# Patient Record
Sex: MALE | Race: BLACK OR AFRICAN AMERICAN | NOT HISPANIC OR LATINO | Employment: UNEMPLOYED | URBAN - METROPOLITAN AREA
[De-identification: names, ages, dates, MRNs, and addresses within clinical notes are randomized per-mention and may not be internally consistent; named-entity substitution may affect disease eponyms.]

---

## 2022-08-05 ENCOUNTER — TELEPHONE (OUTPATIENT)
Dept: TRANSPLANT | Facility: CLINIC | Age: 57
End: 2022-08-05
Payer: MEDICAID

## 2022-08-05 DIAGNOSIS — I50.9 CONGESTIVE HEART FAILURE, UNSPECIFIED HF CHRONICITY, UNSPECIFIED HEART FAILURE TYPE: Primary | ICD-10-CM

## 2022-09-13 ENCOUNTER — HOSPITAL ENCOUNTER (OUTPATIENT)
Dept: PULMONOLOGY | Facility: CLINIC | Age: 57
Discharge: HOME | End: 2022-09-13
Attending: INTERNAL MEDICINE
Payer: MEDICAID

## 2022-09-13 VITALS — HEIGHT: 71 IN | WEIGHT: 170 LBS | BODY MASS INDEX: 23.8 KG/M2

## 2022-09-13 DIAGNOSIS — I50.9 CONGESTIVE HEART FAILURE, UNSPECIFIED HF CHRONICITY, UNSPECIFIED HEART FAILURE TYPE: ICD-10-CM

## 2022-09-13 PROCEDURE — 94618 PULMONARY STRESS TESTING: ICD-10-PCS | Mod: 26,S$PBB,NTX, | Performed by: INTERNAL MEDICINE

## 2022-09-13 PROCEDURE — 94618 PULMONARY STRESS TESTING: CPT | Mod: PBBFAC,NTX | Performed by: INTERNAL MEDICINE

## 2022-09-13 PROCEDURE — 94618 PULMONARY STRESS TESTING: CPT | Mod: 26,S$PBB,NTX, | Performed by: INTERNAL MEDICINE

## 2022-09-13 NOTE — PROCEDURES
Tera Griffiths is a 57 y.o.  male patient, who presents for a 6 minute walk test ordered by MD Waldemar.  The diagnosis is Pre Heart Transplant; Cardiomyopathy.  The patient's BMI is 23.7 kg/m2.  Predicted distance (lower limit of normal) is 472.34 meters.      Test Results:    The test was completed without stopping.  The total time walked was 360 seconds.  During walking, the patient reported:  Lightheadedness; Dyspnea; Chest Tightness.  The patient used no assistive devices during testing.     09/13/2022---------Distance: 243.84 meters (800 feet)     O2 Sat % Supplemental Oxygen Heart Rate Blood Pressure Violeta Scale   Pre-exercise  (Resting) 100 % Room Air 83 bpm 151/119 mmHg 5-6   During Exercise 94 % Room Air 87 bpm 142/105 mmHg 5-6   Post-exercise  (Recovery) 96 % Room Air  76 bpm       Recovery Time: 113 seconds    Performing nurse/tech: Vipin LIN      PREVIOUS STUDY:   The patient has not had a previous study.      CLINICAL INTERPRETATION:  Six minute walk distance is 243.84 meters (800 feet) with heavy dyspnea.  During exercise, there was significant desaturation while breathing room air.  Both blood pressure and heart rate remained stable with walking.  Hypertension was present prior to exercise.  The patient reported non-pulmonary symptoms during exercise.  Significant exercise impairment is likely due to cardiovascular causes and subjective symptoms.  The patient did complete the study, walking 360 seconds of the 360 second test.  No previous study performed.  Based upon age and body mass index, exercise capacity is less than predicted.

## 2022-09-14 ENCOUNTER — TELEPHONE (OUTPATIENT)
Dept: TRANSPLANT | Facility: CLINIC | Age: 57
End: 2022-09-14
Payer: MEDICAID

## 2022-09-14 NOTE — TELEPHONE ENCOUNTER
I called Tera Griffiths and queried about his missed appointments yesterday- pt was unaware of his MD appts, but came to his 6 min walk and lab.  Asked to reschedule his consult.

## 2022-09-23 ENCOUNTER — HOSPITAL ENCOUNTER (OUTPATIENT)
Facility: HOSPITAL | Age: 57
LOS: 1 days | Discharge: HOME | End: 2022-09-24
Attending: INTERNAL MEDICINE | Admitting: INTERNAL MEDICINE
Payer: MEDICAID

## 2022-09-23 ENCOUNTER — OFFICE VISIT (OUTPATIENT)
Dept: TRANSPLANT | Facility: CLINIC | Age: 57
End: 2022-09-23
Payer: MEDICAID

## 2022-09-23 VITALS
BODY MASS INDEX: 24.93 KG/M2 | DIASTOLIC BLOOD PRESSURE: 89 MMHG | HEART RATE: 69 BPM | SYSTOLIC BLOOD PRESSURE: 147 MMHG | HEIGHT: 72 IN | WEIGHT: 184.06 LBS

## 2022-09-23 DIAGNOSIS — I50.9 ACUTE DECOMPENSATED HEART FAILURE: ICD-10-CM

## 2022-09-23 DIAGNOSIS — I50.43 ACUTE ON CHRONIC COMBINED SYSTOLIC AND DIASTOLIC HEART FAILURE: ICD-10-CM

## 2022-09-23 DIAGNOSIS — I25.2 HX OF NON-ST ELEVATION MYOCARDIAL INFARCTION (NSTEMI): ICD-10-CM

## 2022-09-23 DIAGNOSIS — Z95.810 ICD (IMPLANTABLE CARDIOVERTER-DEFIBRILLATOR) IN PLACE: ICD-10-CM

## 2022-09-23 DIAGNOSIS — I10 PRIMARY HYPERTENSION: ICD-10-CM

## 2022-09-23 DIAGNOSIS — I50.9 CONGESTIVE HEART FAILURE, UNSPECIFIED HF CHRONICITY, UNSPECIFIED HEART FAILURE TYPE: Primary | ICD-10-CM

## 2022-09-23 DIAGNOSIS — I25.10 CORONARY ARTERY DISEASE INVOLVING NATIVE CORONARY ARTERY OF NATIVE HEART WITHOUT ANGINA PECTORIS: ICD-10-CM

## 2022-09-23 DIAGNOSIS — I50.9 CONGESTIVE HEART FAILURE (CHF): ICD-10-CM

## 2022-09-23 DIAGNOSIS — E78.5 DYSLIPIDEMIA: ICD-10-CM

## 2022-09-23 DIAGNOSIS — I50.9 CONGESTIVE HEART FAILURE: ICD-10-CM

## 2022-09-23 DIAGNOSIS — E08.00 DIABETES MELLITUS DUE TO UNDERLYING CONDITION WITH HYPEROSMOLARITY WITHOUT COMA, WITHOUT LONG-TERM CURRENT USE OF INSULIN: ICD-10-CM

## 2022-09-23 DIAGNOSIS — I49.9 ABNORMAL HEART RHYTHM: ICD-10-CM

## 2022-09-23 DIAGNOSIS — I48.19 PERSISTENT ATRIAL FIBRILLATION: ICD-10-CM

## 2022-09-23 PROBLEM — I46.9 CARDIAC ARREST WITH VENTRICULAR FIBRILLATION: Status: ACTIVE | Noted: 2022-09-23

## 2022-09-23 PROBLEM — I49.01 CARDIAC ARREST WITH VENTRICULAR FIBRILLATION: Status: ACTIVE | Noted: 2022-09-23

## 2022-09-23 PROBLEM — I48.91 ATRIAL FIBRILLATION: Status: ACTIVE | Noted: 2022-09-23

## 2022-09-23 PROBLEM — E11.9 DIABETES MELLITUS: Status: ACTIVE | Noted: 2022-09-23

## 2022-09-23 LAB
ALBUMIN SERPL BCP-MCNC: 4.1 G/DL (ref 3.5–5.2)
ALP SERPL-CCNC: 75 U/L (ref 55–135)
ALT SERPL W/O P-5'-P-CCNC: 17 U/L (ref 10–44)
AMPHET+METHAMPHET UR QL: NEGATIVE
ANION GAP SERPL CALC-SCNC: 10 MMOL/L (ref 8–16)
APTT BLDCRRT: 28.8 SEC (ref 21–32)
APTT BLDCRRT: 44.8 SEC (ref 21–32)
ASCENDING AORTA: 3.12 CM
AST SERPL-CCNC: 20 U/L (ref 10–40)
AV INDEX (PROSTH): 0.57
AV MEAN GRADIENT: 5 MMHG
AV PEAK GRADIENT: 9 MMHG
AV VALVE AREA: 2.71 CM2
AV VELOCITY RATIO: 0.6
BARBITURATES UR QL SCN>200 NG/ML: NEGATIVE
BASOPHILS # BLD AUTO: 0.02 K/UL (ref 0–0.2)
BASOPHILS NFR BLD: 0.5 % (ref 0–1.9)
BENZODIAZ UR QL SCN>200 NG/ML: NEGATIVE
BILIRUB SERPL-MCNC: 1 MG/DL (ref 0.1–1)
BILIRUB UR QL STRIP: NEGATIVE
BNP SERPL-MCNC: 3916 PG/ML (ref 0–99)
BSA FOR ECHO PROCEDURE: 2.05 M2
BUN SERPL-MCNC: 17 MG/DL (ref 6–20)
BZE UR QL SCN: NEGATIVE
CALCIUM SERPL-MCNC: 9.2 MG/DL (ref 8.7–10.5)
CANNABINOIDS UR QL SCN: NEGATIVE
CHLORIDE SERPL-SCNC: 108 MMOL/L (ref 95–110)
CLARITY UR REFRACT.AUTO: CLEAR
CO2 SERPL-SCNC: 21 MMOL/L (ref 23–29)
COLOR UR AUTO: COLORLESS
CREAT SERPL-MCNC: 1.3 MG/DL (ref 0.5–1.4)
CREAT UR-MCNC: 43 MG/DL (ref 23–375)
CV ECHO LV RWT: 0.36 CM
DIFFERENTIAL METHOD: ABNORMAL
DOP CALC AO PEAK VEL: 1.47 M/S
DOP CALC AO VTI: 24.75 CM
DOP CALC LVOT AREA: 4.8 CM2
DOP CALC LVOT DIAMETER: 2.47 CM
DOP CALC LVOT PEAK VEL: 0.88 M/S
DOP CALC LVOT STROKE VOLUME: 67.05 CM3
DOP CALC MV VTI: 9.22 CM
DOP CALCLVOT PEAK VEL VTI: 14 CM
E/E' RATIO: 18.75 M/S
ECHO LV POSTERIOR WALL: 1.25 CM (ref 0.6–1.1)
EJECTION FRACTION: 15 %
EOSINOPHIL # BLD AUTO: 0.1 K/UL (ref 0–0.5)
EOSINOPHIL NFR BLD: 1.9 % (ref 0–8)
ERYTHROCYTE [DISTWIDTH] IN BLOOD BY AUTOMATED COUNT: 15.8 % (ref 11.5–14.5)
EST. GFR  (NO RACE VARIABLE): >60 ML/MIN/1.73 M^2
ESTIMATED AVG GLUCOSE: 117 MG/DL (ref 68–131)
ETHANOL UR-MCNC: <10 MG/DL
FRACTIONAL SHORTENING: 11 % (ref 28–44)
GLUCOSE SERPL-MCNC: 74 MG/DL (ref 70–110)
GLUCOSE UR QL STRIP: NEGATIVE
HBA1C MFR BLD: 5.7 % (ref 4–5.6)
HCT VFR BLD AUTO: 43.5 % (ref 40–54)
HGB BLD-MCNC: 13.5 G/DL (ref 14–18)
HGB UR QL STRIP: NEGATIVE
IMM GRANULOCYTES # BLD AUTO: 0.01 K/UL (ref 0–0.04)
IMM GRANULOCYTES NFR BLD AUTO: 0.3 % (ref 0–0.5)
INR PPP: 1.2 (ref 0.8–1.2)
INTERVENTRICULAR SEPTUM: 0.77 CM (ref 0.6–1.1)
IVRT: 91.34 MSEC
KETONES UR QL STRIP: NEGATIVE
LA MAJOR: 8.57 CM
LA MINOR: 8.29 CM
LA WIDTH: 5.92 CM
LACTATE SERPL-SCNC: 0.7 MMOL/L (ref 0.5–2.2)
LEFT ATRIUM SIZE: 6.45 CM
LEFT ATRIUM VOLUME INDEX MOD: 93.2 ML/M2
LEFT ATRIUM VOLUME INDEX: 133.4 ML/M2
LEFT ATRIUM VOLUME MOD: 191.09 CM3
LEFT ATRIUM VOLUME: 273.53 CM3
LEFT INTERNAL DIMENSION IN SYSTOLE: 6.16 CM (ref 2.1–4)
LEFT VENTRICLE DIASTOLIC VOLUME INDEX: 120.66 ML/M2
LEFT VENTRICLE DIASTOLIC VOLUME: 247.35 ML
LEFT VENTRICLE MASS INDEX: 155 G/M2
LEFT VENTRICLE SYSTOLIC VOLUME INDEX: 93.4 ML/M2
LEFT VENTRICLE SYSTOLIC VOLUME: 191.42 ML
LEFT VENTRICULAR INTERNAL DIMENSION IN DIASTOLE: 6.9 CM (ref 3.5–6)
LEFT VENTRICULAR MASS: 317.78 G
LEUKOCYTE ESTERASE UR QL STRIP: NEGATIVE
LV LATERAL E/E' RATIO: 18.75 M/S
LV SEPTAL E/E' RATIO: 18.75 M/S
LYMPHOCYTES # BLD AUTO: 1 K/UL (ref 1–4.8)
LYMPHOCYTES NFR BLD: 27.6 % (ref 18–48)
MAGNESIUM SERPL-MCNC: 1.9 MG/DL (ref 1.6–2.6)
MCH RBC QN AUTO: 27.6 PG (ref 27–31)
MCHC RBC AUTO-ENTMCNC: 31 G/DL (ref 32–36)
MCV RBC AUTO: 89 FL (ref 82–98)
METHADONE UR QL SCN>300 NG/ML: NEGATIVE
MONOCYTES # BLD AUTO: 0.4 K/UL (ref 0.3–1)
MONOCYTES NFR BLD: 11.9 % (ref 4–15)
MV MEAN GRADIENT: 0 MMHG
MV PEAK E VEL: 0.75 M/S
MV PEAK GRADIENT: 1 MMHG
MV STENOSIS PRESSURE HALF TIME: 82.36 MS
MV VALVE AREA BY CONTINUITY EQUATION: 7.27 CM2
MV VALVE AREA P 1/2 METHOD: 2.67 CM2
NEUTROPHILS # BLD AUTO: 2.1 K/UL (ref 1.8–7.7)
NEUTROPHILS NFR BLD: 57.8 % (ref 38–73)
NITRITE UR QL STRIP: NEGATIVE
NRBC BLD-RTO: 0 /100 WBC
OPIATES UR QL SCN: NEGATIVE
PCP UR QL SCN>25 NG/ML: NEGATIVE
PH UR STRIP: 7 [PH] (ref 5–8)
PHOSPHATE SERPL-MCNC: 2.9 MG/DL (ref 2.7–4.5)
PISA MRMAX VEL: 0.05 M/S
PISA TR MAX VEL: 2.86 M/S
PLATELET # BLD AUTO: 262 K/UL (ref 150–450)
PMV BLD AUTO: 10.7 FL (ref 9.2–12.9)
POCT GLUCOSE: 63 MG/DL (ref 70–110)
POCT GLUCOSE: 82 MG/DL (ref 70–110)
POCT GLUCOSE: 85 MG/DL (ref 70–110)
POTASSIUM SERPL-SCNC: 4.3 MMOL/L (ref 3.5–5.1)
PROT SERPL-MCNC: 7.3 G/DL (ref 6–8.4)
PROT UR QL STRIP: NEGATIVE
PROTHROMBIN TIME: 11.9 SEC (ref 9–12.5)
PULM VEIN S/D RATIO: 0.32
PV PEAK D VEL: 0.63 M/S
PV PEAK S VEL: 0.2 M/S
RA MAJOR: 8.26 CM
RA PRESSURE: 8 MMHG
RA WIDTH: 5.85 CM
RBC # BLD AUTO: 4.89 M/UL (ref 4.6–6.2)
RIGHT VENTRICULAR END-DIASTOLIC DIMENSION: 4.65 CM
RV TISSUE DOPPLER FREE WALL SYSTOLIC VELOCITY 1 (APICAL 4 CHAMBER VIEW): 11.13 CM/S
SINUS: 3.12 CM
SODIUM SERPL-SCNC: 139 MMOL/L (ref 136–145)
SP GR UR STRIP: 1.01 (ref 1–1.03)
STJ: 2.66 CM
TDI LATERAL: 0.04 M/S
TDI SEPTAL: 0.04 M/S
TDI: 0.04 M/S
TOXICOLOGY INFORMATION: NORMAL
TR MAX PG: 33 MMHG
TRICUSPID ANNULAR PLANE SYSTOLIC EXCURSION: 1.19 CM
TROPONIN I SERPL DL<=0.01 NG/ML-MCNC: 0.02 NG/ML (ref 0–0.03)
TV REST PULMONARY ARTERY PRESSURE: 41 MMHG
URN SPEC COLLECT METH UR: ABNORMAL
WBC # BLD AUTO: 3.7 K/UL (ref 3.9–12.7)

## 2022-09-23 PROCEDURE — 85025 COMPLETE CBC W/AUTO DIFF WBC: CPT | Mod: NTX | Performed by: STUDENT IN AN ORGANIZED HEALTH CARE EDUCATION/TRAINING PROGRAM

## 2022-09-23 PROCEDURE — 99218 PR INITIAL OBSERVATION CARE,LEVL I: CPT | Mod: NTX,,, | Performed by: INTERNAL MEDICINE

## 2022-09-23 PROCEDURE — G0378 HOSPITAL OBSERVATION PER HR: HCPCS | Mod: NTX

## 2022-09-23 PROCEDURE — 25000003 PHARM REV CODE 250: Mod: NTX | Performed by: STUDENT IN AN ORGANIZED HEALTH CARE EDUCATION/TRAINING PROGRAM

## 2022-09-23 PROCEDURE — 3077F SYST BP >= 140 MM HG: CPT | Mod: CPTII,TXP,, | Performed by: INTERNAL MEDICINE

## 2022-09-23 PROCEDURE — 1159F MED LIST DOCD IN RCRD: CPT | Mod: CPTII,TXP,, | Performed by: INTERNAL MEDICINE

## 2022-09-23 PROCEDURE — 85730 THROMBOPLASTIN TIME PARTIAL: CPT | Mod: 91,NTX | Performed by: INTERNAL MEDICINE

## 2022-09-23 PROCEDURE — 85610 PROTHROMBIN TIME: CPT | Mod: NTX | Performed by: STUDENT IN AN ORGANIZED HEALTH CARE EDUCATION/TRAINING PROGRAM

## 2022-09-23 PROCEDURE — 3008F BODY MASS INDEX DOCD: CPT | Mod: CPTII,TXP,, | Performed by: INTERNAL MEDICINE

## 2022-09-23 PROCEDURE — 99999 PR PBB SHADOW E&M-EST. PATIENT-LVL III: CPT | Mod: PBBFAC,TXP,, | Performed by: INTERNAL MEDICINE

## 2022-09-23 PROCEDURE — 36415 COLL VENOUS BLD VENIPUNCTURE: CPT | Mod: NTX | Performed by: STUDENT IN AN ORGANIZED HEALTH CARE EDUCATION/TRAINING PROGRAM

## 2022-09-23 PROCEDURE — 4010F PR ACE/ARB THEARPY RXD/TAKEN: ICD-10-PCS | Mod: CPTII,TXP,, | Performed by: INTERNAL MEDICINE

## 2022-09-23 PROCEDURE — 63600175 PHARM REV CODE 636 W HCPCS: Mod: NTX | Performed by: STUDENT IN AN ORGANIZED HEALTH CARE EDUCATION/TRAINING PROGRAM

## 2022-09-23 PROCEDURE — 3079F PR MOST RECENT DIASTOLIC BLOOD PRESSURE 80-89 MM HG: ICD-10-PCS | Mod: CPTII,TXP,, | Performed by: INTERNAL MEDICINE

## 2022-09-23 PROCEDURE — 84100 ASSAY OF PHOSPHORUS: CPT | Mod: NTX | Performed by: STUDENT IN AN ORGANIZED HEALTH CARE EDUCATION/TRAINING PROGRAM

## 2022-09-23 PROCEDURE — 3079F DIAST BP 80-89 MM HG: CPT | Mod: CPTII,TXP,, | Performed by: INTERNAL MEDICINE

## 2022-09-23 PROCEDURE — 1159F PR MEDICATION LIST DOCUMENTED IN MEDICAL RECORD: ICD-10-PCS | Mod: CPTII,TXP,, | Performed by: INTERNAL MEDICINE

## 2022-09-23 PROCEDURE — 4010F ACE/ARB THERAPY RXD/TAKEN: CPT | Mod: CPTII,TXP,, | Performed by: INTERNAL MEDICINE

## 2022-09-23 PROCEDURE — 83735 ASSAY OF MAGNESIUM: CPT | Mod: NTX | Performed by: STUDENT IN AN ORGANIZED HEALTH CARE EDUCATION/TRAINING PROGRAM

## 2022-09-23 PROCEDURE — 83605 ASSAY OF LACTIC ACID: CPT | Mod: NTX | Performed by: STUDENT IN AN ORGANIZED HEALTH CARE EDUCATION/TRAINING PROGRAM

## 2022-09-23 PROCEDURE — 99204 OFFICE O/P NEW MOD 45 MIN: CPT | Mod: S$PBB,TXP,, | Performed by: INTERNAL MEDICINE

## 2022-09-23 PROCEDURE — 93005 ELECTROCARDIOGRAM TRACING: CPT | Mod: NTX

## 2022-09-23 PROCEDURE — 36415 COLL VENOUS BLD VENIPUNCTURE: CPT | Mod: NTX | Performed by: INTERNAL MEDICINE

## 2022-09-23 PROCEDURE — 3008F PR BODY MASS INDEX (BMI) DOCUMENTED: ICD-10-PCS | Mod: CPTII,TXP,, | Performed by: INTERNAL MEDICINE

## 2022-09-23 PROCEDURE — 3077F PR MOST RECENT SYSTOLIC BLOOD PRESSURE >= 140 MM HG: ICD-10-PCS | Mod: CPTII,TXP,, | Performed by: INTERNAL MEDICINE

## 2022-09-23 PROCEDURE — 93010 ELECTROCARDIOGRAM REPORT: CPT | Mod: NTX,,, | Performed by: STUDENT IN AN ORGANIZED HEALTH CARE EDUCATION/TRAINING PROGRAM

## 2022-09-23 PROCEDURE — 93010 EKG 12-LEAD: ICD-10-PCS | Mod: NTX,,, | Performed by: STUDENT IN AN ORGANIZED HEALTH CARE EDUCATION/TRAINING PROGRAM

## 2022-09-23 PROCEDURE — 83036 HEMOGLOBIN GLYCOSYLATED A1C: CPT | Mod: NTX | Performed by: STUDENT IN AN ORGANIZED HEALTH CARE EDUCATION/TRAINING PROGRAM

## 2022-09-23 PROCEDURE — 93010 EKG 12-LEAD: ICD-10-PCS | Mod: NTX,,, | Performed by: INTERNAL MEDICINE

## 2022-09-23 PROCEDURE — 99218 PR INITIAL OBSERVATION CARE,LEVL I: ICD-10-PCS | Mod: NTX,,, | Performed by: INTERNAL MEDICINE

## 2022-09-23 PROCEDURE — 85730 THROMBOPLASTIN TIME PARTIAL: CPT | Mod: NTX | Performed by: STUDENT IN AN ORGANIZED HEALTH CARE EDUCATION/TRAINING PROGRAM

## 2022-09-23 PROCEDURE — 80307 DRUG TEST PRSMV CHEM ANLYZR: CPT | Mod: NTX | Performed by: STUDENT IN AN ORGANIZED HEALTH CARE EDUCATION/TRAINING PROGRAM

## 2022-09-23 PROCEDURE — 99213 OFFICE O/P EST LOW 20 MIN: CPT | Mod: PBBFAC,TXP,25 | Performed by: INTERNAL MEDICINE

## 2022-09-23 PROCEDURE — 99999 PR PBB SHADOW E&M-EST. PATIENT-LVL III: ICD-10-PCS | Mod: PBBFAC,TXP,, | Performed by: INTERNAL MEDICINE

## 2022-09-23 PROCEDURE — 84484 ASSAY OF TROPONIN QUANT: CPT | Mod: NTX | Performed by: STUDENT IN AN ORGANIZED HEALTH CARE EDUCATION/TRAINING PROGRAM

## 2022-09-23 PROCEDURE — 81003 URINALYSIS AUTO W/O SCOPE: CPT | Mod: NTX | Performed by: STUDENT IN AN ORGANIZED HEALTH CARE EDUCATION/TRAINING PROGRAM

## 2022-09-23 PROCEDURE — 93010 ELECTROCARDIOGRAM REPORT: CPT | Mod: NTX,,, | Performed by: INTERNAL MEDICINE

## 2022-09-23 PROCEDURE — 94761 N-INVAS EAR/PLS OXIMETRY MLT: CPT | Mod: NTX

## 2022-09-23 PROCEDURE — 80053 COMPREHEN METABOLIC PANEL: CPT | Mod: NTX | Performed by: STUDENT IN AN ORGANIZED HEALTH CARE EDUCATION/TRAINING PROGRAM

## 2022-09-23 PROCEDURE — 83880 ASSAY OF NATRIURETIC PEPTIDE: CPT | Mod: NTX | Performed by: STUDENT IN AN ORGANIZED HEALTH CARE EDUCATION/TRAINING PROGRAM

## 2022-09-23 PROCEDURE — 99204 PR OFFICE/OUTPT VISIT, NEW, LEVL IV, 45-59 MIN: ICD-10-PCS | Mod: S$PBB,TXP,, | Performed by: INTERNAL MEDICINE

## 2022-09-23 RX ORDER — IBUPROFEN 200 MG
16 TABLET ORAL
Status: DISCONTINUED | OUTPATIENT
Start: 2022-09-23 | End: 2022-09-24 | Stop reason: HOSPADM

## 2022-09-23 RX ORDER — TORSEMIDE 20 MG/1
40 TABLET ORAL
COMMUNITY
Start: 2022-08-05 | End: 2022-11-07 | Stop reason: SDUPTHER

## 2022-09-23 RX ORDER — ASPIRIN 81 MG/1
81 TABLET ORAL DAILY
Status: DISCONTINUED | OUTPATIENT
Start: 2022-09-23 | End: 2022-09-24 | Stop reason: HOSPADM

## 2022-09-23 RX ORDER — HEPARIN SODIUM,PORCINE/D5W 25000/250
0-40 INTRAVENOUS SOLUTION INTRAVENOUS CONTINUOUS
Status: DISCONTINUED | OUTPATIENT
Start: 2022-09-23 | End: 2022-09-24 | Stop reason: HOSPADM

## 2022-09-23 RX ORDER — FUROSEMIDE 10 MG/ML
40 INJECTION INTRAMUSCULAR; INTRAVENOUS ONCE
Status: COMPLETED | OUTPATIENT
Start: 2022-09-23 | End: 2022-09-23

## 2022-09-23 RX ORDER — TAMSULOSIN HYDROCHLORIDE 0.4 MG/1
1 CAPSULE ORAL NIGHTLY
Status: ON HOLD | COMMUNITY
Start: 2022-07-31 | End: 2023-04-19

## 2022-09-23 RX ORDER — GLUCAGON 1 MG
1 KIT INJECTION
Status: DISCONTINUED | OUTPATIENT
Start: 2022-09-23 | End: 2022-09-24 | Stop reason: HOSPADM

## 2022-09-23 RX ORDER — TAMSULOSIN HYDROCHLORIDE 0.4 MG/1
1 CAPSULE ORAL NIGHTLY
Status: DISCONTINUED | OUTPATIENT
Start: 2022-09-23 | End: 2022-09-24 | Stop reason: HOSPADM

## 2022-09-23 RX ORDER — ATORVASTATIN CALCIUM 80 MG/1
80 TABLET, FILM COATED ORAL DAILY
Status: ON HOLD | COMMUNITY
Start: 2022-07-31 | End: 2023-04-22 | Stop reason: SDUPTHER

## 2022-09-23 RX ORDER — SODIUM CHLORIDE 0.9 % (FLUSH) 0.9 %
10 SYRINGE (ML) INJECTION
Status: DISCONTINUED | OUTPATIENT
Start: 2022-09-23 | End: 2022-09-24 | Stop reason: HOSPADM

## 2022-09-23 RX ORDER — MAGNESIUM SULFATE HEPTAHYDRATE 40 MG/ML
2 INJECTION, SOLUTION INTRAVENOUS ONCE
Status: COMPLETED | OUTPATIENT
Start: 2022-09-23 | End: 2022-09-23

## 2022-09-23 RX ORDER — CLOPIDOGREL BISULFATE 75 MG/1
75 TABLET ORAL DAILY
Status: ON HOLD | COMMUNITY
Start: 2022-07-31 | End: 2023-04-22 | Stop reason: SDUPTHER

## 2022-09-23 RX ORDER — CLOPIDOGREL BISULFATE 75 MG/1
75 TABLET ORAL DAILY
Status: DISCONTINUED | OUTPATIENT
Start: 2022-09-23 | End: 2022-09-23

## 2022-09-23 RX ORDER — FUROSEMIDE 10 MG/ML
40 INJECTION INTRAMUSCULAR; INTRAVENOUS ONCE
Status: DISCONTINUED | OUTPATIENT
Start: 2022-09-23 | End: 2022-09-23

## 2022-09-23 RX ORDER — INSULIN ASPART 100 [IU]/ML
0-5 INJECTION, SOLUTION INTRAVENOUS; SUBCUTANEOUS
Status: DISCONTINUED | OUTPATIENT
Start: 2022-09-23 | End: 2022-09-24 | Stop reason: HOSPADM

## 2022-09-23 RX ORDER — ATORVASTATIN CALCIUM 20 MG/1
80 TABLET, FILM COATED ORAL DAILY
Status: DISCONTINUED | OUTPATIENT
Start: 2022-09-23 | End: 2022-09-24 | Stop reason: HOSPADM

## 2022-09-23 RX ORDER — LANOLIN ALCOHOL/MO/W.PET/CERES
1 CREAM (GRAM) TOPICAL DAILY
Status: DISCONTINUED | OUTPATIENT
Start: 2022-09-23 | End: 2022-09-24 | Stop reason: HOSPADM

## 2022-09-23 RX ORDER — LIDOCAINE 50 MG/G
1 PATCH TOPICAL DAILY
Status: ON HOLD | COMMUNITY
Start: 2022-07-31 | End: 2023-09-01 | Stop reason: HOSPADM

## 2022-09-23 RX ORDER — PANTOPRAZOLE SODIUM 40 MG/1
40 TABLET, DELAYED RELEASE ORAL DAILY
Status: ON HOLD | COMMUNITY
Start: 2022-07-31 | End: 2023-04-19

## 2022-09-23 RX ORDER — GLIMEPIRIDE 4 MG/1
4 TABLET ORAL EVERY MORNING
Status: ON HOLD | COMMUNITY
Start: 2022-07-31 | End: 2022-09-24 | Stop reason: HOSPADM

## 2022-09-23 RX ORDER — SACUBITRIL AND VALSARTAN 24; 26 MG/1; MG/1
1 TABLET, FILM COATED ORAL 2 TIMES DAILY
COMMUNITY
Start: 2022-07-31 | End: 2022-11-07

## 2022-09-23 RX ORDER — SPIRONOLACTONE 25 MG/1
25 TABLET ORAL DAILY
Status: ON HOLD | COMMUNITY
Start: 2022-07-31 | End: 2023-04-19

## 2022-09-23 RX ORDER — FERROUS SULFATE 325(65) MG
325 TABLET ORAL EVERY OTHER DAY
Status: ON HOLD | COMMUNITY
Start: 2022-08-05 | End: 2023-09-01 | Stop reason: HOSPADM

## 2022-09-23 RX ORDER — IBUPROFEN 200 MG
24 TABLET ORAL
Status: DISCONTINUED | OUTPATIENT
Start: 2022-09-23 | End: 2022-09-24 | Stop reason: HOSPADM

## 2022-09-23 RX ADMIN — Medication 16 G: at 12:09

## 2022-09-23 RX ADMIN — HEPARIN SODIUM 12 UNITS/KG/HR: 10000 INJECTION, SOLUTION INTRAVENOUS at 04:09

## 2022-09-23 RX ADMIN — MAGNESIUM SULFATE HEPTAHYDRATE 2 G: 40 INJECTION, SOLUTION INTRAVENOUS at 08:09

## 2022-09-23 RX ADMIN — SACUBITRIL AND VALSARTAN 1 TABLET: 24; 26 TABLET, FILM COATED ORAL at 08:09

## 2022-09-23 RX ADMIN — FUROSEMIDE 40 MG: 10 INJECTION, SOLUTION INTRAMUSCULAR; INTRAVENOUS at 12:09

## 2022-09-23 RX ADMIN — TAMSULOSIN HYDROCHLORIDE 0.4 MG: 0.4 CAPSULE ORAL at 08:09

## 2022-09-23 RX ADMIN — ASPIRIN 81 MG: 81 TABLET, COATED ORAL at 03:09

## 2022-09-23 RX ADMIN — FERROUS SULFATE TAB 325 MG (65 MG ELEMENTAL FE) 1 EACH: 325 (65 FE) TAB at 12:09

## 2022-09-23 RX ADMIN — ATORVASTATIN CALCIUM 80 MG: 20 TABLET, FILM COATED ORAL at 12:09

## 2022-09-23 NOTE — H&P (VIEW-ONLY)
[facility] - Cardiology Stepdown  Heart Transplant  H&P    Patient Name: Tera Griffiths  MRN: [id number]  Admission Date: 9/23/2022  Attending Physician: Caden Aden MD  Primary Care Provider: Primary Doctor No  Principal Problem:<principal problem not specified>    Subjective:     History of Present Illness:  Patient is a 58 yo man with PMH/o DM type 2, dyslipidemia, HTN, CAD with NSTEMI and PCI to RCA 8/2021 complicated with VF arrest, chronic HFrEF 10%  was seen today in Advanced Heart failure and Transplant clinic by Dr. Javier Domingo . Pt was apparently referred by [facility] for  persistent severe symptoms on maximal tolerated therapy since several years.     He presents to clinic with worsening SOB and LE pedal edema since 2 days and was sent to [facility] HT team for admission. On further questioning pt states that he has been gaining weight over 12 pounds over the last 2-3 wks with symptoms of worsening sob and LE edema along with orthopnea, pnd, abdominal bloating, low apetite.  Denies any cp , fever, chills, diet/med noncompliance. He has had multiple hospital admissions for CHF exacerbations over the past 6 months and is unable to walk more than half a block without getting short of breath.       Past Medical History:   Diagnosis Date    Atrial fibrillation     CHF (congestive heart failure)     Coronary atherosclerosis of native coronary artery     Diabetes mellitus, type 2     Hypertension        No past surgical history on file.    Review of patient's allergies indicates:  No Known Allergies    Current Facility-Administered Medications   Medication    aspirin EC tablet 81 mg    atorvastatin tablet 80 mg    dextrose 10% bolus 125 mL    ferrous sulfate tablet 1 each    glucagon (human recombinant) injection 1 mg    glucose chewable tablet 16 g    glucose chewable tablet 24 g    heparin 25,000 units in dextrose 5% (100 units/ml) IV bolus from bag - ADDITIONAL PRN BOLUS - 30 units/kg     heparin 25,000 units in dextrose 5% (100 units/ml) IV bolus from bag - ADDITIONAL PRN BOLUS - 60 units/kg    heparin 25,000 units in dextrose 5% (100 units/ml) IV bolus from bag INITIAL BOLUS    heparin 25,000 units in dextrose 5% 250 mL (100 units/mL) infusion LOW INTENSITY nomogram - [facility]    insulin aspart U-100 pen 0-5 Units    sacubitriL-valsartan 24-26 mg per tablet 1 tablet    sodium chloride 0.9% flush 10 mL    tamsulosin 24 hr capsule 0.4 mg     Family History    None       Tobacco Use    Smoking status: Never    Smokeless tobacco: Never   Substance and Sexual Activity    Alcohol use: Never    Drug use: Not on file    Sexual activity: Not on file     Review of Systems   Constitutional:  Positive for appetite change, fatigue and unexpected weight change.   Respiratory:  Positive for shortness of breath.    Cardiovascular:  Positive for leg swelling. Negative for chest pain.   Gastrointestinal:  Positive for abdominal distention. Negative for abdominal pain, anal bleeding, blood in stool, diarrhea and vomiting.   Genitourinary:  Negative for difficulty urinating and dysuria.   Neurological:  Positive for light-headedness. Negative for dizziness.   Objective:     Vital Signs (Most Recent):  Temp: 97.7 °F (36.5 °C) (09/23/22 1248)  Pulse: 80 (09/23/22 1248)  Resp: 20 (09/23/22 1248)  BP: (!) 149/99 (09/23/22 1248)  SpO2: 96 % (09/23/22 1248)   Vital Signs (24h Range):  Temp:  [97.6 °F (36.4 °C)-97.7 °F (36.5 °C)] 97.7 °F (36.5 °C)  Pulse:  [69-80] 80  Resp:  [20] 20  SpO2:  [96 %] 96 %  BP: (143-152)/() 149/99     Patient Vitals for the past 72 hrs (Last 3 readings):   Weight   09/23/22 1024 83.1 kg (183 lb 3.2 oz)     Body mass index is 24.85 kg/m².      Intake/Output Summary (Last 24 hours) at 9/23/2022 1420  Last data filed at 9/23/2022 1301  Gross per 24 hour   Intake --   Output 150 ml   Net -150 ml       Physical Exam  Constitutional:       Appearance: Normal appearance.   HENT:      Head:  Normocephalic and atraumatic.   Eyes:      Extraocular Movements: Extraocular movements intact.      Pupils: Pupils are equal, round, and reactive to light.   Cardiovascular:      Rate and Rhythm: Normal rate. Rhythm irregular.      Heart sounds:     Gallop present.   Pulmonary:      Effort: Respiratory distress present.      Breath sounds: No stridor. No wheezing, rhonchi or rales.   Chest:      Chest wall: No tenderness.   Abdominal:      General: Bowel sounds are normal. There is distension.      Palpations: Abdomen is soft.      Tenderness: There is abdominal tenderness. There is no guarding.   Musculoskeletal:         General: Swelling present.      Right lower leg: Edema present.      Left lower leg: Edema present.   Skin:     General: Skin is warm.   Neurological:      General: No focal deficit present.      Mental Status: He is alert and oriented to person, place, and time.   Psychiatric:         Mood and Affect: Mood normal.       Significant Labs:  CBC:  Recent Labs   Lab 09/23/22  1124   WBC 3.70*   RBC 4.89   HGB 13.5*   HCT 43.5      MCV 89   MCH 27.6   MCHC 31.0*     BNP:  Recent Labs   Lab 09/23/22  1124   BNP 3,916*     CMP:  Recent Labs   Lab 09/23/22  1124   GLU 74   CALCIUM 9.2   ALBUMIN 4.1   PROT 7.3      K 4.3   CO2 21*      BUN 17   CREATININE 1.3   ALKPHOS 75   ALT 17   AST 20   BILITOT 1.0      Coagulation:   No results for input(s): PT, INR, APTT in the last 168 hours.  LDH:  No results for input(s): LDH in the last 72 hours.  Microbiology:  Microbiology Results (last 7 days)       ** No results found for the last 168 hours. **            I have reviewed all pertinent labs within the past 24 hours.    Diagnostic Results:  I have reviewed and interpreted all pertinent imaging results/findings within the past 24 hours.    Assessment/Plan:   58yo with Advanced Chronic Systolic HF EF 10%, PAF on eliquis, HTN, NIDDM2, HLD, NSTEMI with PCI to RCA 8/2021 with V fib arrest, AICD  placement 10/2021 who presents with worsening LE edema and SOB sice 2 days. Pt has had multiple hospitalizations in the past 6 months with persistent symptoms despite maximal medical therapy.     #Acute on Chronic Systolic HF exacerbation  #Paroxysmal Afib   #CAD with PCI to RCA  8/21  #H/o V fib arrest 8/21  #AICD 10/21  #DM  #HTN  #HLD  #DVT px-on heparin drip    -Hypervolemic on exam, will give 80 mg iv lasix. Strict I and os , cardiac diet 1500 cc fluid restrict.  -Will get an ECHO.  - Hold ELIQUIS and start on heparin drip for Afib.   - will continue Aldactone 25,Entresto .  hold home po diuretic torsemide 20 BID. If bp uncontrolled even after diuresis , will consider increasing entresto or add BIDIL.  -last PCI to RCA in 8/2021 , will replace home med plavix with ASA.   -Once Euvolemic, will reassess and initiate Pathway for HT eval either in hospital vs outpatient.   -continue tele monitoring  - SSI               No notes have been filed under this hospital service.  Service: Transplant Heart      Kayode Adkins MD  Heart Transplant  [facility] - Cardiology Stepdown

## 2022-09-23 NOTE — NURSING
STAT EKG completed, Pt in Afib, provider with HTS stated to monitor if pt goes into Afib RVR to notify HTS team. No further orders.

## 2022-09-23 NOTE — NURSING
Patient arrived to the unit. MD Irish notified. Telemetry box applied and vital signs documented in flow sheet. Identification band placed on patient. Oriented to room, call bell with in reach, bed is in low lock position. Admit completed, plan of care initiated. Will continue to monitor.

## 2022-09-23 NOTE — H&P
[facility] - Cardiology Stepdown  Heart Transplant  H&P    Patient Name: Tera Griffiths  MRN: [id number]  Admission Date: 9/23/2022  Attending Physician: Caden Aden MD  Primary Care Provider: Primary Doctor No  Principal Problem:<principal problem not specified>    Subjective:     History of Present Illness:  Patient is a 56 yo man with PMH/o DM type 2, dyslipidemia, HTN, CAD with NSTEMI and PCI to RCA 8/2021 complicated with VF arrest, chronic HFrEF 10%  was seen today in Advanced Heart failure and Transplant clinic by Dr. Javier Domingo . Pt was apparently referred by [facility] for  persistent severe symptoms on maximal tolerated therapy since several years.     He presents to clinic with worsening SOB and LE pedal edema since 2 days and was sent to [facility] HT team for admission. On further questioning pt states that he has been gaining weight over 12 pounds over the last 2-3 wks with symptoms of worsening sob and LE edema along with orthopnea, pnd, abdominal bloating, low apetite.  Denies any cp , fever, chills, diet/med noncompliance. He has had multiple hospital admissions for CHF exacerbations over the past 6 months and is unable to walk more than half a block without getting short of breath.       Past Medical History:   Diagnosis Date    Atrial fibrillation     CHF (congestive heart failure)     Coronary atherosclerosis of native coronary artery     Diabetes mellitus, type 2     Hypertension        No past surgical history on file.    Review of patient's allergies indicates:  No Known Allergies    Current Facility-Administered Medications   Medication    aspirin EC tablet 81 mg    atorvastatin tablet 80 mg    dextrose 10% bolus 125 mL    ferrous sulfate tablet 1 each    glucagon (human recombinant) injection 1 mg    glucose chewable tablet 16 g    glucose chewable tablet 24 g    heparin 25,000 units in dextrose 5% (100 units/ml) IV bolus from bag - ADDITIONAL PRN BOLUS - 30 units/kg     heparin 25,000 units in dextrose 5% (100 units/ml) IV bolus from bag - ADDITIONAL PRN BOLUS - 60 units/kg    heparin 25,000 units in dextrose 5% (100 units/ml) IV bolus from bag INITIAL BOLUS    heparin 25,000 units in dextrose 5% 250 mL (100 units/mL) infusion LOW INTENSITY nomogram - [facility]    insulin aspart U-100 pen 0-5 Units    sacubitriL-valsartan 24-26 mg per tablet 1 tablet    sodium chloride 0.9% flush 10 mL    tamsulosin 24 hr capsule 0.4 mg     Family History    None       Tobacco Use    Smoking status: Never    Smokeless tobacco: Never   Substance and Sexual Activity    Alcohol use: Never    Drug use: Not on file    Sexual activity: Not on file     Review of Systems   Constitutional:  Positive for appetite change, fatigue and unexpected weight change.   Respiratory:  Positive for shortness of breath.    Cardiovascular:  Positive for leg swelling. Negative for chest pain.   Gastrointestinal:  Positive for abdominal distention. Negative for abdominal pain, anal bleeding, blood in stool, diarrhea and vomiting.   Genitourinary:  Negative for difficulty urinating and dysuria.   Neurological:  Positive for light-headedness. Negative for dizziness.   Objective:     Vital Signs (Most Recent):  Temp: 97.7 °F (36.5 °C) (09/23/22 1248)  Pulse: 80 (09/23/22 1248)  Resp: 20 (09/23/22 1248)  BP: (!) 149/99 (09/23/22 1248)  SpO2: 96 % (09/23/22 1248)   Vital Signs (24h Range):  Temp:  [97.6 °F (36.4 °C)-97.7 °F (36.5 °C)] 97.7 °F (36.5 °C)  Pulse:  [69-80] 80  Resp:  [20] 20  SpO2:  [96 %] 96 %  BP: (143-152)/() 149/99     Patient Vitals for the past 72 hrs (Last 3 readings):   Weight   09/23/22 1024 83.1 kg (183 lb 3.2 oz)     Body mass index is 24.85 kg/m².      Intake/Output Summary (Last 24 hours) at 9/23/2022 1420  Last data filed at 9/23/2022 1301  Gross per 24 hour   Intake --   Output 150 ml   Net -150 ml       Physical Exam  Constitutional:       Appearance: Normal appearance.   HENT:      Head:  Normocephalic and atraumatic.   Eyes:      Extraocular Movements: Extraocular movements intact.      Pupils: Pupils are equal, round, and reactive to light.   Cardiovascular:      Rate and Rhythm: Normal rate. Rhythm irregular.      Heart sounds:     Gallop present.   Pulmonary:      Effort: Respiratory distress present.      Breath sounds: No stridor. No wheezing, rhonchi or rales.   Chest:      Chest wall: No tenderness.   Abdominal:      General: Bowel sounds are normal. There is distension.      Palpations: Abdomen is soft.      Tenderness: There is abdominal tenderness. There is no guarding.   Musculoskeletal:         General: Swelling present.      Right lower leg: Edema present.      Left lower leg: Edema present.   Skin:     General: Skin is warm.   Neurological:      General: No focal deficit present.      Mental Status: He is alert and oriented to person, place, and time.   Psychiatric:         Mood and Affect: Mood normal.       Significant Labs:  CBC:  Recent Labs   Lab 09/23/22  1124   WBC 3.70*   RBC 4.89   HGB 13.5*   HCT 43.5      MCV 89   MCH 27.6   MCHC 31.0*     BNP:  Recent Labs   Lab 09/23/22  1124   BNP 3,916*     CMP:  Recent Labs   Lab 09/23/22  1124   GLU 74   CALCIUM 9.2   ALBUMIN 4.1   PROT 7.3      K 4.3   CO2 21*      BUN 17   CREATININE 1.3   ALKPHOS 75   ALT 17   AST 20   BILITOT 1.0      Coagulation:   No results for input(s): PT, INR, APTT in the last 168 hours.  LDH:  No results for input(s): LDH in the last 72 hours.  Microbiology:  Microbiology Results (last 7 days)       ** No results found for the last 168 hours. **            I have reviewed all pertinent labs within the past 24 hours.    Diagnostic Results:  I have reviewed and interpreted all pertinent imaging results/findings within the past 24 hours.    Assessment/Plan:   58yo with Advanced Chronic Systolic HF EF 10%, PAF on eliquis, HTN, NIDDM2, HLD, NSTEMI with PCI to RCA 8/2021 with V fib arrest, AICD  placement 10/2021 who presents with worsening LE edema and SOB sice 2 days. Pt has had multiple hospitalizations in the past 6 months with persistent symptoms despite maximal medical therapy.     #Acute on Chronic Systolic HF exacerbation  #Paroxysmal Afib   #CAD with PCI to RCA  8/21  #H/o V fib arrest 8/21  #AICD 10/21  #DM  #HTN  #HLD  #DVT px-on heparin drip    -Hypervolemic on exam, will give 80 mg iv lasix. Strict I and os , cardiac diet 1500 cc fluid restrict.  -Will get an ECHO.  - Hold ELIQUIS and start on heparin drip for Afib.   - will continue Aldactone 25,Entresto .  hold home po diuretic torsemide 20 BID. If bp uncontrolled even after diuresis , will consider increasing entresto or add BIDIL.  -last PCI to RCA in 8/2021 , will replace home med plavix with ASA.   -Once Euvolemic, will reassess and initiate Pathway for HT eval either in hospital vs outpatient.   -continue tele monitoring  - SSI               No notes have been filed under this hospital service.  Service: Transplant Heart      Kayode Adkins MD  Heart Transplant  [facility] - Cardiology Stepdown

## 2022-09-23 NOTE — PLAN OF CARE
Problem: Adult Inpatient Plan of Care  Goal: Patient-Specific Goal (Individualized)  Outcome: Ongoing, Progressing  Flowsheets (Taken 9/23/2022 7031)  Anxieties, Fears or Concerns: None voiced  Individualized Care Needs: Monitor Heparin gtt infusing @9.6mL/hr. Aptt due @2200, not therapeutic.Monitor for CP. MOnitor HR and rythm.Pt in Afib, rate has remained controlled.  Patient-Specific Goals (Include Timeframe): Pt to be free of falls and injuries throughout my shift     Plan of care discussed with patient. Patient is free of fall/trauma/injury. Denies CP, SOB, or pain/discomfort. All questions addressed. Will continue to monitor throughout my shift.

## 2022-09-23 NOTE — SUBJECTIVE & OBJECTIVE
Past Medical History:   Diagnosis Date    Atrial fibrillation     CHF (congestive heart failure)     Coronary atherosclerosis of native coronary artery     Diabetes mellitus, type 2     Hypertension        No past surgical history on file.    Review of patient's allergies indicates:  No Known Allergies    Current Facility-Administered Medications   Medication    aspirin EC tablet 81 mg    atorvastatin tablet 80 mg    dextrose 10% bolus 125 mL    ferrous sulfate tablet 1 each    glucagon (human recombinant) injection 1 mg    glucose chewable tablet 16 g    glucose chewable tablet 24 g    heparin 25,000 units in dextrose 5% (100 units/ml) IV bolus from bag - ADDITIONAL PRN BOLUS - 30 units/kg    heparin 25,000 units in dextrose 5% (100 units/ml) IV bolus from bag - ADDITIONAL PRN BOLUS - 60 units/kg    heparin 25,000 units in dextrose 5% (100 units/ml) IV bolus from bag INITIAL BOLUS    heparin 25,000 units in dextrose 5% 250 mL (100 units/mL) infusion LOW INTENSITY nomogram - [facility]    insulin aspart U-100 pen 0-5 Units    sacubitriL-valsartan 24-26 mg per tablet 1 tablet    sodium chloride 0.9% flush 10 mL    tamsulosin 24 hr capsule 0.4 mg     Family History    None       Tobacco Use    Smoking status: Never    Smokeless tobacco: Never   Substance and Sexual Activity    Alcohol use: Never    Drug use: Not on file    Sexual activity: Not on file     Review of Systems   Constitutional:  Positive for appetite change, fatigue and unexpected weight change.   Respiratory:  Positive for shortness of breath.    Cardiovascular:  Positive for leg swelling. Negative for chest pain.   Gastrointestinal:  Positive for abdominal distention. Negative for abdominal pain, anal bleeding, blood in stool, diarrhea and vomiting.   Genitourinary:  Negative for difficulty urinating and dysuria.   Neurological:  Positive for light-headedness. Negative for dizziness.   Objective:     Vital Signs (Most Recent):  Temp: 97.7 °F (36.5 °C)  (09/23/22 1248)  Pulse: 80 (09/23/22 1248)  Resp: 20 (09/23/22 1248)  BP: (!) 149/99 (09/23/22 1248)  SpO2: 96 % (09/23/22 1248)   Vital Signs (24h Range):  Temp:  [97.6 °F (36.4 °C)-97.7 °F (36.5 °C)] 97.7 °F (36.5 °C)  Pulse:  [69-80] 80  Resp:  [20] 20  SpO2:  [96 %] 96 %  BP: (143-152)/() 149/99     Patient Vitals for the past 72 hrs (Last 3 readings):   Weight   09/23/22 1024 83.1 kg (183 lb 3.2 oz)     Body mass index is 24.85 kg/m².      Intake/Output Summary (Last 24 hours) at 9/23/2022 1420  Last data filed at 9/23/2022 1301  Gross per 24 hour   Intake --   Output 150 ml   Net -150 ml       Physical Exam  Constitutional:       Appearance: Normal appearance.   HENT:      Head: Normocephalic and atraumatic.   Eyes:      Extraocular Movements: Extraocular movements intact.      Pupils: Pupils are equal, round, and reactive to light.   Cardiovascular:      Rate and Rhythm: Normal rate. Rhythm irregular.      Heart sounds:     Gallop present.   Pulmonary:      Effort: Respiratory distress present.      Breath sounds: No stridor. No wheezing, rhonchi or rales.   Chest:      Chest wall: No tenderness.   Abdominal:      General: Bowel sounds are normal. There is distension.      Palpations: Abdomen is soft.      Tenderness: There is abdominal tenderness. There is no guarding.   Musculoskeletal:         General: Swelling present.      Right lower leg: Edema present.      Left lower leg: Edema present.   Skin:     General: Skin is warm.   Neurological:      General: No focal deficit present.      Mental Status: He is alert and oriented to person, place, and time.   Psychiatric:         Mood and Affect: Mood normal.       Significant Labs:  CBC:  Recent Labs   Lab 09/23/22  1124   WBC 3.70*   RBC 4.89   HGB 13.5*   HCT 43.5      MCV 89   MCH 27.6   MCHC 31.0*     BNP:  Recent Labs   Lab 09/23/22  1124   BNP 3,916*     CMP:  Recent Labs   Lab 09/23/22  1124   GLU 74   CALCIUM 9.2   ALBUMIN 4.1   PROT 7.3       K 4.3   CO2 21*      BUN 17   CREATININE 1.3   ALKPHOS 75   ALT 17   AST 20   BILITOT 1.0      Coagulation:   No results for input(s): PT, INR, APTT in the last 168 hours.  LDH:  No results for input(s): LDH in the last 72 hours.  Microbiology:  Microbiology Results (last 7 days)       ** No results found for the last 168 hours. **            I have reviewed all pertinent labs within the past 24 hours.    Diagnostic Results:  I have reviewed and interpreted all pertinent imaging results/findings within the past 24 hours.

## 2022-09-23 NOTE — LETTER
September 23, 2022        Rosalio Salazar  [address]  [address]  [place] [address]  Phone: [phone]  Fax: [phone]             [facility]  [address]  [facility] [place] [facility] [address]  Phone: [phone]   Patient: Tera Griffiths   MR Number: [id number]   YOB: 1965   Date of Visit: 9/23/2022       Dear Dr. Rosalio Salazar    Thank you for referring Tear Griffiths to me for evaluation. Attached you will find relevant portions of my assessment and plan of care.    If you have questions, please do not hesitate to call me. I look forward to following Tera Griffiths along with you.    Sincerely,    Daquan Domingo MD    Enclosure    If you would like to receive this communication electronically, please contact [email or web address] or [phone] to request [facility] [employer] access.    [facility] is a tool which provides read-only access to select patient information with whom you have a relationship. Its easy to use and provides real time access to review your patients record including encounter summaries, notes, results, and demographic information.    If you feel you have received this communication in error or would no longer like to receive these types of communications, please e-mail [email or web address]

## 2022-09-23 NOTE — PROGRESS NOTES
Advanced Heart Failure and Transplantation Clinic Follow up.        Attending Physician: Daquan Domingo MD.  The patient's last visit with me was on Visit date not found.         HPI.  Patient is a 58 yo man with PMH/o DM type 2, dyslipidemia, HTN, CAD, RCA NSTEMI complicated with VF arrest and HF in 2021. Patient has HFrEF and LVEF 10%. He has been referred by [facility] cardiologist Dr. Salazar for evaluation for advanced therapies as patient has been having persistent severe symptoms on maximal tolerated therapy. He has had multiple admissions to OSH according to patient, in 2022.     Today he comes with florid symptoms. Gasping for air at rest, after transfering from chair to examining table. He has orthopnea, PND, weight gain, low appetite despite of taking all his medications that includes torsemide 40 mg BID and spironolactone.     Review of Systems   Constitutional:  Positive for activity change, appetite change, fatigue and unexpected weight change. Negative for chills, diaphoresis and fever.   HENT:  Negative for nasal congestion, rhinorrhea and sore throat.    Eyes:  Negative for visual disturbance.   Respiratory:  Positive for cough and shortness of breath.    Cardiovascular:  Positive for palpitations and leg swelling. Negative for chest pain.   Gastrointestinal:  Positive for abdominal distention. Negative for abdominal pain, diarrhea, nausea and vomiting.   Genitourinary:  Negative for difficulty urinating, dysuria and hematuria.   Integumentary:  Negative for rash.   Neurological:  Negative for seizures, syncope and light-headedness.   Psychiatric/Behavioral:  Negative for agitation and hallucinations.       Past Medical History:   Diagnosis Date    Atrial fibrillation     CHF (congestive heart failure)     Coronary atherosclerosis of native coronary artery     Diabetes mellitus, type 2     Hypertension         No past  surgical history on file.     No family history on file.     Review of patient's allergies indicates:  No Known Allergies     Current Outpatient Medications   Medication Instructions    atorvastatin (LIPITOR) 80 mg, Oral, Daily    canagliflozin (INVOKANA) 100 mg, Oral, Daily    clopidogreL (PLAVIX) 75 mg, Oral, Daily    ELIQUIS 5 mg, Oral, 2 times daily    ENTRESTO 24-26 mg per tablet 1 tablet, Oral, 2 times daily    ferrous sulfate (FEOSOL) 325 mg, Oral, Daily    glimepiride (AMARYL) 4 mg, Oral, Every morning    LIDOcaine (LIDODERM) 5 % 1 patch, Daily    pantoprazole (PROTONIX) 40 mg, Oral, Daily    spironolactone (ALDACTONE) 25 mg, Oral, Daily    tamsulosin (FLOMAX) 0.4 mg Cap 1 capsule, Oral, Nightly    torsemide (DEMADEX) 40 mg, Oral        Vitals:    09/23/22 0855   BP: (!) 147/89   Pulse: 69        Wt Readings from Last 3 Encounters:   09/23/22 83.5 kg (184 lb 1.4 oz)   09/13/22 77.1 kg (170 lb)     Temp Readings from Last 3 Encounters:   No data found for Temp     BP Readings from Last 3 Encounters:   09/23/22 (!) 147/89     Pulse Readings from Last 3 Encounters:   09/23/22 69        Body mass index is 24.97 kg/m². Estimated body surface area is 2.06 meters squared as calculated from the following:    Height as of this encounter: 6' (1.829 m).    Weight as of this encounter: 83.5 kg (184 lb 1.4 oz).     Physical Exam  Constitutional:       Appearance: He is well-developed.   HENT:      Head: Normocephalic and atraumatic.      Right Ear: External ear normal.      Left Ear: External ear normal.   Eyes:      Conjunctiva/sclera: Conjunctivae normal.      Pupils: Pupils are equal, round, and reactive to light.   Neck:      Vascular: Hepatojugular reflux and JVD present.      Comments: JVP 16 cmH20  Cardiovascular:      Rate and Rhythm: Normal rate. Rhythm irregularly irregular.      Pulses: Intact distal pulses.      Heart sounds: No murmur heard.    No friction rub. No gallop.   Pulmonary:      Effort: Pulmonary  effort is normal.      Breath sounds: Normal breath sounds.   Abdominal:      General: Bowel sounds are normal. There is no distension.      Palpations: Abdomen is soft.      Tenderness: There is no abdominal tenderness. There is no guarding or rebound.   Musculoskeletal:      Cervical back: Normal range of motion and neck supple.      Right lower le+ Edema present.      Left lower le+ Edema present.   Neurological:      Mental Status: He is alert and oriented to person, place, and time.        Lab Results   Component Value Date    BNP 3,834 (H) 2022     2022    K 4.3 2022     2022    CO2 25 2022    BUN 14 2022    CREATININE 1.2 2022     (H) 2022    HGBA1C 6.1 (H) 2022    AST 16 2022    ALT 16 2022    ALBUMIN 4.2 2022    PROT 7.4 2022    BILITOT 0.8 2022    WBC 4.45 2022    HGB 13.2 (L) 2022    HCT 42.7 2022     2022    TSH 1.676 2022    CHOL 112 2022    HDL 49 2022    LDLCALC 48 2022    TRIG 75 2022           Assessment and Plan:  Coronary artery disease involving native coronary artery of native heart without angina pectoris. Stable.    Hx of non-ST elevation myocardial infarction (NSTEMI)    Persistent atrial fibrillation. Present. On eliquis.    Diabetes mellitus due to underlying condition with hyperosmolarity without coma, without long-term current use of insulin. Stable. Last hgb a1c 6.1    Acute on chronic combined systolic and diastolic heart failure    Dyslipidemia. Stable. On lipitor.    ICD (implantable cardioverter-defibrillator) in place    Primary hypertension. Uncontrolled.           Acute on chronic systolic HF, NYHA class IV, stage C-D.  Etiology: ICM + atrial fibrillation  Devices: ICD  Medications: sacubitril-valsartan, spironolactone, carvedilol, canagliflozin. Torsemide 40 mg BID.  Hemodynamic status: warm, hypertensive,  hypervolemic.  Clinical course: refers multiple hospitalizations for HF in OSH in 2022.  Plan:  Admission for treatment of ADHF and w/u for advanced therapies.

## 2022-09-23 NOTE — HPI
Patient is a 56 yo man with PMH/o DM type 2, dyslipidemia, HTN, CAD with NSTEMI and PCI to RCA 8/2021 complicated with VF arrest, chronic HFrEF 10%  was seen today in Advanced Heart failure and Transplant clinic by Dr. Javier Domingo . Pt was apparently referred by [facility] for  persistent severe symptoms on maximal tolerated therapy since several years.     He presents to clinic with worsening SOB and LE pedal edema since 2 days and was sent to [facility] HT team for admission. On further questioning pt states that he has been gaining weight over 12 pounds over the last 2-3 wks with symptoms of worsening sob and LE edema along with orthopnea, pnd, abdominal bloating, low apetite.  Denies any cp , fever, chills, diet/med noncompliance. He has had multiple hospital admissions for CHF exacerbations over the past 6 months and is unable to walk more than half a block without getting short of breath.

## 2022-09-24 VITALS
HEART RATE: 71 BPM | RESPIRATION RATE: 18 BRPM | WEIGHT: 183 LBS | SYSTOLIC BLOOD PRESSURE: 115 MMHG | BODY MASS INDEX: 24.79 KG/M2 | HEIGHT: 72 IN | DIASTOLIC BLOOD PRESSURE: 77 MMHG | OXYGEN SATURATION: 99 % | TEMPERATURE: 97 F

## 2022-09-24 LAB
ANION GAP SERPL CALC-SCNC: 6 MMOL/L (ref 8–16)
APTT BLDCRRT: 40.6 SEC (ref 21–32)
BASOPHILS # BLD AUTO: 0.02 K/UL (ref 0–0.2)
BASOPHILS NFR BLD: 0.5 % (ref 0–1.9)
BUN SERPL-MCNC: 19 MG/DL (ref 6–20)
CALCIUM SERPL-MCNC: 8.7 MG/DL (ref 8.7–10.5)
CHLORIDE SERPL-SCNC: 105 MMOL/L (ref 95–110)
CO2 SERPL-SCNC: 28 MMOL/L (ref 23–29)
CREAT SERPL-MCNC: 1.3 MG/DL (ref 0.5–1.4)
DIFFERENTIAL METHOD: ABNORMAL
EOSINOPHIL # BLD AUTO: 0.2 K/UL (ref 0–0.5)
EOSINOPHIL NFR BLD: 4.2 % (ref 0–8)
ERYTHROCYTE [DISTWIDTH] IN BLOOD BY AUTOMATED COUNT: 15 % (ref 11.5–14.5)
EST. GFR  (NO RACE VARIABLE): >60 ML/MIN/1.73 M^2
GLUCOSE SERPL-MCNC: 82 MG/DL (ref 70–110)
HCT VFR BLD AUTO: 41.4 % (ref 40–54)
HGB BLD-MCNC: 13 G/DL (ref 14–18)
IMM GRANULOCYTES # BLD AUTO: 0.01 K/UL (ref 0–0.04)
IMM GRANULOCYTES NFR BLD AUTO: 0.2 % (ref 0–0.5)
LYMPHOCYTES # BLD AUTO: 0.9 K/UL (ref 1–4.8)
LYMPHOCYTES NFR BLD: 22.7 % (ref 18–48)
MCH RBC QN AUTO: 26.9 PG (ref 27–31)
MCHC RBC AUTO-ENTMCNC: 31.4 G/DL (ref 32–36)
MCV RBC AUTO: 86 FL (ref 82–98)
MONOCYTES # BLD AUTO: 0.5 K/UL (ref 0.3–1)
MONOCYTES NFR BLD: 12.3 % (ref 4–15)
NEUTROPHILS # BLD AUTO: 2.4 K/UL (ref 1.8–7.7)
NEUTROPHILS NFR BLD: 60.1 % (ref 38–73)
NRBC BLD-RTO: 0 /100 WBC
PLATELET # BLD AUTO: 292 K/UL (ref 150–450)
PMV BLD AUTO: 11.1 FL (ref 9.2–12.9)
POCT GLUCOSE: 83 MG/DL (ref 70–110)
POCT GLUCOSE: 90 MG/DL (ref 70–110)
POTASSIUM SERPL-SCNC: 4 MMOL/L (ref 3.5–5.1)
RBC # BLD AUTO: 4.83 M/UL (ref 4.6–6.2)
SODIUM SERPL-SCNC: 139 MMOL/L (ref 136–145)
WBC # BLD AUTO: 4.05 K/UL (ref 3.9–12.7)

## 2022-09-24 PROCEDURE — G0378 HOSPITAL OBSERVATION PER HR: HCPCS | Mod: NTX

## 2022-09-24 PROCEDURE — 85730 THROMBOPLASTIN TIME PARTIAL: CPT | Mod: NTX | Performed by: STUDENT IN AN ORGANIZED HEALTH CARE EDUCATION/TRAINING PROGRAM

## 2022-09-24 PROCEDURE — 25000003 PHARM REV CODE 250: Mod: NTX | Performed by: STUDENT IN AN ORGANIZED HEALTH CARE EDUCATION/TRAINING PROGRAM

## 2022-09-24 PROCEDURE — 36415 COLL VENOUS BLD VENIPUNCTURE: CPT | Mod: NTX | Performed by: STUDENT IN AN ORGANIZED HEALTH CARE EDUCATION/TRAINING PROGRAM

## 2022-09-24 PROCEDURE — 99224 PR SUBSEQUENT OBSERVATION CARE,LEVEL I: CPT | Mod: NTX,,, | Performed by: INTERNAL MEDICINE

## 2022-09-24 PROCEDURE — 85025 COMPLETE CBC W/AUTO DIFF WBC: CPT | Mod: NTX | Performed by: STUDENT IN AN ORGANIZED HEALTH CARE EDUCATION/TRAINING PROGRAM

## 2022-09-24 PROCEDURE — 99224 PR SUBSEQUENT OBSERVATION CARE,LEVEL I: ICD-10-PCS | Mod: NTX,,, | Performed by: INTERNAL MEDICINE

## 2022-09-24 PROCEDURE — 80048 BASIC METABOLIC PNL TOTAL CA: CPT | Mod: NTX | Performed by: STUDENT IN AN ORGANIZED HEALTH CARE EDUCATION/TRAINING PROGRAM

## 2022-09-24 RX ORDER — TORSEMIDE 20 MG/1
20 TABLET ORAL 2 TIMES DAILY
Status: DISCONTINUED | OUTPATIENT
Start: 2022-09-24 | End: 2022-09-24 | Stop reason: HOSPADM

## 2022-09-24 RX ORDER — SPIRONOLACTONE 25 MG/1
25 TABLET ORAL DAILY
Status: DISCONTINUED | OUTPATIENT
Start: 2022-09-24 | End: 2022-09-24 | Stop reason: HOSPADM

## 2022-09-24 RX ORDER — ACETAMINOPHEN 325 MG/1
650 TABLET ORAL EVERY 8 HOURS PRN
Status: DISCONTINUED | OUTPATIENT
Start: 2022-09-24 | End: 2022-09-24 | Stop reason: HOSPADM

## 2022-09-24 RX ORDER — TORSEMIDE 20 MG/1
20 TABLET ORAL 2 TIMES DAILY
Status: DISCONTINUED | OUTPATIENT
Start: 2022-09-24 | End: 2022-09-24

## 2022-09-24 RX ADMIN — TORSEMIDE 20 MG: 20 TABLET ORAL at 09:09

## 2022-09-24 RX ADMIN — SACUBITRIL AND VALSARTAN 1 TABLET: 24; 26 TABLET, FILM COATED ORAL at 09:09

## 2022-09-24 RX ADMIN — ACETAMINOPHEN 650 MG: 325 TABLET ORAL at 09:09

## 2022-09-24 RX ADMIN — SPIRONOLACTONE 25 MG: 25 TABLET, FILM COATED ORAL at 09:09

## 2022-09-24 RX ADMIN — ATORVASTATIN CALCIUM 80 MG: 20 TABLET, FILM COATED ORAL at 09:09

## 2022-09-24 RX ADMIN — ASPIRIN 81 MG: 81 TABLET, COATED ORAL at 09:09

## 2022-09-24 RX ADMIN — FERROUS SULFATE TAB 325 MG (65 MG ELEMENTAL FE) 1 EACH: 325 (65 FE) TAB at 09:09

## 2022-09-24 NOTE — HOSPITAL COURSE
Patient is a 58 yo man with PMH/o DM type 2, dyslipidemia, HTN, CAD with NSTEMI and PCI to RCA 8/2021 complicated with VF arrest, chronic HFrEF 10%  was seen today in Advanced Heart failure and Transplant clinic by Dr. Javier Domingo . Pt was apparently referred by [facility] for  persistent severe symptoms on maximal tolerated therapy since several years.      Came in to the hospital with  worsening SOB and LE pedal edema since 2 days and was sent to [facility] HT team for admission. On further questioning pt states that he has been gaining weight over 12 pounds over the last 2-3 wks with symptoms of worsening sob and LE edema along with orthopnea, pnd, abdominal bloating, low apetite.  Denies any cp , fever, chills, diet/med noncompliance. He has had multiple hospital admissions for CHF exacerbations over the past 6 months and is unable to walk more than half a block without getting short of breath.     Pt was Hypervolemic on initial examination and was given 80 mg iv lasix and clinically improved overnight with improvement in his symptoms. He was net negative of 3.5 l since 24 hrs. ECHO showed EF 15%, PA 41mm hg, CVP 8, LVIDd 7cm, severely reduced RV systolic fn. Pt was hemodynamically stable , satting at 96 % on room air and planned to D/C him home and to f/u in CHF clinic in 1wk and plan for RHC next wk with a plan for HT/advanced HF evaluation.

## 2022-09-24 NOTE — DISCHARGE SUMMARY
[facility] - Cardiology Stepdown  Heart Transplant  Discharge Summary      Patient Name: Tera Griffiths  MRN: [id number]  Admission Date: 9/23/2022  Hospital Length of Stay: 1 days  Discharge Date and Time: 09/24/2022 1:52 PM  Attending Physician: Caden Aden MD   Discharging Provider: Kayode Adkins MD  Primary Care Provider: Primary Doctor No     HPI: Patient is a 58 yo man with PMH/o DM type 2, dyslipidemia, HTN, CAD with NSTEMI and PCI to RCA 8/2021 complicated with VF arrest, chronic HFrEF 10%  was seen today in Advanced Heart failure and Transplant clinic by Dr. Javier Domingo . Pt was apparently referred by [facility] for  persistent severe symptoms on maximal tolerated therapy since several years.     He presents to clinic with worsening SOB and LE pedal edema since 2 days and was sent to [facility] HT team for admission. On further questioning pt states that he has been gaining weight over 12 pounds over the last 2-3 wks with symptoms of worsening sob and LE edema along with orthopnea, pnd, abdominal bloating, low apetite.  Denies any cp , fever, chills, diet/med noncompliance. He has had multiple hospital admissions for CHF exacerbations over the past 6 months and is unable to walk more than half a block without getting short of breath.       * No surgery found *     Hospital Course: Patient is a 58 yo man with PMH/o DM type 2, dyslipidemia, HTN, CAD with NSTEMI and PCI to RCA 8/2021 complicated with VF arrest, chronic HFrEF 10%  was seen  in Advanced Heart failure and Transplant clinic by Dr. Javier Domingo . Pt was apparently referred by [facility] for  persistent severe symptoms on maximal tolerated therapy since several years.      Came in to the hospital with  worsening SOB and LE pedal edema since 2 days and was sent to [facility] HT team for admission. On further questioning pt states that he has been gaining weight over 12 pounds over the last 2-3 wks with symptoms of worsening sob and LE edema along  with orthopnea, pnd, abdominal bloating, low apetite.  Denies any cp , fever, chills, diet/med noncompliance. He has had multiple hospital admissions for CHF exacerbations over the past 6 months and is unable to walk more than half a block without getting short of breath.     Pt was Hypervolemic on initial examination and was given 80 mg iv lasix and clinically improved overnight with improvement in his symptoms. He was net negative of 3.5 l since 24 hrs. ECHO showed EF 15%, PA 41mm hg, CVP 8, LVIDd 7cm, severely reduced RV systolic fn. Pt was hemodynamically stable , satting at 96 % on room air and planned to D/C him home and to f/u in CHF clinic in 1wk and plan for RHC next wk with a plan for HT/advanced HF evaluation.      Goals of Care Treatment Preferences:  Code Status: Full Code          Pending Diagnostic Studies:     Procedure Component Value Units Date/Time    Echo [[id number]]     Order Status: Sent Lab Status: No result         Final Active Diagnoses:    Diagnosis Date Noted POA    PRINCIPAL PROBLEM:  Acute on chronic combined systolic and diastolic heart failure [I50.43] 09/23/2022 Yes    CAD (coronary artery disease) [I25.10] 09/23/2022 Yes    Hx of non-ST elevation myocardial infarction (NSTEMI) [I25.2] 09/23/2022 Not Applicable    Atrial fibrillation [I48.91] 09/23/2022 Yes    Diabetes mellitus [E11.9] 09/23/2022 Yes    Dyslipidemia [E78.5] 09/23/2022 Yes    ICD (implantable cardioverter-defibrillator) in place [Z95.810] 09/23/2022 Yes    HTN (hypertension) [I10] 09/23/2022 Yes    Cardiac arrest with ventricular fibrillation [I46.9, I49.01] 09/23/2022 Unknown      Problems Resolved During this Admission:      Discharged Condition: Hemodynamically stable    Disposition: Home or Self Care    Follow Up:CHF clinic in 1wk   Plan for RHC next wk      Patient Instructions:   Discontinued Glimeperide medication  F/u with CHF clinic in 1wk  Will schedule for RHC procedure next wk.  No discharge  procedures on file.        Kayode Adkins MD  Heart Transplant  [facility] - Cardiology Stepdown

## 2022-09-24 NOTE — PROGRESS NOTES
Patient had an 18 beat run of vtach. Patient asymptomatic. HTS notified. Will continue to monitor pt.

## 2022-09-24 NOTE — DISCHARGE INSTRUCTIONS
Follow up with Heart failure clinic in 1wk  Discontinued medication Glimepride.  Will schedule for Right Heart Catheterization procedure as an outpatient next week.

## 2022-09-24 NOTE — PLAN OF CARE
Patient remains free from falls and injuries through out shift. Patient AAO and VSS. Patient denies chest pain and SOB. Heparin gtt infusing per orders, PTT Q6. Blood glucose orders maintained. Plan of care reviewed with patient. Patient verbalizes understanding of plan.  Will continue to monitor.

## 2022-09-26 ENCOUNTER — TELEPHONE (OUTPATIENT)
Dept: TRANSPLANT | Facility: CLINIC | Age: 57
End: 2022-09-26
Payer: MEDICAID

## 2022-09-26 DIAGNOSIS — I48.19 PERSISTENT ATRIAL FIBRILLATION: ICD-10-CM

## 2022-09-26 DIAGNOSIS — I50.43 ACUTE ON CHRONIC COMBINED SYSTOLIC AND DIASTOLIC HEART FAILURE: Primary | ICD-10-CM

## 2022-09-26 NOTE — TELEPHONE ENCOUNTER
I called Tera Griffiths as he was DC over the weekend.  He states he is doing well.  Advised that the doctor has requested he undergo a right heart cath this week.  Advised that he will need to hold his Eliquis 2 days before the procedure. Verbalized understanding of instructions. Orders entered and sent to schedulers.

## 2022-09-27 ENCOUNTER — TELEPHONE (OUTPATIENT)
Dept: TRANSPLANT | Facility: CLINIC | Age: 57
End: 2022-09-27
Payer: MEDICAID

## 2022-09-27 NOTE — TELEPHONE ENCOUNTER
I spoke with MR Aye and reminded that he will need to hold Eliquis x 2 days before his RHC on Friday. Verbalized understanding of instructions.

## 2022-09-28 ENCOUNTER — TELEPHONE (OUTPATIENT)
Dept: CARDIOLOGY | Facility: HOSPITAL | Age: 57
End: 2022-09-28
Payer: MEDICAID

## 2022-09-30 ENCOUNTER — HOSPITAL ENCOUNTER (OUTPATIENT)
Facility: HOSPITAL | Age: 57
Discharge: HOME | End: 2022-09-30
Attending: INTERNAL MEDICINE | Admitting: INTERNAL MEDICINE
Payer: MEDICAID

## 2022-09-30 VITALS
OXYGEN SATURATION: 98 % | WEIGHT: 179.44 LBS | HEART RATE: 78 BPM | RESPIRATION RATE: 18 BRPM | DIASTOLIC BLOOD PRESSURE: 76 MMHG | HEIGHT: 72 IN | TEMPERATURE: 98 F | BODY MASS INDEX: 24.3 KG/M2 | SYSTOLIC BLOOD PRESSURE: 151 MMHG

## 2022-09-30 DIAGNOSIS — I48.19 PERSISTENT ATRIAL FIBRILLATION: ICD-10-CM

## 2022-09-30 DIAGNOSIS — I50.43 ACUTE ON CHRONIC COMBINED SYSTOLIC AND DIASTOLIC HEART FAILURE: ICD-10-CM

## 2022-09-30 LAB — POCT GLUCOSE: 94 MG/DL (ref 70–110)

## 2022-09-30 PROCEDURE — 93451 RIGHT HEART CATH: CPT | Mod: TXP | Performed by: INTERNAL MEDICINE

## 2022-09-30 PROCEDURE — 93451 PR RIGHT HEART CATH O2 SATURATION & CARDIAC OUTPUT: ICD-10-PCS | Mod: 26,NTX,, | Performed by: INTERNAL MEDICINE

## 2022-09-30 PROCEDURE — C1769 GUIDE WIRE: HCPCS | Mod: NTX | Performed by: INTERNAL MEDICINE

## 2022-09-30 PROCEDURE — 25000003 PHARM REV CODE 250: Mod: NTX | Performed by: INTERNAL MEDICINE

## 2022-09-30 PROCEDURE — 25000003 PHARM REV CODE 250: Mod: TXP | Performed by: INTERNAL MEDICINE

## 2022-09-30 PROCEDURE — 93451 RIGHT HEART CATH: CPT | Mod: 26,NTX,, | Performed by: INTERNAL MEDICINE

## 2022-09-30 PROCEDURE — C1894 INTRO/SHEATH, NON-LASER: HCPCS | Mod: TXP | Performed by: INTERNAL MEDICINE

## 2022-09-30 PROCEDURE — C1751 CATH, INF, PER/CENT/MIDLINE: HCPCS | Mod: TXP | Performed by: INTERNAL MEDICINE

## 2022-09-30 RX ORDER — NITROGLYCERIN 400 UG/1
SPRAY ORAL
Status: DISCONTINUED | OUTPATIENT
Start: 2022-09-30 | End: 2022-09-30 | Stop reason: HOSPADM

## 2022-09-30 RX ORDER — SODIUM CHLORIDE 0.9 G/100ML
IRRIGANT IRRIGATION
Status: DISCONTINUED | OUTPATIENT
Start: 2022-09-30 | End: 2022-09-30 | Stop reason: HOSPADM

## 2022-09-30 RX ORDER — LIDOCAINE HYDROCHLORIDE 20 MG/ML
INJECTION, SOLUTION INFILTRATION; PERINEURAL
Status: DISCONTINUED | OUTPATIENT
Start: 2022-09-30 | End: 2022-09-30 | Stop reason: HOSPADM

## 2022-09-30 NOTE — DISCHARGE SUMMARY
[facility] - Cath Lab  Discharge Note  Short Stay    Procedure(s) (LRB):  INSERTION, CATHETER, RIGHT HEART (Right)    OUTCOME: Patient tolerated treatment/procedure well without complication and is now ready for discharge.    DISPOSITION: Home or Self Care    FINAL DIAGNOSIS:  <principal problem not specified>    FOLLOWUP: In clinic    DISCHARGE INSTRUCTIONS:  No discharge procedures on file.     TIME SPENT ON DISCHARGE: 30 minutes

## 2022-09-30 NOTE — DISCHARGE INSTRUCTIONS
AFTER THE PROCEDURE:   -DO NOT DRINK OR EAT ANYTHING UNTIL NO LONGER HOARSE   -You may remove the bandage in 24 hours and wash with soap and water.   -You may shower, but do not soak in a tub for three days.   PRECAUTIONS FOR THE NEXT 24 HOURS:   -If you need to cough, sneeze, have a bowel movement, or bear down, hold pressure over your bandage.   -Do not  anything heavier than a gallon of milk(about 5 pounds)   -Avoid excessive bending over.   SYMPTOMS TO WATCH FOR AND REPORT TO YOUR DOCTOR:   -BLEEDING: hold pressure over the site until bleeding stops. Proceed to Emergency Room by ambulance (do not drive yourself) if unable to stop bleeding. Notify your doctor.   -HEMATOMA(hard bruise under the skin): [place] around the bruise if one develops. Call your doctor if it increases in size or if you have difficulty talking, swallowing, breathing or anything unusual.   SIGNS OF INFECTION:Fever (temperature over 100.5 F), pus or redness   -RASH   -CHEST PAIN OR SHORTNESS OF BREATH   You may call you coordinator in the Heart Failure/Heart Transplant/Pulmonary Hypertension Clinic at [phone] during normal business hours(Monday through Friday from 8 A.M. to 5 P.M.) After hours, call the Heart Transplant Service doctor on call at [phone].

## 2022-09-30 NOTE — Clinical Note
dry, intact, no bleeding and no hematoma. Manual pressure applied to right IJ followed with Vaseline gauze with tegaderm dressing

## 2022-09-30 NOTE — Clinical Note
The PA catheter was repositioned to the main pulmonary artery. Hemodynamics were performed. Cardiac output was obtained at 4 L/min. O2 saturation was measured at 64%. C.O=4.18  C.I=2.05

## 2022-09-30 NOTE — PROGRESS NOTES
Patient discharged per MD orders. Instructions given on medications, wound care, activity, signs of infection, when to call MD, and follow up appointments. Pt verbalized understanding. Patient refused transport, ambulated off unit.

## 2022-09-30 NOTE — OP NOTE
Patient was brought to cath lab, draped, and prepped in the usual sterile fashion. Right IJ was accessed via modified seldinger technique with ultrasound guidance and guidewire was introduced into the IJ. Sequential dilation with micropuncture kit and sheath was performed. [place] Lidia catheter was introduced via the sheath. Measurements were obtained, and sheath was removed. Patient tolerated the procedure well and discharged in stable condition.

## 2022-09-30 NOTE — PROGRESS NOTES
Report received from CARL Nolasco. Patient s/p RHC. Dressing to R IJ cdi, soft. No complaints from patient. Vss. Call light in reach.

## 2022-09-30 NOTE — Clinical Note
The PA catheter was repositioned to the main pulmonary artery. Hemodynamics were performed. (POST NITRO)

## 2022-11-07 ENCOUNTER — TELEPHONE (OUTPATIENT)
Dept: ELECTROPHYSIOLOGY | Facility: CLINIC | Age: 57
End: 2022-11-07
Payer: MEDICAID

## 2022-11-07 ENCOUNTER — HOSPITAL ENCOUNTER (OUTPATIENT)
Dept: CARDIOLOGY | Facility: CLINIC | Age: 57
Discharge: HOME | End: 2022-11-07
Payer: MEDICAID

## 2022-11-07 ENCOUNTER — OFFICE VISIT (OUTPATIENT)
Dept: TRANSPLANT | Facility: CLINIC | Age: 57
End: 2022-11-07
Payer: MEDICAID

## 2022-11-07 VITALS
DIASTOLIC BLOOD PRESSURE: 84 MMHG | BODY MASS INDEX: 24.73 KG/M2 | HEIGHT: 72 IN | WEIGHT: 182.56 LBS | HEART RATE: 73 BPM | SYSTOLIC BLOOD PRESSURE: 118 MMHG

## 2022-11-07 DIAGNOSIS — E08.00 DIABETES MELLITUS DUE TO UNDERLYING CONDITION WITH HYPEROSMOLARITY WITHOUT COMA, WITHOUT LONG-TERM CURRENT USE OF INSULIN: ICD-10-CM

## 2022-11-07 DIAGNOSIS — I50.42 CHRONIC COMBINED SYSTOLIC AND DIASTOLIC HEART FAILURE: ICD-10-CM

## 2022-11-07 DIAGNOSIS — I10 PRIMARY HYPERTENSION: ICD-10-CM

## 2022-11-07 DIAGNOSIS — I25.10 CORONARY ARTERY DISEASE INVOLVING NATIVE CORONARY ARTERY OF NATIVE HEART WITHOUT ANGINA PECTORIS: ICD-10-CM

## 2022-11-07 DIAGNOSIS — E78.5 DYSLIPIDEMIA: ICD-10-CM

## 2022-11-07 DIAGNOSIS — I50.9 CONGESTIVE HEART FAILURE, UNSPECIFIED HF CHRONICITY, UNSPECIFIED HEART FAILURE TYPE: Primary | ICD-10-CM

## 2022-11-07 DIAGNOSIS — I48.19 PERSISTENT ATRIAL FIBRILLATION: Primary | ICD-10-CM

## 2022-11-07 DIAGNOSIS — I48.19 PERSISTENT ATRIAL FIBRILLATION: ICD-10-CM

## 2022-11-07 DIAGNOSIS — Z95.810 ICD (IMPLANTABLE CARDIOVERTER-DEFIBRILLATOR) IN PLACE: ICD-10-CM

## 2022-11-07 PROBLEM — I50.43 ACUTE ON CHRONIC COMBINED SYSTOLIC AND DIASTOLIC HEART FAILURE: Status: RESOLVED | Noted: 2022-09-23 | Resolved: 2022-11-07

## 2022-11-07 PROCEDURE — 99214 OFFICE O/P EST MOD 30 MIN: CPT | Mod: S$PBB,NTX,, | Performed by: INTERNAL MEDICINE

## 2022-11-07 PROCEDURE — 3079F PR MOST RECENT DIASTOLIC BLOOD PRESSURE 80-89 MM HG: ICD-10-PCS | Mod: CPTII,NTX,, | Performed by: INTERNAL MEDICINE

## 2022-11-07 PROCEDURE — 3044F HG A1C LEVEL LT 7.0%: CPT | Mod: CPTII,NTX,, | Performed by: INTERNAL MEDICINE

## 2022-11-07 PROCEDURE — 4010F PR ACE/ARB THEARPY RXD/TAKEN: ICD-10-PCS | Mod: CPTII,NTX,, | Performed by: INTERNAL MEDICINE

## 2022-11-07 PROCEDURE — 4010F ACE/ARB THERAPY RXD/TAKEN: CPT | Mod: CPTII,NTX,, | Performed by: INTERNAL MEDICINE

## 2022-11-07 PROCEDURE — 3079F DIAST BP 80-89 MM HG: CPT | Mod: CPTII,NTX,, | Performed by: INTERNAL MEDICINE

## 2022-11-07 PROCEDURE — 3008F BODY MASS INDEX DOCD: CPT | Mod: CPTII,NTX,, | Performed by: INTERNAL MEDICINE

## 2022-11-07 PROCEDURE — 93010 ELECTROCARDIOGRAM REPORT: CPT | Mod: S$PBB,NTX,, | Performed by: INTERNAL MEDICINE

## 2022-11-07 PROCEDURE — 99999 PR PBB SHADOW E&M-EST. PATIENT-LVL IV: CPT | Mod: PBBFAC,TXP,, | Performed by: INTERNAL MEDICINE

## 2022-11-07 PROCEDURE — 3008F PR BODY MASS INDEX (BMI) DOCUMENTED: ICD-10-PCS | Mod: CPTII,NTX,, | Performed by: INTERNAL MEDICINE

## 2022-11-07 PROCEDURE — 93005 ELECTROCARDIOGRAM TRACING: CPT | Mod: PBBFAC,NTX | Performed by: INTERNAL MEDICINE

## 2022-11-07 PROCEDURE — 3074F PR MOST RECENT SYSTOLIC BLOOD PRESSURE < 130 MM HG: ICD-10-PCS | Mod: CPTII,NTX,, | Performed by: INTERNAL MEDICINE

## 2022-11-07 PROCEDURE — 93010 EKG 12-LEAD: ICD-10-PCS | Mod: S$PBB,NTX,, | Performed by: INTERNAL MEDICINE

## 2022-11-07 PROCEDURE — 99214 OFFICE O/P EST MOD 30 MIN: CPT | Mod: PBBFAC,TXP | Performed by: INTERNAL MEDICINE

## 2022-11-07 PROCEDURE — 3044F PR MOST RECENT HEMOGLOBIN A1C LEVEL <7.0%: ICD-10-PCS | Mod: CPTII,NTX,, | Performed by: INTERNAL MEDICINE

## 2022-11-07 PROCEDURE — 99999 PR PBB SHADOW E&M-EST. PATIENT-LVL IV: ICD-10-PCS | Mod: PBBFAC,TXP,, | Performed by: INTERNAL MEDICINE

## 2022-11-07 PROCEDURE — 3074F SYST BP LT 130 MM HG: CPT | Mod: CPTII,NTX,, | Performed by: INTERNAL MEDICINE

## 2022-11-07 PROCEDURE — 1159F MED LIST DOCD IN RCRD: CPT | Mod: CPTII,NTX,, | Performed by: INTERNAL MEDICINE

## 2022-11-07 PROCEDURE — 99214 PR OFFICE/OUTPT VISIT, EST, LEVL IV, 30-39 MIN: ICD-10-PCS | Mod: S$PBB,NTX,, | Performed by: INTERNAL MEDICINE

## 2022-11-07 PROCEDURE — 1159F PR MEDICATION LIST DOCUMENTED IN MEDICAL RECORD: ICD-10-PCS | Mod: CPTII,NTX,, | Performed by: INTERNAL MEDICINE

## 2022-11-07 RX ORDER — GLIMEPIRIDE 4 MG/1
4 TABLET ORAL EVERY MORNING
Status: ON HOLD | COMMUNITY
Start: 2022-09-26 | End: 2023-04-19

## 2022-11-07 RX ORDER — SACUBITRIL AND VALSARTAN 49; 51 MG/1; MG/1
1 TABLET, FILM COATED ORAL 2 TIMES DAILY
Qty: 60 TABLET | Refills: 5 | Status: SHIPPED | OUTPATIENT
Start: 2022-11-07 | End: 2023-01-11

## 2022-11-07 RX ORDER — TORSEMIDE 20 MG/1
40 TABLET ORAL 2 TIMES DAILY
Qty: 120 TABLET | Refills: 5 | Status: ON HOLD | OUTPATIENT
Start: 2022-11-07 | End: 2023-01-11

## 2022-11-07 RX ORDER — METOPROLOL SUCCINATE 25 MG/1
25 TABLET, EXTENDED RELEASE ORAL DAILY
Qty: 30 TABLET | Refills: 11 | Status: ON HOLD | OUTPATIENT
Start: 2022-11-07 | End: 2023-01-07 | Stop reason: HOSPADM

## 2022-11-07 NOTE — TELEPHONE ENCOUNTER
Called patient to get him scheduled to see an arrhythmia specialist. Patient agreed to appointment on 11/10/22 at 3:20pm with Dr. Sanon.

## 2022-11-07 NOTE — PATIENT INSTRUCTIONS
You have just the right amount of fluid on you.  Please adhere to a low sodium diet (no more than 1.5 grams of sodium in 24h).  3.   Follow fluid restriction of  1. no more than 2 liters in 24 hours..   4. You are in atrial fibrillation. You are being referred to EP specialist (rhythm doctor).  5. Please have ECG done today.  6. Please increase your entresto to the next dose: take 2 tablets twice a day of your current dose. New prescription was sent to pharmacy. Take it as instructed.  7. Follow up blood test Thursday.  8. Follow up in 2 months with exercise test and blood tests.  9. Start metoprolol xl 25 mg daily.

## 2022-11-07 NOTE — LETTER
November 7, 2022        [facility]  [address]  [address]  [facility] [address]  Phone: [phone]  Fax: [phone]             [id number]  [address]  [facility] [address]  Phone: [phone]   Patient: Tera Griffiths   MR Number: [id number]   YOB: 1965   Date of Visit: 11/7/2022       Dear Dr. Rosalio Salazar    Thank you for referring Tera Griffiths to me for evaluation. Attached you will find relevant portions of my assessment and plan of care.    If you have questions, please do not hesitate to call me. I look forward to following Tera Griffiths along with you.    Sincerely,    Daquan Domingo MD    Enclosure    If you would like to receive this communication electronically, please contact [email or web address] or [phone] to request [employer] access.    [employer] is a tool which provides read-only access to select patient information with whom you have a relationship. Its easy to use and provides real time access to review your patients record including encounter summaries, notes, results, and demographic information.    If you feel you have received this communication in error or would no longer like to receive these types of communications, please e-mail [email or web address]

## 2022-11-07 NOTE — TELEPHONE ENCOUNTER
----- Message from Cathy Maldonado sent at 11/7/2022  2:33 PM CST -----  Regarding: Appointment  Hi     Is it posible can be seen by one of your docs on 11/10.  Dr. Herrera has placed a referral.      Thank You  Cathy

## 2022-11-07 NOTE — PROGRESS NOTES
[facility] Follow up.      Attending Physician: Daquan Domingo MD.  The patient's last visit with me was on 9/23/2022.         HPI.  Patient is a 58 yo man with PMH/o DM type 2, dyslipidemia, HTN, CAD, RCA NSTEMI complicated with VF arrest and HF in 2021. Patient has HFrEF and LVEF 10%. He has been referred by [facility] cardiologist Dr. Salazar for evaluation for advanced therapies as patient has been having persistent severe symptoms on maximal tolerated therapy. He has had multiple admissions to OSH according to patient, in 2022.      Last visit was his first visit with me. He was in ADHF.  He was admitted for treatment and for evaluation for advanced therapies. He was diuresed and discharged to follow up as an outpatient. Had RHC that showed elevated filling pressures.       RA: 10  PA: 60/25, mean 35  PCWP: 18 with V waves to 27     Saturation:  Arterial: 99 %  PA: 64 %     Mark CI/CO: 2.05/4.18    Today he comes feeling well. He denies congestive symptoms.      Review of Systems   Constitutional:  Negative for activity change, appetite change, chills, diaphoresis, fatigue and fever.   HENT:  Negative for nasal congestion, rhinorrhea and sore throat.    Eyes:  Negative for visual disturbance.   Respiratory:  Negative for cough, choking and shortness of breath.    Cardiovascular:  Negative for chest pain, palpitations and leg swelling.   Gastrointestinal:  Negative for abdominal pain, diarrhea, nausea and vomiting.   Genitourinary:  Negative for difficulty urinating, dysuria and hematuria.   Integumentary:  Negative for rash.   Neurological:  Negative for seizures, syncope and light-headedness.   Psychiatric/Behavioral:  Negative for agitation and hallucinations.       Past Medical History:   Diagnosis Date    Atrial fibrillation     CHF (congestive heart failure)     Coronary atherosclerosis  of native coronary artery     Diabetes mellitus, type 2     Hypertension         Past Surgical History:   Procedure Laterality Date    CARDIAC DEFIBRILLATOR PLACEMENT Left     HERNIA REPAIR      RIGHT HEART CATHETERIZATION Right 9/30/2022    Procedure: INSERTION, CATHETER, RIGHT HEART;  Surgeon: Caden Aden MD;  Location: [facility] CATH LAB;  Service: Cardiology;  Laterality: Right;        No family history on file.     Review of patient's allergies indicates:  No Known Allergies     Current Outpatient Medications   Medication Instructions    atorvastatin (LIPITOR) 80 mg, Oral, Daily    canagliflozin (INVOKANA) 100 mg, Oral, Daily    clopidogreL (PLAVIX) 75 mg, Oral, Daily    ELIQUIS 5 mg, Oral, 2 times daily    ENTRESTO 24-26 mg per tablet 1 tablet, Oral, 2 times daily    ferrous sulfate (FEOSOL) 325 mg, Oral, Daily    glimepiride (AMARYL) 4 mg, Oral, Every morning    LIDOcaine (LIDODERM) 5 % 1 patch, Daily    pantoprazole (PROTONIX) 40 mg, Oral, Daily    spironolactone (ALDACTONE) 25 mg, Oral, Daily    tamsulosin (FLOMAX) 0.4 mg Cap 1 capsule, Oral, Nightly    torsemide (DEMADEX) 40 mg, Oral        Vitals:    11/07/22 1115   BP: 118/84   Pulse: 73        Wt Readings from Last 3 Encounters:   11/07/22 82.8 kg (182 lb 8.7 oz)   09/30/22 81.4 kg (179 lb 7.3 oz)   09/23/22 83 kg (183 lb)     Temp Readings from Last 3 Encounters:   09/30/22 97.9 °F (36.6 °C) (Temporal)   09/24/22 97.4 °F (36.3 °C) (Oral)     BP Readings from Last 3 Encounters:   11/07/22 118/84   09/30/22 (!) 151/76   09/24/22 115/77     Pulse Readings from Last 3 Encounters:   11/07/22 73   09/30/22 78   09/24/22 71        Body mass index is 24.76 kg/m². Estimated body surface area is 2.05 meters squared as calculated from the following:    Height as of this encounter: 6' (1.829 m).    Weight as of this encounter: 82.8 kg (182 lb 8.7 oz).     Physical Exam  Constitutional:       Appearance: He is well-developed.   HENT:      Head: Normocephalic and  atraumatic.      Right Ear: External ear normal.      Left Ear: External ear normal.   Eyes:      Conjunctiva/sclera: Conjunctivae normal.      Pupils: Pupils are equal, round, and reactive to light.   Neck:      Vascular: No hepatojugular reflux or JVD.   Cardiovascular:      Rate and Rhythm: Normal rate and regular rhythm.      Pulses: Intact distal pulses.      Heart sounds: S1 normal and S2 normal. No murmur heard.    No friction rub. No gallop.   Pulmonary:      Effort: Pulmonary effort is normal.      Breath sounds: Normal breath sounds.   Abdominal:      General: Bowel sounds are normal. There is no distension.      Palpations: Abdomen is soft.      Tenderness: There is no abdominal tenderness. There is no guarding or rebound.   Musculoskeletal:      Cervical back: Normal range of motion and neck supple.      Right lower leg: No edema.      Left lower leg: No edema.   Neurological:      Mental Status: He is alert and oriented to person, place, and time.        Lab Results   Component Value Date    BNP 3,916 (H) 09/23/2022     11/07/2022    K 3.9 11/07/2022    MG 1.9 09/23/2022     11/07/2022    CO2 29 11/07/2022    BUN 19 11/07/2022    CREATININE 1.5 (H) 11/07/2022    GLU 92 11/07/2022    HGBA1C 5.7 (H) 09/23/2022    AST 22 11/07/2022    ALT 19 11/07/2022    ALBUMIN 4.2 11/07/2022    PROT 7.7 11/07/2022    BILITOT 1.1 (H) 11/07/2022    WBC 4.51 09/30/2022    HGB 13.6 (L) 09/30/2022    HCT 45.6 09/30/2022     09/30/2022    INR 1.1 09/30/2022    TSH 1.676 09/13/2022    CHOL 112 05/04/2022    HDL 49 05/04/2022    LDLCALC 48 05/04/2022    TRIG 75 05/04/2022         Results for orders placed during the hospital encounter of 09/23/22    Echo    Interpretation Summary  · The left ventricle is severely enlarged with eccentric hypertrophy and severely decreased systolic function. The estimated ejection fraction is 15%.  · Moderate right ventricular enlargement with severely reduced right ventricular  systolic function.  · Severe biatrial enlargement.  · Mild mitral regurgitation.  · The estimated PA systolic pressure is 41 mmHg.  · Intermediate central venous pressure (8 mmHg).  · Atrial fibrillation observed.        Results for orders placed during the hospital encounter of 09/30/22    Cardiac catheterization    Conclusion    The estimated blood loss was <50 mL.    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    Date: 9/30/2022  Time: 11:13 AM         Assessment and Plan:  Congestive heart failure, unspecified HF chronicity, unspecified heart failure type  -     CPX Study; Future  -     Basic Metabolic Panel; Future; Expected date: 11/10/2022  -     NT-Pro Natriuretic Peptide; Future; Expected date: 11/10/2022  -     CBC Auto Differential; Future; Expected date: 12/07/2022  -     Comprehensive Metabolic Panel; Future; Expected date: 12/07/2022  -     NT-Pro Natriuretic Peptide; Future; Expected date: 12/07/2022    Persistent atrial fibrillation  -     EKG 12-lead; Future  -     Ambulatory referral/consult to Electrophysiology; Future; Expected date: 11/14/2022    Coronary artery disease involving native coronary artery of native heart without angina  pectoris    Chronic combined systolic and diastolic heart failure    Dyslipidemia    Primary hypertension    ICD (implantable cardioverter-defibrillator) in place    Diabetes mellitus due to underlying condition with hyperosmolarity without coma, without long-term current use of insulin    Other orders  -     torsemide (DEMADEX) 20 MG Tab; Take 2 tablets (40 mg total) by mouth 2 (two) times a day.  Dispense: 120 tablet; Refill: 5  -     sacubitriL-valsartan (ENTRESTO) 49-51 mg per tablet; Take 1 tablet by mouth 2 (two) times daily.  Dispense: 60 tablet; Refill: 5  -     metoprolol succinate (TOPROL-XL) 25 MG 24 hr tablet; Take 1 tablet (25 mg total) by mouth once daily.  Dispense: 30 tablet; Refill: 11         Chronic systolic HF, NYHA class II, stage C-D.  Etiology: ICM + atrial fibrillation  Devices: ICD  Medications: low dose sacubitril-valsartan, spironolactone, canagliflozin. Torsemide 40 mg BID. Not on any B-blocker.  Hemodynamic status: warm, hypertensive, hypervolemic.  Clinical course: refers multiple hospitalizations for HF in OSH in 2022.  Plan:  -CPX for risk stratification (had [facility]).  -start  metoprolol xl 25 mg daily  -increase entresto to medium dose  -patient in persistent a. Fib. He said he has never been cardioverted. Referral to EP for  rhythm control strategies.  -f/u blood test Thursday      F/u in 2 months

## 2022-11-10 ENCOUNTER — TELEPHONE (OUTPATIENT)
Dept: TRANSPLANT | Facility: CLINIC | Age: 57
End: 2022-11-10
Payer: MEDICAID

## 2022-11-10 ENCOUNTER — OFFICE VISIT (OUTPATIENT)
Dept: ELECTROPHYSIOLOGY | Facility: CLINIC | Age: 57
End: 2022-11-10
Payer: MEDICAID

## 2022-11-10 ENCOUNTER — CLINICAL SUPPORT (OUTPATIENT)
Dept: CARDIOLOGY | Facility: HOSPITAL | Age: 57
End: 2022-11-10
Attending: INTERNAL MEDICINE
Payer: MEDICAID

## 2022-11-10 ENCOUNTER — HOSPITAL ENCOUNTER (OUTPATIENT)
Dept: CARDIOLOGY | Facility: CLINIC | Age: 57
Discharge: HOME | End: 2022-11-10
Payer: MEDICAID

## 2022-11-10 VITALS
SYSTOLIC BLOOD PRESSURE: 130 MMHG | WEIGHT: 186.75 LBS | DIASTOLIC BLOOD PRESSURE: 72 MMHG | BODY MASS INDEX: 25.29 KG/M2 | HEIGHT: 72 IN | HEART RATE: 92 BPM

## 2022-11-10 DIAGNOSIS — Z95.810 ICD (IMPLANTABLE CARDIOVERTER-DEFIBRILLATOR) IN PLACE: ICD-10-CM

## 2022-11-10 DIAGNOSIS — I50.42 CHRONIC COMBINED SYSTOLIC AND DIASTOLIC HEART FAILURE: ICD-10-CM

## 2022-11-10 DIAGNOSIS — I49.01 CARDIAC ARREST WITH VENTRICULAR FIBRILLATION: Primary | ICD-10-CM

## 2022-11-10 DIAGNOSIS — Z95.810 ICD (IMPLANTABLE CARDIOVERTER-DEFIBRILLATOR) IN PLACE: Primary | ICD-10-CM

## 2022-11-10 DIAGNOSIS — I48.91 ATRIAL FIBRILLATION, UNSPECIFIED TYPE: ICD-10-CM

## 2022-11-10 DIAGNOSIS — I48.19 PERSISTENT ATRIAL FIBRILLATION: Primary | ICD-10-CM

## 2022-11-10 DIAGNOSIS — I48.19 PERSISTENT ATRIAL FIBRILLATION: ICD-10-CM

## 2022-11-10 DIAGNOSIS — I50.9 CONGESTIVE HEART FAILURE, UNSPECIFIED HF CHRONICITY, UNSPECIFIED HEART FAILURE TYPE: ICD-10-CM

## 2022-11-10 DIAGNOSIS — I46.9 CARDIAC ARREST WITH VENTRICULAR FIBRILLATION: Primary | ICD-10-CM

## 2022-11-10 PROCEDURE — 93005 ELECTROCARDIOGRAM TRACING: CPT | Mod: PBBFAC,NTX | Performed by: INTERNAL MEDICINE

## 2022-11-10 PROCEDURE — 3044F PR MOST RECENT HEMOGLOBIN A1C LEVEL <7.0%: ICD-10-PCS | Mod: CPTII,NTX,, | Performed by: INTERNAL MEDICINE

## 2022-11-10 PROCEDURE — 3008F PR BODY MASS INDEX (BMI) DOCUMENTED: ICD-10-PCS | Mod: CPTII,NTX,, | Performed by: INTERNAL MEDICINE

## 2022-11-10 PROCEDURE — 1159F MED LIST DOCD IN RCRD: CPT | Mod: CPTII,NTX,, | Performed by: INTERNAL MEDICINE

## 2022-11-10 PROCEDURE — 3008F BODY MASS INDEX DOCD: CPT | Mod: CPTII,NTX,, | Performed by: INTERNAL MEDICINE

## 2022-11-10 PROCEDURE — 93283 PRGRMG EVAL IMPLANTABLE DFB: CPT | Mod: 26,NTX,, | Performed by: INTERNAL MEDICINE

## 2022-11-10 PROCEDURE — 93283 CARDIAC DEVICE CHECK - IN CLINIC & HOSPITAL: ICD-10-PCS | Mod: 26,NTX,, | Performed by: INTERNAL MEDICINE

## 2022-11-10 PROCEDURE — 4010F ACE/ARB THERAPY RXD/TAKEN: CPT | Mod: CPTII,NTX,, | Performed by: INTERNAL MEDICINE

## 2022-11-10 PROCEDURE — 99213 OFFICE O/P EST LOW 20 MIN: CPT | Mod: PBBFAC,TXP | Performed by: INTERNAL MEDICINE

## 2022-11-10 PROCEDURE — 3075F SYST BP GE 130 - 139MM HG: CPT | Mod: CPTII,NTX,, | Performed by: INTERNAL MEDICINE

## 2022-11-10 PROCEDURE — 99999 PR PBB SHADOW E&M-EST. PATIENT-LVL III: CPT | Mod: PBBFAC,TXP,, | Performed by: INTERNAL MEDICINE

## 2022-11-10 PROCEDURE — 99205 PR OFFICE/OUTPT VISIT, NEW, LEVL V, 60-74 MIN: ICD-10-PCS | Mod: S$PBB,NTX,, | Performed by: INTERNAL MEDICINE

## 2022-11-10 PROCEDURE — 1159F PR MEDICATION LIST DOCUMENTED IN MEDICAL RECORD: ICD-10-PCS | Mod: CPTII,NTX,, | Performed by: INTERNAL MEDICINE

## 2022-11-10 PROCEDURE — 93283 PRGRMG EVAL IMPLANTABLE DFB: CPT | Mod: NTX

## 2022-11-10 PROCEDURE — 3044F HG A1C LEVEL LT 7.0%: CPT | Mod: CPTII,NTX,, | Performed by: INTERNAL MEDICINE

## 2022-11-10 PROCEDURE — 3075F PR MOST RECENT SYSTOLIC BLOOD PRESS GE 130-139MM HG: ICD-10-PCS | Mod: CPTII,NTX,, | Performed by: INTERNAL MEDICINE

## 2022-11-10 PROCEDURE — 99999 PR PBB SHADOW E&M-EST. PATIENT-LVL III: ICD-10-PCS | Mod: PBBFAC,TXP,, | Performed by: INTERNAL MEDICINE

## 2022-11-10 PROCEDURE — 99205 OFFICE O/P NEW HI 60 MIN: CPT | Mod: S$PBB,NTX,, | Performed by: INTERNAL MEDICINE

## 2022-11-10 PROCEDURE — 3078F PR MOST RECENT DIASTOLIC BLOOD PRESSURE < 80 MM HG: ICD-10-PCS | Mod: CPTII,NTX,, | Performed by: INTERNAL MEDICINE

## 2022-11-10 PROCEDURE — 4010F PR ACE/ARB THEARPY RXD/TAKEN: ICD-10-PCS | Mod: CPTII,NTX,, | Performed by: INTERNAL MEDICINE

## 2022-11-10 PROCEDURE — 3078F DIAST BP <80 MM HG: CPT | Mod: CPTII,NTX,, | Performed by: INTERNAL MEDICINE

## 2022-11-10 PROCEDURE — 93010 RHYTHM STRIP: ICD-10-PCS | Mod: S$PBB,NTX,, | Performed by: INTERNAL MEDICINE

## 2022-11-10 PROCEDURE — 93010 ELECTROCARDIOGRAM REPORT: CPT | Mod: S$PBB,NTX,, | Performed by: INTERNAL MEDICINE

## 2022-11-10 NOTE — PROGRESS NOTES
Subjective:    Patient ID:  Tera Griffiths is a 57 y.o. male who presents for evaluation of Atrial Fibrillation      57 yoM here for AF management. He suffered a cardiac arrest 12/21 leading to EMS ACLS and recovery. He was admitted to [facility] and underwent DC ICD by Dr Bravo 12/23/21. He developed AF 3/22. No attempts at rhythm control were made. He was on eliquis for CVA prophylaxis. He developed worsening HF symptoms leading to evaluation for advanced options at [facility]. He was referred here to arrange CV.     Past Medical History:  No date: Atrial fibrillation  No date: CHF (congestive heart failure)  No date: Coronary atherosclerosis of native coronary artery  No date: Diabetes mellitus, type 2  No date: Hypertension    Past Surgical History:  No date: CARDIAC DEFIBRILLATOR PLACEMENT; Left  No date: HERNIA REPAIR  9/30/2022: RIGHT HEART CATHETERIZATION; Right      Comment:  Procedure: INSERTION, CATHETER, RIGHT HEART;  Surgeon:                Caden Aden MD;  Location: [facility] CATH LAB;  Service:                Cardiology;  Laterality: Right;    Social History    Socioeconomic History      Marital status: Single    Tobacco Use      Smoking status: Never      Smokeless tobacco: Never    Substance and Sexual Activity      Alcohol use: Never    No family history on file.    Current Outpatient Medications:  atorvastatin (LIPITOR) 80 MG tablet, Take 80 mg by mouth once daily., Disp: , Rfl:   canagliflozin (INVOKANA) 100 mg Tab tablet, Take 100 mg by mouth Daily., Disp: , Rfl:   clopidogreL (PLAVIX) 75 mg tablet, Take 75 mg by mouth once daily., Disp: , Rfl:   ELIQUIS 5 mg Tab, Take 5 mg by mouth 2 (two) times daily., Disp: , Rfl:   ferrous sulfate (FEOSOL) 325 mg (65 mg iron) Tab tablet, Take 325 mg by mouth Daily., Disp: , Rfl:   glimepiride (AMARYL) 4 MG tablet, Take 4 mg by mouth every morning., Disp: , Rfl:   LIDOcaine (LIDODERM) 5 %, 1 patch once daily., Disp: , Rfl:   metoprolol succinate (TOPROL-XL) 25 MG 24 hr  tablet, Take 1 tablet (25 mg total) by mouth once daily., Disp: 30 tablet, Rfl: 11  pantoprazole (PROTONIX) 40 MG tablet, Take 40 mg by mouth once daily., Disp: , Rfl:   sacubitriL-valsartan (ENTRESTO) 49-51 mg per tablet, Take 1 tablet by mouth 2 (two) times daily., Disp: 60 tablet, Rfl: 5  spironolactone (ALDACTONE) 25 MG tablet, Take 25 mg by mouth once daily., Disp: , Rfl:   tamsulosin (FLOMAX) 0.4 mg Cap, Take 1 capsule by mouth every evening., Disp: , Rfl:   torsemide (DEMADEX) 20 MG Tab, Take 2 tablets (40 mg total) by mouth 2 (two) times a day., Disp: 120 tablet, Rfl: 5    No current facility-administered medications for this visit.            Review of Systems   Constitutional: Positive for malaise/fatigue.   HENT: Negative.     Eyes: Negative.    Cardiovascular:  Positive for dyspnea on exertion. Negative for chest pain, leg swelling, near-syncope, palpitations and syncope.   Respiratory:  Positive for shortness of breath.    Endocrine: Negative.    Hematologic/Lymphatic: Negative.    Skin: Negative.    Musculoskeletal: Negative.    Gastrointestinal: Negative.    Genitourinary: Negative.    Neurological: Negative.  Negative for dizziness and light-headedness.   Psychiatric/Behavioral: Negative.     Allergic/Immunologic: Negative.       Objective:    Physical Exam  Vitals reviewed.   Constitutional:       General: He is not in acute distress.     Appearance: He is well-developed.   HENT:      Head: Normocephalic and atraumatic.   Eyes:      Pupils: Pupils are equal, round, and reactive to light.   Neck:      Thyroid: No thyromegaly.      Vascular: No JVD.   Cardiovascular:      Rate and Rhythm: Normal rate. Rhythm irregular.      Chest Wall: PMI is not displaced.      Heart sounds: Normal heart sounds, S1 normal and S2 normal. No murmur heard.    No friction rub. No gallop.   Pulmonary:      Effort: Pulmonary effort is normal. No respiratory distress.      Breath sounds: Normal breath sounds. No wheezing or  rales.   Abdominal:      General: Bowel sounds are normal. There is no distension.      Palpations: Abdomen is soft.      Tenderness: There is no abdominal tenderness. There is no guarding or rebound.   Musculoskeletal:         General: No tenderness. Normal range of motion.      Cervical back: Normal range of motion.   Skin:     General: Skin is warm and dry.      Findings: No erythema or rash.   Neurological:      Mental Status: He is alert and oriented to person, place, and time.      Cranial Nerves: No cranial nerve deficit.   Psychiatric:         Behavior: Behavior normal.         Thought Content: Thought content normal.         Judgment: Judgment normal.     ECG: AF with controlled V rate        Assessment:       1. Cardiac arrest with ventricular fibrillation    2. Persistent atrial fibrillation    3. Chronic combined systolic and diastolic heart failure    4. ICD (implantable cardioverter-defibrillator) in place         Plan:       57 yoM here for AF management. Will arrange KIA/CV. Would start amiodarone afterwards. Extensive discussion regarding risks, benefits, and alternative approaches to the procedure was had with the patient.  The patient voices understanding and all questions have been answered.  The patient would like to proceed as planned.    IKA/CV  Start amiodarone afterwards

## 2022-11-10 NOTE — TELEPHONE ENCOUNTER
Reviewed bmp in epic (pro-bnp still pending).  Results consistent with recent trends.     Spoke with pt who reports no adverse side effects of increased entresto dose.

## 2022-11-11 DIAGNOSIS — I48.91 ATRIAL FIBRILLATION, UNSPECIFIED TYPE: Primary | ICD-10-CM

## 2022-11-14 ENCOUNTER — TELEPHONE (OUTPATIENT)
Dept: TRANSPLANT | Facility: CLINIC | Age: 57
End: 2022-11-14
Payer: MEDICAID

## 2022-11-14 NOTE — TELEPHONE ENCOUNTER
----- Message from Cathy Maldonado sent at 2022  2:40 PM CST -----  [facility] @[facility]      I called pt and reviewed last weeks lab results. Nt proBNP remains elevated at 5592.  Pt denies weight gain however admits that he does not weigh himself daily.  He reports is doing well on current medication regimen and endorses taking a fluid pill.  He is scheduled for pre op labs tomorrow and is scheduled for cardioversion next week.  I asked him to weigh himself every day and to call us if he gains 3 lbs overnight or 5 lbs in a week. Verbalized understanding of instructions.

## 2022-11-15 ENCOUNTER — LAB VISIT (OUTPATIENT)
Dept: LAB | Facility: HOSPITAL | Age: 57
End: 2022-11-15
Attending: INTERNAL MEDICINE
Payer: MEDICAID

## 2022-11-15 DIAGNOSIS — I48.91 ATRIAL FIBRILLATION, UNSPECIFIED TYPE: ICD-10-CM

## 2022-11-15 LAB
APTT BLDCRRT: 28.4 SEC (ref 21–32)
BASOPHILS # BLD AUTO: 0.01 K/UL (ref 0–0.2)
BASOPHILS NFR BLD: 0.3 % (ref 0–1.9)
DIFFERENTIAL METHOD: ABNORMAL
EOSINOPHIL # BLD AUTO: 0.1 K/UL (ref 0–0.5)
EOSINOPHIL NFR BLD: 1.6 % (ref 0–8)
ERYTHROCYTE [DISTWIDTH] IN BLOOD BY AUTOMATED COUNT: 15.7 % (ref 11.5–14.5)
HCT VFR BLD AUTO: 44.4 % (ref 40–54)
HGB BLD-MCNC: 13 G/DL (ref 14–18)
IMM GRANULOCYTES # BLD AUTO: 0.01 K/UL (ref 0–0.04)
IMM GRANULOCYTES NFR BLD AUTO: 0.3 % (ref 0–0.5)
INR PPP: 1.1 (ref 0.8–1.2)
LYMPHOCYTES # BLD AUTO: 0.9 K/UL (ref 1–4.8)
LYMPHOCYTES NFR BLD: 24.2 % (ref 18–48)
MCH RBC QN AUTO: 26.7 PG (ref 27–31)
MCHC RBC AUTO-ENTMCNC: 29.3 G/DL (ref 32–36)
MCV RBC AUTO: 91 FL (ref 82–98)
MONOCYTES # BLD AUTO: 0.4 K/UL (ref 0.3–1)
MONOCYTES NFR BLD: 9.4 % (ref 4–15)
NEUTROPHILS # BLD AUTO: 2.5 K/UL (ref 1.8–7.7)
NEUTROPHILS NFR BLD: 64.2 % (ref 38–73)
NRBC BLD-RTO: 0 /100 WBC
PLATELET # BLD AUTO: 287 K/UL (ref 150–450)
PMV BLD AUTO: 11.9 FL (ref 9.2–12.9)
PROTHROMBIN TIME: 11.4 SEC (ref 9–12.5)
RBC # BLD AUTO: 4.87 M/UL (ref 4.6–6.2)
WBC # BLD AUTO: 3.85 K/UL (ref 3.9–12.7)

## 2022-11-15 PROCEDURE — 85025 COMPLETE CBC W/AUTO DIFF WBC: CPT | Mod: TXP | Performed by: INTERNAL MEDICINE

## 2022-11-15 PROCEDURE — 85610 PROTHROMBIN TIME: CPT | Mod: NTX | Performed by: INTERNAL MEDICINE

## 2022-11-15 PROCEDURE — 36415 COLL VENOUS BLD VENIPUNCTURE: CPT | Mod: TXP | Performed by: INTERNAL MEDICINE

## 2022-11-15 PROCEDURE — 85730 THROMBOPLASTIN TIME PARTIAL: CPT | Mod: NTX | Performed by: INTERNAL MEDICINE

## 2022-11-21 ENCOUNTER — TELEPHONE (OUTPATIENT)
Dept: ELECTROPHYSIOLOGY | Facility: CLINIC | Age: 57
End: 2022-11-21
Payer: MEDICAID

## 2022-11-21 NOTE — TELEPHONE ENCOUNTER
Attempted to contact patient to confirm procedure details. No answer. Left detailed voicemail including: arrival time, NPO after midnight and medication instructions, along with callback number.     Labs Reviewed, no alerts noted.

## 2022-11-22 ENCOUNTER — ANESTHESIA EVENT (OUTPATIENT)
Dept: MEDSURG UNIT | Facility: HOSPITAL | Age: 57
End: 2022-11-22
Payer: MEDICAID

## 2022-11-22 ENCOUNTER — HOSPITAL ENCOUNTER (OUTPATIENT)
Dept: CARDIOLOGY | Facility: HOSPITAL | Age: 57
Discharge: HOME | End: 2022-11-22
Attending: INTERNAL MEDICINE | Admitting: INTERNAL MEDICINE
Payer: MEDICAID

## 2022-11-22 ENCOUNTER — HOSPITAL ENCOUNTER (OUTPATIENT)
Facility: HOSPITAL | Age: 57
Discharge: HOME | End: 2022-11-22
Attending: INTERNAL MEDICINE | Admitting: INTERNAL MEDICINE
Payer: MEDICAID

## 2022-11-22 ENCOUNTER — ANESTHESIA (OUTPATIENT)
Dept: MEDSURG UNIT | Facility: HOSPITAL | Age: 57
End: 2022-11-22
Payer: MEDICAID

## 2022-11-22 VITALS
HEIGHT: 72 IN | BODY MASS INDEX: 24.38 KG/M2 | SYSTOLIC BLOOD PRESSURE: 106 MMHG | DIASTOLIC BLOOD PRESSURE: 62 MMHG | RESPIRATION RATE: 24 BRPM | WEIGHT: 180 LBS | TEMPERATURE: 98 F | OXYGEN SATURATION: 99 % | HEART RATE: 68 BPM

## 2022-11-22 VITALS
DIASTOLIC BLOOD PRESSURE: 91 MMHG | HEIGHT: 72 IN | SYSTOLIC BLOOD PRESSURE: 125 MMHG | BODY MASS INDEX: 24.38 KG/M2 | WEIGHT: 180 LBS

## 2022-11-22 DIAGNOSIS — I48.91 ATRIAL FIBRILLATION, UNSPECIFIED TYPE: ICD-10-CM

## 2022-11-22 DIAGNOSIS — R07.9 CHEST PAIN: ICD-10-CM

## 2022-11-22 DIAGNOSIS — I48.19 PERSISTENT ATRIAL FIBRILLATION: ICD-10-CM

## 2022-11-22 DIAGNOSIS — I48.91 ATRIAL FIBRILLATION: Primary | ICD-10-CM

## 2022-11-22 LAB
BSA FOR ECHO PROCEDURE: 2.04 M2
EJECTION FRACTION: 10 %
GLUCOSE SERPL-MCNC: 61 MG/DL (ref 70–110)
POCT GLUCOSE: 111 MG/DL (ref 70–110)
POCT GLUCOSE: 115 MG/DL (ref 70–110)
POCT GLUCOSE: 43 MG/DL (ref 70–110)
POCT GLUCOSE: 45 MG/DL (ref 70–110)
POCT GLUCOSE: 65 MG/DL (ref 70–110)

## 2022-11-22 PROCEDURE — 63600175 PHARM REV CODE 636 W HCPCS: Mod: NTX | Performed by: NURSE PRACTITIONER

## 2022-11-22 PROCEDURE — 92960 CARDIOVERSION ELECTRIC EXT: CPT | Mod: NTX,59 | Performed by: INTERNAL MEDICINE

## 2022-11-22 PROCEDURE — 25000003 PHARM REV CODE 250: Mod: TXP | Performed by: STUDENT IN AN ORGANIZED HEALTH CARE EDUCATION/TRAINING PROGRAM

## 2022-11-22 PROCEDURE — 25000003 PHARM REV CODE 250: Mod: NTX | Performed by: STUDENT IN AN ORGANIZED HEALTH CARE EDUCATION/TRAINING PROGRAM

## 2022-11-22 PROCEDURE — 93010 ELECTROCARDIOGRAM REPORT: CPT | Mod: NTX,,, | Performed by: INTERNAL MEDICINE

## 2022-11-22 PROCEDURE — 93005 ELECTROCARDIOGRAM TRACING: CPT | Mod: NTX,59

## 2022-11-22 PROCEDURE — D9220A PRA ANESTHESIA: ICD-10-PCS | Mod: ANES,NTX,, | Performed by: ANESTHESIOLOGY

## 2022-11-22 PROCEDURE — 00410 ANES INTEG SYS CONV ARRHYT: CPT | Mod: NTX | Performed by: INTERNAL MEDICINE

## 2022-11-22 PROCEDURE — D9220A PRA ANESTHESIA: Mod: CRNA,NTX,, | Performed by: STUDENT IN AN ORGANIZED HEALTH CARE EDUCATION/TRAINING PROGRAM

## 2022-11-22 PROCEDURE — 92960 PR CARDIOVERSION, ELECTIVE;EXTERN: ICD-10-PCS | Mod: NTX,,, | Performed by: INTERNAL MEDICINE

## 2022-11-22 PROCEDURE — 93320 DOPPLER ECHO COMPLETE: CPT | Mod: 26,NTX,, | Performed by: INTERNAL MEDICINE

## 2022-11-22 PROCEDURE — 92960 CARDIOVERSION ELECTRIC EXT: CPT | Mod: NTX,,, | Performed by: INTERNAL MEDICINE

## 2022-11-22 PROCEDURE — 93325 DOPPLER ECHO COLOR FLOW MAPG: CPT | Mod: 26,NTX,, | Performed by: INTERNAL MEDICINE

## 2022-11-22 PROCEDURE — 63600175 PHARM REV CODE 636 W HCPCS: Mod: TXP | Performed by: NURSE PRACTITIONER

## 2022-11-22 PROCEDURE — 93325 DOPPLER ECHO COLOR FLOW MAPG: CPT | Mod: NTX

## 2022-11-22 PROCEDURE — 37000008 HC ANESTHESIA 1ST 15 MINUTES: Mod: TXP | Performed by: INTERNAL MEDICINE

## 2022-11-22 PROCEDURE — D9220A PRA ANESTHESIA: ICD-10-PCS | Mod: CRNA,NTX,, | Performed by: STUDENT IN AN ORGANIZED HEALTH CARE EDUCATION/TRAINING PROGRAM

## 2022-11-22 PROCEDURE — 93010 EKG 12-LEAD: ICD-10-PCS | Mod: NTX,,, | Performed by: INTERNAL MEDICINE

## 2022-11-22 PROCEDURE — 93312 TRANSESOPHAGEAL ECHO (TEE) (CUPID ONLY): ICD-10-PCS | Mod: 26,NTX,, | Performed by: INTERNAL MEDICINE

## 2022-11-22 PROCEDURE — D9220A PRA ANESTHESIA: Mod: ANES,NTX,, | Performed by: ANESTHESIOLOGY

## 2022-11-22 PROCEDURE — 93325 TRANSESOPHAGEAL ECHO (TEE) (CUPID ONLY): ICD-10-PCS | Mod: 26,NTX,, | Performed by: INTERNAL MEDICINE

## 2022-11-22 PROCEDURE — 37000009 HC ANESTHESIA EA ADD 15 MINS: Mod: NTX | Performed by: INTERNAL MEDICINE

## 2022-11-22 PROCEDURE — 93320 TRANSESOPHAGEAL ECHO (TEE) (CUPID ONLY): ICD-10-PCS | Mod: 26,NTX,, | Performed by: INTERNAL MEDICINE

## 2022-11-22 PROCEDURE — 93312 ECHO TRANSESOPHAGEAL: CPT | Mod: 26,NTX,, | Performed by: INTERNAL MEDICINE

## 2022-11-22 PROCEDURE — 93005 ELECTROCARDIOGRAM TRACING: CPT | Mod: NTX

## 2022-11-22 RX ORDER — SODIUM CHLORIDE 0.9 % (FLUSH) 0.9 %
3 SYRINGE (ML) INJECTION
Status: DISCONTINUED | OUTPATIENT
Start: 2022-11-22 | End: 2022-11-22 | Stop reason: HOSPADM

## 2022-11-22 RX ORDER — ONDANSETRON 2 MG/ML
4 INJECTION INTRAMUSCULAR; INTRAVENOUS ONCE
Status: COMPLETED | OUTPATIENT
Start: 2022-11-22 | End: 2022-11-22

## 2022-11-22 RX ORDER — LIDOCAINE HYDROCHLORIDE 20 MG/ML
SOLUTION OROPHARYNGEAL
Status: DISCONTINUED | OUTPATIENT
Start: 2022-11-22 | End: 2022-11-22

## 2022-11-22 RX ORDER — LIDOCAINE HYDROCHLORIDE 20 MG/ML
INJECTION INTRAVENOUS
Status: DISCONTINUED | OUTPATIENT
Start: 2022-11-22 | End: 2022-11-22

## 2022-11-22 RX ORDER — ETOMIDATE 2 MG/ML
INJECTION INTRAVENOUS
Status: DISCONTINUED | OUTPATIENT
Start: 2022-11-22 | End: 2022-11-22

## 2022-11-22 RX ORDER — AMIODARONE HYDROCHLORIDE 200 MG/1
200 TABLET ORAL 2 TIMES DAILY
Qty: 60 TABLET | Refills: 3 | Status: ON HOLD | OUTPATIENT
Start: 2022-11-22 | End: 2023-04-22 | Stop reason: SDUPTHER

## 2022-11-22 RX ADMIN — ETOMIDATE 6 MG: 2 INJECTION INTRAVENOUS at 02:11

## 2022-11-22 RX ADMIN — DEXTROSE MONOHYDRATE 250 ML: 10 INJECTION, SOLUTION INTRAVENOUS at 02:11

## 2022-11-22 RX ADMIN — LIDOCAINE HYDROCHLORIDE 100 MG: 20 INJECTION INTRAVENOUS at 02:11

## 2022-11-22 RX ADMIN — LIDOCAINE HYDROCHLORIDE 15 ML: 20 SOLUTION ORAL; TOPICAL at 02:11

## 2022-11-22 RX ADMIN — ONDANSETRON 4 MG: 2 INJECTION INTRAMUSCULAR; INTRAVENOUS at 05:11

## 2022-11-22 RX ADMIN — SODIUM CHLORIDE: 9 INJECTION, SOLUTION INTRAVENOUS at 02:11

## 2022-11-22 RX ADMIN — DEXTROSE 250 ML: 10 SOLUTION INTRAVENOUS at 02:11

## 2022-11-22 NOTE — PLAN OF CARE
CBG is 65 pt is NPO for procedure. Pt states he took his invokona and glimeperide at 8 am. Dr Tang notified.

## 2022-11-22 NOTE — DISCHARGE INSTRUCTIONS
Medications:  -Continue to take your home medications as listed on your medication list after you are discharged.    New Medications:  Amiodarone 200 mg two times daily    Diet  -You may resume oral intake after you are discharged, as long you have no swallowing difficulties.    Because you have received sedation for this procedure:  -Limit activity for the remainder of the day.  -Do not smoke for at least 6 hours and until you are fully awake and alert.  -Do not drink alcoholic beverage for 24 hours.  -Do not drive for 24 hours.  -Defer important decision making until the following day.     Go to the Emergency Department if you develop:   -Bleeding  -Weakness or numbness  -Visual, gait or speech disturbance  -New chest pain, palpitations, shortness of breath, rapid heart beat, or fainting  -Fever    Follow up:  -EKG in 1 week.  -Dr. Marvin Sanon  in 1 month.     If you have a pacemaker, defibrillator or loop recorder that is monitored remotely by the [facility] clinic, you may be eligible to send in a remote transmission on the day of your EKG appointment in lieu of the EKG. You will be informed at discharge if this is an option. If you are instructed to send in a remote transmission, please send a manual transmission from your home monitor one week after your procedure and then call the clinic at [phone], option 4, to confirm that your remote transmission was received.    Any need to reschedule or cancel procedures, or any questions regarding your procedures should be addressed directly with the Arrhythmia Department Nurses at the following phone number: [phone].

## 2022-11-22 NOTE — ANESTHESIA PREPROCEDURE EVALUATION
11/22/2022  Tera Griffiths is a 57 y.o., male.      Pre-op Assessment    I have reviewed the Patient Summary Reports.       I have reviewed the Medications.     Review of Systems  Anesthesia Hx:  History of prior surgery of interest to airway management or planning:  Denies Personal Hx of Anesthesia complications.   Cardiovascular:   Pacemaker Hypertension CAD  Dysrhythmias atrial fibrillation CHF EF 15% with severely decreased RV Fxn   Endocrine:   Diabetes        Physical Exam  General: Well nourished    Airway:  Mallampati: III / II  Mouth Opening: Normal  TM Distance: Normal  Tongue: Normal  Neck ROM: Normal ROM    Dental:  Periodontal disease    Chest/Lungs:  Normal Respiratory Rate    Heart:  Rate: Normal        Anesthesia Plan  Type of Anesthesia, risks & benefits discussed:    Anesthesia Type: Gen Natural Airway  Intra-op Monitoring Plan: Art Line and Standard ASA Monitors  Post Op Pain Control Plan: multimodal analgesia  Induction:  IV  Informed Consent: Informed consent signed with the Patient and all parties understand the risks and agree with anesthesia plan.  All questions answered.   ASA Score: 4  Day of Surgery Review of History & Physical: H&P Update referred to the surgeon/provider.  Anesthesia Plan Notes: NPO confirmed.  Patient with mild increased work of breathing preop.  Extremely high-risk with EF 15% and severe RV dysfunction.  Plan a-line pre-induction.  Etomidate bolus with low dose epi boluses (10mcg) to support BP.      Ready For Surgery From Anesthesia Perspective.     .

## 2022-11-22 NOTE — ASSESSMENT & PLAN NOTE
1. KIA for evaluation of YURIDIA prior to DCCV   -No absolute contraindications of esophageal stricture, tumor, perforation, laceration,or diverticulum and/or active GI bleed  -The risks, benefits & alternatives of the procedure were explained to the patient.   -The risks of transesophageal echo include but are not limited to:  Dental trauma, esophageal trauma/perforation, bleeding, laryngospasm/brochospasm, aspiration, sore throat/hoarseness, & dislodgement of the endotracheal tube/nasogastric tube (where applicable).    -The risks of moderate sedation include hypotension, respiratory depression, arrhythmias, bronchospasm, & death.    -Informed consent was obtained. The patient is agreeable to proceed with the procedure and all questions and concerns addressed.    Case discussed with an attending in echocardiography lab.     Further recommendations per attending addendum

## 2022-11-22 NOTE — ANESTHESIA POSTPROCEDURE EVALUATION
Anesthesia Post Evaluation    Patient: Tera Griffiths    Procedure(s) Performed: Procedure(s) (LRB):  CARDIOVERSION (N/A)  Transesophageal echo (KIA) intra-procedure log documentation (N/A)    Final Anesthesia Type: general      Patient location during evaluation: PACU  Patient participation: Yes- Able to Participate  Level of consciousness: awake and alert  Post-procedure vital signs: reviewed and stable  Pain management: adequate  Airway patency: patent    PONV status at discharge: No PONV  Anesthetic complications: no      Cardiovascular status: blood pressure returned to baseline  Respiratory status: unassisted  Hydration status: euvolemic  Follow-up not needed.          Vitals Value Taken Time   /74 11/22/22 1631   Temp 36.7 °C (98.1 °F) 11/22/22 1515   Pulse 58 11/22/22 1633   Resp 12 11/22/22 1633   SpO2 98 % 11/22/22 1633   Vitals shown include unvalidated device data.      No case tracking events are documented in the log.      Pain/Joel Score: Joel Score: 9 (11/22/2022  4:15 PM)

## 2022-11-22 NOTE — H&P
[facility] Short Stay Cardiac Unit  Cardiology  History and Physical     Patient Name: Tera Griffiths  MRN: [id number]  Admission Date: 11/22/2022  Code Status: Prior   Attending Provider: Marvin Sanon MD   Primary Care Physician: Primary Doctor No  Principal Problem:<principal problem not specified>    Patient information was obtained from patient and ER records.     Subjective:     Chief Complaint:  AF      HPI:  57 yoM with AF, HF, CAD, DM type 2, HTN here for AF management. He suffered a cardiac arrest 12/21 leading to EMS ACLS and recovery. He was admitted to [facility] and underwent DC ICD by Dr Bravo 12/23/21. He developed AF 3/22. No attempts at rhythm control were made. He was on eliquis for CVA prophylaxis. He developed worsening HF symptoms leading to evaluation for advanced options at [facility]. He was referred here to arrange CV.     Presents today for KIA/DCCV. ECG notable for AF     Dysphagia or odynophagia:  No  Liver Disease, esophageal disease, or known varices:  No  Upper GI Bleeding: No  Snoring:  No  Sleep Apnea:  No  Prior neck surgery or radiation:  No  History of anesthetic difficulties:  No  Family history of anesthetic difficulties:  No  Last oral intake:  12 hours ago  Able to move neck in all directions:  Yes    9/23/22 TTE    The left ventricle is severely enlarged with eccentric hypertrophy and severely decreased systolic function. The estimated ejection fraction is 15%.   Moderate right ventricular enlargement with severely reduced right ventricular systolic function.   Severe biatrial enlargement.   Mild mitral regurgitation.   The estimated PA systolic pressure is 41 mmHg.   Intermediate central venous pressure (8 mmHg).   Atrial fibrillation observed.      Past Medical History:   Diagnosis Date    Atrial fibrillation     CHF (congestive heart failure)     Coronary atherosclerosis of native coronary artery     Diabetes mellitus, type 2     Hypertension        Past Surgical  History:   Procedure Laterality Date    CARDIAC DEFIBRILLATOR PLACEMENT Left     HERNIA REPAIR      RIGHT HEART CATHETERIZATION Right 9/30/2022    Procedure: INSERTION, CATHETER, RIGHT HEART;  Surgeon: Caden Aden MD;  Location: [facility] CATH LAB;  Service: Cardiology;  Laterality: Right;       Review of patient's allergies indicates:  No Known Allergies    No current facility-administered medications on file prior to encounter.     Current Outpatient Medications on File Prior to Encounter   Medication Sig    atorvastatin (LIPITOR) 80 MG tablet Take 80 mg by mouth once daily.    canagliflozin (INVOKANA) 100 mg Tab tablet Take 100 mg by mouth Daily.    clopidogreL (PLAVIX) 75 mg tablet Take 75 mg by mouth once daily.    ELIQUIS 5 mg Tab Take 5 mg by mouth 2 (two) times daily.    ferrous sulfate (FEOSOL) 325 mg (65 mg iron) Tab tablet Take 325 mg by mouth Daily.    glimepiride (AMARYL) 4 MG tablet Take 4 mg by mouth every morning.    LIDOcaine (LIDODERM) 5 % 1 patch once daily.    metoprolol succinate (TOPROL-XL) 25 MG 24 hr tablet Take 1 tablet (25 mg total) by mouth once daily.    pantoprazole (PROTONIX) 40 MG tablet Take 40 mg by mouth once daily.    sacubitriL-valsartan (ENTRESTO) 49-51 mg per tablet Take 1 tablet by mouth 2 (two) times daily.    spironolactone (ALDACTONE) 25 MG tablet Take 25 mg by mouth once daily.    tamsulosin (FLOMAX) 0.4 mg Cap Take 1 capsule by mouth every evening.    torsemide (DEMADEX) 20 MG Tab Take 2 tablets (40 mg total) by mouth 2 (two) times a day.     Family History    None       Tobacco Use    Smoking status: Never    Smokeless tobacco: Never   Substance and Sexual Activity    Alcohol use: Never    Drug use: Not on file    Sexual activity: Not on file     Review of Systems   Constitutional: Negative for fever and malaise/fatigue.   Eyes:  Negative for blurred vision and pain.   Cardiovascular:  Negative for chest pain, dyspnea on exertion, leg swelling,  orthopnea, palpitations and paroxysmal nocturnal dyspnea.   Respiratory:  Negative for cough, shortness of breath, sputum production and wheezing.    Hematologic/Lymphatic: Negative for adenopathy and bleeding problem.   Skin:  Negative for rash.   Musculoskeletal:  Negative for back pain and neck pain.   Gastrointestinal:  Negative for abdominal pain, constipation, diarrhea, nausea and vomiting.   Genitourinary:  Negative for dysuria.   Neurological:  Negative for dizziness, headaches, light-headedness and weakness.   Objective:     Vital Signs (Most Recent):    Vital Signs (24h Range):           There is no height or weight on file to calculate BMI.            No intake or output data in the 24 hours ending 11/22/22 1221    Lines/Drains/Airways       None                   Physical Exam  Constitutional:       General: He is not in acute distress.     Appearance: Normal appearance. He is not ill-appearing, toxic-appearing or diaphoretic.   HENT:      Head: Normocephalic and atraumatic.      Nose: Nose normal.   Eyes:      Extraocular Movements: Extraocular movements intact.      Pupils: Pupils are equal, round, and reactive to light.   Cardiovascular:      Rate and Rhythm: Normal rate. Rhythm irregular.      Heart sounds: No murmur heard.    No friction rub. No gallop.   Pulmonary:      Effort: Pulmonary effort is normal. No respiratory distress.      Breath sounds: Normal breath sounds. No wheezing or rales.   Abdominal:      General: Abdomen is flat. There is no distension.      Palpations: Abdomen is soft. There is no mass.      Tenderness: There is no abdominal tenderness.   Musculoskeletal:         General: No swelling. Normal range of motion.      Cervical back: Normal range of motion. No rigidity.      Right lower leg: No edema.      Left lower leg: No edema.   Skin:     General: Skin is warm and dry.      Coloration: Skin is not jaundiced.      Findings: No bruising.   Neurological:      General: No focal  deficit present.      Mental Status: He is alert and oriented to person, place, and time.      Cranial Nerves: No cranial nerve deficit.      Motor: No weakness.       Significant Labs: BMP: No results for input(s): GLU, NA, K, CL, CO2, BUN, CREATININE, CALCIUM, MG in the last 48 hours. and CBC No results for input(s): WBC, HGB, HCT, PLT in the last 48 hours.    Significant Imaging: Reviewed     Assessment and Plan:     Atrial fibrillation  1. KIA for evaluation of YURIDIA prior to DCCV   -No absolute contraindications of esophageal stricture, tumor, perforation, laceration,or diverticulum and/or active GI bleed  -The risks, benefits & alternatives of the procedure were explained to the patient.   -The risks of transesophageal echo include but are not limited to:  Dental trauma, esophageal trauma/perforation, bleeding, laryngospasm/brochospasm, aspiration, sore throat/hoarseness, & dislodgement of the endotracheal tube/nasogastric tube (where applicable).    -The risks of moderate sedation include hypotension, respiratory depression, arrhythmias, bronchospasm, & death.    -Informed consent was obtained. The patient is agreeable to proceed with the procedure and all questions and concerns addressed.    Case discussed with an attending in echocardiography lab.     Further recommendations per attending addendum          VTE Risk Mitigation (From admission, onward)    None          Jose Tang MD  Cardiology   [facility] - Short Stay Cardiac Unit

## 2022-11-22 NOTE — SUBJECTIVE & OBJECTIVE
Past Medical History:   Diagnosis Date    Atrial fibrillation     CHF (congestive heart failure)     Coronary atherosclerosis of native coronary artery     Diabetes mellitus, type 2     Hypertension        Past Surgical History:   Procedure Laterality Date    CARDIAC DEFIBRILLATOR PLACEMENT Left     HERNIA REPAIR      RIGHT HEART CATHETERIZATION Right 9/30/2022    Procedure: INSERTION, CATHETER, RIGHT HEART;  Surgeon: Caden Aden MD;  Location: [facility] CATH LAB;  Service: Cardiology;  Laterality: Right;       Review of patient's allergies indicates:  No Known Allergies    No current facility-administered medications on file prior to encounter.     Current Outpatient Medications on File Prior to Encounter   Medication Sig    atorvastatin (LIPITOR) 80 MG tablet Take 80 mg by mouth once daily.    canagliflozin (INVOKANA) 100 mg Tab tablet Take 100 mg by mouth Daily.    clopidogreL (PLAVIX) 75 mg tablet Take 75 mg by mouth once daily.    ELIQUIS 5 mg Tab Take 5 mg by mouth 2 (two) times daily.    ferrous sulfate (FEOSOL) 325 mg (65 mg iron) Tab tablet Take 325 mg by mouth Daily.    glimepiride (AMARYL) 4 MG tablet Take 4 mg by mouth every morning.    LIDOcaine (LIDODERM) 5 % 1 patch once daily.    metoprolol succinate (TOPROL-XL) 25 MG 24 hr tablet Take 1 tablet (25 mg total) by mouth once daily.    pantoprazole (PROTONIX) 40 MG tablet Take 40 mg by mouth once daily.    sacubitriL-valsartan (ENTRESTO) 49-51 mg per tablet Take 1 tablet by mouth 2 (two) times daily.    spironolactone (ALDACTONE) 25 MG tablet Take 25 mg by mouth once daily.    tamsulosin (FLOMAX) 0.4 mg Cap Take 1 capsule by mouth every evening.    torsemide (DEMADEX) 20 MG Tab Take 2 tablets (40 mg total) by mouth 2 (two) times a day.     Family History    None       Tobacco Use    Smoking status: Never    Smokeless tobacco: Never   Substance and Sexual Activity    Alcohol use: Never    Drug use: Not on file    Sexual activity: Not on file     Review of  Systems   Constitutional: Negative for fever and malaise/fatigue.   Eyes:  Negative for blurred vision and pain.   Cardiovascular:  Negative for chest pain, dyspnea on exertion, leg swelling, orthopnea, palpitations and paroxysmal nocturnal dyspnea.   Respiratory:  Negative for cough, shortness of breath, sputum production and wheezing.    Hematologic/Lymphatic: Negative for adenopathy and bleeding problem.   Skin:  Negative for rash.   Musculoskeletal:  Negative for back pain and neck pain.   Gastrointestinal:  Negative for abdominal pain, constipation, diarrhea, nausea and vomiting.   Genitourinary:  Negative for dysuria.   Neurological:  Negative for dizziness, headaches, light-headedness and weakness.   Objective:     Vital Signs (Most Recent):    Vital Signs (24h Range):           There is no height or weight on file to calculate BMI.            No intake or output data in the 24 hours ending 11/22/22 1221    Lines/Drains/Airways       None                   Physical Exam  Constitutional:       General: He is not in acute distress.     Appearance: Normal appearance. He is not ill-appearing, toxic-appearing or diaphoretic.   HENT:      Head: Normocephalic and atraumatic.      Nose: Nose normal.   Eyes:      Extraocular Movements: Extraocular movements intact.      Pupils: Pupils are equal, round, and reactive to light.   Cardiovascular:      Rate and Rhythm: Normal rate. Rhythm irregular.      Heart sounds: No murmur heard.    No friction rub. No gallop.   Pulmonary:      Effort: Pulmonary effort is normal. No respiratory distress.      Breath sounds: Normal breath sounds. No wheezing or rales.   Abdominal:      General: Abdomen is flat. There is no distension.      Palpations: Abdomen is soft. There is no mass.      Tenderness: There is no abdominal tenderness.   Musculoskeletal:         General: No swelling. Normal range of motion.      Cervical back: Normal range of motion. No rigidity.      Right lower leg:  No edema.      Left lower leg: No edema.   Skin:     General: Skin is warm and dry.      Coloration: Skin is not jaundiced.      Findings: No bruising.   Neurological:      General: No focal deficit present.      Mental Status: He is alert and oriented to person, place, and time.      Cranial Nerves: No cranial nerve deficit.      Motor: No weakness.       Significant Labs: BMP: No results for input(s): GLU, NA, K, CL, CO2, BUN, CREATININE, CALCIUM, MG in the last 48 hours. and CBC No results for input(s): WBC, HGB, HCT, PLT in the last 48 hours.    Significant Imaging: Reviewed

## 2022-11-22 NOTE — PROGRESS NOTES
Patient received a 250 ml bolus of D10 for blood glucose of 45. Blood glucose rechecked in procedural room and result is 61. Verbal order received to bolus another 250 ml of D10 and recheck blood glucose once procedure is complete.

## 2022-11-22 NOTE — PLAN OF CARE
Patient arrived to room. PIV placed. Admit assessment completed. Plan of care discussed with patient. Nurse call bell within reach.   Will monitor

## 2022-11-22 NOTE — PLAN OF CARE
D10 IV bolus given as ordered and is now infusing. Pt remains asymptomatic and CARL Marlow in echo lab on unit to transfer patient to his procedure. Bedside report given and patient transferred to procedure by CARL Marlow with D10 gtt infusing. Pt is aaox4. Vss and remains asymptomatic.

## 2022-11-22 NOTE — PLAN OF CARE
Recheck CBG 45. Pt is still asymptomatic. New order given and carried out for D10 IV bolus per protocol. Will continue to monitor

## 2022-11-22 NOTE — TRANSFER OF CARE
Anesthesia Transfer of Care Note    Patient: Tera Griffiths    Procedure(s) Performed: Procedure(s) (LRB):  CARDIOVERSION (N/A)  Transesophageal echo (KIA) intra-procedure log documentation (N/A)    Patient location: PACU    Anesthesia Type: general    Transport from OR: Transported from OR on 2-3 L/min O2 by NC with adequate spontaneous ventilation    Post pain: adequate analgesia    Post assessment: no apparent anesthetic complications    Post vital signs: stable    Level of consciousness: awake    Nausea/Vomiting: no nausea/vomiting    Complications: none    Transfer of care protocol was followed      Last vitals:   Visit Vitals  /89 (BP Location: Left arm, Patient Position: Lying)   Pulse 62   Temp 36.6 °C (97.9 °F) (Temporal)   Resp 20   Ht 6' (1.829 m)   Wt 81.6 kg (180 lb)   SpO2 99%   BMI 24.41 kg/m²

## 2022-11-22 NOTE — PROGRESS NOTES
Removed A-line, held pressure for 10 minutes no bleeding from sight noted after the 10 minutes -placed a 4x4 guaze and covered with a tegaderm

## 2022-11-22 NOTE — PROGRESS NOTES
Received patient from EP lab; patient is still slightly sedated; arouses with pain; D10 currently infusing as a bolus from EP lab which only needs 125cc left to go- CBG is 111; patient arrived with A-line in place (used in the procedure to get a current bp since patient has a low EF)-  will remove A-line

## 2022-11-22 NOTE — PROGRESS NOTES
"Patient is feeling clammy; nausea c/o headache and pain in his chest- He states "something doesn't feel right"   Kaia Carmona Np at bedside- Blood Glucose 115- will order zofran for nausea  "

## 2022-11-23 NOTE — DISCHARGE SUMMARY
[facility] - Short Stay Cardiac Unit  Cardiac Electrophysiology  Discharge Summary      Patient Name: Tera Griffiths  MRN: [id number]  Admission Date: 11/22/2022  Hospital Length of Stay: 0 days  Discharge Date and Time: 11/22/2022  7:40 PM  Attending Physician: Marvin Sanon MD  Discharging Provider: Kaia Carmona NP  Primary Care Physician: Primary Doctor No    HPI: 57 yoM with AF, HF, CAD, DM type 2, HTN here for AF management. He suffered a cardiac arrest 12/21 leading to EMS ACLS and recovery. He was admitted to [facility] and underwent DC ICD by Dr Bravo 12/23/21. He developed AF 3/22. No attempts at rhythm control were made. He was on eliquis for CVA prophylaxis. He developed worsening HF symptoms leading to evaluation for advanced options at [facility]. He was referred here to arrange CV.   Plan for    KIA/CV  Start amiodarone afterwards    Procedure(s) (LRB):  CARDIOVERSION (N/A)  Transesophageal echo (KIA) intra-procedure log documentation (N/A)     Indwelling Lines/Drains at time of discharge:  Lines/Drains/Airways     None             Hospital Course: Mr. Griffiths presented in AF and currently on eliquis 5 mg BID and metoprolol 25 mg daily. He underwent KIA without evidence of YURIDIA thrombus. Proceeded with DCCV 200 J x 1 shock, converting from AF to sinus rhythm. He tolerated the procedure without any acute complications. Post-DCCV ECG revealed sinus rhythm with first degree AV block LBBB at 75 bpm  ms  ms QT/QTc 452/504 ms. Discharge plans discussed with Dr. Sanon. Plan to resume home medications including eliquis 5 mg BID and metoprolol 25 mg daily. Start amiodarone 200 mg BID. Return in 1 week for ECG and in 4 weeks for clinic follow up. While in recovery, patient complaining of chest pain in middle of his chest 8/10, feeling clammy; nausea c/o headache and pain in his chest. . Repeat ECG showed sinus rhythm with first degree block at 61 bpm  ms  ms AT/ATc 520/523 ms. When  compared to first post procedure ECG, no significant change found other than T wave inversion in lateral leads, question of lead placement. Post procedure ECGs reviewed with Dr. Rodriguez (as Dr. Sanon was gone for the day). No concern for ST elevation. Will control nausea and reassess. Ok to start amiodarone.   Ms. Griffiths was assessed at bedside prior to discharge. He reported feeling well and denied chest discomfort/pain, nausea, headache, shortness of breath, palpitations, lightheadedness, or any other acute symptoms. Discharge instructions were discussed and all questions were answered. He was discharged home in stable condition.     Goals of Care Treatment Preferences:  Code Status: Full Code    Consults: none    Significant Diagnostic Studies:  ECG revealed sinus rhythm with first degree AV block LBBB at 75 bpm  ms  ms QT/QTc 452/504 ms    Final Active Diagnoses:    Diagnosis Date Noted POA    PRINCIPAL PROBLEM:  Atrial fibrillation [I48.91] 09/23/2022 Yes      Problems Resolved During this Admission:     Condition: stable    Disposition: Home or Self Care    Follow Up:   Follow-up Information     EKG 1 [facility] Follow up in 1 week(s).    Specialty: Cardiology  Why: Post cardioversion           Marvin Sanon MD Follow up in 1 month(s).    Specialties: Electrophysiology, Cardiovascular Disease  Why: Post cardioversion  Contact information:  [address] [place] [address] [id number] [phone]                       Patient Instructions:      No driving until:   Order Comments: No driving or operating heavy machinery for 24-48 hours after your procedure because you received sedation.     Other restrictions (specify):   Order Comments: Medications:  -Continue to take your home medications as listed on your medication list after you are discharged.    New Medications:  Amiodarone 200 mg two times daily    Diet  -You may resume oral intake after you are discharged, as long you have no swallowing  difficulties.    Because you have received sedation for this procedure:  -Limit activity for the remainder of the day.  -Do not smoke for at least 6 hours and until you are fully awake and alert.  -Do not drink alcoholic beverage for 24 hours.  -Do not drive for 24 hours.  -Defer important decision making until the following day.     Go to the Emergency Department if you develop:   -Bleeding  -Weakness or numbness  -Visual, gait or speech disturbance  -New chest pain, palpitations, shortness of breath, rapid heart beat, or fainting  -Fever    Follow up:  -EKG in 1 week.  -Dr. Marvin Sanon  in 1 month.     If you have a pacemaker, defibrillator or loop recorder that is monitored remotely by the [facility] clinic, you may be eligible to send in a remote transmission on the day of your EKG appointment in lieu of the EKG. You will be informed at discharge if this is an option. If you are instructed to send in a remote transmission, please send a manual transmission from your home monitor one week after your procedure and then call the clinic at [phone], option 4, to confirm that your remote transmission was received.    Any need to reschedule or cancel procedures, or any questions regarding your procedures should be addressed directly with the Arrhythmia Department Nurses at the following phone number: [phone].     Notify your health care provider if you experience any of the following:  increased confusion or weakness     Notify your health care provider if you experience any of the following:  persistent dizziness, light-headedness, or visual disturbances     Notify your health care provider if you experience any of the following:  worsening rash     Notify your health care provider if you experience any of the following:  severe persistent headache     Notify your health care provider if you experience any of the following:  difficulty breathing or increased cough     Notify your health care provider if you  experience any of the following:  redness, tenderness, or signs of infection (pain, swelling, redness, odor or green/yellow discharge around incision site)     Notify your health care provider if you experience any of the following:  severe uncontrolled pain     Notify your health care provider if you experience any of the following:  persistent nausea and vomiting or diarrhea     Notify your health care provider if you experience any of the following:  temperature >100.4     Medications:  Reconciled Home Medications:      Medication List      START taking these medications    amiodarone 200 MG Tab  Commonly known as: PACERONE  Take 1 tablet (200 mg total) by mouth 2 (two) times daily.        CONTINUE taking these medications    atorvastatin 80 MG tablet  Commonly known as: LIPITOR  Take 80 mg by mouth once daily.     canagliflozin 100 mg Tab tablet  Commonly known as: INVOKANA  Take 100 mg by mouth Daily.     clopidogreL 75 mg tablet  Commonly known as: PLAVIX  Take 75 mg by mouth once daily.     ELIQUIS 5 mg Tab  Generic drug: apixaban  Take 5 mg by mouth 2 (two) times daily.     ENTRESTO 49-51 mg per tablet  Generic drug: sacubitriL-valsartan  Take 1 tablet by mouth 2 (two) times daily.     ferrous sulfate 325 mg (65 mg iron) Tab tablet  Commonly known as: FEOSOL  Take 325 mg by mouth Daily.     glimepiride 4 MG tablet  Commonly known as: AMARYL  Take 4 mg by mouth every morning.     LIDOcaine 5 %  Commonly known as: LIDODERM  1 patch once daily.     metoprolol succinate 25 MG 24 hr tablet  Commonly known as: TOPROL-XL  Take 1 tablet (25 mg total) by mouth once daily.     pantoprazole 40 MG tablet  Commonly known as: PROTONIX  Take 40 mg by mouth once daily.     spironolactone 25 MG tablet  Commonly known as: ALDACTONE  Take 25 mg by mouth once daily.     tamsulosin 0.4 mg Cap  Commonly known as: FLOMAX  Take 1 capsule by mouth every evening.     torsemide 20 MG Tab  Commonly known as: DEMADEX  Take 2 tablets (40  mg total) by mouth 2 (two) times a day.          Plan:  -Continue all home medications  -Start Amiodarone 200 mg BID  -Follow up ECG in 1 week  -Follow up with Dr. Sanon in 1 month    Time spent on the discharge of patient: 16 minutes    Kaia Carmona NP  Cardiac Electrophysiology  [facility] - Arrhythmia    Attending: Marvin Sanon MD

## 2022-11-23 NOTE — PROGRESS NOTES
Patient has ambulated without any issues; patient has no complaints of chest pain, nausea, nor headache; patient has eaten and drank; discharge instructions explained, questions answered; waiting on  patient's family member to come pick him up

## 2022-11-29 ENCOUNTER — HOSPITAL ENCOUNTER (OUTPATIENT)
Dept: CARDIOLOGY | Facility: CLINIC | Age: 57
Discharge: HOME | End: 2022-11-29
Payer: MEDICAID

## 2022-11-29 DIAGNOSIS — I48.91 ATRIAL FIBRILLATION, UNSPECIFIED TYPE: ICD-10-CM

## 2022-11-29 PROCEDURE — 93010 RHYTHM STRIP: ICD-10-PCS | Mod: S$PBB,NTX,, | Performed by: INTERNAL MEDICINE

## 2022-11-29 PROCEDURE — 93010 ELECTROCARDIOGRAM REPORT: CPT | Mod: S$PBB,NTX,, | Performed by: INTERNAL MEDICINE

## 2022-11-29 PROCEDURE — 93005 ELECTROCARDIOGRAM TRACING: CPT | Mod: PBBFAC,NTX | Performed by: INTERNAL MEDICINE

## 2022-12-07 ENCOUNTER — TELEPHONE (OUTPATIENT)
Dept: TRANSPLANT | Facility: CLINIC | Age: 57
End: 2022-12-07
Payer: MEDICAID

## 2022-12-07 ENCOUNTER — TELEPHONE (OUTPATIENT)
Dept: ELECTROPHYSIOLOGY | Facility: CLINIC | Age: 57
End: 2022-12-07
Payer: MEDICAID

## 2022-12-07 NOTE — TELEPHONE ENCOUNTER
"I called pt to see how he is doing after his cardioversion.  Pt says he is doing well, denies weight gain and says his HF symptoms are "all right".  Reminded him to call us with worsening symptoms and reminded of upcoming appt in January with Dr Murphy.   "

## 2023-01-03 ENCOUNTER — OFFICE VISIT (OUTPATIENT)
Dept: ELECTROPHYSIOLOGY | Facility: CLINIC | Age: 58
DRG: 291 | End: 2023-01-03
Payer: MEDICAID

## 2023-01-03 ENCOUNTER — CLINICAL SUPPORT (OUTPATIENT)
Dept: CARDIOLOGY | Facility: HOSPITAL | Age: 58
DRG: 291 | End: 2023-01-03
Attending: INTERNAL MEDICINE
Payer: MEDICAID

## 2023-01-03 ENCOUNTER — HOSPITAL ENCOUNTER (INPATIENT)
Facility: HOSPITAL | Age: 58
LOS: 4 days | Discharge: HOME | DRG: 291 | End: 2023-01-07
Attending: EMERGENCY MEDICINE | Admitting: INTERNAL MEDICINE
Payer: MEDICAID

## 2023-01-03 VITALS
SYSTOLIC BLOOD PRESSURE: 122 MMHG | HEIGHT: 72 IN | DIASTOLIC BLOOD PRESSURE: 78 MMHG | BODY MASS INDEX: 25.62 KG/M2 | HEART RATE: 67 BPM | WEIGHT: 189.13 LBS

## 2023-01-03 DIAGNOSIS — I50.9 CHF (CONGESTIVE HEART FAILURE): ICD-10-CM

## 2023-01-03 DIAGNOSIS — Z95.810 ICD (IMPLANTABLE CARDIOVERTER-DEFIBRILLATOR) IN PLACE: ICD-10-CM

## 2023-01-03 DIAGNOSIS — I10 PRIMARY HYPERTENSION: ICD-10-CM

## 2023-01-03 DIAGNOSIS — I48.91 ATRIAL FIBRILLATION, UNSPECIFIED TYPE: ICD-10-CM

## 2023-01-03 DIAGNOSIS — I48.91 A-FIB: ICD-10-CM

## 2023-01-03 DIAGNOSIS — R06.02 SHORTNESS OF BREATH: ICD-10-CM

## 2023-01-03 DIAGNOSIS — I50.9 CONGESTIVE HEART FAILURE, UNSPECIFIED HF CHRONICITY, UNSPECIFIED HEART FAILURE TYPE: ICD-10-CM

## 2023-01-03 DIAGNOSIS — I25.2 HX OF NON-ST ELEVATION MYOCARDIAL INFARCTION (NSTEMI): ICD-10-CM

## 2023-01-03 DIAGNOSIS — I50.42 CHRONIC COMBINED SYSTOLIC AND DIASTOLIC HEART FAILURE: ICD-10-CM

## 2023-01-03 DIAGNOSIS — I46.9 CARDIAC ARREST WITH VENTRICULAR FIBRILLATION: Primary | ICD-10-CM

## 2023-01-03 DIAGNOSIS — I49.01 CARDIAC ARREST WITH VENTRICULAR FIBRILLATION: Primary | ICD-10-CM

## 2023-01-03 DIAGNOSIS — I50.41 ACUTE COMBINED SYSTOLIC AND DIASTOLIC CONGESTIVE HEART FAILURE: Primary | ICD-10-CM

## 2023-01-03 DIAGNOSIS — I50.43 ACUTE ON CHRONIC COMBINED SYSTOLIC AND DIASTOLIC HEART FAILURE: ICD-10-CM

## 2023-01-03 DIAGNOSIS — R06.02 SOB (SHORTNESS OF BREATH): ICD-10-CM

## 2023-01-03 DIAGNOSIS — I48.19 PERSISTENT ATRIAL FIBRILLATION: ICD-10-CM

## 2023-01-03 LAB
ALBUMIN SERPL BCP-MCNC: 4 G/DL (ref 3.5–5.2)
ALP SERPL-CCNC: 66 U/L (ref 55–135)
ALT SERPL W/O P-5'-P-CCNC: 12 U/L (ref 10–44)
ANION GAP SERPL CALC-SCNC: 8 MMOL/L (ref 8–16)
AST SERPL-CCNC: 15 U/L (ref 10–40)
BASOPHILS # BLD AUTO: 0.02 K/UL (ref 0–0.2)
BASOPHILS NFR BLD: 0.4 % (ref 0–1.9)
BILIRUB SERPL-MCNC: 1.1 MG/DL (ref 0.1–1)
BNP SERPL-MCNC: 2779 PG/ML (ref 0–99)
BUN SERPL-MCNC: 13 MG/DL (ref 6–20)
CALCIUM SERPL-MCNC: 9.4 MG/DL (ref 8.7–10.5)
CHLORIDE SERPL-SCNC: 104 MMOL/L (ref 95–110)
CO2 SERPL-SCNC: 29 MMOL/L (ref 23–29)
CREAT SERPL-MCNC: 1.2 MG/DL (ref 0.5–1.4)
DIFFERENTIAL METHOD: ABNORMAL
EOSINOPHIL # BLD AUTO: 0.1 K/UL (ref 0–0.5)
EOSINOPHIL NFR BLD: 1.1 % (ref 0–8)
ERYTHROCYTE [DISTWIDTH] IN BLOOD BY AUTOMATED COUNT: 15.2 % (ref 11.5–14.5)
EST. GFR  (NO RACE VARIABLE): >60 ML/MIN/1.73 M^2
GLUCOSE SERPL-MCNC: 78 MG/DL (ref 70–110)
HCT VFR BLD AUTO: 42.7 % (ref 40–54)
HCV AB SERPL QL IA: NORMAL
HGB BLD-MCNC: 12.7 G/DL (ref 14–18)
HIV 1+2 AB+HIV1 P24 AG SERPL QL IA: NORMAL
IMM GRANULOCYTES # BLD AUTO: 0.01 K/UL (ref 0–0.04)
IMM GRANULOCYTES NFR BLD AUTO: 0.2 % (ref 0–0.5)
LACTATE SERPL-SCNC: 0.9 MMOL/L (ref 0.5–2.2)
LYMPHOCYTES # BLD AUTO: 0.9 K/UL (ref 1–4.8)
LYMPHOCYTES NFR BLD: 19 % (ref 18–48)
MCH RBC QN AUTO: 26.5 PG (ref 27–31)
MCHC RBC AUTO-ENTMCNC: 29.7 G/DL (ref 32–36)
MCV RBC AUTO: 89 FL (ref 82–98)
MONOCYTES # BLD AUTO: 0.5 K/UL (ref 0.3–1)
MONOCYTES NFR BLD: 10.9 % (ref 4–15)
NEUTROPHILS # BLD AUTO: 3.2 K/UL (ref 1.8–7.7)
NEUTROPHILS NFR BLD: 68.4 % (ref 38–73)
NRBC BLD-RTO: 0 /100 WBC
PLATELET # BLD AUTO: 284 K/UL (ref 150–450)
PMV BLD AUTO: 11.4 FL (ref 9.2–12.9)
POCT GLUCOSE: 77 MG/DL (ref 70–110)
POCT GLUCOSE: 77 MG/DL (ref 70–110)
POTASSIUM SERPL-SCNC: 3.8 MMOL/L (ref 3.5–5.1)
PROT SERPL-MCNC: 6.9 G/DL (ref 6–8.4)
RBC # BLD AUTO: 4.8 M/UL (ref 4.6–6.2)
SODIUM SERPL-SCNC: 141 MMOL/L (ref 136–145)
TROPONIN I SERPL DL<=0.01 NG/ML-MCNC: 0.06 NG/ML (ref 0–0.03)
TROPONIN I SERPL DL<=0.01 NG/ML-MCNC: 0.07 NG/ML (ref 0–0.03)
WBC # BLD AUTO: 4.68 K/UL (ref 3.9–12.7)

## 2023-01-03 PROCEDURE — 93005 ELECTROCARDIOGRAM TRACING: CPT | Mod: NTX

## 2023-01-03 PROCEDURE — 93010 EKG 12-LEAD: ICD-10-PCS | Mod: NTX,,, | Performed by: INTERNAL MEDICINE

## 2023-01-03 PROCEDURE — 93283 PRGRMG EVAL IMPLANTABLE DFB: CPT | Mod: TXP

## 2023-01-03 PROCEDURE — 1159F MED LIST DOCD IN RCRD: CPT | Mod: CPTII,NTX,, | Performed by: INTERNAL MEDICINE

## 2023-01-03 PROCEDURE — 3008F PR BODY MASS INDEX (BMI) DOCUMENTED: ICD-10-PCS | Mod: CPTII,NTX,, | Performed by: INTERNAL MEDICINE

## 2023-01-03 PROCEDURE — 20600001 HC STEP DOWN PRIVATE ROOM: Mod: NTX

## 2023-01-03 PROCEDURE — 93010 ELECTROCARDIOGRAM REPORT: CPT | Mod: S$PBB,NTX,, | Performed by: INTERNAL MEDICINE

## 2023-01-03 PROCEDURE — 87389 HIV-1 AG W/HIV-1&-2 AB AG IA: CPT | Mod: NTX | Performed by: PHYSICIAN ASSISTANT

## 2023-01-03 PROCEDURE — 99213 OFFICE O/P EST LOW 20 MIN: CPT | Mod: PBBFAC,TXP,25 | Performed by: INTERNAL MEDICINE

## 2023-01-03 PROCEDURE — 99999 PR PBB SHADOW E&M-EST. PATIENT-LVL III: CPT | Mod: PBBFAC,TXP,, | Performed by: INTERNAL MEDICINE

## 2023-01-03 PROCEDURE — 99285 EMERGENCY DEPT VISIT HI MDM: CPT | Mod: 25,27,NTX

## 2023-01-03 PROCEDURE — 25000003 PHARM REV CODE 250: Mod: NTX | Performed by: NURSE PRACTITIONER

## 2023-01-03 PROCEDURE — 3078F PR MOST RECENT DIASTOLIC BLOOD PRESSURE < 80 MM HG: ICD-10-PCS | Mod: CPTII,NTX,, | Performed by: INTERNAL MEDICINE

## 2023-01-03 PROCEDURE — 84484 ASSAY OF TROPONIN QUANT: CPT | Mod: 91,NTX | Performed by: PHYSICIAN ASSISTANT

## 2023-01-03 PROCEDURE — 84484 ASSAY OF TROPONIN QUANT: CPT | Mod: NTX | Performed by: EMERGENCY MEDICINE

## 2023-01-03 PROCEDURE — 63600175 PHARM REV CODE 636 W HCPCS: Mod: NTX | Performed by: PHYSICIAN ASSISTANT

## 2023-01-03 PROCEDURE — 93283 CARDIAC DEVICE CHECK - IN CLINIC & HOSPITAL: ICD-10-PCS | Mod: 26,NTX,, | Performed by: INTERNAL MEDICINE

## 2023-01-03 PROCEDURE — 93005 ELECTROCARDIOGRAM TRACING: CPT | Mod: PBBFAC,NTX | Performed by: INTERNAL MEDICINE

## 2023-01-03 PROCEDURE — 99213 PR OFFICE/OUTPT VISIT, EST, LEVL III, 20-29 MIN: ICD-10-PCS | Mod: S$PBB,NTX,, | Performed by: INTERNAL MEDICINE

## 2023-01-03 PROCEDURE — 3008F BODY MASS INDEX DOCD: CPT | Mod: CPTII,NTX,, | Performed by: INTERNAL MEDICINE

## 2023-01-03 PROCEDURE — 86803 HEPATITIS C AB TEST: CPT | Mod: NTX | Performed by: PHYSICIAN ASSISTANT

## 2023-01-03 PROCEDURE — 99213 OFFICE O/P EST LOW 20 MIN: CPT | Mod: S$PBB,NTX,, | Performed by: INTERNAL MEDICINE

## 2023-01-03 PROCEDURE — 1159F PR MEDICATION LIST DOCUMENTED IN MEDICAL RECORD: ICD-10-PCS | Mod: CPTII,NTX,, | Performed by: INTERNAL MEDICINE

## 2023-01-03 PROCEDURE — 96374 THER/PROPH/DIAG INJ IV PUSH: CPT | Mod: NTX

## 2023-01-03 PROCEDURE — 99999 PR PBB SHADOW E&M-EST. PATIENT-LVL III: ICD-10-PCS | Mod: PBBFAC,TXP,, | Performed by: INTERNAL MEDICINE

## 2023-01-03 PROCEDURE — 93010 RHYTHM STRIP: ICD-10-PCS | Mod: S$PBB,NTX,, | Performed by: INTERNAL MEDICINE

## 2023-01-03 PROCEDURE — 3074F SYST BP LT 130 MM HG: CPT | Mod: CPTII,NTX,, | Performed by: INTERNAL MEDICINE

## 2023-01-03 PROCEDURE — 99285 EMERGENCY DEPT VISIT HI MDM: CPT | Mod: FS,,, | Performed by: EMERGENCY MEDICINE

## 2023-01-03 PROCEDURE — 80053 COMPREHEN METABOLIC PANEL: CPT | Mod: NTX | Performed by: EMERGENCY MEDICINE

## 2023-01-03 PROCEDURE — 63600175 PHARM REV CODE 636 W HCPCS: Mod: NTX | Performed by: NURSE PRACTITIONER

## 2023-01-03 PROCEDURE — 3078F DIAST BP <80 MM HG: CPT | Mod: CPTII,NTX,, | Performed by: INTERNAL MEDICINE

## 2023-01-03 PROCEDURE — 93010 ELECTROCARDIOGRAM REPORT: CPT | Mod: NTX,,, | Performed by: INTERNAL MEDICINE

## 2023-01-03 PROCEDURE — 83605 ASSAY OF LACTIC ACID: CPT | Mod: NTX | Performed by: PHYSICIAN ASSISTANT

## 2023-01-03 PROCEDURE — 99223 PR INITIAL HOSPITAL CARE,LEVL III: ICD-10-PCS | Mod: NTX,,, | Performed by: INTERNAL MEDICINE

## 2023-01-03 PROCEDURE — 93283 PRGRMG EVAL IMPLANTABLE DFB: CPT | Mod: 26,NTX,, | Performed by: INTERNAL MEDICINE

## 2023-01-03 PROCEDURE — 99285 PR EMERGENCY DEPT VISIT,LEVEL V: ICD-10-PCS | Mod: FS,,, | Performed by: EMERGENCY MEDICINE

## 2023-01-03 PROCEDURE — 85025 COMPLETE CBC W/AUTO DIFF WBC: CPT | Mod: NTX | Performed by: EMERGENCY MEDICINE

## 2023-01-03 PROCEDURE — 99223 1ST HOSP IP/OBS HIGH 75: CPT | Mod: NTX,,, | Performed by: INTERNAL MEDICINE

## 2023-01-03 PROCEDURE — 83880 ASSAY OF NATRIURETIC PEPTIDE: CPT | Mod: NTX | Performed by: EMERGENCY MEDICINE

## 2023-01-03 PROCEDURE — 3074F PR MOST RECENT SYSTOLIC BLOOD PRESSURE < 130 MM HG: ICD-10-PCS | Mod: CPTII,NTX,, | Performed by: INTERNAL MEDICINE

## 2023-01-03 RX ORDER — FUROSEMIDE 10 MG/ML
80 INJECTION INTRAMUSCULAR; INTRAVENOUS 2 TIMES DAILY
Status: DISCONTINUED | OUTPATIENT
Start: 2023-01-03 | End: 2023-01-05

## 2023-01-03 RX ORDER — AMIODARONE HYDROCHLORIDE 200 MG/1
200 TABLET ORAL 2 TIMES DAILY
Status: DISCONTINUED | OUTPATIENT
Start: 2023-01-03 | End: 2023-01-07 | Stop reason: HOSPADM

## 2023-01-03 RX ORDER — CLOPIDOGREL BISULFATE 75 MG/1
75 TABLET ORAL DAILY
Status: DISCONTINUED | OUTPATIENT
Start: 2023-01-04 | End: 2023-01-07 | Stop reason: HOSPADM

## 2023-01-03 RX ORDER — LANOLIN ALCOHOL/MO/W.PET/CERES
400 CREAM (GRAM) TOPICAL 2 TIMES DAILY
Status: DISCONTINUED | OUTPATIENT
Start: 2023-01-03 | End: 2023-01-07

## 2023-01-03 RX ORDER — INSULIN ASPART 100 [IU]/ML
0-5 INJECTION, SOLUTION INTRAVENOUS; SUBCUTANEOUS
Status: DISCONTINUED | OUTPATIENT
Start: 2023-01-03 | End: 2023-01-05

## 2023-01-03 RX ORDER — LANOLIN ALCOHOL/MO/W.PET/CERES
1 CREAM (GRAM) TOPICAL DAILY
Status: DISCONTINUED | OUTPATIENT
Start: 2023-01-04 | End: 2023-01-07 | Stop reason: HOSPADM

## 2023-01-03 RX ORDER — SPIRONOLACTONE 25 MG/1
25 TABLET ORAL DAILY
Status: DISCONTINUED | OUTPATIENT
Start: 2023-01-04 | End: 2023-01-07 | Stop reason: HOSPADM

## 2023-01-03 RX ORDER — POTASSIUM CHLORIDE 20 MEQ/1
20 TABLET, EXTENDED RELEASE ORAL 2 TIMES DAILY
Status: DISCONTINUED | OUTPATIENT
Start: 2023-01-03 | End: 2023-01-04

## 2023-01-03 RX ORDER — IBUPROFEN 200 MG
16 TABLET ORAL
Status: DISCONTINUED | OUTPATIENT
Start: 2023-01-03 | End: 2023-01-05

## 2023-01-03 RX ORDER — ATORVASTATIN CALCIUM 20 MG/1
80 TABLET, FILM COATED ORAL DAILY
Status: DISCONTINUED | OUTPATIENT
Start: 2023-01-04 | End: 2023-01-07 | Stop reason: HOSPADM

## 2023-01-03 RX ORDER — METOPROLOL SUCCINATE 25 MG/1
25 TABLET, EXTENDED RELEASE ORAL DAILY
Status: DISCONTINUED | OUTPATIENT
Start: 2023-01-04 | End: 2023-01-04

## 2023-01-03 RX ORDER — SODIUM CHLORIDE 0.9 % (FLUSH) 0.9 %
10 SYRINGE (ML) INJECTION
Status: DISCONTINUED | OUTPATIENT
Start: 2023-01-03 | End: 2023-01-07 | Stop reason: HOSPADM

## 2023-01-03 RX ORDER — IBUPROFEN 200 MG
24 TABLET ORAL
Status: DISCONTINUED | OUTPATIENT
Start: 2023-01-03 | End: 2023-01-05

## 2023-01-03 RX ORDER — GLUCAGON 1 MG
1 KIT INJECTION
Status: DISCONTINUED | OUTPATIENT
Start: 2023-01-03 | End: 2023-01-05

## 2023-01-03 RX ORDER — PANTOPRAZOLE SODIUM 40 MG/1
40 TABLET, DELAYED RELEASE ORAL DAILY
Status: DISCONTINUED | OUTPATIENT
Start: 2023-01-04 | End: 2023-01-07 | Stop reason: HOSPADM

## 2023-01-03 RX ORDER — TAMSULOSIN HYDROCHLORIDE 0.4 MG/1
1 CAPSULE ORAL NIGHTLY
Status: DISCONTINUED | OUTPATIENT
Start: 2023-01-03 | End: 2023-01-07 | Stop reason: HOSPADM

## 2023-01-03 RX ORDER — FUROSEMIDE 10 MG/ML
40 INJECTION INTRAMUSCULAR; INTRAVENOUS
Status: COMPLETED | OUTPATIENT
Start: 2023-01-03 | End: 2023-01-03

## 2023-01-03 RX ADMIN — APIXABAN 5 MG: 5 TABLET, FILM COATED ORAL at 08:01

## 2023-01-03 RX ADMIN — FUROSEMIDE 40 MG: 10 INJECTION, SOLUTION INTRAMUSCULAR; INTRAVENOUS at 02:01

## 2023-01-03 RX ADMIN — SACUBITRIL AND VALSARTAN 1 TABLET: 49; 51 TABLET, FILM COATED ORAL at 08:01

## 2023-01-03 RX ADMIN — FUROSEMIDE 80 MG: 10 INJECTION, SOLUTION INTRAMUSCULAR; INTRAVENOUS at 06:01

## 2023-01-03 RX ADMIN — AMIODARONE HYDROCHLORIDE 200 MG: 200 TABLET ORAL at 08:01

## 2023-01-03 RX ADMIN — POTASSIUM CHLORIDE 20 MEQ: 1500 TABLET, EXTENDED RELEASE ORAL at 06:01

## 2023-01-03 RX ADMIN — TAMSULOSIN HYDROCHLORIDE 0.4 MG: 0.4 CAPSULE ORAL at 08:01

## 2023-01-03 RX ADMIN — Medication 400 MG: at 08:01

## 2023-01-03 NOTE — PHARMACY MED REC
"Admission Medication History     The home medication history was taken by Carmella Murdock.    You may go to "Admission" then "Reconcile Home Medications" tabs to review and/or act upon these items.     The home medication list has been updated by the Pharmacy department.   Please read ALL comments highlighted in yellow.   Please address this information as you see fit.    Feel free to contact us if you have any questions or require assistance.      The medications listed below were removed from the home medication list. Please reorder if appropriate:  Patient reports no longer taking the following medication(s):  CANAGLIFLOZIN 100 MG      Medications listed below were obtained from: Patient/family    Current Outpatient Medications on File Prior to Encounter   Medication Sig    amiodarone (PACERONE) 200 MG Tab   Take 1 tablet (200 mg total) by mouth 2 (two) times daily.    apixaban (ELIQUIS) 5 mg Tab   Take 5 mg by mouth 2 (two) times daily.    atorvastatin (LIPITOR) 80 MG tablet   Take 80 mg by mouth once daily.    clopidogreL (PLAVIX) 75 mg tablet   Take 75 mg by mouth once daily.    ferrous sulfate (FEOSOL) 325 mg (65 mg iron) Tab tablet   Take 325 mg by mouth every other day.    glimepiride (AMARYL) 4 MG tablet   Take 4 mg by mouth every morning.    LIDOcaine (LIDODERM) 5 %   Place 1 patch onto the skin once daily.    metoprolol succinate (TOPROL-XL) 25 MG 24 hr tablet   Take 1 tablet (25 mg total) by mouth once daily.    pantoprazole (PROTONIX) 40 MG tablet   Take 40 mg by mouth once daily.    sacubitriL-valsartan (ENTRESTO) 49-51 mg per tablet   Take 1 tablet by mouth 2 (two) times daily.    spironolactone (ALDACTONE) 25 MG tablet   Take 25 mg by mouth once daily.    tamsulosin (FLOMAX) 0.4 mg Cap   Take 1 capsule by mouth every evening.    torsemide (DEMADEX) 20 MG Tab Take 1-2 tablets by mouth daily as needed for edema       Potential issues to be addressed PRIOR TO DISCHARGE  Please discuss with the patient " barriers to adherence with medication treatment plans  Patient requires education regarding drug therapies     Carmella Murdock  EXT [id number]                  .

## 2023-01-03 NOTE — ED TRIAGE NOTES
Tera Griffiths, an 57 y.o. male presents to the ED complaining of abdominal swelling and SOB x1 day. States is compliant with all of his medications at home. Difficulty breathign worse with exertion. Not on oxygen at home. Hx of CHF and COPD. O2 while in the room 100%      LOC: The patient is awake, alert and aware of environment with an appropriate affect, the patient is oriented x 3 and speaking appropriately.   APPEARANCE: Patient appears comfortable and in no acute distress, patient is clean and well groomed.  SKIN: The skin is warm and dry, color consistent with ethnicity.   MUSCULOSKELETAL: Patient moving all extremities spontaneously, no swelling noted.  RESPIRATORY: Airway is open and patent, respirations are spontaneous, patient has a normal effort, no accessory muscle use noted. +SOB.   CARDIAC: Patient has a normal rate and regular rhythm, no edema noted, capillary refill < 3 seconds.   GASTRO: Soft and non tender to palpation. Abdominal distention.  : Pt denies any pain or frequency with urination.  NEURO: Pt opens eyes spontaneously, behavior appropriate to situation, follows commands.        Chief Complaint   Patient presents with    Shortness of Breath     Pt reporting progressive worsening of SOB x2 days. Endorses orthopnea, cough, and swelling to abdomen. Denies chest pain. Hx of CHF and COPD     Review of patient's allergies indicates:  No Known Allergies  Past Medical History:   Diagnosis Date    Atrial fibrillation     CHF (congestive heart failure)     Coronary atherosclerosis of native coronary artery     Diabetes mellitus, type 2     Encounter for blood transfusion     Hypertension
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
## 2023-01-03 NOTE — HPI
56 yo man DM2, dyslipidemia, HTN, CAD, RCA NSTEMI complicated with VF arrest and HF in 2021, DC ICD by Dr Bravo 12/23/21, HFrEF and LVEF 10%, in ER after being sent from EP clinic with ADHF/afib RVR. KIA/CV 11/29/22 with initiation of amiodarone. He was in SR for a couple of weeks and then had return of AF 12/17/22. He noticed no symptom change in AF. He states that yesterday evening he started to develop worsening SOB and orthopnea. He took an extra diuretic dose with minimal improvements. In afib in ER with rates 60-70s. He has urinal at bedside with ~800cc Clear urine in it. Denies CP, palpitations, syncope, presyncope, fevers, n/v/d. Cannot lie flat and endorses swollen abdomen.
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
## 2023-01-03 NOTE — H&P
[facility] Emergency Dept  Heart Transplant  H&P  Patient Name: Tera Griffiths  MRN: [id number]  Admission Date: 1/3/2023  Attending Physician: Nicholas Aguilar MD  Primary Care Provider: Primary Doctor No  Principal Problem:<principal problem not specified>    Subjective:     History of Present Illness:  58 yo man DM2, dyslipidemia, HTN, CAD, RCA NSTEMI complicated with VF arrest and HF in 2021, DC ICD by Dr Bravo 12/23/21, HFrEF and LVEF 10%, in ER after being sent from EP clinic with ADHF/afib RVR. KIA/CV 11/29/22 with initiation of amiodarone. He was in SR for a couple of weeks and then had return of AF 12/17/22. He noticed no symptom change in AF. He states that yesterday evening he started to develop worsening SOB and orthopnea. He took an extra diuretic dose with minimal improvements. In afib in ER with rates 60-70s. He has urinal at bedside with ~800cc Clear urine in it. Denies CP, palpitations, syncope, presyncope, fevers, n/v/d. Cannot lie flat and endorses swollen abdomen.         Past Medical History:   Diagnosis Date    Atrial fibrillation     CHF (congestive heart failure)     Coronary atherosclerosis of native coronary artery     Diabetes mellitus, type 2     Encounter for blood transfusion     Hypertension        Past Surgical History:   Procedure Laterality Date    CARDIAC DEFIBRILLATOR PLACEMENT Left     HERNIA REPAIR      RIGHT HEART CATHETERIZATION Right 9/30/2022    Procedure: INSERTION, CATHETER, RIGHT HEART;  Surgeon: Caden Aden MD;  Location: [facility] CATH LAB;  Service: Cardiology;  Laterality: Right;    TREATMENT OF CARDIAC ARRHYTHMIA N/A 11/22/2022    Procedure: CARDIOVERSION;  Surgeon: Marvin Sanon MD;  Location: [facility] EP LAB;  Service: Cardiology;  Laterality: N/A;  afib, KIA, DCCV, anes, MB, 3 Prep       Review of patient's allergies indicates:  No Known Allergies    Current Facility-Administered Medications   Medication    amiodarone tablet 200 mg    apixaban tablet 5 mg     [START ON 1/4/2023] atorvastatin tablet 80 mg    [START ON 1/4/2023] clopidogreL tablet 75 mg    dextrose 10% bolus 125 mL 125 mL    dextrose 10% bolus 250 mL 250 mL    [START ON 1/4/2023] ferrous sulfate tablet 1 each    furosemide injection 80 mg    glucagon (human recombinant) injection 1 mg    glucose chewable tablet 16 g    glucose chewable tablet 24 g    insulin aspart U-100 pen 0-5 Units    magnesium oxide tablet 400 mg    [START ON 1/4/2023] metoprolol succinate (TOPROL-XL) 24 hr tablet 25 mg    [START ON 1/4/2023] pantoprazole EC tablet 40 mg    potassium chloride SA CR tablet 20 mEq    sacubitriL-valsartan 49-51 mg per tablet 1 tablet    sodium chloride 0.9% flush 10 mL    [START ON 1/4/2023] spironolactone tablet 25 mg    tamsulosin 24 hr capsule 0.4 mg     Current Outpatient Medications   Medication Sig    amiodarone (PACERONE) 200 MG Tab Take 1 tablet (200 mg total) by mouth 2 (two) times daily.    atorvastatin (LIPITOR) 80 MG tablet Take 80 mg by mouth once daily.    canagliflozin (INVOKANA) 100 mg Tab tablet Take 100 mg by mouth Daily.    clopidogreL (PLAVIX) 75 mg tablet Take 75 mg by mouth once daily.    ELIQUIS 5 mg Tab Take 5 mg by mouth 2 (two) times daily.    ferrous sulfate (FEOSOL) 325 mg (65 mg iron) Tab tablet Take 325 mg by mouth Daily.    glimepiride (AMARYL) 4 MG tablet Take 4 mg by mouth every morning.    LIDOcaine (LIDODERM) 5 % 1 patch once daily.    metoprolol succinate (TOPROL-XL) 25 MG 24 hr tablet Take 1 tablet (25 mg total) by mouth once daily.    pantoprazole (PROTONIX) 40 MG tablet Take 40 mg by mouth once daily.    sacubitriL-valsartan (ENTRESTO) 49-51 mg per tablet Take 1 tablet by mouth 2 (two) times daily.    sacubitriL-valsartan (ENTRESTO) 49-51 mg per tablet Take 1 tablet by mouth 2 (two) times daily.    spironolactone (ALDACTONE) 25 MG tablet Take 25 mg by mouth once daily.    tamsulosin (FLOMAX) 0.4 mg Cap Take 1 capsule by mouth every evening.    torsemide (DEMADEX) 20  MG Tab Take 2 tablets (40 mg total) by mouth 2 (two) times a day.     Family History    None       Tobacco Use    Smoking status: Never    Smokeless tobacco: Never   Substance and Sexual Activity    Alcohol use: Never    Drug use: Never    Sexual activity: Not on file     Review of Systems  Objective:     Vital Signs (Most Recent):  Temp: 98.1 °F (36.7 °C) (01/03/23 1455)  Pulse: 66 (01/03/23 1345)  Resp: 20 (01/03/23 1345)  BP: 131/87 (01/03/23 1331)  SpO2: 99 % (01/03/23 1345) Vital Signs (24h Range):  Temp:  [98.1 °F (36.7 °C)-98.5 °F (36.9 °C)] 98.1 °F (36.7 °C)  Pulse:  [64-67] 66  Resp:  [20-28] 20  SpO2:  [98 %-99 %] 99 %  BP: (122-131)/(78-87) 131/87     Patient Vitals for the past 72 hrs (Last 3 readings):   Weight   01/03/23 1219 85.7 kg (189 lb)     Body mass index is 25.63 kg/m².      Intake/Output Summary (Last 24 hours) at 1/3/2023 1502  Last data filed at 1/3/2023 1453  Gross per 24 hour   Intake --   Output 800 ml   Net -800 ml       Physical Exam  Vitals and nursing note reviewed.   Constitutional:       Appearance: He is well-developed.   HENT:      Head: Normocephalic.   Eyes:      Pupils: Pupils are equal, round, and reactive to light.   Neck:      Comments: +JVD  Cardiovascular:      Rate and Rhythm: Normal rate. Rhythm irregular.   Pulmonary:      Effort: Pulmonary effort is normal.      Breath sounds: Normal breath sounds.   Abdominal:      General: Bowel sounds are normal. There is distension.      Palpations: Abdomen is soft.   Musculoskeletal:         General: Normal range of motion.      Cervical back: Normal range of motion and neck supple.      Right lower leg: Edema present.      Left lower leg: Edema present.   Skin:     General: Skin is warm and dry.   Neurological:      Mental Status: He is alert and oriented to person, place, and time.   Psychiatric:         Behavior: Behavior normal.       Significant Labs:  CBC:  Recent Labs   Lab 01/03/23  1333   WBC 4.68   RBC 4.80   HGB 12.7*    HCT 42.7      MCV 89   MCH 26.5*   MCHC 29.7*     BNP:  Recent Labs   Lab 01/03/23  1333   BNP 2,779*     CMP:  Recent Labs   Lab 01/03/23  1333   GLU 78   CALCIUM 9.4   ALBUMIN 4.0   PROT 6.9      K 3.8   CO2 29      BUN 13   CREATININE 1.2   ALKPHOS 66   ALT 12   AST 15   BILITOT 1.1*      Coagulation:   No results for input(s): PT, INR, APTT in the last 168 hours.  LDH:  No results for input(s): LDH in the last 72 hours.  Microbiology:  Microbiology Results (last 7 days)       ** No results found for the last 168 hours. **          I have reviewed all pertinent labs within the past 24 hours.    Diagnostic Results:  I have reviewed all pertinent imaging results/findings within the past 24 hours.    Assessment/Plan:     Acute on chronic combined systolic and diastolic heart failure  -ICM. S/P PCI to RCA 8/2021 complicated with VF arrest  -Hypervolemic on exam. Seems to be diuresing after 40mg IVP Lasix. Will increase to 80mg TID.   -GDMT: Continue Entresto 49/51 BID, Spironolactone 25mg daily, toprol 25mg daily.  -Last echo 9/22: EF 15%, Mild-Mod MR. LViDD  6.9/ TAPSE 1.19. Will repeat once euvolemic.  -Will start pathway for LVAD/OHTX w/u(Will initiate in am after diuresis as pt cannot lie flat currently).   -Low sodium 2L FR.     Atrial fibrillation  -Rate controlled currently in ER.  -Continue low dose BB for now.  -Continue amio 200mg daily.   -EP consult once euvolemic for DCCV.      Vishnu Spencer, NP [phone]  Heart Transplant  [facility] - Emergency Dept

## 2023-01-03 NOTE — CARE UPDATE
58 yo man with PMH/o DM type 2, dyslipidemia, HTN, CAD, RCA NSTEMI complicated with VF arrest and HF in 2021. Patient has HFrEF and LVEF 10%. He was admitted to [facility] and underwent DC ICD by Dr Bravo 12/23/21. He has been referred by [facility] cardiologist Dr. Salazar for evaluation for advanced therapies as patient has been having persistent severe symptoms on maximal tolerated therapy. He has had multiple admissions for ADHF. He had a recent RHC with elevated filling pressures. Waiting on CPX.  KIA/CV 11/29/22 with initiation of amiodarone. He was in SR for a couple of weeks and then had return of AF 12/17/22. He noticed no symptom change in AF. He states that yesterday evening he started to develop worsening SOB and orthopnea.He took an extra diuretic dose with minimal improvements.     Today he was referred by Dr. Sanon to ED after found to be in AF RVR and ADHF.     Bedside echo with EF 15%, IVC 2.1cm and not collapsible.  Labs pending however on physical exam he has trace pitting edema with cool feet to the ankles.     Plan:  - admit to HTS team, stable for CSU  - Would start with IV diuresis q 80 IV TID (home regimen Torsemide 40mg bid)  - Once euvolemic repeat KIA DCCV   - Continue Eliquis for cardioembolic protection  - continue Amiodarone    Discussed with Dr. Jeff Uriarte MD PGY VI  Cardiology  Pager: [phone]  [facility]    detailed exam

## 2023-01-03 NOTE — PROGRESS NOTES
Subjective:    Patient ID:  Tera Griffiths is a 57 y.o. male who presents for follow-up of Shortness of Breath      57 yoM here for AF management.     11/22: He suffered a cardiac arrest 12/21 leading to EMS ACLS and recovery. He was admitted to [facility] and underwent DC ICD by Dr Bravo 12/23/21. He developed AF 3/22. No attempts at rhythm control were made. He was on eliquis for CVA prophylaxis. He developed worsening HF symptoms leading to evaluation for advanced options at [facility]. He was referred here to arrange CV.     Interval history: KIA/CV 11/29/22 with initiation of amiodarone. He was in SR for a couple of weeks and then had return of AF 12/17/22. He noticed no symptom change in AF. He feels that his SOB and orthopnea are worse now than usual. He has taken extra diuretic with no improvement.     Past Medical History:  No date: Atrial fibrillation  No date: CHF (congestive heart failure)  No date: Coronary atherosclerosis of native coronary artery  No date: Diabetes mellitus, type 2  No date: Encounter for blood transfusion  No date: Hypertension    Past Surgical History:  No date: CARDIAC DEFIBRILLATOR PLACEMENT; Left  No date: HERNIA REPAIR  9/30/2022: RIGHT HEART CATHETERIZATION; Right      Comment:  Procedure: INSERTION, CATHETER, RIGHT HEART;  Surgeon:                Caden Aden MD;  Location: [facility] CATH LAB;  Service:                Cardiology;  Laterality: Right;  11/22/2022: TREATMENT OF CARDIAC ARRHYTHMIA; N/A      Comment:  Procedure: CARDIOVERSION;  Surgeon: Marvin Sanon MD;  Location: [facility] EP LAB;  Service: Cardiology;                 Laterality: N/A;  afib, KIA, DCCV, anes, MB, 3 Prep    Social History    Socioeconomic History      Marital status: Single    Tobacco Use      Smoking status: Never      Smokeless tobacco: Never    Substance and Sexual Activity      Alcohol use: Never      Drug use: Never    No family history on file.    Current Outpatient  Medications:  amiodarone (PACERONE) 200 MG Tab, Take 1 tablet (200 mg total) by mouth 2 (two) times daily., Disp: 60 tablet, Rfl: 3  atorvastatin (LIPITOR) 80 MG tablet, Take 80 mg by mouth once daily., Disp: , Rfl:   canagliflozin (INVOKANA) 100 mg Tab tablet, Take 100 mg by mouth Daily., Disp: , Rfl:   clopidogreL (PLAVIX) 75 mg tablet, Take 75 mg by mouth once daily., Disp: , Rfl:   ELIQUIS 5 mg Tab, Take 5 mg by mouth 2 (two) times daily., Disp: , Rfl:   ferrous sulfate (FEOSOL) 325 mg (65 mg iron) Tab tablet, Take 325 mg by mouth Daily., Disp: , Rfl:   glimepiride (AMARYL) 4 MG tablet, Take 4 mg by mouth every morning., Disp: , Rfl:   LIDOcaine (LIDODERM) 5 %, 1 patch once daily., Disp: , Rfl:   metoprolol succinate (TOPROL-XL) 25 MG 24 hr tablet, Take 1 tablet (25 mg total) by mouth once daily., Disp: 30 tablet, Rfl: 11  pantoprazole (PROTONIX) 40 MG tablet, Take 40 mg by mouth once daily., Disp: , Rfl:   sacubitriL-valsartan (ENTRESTO) 49-51 mg per tablet, Take 1 tablet by mouth 2 (two) times daily., Disp: 60 tablet, Rfl: 5  spironolactone (ALDACTONE) 25 MG tablet, Take 25 mg by mouth once daily., Disp: , Rfl:   tamsulosin (FLOMAX) 0.4 mg Cap, Take 1 capsule by mouth every evening., Disp: , Rfl:   torsemide (DEMADEX) 20 MG Tab, Take 2 tablets (40 mg total) by mouth 2 (two) times a day., Disp: 120 tablet, Rfl: 5    No current facility-administered medications for this visit.              Review of Systems   Constitutional: Positive for malaise/fatigue.   HENT: Negative.     Eyes: Negative.    Cardiovascular:  Positive for dyspnea on exertion. Negative for chest pain, leg swelling, near-syncope, palpitations and syncope.   Respiratory:  Positive for shortness of breath.    Endocrine: Negative.    Hematologic/Lymphatic: Negative.    Skin: Negative.    Musculoskeletal: Negative.    Gastrointestinal: Negative.    Genitourinary: Negative.    Neurological: Negative.  Negative for dizziness and light-headedness.    Psychiatric/Behavioral: Negative.     Allergic/Immunologic: Negative.       Objective:    Physical Exam  Vitals reviewed.   Constitutional:       General: He is not in acute distress.     Appearance: He is well-developed.   HENT:      Head: Normocephalic and atraumatic.   Eyes:      Pupils: Pupils are equal, round, and reactive to light.   Neck:      Thyroid: No thyromegaly.      Vascular: No JVD.   Cardiovascular:      Rate and Rhythm: Normal rate. Rhythm irregular.      Chest Wall: PMI is not displaced.      Heart sounds: Normal heart sounds, S1 normal and S2 normal. No murmur heard.    No friction rub. No gallop.   Pulmonary:      Effort: Pulmonary effort is normal. No respiratory distress.      Breath sounds: Normal breath sounds. No wheezing or rales.   Abdominal:      General: Bowel sounds are normal. There is no distension.      Palpations: Abdomen is soft.      Tenderness: There is no abdominal tenderness. There is no guarding or rebound.   Musculoskeletal:         General: No tenderness. Normal range of motion.      Cervical back: Normal range of motion.   Skin:     General: Skin is warm and dry.      Findings: No erythema or rash.   Neurological:      Mental Status: He is alert and oriented to person, place, and time.      Cranial Nerves: No cranial nerve deficit.   Psychiatric:         Behavior: Behavior normal.         Thought Content: Thought content normal.         Judgment: Judgment normal.       Assessment:       1. Cardiac arrest with ventricular fibrillation    2. Chronic combined systolic and diastolic heart failure    3. ICD (implantable cardioverter-defibrillator) in place    4. Hx of non-ST elevation myocardial infarction (NSTEMI)    5. Persistent atrial fibrillation         Plan:       57 yoM here for follow up after CV. He had no improvement in symptoms after CV and returned to AF mid December. He has worsening HF symptoms now. I advised that he go to the ER for work up. I anticipate  admission for IV diuresis. Could reattempt CV while admitted.

## 2023-01-04 LAB
ALBUMIN SERPL BCP-MCNC: 3.6 G/DL (ref 3.5–5.2)
ALP SERPL-CCNC: 65 U/L (ref 55–135)
ALT SERPL W/O P-5'-P-CCNC: 10 U/L (ref 10–44)
ANION GAP SERPL CALC-SCNC: 12 MMOL/L (ref 8–16)
ANION GAP SERPL CALC-SCNC: 8 MMOL/L (ref 8–16)
AST SERPL-CCNC: 11 U/L (ref 10–40)
BASOPHILS # BLD AUTO: 0.01 K/UL (ref 0–0.2)
BASOPHILS NFR BLD: 0.2 % (ref 0–1.9)
BILIRUB SERPL-MCNC: 1.1 MG/DL (ref 0.1–1)
BUN SERPL-MCNC: 18 MG/DL (ref 6–20)
BUN SERPL-MCNC: 20 MG/DL (ref 6–20)
CALCIUM SERPL-MCNC: 10 MG/DL (ref 8.7–10.5)
CALCIUM SERPL-MCNC: 9.5 MG/DL (ref 8.7–10.5)
CHLORIDE SERPL-SCNC: 101 MMOL/L (ref 95–110)
CHLORIDE SERPL-SCNC: 103 MMOL/L (ref 95–110)
CO2 SERPL-SCNC: 27 MMOL/L (ref 23–29)
CO2 SERPL-SCNC: 30 MMOL/L (ref 23–29)
CREAT SERPL-MCNC: 1.5 MG/DL (ref 0.5–1.4)
CREAT SERPL-MCNC: 1.6 MG/DL (ref 0.5–1.4)
DIFFERENTIAL METHOD: ABNORMAL
EOSINOPHIL # BLD AUTO: 0.2 K/UL (ref 0–0.5)
EOSINOPHIL NFR BLD: 3.9 % (ref 0–8)
ERYTHROCYTE [DISTWIDTH] IN BLOOD BY AUTOMATED COUNT: 15.1 % (ref 11.5–14.5)
EST. GFR  (NO RACE VARIABLE): 50.6 ML/MIN/1.73 M^2
EST. GFR  (NO RACE VARIABLE): 54.6 ML/MIN/1.73 M^2
GLUCOSE SERPL-MCNC: 75 MG/DL (ref 70–110)
GLUCOSE SERPL-MCNC: 86 MG/DL (ref 70–110)
HCT VFR BLD AUTO: 42.6 % (ref 40–54)
HGB BLD-MCNC: 13.2 G/DL (ref 14–18)
IMM GRANULOCYTES # BLD AUTO: 0 K/UL (ref 0–0.04)
IMM GRANULOCYTES NFR BLD AUTO: 0 % (ref 0–0.5)
LYMPHOCYTES # BLD AUTO: 1 K/UL (ref 1–4.8)
LYMPHOCYTES NFR BLD: 21.8 % (ref 18–48)
MAGNESIUM SERPL-MCNC: 1.8 MG/DL (ref 1.6–2.6)
MAGNESIUM SERPL-MCNC: 2.1 MG/DL (ref 1.6–2.6)
MCH RBC QN AUTO: 27.4 PG (ref 27–31)
MCHC RBC AUTO-ENTMCNC: 31 G/DL (ref 32–36)
MCV RBC AUTO: 88 FL (ref 82–98)
MONOCYTES # BLD AUTO: 0.6 K/UL (ref 0.3–1)
MONOCYTES NFR BLD: 12.6 % (ref 4–15)
NEUTROPHILS # BLD AUTO: 2.7 K/UL (ref 1.8–7.7)
NEUTROPHILS NFR BLD: 61.5 % (ref 38–73)
NRBC BLD-RTO: 0 /100 WBC
PLATELET # BLD AUTO: 287 K/UL (ref 150–450)
PMV BLD AUTO: 11.6 FL (ref 9.2–12.9)
POCT GLUCOSE: 116 MG/DL (ref 70–110)
POCT GLUCOSE: 85 MG/DL (ref 70–110)
POTASSIUM SERPL-SCNC: 3.5 MMOL/L (ref 3.5–5.1)
POTASSIUM SERPL-SCNC: 4.3 MMOL/L (ref 3.5–5.1)
PROT SERPL-MCNC: 6.4 G/DL (ref 6–8.4)
RBC # BLD AUTO: 4.82 M/UL (ref 4.6–6.2)
SODIUM SERPL-SCNC: 139 MMOL/L (ref 136–145)
SODIUM SERPL-SCNC: 142 MMOL/L (ref 136–145)
WBC # BLD AUTO: 4.36 K/UL (ref 3.9–12.7)

## 2023-01-04 PROCEDURE — 85025 COMPLETE CBC W/AUTO DIFF WBC: CPT | Mod: NTX | Performed by: NURSE PRACTITIONER

## 2023-01-04 PROCEDURE — 36415 COLL VENOUS BLD VENIPUNCTURE: CPT | Mod: NTX | Performed by: NURSE PRACTITIONER

## 2023-01-04 PROCEDURE — 83735 ASSAY OF MAGNESIUM: CPT | Mod: 91,NTX | Performed by: STUDENT IN AN ORGANIZED HEALTH CARE EDUCATION/TRAINING PROGRAM

## 2023-01-04 PROCEDURE — 99233 SBSQ HOSP IP/OBS HIGH 50: CPT | Mod: NTX,,, | Performed by: NURSE PRACTITIONER

## 2023-01-04 PROCEDURE — 36415 COLL VENOUS BLD VENIPUNCTURE: CPT | Mod: NTX | Performed by: STUDENT IN AN ORGANIZED HEALTH CARE EDUCATION/TRAINING PROGRAM

## 2023-01-04 PROCEDURE — 83735 ASSAY OF MAGNESIUM: CPT | Mod: NTX | Performed by: NURSE PRACTITIONER

## 2023-01-04 PROCEDURE — 20600001 HC STEP DOWN PRIVATE ROOM: Mod: NTX

## 2023-01-04 PROCEDURE — 99233 PR SUBSEQUENT HOSPITAL CARE,LEVL III: ICD-10-PCS | Mod: NTX,,, | Performed by: NURSE PRACTITIONER

## 2023-01-04 PROCEDURE — 63600175 PHARM REV CODE 636 W HCPCS: Mod: NTX | Performed by: NURSE PRACTITIONER

## 2023-01-04 PROCEDURE — 80048 BASIC METABOLIC PNL TOTAL CA: CPT | Mod: NTX,XB | Performed by: STUDENT IN AN ORGANIZED HEALTH CARE EDUCATION/TRAINING PROGRAM

## 2023-01-04 PROCEDURE — 25000003 PHARM REV CODE 250: Mod: NTX | Performed by: NURSE PRACTITIONER

## 2023-01-04 PROCEDURE — 80053 COMPREHEN METABOLIC PANEL: CPT | Mod: NTX | Performed by: NURSE PRACTITIONER

## 2023-01-04 RX ORDER — POTASSIUM CHLORIDE 20 MEQ/1
40 TABLET, EXTENDED RELEASE ORAL 2 TIMES DAILY
Status: DISCONTINUED | OUTPATIENT
Start: 2023-01-04 | End: 2023-01-07

## 2023-01-04 RX ADMIN — SACUBITRIL AND VALSARTAN 1 TABLET: 49; 51 TABLET, FILM COATED ORAL at 09:01

## 2023-01-04 RX ADMIN — SPIRONOLACTONE 25 MG: 25 TABLET, FILM COATED ORAL at 09:01

## 2023-01-04 RX ADMIN — AMIODARONE HYDROCHLORIDE 200 MG: 200 TABLET ORAL at 09:01

## 2023-01-04 RX ADMIN — APIXABAN 5 MG: 5 TABLET, FILM COATED ORAL at 09:01

## 2023-01-04 RX ADMIN — APIXABAN 5 MG: 5 TABLET, FILM COATED ORAL at 10:01

## 2023-01-04 RX ADMIN — POTASSIUM CHLORIDE 40 MEQ: 1500 TABLET, EXTENDED RELEASE ORAL at 09:01

## 2023-01-04 RX ADMIN — AMIODARONE HYDROCHLORIDE 200 MG: 200 TABLET ORAL at 10:01

## 2023-01-04 RX ADMIN — FERROUS SULFATE TAB 325 MG (65 MG ELEMENTAL FE) 1 EACH: 325 (65 FE) TAB at 09:01

## 2023-01-04 RX ADMIN — TAMSULOSIN HYDROCHLORIDE 0.4 MG: 0.4 CAPSULE ORAL at 10:01

## 2023-01-04 RX ADMIN — ATORVASTATIN CALCIUM 80 MG: 20 TABLET, FILM COATED ORAL at 09:01

## 2023-01-04 RX ADMIN — Medication 400 MG: at 10:01

## 2023-01-04 RX ADMIN — POTASSIUM CHLORIDE 40 MEQ: 1500 TABLET, EXTENDED RELEASE ORAL at 10:01

## 2023-01-04 RX ADMIN — FUROSEMIDE 80 MG: 10 INJECTION, SOLUTION INTRAMUSCULAR; INTRAVENOUS at 04:01

## 2023-01-04 RX ADMIN — PANTOPRAZOLE SODIUM 40 MG: 40 TABLET, DELAYED RELEASE ORAL at 09:01

## 2023-01-04 RX ADMIN — SACUBITRIL AND VALSARTAN 1 TABLET: 49; 51 TABLET, FILM COATED ORAL at 10:01

## 2023-01-04 RX ADMIN — FUROSEMIDE 80 MG: 10 INJECTION, SOLUTION INTRAMUSCULAR; INTRAVENOUS at 09:01

## 2023-01-04 RX ADMIN — CLOPIDOGREL BISULFATE 75 MG: 75 TABLET ORAL at 09:01

## 2023-01-04 RX ADMIN — METOPROLOL SUCCINATE 12.5 MG: 25 TABLET, EXTENDED RELEASE ORAL at 10:01

## 2023-01-04 RX ADMIN — Medication 400 MG: at 09:01

## 2023-01-04 NOTE — SUBJECTIVE & OBJECTIVE
Interval History: Feeling better. Diuresed well.     Scheduled Meds:   amiodarone  200 mg Oral BID    apixaban  5 mg Oral BID    atorvastatin  80 mg Oral Daily    clopidogreL  75 mg Oral Daily    ferrous sulfate  1 tablet Oral Daily    furosemide (LASIX) injection  80 mg Intravenous BID    magnesium oxide  400 mg Oral BID    pantoprazole  40 mg Oral Daily    potassium chloride  40 mEq Oral BID    sacubitriL-valsartan  1 tablet Oral BID    spironolactone  25 mg Oral Daily    tamsulosin  1 capsule Oral QHS     PRN Meds:dextrose 10%, dextrose 10%, glucagon (human recombinant), glucose, glucose, insulin aspart U-100, sodium chloride 0.9%    Review of patient's allergies indicates:  No Known Allergies  Objective:     Vital Signs (Most Recent):  Temp: 97.7 °F (36.5 °C) (01/04/23 0739)  Pulse: (!) 53 (01/04/23 0739)  Resp: 20 (01/04/23 0739)  BP: 114/74 (01/04/23 0739)  SpO2: 96 % (01/04/23 0739)   Vital Signs (24h Range):  Temp:  [97.1 °F (36.2 °C)-98.5 °F (36.9 °C)] 97.7 °F (36.5 °C)  Pulse:  [51-71] 53  Resp:  [17-28] 20  SpO2:  [90 %-99 %] 96 %  BP: ()/(54-96) 114/74     Patient Vitals for the past 72 hrs (Last 3 readings):   Weight   01/03/23 1854 84.1 kg (185 lb 6.5 oz)   01/03/23 1219 85.7 kg (189 lb)     Body mass index is 25.15 kg/m².      Intake/Output Summary (Last 24 hours) at 1/4/2023 0804  Last data filed at 1/4/2023 0557  Gross per 24 hour   Intake 360 ml   Output 3700 ml   Net -3340 ml          Telemetry: afib HR 50-60s.    Physical Exam  Vitals and nursing note reviewed.   Constitutional:       Appearance: He is well-developed.   HENT:      Head: Normocephalic.   Eyes:      Pupils: Pupils are equal, round, and reactive to light.   Neck:      Comments: +JVD  Cardiovascular:      Rate and Rhythm: Normal rate. Rhythm irregular.      Heart sounds: Murmur heard.   Pulmonary:      Effort: Pulmonary effort is normal.      Breath sounds: Normal breath sounds.   Abdominal:      General: Bowel sounds are  normal. There is distension.      Palpations: Abdomen is soft.   Musculoskeletal:         General: Normal range of motion.      Cervical back: Normal range of motion and neck supple.      Right lower leg: Edema present.      Left lower leg: Edema present.   Skin:     General: Skin is warm and dry.   Neurological:      Mental Status: He is alert and oriented to person, place, and time.   Psychiatric:         Behavior: Behavior normal.       Significant Labs:  CBC:  Recent Labs   Lab 01/03/23 1333 01/04/23  0452   WBC 4.68 4.36   RBC 4.80 4.82   HGB 12.7* 13.2*   HCT 42.7 42.6    287   MCV 89 88   MCH 26.5* 27.4   MCHC 29.7* 31.0*     BNP:  Recent Labs   Lab 01/03/23  1333   BNP 2,779*     CMP:  Recent Labs   Lab 01/03/23 1333 01/04/23  0452   GLU 78 86   CALCIUM 9.4 9.5   ALBUMIN 4.0 3.6   PROT 6.9 6.4    142   K 3.8 3.5   CO2 29 27    103   BUN 13 18   CREATININE 1.2 1.5*   ALKPHOS 66 65   ALT 12 10   AST 15 11   BILITOT 1.1* 1.1*      Coagulation:   No results for input(s): PT, INR, APTT in the last 168 hours.  LDH:  No results for input(s): LDH in the last 72 hours.  Microbiology:  Microbiology Results (last 7 days)       ** No results found for the last 168 hours. **            I have reviewed all pertinent labs within the past 24 hours.    Estimated Creatinine Clearance: 61.1 mL/min (A) (based on SCr of 1.5 mg/dL (H)).    Diagnostic Results:  I have reviewed all pertinent imaging results/findings within the past 24 hours.

## 2023-01-04 NOTE — PROGRESS NOTES
[facility]iology [facility]  Heart Transplant  Progress Note    Patient Name: Tera Griffiths  MRN: [id number]  Admission Date: 1/3/2023  Hospital Length of Stay: 1 days  Attending Physician: Nicholas Aguilar MD  Primary Care Provider: Primary Doctor No  Principal Problem:<principal problem not specified>    Subjective:     Interval History: Feeling better. Diuresed well.     Scheduled Meds:   amiodarone  200 mg Oral BID    apixaban  5 mg Oral BID    atorvastatin  80 mg Oral Daily    clopidogreL  75 mg Oral Daily    ferrous sulfate  1 tablet Oral Daily    furosemide (LASIX) injection  80 mg Intravenous BID    magnesium oxide  400 mg Oral BID    pantoprazole  40 mg Oral Daily    potassium chloride  40 mEq Oral BID    sacubitriL-valsartan  1 tablet Oral BID    spironolactone  25 mg Oral Daily    tamsulosin  1 capsule Oral QHS     PRN Meds:dextrose 10%, dextrose 10%, glucagon (human recombinant), glucose, glucose, insulin aspart U-100, sodium chloride 0.9%    Review of patient's allergies indicates:  No Known Allergies  Objective:     Vital Signs (Most Recent):  Temp: 97.7 °F (36.5 °C) (01/04/23 0739)  Pulse: (!) 53 (01/04/23 0739)  Resp: 20 (01/04/23 0739)  BP: 114/74 (01/04/23 0739)  SpO2: 96 % (01/04/23 0739)   Vital Signs (24h Range):  Temp:  [97.1 °F (36.2 °C)-98.5 °F (36.9 °C)] 97.7 °F (36.5 °C)  Pulse:  [51-71] 53  Resp:  [17-28] 20  SpO2:  [90 %-99 %] 96 %  BP: ()/(54-96) 114/74     Patient Vitals for the past 72 hrs (Last 3 readings):   Weight   01/03/23 1854 84.1 kg (185 lb 6.5 oz)   01/03/23 1219 85.7 kg (189 lb)     Body mass index is 25.15 kg/m².      Intake/Output Summary (Last 24 hours) at 1/4/2023 0804  Last data filed at 1/4/2023 0557  Gross per 24 hour   Intake 360 ml   Output 3700 ml   Net -3340 ml          Telemetry: afib HR 50-60s.    Physical Exam  Vitals and nursing note reviewed.   Constitutional:       Appearance: He is well-developed.   HENT:      Head: Normocephalic.   Eyes:       Pupils: Pupils are equal, round, and reactive to light.   Neck:      Comments: +JVD  Cardiovascular:      Rate and Rhythm: Normal rate. Rhythm irregular.      Heart sounds: Murmur heard.   Pulmonary:      Effort: Pulmonary effort is normal.      Breath sounds: Normal breath sounds.   Abdominal:      General: Bowel sounds are normal. There is distension.      Palpations: Abdomen is soft.   Musculoskeletal:         General: Normal range of motion.      Cervical back: Normal range of motion and neck supple.      Right lower leg: Edema present.      Left lower leg: Edema present.   Skin:     General: Skin is warm and dry.   Neurological:      Mental Status: He is alert and oriented to person, place, and time.   Psychiatric:         Behavior: Behavior normal.       Significant Labs:  CBC:  Recent Labs   Lab 01/03/23  1333 01/04/23  0452   WBC 4.68 4.36   RBC 4.80 4.82   HGB 12.7* 13.2*   HCT 42.7 42.6    287   MCV 89 88   MCH 26.5* 27.4   MCHC 29.7* 31.0*     BNP:  Recent Labs   Lab 01/03/23  1333   BNP 2,779*     CMP:  Recent Labs   Lab 01/03/23  1333 01/04/23  0452   GLU 78 86   CALCIUM 9.4 9.5   ALBUMIN 4.0 3.6   PROT 6.9 6.4    142   K 3.8 3.5   CO2 29 27    103   BUN 13 18   CREATININE 1.2 1.5*   ALKPHOS 66 65   ALT 12 10   AST 15 11   BILITOT 1.1* 1.1*      Coagulation:   No results for input(s): PT, INR, APTT in the last 168 hours.  LDH:  No results for input(s): LDH in the last 72 hours.  Microbiology:  Microbiology Results (last 7 days)       ** No results found for the last 168 hours. **            I have reviewed all pertinent labs within the past 24 hours.    Estimated Creatinine Clearance: 61.1 mL/min (A) (based on SCr of 1.5 mg/dL (H)).    Diagnostic Results:  I have reviewed all pertinent imaging results/findings within the past 24 hours.    Assessment and Plan:     56 yo man DM2, dyslipidemia, HTN, CAD, RCA NSTEMI complicated with VF arrest and HF in 2021, DC ICD by Dr Bravo  12/23/21, HFrEF and LVEF 10%, in ER after being sent from EP clinic with ADHF/afib RVR. KIA/CV 11/29/22 with initiation of amiodarone. He was in SR for a couple of weeks and then had return of AF 12/17/22. He noticed no symptom change in AF. He states that yesterday evening he started to develop worsening SOB and orthopnea. He took an extra diuretic dose with minimal improvements. In afib in ER with rates 60-70s. He has urinal at bedside with ~800cc Clear urine in it. Denies CP, palpitations, syncope, presyncope, fevers, n/v/d. Cannot lie flat and endorses swollen abdomen.         Acute on chronic combined systolic and diastolic heart failure  -ICM. S/P PCI to RCA 8/2021 complicated with VF arrest  -Hypervolemic on exam. Seems to be diuresing with Lasix 80mg TID.   -GDMT: Continue Entresto 49/51 BID, Spironolactone 25mg daily. Hold toprol 25mg daily.  -Last echo 9/22: EF 15%, Mild-Mod MR. LViDD  6.9/ TAPSE 1.19. Will repeat once euvolemic.  -Will start pathway for LVAD/OHTX w/u.   -Low sodium 2L FR.     Atrial fibrillation  -Rate controlled currently in ER.  -Hold low dose BB for now.  -Continue amio 200mg daily.   -EP consult once euvolemic for DCCV.      Vishnu Spencer, NP  Heart Transplant  [facility] - [facility] Stepdown

## 2023-01-05 LAB
ALBUMIN SERPL BCP-MCNC: 3.5 G/DL (ref 3.5–5.2)
ALP SERPL-CCNC: 70 U/L (ref 55–135)
ALT SERPL W/O P-5'-P-CCNC: 10 U/L (ref 10–44)
ANION GAP SERPL CALC-SCNC: 10 MMOL/L (ref 8–16)
ASCENDING AORTA: 3 CM
AST SERPL-CCNC: 12 U/L (ref 10–40)
AV INDEX (PROSTH): 0.47
AV MEAN GRADIENT: 2 MMHG
AV PEAK GRADIENT: 5 MMHG
AV VALVE AREA: 2.24 CM2
AV VELOCITY RATIO: 0.49
BILIRUB SERPL-MCNC: 0.9 MG/DL (ref 0.1–1)
BNP SERPL-MCNC: 399 PG/ML (ref 0–99)
BSA FOR ECHO PROCEDURE: 2.01 M2
BUN SERPL-MCNC: 25 MG/DL (ref 6–20)
CALCIUM SERPL-MCNC: 9.4 MG/DL (ref 8.7–10.5)
CHLORIDE SERPL-SCNC: 103 MMOL/L (ref 95–110)
CO2 SERPL-SCNC: 27 MMOL/L (ref 23–29)
CREAT SERPL-MCNC: 1.6 MG/DL (ref 0.5–1.4)
CV ECHO LV RWT: 0.23 CM
DOP CALC AO PEAK VEL: 1.1 M/S
DOP CALC AO VTI: 16.17 CM
DOP CALC LVOT AREA: 4.8 CM2
DOP CALC LVOT DIAMETER: 2.46 CM
DOP CALC LVOT PEAK VEL: 0.54 M/S
DOP CALC LVOT STROKE VOLUME: 36.2 CM3
DOP CALCLVOT PEAK VEL VTI: 7.62 CM
E WAVE DECELERATION TIME: 166.47 MSEC
E/A RATIO: 1.62
E/E' RATIO: 11.33 M/S
ECHO LV POSTERIOR WALL: 0.8 CM (ref 0.6–1.1)
EJECTION FRACTION: 15 %
EST. GFR  (NO RACE VARIABLE): 50.6 ML/MIN/1.73 M^2
FRACTIONAL SHORTENING: 3 % (ref 28–44)
GLUCOSE SERPL-MCNC: 101 MG/DL (ref 70–110)
INTERVENTRICULAR SEPTUM: 0.8 CM (ref 0.6–1.1)
IVRT: 167.46 MSEC
LA MAJOR: 8.59 CM
LA MINOR: 8.37 CM
LA WIDTH: 5.64 CM
LEFT ATRIUM SIZE: 5.25 CM
LEFT ATRIUM VOLUME INDEX MOD: 82.7 ML/M2
LEFT ATRIUM VOLUME INDEX: 105.6 ML/M2
LEFT ATRIUM VOLUME MOD: 167 CM3
LEFT ATRIUM VOLUME: 213.39 CM3
LEFT INTERNAL DIMENSION IN SYSTOLE: 6.8 CM (ref 2.1–4)
LEFT VENTRICLE DIASTOLIC VOLUME INDEX: 122.74 ML/M2
LEFT VENTRICLE DIASTOLIC VOLUME: 247.93 ML
LEFT VENTRICLE MASS INDEX: 121 G/M2
LEFT VENTRICLE SYSTOLIC VOLUME INDEX: 103.2 ML/M2
LEFT VENTRICLE SYSTOLIC VOLUME: 208.37 ML
LEFT VENTRICULAR INTERNAL DIMENSION IN DIASTOLE: 7 CM (ref 3.5–6)
LEFT VENTRICULAR MASS: 244.42 G
LV LATERAL E/E' RATIO: 11.33 M/S
LV SEPTAL E/E' RATIO: 11.33 M/S
MAGNESIUM SERPL-MCNC: 2.1 MG/DL (ref 1.6–2.6)
MV A" WAVE DURATION": 9.51 MSEC
MV PEAK A VEL: 0.21 M/S
MV PEAK E VEL: 0.34 M/S
MV STENOSIS PRESSURE HALF TIME: 48.28 MS
MV VALVE AREA P 1/2 METHOD: 4.56 CM2
POCT GLUCOSE: 89 MG/DL (ref 70–110)
POCT GLUCOSE: 92 MG/DL (ref 70–110)
POTASSIUM SERPL-SCNC: 4.3 MMOL/L (ref 3.5–5.1)
PROT SERPL-MCNC: 6.7 G/DL (ref 6–8.4)
PULM VEIN S/D RATIO: 0.84
PV PEAK D VEL: 0.19 M/S
PV PEAK S VEL: 0.16 M/S
RA MAJOR: 7.28 CM
RA PRESSURE: 3 MMHG
RA WIDTH: 5.44 CM
RIGHT VENTRICULAR END-DIASTOLIC DIMENSION: 4.42 CM
RV TISSUE DOPPLER FREE WALL SYSTOLIC VELOCITY 1 (APICAL 4 CHAMBER VIEW): 6.88 CM/S
SINUS: 3.03 CM
SODIUM SERPL-SCNC: 140 MMOL/L (ref 136–145)
STJ: 2.76 CM
TDI LATERAL: 0.03 M/S
TDI SEPTAL: 0.03 M/S
TDI: 0.03 M/S
TRICUSPID ANNULAR PLANE SYSTOLIC EXCURSION: 1.57 CM

## 2023-01-05 PROCEDURE — 83880 ASSAY OF NATRIURETIC PEPTIDE: CPT | Mod: NTX | Performed by: NURSE PRACTITIONER

## 2023-01-05 PROCEDURE — 63600175 PHARM REV CODE 636 W HCPCS: Mod: NTX | Performed by: NURSE PRACTITIONER

## 2023-01-05 PROCEDURE — 80053 COMPREHEN METABOLIC PANEL: CPT | Mod: NTX | Performed by: NURSE PRACTITIONER

## 2023-01-05 PROCEDURE — 25000003 PHARM REV CODE 250: Mod: NTX | Performed by: NURSE PRACTITIONER

## 2023-01-05 PROCEDURE — 36415 COLL VENOUS BLD VENIPUNCTURE: CPT | Mod: NTX | Performed by: NURSE PRACTITIONER

## 2023-01-05 PROCEDURE — 83735 ASSAY OF MAGNESIUM: CPT | Mod: NTX | Performed by: NURSE PRACTITIONER

## 2023-01-05 PROCEDURE — 99233 SBSQ HOSP IP/OBS HIGH 50: CPT | Mod: NTX,,, | Performed by: NURSE PRACTITIONER

## 2023-01-05 PROCEDURE — 20600001 HC STEP DOWN PRIVATE ROOM: Mod: NTX

## 2023-01-05 PROCEDURE — 99233 PR SUBSEQUENT HOSPITAL CARE,LEVL III: ICD-10-PCS | Mod: NTX,,, | Performed by: NURSE PRACTITIONER

## 2023-01-05 RX ORDER — TORSEMIDE 20 MG/1
40 TABLET ORAL 2 TIMES DAILY
Status: DISCONTINUED | OUTPATIENT
Start: 2023-01-05 | End: 2023-01-06

## 2023-01-05 RX ORDER — FUROSEMIDE 10 MG/ML
80 INJECTION INTRAMUSCULAR; INTRAVENOUS ONCE
Status: COMPLETED | OUTPATIENT
Start: 2023-01-05 | End: 2023-01-05

## 2023-01-05 RX ADMIN — Medication 400 MG: at 08:01

## 2023-01-05 RX ADMIN — AMIODARONE HYDROCHLORIDE 200 MG: 200 TABLET ORAL at 08:01

## 2023-01-05 RX ADMIN — ATORVASTATIN CALCIUM 80 MG: 20 TABLET, FILM COATED ORAL at 08:01

## 2023-01-05 RX ADMIN — POTASSIUM CHLORIDE 40 MEQ: 1500 TABLET, EXTENDED RELEASE ORAL at 08:01

## 2023-01-05 RX ADMIN — FERROUS SULFATE TAB 325 MG (65 MG ELEMENTAL FE) 1 EACH: 325 (65 FE) TAB at 08:01

## 2023-01-05 RX ADMIN — APIXABAN 5 MG: 5 TABLET, FILM COATED ORAL at 08:01

## 2023-01-05 RX ADMIN — TORSEMIDE 40 MG: 20 TABLET ORAL at 04:01

## 2023-01-05 RX ADMIN — CLOPIDOGREL BISULFATE 75 MG: 75 TABLET ORAL at 08:01

## 2023-01-05 RX ADMIN — FUROSEMIDE 80 MG: 10 INJECTION, SOLUTION INTRAMUSCULAR; INTRAVENOUS at 03:01

## 2023-01-05 RX ADMIN — SACUBITRIL AND VALSARTAN 1 TABLET: 49; 51 TABLET, FILM COATED ORAL at 08:01

## 2023-01-05 RX ADMIN — FUROSEMIDE 80 MG: 10 INJECTION, SOLUTION INTRAMUSCULAR; INTRAVENOUS at 08:01

## 2023-01-05 RX ADMIN — SPIRONOLACTONE 25 MG: 25 TABLET, FILM COATED ORAL at 08:01

## 2023-01-05 RX ADMIN — PANTOPRAZOLE SODIUM 40 MG: 40 TABLET, DELAYED RELEASE ORAL at 08:01

## 2023-01-05 RX ADMIN — TAMSULOSIN HYDROCHLORIDE 0.4 MG: 0.4 CAPSULE ORAL at 08:01

## 2023-01-05 NOTE — PLAN OF CARE
Problem: Adult Inpatient Plan of Care  Goal: Plan of Care Review  Outcome: Ongoing, Progressing  Goal: Patient-Specific Goal (Individualized)  Outcome: Ongoing, Progressing  Goal: Absence of Hospital-Acquired Illness or Injury  Outcome: Ongoing, Progressing  Goal: Optimal Comfort and Wellbeing  Outcome: Ongoing, Progressing  Goal: Readiness for Transition of Care  Outcome: Ongoing, Progressing     Problem: Diabetes Comorbidity  Goal: Blood Glucose Level Within Targeted Range  Outcome: Ongoing, Progressing     Problem: Adjustment to Illness (Heart Failure)  Goal: Optimal Coping  Outcome: Ongoing, Progressing     Problem: Cardiac Output Decreased (Heart Failure)  Goal: Optimal Cardiac Output  Outcome: Ongoing, Progressing     Problem: Dysrhythmia (Heart Failure)  Goal: Stable Heart Rate and Rhythm  Outcome: Ongoing, Progressing     Problem: Fluid Imbalance (Heart Failure)  Goal: Fluid Balance  Outcome: Ongoing, Progressing     Problem: Functional Ability Impaired (Heart Failure)  Goal: Optimal Functional Ability  Outcome: Ongoing, Progressing     Problem: Oral Intake Inadequate (Heart Failure)  Goal: Optimal Nutrition Intake  Outcome: Ongoing, Progressing     Problem: Respiratory Compromise (Heart Failure)  Goal: Effective Oxygenation and Ventilation  Outcome: Ongoing, Progressing     Problem: Sleep Disordered Breathing (Heart Failure)  Goal: Effective Breathing Pattern During Sleep  Outcome: Ongoing, Progressing

## 2023-01-05 NOTE — HOSPITAL COURSE
56 yo man DM2, dyslipidemia, HTN, CAD, RCA NSTEMI complicated with VF arrest and HF in 2021, DC ICD by Dr Bravo 12/23/21, HFrEF and LVEF 10%, in ER after being sent from EP clinic with ADHF/afib RVR. KIA/CV 11/29/22 with initiation of amiodarone. He was in SR for a couple of weeks and then had return of AF 12/17/22. He noticed no symptom change in AF. He was admitted and he diuresed well with IVP Lasix. He was transitioned back to PO torsemide once euvolemic, which was held with bump in Cr to 1.8. Will plan to resume at lower dose of 20 mg PO torsemide QD. Will recheck labs Monday and have follow up in 2-3 weeks in HTS clinic. EP consulted for Aflutter/fib, but was found to be in NSR, no indicated for DCCV at this time.

## 2023-01-05 NOTE — PLAN OF CARE
Problem: Adult Inpatient Plan of Care  Goal: Patient-Specific Goal (Individualized)  1/4/2023 1917 by Carleen Baum, RN  Outcome: Ongoing, Progressing  Flowsheets (Taken 1/4/2023 1917)  Anxieties, Fears or Concerns: SOB improve  Individualized Care Needs: Lasix  1/4/2023 1917 by Carleen Baum, RN  Outcome: Ongoing, Progressing     Problem: Diabetes Comorbidity  Goal: Blood Glucose Level Within Targeted Range  Outcome: Ongoing, Progressing  Intervention: Monitor and Manage Glycemia  Flowsheets (Taken 1/4/2023 1917)  Glycemic Management: blood glucose monitored     Problem: Fluid Imbalance (Heart Failure)  Goal: Fluid Balance  Outcome: Ongoing, Progressing  Intervention: Monitor and Manage Fluid Balance  Flowsheets (Taken 1/4/2023 1917)  Fluid/Electrolyte Management: fluids restricted       Plan of care discussed with patient. Patient is free of fall/trauma/injury. Lasix IVP, K replaced. Denies CP, SOB, or pain/discomfort. All questions addressed.

## 2023-01-05 NOTE — PROGRESS NOTES
Admit Note/Discharge Note     Met with patient to assess needs. Patient is a 57 y.o. single male, admitted for  SOB, orthopnea, acute on chronic combined systolic and diastolic heart failure and atrial fibrillation  .      Patient admitted to [facility] on 1/3/2023 .  At this time, patient presents as alert and oriented x 4, good eye contact, calm, and communicative.  At this time, patients caregiver is currently not in attendance.     Household/Family Systems     Patient resides with patient's cousin at     [address] [place] [address].        Support system includes cousins and ex significant other.   Pt has a 16 yr old daughter who lives with her mother in [place].      Patients primary caregiver is self.     Pt's cell: [phone]    Emergency contact:   Cristhian Griffiths (cousin, lives in [place], drives and works full time) [phone]  Neris Palomino (mother to pt's daughter, lives in [place] and drives) [phone]    During admission, patient's caregiver plans to stay at home.  Confirmed patient and patients caregivers do have access to reliable transportation.   The pt does not drive, family assists at times or the pt takes the bus.   Pt reports he is aware of Medicaid Transportation, however he has  not used this service.     Cognitive Status/Learning     Patient reports reading ability as 11th grade and states patient does not have difficulty with reading, writing, seeing, hearing, comprehension, learning, and memory.  Patient reports patient learns best by one on one support (visual).     Needed: No.   Highest education level: High School (9-12) or GED    Vocation/Disability   .  Working for Income: No  If no, reason not working: health  Patient used to be a  and stopped working during the pandemic.   Pt reports he's applying for disability and has an appointment on January 31st.   Pt does received food stamps.  Pt reports he does not have any income and  his cousin is providing housing assistance.     Adherence     Patient reports a high level of adherence to patients health care regimen.  Adherence counseling and education provided. Patient verbalizes understanding.    Substance Use    Patient reports the following substance usage.    Tobacco: none, patient denies any use.  Alcohol: none, patient denies any use.  Illicit Drugs/Non-prescribed Medications: none, patient denies any use.  Patient states clear understanding of the potential impact of substance use.  Substance abstinence/cessation counseling, education and resources provided and reviewed.     Services Utilizing/ADLS    Infusion Service: Prior to admission, patient utilizing? no  Home Health: Prior to admission, patient utilizing? no  DME: Prior to admission, no  Pulmonary/Cardiac Rehab: Prior to admission, no  Dialysis:  Prior to admission, no  Transplant Specialty Pharmacy:  Prior to admission, no.    Prior to admission, patient reports patient was independent with ADLS and was not driving.  Patient reports patient is not able to care for self at this time due to compromised medical condition (as documented in medical record) and physical weakness..  Patient indicates a willingness to care for self once medically cleared to do so.    Insurance/Medications    Insured by   Payer/Plan Subscr  Sex Relation Sub. Ins. ID Effective Group Num   1. MEDICAID - * DARION CARTAGENA 1965 Male Self [id number] [facility]                                   [address] [address]      Primary Insurance (for UNOS reporting): Public Insurance - Medicaid  Secondary Insurance (for UNOS reporting): None    Patient reports patient is able to obtain and afford medications at this time and at time of discharge.    Living Will/Healthcare Power of     Patient states patient does not have a LW and/or HCPA.   provided education regarding LW and HCPA and the completion of forms.    Coping/Mental  Health    Patient is coping adequately with the aid of  family members, by phone.   Patient denies mental health difficulties.   General support provided given hospital admission.     Discharge Planning    At time of discharge, patient plans to return to patient's home under the care of self.  Patients  cousin Cristhian  will transport patient.  Per rounds today, expected discharge date is tomorrow . Patient and patients caregiver  verbalize understanding and are involved in treatment planning and discharge process.    Additional Concerns    Patient is being followed for needs, education, resources, information, emotional support, supportive counseling, and for supportive and skilled discharge plan of care.  remains available. Patient denies additional needs and/or concerns at this time. Patient verbalizes understanding and agreement with information reviewed, social work availability, and how to access available resources as needed.

## 2023-01-05 NOTE — SUBJECTIVE & OBJECTIVE
Interval History: Feeling better(back to baseline).     Scheduled Meds:   amiodarone  200 mg Oral BID    apixaban  5 mg Oral BID    atorvastatin  80 mg Oral Daily    clopidogreL  75 mg Oral Daily    ferrous sulfate  1 tablet Oral Daily    furosemide (LASIX) injection  80 mg Intravenous BID    magnesium oxide  400 mg Oral BID    metoprolol succinate  12.5 mg Oral QHS    pantoprazole  40 mg Oral Daily    potassium chloride  40 mEq Oral BID    sacubitriL-valsartan  1 tablet Oral BID    spironolactone  25 mg Oral Daily    tamsulosin  1 capsule Oral QHS     PRN Meds:dextrose 10%, dextrose 10%, glucagon (human recombinant), glucose, glucose, insulin aspart U-100, sodium chloride 0.9%    Review of patient's allergies indicates:  No Known Allergies  Objective:     Vital Signs (Most Recent):  Temp: 98.2 °F (36.8 °C) (01/05/23 0318)  Pulse: 60 (01/05/23 0713)  Resp: 19 (01/05/23 0318)  BP: 102/65 (01/05/23 0318)  SpO2: 96 % (01/05/23 0318)   Vital Signs (24h Range):  Temp:  [98 °F (36.7 °C)-98.3 °F (36.8 °C)] 98.2 °F (36.8 °C)  Pulse:  [55-83] 60  Resp:  [17-20] 19  SpO2:  [96 %-98 %] 96 %  BP: ()/(61-77) 102/65     Patient Vitals for the past 72 hrs (Last 3 readings):   Weight   01/05/23 0450 80.1 kg (176 lb 9.4 oz)   01/03/23 1854 84.1 kg (185 lb 6.5 oz)   01/03/23 1219 85.7 kg (189 lb)       Body mass index is 23.95 kg/m².      Intake/Output Summary (Last 24 hours) at 1/5/2023 0800  Last data filed at 1/5/2023 0449  Gross per 24 hour   Intake 822 ml   Output 2575 ml   Net -1753 ml            Telemetry: afib HR 50-60s.    Physical Exam  Vitals and nursing note reviewed.   Constitutional:       Appearance: He is well-developed.   HENT:      Head: Normocephalic.   Eyes:      Pupils: Pupils are equal, round, and reactive to light.   Cardiovascular:      Rate and Rhythm: Normal rate. Rhythm irregular.      Heart sounds: Murmur heard.   Pulmonary:      Effort: Pulmonary effort is normal.      Breath sounds: Normal breath  sounds.   Abdominal:      General: Bowel sounds are normal.      Palpations: Abdomen is soft.   Musculoskeletal:         General: Normal range of motion.      Cervical back: Normal range of motion and neck supple.   Skin:     General: Skin is warm and dry.   Neurological:      Mental Status: He is alert and oriented to person, place, and time.   Psychiatric:         Behavior: Behavior normal.       Significant Labs:  CBC:  Recent Labs   Lab 01/03/23 1333 01/04/23  0452   WBC 4.68 4.36   RBC 4.80 4.82   HGB 12.7* 13.2*   HCT 42.7 42.6    287   MCV 89 88   MCH 26.5* 27.4   MCHC 29.7* 31.0*       BNP:  Recent Labs   Lab 01/03/23 1333 01/05/23  0329   BNP 2,779* 399*       CMP:  Recent Labs   Lab 01/03/23 1333 01/04/23 0452 01/04/23  1719 01/05/23  0329   GLU 78 86 75 101   CALCIUM 9.4 9.5 10.0 9.4   ALBUMIN 4.0 3.6  --  3.5   PROT 6.9 6.4  --  6.7    142 139 140   K 3.8 3.5 4.3 4.3   CO2 29 27 30* 27    103 101 103   BUN 13 18 20 25*   CREATININE 1.2 1.5* 1.6* 1.6*   ALKPHOS 66 65  --  70   ALT 12 10  --  10   AST 15 11  --  12   BILITOT 1.1* 1.1*  --  0.9        Coagulation:   No results for input(s): PT, INR, APTT in the last 168 hours.  LDH:  No results for input(s): LDH in the last 72 hours.  Microbiology:  Microbiology Results (last 7 days)       ** No results found for the last 168 hours. **            I have reviewed all pertinent labs within the past 24 hours.    Estimated Creatinine Clearance: 57.3 mL/min (A) (based on SCr of 1.6 mg/dL (H)).    Diagnostic Results:  I have reviewed all pertinent imaging results/findings within the past 24 hours.

## 2023-01-05 NOTE — PROGRESS NOTES
[facility] - Cardiology Stepdown  Heart Transplant  Progress Note    Patient Name: Tera Griffiths  MRN: [id number]  Admission Date: 1/3/2023  Hospital Length of Stay: 2 days  Attending Physician: Nicholas Aguilar MD  Primary Care Provider: Primary Doctor No  Principal Problem:<principal problem not specified>    Subjective:     Interval History: Feeling better(back to baseline).     Scheduled Meds:   amiodarone  200 mg Oral BID    apixaban  5 mg Oral BID    atorvastatin  80 mg Oral Daily    clopidogreL  75 mg Oral Daily    ferrous sulfate  1 tablet Oral Daily    furosemide (LASIX) injection  80 mg Intravenous BID    magnesium oxide  400 mg Oral BID    metoprolol succinate  12.5 mg Oral QHS    pantoprazole  40 mg Oral Daily    potassium chloride  40 mEq Oral BID    sacubitriL-valsartan  1 tablet Oral BID    spironolactone  25 mg Oral Daily    tamsulosin  1 capsule Oral QHS     PRN Meds:dextrose 10%, dextrose 10%, glucagon (human recombinant), glucose, glucose, insulin aspart U-100, sodium chloride 0.9%    Review of patient's allergies indicates:  No Known Allergies  Objective:     Vital Signs (Most Recent):  Temp: 98.2 °F (36.8 °C) (01/05/23 0318)  Pulse: 60 (01/05/23 0713)  Resp: 19 (01/05/23 0318)  BP: 102/65 (01/05/23 0318)  SpO2: 96 % (01/05/23 0318)   Vital Signs (24h Range):  Temp:  [98 °F (36.7 °C)-98.3 °F (36.8 °C)] 98.2 °F (36.8 °C)  Pulse:  [55-83] 60  Resp:  [17-20] 19  SpO2:  [96 %-98 %] 96 %  BP: ()/(61-77) 102/65     Patient Vitals for the past 72 hrs (Last 3 readings):   Weight   01/05/23 0450 80.1 kg (176 lb 9.4 oz)   01/03/23 1854 84.1 kg (185 lb 6.5 oz)   01/03/23 1219 85.7 kg (189 lb)       Body mass index is 23.95 kg/m².      Intake/Output Summary (Last 24 hours) at 1/5/2023 0800  Last data filed at 1/5/2023 0449  Gross per 24 hour   Intake 822 ml   Output 2575 ml   Net -1753 ml            Telemetry: afib HR 50-60s.    Physical Exam  Vitals and nursing note reviewed.    Constitutional:       Appearance: He is well-developed.   HENT:      Head: Normocephalic.   Eyes:      Pupils: Pupils are equal, round, and reactive to light.   Cardiovascular:      Rate and Rhythm: Normal rate. Rhythm irregular.      Heart sounds: Murmur heard.   Pulmonary:      Effort: Pulmonary effort is normal.      Breath sounds: Normal breath sounds.   Abdominal:      General: Bowel sounds are normal.      Palpations: Abdomen is soft.   Musculoskeletal:         General: Normal range of motion.      Cervical back: Normal range of motion and neck supple.   Skin:     General: Skin is warm and dry.   Neurological:      Mental Status: He is alert and oriented to person, place, and time.   Psychiatric:         Behavior: Behavior normal.       Significant Labs:  CBC:  Recent Labs   Lab 01/03/23 1333 01/04/23  0452   WBC 4.68 4.36   RBC 4.80 4.82   HGB 12.7* 13.2*   HCT 42.7 42.6    287   MCV 89 88   MCH 26.5* 27.4   MCHC 29.7* 31.0*       BNP:  Recent Labs   Lab 01/03/23  1333 01/05/23  0329   BNP 2,779* 399*       CMP:  Recent Labs   Lab 01/03/23  1333 01/04/23  0452 01/04/23  1719 01/05/23  0329   GLU 78 86 75 101   CALCIUM 9.4 9.5 10.0 9.4   ALBUMIN 4.0 3.6  --  3.5   PROT 6.9 6.4  --  6.7    142 139 140   K 3.8 3.5 4.3 4.3   CO2 29 27 30* 27    103 101 103   BUN 13 18 20 25*   CREATININE 1.2 1.5* 1.6* 1.6*   ALKPHOS 66 65  --  70   ALT 12 10  --  10   AST 15 11  --  12   BILITOT 1.1* 1.1*  --  0.9        Coagulation:   No results for input(s): PT, INR, APTT in the last 168 hours.  LDH:  No results for input(s): LDH in the last 72 hours.  Microbiology:  Microbiology Results (last 7 days)       ** No results found for the last 168 hours. **            I have reviewed all pertinent labs within the past 24 hours.    Estimated Creatinine Clearance: 57.3 mL/min (A) (based on SCr of 1.6 mg/dL (H)).    Diagnostic Results:  I have reviewed all pertinent imaging results/findings within the past 24  hours.    Assessment and Plan:     56 yo man DM2, dyslipidemia, HTN, CAD, RCA NSTEMI complicated with VF arrest and HF in 2021, DC ICD by Dr Bravo 12/23/21, HFrEF and LVEF 10%, in ER after being sent from EP clinic with ADHF/afib RVR. KIA/CV 11/29/22 with initiation of amiodarone. He was in SR for a couple of weeks and then had return of AF 12/17/22. He noticed no symptom change in AF. He states that yesterday evening he started to develop worsening SOB and orthopnea. He took an extra diuretic dose with minimal improvements. In afib in ER with rates 60-70s. He has urinal at bedside with ~800cc Clear urine in it. Denies CP, palpitations, syncope, presyncope, fevers, n/v/d. Cannot lie flat and endorses swollen abdomen.         Acute on chronic combined systolic and diastolic heart failure  -ICM. S/P PCI to RCA 8/2021 complicated with VF arrest  -Diuresing with Lasix 80mg TID. Will likely transition to oral diuretic soon.   -GDMT: Continue Entresto 49/51 BID, Spironolactone 25mg daily. Decreased toprol to 12.5mg daily.  -Last echo 9/22: EF 15%, Mild-Mod MR. LViDD  6.9/ TAPSE 1.19. Will repeat today.   -Low sodium 2L FR.     Atrial fibrillation  -Rate controlled currently in ER.  -Hold low dose BB for now.  -Continue amio 200mg daily.   -EP consult once euvolemic for DCCV.      Vishnu Spencer, NP  Heart Transplant  [facility] - Cardiology Stepdown

## 2023-01-06 LAB
ALBUMIN SERPL BCP-MCNC: 3.9 G/DL (ref 3.5–5.2)
ALP SERPL-CCNC: 76 U/L (ref 55–135)
ALT SERPL W/O P-5'-P-CCNC: 10 U/L (ref 10–44)
ANION GAP SERPL CALC-SCNC: 14 MMOL/L (ref 8–16)
AST SERPL-CCNC: 12 U/L (ref 10–40)
BASOPHILS # BLD AUTO: 0.03 K/UL (ref 0–0.2)
BASOPHILS NFR BLD: 0.7 % (ref 0–1.9)
BILIRUB SERPL-MCNC: 1 MG/DL (ref 0.1–1)
BUN SERPL-MCNC: 32 MG/DL (ref 6–20)
CALCIUM SERPL-MCNC: 10 MG/DL (ref 8.7–10.5)
CHLORIDE SERPL-SCNC: 100 MMOL/L (ref 95–110)
CO2 SERPL-SCNC: 23 MMOL/L (ref 23–29)
CREAT SERPL-MCNC: 1.8 MG/DL (ref 0.5–1.4)
DIFFERENTIAL METHOD: ABNORMAL
EOSINOPHIL # BLD AUTO: 0.1 K/UL (ref 0–0.5)
EOSINOPHIL NFR BLD: 3.2 % (ref 0–8)
ERYTHROCYTE [DISTWIDTH] IN BLOOD BY AUTOMATED COUNT: 15.2 % (ref 11.5–14.5)
EST. GFR  (NO RACE VARIABLE): 43.9 ML/MIN/1.73 M^2
GLUCOSE SERPL-MCNC: 111 MG/DL (ref 70–110)
HCT VFR BLD AUTO: 51.2 % (ref 40–54)
HGB BLD-MCNC: 15.6 G/DL (ref 14–18)
IMM GRANULOCYTES # BLD AUTO: 0 K/UL (ref 0–0.04)
IMM GRANULOCYTES NFR BLD AUTO: 0 % (ref 0–0.5)
LYMPHOCYTES # BLD AUTO: 1.1 K/UL (ref 1–4.8)
LYMPHOCYTES NFR BLD: 24.6 % (ref 18–48)
MAGNESIUM SERPL-MCNC: 2.2 MG/DL (ref 1.6–2.6)
MCH RBC QN AUTO: 26.5 PG (ref 27–31)
MCHC RBC AUTO-ENTMCNC: 30.5 G/DL (ref 32–36)
MCV RBC AUTO: 87 FL (ref 82–98)
MONOCYTES # BLD AUTO: 0.6 K/UL (ref 0.3–1)
MONOCYTES NFR BLD: 14.2 % (ref 4–15)
NEUTROPHILS # BLD AUTO: 2.5 K/UL (ref 1.8–7.7)
NEUTROPHILS NFR BLD: 57.3 % (ref 38–73)
NRBC BLD-RTO: 0 /100 WBC
PLATELET # BLD AUTO: 338 K/UL (ref 150–450)
PMV BLD AUTO: 11.3 FL (ref 9.2–12.9)
POCT GLUCOSE: 118 MG/DL (ref 70–110)
POCT GLUCOSE: 131 MG/DL (ref 70–110)
POCT GLUCOSE: 99 MG/DL (ref 70–110)
POTASSIUM SERPL-SCNC: 4.8 MMOL/L (ref 3.5–5.1)
PROT SERPL-MCNC: 7.4 G/DL (ref 6–8.4)
RBC # BLD AUTO: 5.88 M/UL (ref 4.6–6.2)
SODIUM SERPL-SCNC: 137 MMOL/L (ref 136–145)
WBC # BLD AUTO: 4.31 K/UL (ref 3.9–12.7)

## 2023-01-06 PROCEDURE — 94761 N-INVAS EAR/PLS OXIMETRY MLT: CPT | Mod: NTX

## 2023-01-06 PROCEDURE — 99233 SBSQ HOSP IP/OBS HIGH 50: CPT | Mod: NTX,,, | Performed by: PHYSICIAN ASSISTANT

## 2023-01-06 PROCEDURE — 85025 COMPLETE CBC W/AUTO DIFF WBC: CPT | Mod: NTX | Performed by: INTERNAL MEDICINE

## 2023-01-06 PROCEDURE — 83735 ASSAY OF MAGNESIUM: CPT | Mod: NTX | Performed by: NURSE PRACTITIONER

## 2023-01-06 PROCEDURE — 20600001 HC STEP DOWN PRIVATE ROOM: Mod: NTX

## 2023-01-06 PROCEDURE — 36415 COLL VENOUS BLD VENIPUNCTURE: CPT | Mod: NTX | Performed by: NURSE PRACTITIONER

## 2023-01-06 PROCEDURE — 99233 PR SUBSEQUENT HOSPITAL CARE,LEVL III: ICD-10-PCS | Mod: NTX,,, | Performed by: PHYSICIAN ASSISTANT

## 2023-01-06 PROCEDURE — 25000003 PHARM REV CODE 250: Mod: NTX | Performed by: NURSE PRACTITIONER

## 2023-01-06 PROCEDURE — 93010 EKG 12-LEAD: ICD-10-PCS | Mod: NTX,,, | Performed by: INTERNAL MEDICINE

## 2023-01-06 PROCEDURE — 93010 ELECTROCARDIOGRAM REPORT: CPT | Mod: NTX,,, | Performed by: INTERNAL MEDICINE

## 2023-01-06 PROCEDURE — 93005 ELECTROCARDIOGRAM TRACING: CPT | Mod: NTX

## 2023-01-06 PROCEDURE — 80053 COMPREHEN METABOLIC PANEL: CPT | Mod: NTX | Performed by: NURSE PRACTITIONER

## 2023-01-06 RX ADMIN — POTASSIUM CHLORIDE 40 MEQ: 1500 TABLET, EXTENDED RELEASE ORAL at 08:01

## 2023-01-06 RX ADMIN — METOPROLOL SUCCINATE 12.5 MG: 25 TABLET, EXTENDED RELEASE ORAL at 08:01

## 2023-01-06 RX ADMIN — Medication 400 MG: at 08:01

## 2023-01-06 RX ADMIN — CLOPIDOGREL BISULFATE 75 MG: 75 TABLET ORAL at 09:01

## 2023-01-06 RX ADMIN — Medication 400 MG: at 09:01

## 2023-01-06 RX ADMIN — SPIRONOLACTONE 25 MG: 25 TABLET, FILM COATED ORAL at 09:01

## 2023-01-06 RX ADMIN — APIXABAN 5 MG: 5 TABLET, FILM COATED ORAL at 09:01

## 2023-01-06 RX ADMIN — APIXABAN 5 MG: 5 TABLET, FILM COATED ORAL at 08:01

## 2023-01-06 RX ADMIN — TAMSULOSIN HYDROCHLORIDE 0.4 MG: 0.4 CAPSULE ORAL at 08:01

## 2023-01-06 RX ADMIN — AMIODARONE HYDROCHLORIDE 200 MG: 200 TABLET ORAL at 08:01

## 2023-01-06 RX ADMIN — FERROUS SULFATE TAB 325 MG (65 MG ELEMENTAL FE) 1 EACH: 325 (65 FE) TAB at 09:01

## 2023-01-06 RX ADMIN — SACUBITRIL AND VALSARTAN 1 TABLET: 49; 51 TABLET, FILM COATED ORAL at 08:01

## 2023-01-06 RX ADMIN — ATORVASTATIN CALCIUM 80 MG: 20 TABLET, FILM COATED ORAL at 09:01

## 2023-01-06 RX ADMIN — PANTOPRAZOLE SODIUM 40 MG: 40 TABLET, DELAYED RELEASE ORAL at 09:01

## 2023-01-06 RX ADMIN — AMIODARONE HYDROCHLORIDE 200 MG: 200 TABLET ORAL at 09:01

## 2023-01-06 RX ADMIN — POTASSIUM CHLORIDE 40 MEQ: 1500 TABLET, EXTENDED RELEASE ORAL at 09:01

## 2023-01-06 RX ADMIN — SACUBITRIL AND VALSARTAN 1 TABLET: 49; 51 TABLET, FILM COATED ORAL at 11:01

## 2023-01-06 NOTE — PROGRESS NOTES
Discharge    Pt presents in room with a visiting friend. Pt aaox4 with pleasant affect. Pt voices agreement with plan to discharge this weekend. No home needs voiced by pt or indicated by medical team. Pt's cousin Cristhian will transport pt home. Pt reports coping well and denies further needs, questions, concerns at this time and none indicated. Providing psychosocial and counseling support, education, resources, assistance and discharge planning as indicated. SW remains available.

## 2023-01-06 NOTE — SUBJECTIVE & OBJECTIVE
Interval History: NAEON. No complaints. Cr trending up to 1.8 from 1.6 yesterday (BL 1.1). Transitioned to PO torsemide 40 mg BID yesterday (previous home dose), will discontinue and monitor kidney function.     Scheduled Meds:   amiodarone  200 mg Oral BID    apixaban  5 mg Oral BID    atorvastatin  80 mg Oral Daily    clopidogreL  75 mg Oral Daily    ferrous sulfate  1 tablet Oral Daily    magnesium oxide  400 mg Oral BID    metoprolol succinate  12.5 mg Oral QHS    pantoprazole  40 mg Oral Daily    potassium chloride  40 mEq Oral BID    sacubitriL-valsartan  1 tablet Oral BID    spironolactone  25 mg Oral Daily    tamsulosin  1 capsule Oral QHS     PRN Meds:dextrose 10%, sodium chloride 0.9%    Review of patient's allergies indicates:  No Known Allergies  Objective:     Vital Signs (Most Recent):  Temp: 97.9 °F (36.6 °C) (01/06/23 0747)  Pulse: 68 (01/06/23 0747)  Resp: 18 (01/06/23 0747)  BP: 119/76 (01/06/23 0747)  SpO2: 98 % (01/06/23 0747)   Vital Signs (24h Range):  Temp:  [97.3 °F (36.3 °C)-98.4 °F (36.9 °C)] 97.9 °F (36.6 °C)  Pulse:  [65-76] 68  Resp:  [16-19] 18  SpO2:  [94 %-99 %] 98 %  BP: (105-128)/(68-86) 119/76     Patient Vitals for the past 72 hrs (Last 3 readings):   Weight   01/06/23 0428 78.8 kg (173 lb 11.6 oz)   01/05/23 1421 79.8 kg (176 lb)   01/05/23 0450 80.1 kg (176 lb 9.4 oz)       Body mass index is 23.56 kg/m².      Intake/Output Summary (Last 24 hours) at 1/6/2023 0903  Last data filed at 1/6/2023 0428  Gross per 24 hour   Intake 685 ml   Output 2350 ml   Net -1665 ml            Telemetry: afib HR 50-60s.    Physical Exam  Vitals and nursing note reviewed.   Constitutional:       Appearance: He is well-developed.   HENT:      Head: Normocephalic.   Eyes:      Pupils: Pupils are equal, round, and reactive to light.   Cardiovascular:      Rate and Rhythm: Normal rate. Rhythm irregular.      Heart sounds: Murmur heard.   Pulmonary:      Effort: Pulmonary effort is normal.      Breath  sounds: Normal breath sounds.   Abdominal:      General: Bowel sounds are normal.      Palpations: Abdomen is soft.   Musculoskeletal:         General: Normal range of motion.      Cervical back: Normal range of motion and neck supple.   Skin:     General: Skin is warm and dry.   Neurological:      Mental Status: He is alert and oriented to person, place, and time.   Psychiatric:         Behavior: Behavior normal.       Significant Labs:  CBC:  Recent Labs   Lab 01/03/23  1333 01/04/23  0452 01/06/23  0419   WBC 4.68 4.36 4.31   RBC 4.80 4.82 5.88   HGB 12.7* 13.2* 15.6   HCT 42.7 42.6 51.2    287 338   MCV 89 88 87   MCH 26.5* 27.4 26.5*   MCHC 29.7* 31.0* 30.5*       BNP:  Recent Labs   Lab 01/03/23  1333 01/05/23  0329   BNP 2,779* 399*       CMP:  Recent Labs   Lab 01/04/23  0452 01/04/23  1719 01/05/23  0329 01/06/23  0420   GLU 86 75 101 111*   CALCIUM 9.5 10.0 9.4 10.0   ALBUMIN 3.6  --  3.5 3.9   PROT 6.4  --  6.7 7.4    139 140 137   K 3.5 4.3 4.3 4.8   CO2 27 30* 27 23    101 103 100   BUN 18 20 25* 32*   CREATININE 1.5* 1.6* 1.6* 1.8*   ALKPHOS 65  --  70 76   ALT 10  --  10 10   AST 11  --  12 12   BILITOT 1.1*  --  0.9 1.0        Coagulation:   No results for input(s): PT, INR, APTT in the last 168 hours.  LDH:  No results for input(s): LDH in the last 72 hours.  Microbiology:  Microbiology Results (last 7 days)       ** No results found for the last 168 hours. **            I have reviewed all pertinent labs within the past 24 hours.    Estimated Creatinine Clearance: 50.9 mL/min (A) (based on SCr of 1.8 mg/dL (H)).    Diagnostic Results:  I have reviewed all pertinent imaging results/findings within the past 24 hours.

## 2023-01-06 NOTE — CARE UPDATE
[facility] contacted regarding plans for DCCV  Tele shows in NSR (also, PVCs aren't followed by T waves and one p wave only)  Used [employer] DCPPM, confirmed patient to be in sinus   No indication for CV at this time

## 2023-01-06 NOTE — PROGRESS NOTES
[facility] - [facility]iology Stepdown  Heart Transplant  Progress Note    Patient Name: Tera Griffiths  MRN: [id number]  Admission Date: 1/3/2023  Hospital Length of Stay: 3 days  Attending Physician: Nicholas Aguilar MD  Primary Care Provider: Primary Doctor No  Principal Problem:<principal problem not specified>    Subjective:     Interval History: NAEON. No complaints. Cr trending up to 1.8 from 1.6 yesterday (BL 1.1). Transitioned to PO torsemide 40 mg BID yesterday (previous home dose), will discontinue and monitor kidney function.     Scheduled Meds:   amiodarone  200 mg Oral BID    apixaban  5 mg Oral BID    atorvastatin  80 mg Oral Daily    clopidogreL  75 mg Oral Daily    ferrous sulfate  1 tablet Oral Daily    magnesium oxide  400 mg Oral BID    metoprolol succinate  12.5 mg Oral QHS    pantoprazole  40 mg Oral Daily    potassium chloride  40 mEq Oral BID    sacubitriL-valsartan  1 tablet Oral BID    spironolactone  25 mg Oral Daily    tamsulosin  1 capsule Oral QHS     PRN Meds:dextrose 10%, sodium chloride 0.9%    Review of patient's allergies indicates:  No Known Allergies  Objective:     Vital Signs (Most Recent):  Temp: 97.9 °F (36.6 °C) (01/06/23 0747)  Pulse: 68 (01/06/23 0747)  Resp: 18 (01/06/23 0747)  BP: 119/76 (01/06/23 0747)  SpO2: 98 % (01/06/23 0747)   Vital Signs (24h Range):  Temp:  [97.3 °F (36.3 °C)-98.4 °F (36.9 °C)] 97.9 °F (36.6 °C)  Pulse:  [65-76] 68  Resp:  [16-19] 18  SpO2:  [94 %-99 %] 98 %  BP: (105-128)/(68-86) 119/76     Patient Vitals for the past 72 hrs (Last 3 readings):   Weight   01/06/23 0428 78.8 kg (173 lb 11.6 oz)   01/05/23 1421 79.8 kg (176 lb)   01/05/23 0450 80.1 kg (176 lb 9.4 oz)       Body mass index is 23.56 kg/m².      Intake/Output Summary (Last 24 hours) at 1/6/2023 0903  Last data filed at 1/6/2023 0428  Gross per 24 hour   Intake 685 ml   Output 2350 ml   Net -1665 ml            Telemetry: afib HR 50-60s.    Physical Exam  Vitals and nursing note  reviewed.   Constitutional:       Appearance: He is well-developed.   HENT:      Head: Normocephalic.   Eyes:      Pupils: Pupils are equal, round, and reactive to light.   Cardiovascular:      Rate and Rhythm: Normal rate. Rhythm irregular.      Heart sounds: Murmur heard.   Pulmonary:      Effort: Pulmonary effort is normal.      Breath sounds: Normal breath sounds.   Abdominal:      General: Bowel sounds are normal.      Palpations: Abdomen is soft.   Musculoskeletal:         General: Normal range of motion.      Cervical back: Normal range of motion and neck supple.   Skin:     General: Skin is warm and dry.   Neurological:      Mental Status: He is alert and oriented to person, place, and time.   Psychiatric:         Behavior: Behavior normal.       Significant Labs:  CBC:  Recent Labs   Lab 01/03/23  1333 01/04/23  0452 01/06/23  0419   WBC 4.68 4.36 4.31   RBC 4.80 4.82 5.88   HGB 12.7* 13.2* 15.6   HCT 42.7 42.6 51.2    287 338   MCV 89 88 87   MCH 26.5* 27.4 26.5*   MCHC 29.7* 31.0* 30.5*       BNP:  Recent Labs   Lab 01/03/23  1333 01/05/23  0329   BNP 2,779* 399*       CMP:  Recent Labs   Lab 01/04/23  0452 01/04/23  1719 01/05/23  0329 01/06/23  0420   GLU 86 75 101 111*   CALCIUM 9.5 10.0 9.4 10.0   ALBUMIN 3.6  --  3.5 3.9   PROT 6.4  --  6.7 7.4    139 140 137   K 3.5 4.3 4.3 4.8   CO2 27 30* 27 23    101 103 100   BUN 18 20 25* 32*   CREATININE 1.5* 1.6* 1.6* 1.8*   ALKPHOS 65  --  70 76   ALT 10  --  10 10   AST 11  --  12 12   BILITOT 1.1*  --  0.9 1.0        Coagulation:   No results for input(s): PT, INR, APTT in the last 168 hours.  LDH:  No results for input(s): LDH in the last 72 hours.  Microbiology:  Microbiology Results (last 7 days)       ** No results found for the last 168 hours. **            I have reviewed all pertinent labs within the past 24 hours.    Estimated Creatinine Clearance: 50.9 mL/min (A) (based on SCr of 1.8 mg/dL (H)).    Diagnostic Results:  I have  reviewed all pertinent imaging results/findings within the past 24 hours.    Assessment and Plan:     58 yo man DM2, dyslipidemia, HTN, CAD, RCA NSTEMI complicated with VF arrest and HF in 2021, DC ICD by Dr Braov 12/23/21, HFrEF and LVEF 10%, in ER after being sent from EP clinic with ADHF/afib RVR. KIA/CV 11/29/22 with initiation of amiodarone. He was in SR for a couple of weeks and then had return of AF 12/17/22. He noticed no symptom change in AF. He states that yesterday evening he started to develop worsening SOB and orthopnea. He took an extra diuretic dose with minimal improvements. In afib in ER with rates 60-70s. He has urinal at bedside with ~800cc Clear urine in it. Denies CP, palpitations, syncope, presyncope, fevers, n/v/d. Cannot lie flat and endorses swollen abdomen.         Acute on chronic combined systolic and diastolic heart failure  -ICM. S/P PCI to RCA 8/2021 complicated with VF arrest  -Diuresed with IVP Lasix 80mg TID, transitioned to PO torsemide 40 mg BID (previous home dose), but will discontinue with bump in Cr  -GDMT: Continue Entresto 49/51 BID, Spironolactone 25mg daily. Decreased toprol to 12.5mg daily.  -Last echo 9/22: EF 15%, Mild-Mod MR. LViDD  6.9/ TAPSE 1.19. Will repeat today.   -Low sodium 2L FR.     Atrial fibrillation  -Rate controlled currently in ER.  -Hold low dose BB for now.  -Continue amio 200mg daily.   -EP consulted for possible DCCV      Elyssa Walker PA-C  Heart Transplant  [facility] - [facility]

## 2023-01-07 VITALS
HEART RATE: 62 BPM | TEMPERATURE: 98 F | WEIGHT: 175.5 LBS | RESPIRATION RATE: 17 BRPM | HEIGHT: 72 IN | OXYGEN SATURATION: 94 % | DIASTOLIC BLOOD PRESSURE: 66 MMHG | BODY MASS INDEX: 23.77 KG/M2 | SYSTOLIC BLOOD PRESSURE: 118 MMHG

## 2023-01-07 LAB
ALBUMIN SERPL BCP-MCNC: 4.1 G/DL (ref 3.5–5.2)
ALP SERPL-CCNC: 79 U/L (ref 55–135)
ALT SERPL W/O P-5'-P-CCNC: 12 U/L (ref 10–44)
ANION GAP SERPL CALC-SCNC: 10 MMOL/L (ref 8–16)
AST SERPL-CCNC: 15 U/L (ref 10–40)
BASOPHILS # BLD AUTO: 0.03 K/UL (ref 0–0.2)
BASOPHILS NFR BLD: 0.7 % (ref 0–1.9)
BILIRUB SERPL-MCNC: 1 MG/DL (ref 0.1–1)
BUN SERPL-MCNC: 37 MG/DL (ref 6–20)
CALCIUM SERPL-MCNC: 10.3 MG/DL (ref 8.7–10.5)
CHLORIDE SERPL-SCNC: 99 MMOL/L (ref 95–110)
CO2 SERPL-SCNC: 25 MMOL/L (ref 23–29)
CREAT SERPL-MCNC: 1.9 MG/DL (ref 0.5–1.4)
DIFFERENTIAL METHOD: ABNORMAL
EOSINOPHIL # BLD AUTO: 0.1 K/UL (ref 0–0.5)
EOSINOPHIL NFR BLD: 3.5 % (ref 0–8)
ERYTHROCYTE [DISTWIDTH] IN BLOOD BY AUTOMATED COUNT: 15.9 % (ref 11.5–14.5)
EST. GFR  (NO RACE VARIABLE): 41.1 ML/MIN/1.73 M^2
GLUCOSE SERPL-MCNC: 129 MG/DL (ref 70–110)
HCT VFR BLD AUTO: 53.2 % (ref 40–54)
HGB BLD-MCNC: 16.3 G/DL (ref 14–18)
IMM GRANULOCYTES # BLD AUTO: 0.01 K/UL (ref 0–0.04)
IMM GRANULOCYTES NFR BLD AUTO: 0.2 % (ref 0–0.5)
LYMPHOCYTES # BLD AUTO: 1.1 K/UL (ref 1–4.8)
LYMPHOCYTES NFR BLD: 26.3 % (ref 18–48)
MAGNESIUM SERPL-MCNC: 2.3 MG/DL (ref 1.6–2.6)
MCH RBC QN AUTO: 26.6 PG (ref 27–31)
MCHC RBC AUTO-ENTMCNC: 30.6 G/DL (ref 32–36)
MCV RBC AUTO: 87 FL (ref 82–98)
MONOCYTES # BLD AUTO: 0.4 K/UL (ref 0.3–1)
MONOCYTES NFR BLD: 10.9 % (ref 4–15)
NEUTROPHILS # BLD AUTO: 2.4 K/UL (ref 1.8–7.7)
NEUTROPHILS NFR BLD: 58.4 % (ref 38–73)
NRBC BLD-RTO: 0 /100 WBC
PLATELET # BLD AUTO: 366 K/UL (ref 150–450)
PMV BLD AUTO: 11.5 FL (ref 9.2–12.9)
POTASSIUM SERPL-SCNC: 4.8 MMOL/L (ref 3.5–5.1)
PROT SERPL-MCNC: 8 G/DL (ref 6–8.4)
RBC # BLD AUTO: 6.12 M/UL (ref 4.6–6.2)
SODIUM SERPL-SCNC: 134 MMOL/L (ref 136–145)
WBC # BLD AUTO: 4.03 K/UL (ref 3.9–12.7)

## 2023-01-07 PROCEDURE — 99233 SBSQ HOSP IP/OBS HIGH 50: CPT | Mod: NTX,,, | Performed by: PHYSICIAN ASSISTANT

## 2023-01-07 PROCEDURE — 36415 COLL VENOUS BLD VENIPUNCTURE: CPT | Mod: NTX | Performed by: INTERNAL MEDICINE

## 2023-01-07 PROCEDURE — 25000003 PHARM REV CODE 250: Mod: NTX | Performed by: NURSE PRACTITIONER

## 2023-01-07 PROCEDURE — 80053 COMPREHEN METABOLIC PANEL: CPT | Mod: NTX | Performed by: INTERNAL MEDICINE

## 2023-01-07 PROCEDURE — 99233 PR SUBSEQUENT HOSPITAL CARE,LEVL III: ICD-10-PCS | Mod: NTX,,, | Performed by: PHYSICIAN ASSISTANT

## 2023-01-07 PROCEDURE — 83735 ASSAY OF MAGNESIUM: CPT | Mod: NTX | Performed by: INTERNAL MEDICINE

## 2023-01-07 PROCEDURE — 85025 COMPLETE CBC W/AUTO DIFF WBC: CPT | Mod: NTX | Performed by: INTERNAL MEDICINE

## 2023-01-07 RX ORDER — POTASSIUM CHLORIDE 20 MEQ/1
40 TABLET, EXTENDED RELEASE ORAL 2 TIMES DAILY
Qty: 60 TABLET | Refills: 0 | Status: CANCELLED | OUTPATIENT
Start: 2023-01-07

## 2023-01-07 RX ORDER — LANOLIN ALCOHOL/MO/W.PET/CERES
400 CREAM (GRAM) TOPICAL 2 TIMES DAILY
Qty: 30 TABLET | Refills: 0 | Status: CANCELLED | OUTPATIENT
Start: 2023-01-07

## 2023-01-07 RX ORDER — METOPROLOL SUCCINATE 25 MG/1
12.5 TABLET, EXTENDED RELEASE ORAL NIGHTLY
Qty: 15 TABLET | Refills: 11 | Status: SHIPPED | OUTPATIENT
Start: 2023-01-07 | End: 2023-02-09 | Stop reason: SDUPTHER

## 2023-01-07 RX ADMIN — PANTOPRAZOLE SODIUM 40 MG: 40 TABLET, DELAYED RELEASE ORAL at 09:01

## 2023-01-07 RX ADMIN — ATORVASTATIN CALCIUM 80 MG: 20 TABLET, FILM COATED ORAL at 09:01

## 2023-01-07 RX ADMIN — APIXABAN 5 MG: 5 TABLET, FILM COATED ORAL at 09:01

## 2023-01-07 RX ADMIN — POTASSIUM CHLORIDE 40 MEQ: 1500 TABLET, EXTENDED RELEASE ORAL at 09:01

## 2023-01-07 RX ADMIN — SACUBITRIL AND VALSARTAN 1 TABLET: 49; 51 TABLET, FILM COATED ORAL at 09:01

## 2023-01-07 RX ADMIN — FERROUS SULFATE TAB 325 MG (65 MG ELEMENTAL FE) 1 EACH: 325 (65 FE) TAB at 09:01

## 2023-01-07 RX ADMIN — AMIODARONE HYDROCHLORIDE 200 MG: 200 TABLET ORAL at 09:01

## 2023-01-07 RX ADMIN — CLOPIDOGREL BISULFATE 75 MG: 75 TABLET ORAL at 09:01

## 2023-01-07 RX ADMIN — SPIRONOLACTONE 25 MG: 25 TABLET, FILM COATED ORAL at 09:01

## 2023-01-07 RX ADMIN — Medication 400 MG: at 09:01

## 2023-01-07 NOTE — NURSING
Patient given discharge instructions with verbal understanding. Meds to be picked up at patients own pharmacy. PIV and tele removed. Patient ambulated to front for discharge, declined a w/c transport

## 2023-01-07 NOTE — PLAN OF CARE
Problem: Adult Inpatient Plan of Care  Goal: Plan of Care Review  Outcome: Met  Goal: Patient-Specific Goal (Individualized)  Outcome: Met  Goal: Absence of Hospital-Acquired Illness or Injury  Outcome: Met  Goal: Optimal Comfort and Wellbeing  Outcome: Met  Goal: Readiness for Transition of Care  Outcome: Met     Problem: Diabetes Comorbidity  Goal: Blood Glucose Level Within Targeted Range  Outcome: Met     Problem: Adjustment to Illness (Heart Failure)  Goal: Optimal Coping  Outcome: Met     Problem: Cardiac Output Decreased (Heart Failure)  Goal: Optimal Cardiac Output  Outcome: Met     Problem: Dysrhythmia (Heart Failure)  Goal: Stable Heart Rate and Rhythm  Outcome: Met     Problem: Fluid Imbalance (Heart Failure)  Goal: Fluid Balance  Outcome: Met     Problem: Functional Ability Impaired (Heart Failure)  Goal: Optimal Functional Ability  Outcome: Met     Problem: Oral Intake Inadequate (Heart Failure)  Goal: Optimal Nutrition Intake  Outcome: Met     Problem: Respiratory Compromise (Heart Failure)  Goal: Effective Oxygenation and Ventilation  Outcome: Met     Problem: Sleep Disordered Breathing (Heart Failure)  Goal: Effective Breathing Pattern During Sleep  Outcome: Met     Problem: Fall Injury Risk  Goal: Absence of Fall and Fall-Related Injury  Outcome: Met

## 2023-01-07 NOTE — PROGRESS NOTES
[facility] - Cardiology Stepdown  Heart Transplant  Progress Note    Patient Name: Tera Griffiths  MRN: [id number]  Admission Date: 1/3/2023  Hospital Length of Stay: 4 days  Attending Physician: Nicholas Aguilar MD  Primary Care Provider: Primary Doctor No  Principal Problem:<principal problem not specified>    Subjective:     Interval History: NAEON. No complaints. Continues to appear euvolemic on exam. Labs pending collection this morning. Will plan to discharge if Cr improved.     Scheduled Meds:   amiodarone  200 mg Oral BID    apixaban  5 mg Oral BID    atorvastatin  80 mg Oral Daily    clopidogreL  75 mg Oral Daily    ferrous sulfate  1 tablet Oral Daily    magnesium oxide  400 mg Oral BID    metoprolol succinate  12.5 mg Oral QHS    pantoprazole  40 mg Oral Daily    potassium chloride  40 mEq Oral BID    sacubitriL-valsartan  1 tablet Oral BID    spironolactone  25 mg Oral Daily    tamsulosin  1 capsule Oral QHS     PRN Meds:dextrose 10%, sodium chloride 0.9%    Review of patient's allergies indicates:  No Known Allergies  Objective:     Vital Signs (Most Recent):  Temp: 98.1 °F (36.7 °C) (01/07/23 0755)  Pulse: 65 (01/07/23 0755)  Resp: 18 (01/07/23 0755)  BP: 125/83 (01/07/23 0755)  SpO2: 95 % (01/07/23 0755)   Vital Signs (24h Range):  Temp:  [97.4 °F (36.3 °C)-98.2 °F (36.8 °C)] 98.1 °F (36.7 °C)  Pulse:  [58-73] 65  Resp:  [17-18] 18  SpO2:  [95 %-98 %] 95 %  BP: (111-125)/(68-83) 125/83     Patient Vitals for the past 72 hrs (Last 3 readings):   Weight   01/07/23 0400 79.6 kg (175 lb 7.8 oz)   01/06/23 0428 78.8 kg (173 lb 11.6 oz)   01/05/23 1421 79.8 kg (176 lb)       Body mass index is 23.8 kg/m².      Intake/Output Summary (Last 24 hours) at 1/7/2023 0924  Last data filed at 1/7/2023 0920  Gross per 24 hour   Intake 720 ml   Output 1400 ml   Net -680 ml            Telemetry: afib HR 50-60s.    Physical Exam  Vitals and nursing note reviewed.   Constitutional:       Appearance: He is  well-developed.   HENT:      Head: Normocephalic.   Eyes:      Pupils: Pupils are equal, round, and reactive to light.   Cardiovascular:      Rate and Rhythm: Normal rate. Rhythm irregular.      Heart sounds: Murmur heard.   Pulmonary:      Effort: Pulmonary effort is normal.      Breath sounds: Normal breath sounds.   Abdominal:      General: Bowel sounds are normal.      Palpations: Abdomen is soft.   Musculoskeletal:         General: Normal range of motion.      Cervical back: Normal range of motion and neck supple.   Skin:     General: Skin is warm and dry.   Neurological:      Mental Status: He is alert and oriented to person, place, and time.   Psychiatric:         Behavior: Behavior normal.       Significant Labs:  CBC:  Recent Labs   Lab 01/03/23  1333 01/04/23  0452 01/06/23  0419   WBC 4.68 4.36 4.31   RBC 4.80 4.82 5.88   HGB 12.7* 13.2* 15.6   HCT 42.7 42.6 51.2    287 338   MCV 89 88 87   MCH 26.5* 27.4 26.5*   MCHC 29.7* 31.0* 30.5*       BNP:  Recent Labs   Lab 01/03/23  1333 01/05/23  0329   BNP 2,779* 399*       CMP:  Recent Labs   Lab 01/04/23  0452 01/04/23  1719 01/05/23  0329 01/06/23  0420   GLU 86 75 101 111*   CALCIUM 9.5 10.0 9.4 10.0   ALBUMIN 3.6  --  3.5 3.9   PROT 6.4  --  6.7 7.4    139 140 137   K 3.5 4.3 4.3 4.8   CO2 27 30* 27 23    101 103 100   BUN 18 20 25* 32*   CREATININE 1.5* 1.6* 1.6* 1.8*   ALKPHOS 65  --  70 76   ALT 10  --  10 10   AST 11  --  12 12   BILITOT 1.1*  --  0.9 1.0        Coagulation:   No results for input(s): PT, INR, APTT in the last 168 hours.  LDH:  No results for input(s): LDH in the last 72 hours.  Microbiology:  Microbiology Results (last 7 days)       ** No results found for the last 168 hours. **            I have reviewed all pertinent labs within the past 24 hours.    Estimated Creatinine Clearance: 50.9 mL/min (A) (based on SCr of 1.8 mg/dL (H)).    Diagnostic Results:  I have reviewed all pertinent imaging results/findings within  the past 24 hours.    Assessment and Plan:     58 yo man DM2, dyslipidemia, HTN, CAD, RCA NSTEMI complicated with VF arrest and HF in 2021, DC ICD by Dr Bravo 12/23/21, HFrEF and LVEF 10%, in ER after being sent from EP clinic with ADHF/afib RVR. KIA/CV 11/29/22 with initiation of amiodarone. He was in SR for a couple of weeks and then had return of AF 12/17/22. He noticed no symptom change in AF. He states that yesterday evening he started to develop worsening SOB and orthopnea. He took an extra diuretic dose with minimal improvements. In afib in ER with rates 60-70s. He has urinal at bedside with ~800cc Clear urine in it. Denies CP, palpitations, syncope, presyncope, fevers, n/v/d. Cannot lie flat and endorses swollen abdomen.         Acute on chronic combined systolic and diastolic heart failure  -ICM. S/P PCI to RCA 8/2021 complicated with VF arrest  -Diuresed with IVP Lasix 80mg TID, transitioned to PO torsemide 40 mg BID (previous home dose), but Cr bumped to 1.8 (BL 1.1) so was discontinued yesterday. Will plan on discharging patient if Cr improved this morning with previous home diuretic regimen.   -GDMT: Continue Entresto 49/51 BID, Spironolactone 25mg daily. Decreased toprol to 12.5mg daily.  -Last echo 9/22: EF 15%, Mild-Mod MR. LViDD  6.9/ TAPSE 1.19. Will repeat today.   -Low sodium 2L FR.     Atrial fibrillation  -Rate controlled currently in ER.  -Hold low dose BB for now.  -Continue amio 200mg daily.   -EP consulted for possible DCCV      Elyssa Walker PA-C  Heart Transplant  [facility] - [facility]

## 2023-01-07 NOTE — PLAN OF CARE
Pt maintained free from falls/trauma/injuries and skin breakdown. Pt denied pain or discomfort. Plan of care reviewed. Pt verbalized understanding. All questions and concerns addressed. Will continue to monitor.

## 2023-01-07 NOTE — DISCHARGE SUMMARY
[facility] - Cardiology Stepdown  Heart Transplant  Discharge Summary      Patient Name: Tera Griffiths  MRN: [id number]  Admission Date: 1/3/2023  Hospital Length of Stay: 4 days  Discharge Date and Time: 01/07/2023 9:40 AM  Attending Physician: Nicholas Aguilar MD   Discharging Provider: Elyssa Walker PA-C  Primary Care Provider: Primary Doctor No     HPI: 58 yo man DM2, dyslipidemia, HTN, CAD, RCA NSTEMI complicated with VF arrest and HF in 2021, DC ICD by Dr Bravo 12/23/21, HFrEF and LVEF 10%, in ER after being sent from EP clinic with ADHF/afib RVR. KIA/CV 11/29/22 with initiation of amiodarone. He was in SR for a couple of weeks and then had return of AF 12/17/22. He noticed no symptom change in AF. He states that yesterday evening he started to develop worsening SOB and orthopnea. He took an extra diuretic dose with minimal improvements. In afib in ER with rates 60-70s. He has urinal at bedside with ~800cc Clear urine in it. Denies CP, palpitations, syncope, presyncope, fevers, n/v/d. Cannot lie flat and endorses swollen abdomen.         * No surgery found *     Hospital Course: 58 yo man DM2, dyslipidemia, HTN, CAD, RCA NSTEMI complicated with VF arrest and HF in 2021, DC ICD by Dr Bravo 12/23/21, HFrEF and LVEF 10%, in ER after being sent from EP clinic with ADHF/afib RVR. KIA/CV 11/29/22 with initiation of amiodarone. He was in SR for a couple of weeks and then had return of AF 12/17/22. He noticed no symptom change in AF. He was admitted and he diuresed well with IVP Lasix. He was transitioned back to PO torsemide once euvolemic, which was held with bump in Cr to 1.8. Will plan to resume at lower dose of 20 mg PO torsemide QD. Will recheck labs Monday and have follow up in 2-3 weeks in [facility] clinic. EP consulted for Aflutter/fib, but was found to be in NSR, no indicated for DCCV at this time.       Goals of Care Treatment Preferences:  Code Status: Full Code      Consults (From admission, onward)         Status Ordering Provider     Inpatient consult to Cardiology  Once        Provider:  (Not yet assigned)    Completed [facility]ON[facility] LUND Diagnostic Studies: Labs:   CMP   Recent Labs   Lab 01/06/23  0420      K 4.8      CO2 23   *   BUN 32*   CREATININE 1.8*   CALCIUM 10.0   PROT 7.4   ALBUMIN 3.9   BILITOT 1.0   ALKPHOS 76   AST 12   ALT 10   ANIONGAP 14       Pending Diagnostic Studies:     Procedure Component Value Units Date/Time    CBC auto differential [[id number]] Collected: 01/07/23 0927    Order Status: Sent Lab Status: In process Updated: 01/07/23 0927    Specimen: Blood     CBC auto differential [[id number]]     Order Status: Sent Lab Status: No result     Specimen: Blood     Comprehensive metabolic panel [[id number]] Collected: 01/07/23 0927    Order Status: Sent Lab Status: In process Updated: 01/07/23 0927    Specimen: Blood     Comprehensive metabolic panel - if not done in ED [[id number]]     Order Status: Sent Lab Status: No result     Specimen: Blood     Magnesium [[id number]] Collected: 01/07/23 0927    Order Status: Sent Lab Status: In process Updated: 01/07/23 0927    Specimen: Blood     Magnesium - if not done in ED [[id number]]     Order Status: Sent Lab Status: No result     Specimen: Blood         Final Active Diagnoses:    Diagnosis Date Noted POA    PRINCIPAL PROBLEM:  Acute on chronic combined systolic and diastolic heart failure [I50.43] 09/23/2022 Yes    Atrial fibrillation [I48.91] 09/23/2022 Yes    ICD (implantable cardioverter-defibrillator) in place [Z95.810] 09/23/2022 Yes      Problems Resolved During this Admission:      Discharged Condition: stable    Disposition:     Follow Up:   Follow-up Information     [id number] Follow up in 2 week(s).    Specialty: Transplant  Contact information:  [address]  [facility] [address] [phone]  Additional information:  [facility] - Main  Building, [address] 3rd Floor   Please park in [facility] and use [facility] elevator                     Patient Instructions:   No discharge procedures on file.  Medications:  Reconciled Home Medications:      Medication List      CHANGE how you take these medications    metoprolol succinate 25 MG 24 hr tablet  Commonly known as: TOPROL-XL  Take 0.5 tablets (12.5 mg total) by mouth every evening.  What changed:   · how much to take  · when to take this     torsemide  Take 20 mg by mouth once daily.  What changed:   · medication strength  · how much to take  · when to take this        CONTINUE taking these medications    amiodarone 200 MG Tab  Commonly known as: PACERONE  Take 1 tablet (200 mg total) by mouth 2 (two) times daily.     apixaban 5 mg Tab  Commonly known as: ELIQUIS  Take 5 mg by mouth 2 (two) times daily.     atorvastatin 80 MG tablet  Commonly known as: LIPITOR  Take 80 mg by mouth once daily.     clopidogreL 75 mg tablet  Commonly known as: PLAVIX  Take 75 mg by mouth once daily.     ENTRESTO 49-51 mg per tablet  Generic drug: sacubitriL-valsartan  Take 1 tablet by mouth 2 (two) times daily.     ferrous sulfate 325 mg (65 mg iron) Tab tablet  Commonly known as: FEOSOL  Take 325 mg by mouth every other day.     glimepiride 4 MG tablet  Commonly known as: AMARYL  Take 4 mg by mouth every morning.     LIDOcaine 5 %  Commonly known as: LIDODERM  Place 1 patch onto the skin once daily.     pantoprazole 40 MG tablet  Commonly known as: PROTONIX  Take 40 mg by mouth once daily.     spironolactone 25 MG tablet  Commonly known as: ALDACTONE  Take 25 mg by mouth once daily.     tamsulosin 0.4 mg Cap  Commonly known as: FLOMAX  Take 1 capsule by mouth every evening.            Elyssa Walker PA-C  Heart Transplant  [facility]

## 2023-01-07 NOTE — ASSESSMENT & PLAN NOTE
-ICM. S/P PCI to RCA 8/2021 complicated with VF arrest  -Diuresed with IVP Lasix 80mg TID, transitioned to PO torsemide 40 mg BID (previous home dose), but Cr bumped to 1.8 (BL 1.1) so was discontinued yesterday. Will plan on discharging patient if Cr improved this morning with previous home diuretic regimen.   -GDMT: Continue Entresto 49/51 BID, Spironolactone 25mg daily. Decreased toprol to 12.5mg daily.  -Last echo 9/22: EF 15%, Mild-Mod MR. LViDD  6.9/ TAPSE 1.19. Will repeat today.   -Low sodium 2L FR.   
-ICM. S/P PCI to RCA 8/2021 complicated with VF arrest  -Diuresed with IVP Lasix 80mg TID, transitioned to PO torsemide 40 mg BID (previous home dose), but will discontinue with bump in Cr  -GDMT: Continue Entresto 49/51 BID, Spironolactone 25mg daily. Decreased toprol to 12.5mg daily.  -Last echo 9/22: EF 15%, Mild-Mod MR. LViDD  6.9/ TAPSE 1.19. Will repeat today.   -Low sodium 2L FR.   
-ICM. S/P PCI to RCA 8/2021 complicated with VF arrest  -Diuresing with Lasix 80mg TID. Will likely transition to oral diuretic soon.   -GDMT: Continue Entresto 49/51 BID, Spironolactone 25mg daily. Decreased toprol to 12.5mg daily.  -Last echo 9/22: EF 15%, Mild-Mod MR. LViDD  6.9/ TAPSE 1.19. Will repeat today.   -Low sodium 2L FR.   
-ICM. S/P PCI to RCA 8/2021 complicated with VF arrest  -Hypervolemic on exam. Seems to be diuresing after 40mg IVP Lasix. Will increase to 80mg TID.   -GDMT: Continue Entresto 49/51 BID, Spironolactone 25mg daily, toprol 25mg daily.  -Last echo 9/22: EF 15%, Mild-Mod MR. LViDD  6.9/ TAPSE 1.19. Will repeat once euvolemic.  -Will start pathway for LVAD/OHTX w/u.   -Low sodium 2L FR.   
-ICM. S/P PCI to RCA 8/2021 complicated with VF arrest  -Hypervolemic on exam. Seems to be diuresing with Lasix 80mg TID.   -GDMT: Continue Entresto 49/51 BID, Spironolactone 25mg daily. Hold toprol 25mg daily.  -Last echo 9/22: EF 15%, Mild-Mod MR. LViDD  6.9/ TAPSE 1.19. Will repeat once euvolemic.  -Will start pathway for LVAD/OHTX w/u.   -Low sodium 2L FR.   
-Rate controlled currently in ER.  -Continue low dose BB for now.  -Continue amio 200mg daily.   -EP consult once euvolemic for DCCV.  
-Rate controlled currently in ER.  -Hold low dose BB for now.  -Continue amio 200mg daily.   -EP consult once euvolemic for DCCV.  
-Rate controlled currently in ER.  -Hold low dose BB for now.  -Continue amio 200mg daily.   -EP consult once euvolemic for DCCV.  
-Rate controlled currently in ER.  -Hold low dose BB for now.  -Continue amio 200mg daily.   -EP consulted for possible DCCV  
-Rate controlled currently in ER.  -Hold low dose BB for now.  -Continue amio 200mg daily.   -EP consulted for possible DCCV  
No

## 2023-01-07 NOTE — SUBJECTIVE & OBJECTIVE
Interval History: NAEON. No complaints. Continues to appear euvolemic on exam. Labs pending collection this morning. Will plan to discharge if Cr improved.     Scheduled Meds:   amiodarone  200 mg Oral BID    apixaban  5 mg Oral BID    atorvastatin  80 mg Oral Daily    clopidogreL  75 mg Oral Daily    ferrous sulfate  1 tablet Oral Daily    magnesium oxide  400 mg Oral BID    metoprolol succinate  12.5 mg Oral QHS    pantoprazole  40 mg Oral Daily    potassium chloride  40 mEq Oral BID    sacubitriL-valsartan  1 tablet Oral BID    spironolactone  25 mg Oral Daily    tamsulosin  1 capsule Oral QHS     PRN Meds:dextrose 10%, sodium chloride 0.9%    Review of patient's allergies indicates:  No Known Allergies  Objective:     Vital Signs (Most Recent):  Temp: 98.1 °F (36.7 °C) (01/07/23 0755)  Pulse: 65 (01/07/23 0755)  Resp: 18 (01/07/23 0755)  BP: 125/83 (01/07/23 0755)  SpO2: 95 % (01/07/23 0755)   Vital Signs (24h Range):  Temp:  [97.4 °F (36.3 °C)-98.2 °F (36.8 °C)] 98.1 °F (36.7 °C)  Pulse:  [58-73] 65  Resp:  [17-18] 18  SpO2:  [95 %-98 %] 95 %  BP: (111-125)/(68-83) 125/83     Patient Vitals for the past 72 hrs (Last 3 readings):   Weight   01/07/23 0400 79.6 kg (175 lb 7.8 oz)   01/06/23 0428 78.8 kg (173 lb 11.6 oz)   01/05/23 1421 79.8 kg (176 lb)       Body mass index is 23.8 kg/m².      Intake/Output Summary (Last 24 hours) at 1/7/2023 0924  Last data filed at 1/7/2023 0920  Gross per 24 hour   Intake 720 ml   Output 1400 ml   Net -680 ml            Telemetry: afib HR 50-60s.    Physical Exam  Vitals and nursing note reviewed.   Constitutional:       Appearance: He is well-developed.   HENT:      Head: Normocephalic.   Eyes:      Pupils: Pupils are equal, round, and reactive to light.   Cardiovascular:      Rate and Rhythm: Normal rate. Rhythm irregular.      Heart sounds: Murmur heard.   Pulmonary:      Effort: Pulmonary effort is normal.      Breath sounds: Normal breath sounds.   Abdominal:      General:  Bowel sounds are normal.      Palpations: Abdomen is soft.   Musculoskeletal:         General: Normal range of motion.      Cervical back: Normal range of motion and neck supple.   Skin:     General: Skin is warm and dry.   Neurological:      Mental Status: He is alert and oriented to person, place, and time.   Psychiatric:         Behavior: Behavior normal.       Significant Labs:  CBC:  Recent Labs   Lab 01/03/23  1333 01/04/23  0452 01/06/23  0419   WBC 4.68 4.36 4.31   RBC 4.80 4.82 5.88   HGB 12.7* 13.2* 15.6   HCT 42.7 42.6 51.2    287 338   MCV 89 88 87   MCH 26.5* 27.4 26.5*   MCHC 29.7* 31.0* 30.5*       BNP:  Recent Labs   Lab 01/03/23  1333 01/05/23  0329   BNP 2,779* 399*       CMP:  Recent Labs   Lab 01/04/23  0452 01/04/23  1719 01/05/23  0329 01/06/23  0420   GLU 86 75 101 111*   CALCIUM 9.5 10.0 9.4 10.0   ALBUMIN 3.6  --  3.5 3.9   PROT 6.4  --  6.7 7.4    139 140 137   K 3.5 4.3 4.3 4.8   CO2 27 30* 27 23    101 103 100   BUN 18 20 25* 32*   CREATININE 1.5* 1.6* 1.6* 1.8*   ALKPHOS 65  --  70 76   ALT 10  --  10 10   AST 11  --  12 12   BILITOT 1.1*  --  0.9 1.0        Coagulation:   No results for input(s): PT, INR, APTT in the last 168 hours.  LDH:  No results for input(s): LDH in the last 72 hours.  Microbiology:  Microbiology Results (last 7 days)       ** No results found for the last 168 hours. **            I have reviewed all pertinent labs within the past 24 hours.    Estimated Creatinine Clearance: 50.9 mL/min (A) (based on SCr of 1.8 mg/dL (H)).    Diagnostic Results:  I have reviewed all pertinent imaging results/findings within the past 24 hours.

## 2023-01-09 ENCOUNTER — PATIENT OUTREACH (OUTPATIENT)
Dept: ADMINISTRATIVE | Facility: CLINIC | Age: 58
End: 2023-01-09
Payer: MEDICAID

## 2023-01-09 ENCOUNTER — LAB VISIT (OUTPATIENT)
Dept: LAB | Facility: OTHER | Age: 58
End: 2023-01-09
Payer: MEDICAID

## 2023-01-09 DIAGNOSIS — I50.22 CHRONIC SYSTOLIC HEART FAILURE: Primary | ICD-10-CM

## 2023-01-09 DIAGNOSIS — I50.22 CHRONIC SYSTOLIC HEART FAILURE: ICD-10-CM

## 2023-01-09 LAB
ALBUMIN SERPL BCP-MCNC: 4.3 G/DL (ref 3.5–5.2)
ALP SERPL-CCNC: 75 U/L (ref 55–135)
ALT SERPL W/O P-5'-P-CCNC: 17 U/L (ref 10–44)
ANION GAP SERPL CALC-SCNC: 9 MMOL/L (ref 8–16)
AST SERPL-CCNC: 17 U/L (ref 10–40)
BILIRUB SERPL-MCNC: 0.8 MG/DL (ref 0.1–1)
BNP SERPL-MCNC: 1358 PG/ML (ref 0–99)
BUN SERPL-MCNC: 20 MG/DL (ref 6–20)
CALCIUM SERPL-MCNC: 9.8 MG/DL (ref 8.7–10.5)
CHLORIDE SERPL-SCNC: 106 MMOL/L (ref 95–110)
CO2 SERPL-SCNC: 24 MMOL/L (ref 23–29)
CREAT SERPL-MCNC: 1.6 MG/DL (ref 0.5–1.4)
EST. GFR  (NO RACE VARIABLE): 51 ML/MIN/1.73 M^2
GLUCOSE SERPL-MCNC: 65 MG/DL (ref 70–110)
MAGNESIUM SERPL-MCNC: 2.2 MG/DL (ref 1.6–2.6)
POTASSIUM SERPL-SCNC: 4.7 MMOL/L (ref 3.5–5.1)
PROT SERPL-MCNC: 8 G/DL (ref 6–8.4)
SODIUM SERPL-SCNC: 139 MMOL/L (ref 136–145)

## 2023-01-09 PROCEDURE — 83735 ASSAY OF MAGNESIUM: CPT | Mod: TXP | Performed by: PHYSICIAN ASSISTANT

## 2023-01-09 PROCEDURE — 36415 COLL VENOUS BLD VENIPUNCTURE: CPT | Mod: NTX | Performed by: PHYSICIAN ASSISTANT

## 2023-01-09 PROCEDURE — 83880 ASSAY OF NATRIURETIC PEPTIDE: CPT | Mod: TXP | Performed by: PHYSICIAN ASSISTANT

## 2023-01-09 PROCEDURE — 80053 COMPREHEN METABOLIC PANEL: CPT | Mod: TXP | Performed by: PHYSICIAN ASSISTANT

## 2023-01-09 NOTE — PROGRESS NOTES
C3 nurse attempted to contact Tera Griffiths  for a TCC post hospital discharge follow up call. No answer. Left message. The patient does not have a scheduled HOSFU appointment, and the pt does not have an [facility] PCP.

## 2023-01-10 NOTE — PROGRESS NOTES
C3 nurse spoke with Tera Griffiths  for a TCC post hospital discharge follow up call. The patient reports does not have a scheduled HOSFU appointment. C3 nurse was unable to schedule HOSFU appointment for [facility]. Patient advised to contact their PCP to schedule a HOSPFU within 5-7 days. Declined NP.

## 2023-01-10 NOTE — PROGRESS NOTES
C3 nurse attempted to contact Tera Griffiths  for a TCC post hospital discharge follow up call. No answer, not available. The patient does not have a scheduled HOSFU appointment, and the pt does not have an [facility] PCP.

## 2023-01-11 ENCOUNTER — HOSPITAL ENCOUNTER (OUTPATIENT)
Dept: CARDIOLOGY | Facility: HOSPITAL | Age: 58
Discharge: HOME | End: 2023-01-11
Attending: INTERNAL MEDICINE
Payer: MEDICAID

## 2023-01-11 ENCOUNTER — OFFICE VISIT (OUTPATIENT)
Dept: TRANSPLANT | Facility: CLINIC | Age: 58
End: 2023-01-11
Payer: MEDICAID

## 2023-01-11 VITALS
BODY MASS INDEX: 25.12 KG/M2 | BODY MASS INDEX: 25.19 KG/M2 | DIASTOLIC BLOOD PRESSURE: 89 MMHG | SYSTOLIC BLOOD PRESSURE: 115 MMHG | WEIGHT: 185.44 LBS | HEART RATE: 58 BPM | WEIGHT: 186 LBS | HEIGHT: 72 IN | HEART RATE: 66 BPM | DIASTOLIC BLOOD PRESSURE: 81 MMHG | HEIGHT: 72 IN | SYSTOLIC BLOOD PRESSURE: 130 MMHG

## 2023-01-11 DIAGNOSIS — E08.00 DIABETES MELLITUS DUE TO UNDERLYING CONDITION WITH HYPEROSMOLARITY WITHOUT COMA, WITHOUT LONG-TERM CURRENT USE OF INSULIN: ICD-10-CM

## 2023-01-11 DIAGNOSIS — Z95.810 ICD (IMPLANTABLE CARDIOVERTER-DEFIBRILLATOR) IN PLACE: ICD-10-CM

## 2023-01-11 DIAGNOSIS — I48.0 PAROXYSMAL ATRIAL FIBRILLATION: ICD-10-CM

## 2023-01-11 DIAGNOSIS — E78.5 DYSLIPIDEMIA: ICD-10-CM

## 2023-01-11 DIAGNOSIS — I50.22 CHRONIC SYSTOLIC HEART FAILURE: Primary | ICD-10-CM

## 2023-01-11 DIAGNOSIS — I10 PRIMARY HYPERTENSION: ICD-10-CM

## 2023-01-11 DIAGNOSIS — I25.10 CORONARY ARTERY DISEASE INVOLVING NATIVE CORONARY ARTERY OF NATIVE HEART WITHOUT ANGINA PECTORIS: ICD-10-CM

## 2023-01-11 DIAGNOSIS — I50.22 CHRONIC SYSTOLIC CONGESTIVE HEART FAILURE: ICD-10-CM

## 2023-01-11 DIAGNOSIS — I50.9 CONGESTIVE HEART FAILURE, UNSPECIFIED HF CHRONICITY, UNSPECIFIED HEART FAILURE TYPE: ICD-10-CM

## 2023-01-11 PROBLEM — I50.43 ACUTE ON CHRONIC COMBINED SYSTOLIC AND DIASTOLIC HEART FAILURE: Status: RESOLVED | Noted: 2022-09-23 | Resolved: 2023-01-11

## 2023-01-11 LAB
CV STRESS BASE HR: 66 BPM
DIASTOLIC BLOOD PRESSURE: 89 MMHG
OHS CV CPX 1 MINUTE RECOVERY HEART RATE: 75 BPM
OHS CV CPX 85 PERCENT MAX PREDICTED HEART RATE MALE: 140
OHS CV CPX DATA GRADE - PEAK: 1.5
OHS CV CPX DATA O2 SAT - PEAK: 96
OHS CV CPX DATA O2 SAT - REST: 95
OHS CV CPX DATA SPEED - PEAK: 1.4
OHS CV CPX DATA TIME - PEAK: 2.47
OHS CV CPX DATA VE/VCO2 - PEAK: 75
OHS CV CPX DATA VE/VO2 - PEAK: 55
OHS CV CPX DATA VO2 - PEAK: 12.1
OHS CV CPX DATA VO2 - REST: 4
OHS CV CPX FEV1/FVC: 0.6
OHS CV CPX FORCED EXPIRATORY VOLUME: 0.63
OHS CV CPX FORCED VITAL CAPACITY (FVC): 1.05
OHS CV CPX HIGHEST VO: 36.7
OHS CV CPX MAX PREDICTED HEART RATE: 165
OHS CV CPX MAXIMAL VOLUNTARY VENTILATION (MVV) PREDICTED: 25.2
OHS CV CPX MAXIMAL VOLUNTARY VENTILATION (MVV): 22
OHS CV CPX MAXIUMUM EXERCISE VENTILATION (VE MAX): 27.8
OHS CV CPX PATIENT AGE: 55
OHS CV CPX PATIENT HEIGHT IN: 72
OHS CV CPX PATIENT IS FEMALE AGE 11-19: 0
OHS CV CPX PATIENT IS FEMALE AGE GREATER THAN 19: 0
OHS CV CPX PATIENT IS FEMALE AGE LESS THAN 11: 0
OHS CV CPX PATIENT IS FEMALE: 0
OHS CV CPX PATIENT IS MALE AGE 11-25: 0
OHS CV CPX PATIENT IS MALE AGE GREATER THAN 25: 1
OHS CV CPX PATIENT IS MALE AGE LESS THAN 11: 0
OHS CV CPX PATIENT IS MALE GREATER THAN 18: 1
OHS CV CPX PATIENT IS MALE LESS THAN OR EQUAL TO 18: 0
OHS CV CPX PATIENT IS MALE: 1
OHS CV CPX PATIENT WEIGHT RETURNED IN OZ: 2976
OHS CV CPX PEAK DIASTOLIC BLOOD PRESSURE: 86 MMHG
OHS CV CPX PEAK HEAR RATE: 81 BPM
OHS CV CPX PEAK RATE PRESSURE PRODUCT: NORMAL
OHS CV CPX PEAK SYSTOLIC BLOOD PRESSURE: 139 MMHG
OHS CV CPX PERCENT BODY FAT: 13.7
OHS CV CPX PERCENT MAX PREDICTED HEART RATE ACHIEVED: 49
OHS CV CPX PREDICTED VO2: 36.7 ML/KG/MIN
OHS CV CPX RATE PRESSURE PRODUCT PRESENTING: 7590
OHS CV CPX REST PET CO2: 23
OHS CV CPX VE/VCO2 SLOPE: 37.5
STRESS ECHO POST EXERCISE DUR MIN: 2 MINUTES
STRESS ECHO POST EXERCISE DUR SEC: 28 SECONDS
SYSTOLIC BLOOD PRESSURE: 115 MMHG

## 2023-01-11 PROCEDURE — 99213 OFFICE O/P EST LOW 20 MIN: CPT | Mod: PBBFAC,25,TXP | Performed by: INTERNAL MEDICINE

## 2023-01-11 PROCEDURE — 99999 PR PBB SHADOW E&M-EST. PATIENT-LVL III: ICD-10-PCS | Mod: PBBFAC,TXP,, | Performed by: INTERNAL MEDICINE

## 2023-01-11 PROCEDURE — 1111F DSCHRG MED/CURRENT MED MERGE: CPT | Mod: CPTII,NTX,, | Performed by: INTERNAL MEDICINE

## 2023-01-11 PROCEDURE — 94621 CARDIOPULMONARY EXERCISE TESTING (CUPID ONLY): ICD-10-PCS | Mod: 26,NTX,, | Performed by: INTERNAL MEDICINE

## 2023-01-11 PROCEDURE — 4010F PR ACE/ARB THEARPY RXD/TAKEN: ICD-10-PCS | Mod: CPTII,NTX,, | Performed by: INTERNAL MEDICINE

## 2023-01-11 PROCEDURE — 3008F PR BODY MASS INDEX (BMI) DOCUMENTED: ICD-10-PCS | Mod: CPTII,NTX,, | Performed by: INTERNAL MEDICINE

## 2023-01-11 PROCEDURE — 94621 CARDIOPULM EXERCISE TESTING: CPT | Mod: 26,NTX,, | Performed by: INTERNAL MEDICINE

## 2023-01-11 PROCEDURE — 1159F PR MEDICATION LIST DOCUMENTED IN MEDICAL RECORD: ICD-10-PCS | Mod: CPTII,NTX,, | Performed by: INTERNAL MEDICINE

## 2023-01-11 PROCEDURE — 99999 PR PBB SHADOW E&M-EST. PATIENT-LVL III: CPT | Mod: PBBFAC,TXP,, | Performed by: INTERNAL MEDICINE

## 2023-01-11 PROCEDURE — 99214 PR OFFICE/OUTPT VISIT, EST, LEVL IV, 30-39 MIN: ICD-10-PCS | Mod: S$PBB,NTX,, | Performed by: INTERNAL MEDICINE

## 2023-01-11 PROCEDURE — 3079F PR MOST RECENT DIASTOLIC BLOOD PRESSURE 80-89 MM HG: ICD-10-PCS | Mod: CPTII,NTX,, | Performed by: INTERNAL MEDICINE

## 2023-01-11 PROCEDURE — 4010F ACE/ARB THERAPY RXD/TAKEN: CPT | Mod: CPTII,NTX,, | Performed by: INTERNAL MEDICINE

## 2023-01-11 PROCEDURE — 3008F BODY MASS INDEX DOCD: CPT | Mod: CPTII,NTX,, | Performed by: INTERNAL MEDICINE

## 2023-01-11 PROCEDURE — 1159F MED LIST DOCD IN RCRD: CPT | Mod: CPTII,NTX,, | Performed by: INTERNAL MEDICINE

## 2023-01-11 PROCEDURE — 94621 CARDIOPULM EXERCISE TESTING: CPT | Mod: NTX

## 2023-01-11 PROCEDURE — 3075F SYST BP GE 130 - 139MM HG: CPT | Mod: CPTII,NTX,, | Performed by: INTERNAL MEDICINE

## 2023-01-11 PROCEDURE — 99214 OFFICE O/P EST MOD 30 MIN: CPT | Mod: S$PBB,NTX,, | Performed by: INTERNAL MEDICINE

## 2023-01-11 PROCEDURE — 3079F DIAST BP 80-89 MM HG: CPT | Mod: CPTII,NTX,, | Performed by: INTERNAL MEDICINE

## 2023-01-11 PROCEDURE — 1111F PR DISCHARGE MEDS RECONCILED W/ CURRENT OUTPATIENT MED LIST: ICD-10-PCS | Mod: CPTII,NTX,, | Performed by: INTERNAL MEDICINE

## 2023-01-11 PROCEDURE — 3075F PR MOST RECENT SYSTOLIC BLOOD PRESS GE 130-139MM HG: ICD-10-PCS | Mod: CPTII,NTX,, | Performed by: INTERNAL MEDICINE

## 2023-01-11 RX ORDER — TORSEMIDE 20 MG/1
20 TABLET ORAL 2 TIMES DAILY
Qty: 60 TABLET | Refills: 11 | Status: ON HOLD | OUTPATIENT
Start: 2023-01-11 | End: 2023-03-14 | Stop reason: SDUPTHER

## 2023-01-11 RX ORDER — CANAGLIFLOZIN 100 MG/1
100 TABLET, FILM COATED ORAL DAILY
COMMUNITY
End: 2023-03-21

## 2023-01-11 RX ORDER — TORSEMIDE 20 MG/1
20 TABLET ORAL DAILY
Qty: 30 TABLET | Refills: 11 | Status: SHIPPED | OUTPATIENT
Start: 2023-01-11 | End: 2023-01-11 | Stop reason: SDUPTHER

## 2023-01-11 RX ORDER — SACUBITRIL AND VALSARTAN 97; 103 MG/1; MG/1
1 TABLET, FILM COATED ORAL 2 TIMES DAILY
Qty: 60 TABLET | Refills: 5 | Status: ON HOLD | OUTPATIENT
Start: 2023-01-11 | End: 2023-04-22 | Stop reason: SDUPTHER

## 2023-01-11 NOTE — PATIENT INSTRUCTIONS
You have just the right amount of fluid on you.  Please adhere to a low sodium diet (no more than 1.5 grams of sodium in 24h).  3.   Follow fluid restriction of  1. no more than 2 liters in 24 hours.  4.  Increase entresto to the next dose: take 2 tablets twice a day.   5. Follow up blood test in 1 week.  6. Follow up in 3 months with caregiver and plan to meet  for evaluation.

## 2023-01-11 NOTE — LETTER
January 11, 2023        Rosalio Salazar  [address]  [address]  [facility] [address]  Phone: [phone]  Fax: [phone]             [facility]  [address]  [facility] [address]  Phone: [phone]   Patient: Tera Griffiths   MR Number: [id number]   YOB: 1965   Date of Visit: 1/11/2023       Dear Dr. Rosalio Salazar    Thank you for referring Tera Griffiths to me for evaluation. Attached you will find relevant portions of my assessment and plan of care.    If you have questions, please do not hesitate to call me. I look forward to following Tera Griffiths along with you.    Sincerely,    Daquan Domingo MD    Enclosure    If you would like to receive this communication electronically, please contact [email or web address] or [phone] to request [employer] access.    [employer] is a tool which provides read-only access to select patient information with whom you have a relationship. Its easy to use and provides real time access to review your patients record including encounter summaries, notes, results, and demographic information.    If you feel you have received this communication in error or would no longer like to receive these types of communications, please e-mail [email or web address]

## 2023-01-11 NOTE — PROGRESS NOTES
Advanced Heart Failure and Transplantation Clinic Follow up.        Attending Physician: Daquan Domingo MD.  The patient's last visit with me was on 11/7/2022.         HPI.  Patient is a 58 yo man with PMH/o DM type 2, dyslipidemia, HTN, CAD, RCA NSTEMI complicated with VF arrest and HF in 2021. Patient has HFrEF and LVEF 10%. He was referred by [facility] cardiologist Dr. Salazar for evaluation for advanced therapies as patient had been having persistent severe symptoms on maximal tolerated therapy. He had multiple admissions to OSH according to patient, in 2022.      Last visit was his first visit with me. He was in ADHF.  He was admitted for treatment and for evaluation for advanced therapies. He was diuresed and discharged to follow up as an outpatient. Had RHC that showed elevated filling pressures.        RA: 10  PA: 60/25, mean 35  PCWP: 18 with V waves to 27     Saturation:  Arterial: 99 %  PA: 64 %     Mark CI/CO: 2.05/4.18     Since last appointment November 2022, he had 2 other admissions. He was discharged 4 days ago from [facility] service. He did not have evaluation for advanced therapies at that time. He has gained 10 pounds since being discharged. He denies congestive symptoms.    Review of Systems   Constitutional:  Negative for activity change, appetite change, chills, diaphoresis, fatigue and fever.   HENT:  Negative for nasal congestion, rhinorrhea and sore throat.    Eyes:  Negative for visual disturbance.   Respiratory:  Negative for cough, choking and shortness of breath.    Cardiovascular:  Negative for chest pain, palpitations and leg swelling.   Gastrointestinal:  Negative for abdominal pain, diarrhea, nausea and vomiting.   Genitourinary:  Negative for difficulty urinating, dysuria and hematuria.   Integumentary:  Negative for rash.   Neurological:  Negative for seizures, syncope and light-headedness.    Psychiatric/Behavioral:  Negative for agitation and hallucinations.       Past Medical History:   Diagnosis Date    Atrial fibrillation     CHF (congestive heart failure)     Coronary atherosclerosis of native coronary artery     Diabetes mellitus, type 2     Encounter for blood transfusion     Hypertension         Past Surgical History:   Procedure Laterality Date    CARDIAC DEFIBRILLATOR PLACEMENT Left     HERNIA REPAIR      RIGHT HEART CATHETERIZATION Right 9/30/2022    Procedure: INSERTION, CATHETER, RIGHT HEART;  Surgeon: Caden Aden MD;  Location: [facility] CATH LAB;  Service: Cardiology;  Laterality: Right;    TREATMENT OF CARDIAC ARRHYTHMIA N/A 11/22/2022    Procedure: CARDIOVERSION;  Surgeon: Marvin Sanon MD;  Location: [facility] EP LAB;  Service: Cardiology;  Laterality: N/A;  afib, KIA, DCCV, anes, MB, 3 Prep        No family history on file.     Review of patient's allergies indicates:  No Known Allergies     Current Outpatient Medications   Medication Instructions    amiodarone (PACERONE) 200 mg, Oral, 2 times daily    apixaban (ELIQUIS) 5 mg, Oral, 2 times daily    atorvastatin (LIPITOR) 80 mg, Oral, Daily    clopidogreL (PLAVIX) 75 mg, Oral, Daily    ferrous sulfate (FEOSOL) 325 mg, Oral, Every other day    glimepiride (AMARYL) 4 mg, Oral, Every morning    INVOKANA 100 mg, Oral, Daily    LIDOcaine (LIDODERM) 5 % 1 patch, Transdermal, Daily    metoprolol succinate (TOPROL-XL) 12.5 mg, Oral, Nightly    pantoprazole (PROTONIX) 40 mg, Oral, Daily    sacubitriL-valsartan (ENTRESTO) 49-51 mg per tablet 1 tablet, Oral, 2 times daily    spironolactone (ALDACTONE) 25 mg, Oral, Daily    tamsulosin (FLOMAX) 0.4 mg Cap 1 capsule, Oral, Nightly    torsemide (DEMADEX) 20 mg, Oral, 2 times daily        Vitals:    01/11/23 1415   BP: 130/81   Pulse: (!) 58        Wt Readings from Last 3 Encounters:   01/11/23 84.1 kg (185 lb 6.5 oz)   01/11/23 84.4 kg (186 lb)   01/07/23 79.6 kg (175 lb 7.8 oz)     Temp Readings  from Last 3 Encounters:   01/07/23 97.8 °F (36.6 °C) (Oral)   11/22/22 98.1 °F (36.7 °C) (Temporal)   09/30/22 97.9 °F (36.6 °C) (Temporal)     BP Readings from Last 3 Encounters:   01/11/23 130/81   01/11/23 115/89   01/07/23 118/66     Pulse Readings from Last 3 Encounters:   01/11/23 (!) 58   01/11/23 66   01/07/23 62        Body mass index is 25.15 kg/m². Estimated body surface area is 2.07 meters squared as calculated from the following:    Height as of this encounter: 6' (1.829 m).    Weight as of this encounter: 84.1 kg (185 lb 6.5 oz).     Physical Exam  Constitutional:       Appearance: He is well-developed.   HENT:      Head: Normocephalic and atraumatic.      Right Ear: External ear normal.      Left Ear: External ear normal.   Eyes:      Conjunctiva/sclera: Conjunctivae normal.      Pupils: Pupils are equal, round, and reactive to light.   Neck:      Vascular: No JVD.   Cardiovascular:      Rate and Rhythm: Normal rate and regular rhythm.      Pulses: Intact distal pulses.      Heart sounds: S1 normal and S2 normal. No murmur heard.    No friction rub. No gallop.   Pulmonary:      Effort: Pulmonary effort is normal.      Breath sounds: Normal breath sounds.   Abdominal:      General: Bowel sounds are normal. There is no distension.      Palpations: Abdomen is soft.      Tenderness: There is no abdominal tenderness. There is no guarding or rebound.   Musculoskeletal:      Cervical back: Normal range of motion and neck supple.      Right lower leg: No edema.      Left lower leg: No edema.   Neurological:      Mental Status: He is alert and oriented to person, place, and time.        Lab Results   Component Value Date    BNP 1,358 (H) 01/09/2023     01/11/2023    K 4.1 01/11/2023    MG 2.2 01/09/2023     01/11/2023    CO2 24 01/11/2023    BUN 12 01/11/2023    CREATININE 1.4 01/11/2023    GLU 63 (L) 01/11/2023    HGBA1C 5.7 (H) 09/23/2022    AST 19 01/11/2023    ALT 15 01/11/2023    ALBUMIN 4.2  01/11/2023    PROT 7.8 01/11/2023    BILITOT 0.8 01/11/2023    WBC 3.06 (L) 01/11/2023    HGB 14.5 01/11/2023    HCT 49.8 01/11/2023     01/11/2023    INR 1.1 11/15/2022    TSH 1.676 09/13/2022    CHOL 112 05/04/2022    HDL 49 05/04/2022    LDLCALC 48 05/04/2022    TRIG 75 05/04/2022         Results for orders placed during the hospital encounter of 01/03/23    Echo    Interpretation Summary  · The left ventricle is severely enlarged with eccentric hypertrophy and severely decreased systolic function.  · The estimated ejection fraction is 15%.  · There is left ventricular global hypokinesis.  · Grade I left ventricular diastolic dysfunction.  · Mild right ventricular enlargement with moderately reduced right ventricular systolic function.  · Normal central venous pressure (3 mmHg).  · Severe left atrial enlargement.  · Moderate posterolateral pericardial effusion. Max diameter 1.4cm.        Results for orders placed during the hospital encounter of 11/22/22    Intra-Procedure Documentation    Narrative  KIA performed in the Invasive Lab  - See Procedure Log link below for nursing documentation  - See KIA order on Card Proc Tab for physician findings         Assessment and Plan:  Chronic systolic heart failure  -     Basic Metabolic Panel; Future; Expected date: 01/18/2023  -     NT-Pro Natriuretic Peptide; Future; Expected date: 01/18/2023  -     CBC Auto Differential; Future; Expected date: 04/11/2023  -     Comprehensive Metabolic Panel; Future; Expected date: 04/11/2023  -     NT-Pro Natriuretic Peptide; Future; Expected date: 04/11/2023    Paroxysmal atrial fibrillation    Coronary artery disease involving native coronary artery of native heart without angina pectoris    Dyslipidemia    Primary hypertension    Chronic systolic congestive heart failure    ICD (implantable cardioverter-defibrillator) in place    Diabetes mellitus due to underlying condition with hyperosmolarity without coma, without  long-term current use of insulin    Stable. Well controlled. HGB A1c 5.7.    Other orders  -     sacubitriL-valsartan (ENTRESTO)  mg per tablet; Take 1 tablet by mouth 2 (two) times daily.  Dispense: 60 tablet; Refill: 5  -     Discontinue: torsemide (DEMADEX) 20 MG Tab; Take 1 tablet (20 mg total) by mouth once daily.  Dispense: 30 tablet; Refill: 11  -     torsemide (DEMADEX) 20 MG Tab; Take 1 tablet (20 mg total) by mouth 2 (two) times a day.  Dispense: 60 tablet; Refill: 11                Chronic systolic HF, NYHA class III, stage D.  Etiology: ICM + atrial fibrillation  Devices: ICD  Medications: medium dose sacubitril-valsartan, spironolactone, canagliflozin, metoprolol xl 12.5 mg daily. Torsemide 20 mg BID.  Hemodynamic status: warm, normotensive, euvolemic.  Clinical course: multiple hospitalizations for HF in OSH in 2022.  Low cardiac function and poor functional status per CPX today.  Patient lives with a cousin. Have children but could not identify a caregiver today. I am concerned about his social support (lack of it) and communications problems for advanced therapies.  Plan:  -Increase entresto to the next dose: take 2 tablets twice a day.   -Follow up blood test in 1 week.  -Follow up in 3 months: patient was asked to bring caregiver to meet with  for evaluation.          F/u in 2 months

## 2023-01-13 ENCOUNTER — TELEPHONE (OUTPATIENT)
Dept: TRANSPLANT | Facility: CLINIC | Age: 58
End: 2023-01-13
Payer: MEDICAID

## 2023-01-13 DIAGNOSIS — I50.22 CHRONIC SYSTOLIC HEART FAILURE: ICD-10-CM

## 2023-01-13 DIAGNOSIS — Q28.2 ARTERIOVENOUS MALFORMATION OF CEREBRAL VESSELS: Primary | ICD-10-CM

## 2023-01-13 NOTE — TELEPHONE ENCOUNTER
I reviewed case with Dr Javier Domingo and he asked to start the evaluation for adv heart failure options.  I called pt and discussed this in detail, and sent request for financial auth to .

## 2023-01-13 NOTE — TELEPHONE ENCOUNTER
Advanced Heart Failure Options: Transplant/LVAD  PRE-EDUCATION BOOKLET NOTE:    I spoke with Tera Griffiths and had a brief discussion regarding the advanced heart failure evaluation process including options for heart transplantation and/or left ventricular assist device (LVAD) implantation.     Heart Transplant Educational Booklet reviewed and given to patient, which included the following handouts:  My Advanced Heart Failure Treatment Guide: Module 1  Pre Heart Transplant Coordinator Team Contact Information   Recipient Informed Consent   HIV Testing Information  Wellness Contract  UNOS Multiple Listing and Waiting Time Transfer  UNOS Heart Allocation   UNOS Toll Free Phone numbers    Significant History:  Tobacco history: no  Stroke history:  no  Thromboembolism history:  no   Prior sternotomies: no  ICD (if yes, indicate brand of device): Yes: medtronic  Angiography (if yes, enter date and location): Yes[facility]   Blood transfusions (if yes, enter date and location): Yes:   Cancer screenings -   Colonoscopy (if yes, enter date and location): No   Pap smear (if yes, enter date and location): N/A   Mammogram (if yes, enter date and location): N/A  COVID Vaccination: yes  Dentist visit within the last year: no  Local Cardiologist: Dr Salazar    Question and answer session was conducted.    Pt notified that Testing for communicable diseases will be completed as part of the Advanced Options evaluation.   Patient notified that HIV Testing is required for transplant evaluation and repeated at scheduled intervals before and after transplant.   Explained that education will be provided, results will be discussed, and the appropriate consults will be scheduled if needed.    Patient was advised to bring the educational packet to future appointments and/or admissions.   Patient was encouraged to contact the coordinator team with any questions or concerns.    Understanding verbalized.

## 2023-01-18 ENCOUNTER — LAB VISIT (OUTPATIENT)
Dept: LAB | Facility: OTHER | Age: 58
End: 2023-01-18
Attending: INTERNAL MEDICINE
Payer: MEDICAID

## 2023-01-18 DIAGNOSIS — I50.22 CHRONIC SYSTOLIC HEART FAILURE: ICD-10-CM

## 2023-01-18 LAB
ANION GAP SERPL CALC-SCNC: 6 MMOL/L (ref 8–16)
BUN SERPL-MCNC: 16 MG/DL (ref 6–20)
CALCIUM SERPL-MCNC: 9.8 MG/DL (ref 8.7–10.5)
CHLORIDE SERPL-SCNC: 111 MMOL/L (ref 95–110)
CO2 SERPL-SCNC: 26 MMOL/L (ref 23–29)
CREAT SERPL-MCNC: 1.5 MG/DL (ref 0.5–1.4)
EST. GFR  (NO RACE VARIABLE): 55 ML/MIN/1.73 M^2
GLUCOSE SERPL-MCNC: 116 MG/DL (ref 70–110)
POTASSIUM SERPL-SCNC: 4.7 MMOL/L (ref 3.5–5.1)
SODIUM SERPL-SCNC: 143 MMOL/L (ref 136–145)

## 2023-01-18 PROCEDURE — 36415 COLL VENOUS BLD VENIPUNCTURE: CPT | Mod: NTX | Performed by: INTERNAL MEDICINE

## 2023-01-18 PROCEDURE — 83880 ASSAY OF NATRIURETIC PEPTIDE: CPT | Mod: TXP | Performed by: INTERNAL MEDICINE

## 2023-01-18 PROCEDURE — 80048 BASIC METABOLIC PNL TOTAL CA: CPT | Mod: TXP | Performed by: INTERNAL MEDICINE

## 2023-01-19 LAB
NT-PROBNP SERPL IA-MCNC: 8102 PG/ML
NT-PROBNP SERPL IA-MCNC: 8102 PG/ML

## 2023-01-23 ENCOUNTER — OFFICE VISIT (OUTPATIENT)
Dept: PALLIATIVE MEDICINE | Facility: CLINIC | Age: 58
End: 2023-01-23
Payer: MEDICAID

## 2023-01-23 ENCOUNTER — HOSPITAL ENCOUNTER (OUTPATIENT)
Dept: PULMONOLOGY | Facility: CLINIC | Age: 58
Discharge: HOME | End: 2023-01-23
Payer: MEDICAID

## 2023-01-23 VITALS
OXYGEN SATURATION: 94 % | TEMPERATURE: 98 F | HEIGHT: 72 IN | HEART RATE: 60 BPM | BODY MASS INDEX: 25.38 KG/M2 | WEIGHT: 187.38 LBS | SYSTOLIC BLOOD PRESSURE: 162 MMHG | DIASTOLIC BLOOD PRESSURE: 101 MMHG

## 2023-01-23 DIAGNOSIS — I50.22 CHRONIC SYSTOLIC HEART FAILURE: ICD-10-CM

## 2023-01-23 DIAGNOSIS — R06.00 DYSPNEA, UNSPECIFIED TYPE: ICD-10-CM

## 2023-01-23 DIAGNOSIS — Q28.2 ARTERIOVENOUS MALFORMATION OF CEREBRAL VESSELS: ICD-10-CM

## 2023-01-23 DIAGNOSIS — Z95.810 ICD (IMPLANTABLE CARDIOVERTER-DEFIBRILLATOR) IN PLACE: ICD-10-CM

## 2023-01-23 DIAGNOSIS — I50.22 CHRONIC SYSTOLIC HEART FAILURE: Primary | ICD-10-CM

## 2023-01-23 DIAGNOSIS — Z51.5 PALLIATIVE CARE ENCOUNTER: ICD-10-CM

## 2023-01-23 LAB
DLCO ADJ PRE: 12.79 ML/(MIN*MMHG) (ref 23.21–37.07)
DLCO SINGLE BREATH LLN: 23.21
DLCO SINGLE BREATH PRE REF: 42.3 %
DLCO SINGLE BREATH REF: 30.14
DLCOC SBVA LLN: 3.04
DLCOC SBVA PRE REF: 94 %
DLCOC SBVA REF: 4.23
DLCOC SINGLE BREATH LLN: 23.21
DLCOC SINGLE BREATH PRE REF: 42.4 %
DLCOC SINGLE BREATH REF: 30.14
DLCOCSBVAULN: 5.42
DLCOCSINGLEBREATHULN: 37.07
DLCOSINGLEBREATHULN: 37.07
DLCOVA LLN: 3.04
DLCOVA PRE REF: 93.7 %
DLCOVA PRE: 3.96 ML/(MIN*MMHG*L) (ref 3.04–5.42)
DLCOVA REF: 4.23
DLCOVAULN: 5.42
DLVAADJ PRE: 3.98 ML/(MIN*MMHG*L) (ref 3.04–5.42)
ERV LLN: -16448.74
ERV PRE REF: 68.9 %
ERV REF: 1.26
ERVULN: ABNORMAL
FEF 25 75 LLN: 1.26
FEF 25 75 PRE REF: 28.3 %
FEF 25 75 REF: 2.84
FEV05 LLN: 1.76
FEV05 REF: 2.9
FEV1 FVC LLN: 68
FEV1 FVC PRE REF: 87.7 %
FEV1 FVC REF: 79
FEV1 LLN: 2.36
FEV1 PRE REF: 43.3 %
FEV1 REF: 3.2
FRCPLETH LLN: 2.58
FRCPLETH PREREF: 77.7 %
FRCPLETH REF: 3.57
FRCPLETHULN: 4.55
FVC LLN: 3.06
FVC PRE REF: 49.3 %
FVC REF: 4.06
IVC PRE: 1.53 L (ref 3.06–5.08)
IVC SINGLE BREATH LLN: 3.06
IVC SINGLE BREATH PRE REF: 37.7 %
IVC SINGLE BREATH REF: 4.06
IVCSINGLEBREATHULN: 5.08
LLN IC: -9999996.6
PEF LLN: 5.97
PEF PRE REF: 44.3 %
PEF REF: 8.62
PHYSICIAN COMMENT: ABNORMAL
PRE DLCO: 12.75 ML/(MIN*MMHG) (ref 23.21–37.07)
PRE ERV: 0.86 L (ref -16448.74–16451.26)
PRE FEF 25 75: 0.8 L/S (ref 1.26–5.04)
PRE FET 100: 8.48 SEC
PRE FEV05 REF: 36.8 %
PRE FEV1 FVC: 69.1 % (ref 67.73–88.45)
PRE FEV1: 1.38 L (ref 2.36–3.98)
PRE FEV5: 1.07 L (ref 1.76–4.03)
PRE FRC PL: 2.77 L (ref 2.58–4.55)
PRE FVC: 2 L (ref 3.06–5.08)
PRE IC: 1.14 L (ref -9999996.6–#######.####)
PRE PEF: 3.82 L/S (ref 5.97–11.28)
PRE REF IC: 33.5 %
PRE RV: 1.91 L (ref 1.63–2.98)
PRE TLC: 3.91 L (ref 5.97–8.28)
PRE VTG: 2.9 L
RAW PRE REF: 129.4 %
RAW PRE: 3.96 CMH2O*S/L (ref 3.06–3.06)
RAW REF: 3.06
REF IC: 3.4
RV LLN: 1.63
RV PRE REF: 82.5 %
RV REF: 2.31
RVTLC LLN: 26
RVTLC PRE REF: 137.6 %
RVTLC PRE: 48.73 % (ref 26.43–44.39)
RVTLC REF: 35
RVTLCULN: 44
RVULN: 2.98
SGAW PRE REF: 98.3 %
SGAW PRE: 0.08 1/(CMH2O*S) (ref 0.08–0.08)
SGAW REF: 0.08
TLC LLN: 5.97
TLC PRE REF: 54.9 %
TLC REF: 7.13
TLC ULN: 8.28
ULN IC: ABNORMAL
VA PRE: 3.22 L (ref 6.98–6.98)
VA SINGLE BREATH LLN: 6.98
VA SINGLE BREATH PRE REF: 46.1 %
VA SINGLE BREATH REF: 6.98
VASINGLEBREATHULN: 6.98
VC LLN: 3.06
VC PRE REF: 49.3 %
VC PRE: 2 L (ref 3.06–5.08)
VC REF: 4.06
VC ULN: 5.08

## 2023-01-23 PROCEDURE — 94729 DIFFUSING CAPACITY: CPT | Mod: 26,S$PBB,TXP, | Performed by: INTERNAL MEDICINE

## 2023-01-23 PROCEDURE — 99214 OFFICE O/P EST MOD 30 MIN: CPT | Mod: PBBFAC,TXP | Performed by: STUDENT IN AN ORGANIZED HEALTH CARE EDUCATION/TRAINING PROGRAM

## 2023-01-23 PROCEDURE — 99205 OFFICE O/P NEW HI 60 MIN: CPT | Mod: S$PBB,NTX,, | Performed by: STUDENT IN AN ORGANIZED HEALTH CARE EDUCATION/TRAINING PROGRAM

## 2023-01-23 PROCEDURE — 99497 ADVNCD CARE PLAN 30 MIN: CPT | Mod: S$PBB,NTX,, | Performed by: STUDENT IN AN ORGANIZED HEALTH CARE EDUCATION/TRAINING PROGRAM

## 2023-01-23 PROCEDURE — 94726 PLETHYSMOGRAPHY LUNG VOLUMES: CPT | Mod: 26,S$PBB,TXP, | Performed by: INTERNAL MEDICINE

## 2023-01-23 PROCEDURE — 94729 DIFFUSING CAPACITY: CPT | Mod: PBBFAC,TXP | Performed by: INTERNAL MEDICINE

## 2023-01-23 PROCEDURE — 99497 ADVNCD CARE PLAN 30 MIN: CPT | Mod: PBBFAC,NTX | Performed by: STUDENT IN AN ORGANIZED HEALTH CARE EDUCATION/TRAINING PROGRAM

## 2023-01-23 PROCEDURE — 94729 PR C02/MEMBANE DIFFUSE CAPACITY: ICD-10-PCS | Mod: 26,S$PBB,TXP, | Performed by: INTERNAL MEDICINE

## 2023-01-23 PROCEDURE — 99205 PR OFFICE/OUTPT VISIT, NEW, LEVL V, 60-74 MIN: ICD-10-PCS | Mod: S$PBB,NTX,, | Performed by: STUDENT IN AN ORGANIZED HEALTH CARE EDUCATION/TRAINING PROGRAM

## 2023-01-23 PROCEDURE — 99999 PR PBB SHADOW E&M-EST. PATIENT-LVL IV: ICD-10-PCS | Mod: PBBFAC,TXP,, | Performed by: STUDENT IN AN ORGANIZED HEALTH CARE EDUCATION/TRAINING PROGRAM

## 2023-01-23 PROCEDURE — 94726 PLETHYSMOGRAPHY LUNG VOLUMES: CPT | Mod: PBBFAC,TXP | Performed by: INTERNAL MEDICINE

## 2023-01-23 PROCEDURE — 99999 PR PBB SHADOW E&M-EST. PATIENT-LVL IV: CPT | Mod: PBBFAC,TXP,, | Performed by: STUDENT IN AN ORGANIZED HEALTH CARE EDUCATION/TRAINING PROGRAM

## 2023-01-23 PROCEDURE — 94726 PULM FUNCT TST PLETHYSMOGRAP: ICD-10-PCS | Mod: 26,S$PBB,TXP, | Performed by: INTERNAL MEDICINE

## 2023-01-23 PROCEDURE — 94010 BREATHING CAPACITY TEST: CPT | Mod: 26,S$PBB,TXP, | Performed by: INTERNAL MEDICINE

## 2023-01-23 PROCEDURE — 94010 BREATHING CAPACITY TEST: CPT | Mod: PBBFAC,TXP | Performed by: INTERNAL MEDICINE

## 2023-01-23 PROCEDURE — 94010 BREATHING CAPACITY TEST: ICD-10-PCS | Mod: 26,S$PBB,TXP, | Performed by: INTERNAL MEDICINE

## 2023-01-23 PROCEDURE — 99497 PR ADVNCD CARE PLAN 30 MIN: ICD-10-PCS | Mod: S$PBB,NTX,, | Performed by: STUDENT IN AN ORGANIZED HEALTH CARE EDUCATION/TRAINING PROGRAM

## 2023-01-23 NOTE — Clinical Note
"Good morning,   I know he is here for advanced therapies evaluation, he told me that his "fluid comes right back up after the hospital every time" so I am wondering if he would benefit from something like the transitional care clinic for more intensive follow up?  Just wanted to ask.  Thanks  Kimberly"

## 2023-01-23 NOTE — PROGRESS NOTES
[facility] Pall[facility]   Psychosocial Assessment    Patient Name: Tera Griffiths  MRN: [id number]  Palliative Care Provider:  Kimberly Baker MD   Primary Care Physician: Primary Doctor No  Principal Problem: Congestive Heart Failure    Reason for Referral:  Psychosocial Assistance and Advanced Care Planning      Present during Interview: patient.      Primary Language:English   Needed: no      Past Medical Situation:   PMH:   Past Medical History:   Diagnosis Date    Atrial fibrillation     CHF (congestive heart failure)     Coronary atherosclerosis of native coronary artery     Diabetes mellitus, type 2     Encounter for blood transfusion     Hypertension      Mental Health/Substance Use History: None identified   Risk of Abuse, neglect or exploitation: None identified   Current or Previous Trauma and/or evidence of PTSD: None identified   Non-traditional Health practices: None identified     Understanding of diagnosis and prognosis: Limited  Experience/Comfort level with health care system: Fair     Patients Mental Status: Alert and oriented to person, place, time and situation with stable mood.     Socio-Economic Factors/Resources:  Address: [address] [place] [address]  Phone Number: [phone] (home)     Marital Status: Single  Household composition: Patient and cousin   Children: Adult children     Patient/Family perceptions about Caregiving Needs; availability and capacity: Patient reports he has support from his cousin and children's mother however it is unclear if these parties would be willing or capable of caring for patient as needs progress.     Family Dynamics/Relationships: healthy     Patient/Family Strengths/Resilience: Patient is willing to engage in difficult discussion of EoL goals.   Patient/Family Coping: Appropriately     Activities of Daily Living: Independent   Support Systems-Family & Community (Home Health, HME etc): n/a  "    Transportation:  yes    Work/Education History: unemployed  Self-Care Activities/Hobbies: fishing, spending time with friends and family     History: no    Financial Resources:Medicaid      Advanced Care Planning & Legal Concerns:   Advanced Directives/Living Will: no  [place]ST: no   Planning:  no    Power of : yes  Surrogate Decision Maker:     Emergency Contacts:    Spirituality, Culture & Coping Mechanisms:  F- Missy and Belief: Pentecostal     I - Importance: Moderate     C - Community/Culture Values: Patient practices independently      A - Address in Care: Needs meet at this time       Goals/Hopes/Expectations: Patient hopes to "feel better" and do "normal things".   Fears/Anxiety/Concerns: Patient endorses SOB and weakness         Complicated Bereavement Risk Assessment Tool (CBRAT)  Reference:  [facility] Clinical Practice Group (May 2016). Bereavement Risk Screening and Management Guidelines.  Retrieved from: http://[email or web address] [email or web address]/wp-content/uploads//BBZYZ-Pheehqtwjtp-Jidjotxzo-and-Management-Guideline-2016.pdf      Bereaved Client Characteristics   Under 18      no  Was a Twin   no  Young Spouse   no  Elderly Spouse    no  Isolated    no  Lacks Meaningful Social Support   no  Dissatisfied with help available during illness   no  New to [place] no  New to Decision-Making   no    Illness  Inherited Disorder   no  Stigmatized Disease in the family/community   no  Lengthy/Burdensome   no     Bereaved Client's History of Loss   Cumulative Multiple Losses   no  Previous Mental Health Illnesses   no  Current Mental Health Illness   no  Other Significant Health Issues   no   Migrant/Refugee   no Death  Sudden or Unexpected   no  Traumatic Circumstances Associated with Death   no  Significant Cultural/Social Burdens as a result of Death   yes   Relationship with   Profound Lifelong Partner   no  Highly Dependent " "   no  Antagonistic   no  Ambivalent   no  Deeply Connected   yes  Culturally Defined   yes   Risk Factors Scores  0-2  Low  3-5  Moderate  5+  High  All persons scoring moderate to high presume to be at risk**    (** It is acknowledged that protective factors and resilience may outweigh apparent risk factors.      Total Risk Factors Score:   Low to moderate     SW accompanied MD during initial visit with patient.  Patient presents Oriented x4 with stable mood. Patient appears to have a limited understanding of their illness. Patient states providers are "trying to find him a new heart.". Patient appeared unaware of alternatives to transplant (LVAD).  Patient denies depression or anxiety. Patient enjoys spending time with his friends and fishing.  Patient hopes to "feel better and do normal things". Patient reports having good support from his cousin (who resides with patient) and the mother of patient's children. Patient reports he feels these individuals would be able to provide assistance with caregiving however cousin works and his child's mother lives across town.   GoC discussion initiated by MD.  Patient completed HCPOA assigning his child's mother (Zahida) and cousin (Cristhian) as decision makers.. Directive scanned into Epic. Patient wishes to be full code and would accept living on breathing machine.Patient encouraged to discuss his wishes further with his decision makers. Patient acknowledged understanding and denies further psychosocial concerns. SW remains available to provide assistance as needed. SW will continue to follow.     Marisol Rivera [facility]  Outpatient   Palliative Medicine                            "

## 2023-01-25 ENCOUNTER — LAB VISIT (OUTPATIENT)
Dept: LAB | Facility: HOSPITAL | Age: 58
End: 2023-01-25
Attending: INTERNAL MEDICINE
Payer: MEDICAID

## 2023-01-25 DIAGNOSIS — Q28.2 ARTERIOVENOUS MALFORMATION OF CEREBRAL VESSELS: ICD-10-CM

## 2023-01-25 DIAGNOSIS — I50.22 CHRONIC SYSTOLIC HEART FAILURE: ICD-10-CM

## 2023-01-25 PROCEDURE — 81241 F5 GENE: CPT | Mod: TXP | Performed by: INTERNAL MEDICINE

## 2023-01-25 PROCEDURE — 36415 COLL VENOUS BLD VENIPUNCTURE: CPT | Mod: TXP | Performed by: INTERNAL MEDICINE

## 2023-01-30 LAB — F5 P.R506Q BLD/T QL: NEGATIVE

## 2023-02-08 ENCOUNTER — TELEPHONE (OUTPATIENT)
Dept: TRANSPLANT | Facility: CLINIC | Age: 58
End: 2023-02-08
Payer: MEDICAID

## 2023-02-08 NOTE — PROGRESS NOTES
Subjective:      Patient ID: Tera Griffiths is a 57 y.o. male.    Chief Complaint: No chief complaint on file.      HPI:  Tera Griffiths is a 57 y.o. male who presents for surgical evaluation of advanced heart failure options. Medical conditions include HFrEF, ICMP, CAD s/p (STEMI s/p PCI to Rcx (8/2021), previous GIB, difficulty with medication compliance,  Afib s/p DCCV, uncontrolled HTN, DM2. He was referred by [facility] cardiologist Dr. Salazar for evaluation for advanced therapies as patient had been having persistent severe symptoms on maximal tolerated therapy. He had multiple admissions to OSH according to patient, in 2022. Patient states that he is unable to walk a full block without being short of breath. Denies any lower extremity swelling but states that he holds fluid in his abdomen. Endorses chest pain at night while laying down. Resolves on its own or with a lidocaine patch. No prior strokes, blood clots, or sternotomies. No smoking or drinking history. Says that his blood glucose is usually 'low.'     B+    Family and social history reviewed    Current Outpatient Medications   Medication Instructions    amiodarone (PACERONE) 200 mg, Oral, 2 times daily    apixaban (ELIQUIS) 5 mg, Oral, 2 times daily    atorvastatin (LIPITOR) 80 mg, Oral, Daily    clopidogreL (PLAVIX) 75 mg, Oral, Daily    ferrous sulfate (FEOSOL) 325 mg, Oral, Every other day    glimepiride (AMARYL) 4 mg, Oral, Every morning    hydrALAZINE (APRESOLINE) 25 mg, Oral, 3 times daily    INVOKANA 100 mg, Oral, Daily    isosorbide dinitrate (ISORDIL) 10 mg, Oral, 3 times daily    LIDOcaine (LIDODERM) 5 % 1 patch, Transdermal, Daily    metoprolol succinate (TOPROL-XL) 25 mg, Oral, Nightly    pantoprazole (PROTONIX) 40 mg, Oral, Daily    potassium chloride SA (K-DUR,KLOR-CON) 20 MEQ tablet 20 mEq, Oral, Daily    sacubitriL-valsartan (ENTRESTO)  mg per tablet 1 tablet, Oral, 2 times daily    spironolactone (ALDACTONE) 25 mg, Oral, Daily     tamsulosin (FLOMAX) 0.4 mg Cap 1 capsule, Oral, Nightly    torsemide (DEMADEX) 20 mg, Oral, 2 times daily         Review of patient's allergies indicates:  No Known Allergies  Past Medical History:   Diagnosis Date    Atrial fibrillation     CHF (congestive heart failure)     Coronary atherosclerosis of native coronary artery     Diabetes mellitus, type 2     Encounter for blood transfusion     Hypertension      Past Surgical History:   Procedure Laterality Date    CARDIAC DEFIBRILLATOR PLACEMENT Left     HERNIA REPAIR      RIGHT HEART CATHETERIZATION Right 9/30/2022    Procedure: INSERTION, CATHETER, RIGHT HEART;  Surgeon: Caden Aden MD;  Location: [facility] CATH LAB;  Service: Cardiology;  Laterality: Right;    TREATMENT OF CARDIAC ARRHYTHMIA N/A 11/22/2022    Procedure: CARDIOVERSION;  Surgeon: Marvin Sanon MD;  Location: [facility] EP LAB;  Service: Cardiology;  Laterality: N/A;  afib, KIA, DCCV, anes, MB, 3 Prep     Family History    None       Social History     Socioeconomic History    Marital status: Single   Tobacco Use    Smoking status: Never    Smokeless tobacco: Never   Substance and Sexual Activity    Alcohol use: Never    Drug use: Never       Current medications Reviewed    Review of Systems   Constitutional:  Positive for activity change and fatigue.   HENT:  Negative for nosebleeds.    Eyes:  Negative for visual disturbance.   Respiratory:  Positive for shortness of breath.    Cardiovascular:  Positive for chest pain (not currently). Negative for palpitations and leg swelling.   Gastrointestinal:  Negative for nausea.   Musculoskeletal:  Negative for gait problem.   Skin:  Negative for color change.   Neurological:  Positive for light-headedness.   Hematological:  Does not bruise/bleed easily.   Psychiatric/Behavioral:  Negative for sleep disturbance.    Objective:   Physical Exam  Vitals reviewed.   Constitutional:       General: He is not in acute distress.     Appearance: He is well-developed.  He is not diaphoretic.   HENT:      Head: Normocephalic and atraumatic.   Eyes:      Pupils: Pupils are equal, round, and reactive to light.   Neck:      Vascular: No JVD.   Cardiovascular:      Rate and Rhythm: Normal rate.   Pulmonary:      Effort: Pulmonary effort is normal. No respiratory distress.   Abdominal:      General: There is no distension.   Musculoskeletal:         General: Normal range of motion.      Cervical back: Normal range of motion.   Skin:     Coloration: Skin is not pale.   Neurological:      Mental Status: He is alert and oriented to person, place, and time.   Psychiatric:         Speech: Speech normal.         Behavior: Behavior normal.         Thought Content: Thought content normal.         Judgment: Judgment normal.       Diagnostic Results: reviewed  CT head 2/9/32  Multiple chronic infarcts, the majority of which are new from 2012.  No acute intracranial process is identified.    CT CAP reviewed     CPX 1/11/23  The patient exercised for 2 minutes and 28 seconds on a low ramp protocol, achieving a peak heart rate of 81 bpm, which is 49% of the age predicted maximum heart rate.  The test was stopped because the patient experienced shortness of breath.  During stress, the following significant arrhythmias were observed: rare PVCs.  There was no ST segment deviation noted during stress.  The ECG portion of this study is negative for myocardial ischemia.  The respiratory exchange ratio (RER) was 0.74, suggesting poor effort.  The peak VO2 was 12.1 ml/kg/min which is 32.97% of predicted equating to a functional capacity of 3.46 METS indicating severe functional impairment.  Severe functional impairment associated with a reduced breathing reserve, normal oxygen stauration, poor effort. The anaerobic threshold was not attained.    TTE 1/5/23  The left ventricle is severely enlarged with eccentric hypertrophy and severely decreased systolic function.  The estimated ejection fraction is  15%.  There is left ventricular global hypokinesis.  Grade I left ventricular diastolic dysfunction.  Mild right ventricular enlargement with moderately reduced right ventricular systolic function.  Normal central venous pressure (3 mmHg).  Severe left atrial enlargement.  Moderate posterolateral pericardial effusion. Max diameter 1.4cm.  LV 7  TAPSE 1.57      Assessment:   ICM   Plan:     CTS Attending Note:    I have personally taken the history and examined this patient and agree with the VINCENT's note as stated above.  Pleasant 57-year-old gentleman with ischemic cardiomyopathy.  He is [place] heart Association class 3.  He has had some of his workup completed.  His ejection fraction is 15%, and his left ventricle is markedly dilated.  I reviewed his CT scan.  There are no anatomic contraindications to either MCS or transplantation.  While moving directly to transplant could be an attractive option as he is blood group B, I am concerned about his degree of pulmonary hypertension.  His pulmonary hypertension did respond somewhat to nitroglycerin, but remained fairly elevated.  He is on appropriate medications, but remains systemically hypertensive.  It may be that with better control of his systemic blood pressure that his pulmonary pressures will come down further.  If that is not the case, then given his pulmonary hypertension left ventricular assist device may be the best option as bridge to transplantation.  If his pulmonary pressures respond to better systemic control, then moving directly to transplant could be an option.
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
## 2023-02-09 ENCOUNTER — OFFICE VISIT (OUTPATIENT)
Dept: TRANSPLANT | Facility: CLINIC | Age: 58
End: 2023-02-09
Payer: MEDICAID

## 2023-02-09 ENCOUNTER — CLINICAL SUPPORT (OUTPATIENT)
Dept: TRANSPLANT | Facility: CLINIC | Age: 58
End: 2023-02-09
Payer: MEDICAID

## 2023-02-09 ENCOUNTER — HOSPITAL ENCOUNTER (OUTPATIENT)
Dept: RADIOLOGY | Facility: HOSPITAL | Age: 58
Discharge: HOME | End: 2023-02-09
Attending: INTERNAL MEDICINE
Payer: MEDICAID

## 2023-02-09 ENCOUNTER — TELEPHONE (OUTPATIENT)
Dept: TRANSPLANT | Facility: CLINIC | Age: 58
End: 2023-02-09
Payer: MEDICAID

## 2023-02-09 ENCOUNTER — SOCIAL WORK (OUTPATIENT)
Dept: TRANSPLANT | Facility: CLINIC | Age: 58
End: 2023-02-09
Payer: MEDICAID

## 2023-02-09 ENCOUNTER — OFFICE VISIT (OUTPATIENT)
Dept: CARDIOTHORACIC SURGERY | Facility: CLINIC | Age: 58
End: 2023-02-09
Payer: MEDICAID

## 2023-02-09 VITALS
DIASTOLIC BLOOD PRESSURE: 74 MMHG | WEIGHT: 190.25 LBS | HEIGHT: 72 IN | HEART RATE: 71 BPM | BODY MASS INDEX: 25.77 KG/M2 | SYSTOLIC BLOOD PRESSURE: 151 MMHG

## 2023-02-09 VITALS
DIASTOLIC BLOOD PRESSURE: 79 MMHG | OXYGEN SATURATION: 97 % | SYSTOLIC BLOOD PRESSURE: 133 MMHG | BODY MASS INDEX: 25.68 KG/M2 | HEIGHT: 72 IN | HEART RATE: 66 BPM | RESPIRATION RATE: 18 BRPM | WEIGHT: 189.63 LBS

## 2023-02-09 DIAGNOSIS — Q28.2 ARTERIOVENOUS MALFORMATION OF CEREBRAL VESSELS: ICD-10-CM

## 2023-02-09 DIAGNOSIS — I50.22 CHRONIC SYSTOLIC HEART FAILURE: ICD-10-CM

## 2023-02-09 DIAGNOSIS — I10 PRIMARY HYPERTENSION: ICD-10-CM

## 2023-02-09 DIAGNOSIS — I50.43 ACUTE ON CHRONIC COMBINED SYSTOLIC AND DIASTOLIC HEART FAILURE: Primary | ICD-10-CM

## 2023-02-09 DIAGNOSIS — I49.01 CARDIAC ARREST WITH VENTRICULAR FIBRILLATION: ICD-10-CM

## 2023-02-09 DIAGNOSIS — I48.0 PAROXYSMAL ATRIAL FIBRILLATION: ICD-10-CM

## 2023-02-09 DIAGNOSIS — I25.10 CORONARY ARTERY DISEASE INVOLVING NATIVE CORONARY ARTERY OF NATIVE HEART WITHOUT ANGINA PECTORIS: ICD-10-CM

## 2023-02-09 DIAGNOSIS — I50.42 CHRONIC COMBINED SYSTOLIC AND DIASTOLIC HEART FAILURE: Primary | ICD-10-CM

## 2023-02-09 DIAGNOSIS — E08.00 DIABETES MELLITUS DUE TO UNDERLYING CONDITION WITH HYPEROSMOLARITY WITHOUT COMA, WITHOUT LONG-TERM CURRENT USE OF INSULIN: ICD-10-CM

## 2023-02-09 DIAGNOSIS — I46.9 CARDIAC ARREST WITH VENTRICULAR FIBRILLATION: ICD-10-CM

## 2023-02-09 DIAGNOSIS — Z95.810 ICD (IMPLANTABLE CARDIOVERTER-DEFIBRILLATOR) IN PLACE: ICD-10-CM

## 2023-02-09 PROCEDURE — 74176 CT ABD & PELVIS W/O CONTRAST: CPT | Mod: TC,TXP

## 2023-02-09 PROCEDURE — 71250 CT CHEST ABDOMEN PELVIS WITHOUT CONTRAST(XPD): ICD-10-PCS | Mod: 26,TXP,, | Performed by: RADIOLOGY

## 2023-02-09 PROCEDURE — 99999 PR PBB SHADOW E&M-EST. PATIENT-LVL III: CPT | Mod: PBBFAC,TXP,, | Performed by: THORACIC SURGERY (CARDIOTHORACIC VASCULAR SURGERY)

## 2023-02-09 PROCEDURE — 70450 CT HEAD WITHOUT CONTRAST: ICD-10-PCS | Mod: 26,TXP,, | Performed by: RADIOLOGY

## 2023-02-09 PROCEDURE — 74176 CT ABD & PELVIS W/O CONTRAST: CPT | Mod: 26,TXP,, | Performed by: RADIOLOGY

## 2023-02-09 PROCEDURE — 99215 PR OFFICE/OUTPT VISIT, EST, LEVL V, 40-54 MIN: ICD-10-PCS | Mod: S$PBB,TXP,, | Performed by: INTERNAL MEDICINE

## 2023-02-09 PROCEDURE — 99999 PR PBB SHADOW E&M-EST. PATIENT-LVL II: CPT | Mod: PBBFAC,TXP,, | Performed by: INTERNAL MEDICINE

## 2023-02-09 PROCEDURE — 99999 PR PBB SHADOW E&M-EST. PATIENT-LVL II: ICD-10-PCS | Mod: PBBFAC,TXP,, | Performed by: INTERNAL MEDICINE

## 2023-02-09 PROCEDURE — 70450 CT HEAD/BRAIN W/O DYE: CPT | Mod: TC,TXP

## 2023-02-09 PROCEDURE — 74176 CT CHEST ABDOMEN PELVIS WITHOUT CONTRAST(XPD): ICD-10-PCS | Mod: 26,TXP,, | Performed by: RADIOLOGY

## 2023-02-09 PROCEDURE — 99999 PR PBB SHADOW E&M-EST. PATIENT-LVL III: ICD-10-PCS | Mod: PBBFAC,TXP,, | Performed by: THORACIC SURGERY (CARDIOTHORACIC VASCULAR SURGERY)

## 2023-02-09 PROCEDURE — 4010F ACE/ARB THERAPY RXD/TAKEN: CPT | Mod: TXP,,, | Performed by: THORACIC SURGERY (CARDIOTHORACIC VASCULAR SURGERY)

## 2023-02-09 PROCEDURE — 99215 OFFICE O/P EST HI 40 MIN: CPT | Mod: S$PBB,TXP,, | Performed by: INTERNAL MEDICINE

## 2023-02-09 PROCEDURE — 4010F ACE/ARB THERAPY RXD/TAKEN: CPT | Mod: TXP,,, | Performed by: INTERNAL MEDICINE

## 2023-02-09 PROCEDURE — 70450 CT HEAD/BRAIN W/O DYE: CPT | Mod: 26,TXP,, | Performed by: RADIOLOGY

## 2023-02-09 PROCEDURE — 99213 OFFICE O/P EST LOW 20 MIN: CPT | Mod: PBBFAC,25,27,TXP | Performed by: THORACIC SURGERY (CARDIOTHORACIC VASCULAR SURGERY)

## 2023-02-09 PROCEDURE — 4010F PR ACE/ARB THEARPY RXD/TAKEN: ICD-10-PCS | Mod: TXP,,, | Performed by: THORACIC SURGERY (CARDIOTHORACIC VASCULAR SURGERY)

## 2023-02-09 PROCEDURE — 99205 OFFICE O/P NEW HI 60 MIN: CPT | Mod: S$PBB,TXP,, | Performed by: THORACIC SURGERY (CARDIOTHORACIC VASCULAR SURGERY)

## 2023-02-09 PROCEDURE — 4010F PR ACE/ARB THEARPY RXD/TAKEN: ICD-10-PCS | Mod: TXP,,, | Performed by: INTERNAL MEDICINE

## 2023-02-09 PROCEDURE — 99212 OFFICE O/P EST SF 10 MIN: CPT | Mod: PBBFAC,25,TXP | Performed by: INTERNAL MEDICINE

## 2023-02-09 PROCEDURE — 71250 CT THORAX DX C-: CPT | Mod: 26,TXP,, | Performed by: RADIOLOGY

## 2023-02-09 PROCEDURE — 99205 PR OFFICE/OUTPT VISIT, NEW, LEVL V, 60-74 MIN: ICD-10-PCS | Mod: S$PBB,TXP,, | Performed by: THORACIC SURGERY (CARDIOTHORACIC VASCULAR SURGERY)

## 2023-02-09 RX ORDER — ISOSORBIDE DINITRATE 10 MG/1
10 TABLET ORAL 3 TIMES DAILY
Qty: 60 TABLET | Refills: 11 | Status: ON HOLD | OUTPATIENT
Start: 2023-02-09 | End: 2023-03-14 | Stop reason: HOSPADM

## 2023-02-09 RX ORDER — HYDRALAZINE HYDROCHLORIDE 25 MG/1
25 TABLET, FILM COATED ORAL 3 TIMES DAILY
Qty: 90 TABLET | Refills: 11 | Status: ON HOLD | OUTPATIENT
Start: 2023-02-09 | End: 2023-03-14 | Stop reason: HOSPADM

## 2023-02-09 RX ORDER — METOPROLOL SUCCINATE 25 MG/1
25 TABLET, EXTENDED RELEASE ORAL NIGHTLY
Qty: 30 TABLET | Refills: 11 | Status: ON HOLD | OUTPATIENT
Start: 2023-02-09 | End: 2023-04-22 | Stop reason: SDUPTHER

## 2023-02-09 RX ORDER — POTASSIUM CHLORIDE 20 MEQ/1
20 TABLET, EXTENDED RELEASE ORAL DAILY
Qty: 90 TABLET | Refills: 3 | Status: ON HOLD | OUTPATIENT
Start: 2023-02-09 | End: 2023-04-18 | Stop reason: HOSPADM

## 2023-02-09 NOTE — PROGRESS NOTES
EDUCATION NOTE:    Tera Griffiths was seen today for pre-heart transplant education.  Patient signed wellness contract and informed consent to undergo heart transplant evaluation work-up.  Thorough pre-transplant education conducted.      Information presented included:  Evaluation process  Members of the transplant team  Selection committee members and role of the committee  Listing process for transplant  Different listing designations, including status 7  1-year graft survival statistics  LVAD as bridge to transplant or DT  Need to reach patient within 15 minutes of donor offer  PHS Donors with Risk Factors  Blood transfusions  Process for matching donor with recipient  Need for weight loss and how it relates to the wait time  Post-transplant immunosuppression for life with need to be able to afford post-transplant medications  Need for a caregiver to be with them at all times beginning with discharge from ICU, through at least the first 6 weeks post-transplant  Need to find local housing for the first 6 weeks post-transplant  How to reach team members at any time  OS website with written instructions regarding how to look up information specific to [facility]'s transplant program  Use of Hepatitis C organs    Patient was accompanied by Claribel.  Patient asked pertinent questions, which were answered to their satisfaction.  Patient was also given a copy of the wellness contract and informed consent to undergo evaluation work-up.
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
## 2023-02-09 NOTE — LETTER
February 9, 2023        Rosalio Salazar  [address]  [address]  [place] [facility] [address]  Phone: [phone]  Fax: [phone]             [facility]  [address]  [facility] [address]  Phone: [phone]   Patient: Tera Griffiths   MR Number: [id number]   YOB: 1965   Date of Visit: 2/9/2023       Dear Dr. Rosalio Salazar    Thank you for referring Tera Griffiths to me for evaluation. Attached you will find relevant portions of my assessment and plan of care.    If you have questions, please do not hesitate to call me. I look forward to following Tera Griffiths along with you.    Sincerely,    Nicholas Aguilar MD    Enclosure    If you would like to receive this communication electronically, please contact [email or web address] or [phone] to request [employer] Link access.    [employer] Link is a tool which provides read-only access to select patient information with whom you have a relationship. Its easy to use and provides real time access to review your patients record including encounter summaries, notes, results, and demographic information.    If you feel you have received this communication in error or would no longer like to receive these types of communications, please e-mail [email or web address]

## 2023-02-09 NOTE — PROGRESS NOTES
Subjective:   Transplant status: active    HPI:  Mr. Griffiths is a very pleasant  57 y.o. year old black male with stage C HFrEF, ICMP, CAD s/p (STEMI s/p PCI to Rcx (8/2021), previous GIB, difficulty with medication compliance,  Afib s/p DCCV, uncontrolled HTN who comes for a follow-up visit. He is currently undergoing VAD/Tx work-up. This is his 1st visit with me. He reports NYHA class III symptoms. PND and orthopnea. He has had several HF admissions. He is on a excellent HF regimen that includes; Entresto 97/103 mg BID, metoprolol succinate 12.5 mg at night, aldactone 25 mg daily and torsemide 20 mg twice daily. Of note, his BP remains significantly elevated (SU=585/74 mm of Hg today) despite optimization of his GDMT.  Most recent Echo showed LV EF=20 cm (LVEDD=7 cm and RVD=4.2 cm). CPX was done with poor effort therefore peak VoO is not reliable though his VE/VCO2 slope was high at 37.5. RHC done recently showed mildly decreased cardiac index 2.05 L.min./m2 with moderate to severe pulmonary HTN (mainly driven by his elevated PCWP large V wave 27 mm of Hg). His BP during his RHC was 124/95 mm of Hg. Patient was given NTG and did show some improvement of his PA pressures (PVR=2.6).    2D Echo with CFD done on 1/5/2023  The left ventricle is severely enlarged with eccentric hypertrophy and severely decreased systolic function. (LVEDD= 7 cm)  The estimated ejection fraction is 15%.  There is left ventricular global hypokinesis.  Grade I left ventricular diastolic dysfunction.  Mild right ventricular enlargement with moderately reduced right ventricular systolic function. (RVD=4.4 cm)  Normal central venous pressure (3 mmHg).  Severe left atrial enlargement.  Moderate posterolateral pericardial effusion. Max diameter 1.4cm.    CPX done on 1/11/2023  Resting spirometry reveals an FVC = 1.05L which is 20.99% of predicted, an FEV1 of 0.63L, which is 15.55% of predicted and an FEV1/FVC ratio of 60%. The MVV = 25.2 L/min,  which is 16.33% of predicted.   The respiratory exchange ratio (RER) was 0.74, suggesting poor effort.   The breathing reserve is calculated at -10.32%, which is reduced. This may be due to a falsely reduced MVV. Oxygen saturation with exercise remained normal.   The peak VO2 was 12.1 ml/kg/min which is 32.97% of predicted equating to a functional capacity of 3.46 METS indicating severe functional impairment.   The anaerobic threshold was not attained.   The peak VO2 Lean was 14.02 ml/kg of lean body weight/min indicating a poor prognosis in heart failure.   The VE/VCO2 Titus was 37.5. The Resting PetCO2 was 23.0.   Severe functional impairment associated with a reduced breathing reserve, normal oxygen stauration, poor effort. These findings are indicative of functional impairment.    RHC done on 9/303022  RA: 10  PA: 60/25, mean 35  PCWP: 18 with V waves to 27     Saturation:  Arterial: 99 %  PA: 64 %     Mark CI/CO: 2.05/4.18     SVR: 1837 dynes/sec/cm -5  PVR: 4.1 Wood Units     Post 2 sprays of sublingual Nitroglycerin:  - Minimal change of BP from 124/95 (106) to 123/91 (102)  - PA pressure reduced to 50/20, with mean of 30  - PCWP largely unchanged at 19 with V waves to 27  - PVR 2.6    Past Medical History:   Diagnosis Date    Atrial fibrillation     CHF (congestive heart failure)     Coronary atherosclerosis of native coronary artery     Diabetes mellitus, type 2     Encounter for blood transfusion     Hypertension      Past Surgical History:   Procedure Laterality Date    CARDIAC DEFIBRILLATOR PLACEMENT Left     HERNIA REPAIR      RIGHT HEART CATHETERIZATION Right 9/30/2022    Procedure: INSERTION, CATHETER, RIGHT HEART;  Surgeon: Caden Aden MD;  Location: [facility] CATH LAB;  Service: Cardiology;  Laterality: Right;    TREATMENT OF CARDIAC ARRHYTHMIA N/A 11/22/2022    Procedure: CARDIOVERSION;  Surgeon: Marvin Sanon MD;  Location: [facility] EP LAB;  Service: Cardiology;  Laterality: N/A;  afib, KIA, DCCV,  LAN castillo, 3 Prep       Review of Systems   Constitutional: Negative. Negative for chills, decreased appetite, diaphoresis, fever, malaise/fatigue, night sweats, weight gain and weight loss.   Eyes: Negative.    Cardiovascular:  Positive for dyspnea on exertion, orthopnea and paroxysmal nocturnal dyspnea. Negative for chest pain, claudication, cyanosis, irregular heartbeat, leg swelling, near-syncope, palpitations and syncope.   Respiratory:  Negative for cough, hemoptysis and shortness of breath.    Endocrine: Negative.    Hematologic/Lymphatic: Negative.    Skin:  Negative for color change, dry skin and nail changes.   Musculoskeletal: Negative.    Gastrointestinal: Negative.    Genitourinary: Negative.    Neurological:  Negative for weakness.     Objective:   Blood pressure (!) 151/74, pulse 71, height 6' (1.829 m), weight 86.3 kg (190 lb 4.1 oz).body mass index is 25.8 kg/m².  Physical Exam  Vitals reviewed.   Constitutional:       Appearance: He is well-developed.      Comments: BP (!) 151/74 (BP Location: Left arm, Patient Position: Sitting, BP Method: Medium (Automatic))   Pulse 71   Ht 6' (1.829 m)   Wt 86.3 kg (190 lb 4.1 oz)   BMI 25.80 kg/m²      HENT:      Head: Normocephalic.   Neck:      Vascular: No carotid bruit or JVD.   Cardiovascular:      Rate and Rhythm: Regular rhythm.      Chest Wall: PMI is displaced.      Pulses: Normal pulses.      Heart sounds: Normal heart sounds. No murmur heard.  Pulmonary:      Effort: Pulmonary effort is normal.      Breath sounds: Normal breath sounds.   Abdominal:      General: Bowel sounds are normal.      Palpations: Abdomen is soft.   Skin:     General: Skin is warm.   Neurological:      Mental Status: He is alert.       Labs:    Chemistry        Component Value Date/Time     02/09/2023 1035    K 3.3 (L) 02/09/2023 1035    CL 99 02/09/2023 1035    CO2 29 02/09/2023 1035    BUN 14 02/09/2023 1035    CREATININE 1.4 02/09/2023 1035    GLU 89 02/09/2023 1035         Component Value Date/Time    CALCIUM 10.6 (H) 02/09/2023 1035    ALKPHOS 84 02/09/2023 1035    AST 23 02/09/2023 1035    ALT 25 02/09/2023 1035    BILITOT 0.8 02/09/2023 1035          Magnesium   Date Value Ref Range Status   01/23/2023 2.0 1.6 - 2.6 mg/dL Final     Lab Results   Component Value Date    WBC 3.32 (L) 02/09/2023    HGB 14.2 02/09/2023    HCT 47.2 02/09/2023     02/09/2023     Lab Results   Component Value Date    INR 1.1 01/23/2023    INR 1.1 11/15/2022    INR 1.1 09/30/2022     BNP   Date Value Ref Range Status   01/23/2023 3,635 (H) 0 - 99 pg/mL Final     Comment:     Values of less than 100 pg/ml are consistent with non-CHF populations.   01/09/2023 1,358 (H) 0 - 99 pg/mL Final     Comment:     Values of less than 100 pg/ml are consistent with non-CHF populations.   01/05/2023 399 (H) 0 - 99 pg/mL Final     Comment:     Values of less than 100 pg/ml are consistent with non-CHF populations.     LD   Date Value Ref Range Status   01/23/2023 232 110 - 260 U/L Final     Comment:     Results are increased in hemolyzed samples.         Assessment:      1. Chronic combined systolic and diastolic heart failure    2. Coronary artery disease involving native coronary artery of native heart without angina pectoris    3. Cardiac arrest with ventricular fibrillation    4. Diabetes mellitus due to underlying condition with hyperosmolarity without coma, without long-term current use of insulin    5. Primary hypertension    6. ICD (implantable cardioverter-defibrillator) in place    7. Paroxysmal atrial fibrillation    8. Arteriovenous malformation of cerebral vessels        Plan:   In summary Mr. Griffiths is a very pleasant with stage C HFrEF, ICMP, NYHA class III symptoms with multiple HF admissions  who is currently in work-up for advanced HF therapies. In my opinion, he will likely not need advanced HF therapies at this time we should optimize his GDMT. His BP remains elevated. I recommend the  following;  Increase metoprolol succinate to 25 mg at night   Start hydralazine 25 mg to take every 8 hours  Start isosorbide dinitrate (isordil) 10 mg every 8 hours.   Call your PCP for  asleep study to r/o sleep apnea  His current elevated BP would preclude MCS and his PA pressures would likely preclude OHTx at this time however with hsi elavted PCWP, thsi hemodynamics may improve with optimization of afterload reduction.    Recommend 2 gram sodium restriction and 1500cc fluid restriction.  Encourage physical activity with graded exercise program.  Requested patient to weigh themselves daily, and to notify us if their weight increases by more than 3 lbs in 1 day or 5 lbs in 1 week.       Transplant candidacy:  Patient is a 57 y.o. year old male with heart failure is being seen for possible LVAD and OHT. he is scheduled for  LVAD/OHTx work-up . The patient will follow up with pre-transplant.    Discussed with the patient and family [facility] program outcomes as reported in INTERMACS (Interagency Registry for Mechanically Assisted Circulatory Support):    1 year survival = 92.7%  2 year survival = 86.7%  3 year survival = 86.7%    Patient and family acknowledged receipt of this information and all questions were answered.     Patient met with  pre-transplant coordinator at the end of this visit.    Nicholas Aguilar MD

## 2023-02-09 NOTE — PATIENT INSTRUCTIONS
Increase metoprolol succinate to 25 mg at night   Start hydralazine 25 mg to take every 8 hours  Start isosorbide dinitrate (isordil) 10 mg every 8 hours.   Call your PCP for  asleep study to r/o sleep apnea

## 2023-02-09 NOTE — TELEPHONE ENCOUNTER
I called Mr. Griffiths as he is late for his appts- he answered his phone and said he is on his way now- he thought his appts started at 1000.  I let him know he had lab at 730, Dr Aguilar at 0900, and several evaluation appointments throughout the day.  He is on his way now and will come up to clinic after he goes to the lab.

## 2023-02-17 ENCOUNTER — NUTRITION (OUTPATIENT)
Dept: TRANSPLANT | Facility: CLINIC | Age: 58
End: 2023-02-17
Payer: MEDICAID

## 2023-02-17 ENCOUNTER — CLINICAL SUPPORT (OUTPATIENT)
Dept: INFECTIOUS DISEASES | Facility: CLINIC | Age: 58
End: 2023-02-17
Payer: MEDICAID

## 2023-02-17 ENCOUNTER — HOSPITAL ENCOUNTER (OUTPATIENT)
Dept: CARDIOLOGY | Facility: HOSPITAL | Age: 58
Discharge: HOME | End: 2023-02-17
Attending: INTERNAL MEDICINE
Payer: MEDICAID

## 2023-02-17 ENCOUNTER — OFFICE VISIT (OUTPATIENT)
Dept: INFECTIOUS DISEASES | Facility: CLINIC | Age: 58
End: 2023-02-17
Payer: MEDICAID

## 2023-02-17 VITALS
DIASTOLIC BLOOD PRESSURE: 115 MMHG | WEIGHT: 195.31 LBS | TEMPERATURE: 98 F | HEIGHT: 72 IN | BODY MASS INDEX: 26.45 KG/M2 | HEART RATE: 81 BPM | SYSTOLIC BLOOD PRESSURE: 169 MMHG

## 2023-02-17 DIAGNOSIS — Z71.85 VACCINE COUNSELING: ICD-10-CM

## 2023-02-17 DIAGNOSIS — I50.22 CHRONIC SYSTOLIC CONGESTIVE HEART FAILURE: Primary | ICD-10-CM

## 2023-02-17 DIAGNOSIS — Q28.2 ARTERIOVENOUS MALFORMATION OF CEREBRAL VESSELS: ICD-10-CM

## 2023-02-17 DIAGNOSIS — I25.10 CORONARY ARTERY DISEASE INVOLVING NATIVE CORONARY ARTERY OF NATIVE HEART WITHOUT ANGINA PECTORIS: ICD-10-CM

## 2023-02-17 DIAGNOSIS — I50.22 CHRONIC SYSTOLIC HEART FAILURE: ICD-10-CM

## 2023-02-17 DIAGNOSIS — Z01.818 PRE-TRANSPLANT EVALUATION FOR HEART TRANSPLANT: ICD-10-CM

## 2023-02-17 DIAGNOSIS — Z76.82 PRE-HEART TRANSPLANT, LISTED: Primary | ICD-10-CM

## 2023-02-17 LAB
LEFT ABI: 1.35
LEFT ARM BP: 163 MMHG
LEFT CBA DIAS: 11 CM/S
LEFT CBA SYS: 25 CM/S
LEFT CCA DIST DIAS: 9 CM/S
LEFT CCA DIST SYS: 40 CM/S
LEFT CCA MID DIAS: 17 CM/S
LEFT CCA MID SYS: 48 CM/S
LEFT CCA PROX DIAS: 19 CM/S
LEFT CCA PROX SYS: 59 CM/S
LEFT DORSALIS PEDIS: 166 MMHG
LEFT ECA DIAS: 10 CM/S
LEFT ECA SYS: 46 CM/S
LEFT ICA DIST DIAS: 23 CM/S
LEFT ICA DIST SYS: 36 CM/S
LEFT ICA MID DIAS: 27 CM/S
LEFT ICA MID SYS: 49 CM/S
LEFT ICA PROX DIAS: 13 CM/S
LEFT ICA PROX SYS: 27 CM/S
LEFT POSTERIOR TIBIAL: 222 MMHG
LEFT TBI: 0.73
LEFT TOE PRESSURE: 120 MMHG
LEFT VERTEBRAL DIAS: 13 CM/S
LEFT VERTEBRAL SYS: 29 CM/S
OHS CV CAROTID RIGHT ICA EDV HIGHEST: 19
OHS CV CAROTID ULTRASOUND LEFT ICA/CCA RATIO: 1.23
OHS CV CAROTID ULTRASOUND RIGHT ICA/CCA RATIO: 1
OHS CV PV CAROTID LEFT HIGHEST CCA: 59
OHS CV PV CAROTID LEFT HIGHEST ICA: 49
OHS CV PV CAROTID RIGHT HIGHEST CCA: 40
OHS CV PV CAROTID RIGHT HIGHEST ICA: 40
OHS CV US CAROTID LEFT HIGHEST EDV: 27
RIGHT ABI: 1.18
RIGHT ARM BP: 165 MMHG
RIGHT CBA DIAS: 15 CM/S
RIGHT CBA SYS: 41 CM/S
RIGHT CCA DIST DIAS: 17 CM/S
RIGHT CCA DIST SYS: 40 CM/S
RIGHT CCA MID DIAS: 13 CM/S
RIGHT CCA MID SYS: 37 CM/S
RIGHT CCA PROX DIAS: 9 CM/S
RIGHT CCA PROX SYS: 39 CM/S
RIGHT DORSALIS PEDIS: 160 MMHG
RIGHT ECA DIAS: 8 CM/S
RIGHT ECA SYS: 50 CM/S
RIGHT ICA DIST DIAS: 19 CM/S
RIGHT ICA DIST SYS: 40 CM/S
RIGHT ICA MID DIAS: 16 CM/S
RIGHT ICA MID SYS: 34 CM/S
RIGHT ICA PROX DIAS: 12 CM/S
RIGHT ICA PROX SYS: 24 CM/S
RIGHT POSTERIOR TIBIAL: 195 MMHG
RIGHT TBI: 0.73
RIGHT TOE PRESSURE: 120 MMHG
RIGHT VERTEBRAL DIAS: 10 CM/S
RIGHT VERTEBRAL SYS: 28 CM/S

## 2023-02-17 PROCEDURE — 93922 ANKLE BRACHIAL INDICES (ABI): ICD-10-PCS | Mod: 26,TXP,, | Performed by: INTERNAL MEDICINE

## 2023-02-17 PROCEDURE — 90677 PCV20 VACCINE IM: CPT | Mod: PBBFAC,TXP

## 2023-02-17 PROCEDURE — 93880 CV US DOPPLER CAROTID (CUPID ONLY): ICD-10-PCS | Mod: 26,TXP,, | Performed by: INTERNAL MEDICINE

## 2023-02-17 PROCEDURE — 93922 UPR/L XTREMITY ART 2 LEVELS: CPT | Mod: TXP

## 2023-02-17 PROCEDURE — 99204 PR OFFICE/OUTPT VISIT, NEW, LEVL IV, 45-59 MIN: ICD-10-PCS | Mod: S$PBB,TXP,, | Performed by: INTERNAL MEDICINE

## 2023-02-17 PROCEDURE — 93880 EXTRACRANIAL BILAT STUDY: CPT | Mod: TXP

## 2023-02-17 PROCEDURE — 99999 PR PBB SHADOW E&M-EST. PATIENT-LVL IV: CPT | Mod: PBBFAC,TXP,, | Performed by: INTERNAL MEDICINE

## 2023-02-17 PROCEDURE — 93922 UPR/L XTREMITY ART 2 LEVELS: CPT | Mod: 26,TXP,, | Performed by: INTERNAL MEDICINE

## 2023-02-17 PROCEDURE — 90632 HEPA VACCINE ADULT IM: CPT | Mod: PBBFAC,TXP

## 2023-02-17 PROCEDURE — 99204 OFFICE O/P NEW MOD 45 MIN: CPT | Mod: S$PBB,TXP,, | Performed by: INTERNAL MEDICINE

## 2023-02-17 PROCEDURE — 4010F PR ACE/ARB THEARPY RXD/TAKEN: ICD-10-PCS | Mod: TXP,,, | Performed by: INTERNAL MEDICINE

## 2023-02-17 PROCEDURE — 99999 PR PBB SHADOW E&M-EST. PATIENT-LVL IV: ICD-10-PCS | Mod: PBBFAC,TXP,, | Performed by: INTERNAL MEDICINE

## 2023-02-17 PROCEDURE — 4010F ACE/ARB THERAPY RXD/TAKEN: CPT | Mod: TXP,,, | Performed by: INTERNAL MEDICINE

## 2023-02-17 PROCEDURE — 93880 EXTRACRANIAL BILAT STUDY: CPT | Mod: 26,TXP,, | Performed by: INTERNAL MEDICINE

## 2023-02-17 PROCEDURE — 99214 OFFICE O/P EST MOD 30 MIN: CPT | Mod: PBBFAC,25,TXP | Performed by: INTERNAL MEDICINE

## 2023-02-17 PROCEDURE — 90472 IMMUNIZATION ADMIN EACH ADD: CPT | Mod: PBBFAC,TXP

## 2023-02-17 NOTE — PROGRESS NOTES
PRE-TRANSPLANT INFECTIOUS DISEASE CONSULT    Reason for Visit:  Pre-transplant evaluation  Referring Provider: Aaareferral Self     History of Present Illness:    57 y.o. male with a history of chronic systolic and diastolic heart failure presents for pre-heart transplant evaluation.    Infectious History:  Recent hospital admissions: No  Recent infections: No  Recent or current antibiotic use: No  History of recurrent infections *(sinus / pneumonia / UTI / SBP)*: No  Recent dental infections, issues or procedures: No  History of chicken pox: No  History of shingles: No  History of STI: No  History of COVID infection: Yes - 2020    History of Immunosuppression:  Prior chemotherapy / immunosuppression: No  Prior transplant: No  History of splenectomy: No    Tuberculosis:  Prior screening for latent TB: No  Prior diagnosis of latent TB: No    Geographical exposures:  Currently lives in [place] with cousin  Lived in the following states: [place]  Lived in [facility] [place]: No  International travel: No    Social/Environmental:  Occupation:    Pets: No  Livestock: No  Fishing / hunting: No  Hobbies: No  Water: Bottled  Consumption of raw/undercooked meat or seafood?  No  Tobacco: No  Alcohol: No  Recreational drug use:  No  Sexual partners: No      Past Histories:   Past Medical History:   Diagnosis Date    Atrial fibrillation     CHF (congestive heart failure)     Coronary atherosclerosis of native coronary artery     Diabetes mellitus, type 2     Encounter for blood transfusion     Hypertension      Past Surgical History:   Procedure Laterality Date    CARDIAC DEFIBRILLATOR PLACEMENT Left     HERNIA REPAIR      RIGHT HEART CATHETERIZATION Right 9/30/2022    Procedure: INSERTION, CATHETER, RIGHT HEART;  Surgeon: Caden Aden MD;  Location: [facility] CATH LAB;  Service: Cardiology;  Laterality: Right;    TREATMENT OF CARDIAC ARRHYTHMIA N/A 11/22/2022    Procedure: CARDIOVERSION;  Surgeon: Marvin Sanon MD;   Location: [facility] EP LAB;  Service: Cardiology;  Laterality: N/A;  afib, KIA, DCCV, anes, MB, 3 Prep     History reviewed. No pertinent family history.  Social History     Tobacco Use    Smoking status: Never    Smokeless tobacco: Never   Substance Use Topics    Alcohol use: Never    Drug use: Never     Review of patient's allergies indicates:  No Known Allergies      Immunization History:  Received all childhood vaccines: No  All household members receive annual flu vaccine: Yes  All household members are up to date on COVID vaccine: Yes      There is no immunization history on file for this patient.       Current antibiotics:  Antibiotics (From admission, onward)      None              Review of Systems   Constitutional: Positive for weight gain. Negative for chills, decreased appetite, fever, malaise/fatigue, night sweats and weight loss.   HENT:  Negative for congestion, ear pain, hearing loss, hoarse voice, sore throat and tinnitus.    Eyes:  Positive for blurred vision. Negative for redness and visual disturbance.   Cardiovascular:  Positive for chest pain, leg swelling and palpitations.   Respiratory:  Positive for shortness of breath. Negative for cough, hemoptysis, sputum production and wheezing.    Hematologic/Lymphatic: Positive for adenopathy. Does not bruise/bleed easily.   Skin:  Negative for dry skin, itching, rash and suspicious lesions.   Musculoskeletal:  Negative for back pain, joint pain, myalgias and neck pain.   Gastrointestinal:  Negative for abdominal pain, constipation, diarrhea, heartburn, nausea and vomiting.   Genitourinary:  Negative for dysuria, flank pain, frequency, hematuria, hesitancy and urgency.   Neurological:  Positive for dizziness and headaches. Negative for numbness, paresthesias and weakness.   Psychiatric/Behavioral:  Negative for depression and memory loss. The patient has insomnia. The patient is not nervous/anxious.      Objective  Physical Exam  Constitutional:        General: He is not in acute distress.     Appearance: He is well-developed. He is not diaphoretic.   HENT:      Head: Normocephalic and atraumatic.      Nose: Nose normal.   Eyes:      Conjunctiva/sclera: Conjunctivae normal.   Pulmonary:      Effort: Pulmonary effort is normal. No respiratory distress.   Abdominal:      General: Abdomen is flat. There is no distension.   Musculoskeletal:      Cervical back: Normal range of motion.      Right lower leg: No edema.      Left lower leg: No edema.   Skin:     General: Skin is warm and dry.      Findings: No erythema or rash.   Neurological:      Mental Status: He is alert.   Psychiatric:         Behavior: Behavior normal.         MELD: *liver transplant only*  MELD-Na score: 10 at 1/23/2023 12:29 PM  MELD score: 10 at 1/23/2023 12:29 PM  Calculated from:  Serum Creatinine: 1.3 mg/dL at 1/23/2023 12:29 PM  Serum Sodium: 141 mmol/L (Using max of 137 mmol/L) at 1/23/2023 12:29 PM  Total Bilirubin: 0.8 mg/dL (Using min of 1 mg/dL) at 1/23/2023 12:29 PM  INR(ratio): 1.1 at 1/23/2023 12:29 PM  Age: 57 years      Labs:    CBC:   Lab Results   Component Value Date    WBC 3.32 (L) 02/09/2023    HGB 14.2 02/09/2023    HCT 47.2 02/09/2023    MCV 90 02/09/2023     02/09/2023    GRAN 1.9 02/09/2023    GRAN 57.0 02/09/2023    LYMPH 0.9 (L) 02/09/2023    LYMPH 27.7 02/09/2023    MONO 0.4 02/09/2023    MONO 11.7 02/09/2023    EOSINOPHIL 3.0 02/09/2023       CMP:   Lab Results   Component Value Date     02/09/2023    K 3.3 (L) 02/09/2023    CL 99 02/09/2023    CO2 29 02/09/2023    GLU 89 02/09/2023    BUN 14 02/09/2023    CREATININE 1.4 02/09/2023    CALCIUM 10.6 (H) 02/09/2023    MG 2.0 01/23/2023    ALBUMIN 4.6 02/09/2023    PROT 8.6 (H) 02/09/2023    ALT 25 02/09/2023    AST 23 02/09/2023    ALKPHOS 84 02/09/2023    BILITOT 0.8 02/09/2023       Syphilis screening:   Lab Results   Component Value Date    RPR Non-reactive 01/23/2023        TB screening:   Lab Results    Component Value Date    TBGOLDPLUS Negative 01/23/2023       HIV screening:   Lab Results   Component Value Date    FBE17PRDG Non-reactive 01/03/2023       Strongyloides IgG:   Lab Results   Component Value Date    STRONGANTIGG Negative 01/23/2023       Hepatitis Serologies:   Lab Results   Component Value Date    HEPAIGG Non-reactive 01/23/2023    HEPBCAB Reactive (A) 01/23/2023    HEPBSAB 137.77 01/23/2023    HEPBSAB Reactive 01/23/2023    HEPCAB Non-reactive 01/03/2023        Varicella IgG:   Lab Results   Component Value Date    VARICELLAINT Negative 01/23/2023       Recent Microbiology:   Microbiology Results (last 7 days)       ** No results found for the last 168 hours. **              Radiology:  CT CAP 2/9/2023    Cardiomegaly and small pericardial effusion.  No significant thoracic aortic calcific atherosclerosis.  Mild abdominal aortic calcific atherosclerosis.     Dilatation of pulmonary trunk to 4.0 cm, an indicator of pulmonary artery hypertension.     1.6 cm heterogeneous exophytic lesion in the inferior pole of the left kidney, indeterminate on this examination.  Further evaluation with ultrasound, renal mass protocol MRI or CT recommended.     Several right sided pulmonary micro nodules.  For multiple solid nodules all <6 mm, Fleischner Society 2017 guidelines recommend no routine follow up for a low risk patient, or follow up with non-contrast chest CT at 12 months after discovery in a high risk patient.    Assessment and Plan    1. Risks of Infection: Available serologies were reviewed. No unusual risks of infection or significant barriers to transplantation were identified from the infectious disease standpoint.       2. Immunizations:  Based on the patient's immunization history and serologies, the following immunizations are recommended:  - Hepatitis A    Patient does not have immunity to hepatitis A    Vaccination required: Yes   - Hepatitis B    HepBcAb positive - further evaluation by  hepatology per heart transplant protocol    Patient does have immunity to hepatitis B    Vaccination ordered today: No    If not ordered, reason for not vaccinating: Immunity   - COVID    Current [facility] vaccination recommendations were discussed with the patient   - Influenza     Annual, high dose preferred   - Prevnar 20   - Tdap   - Varivax (VZV IgG negative)    Recommended Pre-Transplant Immunization Schedule  Vaccine  0m 1m 2m 6m   Pneumococcal conjugate vaccine (Prevnar 20) X      Tetanus-diphtheria-pertussis (Tdap)* X      Hepatitis A Vaccine (Havrix)** X   X   Hepatitis B Vaccine (Heplisav)** X X     Influenza X      Varivax X X            *Administer booster every 10 years.       **Administer if no immunity demonstrated on serologies                 3. Counseling:   I discussed with the patient the risk for increased susceptibility to infections following transplantation including increased risk for infection right after transplant and if rejection should occur.  The patient has been counseled on the importance of vaccinations including but not limited to a yearly flu vaccine. Patient was also instructed to encourage that family/caretakers receive their flu vaccine yearly. The patient was encouraged to contact us about any problems that may develop after immunizations and possible side effects were reviewed.     Specific guidance has been provided to the patient regarding the patient's occupation, hobbies and activities to avoid future infectious complications. These include but are not limited to: avoiding raw/undercooked meats and seafood, avoiding unpasteurized milk/cheeses, proper (hand) hygiene, contact with animals and appropriate vaccination of animals, use of mosquito/tick precautions, avoiding walking barefoot, avoiding sick contacts, and seeking medical advice prior to foreign travel (specifically developing countries).     4. Transplant Candidacy: Based on available information, there are no  identified significant barriers to transplantation from an infectious disease standpoint.  Final determination of transplant candidacy will be made once evaluation is complete and reviewed by the Selection Committee.      Follow up with infectious disease as needed. Please call if any pending serologic testing is positive.      The total time for evaluation and management services performed on 2/17/23 was greater than 45 minutes.

## 2023-02-17 NOTE — PROGRESS NOTES
TRANSPLANT NUTRITIONAL ASSESSMENT    Referring Provider: Rosalio Salazar MD    Reason for Visit: Pre-heart transplant work-up    Age: 57 y.o.  Sex: male    Patient Active Problem List   Diagnosis    Congestive heart failure    CAD (coronary artery disease)    Hx of non-ST elevation myocardial infarction (NSTEMI)    Atrial fibrillation    Diabetes mellitus    Acute on chronic combined systolic and diastolic heart failure    Dyslipidemia    ICD (implantable cardioverter-defibrillator) in place    HTN (hypertension)    Cardiac arrest with ventricular fibrillation    Arteriovenous malformation of cerebral vessels     Past Medical History:   Diagnosis Date    Atrial fibrillation     CHF (congestive heart failure)     Coronary atherosclerosis of native coronary artery     Diabetes mellitus, type 2     Encounter for blood transfusion     Hypertension      Lab Results   Component Value Date    GLU 89 02/09/2023    K 3.3 (L) 02/09/2023    PHOS 2.9 09/23/2022    MG 2.0 01/23/2023    CHOL 192 01/23/2023    HDL 79 (H) 01/23/2023    TRIG 64 01/23/2023    ALBUMIN 4.6 02/09/2023    PREALBUMIN 34 01/23/2023    HGBA1C 5.7 (H) 09/23/2022    CALCIUM 10.6 (H) 02/09/2023     Other Pertinent Labs: GFR: 59.4 (L), PROTEIN: 8.6 (H), BNP: 3,233    Current Outpatient Medications   Medication Sig    amiodarone (PACERONE) 200 MG Tab Take 1 tablet (200 mg total) by mouth 2 (two) times daily.    apixaban (ELIQUIS) 5 mg Tab Take 5 mg by mouth 2 (two) times daily.    atorvastatin (LIPITOR) 80 MG tablet Take 80 mg by mouth once daily.    canagliflozin (INVOKANA) 100 mg Tab tablet Take 100 mg by mouth once daily.    clopidogreL (PLAVIX) 75 mg tablet Take 75 mg by mouth once daily.    ferrous sulfate (FEOSOL) 325 mg (65 mg iron) Tab tablet Take 325 mg by mouth every other day.    glimepiride (AMARYL) 4 MG tablet Take 4 mg by mouth every morning.    hydrALAZINE (APRESOLINE) 25 MG tablet Take 1 tablet (25 mg total) by mouth 3 (three) times daily.     isosorbide dinitrate (ISORDIL) 10 MG tablet Take 1 tablet (10 mg total) by mouth 3 (three) times daily.    LIDOcaine (LIDODERM) 5 % Place 1 patch onto the skin once daily.    metoprolol succinate (TOPROL-XL) 25 MG 24 hr tablet Take 1 tablet (25 mg total) by mouth every evening.    pantoprazole (PROTONIX) 40 MG tablet Take 40 mg by mouth once daily.    potassium chloride SA (K-DUR,KLOR-CON) 20 MEQ tablet Take 1 tablet (20 mEq total) by mouth once daily.    sacubitriL-valsartan (ENTRESTO)  mg per tablet Take 1 tablet by mouth 2 (two) times daily.    spironolactone (ALDACTONE) 25 MG tablet Take 25 mg by mouth once daily.    tamsulosin (FLOMAX) 0.4 mg Cap Take 1 capsule by mouth every evening.    torsemide (DEMADEX) 20 MG Tab Take 1 tablet (20 mg total) by mouth 2 (two) times a day.     No current facility-administered medications for this visit.     Allergies: Patient has no known allergies.    Ht Readings from Last 1 Encounters:   02/09/23 6' (1.829 m)     Wt Readings from Last 1 Encounters:   02/09/23 86 kg (189 lb 9.5 oz)      BMI:  25.71 kg/m²    Usual Weight: 85 KG (187 LBS)    Weight Change/Time: 5.7 lbs in one week  Current Diet: ELIUD, 1500 fluid res  Appetite/Current Intake: fair   Exercise/Physical Activity: none  Nutritional/Herbal Supplements: iron, vit E  Potential Food/Medication Interactions: Lipitor: avoid fatty foods, alcohol. Pacerone:avoid grapefruits and grapefruit juice   Chewing/Swallowing Problems: no  Symptoms: none  Assessment of Lab Values: related to cardiac dysfunction  Support System: family    Estimated Kcal Need: 2150 - 2580 kcals (25 - 30 kcals/ kg CBW)  Estimated Protein Need: 69 (0.8 g/kg CBW)    Nutritional History: pt reports breakfast of home cooked grits. Lunch is a turkey sandwich with mendez. Pt states dinner is beans or baked chicken. Pt drinks 2x 16.9 oz water and 12oz fito-aid.     Nutritional Diagnoses  Problem: predicted suboptimal energy intake  Etiology: pt's food  choices not being caloric dense  Symptoms: nutr report    Educational Need? yes  Barriers: none identified  Discussed with: patient  Interventions: Patient taught nutrition information regarding Pre-heart transplant work-up  .  Recc pt get balance meals along witch checking Na for Turkey for sandwiches to monitor Na closer  Goals/Recommendations: diet adherence, small frequent meals and snacks, and increase intake of high fiber foods  Actions Taken: instruct/provide written information  Strategies Used: problem solving, goal setting, motivational interviewing  Patient and/or family comprehend instructions: yes , adherence expected  Outcome: Verbalizes understanding  Monitoring: Contact information provided, will f/u in clinic and communicate with the care team as needed.     Counseling Time: 15 minutes

## 2023-02-17 NOTE — PROGRESS NOTES
Patient received the Hep A, and Prevnar 20 vaccines in the right deltoid, and the Varicella SQ vaccine in the left posterior arm. Pt tolerated well. Pt asked to wait in the clinic 15 minutes after injection in the event of an allergic reaction. Pt verbalized understanding. Pt left in NAD.

## 2023-02-17 NOTE — PROGRESS NOTES
Review of Systems   Constitutional: Positive for weight gain. Negative for chills, decreased appetite, fever, malaise/fatigue, night sweats and weight loss.   HENT:  Negative for congestion, ear pain, hearing loss, hoarse voice, sore throat and tinnitus.    Eyes:  Positive for blurred vision. Negative for redness and visual disturbance.   Cardiovascular:  Positive for chest pain, leg swelling and palpitations.   Respiratory:  Positive for shortness of breath. Negative for cough, hemoptysis, sputum production and wheezing.    Hematologic/Lymphatic: Positive for adenopathy. Does not bruise/bleed easily.   Skin:  Negative for dry skin, itching, rash and suspicious lesions.   Musculoskeletal:  Negative for back pain, joint pain, myalgias and neck pain.   Gastrointestinal:  Negative for abdominal pain, constipation, diarrhea, heartburn, nausea and vomiting.   Genitourinary:  Negative for dysuria, flank pain, frequency, hematuria, hesitancy and urgency.   Neurological:  Positive for dizziness and headaches. Negative for numbness, paresthesias and weakness.   Psychiatric/Behavioral:  Negative for depression and memory loss. The patient has insomnia. The patient is not nervous/anxious.    Objective:   Physical Exam  Assessment:       No diagnosis found.      Plan:       ***

## 2023-03-09 ENCOUNTER — OFFICE VISIT (OUTPATIENT)
Dept: PALLIATIVE MEDICINE | Facility: CLINIC | Age: 58
DRG: 291 | End: 2023-03-09
Payer: MEDICAID

## 2023-03-09 ENCOUNTER — HOSPITAL ENCOUNTER (INPATIENT)
Facility: HOSPITAL | Age: 58
LOS: 4 days | Discharge: HOME | DRG: 291 | End: 2023-03-14
Attending: STUDENT IN AN ORGANIZED HEALTH CARE EDUCATION/TRAINING PROGRAM | Admitting: INTERNAL MEDICINE
Payer: MEDICAID

## 2023-03-09 VITALS
OXYGEN SATURATION: 97 % | SYSTOLIC BLOOD PRESSURE: 151 MMHG | HEIGHT: 72 IN | DIASTOLIC BLOOD PRESSURE: 107 MMHG | BODY MASS INDEX: 26.43 KG/M2 | WEIGHT: 195.13 LBS | HEART RATE: 73 BPM

## 2023-03-09 DIAGNOSIS — R07.9 CHEST PAIN: ICD-10-CM

## 2023-03-09 DIAGNOSIS — I50.9 ACUTE DECOMPENSATED HEART FAILURE: ICD-10-CM

## 2023-03-09 DIAGNOSIS — Z51.5 PALLIATIVE CARE ENCOUNTER: ICD-10-CM

## 2023-03-09 DIAGNOSIS — I50.43 ACUTE ON CHRONIC COMBINED SYSTOLIC AND DIASTOLIC HEART FAILURE: Primary | ICD-10-CM

## 2023-03-09 DIAGNOSIS — I50.22 CHRONIC SYSTOLIC HEART FAILURE: ICD-10-CM

## 2023-03-09 DIAGNOSIS — I50.22 CHRONIC SYSTOLIC CONGESTIVE HEART FAILURE: ICD-10-CM

## 2023-03-09 DIAGNOSIS — I50.22 CHRONIC SYSTOLIC CONGESTIVE HEART FAILURE: Primary | ICD-10-CM

## 2023-03-09 DIAGNOSIS — R06.00 DYSPNEA, UNSPECIFIED TYPE: ICD-10-CM

## 2023-03-09 LAB
ALBUMIN SERPL BCP-MCNC: 4.2 G/DL (ref 3.5–5.2)
ALLENS TEST: ABNORMAL
ALLENS TEST: NORMAL
ALP SERPL-CCNC: 79 U/L (ref 55–135)
ALT SERPL W/O P-5'-P-CCNC: 16 U/L (ref 10–44)
ANION GAP SERPL CALC-SCNC: 10 MMOL/L (ref 8–16)
AST SERPL-CCNC: 18 U/L (ref 10–40)
BASOPHILS # BLD AUTO: 0.01 K/UL (ref 0–0.2)
BASOPHILS # BLD AUTO: 0.02 K/UL (ref 0–0.2)
BASOPHILS NFR BLD: 0.2 % (ref 0–1.9)
BASOPHILS NFR BLD: 0.5 % (ref 0–1.9)
BILIRUB SERPL-MCNC: 0.8 MG/DL (ref 0.1–1)
BNP SERPL-MCNC: 1755 PG/ML (ref 0–99)
BUN SERPL-MCNC: 12 MG/DL (ref 6–20)
CALCIUM SERPL-MCNC: 9.7 MG/DL (ref 8.7–10.5)
CHLORIDE SERPL-SCNC: 107 MMOL/L (ref 95–110)
CO2 SERPL-SCNC: 23 MMOL/L (ref 23–29)
CREAT SERPL-MCNC: 1.2 MG/DL (ref 0.5–1.4)
DELSYS: ABNORMAL
DELSYS: NORMAL
DIFFERENTIAL METHOD: ABNORMAL
DIFFERENTIAL METHOD: ABNORMAL
EOSINOPHIL # BLD AUTO: 0.2 K/UL (ref 0–0.5)
EOSINOPHIL # BLD AUTO: 0.2 K/UL (ref 0–0.5)
EOSINOPHIL NFR BLD: 3.7 % (ref 0–8)
EOSINOPHIL NFR BLD: 4.1 % (ref 0–8)
ERYTHROCYTE [DISTWIDTH] IN BLOOD BY AUTOMATED COUNT: 14.8 % (ref 11.5–14.5)
ERYTHROCYTE [DISTWIDTH] IN BLOOD BY AUTOMATED COUNT: 15 % (ref 11.5–14.5)
EST. GFR  (NO RACE VARIABLE): >60 ML/MIN/1.73 M^2
GLUCOSE SERPL-MCNC: 77 MG/DL (ref 70–110)
HCO3 UR-SCNC: 27.5 MMOL/L (ref 24–28)
HCT VFR BLD AUTO: 41.6 % (ref 40–54)
HCT VFR BLD AUTO: 47.8 % (ref 40–54)
HCT VFR BLD CALC: 47 %PCV (ref 36–54)
HGB BLD-MCNC: 12.8 G/DL (ref 14–18)
HGB BLD-MCNC: 14 G/DL (ref 14–18)
IMM GRANULOCYTES # BLD AUTO: 0.01 K/UL (ref 0–0.04)
IMM GRANULOCYTES # BLD AUTO: 0.01 K/UL (ref 0–0.04)
IMM GRANULOCYTES NFR BLD AUTO: 0.2 % (ref 0–0.5)
IMM GRANULOCYTES NFR BLD AUTO: 0.3 % (ref 0–0.5)
LDH SERPL L TO P-CCNC: 0.87 MMOL/L (ref 0.36–1.25)
LYMPHOCYTES # BLD AUTO: 1 K/UL (ref 1–4.8)
LYMPHOCYTES # BLD AUTO: 1.1 K/UL (ref 1–4.8)
LYMPHOCYTES NFR BLD: 26.2 % (ref 18–48)
LYMPHOCYTES NFR BLD: 27.2 % (ref 18–48)
MCH RBC QN AUTO: 27 PG (ref 27–31)
MCH RBC QN AUTO: 27.2 PG (ref 27–31)
MCHC RBC AUTO-ENTMCNC: 29.3 G/DL (ref 32–36)
MCHC RBC AUTO-ENTMCNC: 30.8 G/DL (ref 32–36)
MCV RBC AUTO: 89 FL (ref 82–98)
MCV RBC AUTO: 92 FL (ref 82–98)
MONOCYTES # BLD AUTO: 0.4 K/UL (ref 0.3–1)
MONOCYTES # BLD AUTO: 0.5 K/UL (ref 0.3–1)
MONOCYTES NFR BLD: 11.4 % (ref 4–15)
MONOCYTES NFR BLD: 11.4 % (ref 4–15)
NEUTROPHILS # BLD AUTO: 2.1 K/UL (ref 1.8–7.7)
NEUTROPHILS # BLD AUTO: 2.4 K/UL (ref 1.8–7.7)
NEUTROPHILS NFR BLD: 56.5 % (ref 38–73)
NEUTROPHILS NFR BLD: 58.3 % (ref 38–73)
NRBC BLD-RTO: 0 /100 WBC
NRBC BLD-RTO: 0 /100 WBC
PCO2 BLDA: 41.6 MMHG (ref 35–45)
PH SMN: 7.43 [PH] (ref 7.35–7.45)
PLATELET # BLD AUTO: 313 K/UL (ref 150–450)
PLATELET # BLD AUTO: 343 K/UL (ref 150–450)
PMV BLD AUTO: 11 FL (ref 9.2–12.9)
PMV BLD AUTO: 11.3 FL (ref 9.2–12.9)
PO2 BLDA: 75 MMHG (ref 80–100)
POC BE: 3 MMOL/L
POC IONIZED CALCIUM: 1.24 MMOL/L (ref 1.06–1.42)
POC SATURATED O2: 95 % (ref 95–100)
POC TCO2: 29 MMOL/L (ref 23–27)
POTASSIUM BLD-SCNC: 3.4 MMOL/L (ref 3.5–5.1)
POTASSIUM SERPL-SCNC: 4.2 MMOL/L (ref 3.5–5.1)
PROT SERPL-MCNC: 7.8 G/DL (ref 6–8.4)
RBC # BLD AUTO: 4.7 M/UL (ref 4.6–6.2)
RBC # BLD AUTO: 5.19 M/UL (ref 4.6–6.2)
SAMPLE: ABNORMAL
SAMPLE: NORMAL
SITE: ABNORMAL
SITE: NORMAL
SODIUM BLD-SCNC: 138 MMOL/L (ref 136–145)
SODIUM SERPL-SCNC: 140 MMOL/L (ref 136–145)
TROPONIN I SERPL DL<=0.01 NG/ML-MCNC: 0.09 NG/ML (ref 0–0.03)
TROPONIN I SERPL DL<=0.01 NG/ML-MCNC: 0.1 NG/ML (ref 0–0.03)
TROPONIN I SERPL DL<=0.01 NG/ML-MCNC: 0.1 NG/ML (ref 0–0.03)
WBC # BLD AUTO: 3.68 K/UL (ref 3.9–12.7)
WBC # BLD AUTO: 4.04 K/UL (ref 3.9–12.7)

## 2023-03-09 PROCEDURE — 99999 PR PBB SHADOW E&M-EST. PATIENT-LVL III: CPT | Mod: PBBFAC,TXP,, | Performed by: STUDENT IN AN ORGANIZED HEALTH CARE EDUCATION/TRAINING PROGRAM

## 2023-03-09 PROCEDURE — 25000003 PHARM REV CODE 250: Mod: NTX | Performed by: STUDENT IN AN ORGANIZED HEALTH CARE EDUCATION/TRAINING PROGRAM

## 2023-03-09 PROCEDURE — G0378 HOSPITAL OBSERVATION PER HR: HCPCS | Mod: NTX

## 2023-03-09 PROCEDURE — 93010 EKG 12-LEAD: ICD-10-PCS | Mod: NTX,,, | Performed by: INTERNAL MEDICINE

## 2023-03-09 PROCEDURE — 93005 ELECTROCARDIOGRAM TRACING: CPT | Mod: NTX

## 2023-03-09 PROCEDURE — 80053 COMPREHEN METABOLIC PANEL: CPT | Mod: 91,NTX | Performed by: INTERNAL MEDICINE

## 2023-03-09 PROCEDURE — 83880 ASSAY OF NATRIURETIC PEPTIDE: CPT | Mod: NTX | Performed by: STUDENT IN AN ORGANIZED HEALTH CARE EDUCATION/TRAINING PROGRAM

## 2023-03-09 PROCEDURE — 99285 EMERGENCY DEPT VISIT HI MDM: CPT | Mod: NTX,,, | Performed by: STUDENT IN AN ORGANIZED HEALTH CARE EDUCATION/TRAINING PROGRAM

## 2023-03-09 PROCEDURE — 99215 OFFICE O/P EST HI 40 MIN: CPT | Mod: S$PBB,NTX,, | Performed by: STUDENT IN AN ORGANIZED HEALTH CARE EDUCATION/TRAINING PROGRAM

## 2023-03-09 PROCEDURE — 99285 EMERGENCY DEPT VISIT HI MDM: CPT | Mod: 25,27,NTX

## 2023-03-09 PROCEDURE — 84484 ASSAY OF TROPONIN QUANT: CPT | Mod: 91,NTX | Performed by: STUDENT IN AN ORGANIZED HEALTH CARE EDUCATION/TRAINING PROGRAM

## 2023-03-09 PROCEDURE — 93010 ELECTROCARDIOGRAM REPORT: CPT | Mod: NTX,,, | Performed by: INTERNAL MEDICINE

## 2023-03-09 PROCEDURE — 25000242 PHARM REV CODE 250 ALT 637 W/ HCPCS: Mod: NTX | Performed by: STUDENT IN AN ORGANIZED HEALTH CARE EDUCATION/TRAINING PROGRAM

## 2023-03-09 PROCEDURE — 96374 THER/PROPH/DIAG INJ IV PUSH: CPT | Mod: NTX

## 2023-03-09 PROCEDURE — 63600175 PHARM REV CODE 636 W HCPCS: Mod: NTX | Performed by: INTERNAL MEDICINE

## 2023-03-09 PROCEDURE — 84484 ASSAY OF TROPONIN QUANT: CPT | Mod: NTX | Performed by: STUDENT IN AN ORGANIZED HEALTH CARE EDUCATION/TRAINING PROGRAM

## 2023-03-09 PROCEDURE — 83605 ASSAY OF LACTIC ACID: CPT | Mod: NTX | Performed by: INTERNAL MEDICINE

## 2023-03-09 PROCEDURE — 99213 OFFICE O/P EST LOW 20 MIN: CPT | Mod: PBBFAC,TXP,25 | Performed by: STUDENT IN AN ORGANIZED HEALTH CARE EDUCATION/TRAINING PROGRAM

## 2023-03-09 PROCEDURE — 63600175 PHARM REV CODE 636 W HCPCS: Mod: NTX | Performed by: STUDENT IN AN ORGANIZED HEALTH CARE EDUCATION/TRAINING PROGRAM

## 2023-03-09 PROCEDURE — 99215 PR OFFICE/OUTPT VISIT, EST, LEVL V, 40-54 MIN: ICD-10-PCS | Mod: S$PBB,NTX,, | Performed by: STUDENT IN AN ORGANIZED HEALTH CARE EDUCATION/TRAINING PROGRAM

## 2023-03-09 PROCEDURE — 85025 COMPLETE CBC W/AUTO DIFF WBC: CPT | Mod: NTX | Performed by: STUDENT IN AN ORGANIZED HEALTH CARE EDUCATION/TRAINING PROGRAM

## 2023-03-09 PROCEDURE — 36415 COLL VENOUS BLD VENIPUNCTURE: CPT | Mod: NTX | Performed by: INTERNAL MEDICINE

## 2023-03-09 PROCEDURE — 99999 PR PBB SHADOW E&M-EST. PATIENT-LVL III: ICD-10-PCS | Mod: PBBFAC,TXP,, | Performed by: STUDENT IN AN ORGANIZED HEALTH CARE EDUCATION/TRAINING PROGRAM

## 2023-03-09 PROCEDURE — 80053 COMPREHEN METABOLIC PANEL: CPT | Mod: 91,NTX | Performed by: STUDENT IN AN ORGANIZED HEALTH CARE EDUCATION/TRAINING PROGRAM

## 2023-03-09 PROCEDURE — 36415 COLL VENOUS BLD VENIPUNCTURE: CPT | Mod: NTX | Performed by: STUDENT IN AN ORGANIZED HEALTH CARE EDUCATION/TRAINING PROGRAM

## 2023-03-09 PROCEDURE — 85025 COMPLETE CBC W/AUTO DIFF WBC: CPT | Mod: 91,NTX | Performed by: STUDENT IN AN ORGANIZED HEALTH CARE EDUCATION/TRAINING PROGRAM

## 2023-03-09 PROCEDURE — 99285 PR EMERGENCY DEPT VISIT,LEVEL V: ICD-10-PCS | Mod: NTX,,, | Performed by: STUDENT IN AN ORGANIZED HEALTH CARE EDUCATION/TRAINING PROGRAM

## 2023-03-09 RX ORDER — AMIODARONE HYDROCHLORIDE 200 MG/1
200 TABLET ORAL 2 TIMES DAILY
Status: DISCONTINUED | OUTPATIENT
Start: 2023-03-09 | End: 2023-03-14 | Stop reason: HOSPADM

## 2023-03-09 RX ORDER — MORPHINE SULFATE 2 MG/ML
1 INJECTION, SOLUTION INTRAMUSCULAR; INTRAVENOUS ONCE
Status: COMPLETED | OUTPATIENT
Start: 2023-03-09 | End: 2023-03-09

## 2023-03-09 RX ORDER — CLOPIDOGREL BISULFATE 75 MG/1
75 TABLET ORAL DAILY
Status: DISCONTINUED | OUTPATIENT
Start: 2023-03-10 | End: 2023-03-14 | Stop reason: HOSPADM

## 2023-03-09 RX ORDER — TAMSULOSIN HYDROCHLORIDE 0.4 MG/1
1 CAPSULE ORAL NIGHTLY
Status: DISCONTINUED | OUTPATIENT
Start: 2023-03-09 | End: 2023-03-14 | Stop reason: HOSPADM

## 2023-03-09 RX ORDER — ISOSORBIDE DINITRATE 10 MG/1
10 TABLET ORAL 3 TIMES DAILY
Status: DISCONTINUED | OUTPATIENT
Start: 2023-03-09 | End: 2023-03-10

## 2023-03-09 RX ORDER — METOPROLOL SUCCINATE 25 MG/1
25 TABLET, EXTENDED RELEASE ORAL NIGHTLY
Status: DISCONTINUED | OUTPATIENT
Start: 2023-03-09 | End: 2023-03-10

## 2023-03-09 RX ORDER — ASPIRIN 325 MG
325 TABLET ORAL
Status: COMPLETED | OUTPATIENT
Start: 2023-03-09 | End: 2023-03-09

## 2023-03-09 RX ORDER — FUROSEMIDE 10 MG/ML
80 INJECTION INTRAMUSCULAR; INTRAVENOUS
Status: DISCONTINUED | OUTPATIENT
Start: 2023-03-09 | End: 2023-03-09

## 2023-03-09 RX ORDER — FUROSEMIDE 10 MG/ML
80 INJECTION INTRAMUSCULAR; INTRAVENOUS
Status: COMPLETED | OUTPATIENT
Start: 2023-03-09 | End: 2023-03-09

## 2023-03-09 RX ORDER — SPIRONOLACTONE 25 MG/1
25 TABLET ORAL DAILY
Status: DISCONTINUED | OUTPATIENT
Start: 2023-03-10 | End: 2023-03-10

## 2023-03-09 RX ORDER — ACETAMINOPHEN 325 MG/1
650 TABLET ORAL EVERY 4 HOURS PRN
Status: DISCONTINUED | OUTPATIENT
Start: 2023-03-09 | End: 2023-03-14 | Stop reason: HOSPADM

## 2023-03-09 RX ORDER — HYDRALAZINE HYDROCHLORIDE 25 MG/1
25 TABLET, FILM COATED ORAL 3 TIMES DAILY
Status: DISCONTINUED | OUTPATIENT
Start: 2023-03-09 | End: 2023-03-10

## 2023-03-09 RX ORDER — NITROGLYCERIN 0.4 MG/1
0.4 TABLET SUBLINGUAL EVERY 5 MIN PRN
Status: DISCONTINUED | OUTPATIENT
Start: 2023-03-09 | End: 2023-03-14 | Stop reason: HOSPADM

## 2023-03-09 RX ORDER — PANTOPRAZOLE SODIUM 40 MG/1
40 TABLET, DELAYED RELEASE ORAL DAILY
Status: DISCONTINUED | OUTPATIENT
Start: 2023-03-10 | End: 2023-03-14 | Stop reason: HOSPADM

## 2023-03-09 RX ORDER — SODIUM CHLORIDE 0.9 % (FLUSH) 0.9 %
10 SYRINGE (ML) INJECTION
Status: DISCONTINUED | OUTPATIENT
Start: 2023-03-09 | End: 2023-03-14 | Stop reason: HOSPADM

## 2023-03-09 RX ORDER — ATORVASTATIN CALCIUM 40 MG/1
80 TABLET, FILM COATED ORAL DAILY
Status: DISCONTINUED | OUTPATIENT
Start: 2023-03-10 | End: 2023-03-14 | Stop reason: HOSPADM

## 2023-03-09 RX ADMIN — FUROSEMIDE 80 MG: 10 INJECTION, SOLUTION INTRAMUSCULAR; INTRAVENOUS at 09:03

## 2023-03-09 RX ADMIN — MORPHINE SULFATE 1 MG: 2 INJECTION, SOLUTION INTRAMUSCULAR; INTRAVENOUS at 10:03

## 2023-03-09 RX ADMIN — HYDRALAZINE HYDROCHLORIDE 25 MG: 25 TABLET, FILM COATED ORAL at 09:03

## 2023-03-09 RX ADMIN — METOPROLOL SUCCINATE 25 MG: 25 TABLET, EXTENDED RELEASE ORAL at 09:03

## 2023-03-09 RX ADMIN — FUROSEMIDE 80 MG: 10 INJECTION, SOLUTION INTRAMUSCULAR; INTRAVENOUS at 04:03

## 2023-03-09 RX ADMIN — NITROGLYCERIN 0.4 MG: 0.4 TABLET, ORALLY DISINTEGRATING SUBLINGUAL at 04:03

## 2023-03-09 RX ADMIN — NITROGLYCERIN 0.4 MG: 0.4 TABLET, ORALLY DISINTEGRATING SUBLINGUAL at 06:03

## 2023-03-09 RX ADMIN — APIXABAN 5 MG: 5 TABLET, FILM COATED ORAL at 09:03

## 2023-03-09 RX ADMIN — AMIODARONE HYDROCHLORIDE 200 MG: 200 TABLET ORAL at 09:03

## 2023-03-09 RX ADMIN — ASPIRIN 325 MG ORAL TABLET 325 MG: 325 PILL ORAL at 04:03

## 2023-03-09 RX ADMIN — SACUBITRIL AND VALSARTAN 1 TABLET: 97; 103 TABLET, FILM COATED ORAL at 09:03

## 2023-03-09 RX ADMIN — NITROGLYCERIN 0.4 MG: 0.4 TABLET, ORALLY DISINTEGRATING SUBLINGUAL at 05:03

## 2023-03-09 RX ADMIN — ISOSORBIDE DINITRATE 10 MG: 10 TABLET ORAL at 09:03

## 2023-03-09 RX ADMIN — TAMSULOSIN HYDROCHLORIDE 0.4 MG: 0.4 CAPSULE ORAL at 09:03

## 2023-03-09 NOTE — ED NOTES
Patient identifiers verified and correct for Tera Griffiths   LOC: The patient is awake, alert and aware of environment with an appropriate affect, the patient is oriented x 3 and speaking appropriately.   APPEARANCE: Patient appears to be in pain, patient is clean and well groomed.  SKIN: The skin is warm and dry, color consistent with ethnicity, patient has normal skin turgor and moist mucus membranes, skin intact, no breakdown or bruising noted.   MUSCULOSKELETAL: Patient moving all extremities spontaneously, no swelling noted.  RESPIRATORY: Airway is open and patent, respirations are spontaneous, patient is tachypneic, no accessory muscle use noted, pt placed on continuous pulse ox with O2 sats noted at 99% on room air.  CARDIAC: Pt placed on portable cardiac monitor. Patient has a normal rate and regular rhythm, no edema noted, capillary refill < 3 seconds.   GASTRO: Soft and non tender to palpation, no distention noted, normoactive bowel sounds present in all four quadrants. Pt states bowel movements have been regular.  : Pt denies any pain or frequency with urination.  NEURO: Pt opens eyes spontaneously, behavior appropriate to situation, follows commands, facial expression symmetrical, bilateral hand grasp equal and even, purposeful motor response noted, normal sensation in all extremities when touched with a finger.

## 2023-03-09 NOTE — PROGRESS NOTES
Consult Note  Palliative Medicine Clinic      Consult Requested By: No ref. provider found    Primary Care Physician: Primary Doctor No    Reason for Consult: Advance care planning and symptom management in the setting of Heart Failure      ASSESSMENT/PLAN:     Plan/Recommendations:  Tera was seen today for establish care.    Diagnoses and all orders for this visit:    Chronic systolic heart failure / ICD (implantable cardioverter-defibrillator) in place / Dyspnea, unspecified type  - EF is 15%  - Followed by Cardiology, undergoing workup for advanced therapies   -His main goal is life prolongation   -He was very dyspneic today, seemed uncomfortable  -endorsed fluid overload, has been taking torsemide 20 TID for the last week and feels like fluid re accumulates very easily  -Advised him to go to the ED - he kept minimizing his sx even though he was visibly uncomfortable  -If he decides not to go to the ED plan was for CMP and to increase torsemide to 40mg BID     Addendum- pt in the ED, communicated with triage RN       Palliative care encounter    Medicolegal: Has decision making capacity.   He signed a HCPOA naming his friend Zahida and his cousin Cristhian as his HCPOA.     Psychosocial:  support system consists of cousin and friend     Spiritual: yes, Anglican     Prognostication: Patient with advanced heart failure - his prognosis is poor     Understanding of disease and Illness Trajectory: Patient  has  moderate understanding of his illness, they can benefit from continued education on what to expect in the future.      Goals of care:      ACP Date: 01/23/2023  I initiated the process of advance care planning today and explained the importance of this process to the patient.  I introduced the concept of advance directives to the patient, as well. Then the patient received detailed information about the importance of designating a Health Care Power of  (HCPOA).  The patient has not previously appointed a  HCPOA. After our discussion, the patient has decided to complete a HCPOA and has appointed his  friend , health care agent:  Zahida Dave  & health care agent number:    . He was also instructed to communicate with this person about their wishes for future healthcare, should he become sick and lose decision-making capacity. He stated that he trusts them to make the final decisions, that he talks to both of them all the time and they are aware of his condition. He states that they will be available to care for him if need be.  We did specifically address the patient's likely prognosis, which is poor.  We explored the patient's values and preferences for future care.  The patient endorses that what is most important right now is to focus on extending life as long as possible, even it it means sacrificing quality and curative/life-prolongation (regardless of treatment burdens). He states that he will take   Accordingly, we have decided that the best plan to meet the patient's goals includes continuing with treatment            Code status: Full Code     Advance directives:HCPOA on file      min time was spent on advance care planning, goals of care discussion, emotional support, formulating and communicating prognosis and goals of care, exploring burden/benefit of various approaches of treatment.        SUBJECTIVE:     History obtained from: Patient     complaint:   No chief complaint on file.      History of Present Illness:  Mr. Tera Griffiths is 57 y.o. year old male presenting with Heart Failure.  Referred to Palliative Care for evaluation and management of physical symptoms, advance care planning,, and additional support.. He attended the appointment alone      Interval History:    Pt endorsed increased dyspnea lately, has increased his torsemide to 20mg TID, feels like fluid re accumulates very easily.  Endorsed abdominal distention  Endorsed anorexia  "      ----------------------------------  He is endorsing dyspnea, limiting his activity, unable to walk as much or to go up a flight of stairs.  He is endorsing orthopnea, he is waking up at night feeling "like its my last breath" and occasional palpitations- this is affecting his sleep.   He states that is difficult to "keep the fluid off, it is coming right back"  He denies any other sx, including anxiety, depression    Disease History:  HFrEF and LVEF 10% undergoing evaluation for advanced therapies  CAD, RCA NSTEMI complicated with VF arrest and HF in 2021  DM type 2, dyslipidemia, HTN      Past Medical History:   Diagnosis Date    Atrial fibrillation     CHF (congestive heart failure)     Coronary atherosclerosis of native coronary artery     Diabetes mellitus, type 2     Encounter for blood transfusion     Hypertension      Past Surgical History:   Procedure Laterality Date    CARDIAC DEFIBRILLATOR PLACEMENT Left     HERNIA REPAIR      RIGHT HEART CATHETERIZATION Right 9/30/2022    Procedure: INSERTION, CATHETER, RIGHT HEART;  Surgeon: Caden Aden MD;  Location: [facility] CATH LAB;  Service: Cardiology;  Laterality: Right;    TREATMENT OF CARDIAC ARRHYTHMIA N/A 11/22/2022    Procedure: CARDIOVERSION;  Surgeon: Marvin Sanon MD;  Location: [facility] EP LAB;  Service: Cardiology;  Laterality: N/A;  afib, KIA, DCCV, LAN castillo, 3 Prep     No family history on file.  Review of patient's allergies indicates:  No Known Allergies    Medications:    Current Outpatient Medications:     amiodarone (PACERONE) 200 MG Tab, Take 1 tablet (200 mg total) by mouth 2 (two) times daily., Disp: 60 tablet, Rfl: 3    apixaban (ELIQUIS) 5 mg Tab, Take 5 mg by mouth 2 (two) times daily., Disp: , Rfl:     atorvastatin (LIPITOR) 80 MG tablet, Take 80 mg by mouth once daily., Disp: , Rfl:     canagliflozin (INVOKANA) 100 mg Tab tablet, Take 100 mg by mouth once daily., Disp: , Rfl:     clopidogreL (PLAVIX) 75 mg tablet, Take 75 mg by " mouth once daily., Disp: , Rfl:     ferrous sulfate (FEOSOL) 325 mg (65 mg iron) Tab tablet, Take 325 mg by mouth every other day., Disp: , Rfl:     glimepiride (AMARYL) 4 MG tablet, Take 4 mg by mouth every morning., Disp: , Rfl:     hydrALAZINE (APRESOLINE) 25 MG tablet, Take 1 tablet (25 mg total) by mouth 3 (three) times daily., Disp: 90 tablet, Rfl: 11    isosorbide dinitrate (ISORDIL) 10 MG tablet, Take 1 tablet (10 mg total) by mouth 3 (three) times daily., Disp: 60 tablet, Rfl: 11    LIDOcaine (LIDODERM) 5 %, Place 1 patch onto the skin once daily., Disp: , Rfl:     metoprolol succinate (TOPROL-XL) 25 MG 24 hr tablet, Take 1 tablet (25 mg total) by mouth every evening., Disp: 30 tablet, Rfl: 11    pantoprazole (PROTONIX) 40 MG tablet, Take 40 mg by mouth once daily., Disp: , Rfl:     potassium chloride SA (K-DUR,KLOR-CON) 20 MEQ tablet, Take 1 tablet (20 mEq total) by mouth once daily., Disp: 90 tablet, Rfl: 3    sacubitriL-valsartan (ENTRESTO)  mg per tablet, Take 1 tablet by mouth 2 (two) times daily., Disp: 60 tablet, Rfl: 5    spironolactone (ALDACTONE) 25 MG tablet, Take 25 mg by mouth once daily., Disp: , Rfl:     tamsulosin (FLOMAX) 0.4 mg Cap, Take 1 capsule by mouth every evening., Disp: , Rfl:     torsemide (DEMADEX) 20 MG Tab, Take 1 tablet (20 mg total) by mouth 2 (two) times a day., Disp: 60 tablet, Rfl: 11     database queried on 03/09/2023  by Kimberly Baker . The results reviewed and considered with the clinical data in the decision whether or not to prescribe a controlled substance.    None    OBJECTIVE:       ROS:  Review of Systems   Constitutional:  Positive for activity change, appetite change and fatigue.   HENT:  Negative for hearing loss and sore throat.    Eyes:  Negative for visual disturbance.   Respiratory:  Positive for shortness of breath.    Cardiovascular:  Positive for chest pain.   Gastrointestinal:  Positive for abdominal distention. Negative for constipation,  diarrhea and nausea.   Genitourinary:  Negative for dysuria.   Musculoskeletal:  Negative for back pain and gait problem.   Neurological:  Negative for headaches and memory loss.   Psychiatric/Behavioral:  Positive for sleep disturbance. Negative for confusion. The patient is not nervous/anxious.        Review of Symptoms      Symptom Assessment (ESAS 0-10 Scale)  Pain:  5  Dyspnea:  6  Anxiety:  0  Nausea:  0  Depression:  0  Anorexia:  6  Fatigue:  0  Insomnia:  5  Restlessness:  0  Agitation:  0     CAM / Delirium:  Negative  Constipation:  Negative  Diarrhea:  Negative    Anxiety:  Is not nervous/anxious  Constipation:  No constipation    Pain Assessment:    Location(s): chest    Chest       Location: anterior        Quality: Aching and dull        Quantity: 5/10 in intensity        Chronicity: Onset 1 week(s) ago, gradually worsening        Aggravating Factors: Recumbency and movement        Alleviating Factors: Sitting up        Associated Symptoms: None    Modified Violeta Scale:  4    Performance Status:  70    Living Arrangements:  Lives with family    Psychosocial/Cultural:   See Palliative Psychosocial Note: Yes  Lives with his cousin. The mother of his children is part of his support system   **Primary  to Follow**  Palliative Care  Consult: Yes    Spiritual:  F - Missy and Belief:  Religious     Advance Care Planning   Advance Directives:   Living Will: No    LaPOST: No    Do Not Resuscitate Status: No    Medical Power of : Yes    Agent's Name:  Zahida Dave   Agent's Contact Number:  [phone]    Decision Making:  Patient answered questions  Goals of Care: What is most important right now is to focus on extending life as long as possible, even it it means sacrificing quality, curative/life-prolongation (regardless of treatment burdens). Accordingly, we have decided that the best plan to meet the patient's goals includes continuing with treatment.            Physical  Exam:  Vitals: Pulse: 73 (03/09/23 1345)  BP: (!) 151/107 (03/09/23 1345)  SpO2: 97 % (03/09/23 1345)  Physical Exam  Constitutional:       General: He is not in acute distress.  HENT:      Head: Normocephalic and atraumatic.   Eyes:      General: No scleral icterus.  Pulmonary:      Effort: Tachypnea present. No respiratory distress.   Abdominal:      General: Abdomen is protuberant. There is distension.      Palpations: Abdomen is soft.      Tenderness: There is no abdominal tenderness. There is no guarding.   Musculoskeletal:      Cervical back: Neck supple.      Right lower leg: Edema present.      Left lower leg: Edema present.   Skin:     Findings: No rash.   Neurological:      Mental Status: He is alert and oriented to person, place, and time.   Psychiatric:         Mood and Affect: Mood and affect normal.         Labs:  CBC:   WBC   Date Value Ref Range Status   02/09/2023 3.32 (L) 3.90 - 12.70 K/uL Final       Hemoglobin   Date Value Ref Range Status   02/09/2023 14.2 14.0 - 18.0 g/dL Final       Hematocrit   Date Value Ref Range Status   02/09/2023 47.2 40.0 - 54.0 % Final       MCV   Date Value Ref Range Status   02/09/2023 90 82 - 98 fL Final       Platelets   Date Value Ref Range Status   02/09/2023 265 150 - 450 K/uL Final           LFT:   Lab Results   Component Value Date    AST 23 02/09/2023    ALKPHOS 84 02/09/2023    BILITOT 0.8 02/09/2023       Albumin:   Albumin   Date Value Ref Range Status   02/09/2023 4.6 3.5 - 5.2 g/dL Final     Protein:   Total Protein   Date Value Ref Range Status   02/09/2023 8.6 (H) 6.0 - 8.4 g/dL Final       Radiology:I have reviewed all pertinent imaging results/findings within the past 24 hours.  Results for orders placed during the hospital encounter of 01/03/23    Echo    Interpretation Summary  · The left ventricle is severely enlarged with eccentric hypertrophy and severely decreased systolic function.  · The estimated ejection fraction is 15%.  · There is left  ventricular global hypokinesis.  · Grade I left ventricular diastolic dysfunction.  · Mild right ventricular enlargement with moderately reduced right ventricular systolic function.  · Normal central venous pressure (3 mmHg).  · Severe left atrial enlargement.  · Moderate posterolateral pericardial effusion. Max diameter 1.4cm.        40 minutes of total time spent on the encounter, which includes face to face time and non-face to face time preparing to see the patient (eg, review of tests), Obtaining and/or reviewing separately obtained history, Documenting clinical information in the electronic or other health record, Independently interpreting results (not separately reported) and communicating results to the patient/family/caregiver, or Care coordination (not separately reported).        This note was partially created using [employer] Voice Recognition software. Typographical and content errors may occur with this process. While efforts are made to detect and correct such errors, in some cases errors will persist. For this reason, wording in this document should be considered in the proper context and not strictly verbatim.      Encounter occurred during period of COVID-19 emergency. Encounter performed under the concurrent guidelines, limitations and protocols.      Signature: Kimberly Baker MD

## 2023-03-09 NOTE — ED PROVIDER NOTES
Encounter Date: 3/9/2023       History     Chief Complaint   Patient presents with    Chest Pain     Coming from the dr office  upstairs for chest pain.      57 y.o. male with CHF (EF 15%), DM, coronary artery disease, HTN, atrial fibrillation on Eliquis presents for chest pain and shortness of breath.  Patient reports shortness of breath since yesterday that is worse when lying down.  And also reports associated bilateral lower extremity edema that is mild.  Patient also reports some intermittent midsternal chest pain that is present currently.  Patient denies any associated cough, fevers, nausea/vomiting.  Patient does report 1 missed dose of his torsemide this morning      The history is provided by the patient and medical records.   Review of patient's allergies indicates:  No Known Allergies  Past Medical History:   Diagnosis Date    Atrial fibrillation     CHF (congestive heart failure)     Coronary atherosclerosis of native coronary artery     Diabetes mellitus, type 2     Encounter for blood transfusion     Hypertension      Past Surgical History:   Procedure Laterality Date    CARDIAC DEFIBRILLATOR PLACEMENT Left     HERNIA REPAIR      RIGHT HEART CATHETERIZATION Right 9/30/2022    Procedure: INSERTION, CATHETER, RIGHT HEART;  Surgeon: Caden Aden MD;  Location: [facility] CATH LAB;  Service: Cardiology;  Laterality: Right;    TREATMENT OF CARDIAC ARRHYTHMIA N/A 11/22/2022    Procedure: CARDIOVERSION;  Surgeon: Marvin Sanon MD;  Location: [facility] EP LAB;  Service: Cardiology;  Laterality: N/A;  afib, KIA, DCCV, anes, MB, 3 Prep     History reviewed. No pertinent family history.  Social History     Tobacco Use    Smoking status: Never    Smokeless tobacco: Never   Substance Use Topics    Alcohol use: Never    Drug use: Never     Review of Systems   Reason unable to perform ROS: See HPI for relevant ROS.     Physical Exam     Initial Vitals [03/09/23 1449]   BP Pulse Resp Temp SpO2   (!) 162/104 77 16 98.5  °F (36.9 °C) 99 %      MAP       --         Physical Exam    Nursing note and vitals reviewed.  Constitutional:   Alert, tachypneic   Eyes: Conjunctivae are normal. No scleral icterus.   Neck: JVD present.   Cardiovascular:  Normal rate, regular rhythm and intact distal pulses.           Pulmonary/Chest:   Tachypneic, diffuse expiratory wheezing, increased work of breathing   Abdominal: Abdomen is soft. He exhibits no distension. There is no abdominal tenderness.   Musculoskeletal:         General: Edema (Bilateral lower extremity) present.     Neurological: He is alert and oriented to person, place, and time.   Skin: Skin is warm and dry.       ED Course   Procedures  Labs Reviewed   CBC W/ AUTO DIFFERENTIAL - Abnormal; Notable for the following components:       Result Value    MCHC 29.3 (*)     RDW 15.0 (*)     All other components within normal limits   TROPONIN I - Abnormal; Notable for the following components:    Troponin I 0.100 (*)     All other components within normal limits   B-TYPE NATRIURETIC PEPTIDE - Abnormal; Notable for the following components:    BNP 1,755 (*)     All other components within normal limits   TROPONIN I - Abnormal; Notable for the following components:    Troponin I 0.089 (*)     All other components within normal limits   CBC W/ AUTO DIFFERENTIAL - Abnormal; Notable for the following components:    WBC 3.68 (*)     Hemoglobin 12.8 (*)     MCHC 30.8 (*)     RDW 14.8 (*)     All other components within normal limits   COMPREHENSIVE METABOLIC PANEL   POCT TROPONIN   POCT TROPONIN          Imaging Results              X-Ray Chest AP Portable (Final result)  Result time 03/09/23 16:35:07      Final result by Jerry Hodgson MD (03/09/23 16:35:07)                   Impression:      Grossly stable chronic findings as above without detrimental change or radiographic acute intrathoracic process seen on this single view.      Electronically signed by: Jerry Hodgson  MD  Date:    03/09/2023  Time:    16:35               Narrative:    EXAMINATION:  XR CHEST AP PORTABLE    CLINICAL HISTORY:  Chest Pain;    TECHNIQUE:  Single frontal view of the chest was performed.    COMPARISON:  Chest radiograph 01/03/2023    FINDINGS:  Monitoring leads overlie the chest.  Patient is slightly rotated.  Large body habitus.    Left chest dual lead ICD unchanged.  Scattered small radiodense foci again project over the left midlung zone.    Cardiomediastinal silhouette remains midline and enlarged similar to prior.  Grossly similar slight prominence of the central pulmonary vasculature without overt pulmonary edema seen.  Bibasilar minimal platelike scarring versus atelectasis.  The lungs are otherwise well expanded without consolidation, pleural effusion or pneumothorax definitively seen.  Hilar contours are within normal limits.  No acute osseous process seen.  PA and lateral views can be obtained..                                       Medications   nitroGLYCERIN SL tablet 0.4 mg (0.4 mg Sublingual Given 3/9/23 1821)   sodium chloride 0.9% flush 10 mL (has no administration in time range)   acetaminophen tablet 650 mg (has no administration in time range)   amiodarone tablet 200 mg (200 mg Oral Given 3/9/23 2121)   apixaban tablet 5 mg (5 mg Oral Given 3/9/23 2122)   atorvastatin tablet 80 mg (has no administration in time range)   clopidogreL tablet 75 mg (has no administration in time range)   hydrALAZINE tablet 25 mg (25 mg Oral Given 3/9/23 2122)   isosorbide dinitrate tablet 10 mg (10 mg Oral Given 3/9/23 2121)   metoprolol succinate (TOPROL-XL) 24 hr tablet 25 mg (25 mg Oral Given 3/9/23 2121)   pantoprazole EC tablet 40 mg (has no administration in time range)   sacubitriL-valsartan  mg per tablet 1 tablet (1 tablet Oral Given 3/9/23 2121)   spironolactone tablet 25 mg (has no administration in time range)   tamsulosin 24 hr capsule 0.4 mg (0.4 mg Oral Given 3/9/23 2121)   aspirin  tablet 325 mg (325 mg Oral Given 3/9/23 1621)   furosemide injection 80 mg (80 mg Intravenous Given 3/9/23 1621)   morphine injection 1 mg (1 mg Intravenous Given 3/9/23 9777)     Medical Decision Making:   History:   Old Medical Records: I decided to obtain old medical records.  Old Records Summarized: records from clinic visits and records from previous admission(s).  Initial Assessment:   57 y.o. male with CHF (EF 15%), DM, coronary artery disease, HTN, atrial fibrillation on Eliquis presents for chest pain and shortness of breath  Differentials include CHF exacerbation, ACS, musculoskeletal pain, GERD, pulmonary edema, less likely cardiogenic shock  Presentation most consistent with heart failure exacerbation  Patient hemodynamically stable  Patient uncomfortable appearing with active chest pain, EKG shows no STEMI, nonspecific ST changes similar/improved from previous, patient given aspirin, nitroglycerin ordered for chest pain p.r.n.  Patient also has mild peripheral edema, shortness of breath, tachypneic, not hypoxic, Lasix was ordered  Independently Interpreted Test(s):   I have ordered and independently interpreted EKG Reading(s) - see summary below  Clinical Tests:   Lab Tests: Ordered and Reviewed  Radiological Study: Ordered and Reviewed  Medical Tests: Ordered and Reviewed  Other:   I have discussed this case with another health care provider.       <> Summary of the Discussion: Discussed with heart transplant who will admit patient           ED Course as of 03/09/23 2353   Thu Mar 09, 2023   1537 EKG independently interpreted by me shows sinus rhythm with first-degree AV block, AK interval 248, morphology similar to prior, previous T-wave inversions in inferior leads now upright, no STEMI. [NN]   1709 BNP(!): 1,755 [NN]   1709 Troponin I(!): 0.100 [NN]   1717 Troponin I(!): 0.100  Elevated compared to previous [OK]      ED Course User Index  [NN] Rosy Vieyra MD  [OK] Leonides Bhagat MD                  Clinical Impression:   Final diagnoses:  [R07.9] Chest pain  [I50.43] Acute on chronic combined systolic and diastolic heart failure (Primary)        ED Disposition Condition    Observation                 Leonides Bhagat MD  Resident  03/09/23 7120

## 2023-03-10 LAB
ALBUMIN SERPL BCP-MCNC: 3.8 G/DL (ref 3.5–5.2)
ALBUMIN SERPL BCP-MCNC: 3.8 G/DL (ref 3.5–5.2)
ALLENS TEST: ABNORMAL
ALLENS TEST: NORMAL
ALP SERPL-CCNC: 67 U/L (ref 55–135)
ALP SERPL-CCNC: 71 U/L (ref 55–135)
ALT SERPL W/O P-5'-P-CCNC: 13 U/L (ref 10–44)
ALT SERPL W/O P-5'-P-CCNC: 15 U/L (ref 10–44)
ANION GAP SERPL CALC-SCNC: 10 MMOL/L (ref 8–16)
ANION GAP SERPL CALC-SCNC: 13 MMOL/L (ref 8–16)
ANION GAP SERPL CALC-SCNC: 8 MMOL/L (ref 8–16)
ASCENDING AORTA: 3.59 CM
AST SERPL-CCNC: 14 U/L (ref 10–40)
AST SERPL-CCNC: 16 U/L (ref 10–40)
AV INDEX (PROSTH): 0.52
AV MEAN GRADIENT: 5 MMHG
AV PEAK GRADIENT: 10 MMHG
AV VALVE AREA: 2.81 CM2
AV VELOCITY RATIO: 0.55
BASOPHILS # BLD AUTO: 0.01 K/UL (ref 0–0.2)
BASOPHILS NFR BLD: 0.1 % (ref 0–1.9)
BILIRUB SERPL-MCNC: 0.7 MG/DL (ref 0.1–1)
BILIRUB SERPL-MCNC: 0.8 MG/DL (ref 0.1–1)
BILIRUB UR QL STRIP: NEGATIVE
BSA FOR ECHO PROCEDURE: 2.11 M2
BUN SERPL-MCNC: 13 MG/DL (ref 6–20)
BUN SERPL-MCNC: 16 MG/DL (ref 6–20)
BUN SERPL-MCNC: 17 MG/DL (ref 6–20)
CALCIUM SERPL-MCNC: 8.4 MG/DL (ref 8.7–10.5)
CALCIUM SERPL-MCNC: 8.9 MG/DL (ref 8.7–10.5)
CALCIUM SERPL-MCNC: 9.5 MG/DL (ref 8.7–10.5)
CHLORIDE SERPL-SCNC: 104 MMOL/L (ref 95–110)
CHLORIDE SERPL-SCNC: 105 MMOL/L (ref 95–110)
CHLORIDE SERPL-SCNC: 106 MMOL/L (ref 95–110)
CLARITY UR REFRACT.AUTO: CLEAR
CO2 SERPL-SCNC: 23 MMOL/L (ref 23–29)
CO2 SERPL-SCNC: 25 MMOL/L (ref 23–29)
CO2 SERPL-SCNC: 27 MMOL/L (ref 23–29)
COLOR UR AUTO: YELLOW
CREAT SERPL-MCNC: 1.5 MG/DL (ref 0.5–1.4)
CREAT SERPL-MCNC: 1.5 MG/DL (ref 0.5–1.4)
CREAT SERPL-MCNC: 1.6 MG/DL (ref 0.5–1.4)
CV ECHO LV RWT: 0.29 CM
DELSYS: ABNORMAL
DIFFERENTIAL METHOD: ABNORMAL
DOP CALC AO PEAK VEL: 1.62 M/S
DOP CALC AO VTI: 27.23 CM
DOP CALC LVOT AREA: 5.4 CM2
DOP CALC LVOT DIAMETER: 2.63 CM
DOP CALC LVOT PEAK VEL: 0.89 M/S
DOP CALC LVOT STROKE VOLUME: 76.4 CM3
DOP CALCLVOT PEAK VEL VTI: 14.07 CM
E WAVE DECELERATION TIME: 221.16 MSEC
E/A RATIO: 1.3
E/E' RATIO: 14.86 M/S
ECHO LV POSTERIOR WALL: 1.07 CM (ref 0.6–1.1)
EJECTION FRACTION: 18 %
EOSINOPHIL # BLD AUTO: 0.1 K/UL (ref 0–0.5)
EOSINOPHIL NFR BLD: 1 % (ref 0–8)
ERYTHROCYTE [DISTWIDTH] IN BLOOD BY AUTOMATED COUNT: 14.7 % (ref 11.5–14.5)
ERYTHROCYTE [SEDIMENTATION RATE] IN BLOOD BY WESTERGREN METHOD: 23 MM/H
EST. GFR  (NO RACE VARIABLE): 50.6 ML/MIN/1.73 M^2
EST. GFR  (NO RACE VARIABLE): 54.6 ML/MIN/1.73 M^2
EST. GFR  (NO RACE VARIABLE): 54.6 ML/MIN/1.73 M^2
FIO2: 40
FLOW: 2
FLOW: 5
FRACTIONAL SHORTENING: 7 % (ref 28–44)
GLUCOSE SERPL-MCNC: 122 MG/DL (ref 70–110)
GLUCOSE SERPL-MCNC: 152 MG/DL (ref 70–110)
GLUCOSE SERPL-MCNC: 157 MG/DL (ref 70–110)
GLUCOSE UR QL STRIP: NEGATIVE
HCO3 UR-SCNC: 28.7 MMOL/L (ref 24–28)
HCO3 UR-SCNC: 28.9 MMOL/L (ref 24–28)
HCT VFR BLD AUTO: 45.6 % (ref 40–54)
HGB BLD-MCNC: 13.7 G/DL (ref 14–18)
HGB UR QL STRIP: NEGATIVE
IMM GRANULOCYTES # BLD AUTO: 0.01 K/UL (ref 0–0.04)
IMM GRANULOCYTES NFR BLD AUTO: 0.1 % (ref 0–0.5)
INTERVENTRICULAR SEPTUM: 1.03 CM (ref 0.6–1.1)
KETONES UR QL STRIP: NEGATIVE
LA MAJOR: 7.92 CM
LA MINOR: 8.39 CM
LA WIDTH: 5.03 CM
LACTATE SERPL-SCNC: 0.7 MMOL/L (ref 0.5–2.2)
LACTATE SERPL-SCNC: 1.6 MMOL/L (ref 0.5–2.2)
LDH SERPL L TO P-CCNC: 1.05 MMOL/L (ref 0.5–2.2)
LEFT ATRIUM SIZE: 5.7 CM
LEFT ATRIUM VOLUME INDEX MOD: 57 ML/M2
LEFT ATRIUM VOLUME INDEX: 94.6 ML/M2
LEFT ATRIUM VOLUME MOD: 119.61 CM3
LEFT ATRIUM VOLUME: 198.58 CM3
LEFT INTERNAL DIMENSION IN SYSTOLE: 6.85 CM (ref 2.1–4)
LEFT VENTRICLE DIASTOLIC VOLUME INDEX: 136.16 ML/M2
LEFT VENTRICLE DIASTOLIC VOLUME: 285.94 ML
LEFT VENTRICLE MASS INDEX: 178 G/M2
LEFT VENTRICLE SYSTOLIC VOLUME INDEX: 115.7 ML/M2
LEFT VENTRICLE SYSTOLIC VOLUME: 242.94 ML
LEFT VENTRICULAR INTERNAL DIMENSION IN DIASTOLE: 7.36 CM (ref 3.5–6)
LEFT VENTRICULAR MASS: 373.25 G
LEUKOCYTE ESTERASE UR QL STRIP: NEGATIVE
LV LATERAL E/E' RATIO: 13 M/S
LV SEPTAL E/E' RATIO: 17.33 M/S
LYMPHOCYTES # BLD AUTO: 1.1 K/UL (ref 1–4.8)
LYMPHOCYTES NFR BLD: 14.7 % (ref 18–48)
MAGNESIUM SERPL-MCNC: 2 MG/DL (ref 1.6–2.6)
MCH RBC QN AUTO: 26.9 PG (ref 27–31)
MCHC RBC AUTO-ENTMCNC: 30 G/DL (ref 32–36)
MCV RBC AUTO: 90 FL (ref 82–98)
MODE: ABNORMAL
MODE: ABNORMAL
MONOCYTES # BLD AUTO: 0.6 K/UL (ref 0.3–1)
MONOCYTES NFR BLD: 8.1 % (ref 4–15)
MV PEAK A VEL: 0.4 M/S
MV PEAK E VEL: 0.52 M/S
MV STENOSIS PRESSURE HALF TIME: 64.13 MS
MV VALVE AREA P 1/2 METHOD: 3.43 CM2
NEUTROPHILS # BLD AUTO: 5.5 K/UL (ref 1.8–7.7)
NEUTROPHILS NFR BLD: 76 % (ref 38–73)
NITRITE UR QL STRIP: NEGATIVE
NRBC BLD-RTO: 0 /100 WBC
PCO2 BLDA: 48.8 MMHG (ref 35–45)
PCO2 BLDA: 51.5 MMHG (ref 35–45)
PCO2 BLDA: 53.2 MMHG (ref 35–45)
PH SMN: 7.34 [PH] (ref 7.35–7.45)
PH SMN: 7.36 [PH] (ref 7.35–7.45)
PH SMN: 7.38 [PH] (ref 7.35–7.45)
PH UR STRIP: 5 [PH] (ref 5–8)
PHOSPHATE SERPL-MCNC: 2.9 MG/DL (ref 2.7–4.5)
PISA TR MAX VEL: 2.19 M/S
PLATELET # BLD AUTO: 357 K/UL (ref 150–450)
PMV BLD AUTO: 11.2 FL (ref 9.2–12.9)
PO2 BLDA: 33 MMHG (ref 40–60)
PO2 BLDA: 36 MMHG (ref 40–60)
PO2 BLDA: 38 MMHG (ref 40–60)
POC BE: 3 MMOL/L
POC BE: 3 MMOL/L
POC BE: 4 MMOL/L
POC SATURATED O2: 62 % (ref 95–100)
POC SATURATED O2: 64 % (ref 95–100)
POC SATURATED O2: 69 % (ref 95–100)
POC TCO2: 30 MMOL/L (ref 24–29)
POC TCO2: 30 MMOL/L (ref 24–29)
POTASSIUM SERPL-SCNC: 3.8 MMOL/L (ref 3.5–5.1)
POTASSIUM SERPL-SCNC: 3.9 MMOL/L (ref 3.5–5.1)
POTASSIUM SERPL-SCNC: 4 MMOL/L (ref 3.5–5.1)
PROT SERPL-MCNC: 7.1 G/DL (ref 6–8.4)
PROT SERPL-MCNC: 7.2 G/DL (ref 6–8.4)
PROT UR QL STRIP: ABNORMAL
RA MAJOR: 7.56 CM
RA PRESSURE: 3 MMHG
RA WIDTH: 5.35 CM
RBC # BLD AUTO: 5.09 M/UL (ref 4.6–6.2)
RIGHT VENTRICULAR END-DIASTOLIC DIMENSION: 5.86 CM
SAMPLE: ABNORMAL
SAMPLE: NORMAL
SINUS: 3.42 CM
SITE: ABNORMAL
SITE: NORMAL
SODIUM SERPL-SCNC: 139 MMOL/L (ref 136–145)
SODIUM SERPL-SCNC: 140 MMOL/L (ref 136–145)
SODIUM SERPL-SCNC: 142 MMOL/L (ref 136–145)
SP GR UR STRIP: 1.02 (ref 1–1.03)
SP02: 98
STJ: 2.73 CM
TDI LATERAL: 0.04 M/S
TDI SEPTAL: 0.03 M/S
TDI: 0.04 M/S
TR MAX PG: 19 MMHG
TRICUSPID ANNULAR PLANE SYSTOLIC EXCURSION: 1.69 CM
TROPONIN I SERPL DL<=0.01 NG/ML-MCNC: 0.09 NG/ML (ref 0–0.03)
TV REST PULMONARY ARTERY PRESSURE: 22 MMHG
URN SPEC COLLECT METH UR: ABNORMAL
WBC # BLD AUTO: 7.2 K/UL (ref 3.9–12.7)

## 2023-03-10 PROCEDURE — 99900035 HC TECH TIME PER 15 MIN (STAT): Mod: NTX

## 2023-03-10 PROCEDURE — 25500020 PHARM REV CODE 255: Mod: NTX | Performed by: INTERNAL MEDICINE

## 2023-03-10 PROCEDURE — 63600175 PHARM REV CODE 636 W HCPCS: Mod: NTX | Performed by: STUDENT IN AN ORGANIZED HEALTH CARE EDUCATION/TRAINING PROGRAM

## 2023-03-10 PROCEDURE — 20000000 HC ICU ROOM: Mod: NTX

## 2023-03-10 PROCEDURE — 87040 BLOOD CULTURE FOR BACTERIA: CPT | Mod: NTX | Performed by: INTERNAL MEDICINE

## 2023-03-10 PROCEDURE — 99291 CRITICAL CARE FIRST HOUR: CPT | Mod: NTX,,, | Performed by: INTERNAL MEDICINE

## 2023-03-10 PROCEDURE — 27000221 HC OXYGEN, UP TO 24 HOURS: Mod: NTX

## 2023-03-10 PROCEDURE — 25000003 PHARM REV CODE 250: Mod: NTX | Performed by: STUDENT IN AN ORGANIZED HEALTH CARE EDUCATION/TRAINING PROGRAM

## 2023-03-10 PROCEDURE — 83735 ASSAY OF MAGNESIUM: CPT | Mod: NTX | Performed by: STUDENT IN AN ORGANIZED HEALTH CARE EDUCATION/TRAINING PROGRAM

## 2023-03-10 PROCEDURE — 83605 ASSAY OF LACTIC ACID: CPT | Mod: NTX | Performed by: INTERNAL MEDICINE

## 2023-03-10 PROCEDURE — 83605 ASSAY OF LACTIC ACID: CPT | Mod: NTX

## 2023-03-10 PROCEDURE — 84484 ASSAY OF TROPONIN QUANT: CPT | Mod: NTX | Performed by: STUDENT IN AN ORGANIZED HEALTH CARE EDUCATION/TRAINING PROGRAM

## 2023-03-10 PROCEDURE — 36556 INSERT NON-TUNNEL CV CATH: CPT | Mod: NTX

## 2023-03-10 PROCEDURE — 82803 BLOOD GASES ANY COMBINATION: CPT | Mod: NTX

## 2023-03-10 PROCEDURE — 25000003 PHARM REV CODE 250: Mod: NTX | Performed by: INTERNAL MEDICINE

## 2023-03-10 PROCEDURE — 80053 COMPREHEN METABOLIC PANEL: CPT | Mod: NTX | Performed by: STUDENT IN AN ORGANIZED HEALTH CARE EDUCATION/TRAINING PROGRAM

## 2023-03-10 PROCEDURE — 94761 N-INVAS EAR/PLS OXIMETRY MLT: CPT | Mod: NTX

## 2023-03-10 PROCEDURE — 81003 URINALYSIS AUTO W/O SCOPE: CPT | Mod: NTX | Performed by: INTERNAL MEDICINE

## 2023-03-10 PROCEDURE — 85025 COMPLETE CBC W/AUTO DIFF WBC: CPT | Mod: NTX | Performed by: STUDENT IN AN ORGANIZED HEALTH CARE EDUCATION/TRAINING PROGRAM

## 2023-03-10 PROCEDURE — 84100 ASSAY OF PHOSPHORUS: CPT | Mod: NTX | Performed by: STUDENT IN AN ORGANIZED HEALTH CARE EDUCATION/TRAINING PROGRAM

## 2023-03-10 PROCEDURE — 80048 BASIC METABOLIC PNL TOTAL CA: CPT | Mod: NTX,XB | Performed by: INTERNAL MEDICINE

## 2023-03-10 PROCEDURE — 63600175 PHARM REV CODE 636 W HCPCS: Mod: NTX

## 2023-03-10 PROCEDURE — 99291 PR CRITICAL CARE, E/M 30-74 MINUTES: ICD-10-PCS | Mod: NTX,,, | Performed by: INTERNAL MEDICINE

## 2023-03-10 RX ORDER — MORPHINE SULFATE 2 MG/ML
2 INJECTION, SOLUTION INTRAMUSCULAR; INTRAVENOUS ONCE
Status: COMPLETED | OUTPATIENT
Start: 2023-03-10 | End: 2023-03-10

## 2023-03-10 RX ORDER — HYDROCODONE BITARTRATE AND ACETAMINOPHEN 5; 325 MG/1; MG/1
1 TABLET ORAL EVERY 6 HOURS PRN
Status: DISCONTINUED | OUTPATIENT
Start: 2023-03-10 | End: 2023-03-14 | Stop reason: HOSPADM

## 2023-03-10 RX ORDER — SODIUM,POTASSIUM PHOSPHATES 280-250MG
1 POWDER IN PACKET (EA) ORAL ONCE
Status: COMPLETED | OUTPATIENT
Start: 2023-03-10 | End: 2023-03-10

## 2023-03-10 RX ORDER — ONDANSETRON 2 MG/ML
4 INJECTION INTRAMUSCULAR; INTRAVENOUS EVERY 6 HOURS PRN
Status: DISCONTINUED | OUTPATIENT
Start: 2023-03-10 | End: 2023-03-14 | Stop reason: HOSPADM

## 2023-03-10 RX ORDER — MORPHINE SULFATE 2 MG/ML
INJECTION, SOLUTION INTRAMUSCULAR; INTRAVENOUS
Status: COMPLETED
Start: 2023-03-10 | End: 2023-03-10

## 2023-03-10 RX ORDER — ONDANSETRON 2 MG/ML
INJECTION INTRAMUSCULAR; INTRAVENOUS
Status: COMPLETED
Start: 2023-03-10 | End: 2023-03-10

## 2023-03-10 RX ADMIN — AMIODARONE HYDROCHLORIDE 200 MG: 200 TABLET ORAL at 08:03

## 2023-03-10 RX ADMIN — POTASSIUM & SODIUM PHOSPHATES POWDER PACK 280-160-250 MG 1 PACKET: 280-160-250 PACK at 04:03

## 2023-03-10 RX ADMIN — HUMAN ALBUMIN MICROSPHERES AND PERFLUTREN 0.11 MG: 10; .22 INJECTION, SOLUTION INTRAVENOUS at 10:03

## 2023-03-10 RX ADMIN — APIXABAN 5 MG: 5 TABLET, FILM COATED ORAL at 08:03

## 2023-03-10 RX ADMIN — MORPHINE SULFATE 2 MG: 2 INJECTION, SOLUTION INTRAMUSCULAR; INTRAVENOUS at 02:03

## 2023-03-10 RX ADMIN — ATORVASTATIN CALCIUM 80 MG: 40 TABLET, FILM COATED ORAL at 08:03

## 2023-03-10 RX ADMIN — TAMSULOSIN HYDROCHLORIDE 0.4 MG: 0.4 CAPSULE ORAL at 08:03

## 2023-03-10 RX ADMIN — ONDANSETRON 4 MG: 2 INJECTION INTRAMUSCULAR; INTRAVENOUS at 02:03

## 2023-03-10 RX ADMIN — HYDROCODONE BITARTRATE AND ACETAMINOPHEN 1 TABLET: 5; 325 TABLET ORAL at 06:03

## 2023-03-10 RX ADMIN — PANTOPRAZOLE SODIUM 40 MG: 40 TABLET, DELAYED RELEASE ORAL at 08:03

## 2023-03-10 RX ADMIN — ONDANSETRON 4 MG: 2 INJECTION INTRAMUSCULAR; INTRAVENOUS at 09:03

## 2023-03-10 RX ADMIN — CLOPIDOGREL BISULFATE 75 MG: 75 TABLET ORAL at 08:03

## 2023-03-10 RX ADMIN — SODIUM CHLORIDE 500 ML: 9 INJECTION, SOLUTION INTRAVENOUS at 09:03

## 2023-03-10 NOTE — PLAN OF CARE
Heart Transplant Plan of Care    Patient stepped up to ICU for central line placement and hemodynamics after hypotension and chest pain on CSU after arriving from the ED. Received all of home GDMT 930pm. Bedside echo EF 15%, CVP 3.     Lasix 80 IV x2 with 3.2L UOP since 6pm.   iSTAT Lactate 0.87 11pm. Plasma lactate 1.6.  Cr 1.2>1.5 since this afternoon (baseline 1.2-1.4).    RIJ placed.    Hemodynamics:   Parameter   at 0240   CVP 2   SvO2 62   CO  4.92   CI 2.38      MAP 62     Lactate 0.87>1.6>1.05    Assessment/Plan:  - Plan was discussed with attending staff   - Will hold GDMT doses in AM and reassess.  - Hold inotropes at this time  - Repeat hemodynamics and lactate at 7am    Ion Euceda MD  Cardiology Fellow PGY IV  Pager: [phone]  [facility]

## 2023-03-10 NOTE — CARE UPDATE
Inpatient consult to Cardiology  Consult performed by: Ana Maria Uriarte MD  Consult ordered by: Leonides Bhagat MD  Reason for consult: ADHF    Admit patient to [facility] as he is undergoing workup for advanced options    Plan:   Admit for observation for diuresis.    H&P to follow    Ana Maria Uriarte MD PGY VI  Cardiology  Pager: [phone]  [facility]

## 2023-03-10 NOTE — PROCEDURES
Tera Griffiths is a 57 y.o. male patient.    Temp: 98.2 °F (36.8 °C) (03/10/23 0145)  Pulse: 62 (03/10/23 0145)  Resp: (!) 26 (03/10/23 0225)  BP: 107/61 (03/10/23 0145)  SpO2: 100 % (03/10/23 0145)  Weight: 84.4 kg (186 lb 1.1 oz) (03/09/23 2101)  Height: 6' (182.9 cm) (03/09/23 1449)    Central Line    Date/Time: 3/10/2023 2:32 AM  Performed by: Ion Euceda MD  Authorized by: Ion Euceda MD     Location procedure was performed:  [facility] SURGICAL ICU (SICU)  Pre-operative diagnosis:  Acute heart failure  Post-operative diagnosis:  Acute heart failure  Consent Done ?:  Yes  Time out complete?: Verified correct patient, procedure, equipment, staff, and site/side    Indications:  Med administration, vascular access and hemodynamic monitoring  Anesthesia:  Local infiltration  Local anesthetic:  Lidocaine 1% without epinephrine  Preparation:  Skin prepped with ChloraPrep  Skin prep agent dried: Skin prep agent completely dried prior to procedure    Sterile barriers: All five maximal sterile barriers used - gloves, gown, cap, mask and large sterile sheet    Hand hygiene: Hand hygiene performed immediately prior to central venous catheter insertion    Location:  Right internal jugular  Catheter type:  Triple lumen  Catheter size:  7.5 Fr  Ultrasound guidance: Yes    Vessel Caliber:  Medium   patent  Comprressibility:  Normal  Needle advanced into vessel with real time ultrasound guidance.    Guidewire confirmed in vessel.    Image recorded and saved.    Steril sheath on probe.    Sterile gel used.  Manometry: Yes    Number of attempts:  1  Securement:  Line sutured, chlorhexidine patch, sterile dressing applied and blood return through all ports  Complications: No    Specimens: No    Implants: No    XRay:  Placement verified by x-ray  Adverse Events:  NoneTermination Site: superior vena cava    No flowsheet data found.      Ion Euceda MD  Cardiology Fellow, PGY-IV

## 2023-03-10 NOTE — PLAN OF CARE
SICU PLAN OF CARE NOTE     Dx: Acute decompensated heart failure     Shift Events:  Labs, hemodynamics, blood cultures, and SvO2 completed. pt c/o intermittent nausea and x1 emesis PRN zofran given. CT abd/pelvis, CVP 5-6, 500cc bolus given today    Goals of Care: SBP >90. SpO2 >92     Neuro: AAO x4, Arouses to Voice, Follows Commands, and Moves All Extremities        Respiratory: Nasal Cannula     Diet: Cardiac Diet, fluid restriction 1500ml/24*     Urine Output: Voids Spontaneously      Labs/Accuchecks: Daily Labs. SvO2.     Skin: No skin breakdown noted. Pt changes position independently.

## 2023-03-10 NOTE — HPI
Mr. Griffiths is a very pleasant  57 y.o. year old black male with stage C HFrEF, ICMP, CAD s/p (STEMI s/p PCI to Rcx (8/2021), previous GIB, difficulty with medication compliance,  Afib s/p DCCV, uncontrolled HTN who comes for a follow-up visit. He is currently undergoing VAD/Tx work-up.  He has had several HF admissions. He is on a excellent HF regimen that includes; Entresto 97/103 mg BID, metoprolol succinate 12.5 mg at night, aldactone 25 mg daily and torsemide 20 mg twice daily.    He presents today for worsening SOB over the last 2 days along with abdominal swelling and lower extremity swelling. He reports NYHA class III symptoms including PND and orthopnea.He also reports some chest pressure last night as well. No palpitations.     TTE 1/5/23  The left ventricle is severely enlarged with eccentric hypertrophy and severely decreased systolic function.  The estimated ejection fraction is 15%.  There is left ventricular global hypokinesis.  Grade I left ventricular diastolic dysfunction.  Mild right ventricular enlargement with moderately reduced right ventricular systolic function.  Normal central venous pressure (3 mmHg).  Severe left atrial enlargement.  Moderate posterolateral pericardial effusion. Max diameter 1.4cm.

## 2023-03-10 NOTE — SUBJECTIVE & OBJECTIVE
Interval History:   Patient hypotensive after GDMT administration, stepped up to ICU level of care, RIJ central access obtained  This morning reports some subjective improvement although he still has abdominal discomfort. One episode of emesis this morning.   BP improved, -110's  Good urine output, net neg 3L     Continuous Infusions:  Scheduled Meds:   amiodarone  200 mg Oral BID    apixaban  5 mg Oral BID    atorvastatin  80 mg Oral Daily    clopidogreL  75 mg Oral Daily    pantoprazole  40 mg Oral Daily    tamsulosin  1 capsule Oral QHS     PRN Meds:acetaminophen, HYDROcodone-acetaminophen, nitroGLYCERIN, ondansetron, sodium chloride 0.9%    Review of patient's allergies indicates:  No Known Allergies  Objective:     Vital Signs (Most Recent):  Temp: 98.3 °F (36.8 °C) (03/10/23 0715)  Pulse: (!) 51 (03/10/23 0945)  Resp: 17 (03/10/23 0945)  BP: 104/69 (03/10/23 0945)  SpO2: 100 % (03/10/23 0945)   Vital Signs (24h Range):  Temp:  [97.9 °F (36.6 °C)-98.5 °F (36.9 °C)] 98.3 °F (36.8 °C)  Pulse:  [50-77] 51  Resp:  [16-39] 17  SpO2:  [93 %-100 %] 100 %  BP: ()/() 104/69     Patient Vitals for the past 72 hrs (Last 3 readings):   Weight   03/10/23 0945 87.5 kg (193 lb)   03/10/23 0307 87.6 kg (193 lb 2 oz)   03/09/23 2101 84.4 kg (186 lb 1.1 oz)     Body mass index is 26.18 kg/m².      Intake/Output Summary (Last 24 hours) at 3/10/2023 1053  Last data filed at 3/10/2023 0500  Gross per 24 hour   Intake 480 ml   Output 3535 ml   Net -3055 ml       Hemodynamic Parameters:  CVP:  [2 mmHg] 2 mmHg    Telemetry: No tele events     Physical Exam  General: No acute distress   HEENT: Unremarkable   CVS: S1S2 normal, 3/6 HSM LLSB, Displaced PMI, no JVD   Resp: CTABL, mild crackles at L>R bases   Abdomen: Mild distension but no tenderness or guarding   Neuro: AOX3   Peripheries: No pedal edema, B/L LE cool to touch, B/L UE warm,  2+ peripheral pulses     Significant Labs:  CBC:  Recent Labs   Lab  03/09/23  1547 03/09/23  1821 03/09/23  2318 03/10/23  0306   WBC 4.04 3.68*  --  7.20   RBC 5.19 4.70  --  5.09   HGB 14.0 12.8*  --  13.7*   HCT 47.8 41.6 47 45.6    313  --  357   MCV 92 89  --  90   MCH 27.0 27.2  --  26.9*   MCHC 29.3* 30.8*  --  30.0*     BNP:  Recent Labs   Lab 03/09/23  1547   BNP 1,755*     CMP:  Recent Labs   Lab 03/09/23  1547 03/09/23  2258 03/10/23  0306   GLU 77 157* 152*   CALCIUM 9.7 9.5 8.9   ALBUMIN 4.2 3.8 3.8   PROT 7.8 7.2 7.1    140 142   K 4.2 3.9 3.8   CO2 23 23 27    104 105   BUN 12 13 16   CREATININE 1.2 1.5* 1.6*   ALKPHOS 79 67 71   ALT 16 15 13   AST 18 16 14   BILITOT 0.8 0.8 0.7      Coagulation:   No results for input(s): PT, INR, APTT in the last 168 hours.  LDH:  No results for input(s): LDH in the last 72 hours.  Microbiology:  Microbiology Results (last 7 days)       Procedure Component Value Units Date/Time    Blood culture [[id number]]     Order Status: No result Specimen: Blood     Stool culture [[id number]]     Order Status: No result Specimen: Stool             I have reviewed all pertinent labs within the past 24 hours.    Estimated Creatinine Clearance: 57.3 mL/min (A) (based on SCr of 1.6 mg/dL (H)).    Diagnostic Results:  I have reviewed and interpreted all pertinent imaging results/findings within the past 24 hours.

## 2023-03-10 NOTE — PROGRESS NOTES
[facility] Surgical Intensive Care  Heart Transplant  Progress Note    Patient Name: Tera Griffiths  MRN: [id number]  Admission Date: 3/9/2023  Hospital Length of Stay: 0 days  Attending Physician: Nicholas Aguilar MD  Primary Care Provider: Primary Doctor No  Principal Problem:Acute decompensated heart failure    Subjective:     Interval History:   Patient hypotensive after GDMT administration, stepped up to ICU level of care, RIJ central access obtained  This morning reports some subjective improvement although he still has abdominal discomfort. One episode of emesis this morning.   BP improved, -110's  Good urine output, net neg 3L     Continuous Infusions:  Scheduled Meds:   amiodarone  200 mg Oral BID    apixaban  5 mg Oral BID    atorvastatin  80 mg Oral Daily    clopidogreL  75 mg Oral Daily    pantoprazole  40 mg Oral Daily    tamsulosin  1 capsule Oral QHS     PRN Meds:acetaminophen, HYDROcodone-acetaminophen, nitroGLYCERIN, ondansetron, sodium chloride 0.9%    Review of patient's allergies indicates:  No Known Allergies  Objective:     Vital Signs (Most Recent):  Temp: 98.3 °F (36.8 °C) (03/10/23 0715)  Pulse: (!) 51 (03/10/23 0945)  Resp: 17 (03/10/23 0945)  BP: 104/69 (03/10/23 0945)  SpO2: 100 % (03/10/23 0945)   Vital Signs (24h Range):  Temp:  [97.9 °F (36.6 °C)-98.5 °F (36.9 °C)] 98.3 °F (36.8 °C)  Pulse:  [50-77] 51  Resp:  [16-39] 17  SpO2:  [93 %-100 %] 100 %  BP: ()/() 104/69     Patient Vitals for the past 72 hrs (Last 3 readings):   Weight   03/10/23 0945 87.5 kg (193 lb)   03/10/23 0307 87.6 kg (193 lb 2 oz)   03/09/23 2101 84.4 kg (186 lb 1.1 oz)     Body mass index is 26.18 kg/m².      Intake/Output Summary (Last 24 hours) at 3/10/2023 1053  Last data filed at 3/10/2023 0500  Gross per 24 hour   Intake 480 ml   Output 3535 ml   Net -3055 ml       Hemodynamic Parameters:  CVP:  [2 mmHg] 2 mmHg    Telemetry: No tele events     Physical Exam  General: No acute distress    HEENT: Unremarkable   CVS: S1S2 normal, 3/6 HSM LLSB, Displaced PMI, no JVD   Resp: CTABL, mild crackles at L>R bases   Abdomen: Mild distension but no tenderness or guarding   Neuro: AOX3   Peripheries: No pedal edema, B/L LE cool to touch, B/L UE warm,  2+ peripheral pulses     Significant Labs:  CBC:  Recent Labs   Lab 03/09/23  1547 03/09/23  1821 03/09/23  2318 03/10/23  0306   WBC 4.04 3.68*  --  7.20   RBC 5.19 4.70  --  5.09   HGB 14.0 12.8*  --  13.7*   HCT 47.8 41.6 47 45.6    313  --  357   MCV 92 89  --  90   MCH 27.0 27.2  --  26.9*   MCHC 29.3* 30.8*  --  30.0*     BNP:  Recent Labs   Lab 03/09/23  1547   BNP 1,755*     CMP:  Recent Labs   Lab 03/09/23  1547 03/09/23  2258 03/10/23  0306   GLU 77 157* 152*   CALCIUM 9.7 9.5 8.9   ALBUMIN 4.2 3.8 3.8   PROT 7.8 7.2 7.1    140 142   K 4.2 3.9 3.8   CO2 23 23 27    104 105   BUN 12 13 16   CREATININE 1.2 1.5* 1.6*   ALKPHOS 79 67 71   ALT 16 15 13   AST 18 16 14   BILITOT 0.8 0.8 0.7      Coagulation:   No results for input(s): PT, INR, APTT in the last 168 hours.  LDH:  No results for input(s): LDH in the last 72 hours.  Microbiology:  Microbiology Results (last 7 days)       Procedure Component Value Units Date/Time    Blood culture [[id number]]     Order Status: No result Specimen: Blood     Stool culture [[id number]]     Order Status: No result Specimen: Stool             I have reviewed all pertinent labs within the past 24 hours.    Estimated Creatinine Clearance: 57.3 mL/min (A) (based on SCr of 1.6 mg/dL (H)).    Diagnostic Results:  I have reviewed and interpreted all pertinent imaging results/findings within the past 24 hours.    Assessment and Plan:      is a 56 YO male with known h/o ICMP, ACC stage C, NYHA III, with severely reduced LVEF <15%, LVIDd 7cm and RVD 4.2, CAD with h/o STEMI s/p RCA PCI 8/2021, mod -severe PH with noted improvement in PVR on NTG administration, s/p ICD placement, Afib s/p DCCV and  uncontrolled HTN, who is admitted for ADHF.    Patient has had multiple admissions for HF exacerbation in the recent past, currently being evaluated for advanced therapies as an OP and was last seen by  on 2/9/23. GDMT optimized on that visit with continuation of Entresto  BID and spironolactone 25 mg PO Qday, increase in Metoprolol to 25mg PO Qday and addition of Hydralazine and Isordil. Although patient claims that he has been taking all his meds per instructions, drop in BP with administration of home regimen is possibly related to some degree of med non compliance. Also, suspect secondary etiology such as sepsis as the patient has no LE edema, CVP ranges have been 4-6 and BNP is lower than known baseline.         # ADHF   #ICMP, ACC stage C, NYHA III, with severely reduced LVEF <15%, LVIDd 7cm and RVD 4.2  #Mod -severe PH with noted improvement in PVR on NTG administration    -Hold GDMT in the s/o hypotension   - 500 cc NS IV bolus   - CT AP without contrast   - Repeat lactic acid (normal on admission)  -Pan cultures ordered, low threshold to start ABx if needed   -TTE today  -MVO2 62 this am, CI 2.1       #[place]   -Good urine output, likely pre renal from hypotension  - Repeat BMP this afternoon   - Renally dose all meds and avoid nephrotoxins   - Gentle IV hydration as above      #CAD with h/o STEMI s/p RCA PCI 8/2021  - No new EKG changes   - Continue Plavix and Eliquis   - Continue high intensity statin       #h/o Afib s/p DCCV   - Continue Eliquis BID  - Amiodarone 200 mg BID   -Tele monitoring     Arianne Caruso MD  Heart Transplant  [facility] - [facility]

## 2023-03-10 NOTE — CARE UPDATE
Received call from nurse as patient became hypotensive with chest pain.     STAT ECG obtained and remains unchanged from prior. Troponins mildly elevated and flat. Unlikely Type 1 NSTEMI.    Initially thought chest pain was from demand in the setting of hypotension after patient had excessive urine output of 2.9L since 6pm;  however extremities now feel cool and raise concerns for cardiogenic shock    Bedside echo obtained with EF approx 15%, CVP 3    Plan:  Transfer to CICU  Central line placement with hemodynamics  STAT CMP, Lactic acid  Will hold lasix for now    Ana Maria Uriarte MD PGY VI  Cardiology  Pager: [phone]  [facility]

## 2023-03-10 NOTE — PLAN OF CARE
SICU PLAN OF CARE NOTE    Dx: Acute decompensated heart failure    Shift Events: Pt admitted to SICU 0145 am. Labs, hemodynamics, and SvO2 completed. Plan to encourage PO fluid intake and reevaluate in the AM. IVP Morphine given for pain during CVC placement, pt c/o intermittent nausea post admin. PRN zofran given.     Goals of Care: SBP >90. SpO2 >92    Neuro: AAO x4, Arouses to Voice, Follows Commands, and Moves All Extremities    Vital Signs: BP (!) 96/57   Pulse (!) 54   Temp 98.2 °F (36.8 °C) (Oral)   Resp (!) 25   Ht 6' (1.829 m)   Wt 87.6 kg (193 lb 2 oz)   SpO2 97%   BMI 26.19 kg/m²     Respiratory: Nasal Cannula    Diet: Cardiac Diet    Urine Output: Voids Spontaneously 125cc/shift     Labs/Accuchecks: Daily Labs. SvO2.    Skin: No skin breakdown noted. Pt changes position independently.

## 2023-03-10 NOTE — H&P
[facility] - Emergency Dept  Heart Transplant  H&P    Patient Name: Tera Griffiths  MRN: [id number]  Admission Date: 3/9/2023  Attending Physician: Nicholas Aguilar MD  Primary Care Provider: Primary Doctor No  Principal Problem:Acute decompensated heart failure    Subjective:     History of Present Illness:  Mr. Griffiths is a very pleasant  57 y.o. year old black male with stage C HFrEF, ICMP, CAD s/p (STEMI s/p PCI to Rcx (8/2021), previous GIB, difficulty with medication compliance,  Afib s/p DCCV, uncontrolled HTN who comes for a follow-up visit. He is currently undergoing VAD/Tx work-up.  He has had several HF admissions. He is on a excellent HF regimen that includes; Entresto 97/103 mg BID, metoprolol succinate 12.5 mg at night, aldactone 25 mg daily and torsemide 20 mg twice daily.    He presents today for worsening SOB over the last 2 days along with abdominal swelling and lower extremity swelling. He reports NYHA class III symptoms including PND and orthopnea.He also reports some chest pressure last night as well. No palpitations.     TTE 1/5/23   The left ventricle is severely enlarged with eccentric hypertrophy and severely decreased systolic function.   The estimated ejection fraction is 15%.   There is left ventricular global hypokinesis.   Grade I left ventricular diastolic dysfunction.   Mild right ventricular enlargement with moderately reduced right ventricular systolic function.   Normal central venous pressure (3 mmHg).   Severe left atrial enlargement.   Moderate posterolateral pericardial effusion. Max diameter 1.4cm.         Past Medical History:   Diagnosis Date    Atrial fibrillation     CHF (congestive heart failure)     Coronary atherosclerosis of native coronary artery     Diabetes mellitus, type 2     Encounter for blood transfusion     Hypertension        Past Surgical History:   Procedure Laterality Date    CARDIAC DEFIBRILLATOR PLACEMENT Left     HERNIA REPAIR      RIGHT  HEART CATHETERIZATION Right 9/30/2022    Procedure: INSERTION, CATHETER, RIGHT HEART;  Surgeon: Caden Aden MD;  Location: [facility] CATH LAB;  Service: Cardiology;  Laterality: Right;    TREATMENT OF CARDIAC ARRHYTHMIA N/A 11/22/2022    Procedure: CARDIOVERSION;  Surgeon: Marvin Sanon MD;  Location: [facility] EP LAB;  Service: Cardiology;  Laterality: N/A;  afib, KIA, DCCV, anes, MB, 3 Prep       Review of patient's allergies indicates:  No Known Allergies    Current Facility-Administered Medications   Medication    acetaminophen tablet 650 mg    amiodarone tablet 200 mg    apixaban tablet 5 mg    [START ON 3/10/2023] atorvastatin tablet 80 mg    [START ON 3/10/2023] clopidogreL tablet 75 mg    furosemide injection 80 mg    hydrALAZINE tablet 25 mg    isosorbide dinitrate tablet 10 mg    metoprolol succinate (TOPROL-XL) 24 hr tablet 25 mg    nitroGLYCERIN SL tablet 0.4 mg    [START ON 3/10/2023] pantoprazole EC tablet 40 mg    sacubitriL-valsartan  mg per tablet 1 tablet    sodium chloride 0.9% flush 10 mL    [START ON 3/10/2023] spironolactone tablet 25 mg    tamsulosin 24 hr capsule 0.4 mg     Current Outpatient Medications   Medication Sig    amiodarone (PACERONE) 200 MG Tab Take 1 tablet (200 mg total) by mouth 2 (two) times daily.    apixaban (ELIQUIS) 5 mg Tab Take 5 mg by mouth 2 (two) times daily.    atorvastatin (LIPITOR) 80 MG tablet Take 80 mg by mouth once daily.    canagliflozin (INVOKANA) 100 mg Tab tablet Take 100 mg by mouth once daily.    clopidogreL (PLAVIX) 75 mg tablet Take 75 mg by mouth once daily.    ferrous sulfate (FEOSOL) 325 mg (65 mg iron) Tab tablet Take 325 mg by mouth every other day.    glimepiride (AMARYL) 4 MG tablet Take 4 mg by mouth every morning.    hydrALAZINE (APRESOLINE) 25 MG tablet Take 1 tablet (25 mg total) by mouth 3 (three) times daily.    isosorbide dinitrate (ISORDIL) 10 MG tablet Take 1 tablet (10 mg total) by mouth 3 (three) times  daily.    LIDOcaine (LIDODERM) 5 % Place 1 patch onto the skin once daily.    metoprolol succinate (TOPROL-XL) 25 MG 24 hr tablet Take 1 tablet (25 mg total) by mouth every evening.    pantoprazole (PROTONIX) 40 MG tablet Take 40 mg by mouth once daily.    potassium chloride SA (K-DUR,KLOR-CON) 20 MEQ tablet Take 1 tablet (20 mEq total) by mouth once daily.    sacubitriL-valsartan (ENTRESTO)  mg per tablet Take 1 tablet by mouth 2 (two) times daily.    spironolactone (ALDACTONE) 25 MG tablet Take 25 mg by mouth once daily.    tamsulosin (FLOMAX) 0.4 mg Cap Take 1 capsule by mouth every evening.    torsemide (DEMADEX) 20 MG Tab Take 1 tablet (20 mg total) by mouth 2 (two) times a day.     Family History    None       Tobacco Use    Smoking status: Never    Smokeless tobacco: Never   Substance and Sexual Activity    Alcohol use: Never    Drug use: Never    Sexual activity: Not on file     Review of Systems   Constitutional:  Negative for fatigue.   Respiratory:  Positive for chest tightness and shortness of breath. Negative for apnea.    Cardiovascular:  Positive for leg swelling. Negative for chest pain and palpitations.   Gastrointestinal:  Positive for abdominal distention.   Objective:     Vital Signs (Most Recent):  Temp: 98.5 °F (36.9 °C) (03/09/23 1449)  Pulse: 63 (03/09/23 1926)  Resp: 20 (03/09/23 1642)  BP: (!) 152/92 (03/09/23 1927)  SpO2: 96 % (03/09/23 1924)   Vital Signs (24h Range):  Temp:  [98.5 °F (36.9 °C)] 98.5 °F (36.9 °C)  Pulse:  [60-77] 63  Resp:  [16-20] 20  SpO2:  [96 %-99 %] 96 %  BP: (146-162)/() 152/92     Patient Vitals for the past 72 hrs (Last 3 readings):   Weight   03/09/23 1449 86.2 kg (190 lb)     Body mass index is 25.77 kg/m².      Intake/Output Summary (Last 24 hours) at 3/9/2023 2031  Last data filed at 3/9/2023 1929  Gross per 24 hour   Intake --   Output 2960 ml   Net -2960 ml       Physical Exam  Vitals and nursing note reviewed.   Constitutional:        Appearance: Normal appearance.   Neck:      Vascular: JVD present.   Cardiovascular:      Rate and Rhythm: Normal rate and regular rhythm.      Pulses: Normal pulses.      Heart sounds: Murmur heard.      Comments: Pmi displaced  Pulmonary:      Effort: Respiratory distress present.      Breath sounds: Rhonchi present.   Abdominal:      General: Abdomen is flat.   Musculoskeletal:      Cervical back: Normal range of motion.      Right lower leg: No edema.      Left lower leg: No edema.   Skin:     General: Skin is warm.   Neurological:      Mental Status: He is alert.       Significant Labs:  CBC:  Recent Labs   Lab 03/09/23  1547 03/09/23  1821   WBC 4.04 3.68*   RBC 5.19 4.70   HGB 14.0 12.8*   HCT 47.8 41.6    313   MCV 92 89   MCH 27.0 27.2   MCHC 29.3* 30.8*     BNP:  Recent Labs   Lab 03/09/23  1547   BNP 1,755*     CMP:  Recent Labs   Lab 03/09/23  1430 03/09/23  1547   GLU 81 77   CALCIUM 9.6 9.7   ALBUMIN 4.1 4.2   PROT 7.4 7.8    140   K 4.8 4.2   CO2 27 23    107   BUN 13 12   CREATININE 1.2 1.2   ALKPHOS 74 79   ALT 15 16   AST 15 18   BILITOT 0.7 0.8      Coagulation:   No results for input(s): PT, INR, APTT in the last 168 hours.  LDH:  No results for input(s): LDH in the last 72 hours.  Microbiology:  Microbiology Results (last 7 days)       ** No results found for the last 168 hours. **            I have reviewed all pertinent labs within the past 24 hours.        Assessment/Plan:     * Acute decompensated heart failure  In summary Mr. Griffiths is a very pleasant with stage C HFrEF, ICMP, NYHA class III symptoms with multiple HF admissions  who is currently in work-up for advanced HF therapies.    Recommendations:  Continue GDMT which includes: metoprolol succinate to 25 mg at night, Entresto  bid, spironolactone 25, isosorbide dinitrate (isordil) 10 mg   Repeat echo for EF, RV and to assist with volume status assessment  Diuresis with Lasix 80mg iv bid- continue diuresis  until patient clinically dry or BUN/Cr begins to increase  Strict I/oS  2 gram sodium restriction and 1500cc fluid restriction.        HTN (hypertension)  Continue Hydralazine 25 q8h              Atrial fibrillation  Continue Eliquis 5mg bid for cardioembolic protection  Continue Toprol XL 25    CAD (coronary artery disease)  - continue Statin, plavix        Ana Maria Uriarte MD  Heart Transplant  [facility] - Emergency Dept

## 2023-03-10 NOTE — SUBJECTIVE & OBJECTIVE
Past Medical History:   Diagnosis Date    Atrial fibrillation     CHF (congestive heart failure)     Coronary atherosclerosis of native coronary artery     Diabetes mellitus, type 2     Encounter for blood transfusion     Hypertension        Past Surgical History:   Procedure Laterality Date    CARDIAC DEFIBRILLATOR PLACEMENT Left     HERNIA REPAIR      RIGHT HEART CATHETERIZATION Right 9/30/2022    Procedure: INSERTION, CATHETER, RIGHT HEART;  Surgeon: Caden Aden MD;  Location: [facility] CATH LAB;  Service: Cardiology;  Laterality: Right;    TREATMENT OF CARDIAC ARRHYTHMIA N/A 11/22/2022    Procedure: CARDIOVERSION;  Surgeon: Marvin Sanon MD;  Location: [facility] EP LAB;  Service: Cardiology;  Laterality: N/A;  afib, KIA, DCCV, anes, MB, 3 Prep       Review of patient's allergies indicates:  No Known Allergies    Current Facility-Administered Medications   Medication    acetaminophen tablet 650 mg    amiodarone tablet 200 mg    apixaban tablet 5 mg    [START ON 3/10/2023] atorvastatin tablet 80 mg    [START ON 3/10/2023] clopidogreL tablet 75 mg    furosemide injection 80 mg    hydrALAZINE tablet 25 mg    isosorbide dinitrate tablet 10 mg    metoprolol succinate (TOPROL-XL) 24 hr tablet 25 mg    nitroGLYCERIN SL tablet 0.4 mg    [START ON 3/10/2023] pantoprazole EC tablet 40 mg    sacubitriL-valsartan  mg per tablet 1 tablet    sodium chloride 0.9% flush 10 mL    [START ON 3/10/2023] spironolactone tablet 25 mg    tamsulosin 24 hr capsule 0.4 mg     Current Outpatient Medications   Medication Sig    amiodarone (PACERONE) 200 MG Tab Take 1 tablet (200 mg total) by mouth 2 (two) times daily.    apixaban (ELIQUIS) 5 mg Tab Take 5 mg by mouth 2 (two) times daily.    atorvastatin (LIPITOR) 80 MG tablet Take 80 mg by mouth once daily.    canagliflozin (INVOKANA) 100 mg Tab tablet Take 100 mg by mouth once daily.    clopidogreL (PLAVIX) 75 mg tablet Take 75 mg by mouth once daily.    ferrous sulfate (FEOSOL) 325 mg  (65 mg iron) Tab tablet Take 325 mg by mouth every other day.    glimepiride (AMARYL) 4 MG tablet Take 4 mg by mouth every morning.    hydrALAZINE (APRESOLINE) 25 MG tablet Take 1 tablet (25 mg total) by mouth 3 (three) times daily.    isosorbide dinitrate (ISORDIL) 10 MG tablet Take 1 tablet (10 mg total) by mouth 3 (three) times daily.    LIDOcaine (LIDODERM) 5 % Place 1 patch onto the skin once daily.    metoprolol succinate (TOPROL-XL) 25 MG 24 hr tablet Take 1 tablet (25 mg total) by mouth every evening.    pantoprazole (PROTONIX) 40 MG tablet Take 40 mg by mouth once daily.    potassium chloride SA (K-DUR,KLOR-CON) 20 MEQ tablet Take 1 tablet (20 mEq total) by mouth once daily.    sacubitriL-valsartan (ENTRESTO)  mg per tablet Take 1 tablet by mouth 2 (two) times daily.    spironolactone (ALDACTONE) 25 MG tablet Take 25 mg by mouth once daily.    tamsulosin (FLOMAX) 0.4 mg Cap Take 1 capsule by mouth every evening.    torsemide (DEMADEX) 20 MG Tab Take 1 tablet (20 mg total) by mouth 2 (two) times a day.     Family History    None       Tobacco Use    Smoking status: Never    Smokeless tobacco: Never   Substance and Sexual Activity    Alcohol use: Never    Drug use: Never    Sexual activity: Not on file     Review of Systems   Constitutional:  Negative for fatigue.   Respiratory:  Positive for chest tightness and shortness of breath. Negative for apnea.    Cardiovascular:  Positive for leg swelling. Negative for chest pain and palpitations.   Gastrointestinal:  Positive for abdominal distention.   Objective:     Vital Signs (Most Recent):  Temp: 98.5 °F (36.9 °C) (03/09/23 1449)  Pulse: 63 (03/09/23 1926)  Resp: 20 (03/09/23 1642)  BP: (!) 152/92 (03/09/23 1927)  SpO2: 96 % (03/09/23 1924)   Vital Signs (24h Range):  Temp:  [98.5 °F (36.9 °C)] 98.5 °F (36.9 °C)  Pulse:  [60-77] 63  Resp:  [16-20] 20  SpO2:  [96 %-99 %] 96 %  BP: (146-162)/() 152/92     Patient Vitals for the past 72 hrs (Last 3  readings):   Weight   03/09/23 1449 86.2 kg (190 lb)     Body mass index is 25.77 kg/m².      Intake/Output Summary (Last 24 hours) at 3/9/2023 2031  Last data filed at 3/9/2023 1929  Gross per 24 hour   Intake --   Output 2960 ml   Net -2960 ml       Physical Exam  Vitals and nursing note reviewed.   Constitutional:       Appearance: Normal appearance.   Neck:      Vascular: JVD present.   Cardiovascular:      Rate and Rhythm: Normal rate and regular rhythm.      Pulses: Normal pulses.      Heart sounds: Murmur heard.      Comments: Pmi displaced  Pulmonary:      Effort: Respiratory distress present.      Breath sounds: Rhonchi present.   Abdominal:      General: Abdomen is flat.   Musculoskeletal:      Cervical back: Normal range of motion.      Right lower leg: No edema.      Left lower leg: No edema.   Skin:     General: Skin is warm.   Neurological:      Mental Status: He is alert.       Significant Labs:  CBC:  Recent Labs   Lab 03/09/23  1547 03/09/23  1821   WBC 4.04 3.68*   RBC 5.19 4.70   HGB 14.0 12.8*   HCT 47.8 41.6    313   MCV 92 89   MCH 27.0 27.2   MCHC 29.3* 30.8*     BNP:  Recent Labs   Lab 03/09/23  1547   BNP 1,755*     CMP:  Recent Labs   Lab 03/09/23  1430 03/09/23  1547   GLU 81 77   CALCIUM 9.6 9.7   ALBUMIN 4.1 4.2   PROT 7.4 7.8    140   K 4.8 4.2   CO2 27 23    107   BUN 13 12   CREATININE 1.2 1.2   ALKPHOS 74 79   ALT 15 16   AST 15 18   BILITOT 0.7 0.8      Coagulation:   No results for input(s): PT, INR, APTT in the last 168 hours.  LDH:  No results for input(s): LDH in the last 72 hours.  Microbiology:  Microbiology Results (last 7 days)       ** No results found for the last 168 hours. **            I have reviewed all pertinent labs within the past 24 hours.

## 2023-03-10 NOTE — PROGRESS NOTES
Admit Note/Discharge Note     Met with patient to assess needs. Patient is a 57 y.o. single male, admitted for for heart failure; volume overload.  Pt has hx of CAD, afib.      Patient admitted to [facility] on 3/9/2023 .  At this time, patient presents as alert and oriented x 4, good eye contact, calm, and communicative.  At this time, patients caregiver is not present    Household/Family Systems     Patient resides with patient's cousin (Roman Palomino).     [address]  [place] [address].        Support system includes cousins and ex significant other.  Pt has a 16 yr old daughter who lives with her mother in [place].  Pt also reported he has 2 adult children.     Patients primary caregiver is self.     Pt's cell: [phone]    Emergency contact:   Cristhian Griffiths (cousin, lives in [place], drives and works full time) [phone]  Neris Palomino (mother to pt's daughter, lives in [place] and drives) [phone]    During admission, patient's caregiver plans to stay at home.  Confirmed patient and patients caregivers do have access to reliable transportation.   The pt does not drive, family assists at times or the pt takes the bus.   Pt reports he is aware of Medicaid Transportation, however he has not used this service.     Pt also reported having 3 sisters and 2 brothers.  Pt stated that they are involved and supportive.  Pt's sister (Gina Griffiths) also assists with transportation at times per pt.     Cognitive Status/Learning     Patient reports reading ability as 11th grade and states patient does not have difficulty with reading, writing, seeing, hearing, comprehension, learning, and memory.  Patient reports patient learns best by one on one/hands on.     Needed: No.   Highest education level: High School (9-12) or GED    Vocation/Disability   .  Working for Income: No  If no, reason not working: health  Patient used to be a  and stopped working during the  pandemic.   Pt reports he has applied for disability.  Pt stated that he has no income at this time, but he does receive food stamps.  Pt stated that his cousin assists pt with housing.     Adherence     Patient reports a high level of adherence to patients health care regimen.  Adherence counseling and education provided. Patient verbalizes understanding.    Substance Use    Patient reports the following substance usage.    Tobacco: none, patient denies any use.  Alcohol: none, patient denies any use.  Illicit Drugs/Non-prescribed Medications: none, patient denies any use.  Patient states clear understanding of the potential impact of substance use.  Substance abstinence/cessation counseling, education and resources provided and reviewed.     Services Utilizing/ADLS    Infusion Service: Prior to admission, patient utilizing? no  Home Health: Prior to admission, patient utilizing? no  DME: Prior to admission, no  Pulmonary/Cardiac Rehab: Prior to admission, no  Dialysis:  Prior to admission, no  Transplant Specialty Pharmacy:  Prior to admission, no.    Prior to admission, patient reports patient was independent with ADLS and was not driving.  Patient reports patient is not able to care for self at this time due to compromised medical condition (as documented in medical record) and physical weakness..  Patient indicates a willingness to care for self once medically cleared to do so.    Insurance/Medications    Payer/Plan Subscr  Sex Relation Sub. Ins. ID Effective Group Num   1. [place]* DARION CARTAGENA 1965 Male Self [id number] [address] [facility]                                   [address] [address]   2. [employer]* DARION CARTAGENA 1965 Male Self [id number]                                    [address] [address]     Primary Insurance (for UNOS reporting): Public Insurance - Medicaid  Secondary Insurance (for UNOS reporting): None    Patient reports patient is able to obtain and afford medications at this  time and at time of discharge.    Living Will/Healthcare Power of     Patient states patient does not have a LW and/or HCPA.   provided education regarding LW and HCPA and the completion of forms.    Coping/Mental Health    Patient is coping adequately with the aid of  family members.    Patient denies mental health difficulties.     Discharge Planning    At time of discharge, patient plans to return to patient's home under the care of self.  Patients  cousin (Cristhian)  or sister (Corey) will transport patient.  Per rounds today, expected discharge date has not been medically determined at this time. Patient verbalized understanding and are involved in treatment planning and discharge process.    Additional Concerns     providing ongoing psychosocial support, education, resources and d/c planning as needed.  SW remains available. Patient denies additional needs and/or concerns at this time. Patient verbalizes understanding and agreement with information reviewed, social work availability, and how to access available resources as needed.

## 2023-03-10 NOTE — NURSING
Pt transported to SICU [address] with portable telemetry and Nasal Cannula to O2 tank. Pt connected to ICU monitor and wall O2. HTS MD and Charge RN called to bedside for patient arrival. Report received from Lizabeth HENRY. New orders received and implemented. Pt assessed, immediate needs met. All questions answered, emotional support provided. CVC placed with pt consent, pt tolerated well.     Admit Skin Note: Nurses Note -- 4 Eyes      3/10/2023   0145 AM      Skin assessed during: Admit      [x] No Pressure Injuries Present    []Prevention Measures Documented      [] Yes- Altered Skin Integrity Present or Discovered   [] LDA Added if Not in Epic (Describe Wound)   [] New Altered Skin Integrity was Present on Admit and Documented in LDA   [] Wound Image Taken    Wound Care Consulted? No    Attending Nurse:  Vaishali France RN     Second RN/Staff Member:  Prashanth Mckeon RN

## 2023-03-10 NOTE — ASSESSMENT & PLAN NOTE
In summary Mr. Griffiths is a very pleasant with stage C HFrEF, ICMP, NYHA class III symptoms with multiple HF admissions  who is currently in work-up for advanced HF therapies.    Recommendations:  Continue GDMT which includes: metoprolol succinate to 25 mg at night, Entresto  bid, spironolactone 25, isosorbide dinitrate (isordil) 10 mg   Repeat echo for EF, RV and to assist with volume status assessment  Diuresis with Lasix 80mg iv bid- continue diuresis until patient clinically dry or BUN/Cr begins to increase  Strict I/oS  2 gram sodium restriction and 1500cc fluid restriction.

## 2023-03-11 LAB
ALBUMIN SERPL BCP-MCNC: 3.3 G/DL (ref 3.5–5.2)
ALLENS TEST: ABNORMAL
ALLENS TEST: ABNORMAL
ALP SERPL-CCNC: 66 U/L (ref 55–135)
ALT SERPL W/O P-5'-P-CCNC: 10 U/L (ref 10–44)
ANION GAP SERPL CALC-SCNC: 8 MMOL/L (ref 8–16)
AST SERPL-CCNC: 10 U/L (ref 10–40)
BASOPHILS # BLD AUTO: 0.01 K/UL (ref 0–0.2)
BASOPHILS NFR BLD: 0.2 % (ref 0–1.9)
BILIRUB SERPL-MCNC: 0.7 MG/DL (ref 0.1–1)
BUN SERPL-MCNC: 18 MG/DL (ref 6–20)
CALCIUM SERPL-MCNC: 8.6 MG/DL (ref 8.7–10.5)
CHLORIDE SERPL-SCNC: 105 MMOL/L (ref 95–110)
CO2 SERPL-SCNC: 25 MMOL/L (ref 23–29)
CREAT SERPL-MCNC: 1.4 MG/DL (ref 0.5–1.4)
DELSYS: ABNORMAL
DELSYS: ABNORMAL
DIFFERENTIAL METHOD: ABNORMAL
EOSINOPHIL # BLD AUTO: 0.2 K/UL (ref 0–0.5)
EOSINOPHIL NFR BLD: 3.8 % (ref 0–8)
ERYTHROCYTE [DISTWIDTH] IN BLOOD BY AUTOMATED COUNT: 14.9 % (ref 11.5–14.5)
EST. GFR  (NO RACE VARIABLE): 59.4 ML/MIN/1.73 M^2
GLUCOSE SERPL-MCNC: 94 MG/DL (ref 70–110)
HCO3 UR-SCNC: 27.5 MMOL/L (ref 24–28)
HCO3 UR-SCNC: 29.6 MMOL/L (ref 24–28)
HCT VFR BLD AUTO: 44.7 % (ref 40–54)
HCT VFR BLD CALC: 45 %PCV (ref 36–54)
HCT VFR BLD CALC: 47 %PCV (ref 36–54)
HGB BLD-MCNC: 13.2 G/DL (ref 14–18)
IMM GRANULOCYTES # BLD AUTO: 0.01 K/UL (ref 0–0.04)
IMM GRANULOCYTES NFR BLD AUTO: 0.2 % (ref 0–0.5)
LYMPHOCYTES # BLD AUTO: 0.9 K/UL (ref 1–4.8)
LYMPHOCYTES NFR BLD: 20.3 % (ref 18–48)
MAGNESIUM SERPL-MCNC: 1.9 MG/DL (ref 1.6–2.6)
MCH RBC QN AUTO: 26.5 PG (ref 27–31)
MCHC RBC AUTO-ENTMCNC: 29.5 G/DL (ref 32–36)
MCV RBC AUTO: 90 FL (ref 82–98)
MONOCYTES # BLD AUTO: 0.6 K/UL (ref 0.3–1)
MONOCYTES NFR BLD: 14 % (ref 4–15)
NEUTROPHILS # BLD AUTO: 2.8 K/UL (ref 1.8–7.7)
NEUTROPHILS NFR BLD: 61.5 % (ref 38–73)
NRBC BLD-RTO: 0 /100 WBC
PCO2 BLDA: 52.8 MMHG (ref 35–45)
PCO2 BLDA: 53.3 MMHG (ref 35–45)
PH SMN: 7.32 [PH] (ref 7.35–7.45)
PH SMN: 7.35 [PH] (ref 7.35–7.45)
PLATELET # BLD AUTO: 314 K/UL (ref 150–450)
PMV BLD AUTO: 11.2 FL (ref 9.2–12.9)
PO2 BLDA: 37 MMHG (ref 40–60)
PO2 BLDA: 40 MMHG (ref 40–60)
POC BE: 1 MMOL/L
POC BE: 4 MMOL/L
POC SATURATED O2: 67 % (ref 95–100)
POC SATURATED O2: 70 % (ref 95–100)
POC TCO2: 29 MMOL/L (ref 24–29)
POC TCO2: 31 MMOL/L (ref 24–29)
POTASSIUM SERPL-SCNC: 3.9 MMOL/L (ref 3.5–5.1)
PROT SERPL-MCNC: 6.3 G/DL (ref 6–8.4)
RBC # BLD AUTO: 4.98 M/UL (ref 4.6–6.2)
SAMPLE: ABNORMAL
SAMPLE: ABNORMAL
SITE: ABNORMAL
SITE: ABNORMAL
SODIUM SERPL-SCNC: 138 MMOL/L (ref 136–145)
WBC # BLD AUTO: 4.49 K/UL (ref 3.9–12.7)

## 2023-03-11 PROCEDURE — 25000003 PHARM REV CODE 250: Mod: NTX | Performed by: INTERNAL MEDICINE

## 2023-03-11 PROCEDURE — 99900035 HC TECH TIME PER 15 MIN (STAT): Mod: NTX

## 2023-03-11 PROCEDURE — 87045 FECES CULTURE AEROBIC BACT: CPT | Mod: NTX | Performed by: INTERNAL MEDICINE

## 2023-03-11 PROCEDURE — 83735 ASSAY OF MAGNESIUM: CPT | Mod: NTX | Performed by: STUDENT IN AN ORGANIZED HEALTH CARE EDUCATION/TRAINING PROGRAM

## 2023-03-11 PROCEDURE — 82803 BLOOD GASES ANY COMBINATION: CPT | Mod: NTX

## 2023-03-11 PROCEDURE — 80053 COMPREHEN METABOLIC PANEL: CPT | Mod: NTX | Performed by: STUDENT IN AN ORGANIZED HEALTH CARE EDUCATION/TRAINING PROGRAM

## 2023-03-11 PROCEDURE — 27000221 HC OXYGEN, UP TO 24 HOURS: Mod: NTX

## 2023-03-11 PROCEDURE — 87427 SHIGA-LIKE TOXIN AG IA: CPT | Mod: 59,NTX | Performed by: INTERNAL MEDICINE

## 2023-03-11 PROCEDURE — 25000003 PHARM REV CODE 250: Mod: NTX | Performed by: STUDENT IN AN ORGANIZED HEALTH CARE EDUCATION/TRAINING PROGRAM

## 2023-03-11 PROCEDURE — 99900031 HC PATIENT EDUCATION (STAT): Mod: NTX

## 2023-03-11 PROCEDURE — 94761 N-INVAS EAR/PLS OXIMETRY MLT: CPT | Mod: NTX

## 2023-03-11 PROCEDURE — 20600001 HC STEP DOWN PRIVATE ROOM: Mod: NTX

## 2023-03-11 PROCEDURE — 87046 STOOL CULTR AEROBIC BACT EA: CPT | Mod: 59,NTX | Performed by: INTERNAL MEDICINE

## 2023-03-11 PROCEDURE — 99233 SBSQ HOSP IP/OBS HIGH 50: CPT | Mod: NTX,,, | Performed by: INTERNAL MEDICINE

## 2023-03-11 PROCEDURE — 99233 PR SUBSEQUENT HOSPITAL CARE,LEVL III: ICD-10-PCS | Mod: NTX,,, | Performed by: INTERNAL MEDICINE

## 2023-03-11 PROCEDURE — 85025 COMPLETE CBC W/AUTO DIFF WBC: CPT | Mod: NTX | Performed by: STUDENT IN AN ORGANIZED HEALTH CARE EDUCATION/TRAINING PROGRAM

## 2023-03-11 RX ORDER — LACTULOSE 10 G/15ML
20 SOLUTION ORAL 2 TIMES DAILY
Status: COMPLETED | OUTPATIENT
Start: 2023-03-11 | End: 2023-03-12

## 2023-03-11 RX ADMIN — CLOPIDOGREL BISULFATE 75 MG: 75 TABLET ORAL at 08:03

## 2023-03-11 RX ADMIN — AMIODARONE HYDROCHLORIDE 200 MG: 200 TABLET ORAL at 08:03

## 2023-03-11 RX ADMIN — LACTULOSE 20 G: 20 SOLUTION ORAL at 08:03

## 2023-03-11 RX ADMIN — HYDROCODONE BITARTRATE AND ACETAMINOPHEN 1 TABLET: 5; 325 TABLET ORAL at 05:03

## 2023-03-11 RX ADMIN — ATORVASTATIN CALCIUM 80 MG: 40 TABLET, FILM COATED ORAL at 08:03

## 2023-03-11 RX ADMIN — APIXABAN 5 MG: 5 TABLET, FILM COATED ORAL at 08:03

## 2023-03-11 RX ADMIN — TAMSULOSIN HYDROCHLORIDE 0.4 MG: 0.4 CAPSULE ORAL at 08:03

## 2023-03-11 RX ADMIN — PANTOPRAZOLE SODIUM 40 MG: 40 TABLET, DELAYED RELEASE ORAL at 08:03

## 2023-03-11 NOTE — SUBJECTIVE & OBJECTIVE
Interval History:   This morning reports improvement in abdominal discomfort. No further emesis. BP improved, -110's    Continuous Infusions:  Scheduled Meds:   amiodarone  200 mg Oral BID    apixaban  5 mg Oral BID    atorvastatin  80 mg Oral Daily    clopidogreL  75 mg Oral Daily    pantoprazole  40 mg Oral Daily    tamsulosin  1 capsule Oral QHS     PRN Meds:acetaminophen, HYDROcodone-acetaminophen, nitroGLYCERIN, ondansetron, sodium chloride 0.9%    Objective:     Vital Signs (Most Recent):  Temp: 98.1 °F (36.7 °C) (03/11/23 0800)  Pulse: 60 (03/11/23 0830)  Resp: (!) 21 (03/11/23 0830)  BP: 126/86 (03/11/23 0815)  SpO2: 98 % (03/11/23 0830)   Vital Signs (24h Range):  Temp:  [97.7 °F (36.5 °C)-98.1 °F (36.7 °C)] 98.1 °F (36.7 °C)  Pulse:  [50-69] 60  Resp:  [17-43] 21  SpO2:  [94 %-100 %] 98 %  BP: ()/(46-87) 126/86     Patient Vitals for the past 72 hrs (Last 3 readings):   Weight   03/10/23 0945 87.5 kg (193 lb)   03/10/23 0307 87.6 kg (193 lb 2 oz)   03/09/23 2101 84.4 kg (186 lb 1.1 oz)     Body mass index is 26.18 kg/m².    Intake/Output Summary (Last 24 hours) at 3/11/2023 0918  Last data filed at 3/11/2023 0701  Gross per 24 hour   Intake 480 ml   Output 825 ml   Net -345 ml     Hemodynamic Parameters:  CVP:  [5 mmHg-6 mmHg] 5 mmHg    Telemetry: No tele events     Physical Exam  General: No acute distress   HEENT: Unremarkable   CVS: S1S2 normal, 3/6 HSM LLSB, Displaced PMI, no JVD   Resp: CTABL, mild crackles at L>R bases   Abdomen: Mild distension but no tenderness or guarding   Neuro: AOX3   Peripheries: No pedal edema, B/L LE warm to touch, B/L UE warm,  2+ peripheral pulses     Significant Labs:  CBC:  Recent Labs   Lab 03/09/23  1821 03/09/23  2318 03/10/23  0306 03/11/23  0347 03/11/23  0757   WBC 3.68*  --  7.20 4.49  --    RBC 4.70  --  5.09 4.98  --    HGB 12.8*  --  13.7* 13.2*  --    HCT 41.6   < > 45.6 44.7 45     --  357 314  --    MCV 89  --  90 90  --    MCH 27.2  --   26.9* 26.5*  --    MCHC 30.8*  --  30.0* 29.5*  --     < > = values in this interval not displayed.     BNP:  Recent Labs   Lab 03/09/23  1547   BNP 1,755*     CMP:  Recent Labs   Lab 03/09/23  2258 03/10/23  0306 03/10/23  1150 03/11/23  0347   * 152* 122* 94   CALCIUM 9.5 8.9 8.4* 8.6*   ALBUMIN 3.8 3.8  --  3.3*   PROT 7.2 7.1  --  6.3    142 139 138   K 3.9 3.8 4.0 3.9   CO2 23 27 25 25    105 106 105   BUN 13 16 17 18   CREATININE 1.5* 1.6* 1.5* 1.4   ALKPHOS 67 71  --  66   ALT 15 13  --  10   AST 16 14  --  10   BILITOT 0.8 0.7  --  0.7     Coagulation:   No results for input(s): PT, INR, APTT in the last 168 hours.  Microbiology:  Microbiology Results (last 7 days)       Procedure Component Value Units Date/Time    Blood culture [[id number]] Collected: 03/10/23 1149    Order Status: Completed Specimen: Blood from Peripheral, Forearm, Left Updated: 03/10/23 1915     Blood Culture, Routine No Growth to date    Narrative:      Please obtain 2 sets    Blood culture [[id number]]     Order Status: No result Specimen: Blood     Stool culture [[id number]]     Order Status: No result Specimen: Stool           I have reviewed all pertinent labs within the past 24 hours.    Estimated Creatinine Clearance: 65.4 mL/min (based on SCr of 1.4 mg/dL).    Diagnostic Results:  I have reviewed and interpreted all pertinent imaging results/findings within the past 24 hours.

## 2023-03-11 NOTE — PROGRESS NOTES
[facility]  Heart Transplant  Progress Note    Patient Name: Tera Griffiths  MRN: [id number]  Admission Date: 3/9/2023  Hospital Length of Stay: 1 days  Attending Physician: Nicholas Aguilar MD  Primary Care Provider: Primary Doctor No  Principal Problem:Acute decompensated heart failure    Subjective:     Interval History:   This morning reports improvement in abdominal discomfort. No further emesis. BP improved, -110's    Continuous Infusions:  Scheduled Meds:   amiodarone  200 mg Oral BID    apixaban  5 mg Oral BID    atorvastatin  80 mg Oral Daily    clopidogreL  75 mg Oral Daily    pantoprazole  40 mg Oral Daily    tamsulosin  1 capsule Oral QHS     PRN Meds:acetaminophen, HYDROcodone-acetaminophen, nitroGLYCERIN, ondansetron, sodium chloride 0.9%    Objective:     Vital Signs (Most Recent):  Temp: 98.1 °F (36.7 °C) (03/11/23 0800)  Pulse: 60 (03/11/23 0830)  Resp: (!) 21 (03/11/23 0830)  BP: 126/86 (03/11/23 0815)  SpO2: 98 % (03/11/23 0830)   Vital Signs (24h Range):  Temp:  [97.7 °F (36.5 °C)-98.1 °F (36.7 °C)] 98.1 °F (36.7 °C)  Pulse:  [50-69] 60  Resp:  [17-43] 21  SpO2:  [94 %-100 %] 98 %  BP: ()/(46-87) 126/86     Patient Vitals for the past 72 hrs (Last 3 readings):   Weight   03/10/23 0945 87.5 kg (193 lb)   03/10/23 0307 87.6 kg (193 lb 2 oz)   03/09/23 2101 84.4 kg (186 lb 1.1 oz)     Body mass index is 26.18 kg/m².    Intake/Output Summary (Last 24 hours) at 3/11/2023 0918  Last data filed at 3/11/2023 0701  Gross per 24 hour   Intake 480 ml   Output 825 ml   Net -345 ml     Hemodynamic Parameters:  CVP:  [5 mmHg-6 mmHg] 5 mmHg    Telemetry: No tele events     Physical Exam  General: No acute distress   HEENT: Unremarkable   CVS: S1S2 normal, 3/6 HSM LLSB, Displaced PMI, no JVD   Resp: CTABL  Abdomen: Mild distension but no tenderness or guarding   Neuro: AOX3   Peripheries: No pedal edema, B/L LE warm to touch, B/L UE warm,  2+ peripheral pulses     Significant  Labs:  CBC:  Recent Labs   Lab 03/09/23  1821 03/09/23  2318 03/10/23  0306 03/11/23  0347 03/11/23  0757   WBC 3.68*  --  7.20 4.49  --    RBC 4.70  --  5.09 4.98  --    HGB 12.8*  --  13.7* 13.2*  --    HCT 41.6   < > 45.6 44.7 45     --  357 314  --    MCV 89  --  90 90  --    MCH 27.2  --  26.9* 26.5*  --    MCHC 30.8*  --  30.0* 29.5*  --     < > = values in this interval not displayed.     BNP:  Recent Labs   Lab 03/09/23  1547   BNP 1,755*     CMP:  Recent Labs   Lab 03/09/23  2258 03/10/23  0306 03/10/23  1150 03/11/23  0347   * 152* 122* 94   CALCIUM 9.5 8.9 8.4* 8.6*   ALBUMIN 3.8 3.8  --  3.3*   PROT 7.2 7.1  --  6.3    142 139 138   K 3.9 3.8 4.0 3.9   CO2 23 27 25 25    105 106 105   BUN 13 16 17 18   CREATININE 1.5* 1.6* 1.5* 1.4   ALKPHOS 67 71  --  66   ALT 15 13  --  10   AST 16 14  --  10   BILITOT 0.8 0.7  --  0.7     Coagulation:   No results for input(s): PT, INR, APTT in the last 168 hours.  Microbiology:  Microbiology Results (last 7 days)       Procedure Component Value Units Date/Time    Blood culture [[id number]] Collected: 03/10/23 1149    Order Status: Completed Specimen: Blood from Peripheral, Forearm, Left Updated: 03/10/23 1915     Blood Culture, Routine No Growth to date    Narrative:      Please obtain 2 sets    Blood culture [[id number]]     Order Status: No result Specimen: Blood     Stool culture [[id number]]     Order Status: No result Specimen: Stool           I have reviewed all pertinent labs within the past 24 hours.    Estimated Creatinine Clearance: 65.4 mL/min (based on SCr of 1.4 mg/dL).    Diagnostic Results:  I have reviewed and interpreted all pertinent imaging results/findings within the past 24 hours.  Assessment and Plan:     * Acute decompensated heart failure  In summary Mr. Griffiths is a very pleasant with stage C HFrEF, ICMP, NYHA class III symptoms with multiple HF admissions  who is currently in work-up for advanced HF  therapies.    Recommendations:  Holding diuretics and GDMT which includes: toprol 25 mg at night, Entresto  bid, spironolactone 25, isosorbide dinitrate (isordil) 10 mg   Strict I/Os. 2g sodium restriction and 1500cc fluid restriction.  3/11 step down to CSU. Keep RIJ in place for hemodynamics with CVP and SvO2s.    Atrial fibrillation  Continue Eliquis 5mg bid for cardioembolic protection  Holding Toprol XL 25    CAD (coronary artery disease)  - continue atorvastatin, plavix      Ion Euceda MD  Heart Transplant  [facility]

## 2023-03-11 NOTE — EICU
Intervention Initiated From:  COR / EICU    Elver intervened regarding: Video rounding  Respiratory    Nurse Notified: Azeb Hanks, RN  Yes    Doctor Notified:  No    Comments: Video rounds on patient. Patient sitting up in chair. Noted patient unable to say a full sentence, states he's SOB. RR 22 , Sa02 100%. WCTM as needed.

## 2023-03-11 NOTE — PLAN OF CARE
Reporting SOB and dizziness  Hold off on stepping down today  CVP 1, SVO2 70 with Hb 13.2  CI/CO/SVR 2.8/5.9/1219  Got kub and cxr  Will order bowel regimen for constipation on kub

## 2023-03-11 NOTE — CARE UPDATE
Reports mild dyspnea and dizziness  Lungs sound clear, SVO2 99% on 1 lpm  Ordered cvp, svo2 and cxr stat and then will decide if to step down or not

## 2023-03-11 NOTE — CARE UPDATE
Hemodynamics:   Parameter   at 2000   CVP 5   SvO2 69   CO  5.38   CI 2.60   SVR 1234   MAP 88     Current Heart Failure Medications:  -no drips      Intake/Output Summary (Last 24 hours) at 3/10/2023 1833  Last data filed at 3/10/2023 1704  Gross per 24 hour   Intake 840 ml   Output 2760 ml   Net -1920 ml     Assessment/Plan:  - Plan was discussed with attending staff     Ion Euceda MD  Cardiology Fellow PGY IV

## 2023-03-11 NOTE — PLAN OF CARE
POC Reviewed with patient. SBP and MAP goal met throughout the shift. 2L NC. No complaints of chest or abdominal pain. All needs and concerns addressed.   CVP: 5/6/6  svO2: 69

## 2023-03-11 NOTE — ASSESSMENT & PLAN NOTE
In summary Mr. Griffiths is a very pleasant with stage C HFrEF, ICMP, NYHA class III symptoms with multiple HF admissions  who is currently in work-up for advanced HF therapies.    Recommendations:  Holding diuretics and GDMT which includes: toprol 25 mg at night, Entresto  bid, spironolactone 25, isosorbide dinitrate (isordil) 10 mg   Strict I/Os. 2g sodium restriction and 1500cc fluid restriction.  3/11 step down to CSU. Keep RIJ in place for hemodynamics with CVP and SvO2s.

## 2023-03-12 LAB
ALBUMIN SERPL BCP-MCNC: 3 G/DL (ref 3.5–5.2)
ALLENS TEST: ABNORMAL
ALLENS TEST: ABNORMAL
ALP SERPL-CCNC: 59 U/L (ref 55–135)
ALT SERPL W/O P-5'-P-CCNC: 8 U/L (ref 10–44)
ANION GAP SERPL CALC-SCNC: 6 MMOL/L (ref 8–16)
AST SERPL-CCNC: 11 U/L (ref 10–40)
BASOPHILS # BLD AUTO: 0.02 K/UL (ref 0–0.2)
BASOPHILS NFR BLD: 0.5 % (ref 0–1.9)
BILIRUB SERPL-MCNC: 0.6 MG/DL (ref 0.1–1)
BUN SERPL-MCNC: 19 MG/DL (ref 6–20)
CALCIUM SERPL-MCNC: 8.4 MG/DL (ref 8.7–10.5)
CHLORIDE SERPL-SCNC: 108 MMOL/L (ref 95–110)
CO2 SERPL-SCNC: 22 MMOL/L (ref 23–29)
CREAT SERPL-MCNC: 1.2 MG/DL (ref 0.5–1.4)
DELSYS: ABNORMAL
DELSYS: ABNORMAL
DIFFERENTIAL METHOD: ABNORMAL
EOSINOPHIL # BLD AUTO: 0.2 K/UL (ref 0–0.5)
EOSINOPHIL NFR BLD: 4.4 % (ref 0–8)
ERYTHROCYTE [DISTWIDTH] IN BLOOD BY AUTOMATED COUNT: 14.8 % (ref 11.5–14.5)
ERYTHROCYTE [SEDIMENTATION RATE] IN BLOOD BY WESTERGREN METHOD: 19 MM/H
EST. GFR  (NO RACE VARIABLE): >60 ML/MIN/1.73 M^2
FIO2: 21
FIO2: 21
GLUCOSE SERPL-MCNC: 85 MG/DL (ref 70–110)
HCO3 UR-SCNC: 25.6 MMOL/L (ref 24–28)
HCO3 UR-SCNC: 28.5 MMOL/L (ref 24–28)
HCT VFR BLD AUTO: 41.2 % (ref 40–54)
HCT VFR BLD CALC: 41 %PCV (ref 36–54)
HGB BLD-MCNC: 12.3 G/DL (ref 14–18)
IMM GRANULOCYTES # BLD AUTO: 0 K/UL (ref 0–0.04)
IMM GRANULOCYTES NFR BLD AUTO: 0 % (ref 0–0.5)
LYMPHOCYTES # BLD AUTO: 1 K/UL (ref 1–4.8)
LYMPHOCYTES NFR BLD: 25.5 % (ref 18–48)
MAGNESIUM SERPL-MCNC: 1.8 MG/DL (ref 1.6–2.6)
MCH RBC QN AUTO: 27.3 PG (ref 27–31)
MCHC RBC AUTO-ENTMCNC: 29.9 G/DL (ref 32–36)
MCV RBC AUTO: 91 FL (ref 82–98)
MODE: ABNORMAL
MODE: ABNORMAL
MONOCYTES # BLD AUTO: 0.5 K/UL (ref 0.3–1)
MONOCYTES NFR BLD: 12.1 % (ref 4–15)
NEUTROPHILS # BLD AUTO: 2.2 K/UL (ref 1.8–7.7)
NEUTROPHILS NFR BLD: 57.5 % (ref 38–73)
NRBC BLD-RTO: 0 /100 WBC
PCO2 BLDA: 46.3 MMHG (ref 35–45)
PCO2 BLDA: 53.9 MMHG (ref 35–45)
PH SMN: 7.33 [PH] (ref 7.35–7.45)
PH SMN: 7.35 [PH] (ref 7.35–7.45)
PHOSPHATE SERPL-MCNC: 3.4 MG/DL (ref 2.7–4.5)
PLATELET # BLD AUTO: 271 K/UL (ref 150–450)
PMV BLD AUTO: 11.2 FL (ref 9.2–12.9)
PO2 BLDA: 34 MMHG (ref 40–60)
PO2 BLDA: 35 MMHG (ref 40–60)
POC BE: 0 MMOL/L
POC BE: 3 MMOL/L
POC IONIZED CALCIUM: 1.2 MMOL/L (ref 1.06–1.42)
POC SATURATED O2: 61 % (ref 95–100)
POC SATURATED O2: 63 % (ref 95–100)
POC TCO2: 27 MMOL/L (ref 24–29)
POC TCO2: 30 MMOL/L (ref 24–29)
POTASSIUM BLD-SCNC: 4 MMOL/L (ref 3.5–5.1)
POTASSIUM SERPL-SCNC: 4.2 MMOL/L (ref 3.5–5.1)
PROT SERPL-MCNC: 5.7 G/DL (ref 6–8.4)
RBC # BLD AUTO: 4.51 M/UL (ref 4.6–6.2)
SAMPLE: ABNORMAL
SAMPLE: ABNORMAL
SITE: ABNORMAL
SITE: ABNORMAL
SODIUM BLD-SCNC: 141 MMOL/L (ref 136–145)
SODIUM SERPL-SCNC: 136 MMOL/L (ref 136–145)
WBC # BLD AUTO: 3.88 K/UL (ref 3.9–12.7)

## 2023-03-12 PROCEDURE — 99233 PR SUBSEQUENT HOSPITAL CARE,LEVL III: ICD-10-PCS | Mod: NTX,,, | Performed by: INTERNAL MEDICINE

## 2023-03-12 PROCEDURE — 99233 SBSQ HOSP IP/OBS HIGH 50: CPT | Mod: NTX,,, | Performed by: INTERNAL MEDICINE

## 2023-03-12 PROCEDURE — 99900031 HC PATIENT EDUCATION (STAT): Mod: NTX

## 2023-03-12 PROCEDURE — 63600175 PHARM REV CODE 636 W HCPCS: Mod: NTX | Performed by: INTERNAL MEDICINE

## 2023-03-12 PROCEDURE — 27000221 HC OXYGEN, UP TO 24 HOURS: Mod: NTX

## 2023-03-12 PROCEDURE — 20600001 HC STEP DOWN PRIVATE ROOM: Mod: NTX

## 2023-03-12 PROCEDURE — 25000003 PHARM REV CODE 250: Mod: NTX | Performed by: STUDENT IN AN ORGANIZED HEALTH CARE EDUCATION/TRAINING PROGRAM

## 2023-03-12 PROCEDURE — 94761 N-INVAS EAR/PLS OXIMETRY MLT: CPT | Mod: NTX

## 2023-03-12 PROCEDURE — 82803 BLOOD GASES ANY COMBINATION: CPT | Mod: NTX

## 2023-03-12 PROCEDURE — 85025 COMPLETE CBC W/AUTO DIFF WBC: CPT | Mod: NTX | Performed by: STUDENT IN AN ORGANIZED HEALTH CARE EDUCATION/TRAINING PROGRAM

## 2023-03-12 PROCEDURE — 25000003 PHARM REV CODE 250: Mod: NTX | Performed by: INTERNAL MEDICINE

## 2023-03-12 PROCEDURE — 83735 ASSAY OF MAGNESIUM: CPT | Mod: NTX | Performed by: STUDENT IN AN ORGANIZED HEALTH CARE EDUCATION/TRAINING PROGRAM

## 2023-03-12 PROCEDURE — 99900035 HC TECH TIME PER 15 MIN (STAT): Mod: NTX

## 2023-03-12 PROCEDURE — 84100 ASSAY OF PHOSPHORUS: CPT | Mod: NTX | Performed by: STUDENT IN AN ORGANIZED HEALTH CARE EDUCATION/TRAINING PROGRAM

## 2023-03-12 PROCEDURE — 80053 COMPREHEN METABOLIC PANEL: CPT | Mod: NTX | Performed by: STUDENT IN AN ORGANIZED HEALTH CARE EDUCATION/TRAINING PROGRAM

## 2023-03-12 RX ORDER — MAGNESIUM SULFATE HEPTAHYDRATE 40 MG/ML
2 INJECTION, SOLUTION INTRAVENOUS ONCE
Status: COMPLETED | OUTPATIENT
Start: 2023-03-12 | End: 2023-03-12

## 2023-03-12 RX ADMIN — MAGNESIUM SULFATE 2 G: 2 INJECTION INTRAVENOUS at 05:03

## 2023-03-12 RX ADMIN — TAMSULOSIN HYDROCHLORIDE 0.4 MG: 0.4 CAPSULE ORAL at 08:03

## 2023-03-12 RX ADMIN — APIXABAN 5 MG: 5 TABLET, FILM COATED ORAL at 08:03

## 2023-03-12 RX ADMIN — SACUBITRIL AND VALSARTAN 1 TABLET: 24; 26 TABLET, FILM COATED ORAL at 10:03

## 2023-03-12 RX ADMIN — ATORVASTATIN CALCIUM 80 MG: 40 TABLET, FILM COATED ORAL at 09:03

## 2023-03-12 RX ADMIN — APIXABAN 5 MG: 5 TABLET, FILM COATED ORAL at 09:03

## 2023-03-12 RX ADMIN — AMIODARONE HYDROCHLORIDE 200 MG: 200 TABLET ORAL at 08:03

## 2023-03-12 RX ADMIN — PANTOPRAZOLE SODIUM 40 MG: 40 TABLET, DELAYED RELEASE ORAL at 09:03

## 2023-03-12 RX ADMIN — SACUBITRIL AND VALSARTAN 1 TABLET: 24; 26 TABLET, FILM COATED ORAL at 09:03

## 2023-03-12 RX ADMIN — SACUBITRIL AND VALSARTAN 1 TABLET: 24; 26 TABLET, FILM COATED ORAL at 08:03

## 2023-03-12 RX ADMIN — CLOPIDOGREL BISULFATE 75 MG: 75 TABLET ORAL at 09:03

## 2023-03-12 RX ADMIN — LACTULOSE 20 G: 20 SOLUTION ORAL at 09:03

## 2023-03-12 RX ADMIN — AMIODARONE HYDROCHLORIDE 200 MG: 200 TABLET ORAL at 09:03

## 2023-03-12 NOTE — CARE UPDATE
SICU PLAN OF CARE NOTE    Dx: Acute decompensated heart failure    Shift Events: Patient with c/o SOB and abdominal pain towards the end of the shift- CXR and KUB ordered. Bowel regimen ordered. PRN Norco given per orders. Will hold off on stepdown per primary team.    Plan of care reviewed with the patient and all questions answered at this time.    Goals of Care: MAP > 65, Bowel regimen, Pain control    Neuro: AAO x4, Follows Commands, and Moves All Extremities    Vital Signs: /76 (BP Location: Right arm, Patient Position: Lying)   Pulse 62   Temp 98.1 °F (36.7 °C) (Oral)   Resp (!) 26   Ht 6' (1.829 m)   Wt 87.5 kg (193 lb)   SpO2 100%   BMI 26.18 kg/m²     Respiratory: 2L Nasal Cannula    Diet: Cardiac Diet with 1.5L FR    Gtts: None    Urine Output: Voids Spontaneously 375 cc/shift    Drains: None     Labs/Accuchecks: Daily Labs / No accuchecks    Skin: No signs of skin breakdown or redness noted to bony prominences. Patient repositions himself independently in the bed and chair. Assistance provided for transferring to the bed or chair. Waffle mattress in place and inflated. [employer] bed plugged in and working.

## 2023-03-12 NOTE — PLAN OF CARE
SICU PLAN OF CARE NOTE     Dx: Acute decompensated heart failure     Shift Events:  Labs, hemodynamics and SvO2 completed. CVP 4-5, Bowel regimen started,   BM x 2     Goals of Care: SBP >90. SpO2 >92     Neuro: AAO x4, Arouses to Voice, Follows Commands, and Moves All Extremities        Respiratory: Nasal Cannula for comfort      Diet: Cardiac Diet, fluid restriction 1500ml/24*     Urine Output: Voids Spontaneously      Labs/Accuchecks: Daily Labs. SvO2.     Skin: No skin breakdown noted. Pt changes position independently.

## 2023-03-12 NOTE — PROGRESS NOTES
[facility]  Heart Transplant  Progress Note    Patient Name: Tera Griffiths  MRN: [id number]  Admission Date: 3/9/2023  Hospital Length of Stay: 2 days  Attending Physician: Nicholas Aguilar MD  Primary Care Provider: Primary Doctor No  Principal Problem:Acute decompensated heart failure    Subjective:     Interval History:   This morning reports improvement in abdominal discomfort. No further emesis. BP improved, -130's. 3/11 afternoon had dizziness and SOB. KUB with stool burden, symptoms improved with BMs.    Continuous Infusions:  Scheduled Meds:   amiodarone  200 mg Oral BID    apixaban  5 mg Oral BID    atorvastatin  80 mg Oral Daily    clopidogreL  75 mg Oral Daily    lactulose  20 g Oral BID    pantoprazole  40 mg Oral Daily    sacubitriL-valsartan  1 tablet Oral BID    tamsulosin  1 capsule Oral QHS     PRN Meds:acetaminophen, HYDROcodone-acetaminophen, nitroGLYCERIN, ondansetron, sodium chloride 0.9%    Objective:     Vital Signs (Most Recent):  Temp: 98.1 °F (36.7 °C) (03/12/23 0300)  Pulse: 73 (03/12/23 0747)  Resp: (!) 22 (03/12/23 0747)  BP: (!) 150/104 (03/12/23 0600)  SpO2: 100 % (03/12/23 0747)   Vital Signs (24h Range):  Temp:  [98 °F (36.7 °C)-98.2 °F (36.8 °C)] 98.1 °F (36.7 °C)  Pulse:  [54-75] 73  Resp:  [16-48] 22  SpO2:  [93 %-100 %] 100 %  BP: (104-151)/() 150/104     Patient Vitals for the past 72 hrs (Last 3 readings):   Weight   03/12/23 0312 89.3 kg (196 lb 13.9 oz)   03/10/23 0945 87.5 kg (193 lb)   03/10/23 0307 87.6 kg (193 lb 2 oz)     Body mass index is 26.7 kg/m².    Intake/Output Summary (Last 24 hours) at 3/12/2023 0832  Last data filed at 3/12/2023 0430  Gross per 24 hour   Intake 730 ml   Output 725 ml   Net 5 ml     Hemodynamic Parameters:       Telemetry: No tele events     Physical Exam  General: No acute distress   HEENT: Unremarkable   CVS: S1S2 normal, 3/6 HSM LLSB, Displaced PMI, no JVD   Resp: CTABL, mild crackles at L>R bases    Abdomen: Mild distension but no tenderness or guarding   Neuro: AOX3   Peripheries: No pedal edema, B/L LE warm to touch, B/L UE warm,  2+ peripheral pulses     Significant Labs:  CBC:  Recent Labs   Lab 03/10/23  0306 03/11/23  0347 03/11/23  0757 03/11/23  1715 03/12/23  0308   WBC 7.20 4.49  --   --  3.88*   RBC 5.09 4.98  --   --  4.51*   HGB 13.7* 13.2*  --   --  12.3*   HCT 45.6 44.7 45 47 41.2    314  --   --  271   MCV 90 90  --   --  91   MCH 26.9* 26.5*  --   --  27.3   MCHC 30.0* 29.5*  --   --  29.9*     CMP:  Recent Labs   Lab 03/10/23  0306 03/10/23  1150 03/11/23  0347 03/12/23  0308   * 122* 94 85   CALCIUM 8.9 8.4* 8.6* 8.4*   ALBUMIN 3.8  --  3.3* 3.0*   PROT 7.1  --  6.3 5.7*    139 138 136   K 3.8 4.0 3.9 4.2   CO2 27 25 25 22*    106 105 108   BUN 16 17 18 19   CREATININE 1.6* 1.5* 1.4 1.2   ALKPHOS 71  --  66 59   ALT 13  --  10 8*   AST 14  --  10 11   BILITOT 0.7  --  0.7 0.6     Microbiology:  Microbiology Results (last 7 days)       Procedure Component Value Units Date/Time    Blood culture [[id number]] Collected: 03/10/23 1149    Order Status: Completed Specimen: Blood from Peripheral, Forearm, Left Updated: 03/11/23 1412     Blood Culture, Routine No Growth to date      No Growth to date    Narrative:      Please obtain 2 sets    Stool culture [[id number]] Collected: 03/11/23 0943    Order Status: Sent Specimen: Stool Updated: 03/11/23 1030    E. coli 0157 antigen [[id number]] Collected: 03/11/23 0943    Order Status: No result Specimen: Stool Updated: 03/11/23 1030    Blood culture [[id number]]     Order Status: No result Specimen: Blood           I have reviewed all pertinent labs within the past 24 hours.    Estimated Creatinine Clearance: 76.3 mL/min (based on SCr of 1.2 mg/dL).    Diagnostic Results:  I have reviewed and interpreted all pertinent imaging results/findings within the past 24 hours.    Assessment and Plan:     * Acute decompensated heart  failure  In summary Mr. Griffiths is a very pleasant with stage C HFrEF, ICMP, NYHA class III symptoms with multiple HF admissions  who is currently in work-up for advanced HF therapies.    Recommendations:  Holding diuretics and GDMT which includes: toprol 25 mg at night, Entresto  bid, spironolactone 25, isosorbide dinitrate (isordil) 10 mg   Strict I/Os. 2g sodium restriction and 1500cc fluid restriction.  3/12 monitored overnight due to dizziness and SOB. CVP 1>4. SBP 130s.   3/12 Restart entresto at 24/26 dose this morning.   Stepdown to CSU. Keep RIJ for in place for hemodynamics with CVP and SvO2s    HTN (hypertension)  SBP 130s, restart low dose entresto    Atrial fibrillation  Continue Eliquis 5mg bid for cardioembolic protection  Hold Toprol XL 25    CAD (coronary artery disease)  - continue atorvastatin, plavix      Ion Euceda MD  Heart Transplant  [facility]

## 2023-03-12 NOTE — NURSING
SICU PLAN OF CARE NOTE     Dx: Acute decompensated heart failure     Shift Events: No acute events, VSS. Transfer order active.     Plan of care reviewed with the patient and all questions answered at this time.     Goals of Care: MAP > 65, Bowel regimen, Pain control     Neuro: AAO x4, Follows Commands, and Moves All Extremities     Respiratory: Room air    CV: CVP 6, 10, 12. Progressive increase in BP throughout the day, MD notified     Diet: Cardiac Diet with 1.5L FR     Gtts: None     Urine Output: Voids Spontaneously 1375 cc/shift     Drains: None     Labs/Accuchecks: Daily Labs / No accuchecks     Skin: No signs of skin breakdown or redness noted to bony prominences. Patient repositions himself independently in the bed and chair. Assistance provided for transferring to the bed or chair. Waffle mattress in place and inflated. [employer] bed plugged in and working      Mary Pan, BSN, CCRN  Surgical Intensive Care  [facility] - [facility]

## 2023-03-12 NOTE — PROGRESS NOTES
"Call received from eICU about patient with c/o SOB. Patient sitting up in the chair finishing breakfast. VSS on RA. Asked patient if he was feeling short of breath and he stated he was "feeling fine". No distress noted.  "

## 2023-03-12 NOTE — PROGRESS NOTES
Notified Dr. Davila of patient complaining of mild shortness of breath and dizziness while sitting up in the chair. VSS but placed on 2L NC for comfort and patient stated it provided some relief. Dr. Davila at bedside. CVP 1, SVO2 70, abdominal distention noted and patient with reports of pain 6 on 0-10 pain scale to abdomen. CXR and KUB ordered. PRN pain medication given.

## 2023-03-12 NOTE — SUBJECTIVE & OBJECTIVE
Interval History:   This morning reports improvement in abdominal discomfort. No further emesis. BP improved, -130's. 3/11 afternoon had dizziness and SOB. KUB with stool burden, symptoms improved with BMs.    Continuous Infusions:  Scheduled Meds:   amiodarone  200 mg Oral BID    apixaban  5 mg Oral BID    atorvastatin  80 mg Oral Daily    clopidogreL  75 mg Oral Daily    lactulose  20 g Oral BID    pantoprazole  40 mg Oral Daily    sacubitriL-valsartan  1 tablet Oral BID    tamsulosin  1 capsule Oral QHS     PRN Meds:acetaminophen, HYDROcodone-acetaminophen, nitroGLYCERIN, ondansetron, sodium chloride 0.9%    Objective:     Vital Signs (Most Recent):  Temp: 98.1 °F (36.7 °C) (03/12/23 0300)  Pulse: 73 (03/12/23 0747)  Resp: (!) 22 (03/12/23 0747)  BP: (!) 150/104 (03/12/23 0600)  SpO2: 100 % (03/12/23 0747)   Vital Signs (24h Range):  Temp:  [98 °F (36.7 °C)-98.2 °F (36.8 °C)] 98.1 °F (36.7 °C)  Pulse:  [54-75] 73  Resp:  [16-48] 22  SpO2:  [93 %-100 %] 100 %  BP: (104-151)/() 150/104     Patient Vitals for the past 72 hrs (Last 3 readings):   Weight   03/12/23 0312 89.3 kg (196 lb 13.9 oz)   03/10/23 0945 87.5 kg (193 lb)   03/10/23 0307 87.6 kg (193 lb 2 oz)     Body mass index is 26.7 kg/m².    Intake/Output Summary (Last 24 hours) at 3/12/2023 0832  Last data filed at 3/12/2023 0430  Gross per 24 hour   Intake 730 ml   Output 725 ml   Net 5 ml     Hemodynamic Parameters:       Telemetry: No tele events     Physical Exam  General: No acute distress   HEENT: Unremarkable   CVS: S1S2 normal, 3/6 HSM LLSB, Displaced PMI, no JVD   Resp: CTABL, mild crackles at L>R bases   Abdomen: Mild distension but no tenderness or guarding   Neuro: AOX3   Peripheries: No pedal edema, B/L LE warm to touch, B/L UE warm,  2+ peripheral pulses     Significant Labs:  CBC:  Recent Labs   Lab 03/10/23  0306 03/11/23  0347 03/11/23  0757 03/11/23  1715 03/12/23  0308   WBC 7.20 4.49  --   --  3.88*   RBC 5.09 4.98  --   --   4.51*   HGB 13.7* 13.2*  --   --  12.3*   HCT 45.6 44.7 45 47 41.2    314  --   --  271   MCV 90 90  --   --  91   MCH 26.9* 26.5*  --   --  27.3   MCHC 30.0* 29.5*  --   --  29.9*     CMP:  Recent Labs   Lab 03/10/23  0306 03/10/23  1150 03/11/23  0347 03/12/23  0308   * 122* 94 85   CALCIUM 8.9 8.4* 8.6* 8.4*   ALBUMIN 3.8  --  3.3* 3.0*   PROT 7.1  --  6.3 5.7*    139 138 136   K 3.8 4.0 3.9 4.2   CO2 27 25 25 22*    106 105 108   BUN 16 17 18 19   CREATININE 1.6* 1.5* 1.4 1.2   ALKPHOS 71  --  66 59   ALT 13  --  10 8*   AST 14  --  10 11   BILITOT 0.7  --  0.7 0.6     Microbiology:  Microbiology Results (last 7 days)       Procedure Component Value Units Date/Time    Blood culture [[id number]] Collected: 03/10/23 1149    Order Status: Completed Specimen: Blood from Peripheral, Forearm, Left Updated: 03/11/23 1412     Blood Culture, Routine No Growth to date      No Growth to date    Narrative:      Please obtain 2 sets    Stool culture [[id number]] Collected: 03/11/23 0943    Order Status: Sent Specimen: Stool Updated: 03/11/23 1030    E. coli 0157 antigen [[id number]] Collected: 03/11/23 0943    Order Status: No result Specimen: Stool Updated: 03/11/23 1030    Blood culture [[id number]]     Order Status: No result Specimen: Blood           I have reviewed all pertinent labs within the past 24 hours.    Estimated Creatinine Clearance: 76.3 mL/min (based on SCr of 1.2 mg/dL).    Diagnostic Results:  I have reviewed and interpreted all pertinent imaging results/findings within the past 24 hours.

## 2023-03-12 NOTE — ASSESSMENT & PLAN NOTE
In summary Mr. Griffiths is a very pleasant with stage C HFrEF, ICMP, NYHA class III symptoms with multiple HF admissions  who is currently in work-up for advanced HF therapies.    Recommendations:  Holding diuretics and GDMT which includes: toprol 25 mg at night, Entresto  bid, spironolactone 25, isosorbide dinitrate (isordil) 10 mg   Strict I/Os. 2g sodium restriction and 1500cc fluid restriction.  3/12 monitored overnight due to dizziness and SOB. CVP 1>4. SBP 130s.   3/12 Restart entresto at 24/26 dose this morning.   Stepdown to CSU. Keep RIJ for in place for hemodynamics with CVP and SvO2s

## 2023-03-13 ENCOUNTER — TELEPHONE (OUTPATIENT)
Dept: TRANSPLANT | Facility: CLINIC | Age: 58
End: 2023-03-13
Payer: MEDICAID

## 2023-03-13 DIAGNOSIS — Z76.82 HEART TRANSPLANT CANDIDATE: ICD-10-CM

## 2023-03-13 DIAGNOSIS — R76.8 HEPATITIS B CORE ANTIBODY POSITIVE: Primary | ICD-10-CM

## 2023-03-13 LAB
ALBUMIN SERPL BCP-MCNC: 3.1 G/DL (ref 3.5–5.2)
ALLENS TEST: ABNORMAL
ALLENS TEST: ABNORMAL
ALP SERPL-CCNC: 58 U/L (ref 55–135)
ALT SERPL W/O P-5'-P-CCNC: 9 U/L (ref 10–44)
ANION GAP SERPL CALC-SCNC: 7 MMOL/L (ref 8–16)
AST SERPL-CCNC: 10 U/L (ref 10–40)
BASOPHILS # BLD AUTO: 0.03 K/UL (ref 0–0.2)
BASOPHILS NFR BLD: 0.8 % (ref 0–1.9)
BILIRUB SERPL-MCNC: 0.6 MG/DL (ref 0.1–1)
BUN SERPL-MCNC: 21 MG/DL (ref 6–20)
CALCIUM SERPL-MCNC: 8.6 MG/DL (ref 8.7–10.5)
CHLORIDE SERPL-SCNC: 108 MMOL/L (ref 95–110)
CO2 SERPL-SCNC: 22 MMOL/L (ref 23–29)
CREAT SERPL-MCNC: 1.2 MG/DL (ref 0.5–1.4)
DELSYS: ABNORMAL
DIFFERENTIAL METHOD: ABNORMAL
E COLI SXT1 STL QL IA: NEGATIVE
E COLI SXT2 STL QL IA: NEGATIVE
EOSINOPHIL # BLD AUTO: 0.2 K/UL (ref 0–0.5)
EOSINOPHIL NFR BLD: 4.6 % (ref 0–8)
ERYTHROCYTE [DISTWIDTH] IN BLOOD BY AUTOMATED COUNT: 14.7 % (ref 11.5–14.5)
EST. GFR  (NO RACE VARIABLE): >60 ML/MIN/1.73 M^2
GLUCOSE SERPL-MCNC: 84 MG/DL (ref 70–110)
HCO3 UR-SCNC: 25.8 MMOL/L (ref 24–28)
HCT VFR BLD AUTO: 42.7 % (ref 40–54)
HCT VFR BLD CALC: 47 %PCV (ref 36–54)
HGB BLD-MCNC: 13 G/DL (ref 14–18)
IMM GRANULOCYTES # BLD AUTO: 0 K/UL (ref 0–0.04)
IMM GRANULOCYTES NFR BLD AUTO: 0 % (ref 0–0.5)
LYMPHOCYTES # BLD AUTO: 1 K/UL (ref 1–4.8)
LYMPHOCYTES NFR BLD: 24.8 % (ref 18–48)
MAGNESIUM SERPL-MCNC: 1.8 MG/DL (ref 1.6–2.6)
MCH RBC QN AUTO: 26.8 PG (ref 27–31)
MCHC RBC AUTO-ENTMCNC: 30.4 G/DL (ref 32–36)
MCV RBC AUTO: 88 FL (ref 82–98)
MONOCYTES # BLD AUTO: 0.4 K/UL (ref 0.3–1)
MONOCYTES NFR BLD: 11.1 % (ref 4–15)
NEUTROPHILS # BLD AUTO: 2.3 K/UL (ref 1.8–7.7)
NEUTROPHILS NFR BLD: 58.7 % (ref 38–73)
NRBC BLD-RTO: 0 /100 WBC
PCO2 BLDA: 47.1 MMHG (ref 35–45)
PH SMN: 7.35 [PH] (ref 7.35–7.45)
PHOSPHATE SERPL-MCNC: 2.9 MG/DL (ref 2.7–4.5)
PLATELET # BLD AUTO: 287 K/UL (ref 150–450)
PMV BLD AUTO: 11.1 FL (ref 9.2–12.9)
PO2 BLDA: 36 MMHG (ref 40–60)
PO2 BLDA: 37 MMHG (ref 40–60)
POC BE: 0 MMOL/L
POC IONIZED CALCIUM: 1.31 MMOL/L (ref 1.06–1.42)
POC SATURATED O2: 67 % (ref 95–100)
POC SATURATED O2: 67 % (ref 95–100)
POC TCO2: 27 MMOL/L (ref 24–29)
POTASSIUM BLD-SCNC: 4.5 MMOL/L (ref 3.5–5.1)
POTASSIUM SERPL-SCNC: 4.3 MMOL/L (ref 3.5–5.1)
PROT SERPL-MCNC: 6 G/DL (ref 6–8.4)
RBC # BLD AUTO: 4.85 M/UL (ref 4.6–6.2)
SAMPLE: ABNORMAL
SAMPLE: ABNORMAL
SITE: ABNORMAL
SITE: ABNORMAL
SODIUM BLD-SCNC: 137 MMOL/L (ref 136–145)
SODIUM SERPL-SCNC: 137 MMOL/L (ref 136–145)
WBC # BLD AUTO: 3.95 K/UL (ref 3.9–12.7)

## 2023-03-13 PROCEDURE — 99900035 HC TECH TIME PER 15 MIN (STAT): Mod: NTX

## 2023-03-13 PROCEDURE — 63600175 PHARM REV CODE 636 W HCPCS: Mod: NTX | Performed by: STUDENT IN AN ORGANIZED HEALTH CARE EDUCATION/TRAINING PROGRAM

## 2023-03-13 PROCEDURE — 85025 COMPLETE CBC W/AUTO DIFF WBC: CPT | Mod: NTX | Performed by: STUDENT IN AN ORGANIZED HEALTH CARE EDUCATION/TRAINING PROGRAM

## 2023-03-13 PROCEDURE — 99291 CRITICAL CARE FIRST HOUR: CPT | Mod: NTX,,, | Performed by: INTERNAL MEDICINE

## 2023-03-13 PROCEDURE — 25000003 PHARM REV CODE 250: Mod: NTX | Performed by: STUDENT IN AN ORGANIZED HEALTH CARE EDUCATION/TRAINING PROGRAM

## 2023-03-13 PROCEDURE — 94761 N-INVAS EAR/PLS OXIMETRY MLT: CPT | Mod: NTX

## 2023-03-13 PROCEDURE — 84100 ASSAY OF PHOSPHORUS: CPT | Mod: NTX | Performed by: STUDENT IN AN ORGANIZED HEALTH CARE EDUCATION/TRAINING PROGRAM

## 2023-03-13 PROCEDURE — 20600001 HC STEP DOWN PRIVATE ROOM: Mod: NTX

## 2023-03-13 PROCEDURE — 83735 ASSAY OF MAGNESIUM: CPT | Mod: NTX | Performed by: STUDENT IN AN ORGANIZED HEALTH CARE EDUCATION/TRAINING PROGRAM

## 2023-03-13 PROCEDURE — 82803 BLOOD GASES ANY COMBINATION: CPT | Mod: NTX

## 2023-03-13 PROCEDURE — 63600175 PHARM REV CODE 636 W HCPCS: Mod: NTX | Performed by: INTERNAL MEDICINE

## 2023-03-13 PROCEDURE — 99291 PR CRITICAL CARE, E/M 30-74 MINUTES: ICD-10-PCS | Mod: NTX,,, | Performed by: INTERNAL MEDICINE

## 2023-03-13 PROCEDURE — 25000003 PHARM REV CODE 250: Mod: NTX | Performed by: INTERNAL MEDICINE

## 2023-03-13 PROCEDURE — 80053 COMPREHEN METABOLIC PANEL: CPT | Mod: NTX | Performed by: STUDENT IN AN ORGANIZED HEALTH CARE EDUCATION/TRAINING PROGRAM

## 2023-03-13 RX ORDER — MAGNESIUM SULFATE HEPTAHYDRATE 40 MG/ML
2 INJECTION, SOLUTION INTRAVENOUS ONCE
Status: COMPLETED | OUTPATIENT
Start: 2023-03-13 | End: 2023-03-13

## 2023-03-13 RX ORDER — SPIRONOLACTONE 25 MG/1
25 TABLET ORAL DAILY
Status: DISCONTINUED | OUTPATIENT
Start: 2023-03-13 | End: 2023-03-14 | Stop reason: HOSPADM

## 2023-03-13 RX ADMIN — APIXABAN 5 MG: 5 TABLET, FILM COATED ORAL at 09:03

## 2023-03-13 RX ADMIN — TAMSULOSIN HYDROCHLORIDE 0.4 MG: 0.4 CAPSULE ORAL at 09:03

## 2023-03-13 RX ADMIN — CLOPIDOGREL BISULFATE 75 MG: 75 TABLET ORAL at 08:03

## 2023-03-13 RX ADMIN — SPIRONOLACTONE 25 MG: 25 TABLET, FILM COATED ORAL at 11:03

## 2023-03-13 RX ADMIN — ATORVASTATIN CALCIUM 80 MG: 40 TABLET, FILM COATED ORAL at 08:03

## 2023-03-13 RX ADMIN — SACUBITRIL AND VALSARTAN 1 TABLET: 49; 51 TABLET, FILM COATED ORAL at 08:03

## 2023-03-13 RX ADMIN — AMIODARONE HYDROCHLORIDE 200 MG: 200 TABLET ORAL at 09:03

## 2023-03-13 RX ADMIN — IRON SUCROSE 100 MG: 20 INJECTION, SOLUTION INTRAVENOUS at 01:03

## 2023-03-13 RX ADMIN — MAGNESIUM SULFATE 2 G: 2 INJECTION INTRAVENOUS at 04:03

## 2023-03-13 RX ADMIN — PANTOPRAZOLE SODIUM 40 MG: 40 TABLET, DELAYED RELEASE ORAL at 08:03

## 2023-03-13 RX ADMIN — AMIODARONE HYDROCHLORIDE 200 MG: 200 TABLET ORAL at 08:03

## 2023-03-13 RX ADMIN — SACUBITRIL AND VALSARTAN 1 TABLET: 49; 51 TABLET, FILM COATED ORAL at 09:03

## 2023-03-13 RX ADMIN — APIXABAN 5 MG: 5 TABLET, FILM COATED ORAL at 08:03

## 2023-03-13 NOTE — TELEPHONE ENCOUNTER
Mr Griffiths is currently admitted, but will complete his evaluation as an outpatient including colonoscopy and Hepatology consult for +Hep B Core ab  
difficulty breathing

## 2023-03-13 NOTE — CARE UPDATE
Hemodynamics Care Update    CVP 6  Svo2 63  CO 5.3  CI 2.5  SVR 1427    MAPs elevated > 100      Plan:   - Extra entresto dose given- dose changed to 49-51mg BID        Discussed with Dr. Aguilar,         Irma Uriarte MD  Cardiology fellow

## 2023-03-13 NOTE — PLAN OF CARE
SICU PLAN OF CARE NOTE    Dx: Acute decompensated heart failure    Shift Events: JOAN. OOBTC. Stepdown orders in, waiting on bed. Plan of care reviewed with pt, questions answered and verbalized understanding.     Goals of Care: MAP>65     Neuro: AAO x4, Follows Commands, and Moves All Extremities    Vital Signs: /87 (BP Location: Left arm, Patient Position: Lying)   Pulse 66   Temp 98 °F (36.7 °C) (Oral)   Resp 20   Ht 6' (1.829 m)   Wt 90 kg (198 lb 6.6 oz)   SpO2 (!) 93%   BMI 26.91 kg/m²     Respiratory: Room Air    Diet: Cardiac Diet    Gtts: No gtts     Urine Output: Voids Spontaneously 875 cc/shift     Labs/Accuchecks: Daily labs. SvO2 Q12     Skin: No skin breakdown or redness noted to sacrum or hima prominences. Heel and sacral foams in place. Pt repositions independently.

## 2023-03-13 NOTE — PROGRESS NOTES
[facility]  Heart Transplant  Progress Note    Patient Name: Tera Griffiths  MRN: [id number]  Admission Date: 3/9/2023  Hospital Length of Stay: 3 days  Attending Physician: Chelsy Morales DO  Primary Care Provider: Primary Doctor No  Principal Problem:Acute decompensated heart failure    Subjective:     Interval History: No new overnight events, patient reports that he feels well, abdominal discomfort is improved   CVP 6 this am, MVO2 67   Good urine output   One loose BM reported yesterday   Has been OOB to chair     Continuous Infusions:  Scheduled Meds:   amiodarone  200 mg Oral BID    apixaban  5 mg Oral BID    atorvastatin  80 mg Oral Daily    clopidogreL  75 mg Oral Daily    pantoprazole  40 mg Oral Daily    sacubitriL-valsartan  1 tablet Oral BID    spironolactone  25 mg Oral Daily    tamsulosin  1 capsule Oral QHS     PRN Meds:acetaminophen, HYDROcodone-acetaminophen, nitroGLYCERIN, ondansetron, sodium chloride 0.9%    Review of patient's allergies indicates:  No Known Allergies  Objective:     Vital Signs (Most Recent):  Temp: 98.6 °F (37 °C) (03/13/23 0700)  Pulse: 65 (03/13/23 1000)  Resp: (!) 27 (03/13/23 1000)  BP: (!) 157/98 (03/13/23 0900)  SpO2: 97 % (03/13/23 1000)   Vital Signs (24h Range):  Temp:  [98.1 °F (36.7 °C)-98.6 °F (37 °C)] 98.6 °F (37 °C)  Pulse:  [54-80] 65  Resp:  [19-41] 27  SpO2:  [93 %-100 %] 97 %  BP: (118-181)/() 157/98     Patient Vitals for the past 72 hrs (Last 3 readings):   Weight   03/13/23 0400 90 kg (198 lb 6.6 oz)   03/12/23 0312 89.3 kg (196 lb 13.9 oz)     Body mass index is 26.91 kg/m².      Intake/Output Summary (Last 24 hours) at 3/13/2023 1038  Last data filed at 3/13/2023 0718  Gross per 24 hour   Intake 630 ml   Output 2975 ml   Net -2345 ml       Hemodynamic Parameters:         Physical Exam  Gen: NAD   HEENT: Right IJ TLC in place, otherwise unremarkable   CVS: S1S2 normal, no mumurs   Resp: CTABL   Abdomen: Soft, non  tender   Neuro: AOX3   Peripheral: No edema     Significant Labs:  CBC:  Recent Labs   Lab 03/11/23  0347 03/11/23  0757 03/12/23 0308 03/12/23 2054 03/13/23 0234   WBC 4.49  --  3.88*  --  3.95   RBC 4.98  --  4.51*  --  4.85   HGB 13.2*  --  12.3*  --  13.0*   HCT 44.7   < > 41.2 41 42.7     --  271  --  287   MCV 90  --  91  --  88   MCH 26.5*  --  27.3  --  26.8*   MCHC 29.5*  --  29.9*  --  30.4*    < > = values in this interval not displayed.     BNP:  Recent Labs   Lab 03/09/23  1547   BNP 1,755*     CMP:  Recent Labs   Lab 03/11/23 0347 03/12/23 0308 03/13/23 0234   GLU 94 85 84   CALCIUM 8.6* 8.4* 8.6*   ALBUMIN 3.3* 3.0* 3.1*   PROT 6.3 5.7* 6.0    136 137   K 3.9 4.2 4.3   CO2 25 22* 22*    108 108   BUN 18 19 21*   CREATININE 1.4 1.2 1.2   ALKPHOS 66 59 58   ALT 10 8* 9*   AST 10 11 10   BILITOT 0.7 0.6 0.6      Coagulation:   No results for input(s): PT, INR, APTT in the last 168 hours.  LDH:  No results for input(s): LDH in the last 72 hours.  Microbiology:  Microbiology Results (last 7 days)       Procedure Component Value Units Date/Time    Stool culture [[id number]] Collected: 03/11/23 0943    Order Status: Completed Specimen: Stool Updated: 03/13/23 1037     Stool Culture Nothing significant to date    E. coli 0157 antigen [[id number]] Collected: 03/11/23 0943    Order Status: Completed Specimen: Stool Updated: 03/13/23 0240     Shiga Toxin 1 E.coli Negative     Shiga Toxin 2 E.coli Negative    Blood culture [[id number]] Collected: 03/10/23 1149    Order Status: Completed Specimen: Blood from Peripheral, Forearm, Left Updated: 03/12/23 1412     Blood Culture, Routine No Growth to date      No Growth to date      No Growth to date    Narrative:      Please obtain 2 sets    Blood culture [[id number]]     Order Status: Canceled Specimen: Blood             I have reviewed all pertinent labs within the past 24 hours.    Estimated Creatinine Clearance: 76.3 mL/min (based on SCr of  1.2 mg/dL).    Diagnostic Results:  I have reviewed and interpreted all pertinent imaging results/findings within the past 24 hours.    Assessment and Plan:      is a 56 YO male with known h/o ICMP, ACC stage C, NYHA III, with severely reduced LVEF <15%, LVIDd 7cm and RVD 4.2, CAD with h/o STEMI s/p RCA PCI 8/2021, mod -severe PH with noted improvement in PVR on NTG administration, s/p ICD placement, Afib s/p DCCV and uncontrolled HTN, who is admitted for ADHF.     Patient has had multiple admissions for HF exacerbation in the recent past, currently being evaluated for advanced therapies as an OP and was last seen by  on 2/9/23. GDMT optimized on that visit with continuation of Entresto  BID and spironolactone 25 mg PO Qday, increase in Metoprolol to 25mg PO Qday and addition of Hydralazine and Isordil. Although patient claims that he has been taking all his meds per instructions, drop in BP with administration of home regimen is possibly related to some degree of med non compliance.           # ADHF   #ICMP, ACC stage C, NYHA III, with severely reduced LVEF <15%, LVIDd 7cm and RVD 4.2  #Mod -severe PH with noted improvement in PVR on NTG administration     -Re introduce GDMT as tolerated, currently on Entresto 49-51mg PO BID   -Re start spironolactone 25mg PO Qday today   -Hold BB, Hydralzine and Isordil   -Other AHF therapy evaluation as OP, likely will need repeat CMT once GDMT is optimized          #CAD with h/o STEMI s/p RCA PCI 8/2021  - No new EKG changes   - Continue Plavix and Eliquis   - Continue high intensity statin      #h/o Afib s/p DCCV   - Continue Eliquis BID  - Amiodarone 200 mg BID   -Tele monitoring     #Spleenic mass noted on CT AP  - OP fu     Fluid and Na  Restricted cardiac diet  Right IJ TLC placed 3/9/23  PT/OT ordered   Stepdown to floor today       Arianne Caruso MD  Heart Transplant  [facility]

## 2023-03-13 NOTE — SUBJECTIVE & OBJECTIVE
Interval History: No new overnight events, patient reports that he feels well, abdominal discomfort is improved   CVP 6 this am, MVO2 67   Good urine output   One loose BM reported yesterday   Has been OOB to chair     Continuous Infusions:  Scheduled Meds:   amiodarone  200 mg Oral BID    apixaban  5 mg Oral BID    atorvastatin  80 mg Oral Daily    clopidogreL  75 mg Oral Daily    pantoprazole  40 mg Oral Daily    sacubitriL-valsartan  1 tablet Oral BID    spironolactone  25 mg Oral Daily    tamsulosin  1 capsule Oral QHS     PRN Meds:acetaminophen, HYDROcodone-acetaminophen, nitroGLYCERIN, ondansetron, sodium chloride 0.9%    Review of patient's allergies indicates:  No Known Allergies  Objective:     Vital Signs (Most Recent):  Temp: 98.6 °F (37 °C) (03/13/23 0700)  Pulse: 65 (03/13/23 1000)  Resp: (!) 27 (03/13/23 1000)  BP: (!) 157/98 (03/13/23 0900)  SpO2: 97 % (03/13/23 1000)   Vital Signs (24h Range):  Temp:  [98.1 °F (36.7 °C)-98.6 °F (37 °C)] 98.6 °F (37 °C)  Pulse:  [54-80] 65  Resp:  [19-41] 27  SpO2:  [93 %-100 %] 97 %  BP: (118-181)/() 157/98     Patient Vitals for the past 72 hrs (Last 3 readings):   Weight   03/13/23 0400 90 kg (198 lb 6.6 oz)   03/12/23 0312 89.3 kg (196 lb 13.9 oz)     Body mass index is 26.91 kg/m².      Intake/Output Summary (Last 24 hours) at 3/13/2023 1038  Last data filed at 3/13/2023 0718  Gross per 24 hour   Intake 630 ml   Output 2975 ml   Net -2345 ml       Hemodynamic Parameters:         Physical Exam  Gen: NAD   HEENT: Right IJ TLC in place, otherwise unremarkable   CVS: S1S2 normal, no mumurs   Resp: CTABL   Abdomen: Soft, non tender   Neuro: AOX3   Peripheral: No edema     Significant Labs:  CBC:  Recent Labs   Lab 03/11/23  0347 03/11/23  0757 03/12/23  0308 03/12/23 2054 03/13/23 0234   WBC 4.49  --  3.88*  --  3.95   RBC 4.98  --  4.51*  --  4.85   HGB 13.2*  --  12.3*  --  13.0*   HCT 44.7   < > 41.2 41 42.7     --  271  --  287   MCV 90  --  91  --   88   MCH 26.5*  --  27.3  --  26.8*   MCHC 29.5*  --  29.9*  --  30.4*    < > = values in this interval not displayed.     BNP:  Recent Labs   Lab 03/09/23  1547   BNP 1,755*     CMP:  Recent Labs   Lab 03/11/23  0347 03/12/23  0308 03/13/23  0234   GLU 94 85 84   CALCIUM 8.6* 8.4* 8.6*   ALBUMIN 3.3* 3.0* 3.1*   PROT 6.3 5.7* 6.0    136 137   K 3.9 4.2 4.3   CO2 25 22* 22*    108 108   BUN 18 19 21*   CREATININE 1.4 1.2 1.2   ALKPHOS 66 59 58   ALT 10 8* 9*   AST 10 11 10   BILITOT 0.7 0.6 0.6      Coagulation:   No results for input(s): PT, INR, APTT in the last 168 hours.  LDH:  No results for input(s): LDH in the last 72 hours.  Microbiology:  Microbiology Results (last 7 days)       Procedure Component Value Units Date/Time    Stool culture [[id number]] Collected: 03/11/23 0943    Order Status: Completed Specimen: Stool Updated: 03/13/23 1037     Stool Culture Nothing significant to date    E. coli 0157 antigen [[id number]] Collected: 03/11/23 0943    Order Status: Completed Specimen: Stool Updated: 03/13/23 0240     Shiga Toxin 1 E.coli Negative     Shiga Toxin 2 E.coli Negative    Blood culture [[id number]] Collected: 03/10/23 1149    Order Status: Completed Specimen: Blood from Peripheral, Forearm, Left Updated: 03/12/23 1412     Blood Culture, Routine No Growth to date      No Growth to date      No Growth to date    Narrative:      Please obtain 2 sets    Blood culture [[id number]]     Order Status: Canceled Specimen: Blood             I have reviewed all pertinent labs within the past 24 hours.    Estimated Creatinine Clearance: 76.3 mL/min (based on SCr of 1.2 mg/dL).    Diagnostic Results:  I have reviewed and interpreted all pertinent imaging results/findings within the past 24 hours.

## 2023-03-14 VITALS
BODY MASS INDEX: 26.88 KG/M2 | TEMPERATURE: 98 F | HEART RATE: 67 BPM | DIASTOLIC BLOOD PRESSURE: 79 MMHG | OXYGEN SATURATION: 98 % | HEIGHT: 72 IN | WEIGHT: 198.44 LBS | RESPIRATION RATE: 18 BRPM | SYSTOLIC BLOOD PRESSURE: 129 MMHG

## 2023-03-14 LAB
ALBUMIN SERPL BCP-MCNC: 3.6 G/DL (ref 3.5–5.2)
ALP SERPL-CCNC: 70 U/L (ref 55–135)
ALT SERPL W/O P-5'-P-CCNC: 9 U/L (ref 10–44)
ANION GAP SERPL CALC-SCNC: 8 MMOL/L (ref 8–16)
AST SERPL-CCNC: 11 U/L (ref 10–40)
BACTERIA STL CULT: NORMAL
BASOPHILS # BLD AUTO: 0.02 K/UL (ref 0–0.2)
BASOPHILS NFR BLD: 0.5 % (ref 0–1.9)
BILIRUB SERPL-MCNC: 0.6 MG/DL (ref 0.1–1)
BUN SERPL-MCNC: 21 MG/DL (ref 6–20)
CALCIUM SERPL-MCNC: 9.5 MG/DL (ref 8.7–10.5)
CHLORIDE SERPL-SCNC: 104 MMOL/L (ref 95–110)
CO2 SERPL-SCNC: 25 MMOL/L (ref 23–29)
CREAT SERPL-MCNC: 1.4 MG/DL (ref 0.5–1.4)
DIFFERENTIAL METHOD: ABNORMAL
EOSINOPHIL # BLD AUTO: 0.2 K/UL (ref 0–0.5)
EOSINOPHIL NFR BLD: 3.9 % (ref 0–8)
ERYTHROCYTE [DISTWIDTH] IN BLOOD BY AUTOMATED COUNT: 14.7 % (ref 11.5–14.5)
EST. GFR  (NO RACE VARIABLE): 59.4 ML/MIN/1.73 M^2
GLUCOSE SERPL-MCNC: 104 MG/DL (ref 70–110)
HCT VFR BLD AUTO: 46.9 % (ref 40–54)
HGB BLD-MCNC: 14.6 G/DL (ref 14–18)
IMM GRANULOCYTES # BLD AUTO: 0.01 K/UL (ref 0–0.04)
IMM GRANULOCYTES NFR BLD AUTO: 0.2 % (ref 0–0.5)
LYMPHOCYTES # BLD AUTO: 1.2 K/UL (ref 1–4.8)
LYMPHOCYTES NFR BLD: 26.4 % (ref 18–48)
MAGNESIUM SERPL-MCNC: 2 MG/DL (ref 1.6–2.6)
MCH RBC QN AUTO: 26.9 PG (ref 27–31)
MCHC RBC AUTO-ENTMCNC: 31.1 G/DL (ref 32–36)
MCV RBC AUTO: 86 FL (ref 82–98)
MONOCYTES # BLD AUTO: 0.5 K/UL (ref 0.3–1)
MONOCYTES NFR BLD: 11.7 % (ref 4–15)
NEUTROPHILS # BLD AUTO: 2.5 K/UL (ref 1.8–7.7)
NEUTROPHILS NFR BLD: 57.3 % (ref 38–73)
NRBC BLD-RTO: 0 /100 WBC
PLATELET # BLD AUTO: 329 K/UL (ref 150–450)
PMV BLD AUTO: 11.2 FL (ref 9.2–12.9)
POTASSIUM SERPL-SCNC: 4.6 MMOL/L (ref 3.5–5.1)
PROT SERPL-MCNC: 7.1 G/DL (ref 6–8.4)
RBC # BLD AUTO: 5.43 M/UL (ref 4.6–6.2)
SODIUM SERPL-SCNC: 137 MMOL/L (ref 136–145)
WBC # BLD AUTO: 4.36 K/UL (ref 3.9–12.7)

## 2023-03-14 PROCEDURE — 80053 COMPREHEN METABOLIC PANEL: CPT | Mod: NTX | Performed by: STUDENT IN AN ORGANIZED HEALTH CARE EDUCATION/TRAINING PROGRAM

## 2023-03-14 PROCEDURE — 25000003 PHARM REV CODE 250: Mod: NTX | Performed by: INTERNAL MEDICINE

## 2023-03-14 PROCEDURE — 97161 PT EVAL LOW COMPLEX 20 MIN: CPT | Mod: NTX

## 2023-03-14 PROCEDURE — 83735 ASSAY OF MAGNESIUM: CPT | Mod: NTX | Performed by: STUDENT IN AN ORGANIZED HEALTH CARE EDUCATION/TRAINING PROGRAM

## 2023-03-14 PROCEDURE — 99233 SBSQ HOSP IP/OBS HIGH 50: CPT | Mod: NTX,,, | Performed by: INTERNAL MEDICINE

## 2023-03-14 PROCEDURE — 97116 GAIT TRAINING THERAPY: CPT | Mod: NTX

## 2023-03-14 PROCEDURE — 99233 PR SUBSEQUENT HOSPITAL CARE,LEVL III: ICD-10-PCS | Mod: NTX,,, | Performed by: INTERNAL MEDICINE

## 2023-03-14 PROCEDURE — 97165 OT EVAL LOW COMPLEX 30 MIN: CPT | Mod: NTX

## 2023-03-14 PROCEDURE — 25000003 PHARM REV CODE 250: Mod: NTX | Performed by: STUDENT IN AN ORGANIZED HEALTH CARE EDUCATION/TRAINING PROGRAM

## 2023-03-14 PROCEDURE — 85025 COMPLETE CBC W/AUTO DIFF WBC: CPT | Mod: NTX | Performed by: STUDENT IN AN ORGANIZED HEALTH CARE EDUCATION/TRAINING PROGRAM

## 2023-03-14 RX ORDER — TORSEMIDE 20 MG/1
20 TABLET ORAL DAILY
Qty: 30 TABLET | Refills: 11 | Status: ON HOLD | OUTPATIENT
Start: 2023-03-14 | End: 2023-04-22 | Stop reason: SDUPTHER

## 2023-03-14 RX ORDER — METOPROLOL SUCCINATE 25 MG/1
25 TABLET, EXTENDED RELEASE ORAL DAILY
Status: DISCONTINUED | OUTPATIENT
Start: 2023-03-14 | End: 2023-03-14 | Stop reason: HOSPADM

## 2023-03-14 RX ADMIN — SACUBITRIL AND VALSARTAN 1 TABLET: 97; 103 TABLET, FILM COATED ORAL at 08:03

## 2023-03-14 RX ADMIN — CLOPIDOGREL BISULFATE 75 MG: 75 TABLET ORAL at 08:03

## 2023-03-14 RX ADMIN — SPIRONOLACTONE 25 MG: 25 TABLET, FILM COATED ORAL at 08:03

## 2023-03-14 RX ADMIN — ATORVASTATIN CALCIUM 80 MG: 40 TABLET, FILM COATED ORAL at 08:03

## 2023-03-14 RX ADMIN — APIXABAN 5 MG: 5 TABLET, FILM COATED ORAL at 08:03

## 2023-03-14 RX ADMIN — METOPROLOL SUCCINATE 25 MG: 25 TABLET, EXTENDED RELEASE ORAL at 11:03

## 2023-03-14 RX ADMIN — AMIODARONE HYDROCHLORIDE 200 MG: 200 TABLET ORAL at 08:03

## 2023-03-14 RX ADMIN — PANTOPRAZOLE SODIUM 40 MG: 40 TABLET, DELAYED RELEASE ORAL at 08:03

## 2023-03-14 NOTE — DISCHARGE SUMMARY
[facility] - Cardiology Stepdown  Heart Transplant  Discharge Summary      Patient Name: Tera Griffiths  MRN: [id number]  Admission Date: 3/9/2023  Hospital Length of Stay: 4 days  Discharge Date and Time: 03/14/2023 1:53 PM  Attending Physician: Chelsy Morales DO   Discharging Provider: Arianne Caruso MD  Primary Care Provider: Primary Doctor No     HPI: Mr. Griffiths is a very pleasant  57 y.o. year old black male with stage C HFrEF, ICMP, CAD s/p (STEMI s/p PCI to Rcx (8/2021), previous GIB, difficulty with medication compliance,  Afib s/p DCCV, uncontrolled HTN who comes for a follow-up visit. He is currently undergoing VAD/Tx work-up.  He has had several HF admissions. He is on a excellent HF regimen that includes; Entresto 97/103 mg BID, metoprolol succinate 12.5 mg at night, aldactone 25 mg daily and torsemide 20 mg twice daily.    He presents today for worsening SOB over the last 2 days along with abdominal swelling and lower extremity swelling. He reports NYHA class III symptoms including PND and orthopnea.He also reports some chest pressure last night as well. No palpitations.     TTE 1/5/23   The left ventricle is severely enlarged with eccentric hypertrophy and severely decreased systolic function.   The estimated ejection fraction is 15%.   There is left ventricular global hypokinesis.   Grade I left ventricular diastolic dysfunction.   Mild right ventricular enlargement with moderately reduced right ventricular systolic function.   Normal central venous pressure (3 mmHg).   Severe left atrial enlargement.   Moderate posterolateral pericardial effusion. Max diameter 1.4cm.         * No surgery found *     Hospital Course:  is a 58 YO male with known h/o ICMP, ACC stage C, NYHA III, with severely reduced LVEF <15%, LVIDd 7cm and RVD 4.2, CAD with h/o STEMI s/p RCA PCI 8/2021, mod -severe PH with noted improvement in PVR on NTG administration, s/p ICD placement, Afib s/p DCCV and  uncontrolled HTN, admitted 3/9/23 for worsening SOB, diuresed on admission (Lasix 80 IV x 2 became) , subsequently became hypotensive requiring transfer to ICU 3/10/23 requiring IVFs ( transduced CVP 2). Hypotension was likely in the setting of volume depletion from diuresis and GDMT.   GDMT was briefly held (3/11/23) and restarted at low dose the next day with gradual up titration.     On the day of discharge, patient is asymptomatic from a HF standpoint. Labs are WNL. Physical exam is unremarkable for any signs of volume overload.   Plan to fu in OP clinic for further AHF therapy work up as needed, however at this point, we believe he will benefit from further afterload reduction and reassessment with possible CMT once GDMT is further optimized.         Goals of Care Treatment Preferences:  Code Status: Full Code    Health care agent: Zahida Dave  [facility] care agent number:           What is most important right now is to focus on extending life as long as possible, even it it means sacrificing quality, curative/life-prolongation (regardless of treatment burdens).  Accordingly, we have decided that the best plan to meet the patient's goals includes continuing with treatment.      Consults (From admission, onward)        Status Ordering Provider     Inpatient consult to Cardiology  Once        Provider:  (Not yet assigned)    Completed BEATRIS CHAVEZ          Significant Diagnostic Studies: Labs: All labs within the past 24 hours have been reviewed    Pending Diagnostic Studies:     Procedure Component Value Units Date/Time    Comprehensive metabolic panel - if not done in ED [[id number]] Collected: 03/10/23 0306    Order Status: Sent Lab Status: In process Updated: 03/10/23 0307    Specimen: Blood     Magnesium - if not done in ED [[id number]] Collected: 03/10/23 0306    Order Status: Sent Lab Status: In process Updated: 03/10/23 0307    Specimen: Blood     Phosphorus - if not done in ED [[id number]]  Collected: 03/10/23 0306    Order Status: Sent Lab Status: In process Updated: 03/10/23 0307    Specimen: Blood         Final Active Diagnoses:    Diagnosis Date Noted POA    PRINCIPAL PROBLEM:  Acute decompensated heart failure [I50.9] 03/09/2023 Yes    CAD (coronary artery disease) [I25.10] 09/23/2022 Yes    Atrial fibrillation [I48.91] 09/23/2022 Yes    HTN (hypertension) [I10] 09/23/2022 Yes      Problems Resolved During this Admission:      Discharged Condition: good    Disposition: Home     Follow Up: 1 week     Patient Instructions:      BASIC METABOLIC PANEL   Standing Status: Future Standing Exp. Date: 05/12/24     Medications:  Reconciled Home Medications:      Medication List      CHANGE how you take these medications    torsemide 20 MG Tab  Commonly known as: DEMADEX  Take 1 tablet (20 mg total) by mouth once daily.  What changed: when to take this        CONTINUE taking these medications    amiodarone 200 MG Tab  Commonly known as: PACERONE  Take 1 tablet (200 mg total) by mouth 2 (two) times daily.     apixaban 5 mg Tab  Commonly known as: ELIQUIS  Take 5 mg by mouth 2 (two) times daily.     atorvastatin 80 MG tablet  Commonly known as: LIPITOR  Take 80 mg by mouth once daily.     clopidogreL 75 mg tablet  Commonly known as: PLAVIX  Take 75 mg by mouth once daily.     ENTRESTO  mg per tablet  Generic drug: sacubitriL-valsartan  Take 1 tablet by mouth 2 (two) times daily.     ferrous sulfate 325 mg (65 mg iron) Tab tablet  Commonly known as: FEOSOL  Take 325 mg by mouth every other day.     glimepiride 4 MG tablet  Commonly known as: AMARYL  Take 4 mg by mouth every morning.     INVOKANA 100 mg Tab tablet  Generic drug: canagliflozin  Take 100 mg by mouth once daily.     LIDOcaine 5 %  Commonly known as: LIDODERM  Place 1 patch onto the skin once daily.     metoprolol succinate 25 MG 24 hr tablet  Commonly known as: TOPROL-XL  Take 1 tablet (25 mg total) by mouth every evening.      pantoprazole 40 MG tablet  Commonly known as: PROTONIX  Take 40 mg by mouth once daily.     potassium chloride SA 20 MEQ tablet  Commonly known as: K-DUR,KLOR-CON  Take 1 tablet (20 mEq total) by mouth once daily.     spironolactone 25 MG tablet  Commonly known as: ALDACTONE  Take 25 mg by mouth once daily.     tamsulosin 0.4 mg Cap  Commonly known as: FLOMAX  Take 1 capsule by mouth every evening.        STOP taking these medications    hydrALAZINE 25 MG tablet  Commonly known as: APRESOLINE     isosorbide dinitrate 10 MG tablet  Commonly known as: ISORDIL          Adhere to salt restricted diet.  Please keep a diary of blood pressures, daily weights and heart rate and bring it with you to your next clinic appointment   Please consider taking an additional dose of Torsemide if you notice swelling in your legs or abdomen, or have difficulty breathing or lying flat or gain more than 3 lbs in 1 day or 5 lbs in 2-3 days.   Please call 911 if your condition worsens     Arianne Caruso MD  Heart Transplant  [facility] - [facility]

## 2023-03-14 NOTE — HOSPITAL COURSE
is a 56 YO male with known h/o ICMP, ACC stage C, NYHA III, with severely reduced LVEF <15%, LVIDd 7cm and RVD 4.2, CAD with h/o STEMI s/p RCA PCI 8/2021, mod -severe PH with noted improvement in PVR on NTG administration, s/p ICD placement, Afib s/p DCCV and uncontrolled HTN, admitted 3/9/23 for worsening SOB, diuresed on admission (Lasix 80 IV x 2 became) , subsequently became hypotensive requiring transfer to ICU 3/10/23 requiring IVFs ( transduced CVP 2). Hypotension was likely in the setting of volume depletion from diuresis and GDMT.   GDMT was briefly held (3/11/23) and restarted at low dose the next day with gradual up titration.     On the day of discharge, patient is asymptomatic from a HF standpoint. Labs are WNL. Physical exam is unremarkable for any signs of volume overload.   Plan to fu in OP clinic for further AHF therapy work up as needed, however at this point, we believe he will benefit from further afterload reduction and reassessment with possible CMT once GDMT is further optimized.

## 2023-03-14 NOTE — PLAN OF CARE
Problem: Occupational Therapy  Goal: Occupational Therapy Goal  Outcome: Met     Eval and D/CARYL 3/14/23.

## 2023-03-14 NOTE — PLAN OF CARE
Problem: Physical Therapy  Goal: Physical Therapy Goal  Description: Goals to be met by: 3/28/23    Patient will increase functional independence with mobility by performin. Gait  x 500 feet with independence   2. Ascend/descend 1 flight of stairs with bilateral handrails modified independence using LRAD as needed    Outcome: Ongoing, Progressing     Pt tolerated PT session well.      All needs met, all questions answered.

## 2023-03-14 NOTE — PROGRESS NOTES
Interdisciplinary Rounds Report:   Attended interdisciplinary rounds with the [facility]/CTS services including the LVAD Coordinators, social workers, cardiologists, surgeons,  PT/OT/Speech, dietician, and unit charge nurses. Discussed patient status, plan of care, goals of care, including DC date, and post discharge needs. Plan of care will be discussed with the patient and/or family per the physician while rounding on the floor. This is a recurring meeting that is medically and socially necessary to collaborate with the interdisciplinary team to assist patient needs and safe discharge.

## 2023-03-14 NOTE — PLAN OF CARE
Problem: Adult Inpatient Plan of Care  Goal: Plan of Care Review  Outcome: Met  Goal: Patient-Specific Goal (Individualized)  Outcome: Met  Goal: Absence of Hospital-Acquired Illness or Injury  Outcome: Met  Goal: Optimal Comfort and Wellbeing  Outcome: Met  Goal: Readiness for Transition of Care  Outcome: Met     Problem: Diabetes Comorbidity  Goal: Blood Glucose Level Within Targeted Range  Outcome: Met     Problem: Infection  Goal: Absence of Infection Signs and Symptoms  Outcome: Met     Problem: Fall Injury Risk  Goal: Absence of Fall and Fall-Related Injury  Outcome: Met   Pt discharged per MD orders.  Tele discontinued and returned to station.  IV discontinued; catheter tip intact x.  Medication list and prescriptions reviewed; prescriptions sent to pt preferred pharmacy and printed prescriptions provided.  Pt verbalizes understanding of all written and verbal discharge instructions.  Pt awaiting family/escort arrival.  Will continue to monitor.

## 2023-03-14 NOTE — CARE UPDATE
Hemodynamics Care Update Note     SVO2: 67  CVP: 3  CO: 5.4  CI: 2.65  SVR: 934  UO: 975cc since 7am       Plan:  No changes made        Marcello Boucher MD  Cardiovascular Disease PGY4  [facility]

## 2023-03-14 NOTE — PT/OT/SLP EVAL
Physical Therapy  Evaluation and Treatment    Patient Name:  Tera Griffiths   MRN:  [id number]    Recommendations:     Discharge Recommendations:  other (see comments) (home)   Discharge Equipment Recommendations: none   Barriers to discharge: decreased functional mobility and fall risk    Assessment:     Tera Griffiths is a 57 y.o. male admitted with a medical diagnosis of Acute decompensated heart failure.  Pt demonstrates the below listed impairments with decreased tolerance to functional mobility and impaired endurance being the most limiting.  Pt demonstrates fair tolerance to out of bed mobility and feels he is close to his baseline.  Pt requires skilled PT due to patient's status as: a fall risk to allow safe d/c home.     Impairments and functional limitations:  impaired endurance, impaired functional mobility, gait instability, impaired balance, impaired cardiopulmonary response to activity.  These deficits affect their roles and responsibilities in which they were able to complete prior to admit.  Rehab Prognosis:   Good ; patient would benefit from acute skilled PT services 3 x/week to address these deficits, improve quality of life, focus on recovery of impairments, provide patient/caregiver education, reduce fall risk, and reach maximum level of function.  Pt is highly  motivated to participated in skilled PT.    Recent Surgery:   * No surgery found *      Plan:     During this hospitalization, patient to be seen 3 x/week to address the identified rehab impairments via gait training, therapeutic activities, therapeutic exercises, neuromuscular re-education and progress toward the following goals:    Plan of Care Expires:  04/13/23    Subjective     Chief Complaint: decreased tolerance to functional mobility  Patient/Family Comments/Goals: Return home  Pain/Comfort:  Pain Rating 1: 0/10    Patients cultural, spiritual, Religion conflicts given the current situation: no    Living Environment:  Patient  lives with their cousin in apartment, 1 flight of steps with Bilateral hand rail, tub/shower.   Pt utilizes No AD for ambulation of all distances.    Prior to admission, patient was independent with ADLs.   DME owned: none.   DME not currently used: n/a.   Upon discharge, patient will have assistance from family with no 24/7 assist. Does not drive    Objective:     Communicated with nursing prior to session.  Patient found HOB elevated with telemetry  upon PT entry to room.    General Precautions: Standard, fall   Orthopedic Precautions:N/A   Braces: N/A   Oxygen Device:      Exams:  Cognitive Exam:  Patient is oriented to Person, Place, Time, and Situation  Command following: Patient follows 100% of commands   RLE ROM: WFL  RLE Strength: WFL  LLE ROM: WFL  LLE Strength: WFL  Postural Exam:  Patient presented with the following abnormalities:    -       Rounded shoulders    Functional Mobility:  Bed Mobility:  Rolling Left: MOD I  Scooting: MOD I  Supine to Sit: MOD I  Sit to Supine: MOD I  Head of bed position: HOB elevated    Transfers:  Sit to Stand: IND with No AD    Gait: Patient ambulated 450' with No AD and SBA. Patient demonstrates decreased step length and decreased arm swing. All lines remained intact throughout ambulation trial, gait belt utilized, mask donned for out of room ambulation.  Some SOB, states he is close to his baseline    Balance:   Position Score Time   Static Sitting GOOD: Takes MODERATE challenges n/a   Dynamic Sitting GOOD-: Maintains balance through MODERATE excursions of active trunk motion, but inconstantly  n/a   Static Standing FAIR+: Takes MINIMAL challenges n/a   Dynamic Standing FAIR: Maintains without assist, but is unable to take any challenges n/a       Therapeutic Activities:  Patient educated on role of acute care PT and PT POC, safety while in hospital including calling nurse for mobility, call light usage, benefits of out of bed mobility, breathing technique, fall risk,  bed mobility , transfers, gait technique, positioning, posture, risks of prolonged bed rest, possible discharge disposition , and benefits of continued PT by explanation and demonstration.    Patient demonstrates good understanding of education provided this day.   Whiteboard updated    Therapeutic Exercises:  n/a    AM-PAC 6 CLICK MOBILITY  Total Score:      Patient left HOB elevated with all lines intact, call button in reach, and RN notified.    GOALS:   Multidisciplinary Problems       Physical Therapy Goals          Problem: Physical Therapy    Goal Priority Disciplines Outcome Goal Variances Interventions   Physical Therapy Goal     PT, PT/OT Ongoing, Progressing     Description: Goals to be met by: 3/28/23    Patient will increase functional independence with mobility by performin. Gait  x 500 feet with independence   2. Ascend/descend 1 flight of stairs with bilateral handrails modified independence using LRAD as needed                         History:     Past Medical History:   Diagnosis Date    Atrial fibrillation     CHF (congestive heart failure)     Coronary atherosclerosis of native coronary artery     Diabetes mellitus, type 2     Encounter for blood transfusion     Hypertension        Past Surgical History:   Procedure Laterality Date    CARDIAC DEFIBRILLATOR PLACEMENT Left     HERNIA REPAIR      RIGHT HEART CATHETERIZATION Right 2022    Procedure: INSERTION, CATHETER, RIGHT HEART;  Surgeon: Caden Aden MD;  Location: [facility] CATH LAB;  Service: Cardiology;  Laterality: Right;    TREATMENT OF CARDIAC ARRHYTHMIA N/A 2022    Procedure: CARDIOVERSION;  Surgeon: Marvin Sanon MD;  Location: [facility] EP LAB;  Service: Cardiology;  Laterality: N/A;  afib, KIA, DCCV, anes, MB, 3 Prep       Time Tracking:     PT Received On: 23  PT Start Time: 1137     PT Stop Time: 1152  PT Total Time (min): 15 min     Billable Minutes: Evaluation 7 and Gait Training 8    2023

## 2023-03-14 NOTE — PT/OT/SLP EVAL
Occupational Therapy   Evaluation/Discharge    Name: Tera Griffiths  MRN: [id number]  Admitting Diagnosis: Acute decompensated heart failure  Recent Surgery: * No surgery found *      Recommendations:     Discharge Recommendations: home    Assessment:     Tera Griffiths is a 57 y.o. male with a medical diagnosis of Acute decompensated heart failure. Performance deficits affecting function: weakness.  Pt tolerated therapy well and able to complete all ADLs with supervision. Pt with no signs of SOB during session. Pt does not need OT services and plan to D/C this date.     Plan:     Plan to D/C pt this date.   Plan of Care Expires: 03/14/23  Plan of Care Reviewed with: patient    Subjective     Pt agreeable to therapy.   Pt agrees with D/C plan.     Occupational Profile:  Living Environment: Pt lives with cousin in a 2nd floor apartment with approximately 15 steps to get to the 2nd floor. Pt uses walk-in shower.    Previous level of function: Pt I in all ADLs and ambulated with no AD PTA.  Equipment Used at Home: none  Assistance upon Discharge: Pt will have assistance from cousin upon D/C.     Pain/Comfort:  Pain Rating 1: 0/10    Patients cultural, spiritual, Druze conflicts given the current situation: no    Objective:     Communicated with: nsg prior to session.  Patient found supine with telemetry upon OT entry to room.    General Precautions: Standard, fall  Respiratory Status: Room air    Occupational Performance:    Bed Mobility:    Patient completed Scooting/Bridging with supervision  Patient completed Supine to Sit with supervision    Functional Mobility/Transfers:  Patient completed Sit <> Stand Transfer with supervision  with  no assistive device   Functional Mobility: Pt ambulated from bed to bathroom with supervision. Pt stood at bathroom sink for 5 mins with supervision. Pt ambulated from bathroom to chair with supervision.     Activities of Daily Living:  Grooming: Pt completed oral care with  supervision at bathroom sink  Lower Body Dressing: Pt donned socks with supervision in bed    Cognitive/Visual Perceptual:  Pt AxO x 4.   Pt vision intact.     Physical Exam:  Pt B UE ROM and strength WFL as seen through functional use.  Pt B  strength WFL as seen through functional use.    AMPAC 6 Click ADL:  AMPAC Total Score: 24    Treatment & Education:  Ed pt with OT POC and safety with functional mobility and ADL skills.   Pt ed on OT services plan to D/C.  Pt ed on potential need for sternal precautions in the future if pt receives heart tx and LVAD placement.     Patient left up in chair with all lines intact and call button in reach    GOALS:   Multidisciplinary Problems       Occupational Therapy Goals       Not on file              Multidisciplinary Problems (Resolved)          Problem: Occupational Therapy    Goal Priority Disciplines Outcome Interventions   Occupational Therapy Goal   (Resolved)     OT, PT/OT Met                        History:     Past Medical History:   Diagnosis Date    Atrial fibrillation     CHF (congestive heart failure)     Coronary atherosclerosis of native coronary artery     Diabetes mellitus, type 2     Encounter for blood transfusion     Hypertension          Past Surgical History:   Procedure Laterality Date    CARDIAC DEFIBRILLATOR PLACEMENT Left     HERNIA REPAIR      RIGHT HEART CATHETERIZATION Right 9/30/2022    Procedure: INSERTION, CATHETER, RIGHT HEART;  Surgeon: Caden Aden MD;  Location: [facility] CATH LAB;  Service: Cardiology;  Laterality: Right;    TREATMENT OF CARDIAC ARRHYTHMIA N/A 11/22/2022    Procedure: CARDIOVERSION;  Surgeon: Marvin Sanon MD;  Location: [facility] EP LAB;  Service: Cardiology;  Laterality: N/A;  afib, KIA, DCCV, anes, MB, 3 Prep       Time Tracking:     OT Date of Treatment: 03/14/23  OT Start Time: 0955  OT Stop Time: 1006  OT Total Time (min): 11 min    Billable Minutes:Evaluation 11    3/14/2023

## 2023-03-14 NOTE — PROGRESS NOTES
[facility] Cardiology Stepdown  Heart Transplant  Progress Note    Patient Name: Tera Griffiths  MRN: [id number]  Admission Date: 3/9/2023  Hospital Length of Stay: 4 days  Attending Physician: Chelsy Morales DO  Primary Care Provider: Primary Doctor No  Principal Problem:Acute decompensated heart failure    Subjective:     Interval History: No acute overnight events, remains hypertensive, no active complaints this morning.       Continuous Infusions:  Scheduled Meds:   amiodarone  200 mg Oral BID    apixaban  5 mg Oral BID    atorvastatin  80 mg Oral Daily    clopidogreL  75 mg Oral Daily    metoprolol succinate  25 mg Oral Daily    pantoprazole  40 mg Oral Daily    sacubitriL-valsartan  1 tablet Oral BID    spironolactone  25 mg Oral Daily    tamsulosin  1 capsule Oral QHS     PRN Meds:acetaminophen, HYDROcodone-acetaminophen, nitroGLYCERIN, ondansetron, sodium chloride 0.9%    Review of patient's allergies indicates:  No Known Allergies  Objective:     Vital Signs (Most Recent):  Temp: 98.3 °F (36.8 °C) (03/14/23 0745)  Pulse: 66 (03/14/23 0745)  Resp: 18 (03/14/23 0745)  BP: (!) 153/73 (03/14/23 0745)  SpO2: 95 % (03/14/23 0745)   Vital Signs (24h Range):  Temp:  [97.5 °F (36.4 °C)-98.3 °F (36.8 °C)] 98.3 °F (36.8 °C)  Pulse:  [57-75] 66  Resp:  [18-47] 18  SpO2:  [89 %-99 %] 95 %  BP: (105-167)/() 153/73     Patient Vitals for the past 72 hrs (Last 3 readings):   Weight   03/13/23 0400 90 kg (198 lb 6.6 oz)   03/12/23 0312 89.3 kg (196 lb 13.9 oz)     Body mass index is 26.91 kg/m².      Intake/Output Summary (Last 24 hours) at 3/14/2023 1036  Last data filed at 3/14/2023 0902  Gross per 24 hour   Intake 742 ml   Output 1825 ml   Net -1083 ml       Hemodynamic Parameters:           Physical Exam  Constitutional:       Appearance: Normal appearance.   HENT:      Head: Normocephalic.   Eyes:      Pupils: Pupils are equal, round, and reactive to light.   Cardiovascular:      Rate and Rhythm:  Normal rate and regular rhythm.      Pulses: Normal pulses.   Pulmonary:      Effort: Pulmonary effort is normal.      Breath sounds: Normal breath sounds.   Abdominal:      General: Abdomen is flat.      Palpations: Abdomen is soft.   Musculoskeletal:         General: Normal range of motion.      Cervical back: Normal range of motion.   Skin:     General: Skin is warm.   Neurological:      General: No focal deficit present.      Mental Status: He is alert.       Significant Labs:  CBC:  Recent Labs   Lab 03/12/23 0308 03/12/23 2054 03/13/23 0234 03/13/23 1957 03/14/23 0335   WBC 3.88*  --  3.95  --  4.36   RBC 4.51*  --  4.85  --  5.43   HGB 12.3*  --  13.0*  --  14.6   HCT 41.2   < > 42.7 47 46.9     --  287  --  329   MCV 91  --  88  --  86   MCH 27.3  --  26.8*  --  26.9*   MCHC 29.9*  --  30.4*  --  31.1*    < > = values in this interval not displayed.     BNP:  Recent Labs   Lab 03/09/23  1547   BNP 1,755*     CMP:  Recent Labs   Lab 03/12/23 0308 03/13/23 0234 03/14/23 0335   GLU 85 84 104   CALCIUM 8.4* 8.6* 9.5   ALBUMIN 3.0* 3.1* 3.6   PROT 5.7* 6.0 7.1    137 137   K 4.2 4.3 4.6   CO2 22* 22* 25    108 104   BUN 19 21* 21*   CREATININE 1.2 1.2 1.4   ALKPHOS 59 58 70   ALT 8* 9* 9*   AST 11 10 11   BILITOT 0.6 0.6 0.6      Coagulation:   No results for input(s): PT, INR, APTT in the last 168 hours.  LDH:  No results for input(s): LDH in the last 72 hours.  Microbiology:  Microbiology Results (last 7 days)       Procedure Component Value Units Date/Time    Blood culture [[id number]] Collected: 03/10/23 1149    Order Status: Completed Specimen: Blood from Peripheral, Forearm, Left Updated: 03/13/23 1412     Blood Culture, Routine No Growth to date      No Growth to date      No Growth to date      No Growth to date    Narrative:      Please obtain 2 sets    Stool culture [[id number]] Collected: 03/11/23 0943    Order Status: Completed Specimen: Stool Updated: 03/13/23 1037     Stool  Culture Nothing significant to date    E. coli 0157 antigen [[id number]] Collected: 03/11/23 0943    Order Status: Completed Specimen: Stool Updated: 03/13/23 0240     Shiga Toxin 1 E.coli Negative     Shiga Toxin 2 E.coli Negative    Blood culture [[id number]]     Order Status: Canceled Specimen: Blood             I have reviewed all pertinent labs within the past 24 hours.    Estimated Creatinine Clearance: 65.4 mL/min (based on SCr of 1.4 mg/dL).    Diagnostic Results:  I have reviewed and interpreted all pertinent imaging results/findings within the past 24 hours.    Assessment and Plan:      is a 58 YO male with known h/o ICMP, ACC stage C, NYHA III, with severely reduced LVEF <15%, LVIDd 7cm and RVD 4.2, CAD with h/o STEMI s/p RCA PCI 8/2021, mod -severe PH with noted improvement in PVR on NTG administration, s/p ICD placement, Afib s/p DCCV and uncontrolled HTN, who is admitted for ADHF.     Patient has had multiple admissions for HF exacerbation in the recent past, currently being evaluated for advanced therapies as an OP and was last seen by  on 2/9/23. GDMT optimized on that visit with continuation of Entresto  BID and spironolactone 25 mg PO Qday, increase in Metoprolol to 25mg PO Qday and addition of Hydralazine and Isordil. Although patient claims that he has been taking all his meds per instructions, drop in BP with administration of home regimen is possibly related to some degree of med non compliance.           # ADHF   #ICMP, ACC stage C, NYHA III, with severely reduced LVEF <15%, LVIDd 7cm and RVD 4.2  #Mod -severe PH with noted improvement in PVR on NTG administration     CVP ranges over the last 48 hrs : 3-7  -3/14/23: Entresto increased to  BID, continue Spironolactone 25 mg PO Qday . Restart Metoprolol succinate 25 mg PO Q day today  -Will benefit from addition of Jardiance as an OP .   - Consider addition of hydralazine and Isordil once on max doses of above GDMT   -  Likely discharge on Torsemide 20 mg PO Qday, plan to reassess in clinic in 1week, with repeat labs.   -Other AHF therapy evaluation as OP         #CAD with h/o STEMI s/p RCA PCI 8/2021  - Continue Plavix and Eliquis   - Continue high intensity statin      #h/o Afib s/p DCCV   - Continue Eliquis BID  - Amiodarone 200 mg BID   -Tele monitoring      #Spleenic mass noted on CT AP  - OP fu      Discharge pending PT evaluation      Arianne Caruso MD  Heart Transplant  [facility] - Cardiology Stepdown

## 2023-03-14 NOTE — SUBJECTIVE & OBJECTIVE
Interval History: No acute overnight events, remains hypertensive, no active complaints this morning.       Continuous Infusions:  Scheduled Meds:   amiodarone  200 mg Oral BID    apixaban  5 mg Oral BID    atorvastatin  80 mg Oral Daily    clopidogreL  75 mg Oral Daily    metoprolol succinate  25 mg Oral Daily    pantoprazole  40 mg Oral Daily    sacubitriL-valsartan  1 tablet Oral BID    spironolactone  25 mg Oral Daily    tamsulosin  1 capsule Oral QHS     PRN Meds:acetaminophen, HYDROcodone-acetaminophen, nitroGLYCERIN, ondansetron, sodium chloride 0.9%    Review of patient's allergies indicates:  No Known Allergies  Objective:     Vital Signs (Most Recent):  Temp: 98.3 °F (36.8 °C) (03/14/23 0745)  Pulse: 66 (03/14/23 0745)  Resp: 18 (03/14/23 0745)  BP: (!) 153/73 (03/14/23 0745)  SpO2: 95 % (03/14/23 0745)   Vital Signs (24h Range):  Temp:  [97.5 °F (36.4 °C)-98.3 °F (36.8 °C)] 98.3 °F (36.8 °C)  Pulse:  [57-75] 66  Resp:  [18-47] 18  SpO2:  [89 %-99 %] 95 %  BP: (105-167)/() 153/73     Patient Vitals for the past 72 hrs (Last 3 readings):   Weight   03/13/23 0400 90 kg (198 lb 6.6 oz)   03/12/23 0312 89.3 kg (196 lb 13.9 oz)     Body mass index is 26.91 kg/m².      Intake/Output Summary (Last 24 hours) at 3/14/2023 1036  Last data filed at 3/14/2023 0902  Gross per 24 hour   Intake 742 ml   Output 1825 ml   Net -1083 ml       Hemodynamic Parameters:           Physical Exam  Constitutional:       Appearance: Normal appearance.   HENT:      Head: Normocephalic.   Eyes:      Pupils: Pupils are equal, round, and reactive to light.   Cardiovascular:      Rate and Rhythm: Normal rate and regular rhythm.      Pulses: Normal pulses.   Pulmonary:      Effort: Pulmonary effort is normal.      Breath sounds: Normal breath sounds.   Abdominal:      General: Abdomen is flat.      Palpations: Abdomen is soft.   Musculoskeletal:         General: Normal range of motion.      Cervical back: Normal range of motion.    Skin:     General: Skin is warm.   Neurological:      General: No focal deficit present.      Mental Status: He is alert.       Significant Labs:  CBC:  Recent Labs   Lab 03/12/23 0308 03/12/23 2054 03/13/23 0234 03/13/23 1957 03/14/23  0335   WBC 3.88*  --  3.95  --  4.36   RBC 4.51*  --  4.85  --  5.43   HGB 12.3*  --  13.0*  --  14.6   HCT 41.2   < > 42.7 47 46.9     --  287  --  329   MCV 91  --  88  --  86   MCH 27.3  --  26.8*  --  26.9*   MCHC 29.9*  --  30.4*  --  31.1*    < > = values in this interval not displayed.     BNP:  Recent Labs   Lab 03/09/23  1547   BNP 1,755*     CMP:  Recent Labs   Lab 03/12/23 0308 03/13/23 0234 03/14/23 0335   GLU 85 84 104   CALCIUM 8.4* 8.6* 9.5   ALBUMIN 3.0* 3.1* 3.6   PROT 5.7* 6.0 7.1    137 137   K 4.2 4.3 4.6   CO2 22* 22* 25    108 104   BUN 19 21* 21*   CREATININE 1.2 1.2 1.4   ALKPHOS 59 58 70   ALT 8* 9* 9*   AST 11 10 11   BILITOT 0.6 0.6 0.6      Coagulation:   No results for input(s): PT, INR, APTT in the last 168 hours.  LDH:  No results for input(s): LDH in the last 72 hours.  Microbiology:  Microbiology Results (last 7 days)       Procedure Component Value Units Date/Time    Blood culture [[id number]] Collected: 03/10/23 1149    Order Status: Completed Specimen: Blood from Peripheral, Forearm, Left Updated: 03/13/23 1412     Blood Culture, Routine No Growth to date      No Growth to date      No Growth to date      No Growth to date    Narrative:      Please obtain 2 sets    Stool culture [[id number]] Collected: 03/11/23 0943    Order Status: Completed Specimen: Stool Updated: 03/13/23 1037     Stool Culture Nothing significant to date    E. coli 0157 antigen [[id number]] Collected: 03/11/23 0943    Order Status: Completed Specimen: Stool Updated: 03/13/23 0240     Shiga Toxin 1 E.coli Negative     Shiga Toxin 2 E.coli Negative    Blood culture [[id number]]     Order Status: Canceled Specimen: Blood             I have reviewed all  pertinent labs within the past 24 hours.    Estimated Creatinine Clearance: 65.4 mL/min (based on SCr of 1.4 mg/dL).    Diagnostic Results:  I have reviewed and interpreted all pertinent imaging results/findings within the past 24 hours.

## 2023-03-14 NOTE — NURSING
Patient admitted to CSU room 354. Report given to CSU RN Paloma. Patient alert and oriented to room. Cardiac monitoring maintained throughout transfer. Patient connected to telemetry, assessed, denies any pain, oriented to room, and instructed to call for assistance or any needs. Call bell in reach. Reviewed goals for today. Will continue to monitor

## 2023-03-15 ENCOUNTER — PATIENT OUTREACH (OUTPATIENT)
Dept: ADMINISTRATIVE | Facility: CLINIC | Age: 58
End: 2023-03-15
Payer: MEDICAID

## 2023-03-15 LAB — BACTERIA BLD CULT: NORMAL

## 2023-03-15 NOTE — PROGRESS NOTES
Discharge Note:  Pt to be dc today. SW met with pt to discuss dc plan.  Pt voiced understanding and agreement and expressed no needs. Pt reported having a ride home.

## 2023-03-15 NOTE — PROGRESS NOTES
C3 nurse attempted to contact Tera Griffiths  for a TCC post hospital discharge follow up call. No answer. The patient does not have a scheduled HOSFU appointment, and the pt does not have an [facility] PCP.    Patient does have scheduled f/u with Chelsy Morales DO on 3/21/23 (Cardiology) @ 9:30 AM.

## 2023-03-16 NOTE — PROGRESS NOTES
Second attempt C3 nurse attempted to contact Tera Griffiths  for a TCC post hospital discharge follow up call. No answer. The patient does not have a scheduled HOSFU appointment, and the pt does not have an [facility] PCP.    Patient does have scheduled f/u with Chelsy Moarles DO on 3/21/23 (Cardiology) @ 9:30 AM.

## 2023-03-16 NOTE — PROGRESS NOTES
Third  attempt C3 nurse attempted to contact Tera Aye  for a TCC post hospital discharge follow up call. No answer. The patient does not have a scheduled HOSFU appointment, and the pt does not have an [facility] PCP.    Patient does have scheduled f/u with Chelsy Morales DO on 3/21/23 (Cardiology) @ 9:30 AM.

## 2023-03-20 NOTE — PROGRESS NOTES
ADVANCED HEART FAILURE AND TRANSPLANTATION CLINIC VISIT    CHIEF COMPLAINT:  Follow-up heart failure    HISTORY OF PRESENT ILLNESS:  Tera Griffiths is a 57 y.o.  male with a past medical history remarkable for ICM/HFrEF, LVEDD 70 mm presenting for post discharge follow-up     Co-morbidities: CAD s/p STEMI PCI RCA 8/2021, AF DCCV, HTN, iron deficiency anemia, DM2 5.7% 9/2022     Admitted 3/19-3/14/2023 for ADHF but with GDMT and Lasix 80 IV x 2 became hypotensive requiring transfer to ICU 3/10 requiring IVFs after transduced CVP 2. Stated adherence to all medications. He takes care of placing all meds in pill box and does not recall and missed doses. We suspected hypotension was in the setting of very brisk diuresis and volume depletion with GDMT regimen. After stabilization, he became hypertensive and GDMT was resumed and up-titrated. Given no signs of hypoperfusion, Toprol 25 mg daily was restarted, which can be transitioned to Coreg if needed outpatient if still remains hypertensive after SGLT2i and hydralazine/isosorbide. We started SGLT2i on discharge and lowered dose of torsemide to 20 mg daily with daily weights. He received IV Fe 3/13 for iron deficiency anemia    Since discharge, he has been feeling more lightheaded. Home BP readings have been around 120/90s. Notices the lightheadedness with standing. Notes lightheadedness while standing even with today's orthostatic vitals, which are not positive and noted still to be hypertensive. Notes SOB with ambulating 1/2 block. Occasional orthopnea, PND, bendopnea, abdominal bloating/early satiety, notes chest tightness every morning that subsides on its own after occurring for 13 minutes per him. Denies nausea, lower extremity edema, chest discomfort, presyncope, palpitations, or syncope. Denies adverse bleeding, no hematochezia/melena/hematuria/hematemesis. Does have scale at home but does not weigh self daily. States follows salt restriction and  fluid restriction.    HF hospitalizations: 3/2023  ICD discharges -    Current diuretic regimen: torsemide 20 mg daily    GDMT: Toprol 25 mg daily, Entresto  mg BID, spironolactone 25 mg daily  Also on amiodarone 200 mg BID, Eliquis 5 mg BID + Plavix 75 mg daily    Cardiac history:  Angina: +  Myocardial infarction: +  Revascularization:+  CVA/TIA: neg  VTE: neg  AF/arrhythmias: +  Obesity: BMI 26  Hyperlipidemia: +  DM: +  PAD: unknown    Cardiac Data:  Transthoracic Echo: Results for orders placed during the hospital encounter of 03/09/23  · The left ventricle is severely enlarged with severe eccentric hypertrophy and severely decreased systolic function.  · Severe left atrial enlargement.  · The estimated ejection fraction is 18%( probably upper teens to at most low 20s).  · There is severe left ventricular global hypokinesis.  · Grade II left ventricular diastolic dysfunction.  · Moderate right ventricular enlargement with mildly reduced right ventricular systolic function.  · Severe right atrial enlargement.  · Mild tricuspid regurgitation.  · Normal central venous pressure (3 mmHg).  · The estimated PA systolic pressure is 22 mmHg.  · Small posterior pericardial effusion.    ECG 3/9/2023: SR 61 bpm, 1st degree AVB, LVH with QRS widening 132 ms and ST T changes of hypertrophy    Left Heart Catheterization: none documented but states about a year ago    Right Heart Catheterization 9/30/2022  RA: 10  PA: 60/25, mean 35  PCWP: 18 with V waves to 27  Saturation:  Arterial: 99 %  PA: 64 %  Mark CI/CO: 2.05/4.18  SVR: 1837 dynes/sec/cm -5  PVR: 4.1 Wood Units     Post 2 sprays of sublingual Nitroglycerin:  - Minimal change of BP from 124/95 (106) to 123/91 (102)  - PA pressure reduced to 50/20, with mean of 30  - PCWP largely unchanged at 19 with V waves to 27  - PVR 2.6    CPX 1/11/2023 exercised 2:28 on low ramp protocol achieving max HR 81 bpm (49% predicted) and 3.5 METS. Test was stopped due to SOB. RER  0.74, peak VO2 12.1 mL/kg/min (33% predicted), AT was not attained, VE/VCO2 37.5, normal BP response and no desaturations    Devices: ICD    PAST MEDICAL HISTORY:  Past Medical History:   Diagnosis Date    Atrial fibrillation     CHF (congestive heart failure)     Coronary atherosclerosis of native coronary artery     Diabetes mellitus, type 2     Encounter for blood transfusion     Hypertension        PAST SURGICAL HISTORY:  Past Surgical History:   Procedure Laterality Date    CARDIAC DEFIBRILLATOR PLACEMENT Left     HERNIA REPAIR      RIGHT HEART CATHETERIZATION Right 9/30/2022    Procedure: INSERTION, CATHETER, RIGHT HEART;  Surgeon: Caden Aden MD;  Location: [facility] CATH LAB;  Service: Cardiology;  Laterality: Right;    TREATMENT OF CARDIAC ARRHYTHMIA N/A 11/22/2022    Procedure: CARDIOVERSION;  Surgeon: Marvin Sanon MD;  Location: [facility] EP LAB;  Service: Cardiology;  Laterality: N/A;  afib, KIA, DCCV, anes, MB, 3 Prep       SOCIAL HISTORY  Social History     Socioeconomic History    Marital status: Single   Tobacco Use    Smoking status: Never    Smokeless tobacco: Never   Substance and Sexual Activity    Alcohol use: Never    Drug use: Never       FAMILY HISTORY  No family history on file.    ALLERGIES:  Review of patient's allergies indicates:  No Known Allergies    MEDICATIONS  Current Outpatient Medications on File Prior to Visit   Medication Sig Dispense Refill    amiodarone (PACERONE) 200 MG Tab Take 1 tablet (200 mg total) by mouth 2 (two) times daily. 60 tablet 3    apixaban (ELIQUIS) 5 mg Tab Take 5 mg by mouth 2 (two) times daily.      atorvastatin (LIPITOR) 80 MG tablet Take 80 mg by mouth once daily.      canagliflozin (INVOKANA) 100 mg Tab tablet Take 100 mg by mouth once daily.      clopidogreL (PLAVIX) 75 mg tablet Take 75 mg by mouth once daily.      ferrous sulfate (FEOSOL) 325 mg (65 mg iron) Tab tablet Take 325 mg by mouth every other day.      glimepiride (AMARYL) 4 MG tablet Take 4  mg by mouth every morning.      LIDOcaine (LIDODERM) 5 % Place 1 patch onto the skin once daily.      metoprolol succinate (TOPROL-XL) 25 MG 24 hr tablet Take 1 tablet (25 mg total) by mouth every evening. 30 tablet 11    pantoprazole (PROTONIX) 40 MG tablet Take 40 mg by mouth once daily.      potassium chloride SA (K-DUR,KLOR-CON) 20 MEQ tablet Take 1 tablet (20 mEq total) by mouth once daily. 90 tablet 3    sacubitriL-valsartan (ENTRESTO)  mg per tablet Take 1 tablet by mouth 2 (two) times daily. 60 tablet 5    spironolactone (ALDACTONE) 25 MG tablet Take 25 mg by mouth once daily.      tamsulosin (FLOMAX) 0.4 mg Cap Take 1 capsule by mouth every evening.      torsemide (DEMADEX) 20 MG Tab Take 1 tablet (20 mg total) by mouth once daily. 30 tablet 11     No current facility-administered medications on file prior to visit.       REVIEW OF SYSTEMS  As per HPI. All other ROS reviewed and negative.    PHYSICAL EXAM:    Vitals:    03/21/23 0909 03/21/23 0910   BP: (!) 149/99 (!) 153/99   BP Location: Left arm Left arm   Patient Position: Sitting Standing   BP Method: Medium (Automatic) Medium (Automatic)   Pulse: 77 79   Weight: 87.9 kg (193 lb 12.6 oz)    Height: 6' (1.829 m)     Body mass index is 26.28 kg/m².    GENERAL: Alert, NAD   HEENT: Anicteric sclerae  NECK: JVP not visible above level of clavicle while sitting upright and noted at clavicle reclining 45 degrees without hepatojugular reflux  CARDIOVASCULAR: Regular rate and rhythm. Normal S1, S2 no murmurs, rubs or gallops.  PULMONARY: Lungs clear to auscultation bilaterally  ABDOMEN: Soft, nontender, nondistended. No guarding, no rebound, no hepatomegaly  EXTREMITIES: Warm. No clubbing, cyanosis or edema. No pre-sacral edema  VASCULAR: 2+ bilateral radial pulses  NEUROLOGIC: No focal deficits    LABS:    BMP pending today  Lab Results   Component Value Date     03/14/2023    K 4.6 03/14/2023     03/14/2023    CO2 25 03/14/2023    BUN 21 (H)  03/14/2023    CREATININE 1.4 03/14/2023    CALCIUM 9.5 03/14/2023    ANIONGAP 8 03/14/2023    EGFRNORACEVR 59.4 (A) 03/14/2023       Magnesium   Date Value Ref Range Status   03/14/2023 2.0 1.6 - 2.6 mg/dL Final       Lab Results   Component Value Date    WBC 4.36 03/14/2023    HGB 14.6 03/14/2023    HCT 46.9 03/14/2023    MCV 86 03/14/2023     03/14/2023       Lab Results   Component Value Date    INR 1.1 01/23/2023    INR 1.1 11/15/2022    INR 1.1 09/30/2022       BNP   Date Value Ref Range Status   03/09/2023 1,755 (H) 0 - 99 pg/mL Final     Comment:     Values of less than 100 pg/ml are consistent with non-CHF populations.   02/09/2023 3,233 (H) 0 - 99 pg/mL Final     Comment:     Values of less than 100 pg/ml are consistent with non-CHF populations.   01/23/2023 3,635 (H) 0 - 99 pg/mL Final     Comment:     Values of less than 100 pg/ml are consistent with non-CHF populations.       LD   Date Value Ref Range Status   01/23/2023 232 110 - 260 U/L Final     Comment:     Results are increased in hemolyzed samples.       IMPRESSION:    NYHA Class III   ACC/AHA Stage C-D  Smith profile A    1. Chronic combined systolic and diastolic heart failure    2. Ischemic cardiomyopathy    3. Coronary artery disease of native artery of native heart with stable angina pectoris    4. History of ST elevation myocardial infarction (STEMI)    5. Cardiac arrest with ventricular fibrillation    6. Primary hypertension    7. ICD (implantable cardioverter-defibrillator) in place    8. Dyslipidemia    9. Type 2 diabetes mellitus with other circulatory complication, without long-term current use of insulin    10. Arteriovenous malformation of cerebral vessels        PLAN:    Would evaluate chest pain further with PET stress evaluating for any ischemia. In interim, will try low dose isosorbide dinitrate at 10 mg TID. Holding hydralazine at this time with new additions otherwise given reported lightheadedness (but negative  orthostatics).    Start Jardiance 10 mg daily and reduce torsemide to PRN for weight gain following daily weights. Will repeat labs next week.    Recommend 2 gram sodium restriction and 1500 mL fluid restriction.  Encourage physical activity with graded exercise program.  Requested patient to weigh themselves daily, and to notify us if their weight increases by more than 3 lbs in 1 day or 5 lbs in 1 week.     Pt to call us with more shortness of breath, swelling or unexpected weight changes, fever, chills, bloody or black bowel movements, or other concerns.    F/U 1 month with clinic visit, labs, PET stress    Would repeat CPX after max tolerated GDMT and RHC    Electronically signed by:   Chelsy Morales   03/20/2023 3:15 PM

## 2023-03-21 ENCOUNTER — TELEPHONE (OUTPATIENT)
Dept: HEPATOLOGY | Facility: CLINIC | Age: 58
End: 2023-03-21
Payer: MEDICAID

## 2023-03-21 ENCOUNTER — LAB VISIT (OUTPATIENT)
Dept: LAB | Facility: HOSPITAL | Age: 58
End: 2023-03-21
Attending: STUDENT IN AN ORGANIZED HEALTH CARE EDUCATION/TRAINING PROGRAM
Payer: MEDICAID

## 2023-03-21 ENCOUNTER — OFFICE VISIT (OUTPATIENT)
Dept: TRANSPLANT | Facility: CLINIC | Age: 58
End: 2023-03-21
Payer: MEDICAID

## 2023-03-21 VITALS
BODY MASS INDEX: 26.25 KG/M2 | WEIGHT: 193.81 LBS | HEIGHT: 72 IN | HEART RATE: 79 BPM | DIASTOLIC BLOOD PRESSURE: 99 MMHG | SYSTOLIC BLOOD PRESSURE: 153 MMHG

## 2023-03-21 DIAGNOSIS — I25.5 ISCHEMIC CARDIOMYOPATHY: ICD-10-CM

## 2023-03-21 DIAGNOSIS — I50.22 CHRONIC SYSTOLIC CONGESTIVE HEART FAILURE: ICD-10-CM

## 2023-03-21 DIAGNOSIS — I25.2 HISTORY OF ST ELEVATION MYOCARDIAL INFARCTION (STEMI): ICD-10-CM

## 2023-03-21 DIAGNOSIS — E11.59 TYPE 2 DIABETES MELLITUS WITH OTHER CIRCULATORY COMPLICATION, WITHOUT LONG-TERM CURRENT USE OF INSULIN: ICD-10-CM

## 2023-03-21 DIAGNOSIS — I10 PRIMARY HYPERTENSION: ICD-10-CM

## 2023-03-21 DIAGNOSIS — E78.5 DYSLIPIDEMIA: ICD-10-CM

## 2023-03-21 DIAGNOSIS — I46.9 CARDIAC ARREST WITH VENTRICULAR FIBRILLATION: ICD-10-CM

## 2023-03-21 DIAGNOSIS — Z95.810 ICD (IMPLANTABLE CARDIOVERTER-DEFIBRILLATOR) IN PLACE: ICD-10-CM

## 2023-03-21 DIAGNOSIS — I49.01 CARDIAC ARREST WITH VENTRICULAR FIBRILLATION: ICD-10-CM

## 2023-03-21 DIAGNOSIS — I50.42 CHRONIC COMBINED SYSTOLIC AND DIASTOLIC HEART FAILURE: Primary | ICD-10-CM

## 2023-03-21 DIAGNOSIS — I25.118 CORONARY ARTERY DISEASE OF NATIVE ARTERY OF NATIVE HEART WITH STABLE ANGINA PECTORIS: ICD-10-CM

## 2023-03-21 DIAGNOSIS — Q28.2 ARTERIOVENOUS MALFORMATION OF CEREBRAL VESSELS: ICD-10-CM

## 2023-03-21 LAB
ANION GAP SERPL CALC-SCNC: 10 MMOL/L (ref 8–16)
BUN SERPL-MCNC: 15 MG/DL (ref 6–20)
CALCIUM SERPL-MCNC: 9.7 MG/DL (ref 8.7–10.5)
CHLORIDE SERPL-SCNC: 106 MMOL/L (ref 95–110)
CO2 SERPL-SCNC: 24 MMOL/L (ref 23–29)
CREAT SERPL-MCNC: 1.4 MG/DL (ref 0.5–1.4)
EST. GFR  (NO RACE VARIABLE): 59.4 ML/MIN/1.73 M^2
GLUCOSE SERPL-MCNC: 94 MG/DL (ref 70–110)
POTASSIUM SERPL-SCNC: 4.1 MMOL/L (ref 3.5–5.1)
SODIUM SERPL-SCNC: 140 MMOL/L (ref 136–145)

## 2023-03-21 PROCEDURE — 80048 BASIC METABOLIC PNL TOTAL CA: CPT | Mod: TXP | Performed by: INTERNAL MEDICINE

## 2023-03-21 PROCEDURE — 99999 PR PBB SHADOW E&M-EST. PATIENT-LVL V: CPT | Mod: PBBFAC,TXP,, | Performed by: INTERNAL MEDICINE

## 2023-03-21 PROCEDURE — 99215 OFFICE O/P EST HI 40 MIN: CPT | Mod: PBBFAC,TXP | Performed by: INTERNAL MEDICINE

## 2023-03-21 PROCEDURE — 99214 OFFICE O/P EST MOD 30 MIN: CPT | Mod: S$PBB,,, | Performed by: INTERNAL MEDICINE

## 2023-03-21 PROCEDURE — 36415 COLL VENOUS BLD VENIPUNCTURE: CPT | Mod: NTX | Performed by: INTERNAL MEDICINE

## 2023-03-21 PROCEDURE — 99214 PR OFFICE/OUTPT VISIT, EST, LEVL IV, 30-39 MIN: ICD-10-PCS | Mod: S$PBB,,, | Performed by: INTERNAL MEDICINE

## 2023-03-21 PROCEDURE — 99999 PR PBB SHADOW E&M-EST. PATIENT-LVL V: ICD-10-PCS | Mod: PBBFAC,TXP,, | Performed by: INTERNAL MEDICINE

## 2023-03-21 RX ORDER — ISOSORBIDE MONONITRATE 10 MG/1
10 TABLET ORAL 2 TIMES DAILY
Qty: 60 TABLET | Refills: 11 | Status: ON HOLD | OUTPATIENT
Start: 2023-03-21 | End: 2023-04-22 | Stop reason: SDUPTHER

## 2023-03-21 NOTE — TELEPHONE ENCOUNTER
Referral to hepatology for Hep B core antibody, pre heart transplant     Scheduling attempt # 1    Please call pt to schedule appt with any [facility]  Encourage video visits for new patient appts with APPs

## 2023-03-21 NOTE — PATIENT INSTRUCTIONS
We will start a new medicine called Jardiance at 10 mg daily with repeat blood work next week. Because we are starting this medicine, only take your torsemide if your weight increases by >3 lbs in one day or >5 lbs in one week.    We will obtain a stress test looking for areas of the heart that may not be getting enough blood flow.     We will also start a medicine to help with your BP and chest discomfort called isosorbide at 10 mg three times a day

## 2023-03-21 NOTE — LETTER
March 21, 2023        Rosalio Salazar  [address]  [address]  [place] [address]  Phone: [phone]  Fax: [phone]             [facility]  [address]  [place] [address]  Phone: [phone]   Patient: Tera Griffiths   MR Number: [id number]   YOB: 1965   Date of Visit: 3/21/2023       Dear Dr. Rosalio Salazar    Thank you for referring Tera Griffiths to me for evaluation. Attached you will find relevant portions of my assessment and plan of care.    If you have questions, please do not hesitate to call me. I look forward to following Tera Griffiths along with you.    Sincerely,    Chelsy Morales, DO    Enclosure    If you would like to receive this communication electronically, please contact [email or web address] or [phone] to request [employer] access.    [employer] is a tool which provides read-only access to select patient information with whom you have a relationship. Its easy to use and provides real time access to review your patients record including encounter summaries, notes, results, and demographic information.    If you feel you have received this communication in error or would no longer like to receive these types of communications, please e-mail [email or web address]

## 2023-03-22 ENCOUNTER — TELEPHONE (OUTPATIENT)
Dept: TRANSPLANT | Facility: CLINIC | Age: 58
End: 2023-03-22
Payer: MEDICAID

## 2023-03-22 ENCOUNTER — COMMITTEE REVIEW (OUTPATIENT)
Dept: TRANSPLANT | Facility: CLINIC | Age: 58
End: 2023-03-22
Payer: MEDICAID

## 2023-03-22 NOTE — COMMITTEE REVIEW
Discussed Mr Griffiths at Selection Committee.  Mr Griffiths is declined for advanced options at this time as he does not currently meet criteria.  He will follow in Heart Failure section at this time.    Note was written by Thelma Ferrara RN    ==========================================================    I agree and attest to the decision of the committee.

## 2023-03-22 NOTE — TELEPHONE ENCOUNTER
Care of patient is being transferred to CHF section from Preht section, per Dr. Morales    Dx: CAD  Reason: does not meet criteria, need to complete testing and uptitrate GDMT- on High risk list  Pt is not on home inotrope therapy.      Outstanding orders scheduled:  3/28 Lab  4/24 RTC Lab  5/5 PET

## 2023-04-16 ENCOUNTER — HOSPITAL ENCOUNTER (INPATIENT)
Facility: HOSPITAL | Age: 58
LOS: 6 days | Discharge: HOME HEALTH | DRG: 280 | End: 2023-04-22
Attending: EMERGENCY MEDICINE | Admitting: INTERNAL MEDICINE
Payer: MEDICAID

## 2023-04-16 DIAGNOSIS — Z01.818 PRE-OP EXAM: ICD-10-CM

## 2023-04-16 DIAGNOSIS — R00.0 SINUS TACHYCARDIA: ICD-10-CM

## 2023-04-16 DIAGNOSIS — I50.42 CHRONIC COMBINED SYSTOLIC AND DIASTOLIC HEART FAILURE: ICD-10-CM

## 2023-04-16 DIAGNOSIS — I48.91 AFIB: ICD-10-CM

## 2023-04-16 DIAGNOSIS — R07.9 CHEST PAIN: Primary | ICD-10-CM

## 2023-04-16 DIAGNOSIS — I21.4 NSTEMI (NON-ST ELEVATED MYOCARDIAL INFARCTION): ICD-10-CM

## 2023-04-16 DIAGNOSIS — I50.43 ACUTE ON CHRONIC COMBINED SYSTOLIC AND DIASTOLIC CONGESTIVE HEART FAILURE: ICD-10-CM

## 2023-04-16 DIAGNOSIS — I25.5 ISCHEMIC CARDIOMYOPATHY: ICD-10-CM

## 2023-04-16 DIAGNOSIS — I25.118 CORONARY ARTERY DISEASE OF NATIVE ARTERY OF NATIVE HEART WITH STABLE ANGINA PECTORIS: ICD-10-CM

## 2023-04-16 DIAGNOSIS — I25.10 CORONARY ARTERY DISEASE INVOLVING NATIVE CORONARY ARTERY OF NATIVE HEART WITHOUT ANGINA PECTORIS: ICD-10-CM

## 2023-04-16 DIAGNOSIS — R07.89 CHEST DISCOMFORT: ICD-10-CM

## 2023-04-16 LAB
ABO + RH BLD: NORMAL
ALBUMIN SERPL BCP-MCNC: 4.1 G/DL (ref 3.5–5.2)
ALBUMIN SERPL BCP-MCNC: 4.4 G/DL (ref 3.5–5.2)
ALP SERPL-CCNC: 74 U/L (ref 55–135)
ALP SERPL-CCNC: 76 U/L (ref 55–135)
ALT SERPL W/O P-5'-P-CCNC: 13 U/L (ref 10–44)
ALT SERPL W/O P-5'-P-CCNC: 15 U/L (ref 10–44)
ANION GAP SERPL CALC-SCNC: 12 MMOL/L (ref 8–16)
ANION GAP SERPL CALC-SCNC: 18 MMOL/L (ref 8–16)
APTT PPP: 27.8 SEC (ref 21–32)
APTT PPP: 28.5 SEC (ref 21–32)
AST SERPL-CCNC: 15 U/L (ref 10–40)
AST SERPL-CCNC: 18 U/L (ref 10–40)
BASOPHILS # BLD AUTO: 0.02 K/UL (ref 0–0.2)
BASOPHILS # BLD AUTO: 0.02 K/UL (ref 0–0.2)
BASOPHILS NFR BLD: 0.3 % (ref 0–1.9)
BASOPHILS NFR BLD: 0.5 % (ref 0–1.9)
BILIRUB SERPL-MCNC: 0.8 MG/DL (ref 0.1–1)
BILIRUB SERPL-MCNC: 0.8 MG/DL (ref 0.1–1)
BLD GP AB SCN CELLS X3 SERPL QL: NORMAL
BNP SERPL-MCNC: 2707 PG/ML (ref 0–99)
BUN SERPL-MCNC: 15 MG/DL (ref 6–20)
BUN SERPL-MCNC: 16 MG/DL (ref 6–20)
CALCIUM SERPL-MCNC: 10.2 MG/DL (ref 8.7–10.5)
CALCIUM SERPL-MCNC: 9.6 MG/DL (ref 8.7–10.5)
CHLORIDE SERPL-SCNC: 100 MMOL/L (ref 95–110)
CHLORIDE SERPL-SCNC: 103 MMOL/L (ref 95–110)
CO2 SERPL-SCNC: 22 MMOL/L (ref 23–29)
CO2 SERPL-SCNC: 23 MMOL/L (ref 23–29)
CREAT SERPL-MCNC: 1.5 MG/DL (ref 0.5–1.4)
CREAT SERPL-MCNC: 1.5 MG/DL (ref 0.5–1.4)
DIFFERENTIAL METHOD: ABNORMAL
DIFFERENTIAL METHOD: ABNORMAL
EOSINOPHIL # BLD AUTO: 0.1 K/UL (ref 0–0.5)
EOSINOPHIL # BLD AUTO: 0.1 K/UL (ref 0–0.5)
EOSINOPHIL NFR BLD: 1 % (ref 0–8)
EOSINOPHIL NFR BLD: 2 % (ref 0–8)
ERYTHROCYTE [DISTWIDTH] IN BLOOD BY AUTOMATED COUNT: 14.5 % (ref 11.5–14.5)
ERYTHROCYTE [DISTWIDTH] IN BLOOD BY AUTOMATED COUNT: 14.8 % (ref 11.5–14.5)
EST. GFR  (NO RACE VARIABLE): 54.6 ML/MIN/1.73 M^2
EST. GFR  (NO RACE VARIABLE): 54.6 ML/MIN/1.73 M^2
GLUCOSE SERPL-MCNC: 112 MG/DL (ref 70–110)
GLUCOSE SERPL-MCNC: 87 MG/DL (ref 70–110)
HCT VFR BLD AUTO: 45.1 % (ref 40–54)
HCT VFR BLD AUTO: 45.2 % (ref 40–54)
HGB BLD-MCNC: 13.8 G/DL (ref 14–18)
HGB BLD-MCNC: 14.3 G/DL (ref 14–18)
IMM GRANULOCYTES # BLD AUTO: 0.01 K/UL (ref 0–0.04)
IMM GRANULOCYTES # BLD AUTO: 0.01 K/UL (ref 0–0.04)
IMM GRANULOCYTES NFR BLD AUTO: 0.2 % (ref 0–0.5)
IMM GRANULOCYTES NFR BLD AUTO: 0.2 % (ref 0–0.5)
LACTATE SERPL-SCNC: 1.2 MMOL/L (ref 0.5–2.2)
LACTATE SERPL-SCNC: 1.6 MMOL/L (ref 0.5–2.2)
LIPASE SERPL-CCNC: 15 U/L (ref 4–60)
LYMPHOCYTES # BLD AUTO: 0.9 K/UL (ref 1–4.8)
LYMPHOCYTES # BLD AUTO: 1.1 K/UL (ref 1–4.8)
LYMPHOCYTES NFR BLD: 18.2 % (ref 18–48)
LYMPHOCYTES NFR BLD: 23.1 % (ref 18–48)
MAGNESIUM SERPL-MCNC: 2 MG/DL (ref 1.6–2.6)
MCH RBC QN AUTO: 27 PG (ref 27–31)
MCH RBC QN AUTO: 27.6 PG (ref 27–31)
MCHC RBC AUTO-ENTMCNC: 30.6 G/DL (ref 32–36)
MCHC RBC AUTO-ENTMCNC: 31.6 G/DL (ref 32–36)
MCV RBC AUTO: 87 FL (ref 82–98)
MCV RBC AUTO: 88 FL (ref 82–98)
MONOCYTES # BLD AUTO: 0.6 K/UL (ref 0.3–1)
MONOCYTES # BLD AUTO: 0.8 K/UL (ref 0.3–1)
MONOCYTES NFR BLD: 13 % (ref 4–15)
MONOCYTES NFR BLD: 14 % (ref 4–15)
NEUTROPHILS # BLD AUTO: 2.5 K/UL (ref 1.8–7.7)
NEUTROPHILS # BLD AUTO: 3.9 K/UL (ref 1.8–7.7)
NEUTROPHILS NFR BLD: 60.2 % (ref 38–73)
NEUTROPHILS NFR BLD: 67.3 % (ref 38–73)
NRBC BLD-RTO: 0 /100 WBC
NRBC BLD-RTO: 0 /100 WBC
PHOSPHATE SERPL-MCNC: 2.9 MG/DL (ref 2.7–4.5)
PLATELET # BLD AUTO: 275 K/UL (ref 150–450)
PLATELET # BLD AUTO: 284 K/UL (ref 150–450)
PMV BLD AUTO: 11 FL (ref 9.2–12.9)
PMV BLD AUTO: 11.1 FL (ref 9.2–12.9)
POC CARDIAC TROPONIN I: 0.09 NG/ML (ref 0–0.08)
POC CARDIAC TROPONIN I: 0.1 NG/ML (ref 0–0.08)
POTASSIUM SERPL-SCNC: 3.2 MMOL/L (ref 3.5–5.1)
POTASSIUM SERPL-SCNC: 4.1 MMOL/L (ref 3.5–5.1)
PROT SERPL-MCNC: 7.8 G/DL (ref 6–8.4)
PROT SERPL-MCNC: 8 G/DL (ref 6–8.4)
RBC # BLD AUTO: 5.11 M/UL (ref 4.6–6.2)
RBC # BLD AUTO: 5.19 M/UL (ref 4.6–6.2)
SAMPLE: ABNORMAL
SAMPLE: ABNORMAL
SODIUM SERPL-SCNC: 138 MMOL/L (ref 136–145)
SODIUM SERPL-SCNC: 140 MMOL/L (ref 136–145)
SPECIMEN OUTDATE: NORMAL
TROPONIN I SERPL DL<=0.01 NG/ML-MCNC: 0.1 NG/ML (ref 0–0.03)
TROPONIN I SERPL DL<=0.01 NG/ML-MCNC: 0.12 NG/ML (ref 0–0.03)
TROPONIN I SERPL DL<=0.01 NG/ML-MCNC: 0.15 NG/ML (ref 0–0.03)
WBC # BLD AUTO: 4.07 K/UL (ref 3.9–12.7)
WBC # BLD AUTO: 5.83 K/UL (ref 3.9–12.7)

## 2023-04-16 PROCEDURE — 84484 ASSAY OF TROPONIN QUANT: CPT | Performed by: EMERGENCY MEDICINE

## 2023-04-16 PROCEDURE — 99291 CRITICAL CARE FIRST HOUR: CPT | Mod: ,,, | Performed by: EMERGENCY MEDICINE

## 2023-04-16 PROCEDURE — 93010 ELECTROCARDIOGRAM REPORT: CPT | Mod: ,,, | Performed by: INTERNAL MEDICINE

## 2023-04-16 PROCEDURE — 99222 PR INITIAL HOSPITAL CARE,LEVL II: ICD-10-PCS | Mod: ,,, | Performed by: INTERNAL MEDICINE

## 2023-04-16 PROCEDURE — 82962 GLUCOSE BLOOD TEST: CPT

## 2023-04-16 PROCEDURE — 83605 ASSAY OF LACTIC ACID: CPT | Mod: 91 | Performed by: STUDENT IN AN ORGANIZED HEALTH CARE EDUCATION/TRAINING PROGRAM

## 2023-04-16 PROCEDURE — 80053 COMPREHEN METABOLIC PANEL: CPT | Mod: 91 | Performed by: INTERNAL MEDICINE

## 2023-04-16 PROCEDURE — 93010 EKG 12-LEAD: ICD-10-PCS | Mod: ,,, | Performed by: INTERNAL MEDICINE

## 2023-04-16 PROCEDURE — 85025 COMPLETE CBC W/AUTO DIFF WBC: CPT | Performed by: EMERGENCY MEDICINE

## 2023-04-16 PROCEDURE — 25000003 PHARM REV CODE 250: Performed by: INTERNAL MEDICINE

## 2023-04-16 PROCEDURE — 85025 COMPLETE CBC W/AUTO DIFF WBC: CPT | Mod: 91 | Performed by: INTERNAL MEDICINE

## 2023-04-16 PROCEDURE — 80053 COMPREHEN METABOLIC PANEL: CPT | Performed by: EMERGENCY MEDICINE

## 2023-04-16 PROCEDURE — 93005 ELECTROCARDIOGRAM TRACING: CPT

## 2023-04-16 PROCEDURE — 63600175 PHARM REV CODE 636 W HCPCS: Performed by: EMERGENCY MEDICINE

## 2023-04-16 PROCEDURE — 93010 EKG 12-LEAD: ICD-10-PCS | Mod: 76,,, | Performed by: INTERNAL MEDICINE

## 2023-04-16 PROCEDURE — 99285 EMERGENCY DEPT VISIT HI MDM: CPT | Mod: 25

## 2023-04-16 PROCEDURE — 99222 1ST HOSP IP/OBS MODERATE 55: CPT | Mod: ,,, | Performed by: INTERNAL MEDICINE

## 2023-04-16 PROCEDURE — 99291 PR CRITICAL CARE, E/M 30-74 MINUTES: ICD-10-PCS | Mod: ,,, | Performed by: EMERGENCY MEDICINE

## 2023-04-16 PROCEDURE — 25000242 PHARM REV CODE 250 ALT 637 W/ HCPCS: Performed by: EMERGENCY MEDICINE

## 2023-04-16 PROCEDURE — 93010 ELECTROCARDIOGRAM REPORT: CPT | Mod: 76,,, | Performed by: INTERNAL MEDICINE

## 2023-04-16 PROCEDURE — 83735 ASSAY OF MAGNESIUM: CPT | Performed by: INTERNAL MEDICINE

## 2023-04-16 PROCEDURE — 83690 ASSAY OF LIPASE: CPT | Performed by: INTERNAL MEDICINE

## 2023-04-16 PROCEDURE — 83880 ASSAY OF NATRIURETIC PEPTIDE: CPT | Performed by: EMERGENCY MEDICINE

## 2023-04-16 PROCEDURE — 84484 ASSAY OF TROPONIN QUANT: CPT | Mod: 91 | Performed by: STUDENT IN AN ORGANIZED HEALTH CARE EDUCATION/TRAINING PROGRAM

## 2023-04-16 PROCEDURE — 85730 THROMBOPLASTIN TIME PARTIAL: CPT | Performed by: EMERGENCY MEDICINE

## 2023-04-16 PROCEDURE — 84100 ASSAY OF PHOSPHORUS: CPT | Performed by: INTERNAL MEDICINE

## 2023-04-16 PROCEDURE — 85730 THROMBOPLASTIN TIME PARTIAL: CPT | Mod: 91 | Performed by: INTERNAL MEDICINE

## 2023-04-16 PROCEDURE — 25000003 PHARM REV CODE 250: Performed by: EMERGENCY MEDICINE

## 2023-04-16 PROCEDURE — 86900 BLOOD TYPING SEROLOGIC ABO: CPT | Performed by: INTERNAL MEDICINE

## 2023-04-16 PROCEDURE — 27000221 HC OXYGEN, UP TO 24 HOURS

## 2023-04-16 PROCEDURE — 83605 ASSAY OF LACTIC ACID: CPT | Performed by: INTERNAL MEDICINE

## 2023-04-16 PROCEDURE — 63600175 PHARM REV CODE 636 W HCPCS: Performed by: INTERNAL MEDICINE

## 2023-04-16 PROCEDURE — 99900035 HC TECH TIME PER 15 MIN (STAT)

## 2023-04-16 PROCEDURE — 96374 THER/PROPH/DIAG INJ IV PUSH: CPT

## 2023-04-16 PROCEDURE — 63600175 PHARM REV CODE 636 W HCPCS: Performed by: STUDENT IN AN ORGANIZED HEALTH CARE EDUCATION/TRAINING PROGRAM

## 2023-04-16 PROCEDURE — 20000000 HC ICU ROOM

## 2023-04-16 PROCEDURE — 25000003 PHARM REV CODE 250: Performed by: STUDENT IN AN ORGANIZED HEALTH CARE EDUCATION/TRAINING PROGRAM

## 2023-04-16 RX ORDER — CLOPIDOGREL BISULFATE 75 MG/1
75 TABLET ORAL DAILY
Status: DISCONTINUED | OUTPATIENT
Start: 2023-04-17 | End: 2023-04-17

## 2023-04-16 RX ORDER — CLOPIDOGREL 300 MG/1
300 TABLET, FILM COATED ORAL ONCE
Status: COMPLETED | OUTPATIENT
Start: 2023-04-16 | End: 2023-04-16

## 2023-04-16 RX ORDER — PANTOPRAZOLE SODIUM 40 MG/1
40 TABLET, DELAYED RELEASE ORAL DAILY
Status: DISCONTINUED | OUTPATIENT
Start: 2023-04-17 | End: 2023-04-22 | Stop reason: HOSPADM

## 2023-04-16 RX ORDER — MORPHINE SULFATE 4 MG/ML
4 INJECTION, SOLUTION INTRAMUSCULAR; INTRAVENOUS ONCE
Status: COMPLETED | OUTPATIENT
Start: 2023-04-16 | End: 2023-04-16

## 2023-04-16 RX ORDER — DEXTROSE 40 %
30 GEL (GRAM) ORAL
Status: DISCONTINUED | OUTPATIENT
Start: 2023-04-16 | End: 2023-04-22 | Stop reason: HOSPADM

## 2023-04-16 RX ORDER — AMIODARONE HYDROCHLORIDE 200 MG/1
200 TABLET ORAL 2 TIMES DAILY
Status: DISCONTINUED | OUTPATIENT
Start: 2023-04-16 | End: 2023-04-17

## 2023-04-16 RX ORDER — ATORVASTATIN CALCIUM 40 MG/1
80 TABLET, FILM COATED ORAL DAILY
Status: DISCONTINUED | OUTPATIENT
Start: 2023-04-17 | End: 2023-04-22 | Stop reason: HOSPADM

## 2023-04-16 RX ORDER — SODIUM CHLORIDE 0.9 % (FLUSH) 0.9 %
10 SYRINGE (ML) INJECTION EVERY 12 HOURS PRN
Status: DISCONTINUED | OUTPATIENT
Start: 2023-04-16 | End: 2023-04-22 | Stop reason: HOSPADM

## 2023-04-16 RX ORDER — ACETAMINOPHEN 500 MG
1000 TABLET ORAL
Status: COMPLETED | OUTPATIENT
Start: 2023-04-16 | End: 2023-04-16

## 2023-04-16 RX ORDER — ONDANSETRON 2 MG/ML
4 INJECTION INTRAMUSCULAR; INTRAVENOUS EVERY 8 HOURS PRN
Status: DISCONTINUED | OUTPATIENT
Start: 2023-04-16 | End: 2023-04-16 | Stop reason: ALTCHOICE

## 2023-04-16 RX ORDER — ASPIRIN 81 MG/1
81 TABLET ORAL DAILY
Status: DISCONTINUED | OUTPATIENT
Start: 2023-04-17 | End: 2023-04-21

## 2023-04-16 RX ORDER — HEPARIN SODIUM,PORCINE/D5W 25000/250
0-40 INTRAVENOUS SOLUTION INTRAVENOUS CONTINUOUS
Status: DISCONTINUED | OUTPATIENT
Start: 2023-04-16 | End: 2023-04-20

## 2023-04-16 RX ORDER — ONDANSETRON 2 MG/ML
INJECTION INTRAMUSCULAR; INTRAVENOUS
Status: DISPENSED
Start: 2023-04-16 | End: 2023-04-17

## 2023-04-16 RX ORDER — NITROGLYCERIN 20 MG/100ML
0-400 INJECTION INTRAVENOUS CONTINUOUS
Status: DISCONTINUED | OUTPATIENT
Start: 2023-04-16 | End: 2023-04-17

## 2023-04-16 RX ORDER — SPIRONOLACTONE 25 MG/1
25 TABLET ORAL DAILY
Status: DISCONTINUED | OUTPATIENT
Start: 2023-04-17 | End: 2023-04-19

## 2023-04-16 RX ORDER — NITROGLYCERIN 20 MG/1
1 PATCH TRANSDERMAL DAILY
Status: DISCONTINUED | OUTPATIENT
Start: 2023-04-16 | End: 2023-04-16 | Stop reason: SDUPTHER

## 2023-04-16 RX ORDER — NITROGLYCERIN 0.4 MG/1
0.4 TABLET SUBLINGUAL EVERY 5 MIN PRN
Status: DISCONTINUED | OUTPATIENT
Start: 2023-04-16 | End: 2023-04-22 | Stop reason: HOSPADM

## 2023-04-16 RX ORDER — NITROGLYCERIN 20 MG/1
1 PATCH TRANSDERMAL DAILY
Status: DISCONTINUED | OUTPATIENT
Start: 2023-04-17 | End: 2023-04-16

## 2023-04-16 RX ORDER — ACETAMINOPHEN 325 MG/1
650 TABLET ORAL EVERY 4 HOURS PRN
Status: DISCONTINUED | OUTPATIENT
Start: 2023-04-16 | End: 2023-04-22 | Stop reason: HOSPADM

## 2023-04-16 RX ORDER — FUROSEMIDE 10 MG/ML
80 INJECTION INTRAMUSCULAR; INTRAVENOUS
Status: COMPLETED | OUTPATIENT
Start: 2023-04-16 | End: 2023-04-16

## 2023-04-16 RX ORDER — MAG HYDROX/ALUMINUM HYD/SIMETH 200-200-20
30 SUSPENSION, ORAL (FINAL DOSE FORM) ORAL
Status: COMPLETED | OUTPATIENT
Start: 2023-04-16 | End: 2023-04-16

## 2023-04-16 RX ORDER — DEXTROSE 40 %
15 GEL (GRAM) ORAL
Status: DISCONTINUED | OUTPATIENT
Start: 2023-04-16 | End: 2023-04-22 | Stop reason: HOSPADM

## 2023-04-16 RX ORDER — NITROGLYCERIN 0.4 MG/1
0.4 TABLET SUBLINGUAL
Status: COMPLETED | OUTPATIENT
Start: 2023-04-16 | End: 2023-04-16

## 2023-04-16 RX ORDER — FUROSEMIDE 10 MG/ML
80 INJECTION INTRAMUSCULAR; INTRAVENOUS 3 TIMES DAILY
Status: DISCONTINUED | OUTPATIENT
Start: 2023-04-16 | End: 2023-04-16

## 2023-04-16 RX ORDER — METOPROLOL SUCCINATE 25 MG/1
25 TABLET, EXTENDED RELEASE ORAL NIGHTLY
Status: DISCONTINUED | OUTPATIENT
Start: 2023-04-16 | End: 2023-04-22 | Stop reason: HOSPADM

## 2023-04-16 RX ORDER — ASPIRIN 325 MG
325 TABLET ORAL
Status: COMPLETED | OUTPATIENT
Start: 2023-04-16 | End: 2023-04-16

## 2023-04-16 RX ORDER — NALOXONE HCL 0.4 MG/ML
0.02 VIAL (ML) INJECTION
Status: DISCONTINUED | OUTPATIENT
Start: 2023-04-16 | End: 2023-04-22 | Stop reason: HOSPADM

## 2023-04-16 RX ORDER — PROCHLORPERAZINE EDISYLATE 5 MG/ML
5 INJECTION INTRAMUSCULAR; INTRAVENOUS EVERY 6 HOURS PRN
Status: DISCONTINUED | OUTPATIENT
Start: 2023-04-16 | End: 2023-04-17

## 2023-04-16 RX ORDER — GLUCAGON 1 MG
1 KIT INJECTION
Status: DISCONTINUED | OUTPATIENT
Start: 2023-04-16 | End: 2023-04-22 | Stop reason: HOSPADM

## 2023-04-16 RX ORDER — TORSEMIDE 20 MG/1
20 TABLET ORAL DAILY
Status: DISCONTINUED | OUTPATIENT
Start: 2023-04-17 | End: 2023-04-16

## 2023-04-16 RX ADMIN — NITROGLYCERIN 0.4 MG: 0.4 TABLET, ORALLY DISINTEGRATING SUBLINGUAL at 01:04

## 2023-04-16 RX ADMIN — AMIODARONE HYDROCHLORIDE 200 MG: 200 TABLET ORAL at 09:04

## 2023-04-16 RX ADMIN — ACETAMINOPHEN 1000 MG: 500 TABLET ORAL at 02:04

## 2023-04-16 RX ADMIN — NITROGLYCERIN 0.4 MG: 0.4 TABLET SUBLINGUAL at 06:04

## 2023-04-16 RX ADMIN — NITROGLYCERIN 30 MCG/MIN: 20 INJECTION INTRAVENOUS at 09:04

## 2023-04-16 RX ADMIN — PROCHLORPERAZINE EDISYLATE 5 MG: 5 INJECTION INTRAMUSCULAR; INTRAVENOUS at 09:04

## 2023-04-16 RX ADMIN — NITROGLYCERIN 0.4 MG: 0.4 TABLET, ORALLY DISINTEGRATING SUBLINGUAL at 12:04

## 2023-04-16 RX ADMIN — FUROSEMIDE 80 MG: 10 INJECTION, SOLUTION INTRAMUSCULAR; INTRAVENOUS at 09:04

## 2023-04-16 RX ADMIN — MORPHINE SULFATE 4 MG: 4 INJECTION INTRAVENOUS at 06:04

## 2023-04-16 RX ADMIN — POTASSIUM BICARBONATE 50 MEQ: 978 TABLET, EFFERVESCENT ORAL at 11:04

## 2023-04-16 RX ADMIN — HEPARIN SODIUM 12 UNITS/KG/HR: 10000 INJECTION, SOLUTION INTRAVENOUS at 04:04

## 2023-04-16 RX ADMIN — ASPIRIN 325 MG ORAL TABLET 325 MG: 325 PILL ORAL at 12:04

## 2023-04-16 RX ADMIN — FUROSEMIDE 80 MG: 10 INJECTION, SOLUTION INTRAMUSCULAR; INTRAVENOUS at 02:04

## 2023-04-16 RX ADMIN — FUROSEMIDE 10 MG/HR: 10 INJECTION, SOLUTION INTRAMUSCULAR; INTRAVENOUS at 10:04

## 2023-04-16 RX ADMIN — SACUBITRIL AND VALSARTAN 1 TABLET: 97; 103 TABLET, FILM COATED ORAL at 02:04

## 2023-04-16 RX ADMIN — NITROGLYCERIN 0.4 MG: 0.4 TABLET, ORALLY DISINTEGRATING SUBLINGUAL at 02:04

## 2023-04-16 RX ADMIN — ALUMINUM HYDROXIDE, MAGNESIUM HYDROXIDE, AND SIMETHICONE 30 ML: 200; 200; 20 SUSPENSION ORAL at 03:04

## 2023-04-16 RX ADMIN — CLOPIDOGREL BISULFATE 300 MG: 300 TABLET, FILM COATED ORAL at 06:04

## 2023-04-16 NOTE — ED NOTES
Confirmed with JOAQUIM Baker MD - patient to receive loading dose plavix while on heparin infusion per [facility] protocol.

## 2023-04-16 NOTE — ED TRIAGE NOTES
Patient to ED with complaints of constant non radiating midsternal chest pain since 9am with associated shortness of breath. Denies NVD. Denies worsening factors. PMH CHF on transplant list, afib, previous STEMI 2021. Patient aaox4. Respirations even and unlabored. Skin warm and dry. ED stretcher in low and locked position. Side rails up x2. Plan of care discussed with patient - verbalized understanding. Pending ED workup at this time.

## 2023-04-16 NOTE — ASSESSMENT & PLAN NOTE
Significant systolic heart failure, no concerns for decompensation/worsen volume overload at this time, continue home meds.

## 2023-04-16 NOTE — PROVIDER PROGRESS NOTES - EMERGENCY DEPT.
Encounter Date: 4/16/2023    ED Physician Progress Notes         EKG - STEMI Decision  Initial Reading: No STEMI present (Somewhat concerning precordial leads possibly some motion artifact given very bloody [id number].  Will repeat EKG in 10 minutes.).

## 2023-04-16 NOTE — HPI
57 year old gentleman with past medical history of ischemic cardiomyopathy EF of 18% with cardiac device placement, coronary artery disease status post STEMI with PCI to RCA in August 2021, atrial fibrillation, hypertension, recent management for decompensated heart failure, post discharge he was declined for advanced options as he did not meet criteria.  Patient was well until this morning around 9:00 a.m. at home he was lying down and experienced sudden onset chest pain and decided to come to hospital.  Chest pain described as pressure-like and squeezing in nature.  He rated it as 8/10 in severity, central chest, no radiation.  No aggravating factors however minimal relief with nitroglycerin.  Patient denies any worsening shortness of breath, pedal edema or abdominal fullness.  He denies any cough or fever was.  Denies any current tobacco use or alcohol use.  In the emergency room patient had elevated blood pressures.  Troponin slightly raised from baseline with first reading 0.121 and second 0.151.  No significant changes on EKG or CXR.  Patient reports that pain improved from 8/10 to 7/10 after receiving 3 nitroglycerin.  He also received aspirin 325 mg once, 1 time dose of Lasix 80 mg IV and started on heparin drip.  Cardiology was consulted and fellow went to see patient

## 2023-04-16 NOTE — SUBJECTIVE & OBJECTIVE
Past Medical History:   Diagnosis Date    Atrial fibrillation     Encounter for blood transfusion        Past Surgical History:   Procedure Laterality Date    CARDIAC DEFIBRILLATOR PLACEMENT Left     HERNIA REPAIR      RIGHT HEART CATHETERIZATION Right 9/30/2022    Procedure: INSERTION, CATHETER, RIGHT HEART;  Surgeon: Caden Aden MD;  Location: [facility] CATH LAB;  Service: Cardiology;  Laterality: Right;    TREATMENT OF CARDIAC ARRHYTHMIA N/A 11/22/2022    Procedure: CARDIOVERSION;  Surgeon: Marvin Sanon MD;  Location: [facility] EP LAB;  Service: Cardiology;  Laterality: N/A;  afib, KIA, DCCV, anes, MB, 3 Prep       Review of patient's allergies indicates:  No Known Allergies    No current facility-administered medications on file prior to encounter.     Current Outpatient Medications on File Prior to Encounter   Medication Sig    amiodarone (PACERONE) 200 MG Tab Take 1 tablet (200 mg total) by mouth 2 (two) times daily.    apixaban (ELIQUIS) 5 mg Tab Take 5 mg by mouth 2 (two) times daily.    atorvastatin (LIPITOR) 80 MG tablet Take 80 mg by mouth once daily.    clopidogreL (PLAVIX) 75 mg tablet Take 75 mg by mouth once daily.    empagliflozin (JARDIANCE) 10 mg tablet Take 1 tablet (10 mg total) by mouth once daily.    ferrous sulfate (FEOSOL) 325 mg (65 mg iron) Tab tablet Take 325 mg by mouth every other day.    glimepiride (AMARYL) 4 MG tablet Take 4 mg by mouth every morning.    isosorbide mononitrate (ISMO,MONOKET) 10 mg tablet Take 1 tablet (10 mg total) by mouth 2 (two) times daily.    LIDOcaine (LIDODERM) 5 % Place 1 patch onto the skin once daily.    metoprolol succinate (TOPROL-XL) 25 MG 24 hr tablet Take 1 tablet (25 mg total) by mouth every evening.    pantoprazole (PROTONIX) 40 MG tablet Take 40 mg by mouth once daily.    potassium chloride SA (K-DUR,KLOR-CON) 20 MEQ tablet Take 1 tablet (20 mEq total) by mouth once daily.    sacubitriL-valsartan (ENTRESTO)  mg per tablet Take 1 tablet by mouth  2 (two) times daily.    spironolactone (ALDACTONE) 25 MG tablet Take 25 mg by mouth once daily.    tamsulosin (FLOMAX) 0.4 mg Cap Take 1 capsule by mouth every evening.    torsemide (DEMADEX) 20 MG Tab Take 1 tablet (20 mg total) by mouth once daily.     Family History    None       Tobacco Use    Smoking status: Never    Smokeless tobacco: Never   Substance and Sexual Activity    Alcohol use: Never    Drug use: Never    Sexual activity: Not on file     Review of Systems   Respiratory:  Positive for shortness of breath.    Cardiovascular:  Positive for chest pain.   Objective:     Vital Signs (Most Recent):  Temp: 97.9 °F (36.6 °C) (04/16/23 1215)  Pulse: 77 (04/16/23 1502)  Resp: 20 (04/16/23 1502)  BP: (!) 167/102 (04/16/23 1502)  SpO2: 97 % (04/16/23 1502)   Vital Signs (24h Range):  Temp:  [97.9 °F (36.6 °C)] 97.9 °F (36.6 °C)  Pulse:  [53-82] 77  Resp:  [12-25] 20  SpO2:  [96 %-100 %] 97 %  BP: (163-181)/(102-135) 167/102     Weight: 86.2 kg (190 lb 0.6 oz)  Body mass index is 25.77 kg/m².    Physical Exam  Constitutional:       General: He is not in acute distress.  HENT:      Head: Normocephalic.      Right Ear: External ear normal.      Left Ear: External ear normal.      Nose: Nose normal.   Eyes:      General: No scleral icterus.  Neck:      Comments: Minimal JVD  Cardiovascular:      Rate and Rhythm: Normal rate.      Heart sounds: Normal heart sounds.   Pulmonary:      Breath sounds: Rales (minimal) present.   Abdominal:      Palpations: Abdomen is soft.      Tenderness: There is no abdominal tenderness.   Musculoskeletal:      Right lower leg: Edema present.      Left lower leg: Edema present.   Skin:     General: Skin is warm.   Neurological:      General: No focal deficit present.      Mental Status: He is alert and oriented to person, place, and time.   Psychiatric:         Mood and Affect: Mood normal.           Significant Labs: All pertinent labs within the past 24 hours have been  reviewed.    Significant Imaging: I have reviewed all pertinent imaging results/findings within the past 24 hours.

## 2023-04-16 NOTE — H&P
Declan Salomon - Emergency Dept  [facility] Medicine  History & Physical    Patient Name: Tera Griffiths  MRN: [id number]  Patient Class: IP- Inpatient  Admission Date: 4/16/2023  Attending Physician: Karthik Baker MD   Primary Care Provider: Primary Doctor No         Patient information was obtained from patient, past medical records and ER records.     Subjective:     Principal Problem:NSTEMI (non-ST elevated myocardial infarction)    Chief Complaint:   Chief Complaint   Patient presents with    Chest Pain     Pt c/o chest pain-onset this am.  Also c/o SOb        HPI: 57 year old gentleman with past medical history of ischemic cardiomyopathy EF of 18% with cardiac device placement, coronary artery disease status post STEMI with PCI to RCA in August 2021, atrial fibrillation, hypertension, recent management for decompensated heart failure, post discharge he was declined for advanced options as he did not meet criteria.  Patient was well until this morning around 9:00 a.m. at home he was lying down and experienced sudden onset chest pain and decided to come to hospital.  Chest pain described as pressure-like and squeezing in nature.  He rated it as 8/10 in severity, central chest, no radiation.  No aggravating factors however minimal relief with nitroglycerin.  Patient denies any worsening shortness of breath, pedal edema or abdominal fullness.  He denies any cough or fever was.  Denies any current tobacco use or alcohol use.  In the emergency room patient had elevated blood pressures.  Troponin slightly raised from baseline with first reading 0.121 and second 0.151.  No significant changes on EKG or CXR.  Patient reports that pain improved from 8/10 to 7/10 after receiving 3 nitroglycerin.  He also received aspirin 325 mg once, 1 time dose of Lasix 80 mg IV and started on heparin drip.  Cardiology was consulted and fellow went to see patient      Past Medical History:   Diagnosis Date    Atrial fibrillation     Encounter  for blood transfusion        Past Surgical History:   Procedure Laterality Date    CARDIAC DEFIBRILLATOR PLACEMENT Left     HERNIA REPAIR      RIGHT HEART CATHETERIZATION Right 9/30/2022    Procedure: INSERTION, CATHETER, RIGHT HEART;  Surgeon: Caden Aden MD;  Location: [facility] CATH LAB;  Service: Cardiology;  Laterality: Right;    TREATMENT OF CARDIAC ARRHYTHMIA N/A 11/22/2022    Procedure: CARDIOVERSION;  Surgeon: Marvin Sanon MD;  Location: [facility] EP LAB;  Service: Cardiology;  Laterality: N/A;  afib, KIA, DCCV, anes, MB, 3 Prep       Review of patient's allergies indicates:  No Known Allergies    No current facility-administered medications on file prior to encounter.     Current Outpatient Medications on File Prior to Encounter   Medication Sig    amiodarone (PACERONE) 200 MG Tab Take 1 tablet (200 mg total) by mouth 2 (two) times daily.    apixaban (ELIQUIS) 5 mg Tab Take 5 mg by mouth 2 (two) times daily.    atorvastatin (LIPITOR) 80 MG tablet Take 80 mg by mouth once daily.    clopidogreL (PLAVIX) 75 mg tablet Take 75 mg by mouth once daily.    empagliflozin (JARDIANCE) 10 mg tablet Take 1 tablet (10 mg total) by mouth once daily.    ferrous sulfate (FEOSOL) 325 mg (65 mg iron) Tab tablet Take 325 mg by mouth every other day.    glimepiride (AMARYL) 4 MG tablet Take 4 mg by mouth every morning.    isosorbide mononitrate (ISMO,MONOKET) 10 mg tablet Take 1 tablet (10 mg total) by mouth 2 (two) times daily.    LIDOcaine (LIDODERM) 5 % Place 1 patch onto the skin once daily.    metoprolol succinate (TOPROL-XL) 25 MG 24 hr tablet Take 1 tablet (25 mg total) by mouth every evening.    pantoprazole (PROTONIX) 40 MG tablet Take 40 mg by mouth once daily.    potassium chloride SA (K-DUR,KLOR-CON) 20 MEQ tablet Take 1 tablet (20 mEq total) by mouth once daily.    sacubitriL-valsartan (ENTRESTO)  mg per tablet Take 1 tablet by mouth 2 (two) times daily.    spironolactone (ALDACTONE) 25 MG  tablet Take 25 mg by mouth once daily.    tamsulosin (FLOMAX) 0.4 mg Cap Take 1 capsule by mouth every evening.    torsemide (DEMADEX) 20 MG Tab Take 1 tablet (20 mg total) by mouth once daily.     Family History    None       Tobacco Use    Smoking status: Never    Smokeless tobacco: Never   Substance and Sexual Activity    Alcohol use: Never    Drug use: Never    Sexual activity: Not on file     Review of Systems   Respiratory:  Positive for shortness of breath.    Cardiovascular:  Positive for chest pain.   Objective:     Vital Signs (Most Recent):  Temp: 97.9 °F (36.6 °C) (04/16/23 1215)  Pulse: 77 (04/16/23 1502)  Resp: 20 (04/16/23 1502)  BP: (!) 167/102 (04/16/23 1502)  SpO2: 97 % (04/16/23 1502)   Vital Signs (24h Range):  Temp:  [97.9 °F (36.6 °C)] 97.9 °F (36.6 °C)  Pulse:  [53-82] 77  Resp:  [12-25] 20  SpO2:  [96 %-100 %] 97 %  BP: (163-181)/(102-135) 167/102     Weight: 86.2 kg (190 lb 0.6 oz)  Body mass index is 25.77 kg/m².    Physical Exam  Constitutional:       General: He is not in acute distress.  HENT:      Head: Normocephalic.      Right Ear: External ear normal.      Left Ear: External ear normal.      Nose: Nose normal.   Eyes:      General: No scleral icterus.  Neck:      Comments: Minimal JVD  Cardiovascular:      Rate and Rhythm: Normal rate.      Heart sounds: Normal heart sounds.   Pulmonary:      Breath sounds: Rales (minimal) present.   Abdominal:      Palpations: Abdomen is soft.      Tenderness: There is no abdominal tenderness.   Musculoskeletal:      Right lower leg: Edema present.      Left lower leg: Edema present.   Skin:     General: Skin is warm.   Neurological:      General: No focal deficit present.      Mental Status: He is alert and oriented to person, place, and time.   Psychiatric:         Mood and Affect: Mood normal.           Significant Labs: All pertinent labs within the past 24 hours have been reviewed.    Significant Imaging: I have reviewed all pertinent  imaging results/findings within the past 24 hours.    Assessment/Plan:     * NSTEMI (non-ST elevated myocardial infarction)  Unstable angina/NSTEMI.  Patient has mild rise in troponin from baseline and has ongoing typical chest pain with minimal relief with 3 nitroglycerin.  Cardiology consulted and they will see patient.  Will start patient on nitro paste.  Patient already received 325 mg of aspirin, will do loading dose of Plavix 300 mg.  Will continue the altered aspirin 81 mg daily and Plavix 75 mg daily.  Continue statin and beta-blocker.  Continue heparin drip.  Cardiac diet.  Follow-up with Cardiology recommendations, per discussion with cardiology fellow, patient may need to go to the unit on nitroglycerin drip.      Chronic combined systolic and diastolic heart failure  Significant systolic heart failure, no concerns for decompensation/worsen volume overload at this time, continue home meds.      Atrial fibrillation  Rate controlled on heparin drip.  Continue amiodarone and beta-blocker        VTE Risk Mitigation (From admission, onward)         Ordered     heparin 25,000 units in dextrose 5% (100 units/ml) IV bolus from bag - ADDITIONAL PRN BOLUS - 60 units/kg (max bolus 4000 units)  As needed (PRN)        Question:  Heparin Infusion Adjustment (DO NOT MODIFY ANSWER)  Answer:  \\[email or web address]\[place]\Images\Pharmacy\HeparinInfusions\heparin LOW INTENSITY nomogram for [facility] [id number]    04/16/23 1520     heparin 25,000 units in dextrose 5% (100 units/ml) IV bolus from bag - ADDITIONAL PRN BOLUS - 30 units/kg (max bolus 4000 units)  As needed (PRN)        Question:  Heparin Infusion Adjustment (DO NOT MODIFY ANSWER)  Answer:  \\[email or web address]\[place]\Images\Pharmacy\HeparinInfusions\heparin LOW INTENSITY nomogram for [facility] [id number].pdf    04/16/23 1520     IP VTE HIGH RISK PATIENT  Once         04/16/23 1713     Place sequential compression device  Until discontinued         04/16/23 1713     heparin 25,000 units in  dextrose 5% 250 mL (100 units/mL) infusion LOW INTENSITY nomogram - [facility]  Continuous        Question Answer Comment   Heparin Infusion Adjustment (DO NOT MODIFY ANSWER) \\[email or web address]\[facility]\Images\Pharmacy\HeparinInfusions\heparin LOW INTENSITY nomogram for [facility] [id number].pdf    Begin at (in units/kg/hr) 12        04/16/23 1520                           Karthik Baker MD  Department of Hospital Medicine  [facility] - Emergency Dept

## 2023-04-16 NOTE — ED PROVIDER NOTES
Chief Complaint   Chest Pain (Pt c/o chest pain-onset this am.  Also c/o SOb)      History Of Present Illness   Tera Griffiths is a 57 y.o. male presenting with chest discomfort that started gradually this morning.  It feels like a pressure.  Associated shortness of breath.  He can not identify any exacerbating or alleviating factors.  He notes an extensive history of heart disease.    History obtained from:  Patient    Review of patient's allergies indicates:  No Known Allergies    No current facility-administered medications on file prior to encounter.     Current Outpatient Medications on File Prior to Encounter   Medication Sig Dispense Refill    amiodarone (PACERONE) 200 MG Tab Take 1 tablet (200 mg total) by mouth 2 (two) times daily. 60 tablet 3    apixaban (ELIQUIS) 5 mg Tab Take 5 mg by mouth 2 (two) times daily.      atorvastatin (LIPITOR) 80 MG tablet Take 80 mg by mouth once daily.      clopidogreL (PLAVIX) 75 mg tablet Take 75 mg by mouth once daily.      empagliflozin (JARDIANCE) 10 mg tablet Take 1 tablet (10 mg total) by mouth once daily. 30 tablet 1    ferrous sulfate (FEOSOL) 325 mg (65 mg iron) Tab tablet Take 325 mg by mouth every other day.      glimepiride (AMARYL) 4 MG tablet Take 4 mg by mouth every morning.      isosorbide mononitrate (ISMO,MONOKET) 10 mg tablet Take 1 tablet (10 mg total) by mouth 2 (two) times daily. 60 tablet 11    LIDOcaine (LIDODERM) 5 % Place 1 patch onto the skin once daily.      metoprolol succinate (TOPROL-XL) 25 MG 24 hr tablet Take 1 tablet (25 mg total) by mouth every evening. 30 tablet 11    pantoprazole (PROTONIX) 40 MG tablet Take 40 mg by mouth once daily.      potassium chloride SA (K-DUR,KLOR-CON) 20 MEQ tablet Take 1 tablet (20 mEq total) by mouth once daily. 90 tablet 3    sacubitriL-valsartan (ENTRESTO)  mg per tablet Take 1 tablet by mouth 2 (two) times daily. 60 tablet 5    spironolactone (ALDACTONE) 25 MG tablet Take 25 mg by mouth once daily.       tamsulosin (FLOMAX) 0.4 mg Cap Take 1 capsule by mouth every evening.      torsemide (DEMADEX) 20 MG Tab Take 1 tablet (20 mg total) by mouth once daily. 30 tablet 11       Past History   As per HPI and below:  Past Medical History:   Diagnosis Date    Atrial fibrillation     Encounter for blood transfusion      Past Surgical History:   Procedure Laterality Date    CARDIAC DEFIBRILLATOR PLACEMENT Left     HERNIA REPAIR      RIGHT HEART CATHETERIZATION Right 9/30/2022    Procedure: INSERTION, CATHETER, RIGHT HEART;  Surgeon: Caden Aden MD;  Location: [facility] CATH LAB;  Service: Cardiology;  Laterality: Right;    TREATMENT OF CARDIAC ARRHYTHMIA N/A 11/22/2022    Procedure: CARDIOVERSION;  Surgeon: Marvin Sanon MD;  Location: [facility] EP LAB;  Service: Cardiology;  Laterality: N/A;  afib, KIA, DCCV, anes, MB, 3 Prep       Social History     Socioeconomic History    Marital status: Single   Tobacco Use    Smoking status: Never    Smokeless tobacco: Never   Substance and Sexual Activity    Alcohol use: Never    Drug use: Never       History reviewed. No pertinent family history.    Physical Exam     Vitals:    04/16/23 1255 04/16/23 1302 04/16/23 1402 04/16/23 1432   BP:  (!) 164/104 (!) 163/103 (!) 171/109   Pulse:  79 78 78   Resp: 18 12 18 (!) 25   Temp:       SpO2: 96% 97% 99% 100%   Weight:       Height:         Appearance:  Uncomfortable, breathing somewhat heavily.  Skin: No rashes seen.  Good turgor.  No abrasions.  No ecchymoses.  Eyes: No conjunctival injection.  ENT: Oropharynx clear.    Chest: Clear to auscultation bilaterally.  Good air movement.  No wheezes.  No rhonchi.  Cardiovascular: Regular rate and rhythm.  No murmurs. No gallops. No rubs.  Abdomen: Soft.  Not distended.  Nontender.  No guarding.  No rebound.  Musculoskeletal: Good range of motion all joints.  No deformities.  Neck supple.  No meningismus.  Neurologic: Motor intact.  Sensation intact.  Cerebellar intact.  Cranial nerves  intact.  Mental Status:  Alert and oriented x 3.  Appropriate, conversant.      Initial MDM   Chest pressure that started this morning.  EKG shows LVH with strain, similar morphology to previous, no obvious STEMI.  Patient has known coronary artery disease and CHF, echo with EF at about 18% done last month.  Anticipate hospitalization.  Will obtain cardiac workup.    Medications Given     Medications   aspirin tablet 325 mg (325 mg Oral Given 4/16/23 1249)   nitroGLYCERIN SL tablet 0.4 mg (0.4 mg Sublingual Given 4/16/23 1249)   nitroGLYCERIN SL tablet 0.4 mg (0.4 mg Sublingual Given 4/16/23 1353)   sacubitriL-valsartan  mg per tablet 1 tablet (1 tablet Oral Given 4/16/23 1426)   furosemide injection 80 mg (80 mg Intravenous Given 4/16/23 1427)   nitroGLYCERIN SL tablet 0.4 mg (0.4 mg Sublingual Given 4/16/23 1426)   acetaminophen tablet 1,000 mg (1,000 mg Oral Given 4/16/23 1426)   aluminum-magnesium hydroxide-simethicone 200-200-20 mg/5 mL suspension 30 mL (30 mLs Oral Given 4/16/23 1513)       Results and Course     Labs Reviewed   CBC W/ AUTO DIFFERENTIAL - Abnormal; Notable for the following components:       Result Value    Hemoglobin 13.8 (*)     MCHC 30.6 (*)     RDW 14.8 (*)     Lymph # 0.9 (*)     All other components within normal limits   COMPREHENSIVE METABOLIC PANEL - Abnormal; Notable for the following components:    Creatinine 1.5 (*)     eGFR 54.6 (*)     All other components within normal limits   TROPONIN I - Abnormal; Notable for the following components:    Troponin I 0.121 (*)     All other components within normal limits   B-TYPE NATRIURETIC PEPTIDE - Abnormal; Notable for the following components:    BNP 2,707 (*)     All other components within normal limits   TROPONIN ISTAT - Abnormal; Notable for the following components:    POC Cardiac Troponin I 0.10 (*)     All other components within normal limits   TROPONIN I   POCT TROPONIN   POCT TROPONIN       Imaging Results               "X-Ray Chest AP Portable (Final result)  Result time 04/16/23 14:36:10      Final result by Reji Amaya MD (04/16/23 14:36:10)                   Impression:      Cardiomegaly with probable mild interstitial edema and small pleural effusions.  Similar findings were present on 03/11/2023.      Electronically signed by: Reji Amaya MD  Date:    04/16/2023  Time:    14:36               Narrative:    EXAMINATION:  XR CHEST AP PORTABLE    CLINICAL HISTORY:  Provided history is "Chest Pain;  ".    TECHNIQUE:  One view of the chest.    COMPARISON:  03/11/2023.    FINDINGS:  Cardiac wires overlie the chest.  Cardiomediastinal silhouette is enlarged and similar to the prior study.  Left chest wall AICD is present with transvenous leads overlying the heart.  There is central vascular congestion with coarsened perihilar and lower lung zone interstitial markings.  No confluent area of consolidation.  Possible trace bilateral pleural effusions.  No distinct pneumothorax.                                      ED Course as of 04/16/23 1518   Sun Apr 16, 2023   1228 EKG 12-lead  Atrial fibrillation, 87 bpm, LVH with strain, similar overall morphology to previous with subtle ST changes per my independent interpretation.  No STEMI.     [DC]   1259 WBC: 4.07 [DC]   1259 Hemoglobin(!): 13.8 [DC]   1259 Platelets: 275 [DC]   1405 Troponin I(!): 0.121 [DC]   1405 BNP(!): 2,707 [DC]   1409 Patient states CP and SOB are improved, but now has headache. [DC]   1501 Chest pressure resolved, shortness of breath much improved.  He notes a burning sensation different than the chest pressure now.  Will recheck EKG, give Maalox.   [DC]   1501 X-Ray Chest AP Portable  Volume overload, similar to previous per my independent interpretation.     [DC]   1510 Discussed with cardiology and ,  to admit. [DC]   1518 Repeat EKG no changes [DC]      ED Course User Index  [DC] Karthik Richards MD       Critical Care   Date: 04/16/2023  Performed " by: Karthik Richards MD   Authorized by: Karthik Richards MD    Total critical care time (exclusive of procedural time) : 45 minutes  Critical care was necessary to treat or prevent imminent or life-threatening deterioration of the following conditions:  acs       MDM, Impression and Plan   57 y.o. male with chest pressure and shortness of breath that started this morning, resolved after 3 nitroglycerin.  EKGs with no acute changes throughout his stay.  His troponin is elevated above baseline.  Will admit to Hospital Medicine for CHF, ACS management.  Will start heparin drip.         Final diagnoses:  [R07.9] Chest pain (Primary)  [I50.43] Acute on chronic combined systolic and diastolic congestive heart failure  [R07.89] Chest discomfort        ED Disposition Condition    Admit Stable                  Karthik Richards MD  04/16/23 2059

## 2023-04-16 NOTE — Clinical Note
The catheter was inserted into the ostium   left main. An angiography was performed of the left coronary arteries. Multiple views were taken. The angiography was performed via hand injection with .

## 2023-04-16 NOTE — Clinical Note
The catheter was inserted into the proximal   right coronary artery. An angiography was performed of the right coronary arteries. Multiple views were taken. The angiography was performed via hand injection with .

## 2023-04-16 NOTE — ASSESSMENT & PLAN NOTE
Unstable angina/NSTEMI.  Patient has mild rise in troponin from baseline and has ongoing typical chest pain with minimal relief with 3 nitroglycerin.  Cardiology consulted and they will see patient.  Will start patient on nitro paste.  Patient already received 325 mg of aspirin, will do loading dose of Plavix 300 mg.  Will continue the altered aspirin 81 mg daily and Plavix 75 mg daily.  Continue statin and beta-blocker.  Continue heparin drip.  Cardiac diet.  Follow-up with Cardiology recommendations, per discussion with cardiology fellow, patient may need to go to the unit on nitroglycerin drip.

## 2023-04-16 NOTE — Clinical Note
45 ml of contrast were injected throughout the case. 105 mL of contrast was the total wasted during the case. 150 mL was the total amount used during the case.

## 2023-04-16 NOTE — Clinical Note
The catheter was inserted into the aorta right coronary artery. The catheter was unable to access the area..

## 2023-04-17 ENCOUNTER — TELEPHONE (OUTPATIENT)
Dept: TRANSPLANT | Facility: CLINIC | Age: 58
End: 2023-04-17
Payer: MEDICAID

## 2023-04-17 PROBLEM — I16.1 HYPERTENSIVE EMERGENCY: Status: ACTIVE | Noted: 2023-04-17

## 2023-04-17 LAB
ANION GAP SERPL CALC-SCNC: 15 MMOL/L (ref 8–16)
APTT PPP: 39.9 SEC (ref 21–32)
APTT PPP: 41.6 SEC (ref 21–32)
BASOPHILS # BLD AUTO: 0.01 K/UL (ref 0–0.2)
BASOPHILS # BLD AUTO: 0.01 K/UL (ref 0–0.2)
BASOPHILS NFR BLD: 0.2 % (ref 0–1.9)
BASOPHILS NFR BLD: 0.2 % (ref 0–1.9)
BUN SERPL-MCNC: 18 MG/DL (ref 6–20)
CALCIUM SERPL-MCNC: 9.7 MG/DL (ref 8.7–10.5)
CHLORIDE SERPL-SCNC: 100 MMOL/L (ref 95–110)
CO2 SERPL-SCNC: 22 MMOL/L (ref 23–29)
CREAT SERPL-MCNC: 1.5 MG/DL (ref 0.5–1.4)
DIFFERENTIAL METHOD: ABNORMAL
DIFFERENTIAL METHOD: ABNORMAL
EOSINOPHIL # BLD AUTO: 0 K/UL (ref 0–0.5)
EOSINOPHIL # BLD AUTO: 0 K/UL (ref 0–0.5)
EOSINOPHIL NFR BLD: 0 % (ref 0–8)
EOSINOPHIL NFR BLD: 0 % (ref 0–8)
ERYTHROCYTE [DISTWIDTH] IN BLOOD BY AUTOMATED COUNT: 14.5 % (ref 11.5–14.5)
ERYTHROCYTE [DISTWIDTH] IN BLOOD BY AUTOMATED COUNT: 14.5 % (ref 11.5–14.5)
EST. GFR  (NO RACE VARIABLE): 54.6 ML/MIN/1.73 M^2
ESTIMATED AVG GLUCOSE: 111 MG/DL (ref 68–131)
GLUCOSE SERPL-MCNC: 114 MG/DL (ref 70–110)
HBA1C MFR BLD: 5.5 % (ref 4–5.6)
HCT VFR BLD AUTO: 45.8 % (ref 40–54)
HCT VFR BLD AUTO: 45.8 % (ref 40–54)
HGB BLD-MCNC: 14.4 G/DL (ref 14–18)
HGB BLD-MCNC: 14.4 G/DL (ref 14–18)
IMM GRANULOCYTES # BLD AUTO: 0.02 K/UL (ref 0–0.04)
IMM GRANULOCYTES # BLD AUTO: 0.02 K/UL (ref 0–0.04)
IMM GRANULOCYTES NFR BLD AUTO: 0.4 % (ref 0–0.5)
IMM GRANULOCYTES NFR BLD AUTO: 0.4 % (ref 0–0.5)
LYMPHOCYTES # BLD AUTO: 0.8 K/UL (ref 1–4.8)
LYMPHOCYTES # BLD AUTO: 0.8 K/UL (ref 1–4.8)
LYMPHOCYTES NFR BLD: 14 % (ref 18–48)
LYMPHOCYTES NFR BLD: 14 % (ref 18–48)
MAGNESIUM SERPL-MCNC: 2 MG/DL (ref 1.6–2.6)
MCH RBC QN AUTO: 27.1 PG (ref 27–31)
MCH RBC QN AUTO: 27.1 PG (ref 27–31)
MCHC RBC AUTO-ENTMCNC: 31.4 G/DL (ref 32–36)
MCHC RBC AUTO-ENTMCNC: 31.4 G/DL (ref 32–36)
MCV RBC AUTO: 86 FL (ref 82–98)
MCV RBC AUTO: 86 FL (ref 82–98)
MONOCYTES # BLD AUTO: 0.6 K/UL (ref 0.3–1)
MONOCYTES # BLD AUTO: 0.6 K/UL (ref 0.3–1)
MONOCYTES NFR BLD: 10.9 % (ref 4–15)
MONOCYTES NFR BLD: 10.9 % (ref 4–15)
NEUTROPHILS # BLD AUTO: 4.2 K/UL (ref 1.8–7.7)
NEUTROPHILS # BLD AUTO: 4.2 K/UL (ref 1.8–7.7)
NEUTROPHILS NFR BLD: 74.5 % (ref 38–73)
NEUTROPHILS NFR BLD: 74.5 % (ref 38–73)
NRBC BLD-RTO: 0 /100 WBC
NRBC BLD-RTO: 0 /100 WBC
PHOSPHATE SERPL-MCNC: 2.9 MG/DL (ref 2.7–4.5)
PLATELET # BLD AUTO: 299 K/UL (ref 150–450)
PLATELET # BLD AUTO: 299 K/UL (ref 150–450)
PMV BLD AUTO: 11.3 FL (ref 9.2–12.9)
PMV BLD AUTO: 11.3 FL (ref 9.2–12.9)
POCT GLUCOSE: 108 MG/DL (ref 70–110)
POCT GLUCOSE: 115 MG/DL (ref 70–110)
POCT GLUCOSE: 130 MG/DL (ref 70–110)
POCT GLUCOSE: 94 MG/DL (ref 70–110)
POTASSIUM SERPL-SCNC: 4.5 MMOL/L (ref 3.5–5.1)
RBC # BLD AUTO: 5.31 M/UL (ref 4.6–6.2)
RBC # BLD AUTO: 5.31 M/UL (ref 4.6–6.2)
SODIUM SERPL-SCNC: 137 MMOL/L (ref 136–145)
WBC # BLD AUTO: 5.58 K/UL (ref 3.9–12.7)
WBC # BLD AUTO: 5.58 K/UL (ref 3.9–12.7)

## 2023-04-17 PROCEDURE — 83036 HEMOGLOBIN GLYCOSYLATED A1C: CPT | Performed by: STUDENT IN AN ORGANIZED HEALTH CARE EDUCATION/TRAINING PROGRAM

## 2023-04-17 PROCEDURE — 93010 EKG 12-LEAD: ICD-10-PCS | Mod: ,,, | Performed by: INTERNAL MEDICINE

## 2023-04-17 PROCEDURE — 85730 THROMBOPLASTIN TIME PARTIAL: CPT | Mod: 91 | Performed by: INTERNAL MEDICINE

## 2023-04-17 PROCEDURE — 84100 ASSAY OF PHOSPHORUS: CPT | Performed by: INTERNAL MEDICINE

## 2023-04-17 PROCEDURE — 99291 CRITICAL CARE FIRST HOUR: CPT | Mod: ,,, | Performed by: INTERNAL MEDICINE

## 2023-04-17 PROCEDURE — 20000000 HC ICU ROOM

## 2023-04-17 PROCEDURE — 63600175 PHARM REV CODE 636 W HCPCS: Performed by: STUDENT IN AN ORGANIZED HEALTH CARE EDUCATION/TRAINING PROGRAM

## 2023-04-17 PROCEDURE — 27000221 HC OXYGEN, UP TO 24 HOURS

## 2023-04-17 PROCEDURE — 83735 ASSAY OF MAGNESIUM: CPT | Performed by: INTERNAL MEDICINE

## 2023-04-17 PROCEDURE — 51702 INSERT TEMP BLADDER CATH: CPT

## 2023-04-17 PROCEDURE — 25000003 PHARM REV CODE 250: Performed by: STUDENT IN AN ORGANIZED HEALTH CARE EDUCATION/TRAINING PROGRAM

## 2023-04-17 PROCEDURE — 93005 ELECTROCARDIOGRAM TRACING: CPT

## 2023-04-17 PROCEDURE — 63600175 PHARM REV CODE 636 W HCPCS: Performed by: EMERGENCY MEDICINE

## 2023-04-17 PROCEDURE — 85730 THROMBOPLASTIN TIME PARTIAL: CPT | Performed by: EMERGENCY MEDICINE

## 2023-04-17 PROCEDURE — 63600175 PHARM REV CODE 636 W HCPCS

## 2023-04-17 PROCEDURE — 93010 ELECTROCARDIOGRAM REPORT: CPT | Mod: ,,, | Performed by: INTERNAL MEDICINE

## 2023-04-17 PROCEDURE — 25000003 PHARM REV CODE 250: Performed by: INTERNAL MEDICINE

## 2023-04-17 PROCEDURE — 99900035 HC TECH TIME PER 15 MIN (STAT)

## 2023-04-17 PROCEDURE — 85025 COMPLETE CBC W/AUTO DIFF WBC: CPT | Performed by: EMERGENCY MEDICINE

## 2023-04-17 PROCEDURE — 94761 N-INVAS EAR/PLS OXIMETRY MLT: CPT

## 2023-04-17 PROCEDURE — 80048 BASIC METABOLIC PNL TOTAL CA: CPT | Performed by: INTERNAL MEDICINE

## 2023-04-17 PROCEDURE — 99291 PR CRITICAL CARE, E/M 30-74 MINUTES: ICD-10-PCS | Mod: ,,, | Performed by: INTERNAL MEDICINE

## 2023-04-17 RX ORDER — AMIODARONE HYDROCHLORIDE 200 MG/1
200 TABLET ORAL 2 TIMES DAILY
Status: DISCONTINUED | OUTPATIENT
Start: 2023-04-17 | End: 2023-04-17

## 2023-04-17 RX ORDER — DEXTROSE 40 %
15 GEL (GRAM) ORAL
Status: DISCONTINUED | OUTPATIENT
Start: 2023-04-17 | End: 2023-04-17

## 2023-04-17 RX ORDER — AMIODARONE HYDROCHLORIDE 200 MG/1
200 TABLET ORAL DAILY
Status: DISCONTINUED | OUTPATIENT
Start: 2023-04-18 | End: 2023-04-17

## 2023-04-17 RX ORDER — FUROSEMIDE 10 MG/ML
80 INJECTION INTRAMUSCULAR; INTRAVENOUS 2 TIMES DAILY
Status: DISCONTINUED | OUTPATIENT
Start: 2023-04-17 | End: 2023-04-18

## 2023-04-17 RX ORDER — MECLIZINE HCL 12.5 MG 12.5 MG/1
25 TABLET ORAL 3 TIMES DAILY PRN
Status: DISCONTINUED | OUTPATIENT
Start: 2023-04-17 | End: 2023-04-22 | Stop reason: HOSPADM

## 2023-04-17 RX ORDER — INSULIN ASPART 100 [IU]/ML
0-5 INJECTION, SOLUTION INTRAVENOUS; SUBCUTANEOUS
Status: DISCONTINUED | OUTPATIENT
Start: 2023-04-17 | End: 2023-04-22 | Stop reason: HOSPADM

## 2023-04-17 RX ORDER — AMIODARONE HYDROCHLORIDE 200 MG/1
200 TABLET ORAL 2 TIMES DAILY
Status: DISCONTINUED | OUTPATIENT
Start: 2023-04-17 | End: 2023-04-22 | Stop reason: HOSPADM

## 2023-04-17 RX ORDER — GLUCAGON 1 MG
1 KIT INJECTION
Status: DISCONTINUED | OUTPATIENT
Start: 2023-04-17 | End: 2023-04-19

## 2023-04-17 RX ORDER — DEXTROSE 40 %
30 GEL (GRAM) ORAL
Status: DISCONTINUED | OUTPATIENT
Start: 2023-04-17 | End: 2023-04-17

## 2023-04-17 RX ADMIN — FUROSEMIDE 80 MG: 10 INJECTION, SOLUTION INTRAMUSCULAR; INTRAVENOUS at 09:04

## 2023-04-17 RX ADMIN — SACUBITRIL AND VALSARTAN 1 TABLET: 97; 103 TABLET, FILM COATED ORAL at 09:04

## 2023-04-17 RX ADMIN — ATORVASTATIN CALCIUM 80 MG: 40 TABLET, FILM COATED ORAL at 09:04

## 2023-04-17 RX ADMIN — AMIODARONE HYDROCHLORIDE 200 MG: 200 TABLET ORAL at 09:04

## 2023-04-17 RX ADMIN — METOPROLOL SUCCINATE 25 MG: 25 TABLET, EXTENDED RELEASE ORAL at 08:04

## 2023-04-17 RX ADMIN — PANTOPRAZOLE SODIUM 40 MG: 40 TABLET, DELAYED RELEASE ORAL at 09:04

## 2023-04-17 RX ADMIN — CLOPIDOGREL BISULFATE 75 MG: 75 TABLET ORAL at 09:04

## 2023-04-17 RX ADMIN — AMIODARONE HYDROCHLORIDE 150 MG: 1.5 INJECTION, SOLUTION INTRAVENOUS at 12:04

## 2023-04-17 RX ADMIN — SACUBITRIL AND VALSARTAN 1 TABLET: 97; 103 TABLET, FILM COATED ORAL at 08:04

## 2023-04-17 RX ADMIN — SPIRONOLACTONE 25 MG: 25 TABLET, FILM COATED ORAL at 09:04

## 2023-04-17 RX ADMIN — POTASSIUM BICARBONATE 40 MEQ: 391 TABLET, EFFERVESCENT ORAL at 01:04

## 2023-04-17 RX ADMIN — HEPARIN SODIUM 15 UNITS/KG/HR: 10000 INJECTION, SOLUTION INTRAVENOUS at 12:04

## 2023-04-17 RX ADMIN — ASPIRIN 81 MG: 81 TABLET, COATED ORAL at 09:04

## 2023-04-17 NOTE — SUBJECTIVE & OBJECTIVE
Past Medical History:   Diagnosis Date    Atrial fibrillation     Encounter for blood transfusion        Past Surgical History:   Procedure Laterality Date    CARDIAC DEFIBRILLATOR PLACEMENT Left     HERNIA REPAIR      RIGHT HEART CATHETERIZATION Right 9/30/2022    Procedure: INSERTION, CATHETER, RIGHT HEART;  Surgeon: Caden Aden MD;  Location: [facility] CATH LAB;  Service: Cardiology;  Laterality: Right;    TREATMENT OF CARDIAC ARRHYTHMIA N/A 11/22/2022    Procedure: CARDIOVERSION;  Surgeon: Marvin Sanon MD;  Location: [facility] EP LAB;  Service: Cardiology;  Laterality: N/A;  afib, KIA, DCCV, anes, MB, 3 Prep       Review of patient's allergies indicates:  No Known Allergies    No current facility-administered medications on file prior to encounter.     Current Outpatient Medications on File Prior to Encounter   Medication Sig    amiodarone (PACERONE) 200 MG Tab Take 1 tablet (200 mg total) by mouth 2 (two) times daily.    apixaban (ELIQUIS) 5 mg Tab Take 5 mg by mouth 2 (two) times daily.    atorvastatin (LIPITOR) 80 MG tablet Take 80 mg by mouth once daily.    clopidogreL (PLAVIX) 75 mg tablet Take 75 mg by mouth once daily.    empagliflozin (JARDIANCE) 10 mg tablet Take 1 tablet (10 mg total) by mouth once daily.    ferrous sulfate (FEOSOL) 325 mg (65 mg iron) Tab tablet Take 325 mg by mouth every other day.    glimepiride (AMARYL) 4 MG tablet Take 4 mg by mouth every morning.    isosorbide mononitrate (ISMO,MONOKET) 10 mg tablet Take 1 tablet (10 mg total) by mouth 2 (two) times daily.    LIDOcaine (LIDODERM) 5 % Place 1 patch onto the skin once daily.    metoprolol succinate (TOPROL-XL) 25 MG 24 hr tablet Take 1 tablet (25 mg total) by mouth every evening.    pantoprazole (PROTONIX) 40 MG tablet Take 40 mg by mouth once daily.    potassium chloride SA (K-DUR,KLOR-CON) 20 MEQ tablet Take 1 tablet (20 mEq total) by mouth once daily.    sacubitriL-valsartan (ENTRESTO)  mg per tablet Take 1 tablet by mouth  2 (two) times daily.    spironolactone (ALDACTONE) 25 MG tablet Take 25 mg by mouth once daily.    tamsulosin (FLOMAX) 0.4 mg Cap Take 1 capsule by mouth every evening.    torsemide (DEMADEX) 20 MG Tab Take 1 tablet (20 mg total) by mouth once daily.     Family History    None       Tobacco Use    Smoking status: Never    Smokeless tobacco: Never   Substance and Sexual Activity    Alcohol use: Never    Drug use: Never    Sexual activity: Not on file     Review of Systems   Constitutional: Negative for chills, decreased appetite and fever.   Cardiovascular:  Positive for chest pain. Negative for dyspnea on exertion, leg swelling, orthopnea and paroxysmal nocturnal dyspnea.   Respiratory:  Negative for shortness of breath.    Endocrine: Negative.    Hematologic/Lymphatic: Negative.    Skin: Negative.    Musculoskeletal: Negative.    Gastrointestinal:  Positive for nausea. Negative for abdominal pain, constipation, diarrhea and vomiting.   Neurological: Negative.    Psychiatric/Behavioral: Negative.     Objective:     Vital Signs (Most Recent):  Temp: 98.1 °F (36.7 °C) (04/16/23 2300)  Pulse: (!) 111 (04/16/23 2330)  Resp: 20 (04/16/23 2330)  BP: 119/70 (04/16/23 2330)  SpO2: 95 % (04/16/23 2330)   Vital Signs (24h Range):  Temp:  [97.9 °F (36.6 °C)-98.5 °F (36.9 °C)] 98.1 °F (36.7 °C)  Pulse:  [] 111  Resp:  [12-30] 20  SpO2:  [88 %-100 %] 95 %  BP: ()/() 119/70       Weight: 86.2 kg (190 lb 0.6 oz)  Body mass index is 25.77 kg/m².    SpO2: 95 %       Physical Exam  Constitutional:       General: He is not in acute distress.     Appearance: He is obese. He is ill-appearing. He is not toxic-appearing or diaphoretic.   HENT:      Head: Normocephalic.      Nose: Nose normal.      Mouth/Throat:      Mouth: Mucous membranes are moist.   Eyes:      Extraocular Movements: Extraocular movements intact.   Cardiovascular:      Rate and Rhythm: Normal rate. Rhythm irregular.      Pulses: Normal pulses.       Heart sounds: No murmur heard.  Pulmonary:      Effort: Pulmonary effort is normal. No respiratory distress.      Breath sounds: Normal breath sounds. No wheezing or rales.   Abdominal:      General: Abdomen is flat. Bowel sounds are normal. There is no distension.      Tenderness: There is no abdominal tenderness.   Musculoskeletal:         General: No swelling or tenderness. Normal range of motion.      Cervical back: Normal range of motion.   Skin:     General: Skin is warm.   Neurological:      Mental Status: He is alert and oriented to person, place, and time. Mental status is at baseline.   Psychiatric:         Mood and Affect: Mood normal.         Behavior: Behavior normal.       Significant Labs: BMP:   Recent Labs   Lab 04/16/23 1246 04/16/23 2124   GLU 87 112*    140   K 4.1 3.2*    100   CO2 23 22*   BUN 15 16   CREATININE 1.5* 1.5*   CALCIUM 10.2 9.6   MG  --  2.0   , CMP:   Recent Labs   Lab 04/16/23 1246 04/16/23 2124    140   K 4.1 3.2*    100   CO2 23 22*   GLU 87 112*   BUN 15 16   CREATININE 1.5* 1.5*   CALCIUM 10.2 9.6   PROT 8.0 7.8   ALBUMIN 4.4 4.1   BILITOT 0.8 0.8   ALKPHOS 76 74   AST 18 15   ALT 15 13   ANIONGAP 12 18*   , CBC:   Recent Labs   Lab 04/16/23 1246 04/16/23 2124   WBC 4.07 5.83   HGB 13.8* 14.3   HCT 45.1 45.2    284   , INR: No results for input(s): INR, PROTIME in the last 48 hours., and Troponin   Recent Labs   Lab 04/16/23  1246 04/16/23  1515 04/16/23 2002   TROPONINI 0.121* 0.151* 0.102*       Significant Imaging:   Imaging Results              X-Ray Abdomen AP 1 View (Final result)  Result time 04/16/23 22:45:30      Final result by Karthik Corrales MD (04/16/23 22:45:30)                   Impression:      Decreased bowel gas with persistent single loop of small bowel distended in the mid abdomen.  This may be related to improving ileus.      Electronically signed by: Karthik Corrales  Date:    04/16/2023  Time:    22:45           "     Narrative:    EXAMINATION:  XR ABDOMEN AP 1 VIEW    CLINICAL HISTORY:  Abdominal pain;    TECHNIQUE:  AP View(s) of the abdomen was performed.    COMPARISON:  03/11/2023    FINDINGS:  Bowel gas pattern appears similar with decreased filling of gas in mid abdomen small bowel loop.  No free air mass organomegaly or new calcification.                                       X-Ray Chest AP Portable (Final result)  Result time 04/16/23 14:36:10      Final result by Reji Amaya MD (04/16/23 14:36:10)                   Impression:      Cardiomegaly with probable mild interstitial edema and small pleural effusions.  Similar findings were present on 03/11/2023.      Electronically signed by: Reji Amaya MD  Date:    04/16/2023  Time:    14:36               Narrative:    EXAMINATION:  XR CHEST AP PORTABLE    CLINICAL HISTORY:  Provided history is "Chest Pain;  ".    TECHNIQUE:  One view of the chest.    COMPARISON:  03/11/2023.    FINDINGS:  Cardiac wires overlie the chest.  Cardiomediastinal silhouette is enlarged and similar to the prior study.  Left chest wall AICD is present with transvenous leads overlying the heart.  There is central vascular congestion with coarsened perihilar and lower lung zone interstitial markings.  No confluent area of consolidation.  Possible trace bilateral pleural effusions.  No distinct pneumothorax.                                      "

## 2023-04-17 NOTE — PLAN OF CARE
SICU PLAN OF CARE NOTE    Dx: NSTEMI (non-ST elevated myocardial infarction)    Shift Events: Nitro gtt started and then stopped, lasix gtt started, amio bolus, potassium replaced, monk placed,see notes for details    Goals of Care: SD    Neuro: AAO x4, Follows Commands, and Moves All Extremities    Vital Signs: /69 (BP Location: Left arm, Patient Position: Lying)   Pulse 68   Temp 98.5 °F (36.9 °C) (Oral)   Resp (!) 21   Ht 6' (1.829 m)   Wt 86.2 kg (190 lb 0.6 oz)   SpO2 (!) 92%   BMI 25.77 kg/m²     Respiratory: Nasal Cannula    Diet: NPO and Sips with Meds    Gtts: Lasix and Heparin    Urine Output: Urinary Catheter 1785 cc/shift    Drains: none     Labs/Accuchecks: daily/achs    Skin: no skin breakdown, foams to sacrum and heels, waffle mattress in place and inflated properly, pt able to shift weight independently

## 2023-04-17 NOTE — ASSESSMENT & PLAN NOTE
Hx of persistent atrial fib s/p KIA/CV 11/29/22 with initiation of amiodarone. On eliquis 5mg BID.   On admission, he was in atrial fib with ectopy, though now appears to have cardioverted to NSR    - Continue with diuresis  - Continue heparin gtt  - maintain K>4, Mg>2

## 2023-04-17 NOTE — ASSESSMENT & PLAN NOTE
Patient with ADHF. Diuresing with IV lasix. Now with improvement in chest pain.   - See 'NSTEMI'  - Resume GDMT as tolerated  - decrease lasix to 80 mg IV qd  - reports respiratory status now at baseline s/p diuresis  - will obtain LHC today  - will consult EP for BiV evaluation given LBBB

## 2023-04-17 NOTE — CONSULTS
Cardiology consulted for concern of NSTEMI in this patient with CAD, atrial fib, HFrEF (Ef18%), deemed not an advanced candidate. ACS protocol initiated by hospital medicine team. Trop 0.12->0.15. On evaluation, the patient reported ongoing chest pain 8/10 similar to that  of his MI, uncomfortable, nauseated, hypertensive, in atrial fib though rate controlled and in ADHF. Patient transferred to CCU for closer monitoring with initiation of NTG drip in the setting of NSTEMI.     Discussed with staff attending.     Marcello Boucher MD  Cardiovascular Disease PGY4  [facility]

## 2023-04-17 NOTE — NURSING
KUB normal, lab results reported to MD Sander, due to decreasing troponin, nitro titrated to off and lasix gtt started and monk placed, following these intervention pt pain level decreased to 5, gave 50 MEq of effer-K and o2 titrated to 1LNC, pt now sleeping.

## 2023-04-17 NOTE — ED NOTES
MD notified on patient BP dropping, tachypneic with spo2 92% on 2L NC, patient endorsing weakness and more lethargic, with NV. Per MD Obi, plan to give 80mg PIV lasix at this time.

## 2023-04-17 NOTE — HOSPITAL COURSE
Admitted to CCU for nitro drip in setting of chest pain.  Trop downtrending and appears to be at baseline, overloaded by exam, started on lasix drip with brisk diuresis and improvement back to baseline respiratory status.  Interventional cardiology consulted for ischemic evaluation, LHC showing one small obtuse marginal branch with complete acute occlusion but otherwise nonobstructive disease in LAD, LCX, RCA.  Developed EMERSON s/p cath presumably 2/2 to contrast, Cr improving with holding diuresis and ARB/MRA.  Discussed placing BiV lead for CRTD with EP, however decision was made to hold off, may be reevaluated outpt for BiV upgrade.  Will discharge with PCP, cardiology, and EP follow up.  Can add GDMT back outpt if Cr stablizes.

## 2023-04-17 NOTE — PLAN OF CARE
[facility] - Surgical Intensive Care  Initial Discharge Assessment       Primary Care Provider: Primary Doctor No    Admission Diagnosis: Chest discomfort [R07.89]  Acute on chronic combined systolic and diastolic congestive heart failure [I50.43]  Chest pain [R07.9]    Admission Date: 4/16/2023  Expected Discharge Date:     Discharge Barriers Identified: None    Payor: MEDICAID / Plan: [facility] COMMUNITY PLAN [facility] [employer] ([place] MEDICAID) / Product Type: Managed Medicaid /     Extended Emergency Contact Information  Primary Emergency Contact: MAHNAZ CARTAGENA  Mobile Phone: [phone]  Relation: Relative  Preferred language: English   needed? No    Discharge Plan A: Home  Discharge Plan B: Home with family      [facility] [id number] at [place], [place] - [address]  [address]  [id number]  [place] [address]  Phone: [phone] Fax: [phone]      Initial Assessment (most recent)       Adult Discharge Assessment - 04/17/23 1030          Discharge Assessment    Assessment Type Discharge Planning Assessment     Confirmed/corrected address, phone number and insurance Yes     Confirmed Demographics Correct on Facesheet     Source of Information patient     Communicated YUSEF with patient/caregiver Date not available/Unable to determine     Reason For Admission NSTEMI (non-ST elevated myocardial infarction)     People in Home other relative(s)     Do you expect to return to your current living situation? Yes     Do you have help at home or someone to help you manage your care at home? Yes     Who are your caregiver(s) and their phone number(s)? Bisi Cartagena     Prior to hospitilization cognitive status: Alert/Oriented     Current cognitive status: Alert/Oriented     Home Layout Able to live on 1st floor     Equipment Currently Used at Home none     Readmission within 30 days? No     Patient currently being followed by outpatient case management? No     Do you currently have  service(s) that help you manage your care at home? No     Do you take prescription medications? Yes     Do you have prescription coverage? Yes     Coverage MEDICAID - [facility] COMMUNITY PLAN [facility] ([place] MEDICAID)     Is the patient taking medications as prescribed? yes     Who is going to help you get home at discharge? Cristhian Griffiths     How do you get to doctors appointments? family or friend will provide     Are you on dialysis? No     Do you take coumadin? No     Discharge Plan A Home     Discharge Plan B Home with family     DME Needed Upon Discharge  other (see comments)   TBD    Discharge Plan discussed with: Patient     Discharge Barriers Identified None        Physical Activity    On average, how many days per week do you engage in moderate to strenuous exercise (like a brisk walk)? Patient refused     On average, how many minutes do you engage in exercise at this level? Patient refused        Financial Resource Strain    How hard is it for you to pay for the very basics like food, housing, medical care, and heating? Patient refused        Housing Stability    In the last 12 months, was there a time when you were not able to pay the mortgage or rent on time? Patient refused     In the last 12 months, was there a time when you did not have a steady place to sleep or slept in a shelter (including now)? Patient refused        Transportation Needs    In the past 12 months, has lack of transportation kept you from medical appointments or from getting medications? Patient refused     In the past 12 months, has lack of transportation kept you from meetings, work, or from getting things needed for daily living? Patient refused        Food Insecurity    Within the past 12 months, you worried that your food would run out before you got the money to buy more. Patient refused     Within the past 12 months, the food you bought just didn't last and you didn't have money to get more. Patient refused        Stress    Do you  feel stress - tense, restless, nervous, or anxious, or unable to sleep at night because your mind is troubled all the time - these days? Patient refused        Social Connections    In a typical week, how many times do you talk on the phone with family, friends, or neighbors? Patient refused     How often do you get together with friends or relatives? Patient refused     How often do you attend Taoism or Congregational services? Patient refused     Do you belong to any clubs or organizations such as Taoism groups, unions, fraternal or athletic groups, or school groups? Patient refused     How often do you attend meetings of the clubs or organizations you belong to? Patient refused     Are you , , , , never , or living with a partner? Patient refused        Alcohol Use    Q1: How often do you have a drink containing alcohol? Patient refused     Q2: How many drinks containing alcohol do you have on a typical day when you are drinking? Patient refused     Q3: How often do you have six or more drinks on one occasion? Patient refused                   This SW met with patient at bedside to complete DPA. Questions answered / contact numbers provided.  Use PREFERRED PHARMACY / BEDSIDE DELIVERY for any necessary medications at time of discharge. The patient is independent with all ADLs - does not use DME, In-home equipment, is not on HD, BTs or home oxygen. The patient's cousin will be assisting with help upon discharge. The patient's cousin  will be providing transportation home. Hospital follow up will be scheduled with PCP. Will continue to follow for course of hospitalization.     Krunal Sawyer LMSW  Case Management [facility]  Ext: [phone]

## 2023-04-17 NOTE — NURSING
Nurses Note -- 4 Eyes      4/16/2023   11:48 PM      Skin assessed during: Admit      [x] No Pressure Injuries Present    [x]Prevention Measures Documented      [] Yes- Altered Skin Integrity Present or Discovered   [] LDA Added if Not in Epic (Describe Wound)   [] New Altered Skin Integrity was Present on Admit and Documented in LDA   [] Wound Image Taken    Wound Care Consulted? No    Attending Nurse:  Jose Og RN     Second RN/Staff Member:  Mylene Joseph RN

## 2023-04-17 NOTE — ED NOTES
Patient endorsing n/v with x1 episode of nonbloody emesis. MD JOAQUIM Baker notified and antiemetic requested at this time. Pending updated orders.

## 2023-04-17 NOTE — NURSING
Pt BSM alarming Vtach, QRS complexes very wide,BP on cuff with MAPs in the 70s and pt is asymptomatic, MD Sander notified and amio bolus and an additional 40 Meq of potassium administered per orders, MD performed bedside echo without abnormal findings given the pts history of HF, pt converted to NSR after completing the amio bolus.no further orders at this time.

## 2023-04-17 NOTE — NURSING
"Pt arrived  to SICU from ED @ 2120 connected to O2 tank at 2L and BSM with heparin gtt infusing at 12 units/kg/hr, connected to BSM and wall O2, pt c/o 7/10 chest pain, per Obi, MD labs drawn and sent, nitro gtt started and 80 IVP lasix administered, nitro titrated up to 45mcg/min for continued CP without relief of symptoms, pt states " my stomach is burning", abd soft but tender to palpation on the R side, lipase added on to labs and KUB ordered per MD, administered 5mg IVP compazine for nausea with full relief of symptoms, pt in afib with multiple PVCs, bigeminy and trigemy,cuff pressures with MAPs in 60s, PO amio given but PO BP meds held per MD, sats 95% on 2L NC, Pt AAOx4, no skin breakdown upon full physical assessment  "

## 2023-04-17 NOTE — ASSESSMENT & PLAN NOTE
/135 on admit. Likely contributed to chest pain. BP now improved. Symptoms have resolved  - Resume home antihypertensives.

## 2023-04-17 NOTE — H&P
[facility] - [facility] Intensive Care  Cardiology  History and Physical     Patient Name: Tera Griffiths  MRN: [id number]  Admission Date: 4/16/2023  Code Status: Full Code   Attending Provider: Rustam Griggs MD   Primary Care Physician: Primary Doctor No  Principal Problem:NSTEMI (non-ST elevated myocardial infarction)    Patient information was obtained from patient, past medical records and ER records.     Subjective:     Chief Complaint:  Chest pain     HPI:  Tera Griffiths is a 57 year old gentleman with past medical history of ischemic cardiomyopathy EF of 18% (s/p ICD), coronary artery disease status post STEMI with PCI to RCA in August 2021, persistent atrial fibrillation, hypertension, recent management for decompensated heart failure, was evaluated by HTS in the clinic for advanced options consideration but  he was declined for advanced options.  Patient was well until this morning around 9:00 a.m. at home he was lying down and experienced sudden onset chest pain and decided to come to hospital.  Chest pain described as pressure-like and squeezing in nature.  He rated it as 8/10 in severity, central chest, no radiation.  Chest pain is similar to his AMI pain back in 2021. It is associated with nausea and diaphoresis. No aggravating factors however minimal relief with nitroglycerin.  Patient denies any worsening shortness of breath, pedal edema, worsening orthopnea (2+ orthopnea at baseline) or abdominal fullness. He is compliant with his medications. He denies any cough or fever was.  Denies any current tobacco use or alcohol use.  In the emergency room patient had elevated blood pressures (/120s).  Troponin slightly raised from baseline with first reading 0.121 and second 0.151.  No ischemic changes on ECG though with atrial fib and PVCs. CXR with pulmonary edema, similar to prior. He was admitted to hospital medicine. Patient continued to have chest pain despite NTG X4 and morphine.  He was  started on ACS protocol by hospital medicine team.  Cardiology was consulted for further evaluation. On evaluation, he appeared slightly diaphoretic, uncomfortable and in persistent chest pain. He was transfeered to CCU for closer monitoring w/ initiation of NTG.         Past Medical History:   Diagnosis Date    Atrial fibrillation     Encounter for blood transfusion        Past Surgical History:   Procedure Laterality Date    CARDIAC DEFIBRILLATOR PLACEMENT Left     HERNIA REPAIR      RIGHT HEART CATHETERIZATION Right 9/30/2022    Procedure: INSERTION, CATHETER, RIGHT HEART;  Surgeon: Caden Aden MD;  Location: [facility] CATH LAB;  Service: Cardiology;  Laterality: Right;    TREATMENT OF CARDIAC ARRHYTHMIA N/A 11/22/2022    Procedure: CARDIOVERSION;  Surgeon: Marvin Sanon MD;  Location: [facility] EP LAB;  Service: Cardiology;  Laterality: N/A;  afib, KIA, DCCV, anes, MB, 3 Prep       Review of patient's allergies indicates:  No Known Allergies    No current facility-administered medications on file prior to encounter.     Current Outpatient Medications on File Prior to Encounter   Medication Sig    amiodarone (PACERONE) 200 MG Tab Take 1 tablet (200 mg total) by mouth 2 (two) times daily.    apixaban (ELIQUIS) 5 mg Tab Take 5 mg by mouth 2 (two) times daily.    atorvastatin (LIPITOR) 80 MG tablet Take 80 mg by mouth once daily.    clopidogreL (PLAVIX) 75 mg tablet Take 75 mg by mouth once daily.    empagliflozin (JARDIANCE) 10 mg tablet Take 1 tablet (10 mg total) by mouth once daily.    ferrous sulfate (FEOSOL) 325 mg (65 mg iron) Tab tablet Take 325 mg by mouth every other day.    glimepiride (AMARYL) 4 MG tablet Take 4 mg by mouth every morning.    isosorbide mononitrate (ISMO,MONOKET) 10 mg tablet Take 1 tablet (10 mg total) by mouth 2 (two) times daily.    LIDOcaine (LIDODERM) 5 % Place 1 patch onto the skin once daily.    metoprolol succinate (TOPROL-XL) 25 MG 24 hr tablet Take 1 tablet (25 mg total) by mouth  every evening.    pantoprazole (PROTONIX) 40 MG tablet Take 40 mg by mouth once daily.    potassium chloride SA (K-DUR,KLOR-CON) 20 MEQ tablet Take 1 tablet (20 mEq total) by mouth once daily.    sacubitriL-valsartan (ENTRESTO)  mg per tablet Take 1 tablet by mouth 2 (two) times daily.    spironolactone (ALDACTONE) 25 MG tablet Take 25 mg by mouth once daily.    tamsulosin (FLOMAX) 0.4 mg Cap Take 1 capsule by mouth every evening.    torsemide (DEMADEX) 20 MG Tab Take 1 tablet (20 mg total) by mouth once daily.     Family History    None       Tobacco Use    Smoking status: Never    Smokeless tobacco: Never   Substance and Sexual Activity    Alcohol use: Never    Drug use: Never    Sexual activity: Not on file     Review of Systems   Constitutional: Negative for chills, decreased appetite and fever.   Cardiovascular:  Positive for chest pain. Negative for dyspnea on exertion, leg swelling, orthopnea and paroxysmal nocturnal dyspnea.   Respiratory:  Negative for shortness of breath.    Endocrine: Negative.    Hematologic/Lymphatic: Negative.    Skin: Negative.    Musculoskeletal: Negative.    Gastrointestinal:  Positive for nausea. Negative for abdominal pain, constipation, diarrhea and vomiting.   Neurological: Negative.    Psychiatric/Behavioral: Negative.     Objective:     Vital Signs (Most Recent):  Temp: 98.1 °F (36.7 °C) (04/16/23 2300)  Pulse: (!) 111 (04/16/23 2330)  Resp: 20 (04/16/23 2330)  BP: 119/70 (04/16/23 2330)  SpO2: 95 % (04/16/23 2330)   Vital Signs (24h Range):  Temp:  [97.9 °F (36.6 °C)-98.5 °F (36.9 °C)] 98.1 °F (36.7 °C)  Pulse:  [] 111  Resp:  [12-30] 20  SpO2:  [88 %-100 %] 95 %  BP: ()/() 119/70     Weight: 86.2 kg (190 lb 0.6 oz)  Body mass index is 25.77 kg/m².    SpO2: 95 %         Intake/Output Summary (Last 24 hours) at 4/17/2023 0034  Last data filed at 4/16/2023 2228  Gross per 24 hour   Intake 98.61 ml   Output 1915 ml   Net -1816.39 ml        Lines/Drains/Airways       Drain  Duration                  Urethral Catheter 04/16/23 2228 Straight-tip 16 Fr. <1 day              Peripheral Intravenous Line  Duration                  Peripheral IV - Single Lumen 04/16/23 1235 20 G Right Antecubital <1 day         Peripheral IV - Single Lumen 04/16/23 2035 20 G Distal;Posterior;Right Forearm <1 day         Peripheral IV - Single Lumen 04/16/23 2120 18 G Anterior;Left Forearm <1 day                    Physical Exam  Constitutional:       General: He is not in acute distress.     Appearance: He is obese. He is ill-appearing. He is not toxic-appearing or diaphoretic.   HENT:      Head: Normocephalic.      Nose: Nose normal.      Mouth/Throat:      Mouth: Mucous membranes are moist.   Eyes:      Extraocular Movements: Extraocular movements intact.   Cardiovascular:      Rate and Rhythm: Normal rate. Rhythm irregular.      Pulses: Normal pulses.      Heart sounds: No murmur heard.  Pulmonary:      Effort: Pulmonary effort is normal. No respiratory distress.      Breath sounds: Normal breath sounds. No wheezing or rales.   Abdominal:      General: Abdomen is flat. Bowel sounds are normal. There is no distension.      Tenderness: There is no abdominal tenderness.   Musculoskeletal:         General: No swelling or tenderness. Normal range of motion.      Cervical back: Normal range of motion.   Skin:     General: Skin is warm.   Neurological:      Mental Status: He is alert and oriented to person, place, and time. Mental status is at baseline.   Psychiatric:         Mood and Affect: Mood normal.         Behavior: Behavior normal.       Significant Labs: BMP:   Recent Labs   Lab 04/16/23  1246 04/16/23 2124   GLU 87 112*    140   K 4.1 3.2*    100   CO2 23 22*   BUN 15 16   CREATININE 1.5* 1.5*   CALCIUM 10.2 9.6   MG  --  2.0   , CMP   Recent Labs   Lab 04/16/23  1246 04/16/23 2124    140   K 4.1 3.2*    100   CO2 23 22*   GLU 87 112*  "  BUN 15 16   CREATININE 1.5* 1.5*   CALCIUM 10.2 9.6   PROT 8.0 7.8   ALBUMIN 4.4 4.1   BILITOT 0.8 0.8   ALKPHOS 76 74   AST 18 15   ALT 15 13   ANIONGAP 12 18*   , and CBC   Recent Labs   Lab 04/16/23  1246 04/16/23  2124   WBC 4.07 5.83   HGB 13.8* 14.3   HCT 45.1 45.2    284       Significant Imaging:   Imaging Results              X-Ray Abdomen AP 1 View (Final result)  Result time 04/16/23 22:45:30      Final result by Karthik Corrales MD (04/16/23 22:45:30)                   Impression:      Decreased bowel gas with persistent single loop of small bowel distended in the mid abdomen.  This may be related to improving ileus.      Electronically signed by: Karthik Corrales  Date:    04/16/2023  Time:    22:45               Narrative:    EXAMINATION:  XR ABDOMEN AP 1 VIEW    CLINICAL HISTORY:  Abdominal pain;    TECHNIQUE:  AP View(s) of the abdomen was performed.    COMPARISON:  03/11/2023    FINDINGS:  Bowel gas pattern appears similar with decreased filling of gas in mid abdomen small bowel loop.  No free air mass organomegaly or new calcification.                                       X-Ray Chest AP Portable (Final result)  Result time 04/16/23 14:36:10      Final result by Reji Amaya MD (04/16/23 14:36:10)                   Impression:      Cardiomegaly with probable mild interstitial edema and small pleural effusions.  Similar findings were present on 03/11/2023.      Electronically signed by: Reji Amaya MD  Date:    04/16/2023  Time:    14:36               Narrative:    EXAMINATION:  XR CHEST AP PORTABLE    CLINICAL HISTORY:  Provided history is "Chest Pain;  ".    TECHNIQUE:  One view of the chest.    COMPARISON:  03/11/2023.    FINDINGS:  Cardiac wires overlie the chest.  Cardiomediastinal silhouette is enlarged and similar to the prior study.  Left chest wall AICD is present with transvenous leads overlying the heart.  There is central vascular congestion with coarsened perihilar " and lower lung zone interstitial markings.  No confluent area of consolidation.  Possible trace bilateral pleural effusions.  No distinct pneumothorax.                                        Assessment and Plan:     * NSTEMI (non-ST elevated myocardial infarction)  Tera Griffiths is a 57 year old gentleman with past medical history of ischemic cardiomyopathy EF of 18% (s/ ICD), coronary artery disease status post STEMI with PCI to RCA in August 2021, persistent atrial fibrillation, hypertension, recent management for decompensated heart failure, was evaluated by HTS in the clinic for advanced options consideration but  he was declined for advanced options. Admitted for NSTEMI and eventually upgraded to CCU for closer monitoring.   NSTEMI likely 2/2 to elevated BP vs decompensated HF. There was an initial concern for Type 1NSTEMI as ACS was initiated, however troponin flat and now downtrending so low suspicion. He was initiated on NTG ands aggressive diuresis. Bedside TTE with EF 10%, CVP 3 (though it appears CVP 3 on priro ADHF admissions). Reports abdominal bloating. Lactic wnl    Plan  Now chest pain free. Off NTG. Nausea/ vomiting resolved.  Continue ACS for now. Will reassess in the AM  Initiated on IV lasix gtt overnight, pt diuresing well. Closely monitor fluid status and de-escalate as appropriate          Hypertensive emergency  /135 on admit. Likely contributed to chest pain. BP now improved. Symptoms have resolved  - Resume home antihypertensives.     Dyslipidemia  Contiune statin    Acute on chronic combined systolic and diastolic congestive heart failure  Patient with ADHF. Diuresing with IV lasix. Now with improvement in chest pain.   - See 'NSTEMI'  - Resume GDMT in the AM.     Type 2 diabetes mellitus with circulatory disorder, without long-term current use of insulin  Controlled. A1c 5.7.  LDSSI     Atrial fibrillation  Hx of persistent atrial fib s/p KIA/CV 11/29/22 with initiation of  amiodarone. On eliquis 5mg BID.   On admission, he was in atrial fib with ectopy, though now appears to have cardioverted to NSR    - Continue with diuresis  - Continue heparin gtt  - maintain K>4, Mg>2    CAD (coronary artery disease)  S/p PCI in 2021 for AMI.   - Continue antiplatelet therapy and statin        VTE Risk Mitigation (From admission, onward)           Ordered     heparin 25,000 units in dextrose 5% (100 units/ml) IV bolus from bag - ADDITIONAL PRN BOLUS - 60 units/kg (max bolus 4000 units)  As needed (PRN)        Question:  Heparin Infusion Adjustment (DO NOT MODIFY ANSWER)  Answer:  [email or web address]\epic\Images\Pharmacy\HeparinInfusions\heparin LOW INTENSITY nomogram for [facility] [id number]    04/16/23 1520     heparin 25,000 units in dextrose 5% (100 units/ml) IV bolus from bag - ADDITIONAL PRN BOLUS - 30 units/kg (max bolus 4000 units)  As needed (PRN)        Question:  Heparin Infusion Adjustment (DO NOT MODIFY ANSWER)  Answer:  [email or web address]\epic\Images\Pharmacy\HeparinInfusions\heparin LOW INTENSITY nomogram for [facility] [id number].pdf    04/16/23 1520     IP VTE HIGH RISK PATIENT  Once         04/16/23 1713     Place sequential compression device  Until discontinued         04/16/23 1713     heparin 25,000 units in dextrose 5% 250 mL (100 units/mL) infusion LOW INTENSITY nomogram - [facility]  Continuous        Question Answer Comment   Heparin Infusion Adjustment (DO NOT MODIFY ANSWER) [email or web address]\epic\Images\Pharmacy\HeparinInfusions\heparin LOW INTENSITY nomogram for [facility] [id number].pdf    Begin at (in units/kg/hr) 12        04/16/23 1520                    Marcello Boucher MD  Cardiology   [facility] - Surgical Intensive Care

## 2023-04-17 NOTE — ED NOTES
Patient report given to receiving SICU RN. Patient to unit with RN and monitor with all personal belongings.

## 2023-04-17 NOTE — TELEPHONE ENCOUNTER
Heart Transplant  attempted to contact pt by phone x2  in order to discuss pt's appointment on April 24th.   Pt's needs to bring to clinic his primary and secondary caregivers.   will attempt to reach pt again.

## 2023-04-17 NOTE — ASSESSMENT & PLAN NOTE
Tera Griffiths is a 57 year old gentleman with past medical history of ischemic cardiomyopathy EF of 18% with cardiac device placement, coronary artery disease status post STEMI with PCI to RCA in August 2021, persistent atrial fibrillation, hypertension, recent management for decompensated heart failure, was evaluated by HTS in the clinic for advanced options consideration but  he was declined for advanced options. Admitted for NSTEMI and eventually upgraded to CCU for closer monitoring.   NSTEMI likely 2/2 to elevated BP vs decompensated HF. There was an initial concern for Type 1NSTEMI as ACS was initiated, however troponin flat and now downtrending so low suspicion. He was initiated on NTG ands aggressive diuresis. Bedside TTE with EF 10%, CVP 3 (though it appears CVP 3 on priro ADHF admissions). Reports abdominal bloating. Lactic wnl    Plan  Now chest pain free. Off NTG. Nausea/ vomiting resolved.  Continue ACS for now. Will reassess in the AM  Initiated on IV lasix gtt overnight, pt diuresing well. Closely monitor fluid status and de-escalate as appropriate

## 2023-04-17 NOTE — HPI
Tera Griffiths is a 57 year old gentleman with past medical history of ischemic cardiomyopathy EF of 18% with cardiac device placement, coronary artery disease status post STEMI with PCI to RCA in August 2021, persistent atrial fibrillation, hypertension, recent management for decompensated heart failure, was evaluated by [facility] in the clinic for advanced options consideration but  he was declined for advanced options.  Patient was well until this morning around 9:00 a.m. at home he was lying down and experienced sudden onset chest pain and decided to come to hospital.  Chest pain described as pressure-like and squeezing in nature.  He rated it as 8/10 in severity, central chest, no radiation.  Chest pain is similar to his AMI pain back in 2021. It is associated with nausea and diaphoresis. No aggravating factors however minimal relief with nitroglycerin.  Patient denies any worsening shortness of breath, pedal edema, worsening orthopnea (2+ orthopnea at baseline) or abdominal fullness. He is compliant with his medications. He denies any cough or fever was.  Denies any current tobacco use or alcohol use.  In the emergency room patient had elevated blood pressures.  Troponin slightly raised from baseline with first reading 0.121 and second 0.151.  No ischemic changes on ECG though with atrial fib and PVCs. CXR with pulmonary edema, similar to prior. He was admitted to hospital medicine. Patient continued to have chest pain despite NTG X4 and morphine.  He was started on ACS protocol by hospital medicine team.  Cardiology was consulted for further evaluation. On evaluation, he appeared slightly diaphoretic, uncomfortable and in persistent chest pain. He was transfeered to CCU for closer monitoring w/ initiation of NTG.

## 2023-04-17 NOTE — HPI
Tera Griffiths is a 57 year old gentleman with past medical history of ischemic cardiomyopathy EF of 18% (s/p ICD), coronary artery disease status post STEMI with PCI to RCA in August 2021, persistent atrial fibrillation, hypertension, recent management for decompensated heart failure, was evaluated by [facility] in the clinic for advanced options consideration but  he was declined for advanced options.  Patient was well until this morning around 9:00 a.m. at home he was lying down and experienced sudden onset chest pain and decided to come to hospital.  Chest pain described as pressure-like and squeezing in nature.  He rated it as 8/10 in severity, central chest, no radiation.  Chest pain is similar to his AMI pain back in 2021. It is associated with nausea and diaphoresis. No aggravating factors however minimal relief with nitroglycerin.  Patient denies any worsening shortness of breath, pedal edema, worsening orthopnea (2+ orthopnea at baseline) or abdominal fullness. He is compliant with his medications. He denies any cough or fever was.  Denies any current tobacco use or alcohol use.  In the emergency room patient had elevated blood pressures (/120s).  Troponin slightly raised from baseline with first reading 0.121 and second 0.151.  No ischemic changes on ECG though with atrial fib and PVCs. CXR with pulmonary edema, similar to prior. He was admitted to hospital medicine. Patient continued to have chest pain despite NTG X4 and morphine.  He was started on ACS protocol by hospital medicine team.  Cardiology was consulted for further evaluation. On evaluation, he appeared slightly diaphoretic, uncomfortable and in persistent chest pain. He was transfeered to CCU for closer monitoring w/ initiation of NTG.

## 2023-04-17 NOTE — SUBJECTIVE & OBJECTIVE
Past Medical History:   Diagnosis Date    Atrial fibrillation     Encounter for blood transfusion        Past Surgical History:   Procedure Laterality Date    CARDIAC DEFIBRILLATOR PLACEMENT Left     HERNIA REPAIR      RIGHT HEART CATHETERIZATION Right 9/30/2022    Procedure: INSERTION, CATHETER, RIGHT HEART;  Surgeon: Caden Aden MD;  Location: [facility] CATH LAB;  Service: Cardiology;  Laterality: Right;    TREATMENT OF CARDIAC ARRHYTHMIA N/A 11/22/2022    Procedure: CARDIOVERSION;  Surgeon: Marvin Sanon MD;  Location: [facility] EP LAB;  Service: Cardiology;  Laterality: N/A;  afib, KIA, DCCV, anes, MB, 3 Prep       Review of patient's allergies indicates:  No Known Allergies    No current facility-administered medications on file prior to encounter.     Current Outpatient Medications on File Prior to Encounter   Medication Sig    amiodarone (PACERONE) 200 MG Tab Take 1 tablet (200 mg total) by mouth 2 (two) times daily.    apixaban (ELIQUIS) 5 mg Tab Take 5 mg by mouth 2 (two) times daily.    atorvastatin (LIPITOR) 80 MG tablet Take 80 mg by mouth once daily.    clopidogreL (PLAVIX) 75 mg tablet Take 75 mg by mouth once daily.    empagliflozin (JARDIANCE) 10 mg tablet Take 1 tablet (10 mg total) by mouth once daily.    ferrous sulfate (FEOSOL) 325 mg (65 mg iron) Tab tablet Take 325 mg by mouth every other day.    glimepiride (AMARYL) 4 MG tablet Take 4 mg by mouth every morning.    isosorbide mononitrate (ISMO,MONOKET) 10 mg tablet Take 1 tablet (10 mg total) by mouth 2 (two) times daily.    LIDOcaine (LIDODERM) 5 % Place 1 patch onto the skin once daily.    metoprolol succinate (TOPROL-XL) 25 MG 24 hr tablet Take 1 tablet (25 mg total) by mouth every evening.    pantoprazole (PROTONIX) 40 MG tablet Take 40 mg by mouth once daily.    potassium chloride SA (K-DUR,KLOR-CON) 20 MEQ tablet Take 1 tablet (20 mEq total) by mouth once daily.    sacubitriL-valsartan (ENTRESTO)  mg per tablet Take 1 tablet by mouth  2 (two) times daily.    spironolactone (ALDACTONE) 25 MG tablet Take 25 mg by mouth once daily.    tamsulosin (FLOMAX) 0.4 mg Cap Take 1 capsule by mouth every evening.    torsemide (DEMADEX) 20 MG Tab Take 1 tablet (20 mg total) by mouth once daily.     Family History    None       Tobacco Use    Smoking status: Never    Smokeless tobacco: Never   Substance and Sexual Activity    Alcohol use: Never    Drug use: Never    Sexual activity: Not on file     Review of Systems   Constitutional: Negative for chills, decreased appetite and fever.   Cardiovascular:  Positive for chest pain. Negative for dyspnea on exertion, leg swelling, orthopnea and paroxysmal nocturnal dyspnea.   Respiratory:  Negative for shortness of breath.    Endocrine: Negative.    Hematologic/Lymphatic: Negative.    Skin: Negative.    Musculoskeletal: Negative.    Gastrointestinal:  Positive for nausea. Negative for abdominal pain, constipation, diarrhea and vomiting.   Neurological: Negative.    Psychiatric/Behavioral: Negative.     Objective:     Vital Signs (Most Recent):  Temp: 98.1 °F (36.7 °C) (04/16/23 2300)  Pulse: (!) 111 (04/16/23 2330)  Resp: 20 (04/16/23 2330)  BP: 119/70 (04/16/23 2330)  SpO2: 95 % (04/16/23 2330)   Vital Signs (24h Range):  Temp:  [97.9 °F (36.6 °C)-98.5 °F (36.9 °C)] 98.1 °F (36.7 °C)  Pulse:  [] 111  Resp:  [12-30] 20  SpO2:  [88 %-100 %] 95 %  BP: ()/() 119/70     Weight: 86.2 kg (190 lb 0.6 oz)  Body mass index is 25.77 kg/m².    SpO2: 95 %         Intake/Output Summary (Last 24 hours) at 4/17/2023 0034  Last data filed at 4/16/2023 2228  Gross per 24 hour   Intake 98.61 ml   Output 1915 ml   Net -1816.39 ml       Lines/Drains/Airways       Drain  Duration                  Urethral Catheter 04/16/23 2228 Straight-tip 16 Fr. <1 day              Peripheral Intravenous Line  Duration                  Peripheral IV - Single Lumen 04/16/23 1235 20 G Right Antecubital <1 day         Peripheral IV -  Single Lumen 04/16/23 2035 20 G Distal;Posterior;Right Forearm <1 day         Peripheral IV - Single Lumen 04/16/23 2120 18 G Anterior;Left Forearm <1 day                    Physical Exam  Constitutional:       General: He is not in acute distress.     Appearance: He is obese. He is ill-appearing. He is not toxic-appearing or diaphoretic.   HENT:      Head: Normocephalic.      Nose: Nose normal.      Mouth/Throat:      Mouth: Mucous membranes are moist.   Eyes:      Extraocular Movements: Extraocular movements intact.   Cardiovascular:      Rate and Rhythm: Normal rate. Rhythm irregular.      Pulses: Normal pulses.      Heart sounds: No murmur heard.  Pulmonary:      Effort: Pulmonary effort is normal. No respiratory distress.      Breath sounds: Normal breath sounds. No wheezing or rales.   Abdominal:      General: Abdomen is flat. Bowel sounds are normal. There is no distension.      Tenderness: There is no abdominal tenderness.   Musculoskeletal:         General: No swelling or tenderness. Normal range of motion.      Cervical back: Normal range of motion.   Skin:     General: Skin is warm.   Neurological:      Mental Status: He is alert and oriented to person, place, and time. Mental status is at baseline.   Psychiatric:         Mood and Affect: Mood normal.         Behavior: Behavior normal.       Significant Labs: BMP:   Recent Labs   Lab 04/16/23  1246 04/16/23 2124   GLU 87 112*    140   K 4.1 3.2*    100   CO2 23 22*   BUN 15 16   CREATININE 1.5* 1.5*   CALCIUM 10.2 9.6   MG  --  2.0   , CMP   Recent Labs   Lab 04/16/23  1246 04/16/23 2124    140   K 4.1 3.2*    100   CO2 23 22*   GLU 87 112*   BUN 15 16   CREATININE 1.5* 1.5*   CALCIUM 10.2 9.6   PROT 8.0 7.8   ALBUMIN 4.4 4.1   BILITOT 0.8 0.8   ALKPHOS 76 74   AST 18 15   ALT 15 13   ANIONGAP 12 18*   , and CBC   Recent Labs   Lab 04/16/23  1246 04/16/23 2124   WBC 4.07 5.83   HGB 13.8* 14.3   HCT 45.1 45.2    284  "      Significant Imaging:   Imaging Results              X-Ray Abdomen AP 1 View (Final result)  Result time 04/16/23 22:45:30      Final result by Karthik Corrales MD (04/16/23 22:45:30)                   Impression:      Decreased bowel gas with persistent single loop of small bowel distended in the mid abdomen.  This may be related to improving ileus.      Electronically signed by: Karthik Corrales  Date:    04/16/2023  Time:    22:45               Narrative:    EXAMINATION:  XR ABDOMEN AP 1 VIEW    CLINICAL HISTORY:  Abdominal pain;    TECHNIQUE:  AP View(s) of the abdomen was performed.    COMPARISON:  03/11/2023    FINDINGS:  Bowel gas pattern appears similar with decreased filling of gas in mid abdomen small bowel loop.  No free air mass organomegaly or new calcification.                                       X-Ray Chest AP Portable (Final result)  Result time 04/16/23 14:36:10      Final result by Reji Amaya MD (04/16/23 14:36:10)                   Impression:      Cardiomegaly with probable mild interstitial edema and small pleural effusions.  Similar findings were present on 03/11/2023.      Electronically signed by: Reji Amaya MD  Date:    04/16/2023  Time:    14:36               Narrative:    EXAMINATION:  XR CHEST AP PORTABLE    CLINICAL HISTORY:  Provided history is "Chest Pain;  ".    TECHNIQUE:  One view of the chest.    COMPARISON:  03/11/2023.    FINDINGS:  Cardiac wires overlie the chest.  Cardiomediastinal silhouette is enlarged and similar to the prior study.  Left chest wall AICD is present with transvenous leads overlying the heart.  There is central vascular congestion with coarsened perihilar and lower lung zone interstitial markings.  No confluent area of consolidation.  Possible trace bilateral pleural effusions.  No distinct pneumothorax.                                      "

## 2023-04-18 LAB
ANION GAP SERPL CALC-SCNC: 14 MMOL/L (ref 8–16)
APTT PPP: 36.2 SEC (ref 21–32)
APTT PPP: 39.3 SEC (ref 21–32)
APTT PPP: 46.9 SEC (ref 21–32)
BASOPHILS # BLD AUTO: 0.02 K/UL (ref 0–0.2)
BASOPHILS NFR BLD: 0.4 % (ref 0–1.9)
BUN SERPL-MCNC: 26 MG/DL (ref 6–20)
CALCIUM SERPL-MCNC: 9.9 MG/DL (ref 8.7–10.5)
CHLORIDE SERPL-SCNC: 101 MMOL/L (ref 95–110)
CO2 SERPL-SCNC: 23 MMOL/L (ref 23–29)
CREAT SERPL-MCNC: 1.6 MG/DL (ref 0.5–1.4)
DIFFERENTIAL METHOD: ABNORMAL
EOSINOPHIL # BLD AUTO: 0.1 K/UL (ref 0–0.5)
EOSINOPHIL NFR BLD: 1.7 % (ref 0–8)
ERYTHROCYTE [DISTWIDTH] IN BLOOD BY AUTOMATED COUNT: 15 % (ref 11.5–14.5)
EST. GFR  (NO RACE VARIABLE): 50.6 ML/MIN/1.73 M^2
GLUCOSE SERPL-MCNC: 103 MG/DL (ref 70–110)
HCT VFR BLD AUTO: 51.3 % (ref 40–54)
HGB BLD-MCNC: 16.5 G/DL (ref 14–18)
IMM GRANULOCYTES # BLD AUTO: 0.01 K/UL (ref 0–0.04)
IMM GRANULOCYTES NFR BLD AUTO: 0.2 % (ref 0–0.5)
LYMPHOCYTES # BLD AUTO: 0.9 K/UL (ref 1–4.8)
LYMPHOCYTES NFR BLD: 17.6 % (ref 18–48)
MCH RBC QN AUTO: 26.9 PG (ref 27–31)
MCHC RBC AUTO-ENTMCNC: 32.2 G/DL (ref 32–36)
MCV RBC AUTO: 84 FL (ref 82–98)
MONOCYTES # BLD AUTO: 0.7 K/UL (ref 0.3–1)
MONOCYTES NFR BLD: 13.9 % (ref 4–15)
NEUTROPHILS # BLD AUTO: 3.5 K/UL (ref 1.8–7.7)
NEUTROPHILS NFR BLD: 66.2 % (ref 38–73)
NRBC BLD-RTO: 0 /100 WBC
PLATELET # BLD AUTO: 299 K/UL (ref 150–450)
PMV BLD AUTO: 10.8 FL (ref 9.2–12.9)
POCT GLUCOSE: 104 MG/DL (ref 70–110)
POCT GLUCOSE: 130 MG/DL (ref 70–110)
POCT GLUCOSE: 82 MG/DL (ref 70–110)
POCT GLUCOSE: 94 MG/DL (ref 70–110)
POTASSIUM SERPL-SCNC: 3.9 MMOL/L (ref 3.5–5.1)
RBC # BLD AUTO: 6.13 M/UL (ref 4.6–6.2)
SODIUM SERPL-SCNC: 138 MMOL/L (ref 136–145)
WBC # BLD AUTO: 5.34 K/UL (ref 3.9–12.7)

## 2023-04-18 PROCEDURE — 99231 PR SUBSEQUENT HOSPITAL CARE,LEVL I: ICD-10-PCS | Mod: ,,, | Performed by: INTERNAL MEDICINE

## 2023-04-18 PROCEDURE — 99900035 HC TECH TIME PER 15 MIN (STAT)

## 2023-04-18 PROCEDURE — 99152 MOD SED SAME PHYS/QHP 5/>YRS: CPT | Mod: ,,, | Performed by: INTERNAL MEDICINE

## 2023-04-18 PROCEDURE — 25000003 PHARM REV CODE 250: Performed by: STUDENT IN AN ORGANIZED HEALTH CARE EDUCATION/TRAINING PROGRAM

## 2023-04-18 PROCEDURE — 93454 PR CATH PLACE/CORONARY ANGIO, IMG SUPER/INTERP: ICD-10-PCS | Mod: 26,,, | Performed by: INTERNAL MEDICINE

## 2023-04-18 PROCEDURE — 20600001 HC STEP DOWN PRIVATE ROOM

## 2023-04-18 PROCEDURE — 36415 COLL VENOUS BLD VENIPUNCTURE: CPT | Performed by: INTERNAL MEDICINE

## 2023-04-18 PROCEDURE — 80048 BASIC METABOLIC PNL TOTAL CA: CPT | Performed by: INTERNAL MEDICINE

## 2023-04-18 PROCEDURE — 85730 THROMBOPLASTIN TIME PARTIAL: CPT | Mod: 91 | Performed by: INTERNAL MEDICINE

## 2023-04-18 PROCEDURE — 93010 ELECTROCARDIOGRAM REPORT: CPT | Mod: ,,, | Performed by: INTERNAL MEDICINE

## 2023-04-18 PROCEDURE — C1760 CLOSURE DEV, VASC: HCPCS | Performed by: INTERNAL MEDICINE

## 2023-04-18 PROCEDURE — C1894 INTRO/SHEATH, NON-LASER: HCPCS | Performed by: INTERNAL MEDICINE

## 2023-04-18 PROCEDURE — 25000003 PHARM REV CODE 250: Performed by: INTERNAL MEDICINE

## 2023-04-18 PROCEDURE — 99153 MOD SED SAME PHYS/QHP EA: CPT | Performed by: INTERNAL MEDICINE

## 2023-04-18 PROCEDURE — 93005 ELECTROCARDIOGRAM TRACING: CPT

## 2023-04-18 PROCEDURE — 93454 CORONARY ARTERY ANGIO S&I: CPT | Performed by: INTERNAL MEDICINE

## 2023-04-18 PROCEDURE — C1769 GUIDE WIRE: HCPCS | Performed by: INTERNAL MEDICINE

## 2023-04-18 PROCEDURE — 85730 THROMBOPLASTIN TIME PARTIAL: CPT | Performed by: EMERGENCY MEDICINE

## 2023-04-18 PROCEDURE — 63600175 PHARM REV CODE 636 W HCPCS: Performed by: STUDENT IN AN ORGANIZED HEALTH CARE EDUCATION/TRAINING PROGRAM

## 2023-04-18 PROCEDURE — 85025 COMPLETE CBC W/AUTO DIFF WBC: CPT | Performed by: EMERGENCY MEDICINE

## 2023-04-18 PROCEDURE — 25500020 PHARM REV CODE 255: Performed by: INTERNAL MEDICINE

## 2023-04-18 PROCEDURE — 93454 CORONARY ARTERY ANGIO S&I: CPT | Mod: 26,,, | Performed by: INTERNAL MEDICINE

## 2023-04-18 PROCEDURE — 99152 PR MOD CONSCIOUS SEDATION, SAME PHYS, 5+ YRS, FIRST 15 MIN: ICD-10-PCS | Mod: ,,, | Performed by: INTERNAL MEDICINE

## 2023-04-18 PROCEDURE — 63600175 PHARM REV CODE 636 W HCPCS: Performed by: INTERNAL MEDICINE

## 2023-04-18 PROCEDURE — 99231 SBSQ HOSP IP/OBS SF/LOW 25: CPT | Mod: ,,, | Performed by: INTERNAL MEDICINE

## 2023-04-18 PROCEDURE — 93010 EKG 12-LEAD: ICD-10-PCS | Mod: ,,, | Performed by: INTERNAL MEDICINE

## 2023-04-18 PROCEDURE — 99152 MOD SED SAME PHYS/QHP 5/>YRS: CPT | Performed by: INTERNAL MEDICINE

## 2023-04-18 PROCEDURE — 63600175 PHARM REV CODE 636 W HCPCS: Performed by: EMERGENCY MEDICINE

## 2023-04-18 PROCEDURE — 94761 N-INVAS EAR/PLS OXIMETRY MLT: CPT

## 2023-04-18 RX ORDER — CLOPIDOGREL BISULFATE 75 MG/1
75 TABLET ORAL ONCE
Status: COMPLETED | OUTPATIENT
Start: 2023-04-18 | End: 2023-04-18

## 2023-04-18 RX ORDER — CLOPIDOGREL BISULFATE 75 MG/1
75 TABLET ORAL DAILY
Status: DISCONTINUED | OUTPATIENT
Start: 2023-04-19 | End: 2023-04-22 | Stop reason: HOSPADM

## 2023-04-18 RX ORDER — SODIUM CHLORIDE 0.9 % (FLUSH) 0.9 %
10 SYRINGE (ML) INJECTION
Status: DISCONTINUED | OUTPATIENT
Start: 2023-04-18 | End: 2023-04-22 | Stop reason: HOSPADM

## 2023-04-18 RX ORDER — MIDAZOLAM HYDROCHLORIDE 1 MG/ML
INJECTION, SOLUTION INTRAMUSCULAR; INTRAVENOUS
Status: DISCONTINUED | OUTPATIENT
Start: 2023-04-18 | End: 2023-04-22 | Stop reason: HOSPADM

## 2023-04-18 RX ORDER — LIDOCAINE HYDROCHLORIDE 20 MG/ML
INJECTION, SOLUTION EPIDURAL; INFILTRATION; INTRACAUDAL; PERINEURAL
Status: DISCONTINUED | OUTPATIENT
Start: 2023-04-18 | End: 2023-04-22 | Stop reason: HOSPADM

## 2023-04-18 RX ORDER — CLOPIDOGREL 300 MG/1
600 TABLET, FILM COATED ORAL ONCE
Status: DISCONTINUED | OUTPATIENT
Start: 2023-04-18 | End: 2023-04-18

## 2023-04-18 RX ORDER — DIPHENHYDRAMINE HCL 50 MG
50 CAPSULE ORAL ONCE
Status: COMPLETED | OUTPATIENT
Start: 2023-04-18 | End: 2023-04-18

## 2023-04-18 RX ORDER — FUROSEMIDE 10 MG/ML
80 INJECTION INTRAMUSCULAR; INTRAVENOUS DAILY
Status: DISCONTINUED | OUTPATIENT
Start: 2023-04-19 | End: 2023-04-19

## 2023-04-18 RX ORDER — SODIUM CHLORIDE 9 MG/ML
INJECTION, SOLUTION INTRAVENOUS CONTINUOUS
Status: ACTIVE | OUTPATIENT
Start: 2023-04-18 | End: 2023-04-18

## 2023-04-18 RX ORDER — SODIUM CHLORIDE 9 MG/ML
INJECTION, SOLUTION INTRAVENOUS CONTINUOUS
Status: DISCONTINUED | OUTPATIENT
Start: 2023-04-18 | End: 2023-04-20

## 2023-04-18 RX ORDER — HEPARIN SOD,PORCINE/0.9 % NACL 1000/500ML
INTRAVENOUS SOLUTION INTRAVENOUS
Status: DISCONTINUED | OUTPATIENT
Start: 2023-04-18 | End: 2023-04-22 | Stop reason: HOSPADM

## 2023-04-18 RX ADMIN — AMIODARONE HYDROCHLORIDE 200 MG: 200 TABLET ORAL at 08:04

## 2023-04-18 RX ADMIN — CLOPIDOGREL BISULFATE 75 MG: 75 TABLET ORAL at 01:04

## 2023-04-18 RX ADMIN — SACUBITRIL AND VALSARTAN 1 TABLET: 97; 103 TABLET, FILM COATED ORAL at 09:04

## 2023-04-18 RX ADMIN — AMIODARONE HYDROCHLORIDE 200 MG: 200 TABLET ORAL at 09:04

## 2023-04-18 RX ADMIN — SPIRONOLACTONE 25 MG: 25 TABLET, FILM COATED ORAL at 09:04

## 2023-04-18 RX ADMIN — HEPARIN SODIUM 17 UNITS/KG/HR: 10000 INJECTION, SOLUTION INTRAVENOUS at 11:04

## 2023-04-18 RX ADMIN — FUROSEMIDE 80 MG: 10 INJECTION, SOLUTION INTRAMUSCULAR; INTRAVENOUS at 09:04

## 2023-04-18 RX ADMIN — ATORVASTATIN CALCIUM 80 MG: 40 TABLET, FILM COATED ORAL at 09:04

## 2023-04-18 RX ADMIN — POTASSIUM BICARBONATE 40 MEQ: 391 TABLET, EFFERVESCENT ORAL at 09:04

## 2023-04-18 RX ADMIN — DIPHENHYDRAMINE HYDROCHLORIDE 50 MG: 50 CAPSULE ORAL at 01:04

## 2023-04-18 RX ADMIN — SODIUM CHLORIDE: 9 INJECTION, SOLUTION INTRAVENOUS at 07:04

## 2023-04-18 RX ADMIN — ASPIRIN 81 MG: 81 TABLET, COATED ORAL at 09:04

## 2023-04-18 RX ADMIN — PANTOPRAZOLE SODIUM 40 MG: 40 TABLET, DELAYED RELEASE ORAL at 09:04

## 2023-04-18 NOTE — PROGRESS NOTES
[facility] Step[facility]  Cardiology  Progress Note    Patient Name: Tera Griffiths  MRN: [id number]  Admission Date: 4/16/2023  Hospital Length of Stay: 2 days  Code Status: Full Code   Attending Physician: Rustam Griggs MD   Primary Care Physician: Primary Doctor No  Expected Discharge Date: 4/20/2023  Principal Problem:NSTEMI (non-ST elevated myocardial infarction)    Subjective:     Hospital Course:   Admitted to CCU for nitro drip in setting of chest pain.  Trop downtrending and appears to be at baseline, overloaded by exam, started on lasix drip with brisk diuresis and improvement back to baseline respiratory status.  Interventional cardiology consulted for ischemic evaluation.        Interval History: naeon.  Denies CP, SOB.  Interventional consulted, will take for [facility] today.      Review of Systems   Constitutional: Negative for chills, decreased appetite and fever.   Cardiovascular:  Positive for chest pain. Negative for dyspnea on exertion, leg swelling, orthopnea and paroxysmal nocturnal dyspnea.   Respiratory:  Negative for shortness of breath.    Endocrine: Negative.    Hematologic/Lymphatic: Negative.    Skin: Negative.    Musculoskeletal: Negative.    Gastrointestinal:  Positive for nausea. Negative for abdominal pain, constipation, diarrhea and vomiting.   Neurological: Negative.    Psychiatric/Behavioral: Negative.     Objective:     Vital Signs (Most Recent):  Temp: 98.1 °F (36.7 °C) (04/18/23 0800)  Pulse: 72 (04/18/23 1000)  Resp: (!) 30 (04/18/23 1000)  BP: 111/84 (04/18/23 1000)  SpO2: 96 % (04/18/23 1000)   Vital Signs (24h Range):  Temp:  [97.8 °F (36.6 °C)-98.4 °F (36.9 °C)] 98.1 °F (36.7 °C)  Pulse:  [59-81] 72  Resp:  [18-38] 30  SpO2:  [87 %-98 %] 96 %  BP: ()/(56-98) 111/84     Weight: 86.2 kg (190 lb 0.6 oz)  Body mass index is 25.77 kg/m².     SpO2: 96 %         Intake/Output Summary (Last 24 hours) at 4/18/2023 1051  Last data filed at 4/18/2023 1000  Gross per 24  hour   Intake 773 ml   Output 2390 ml   Net -1617 ml       Lines/Drains/Airways       Drain  Duration                  Urethral Catheter 04/16/23 2228 Straight-tip 16 Fr. 1 day              Peripheral Intravenous Line  Duration                  Peripheral IV - Single Lumen 04/16/23 1235 20 G Right Antecubital 1 day         Peripheral IV - Single Lumen 04/16/23 2035 20 G Distal;Posterior;Right Forearm 1 day         Peripheral IV - Single Lumen 04/16/23 2120 18 G Anterior;Left Forearm 1 day                    Physical Exam  Constitutional:       General: He is not in acute distress.     Appearance: He is obese. He is not ill-appearing, toxic-appearing or diaphoretic.   HENT:      Head: Normocephalic.      Nose: Nose normal.      Mouth/Throat:      Mouth: Mucous membranes are moist.   Eyes:      Extraocular Movements: Extraocular movements intact.   Cardiovascular:      Rate and Rhythm: Normal rate. Rhythm irregular.      Pulses: Normal pulses.      Heart sounds: No murmur heard.  Pulmonary:      Effort: Pulmonary effort is normal. No respiratory distress.      Breath sounds: Normal breath sounds. No wheezing or rales.   Abdominal:      General: Abdomen is flat. Bowel sounds are normal. There is no distension.      Tenderness: There is no abdominal tenderness.   Musculoskeletal:         General: No swelling or tenderness. Normal range of motion.      Cervical back: Normal range of motion.   Skin:     General: Skin is warm.   Neurological:      Mental Status: He is alert and oriented to person, place, and time. Mental status is at baseline.   Psychiatric:         Mood and Affect: Mood normal.         Behavior: Behavior normal.       Significant Labs: All pertinent lab results from the last 24 hours have been reviewed.    Significant Imaging:  reviewed    Assessment and Plan:     * NSTEMI (non-ST elevated myocardial infarction)  Tera Griffiths is a 57 year old gentleman with past medical history of ischemic  cardiomyopathy EF of 18% with cardiac device placement, coronary artery disease status post STEMI with PCI to RCA in August 2021, persistent atrial fibrillation, hypertension, recent management for decompensated heart failure, was evaluated by HTS in the clinic for advanced options consideration but does not qualify for advanced options. Admitted for NSTEMI and eventually upgraded to CCU for closer monitoring.   NSTEMI likely 2/2 to elevated BP vs decompensated HF. There was an initial concern for Type 1NSTEMI as ACS was initiated, however troponin flat and now downtrending so low suspicion. He was initiated on NTG ands aggressive diuresis. Bedside TTE with EF 10%, CVP 3 (though it appears CVP 3 on priro ADHF admissions). Reports abdominal bloating. Lactic wnl    - Now chest pain free. Off NTG. Nausea/vomiting resolved  - Continue ACS for now  - trops appear at baseline   - interventional cardiology consulted, will take for [facility] today    Hypertensive emergency  /135 on admit. Likely contributed to chest pain. BP now improved. Symptoms have resolved  - Resume home antihypertensives.     Dyslipidemia  Contiune statin    Acute on chronic combined systolic and diastolic congestive heart failure  Patient with ADHF. Diuresing with IV lasix. Now with improvement in chest pain.   - See 'NSTEMI'  - Resume GDMT as tolerated  - decrease lasix to 80 mg IV qd  - reports respiratory status now at baseline s/p diuresis  - will obtain [facility] today  - will consult EP for BiV evaluation given LBBB    Type 2 diabetes mellitus with circulatory disorder, without long-term current use of insulin  Controlled. A1c 5.7.  LDSSI     Atrial fibrillation  Hx of persistent atrial fib s/p KIA/CV 11/29/22 with initiation of amiodarone. On eliquis 5mg BID.   On admission, he was in atrial fib with ectopy, though now appears to have cardioverted to NSR    - Continue with diuresis  - Continue heparin gtt  - maintain K>4, Mg>2    CAD (coronary  artery disease)  S/p PCI in 2021 for AMI.   - Continue antiplatelet therapy and statin      VTE Risk Mitigation (From admission, onward)         Ordered     heparin infusion 1,000 units/500 ml in 0.9% NaCl (on sterile field)  As needed (PRN)         04/18/23 1415     heparin 25,000 units in dextrose 5% (100 units/ml) IV bolus from bag - ADDITIONAL PRN BOLUS - 60 units/kg (max bolus 4000 units)  As needed (PRN)        Question:  Heparin Infusion Adjustment (DO NOT MODIFY ANSWER)  Answer:  [email or web address]\[email or web address]\Images\Pharmacy\HeparinInfusions\heparin LOW INTENSITY nomogram for [facility] [id number]    04/16/23 1520     heparin 25,000 units in dextrose 5% (100 units/ml) IV bolus from bag - ADDITIONAL PRN BOLUS - 30 units/kg (max bolus 4000 units)  As needed (PRN)        Question:  Heparin Infusion Adjustment (DO NOT MODIFY ANSWER)  Answer:  [email or web address]\[email or web address]\Images\Pharmacy\HeparinInfusions\heparin LOW INTENSITY nomogram for [facility] [id number].pdf    04/16/23 1520     IP VTE HIGH RISK PATIENT  Once         04/16/23 1713     Place sequential compression device  Until discontinued         04/16/23 1713     heparin 25,000 units in dextrose 5% 250 mL (100 units/mL) infusion LOW INTENSITY nomogram - [facility]  Continuous        Question Answer Comment   Heparin Infusion Adjustment (DO NOT MODIFY ANSWER) [email or web address]\[email or web address]\Images\Pharmacy\HeparinInfusions\heparin LOW INTENSITY nomogram for [facility] [id number].pdf    Begin at (in units/kg/hr) 12        04/16/23 1520                Barron Finch MD  Cardiology  [facility] - Cardiology Stepdown

## 2023-04-18 NOTE — HPI
57 year old male with past medical history of ischemic cardiomyopathy EF of 18% s/p ICD, coronary artery disease status post STEMI with PCI to RCA in August 2021 done at [facility], persistent atrial fibrillation, hypertension, who presented for chest pain. Patient reports worsening chest pain over the past 2 months. On both exertion and rest. Describes pain as pressure like with no radiation. On day of presentation, he states he developed recurrent chest pain at 900 a.m. while lying down so presented to ED. Work up concerning for acute decompensated heart failure and uncontrolled hypertension. Trop peak 0.15. EKG with LVH so unable to interpret ST changes. He was initially admitted to  for NSTEMI/ADHF. Started on ACS protocol and moved to CCU due to continued chest pain. He was then started on nitro gtt. IC consulted for ischemic evaluation

## 2023-04-18 NOTE — CONSULTS
[facility]  Interventional Cardiology  Consult Note    Patient Name: Tera Griffiths  MRN: [id number]  Admission Date: 4/16/2023  Hospital Length of Stay: 2 days  Code Status: Full Code   Attending Provider: Rustam Griggs MD  Consulting Provider: Favian Champion MD  Primary Care Physician: Primary Doctor No  Principal Problem:NSTEMI (non-ST elevated myocardial infarction)    Patient information was obtained from patient and past medical records.     Inpatient consult to Interventional Cardiology  Consult performed by: Favian Champion MD  Consult ordered by: Jose Tang MD        Subjective:     Chief Complaint:  Chest pain    HPI:  57 year old male with past medical history of ischemic cardiomyopathy EF of 18% s/p ICD, coronary artery disease status post STEMI with PCI to RCA in August 2021 done at OSH, persistent atrial fibrillation, hypertension, who presented for chest pain. Patient reports worsening chest pain over the past 2 months. On both exertion and rest. Describes pain as pressure like with no radiation. On day of presentation, he states he developed recurrent chest pain at 900 a.m. while lying down so presented to ED. Work up concerning for acute decompensated heart failure and uncontrolled hypertension. Trop peak 0.15. EKG with LVH so unable to interpret ST changes. He was initially admitted to  for NSTEMI/ADHF. Started on ACS protocol and moved to CCU due to continued chest pain. He was then started on nitro gtt. IC consulted for ischemic evaluation       Past Medical History:   Diagnosis Date    Atrial fibrillation     Encounter for blood transfusion        Past Surgical History:   Procedure Laterality Date    CARDIAC DEFIBRILLATOR PLACEMENT Left     HERNIA REPAIR      RIGHT HEART CATHETERIZATION Right 9/30/2022    Procedure: INSERTION, CATHETER, RIGHT HEART;  Surgeon: Caden Aden MD;  Location: [facility] CATH LAB;  Service: Cardiology;  Laterality: Right;    TREATMENT OF  CARDIAC ARRHYTHMIA N/A 11/22/2022    Procedure: CARDIOVERSION;  Surgeon: Marvin Sanon MD;  Location: [facility] EP LAB;  Service: Cardiology;  Laterality: N/A;  afib, KIA, DCCV, anes, MB, 3 Prep       Review of patient's allergies indicates:  No Known Allergies    PTA Medications   Medication Sig    amiodarone (PACERONE) 200 MG Tab Take 1 tablet (200 mg total) by mouth 2 (two) times daily.    apixaban (ELIQUIS) 5 mg Tab Take 5 mg by mouth 2 (two) times daily.    atorvastatin (LIPITOR) 80 MG tablet Take 80 mg by mouth once daily.    clopidogreL (PLAVIX) 75 mg tablet Take 75 mg by mouth once daily.    empagliflozin (JARDIANCE) 10 mg tablet Take 1 tablet (10 mg total) by mouth once daily.    ferrous sulfate (FEOSOL) 325 mg (65 mg iron) Tab tablet Take 325 mg by mouth every other day.    glimepiride (AMARYL) 4 MG tablet Take 4 mg by mouth every morning.    isosorbide mononitrate (ISMO,MONOKET) 10 mg tablet Take 1 tablet (10 mg total) by mouth 2 (two) times daily.    LIDOcaine (LIDODERM) 5 % Place 1 patch onto the skin once daily.    metoprolol succinate (TOPROL-XL) 25 MG 24 hr tablet Take 1 tablet (25 mg total) by mouth every evening.    pantoprazole (PROTONIX) 40 MG tablet Take 40 mg by mouth once daily.    potassium chloride SA (K-DUR,KLOR-CON) 20 MEQ tablet Take 1 tablet (20 mEq total) by mouth once daily.    sacubitriL-valsartan (ENTRESTO)  mg per tablet Take 1 tablet by mouth 2 (two) times daily.    spironolactone (ALDACTONE) 25 MG tablet Take 25 mg by mouth once daily.    tamsulosin (FLOMAX) 0.4 mg Cap Take 1 capsule by mouth every evening.    torsemide (DEMADEX) 20 MG Tab Take 1 tablet (20 mg total) by mouth once daily.     Family History    None       Tobacco Use    Smoking status: Never    Smokeless tobacco: Never   Substance and Sexual Activity    Alcohol use: Never    Drug use: Never    Sexual activity: Not on file     Review of Systems   Constitutional: Positive for malaise/fatigue.   Cardiovascular:   Positive for chest pain.   Objective:     Vital Signs (Most Recent):  Temp: 98.4 °F (36.9 °C) (04/18/23 1200)  Pulse: 84 (04/18/23 1200)  Resp: (!) 34 (04/18/23 1200)  BP: 110/66 (04/18/23 1200)  SpO2: (!) 94 % (04/18/23 1200)   Vital Signs (24h Range):  Temp:  [97.9 °F (36.6 °C)-98.4 °F (36.9 °C)] 98.4 °F (36.9 °C)  Pulse:  [63-84] 84  Resp:  [18-38] 34  SpO2:  [88 %-98 %] 94 %  BP: ()/(56-98) 110/66     Weight: 86.2 kg (190 lb 0.6 oz)  Body mass index is 25.77 kg/m².    SpO2: (!) 94 %         Intake/Output Summary (Last 24 hours) at 4/18/2023 1215  Last data filed at 4/18/2023 1201  Gross per 24 hour   Intake 775.36 ml   Output 2200 ml   Net -1424.64 ml       Lines/Drains/Airways       Drain  Duration                  Urethral Catheter 04/16/23 2228 Straight-tip 16 Fr. 1 day              Peripheral Intravenous Line  Duration                  Peripheral IV - Single Lumen 04/16/23 1235 20 G Right Antecubital 1 day         Peripheral IV - Single Lumen 04/16/23 2035 20 G Distal;Posterior;Right Forearm 1 day         Peripheral IV - Single Lumen 04/16/23 2120 18 G Anterior;Left Forearm 1 day                    Physical Exam  Constitutional:       General: He is not in acute distress.     Appearance: Normal appearance. He is not ill-appearing.   HENT:      Head: Normocephalic and atraumatic.      Nose: No congestion.      Mouth/Throat:      Mouth: Mucous membranes are moist.   Eyes:      Conjunctiva/sclera: Conjunctivae normal.   Cardiovascular:      Rate and Rhythm: Normal rate and regular rhythm.      Pulses: Normal pulses.   Pulmonary:      Effort: Pulmonary effort is normal. No respiratory distress.   Abdominal:      General: Abdomen is flat.   Musculoskeletal:      Cervical back: Normal range of motion.      Right lower leg: No edema.      Left lower leg: No edema.   Skin:     Capillary Refill: Capillary refill takes less than 2 seconds.      Findings: No rash.   Neurological:      Mental Status: He is alert  and oriented to person, place, and time.   Psychiatric:         Mood and Affect: Mood normal.       Significant Labs: All pertinent lab results from the last 24 hours have been reviewed.        Assessment and Plan:     Cardiac/Vascular  * NSTEMI (non-ST elevated myocardial infarction)  - Plan is for LHC/angiogram +/- PCI.   - TTE: 18%  - Anti-platelet therapy: ASA and plavix   - Access: R groin  - Creatinine/CrCl: 1.6  - Hemoglobin: 16.5  - Allergies: no contrast allergies.   - Pre-op hydration: 3 cc/kg/hr x 1 hour  - Pre-op med: 50 mg Benadryl    All patient's questions were answered.  The risks, benefits and alternatives of the procedure were explained to the patient.   The risks of coronary angiography include but are not limited to: bleeding, infection, heart rhythm abnormalities, allergic reactions, kidney injury and potential need for dialysis, stroke and death.   Should stenting be indicated, the patient has agreed to dual anti-platelet therapy for 1-consecutive year with a drug-eluting stent and a minimum of 1-month with the use of a bare metal stent  Additionally, pt is aware that non-compliance is likely to result in stent clotting with heart attack, heart failure, and/or death  The risks of moderate sedation include hypotension, respiratory depression, arrhythmias, bronchospasm, and death.   Informed consent was obtained and the  patient is agreeable to proceed with the procedure.                  VTE Risk Mitigation (From admission, onward)           Ordered     heparin 25,000 units in dextrose 5% (100 units/ml) IV bolus from bag - ADDITIONAL PRN BOLUS - 60 units/kg (max bolus 4000 units)  As needed (PRN)        Question:  Heparin Infusion Adjustment (DO NOT MODIFY ANSWER)  Answer:  [email or web address]\epic\Images\Pharmacy\HeparinInfusions\heparin LOW INTENSITY nomogram for [facility] [id number].pdf    04/16/23 1520     heparin 25,000 units in dextrose 5% (100 units/ml) IV bolus from bag - ADDITIONAL PRN BOLUS - 30  units/kg (max bolus 4000 units)  As needed (PRN)        Question:  Heparin Infusion Adjustment (DO NOT MODIFY ANSWER)  Answer:  \\[email or web address]\epic\Images\Pharmacy\HeparinInfusions\heparin LOW INTENSITY nomogram for [facility] [id number].pdf    04/16/23 1520     IP VTE HIGH RISK PATIENT  Once         04/16/23 1713     Place sequential compression device  Until discontinued         04/16/23 1713     heparin 25,000 units in dextrose 5% 250 mL (100 units/mL) infusion LOW INTENSITY nomogram - [facility]  Continuous        Question Answer Comment   Heparin Infusion Adjustment (DO NOT MODIFY ANSWER) \\[email or web address]\epic\Images\Pharmacy\HeparinInfusions\heparin LOW INTENSITY nomogram for [facility] [id number].pdf    Begin at (in units/kg/hr) 12        04/16/23 1520                        Favian Heathor, MD  Interventional Cardiology   [facility] - Neuro Critical Care      Staff:  I have personally taken the history and examined this patient and agree with the fellow's note as stated above and amended it accordingly :-)  Small NSTEMI in patient wtth known CAD by outside cath.  Agree with coronary angiography and possible intervention.

## 2023-04-18 NOTE — SUBJECTIVE & OBJECTIVE
Interval History: naeon.  Denies CP, SOB.  Interventional consulted, will take for [facility] today.      Review of Systems   Constitutional: Negative for chills, decreased appetite and fever.   Cardiovascular:  Positive for chest pain. Negative for dyspnea on exertion, leg swelling, orthopnea and paroxysmal nocturnal dyspnea.   Respiratory:  Negative for shortness of breath.    Endocrine: Negative.    Hematologic/Lymphatic: Negative.    Skin: Negative.    Musculoskeletal: Negative.    Gastrointestinal:  Positive for nausea. Negative for abdominal pain, constipation, diarrhea and vomiting.   Neurological: Negative.    Psychiatric/Behavioral: Negative.     Objective:     Vital Signs (Most Recent):  Temp: 98.1 °F (36.7 °C) (04/18/23 0800)  Pulse: 72 (04/18/23 1000)  Resp: (!) 30 (04/18/23 1000)  BP: 111/84 (04/18/23 1000)  SpO2: 96 % (04/18/23 1000)   Vital Signs (24h Range):  Temp:  [97.8 °F (36.6 °C)-98.4 °F (36.9 °C)] 98.1 °F (36.7 °C)  Pulse:  [59-81] 72  Resp:  [18-38] 30  SpO2:  [87 %-98 %] 96 %  BP: ()/(56-98) 111/84     Weight: 86.2 kg (190 lb 0.6 oz)  Body mass index is 25.77 kg/m².     SpO2: 96 %         Intake/Output Summary (Last 24 hours) at 4/18/2023 1051  Last data filed at 4/18/2023 1000  Gross per 24 hour   Intake 773 ml   Output 2390 ml   Net -1617 ml       Lines/Drains/Airways       Drain  Duration                  Urethral Catheter 04/16/23 2228 Straight-tip 16 Fr. 1 day              Peripheral Intravenous Line  Duration                  Peripheral IV - Single Lumen 04/16/23 1235 20 G Right Antecubital 1 day         Peripheral IV - Single Lumen 04/16/23 2035 20 G Distal;Posterior;Right Forearm 1 day         Peripheral IV - Single Lumen 04/16/23 2120 18 G Anterior;Left Forearm 1 day                    Physical Exam  Constitutional:       General: He is not in acute distress.     Appearance: He is obese. He is not ill-appearing, toxic-appearing or diaphoretic.   HENT:      Head: Normocephalic.       Nose: Nose normal.      Mouth/Throat:      Mouth: Mucous membranes are moist.   Eyes:      Extraocular Movements: Extraocular movements intact.   Cardiovascular:      Rate and Rhythm: Normal rate. Rhythm irregular.      Pulses: Normal pulses.      Heart sounds: No murmur heard.  Pulmonary:      Effort: Pulmonary effort is normal. No respiratory distress.      Breath sounds: Normal breath sounds. No wheezing or rales.   Abdominal:      General: Abdomen is flat. Bowel sounds are normal. There is no distension.      Tenderness: There is no abdominal tenderness.   Musculoskeletal:         General: No swelling or tenderness. Normal range of motion.      Cervical back: Normal range of motion.   Skin:     General: Skin is warm.   Neurological:      Mental Status: He is alert and oriented to person, place, and time. Mental status is at baseline.   Psychiatric:         Mood and Affect: Mood normal.         Behavior: Behavior normal.       Significant Labs: All pertinent lab results from the last 24 hours have been reviewed.    Significant Imaging:  reviewed

## 2023-04-18 NOTE — ASSESSMENT & PLAN NOTE
Tera Griffiths is a 57 year old gentleman with past medical history of ischemic cardiomyopathy EF of 18% with cardiac device placement, coronary artery disease status post STEMI with PCI to RCA in August 2021, persistent atrial fibrillation, hypertension, recent management for decompensated heart failure, was evaluated by HTS in the clinic for advanced options consideration but does not qualify for advanced options. Admitted for NSTEMI and eventually upgraded to CCU for closer monitoring.   NSTEMI likely 2/2 to elevated BP vs decompensated HF. There was an initial concern for Type 1NSTEMI as ACS was initiated, however troponin flat and now downtrending so low suspicion. He was initiated on NTG ands aggressive diuresis. Bedside TTE with EF 10%, CVP 3 (though it appears CVP 3 on priro ADHF admissions). Reports abdominal bloating. Lactic wnl    - Now chest pain free. Off NTG. Nausea/vomiting resolved  - Continue ACS for now  - trops appear at baseline   - interventional cardiology consulted, will take for [facility] today

## 2023-04-18 NOTE — SUBJECTIVE & OBJECTIVE
Past Medical History:   Diagnosis Date    Atrial fibrillation     Encounter for blood transfusion        Past Surgical History:   Procedure Laterality Date    CARDIAC DEFIBRILLATOR PLACEMENT Left     HERNIA REPAIR      RIGHT HEART CATHETERIZATION Right 9/30/2022    Procedure: INSERTION, CATHETER, RIGHT HEART;  Surgeon: Caden Aden MD;  Location: [facility] CATH LAB;  Service: Cardiology;  Laterality: Right;    TREATMENT OF CARDIAC ARRHYTHMIA N/A 11/22/2022    Procedure: CARDIOVERSION;  Surgeon: Marvin Sanon MD;  Location: [facility] EP LAB;  Service: Cardiology;  Laterality: N/A;  afib, KIA, DCCV, anes, MB, 3 Prep       Review of patient's allergies indicates:  No Known Allergies    PTA Medications   Medication Sig    amiodarone (PACERONE) 200 MG Tab Take 1 tablet (200 mg total) by mouth 2 (two) times daily.    apixaban (ELIQUIS) 5 mg Tab Take 5 mg by mouth 2 (two) times daily.    atorvastatin (LIPITOR) 80 MG tablet Take 80 mg by mouth once daily.    clopidogreL (PLAVIX) 75 mg tablet Take 75 mg by mouth once daily.    empagliflozin (JARDIANCE) 10 mg tablet Take 1 tablet (10 mg total) by mouth once daily.    ferrous sulfate (FEOSOL) 325 mg (65 mg iron) Tab tablet Take 325 mg by mouth every other day.    glimepiride (AMARYL) 4 MG tablet Take 4 mg by mouth every morning.    isosorbide mononitrate (ISMO,MONOKET) 10 mg tablet Take 1 tablet (10 mg total) by mouth 2 (two) times daily.    LIDOcaine (LIDODERM) 5 % Place 1 patch onto the skin once daily.    metoprolol succinate (TOPROL-XL) 25 MG 24 hr tablet Take 1 tablet (25 mg total) by mouth every evening.    pantoprazole (PROTONIX) 40 MG tablet Take 40 mg by mouth once daily.    potassium chloride SA (K-DUR,KLOR-CON) 20 MEQ tablet Take 1 tablet (20 mEq total) by mouth once daily.    sacubitriL-valsartan (ENTRESTO)  mg per tablet Take 1 tablet by mouth 2 (two) times daily.    spironolactone (ALDACTONE) 25 MG tablet Take 25 mg by mouth once daily.    tamsulosin (FLOMAX)  0.4 mg Cap Take 1 capsule by mouth every evening.    torsemide (DEMADEX) 20 MG Tab Take 1 tablet (20 mg total) by mouth once daily.     Family History    None       Tobacco Use    Smoking status: Never    Smokeless tobacco: Never   Substance and Sexual Activity    Alcohol use: Never    Drug use: Never    Sexual activity: Not on file     Review of Systems   Constitutional: Positive for malaise/fatigue.   Cardiovascular:  Positive for chest pain.   Objective:     Vital Signs (Most Recent):  Temp: 98.4 °F (36.9 °C) (04/18/23 1200)  Pulse: 84 (04/18/23 1200)  Resp: (!) 34 (04/18/23 1200)  BP: 110/66 (04/18/23 1200)  SpO2: (!) 94 % (04/18/23 1200)   Vital Signs (24h Range):  Temp:  [97.9 °F (36.6 °C)-98.4 °F (36.9 °C)] 98.4 °F (36.9 °C)  Pulse:  [63-84] 84  Resp:  [18-38] 34  SpO2:  [88 %-98 %] 94 %  BP: ()/(56-98) 110/66     Weight: 86.2 kg (190 lb 0.6 oz)  Body mass index is 25.77 kg/m².    SpO2: (!) 94 %         Intake/Output Summary (Last 24 hours) at 4/18/2023 1215  Last data filed at 4/18/2023 1201  Gross per 24 hour   Intake 775.36 ml   Output 2200 ml   Net -1424.64 ml       Lines/Drains/Airways       Drain  Duration                  Urethral Catheter 04/16/23 2228 Straight-tip 16 Fr. 1 day              Peripheral Intravenous Line  Duration                  Peripheral IV - Single Lumen 04/16/23 1235 20 G Right Antecubital 1 day         Peripheral IV - Single Lumen 04/16/23 2035 20 G Distal;Posterior;Right Forearm 1 day         Peripheral IV - Single Lumen 04/16/23 2120 18 G Anterior;Left Forearm 1 day                    Physical Exam  Constitutional:       General: He is not in acute distress.     Appearance: Normal appearance. He is not ill-appearing.   HENT:      Head: Normocephalic and atraumatic.      Nose: No congestion.      Mouth/Throat:      Mouth: Mucous membranes are moist.   Eyes:      Conjunctiva/sclera: Conjunctivae normal.   Cardiovascular:      Rate and Rhythm: Normal rate and regular rhythm.       Pulses: Normal pulses.   Pulmonary:      Effort: Pulmonary effort is normal. No respiratory distress.   Abdominal:      General: Abdomen is flat.   Musculoskeletal:      Cervical back: Normal range of motion.      Right lower leg: No edema.      Left lower leg: No edema.   Skin:     Capillary Refill: Capillary refill takes less than 2 seconds.      Findings: No rash.   Neurological:      Mental Status: He is alert and oriented to person, place, and time.   Psychiatric:         Mood and Affect: Mood normal.       Significant Labs: All pertinent lab results from the last 24 hours have been reviewed.

## 2023-04-18 NOTE — ASSESSMENT & PLAN NOTE
- Plan is for LHC/angiogram +/- PCI today, keep npo  - TTE: 18%  - Anti-platelet therapy: ASA and plavix   - Access: R groin  - Creatinine/CrCl: 1.6  - Hemoglobin: 16.5  - Allergies: no contrast allergies.   - Pre-op hydration: 3 cc/kg/hr x 1 hour  - Pre-op med: 50 mg Benadryl     All patient's questions were answered.   The risks, benefits and alternatives of the procedure were explained to the patient.    The risks of coronary angiography include but are not limited to: bleeding, infection, heart rhythm abnormalities, allergic reactions, kidney injury and potential need for dialysis, stroke and death.    Should stenting be indicated, the patient has agreed to dual anti-platelet therapy for 1-consecutive year with a drug-eluting stent and a minimum of 1-month with the use of a bare metal stent   Additionally, pt is aware that non-compliance is likely to result in stent clotting with heart attack, heart failure, and/or death   The risks of moderate sedation include hypotension, respiratory depression, arrhythmias, bronchospasm, and death.    Informed consent was obtained and the  patient is agreeable to proceed with the procedure.

## 2023-04-18 NOTE — NURSING
Blood in urine noted @ 0615; Cardiac team called and notified. Resident will notify day team to determine plan of care.

## 2023-04-18 NOTE — ASSESSMENT & PLAN NOTE
- Plan is for LHC/angiogram +/- PCI.   - TTE: 18%  - Anti-platelet therapy: ASA and plavix   - Access: R groin  - Creatinine/CrCl: 1.6  - Hemoglobin: 16.5  - Allergies: no contrast allergies.   - Pre-op hydration: 3 cc/kg/hr x 1 hour  - Pre-op med: 50 mg Benadryl     All patient's questions were answered.   The risks, benefits and alternatives of the procedure were explained to the patient.    The risks of coronary angiography include but are not limited to: bleeding, infection, heart rhythm abnormalities, allergic reactions, kidney injury and potential need for dialysis, stroke and death.    Should stenting be indicated, the patient has agreed to dual anti-platelet therapy for 1-consecutive year with a drug-eluting stent and a minimum of 1-month with the use of a bare metal stent   Additionally, pt is aware that non-compliance is likely to result in stent clotting with heart attack, heart failure, and/or death   The risks of moderate sedation include hypotension, respiratory depression, arrhythmias, bronchospasm, and death.    Informed consent was obtained and the  patient is agreeable to proceed with the procedure.

## 2023-04-18 NOTE — BRIEF OP NOTE
[facility] - Cath Lab  Cardiac Catheterization Procedures   Brief Op Note    SUMMARY     Tera Griffiths  [id number]    Date of Procedure: 4/18/2023    Procedures: [facility]    Indications: acute coronary syndrome.      Pre-Procedure Diagnosis: NSTEMI    Post-Procedure Diagnosis: Non obstructive CAD    Providers: RAMBO PARIS    Assisting Providers: MARJ RODRIGUEZ MD    Anesthesia: RN IV Sedation Procedural Sedation    Complications: None; patient tolerated the procedure well.    Estimated Blood Loss (EBL): less than 50 mL           Implants: NONE    Findings: Non obstructive CAD     Recommendations:   Post procedural care with close monitoring per protocol.   Optimization of medical therapy.   Continue DAPT ASA 81 mg daily and Plavix 75 mg daily  Aggressive risk factor and life style modifications.   Medication compliance.   Plan of care explained to the patient who voiced understanding.   Follow up in clinic as scheduled.   Please call with any questions.   Full op note to follow.     Condition: stable    Disposition: PACU-hemodynamicallystable    Attestation: I was present and scrubbed for the entire procedure.    Manuel Shah MD

## 2023-04-18 NOTE — PLAN OF CARE
[facility] Care Plan    POC reviewed with Tera Griffiths and family at 0500. Pt verbalized understanding. Questions and concerns addressed. No acute events overnight. Pt progressing toward goals. Will continue to monitor. See below and flowsheets for full assessment and VS info.     - Heparin gtt titrated per nomogram; gtt increased to 17 units  - Blood noted in urine; cardiac team notified; see note for details          Is this a stroke patient? no    Neuro:  [place] Coma Scale  Best Eye Response: 4-->(E4) spontaneous  Best Motor Response: 6-->(M6) obeys commands  Best Verbal Response: 5-->(V5) oriented  [place] Coma Scale Score: 15  Assessment Qualifiers: patient not sedated/intubated, no eye obstruction present  Pupil PERRLA: yes     24hr Temp:  [97.8 °F (36.6 °C)-98.4 °F (36.9 °C)]     CV:   Rhythm: normal sinus rhythm  BP goals:   SBP < 180  MAP > 65    Resp:      Oxygen Concentration (%): 28    Plan: N/A    GI/:     Diet/Nutrition Received: consistent carb/diabetic diet  Last Bowel Movement: 04/17/23  Voiding Characteristics: urethral catheter (bladder)    Intake/Output Summary (Last 24 hours) at 4/18/2023 0516  Last data filed at 4/18/2023 0505  Gross per 24 hour   Intake 1053.67 ml   Output 2510 ml   Net -1456.33 ml     Unmeasured Output  Stool Occurrence: 0    Labs/Accuchecks:  Recent Labs   Lab 04/18/23 0414   WBC 5.34   RBC 6.13   HGB 16.5   HCT 51.3         Recent Labs   Lab 04/16/23 2124 04/17/23  0332 04/18/23  0414      < > 138   K 3.2*   < > 3.9   CO2 22*   < > 23      < > 101   BUN 16   < > 26*   CREATININE 1.5*   < > 1.6*   ALKPHOS 74  --   --    ALT 13  --   --    AST 15  --   --    BILITOT 0.8  --   --     < > = values in this interval not displayed.      Recent Labs   Lab 04/18/23 0414   APTT 36.2*      Recent Labs   Lab 04/16/23 2002   TROPONINI 0.102*       Electrolytes: No replacement orders  Accuchecks: ACHS    Gtts:   heparin (porcine) in D5W 15 Units/kg/hr (04/17/23  2300)       LDA/Wounds:  Lines/Drains/Airways       Drain  Duration                  Urethral Catheter 04/16/23 2228 Straight-tip 16 Fr. 1 day              Peripheral Intravenous Line  Duration                  Peripheral IV - Single Lumen 04/16/23 1235 20 G Right Antecubital 1 day         Peripheral IV - Single Lumen 04/16/23 2035 20 G Distal;Posterior;Right Forearm 1 day         Peripheral IV - Single Lumen 04/16/23 2120 18 G Anterior;Left Forearm 1 day                  Wounds: No  Wound care consulted: No

## 2023-04-19 PROBLEM — N17.9 ACUTE KIDNEY INJURY SUPERIMPOSED ON CKD: Status: ACTIVE | Noted: 2023-04-19

## 2023-04-19 PROBLEM — N18.9 ACUTE KIDNEY INJURY SUPERIMPOSED ON CKD: Status: ACTIVE | Noted: 2023-04-19

## 2023-04-19 LAB
ANION GAP SERPL CALC-SCNC: 17 MMOL/L (ref 8–16)
APTT PPP: 46 SEC (ref 21–32)
BASOPHILS # BLD AUTO: 0.02 K/UL (ref 0–0.2)
BASOPHILS NFR BLD: 0.3 % (ref 0–1.9)
BUN SERPL-MCNC: 40 MG/DL (ref 6–20)
CALCIUM SERPL-MCNC: 9.8 MG/DL (ref 8.7–10.5)
CHLORIDE SERPL-SCNC: 98 MMOL/L (ref 95–110)
CO2 SERPL-SCNC: 20 MMOL/L (ref 23–29)
CREAT SERPL-MCNC: 2.5 MG/DL (ref 0.5–1.4)
DIFFERENTIAL METHOD: ABNORMAL
EOSINOPHIL # BLD AUTO: 0.1 K/UL (ref 0–0.5)
EOSINOPHIL NFR BLD: 1.4 % (ref 0–8)
ERYTHROCYTE [DISTWIDTH] IN BLOOD BY AUTOMATED COUNT: 14.5 % (ref 11.5–14.5)
EST. GFR  (NO RACE VARIABLE): 29.6 ML/MIN/1.73 M^2
GLUCOSE SERPL-MCNC: 99 MG/DL (ref 70–110)
HCT VFR BLD AUTO: 51.4 % (ref 40–54)
HGB BLD-MCNC: 16.2 G/DL (ref 14–18)
IMM GRANULOCYTES # BLD AUTO: 0.01 K/UL (ref 0–0.04)
IMM GRANULOCYTES NFR BLD AUTO: 0.2 % (ref 0–0.5)
LYMPHOCYTES # BLD AUTO: 1.2 K/UL (ref 1–4.8)
LYMPHOCYTES NFR BLD: 19.5 % (ref 18–48)
MCH RBC QN AUTO: 26.7 PG (ref 27–31)
MCHC RBC AUTO-ENTMCNC: 31.5 G/DL (ref 32–36)
MCV RBC AUTO: 85 FL (ref 82–98)
MONOCYTES # BLD AUTO: 1 K/UL (ref 0.3–1)
MONOCYTES NFR BLD: 15.6 % (ref 4–15)
NEUTROPHILS # BLD AUTO: 3.9 K/UL (ref 1.8–7.7)
NEUTROPHILS NFR BLD: 63 % (ref 38–73)
NRBC BLD-RTO: 0 /100 WBC
PLATELET # BLD AUTO: 322 K/UL (ref 150–450)
PMV BLD AUTO: 11.9 FL (ref 9.2–12.9)
POCT GLUCOSE: 132 MG/DL (ref 70–110)
POTASSIUM SERPL-SCNC: 4 MMOL/L (ref 3.5–5.1)
RBC # BLD AUTO: 6.06 M/UL (ref 4.6–6.2)
SODIUM SERPL-SCNC: 135 MMOL/L (ref 136–145)
WBC # BLD AUTO: 6.21 K/UL (ref 3.9–12.7)

## 2023-04-19 PROCEDURE — 36415 COLL VENOUS BLD VENIPUNCTURE: CPT | Performed by: EMERGENCY MEDICINE

## 2023-04-19 PROCEDURE — 63600175 PHARM REV CODE 636 W HCPCS: Performed by: EMERGENCY MEDICINE

## 2023-04-19 PROCEDURE — 99231 SBSQ HOSP IP/OBS SF/LOW 25: CPT | Mod: ,,, | Performed by: INTERNAL MEDICINE

## 2023-04-19 PROCEDURE — 36415 COLL VENOUS BLD VENIPUNCTURE: CPT | Performed by: INTERNAL MEDICINE

## 2023-04-19 PROCEDURE — 25000003 PHARM REV CODE 250: Performed by: INTERNAL MEDICINE

## 2023-04-19 PROCEDURE — 80048 BASIC METABOLIC PNL TOTAL CA: CPT | Performed by: INTERNAL MEDICINE

## 2023-04-19 PROCEDURE — 99231 PR SUBSEQUENT HOSPITAL CARE,LEVL I: ICD-10-PCS | Mod: ,,, | Performed by: INTERNAL MEDICINE

## 2023-04-19 PROCEDURE — 25000003 PHARM REV CODE 250: Performed by: STUDENT IN AN ORGANIZED HEALTH CARE EDUCATION/TRAINING PROGRAM

## 2023-04-19 PROCEDURE — 20600001 HC STEP DOWN PRIVATE ROOM

## 2023-04-19 PROCEDURE — 85730 THROMBOPLASTIN TIME PARTIAL: CPT | Performed by: INTERNAL MEDICINE

## 2023-04-19 PROCEDURE — 85025 COMPLETE CBC W/AUTO DIFF WBC: CPT | Performed by: EMERGENCY MEDICINE

## 2023-04-19 RX ADMIN — ASPIRIN 81 MG: 81 TABLET, COATED ORAL at 09:04

## 2023-04-19 RX ADMIN — HEPARIN SODIUM 17 UNITS/KG/HR: 10000 INJECTION, SOLUTION INTRAVENOUS at 12:04

## 2023-04-19 RX ADMIN — PANTOPRAZOLE SODIUM 40 MG: 40 TABLET, DELAYED RELEASE ORAL at 09:04

## 2023-04-19 RX ADMIN — CLOPIDOGREL BISULFATE 75 MG: 75 TABLET ORAL at 09:04

## 2023-04-19 RX ADMIN — AMIODARONE HYDROCHLORIDE 200 MG: 200 TABLET ORAL at 09:04

## 2023-04-19 RX ADMIN — ATORVASTATIN CALCIUM 80 MG: 40 TABLET, FILM COATED ORAL at 09:04

## 2023-04-19 NOTE — SUBJECTIVE & OBJECTIVE
Interval History: mildly hypotensive overnight, antihypertensives held.  This AM, BP 90-110s systolic.  Cr 1.6->2.5.      Review of Systems   Constitutional: Negative for chills, decreased appetite and fever.   Cardiovascular:  Positive for chest pain. Negative for dyspnea on exertion, leg swelling, orthopnea and paroxysmal nocturnal dyspnea.   Respiratory:  Negative for shortness of breath.    Endocrine: Negative.    Hematologic/Lymphatic: Negative.    Skin: Negative.    Musculoskeletal: Negative.    Gastrointestinal:  Positive for nausea. Negative for abdominal pain, constipation, diarrhea and vomiting.   Neurological: Negative.    Psychiatric/Behavioral: Negative.     Objective:     Vital Signs (Most Recent):  Temp: 98.3 °F (36.8 °C) (04/19/23 0754)  Pulse: 73 (04/19/23 0754)  Resp: 16 (04/19/23 0807)  BP: 118/80 (04/19/23 0807)  SpO2: (!) 93 % (04/19/23 0754)   Vital Signs (24h Range):  Temp:  [97.9 °F (36.6 °C)-99.8 °F (37.7 °C)] 98.3 °F (36.8 °C)  Pulse:  [65-84] 73  Resp:  [16-34] 16  SpO2:  [93 %-96 %] 93 %  BP: ()/() 118/80     Weight: 86.2 kg (190 lb 0.6 oz)  Body mass index is 25.77 kg/m².     SpO2: (!) 93 %         Intake/Output Summary (Last 24 hours) at 4/19/2023 0914  Last data filed at 4/18/2023 1942  Gross per 24 hour   Intake 474.05 ml   Output 830 ml   Net -355.95 ml       Lines/Drains/Airways       Drain  Duration                  Urethral Catheter 04/16/23 2228 Straight-tip 16 Fr. 2 days              Peripheral Intravenous Line  Duration                  Peripheral IV - Single Lumen 04/16/23 1235 20 G Right Antecubital 2 days         Peripheral IV - Single Lumen 04/16/23 2035 20 G Distal;Posterior;Right Forearm 2 days         Peripheral IV - Single Lumen 04/16/23 2120 18 G Anterior;Left Forearm 2 days                    Physical Exam  Constitutional:       General: He is not in acute distress.     Appearance: He is obese. He is not ill-appearing, toxic-appearing or diaphoretic.    HENT:      Head: Normocephalic.      Nose: Nose normal.      Mouth/Throat:      Mouth: Mucous membranes are moist.   Eyes:      Extraocular Movements: Extraocular movements intact.   Cardiovascular:      Rate and Rhythm: Normal rate. Rhythm irregular.      Pulses: Normal pulses.      Heart sounds: No murmur heard.  Pulmonary:      Effort: Pulmonary effort is normal. No respiratory distress.      Breath sounds: Normal breath sounds. No wheezing or rales.   Abdominal:      General: Abdomen is flat. Bowel sounds are normal. There is no distension.      Tenderness: There is no abdominal tenderness.   Musculoskeletal:         General: No swelling or tenderness. Normal range of motion.      Cervical back: Normal range of motion.   Skin:     General: Skin is warm.   Neurological:      Mental Status: He is alert and oriented to person, place, and time. Mental status is at baseline.   Psychiatric:         Mood and Affect: Mood normal.         Behavior: Behavior normal.       Significant Labs: All pertinent lab results from the last 24 hours have been reviewed.    Significant Imaging:  reviewed

## 2023-04-19 NOTE — NURSING
Resume care from previous nurse, pt voice no complaints, lying in bed w/ bed in lowest position and locked. Vis not connected - will recalibrate once morning nmeds are giving.. Call bell within reach, introduce myself to pt. Will continue to monitor pt status

## 2023-04-19 NOTE — CARE UPDATE
RAPID RESPONSE NURSE CHART REVIEW       Chart Reviewed: 04/19/2023, 6:12 AM    MRN: [id number]  Bed: 322/322 A    Dx: NSTEMI (non-ST elevated myocardial infarction)    Tera Griffiths has a past medical history of Atrial fibrillation and Encounter for blood transfusion.    Last VS: BP (!) 99/56 (BP Location: Right arm, Patient Position: Lying)   Pulse 80   Temp 99.8 °F (37.7 °C) (Oral)   Resp 19   Ht 6' (1.829 m)   Wt 86.2 kg (190 lb 0.6 oz)   SpO2 95%   BMI 25.77 kg/m²     24H Vital Sign Range:  Temp:  [97.9 °F (36.6 °C)-99.8 °F (37.7 °C)]   Pulse:  [65-84]   Resp:  [19-34]   BP: ()/(50-84)   SpO2:  [93 %-96 %]     Level of Consciousness (AVPU): alert    Recent Labs     04/17/23  0332 04/18/23  0414 04/19/23  0350   WBC 5.58  5.58 5.34 6.21   HGB 14.4  14.4 16.5 16.2   HCT 45.8  45.8 51.3 51.4     299 299 322       Recent Labs     04/16/23 2124 04/17/23  0332 04/17/23  0534 04/18/23  0414 04/19/23  0350    137  --  138 135*   K 3.2* 4.5  --  3.9 4.0    100  --  101 98   CO2 22* 22*  --  23 20*   CREATININE 1.5* 1.5*  --  1.6* 2.5*   * 114*  --  103 99   PHOS 2.9  --  2.9  --   --    MG 2.0  --  2.0  --   --         No results for input(s): PH, PCO2, PO2, HCO3, POCSATURATED, BE in the last 72 hours.     OXYGEN:  Flow (L/min): 2  Oxygen Concentration (%): 28       MEWS score: 2    Chart review completed. No concerns at this time. Instructed to call [phone] for further concerns or assistance.    Karthik Saldana RN

## 2023-04-19 NOTE — ASSESSMENT & PLAN NOTE
Baseline Cr 1.2-1.4.  ISI ([facility] on 4/18) vs hypotension  Lab Results   Component Value Date    BUN 40 (H) 04/19/2023    CREATININE 2.5 (H) 04/19/2023   - strict I/O  - CMP qd, trend Cr  - U/a, Umicro ordered  - renally dose all medications  - avoid nephrotoxic agents, NSAIDs, IV contrast, ACE/ARB  - Maintain MAP > 65  - hold entresto, spironolactone for worsening Tena

## 2023-04-19 NOTE — ASSESSMENT & PLAN NOTE
Tera Griffiths is a 57 year old gentleman with past medical history of ischemic cardiomyopathy EF of 18% with cardiac device placement, coronary artery disease status post STEMI with PCI to RCA in August 2021, persistent atrial fibrillation, hypertension, recent management for decompensated heart failure, was evaluated by HTS in the clinic for advanced options consideration but does not qualify for advanced options. Admitted for NSTEMI and eventually upgraded to CCU for closer monitoring.   NSTEMI likely 2/2 to elevated BP vs decompensated HF. There was an initial concern for Type 1NSTEMI as ACS was initiated, however troponin flat and now downtrending so low suspicion. He was initiated on NTG ands aggressive diuresis. Bedside TTE with EF 10%, CVP 3 (though it appears CVP 3 on priro ADHF admissions). Reports abdominal bloating. Lactic wnl    - Now chest pain free. Off NTG. Nausea/vomiting resolved  - Continue ACS for now  - trops appear at baseline   - [facility] without obstructive disease

## 2023-04-19 NOTE — NURSING
1520- patient -aaox4-transfer from cath lab via stretcher - nadn- right groin dressing dry/intact-  right pedal pulse doppler- bp stable -= heart rhythm NS on monitor- nurse will continue to monitor for any change in condition......

## 2023-04-19 NOTE — PROGRESS NOTES
[facility] - [facility] Step[facility]  Cardiology  Progress Note    Patient Name: Tera Grifftihs  MRN: [id number]  Admission Date: 4/16/2023  Hospital Length of Stay: 3 days  Code Status: Full Code   Attending Physician: Rustam Griggs MD   Primary Care Physician: Primary Doctor No  Expected Discharge Date: 4/20/2023  Principal Problem:NSTEMI (non-ST elevated myocardial infarction)    Subjective:     Hospital Course:   Admitted to CCU for nitro drip in setting of chest pain.  Trop downtrending and appears to be at baseline, overloaded by exam, started on lasix drip with brisk diuresis and improvement back to baseline respiratory status.  Interventional cardiology consulted for ischemic evaluation.        Interval History: mildly hypotensive overnight, antihypertensives held.  This AM, BP 90-110s systolic.  Cr 1.6->2.5.      Review of Systems   Constitutional: Negative for chills, decreased appetite and fever.   Cardiovascular:  Positive for chest pain. Negative for dyspnea on exertion, leg swelling, orthopnea and paroxysmal nocturnal dyspnea.   Respiratory:  Negative for shortness of breath.    Endocrine: Negative.    Hematologic/Lymphatic: Negative.    Skin: Negative.    Musculoskeletal: Negative.    Gastrointestinal:  Positive for nausea. Negative for abdominal pain, constipation, diarrhea and vomiting.   Neurological: Negative.    Psychiatric/Behavioral: Negative.     Objective:     Vital Signs (Most Recent):  Temp: 98.3 °F (36.8 °C) (04/19/23 0754)  Pulse: 73 (04/19/23 0754)  Resp: 16 (04/19/23 0807)  BP: 118/80 (04/19/23 0807)  SpO2: (!) 93 % (04/19/23 0754)   Vital Signs (24h Range):  Temp:  [97.9 °F (36.6 °C)-99.8 °F (37.7 °C)] 98.3 °F (36.8 °C)  Pulse:  [65-84] 73  Resp:  [16-34] 16  SpO2:  [93 %-96 %] 93 %  BP: ()/() 118/80     Weight: 86.2 kg (190 lb 0.6 oz)  Body mass index is 25.77 kg/m².     SpO2: (!) 93 %         Intake/Output Summary (Last 24 hours) at 4/19/2023 0956  Last data filed at  4/18/2023 1942  Gross per 24 hour   Intake 474.05 ml   Output 830 ml   Net -355.95 ml       Lines/Drains/Airways       Drain  Duration                  Urethral Catheter 04/16/23 2228 Straight-tip 16 Fr. 2 days              Peripheral Intravenous Line  Duration                  Peripheral IV - Single Lumen 04/16/23 1235 20 G Right Antecubital 2 days         Peripheral IV - Single Lumen 04/16/23 2035 20 G Distal;Posterior;Right Forearm 2 days         Peripheral IV - Single Lumen 04/16/23 2120 18 G Anterior;Left Forearm 2 days                    Physical Exam  Constitutional:       General: He is not in acute distress.     Appearance: He is obese. He is not ill-appearing, toxic-appearing or diaphoretic.   HENT:      Head: Normocephalic.      Nose: Nose normal.      Mouth/Throat:      Mouth: Mucous membranes are moist.   Eyes:      Extraocular Movements: Extraocular movements intact.   Cardiovascular:      Rate and Rhythm: Normal rate. Rhythm irregular.      Pulses: Normal pulses.      Heart sounds: No murmur heard.  Pulmonary:      Effort: Pulmonary effort is normal. No respiratory distress.      Breath sounds: Normal breath sounds. No wheezing or rales.   Abdominal:      General: Abdomen is flat. Bowel sounds are normal. There is no distension.      Tenderness: There is no abdominal tenderness.   Musculoskeletal:         General: No swelling or tenderness. Normal range of motion.      Cervical back: Normal range of motion.   Skin:     General: Skin is warm.   Neurological:      Mental Status: He is alert and oriented to person, place, and time. Mental status is at baseline.   Psychiatric:         Mood and Affect: Mood normal.         Behavior: Behavior normal.       Significant Labs: All pertinent lab results from the last 24 hours have been reviewed.    Significant Imaging:  reviewed    Assessment and Plan:     * NSTEMI (non-ST elevated myocardial infarction)  Tera Griffiths is a 57 year old gentleman with past  medical history of ischemic cardiomyopathy EF of 18% with cardiac device placement, coronary artery disease status post STEMI with PCI to RCA in August 2021, persistent atrial fibrillation, hypertension, recent management for decompensated heart failure, was evaluated by HTS in the clinic for advanced options consideration but does not qualify for advanced options. Admitted for NSTEMI and eventually upgraded to CCU for closer monitoring.   NSTEMI likely 2/2 to elevated BP vs decompensated HF. There was an initial concern for Type 1NSTEMI as ACS was initiated, however troponin flat and now downtrending so low suspicion. He was initiated on NTG ands aggressive diuresis. Bedside TTE with EF 10%, CVP 3 (though it appears CVP 3 on priro ADHF admissions). Reports abdominal bloating. Lactic wnl    - Now chest pain free. Off NTG. Nausea/vomiting resolved  - Continue ACS for now  - trops appear at baseline   - [facility] without obstructive disease    Acute kidney injury superimposed on CKD  Baseline Cr 1.2-1.4.  ISI ([facility] on 4/18) vs hypotension  Lab Results   Component Value Date    BUN 40 (H) 04/19/2023    CREATININE 2.5 (H) 04/19/2023   - strict I/O  - CMP qd, trend Cr  - U/a, Umicro ordered  - renally dose all medications  - avoid nephrotoxic agents, NSAIDs, IV contrast, ACE/ARB  - Maintain MAP > 65  - hold entresto, spironolactone for worsening Tena    Hypertensive emergency  /135 on admit. Likely contributed to chest pain. BP now improved. Symptoms have resolved  - Resume home antihypertensives.     Dyslipidemia  Contiune statin    Acute on chronic combined systolic and diastolic congestive heart failure  Patient with ADHF. Diuresing with IV lasix. Now with improvement in chest pain.   - See 'NSTEMI'  - Resume GDMT as tolerated  - holding lasix today  - reports respiratory status now at baseline s/p diuresis  - will need outpt EP evaluation for BiV evaluation given LBBB    Type 2 diabetes mellitus with circulatory  disorder, without long-term current use of insulin  Controlled. A1c 5.7.  LDSSI     Atrial fibrillation  Hx of persistent atrial fib s/p KIA/CV 11/29/22 with initiation of amiodarone. On eliquis 5mg BID.   On admission, he was in atrial fib with ectopy, though now appears to have cardioverted to NSR    - Continue heparin gtt, switch back to eliquis when renal function stable  - maintain K>4, Mg>2    CAD (coronary artery disease)  S/p PCI in 2021 for AMI.   - Continue antiplatelet therapy and statin        VTE Risk Mitigation (From admission, onward)         Ordered     heparin infusion 1,000 units/500 ml in 0.9% NaCl (on sterile field)  As needed (PRN)         04/18/23 1415     heparin 25,000 units in dextrose 5% (100 units/ml) IV bolus from bag - ADDITIONAL PRN BOLUS - 60 units/kg (max bolus 4000 units)  As needed (PRN)        Question:  Heparin Infusion Adjustment (DO NOT MODIFY ANSWER)  Answer:  [email or web address]\epic\Images\Pharmacy\HeparinInfusions\heparin LOW INTENSITY nomogram for [facility] [id number].pdf    04/16/23 1520     heparin 25,000 units in dextrose 5% (100 units/ml) IV bolus from bag - ADDITIONAL PRN BOLUS - 30 units/kg (max bolus 4000 units)  As needed (PRN)        Question:  Heparin Infusion Adjustment (DO NOT MODIFY ANSWER)  Answer:  [email or web address]\epic\Images\Pharmacy\HeparinInfusions\heparin LOW INTENSITY nomogram for [facility] [id number].pdf    04/16/23 1520     IP VTE HIGH RISK PATIENT  Once         04/16/23 1713     Place sequential compression device  Until discontinued         04/16/23 1713     heparin 25,000 units in dextrose 5% 250 mL (100 units/mL) infusion LOW INTENSITY nomogram - [facility]  Continuous        Question Answer Comment   Heparin Infusion Adjustment (DO NOT MODIFY ANSWER) \\[email or web address]\epic\Images\Pharmacy\HeparinInfusions\heparin LOW INTENSITY nomogram for [facility] [id number].pdf    Begin at (in units/kg/hr) 12        04/16/23 1520                Barron Finch MD  Cardiology  [facility] - Cardiology Stepdown

## 2023-04-19 NOTE — PLAN OF CARE
Updated care plan w/ pt. Pt continue on a heparin infusion at 17units/hr.  Pt remained free from injury and no skin breakdown.  Address all issues throughout shift.  Pt was evaluated by HTS in the clinic for advanced options consideration but does not qualify for advanced options.  Address all issues throughout shift.  Avasys activated. Pt did not try to get out of bed by self. Ugalde intact draining concentrated urine.

## 2023-04-19 NOTE — NURSING
1900- aaox4-sitting up in chair- c/o lightheadedness/dizziness- place back in bed- bp check 80/50- Dr. Jackson notified- waiting on return phone call.... nurse will continue to monitor for any change...

## 2023-04-19 NOTE — ASSESSMENT & PLAN NOTE
Patient with ADHF. Diuresing with IV lasix. Now with improvement in chest pain.   - See 'NSTEMI'  - Resume GDMT as tolerated  - holding lasix today  - reports respiratory status now at baseline s/p diuresis  - will need outpt EP evaluation for BiV evaluation given LBBB

## 2023-04-19 NOTE — NURSING
Diet  Spoke w/ team in reference to pt's diet status. Pt diet was change per md. Call food and nutrition to order tray. Updated pt on tray.  In the meantime. Pt was given cracker and juice utnil tray arrives.

## 2023-04-19 NOTE — ASSESSMENT & PLAN NOTE
Hx of persistent atrial fib s/p KIA/CV 11/29/22 with initiation of amiodarone. On eliquis 5mg BID.   On admission, he was in atrial fib with ectopy, though now appears to have cardioverted to NSR    - Continue heparin gtt, switch back to eliquis when renal function stable  - maintain K>4, Mg>2

## 2023-04-20 ENCOUNTER — TELEPHONE (OUTPATIENT)
Dept: TRANSPLANT | Facility: CLINIC | Age: 58
End: 2023-04-20
Payer: MEDICAID

## 2023-04-20 LAB
ALBUMIN SERPL BCP-MCNC: 3.4 G/DL (ref 3.5–5.2)
ALP SERPL-CCNC: 71 U/L (ref 55–135)
ALT SERPL W/O P-5'-P-CCNC: 8 U/L (ref 10–44)
ANION GAP SERPL CALC-SCNC: 12 MMOL/L (ref 8–16)
APTT PPP: 30.5 SEC (ref 21–32)
APTT PPP: 30.5 SEC (ref 21–32)
APTT PPP: 73.2 SEC (ref 21–32)
AST SERPL-CCNC: 11 U/L (ref 10–40)
BASOPHILS # BLD AUTO: 0.01 K/UL (ref 0–0.2)
BASOPHILS # BLD AUTO: 0.02 K/UL (ref 0–0.2)
BASOPHILS NFR BLD: 0.2 % (ref 0–1.9)
BASOPHILS NFR BLD: 0.4 % (ref 0–1.9)
BILIRUB SERPL-MCNC: 0.8 MG/DL (ref 0.1–1)
BUN SERPL-MCNC: 43 MG/DL (ref 6–20)
CALCIUM SERPL-MCNC: 9.5 MG/DL (ref 8.7–10.5)
CHLORIDE SERPL-SCNC: 100 MMOL/L (ref 95–110)
CO2 SERPL-SCNC: 23 MMOL/L (ref 23–29)
CREAT SERPL-MCNC: 1.9 MG/DL (ref 0.5–1.4)
DIFFERENTIAL METHOD: ABNORMAL
DIFFERENTIAL METHOD: ABNORMAL
EOSINOPHIL # BLD AUTO: 0.1 K/UL (ref 0–0.5)
EOSINOPHIL # BLD AUTO: 0.2 K/UL (ref 0–0.5)
EOSINOPHIL NFR BLD: 2.4 % (ref 0–8)
EOSINOPHIL NFR BLD: 3.6 % (ref 0–8)
ERYTHROCYTE [DISTWIDTH] IN BLOOD BY AUTOMATED COUNT: 14.1 % (ref 11.5–14.5)
ERYTHROCYTE [DISTWIDTH] IN BLOOD BY AUTOMATED COUNT: 14.3 % (ref 11.5–14.5)
EST. GFR  (NO RACE VARIABLE): 41.1 ML/MIN/1.73 M^2
GLUCOSE SERPL-MCNC: 102 MG/DL (ref 70–110)
HCT VFR BLD AUTO: 48.8 % (ref 40–54)
HCT VFR BLD AUTO: 51.2 % (ref 40–54)
HGB BLD-MCNC: 15.8 G/DL (ref 14–18)
HGB BLD-MCNC: 16.1 G/DL (ref 14–18)
IMM GRANULOCYTES # BLD AUTO: 0 K/UL (ref 0–0.04)
IMM GRANULOCYTES # BLD AUTO: 0.01 K/UL (ref 0–0.04)
IMM GRANULOCYTES NFR BLD AUTO: 0 % (ref 0–0.5)
IMM GRANULOCYTES NFR BLD AUTO: 0.2 % (ref 0–0.5)
INR PPP: 1.1 (ref 0.8–1.2)
LYMPHOCYTES # BLD AUTO: 0.8 K/UL (ref 1–4.8)
LYMPHOCYTES # BLD AUTO: 1.1 K/UL (ref 1–4.8)
LYMPHOCYTES NFR BLD: 17 % (ref 18–48)
LYMPHOCYTES NFR BLD: 22.8 % (ref 18–48)
MAGNESIUM SERPL-MCNC: 2.1 MG/DL (ref 1.6–2.6)
MCH RBC QN AUTO: 26.8 PG (ref 27–31)
MCH RBC QN AUTO: 27 PG (ref 27–31)
MCHC RBC AUTO-ENTMCNC: 31.4 G/DL (ref 32–36)
MCHC RBC AUTO-ENTMCNC: 32.4 G/DL (ref 32–36)
MCV RBC AUTO: 83 FL (ref 82–98)
MCV RBC AUTO: 85 FL (ref 82–98)
MONOCYTES # BLD AUTO: 0.8 K/UL (ref 0.3–1)
MONOCYTES # BLD AUTO: 0.8 K/UL (ref 0.3–1)
MONOCYTES NFR BLD: 16.6 % (ref 4–15)
MONOCYTES NFR BLD: 17 % (ref 4–15)
NEUTROPHILS # BLD AUTO: 2.7 K/UL (ref 1.8–7.7)
NEUTROPHILS # BLD AUTO: 3.1 K/UL (ref 1.8–7.7)
NEUTROPHILS NFR BLD: 56.8 % (ref 38–73)
NEUTROPHILS NFR BLD: 63 % (ref 38–73)
NRBC BLD-RTO: 0 /100 WBC
NRBC BLD-RTO: 0 /100 WBC
PHOSPHATE SERPL-MCNC: 3.4 MG/DL (ref 2.7–4.5)
PLATELET # BLD AUTO: 271 K/UL (ref 150–450)
PLATELET # BLD AUTO: 278 K/UL (ref 150–450)
PMV BLD AUTO: 10.9 FL (ref 9.2–12.9)
PMV BLD AUTO: 12 FL (ref 9.2–12.9)
POCT GLUCOSE: 103 MG/DL (ref 70–110)
POCT GLUCOSE: 107 MG/DL (ref 70–110)
POCT GLUCOSE: 120 MG/DL (ref 70–110)
POTASSIUM SERPL-SCNC: 3.7 MMOL/L (ref 3.5–5.1)
PROT SERPL-MCNC: 7.3 G/DL (ref 6–8.4)
PROTHROMBIN TIME: 11.5 SEC (ref 9–12.5)
RBC # BLD AUTO: 5.86 M/UL (ref 4.6–6.2)
RBC # BLD AUTO: 6.01 M/UL (ref 4.6–6.2)
SODIUM SERPL-SCNC: 135 MMOL/L (ref 136–145)
WBC # BLD AUTO: 4.7 K/UL (ref 3.9–12.7)
WBC # BLD AUTO: 4.94 K/UL (ref 3.9–12.7)

## 2023-04-20 PROCEDURE — 25000003 PHARM REV CODE 250: Performed by: STUDENT IN AN ORGANIZED HEALTH CARE EDUCATION/TRAINING PROGRAM

## 2023-04-20 PROCEDURE — 85730 THROMBOPLASTIN TIME PARTIAL: CPT | Performed by: EMERGENCY MEDICINE

## 2023-04-20 PROCEDURE — 83735 ASSAY OF MAGNESIUM: CPT | Performed by: STUDENT IN AN ORGANIZED HEALTH CARE EDUCATION/TRAINING PROGRAM

## 2023-04-20 PROCEDURE — 85025 COMPLETE CBC W/AUTO DIFF WBC: CPT | Performed by: EMERGENCY MEDICINE

## 2023-04-20 PROCEDURE — 36415 COLL VENOUS BLD VENIPUNCTURE: CPT | Performed by: INTERNAL MEDICINE

## 2023-04-20 PROCEDURE — 20600001 HC STEP DOWN PRIVATE ROOM

## 2023-04-20 PROCEDURE — 63600175 PHARM REV CODE 636 W HCPCS: Performed by: INTERNAL MEDICINE

## 2023-04-20 PROCEDURE — 80053 COMPREHEN METABOLIC PANEL: CPT | Performed by: STUDENT IN AN ORGANIZED HEALTH CARE EDUCATION/TRAINING PROGRAM

## 2023-04-20 PROCEDURE — 63600175 PHARM REV CODE 636 W HCPCS: Performed by: EMERGENCY MEDICINE

## 2023-04-20 PROCEDURE — 99231 SBSQ HOSP IP/OBS SF/LOW 25: CPT | Mod: ,,, | Performed by: INTERNAL MEDICINE

## 2023-04-20 PROCEDURE — 25000003 PHARM REV CODE 250: Performed by: INTERNAL MEDICINE

## 2023-04-20 PROCEDURE — 85610 PROTHROMBIN TIME: CPT | Performed by: INTERNAL MEDICINE

## 2023-04-20 PROCEDURE — 85025 COMPLETE CBC W/AUTO DIFF WBC: CPT | Mod: 91 | Performed by: INTERNAL MEDICINE

## 2023-04-20 PROCEDURE — 85730 THROMBOPLASTIN TIME PARTIAL: CPT | Mod: 91 | Performed by: INTERNAL MEDICINE

## 2023-04-20 PROCEDURE — 99231 PR SUBSEQUENT HOSPITAL CARE,LEVL I: ICD-10-PCS | Mod: ,,, | Performed by: INTERNAL MEDICINE

## 2023-04-20 PROCEDURE — 63600175 PHARM REV CODE 636 W HCPCS: Performed by: STUDENT IN AN ORGANIZED HEALTH CARE EDUCATION/TRAINING PROGRAM

## 2023-04-20 PROCEDURE — 84100 ASSAY OF PHOSPHORUS: CPT | Performed by: STUDENT IN AN ORGANIZED HEALTH CARE EDUCATION/TRAINING PROGRAM

## 2023-04-20 RX ORDER — HEPARIN SODIUM,PORCINE/D5W 25000/250
0-40 INTRAVENOUS SOLUTION INTRAVENOUS CONTINUOUS
Status: DISCONTINUED | OUTPATIENT
Start: 2023-04-20 | End: 2023-04-22

## 2023-04-20 RX ORDER — FUROSEMIDE 10 MG/ML
80 INJECTION INTRAMUSCULAR; INTRAVENOUS 2 TIMES DAILY
Status: COMPLETED | OUTPATIENT
Start: 2023-04-20 | End: 2023-04-20

## 2023-04-20 RX ORDER — FUROSEMIDE 10 MG/ML
80 INJECTION INTRAMUSCULAR; INTRAVENOUS 2 TIMES DAILY
Status: DISCONTINUED | OUTPATIENT
Start: 2023-04-20 | End: 2023-04-20

## 2023-04-20 RX ADMIN — AMIODARONE HYDROCHLORIDE 200 MG: 200 TABLET ORAL at 08:04

## 2023-04-20 RX ADMIN — METOPROLOL SUCCINATE 25 MG: 25 TABLET, EXTENDED RELEASE ORAL at 08:04

## 2023-04-20 RX ADMIN — HEPARIN SODIUM AND DEXTROSE 12 UNITS/KG/HR: 10000; 5 INJECTION INTRAVENOUS at 08:04

## 2023-04-20 RX ADMIN — FUROSEMIDE 80 MG: 10 INJECTION, SOLUTION INTRAMUSCULAR; INTRAVENOUS at 05:04

## 2023-04-20 RX ADMIN — CLOPIDOGREL BISULFATE 75 MG: 75 TABLET ORAL at 08:04

## 2023-04-20 RX ADMIN — HEPARIN SODIUM 17 UNITS/KG/HR: 10000 INJECTION, SOLUTION INTRAVENOUS at 04:04

## 2023-04-20 RX ADMIN — APIXABAN 5 MG: 5 TABLET, FILM COATED ORAL at 10:04

## 2023-04-20 RX ADMIN — ATORVASTATIN CALCIUM 80 MG: 40 TABLET, FILM COATED ORAL at 08:04

## 2023-04-20 RX ADMIN — FUROSEMIDE 80 MG: 10 INJECTION, SOLUTION INTRAMUSCULAR; INTRAVENOUS at 10:04

## 2023-04-20 RX ADMIN — POTASSIUM BICARBONATE 40 MEQ: 391 TABLET, EFFERVESCENT ORAL at 08:04

## 2023-04-20 RX ADMIN — ASPIRIN 81 MG: 81 TABLET, COATED ORAL at 08:04

## 2023-04-20 RX ADMIN — PANTOPRAZOLE SODIUM 40 MG: 40 TABLET, DELAYED RELEASE ORAL at 08:04

## 2023-04-20 NOTE — ASSESSMENT & PLAN NOTE
Baseline Cr 1.2-1.4.  ISI ([facility] on 4/18) vs hypotension  Lab Results   Component Value Date    BUN 43 (H) 04/20/2023    CREATININE 1.9 (H) 04/20/2023   - strict I/O  - CMP qd, trend Cr  - U/a, Umicro ordered  - renally dose all medications  - avoid nephrotoxic agents, NSAIDs, IV contrast, ACE/ARB  - Maintain MAP > 65  - hold entresto, spironolactone for Tena

## 2023-04-20 NOTE — ASSESSMENT & PLAN NOTE
Patient with ADHF. Diuresing with IV lasix. Now with improvement in chest pain.   - See 'NSTEMI'  - Resume GDMT as tolerated  - start lasix 80 mg bid  - reports respiratory status now at baseline s/p diuresis  - will need outpt EP evaluation for BiV evaluation given LBBB

## 2023-04-20 NOTE — PROGRESS NOTES
[facility] - Cardiology Stepdown  Cardiology  Progress Note    Patient Name: Tera Griffiths  MRN: [id number]  Admission Date: 4/16/2023  Hospital Length of Stay: 4 days  Code Status: Full Code   Attending Physician: Rustam Griggs MD   Primary Care Physician: Primary Doctor No  Expected Discharge Date: 4/21/2023  Principal Problem:NSTEMI (non-ST elevated myocardial infarction)    Subjective:     Hospital Course:   Admitted to CCU for nitro drip in setting of chest pain.  Trop downtrending and appears to be at baseline, overloaded by exam, started on lasix drip with brisk diuresis and improvement back to baseline respiratory status.  Interventional cardiology consulted for ischemic evaluation, LHC showing one small obtuse marginal branch with complete acute occlusion but otherwise nonobstructive disease in LAD, LCX, RCA.  Developed EMERSON s/p cath presumably 2/2 to contrast.        Interval History: naeon, this AM without complaints.  UOP -650cc, Cr 2.5->1.9    Review of Systems   Constitutional: Negative for chills, decreased appetite and fever.   Cardiovascular:  Positive for chest pain. Negative for dyspnea on exertion, leg swelling, orthopnea and paroxysmal nocturnal dyspnea.   Respiratory:  Negative for shortness of breath.    Endocrine: Negative.    Hematologic/Lymphatic: Negative.    Skin: Negative.    Musculoskeletal: Negative.    Gastrointestinal:  Positive for nausea. Negative for abdominal pain, constipation, diarrhea and vomiting.   Neurological: Negative.    Psychiatric/Behavioral: Negative.     Objective:     Vital Signs (Most Recent):  Temp: 97.5 °F (36.4 °C) (04/20/23 0730)  Pulse: 60 (04/20/23 0730)  Resp: 16 (04/20/23 0730)  BP: (!) 141/87 (04/20/23 0730)  SpO2: 95 % (04/20/23 0730)   Vital Signs (24h Range):  Temp:  [97.5 °F (36.4 °C)-98.4 °F (36.9 °C)] 97.5 °F (36.4 °C)  Pulse:  [59-85] 60  Resp:  [16-20] 16  SpO2:  [92 %-98 %] 95 %  BP: (109-141)/(58-87) 141/87     Weight: 86.2 kg (190 lb 0.6  oz)  Body mass index is 25.77 kg/m².     SpO2: 95 %         Intake/Output Summary (Last 24 hours) at 4/20/2023 0835  Last data filed at 4/19/2023 1857  Gross per 24 hour   Intake 285.6 ml   Output --   Net 285.6 ml       Lines/Drains/Airways       Drain  Duration                  Urethral Catheter 04/16/23 2228 Straight-tip 16 Fr. 3 days              Peripheral Intravenous Line  Duration                  Peripheral IV - Single Lumen 04/16/23 2035 20 G Distal;Posterior;Right Forearm 3 days         Peripheral IV - Single Lumen 04/16/23 2120 18 G Anterior;Left Forearm 3 days                    Physical Exam  Constitutional:       General: He is not in acute distress.     Appearance: He is obese. He is not ill-appearing, toxic-appearing or diaphoretic.   HENT:      Head: Normocephalic.      Nose: Nose normal.      Mouth/Throat:      Mouth: Mucous membranes are moist.   Eyes:      Extraocular Movements: Extraocular movements intact.   Cardiovascular:      Rate and Rhythm: Normal rate. Rhythm irregular.      Pulses: Normal pulses.      Heart sounds: No murmur heard.  Pulmonary:      Effort: Pulmonary effort is normal. No respiratory distress.      Breath sounds: Normal breath sounds. No wheezing or rales.   Abdominal:      General: Abdomen is flat. Bowel sounds are normal. There is no distension.      Tenderness: There is no abdominal tenderness.   Musculoskeletal:         General: No swelling or tenderness. Normal range of motion.      Cervical back: Normal range of motion.   Skin:     General: Skin is warm.   Neurological:      Mental Status: He is alert and oriented to person, place, and time. Mental status is at baseline.   Psychiatric:         Mood and Affect: Mood normal.         Behavior: Behavior normal.       Significant Labs: All pertinent lab results from the last 24 hours have been reviewed.    Significant Imaging:  reviewed    Assessment and Plan:     * NSTEMI (non-ST elevated myocardial infarction)  Tera  Aye is a 57 year old gentleman with past medical history of ischemic cardiomyopathy EF of 18% with cardiac device placement, coronary artery disease status post STEMI with PCI to RCA in August 2021, persistent atrial fibrillation, hypertension, recent management for decompensated heart failure, was evaluated by HTS in the clinic for advanced options consideration but does not qualify for advanced options. Admitted for NSTEMI and eventually upgraded to CCU for closer monitoring.   NSTEMI likely 2/2 to elevated BP vs decompensated HF. There was an initial concern for Type 1NSTEMI as ACS was initiated, however troponin flat and now downtrending so low suspicion. He was initiated on NTG ands aggressive diuresis. Bedside TTE with EF 10%, CVP 3 (though it appears CVP 3 on priro ADHF admissions). Reports abdominal bloating. Lactic wnl    - Now chest pain free. Off NTG. Nausea/vomiting resolved  - Continue ACS for now  - trops appear at baseline   - [facility] without obstructive disease    Acute kidney injury superimposed on CKD  Baseline Cr 1.2-1.4.  ISI ([facility] on 4/18) vs hypotension  Lab Results   Component Value Date    BUN 43 (H) 04/20/2023    CREATININE 1.9 (H) 04/20/2023   - strict I/O  - CMP qd, trend Cr  - U/a, Umicro ordered  - renally dose all medications  - avoid nephrotoxic agents, NSAIDs, IV contrast, ACE/ARB  - Maintain MAP > 65  - hold entresto, spironolactone for Tena    Hypertensive emergency  /135 on admit. Likely contributed to chest pain. BP now improved. Symptoms have resolved  - Resume home antihypertensives.     Dyslipidemia  Contiune statin    Acute on chronic combined systolic and diastolic congestive heart failure  Patient with ADHF. Diuresing with IV lasix. Now with improvement in chest pain.   - See 'NSTEMI'  - Resume GDMT as tolerated  - start lasix 80 mg bid  - reports respiratory status now at baseline s/p diuresis  - will need outpt EP evaluation for BiV evaluation given LBBB    Type 2  diabetes mellitus with circulatory disorder, without long-term current use of insulin  Controlled. A1c 5.7.  LDSSI     Atrial fibrillation  Hx of persistent atrial fib s/p KIA/CV 11/29/22 with initiation of amiodarone. On eliquis 5mg BID.   On admission, he was in atrial fib with ectopy, though now appears to have cardioverted to NSR    - Continue heparin gtt, switch back to eliquis when renal function stable  - maintain K>4, Mg>2    CAD (coronary artery disease)  S/p PCI in 2021 for AMI.   - Continue antiplatelet therapy and statin        VTE Risk Mitigation (From admission, onward)         Ordered     apixaban tablet 5 mg  2 times daily         04/20/23 0934     heparin infusion 1,000 units/500 ml in 0.9% NaCl (on sterile field)  As needed (PRN)         04/18/23 1415     IP VTE HIGH RISK PATIENT  Once         04/16/23 1713     Place sequential compression device  Until discontinued         04/16/23 1713                Barron Finch MD  Cardiology  [facility] - Cardiology Stepdown

## 2023-04-20 NOTE — PLAN OF CARE
[facility] - Cardiology Stepdown  Discharge Reassessment    Primary Care Provider: Primary Doctor No    Expected Discharge Date: 4/21/2023    Reassessment (most recent)       Discharge Reassessment - 04/20/23 1127          Discharge Reassessment    Assessment Type Discharge Planning Reassessment     Did the patient's condition or plan change since previous assessment? No     Discharge Plan A Home     Discharge Plan B Home     DME Needed Upon Discharge  none     Discharge Barriers Identified None     Why the patient remains in the hospital Requires continued medical care        Post-Acute Status    Post-Acute Authorization Other     Other Status No Post-Acute Service Needs     Discharge Delays None known at this time                   Yolanda Paulson LCSW  PRN

## 2023-04-20 NOTE — PLAN OF CARE
Problem: Adult Inpatient Plan of Care  Goal: Plan of Care Review  Outcome: Ongoing, Progressing  Goal: Patient-Specific Goal (Individualized)  Outcome: Ongoing, Progressing  Goal: Absence of Hospital-Acquired Illness or Injury  Outcome: Ongoing, Progressing  Goal: Optimal Comfort and Wellbeing  Outcome: Ongoing, Progressing  Goal: Readiness for Transition of Care  Outcome: Ongoing, Progressing     Problem: Diabetes Comorbidity  Goal: Blood Glucose Level Within Targeted Range  Outcome: Ongoing, Progressing     Problem: Infection  Goal: Absence of Infection Signs and Symptoms  Outcome: Ongoing, Progressing     Problem: Fall Injury Risk  Goal: Absence of Fall and Fall-Related Injury  Outcome: Ongoing, Progressing     Problem: Skin Injury Risk Increased  Goal: Skin Health and Integrity  Outcome: Ongoing, Progressing     Problem: Fluid and Electrolyte Imbalance (Acute Kidney Injury/Impairment)  Goal: Fluid and Electrolyte Balance  Outcome: Ongoing, Progressing     Problem: Oral Intake Inadequate (Acute Kidney Injury/Impairment)  Goal: Optimal Nutrition Intake  Outcome: Ongoing, Progressing     Problem: Renal Function Impairment (Acute Kidney Injury/Impairment)  Goal: Effective Renal Function  Outcome: Ongoing, Progressing

## 2023-04-20 NOTE — TELEPHONE ENCOUNTER
Heart Transplant  spoke with Mr. Griffiths by phone ( [phone]) to remind pt of his appointment with the  on Monday, April 24th at 2:00.  Worker explained to pt the need for his primary and secondary caregivers to be in attendance so the  can review psychosocial and provide education to caregivers.   Pt stated an understanding of same.

## 2023-04-20 NOTE — SUBJECTIVE & OBJECTIVE
Interval History: candace, this AM without complaints.  UOP -650cc, Cr 2.5->1.9    Review of Systems   Constitutional: Negative for chills, decreased appetite and fever.   Cardiovascular:  Positive for chest pain. Negative for dyspnea on exertion, leg swelling, orthopnea and paroxysmal nocturnal dyspnea.   Respiratory:  Negative for shortness of breath.    Endocrine: Negative.    Hematologic/Lymphatic: Negative.    Skin: Negative.    Musculoskeletal: Negative.    Gastrointestinal:  Positive for nausea. Negative for abdominal pain, constipation, diarrhea and vomiting.   Neurological: Negative.    Psychiatric/Behavioral: Negative.     Objective:     Vital Signs (Most Recent):  Temp: 97.5 °F (36.4 °C) (04/20/23 0730)  Pulse: 60 (04/20/23 0730)  Resp: 16 (04/20/23 0730)  BP: (!) 141/87 (04/20/23 0730)  SpO2: 95 % (04/20/23 0730)   Vital Signs (24h Range):  Temp:  [97.5 °F (36.4 °C)-98.4 °F (36.9 °C)] 97.5 °F (36.4 °C)  Pulse:  [59-85] 60  Resp:  [16-20] 16  SpO2:  [92 %-98 %] 95 %  BP: (109-141)/(58-87) 141/87     Weight: 86.2 kg (190 lb 0.6 oz)  Body mass index is 25.77 kg/m².     SpO2: 95 %         Intake/Output Summary (Last 24 hours) at 4/20/2023 0835  Last data filed at 4/19/2023 1857  Gross per 24 hour   Intake 285.6 ml   Output --   Net 285.6 ml       Lines/Drains/Airways       Drain  Duration                  Urethral Catheter 04/16/23 2228 Straight-tip 16 Fr. 3 days              Peripheral Intravenous Line  Duration                  Peripheral IV - Single Lumen 04/16/23 2035 20 G Distal;Posterior;Right Forearm 3 days         Peripheral IV - Single Lumen 04/16/23 2120 18 G Anterior;Left Forearm 3 days                    Physical Exam  Constitutional:       General: He is not in acute distress.     Appearance: He is obese. He is not ill-appearing, toxic-appearing or diaphoretic.   HENT:      Head: Normocephalic.      Nose: Nose normal.      Mouth/Throat:      Mouth: Mucous membranes are moist.   Eyes:       Extraocular Movements: Extraocular movements intact.   Cardiovascular:      Rate and Rhythm: Normal rate. Rhythm irregular.      Pulses: Normal pulses.      Heart sounds: No murmur heard.  Pulmonary:      Effort: Pulmonary effort is normal. No respiratory distress.      Breath sounds: Normal breath sounds. No wheezing or rales.   Abdominal:      General: Abdomen is flat. Bowel sounds are normal. There is no distension.      Tenderness: There is no abdominal tenderness.   Musculoskeletal:         General: No swelling or tenderness. Normal range of motion.      Cervical back: Normal range of motion.   Skin:     General: Skin is warm.   Neurological:      Mental Status: He is alert and oriented to person, place, and time. Mental status is at baseline.   Psychiatric:         Mood and Affect: Mood normal.         Behavior: Behavior normal.       Significant Labs: All pertinent lab results from the last 24 hours have been reviewed.    Significant Imaging:  reviewed

## 2023-04-20 NOTE — ASSESSMENT & PLAN NOTE
Tera Griffiths is a 57 year old gentleman with past medical history of ischemic cardiomyopathy EF of 18% with cardiac device placement, coronary artery disease status post STEMI with PCI to RCA in August 2021, persistent atrial fibrillation, hypertension, recent management for decompensated heart failure, was evaluated by HTS in the clinic for advanced options consideration but does not qualify for advanced options. Admitted for NSTEMI and eventually upgraded to CCU for closer monitoring.   NSTEMI likely 2/2 to elevated BP vs decompensated HF. There was an initial concern for Type 1NSTEMI as ACS was initiated, however troponin flat and now downtrending so low suspicion. He was initiated on NTG ands aggressive diuresis. Bedside TTE with EF 10%, CVP 3 (though it appears CVP 3 on priro ADHF admissions). Reports abdominal bloating. Lactic wnl    - Now chest pain free. Off NTG. Nausea/vomiting resolved  - Continue ACS for now  - trops appear at baseline   - [facility] without obstructive disease

## 2023-04-21 LAB
ALBUMIN SERPL BCP-MCNC: 3.7 G/DL (ref 3.5–5.2)
ALP SERPL-CCNC: 67 U/L (ref 55–135)
ALT SERPL W/O P-5'-P-CCNC: 10 U/L (ref 10–44)
ANION GAP SERPL CALC-SCNC: 15 MMOL/L (ref 8–16)
APTT PPP: 45.7 SEC (ref 21–32)
APTT PPP: 50.9 SEC (ref 21–32)
APTT PPP: 62.8 SEC (ref 21–32)
AST SERPL-CCNC: 12 U/L (ref 10–40)
BASOPHILS # BLD AUTO: 0.02 K/UL (ref 0–0.2)
BASOPHILS NFR BLD: 0.4 % (ref 0–1.9)
BILIRUB SERPL-MCNC: 0.8 MG/DL (ref 0.1–1)
BUN SERPL-MCNC: 38 MG/DL (ref 6–20)
CALCIUM SERPL-MCNC: 9.8 MG/DL (ref 8.7–10.5)
CHLORIDE SERPL-SCNC: 97 MMOL/L (ref 95–110)
CO2 SERPL-SCNC: 24 MMOL/L (ref 23–29)
CREAT SERPL-MCNC: 1.8 MG/DL (ref 0.5–1.4)
DIFFERENTIAL METHOD: ABNORMAL
EOSINOPHIL # BLD AUTO: 0.2 K/UL (ref 0–0.5)
EOSINOPHIL NFR BLD: 3.9 % (ref 0–8)
ERYTHROCYTE [DISTWIDTH] IN BLOOD BY AUTOMATED COUNT: 14.2 % (ref 11.5–14.5)
EST. GFR  (NO RACE VARIABLE): 43.9 ML/MIN/1.73 M^2
GLUCOSE SERPL-MCNC: 104 MG/DL (ref 70–110)
HCT VFR BLD AUTO: 49.8 % (ref 40–54)
HGB BLD-MCNC: 15.8 G/DL (ref 14–18)
IMM GRANULOCYTES # BLD AUTO: 0.01 K/UL (ref 0–0.04)
IMM GRANULOCYTES NFR BLD AUTO: 0.2 % (ref 0–0.5)
LYMPHOCYTES # BLD AUTO: 1 K/UL (ref 1–4.8)
LYMPHOCYTES NFR BLD: 22.7 % (ref 18–48)
MAGNESIUM SERPL-MCNC: 2.2 MG/DL (ref 1.6–2.6)
MCH RBC QN AUTO: 27 PG (ref 27–31)
MCHC RBC AUTO-ENTMCNC: 31.7 G/DL (ref 32–36)
MCV RBC AUTO: 85 FL (ref 82–98)
MONOCYTES # BLD AUTO: 0.6 K/UL (ref 0.3–1)
MONOCYTES NFR BLD: 13.7 % (ref 4–15)
NEUTROPHILS # BLD AUTO: 2.7 K/UL (ref 1.8–7.7)
NEUTROPHILS NFR BLD: 59.1 % (ref 38–73)
NRBC BLD-RTO: 0 /100 WBC
PHOSPHATE SERPL-MCNC: 3.2 MG/DL (ref 2.7–4.5)
PLATELET # BLD AUTO: 294 K/UL (ref 150–450)
PMV BLD AUTO: 12 FL (ref 9.2–12.9)
POCT GLUCOSE: 100 MG/DL (ref 70–110)
POCT GLUCOSE: 108 MG/DL (ref 70–110)
POCT GLUCOSE: 112 MG/DL (ref 70–110)
POCT GLUCOSE: 145 MG/DL (ref 70–110)
POTASSIUM SERPL-SCNC: 3.8 MMOL/L (ref 3.5–5.1)
PROT SERPL-MCNC: 7.8 G/DL (ref 6–8.4)
RBC # BLD AUTO: 5.85 M/UL (ref 4.6–6.2)
SODIUM SERPL-SCNC: 136 MMOL/L (ref 136–145)
WBC # BLD AUTO: 4.59 K/UL (ref 3.9–12.7)

## 2023-04-21 PROCEDURE — 83735 ASSAY OF MAGNESIUM: CPT | Performed by: STUDENT IN AN ORGANIZED HEALTH CARE EDUCATION/TRAINING PROGRAM

## 2023-04-21 PROCEDURE — 97165 OT EVAL LOW COMPLEX 30 MIN: CPT

## 2023-04-21 PROCEDURE — 80053 COMPREHEN METABOLIC PANEL: CPT | Performed by: STUDENT IN AN ORGANIZED HEALTH CARE EDUCATION/TRAINING PROGRAM

## 2023-04-21 PROCEDURE — 25000003 PHARM REV CODE 250: Performed by: STUDENT IN AN ORGANIZED HEALTH CARE EDUCATION/TRAINING PROGRAM

## 2023-04-21 PROCEDURE — 99233 SBSQ HOSP IP/OBS HIGH 50: CPT | Mod: ,,, | Performed by: INTERNAL MEDICINE

## 2023-04-21 PROCEDURE — 85730 THROMBOPLASTIN TIME PARTIAL: CPT | Mod: 91 | Performed by: INTERNAL MEDICINE

## 2023-04-21 PROCEDURE — 36415 COLL VENOUS BLD VENIPUNCTURE: CPT | Performed by: INTERNAL MEDICINE

## 2023-04-21 PROCEDURE — 97161 PT EVAL LOW COMPLEX 20 MIN: CPT

## 2023-04-21 PROCEDURE — 25000003 PHARM REV CODE 250: Performed by: INTERNAL MEDICINE

## 2023-04-21 PROCEDURE — 20600001 HC STEP DOWN PRIVATE ROOM

## 2023-04-21 PROCEDURE — 99233 PR SUBSEQUENT HOSPITAL CARE,LEVL III: ICD-10-PCS | Mod: ,,, | Performed by: INTERNAL MEDICINE

## 2023-04-21 PROCEDURE — 63600175 PHARM REV CODE 636 W HCPCS: Performed by: INTERNAL MEDICINE

## 2023-04-21 PROCEDURE — 84100 ASSAY OF PHOSPHORUS: CPT | Performed by: STUDENT IN AN ORGANIZED HEALTH CARE EDUCATION/TRAINING PROGRAM

## 2023-04-21 PROCEDURE — 85025 COMPLETE CBC W/AUTO DIFF WBC: CPT | Performed by: INTERNAL MEDICINE

## 2023-04-21 RX ORDER — LOSARTAN POTASSIUM 50 MG/1
50 TABLET ORAL DAILY
Status: DISCONTINUED | OUTPATIENT
Start: 2023-04-21 | End: 2023-04-22

## 2023-04-21 RX ADMIN — ASPIRIN 81 MG: 81 TABLET, COATED ORAL at 09:04

## 2023-04-21 RX ADMIN — HEPARIN SODIUM AND DEXTROSE 12 UNITS/KG/HR: 10000; 5 INJECTION INTRAVENOUS at 04:04

## 2023-04-21 RX ADMIN — PANTOPRAZOLE SODIUM 40 MG: 40 TABLET, DELAYED RELEASE ORAL at 08:04

## 2023-04-21 RX ADMIN — AMIODARONE HYDROCHLORIDE 200 MG: 200 TABLET ORAL at 08:04

## 2023-04-21 RX ADMIN — ATORVASTATIN CALCIUM 80 MG: 40 TABLET, FILM COATED ORAL at 08:04

## 2023-04-21 RX ADMIN — METOPROLOL SUCCINATE 25 MG: 25 TABLET, EXTENDED RELEASE ORAL at 08:04

## 2023-04-21 RX ADMIN — LOSARTAN POTASSIUM 50 MG: 50 TABLET, FILM COATED ORAL at 11:04

## 2023-04-21 RX ADMIN — CLOPIDOGREL BISULFATE 75 MG: 75 TABLET ORAL at 09:04

## 2023-04-21 NOTE — ASSESSMENT & PLAN NOTE
Patient with ADHF. Diuresing with IV lasix. Now with improvement in chest pain.   - See 'NSTEMI'  - Resume GDMT as tolerated; Pt on Metop 25 mg daily.   -Will restart pt on Losartan 50 mg daily. Pt will need MRA and SGLT-2 prior to discharge   - reports respiratory status now at baseline s/p diuresis  - will discuss with EP about upgrade to CRT-D inpatient    -Lasix held given pt euvolemic

## 2023-04-21 NOTE — SUBJECTIVE & OBJECTIVE
Interval History: NAEON. Pt remains stable and denies any issues at this time. Cr down to 1.8.     Review of Systems   Constitutional: Negative for fever and malaise/fatigue.   Eyes:  Negative for blurred vision and pain.   Cardiovascular:  Negative for chest pain, dyspnea on exertion, leg swelling, orthopnea, palpitations and paroxysmal nocturnal dyspnea.   Respiratory:  Negative for cough, shortness of breath, sputum production and wheezing.    Hematologic/Lymphatic: Negative for adenopathy and bleeding problem.   Skin:  Negative for rash.   Musculoskeletal:  Negative for back pain and neck pain.   Gastrointestinal:  Negative for abdominal pain, constipation, diarrhea, nausea and vomiting.   Genitourinary:  Negative for dysuria.   Neurological:  Negative for dizziness, headaches, light-headedness and weakness.   Objective:     Vital Signs (Most Recent):  Temp: 98.2 °F (36.8 °C) (04/21/23 0813)  Pulse: (!) 57 (04/21/23 1200)  Resp: 16 (04/21/23 1112)  BP: (!) 129/104 (04/21/23 1112)  SpO2: 98 % (04/21/23 1112)   Vital Signs (24h Range):  Temp:  [98 °F (36.7 °C)-98.2 °F (36.8 °C)] 98.2 °F (36.8 °C)  Pulse:  [57-76] 57  Resp:  [16-18] 16  SpO2:  [93 %-98 %] 98 %  BP: ()/() 129/104     Weight: 81 kg (178 lb 9.2 oz)  Body mass index is 24.22 kg/m².     SpO2: 98 %         Intake/Output Summary (Last 24 hours) at 4/21/2023 1305  Last data filed at 4/21/2023 0902  Gross per 24 hour   Intake 660 ml   Output 1850 ml   Net -1190 ml       Lines/Drains/Airways       Peripheral Intravenous Line  Duration                  Peripheral IV - Single Lumen 04/16/23 2035 20 G Distal;Posterior;Right Forearm 4 days         Peripheral IV - Single Lumen 04/16/23 2120 18 G Anterior;Left Forearm 4 days                    Physical Exam  Constitutional:       General: He is not in acute distress.     Appearance: Normal appearance. He is not ill-appearing, toxic-appearing or diaphoretic.   HENT:      Head: Normocephalic and  atraumatic.      Nose: Nose normal.   Eyes:      Extraocular Movements: Extraocular movements intact.      Pupils: Pupils are equal, round, and reactive to light.   Cardiovascular:      Rate and Rhythm: Normal rate and regular rhythm.      Heart sounds: No murmur heard.    No friction rub. No gallop.   Pulmonary:      Effort: Pulmonary effort is normal. No respiratory distress.      Breath sounds: Normal breath sounds. No wheezing or rales.   Abdominal:      General: Abdomen is flat. There is no distension.      Palpations: Abdomen is soft. There is no mass.      Tenderness: There is no abdominal tenderness.   Musculoskeletal:         General: No swelling. Normal range of motion.      Cervical back: Normal range of motion. No rigidity.      Right lower leg: No edema.      Left lower leg: No edema.   Skin:     General: Skin is warm and dry.      Coloration: Skin is not jaundiced.      Findings: No bruising.   Neurological:      General: No focal deficit present.      Mental Status: He is alert and oriented to person, place, and time.      Cranial Nerves: No cranial nerve deficit.      Motor: No weakness.       Significant Labs: BMP:   Recent Labs   Lab 04/20/23  0429 04/21/23  0510    104   * 136   K 3.7 3.8    97   CO2 23 24   BUN 43* 38*   CREATININE 1.9* 1.8*   CALCIUM 9.5 9.8   MG 2.1 2.2    and CBC   Recent Labs   Lab 04/20/23  0429 04/20/23  1517 04/21/23  0510   WBC 4.70 4.94 4.59   HGB 15.8 16.1 15.8   HCT 48.8 51.2 49.8    271 294       Significant Imaging: Reviewed

## 2023-04-21 NOTE — ASSESSMENT & PLAN NOTE
S/p PCI in 2021 for AMI. [facility] on this hospitalization with nonobstructive CAD   - Continue plavix, heparin gtt and statin  -Stop ASA at this time

## 2023-04-21 NOTE — PT/OT/SLP EVAL
Physical Therapy Co-Evaluation with OT    Patient Name:  Tera Griffiths   MRN:  [id number]    Recommendations:     Discharge Recommendations: other (see comments)   Discharge Equipment Recommendations:  (TBD pending pt progress)   Barriers to discharge: None    Assessment:     Tera Griffiths is a 57 y.o. male admitted with a medical diagnosis of NSTEMI (non-ST elevated myocardial infarction).  He presents with the following impairments/functional limitations: weakness, impaired endurance, impaired functional mobility, gait instability, impaired balance, impaired cardiopulmonary response to activity. Pt with fair activity tolerance and instability noted during gait activities-- would benefit from follow-up gait training and therapy intervention to maximize safety and endurance training.     Rehab Prognosis: Good; patient would benefit from acute skilled PT services to address these deficits and reach maximum level of function.       Recent Surgery: Procedure(s) (LRB):  Left heart cath (Left) 3 Days Post-Op    Plan:     During this hospitalization, patient to be seen 2 x/week to address the identified rehab impairments via gait training, therapeutic activities, therapeutic exercises, neuromuscular re-education and progress toward the following goals:    Plan of Care Expires:  05/21/23    Subjective     Chief Complaint: SOB upon exertion   Patient/Family Comments/goals: to return home   Pain/Comfort:  Pain Rating 1: 0/10  Pain Rating Post-Intervention 1: 0/10    Patients cultural, spiritual, Scientology conflicts given the current situation: no    Living Environment:  Pt reported living with cousin in 2nd floor apartment with ~15 STEFANIE (no elevator access).     Prior to admission, patients level of function was (I) with mobility and ADLs.  Equipment used at home: none.  DME owned (not currently used): none.      Upon discharge, patient will have assistance from cousin.    Objective:     Communicated with RN prior to  session.  Patient found HOB elevated with peripheral IV, pulse ox (continuous), telemetry  upon PT entry to room.    General Precautions: Standard, fall  Orthopedic Precautions:N/A   Braces: N/A  Respiratory Status: Room air    Exams:  Cognitive Exam:  Patient is oriented to Person, Place, Time, and Situation  Postural Exam:  Patient presented with the following abnormalities:    -       Rounded shoulders  Skin Integrity/Edema:      -       Skin integrity: Visible skin intact  -       Edema: None noted BLEs  RLE ROM: WFL  RLE Strength: WFL  LLE ROM: WFL  LLE Strength: WFL    Functional Mobility:    Bed Mobility:   Supine > Sit: supervision  Sit > Supine: supervision    Transfers:   Sit <> Stand Transfer: contact guard assistance from EOB without AD    Balance:    Standing balance:   FAIR: Maintains without assist but unable to take challenges  FAIR: Needs CONTACT GUARD during gait                 Gait:  Distance: ~100 ft.    Assistive Device: no AD (pt frequently reaching out to hallway railing for UE support due to instability)   Assistance Level: contact guard assistance   Gait Assessment: reciprocal gait pattern with  mild instability, decreased step length, decreased gait speed       AM-PAC 6 CLICK MOBILITY  Total Score:19       Treatment & Education:  Pt educated on:   - role of PT and POC/goals for therapy   - Safety with mobility  - Instructed to call nursing staff for assistance with mobility as needed 2/2 fall risk     Pt verbalized understanding and expressed no further concerns/questions.     Patient left HOB elevated with all lines intact, call button in reach, and RN notified.    GOALS:   Multidisciplinary Problems       Physical Therapy Goals          Problem: Physical Therapy    Goal Priority Disciplines Outcome Goal Variances Interventions   Physical Therapy Goal     PT, PT/OT Ongoing, Progressing     Description: Goals to be met by: 5/5/23     Patient will increase functional independence with  mobility by performin. Sit to stand transfer with Modified [place]  2. Gait  x 200 feet with Supervision with or without using LRAD, with no LOB    3. Ascend/descend 10 stairs with right or left Handrail as needed with Supervision   4. Lower extremity exercise program x20 reps per handout, with independence                         History:     Past Medical History:   Diagnosis Date    Atrial fibrillation     Encounter for blood transfusion        Past Surgical History:   Procedure Laterality Date    CARDIAC DEFIBRILLATOR PLACEMENT Left     HERNIA REPAIR      LEFT HEART CATHETERIZATION Left 2023    Procedure: Left heart cath;  Surgeon: Atilio Marks MD;  Location: [facility] CATH LAB;  Service: Cardiology;  Laterality: Left;    RIGHT HEART CATHETERIZATION Right 2022    Procedure: INSERTION, CATHETER, RIGHT HEART;  Surgeon: Caden Aden MD;  Location: [facility] CATH LAB;  Service: Cardiology;  Laterality: Right;    TREATMENT OF CARDIAC ARRHYTHMIA N/A 2022    Procedure: CARDIOVERSION;  Surgeon: Marvin Sanon MD;  Location: [facility] EP LAB;  Service: Cardiology;  Laterality: N/A;  afib, KIA, DCCV, anes, MB, 3 Prep       Time Tracking:     PT Received On: 23  PT Start Time: 842     PT Stop Time: 0853  PT Total Time (min): 11 min     Billable Minutes: Evaluation 11 min      2023

## 2023-04-21 NOTE — ASSESSMENT & PLAN NOTE
Baseline Cr 1.2-1.4.  ISI ([facility] on 4/18) vs hypotension  Lab Results   Component Value Date    BUN 38 (H) 04/21/2023    CREATININE 1.8 (H) 04/21/2023   - strict I/O  - CMP qd, trend Cr  - renally dose all medications  - avoid nephrotoxic agents, NSAIDs, IV contrast, ACE/ARB  - Maintain MAP > 65  - hold entresto, spironolactone for Tena  -Cr improved today, will continue to monitor

## 2023-04-21 NOTE — PT/OT/SLP EVAL
Occupational Therapy   Co-Evaluation    Name: Tera Griffiths  MRN: [id number]  Admitting Diagnosis: NSTEMI (non-ST elevated myocardial infarction)  Recent Surgery: Procedure(s) (LRB):  Left heart cath (Left) 3 Days Post-Op    Recommendations:     Discharge Recommendations: other (see comments)  Discharge Equipment Recommendations:   (TBD)  Barriers to discharge:  None    Assessment:     Tera Griffiths is a 57 y.o. male with a medical diagnosis of NSTEMI (non-ST elevated myocardial infarction). Pt agreeable to PT/OT eval and tolerated session fairly well with good participation. Pt is currently functioning below his baseline in self-care and functional mobility due to deficits listed below. He required CGA-SBA for all self-care tasks and functional mobility/transfers completed this day. Once medically stable, OT recommending further therapy services post hospitalization in order to reduce caregiver burden, increase (I) with functional activities, and facilitate safe discharge.  Performance deficits affecting function: weakness, impaired endurance, impaired self care skills, impaired functional mobility, gait instability, impaired balance, decreased safety awareness, impaired cardiopulmonary response to activity.      Rehab Prognosis: Good; patient would benefit from acute skilled OT services to address these deficits and reach maximum level of function.       Plan:     Patient to be seen 2 x/week to address the above listed problems via self-care/home management, therapeutic activities, therapeutic exercises, neuromuscular re-education  Plan of Care Expires: 05/21/23  Plan of Care Reviewed with: patient    Subjective     Chief Complaint: feeling unsteady  Patient/Family Comments/goals: return to PLOF    Occupational Profile:  Living Environment: Pt lives with his cousin in a 2nd floor apartment with 15 STEFANIE and b/l hand rails. W-I-S  Previous level of function: (I) in ADLs and functional mobility using no AD  Roles  and Routines: pt does not work/drive  Equipment Used at Home: none  Assistance upon Discharge: cousin    Pain/Comfort:  Pain Rating 1: 0/10  Pain Rating Post-Intervention 1: 0/10    Patients cultural, spiritual, Caodaism conflicts given the current situation: no    Objective:   Co-treatment performed due to patient's multiple deficits requiring two skilled therapists to appropriately and safely assess patient's strength and endurance while facilitating functional tasks in addition to accommodating for patient's activity tolerance.      Additional staff present:  BARTOLO Robb    Communicated with: RN prior to session.  Patient found HOB elevated with peripheral IV, pulse ox (continuous), telemetry upon OT entry to room.    General Precautions: Standard, fall  Orthopedic Precautions: N/A  Braces: N/A  Respiratory Status: Room air    Occupational Performance:    Bed Mobility:    Patient completed Scooting/Bridging with supervision  Patient completed Supine to Sit with supervision  Patient completed Sit to Supine with supervision    Functional Mobility/Transfers:  Patient completed Sit <> Stand Transfer with contact guard assistance  with  no assistive device   Functional Mobility: Pt engaged in functional mobility to simulate household/community distances across room/hallway with CGA and no AD in order to maximize functional endurance and standing balance required for engagement in occupations of choice   No overt LOB however pt unsteady with gait   Multiple attempts to reach for external objects (e.g., hallway railing) to steady    Activities of Daily Living:  Lower Body Dressing: stand by assistance to don b/l socks sitting EOB with figure 4 technique    Cognitive/Visual Perceptual:  Cognitive/Psychosocial Skills:     -       Oriented to: Person, Place, Time, and Situation   -       Follows Commands/attention:Follows one-step commands  -       Safety awareness/insight to disability: impaired   -        Mood/Affect/Coping skills/emotional control: Cooperative    Physical Exam:  Balance:    -       fair/fair+ standing balance with no AD; fair+/good sitting balance  Postural examination/scapula alignment:    -       Rounded shoulders  -       Forward head  Upper Extremity Range of Motion:     -       Right Upper Extremity: WFL  -       Left Upper Extremity: WFL  Upper Extremity Strength:    -       Right Upper Extremity: WFL  -       Left Upper Extremity: WFL    AMPAC 6 Click ADL:  AMPAC Total Score: 19    Treatment & Education:  -Education on energy conservation and task modification to maximize safety and (I) during ADLs and mobility  -Education on importance of OOB activity to improve overall activity tolerance and promote recovery  -Pt educated to call for assistance and to transfer with hospital staff only  -Provided education regarding role of OT, POC, & discharge recommendations with pt verbalizing understanding.  Pt had no further questions & when asked whether there were any concerns pt reported none.     Patient left HOB elevated with all lines intact, call button in reach, and RN notified    GOALS:   Multidisciplinary Problems       Occupational Therapy Goals          Problem: Occupational Therapy    Goal Priority Disciplines Outcome Interventions   Occupational Therapy Goal     OT, PT/OT Ongoing, Progressing    Description: Goals to be met by: 5/12/23     Patient will increase functional independence with ADLs by performing:    UE Dressing with Modified [place].  LE Dressing with Modified [place].  Grooming while standing with Modified [place].  Toileting from toilet/bedside commode with Modified [place] for hygiene and clothing management.   Toilet transfer to toilet/bedside commode with Modified [place].                         History:     Past Medical History:   Diagnosis Date    Atrial fibrillation     Encounter for blood transfusion          Past Surgical History:    Procedure Laterality Date    CARDIAC DEFIBRILLATOR PLACEMENT Left     HERNIA REPAIR      LEFT HEART CATHETERIZATION Left 4/18/2023    Procedure: Left heart cath;  Surgeon: Atilio Marks MD;  Location: [facility] CATH LAB;  Service: Cardiology;  Laterality: Left;    RIGHT HEART CATHETERIZATION Right 9/30/2022    Procedure: INSERTION, CATHETER, RIGHT HEART;  Surgeon: Caden Aden MD;  Location: [facility] CATH LAB;  Service: Cardiology;  Laterality: Right;    TREATMENT OF CARDIAC ARRHYTHMIA N/A 11/22/2022    Procedure: CARDIOVERSION;  Surgeon: Marvin Sanon MD;  Location: [facility] EP LAB;  Service: Cardiology;  Laterality: N/A;  afib, KIA, DCCV, anes, MB, 3 Prep       Time Tracking:     OT Date of Treatment: 04/21/23  OT Start Time: 0843  OT Stop Time: 0853  OT Total Time (min): 10 min    Billable Minutes:Evaluation 10    4/21/2023

## 2023-04-21 NOTE — PLAN OF CARE
Plan of Care:  PT evaluation completed- see note for details. Goals and POC established.     Please continue Progressive Mobility Protocol as appropriate.     2023    Physical Therapy Treatment Goals:  Multidisciplinary Problems       Physical Therapy Goals          Problem: Physical Therapy    Goal Priority Disciplines Outcome Goal Variances Interventions   Physical Therapy Goal     PT, PT/OT Ongoing, Progressing     Description: Goals to be met by: 23     Patient will increase functional independence with mobility by performin. Sit to stand transfer with Modified [place]  2. Gait  x 200 feet with Supervision with or without using LRAD, with no LOB    3. Ascend/descend 10 stairs with right or left Handrail as needed with Supervision   4. Lower extremity exercise program x20 reps per handout, with independence

## 2023-04-21 NOTE — PLAN OF CARE
Plan of care reviewed with patient. Patient is AOX4 and VS stable. Patient remained free of falls and trauma, fall precautions are in place. Patient is ambulating independently with a stand by assist. Patient has no complaints of pain. Heparin gtt continuing. Patient has no questions at this time. Wheels are locked and the bed is in lowest position. The call bell is within reach. Telemetry is on. Will continue to monitor.

## 2023-04-21 NOTE — PROGRESS NOTES
[facility] - [facility]iology Stepdown  Cardiology  Progress Note    Patient Name: Tera Griffiths  MRN: [id number]  Admission Date: 4/16/2023  Hospital Length of Stay: 5 days  Code Status: Full Code   Attending Physician: Milad Nassar MD   Primary Care Physician: Primary Doctor No  Expected Discharge Date: 4/21/2023  Principal Problem:NSTEMI (non-ST elevated myocardial infarction)    Subjective:     Hospital Course:   Admitted to CCU for nitro drip in setting of chest pain.  Trop downtrending and appears to be at baseline, overloaded by exam, started on lasix drip with brisk diuresis and improvement back to baseline respiratory status.  Interventional cardiology consulted for ischemic evaluation, LHC showing one small obtuse marginal branch with complete acute occlusion but otherwise nonobstructive disease in LAD, LCX, RCA.  Developed EMERSON s/p cath presumably 2/2 to contrast.      Interval History: NAEON. Pt remains stable and denies any issues at this time. Cr down to 1.8.     Review of Systems   Constitutional: Negative for fever and malaise/fatigue.   Eyes:  Negative for blurred vision and pain.   Cardiovascular:  Negative for chest pain, dyspnea on exertion, leg swelling, orthopnea, palpitations and paroxysmal nocturnal dyspnea.   Respiratory:  Negative for cough, shortness of breath, sputum production and wheezing.    Hematologic/Lymphatic: Negative for adenopathy and bleeding problem.   Skin:  Negative for rash.   Musculoskeletal:  Negative for back pain and neck pain.   Gastrointestinal:  Negative for abdominal pain, constipation, diarrhea, nausea and vomiting.   Genitourinary:  Negative for dysuria.   Neurological:  Negative for dizziness, headaches, light-headedness and weakness.   Objective:     Vital Signs (Most Recent):  Temp: 98.2 °F (36.8 °C) (04/21/23 0813)  Pulse: (!) 57 (04/21/23 1200)  Resp: 16 (04/21/23 1112)  BP: (!) 129/104 (04/21/23 1112)  SpO2: 98 % (04/21/23 1112)   Vital Signs (24h  Range):  Temp:  [98 °F (36.7 °C)-98.2 °F (36.8 °C)] 98.2 °F (36.8 °C)  Pulse:  [57-76] 57  Resp:  [16-18] 16  SpO2:  [93 %-98 %] 98 %  BP: ()/() 129/104     Weight: 81 kg (178 lb 9.2 oz)  Body mass index is 24.22 kg/m².     SpO2: 98 %         Intake/Output Summary (Last 24 hours) at 4/21/2023 1305  Last data filed at 4/21/2023 0902  Gross per 24 hour   Intake 660 ml   Output 1850 ml   Net -1190 ml       Lines/Drains/Airways       Peripheral Intravenous Line  Duration                  Peripheral IV - Single Lumen 04/16/23 2035 20 G Distal;Posterior;Right Forearm 4 days         Peripheral IV - Single Lumen 04/16/23 2120 18 G Anterior;Left Forearm 4 days                    Physical Exam  Constitutional:       General: He is not in acute distress.     Appearance: Normal appearance. He is not ill-appearing, toxic-appearing or diaphoretic.   HENT:      Head: Normocephalic and atraumatic.      Nose: Nose normal.   Eyes:      Extraocular Movements: Extraocular movements intact.      Pupils: Pupils are equal, round, and reactive to light.   Cardiovascular:      Rate and Rhythm: Normal rate and regular rhythm.      Heart sounds: No murmur heard.    No friction rub. No gallop.   Pulmonary:      Effort: Pulmonary effort is normal. No respiratory distress.      Breath sounds: Normal breath sounds. No wheezing or rales.   Abdominal:      General: Abdomen is flat. There is no distension.      Palpations: Abdomen is soft. There is no mass.      Tenderness: There is no abdominal tenderness.   Musculoskeletal:         General: No swelling. Normal range of motion.      Cervical back: Normal range of motion. No rigidity.      Right lower leg: No edema.      Left lower leg: No edema.   Skin:     General: Skin is warm and dry.      Coloration: Skin is not jaundiced.      Findings: No bruising.   Neurological:      General: No focal deficit present.      Mental Status: He is alert and oriented to person, place, and time.       Cranial Nerves: No cranial nerve deficit.      Motor: No weakness.       Significant Labs: BMP:   Recent Labs   Lab 04/20/23  0429 04/21/23  0510    104   * 136   K 3.7 3.8    97   CO2 23 24   BUN 43* 38*   CREATININE 1.9* 1.8*   CALCIUM 9.5 9.8   MG 2.1 2.2    and CBC   Recent Labs   Lab 04/20/23  0429 04/20/23  1517 04/21/23  0510   WBC 4.70 4.94 4.59   HGB 15.8 16.1 15.8   HCT 48.8 51.2 49.8    271 294       Significant Imaging: Reviewed     Assessment and Plan:         * NSTEMI (non-ST elevated myocardial infarction)  Tera Griffiths is a 57 year old gentleman with past medical history of ischemic cardiomyopathy EF of 18% with cardiac device placement, coronary artery disease status post STEMI with PCI to RCA in August 2021, persistent atrial fibrillation, hypertension, recent management for decompensated heart failure, was evaluated by HTS in the clinic for advanced options consideration but does not qualify for advanced options. Admitted for NSTEMI and eventually upgraded to CCU for closer monitoring.   NSTEMI likely 2/2 to elevated BP vs decompensated HF. There was an initial concern for Type 1NSTEMI as ACS was initiated, however troponin flat and now downtrending so low suspicion. He was initiated on NTG ands aggressive diuresis. Bedside TTE with EF 10%, CVP 3 (though it appears CVP 3 on priro ADHF admissions). Reports abdominal bloating. Lactic wnl    - Now chest pain free. Off NTG. Nausea/vomiting resolved  - Continue Plavix given recent NSTEMI. Will also continue heparin and stop ASA at this time    - [facility] without obstructive disease    Acute kidney injury superimposed on CKD  Baseline Cr 1.2-1.4.  ISI ([facility] on 4/18) vs hypotension  Lab Results   Component Value Date    BUN 38 (H) 04/21/2023    CREATININE 1.8 (H) 04/21/2023   - strict I/O  - CMP qd, trend Cr  - renally dose all medications  - avoid nephrotoxic agents, NSAIDs, IV contrast, ACE/ARB  - Maintain MAP > 65  - hold  entresto, spironolactone for Tena  -Cr improved today, will continue to monitor     Hypertensive emergency  /135 on admit. Likely contributed to chest pain. BP now improved. Symptoms have resolved  - Resume home antihypertensives.     Dyslipidemia  Contiune statin    Acute on chronic combined systolic and diastolic congestive heart failure  Patient with ADHF. Diuresing with IV lasix. Now with improvement in chest pain.   - See 'NSTEMI'  - Resume GDMT as tolerated; Pt on Metop 25 mg daily.   -Will restart pt on Losartan 50 mg daily. Pt will need MRA and SGLT-2 prior to discharge   - reports respiratory status now at baseline s/p diuresis  - will discuss with EP about upgrade to CRT-D inpatient    -Lasix held given pt euvolemic     Type 2 diabetes mellitus with circulatory disorder, without long-term current use of insulin  Controlled. A1c 5.7.  LDSSI     Atrial fibrillation  Hx of persistent atrial fib s/p KIA/CV 11/29/22 with initiation of amiodarone. On eliquis 5mg BID.   On admission, he was in atrial fib with ectopy, though now appears to have cardioverted to NSR    - Continue heparin gtt, switch back to eliquis when renal function stable  - maintain K>4, Mg>2    CAD (coronary artery disease)  S/p PCI in 2021 for AMI. [facility] on this hospitalization with nonobstructive CAD   - Continue plavix, heparin gtt and statin  -Stop ASA at this time         VTE Risk Mitigation (From admission, onward)         Ordered     heparin 25,000 units in dextrose 5% 250 mL (100 units/mL) infusion LOW INTENSITY nomogram - [facility]  Continuous        Question Answer Comment   Heparin Infusion Adjustment (DO NOT MODIFY ANSWER) [email or web address]\epic\Images\Pharmacy\HeparinInfusions\heparin LOW INTENSITY nomogram for [facility] [id number]    Begin at (in units/kg/hr) 12 04/20/23 1448     heparin 25,000 units in dextrose 5% (100 units/ml) IV bolus from bag - ADDITIONAL PRN BOLUS - 60 units/kg  As needed (PRN)        Question:  Heparin Infusion  Adjustment (DO NOT MODIFY ANSWER)  Answer:  [email or web address]\epic\Images\Pharmacy\HeparinInfusions\heparin LOW INTENSITY nomogram for [facility] [id number]    04/20/23 1448     heparin 25,000 units in dextrose 5% (100 units/ml) IV bolus from bag - ADDITIONAL PRN BOLUS - 30 units/kg  As needed (PRN)        Question:  Heparin Infusion Adjustment (DO NOT MODIFY ANSWER)  Answer:  [email or web address]\epic\Images\Pharmacy\HeparinInfusions\heparin LOW INTENSITY nomogram for [facility] [id number]    04/20/23 1448     heparin infusion 1,000 units/500 ml in 0.9% NaCl (on sterile field)  As needed (PRN)         04/18/23 1415     IP VTE HIGH RISK PATIENT  Once         04/16/23 1713     Place sequential compression device  Until discontinued         04/16/23 1713                Jose Tang MD  Cardiology  [facility] - Cardiology Stepdown

## 2023-04-21 NOTE — ASSESSMENT & PLAN NOTE
Tera Griffiths is a 57 year old gentleman with past medical history of ischemic cardiomyopathy EF of 18% with cardiac device placement, coronary artery disease status post STEMI with PCI to RCA in August 2021, persistent atrial fibrillation, hypertension, recent management for decompensated heart failure, was evaluated by HTS in the clinic for advanced options consideration but does not qualify for advanced options. Admitted for NSTEMI and eventually upgraded to CCU for closer monitoring.   NSTEMI likely 2/2 to elevated BP vs decompensated HF. There was an initial concern for Type 1NSTEMI as ACS was initiated, however troponin flat and now downtrending so low suspicion. He was initiated on NTG ands aggressive diuresis. Bedside TTE with EF 10%, CVP 3 (though it appears CVP 3 on priro ADHF admissions). Reports abdominal bloating. Lactic wnl    - Now chest pain free. Off NTG. Nausea/vomiting resolved  - Continue Plavix given recent NSTEMI. Will also continue heparin and stop ASA at this time    - [facility] without obstructive disease

## 2023-04-21 NOTE — PLAN OF CARE
Problem: Occupational Therapy  Goal: Occupational Therapy Goal  Description: Goals to be met by: 5/12/23     Patient will increase functional independence with ADLs by performing:    UE Dressing with Modified [place].  LE Dressing with Modified [place].  Grooming while standing with Modified [place].  Toileting from toilet/bedside commode with Modified [place] for hygiene and clothing management.   Toilet transfer to toilet/bedside commode with Modified [place].    Outcome: Ongoing, Progressing   OT eval complete with goals established.

## 2023-04-22 VITALS
HEIGHT: 72 IN | WEIGHT: 181.88 LBS | SYSTOLIC BLOOD PRESSURE: 145 MMHG | TEMPERATURE: 96 F | DIASTOLIC BLOOD PRESSURE: 96 MMHG | OXYGEN SATURATION: 92 % | BODY MASS INDEX: 24.63 KG/M2 | HEART RATE: 64 BPM | RESPIRATION RATE: 20 BRPM

## 2023-04-22 LAB
ALBUMIN SERPL BCP-MCNC: 3.4 G/DL (ref 3.5–5.2)
ALP SERPL-CCNC: 67 U/L (ref 55–135)
ALT SERPL W/O P-5'-P-CCNC: 12 U/L (ref 10–44)
ANION GAP SERPL CALC-SCNC: 12 MMOL/L (ref 8–16)
APTT PPP: 50.5 SEC (ref 21–32)
AST SERPL-CCNC: 16 U/L (ref 10–40)
BASOPHILS # BLD AUTO: 0.03 K/UL (ref 0–0.2)
BASOPHILS NFR BLD: 0.7 % (ref 0–1.9)
BILIRUB SERPL-MCNC: 0.6 MG/DL (ref 0.1–1)
BUN SERPL-MCNC: 38 MG/DL (ref 6–20)
CALCIUM SERPL-MCNC: 10 MG/DL (ref 8.7–10.5)
CHLORIDE SERPL-SCNC: 97 MMOL/L (ref 95–110)
CO2 SERPL-SCNC: 25 MMOL/L (ref 23–29)
CREAT SERPL-MCNC: 1.7 MG/DL (ref 0.5–1.4)
DIFFERENTIAL METHOD: ABNORMAL
EOSINOPHIL # BLD AUTO: 0.2 K/UL (ref 0–0.5)
EOSINOPHIL NFR BLD: 4.4 % (ref 0–8)
ERYTHROCYTE [DISTWIDTH] IN BLOOD BY AUTOMATED COUNT: 14.3 % (ref 11.5–14.5)
EST. GFR  (NO RACE VARIABLE): 47 ML/MIN/1.73 M^2
GLUCOSE SERPL-MCNC: 108 MG/DL (ref 70–110)
HCT VFR BLD AUTO: 46.1 % (ref 40–54)
HGB BLD-MCNC: 14.5 G/DL (ref 14–18)
IMM GRANULOCYTES # BLD AUTO: 0.01 K/UL (ref 0–0.04)
IMM GRANULOCYTES NFR BLD AUTO: 0.2 % (ref 0–0.5)
LYMPHOCYTES # BLD AUTO: 1.3 K/UL (ref 1–4.8)
LYMPHOCYTES NFR BLD: 27.8 % (ref 18–48)
MAGNESIUM SERPL-MCNC: 2.2 MG/DL (ref 1.6–2.6)
MCH RBC QN AUTO: 26.8 PG (ref 27–31)
MCHC RBC AUTO-ENTMCNC: 31.5 G/DL (ref 32–36)
MCV RBC AUTO: 85 FL (ref 82–98)
MONOCYTES # BLD AUTO: 0.8 K/UL (ref 0.3–1)
MONOCYTES NFR BLD: 16.5 % (ref 4–15)
NEUTROPHILS # BLD AUTO: 2.3 K/UL (ref 1.8–7.7)
NEUTROPHILS NFR BLD: 50.4 % (ref 38–73)
NRBC BLD-RTO: 0 /100 WBC
PHOSPHATE SERPL-MCNC: 3.4 MG/DL (ref 2.7–4.5)
PLATELET # BLD AUTO: 264 K/UL (ref 150–450)
PMV BLD AUTO: 11.6 FL (ref 9.2–12.9)
POCT GLUCOSE: 93 MG/DL (ref 70–110)
POCT GLUCOSE: 98 MG/DL (ref 70–110)
POTASSIUM SERPL-SCNC: 3.9 MMOL/L (ref 3.5–5.1)
PROT SERPL-MCNC: 7.2 G/DL (ref 6–8.4)
RBC # BLD AUTO: 5.42 M/UL (ref 4.6–6.2)
SODIUM SERPL-SCNC: 134 MMOL/L (ref 136–145)
WBC # BLD AUTO: 4.54 K/UL (ref 3.9–12.7)

## 2023-04-22 PROCEDURE — 25000003 PHARM REV CODE 250: Performed by: INTERNAL MEDICINE

## 2023-04-22 PROCEDURE — 80053 COMPREHEN METABOLIC PANEL: CPT | Performed by: STUDENT IN AN ORGANIZED HEALTH CARE EDUCATION/TRAINING PROGRAM

## 2023-04-22 PROCEDURE — 84100 ASSAY OF PHOSPHORUS: CPT | Performed by: STUDENT IN AN ORGANIZED HEALTH CARE EDUCATION/TRAINING PROGRAM

## 2023-04-22 PROCEDURE — 36415 COLL VENOUS BLD VENIPUNCTURE: CPT | Performed by: INTERNAL MEDICINE

## 2023-04-22 PROCEDURE — 99239 HOSP IP/OBS DSCHRG MGMT >30: CPT | Mod: ,,, | Performed by: INTERNAL MEDICINE

## 2023-04-22 PROCEDURE — 85730 THROMBOPLASTIN TIME PARTIAL: CPT | Performed by: INTERNAL MEDICINE

## 2023-04-22 PROCEDURE — 25000003 PHARM REV CODE 250: Performed by: STUDENT IN AN ORGANIZED HEALTH CARE EDUCATION/TRAINING PROGRAM

## 2023-04-22 PROCEDURE — 99239 PR HOSPITAL DISCHARGE DAY,>30 MIN: ICD-10-PCS | Mod: ,,, | Performed by: INTERNAL MEDICINE

## 2023-04-22 PROCEDURE — 83735 ASSAY OF MAGNESIUM: CPT | Performed by: STUDENT IN AN ORGANIZED HEALTH CARE EDUCATION/TRAINING PROGRAM

## 2023-04-22 PROCEDURE — 85025 COMPLETE CBC W/AUTO DIFF WBC: CPT | Performed by: INTERNAL MEDICINE

## 2023-04-22 RX ORDER — ISOSORBIDE MONONITRATE 10 MG/1
10 TABLET ORAL 2 TIMES DAILY
Qty: 60 TABLET | Refills: 11 | Status: ON HOLD | OUTPATIENT
Start: 2023-04-22 | End: 2023-06-11 | Stop reason: SDUPTHER

## 2023-04-22 RX ORDER — AMIODARONE HYDROCHLORIDE 200 MG/1
200 TABLET ORAL 2 TIMES DAILY
Qty: 60 TABLET | Refills: 3 | Status: ON HOLD | OUTPATIENT
Start: 2023-04-22 | End: 2023-09-01 | Stop reason: HOSPADM

## 2023-04-22 RX ORDER — TORSEMIDE 20 MG/1
20 TABLET ORAL DAILY
Status: DISCONTINUED | OUTPATIENT
Start: 2023-04-22 | End: 2023-04-22 | Stop reason: HOSPADM

## 2023-04-22 RX ORDER — CLOPIDOGREL BISULFATE 75 MG/1
75 TABLET ORAL DAILY
Qty: 30 TABLET | Refills: 11 | Status: ON HOLD | OUTPATIENT
Start: 2023-04-22 | End: 2023-09-01 | Stop reason: HOSPADM

## 2023-04-22 RX ORDER — TORSEMIDE 20 MG/1
20 TABLET ORAL DAILY
Qty: 30 TABLET | Refills: 11 | Status: ON HOLD | OUTPATIENT
Start: 2023-04-22 | End: 2023-09-01 | Stop reason: HOSPADM

## 2023-04-22 RX ORDER — ATORVASTATIN CALCIUM 80 MG/1
80 TABLET, FILM COATED ORAL DAILY
Qty: 30 TABLET | Refills: 11 | Status: ON HOLD | OUTPATIENT
Start: 2023-04-22 | End: 2023-09-01 | Stop reason: HOSPADM

## 2023-04-22 RX ORDER — LOSARTAN POTASSIUM 50 MG/1
50 TABLET ORAL DAILY
Status: DISCONTINUED | OUTPATIENT
Start: 2023-04-22 | End: 2023-04-22

## 2023-04-22 RX ORDER — METOPROLOL SUCCINATE 25 MG/1
25 TABLET, EXTENDED RELEASE ORAL NIGHTLY
Qty: 30 TABLET | Refills: 11 | Status: ON HOLD | OUTPATIENT
Start: 2023-04-22 | End: 2023-07-16 | Stop reason: SDUPTHER

## 2023-04-22 RX ORDER — SACUBITRIL AND VALSARTAN 97; 103 MG/1; MG/1
1 TABLET, FILM COATED ORAL 2 TIMES DAILY
Qty: 60 TABLET | Refills: 5 | Status: SHIPPED | OUTPATIENT
Start: 2023-04-22 | End: 2023-04-22 | Stop reason: SDUPTHER

## 2023-04-22 RX ORDER — ISOSORBIDE MONONITRATE 10 MG/1
10 TABLET ORAL 2 TIMES DAILY
Qty: 60 TABLET | Refills: 11 | Status: SHIPPED | OUTPATIENT
Start: 2023-04-22 | End: 2023-04-22 | Stop reason: SDUPTHER

## 2023-04-22 RX ORDER — SACUBITRIL AND VALSARTAN 97; 103 MG/1; MG/1
1 TABLET, FILM COATED ORAL 2 TIMES DAILY
Qty: 60 TABLET | Refills: 5 | Status: ON HOLD | OUTPATIENT
Start: 2023-04-22 | End: 2023-06-11 | Stop reason: HOSPADM

## 2023-04-22 RX ORDER — SPIRONOLACTONE 25 MG/1
25 TABLET ORAL DAILY
Status: DISCONTINUED | OUTPATIENT
Start: 2023-04-22 | End: 2023-04-22

## 2023-04-22 RX ORDER — LOSARTAN POTASSIUM 50 MG/1
100 TABLET ORAL DAILY
Status: DISCONTINUED | OUTPATIENT
Start: 2023-04-22 | End: 2023-04-22

## 2023-04-22 RX ADMIN — CLOPIDOGREL BISULFATE 75 MG: 75 TABLET ORAL at 08:04

## 2023-04-22 RX ADMIN — POTASSIUM BICARBONATE 40 MEQ: 391 TABLET, EFFERVESCENT ORAL at 08:04

## 2023-04-22 RX ADMIN — AMIODARONE HYDROCHLORIDE 200 MG: 200 TABLET ORAL at 08:04

## 2023-04-22 RX ADMIN — ATORVASTATIN CALCIUM 80 MG: 40 TABLET, FILM COATED ORAL at 08:04

## 2023-04-22 RX ADMIN — PANTOPRAZOLE SODIUM 40 MG: 40 TABLET, DELAYED RELEASE ORAL at 08:04

## 2023-04-22 RX ADMIN — LOSARTAN POTASSIUM 50 MG: 50 TABLET, FILM COATED ORAL at 08:04

## 2023-04-22 RX ADMIN — TORSEMIDE 20 MG: 20 TABLET ORAL at 08:04

## 2023-04-22 NOTE — ASSESSMENT & PLAN NOTE
S/p PCI in 2021 for AMI. [facility] on this hospitalization with nonobstructive CAD   - Continue plavix, eliquis and statin  -Stop ASA at this time

## 2023-04-22 NOTE — NURSING
Patient is ready for discharge. Patient stable alert and oriented. Telemetry and IVs removed. No complaints of pain. Discussed discharge plan. Reviewed medications and side effects, appointments, and answered questions with patient. Medications to be picked up at [facility] pharmacy downstairs by patient. Patient walked off unit by self. All belongings sent with patient.

## 2023-04-22 NOTE — SUBJECTIVE & OBJECTIVE
Interval History: Cr slowly downtrending.  Denies shortness of breath, chest pain.  Per chart, UOP -200 total over 24h.     Review of Systems   Constitutional: Negative for fever and malaise/fatigue.   Eyes:  Negative for blurred vision and pain.   Cardiovascular:  Negative for chest pain, dyspnea on exertion, leg swelling, orthopnea, palpitations and paroxysmal nocturnal dyspnea.   Respiratory:  Negative for cough, shortness of breath, sputum production and wheezing.    Hematologic/Lymphatic: Negative for adenopathy and bleeding problem.   Skin:  Negative for rash.   Musculoskeletal:  Negative for back pain and neck pain.   Gastrointestinal:  Negative for abdominal pain, constipation, diarrhea, nausea and vomiting.   Genitourinary:  Negative for dysuria.   Neurological:  Negative for dizziness, headaches, light-headedness and weakness.   Objective:     Vital Signs (Most Recent):  Temp: 96.6 °F (35.9 °C) (04/22/23 0809)  Pulse: 61 (04/22/23 0809)  Resp: 20 (04/22/23 0809)  BP: (!) 134/91 (04/22/23 0809)  SpO2: 98 % (04/22/23 0809)   Vital Signs (24h Range):  Temp:  [96.6 °F (35.9 °C)-98.1 °F (36.7 °C)] 96.6 °F (35.9 °C)  Pulse:  [53-64] 61  Resp:  [16-21] 20  SpO2:  [96 %-98 %] 98 %  BP: (125-135)/() 134/91     Weight: 82.5 kg (181 lb 14.1 oz)  Body mass index is 24.67 kg/m².     SpO2: 98 %         Intake/Output Summary (Last 24 hours) at 4/22/2023 0832  Last data filed at 4/22/2023 0321  Gross per 24 hour   Intake 240 ml   Output 200 ml   Net 40 ml       Lines/Drains/Airways       Peripheral Intravenous Line  Duration                  Peripheral IV - Single Lumen 04/16/23 2035 20 G Distal;Posterior;Right Forearm 5 days         Peripheral IV - Single Lumen 04/16/23 2120 18 G Anterior;Left Forearm 5 days                    Physical Exam  Constitutional:       General: He is not in acute distress.     Appearance: Normal appearance. He is not ill-appearing, toxic-appearing or diaphoretic.   HENT:      Head:  Normocephalic and atraumatic.      Nose: Nose normal.   Eyes:      Extraocular Movements: Extraocular movements intact.      Pupils: Pupils are equal, round, and reactive to light.   Cardiovascular:      Rate and Rhythm: Normal rate and regular rhythm.      Heart sounds: No murmur heard.    No friction rub. No gallop.   Pulmonary:      Effort: Pulmonary effort is normal. No respiratory distress.      Breath sounds: Normal breath sounds. No wheezing or rales.   Abdominal:      General: Abdomen is flat. There is no distension.      Palpations: Abdomen is soft. There is no mass.      Tenderness: There is no abdominal tenderness.   Musculoskeletal:         General: No swelling. Normal range of motion.      Cervical back: Normal range of motion. No rigidity.      Right lower leg: No edema.      Left lower leg: No edema.   Skin:     General: Skin is warm and dry.      Coloration: Skin is not jaundiced.      Findings: No bruising.   Neurological:      General: No focal deficit present.      Mental Status: He is alert and oriented to person, place, and time.      Cranial Nerves: No cranial nerve deficit.      Motor: No weakness.       Significant Labs: All pertinent lab results from the last 24 hours have been reviewed.    Significant Imaging:  reviewed

## 2023-04-22 NOTE — ASSESSMENT & PLAN NOTE
Tera Griffiths is a 57 year old gentleman with past medical history of ischemic cardiomyopathy EF of 18% with cardiac device placement, coronary artery disease status post STEMI with PCI to RCA in August 2021, persistent atrial fibrillation, hypertension, recent management for decompensated heart failure, was evaluated by HTS in the clinic for advanced options consideration but does not qualify for advanced options. Admitted for NSTEMI and eventually upgraded to CCU for closer monitoring.   NSTEMI likely 2/2 to elevated BP vs decompensated HF. There was an initial concern for Type 1NSTEMI as ACS was initiated, however troponin flat and now downtrending so low suspicion. He was initiated on NTG ands aggressive diuresis. Bedside TTE with EF 10%, CVP 3 (though it appears CVP 3 on priro ADHF admissions). Reports abdominal bloating. Lactic wnl    - Now chest pain free. Off NTG. Nausea/vomiting resolved  - Continue Plavix given recent NSTEMI. Will also continue heparin and stop ASA at this time    - [facility] without obstructive disease

## 2023-04-22 NOTE — PLAN OF CARE
[facility] - Cardiology Stepdown      HOME HEALTH ORDERS  FACE TO FACE ENCOUNTER    Patient Name: Tera Griffiths  YOB: 1965    PCP: Primary Doctor No   PCP Address: None  PCP Phone Number: None  PCP Fax: None    Encounter Date: 4/16/23    Admit to Home Health    Diagnoses:  Active Hospital Problems    Diagnosis  POA    *NSTEMI (non-ST elevated myocardial infarction) [I21.4]  Unknown    Acute kidney injury superimposed on CKD [N17.9, N18.9]  Unknown    Hypertensive emergency [I16.1]  Yes    Atrial fibrillation [I48.91]  Yes    Acute on chronic combined systolic and diastolic congestive heart failure [I50.43]  Unknown    ICD (implantable cardioverter-defibrillator) in place [Z95.810]  Yes    HTN (hypertension) [I10]  Yes    CAD (coronary artery disease) [I25.10]  Yes    Dyslipidemia [E78.5]  Yes    Type 2 diabetes mellitus with circulatory disorder, without long-term current use of insulin [E11.59]  Yes      Resolved Hospital Problems   No resolved problems to display.       Follow Up Appointments:  Future Appointments   Date Time Provider Department Center   4/24/2023  1:00 PM LAB, APPOINTMENT [facility] LAB [facility]   4/24/2023  2:00 PM GENOVEVA,  [facility]   4/24/2023  3:00 PM [facility]   5/5/2023  1:30 PM LAB, APPOINTMENT [facility] LAB [facility]   5/5/2023  2:30 PM CARDIAC, PET IMAGING [facility]   5/5/2023  3:30 PM Chelsy Morales DO [facility]   5/10/2023  2:00 PM Kayleen Kelsey NP [facility] HEPAT [facility]       Allergies:Review of patient's allergies indicates:  No Known Allergies    Medications: Review discharge medications with patient and family and provide education.    Current Facility-Administered Medications   Medication Dose Route Frequency Provider Last Rate Last Admin    acetaminophen tablet 650 mg  650 mg Oral Q4H PRN Karthik Baker MD        amiodarone tablet 200 mg  200 mg  Oral BID Barron Finch MD   200 mg at 04/22/23 0859    atorvastatin tablet 80 mg  80 mg Oral Daily Karthik Baker MD   80 mg at 04/22/23 0858    clopidogreL tablet 75 mg  75 mg Oral Daily Manuel Shah MD   75 mg at 04/22/23 0858    dextrose 10% bolus 125 mL 125 mL  12.5 g Intravenous PRN Marcello Boucher MD        dextrose 10% bolus 250 mL 250 mL  25 g Intravenous PRN Karthik Baker MD        dextrose 40 % gel 15,000 mg  15 g Oral PRN Karthik Baker MD        dextrose 40 % gel 30,000 mg  30 g Oral PRN Karthik Baker MD        glucagon (human recombinant) injection 1 mg  1 mg Intramuscular PRN Karthik Baker MD        heparin 25,000 units in dextrose 5% (100 units/ml) IV bolus from bag - ADDITIONAL PRN BOLUS - 30 units/kg  30 Units/kg (Adjusted) Intravenous PRN Rustam Griggs MD        heparin 25,000 units in dextrose 5% (100 units/ml) IV bolus from bag - ADDITIONAL PRN BOLUS - 60 units/kg  60 Units/kg (Adjusted) Intravenous PRN Rustam Griggs MD        heparin 25,000 units in dextrose 5% 250 mL (100 units/mL) infusion LOW INTENSITY nomogram - [facility]  0-40 Units/kg/hr (Adjusted) Intravenous Continuous Rustam Griggs MD 9.7 mL/hr at 04/21/23 1611 12 Units/kg/hr at 04/21/23 1611    heparin infusion 1,000 units/500 ml in 0.9% NaCl (on sterile field)    PRN Atilio Marks MD   1,500 mL at 04/18/23 1415    insulin aspart U-100 pen 0-5 Units  0-5 Units Subcutaneous QID (AC + HS) PRN Marcello Boucher MD        iohexoL (OMNIPAQUE 350) injection    PRN Atilio Marks MD   45 mL at 04/18/23 1446    LIDOcaine (PF) 20 mg/ml (2%) injection    PRN Atilio Marks MD   10 mL at 04/18/23 1420    meclizine tablet 25 mg  25 mg Oral TID PRN aBrron Finch MD        metoprolol succinate (TOPROL-XL) 24 hr tablet 25 mg  25 mg Oral QHS Karthik Baker MD   25 mg at 04/21/23 2024    midazolam injection    PRN Atilio Marks MD   1 mg at 04/18/23 1419    naloxone 0.4 mg/mL injection 0.02 mg  0.02 mg Intravenous PRN Karthik Baker MD         nitroGLYCERIN SL tablet 0.4 mg  0.4 mg Sublingual Q5 Min PRN Phil Gambino MD        pantoprazole EC tablet 40 mg  40 mg Oral Daily Karthik Baker MD   40 mg at 04/22/23 0859    sodium chloride 0.9% flush 10 mL  10 mL Intravenous Q12H PRN Karthik Baker MD        sodium chloride 0.9% flush 10 mL  10 mL Intravenous PRN Favian Champion MD        torsemide tablet 20 mg  20 mg Oral Daily Barron Finch MD   20 mg at 04/22/23 0859     Current Discharge Medication List        CONTINUE these medications which have CHANGED    Details   isosorbide mononitrate (ISMO,MONOKET) 10 mg tablet Take 1 tablet (10 mg total) by mouth 2 (two) times daily. Hold until seen by cardiologist  Qty: 60 tablet, Refills: 11    Associated Diagnoses: Chronic combined systolic and diastolic heart failure; Ischemic cardiomyopathy      sacubitriL-valsartan (ENTRESTO)  mg per tablet Take 1 tablet by mouth 2 (two) times daily. HOLD UNTIL SEEN BY CARDIOLOGY  Qty: 60 tablet, Refills: 5           CONTINUE these medications which have NOT CHANGED    Details   amiodarone (PACERONE) 200 MG Tab Take 1 tablet (200 mg total) by mouth 2 (two) times daily.  Qty: 60 tablet, Refills: 3      empagliflozin (JARDIANCE) 10 mg tablet Take 1 tablet (10 mg total) by mouth once daily.  Qty: 30 tablet, Refills: 1    Associated Diagnoses: Chronic combined systolic and diastolic heart failure; Ischemic cardiomyopathy      ferrous sulfate (FEOSOL) 325 mg (65 mg iron) Tab tablet Take 325 mg by mouth every other day.      LIDOcaine (LIDODERM) 5 % Place 1 patch onto the skin once daily.      metoprolol succinate (TOPROL-XL) 25 MG 24 hr tablet Take 1 tablet (25 mg total) by mouth every evening.  Qty: 30 tablet, Refills: 11    Comments: .  Associated Diagnoses: Chronic combined systolic and diastolic heart failure; Coronary artery disease involving native coronary artery of native heart without angina pectoris      torsemide (DEMADEX) 20 MG Tab Take 1 tablet (20 mg total) by mouth once  daily.  Qty: 30 tablet, Refills: 11    Comments: .      apixaban (ELIQUIS) 5 mg Tab Take 5 mg by mouth 2 (two) times daily.      atorvastatin (LIPITOR) 80 MG tablet Take 80 mg by mouth once daily.      clopidogreL (PLAVIX) 75 mg tablet Take 75 mg by mouth once daily.           STOP taking these medications       pantoprazole (PROTONIX) 40 MG tablet Comments:   Reason for Stopping:         potassium chloride SA (K-DUR,KLOR-CON) 20 MEQ tablet Comments:   Reason for Stopping:                 I have seen and examined this patient within the last 30 days. My clinical findings that support the need for the home health skilled services and home bound status are the following:no   Weakness/numbness causing balance and gait disturbance due to Heart Failure making it taxing to leave home.  Requiring assistive device to leave home due to unsteady gait caused by  Heart Failure.     Diet:   cardiac diet, fluid restriction, and 2 gram sodium diet    Labs:  Report Lab results to PCP.    Referrals/ Consults  Physical Therapy to evaluate and treat. Evaluate for home safety and equipment needs; Establish/upgrade home exercise program. Perform / instruct on therapeutic exercises, gait training, transfer training, and Range of Motion.  Occupational Therapy to evaluate and treat. Evaluate home environment for safety and equipment needs. Perform/Instruct on transfers, ADL training, ROM, and therapeutic exercises.   to evaluate for community resources/long-range planning.  Aide to provide assistance with personal care, ADLs, and vital signs.    Activities:   activity as tolerated    Nursing:   Agency to admit patient within 24 hours of hospital discharge unless specified on physician order or at patient request    SN to complete comprehensive assessment including routine vital signs. Instruct on disease process and s/s of complications to report to MD. Review/verify medication list sent home with the patient at time of  discharge  and instruct patient/caregiver as needed. Frequency may be adjusted depending on start of care date.     Skilled nurse to perform up to 3 visits PRN for symptoms related to diagnosis    Notify MD if SBP > 160 or < 90; DBP > 90 or < 50; HR > 120 or < 50; Temp > 101; O2 < 88%    Ok to schedule additional visits based on staff availability and patient request on consecutive days within the home health episode.    When multiple disciplines ordered:    Start of Care occurs on Sunday - Wednesday schedule remaining discipline evaluations as ordered on separate consecutive days following the start of care.    Thursday SOC -schedule subsequent evaluations Friday and Monday the following week.     Friday - Saturday SOC - schedule subsequent discipline evaluations on consecutive days starting Monday of the following week.    For all post-discharge communication and subsequent orders please contact patient's primary care physician.     Miscellaneous   Routine Skin for Bedridden Patients: Instruct patient/caregiver to apply moisture barrier cream to all skin folds and wet areas in perineal area daily and after baths and all bowel movements.    Home Health Aide:  Nursing Three times weekly, Physical Therapy Three times weekly, Occupational Therapy Three times weekly, Medical Social Work Three times weekly, and Home Health Aide Three times weekly    Wound Care Orders  no    I certify that this patient is confined to his home and needs intermittent skilled nursing care, physical therapy, and occupational therapy.

## 2023-04-22 NOTE — PLAN OF CARE
[facility].    UPDATE (4:30 PM):   Patient accepted in [facility] by [facility].    UPDATE (17:45 pm):  CM received return call from liaison with [facility] stating [employer] does not accept his insurance plan.   No accepting home health agencies at this time.

## 2023-04-22 NOTE — ASSESSMENT & PLAN NOTE
Baseline Cr 1.2-1.4.  ISI ([facility] on 4/18) vs hypotension  Lab Results   Component Value Date    BUN 38 (H) 04/22/2023    CREATININE 1.7 (H) 04/22/2023   - strict I/O  - CMP qd, trend Cr  - renally dose all medications  - avoid nephrotoxic agents, NSAIDs, IV contrast, ACE/ARB  - Maintain MAP > 65  - hold entresto, spironolactone for Tena  -Cr improved today, will continue to monitor

## 2023-04-22 NOTE — ASSESSMENT & PLAN NOTE
Patient with ADHF. Diuresing with IV lasix. Now with improvement in chest pain.   - See 'NSTEMI'  - Resume GDMT as tolerated; Pt on Metop 25 mg daily.   - reports respiratory status now at baseline s/p diuresis  - CRTD upgrade discussed with EP, will defer for now and can be reevaluated as outpt  - holding ARB/MRA and SGLT-2 until seen by outpt cardiology, can resume if Cr stabilizes and HTN allows outpt

## 2023-04-22 NOTE — ASSESSMENT & PLAN NOTE
Hx of persistent atrial fib s/p KIA/CV 11/29/22 with initiation of amiodarone. On eliquis 5mg BID.   On admission, he was in atrial fib with ectopy, though now appears to have cardioverted to NSR    - resume eliquis  - maintain K>4, Mg>2

## 2023-04-22 NOTE — DISCHARGE SUMMARY
[facility] - Cardiology Stepdown  Cardiology  Discharge Summary      Patient Name: Tera Griffiths  MRN: [id number]  Admission Date: 4/16/2023  Hospital Length of Stay: 6 days  Discharge Date and Time:  04/22/2023 10:47 AM  Attending Physician: Milad Nassar MD    Discharging Provider: Barron Finch MD  Primary Care Physician: Primary Doctor No    HPI:   Tera Griffiths is a 57 year old gentleman with past medical history of ischemic cardiomyopathy EF of 18% (s/p ICD), coronary artery disease status post STEMI with PCI to RCA in August 2021, persistent atrial fibrillation, hypertension, recent management for decompensated heart failure, was evaluated by [facility] in the clinic for advanced options consideration but  he was declined for advanced options.  Patient was well until this morning around 9:00 a.m. at home he was lying down and experienced sudden onset chest pain and decided to come to hospital.  Chest pain described as pressure-like and squeezing in nature.  He rated it as 8/10 in severity, central chest, no radiation.  Chest pain is similar to his AMI pain back in 2021. It is associated with nausea and diaphoresis. No aggravating factors however minimal relief with nitroglycerin.  Patient denies any worsening shortness of breath, pedal edema, worsening orthopnea (2+ orthopnea at baseline) or abdominal fullness. He is compliant with his medications. He denies any cough or fever was.  Denies any current tobacco use or alcohol use.  In the emergency room patient had elevated blood pressures (/120s).  Troponin slightly raised from baseline with first reading 0.121 and second 0.151.  No ischemic changes on ECG though with atrial fib and PVCs. CXR with pulmonary edema, similar to prior. He was admitted to hospital medicine. Patient continued to have chest pain despite NTG X4 and morphine.  He was started on ACS protocol by hospital medicine team.  Cardiology was consulted for further evaluation. On  evaluation, he appeared slightly diaphoretic, uncomfortable and in persistent chest pain. He was transfeered to CCU for closer monitoring w/ initiation of NTG.         Procedure(s) (LRB):  Left heart cath (Left)     Indwelling Lines/Drains at time of discharge:  Lines/Drains/Airways     None               BP (!) 134/91 (BP Location: Left arm, Patient Position: Lying)   Pulse (!) 55   Temp 96.6 °F (35.9 °C) (Oral)   Resp 20   Ht 6' (1.829 m)   Wt 82.5 kg (181 lb 14.1 oz)   SpO2 98%   BMI 24.67 kg/m²     Physical Exam  Constitutional:       General: He is not in acute distress.     Appearance: Normal appearance. He is not ill-appearing, toxic-appearing or diaphoretic.   HENT:      Head: Normocephalic and atraumatic.      Nose: Nose normal.   Eyes:      Extraocular Movements: Extraocular movements intact.      Pupils: Pupils are equal, round, and reactive to light.   Cardiovascular:      Rate and Rhythm: Normal rate and regular rhythm.      Heart sounds: No murmur heard.    No friction rub. No gallop.   Pulmonary:      Effort: Pulmonary effort is normal. No respiratory distress.      Breath sounds: Normal breath sounds. No wheezing or rales.   Abdominal:      General: Abdomen is flat. There is no distension.      Palpations: Abdomen is soft. There is no mass.      Tenderness: There is no abdominal tenderness.   Musculoskeletal:         General: No swelling. Normal range of motion.      Cervical back: Normal range of motion. No rigidity.      Right lower leg: No edema.      Left lower leg: No edema.   Skin:     General: Skin is warm and dry.      Coloration: Skin is not jaundiced.      Findings: No bruising.   Neurological:      General: No focal deficit present.      Mental Status: He is alert and oriented to person, place, and time.      Cranial Nerves: No cranial nerve deficit.      Motor: No weakness.     Hospital Course:  Admitted to CCU for nitro drip in setting of chest pain.  Trop downtrending and appears  to be at baseline, overloaded by exam, started on lasix drip with brisk diuresis and improvement back to baseline respiratory status.  Interventional cardiology consulted for ischemic evaluation, LHC showing one small obtuse marginal branch with complete acute occlusion but otherwise nonobstructive disease in LAD, LCX, RCA.  Developed EMERSON s/p cath presumably 2/2 to contrast, Cr improving with holding diuresis and ARB/MRA.  Discussed placing BiV lead for CRTD with EP, however decision was made to hold off, may be reevaluated outpt for BiV upgrade.  Will discharge with PCP, cardiology, and EP follow up.  Can add GDMT back outpt if Cr stablizes.       Goals of Care Treatment Preferences:  Code Status: Full Code    Health care agent: Zahida Dave  [facility] agent number:           What is most important right now is to focus on extending life as long as possible, even it it means sacrificing quality, curative/life-prolongation (regardless of treatment burdens).  Accordingly, we have decided that the best plan to meet the patient's goals includes continuing with treatment.      Consults:   Consults (From admission, onward)        Status Ordering Provider     Inpatient consult to Interventional Cardiology  Once        Provider:  (Not yet assigned)    Completed CORNELIUS MCRAE     Inpatient consult to Cardiology  Once        Provider:  (Not yet assigned)    Completed ZOILA OVALLE          Significant Diagnostic Studies: Labs:   CMP   Recent Labs   Lab 04/21/23  0510 04/22/23  0254    134*   K 3.8 3.9   CL 97 97   CO2 24 25    108   BUN 38* 38*   CREATININE 1.8* 1.7*   CALCIUM 9.8 10.0   PROT 7.8 7.2   ALBUMIN 3.7 3.4*   BILITOT 0.8 0.6   ALKPHOS 67 67   AST 12 16   ALT 10 12   ANIONGAP 15 12    and CBC   Recent Labs   Lab 04/20/23  1517 04/21/23  0510 04/22/23  0254   WBC 4.94 4.59 4.54   HGB 16.1 15.8 14.5   HCT 51.2 49.8 46.1    294 264     Cardiac Graphics: Echocardiogram:   2D echo with  color flow doppler: No results found for this or any previous visit. and Transthoracic echo (TTE) complete (Cupid Only):   Results for orders placed or performed during the hospital encounter of 03/09/23   Echo   Result Value Ref Range    Ascending aorta 3.59 cm    STJ 2.73 cm    AV mean gradient 5 mmHg    Ao peak rome 1.62 m/s    Ao VTI 27.23 cm    IVS 1.03 0.6 - 1.1 cm    LA size 5.70 cm    Left Atrium Major Axis 7.92 cm    Left Atrium Minor Axis 8.39 cm    LVIDd 7.36 (A) 3.5 - 6.0 cm    LVIDs 6.85 (A) 2.1 - 4.0 cm    LVOT diameter 2.63 cm    LVOT peak VTI 14.07 cm    Posterior Wall 1.07 0.6 - 1.1 cm    MV Peak A Rome 0.40 m/s    E wave deceleration time 221.16 msec    MV Peak E Rome 0.52 m/s    RA Major Axis 7.56 cm    RA Width 5.35 cm    RVDD 5.86 cm    Sinus 3.42 cm    TAPSE 1.69 cm    TR Max Rome 2.19 m/s    TDI LATERAL 0.04 m/s    TDI SEPTAL 0.03 m/s    LA WIDTH 5.03 cm    MV stenosis pressure 1/2 time 64.13 ms    LV Diastolic Volume 285.94 mL    LV Systolic Volume 242.94 mL    LVOT peak rome 0.89 m/s    LA volume (mod) 119.61 cm3    LV LATERAL E/E' RATIO 13.00 m/s    LV SEPTAL E/E' RATIO 17.33 m/s    FS 7 %    LA volume 198.58 cm3    LV mass 373.25 g    Left Ventricle Relative Wall Thickness 0.29 cm    AV valve area 2.81 cm2    AV Velocity Ratio 0.55     AV index (prosthetic) 0.52     MV valve area p 1/2 method 3.43 cm2    E/A ratio 1.30     Mean e' 0.04 m/s    LVOT area 5.4 cm2    LVOT stroke volume 76.40 cm3    AV peak gradient 10 mmHg    E/E' ratio 14.86 m/s    LV Systolic Volume Index 115.7 mL/m2    LV Diastolic Volume Index 136.16 mL/m2    LA Volume Index 94.6 mL/m2    LV Mass Index 178 g/m2    Triscuspid Valve Regurgitation Peak Gradient 19 mmHg    LA Volume Index (Mod) 57.0 mL/m2    BSA 2.11 m2    Right Atrial Pressure (from IVC) 3 mmHg    EF 18 %    TV rest pulmonary artery pressure 22 mmHg    Narrative    · The left ventricle is severely enlarged with severe eccentric   hypertrophy and severely decreased  systolic function.  · Severe left atrial enlargement.  · The estimated ejection fraction is 18%( probably upper teens to at most   low 20s).  · There is severe left ventricular global hypokinesis.  · Grade II left ventricular diastolic dysfunction.  · Moderate right ventricular enlargement with mildly reduced right   ventricular systolic function.  · Severe right atrial enlargement.  · Mild tricuspid regurgitation.  · Normal central venous pressure (3 mmHg).  · The estimated PA systolic pressure is 22 mmHg.  · Small posterior pericardial effusion.          Pending Diagnostic Studies:     None          Final Active Diagnoses:    Diagnosis Date Noted POA    PRINCIPAL PROBLEM:  NSTEMI (non-ST elevated myocardial infarction) [I21.4] 04/16/2023 Unknown    Acute kidney injury superimposed on CKD [N17.9, N18.9] 04/19/2023 Unknown    Hypertensive emergency [I16.1] 04/17/2023 Yes    Atrial fibrillation [I48.91] 09/23/2022 Yes    Acute on chronic combined systolic and diastolic congestive heart failure [I50.43] 09/23/2022 Unknown    ICD (implantable cardioverter-defibrillator) in place [Z95.810] 09/23/2022 Yes    HTN (hypertension) [I10] 09/23/2022 Yes    CAD (coronary artery disease) [I25.10] 09/23/2022 Yes    Dyslipidemia [E78.5] 09/23/2022 Yes    Type 2 diabetes mellitus with circulatory disorder, without long-term current use of insulin [E11.59] 09/23/2022 Yes      Problems Resolved During this Admission:     Cardiac/Vascular  * NSTEMI (non-ST elevated myocardial infarction)  Tera Griffiths is a 57 year old gentleman with past medical history of ischemic cardiomyopathy EF of 18% with cardiac device placement, coronary artery disease status post STEMI with PCI to RCA in August 2021, persistent atrial fibrillation, hypertension, recent management for decompensated heart failure, was evaluated by [facility] in the clinic for advanced options consideration but does not qualify for advanced options. Admitted for NSTEMI and  eventually upgraded to CCU for closer monitoring.   NSTEMI likely 2/2 to elevated BP vs decompensated HF. There was an initial concern for Type 1NSTEMI as ACS was initiated, however troponin flat and now downtrending so low suspicion. He was initiated on NTG ands aggressive diuresis. Bedside TTE with EF 10%, CVP 3 (though it appears CVP 3 on priro ADHF admissions). Reports abdominal bloating. Lactic wnl    - Now chest pain free. Off NTG. Nausea/vomiting resolved  - Continue Plavix given recent NSTEMI. Will also continue heparin and stop ASA at this time    - [facility] without obstructive disease    Hypertensive emergency  /135 on admit. Likely contributed to chest pain. BP now improved. Symptoms have resolved  - Resume home antihypertensives.     Dyslipidemia  Contiune statin    Acute on chronic combined systolic and diastolic congestive heart failure  Patient with ADHF. Diuresing with IV lasix. Now with improvement in chest pain.   - See 'NSTEMI'  - Resume GDMT as tolerated; Pt on Metop 25 mg daily.   - reports respiratory status now at baseline s/p diuresis  - CRTD upgrade discussed with EP, will defer for now and can be reevaluated as outpt  - holding ARB/MRA and SGLT-2 until seen by outpt cardiology, can resume if Cr stabilizes and HTN allows outpt    Atrial fibrillation  Hx of persistent atrial fib s/p KIA/CV 11/29/22 with initiation of amiodarone. On eliquis 5mg BID.   On admission, he was in atrial fib with ectopy, though now appears to have cardioverted to NSR    - resume eliquis  - maintain K>4, Mg>2    CAD (coronary artery disease)  S/p PCI in 2021 for AMI. [facility] on this hospitalization with nonobstructive CAD   - Continue plavix, eliquis and statin  -Stop ASA at this time     Renal/  Acute kidney injury superimposed on CKD  Baseline Cr 1.2-1.4.  ISI ([facility] on 4/18) vs hypotension  Lab Results   Component Value Date    BUN 38 (H) 04/22/2023    CREATININE 1.7 (H) 04/22/2023   - strict I/O  - CMP qd, trend  Cr  - renally dose all medications  - avoid nephrotoxic agents, NSAIDs, IV contrast, ACE/ARB  - Maintain MAP > 65  - hold entresto, spironolactone for Tena  -Cr improved today, will continue to monitor     Endocrine  Type 2 diabetes mellitus with circulatory disorder, without long-term current use of insulin  Controlled. A1c 5.7.  LDSSI         Discharged Condition: stable    Disposition: Home-Health Care [facility]    Follow Up:    Patient Instructions:      Ambulatory referral/consult to Cardiology   Standing Status: Future   Referral Priority: Routine Referral Type: Consultation   Referral Reason: Specialty Services Required   Requested Specialty: Cardiology   Number of Visits Requested: 1     Medications:  Reconciled Home Medications:      Medication List      CHANGE how you take these medications    ENTRESTO  mg per tablet  Generic drug: sacubitriL-valsartan  Take 1 tablet by mouth 2 (two) times daily. HOLD UNTIL SEEN BY CARDIOLOGY  What changed: additional instructions     isosorbide mononitrate 10 mg tablet  Commonly known as: ISMO,MONOKET  Take 1 tablet (10 mg total) by mouth 2 (two) times daily. Hold until seen by cardiologist  What changed: additional instructions     metoprolol succinate 25 MG 24 hr tablet  Commonly known as: TOPROL-XL  Take 1 tablet (25 mg total) by mouth every evening.  What changed: how much to take        CONTINUE taking these medications    amiodarone 200 MG Tab  Commonly known as: PACERONE  Take 1 tablet (200 mg total) by mouth 2 (two) times daily.     apixaban 5 mg Tab  Commonly known as: ELIQUIS  Take 5 mg by mouth 2 (two) times daily.     atorvastatin 80 MG tablet  Commonly known as: LIPITOR  Take 80 mg by mouth once daily.     clopidogreL 75 mg tablet  Commonly known as: PLAVIX  Take 75 mg by mouth once daily.     ferrous sulfate 325 mg (65 mg iron) Tab tablet  Commonly known as: FEOSOL  Take 325 mg by mouth every other day.     JARDIANCE 10 mg tablet  Generic drug:  empagliflozin  Take 1 tablet (10 mg total) by mouth once daily.     LIDOcaine 5 %  Commonly known as: LIDODERM  Place 1 patch onto the skin once daily.     torsemide 20 MG Tab  Commonly known as: DEMADEX  Take 1 tablet (20 mg total) by mouth once daily.        STOP taking these medications    potassium chloride SA 20 MEQ tablet  Commonly known as: K-DUR,KLOR-ROWENA            Time spent on the discharge of patient: 35 minutes    Barron Finch MD  Cardiology  [facility] - Cardiology Stepdown

## 2023-04-24 ENCOUNTER — PATIENT OUTREACH (OUTPATIENT)
Dept: ADMINISTRATIVE | Facility: CLINIC | Age: 58
End: 2023-04-24
Payer: MEDICAID

## 2023-04-24 ENCOUNTER — SOCIAL WORK (OUTPATIENT)
Dept: TRANSPLANT | Facility: CLINIC | Age: 58
End: 2023-04-24
Payer: MEDICAID

## 2023-04-24 ENCOUNTER — OFFICE VISIT (OUTPATIENT)
Dept: TRANSPLANT | Facility: CLINIC | Age: 58
End: 2023-04-24
Payer: MEDICAID

## 2023-04-24 VITALS
DIASTOLIC BLOOD PRESSURE: 108 MMHG | HEIGHT: 72 IN | BODY MASS INDEX: 25.26 KG/M2 | WEIGHT: 186.5 LBS | HEART RATE: 78 BPM | SYSTOLIC BLOOD PRESSURE: 175 MMHG

## 2023-04-24 DIAGNOSIS — I10 PRIMARY HYPERTENSION: ICD-10-CM

## 2023-04-24 DIAGNOSIS — I48.0 PAROXYSMAL ATRIAL FIBRILLATION: ICD-10-CM

## 2023-04-24 DIAGNOSIS — I25.10 CORONARY ARTERY DISEASE INVOLVING NATIVE CORONARY ARTERY OF NATIVE HEART WITHOUT ANGINA PECTORIS: ICD-10-CM

## 2023-04-24 DIAGNOSIS — Z95.810 ICD (IMPLANTABLE CARDIOVERTER-DEFIBRILLATOR) IN PLACE: ICD-10-CM

## 2023-04-24 DIAGNOSIS — E78.5 DYSLIPIDEMIA: ICD-10-CM

## 2023-04-24 DIAGNOSIS — E11.51 TYPE 2 DIABETES MELLITUS WITH DIABETIC PERIPHERAL ANGIOPATHY WITHOUT GANGRENE, WITHOUT LONG-TERM CURRENT USE OF INSULIN: ICD-10-CM

## 2023-04-24 DIAGNOSIS — I50.42 CHRONIC COMBINED SYSTOLIC AND DIASTOLIC HEART FAILURE: Primary | ICD-10-CM

## 2023-04-24 PROCEDURE — 99999 PR PBB SHADOW E&M-EST. PATIENT-LVL III: CPT | Mod: PBBFAC,,, | Performed by: INTERNAL MEDICINE

## 2023-04-24 PROCEDURE — 99214 OFFICE O/P EST MOD 30 MIN: CPT | Mod: S$PBB,,, | Performed by: INTERNAL MEDICINE

## 2023-04-24 PROCEDURE — 99214 PR OFFICE/OUTPT VISIT, EST, LEVL IV, 30-39 MIN: ICD-10-PCS | Mod: S$PBB,,, | Performed by: INTERNAL MEDICINE

## 2023-04-24 PROCEDURE — 99999 PR PBB SHADOW E&M-EST. PATIENT-LVL III: ICD-10-PCS | Mod: PBBFAC,,, | Performed by: INTERNAL MEDICINE

## 2023-04-24 PROCEDURE — 99213 OFFICE O/P EST LOW 20 MIN: CPT | Mod: PBBFAC | Performed by: INTERNAL MEDICINE

## 2023-04-24 NOTE — PROGRESS NOTES
C3 nurse attempted to contact Tera Griffiths  for a TCC post hospital discharge follow up call. The patient is unable to conduct the call @ this time. The patient requested a callback. The patient does not have a primary PCP and does not have a hosp f/u scheduled.

## 2023-04-24 NOTE — PATIENT INSTRUCTIONS
You have just the right amount of fluid on you.  Please adhere to a low sodium diet (no more than 1.5 grams of sodium in 24h).  3.   Follow fluid restriction of  1. no more than 2 liters in 24 hours..   4.  Please take your medications daily.  5. Follow up 3 months with labs.

## 2023-04-24 NOTE — LETTER
April 25, 2023        Rosalio Salazar  [address]  [address]  [facility] [address]  Phone: [phone]  Fax: [phone]             [id number]  [address]  [facility] [address]  Phone: [phone]   Patient: Tera Griffiths   MR Number: [id number]   YOB: 1965   Date of Visit: 4/24/2023       Dear Dr. Rosalio Salazar    Thank you for referring Tera Griffiths to me for evaluation. Attached you will find relevant portions of my assessment and plan of care.    If you have questions, please do not hesitate to call me. I look forward to following Tera Griffiths along with you.    Sincerely,    Daquan Domingo MD    Enclosure    If you would like to receive this communication electronically, please contact [email or web address] or [phone] to request [employer] access.    [employer] is a tool which provides read-only access to select patient information with whom you have a relationship. Its easy to use and provides real time access to review your patients record including encounter summaries, notes, results, and demographic information.    If you feel you have received this communication in error or would no longer like to receive these types of communications, please e-mail [email or web address]

## 2023-04-24 NOTE — PROGRESS NOTES
"                                                                                       [facility] Transplantation Clinic Follow up.        Attending Physician: Daquan Domingo MD.  The patient's last visit with me was on 1/11/2023.         HPI.  Patient is a 58 yo man with PMH/o DM type 2, dyslipidemia, HTN, CAD, RCA NSTEMI complicated with VF arrest and HF in 2021. Patient has HFrEF and LVEF 15%. LVEDD 7.4 cm. He was referred by [facility] cardiologist Dr. Salazar for evaluation for advanced therapies as patient had been having persistent severe symptoms on maximal tolerated therapy. He had multiple admissions to OSH according to patient, in 2022.     Patient was presented for advanced therapies. Per committee note: "patient does not meet criteria".        RA: 10  PA: 60/25, mean 35  PCWP: 18 with V waves to 27     Saturation:  Arterial: 99 %  PA: 64 %     Mark CI/CO: 2.05/4.18     He has had 6 CV hospitalizations in the last 7 months.    Review of Systems   Constitutional:  Negative for chills, diaphoresis and fever.   HENT:  Negative for nasal congestion, rhinorrhea and sore throat.    Eyes:  Negative for visual disturbance.   Respiratory:  Negative for cough, choking and shortness of breath.    Cardiovascular:  Negative for chest pain.   Gastrointestinal:  Negative for abdominal pain, diarrhea, nausea and vomiting.   Genitourinary:  Negative for difficulty urinating, dysuria and hematuria.   Integumentary:  Negative for rash.   Neurological:  Negative for seizures, syncope and light-headedness.   Psychiatric/Behavioral:  Negative for agitation and hallucinations.       Past Medical History:   Diagnosis Date    Atrial fibrillation     Encounter for blood transfusion         Past Surgical History:   Procedure Laterality Date    CARDIAC DEFIBRILLATOR PLACEMENT Left     HERNIA REPAIR      LEFT HEART CATHETERIZATION Left 4/18/2023    Procedure: Left heart cath;  Surgeon: Atilio Marks MD;  Location: " [facility] CATH LAB;  Service: Cardiology;  Laterality: Left;    RIGHT HEART CATHETERIZATION Right 9/30/2022    Procedure: INSERTION, CATHETER, RIGHT HEART;  Surgeon: Caden Aden MD;  Location: [facility] CATH LAB;  Service: Cardiology;  Laterality: Right;    TREATMENT OF CARDIAC ARRHYTHMIA N/A 11/22/2022    Procedure: CARDIOVERSION;  Surgeon: Marvin Sanon MD;  Location: [facility] EP LAB;  Service: Cardiology;  Laterality: N/A;  afib, KIA, DCCV, anes, MB, 3 Prep        No family history on file.     Review of patient's allergies indicates:  No Known Allergies     Current Outpatient Medications   Medication Instructions    amiodarone (PACERONE) 200 mg, Oral, 2 times daily    apixaban (ELIQUIS) 5 mg, Oral, 2 times daily    atorvastatin (LIPITOR) 80 mg, Oral, Daily    clopidogreL (PLAVIX) 75 mg, Oral, Daily    ferrous sulfate (FEOSOL) 325 mg, Oral, Every other day    isosorbide mononitrate (ISMO,MONOKET) 10 mg, Oral, 2 times daily, Hold until seen by cardiologist    JARDIANCE 10 mg, Oral, Daily    LIDOcaine (LIDODERM) 5 % 1 patch, Transdermal, Daily    metoprolol succinate (TOPROL-XL) 25 mg, Oral, Nightly    sacubitriL-valsartan (ENTRESTO)  mg per tablet 1 tablet, Oral, 2 times daily, HOLD UNTIL SEEN BY CARDIOLOGY    torsemide (DEMADEX) 20 mg, Oral, Daily        There were no vitals filed for this visit.     Wt Readings from Last 3 Encounters:   04/22/23 82.5 kg (181 lb 14.1 oz)   03/21/23 87.9 kg (193 lb 12.6 oz)   03/13/23 90 kg (198 lb 6.6 oz)     Temp Readings from Last 3 Encounters:   04/22/23 96.4 °F (35.8 °C) (Oral)   03/14/23 98.4 °F (36.9 °C) (Oral)   02/17/23 97.5 °F (36.4 °C) (Oral)     BP Readings from Last 3 Encounters:   04/22/23 (!) 145/96   03/21/23 (!) 153/99   03/14/23 129/79     Pulse Readings from Last 3 Encounters:   04/22/23 64   03/21/23 79   03/14/23 67        There is no height or weight on file to calculate BMI. Estimated body surface area is 2.05 meters squared as calculated from the  following:    Height as of 4/16/23: 6' (1.829 m).    Weight as of 4/22/23: 82.5 kg (181 lb 14.1 oz).     Physical Exam  Constitutional:       Appearance: He is well-developed.   HENT:      Head: Normocephalic and atraumatic.      Right Ear: External ear normal.      Left Ear: External ear normal.   Eyes:      Conjunctiva/sclera: Conjunctivae normal.      Pupils: Pupils are equal, round, and reactive to light.   Neck:      Vascular: No hepatojugular reflux or JVD.   Cardiovascular:      Rate and Rhythm: Normal rate and regular rhythm.      Pulses: Intact distal pulses.      Heart sounds: S1 normal and S2 normal. No murmur heard.    No friction rub. No gallop.   Pulmonary:      Effort: Pulmonary effort is normal.      Breath sounds: Normal breath sounds.   Abdominal:      General: Bowel sounds are normal. There is no distension.      Palpations: Abdomen is soft.      Tenderness: There is no abdominal tenderness. There is no guarding or rebound.   Musculoskeletal:      Cervical back: Normal range of motion and neck supple.      Right lower leg: No edema.      Left lower leg: No edema.   Neurological:      Mental Status: He is alert and oriented to person, place, and time.        Lab Results   Component Value Date    BNP 2,707 (H) 04/16/2023     (L) 04/22/2023    K 3.9 04/22/2023    MG 2.2 04/22/2023    CL 97 04/22/2023    CO2 25 04/22/2023    BUN 38 (H) 04/22/2023    CREATININE 1.7 (H) 04/22/2023     04/22/2023    HGBA1C 5.5 04/17/2023    AST 16 04/22/2023    ALT 12 04/22/2023    ALBUMIN 3.4 (L) 04/22/2023    PROT 7.2 04/22/2023    BILITOT 0.6 04/22/2023    WBC 4.54 04/22/2023    HGB 14.5 04/22/2023    HCT 46.1 04/22/2023    HCT 47 03/13/2023     04/22/2023    INR 1.1 04/20/2023     01/23/2023    TSH 2.034 01/23/2023    CHOL 192 01/23/2023    HDL 79 (H) 01/23/2023    LDLCALC 100.2 01/23/2023    TRIG 64 01/23/2023       @LABRCNTIP(cpk,cpkmb,troponini,mb)@     No results found for this visit on  04/24/23.       Results for orders placed during the hospital encounter of 03/09/23    Echo    Interpretation Summary  · The left ventricle is severely enlarged with severe eccentric hypertrophy and severely decreased systolic function.  · Severe left atrial enlargement.  · The estimated ejection fraction is 18%( probably upper teens to at most low 20s).  · There is severe left ventricular global hypokinesis.  · Grade II left ventricular diastolic dysfunction.  · Moderate right ventricular enlargement with mildly reduced right ventricular systolic function.  · Severe right atrial enlargement.  · Mild tricuspid regurgitation.  · Normal central venous pressure (3 mmHg).  · The estimated PA systolic pressure is 22 mmHg.  · Small posterior pericardial effusion.        Results for orders placed during the hospital encounter of 04/16/23    Cardiac catheterization    Conclusion    The estimated blood loss was none.    Indication    57 y.o. male referred by the hospital service for VT and AICD shock for coronary angiography and possible intervention.    The patient understands the risks, benefits, and alternatives of angiography and intervention and gives informed consent.    Description of procedure    The patient was brought to the cath lab in the fasting state.  Right common femoral access was obtained.  Angiography was performed with the left 5 4 Green[place] Chase catheter and a AR1 catheter.  Hemostasis was obtained with a Perclose device.    Complications:  None  Estimated blood loss:  50 cc  Contrast used:  50    Findings:    Left main was normal  The LAD had luminal irregularity with no obstructive disease.  The left circumflex had luminal irregularity with no obstructive disease.  There was 1 obtuse marginal branch that was completely occluded and appeared to be an acute occlusion.  It was very small.  The RCA luminal irregularity without obstructive disease.      Summary:    Non-STEMI from a small OMB occlusion.  No  other significant coronary disease.  Ischemic cardiomyopathy with a history of VT and AICD in Situ.    Recommendations:    Conservative management of his coronary disease.  Aggressive management of his arrhythmia.      MD Lida Sanchez [facility]  [facility] [address] [place],  [phone]    The clinically important portion of this report has been dictated in the section above. Our Epic reporting system does not allow us to provide a clinically meaningful report without this dictation. Please beware that there may be errors and discrepancies in the computer transcription and all of the clicks required to finalize the report.         Assessment and Plan:  Chronic combined systolic and diastolic heart failure  -     CBC Auto Differential; Future; Expected date: 07/24/2023  -     Comprehensive Metabolic Panel; Future; Expected date: 07/24/2023  -     NT-Pro Natriuretic Peptide; Future; Expected date: 07/24/2023    Coronary artery disease involving native coronary artery of native heart without angina pectoris    Dyslipidemia    Primary hypertension    Paroxysmal atrial fibrillation    ICD (implantable cardioverter-defibrillator) in place    Type 2 diabetes mellitus with diabetic peripheral angiopathy without gangrene, without long-term current use of insulin          Chronic systolic HF, NYHA class III, stage D.  Etiology: ICM + atrial fibrillation  Devices: ICD  Medications: high dose sacubitril-valsartan, empagliflozin, metoprolol xl 25 mg daily. Torsemide 20 mg daily.  Hemodynamic status: warm, normotensive, euvolemic.  Clinical course: multiple hospitalizations for HF in OSH in 2022/2023.    Patient did not take his medications today. Hypertensive.  Plan:  -take meds as prescribed.        F/u in 3 months

## 2023-04-25 PROBLEM — I50.43 ACUTE ON CHRONIC COMBINED SYSTOLIC AND DIASTOLIC CONGESTIVE HEART FAILURE: Status: RESOLVED | Noted: 2022-09-23 | Resolved: 2023-04-25

## 2023-04-25 PROBLEM — I16.1 HYPERTENSIVE EMERGENCY: Status: RESOLVED | Noted: 2023-04-17 | Resolved: 2023-04-25

## 2023-04-25 NOTE — PROGRESS NOTES
3rd attempt for TCC Call; Left voicemail. Please call [phone] please leave first and last name and  for Jesse. I will return your call.

## 2023-04-25 NOTE — PROGRESS NOTES
LVAD and Transplant Recipient Adult Psychosocial Assessment    Pt presents to clinic with (Claribel Griffiths and Pj Kee) for psychosocial education and caregiver assessment.    Claribel Griffiths (Sister, drives, does not work) - [phone]. Claribel will be primary caregiver   Pj Kee (Sister, drives, does not work) - [phone]. Pj will be backup caregiver.       SW reviewed psychosocial assessment by Barbara Randolph on 2/8/23 with pt and caregivers. All information confirmed.    Mrs. Sanford states that Pt has moved into [employer] [facility] and receives a utility voucher, they  They are not sure at this time if Pt has 2 prong or 3 prong outlets. Mrs. Sanford states she is trying to get [place] to allow Pt to move closer to her if possible.       Confirmed pt has adequate caregiver and support plan.    Provided educational handout and discussed contents. Answered questions and provided resources as needed. Pt and caregivers voice understanding and deny further questions or concerns at this time.    Recommendations:    Pt and Mrs. Sanford state they will apply for SSI/SSDI this week.     Encouraged Pt & caregiver(s) to consider fundraising for OHT. Education provided on discussing setting up fundraising account with bank to protect gifts/donations.    Psychosocial Suitability: Patient presents as an suitable candidate for LVAD or transplant at this time. Based on psychosocial risk factors, patient presents as high risk, due to no income, electric outlet issues, possible limited resources and limited health literacy. Pt strengths include adequate health insurance and supportive family members.     Transplant committee to determine final decision regarding transplant eligibility.

## 2023-05-05 ENCOUNTER — TELEPHONE (OUTPATIENT)
Dept: TRANSPLANT | Facility: CLINIC | Age: 58
End: 2023-05-05
Payer: MEDICAID

## 2023-05-05 PROBLEM — I25.5 ISCHEMIC CARDIOMYOPATHY: Status: ACTIVE | Noted: 2023-05-05

## 2023-05-05 PROBLEM — N18.30 STAGE 3 CHRONIC KIDNEY DISEASE: Status: ACTIVE | Noted: 2023-04-19

## 2023-05-05 NOTE — TELEPHONE ENCOUNTER
I spoke with pt's sister, who says that Mr Griffiths is not having any HF problems and said that someone called him and cancelled his appointment for today.  I went ahead and officially cancelled it in the system.

## 2023-05-10 ENCOUNTER — OFFICE VISIT (OUTPATIENT)
Dept: HEPATOLOGY | Facility: CLINIC | Age: 58
End: 2023-05-10
Payer: MEDICAID

## 2023-05-10 VITALS — WEIGHT: 190.94 LBS | BODY MASS INDEX: 25.89 KG/M2

## 2023-05-10 DIAGNOSIS — Z76.82 HEART TRANSPLANT CANDIDATE: ICD-10-CM

## 2023-05-10 DIAGNOSIS — R76.8 HEPATITIS B CORE ANTIBODY POSITIVE: Primary | ICD-10-CM

## 2023-05-10 PROCEDURE — 99214 OFFICE O/P EST MOD 30 MIN: CPT | Mod: S$PBB,,, | Performed by: NURSE PRACTITIONER

## 2023-05-10 PROCEDURE — 99213 OFFICE O/P EST LOW 20 MIN: CPT | Mod: PBBFAC | Performed by: NURSE PRACTITIONER

## 2023-05-10 PROCEDURE — 99999 PR PBB SHADOW E&M-EST. PATIENT-LVL III: CPT | Mod: PBBFAC,,, | Performed by: NURSE PRACTITIONER

## 2023-05-10 PROCEDURE — 99999 PR PBB SHADOW E&M-EST. PATIENT-LVL III: ICD-10-PCS | Mod: PBBFAC,,, | Performed by: NURSE PRACTITIONER

## 2023-05-10 PROCEDURE — 99214 PR OFFICE/OUTPT VISIT, EST, LEVL IV, 30-39 MIN: ICD-10-PCS | Mod: S$PBB,,, | Performed by: NURSE PRACTITIONER

## 2023-05-10 NOTE — PROGRESS NOTES
[facility] - New Patient     REFERRING PROVIDER: Dr. Jeane Stuart  PCP: Primary Doctor No    Chief Complaint: Hepatitis B core Ab positive      HISTORY     This is a 57 y.o. male referred for evaluation of positive hepatitis B core Ab.    He has heart failure with EF 15%. Has ICD. He has been followed by our heart failure/transplant team here. Reports he is not currently being evaluated for heart transplant. Committee review 3/22/23- declined for advanced options as he did not meet criteria. Plan to continue f/u with heart failure team.     Labs from 1/23/23 showed a positive hepatitis B core antibody. HBV surface Ag negative.     No known history of hepatitis B or prior exposure.  No family history of hepatitis B.    History of IVIG - no    Hepatitis B serologies:  Lab Results   Component Value Date    HEPBSAG Non-reactive 01/23/2023    HEPBCAB Reactive (A) 01/23/2023    HEPBSAB 137.77 01/23/2023    HEPBSAB Reactive 01/23/2023   HBV surface Ab titer 137    Normal liver enzymes and synthetic liver function.  CT 2/923 and 3/11/23 with normal liver.     Not currently on immunosuppressive medications.     Feels well overall. Denies any SOB or symptoms of volume overload today.    Denies symptoms of hepatic decompensation including jaundice, ascites, cognitive problems to suggest hepatic encephalopathy, or GI bleeding.           Past medical history, surgical history, problem list, family history, social history, allergies: Reviewed and updated in the appropriate section of the electronic medical record.      Current Outpatient Medications   Medication Sig Dispense Refill    amiodarone (PACERONE) 200 MG Tab Take 1 tablet (200 mg total) by mouth 2 (two) times daily. 60 tablet 3    apixaban (ELIQUIS) 5 mg Tab Take 5 mg by mouth 2 (two) times daily.      atorvastatin (LIPITOR) 80 MG tablet Take 1 tablet (80 mg total) by mouth once daily. 30 tablet 11    clopidogreL (PLAVIX) 75 mg tablet Take 1 tablet (75  mg total) by mouth once daily. 30 tablet 11    empagliflozin (JARDIANCE) 10 mg tablet Take 1 tablet (10 mg total) by mouth once daily. 30 tablet 1    ferrous sulfate (FEOSOL) 325 mg (65 mg iron) Tab tablet Take 325 mg by mouth every other day.      isosorbide mononitrate (ISMO,MONOKET) 10 mg tablet Take 1 tablet (10 mg total) by mouth 2 (two) times daily. Hold until seen by cardiologist 60 tablet 11    LIDOcaine (LIDODERM) 5 % Place 1 patch onto the skin once daily.      metoprolol succinate (TOPROL-XL) 25 MG 24 hr tablet Take 1 tablet (25 mg total) by mouth every evening. 30 tablet 11    sacubitriL-valsartan (ENTRESTO)  mg per tablet Take 1 tablet by mouth 2 (two) times daily. HOLD UNTIL SEEN BY CARDIOLOGY 60 tablet 5    torsemide (DEMADEX) 20 MG Tab Take 1 tablet (20 mg total) by mouth once daily. 30 tablet 11     No current facility-administered medications for this visit.     Medication list reviewed and updated.      Review of Systems - as per HPI  Constitutional: Negative for fatigue or unexpected weight change.   Respiratory: Negative for shortness of breath.    Cardiovascular: Negative for leg swelling.  Gastrointestinal: Negative for abdominal distention or abdominal pain. Negative for melena or hematemesis.  Musculoskeletal: Negative for myalgias.    Skin: Negative for itching.  Neurological: Negative for confusion or slowed mentation. Negative for tremors.   Hematological: Does not bruise/bleed easily.   Psychiatric: Negative for sleep disturbance.      Physical Exam   Constitutional: Well-nourished. No distress. Alert and oriented.  Eyes: No scleral icterus.   Pulmonary/Chest: Respiratory effort normal. No respiratory distress.   Abdominal: No distension, no ascites appreciated.   Extremities: No edema.   Neurological: No tremor or asterixis. Gait normal.  Skin: No jaundice. No spider telangiectasias or palmar erythema.  Psychiatric: Normal mood and affect. Speech, behavior, and thought content  normal. No depression or anxiety noted.         LABS & DIAGNOSTIC STUDIES     I have personally reviewed pertinent laboratory findings:    Lab Results   Component Value Date    ALT 12 04/22/2023    AST 16 04/22/2023    ALKPHOS 67 04/22/2023    BILITOT 0.6 04/22/2023    ALBUMIN 3.4 (L) 04/22/2023    INR 1.1 04/20/2023       Lab Results   Component Value Date    WBC 4.54 04/22/2023    HGB 14.5 04/22/2023    HCT 46.1 04/22/2023    MCV 85 04/22/2023     04/22/2023       Lab Results   Component Value Date     (L) 04/22/2023    K 3.9 04/22/2023    BUN 38 (H) 04/22/2023    CREATININE 1.7 (H) 04/22/2023       Lab Results   Component Value Date    FERRITIN 58 01/23/2023    FESATURATED 11 (L) 01/23/2023    HEPBSAG Non-reactive 01/23/2023    HEPBCAB Reactive (A) 01/23/2023    HEPCAB Non-reactive 01/03/2023    [id number] Non-reactive 01/03/2023       No results found for: AFP    I have personally reviewed the following result reports:  CT - 3/11/23, 2/9/23        ASSESSMENT & PLAN     57 y.o. male with:    1. Hepatitis B core antibody positive  -- Hepatitis B serologies reviewed with patient. Reassured that patient does not have current hepatitis B infection. Labs suggest prior exposure and clearance.   -- Will repeat HBV core Ab and check HBV DNA.  -- Discussed risk of HBV reactivation with certain immunosuppressive medications and when antiviral prophylaxis is warranted.   -- Not currently in evaluation for heart transplant. Declined at selection committee 3/22/23.    Per heart transplant protocol, HBV core Ab+ recipients without surface Ab require lifelong prophylaxis. Labs confirm he has +surface Ab though he can always be re-referred if ever listed for heart transplant or requires immunosuppression in the future.       Orders Placed This Encounter   Procedures    Hepatitis B Core Antibody, Total    Hepatitis B Surface Antigen    HEPATITIS B VIRAL DNA, QUANTITATIVE       *See AVS for patient education and  instructions.       Return to clinic pending above.      Thank you for allowing me to participate in the care of Tera Griffiths    WILLARD QuanP-C  Hepatology        Duration of encounter: 30 min  This includes face to face time and non-face to face time preparing to see the patient (eg, review of tests), obtaining and/or reviewing separately obtained history, documenting clinical information in the electronic or other health record, independently interpreting results and communicating results to the patient/family/caregiver, or Care coordination.

## 2023-05-10 NOTE — PATIENT INSTRUCTIONS
Labs in July      Your labs suggest that you were exposed to hepatitis B in the past and your body cleared the virus. This typically does not cause a chronic (long standing) hepatitis B infection; however the virus can remain present in the liver and potentially flare if the immune system is suppressed. You should let your provider know if you are prescribed medications that can affect your immune system. These include but are not limited to- high dose steroids, medications for rheumatologic disease (such as Lupus, RA, psoriasis, etc.), and chemotherapy. You may need special blood monitoring or medication to prevent the hepatitis B virus from returning or reactivating.

## 2023-06-01 NOTE — PROGRESS NOTES
2nd attempt to make TCC Call. Left voicemail. Please call [phone] leave your first and last name and date of birth for Jesse. I will return your call.       PRE-INTERVENTIONAL RADIOLOGY PROCEDURE NOTE  ============================  Amirah Sherman MD  Internal Medicine, PGY-1  [facility] [address] / [phone]  ============================  Patient Name: JAVY BARILLAS    Patient Age: 7y2m    Patient Gender: Female    Procedure: ______________    Diagnosis/Indication:     Interventional Radiology Attending Physician:  _____    Ordering Attending Physician:    Pertinent Medical History:    Pertinent labs:                      11.8   6.29  )-----------( 489      ( 01 Jun 2023 13:04 )             36.9             Patient and Family Aware ? Yes   PRE-INTERVENTIONAL RADIOLOGY PROCEDURE NOTE  ============================  Patient Name: JAVY BARILLAS    Patient Age: 7y2m    Patient Gender: Female    Procedure: IR single lumen PICC placement    Diagnosis/Indication: 10 yo F with hx congenital cholesteatoma p/w left sided facial swelling and ear drainage along with ear infection for 2-3 weeks prior to admission. Imaging with left sided cholesteatoma with bony erosion of left external canal and mastoid air cells. POD #5 (as of 6/1) from I&D of left post-auricular abscess with biopsy of left external auditory canal mass. Requires 4-6 weeks of IV antibiotics for concern of .    Ordering Attending Physician: Dr. Dodge    Pertinent Medical History:      Pertinent labs:                      11.8   6.29  )-----------( 489      ( 01 Jun 2023 13:04 )             36.9     06-01    135  |  99  |  8   ----------------------------<  90  4.6   |  21<L>  |  0.32    Ca    10.2      01 Jun 2023 13:04  Phos  4.5     06-01  Mg     2.00     06-01    TPro  7.4  /  Alb  4.2  /  TBili  0.3  /  DBili  x   /  AST  21  /  ALT  14  /  AlkPhos  186  06-01    PT/INR - ( 01 Jun 2023 13:04 )   PT: 13.6 sec;   INR: 1.17 ratio       PTT - ( 01 Jun 2023 13:04 )  PTT:35.8 sec      Patient and Family Aware ? Yes   PRE-INTERVENTIONAL RADIOLOGY PROCEDURE NOTE  ============================  Patient Name: JAVY BARILLAS    Patient Age: 7y2m    Patient Gender: Female    Procedure: IR single lumen PICC placement    Diagnosis/Indication: Requires long-term IV antibiotics (4-6 weeks)    Ordering Attending Physician: Dr. Dodge    Pertinent Medical History: 10 yo F with hx congenital cholesteatoma p/w left sided facial swelling and ear drainage along with ear infection for 2-3 weeks prior to admission. Imaging with left sided cholesteatoma with bony erosion of left external canal and mastoid air cells. POD #5 (as of 6/1) from I&D of left post-auricular abscess with biopsy of left external auditory canal mass. Requires 4-6 weeks of IV antibiotics per ID.      Pertinent labs:                      11.8   6.29  )-----------( 489      ( 01 Jun 2023 13:04 )             36.9     06-01    135  |  99  |  8   ----------------------------<  90  4.6   |  21<L>  |  0.32    Ca    10.2      01 Jun 2023 13:04  Phos  4.5     06-01  Mg     2.00     06-01    TPro  7.4  /  Alb  4.2  /  TBili  0.3  /  DBili  x   /  AST  21  /  ALT  14  /  AlkPhos  186  06-01    PT/INR - ( 01 Jun 2023 13:04 )   PT: 13.6 sec;   INR: 1.17 ratio       PTT - ( 01 Jun 2023 13:04 )  PTT:35.8 sec      Patient and Family Aware ? Yes

## 2023-06-08 ENCOUNTER — HOSPITAL ENCOUNTER (INPATIENT)
Facility: HOSPITAL | Age: 58
LOS: 3 days | Discharge: HOME | DRG: 291 | End: 2023-06-11
Attending: EMERGENCY MEDICINE | Admitting: INTERNAL MEDICINE
Payer: MEDICAID

## 2023-06-08 DIAGNOSIS — R06.02 SHORTNESS OF BREATH: ICD-10-CM

## 2023-06-08 DIAGNOSIS — I50.42 CHRONIC COMBINED SYSTOLIC AND DIASTOLIC HEART FAILURE: ICD-10-CM

## 2023-06-08 DIAGNOSIS — R07.9 CHEST PAIN: ICD-10-CM

## 2023-06-08 DIAGNOSIS — N17.9 AKI (ACUTE KIDNEY INJURY): Primary | ICD-10-CM

## 2023-06-08 DIAGNOSIS — I25.5 ISCHEMIC CARDIOMYOPATHY: ICD-10-CM

## 2023-06-08 LAB
ALBUMIN SERPL BCP-MCNC: 4.2 G/DL (ref 3.5–5.2)
ALLENS TEST: ABNORMAL
ALP SERPL-CCNC: 76 U/L (ref 55–135)
ALT SERPL W/O P-5'-P-CCNC: 22 U/L (ref 10–44)
AMPHET+METHAMPHET UR QL: NEGATIVE
ANION GAP SERPL CALC-SCNC: 8 MMOL/L (ref 8–16)
AST SERPL-CCNC: 23 U/L (ref 10–40)
BARBITURATES UR QL SCN>200 NG/ML: NEGATIVE
BASOPHILS # BLD AUTO: 0.01 K/UL (ref 0–0.2)
BASOPHILS NFR BLD: 0.2 % (ref 0–1.9)
BENZODIAZ UR QL SCN>200 NG/ML: NEGATIVE
BILIRUB SERPL-MCNC: 0.8 MG/DL (ref 0.1–1)
BNP SERPL-MCNC: 2871 PG/ML (ref 0–99)
BUN SERPL-MCNC: 13 MG/DL (ref 6–20)
BUN SERPL-MCNC: 15 MG/DL (ref 6–30)
BZE UR QL SCN: NEGATIVE
CALCIUM SERPL-MCNC: 9.6 MG/DL (ref 8.7–10.5)
CANNABINOIDS UR QL SCN: NEGATIVE
CHLORIDE SERPL-SCNC: 106 MMOL/L (ref 95–110)
CHLORIDE SERPL-SCNC: 108 MMOL/L (ref 95–110)
CO2 SERPL-SCNC: 20 MMOL/L (ref 23–29)
CREAT SERPL-MCNC: 1.4 MG/DL (ref 0.5–1.4)
CREAT SERPL-MCNC: 1.4 MG/DL (ref 0.5–1.4)
CREAT UR-MCNC: 6 MG/DL (ref 23–375)
DIFFERENTIAL METHOD: ABNORMAL
EOSINOPHIL # BLD AUTO: 0.1 K/UL (ref 0–0.5)
EOSINOPHIL NFR BLD: 1.2 % (ref 0–8)
ERYTHROCYTE [DISTWIDTH] IN BLOOD BY AUTOMATED COUNT: 14.8 % (ref 11.5–14.5)
EST. GFR  (NO RACE VARIABLE): 59.4 ML/MIN/1.73 M^2
ETHANOL UR-MCNC: <10 MG/DL
GLUCOSE SERPL-MCNC: 91 MG/DL (ref 70–110)
GLUCOSE SERPL-MCNC: 97 MG/DL (ref 70–110)
HCO3 UR-SCNC: 22.3 MMOL/L (ref 24–28)
HCT VFR BLD AUTO: 43.7 % (ref 40–54)
HCT VFR BLD CALC: 46 %PCV (ref 36–54)
HGB BLD-MCNC: 13.3 G/DL (ref 14–18)
IMM GRANULOCYTES # BLD AUTO: 0 K/UL (ref 0–0.04)
IMM GRANULOCYTES NFR BLD AUTO: 0 % (ref 0–0.5)
LYMPHOCYTES # BLD AUTO: 1 K/UL (ref 1–4.8)
LYMPHOCYTES NFR BLD: 22.7 % (ref 18–48)
MCH RBC QN AUTO: 26.9 PG (ref 27–31)
MCHC RBC AUTO-ENTMCNC: 30.4 G/DL (ref 32–36)
MCV RBC AUTO: 88 FL (ref 82–98)
METHADONE UR QL SCN>300 NG/ML: NEGATIVE
MONOCYTES # BLD AUTO: 0.4 K/UL (ref 0.3–1)
MONOCYTES NFR BLD: 9.5 % (ref 4–15)
NEUTROPHILS # BLD AUTO: 2.8 K/UL (ref 1.8–7.7)
NEUTROPHILS NFR BLD: 66.4 % (ref 38–73)
NRBC BLD-RTO: 0 /100 WBC
OPIATES UR QL SCN: NEGATIVE
PCO2 BLDA: 40.6 MMHG (ref 35–45)
PCP UR QL SCN>25 NG/ML: NEGATIVE
PH SMN: 7.35 [PH] (ref 7.35–7.45)
PLATELET # BLD AUTO: 331 K/UL (ref 150–450)
PMV BLD AUTO: 11 FL (ref 9.2–12.9)
PO2 BLDA: 53 MMHG (ref 40–60)
POC BE: -3 MMOL/L
POC CARDIAC TROPONIN I: 0.05 NG/ML (ref 0–0.08)
POC IONIZED CALCIUM: 1.15 MMOL/L (ref 1.06–1.42)
POC SATURATED O2: 85 % (ref 95–100)
POC TCO2 (MEASURED): 21 MMOL/L (ref 23–29)
POC TCO2: 23 MMOL/L (ref 24–29)
POTASSIUM BLD-SCNC: 4.4 MMOL/L (ref 3.5–5.1)
POTASSIUM SERPL-SCNC: 4.4 MMOL/L (ref 3.5–5.1)
PROT SERPL-MCNC: 7.8 G/DL (ref 6–8.4)
RBC # BLD AUTO: 4.95 M/UL (ref 4.6–6.2)
SAMPLE: ABNORMAL
SAMPLE: ABNORMAL
SAMPLE: NORMAL
SITE: ABNORMAL
SODIUM BLD-SCNC: 141 MMOL/L (ref 136–145)
SODIUM SERPL-SCNC: 136 MMOL/L (ref 136–145)
TOXICOLOGY INFORMATION: ABNORMAL
TROPONIN I SERPL DL<=0.01 NG/ML-MCNC: 0.07 NG/ML (ref 0–0.03)
TROPONIN I SERPL DL<=0.01 NG/ML-MCNC: 0.08 NG/ML (ref 0–0.03)
WBC # BLD AUTO: 4.19 K/UL (ref 3.9–12.7)

## 2023-06-08 PROCEDURE — 63600175 PHARM REV CODE 636 W HCPCS: Performed by: EMERGENCY MEDICINE

## 2023-06-08 PROCEDURE — 25000003 PHARM REV CODE 250: Performed by: EMERGENCY MEDICINE

## 2023-06-08 PROCEDURE — 99291 CRITICAL CARE FIRST HOUR: CPT | Mod: ,,, | Performed by: INTERNAL MEDICINE

## 2023-06-08 PROCEDURE — 27000190 HC CPAP FULL FACE MASK W/VALVE

## 2023-06-08 PROCEDURE — 99291 PR CRITICAL CARE, E/M 30-74 MINUTES: ICD-10-PCS | Mod: ,,, | Performed by: INTERNAL MEDICINE

## 2023-06-08 PROCEDURE — 85025 COMPLETE CBC W/AUTO DIFF WBC: CPT | Performed by: EMERGENCY MEDICINE

## 2023-06-08 PROCEDURE — 93010 ELECTROCARDIOGRAM REPORT: CPT | Mod: ,,, | Performed by: INTERNAL MEDICINE

## 2023-06-08 PROCEDURE — 93005 ELECTROCARDIOGRAM TRACING: CPT

## 2023-06-08 PROCEDURE — 83880 ASSAY OF NATRIURETIC PEPTIDE: CPT | Mod: 91 | Performed by: INTERNAL MEDICINE

## 2023-06-08 PROCEDURE — 84484 ASSAY OF TROPONIN QUANT: CPT | Mod: 91 | Performed by: EMERGENCY MEDICINE

## 2023-06-08 PROCEDURE — 20000000 HC ICU ROOM

## 2023-06-08 PROCEDURE — 25000003 PHARM REV CODE 250: Performed by: STUDENT IN AN ORGANIZED HEALTH CARE EDUCATION/TRAINING PROGRAM

## 2023-06-08 PROCEDURE — 82803 BLOOD GASES ANY COMBINATION: CPT

## 2023-06-08 PROCEDURE — 94660 CPAP INITIATION&MGMT: CPT

## 2023-06-08 PROCEDURE — 99291 PR CRITICAL CARE, E/M 30-74 MINUTES: ICD-10-PCS | Mod: ,,, | Performed by: EMERGENCY MEDICINE

## 2023-06-08 PROCEDURE — 80307 DRUG TEST PRSMV CHEM ANLYZR: CPT | Performed by: HOSPITALIST

## 2023-06-08 PROCEDURE — 27000221 HC OXYGEN, UP TO 24 HOURS

## 2023-06-08 PROCEDURE — 83880 ASSAY OF NATRIURETIC PEPTIDE: CPT | Performed by: EMERGENCY MEDICINE

## 2023-06-08 PROCEDURE — 80053 COMPREHEN METABOLIC PANEL: CPT | Performed by: EMERGENCY MEDICINE

## 2023-06-08 PROCEDURE — 25000003 PHARM REV CODE 250: Performed by: HOSPITALIST

## 2023-06-08 PROCEDURE — 93010 EKG 12-LEAD: ICD-10-PCS | Mod: ,,, | Performed by: INTERNAL MEDICINE

## 2023-06-08 PROCEDURE — 99291 CRITICAL CARE FIRST HOUR: CPT | Mod: ,,, | Performed by: EMERGENCY MEDICINE

## 2023-06-08 PROCEDURE — 99291 CRITICAL CARE FIRST HOUR: CPT

## 2023-06-08 PROCEDURE — 99900035 HC TECH TIME PER 15 MIN (STAT)

## 2023-06-08 PROCEDURE — 99900031 HC PATIENT EDUCATION (STAT)

## 2023-06-08 PROCEDURE — 80047 BASIC METABLC PNL IONIZED CA: CPT | Mod: 91

## 2023-06-08 PROCEDURE — 93010 ELECTROCARDIOGRAM REPORT: CPT | Mod: 59,,, | Performed by: INTERNAL MEDICINE

## 2023-06-08 PROCEDURE — 96374 THER/PROPH/DIAG INJ IV PUSH: CPT

## 2023-06-08 PROCEDURE — 94761 N-INVAS EAR/PLS OXIMETRY MLT: CPT

## 2023-06-08 RX ORDER — NITROGLYCERIN 20 MG/100ML
5 INJECTION INTRAVENOUS CONTINUOUS
Status: DISCONTINUED | OUTPATIENT
Start: 2023-06-08 | End: 2023-06-09

## 2023-06-08 RX ORDER — METOPROLOL SUCCINATE 25 MG/1
25 TABLET, EXTENDED RELEASE ORAL NIGHTLY
Status: DISCONTINUED | OUTPATIENT
Start: 2023-06-08 | End: 2023-06-11 | Stop reason: HOSPADM

## 2023-06-08 RX ORDER — ONDANSETRON 2 MG/ML
4 INJECTION INTRAMUSCULAR; INTRAVENOUS
Status: COMPLETED | OUTPATIENT
Start: 2023-06-08 | End: 2023-06-08

## 2023-06-08 RX ORDER — ATORVASTATIN CALCIUM 20 MG/1
80 TABLET, FILM COATED ORAL DAILY
Status: DISCONTINUED | OUTPATIENT
Start: 2023-06-09 | End: 2023-06-11 | Stop reason: HOSPADM

## 2023-06-08 RX ORDER — ONDANSETRON 2 MG/ML
4 INJECTION INTRAMUSCULAR; INTRAVENOUS
Status: ACTIVE | OUTPATIENT
Start: 2023-06-08 | End: 2023-06-08

## 2023-06-08 RX ORDER — CLOPIDOGREL BISULFATE 75 MG/1
75 TABLET ORAL DAILY
Status: DISCONTINUED | OUTPATIENT
Start: 2023-06-09 | End: 2023-06-11 | Stop reason: HOSPADM

## 2023-06-08 RX ORDER — AMIODARONE HYDROCHLORIDE 200 MG/1
200 TABLET ORAL 2 TIMES DAILY
Status: DISCONTINUED | OUTPATIENT
Start: 2023-06-08 | End: 2023-06-09

## 2023-06-08 RX ORDER — ISOSORBIDE DINITRATE 5 MG/1
10 TABLET ORAL
Status: COMPLETED | OUTPATIENT
Start: 2023-06-08 | End: 2023-06-08

## 2023-06-08 RX ORDER — LANOLIN ALCOHOL/MO/W.PET/CERES
1 CREAM (GRAM) TOPICAL DAILY
Status: DISCONTINUED | OUTPATIENT
Start: 2023-06-08 | End: 2023-06-11 | Stop reason: HOSPADM

## 2023-06-08 RX ORDER — ACETAMINOPHEN 325 MG/1
650 TABLET ORAL EVERY 6 HOURS PRN
Status: DISCONTINUED | OUTPATIENT
Start: 2023-06-08 | End: 2023-06-11 | Stop reason: HOSPADM

## 2023-06-08 RX ORDER — FUROSEMIDE 10 MG/ML
80 INJECTION INTRAMUSCULAR; INTRAVENOUS
Status: COMPLETED | OUTPATIENT
Start: 2023-06-08 | End: 2023-06-08

## 2023-06-08 RX ADMIN — ISOSORBIDE DINITRATE 10 MG: 5 TABLET ORAL at 03:06

## 2023-06-08 RX ADMIN — SACUBITRIL AND VALSARTAN 1 TABLET: 97; 103 TABLET, FILM COATED ORAL at 09:06

## 2023-06-08 RX ADMIN — AMIODARONE HYDROCHLORIDE 200 MG: 200 TABLET ORAL at 09:06

## 2023-06-08 RX ADMIN — ONDANSETRON 4 MG: 2 INJECTION INTRAMUSCULAR; INTRAVENOUS at 11:06

## 2023-06-08 RX ADMIN — NITROGLYCERIN 10 MCG/MIN: 20 INJECTION INTRAVENOUS at 11:06

## 2023-06-08 RX ADMIN — FUROSEMIDE 80 MG: 10 INJECTION, SOLUTION INTRAMUSCULAR; INTRAVENOUS at 03:06

## 2023-06-08 RX ADMIN — METOPROLOL SUCCINATE 25 MG: 25 TABLET, EXTENDED RELEASE ORAL at 09:06

## 2023-06-08 RX ADMIN — ACETAMINOPHEN 650 MG: 325 TABLET ORAL at 09:06

## 2023-06-08 RX ADMIN — APIXABAN 5 MG: 5 TABLET, FILM COATED ORAL at 09:06

## 2023-06-08 NOTE — RESPIRATORY THERAPY
Patient transported from ED on NC. SOB and increased WOB noted at this time. After move to bed, patient relaxed. BIPAP at bedside, patient requested a break at this time. Instructed on slow, deep breathing, placed on 4L NC, O2 sat 94%. Will continue to monitor

## 2023-06-08 NOTE — ASSESSMENT & PLAN NOTE
Acute on chronic systolic/diastolic heart failure  Presented with elevated blood pressures.  Started on Tridil drip received IV Lasix in the emergency room.  Restart GDMT.  Will start him on IV Lasix 80 mg b.i.d.  Replete K>4 and MG>2

## 2023-06-08 NOTE — PROVIDER PROGRESS NOTES - EMERGENCY DEPT.
Encounter Date: 6/8/2023    ED Physician Progress Notes        Physician Note:   No dynamic changes on repeat EKG, does not currently meet criteria for STEMI.

## 2023-06-08 NOTE — ED TRIAGE NOTES
Patient identifiers for Tera Griffiths 57 y.o. male checked and correct.  Chief Complaint   Patient presents with    Chest Pain     Patient reports chest pain since 0730 this morning when the pain awoke him from his sleep. Endorses associated shortness of breath. Patient is mid sternal and non radiating.      Past Medical History:   Diagnosis Date    Atrial fibrillation     Encounter for blood transfusion      Allergies reported: Review of patient's allergies indicates:  No Known Allergies      LOC: Patient is awake, alert, and aware of environment with an appropriate affect. Patient is oriented x 4 and speaking appropriately.  APPEARANCE: Patient resting comfortably and in no acute distress. Patient is clean and well groomed, patient's clothing is properly fastened.  SKIN: The skin is warm and dry. Patient has normal skin turgor and moist mucus membranes.   MUSKULOSKELETAL: Patient is moving all extremities well, no obvious deformities noted. Pulses intact.   RESPIRATORY: Airway is open and patent. Respirations are spontaneous and labored with normal. SOB noted  CARDIAC: Patient has a normal rate and rhythm on cardiac monitor. peripheral edema noted to legs bilaterally noted. Reports chest pain  ABDOMEN: No distention noted. Soft and non-tender upon palpation.  NEUROLOGICAL: pupils **mm, PERRL. Facial expression is symmetrical. Hand grasps are equal bilaterally. Normal sensation in all extremities when touched with finger.

## 2023-06-08 NOTE — ED PROVIDER NOTES
Encounter Date: 6/8/2023       History     Chief Complaint   Patient presents with    Chest Pain     Patient reports chest pain since 0730 this morning when the pain awoke him from his sleep. Endorses associated shortness of breath. Patient is mid sternal and non radiating.      57-year-old male with history of coronary artery disease, ischemic cardiomyopathy with EF of 18%, AICD, recent admission for NSTEMI presents with chest discomfort.  He had been doing well until yesterday when he started feeling more short of breath.  Last night he had difficulty sleeping be cause of shortness of breath and orthopnea.  This morning around 730 he started with chest discomfort.  It was a squeezing chest pain that radiated to his shoulder.  Currently he still has this chest discomfort and shortness of breath.  This feels very similar to his visit in April of this year.  He had an NSTEMI, congestive heart failure, elevated blood pressure.  He was admitted to the floor but later upgraded to the CCU.    Review of patient's allergies indicates:  No Known Allergies  Past Medical History:   Diagnosis Date    Atrial fibrillation     Encounter for blood transfusion      Past Surgical History:   Procedure Laterality Date    CARDIAC DEFIBRILLATOR PLACEMENT Left     HERNIA REPAIR      LEFT HEART CATHETERIZATION Left 4/18/2023    Procedure: Left heart cath;  Surgeon: Atilio Marks MD;  Location: [facility] CATH LAB;  Service: Cardiology;  Laterality: Left;    RIGHT HEART CATHETERIZATION Right 9/30/2022    Procedure: INSERTION, CATHETER, RIGHT HEART;  Surgeon: Caden Aden MD;  Location: [facility] CATH LAB;  Service: Cardiology;  Laterality: Right;    TREATMENT OF CARDIAC ARRHYTHMIA N/A 11/22/2022    Procedure: CARDIOVERSION;  Surgeon: Marvin Sanon MD;  Location: [facility] EP LAB;  Service: Cardiology;  Laterality: N/A;  afib, KIA, DCCV, anes, MB, 3 Prep     No family history on file.  Social History     Tobacco Use    Smoking status: Never     Smokeless tobacco: Never   Substance Use Topics    Alcohol use: Never    Drug use: Never     Review of Systems    Physical Exam     Initial Vitals [06/08/23 1030]   BP Pulse Resp Temp SpO2   (!) 175/127 102 (!) 28 98.2 °F (36.8 °C) 98 %      MAP       --         Physical Exam    Constitutional:   Patient appears tachypneic.  He is able to speak in full sentences but he is quite short of breath.  He is sitting straight up on the side of the bed.   HENT:   Head: Normocephalic.   Eyes: Pupils are equal, round, and reactive to light.   Neck: JVD present.   Cardiovascular:  Regular rhythm.           Tachycardic   Pulmonary/Chest: He has rales.   Abdominal: Abdomen is soft. Bowel sounds are normal. He exhibits distension. There is no abdominal tenderness. There is no rebound and no guarding.   Musculoskeletal:         General: Edema present.     Neurological: He is alert. He has normal strength.   Psychiatric: He has a normal mood and affect.       ED Course   Procedures  Labs Reviewed   CBC W/ AUTO DIFFERENTIAL - Abnormal; Notable for the following components:       Result Value    Hemoglobin 13.3 (*)     MCH 26.9 (*)     MCHC 30.4 (*)     RDW 14.8 (*)     All other components within normal limits   COMPREHENSIVE METABOLIC PANEL - Abnormal; Notable for the following components:    CO2 20 (*)     eGFR 59.4 (*)     All other components within normal limits   TROPONIN I - Abnormal; Notable for the following components:    Troponin I 0.070 (*)     All other components within normal limits   TROPONIN I - Abnormal; Notable for the following components:    Troponin I 0.077 (*)     All other components within normal limits   B-TYPE NATRIURETIC PEPTIDE - Abnormal; Notable for the following components:    BNP 2,871 (*)     All other components within normal limits   ISTAT PROCEDURE - Abnormal; Notable for the following components:    POC TCO2 (MEASURED) 21 (*)     All other components within normal limits   ISTAT PROCEDURE -  Abnormal; Notable for the following components:    POC PH 7.347 (*)     POC HCO3 22.3 (*)     POC SATURATED O2 85 (*)     POC TCO2 23 (*)     All other components within normal limits   TROPONIN ISTAT   POCT TROPONIN   POCT TROPONIN   ISTAT CHEM8     EKG Readings: (Independently Interpreted)   Normal sinus rhythm at 94.  QRS widened 02/01/2032.  Left ventricular hypertrophy with ST changes in the anterior leads consistent with prior EKGs.   ECG Results              EKG 12-lead (Final result)  Result time 06/08/23 15:21:27      Final result by Interface, Lab In [facility] (06/08/23 15:21:27)                   Narrative:    Test Reason : R07.9,    Vent. Rate : 086 BPM     Atrial Rate : 086 BPM     P-R Int : 256 ms          QRS Dur : 134 ms      QT Int : 434 ms       P-R-T Axes : 076 -19 125 degrees     QTc Int : 519 ms    Sinus rhythm with 1st degree A-V block  Possible Left atrial enlargement  LVH with QRS widening ( Lyon product )  T wave abnormality, consider lateral ischemia  Abnormal ECG  When compared with ECG of 08-JUN-2023 10:32,  Premature ventricular complexes are no longer Present  Confirmed by LAXMI STILES MD ([id number]) on 6/8/2023 3:21:17 PM    Referred By: [email or web address]   SELF           Confirmed By:LAXMI STILES MD                                  Imaging Results              X-Ray Chest 1 View (Final result)  Result time 06/08/23 13:25:02      Final result by Johan Martin MD (06/08/23 13:25:02)                   Impression:      See above      Electronically signed by: Johan Martin MD  Date:    06/08/2023  Time:    13:25               Narrative:    EXAMINATION:  XR CHEST 1 VIEW    CLINICAL HISTORY:  Shortness of breath    TECHNIQUE:  Single frontal view of the chest was performed.    COMPARISON:  No 04/20/2023 ne    FINDINGS:  Pacemaker identified as before.  Cardiomegaly.  No significant airspace consolidation or pleural effusion identified                                       Medications   nitroGLYCERIN in  5 % dextrose 50 mg/250 mL (200 mcg/mL) infusion (80 mcg/min Intravenous Verify Only 6/8/23 1801)   ondansetron injection 4 mg (4 mg Intravenous Not Given 6/8/23 1145)   amiodarone tablet 200 mg (200 mg Oral Given 6/8/23 2112)   apixaban tablet 5 mg (5 mg Oral Given 6/8/23 2112)   atorvastatin tablet 80 mg (has no administration in time range)   clopidogreL tablet 75 mg (has no administration in time range)   ferrous sulfate tablet 1 each (1 each Oral Not Given 6/8/23 1700)   metoprolol succinate (TOPROL-XL) 24 hr tablet 25 mg (25 mg Oral Given 6/8/23 2112)   sacubitriL-valsartan  mg per tablet 1 tablet (1 tablet Oral Given 6/8/23 2112)   acetaminophen tablet 650 mg (650 mg Oral Given 6/8/23 2112)   ondansetron injection 4 mg (4 mg Intravenous Given 6/8/23 1138)   furosemide injection 80 mg (80 mg Intravenous Given 6/8/23 1521)   isosorbide dinitrate tablet 10 mg (10 mg Oral Given 6/8/23 1524)     Medical Decision Making:   Initial Assessment:   57-year-old male with history of ischemic cardiomyopathy presents with chest pain and shortness of breath.  He does have respiratory distress and markedly elevated blood pressure.  Will start on Tridil drip and BiPAP.  EKG reveals no ST-elevation MI. I reviewed his medical record from prior visit.  I suspect this is if re-exacerbate CHF.  Also could be acute coronary syndrome.  Will continue to keep an open mind as we started our evaluation and treatment.  ED Management:  Reassessed patient.  Blood pressure is down to 130 over 90.  He is still on Tridil drip.  Breathing much better.  Able to take off BiPAP.  Satting 95% on room air.  Discussed case with cardiology.  They will evaluate for potential admission to the Cardiology Service.  Had been followed by the heart transplant Service but he is not eligible for advanced options.            Attending Attestation:         Attending Critical Care:   Critical Care Times:   Direct Patient Care (initial evaluation,  reassessments, and time considering the case)................................................................24 minutes.   Additional History from reviewing old medical records or taking additional history from the family, EMS, PCP, etc.......................4 minutes.   Ordering, Reviewing, and Interpreting Diagnostic Studies...............................................................................................................4 minutes.   Documentation..................................................................................................................................................................................4 minutes.   Consultation with other Physicians. .................................................................................................................................................4 minutes.   ==============================================================  Total Critical Care Time - exclusive of procedural time: 40 minutes.  ==============================================================                     Clinical Impression:   Final diagnoses:  [R07.9] Chest pain  [R06.02] Shortness of breath        ED Disposition Condition    Admit                 Hector Abreu MD  06/08/23 0478

## 2023-06-08 NOTE — H&P
[facility] Emergency Dept  Cardiology  History and Physical     Patient Name: Tear Griffiths  MRN: [id number]  Admission Date: 6/8/2023  Code Status: Prior   Attending Provider: Rustam Griggs MD   Primary Care Physician: Primary Doctor No  Principal Problem:Chronic combined systolic and diastolic heart failure    Patient information was obtained from patient and ER records.     Subjective:     Chief Complaint:sob    HPI:  57 y.o. year old black male with stage C HFrEF, ICMP, CAD s/p (STEMI s/p PCI to Rcx (8/2021), previous GIB, difficulty with medication compliance,  Afib s/p DCCV, uncontrolled HTN presented with acute onset of shortness of breath.  Patient was discharged 1 month back with similar problems.  He continued to have shortness of breath on minimal exertion but has progressed to a state where he is short of breath even at rest.  Complains of having orthopnea, PND and chest pain.  On presentation his blood pressures were elevated with systolics in 180s for which he was started on Tridil drip.  He was also placed on BiPAP but patient was not able to tolerate due to nausea.  Received IV Lasix in the emergency room and made some urine.  On previous admission he underwent left heart catheterization that showed nonobstructive coronary artery disease.  He is currently on max tolerated dose of GDM T with Entresto  mg b.i.d., Aldactone, Toprol-XL.  Patient has very poor quality of life due to his symptoms.  He was declined for advance options by heart transplant      Past Medical History:   Diagnosis Date    Atrial fibrillation     Encounter for blood transfusion        Past Surgical History:   Procedure Laterality Date    CARDIAC DEFIBRILLATOR PLACEMENT Left     HERNIA REPAIR      LEFT HEART CATHETERIZATION Left 4/18/2023    Procedure: Left heart cath;  Surgeon: Atilio Marks MD;  Location: [facility] CATH LAB;  Service: Cardiology;  Laterality: Left;    RIGHT HEART CATHETERIZATION Right 9/30/2022     Procedure: INSERTION, CATHETER, RIGHT HEART;  Surgeon: Caden Aden MD;  Location: [facility] CATH LAB;  Service: Cardiology;  Laterality: Right;    TREATMENT OF CARDIAC ARRHYTHMIA N/A 11/22/2022    Procedure: CARDIOVERSION;  Surgeon: Marvin Sanon MD;  Location: [facility] EP LAB;  Service: Cardiology;  Laterality: N/A;  afib, KIA, DCCV, anes, MB, 3 Prep       Review of patient's allergies indicates:  No Known Allergies    No current facility-administered medications on file prior to encounter.     Current Outpatient Medications on File Prior to Encounter   Medication Sig    amiodarone (PACERONE) 200 MG Tab Take 1 tablet (200 mg total) by mouth 2 (two) times daily.    apixaban (ELIQUIS) 5 mg Tab Take 5 mg by mouth 2 (two) times daily.    atorvastatin (LIPITOR) 80 MG tablet Take 1 tablet (80 mg total) by mouth once daily.    clopidogreL (PLAVIX) 75 mg tablet Take 1 tablet (75 mg total) by mouth once daily.    empagliflozin (JARDIANCE) 10 mg tablet Take 1 tablet (10 mg total) by mouth once daily.    ferrous sulfate (FEOSOL) 325 mg (65 mg iron) Tab tablet Take 325 mg by mouth every other day.    isosorbide mononitrate (ISMO,MONOKET) 10 mg tablet Take 1 tablet (10 mg total) by mouth 2 (two) times daily. Hold until seen by cardiologist    LIDOcaine (LIDODERM) 5 % Place 1 patch onto the skin once daily.    metoprolol succinate (TOPROL-XL) 25 MG 24 hr tablet Take 1 tablet (25 mg total) by mouth every evening.    sacubitriL-valsartan (ENTRESTO)  mg per tablet Take 1 tablet by mouth 2 (two) times daily. HOLD UNTIL SEEN BY CARDIOLOGY    torsemide (DEMADEX) 20 MG Tab Take 1 tablet (20 mg total) by mouth once daily.     Family History    None       Tobacco Use    Smoking status: Never    Smokeless tobacco: Never   Substance and Sexual Activity    Alcohol use: Never    Drug use: Never    Sexual activity: Not on file     Review of Systems   Constitutional: Negative.   Eyes: Negative.    Cardiovascular:  Negative.    Respiratory: Negative.     Endocrine: Negative.    Hematologic/Lymphatic: Negative.    Skin: Negative.    Musculoskeletal: Negative.    Objective:     Vital Signs (Most Recent):  Temp: 98.2 °F (36.8 °C) (06/08/23 1030)  Pulse: 63 (06/08/23 1512)  Resp: (!) 26 (06/08/23 1512)  BP: (!) 144/98 (06/08/23 1602)  SpO2: (!) 94 % (06/08/23 1517) Vital Signs (24h Range):  Temp:  [98.2 °F (36.8 °C)] 98.2 °F (36.8 °C)  Pulse:  [] 63  Resp:  [21-39] 26  SpO2:  [94 %-98 %] 94 %  BP: (136-175)/() 144/98     Weight: 86.2 kg (190 lb)  Body mass index is 25.77 kg/m².    SpO2: (!) 94 %       No intake or output data in the 24 hours ending 06/08/23 1653    Lines/Drains/Airways       Peripheral Intravenous Line  Duration                  Peripheral IV - Single Lumen 06/08/23 1157 18 G Left Antecubital <1 day                     Physical Exam  Constitutional:       Appearance: Normal appearance.   HENT:      Head: Normocephalic and atraumatic.   Eyes:      Extraocular Movements: Extraocular movements intact.      Pupils: Pupils are equal, round, and reactive to light.   Cardiovascular:      Rate and Rhythm: Normal rate and regular rhythm.      Pulses: Normal pulses.      Heart sounds: Normal heart sounds.   Pulmonary:      Effort: Pulmonary effort is normal.      Breath sounds: Normal breath sounds.   Abdominal:      General: Abdomen is flat. Bowel sounds are normal.      Palpations: Abdomen is soft.   Musculoskeletal:         General: Normal range of motion.      Cervical back: Normal range of motion.   Skin:     General: Skin is warm.      Capillary Refill: Capillary refill takes less than 2 seconds.   Neurological:      General: No focal deficit present.      Mental Status: He is alert and oriented to person, place, and time.        Significant Labs: All pertinent lab results from the last 24 hours have been reviewed.    Significant Imaging: EKG: NSR with LBBB    Assessment and Plan:     * Acute combined  systolic and diastolic heart failure  Acute on chronic systolic/diastolic heart failure  Presented with elevated blood pressures.  Started on Tridil drip received IV Lasix in the emergency room.  Restart GDMT.  Will start him on IV Lasix 80 mg b.i.d.  Replete K>4 and MG>2     Stage 3 chronic kidney disease  Creatinine at presentation is 1.4.  Improved from prior admission.  Continue to monitor with diuresis.    HTN (hypertension)  Restart home blood pressure medications.  Continue to wean off Tridil drip.  Will admit him to ICU.    Dyslipidemia  On Statins     Type 2 diabetes mellitus with circulatory disorder, without long-term current use of insulin  Sliding scale and a.c. HS.    Atrial fibrillation  History of paroxysmal atrial fibrillation.  In normal sinus rhythm.  On anticoagulation with Eliquis    CAD (coronary artery disease)  Patient has nonobstructive coronary artery disease from angiogram 1 month back.  He has history of STEMI his RCA.  Continue Plavix, Toprol-XL, statins.        VTE Risk Mitigation (From admission, onward)         Ordered     apixaban tablet 5 mg  2 times daily         06/08/23 7236                Doug Mcgarry MD  Cardiology   [facility] - Emergency Dept

## 2023-06-08 NOTE — PROGRESS NOTES
Patient cannot go on Bipap  @ this time due to being nauseous, it's a contraindication. Dr. Abreu and RN  have made aware. Will continue to monitor.

## 2023-06-08 NOTE — HPI
57 y.o. year old black male with stage C HFrEF, ICMP, CAD s/p (STEMI s/p PCI to Rcx (8/2021), previous GIB, difficulty with medication compliance,  Afib s/p DCCV, uncontrolled HTN presented with acute onset of shortness of breath.  Patient was discharged 1 month back with similar problems.  He continued to have shortness of breath on minimal exertion but has progressed to a state where he is short of breath even at rest.  Complains of having orthopnea, PND and chest pain.  On presentation his blood pressures were elevated with systolics in 180s for which he was started on Tridil drip.  He was also placed on BiPAP but patient was not able to tolerate due to nausea.  Received IV Lasix in the emergency room and made some urine.  On previous admission he underwent left heart catheterization that showed nonobstructive coronary artery disease.  He is currently on max tolerated dose of GDM T with Entresto  mg b.i.d., Aldactone, Toprol-XL.  Patient has very poor quality of life due to his symptoms.  He is currently undergoing testing and workup for advance options by heart transplant

## 2023-06-08 NOTE — ED NOTES
I-STAT Chem-8+ Results:   Value Reference Range   Sodium 141 136-145 mmol/L   Potassium  4.4 3.5-5.1 mmol/L   Chloride 106  mmol/L   Ionized Calcium 1.15 1.06-1.42 mmol/L   CO2 (measured) 21 23-29 mmol/L   Glucose 97  mg/dL   BUN 15 6-30 mg/dL   Creatinine 1.4 0.5-1.4 mg/dL   Hematocrit 46 36-54%

## 2023-06-08 NOTE — SUBJECTIVE & OBJECTIVE
Past Medical History:   Diagnosis Date    Atrial fibrillation     Encounter for blood transfusion        Past Surgical History:   Procedure Laterality Date    CARDIAC DEFIBRILLATOR PLACEMENT Left     HERNIA REPAIR      LEFT HEART CATHETERIZATION Left 4/18/2023    Procedure: Left heart cath;  Surgeon: Atilio Marks MD;  Location: [facility] CATH LAB;  Service: Cardiology;  Laterality: Left;    RIGHT HEART CATHETERIZATION Right 9/30/2022    Procedure: INSERTION, CATHETER, RIGHT HEART;  Surgeon: Caden Aden MD;  Location: [facility] CATH LAB;  Service: Cardiology;  Laterality: Right;    TREATMENT OF CARDIAC ARRHYTHMIA N/A 11/22/2022    Procedure: CARDIOVERSION;  Surgeon: Marvin Sanon MD;  Location: [facility] EP LAB;  Service: Cardiology;  Laterality: N/A;  afib, KIA, DCCV, anes, MB, 3 Prep       Review of patient's allergies indicates:  No Known Allergies    No current facility-administered medications on file prior to encounter.     Current Outpatient Medications on File Prior to Encounter   Medication Sig    amiodarone (PACERONE) 200 MG Tab Take 1 tablet (200 mg total) by mouth 2 (two) times daily.    apixaban (ELIQUIS) 5 mg Tab Take 5 mg by mouth 2 (two) times daily.    atorvastatin (LIPITOR) 80 MG tablet Take 1 tablet (80 mg total) by mouth once daily.    clopidogreL (PLAVIX) 75 mg tablet Take 1 tablet (75 mg total) by mouth once daily.    empagliflozin (JARDIANCE) 10 mg tablet Take 1 tablet (10 mg total) by mouth once daily.    ferrous sulfate (FEOSOL) 325 mg (65 mg iron) Tab tablet Take 325 mg by mouth every other day.    isosorbide mononitrate (ISMO,MONOKET) 10 mg tablet Take 1 tablet (10 mg total) by mouth 2 (two) times daily. Hold until seen by cardiologist    LIDOcaine (LIDODERM) 5 % Place 1 patch onto the skin once daily.    metoprolol succinate (TOPROL-XL) 25 MG 24 hr tablet Take 1 tablet (25 mg total) by mouth every evening.    sacubitriL-valsartan (ENTRESTO)  mg per tablet Take 1 tablet by mouth 2  (two) times daily. HOLD UNTIL SEEN BY CARDIOLOGY    torsemide (DEMADEX) 20 MG Tab Take 1 tablet (20 mg total) by mouth once daily.     Family History    None       Tobacco Use    Smoking status: Never    Smokeless tobacco: Never   Substance and Sexual Activity    Alcohol use: Never    Drug use: Never    Sexual activity: Not on file     Review of Systems   Constitutional: Negative.   Eyes: Negative.    Cardiovascular: Negative.    Respiratory: Negative.     Endocrine: Negative.    Hematologic/Lymphatic: Negative.    Skin: Negative.    Musculoskeletal: Negative.    Objective:     Vital Signs (Most Recent):  Temp: 98.2 °F (36.8 °C) (06/08/23 1030)  Pulse: 63 (06/08/23 1512)  Resp: (!) 26 (06/08/23 1512)  BP: (!) 144/98 (06/08/23 1602)  SpO2: (!) 94 % (06/08/23 1517) Vital Signs (24h Range):  Temp:  [98.2 °F (36.8 °C)] 98.2 °F (36.8 °C)  Pulse:  [] 63  Resp:  [21-39] 26  SpO2:  [94 %-98 %] 94 %  BP: (136-175)/() 144/98     Weight: 86.2 kg (190 lb)  Body mass index is 25.77 kg/m².    SpO2: (!) 94 %       No intake or output data in the 24 hours ending 06/08/23 1653    Lines/Drains/Airways       Peripheral Intravenous Line  Duration                  Peripheral IV - Single Lumen 06/08/23 1157 18 G Left Antecubital <1 day                     Physical Exam  Constitutional:       Appearance: Normal appearance.   HENT:      Head: Normocephalic and atraumatic.   Eyes:      Extraocular Movements: Extraocular movements intact.      Pupils: Pupils are equal, round, and reactive to light.   Cardiovascular:      Rate and Rhythm: Normal rate and regular rhythm.      Pulses: Normal pulses.      Heart sounds: Normal heart sounds.   Pulmonary:      Effort: Pulmonary effort is normal.      Breath sounds: Normal breath sounds.   Abdominal:      General: Abdomen is flat. Bowel sounds are normal.      Palpations: Abdomen is soft.   Musculoskeletal:         General: Normal range of motion.      Cervical back: Normal range of  motion.   Skin:     General: Skin is warm.      Capillary Refill: Capillary refill takes less than 2 seconds.   Neurological:      General: No focal deficit present.      Mental Status: He is alert and oriented to person, place, and time.        Significant Labs: All pertinent lab results from the last 24 hours have been reviewed.    Significant Imaging: EKG: NSR with LBBB

## 2023-06-08 NOTE — PROVIDER PROGRESS NOTES - EMERGENCY DEPT.
Encounter Date: 6/8/2023    ED Physician Progress Notes         EKG - STEMI Decision  Initial Reading: No STEMI present (Some concave ST elevation in V1, V3, V4, unable to open [facility] old EKGs to compare.  No reciprocal changes seen, will repeat EKG in 10 minutes to assure there is no dynamic changes or changes consistent with ischemia.).

## 2023-06-08 NOTE — ASSESSMENT & PLAN NOTE
History of paroxysmal atrial fibrillation.  In normal sinus rhythm.  On anticoagulation with Eliquis

## 2023-06-08 NOTE — ASSESSMENT & PLAN NOTE
Patient has nonobstructive coronary artery disease from angiogram 1 month back.  He has history of STEMI his RCA.  Continue Plavix, Toprol-XL, statins.

## 2023-06-08 NOTE — ASSESSMENT & PLAN NOTE
Restart home blood pressure medications.  Continue to wean off Tridil drip.  Will admit him to ICU.

## 2023-06-08 NOTE — ASSESSMENT & PLAN NOTE
Creatinine at presentation is 1.4.  Improved from prior admission.  Continue to monitor with diuresis.

## 2023-06-08 NOTE — ED NOTES
RT at the bedside. Made aware that pt can't have BIPAP on until nausea is controlled. Dr. Abreu made aware

## 2023-06-09 LAB
ALBUMIN SERPL BCP-MCNC: 3.7 G/DL (ref 3.5–5.2)
ALP SERPL-CCNC: 67 U/L (ref 55–135)
ALT SERPL W/O P-5'-P-CCNC: 18 U/L (ref 10–44)
ANION GAP SERPL CALC-SCNC: 8 MMOL/L (ref 8–16)
ANION GAP SERPL CALC-SCNC: 8 MMOL/L (ref 8–16)
AST SERPL-CCNC: 15 U/L (ref 10–40)
BASOPHILS # BLD AUTO: 0.01 K/UL (ref 0–0.2)
BASOPHILS NFR BLD: 0.2 % (ref 0–1.9)
BILIRUB SERPL-MCNC: 0.8 MG/DL (ref 0.1–1)
BILIRUB UR QL STRIP: NEGATIVE
BUN SERPL-MCNC: 16 MG/DL (ref 6–20)
BUN SERPL-MCNC: 18 MG/DL (ref 6–20)
CALCIUM SERPL-MCNC: 9.4 MG/DL (ref 8.7–10.5)
CALCIUM SERPL-MCNC: 9.5 MG/DL (ref 8.7–10.5)
CHLORIDE SERPL-SCNC: 102 MMOL/L (ref 95–110)
CHLORIDE SERPL-SCNC: 103 MMOL/L (ref 95–110)
CLARITY UR REFRACT.AUTO: CLEAR
CO2 SERPL-SCNC: 27 MMOL/L (ref 23–29)
CO2 SERPL-SCNC: 29 MMOL/L (ref 23–29)
COLOR UR AUTO: COLORLESS
CREAT SERPL-MCNC: 1.5 MG/DL (ref 0.5–1.4)
CREAT SERPL-MCNC: 1.7 MG/DL (ref 0.5–1.4)
CREAT UR-MCNC: 56 MG/DL (ref 23–375)
DIFFERENTIAL METHOD: ABNORMAL
EOSINOPHIL # BLD AUTO: 0.1 K/UL (ref 0–0.5)
EOSINOPHIL NFR BLD: 1.7 % (ref 0–8)
EOSINOPHIL URNS QL WRIGHT STN: NORMAL
ERYTHROCYTE [DISTWIDTH] IN BLOOD BY AUTOMATED COUNT: 14.4 % (ref 11.5–14.5)
EST. GFR  (NO RACE VARIABLE): 47 ML/MIN/1.73 M^2
EST. GFR  (NO RACE VARIABLE): 54.6 ML/MIN/1.73 M^2
GLUCOSE SERPL-MCNC: 101 MG/DL (ref 70–110)
GLUCOSE SERPL-MCNC: 99 MG/DL (ref 70–110)
GLUCOSE UR QL STRIP: NEGATIVE
HCT VFR BLD AUTO: 40.2 % (ref 40–54)
HGB BLD-MCNC: 12.6 G/DL (ref 14–18)
HGB UR QL STRIP: NEGATIVE
IMM GRANULOCYTES # BLD AUTO: 0.01 K/UL (ref 0–0.04)
IMM GRANULOCYTES NFR BLD AUTO: 0.2 % (ref 0–0.5)
KETONES UR QL STRIP: NEGATIVE
LEUKOCYTE ESTERASE UR QL STRIP: NEGATIVE
LYMPHOCYTES # BLD AUTO: 1 K/UL (ref 1–4.8)
LYMPHOCYTES NFR BLD: 14.7 % (ref 18–48)
MAGNESIUM SERPL-MCNC: 2 MG/DL (ref 1.6–2.6)
MCH RBC QN AUTO: 26.6 PG (ref 27–31)
MCHC RBC AUTO-ENTMCNC: 31.3 G/DL (ref 32–36)
MCV RBC AUTO: 85 FL (ref 82–98)
MONOCYTES # BLD AUTO: 0.8 K/UL (ref 0.3–1)
MONOCYTES NFR BLD: 11.7 % (ref 4–15)
NEUTROPHILS # BLD AUTO: 4.8 K/UL (ref 1.8–7.7)
NEUTROPHILS NFR BLD: 71.5 % (ref 38–73)
NITRITE UR QL STRIP: NEGATIVE
NRBC BLD-RTO: 0 /100 WBC
PH UR STRIP: 6 [PH] (ref 5–8)
PHOSPHATE SERPL-MCNC: 3.9 MG/DL (ref 2.7–4.5)
PLATELET # BLD AUTO: 297 K/UL (ref 150–450)
PMV BLD AUTO: 11.2 FL (ref 9.2–12.9)
POTASSIUM SERPL-SCNC: 3.6 MMOL/L (ref 3.5–5.1)
POTASSIUM SERPL-SCNC: 4.5 MMOL/L (ref 3.5–5.1)
PROT SERPL-MCNC: 6.8 G/DL (ref 6–8.4)
PROT UR QL STRIP: NEGATIVE
RBC # BLD AUTO: 4.73 M/UL (ref 4.6–6.2)
SODIUM SERPL-SCNC: 137 MMOL/L (ref 136–145)
SODIUM SERPL-SCNC: 140 MMOL/L (ref 136–145)
SODIUM UR-SCNC: 119 MMOL/L (ref 20–250)
SP GR UR STRIP: 1.01 (ref 1–1.03)
URN SPEC COLLECT METH UR: ABNORMAL
UUN UR-MCNC: 279 MG/DL (ref 140–1050)
WBC # BLD AUTO: 6.66 K/UL (ref 3.9–12.7)

## 2023-06-09 PROCEDURE — 94660 CPAP INITIATION&MGMT: CPT

## 2023-06-09 PROCEDURE — 25000003 PHARM REV CODE 250: Performed by: HOSPITALIST

## 2023-06-09 PROCEDURE — 25000003 PHARM REV CODE 250

## 2023-06-09 PROCEDURE — 87205 SMEAR GRAM STAIN: CPT | Performed by: INTERNAL MEDICINE

## 2023-06-09 PROCEDURE — 84300 ASSAY OF URINE SODIUM: CPT | Performed by: INTERNAL MEDICINE

## 2023-06-09 PROCEDURE — 81003 URINALYSIS AUTO W/O SCOPE: CPT | Performed by: INTERNAL MEDICINE

## 2023-06-09 PROCEDURE — 27000221 HC OXYGEN, UP TO 24 HOURS

## 2023-06-09 PROCEDURE — 94761 N-INVAS EAR/PLS OXIMETRY MLT: CPT

## 2023-06-09 PROCEDURE — 80048 BASIC METABOLIC PNL TOTAL CA: CPT | Mod: XB | Performed by: INTERNAL MEDICINE

## 2023-06-09 PROCEDURE — 82570 ASSAY OF URINE CREATININE: CPT | Performed by: INTERNAL MEDICINE

## 2023-06-09 PROCEDURE — 99291 CRITICAL CARE FIRST HOUR: CPT | Mod: ,,, | Performed by: INTERNAL MEDICINE

## 2023-06-09 PROCEDURE — 20600001 HC STEP DOWN PRIVATE ROOM

## 2023-06-09 PROCEDURE — 85025 COMPLETE CBC W/AUTO DIFF WBC: CPT | Performed by: STUDENT IN AN ORGANIZED HEALTH CARE EDUCATION/TRAINING PROGRAM

## 2023-06-09 PROCEDURE — 25000003 PHARM REV CODE 250: Performed by: EMERGENCY MEDICINE

## 2023-06-09 PROCEDURE — 99900035 HC TECH TIME PER 15 MIN (STAT)

## 2023-06-09 PROCEDURE — 99291 PR CRITICAL CARE, E/M 30-74 MINUTES: ICD-10-PCS | Mod: ,,, | Performed by: INTERNAL MEDICINE

## 2023-06-09 PROCEDURE — 83735 ASSAY OF MAGNESIUM: CPT | Performed by: STUDENT IN AN ORGANIZED HEALTH CARE EDUCATION/TRAINING PROGRAM

## 2023-06-09 PROCEDURE — 63600175 PHARM REV CODE 636 W HCPCS: Performed by: HOSPITALIST

## 2023-06-09 PROCEDURE — 25000003 PHARM REV CODE 250: Performed by: STUDENT IN AN ORGANIZED HEALTH CARE EDUCATION/TRAINING PROGRAM

## 2023-06-09 PROCEDURE — 84100 ASSAY OF PHOSPHORUS: CPT | Performed by: STUDENT IN AN ORGANIZED HEALTH CARE EDUCATION/TRAINING PROGRAM

## 2023-06-09 PROCEDURE — 25000003 PHARM REV CODE 250: Performed by: INTERNAL MEDICINE

## 2023-06-09 PROCEDURE — 80053 COMPREHEN METABOLIC PANEL: CPT | Performed by: STUDENT IN AN ORGANIZED HEALTH CARE EDUCATION/TRAINING PROGRAM

## 2023-06-09 PROCEDURE — 84540 ASSAY OF URINE/UREA-N: CPT | Performed by: INTERNAL MEDICINE

## 2023-06-09 RX ORDER — AMIODARONE HYDROCHLORIDE 200 MG/1
200 TABLET ORAL DAILY
Status: DISCONTINUED | OUTPATIENT
Start: 2023-06-10 | End: 2023-06-11 | Stop reason: HOSPADM

## 2023-06-09 RX ORDER — AMIODARONE HYDROCHLORIDE 200 MG/1
200 TABLET ORAL 2 TIMES DAILY
Status: DISCONTINUED | OUTPATIENT
Start: 2023-06-09 | End: 2023-06-09

## 2023-06-09 RX ORDER — AMIODARONE HYDROCHLORIDE 200 MG/1
200 TABLET ORAL DAILY
Status: DISCONTINUED | OUTPATIENT
Start: 2023-06-10 | End: 2023-06-09

## 2023-06-09 RX ORDER — HYDRALAZINE HYDROCHLORIDE 25 MG/1
25 TABLET, FILM COATED ORAL 3 TIMES DAILY
Status: DISCONTINUED | OUTPATIENT
Start: 2023-06-09 | End: 2023-06-09

## 2023-06-09 RX ORDER — POTASSIUM CHLORIDE 20 MEQ/1
60 TABLET, EXTENDED RELEASE ORAL ONCE
Status: COMPLETED | OUTPATIENT
Start: 2023-06-09 | End: 2023-06-09

## 2023-06-09 RX ORDER — POTASSIUM CHLORIDE 20 MEQ/1
40 TABLET, EXTENDED RELEASE ORAL ONCE
Status: COMPLETED | OUTPATIENT
Start: 2023-06-09 | End: 2023-06-09

## 2023-06-09 RX ORDER — ISOSORBIDE DINITRATE 20 MG/1
20 TABLET ORAL 3 TIMES DAILY
Status: DISCONTINUED | OUTPATIENT
Start: 2023-06-09 | End: 2023-06-09

## 2023-06-09 RX ORDER — FUROSEMIDE 10 MG/ML
80 INJECTION INTRAMUSCULAR; INTRAVENOUS
Status: DISCONTINUED | OUTPATIENT
Start: 2023-06-09 | End: 2023-06-09

## 2023-06-09 RX ADMIN — FERROUS SULFATE TAB 325 MG (65 MG ELEMENTAL FE) 1 EACH: 325 (65 FE) TAB at 10:06

## 2023-06-09 RX ADMIN — ATORVASTATIN CALCIUM 80 MG: 40 TABLET, FILM COATED ORAL at 09:06

## 2023-06-09 RX ADMIN — NITROGLYCERIN 80 MCG/MIN: 20 INJECTION INTRAVENOUS at 01:06

## 2023-06-09 RX ADMIN — SACUBITRIL AND VALSARTAN 1 TABLET: 97; 103 TABLET, FILM COATED ORAL at 09:06

## 2023-06-09 RX ADMIN — ISOSORBIDE DINITRATE 20 MG: 20 TABLET ORAL at 10:06

## 2023-06-09 RX ADMIN — APIXABAN 5 MG: 5 TABLET, FILM COATED ORAL at 09:06

## 2023-06-09 RX ADMIN — FUROSEMIDE 80 MG: 10 INJECTION, SOLUTION INTRAMUSCULAR; INTRAVENOUS at 10:06

## 2023-06-09 RX ADMIN — SACUBITRIL AND VALSARTAN 1 TABLET: 24; 26 TABLET, FILM COATED ORAL at 09:06

## 2023-06-09 RX ADMIN — AMIODARONE HYDROCHLORIDE 200 MG: 200 TABLET ORAL at 09:06

## 2023-06-09 RX ADMIN — HYDRALAZINE HYDROCHLORIDE 25 MG: 25 TABLET, FILM COATED ORAL at 10:06

## 2023-06-09 RX ADMIN — POTASSIUM CHLORIDE 40 MEQ: 1500 TABLET, EXTENDED RELEASE ORAL at 02:06

## 2023-06-09 RX ADMIN — CLOPIDOGREL BISULFATE 75 MG: 75 TABLET ORAL at 09:06

## 2023-06-09 RX ADMIN — POTASSIUM CHLORIDE 60 MEQ: 1500 TABLET, EXTENDED RELEASE ORAL at 03:06

## 2023-06-09 RX ADMIN — METOPROLOL SUCCINATE 25 MG: 25 TABLET, EXTENDED RELEASE ORAL at 09:06

## 2023-06-09 NOTE — PROGRESS NOTES
[facility] - Cardiac Intensive [facility]  Cardiology  Progress Note    Patient Name: Tera Griffiths  MRN: [id number]  Admission Date: 6/8/2023  Hospital Length of Stay: 1 days  Code Status: Full Code   Attending Physician: Rustam Griggs MD   Primary Care Physician: Primary Doctor No  Expected Discharge Date:   Principal Problem:Chronic combined systolic and diastolic heart failure    Subjective:     Hospital Course:   Admitted for HF exacerbation with initial elevated BP on nitro gtt.  Weaning off gtt and reintroducing home medications while diuresing with IV medications.      Interval History: No acute events overnight.  Patient reports intermittent headaches and dyspnea.  Diuresing well.  On nitro gtt    Review of Systems   Constitutional: Negative.   Eyes: Negative.    Cardiovascular: Negative.    Respiratory:  Positive for shortness of breath.    Endocrine: Negative.    Hematologic/Lymphatic: Negative.    Skin: Negative.    Musculoskeletal: Negative.    Neurological:  Positive for headaches. Negative for dizziness.   Objective:     Vital Signs (Most Recent):  Temp: 98.1 °F (36.7 °C) (06/09/23 0800)  Pulse: 66 (06/09/23 0900)  Resp: (!) 30 (06/09/23 0900)  BP: 107/70 (06/09/23 0900)  SpO2: 97 % (06/09/23 0900) Vital Signs (24h Range):  Temp:  [97.5 °F (36.4 °C)-98.2 °F (36.8 °C)] 98.1 °F (36.7 °C)  Pulse:  [] 66  Resp:  [19-39] 30  SpO2:  [88 %-98 %] 97 %  BP: ()/() 107/70     Weight: 86.2 kg (190 lb)  Body mass index is 25.77 kg/m².     SpO2: 97 %         Intake/Output Summary (Last 24 hours) at 6/9/2023 0916  Last data filed at 6/9/2023 0900  Gross per 24 hour   Intake 432.95 ml   Output 2770 ml   Net -2337.05 ml       Lines/Drains/Airways       Peripheral Intravenous Line  Duration                  Peripheral IV - Single Lumen 06/08/23 1157 18 G Left Antecubital <1 day         Peripheral IV - Single Lumen 06/08/23 1745 20 G Posterior;Right Forearm <1 day                       Physical  Exam  Constitutional:       Appearance: Normal appearance.   HENT:      Head: Normocephalic and atraumatic.   Eyes:      Extraocular Movements: Extraocular movements intact.      Pupils: Pupils are equal, round, and reactive to light.   Cardiovascular:      Rate and Rhythm: Normal rate and regular rhythm.      Pulses: Normal pulses.      Heart sounds: Normal heart sounds.   Pulmonary:      Effort: Pulmonary effort is normal.      Breath sounds: Normal breath sounds.   Abdominal:      General: Abdomen is flat. Bowel sounds are normal.      Palpations: Abdomen is soft.   Musculoskeletal:         General: Normal range of motion.      Cervical back: Normal range of motion.   Skin:     General: Skin is warm.      Capillary Refill: Capillary refill takes less than 2 seconds.   Neurological:      General: No focal deficit present.      Mental Status: He is alert and oriented to person, place, and time.          Significant Labs: All pertinent lab results from the last 24 hours have been reviewed.    Significant Imaging:  All relevant imaging reviewed    Assessment and Plan:       * Chronic combined systolic and diastolic heart failure  Acute on chronic systolic/diastolic heart failure  Presented with elevated blood pressures.  Started on Tridil drip received IV Lasix in the emergency room.  Restart GDMT.   IV Lasix 80mg BID  Strict I/Os  Wean off nitro gtt while restarting home medications  Replete K>4 and MG>2     Stage 3 chronic kidney disease  Creatinine at presentation is 1.4.  Improved from prior admission.  Continue to monitor with diuresis.    HTN (hypertension)  Restart home blood pressure medications.  Continue to wean off Tridil drip while introducing home medications.  Denies missing doses of medications at home.    Dyslipidemia  On Statins     Type 2 diabetes mellitus with circulatory disorder, without long-term current use of insulin  Sliding scale and a.c. HS.    Atrial fibrillation  History of paroxysmal  atrial fibrillation.  In normal sinus rhythm.  On anticoagulation with Eliquis    CAD (coronary artery disease)  Patient has nonobstructive coronary artery disease from angiogram 1 month back.  He has history of STEMI his RCA.  Continue Plavix, Toprol-XL, statins.        VTE Risk Mitigation (From admission, onward)         Ordered     apixaban tablet 5 mg  2 times daily         06/08/23 8765                Alexis Martinez MD  Cardiology  [facility] - Cardiac Intensive Care

## 2023-06-09 NOTE — HOSPITAL COURSE
Admitted for HF exacerbation with initial elevated BP on nitro gtt.  Weaned off gtt and reintroduced home medications as tolerated while diuresing with IV medications.  Transitioned to home PO Torsemide with good UOP.  Symptoms improved.  Restarted on Entresto 49-51.  Attempted to restart home isosorbide however became hypotensive.  Plan to hold home isosorbide and continue Entresto as well as PO Torsemide with close follow up in cardiology clinic.

## 2023-06-09 NOTE — PLAN OF CARE
CICU Care Plan    POC reviewed with Tera Griffiths at 1900. Pt verbalized understanding. Questions and concerns addressed. UOP ~1100cc. No acute events today. Pt progressing toward goals. Will continue to monitor. See below and flowsheets for full assessment and VS info.             Is this a stroke patient? no    Neuro:  [place] Coma Scale  Best Eye Response: 4-->(E4) spontaneous  Best Motor Response: 6-->(M6) obeys commands  Best Verbal Response: 5-->(V5) oriented  [place] Coma Scale Score: 15  Assessment Qualifiers: patient not sedated/intubated, no eye obstruction present  Pupil PERRLA: yes     24 hr Temp:  [97.5 °F (36.4 °C)-98.2 °F (36.8 °C)]     CV:   Rhythm: normal sinus rhythm  BP goals:   SBP < 160  MAP > 65    Resp:      Oxygen Concentration (%): 30    Plan: N/A    GI/:        Last Bowel Movement: 06/07/23       Intake/Output Summary (Last 24 hours) at 6/9/2023 0408  Last data filed at 6/9/2023 0302  Gross per 24 hour   Intake 300.33 ml   Output 2520 ml   Net -2219.67 ml     Unmeasured Output  Stool Occurrence: 1  Nutritional Supplement Intake: Quantity NPO, Type:  NPO    Labs/Accuchecks:  Recent Labs   Lab 06/09/23  0240   WBC 6.66   RBC 4.73   HGB 12.6*   HCT 40.2         Recent Labs   Lab 06/09/23  0240      K 3.6   CO2 29      BUN 16   CREATININE 1.5*   ALKPHOS 67   ALT 18   AST 15   BILITOT 0.8    No results for input(s): PROTIME, INR, APTT, HEPANTIXA in the last 168 hours.   Recent Labs   Lab 06/08/23  1558   TROPONINI 0.077*       Electrolytes: Electrolytes replaced  Accuchecks: none    Gtts:   nitroGLYCERIN 80 mcg/min (06/09/23 0302)       LDA/Wounds:  Lines/Drains/Airways       Peripheral Intravenous Line  Duration                  Peripheral IV - Single Lumen 06/08/23 1157 18 G Left Antecubital <1 day         Peripheral IV - Single Lumen 06/08/23 1745 20 G Posterior;Right Forearm <1 day                  Wounds: No  Wound care consulted: No      Problem: Adult Inpatient Plan  of Care  Goal: Plan of Care Review  Outcome: Ongoing, Progressing  Goal: Absence of Hospital-Acquired Illness or Injury  Outcome: Ongoing, Progressing  Goal: Optimal Comfort and Wellbeing  Outcome: Ongoing, Progressing

## 2023-06-09 NOTE — SUBJECTIVE & OBJECTIVE
Interval History: No acute events overnight.  Patient reports intermittent headaches and dyspnea.  Diuresing well.  On nitro gtt    Review of Systems   Constitutional: Negative.   Eyes: Negative.    Cardiovascular: Negative.    Respiratory:  Positive for shortness of breath.    Endocrine: Negative.    Hematologic/Lymphatic: Negative.    Skin: Negative.    Musculoskeletal: Negative.    Neurological:  Positive for headaches. Negative for dizziness.   Objective:     Vital Signs (Most Recent):  Temp: 98.1 °F (36.7 °C) (06/09/23 0800)  Pulse: 66 (06/09/23 0900)  Resp: (!) 30 (06/09/23 0900)  BP: 107/70 (06/09/23 0900)  SpO2: 97 % (06/09/23 0900) Vital Signs (24h Range):  Temp:  [97.5 °F (36.4 °C)-98.2 °F (36.8 °C)] 98.1 °F (36.7 °C)  Pulse:  [] 66  Resp:  [19-39] 30  SpO2:  [88 %-98 %] 97 %  BP: ()/() 107/70     Weight: 86.2 kg (190 lb)  Body mass index is 25.77 kg/m².     SpO2: 97 %         Intake/Output Summary (Last 24 hours) at 6/9/2023 0916  Last data filed at 6/9/2023 0900  Gross per 24 hour   Intake 432.95 ml   Output 2770 ml   Net -2337.05 ml       Lines/Drains/Airways       Peripheral Intravenous Line  Duration                  Peripheral IV - Single Lumen 06/08/23 1157 18 G Left Antecubital <1 day         Peripheral IV - Single Lumen 06/08/23 1745 20 G Posterior;Right Forearm <1 day                       Physical Exam  Constitutional:       Appearance: Normal appearance.   HENT:      Head: Normocephalic and atraumatic.   Eyes:      Extraocular Movements: Extraocular movements intact.      Pupils: Pupils are equal, round, and reactive to light.   Cardiovascular:      Rate and Rhythm: Normal rate and regular rhythm.      Pulses: Normal pulses.      Heart sounds: Normal heart sounds.   Pulmonary:      Effort: Pulmonary effort is normal.      Breath sounds: Normal breath sounds.   Abdominal:      General: Abdomen is flat. Bowel sounds are normal.      Palpations: Abdomen is soft.    Musculoskeletal:         General: Normal range of motion.      Cervical back: Normal range of motion.   Skin:     General: Skin is warm.      Capillary Refill: Capillary refill takes less than 2 seconds.   Neurological:      General: No focal deficit present.      Mental Status: He is alert and oriented to person, place, and time.          Significant Labs: All pertinent lab results from the last 24 hours have been reviewed.    Significant Imaging:  All relevant imaging reviewed

## 2023-06-09 NOTE — PLAN OF CARE
[facility] Card[facility]  Initial Discharge Assessment       Primary Care Provider: Primary Doctor No    Admission Diagnosis: Shortness of breath [R06.02]  Chest pain [R07.9]    Admission Date: 6/8/2023  Expected Discharge Date: 6/13/2023    Transition of Care Barriers: Underinsured    Payor: MEDICAID / Plan: [facility] COMMUNITY PLAN [facility] ([place] MEDICAID) / Product Type: Managed Medicaid /     Extended Emergency Contact Information  Primary Emergency Contact: MADELYN CARTAGENA  Mobile Phone: [phone]  Relation: Sister  Preferred language: English   needed? No  Secondary Emergency Contact: MAHNAZ CARTAGENA  Mobile Phone: [phone]  Relation: Relative  Preferred language: English   needed? No    Discharge Plan A: Home with family  Discharge Plan B: Home      [facility] [address] [place], [place] - [address]  [facility] [address]  Phone: [phone] Fax: [phone]      Initial Assessment (most recent)       Adult Discharge Assessment - 06/09/23 1736          Discharge Assessment    Assessment Type Discharge Planning Assessment     Confirmed/corrected address, phone number and insurance Yes     Confirmed Demographics Correct on Facesheet     Source of Information patient     Communicated YUSEF with patient/caregiver Date not available/Unable to determine     Reason For Admission chronic combined systolic and diastolic heart failure     People in Home other relative(s);sibling(s)     Facility Arrived From: home     Do you expect to return to your current living situation? Yes     Do you have help at home or someone to help you manage your care at home? Yes     Who are your caregiver(s) and their phone number(s)? Madelyn Cartagena(sister) [phone] and Mahnaz Cartagena (cousin) [phone]     Prior to hospitilization cognitive status: Alert/Oriented     Current cognitive status: Alert/Oriented     Equipment Currently Used at Home  none     Readmission within 30 days? No     Patient currently being followed by outpatient case management? No     Do you currently have service(s) that help you manage your care at home? No     Do you take prescription medications? Yes     Do you have prescription coverage? Yes     Coverage Medicaid [employer] community plan     Do you have any problems affording any of your prescribed medications? No     Is the patient taking medications as prescribed? yes     Who is going to help you get home at discharge? Cristhian Griffiths (cousin) [phone]     How do you get to doctors appointments? family or friend will provide     Are you on dialysis? No     Do you take coumadin? No     Discharge Plan A Home with family     Discharge Plan B Home     DME Needed Upon Discharge  other (see comments)   TBD    Discharge Plan discussed with: Patient     Transition of Care Barriers Underinsured        OTHER    Name(s) of People in Home Claribel Griffiths(sister) [phone] and Cristhian Griffiths (cousin) [phone]                      CM met with patient at the bedside . Discussed the discharge process and given the discharge booklet and contact numbers placed on the white board. Patient verified his name , , insurance and stated he sees Dr Bueno at [facility] for his PCP. He stated his cousin lives with him and helps him and will bring hime home. He said he was independent with his care at home. How ever he stated his family helps him with care. He is not on coumadin and is not dialysis. Will continue to monitor for discharge needs.     Marichuy Pérez RN    [phone]

## 2023-06-09 NOTE — ASSESSMENT & PLAN NOTE
Restart home blood pressure medications.  Continue to wean off Tridil drip while introducing home medications.  Denies missing doses of medications at home.

## 2023-06-09 NOTE — NURSING
Pt arrived to room 312 via wheelchair.Pt oriented to room,tele applied.VSS refer to flowsheet.Call light within reach,bed at lowest position.

## 2023-06-09 NOTE — ASSESSMENT & PLAN NOTE
Acute on chronic systolic/diastolic heart failure  Presented with elevated blood pressures.  Started on Tridil drip received IV Lasix in the emergency room.  Restart GDMT.   IV Lasix 80mg BID  Strict I/Os  Wean off nitro gtt while restarting home medications  Replete K>4 and MG>2

## 2023-06-09 NOTE — NURSING TRANSFER
Nursing Transfer Note      6/9/2023     Reason patient is being transferred: Step down    Transfer To:      Transfer via wheelchair    Transfer with 2L to O2, cardiac monitoring    Transported by [facility]    Telemetry: Rate 65bpm    Medicines sent: None    Any special needs or follow-up needed: None    Chart send with patient: Yes    1  Upon arrival to floor: cardiac monitor applied, patient oriented to room, call bell in reach, and bed in lowest position

## 2023-06-10 LAB
ALBUMIN SERPL BCP-MCNC: 3.4 G/DL (ref 3.5–5.2)
ALP SERPL-CCNC: 67 U/L (ref 55–135)
ALT SERPL W/O P-5'-P-CCNC: 14 U/L (ref 10–44)
ANION GAP SERPL CALC-SCNC: 10 MMOL/L (ref 8–16)
AST SERPL-CCNC: 13 U/L (ref 10–40)
BASOPHILS # BLD AUTO: 0.01 K/UL (ref 0–0.2)
BASOPHILS NFR BLD: 0.2 % (ref 0–1.9)
BILIRUB SERPL-MCNC: 1 MG/DL (ref 0.1–1)
BUN SERPL-MCNC: 18 MG/DL (ref 6–20)
CALCIUM SERPL-MCNC: 9.6 MG/DL (ref 8.7–10.5)
CHLORIDE SERPL-SCNC: 106 MMOL/L (ref 95–110)
CO2 SERPL-SCNC: 23 MMOL/L (ref 23–29)
CREAT SERPL-MCNC: 1.5 MG/DL (ref 0.5–1.4)
DIFFERENTIAL METHOD: ABNORMAL
EOSINOPHIL # BLD AUTO: 0.1 K/UL (ref 0–0.5)
EOSINOPHIL NFR BLD: 1.1 % (ref 0–8)
ERYTHROCYTE [DISTWIDTH] IN BLOOD BY AUTOMATED COUNT: 14.3 % (ref 11.5–14.5)
EST. GFR  (NO RACE VARIABLE): 54.6 ML/MIN/1.73 M^2
GLUCOSE SERPL-MCNC: 92 MG/DL (ref 70–110)
HCT VFR BLD AUTO: 46.5 % (ref 40–54)
HGB BLD-MCNC: 14.7 G/DL (ref 14–18)
IMM GRANULOCYTES # BLD AUTO: 0 K/UL (ref 0–0.04)
IMM GRANULOCYTES NFR BLD AUTO: 0 % (ref 0–0.5)
LYMPHOCYTES # BLD AUTO: 1.1 K/UL (ref 1–4.8)
LYMPHOCYTES NFR BLD: 18.6 % (ref 18–48)
MAGNESIUM SERPL-MCNC: 2 MG/DL (ref 1.6–2.6)
MCH RBC QN AUTO: 26.7 PG (ref 27–31)
MCHC RBC AUTO-ENTMCNC: 31.6 G/DL (ref 32–36)
MCV RBC AUTO: 84 FL (ref 82–98)
MONOCYTES # BLD AUTO: 1 K/UL (ref 0.3–1)
MONOCYTES NFR BLD: 17 % (ref 4–15)
NEUTROPHILS # BLD AUTO: 3.9 K/UL (ref 1.8–7.7)
NEUTROPHILS NFR BLD: 63.1 % (ref 38–73)
NRBC BLD-RTO: 0 /100 WBC
NT-PROBNP SERPL IA-MCNC: 4123 PG/ML
PHOSPHATE SERPL-MCNC: 2.7 MG/DL (ref 2.7–4.5)
PLATELET # BLD AUTO: 320 K/UL (ref 150–450)
PMV BLD AUTO: 11.3 FL (ref 9.2–12.9)
POCT GLUCOSE: 103 MG/DL (ref 70–110)
POTASSIUM SERPL-SCNC: 4.9 MMOL/L (ref 3.5–5.1)
PROT SERPL-MCNC: 7.1 G/DL (ref 6–8.4)
RBC # BLD AUTO: 5.51 M/UL (ref 4.6–6.2)
SODIUM SERPL-SCNC: 139 MMOL/L (ref 136–145)
WBC # BLD AUTO: 6.12 K/UL (ref 3.9–12.7)

## 2023-06-10 PROCEDURE — 25000003 PHARM REV CODE 250: Performed by: INTERNAL MEDICINE

## 2023-06-10 PROCEDURE — 36415 COLL VENOUS BLD VENIPUNCTURE: CPT | Performed by: STUDENT IN AN ORGANIZED HEALTH CARE EDUCATION/TRAINING PROGRAM

## 2023-06-10 PROCEDURE — 25000003 PHARM REV CODE 250

## 2023-06-10 PROCEDURE — 99233 SBSQ HOSP IP/OBS HIGH 50: CPT | Mod: ,,, | Performed by: INTERNAL MEDICINE

## 2023-06-10 PROCEDURE — 80053 COMPREHEN METABOLIC PANEL: CPT | Performed by: STUDENT IN AN ORGANIZED HEALTH CARE EDUCATION/TRAINING PROGRAM

## 2023-06-10 PROCEDURE — 83735 ASSAY OF MAGNESIUM: CPT | Performed by: STUDENT IN AN ORGANIZED HEALTH CARE EDUCATION/TRAINING PROGRAM

## 2023-06-10 PROCEDURE — 25000003 PHARM REV CODE 250: Performed by: HOSPITALIST

## 2023-06-10 PROCEDURE — 94660 CPAP INITIATION&MGMT: CPT

## 2023-06-10 PROCEDURE — 85025 COMPLETE CBC W/AUTO DIFF WBC: CPT | Performed by: STUDENT IN AN ORGANIZED HEALTH CARE EDUCATION/TRAINING PROGRAM

## 2023-06-10 PROCEDURE — 84100 ASSAY OF PHOSPHORUS: CPT | Performed by: STUDENT IN AN ORGANIZED HEALTH CARE EDUCATION/TRAINING PROGRAM

## 2023-06-10 PROCEDURE — 94761 N-INVAS EAR/PLS OXIMETRY MLT: CPT

## 2023-06-10 PROCEDURE — 99900035 HC TECH TIME PER 15 MIN (STAT)

## 2023-06-10 PROCEDURE — 20600001 HC STEP DOWN PRIVATE ROOM

## 2023-06-10 PROCEDURE — 99233 PR SUBSEQUENT HOSPITAL CARE,LEVL III: ICD-10-PCS | Mod: ,,, | Performed by: INTERNAL MEDICINE

## 2023-06-10 RX ORDER — TORSEMIDE 20 MG/1
20 TABLET ORAL DAILY
Status: DISCONTINUED | OUTPATIENT
Start: 2023-06-10 | End: 2023-06-11 | Stop reason: HOSPADM

## 2023-06-10 RX ADMIN — TORSEMIDE 20 MG: 20 TABLET ORAL at 01:06

## 2023-06-10 RX ADMIN — FERROUS SULFATE TAB 325 MG (65 MG ELEMENTAL FE) 1 EACH: 325 (65 FE) TAB at 09:06

## 2023-06-10 RX ADMIN — SACUBITRIL AND VALSARTAN 1 TABLET: 24; 26 TABLET, FILM COATED ORAL at 09:06

## 2023-06-10 RX ADMIN — ATORVASTATIN CALCIUM 80 MG: 40 TABLET, FILM COATED ORAL at 09:06

## 2023-06-10 RX ADMIN — METOPROLOL SUCCINATE 25 MG: 25 TABLET, EXTENDED RELEASE ORAL at 10:06

## 2023-06-10 RX ADMIN — CLOPIDOGREL BISULFATE 75 MG: 75 TABLET ORAL at 09:06

## 2023-06-10 RX ADMIN — APIXABAN 5 MG: 5 TABLET, FILM COATED ORAL at 09:06

## 2023-06-10 RX ADMIN — APIXABAN 5 MG: 5 TABLET, FILM COATED ORAL at 10:06

## 2023-06-10 RX ADMIN — AMIODARONE HYDROCHLORIDE 200 MG: 200 TABLET ORAL at 09:06

## 2023-06-10 RX ADMIN — SACUBITRIL AND VALSARTAN 1 TABLET: 49; 51 TABLET, FILM COATED ORAL at 10:06

## 2023-06-10 NOTE — ASSESSMENT & PLAN NOTE
Acute on chronic systolic/diastolic heart failure  Presented with elevated blood pressures.  Started on Tridil drip received IV Lasix in the emergency room.  Restart GDMT.   S/p IV diuresis.  Transition to home Torsemide 20mg qd  Strict I/Os  Weaned off nitro gtt while restarting home medications  Replete K>4 and MG>2

## 2023-06-10 NOTE — SUBJECTIVE & OBJECTIVE
Interval History: Reports feeling well.  Denies chest pain, dyspnea, cough.  Diuresing well.  Restarting home Torsemide and titrating BP medications as tolerated.    Review of Systems   Constitutional: Negative.   Eyes: Negative.    Cardiovascular: Negative.  Negative for chest pain and leg swelling.   Respiratory:  Negative for cough and shortness of breath.    Endocrine: Negative.    Hematologic/Lymphatic: Negative.    Skin: Negative.    Musculoskeletal: Negative.    Neurological:  Negative for dizziness.   Objective:     Vital Signs (Most Recent):  Temp: 97.3 °F (36.3 °C) (06/10/23 1126)  Pulse: 62 (06/10/23 1126)  Resp: 18 (06/10/23 1126)  BP: 120/74 (06/10/23 1126)  SpO2: (!) 94 % (06/10/23 1126) Vital Signs (24h Range):  Temp:  [96.5 °F (35.8 °C)-98.7 °F (37.1 °C)] 97.3 °F (36.3 °C)  Pulse:  [62-78] 62  Resp:  [16-62] 18  SpO2:  [91 %-100 %] 94 %  BP: ()/(44-85) 120/74     Weight: 86.2 kg (190 lb)  Body mass index is 25.77 kg/m².     SpO2: (!) 94 %         Intake/Output Summary (Last 24 hours) at 6/10/2023 1208  Last data filed at 6/10/2023 1002  Gross per 24 hour   Intake 120 ml   Output 1150 ml   Net -1030 ml       Lines/Drains/Airways       Peripheral Intravenous Line  Duration                  Peripheral IV - Single Lumen 06/08/23 1157 18 G Left Antecubital 2 days         Peripheral IV - Single Lumen 06/08/23 1745 20 G Posterior;Right Forearm 1 day                       Physical Exam  Constitutional:       Appearance: Normal appearance.   HENT:      Head: Normocephalic and atraumatic.   Eyes:      Extraocular Movements: Extraocular movements intact.      Pupils: Pupils are equal, round, and reactive to light.   Cardiovascular:      Rate and Rhythm: Normal rate and regular rhythm.      Pulses: Normal pulses.      Heart sounds: Normal heart sounds.   Pulmonary:      Effort: Pulmonary effort is normal.      Breath sounds: Normal breath sounds.   Abdominal:      General: Abdomen is flat. Bowel sounds are  normal.      Palpations: Abdomen is soft.   Musculoskeletal:         General: No tenderness. Normal range of motion.      Cervical back: Normal range of motion.      Right lower leg: No edema.      Left lower leg: No edema.   Skin:     General: Skin is warm.      Capillary Refill: Capillary refill takes less than 2 seconds.   Neurological:      General: No focal deficit present.      Mental Status: He is alert and oriented to person, place, and time.          Significant Labs: All pertinent lab results from the last 24 hours have been reviewed.    Significant Imaging:  All relevant imaging reviewed

## 2023-06-10 NOTE — ASSESSMENT & PLAN NOTE
Restart home blood pressure medications. Weaned off Tridil drip while introducing home medications.  Denies missing doses of medications at home.  BP drop significantly when restarted home isosorbide.  Unclear about home medication compliance.      Entresto 49-51 BID  Titrate and addition of antihypertensives as tolerated

## 2023-06-11 VITALS
DIASTOLIC BLOOD PRESSURE: 74 MMHG | BODY MASS INDEX: 25.73 KG/M2 | SYSTOLIC BLOOD PRESSURE: 117 MMHG | WEIGHT: 190 LBS | HEIGHT: 72 IN | HEART RATE: 62 BPM | OXYGEN SATURATION: 94 % | RESPIRATION RATE: 17 BRPM | TEMPERATURE: 98 F

## 2023-06-11 LAB
ALBUMIN SERPL BCP-MCNC: 3.4 G/DL (ref 3.5–5.2)
ALP SERPL-CCNC: 66 U/L (ref 55–135)
ALT SERPL W/O P-5'-P-CCNC: 12 U/L (ref 10–44)
ANION GAP SERPL CALC-SCNC: 13 MMOL/L (ref 8–16)
AST SERPL-CCNC: 11 U/L (ref 10–40)
BASOPHILS # BLD AUTO: 0.02 K/UL (ref 0–0.2)
BASOPHILS NFR BLD: 0.5 % (ref 0–1.9)
BILIRUB SERPL-MCNC: 0.7 MG/DL (ref 0.1–1)
BUN SERPL-MCNC: 24 MG/DL (ref 6–20)
CALCIUM SERPL-MCNC: 9.8 MG/DL (ref 8.7–10.5)
CHLORIDE SERPL-SCNC: 103 MMOL/L (ref 95–110)
CO2 SERPL-SCNC: 23 MMOL/L (ref 23–29)
CREAT SERPL-MCNC: 1.5 MG/DL (ref 0.5–1.4)
DIFFERENTIAL METHOD: ABNORMAL
EOSINOPHIL # BLD AUTO: 0.1 K/UL (ref 0–0.5)
EOSINOPHIL NFR BLD: 2.7 % (ref 0–8)
ERYTHROCYTE [DISTWIDTH] IN BLOOD BY AUTOMATED COUNT: 14.6 % (ref 11.5–14.5)
EST. GFR  (NO RACE VARIABLE): 54.6 ML/MIN/1.73 M^2
GLUCOSE SERPL-MCNC: 106 MG/DL (ref 70–110)
HCT VFR BLD AUTO: 49.4 % (ref 40–54)
HGB BLD-MCNC: 15.2 G/DL (ref 14–18)
IMM GRANULOCYTES # BLD AUTO: 0.01 K/UL (ref 0–0.04)
IMM GRANULOCYTES NFR BLD AUTO: 0.3 % (ref 0–0.5)
LYMPHOCYTES # BLD AUTO: 1.2 K/UL (ref 1–4.8)
LYMPHOCYTES NFR BLD: 31.1 % (ref 18–48)
MAGNESIUM SERPL-MCNC: 2 MG/DL (ref 1.6–2.6)
MCH RBC QN AUTO: 26.9 PG (ref 27–31)
MCHC RBC AUTO-ENTMCNC: 30.8 G/DL (ref 32–36)
MCV RBC AUTO: 87 FL (ref 82–98)
MONOCYTES # BLD AUTO: 0.6 K/UL (ref 0.3–1)
MONOCYTES NFR BLD: 17 % (ref 4–15)
NEUTROPHILS # BLD AUTO: 1.8 K/UL (ref 1.8–7.7)
NEUTROPHILS NFR BLD: 48.4 % (ref 38–73)
NRBC BLD-RTO: 0 /100 WBC
PHOSPHATE SERPL-MCNC: 3.4 MG/DL (ref 2.7–4.5)
PLATELET # BLD AUTO: 339 K/UL (ref 150–450)
PMV BLD AUTO: 11.3 FL (ref 9.2–12.9)
POTASSIUM SERPL-SCNC: 4.2 MMOL/L (ref 3.5–5.1)
PROT SERPL-MCNC: 7.1 G/DL (ref 6–8.4)
RBC # BLD AUTO: 5.66 M/UL (ref 4.6–6.2)
SODIUM SERPL-SCNC: 139 MMOL/L (ref 136–145)
WBC # BLD AUTO: 3.7 K/UL (ref 3.9–12.7)

## 2023-06-11 PROCEDURE — 94660 CPAP INITIATION&MGMT: CPT

## 2023-06-11 PROCEDURE — 83735 ASSAY OF MAGNESIUM: CPT | Performed by: STUDENT IN AN ORGANIZED HEALTH CARE EDUCATION/TRAINING PROGRAM

## 2023-06-11 PROCEDURE — 99900035 HC TECH TIME PER 15 MIN (STAT)

## 2023-06-11 PROCEDURE — 99233 PR SUBSEQUENT HOSPITAL CARE,LEVL III: ICD-10-PCS | Mod: ,,, | Performed by: INTERNAL MEDICINE

## 2023-06-11 PROCEDURE — 36415 COLL VENOUS BLD VENIPUNCTURE: CPT | Performed by: STUDENT IN AN ORGANIZED HEALTH CARE EDUCATION/TRAINING PROGRAM

## 2023-06-11 PROCEDURE — 80053 COMPREHEN METABOLIC PANEL: CPT | Performed by: STUDENT IN AN ORGANIZED HEALTH CARE EDUCATION/TRAINING PROGRAM

## 2023-06-11 PROCEDURE — 99233 SBSQ HOSP IP/OBS HIGH 50: CPT | Mod: ,,, | Performed by: INTERNAL MEDICINE

## 2023-06-11 PROCEDURE — 85025 COMPLETE CBC W/AUTO DIFF WBC: CPT | Performed by: STUDENT IN AN ORGANIZED HEALTH CARE EDUCATION/TRAINING PROGRAM

## 2023-06-11 PROCEDURE — 25000003 PHARM REV CODE 250

## 2023-06-11 PROCEDURE — 94761 N-INVAS EAR/PLS OXIMETRY MLT: CPT

## 2023-06-11 PROCEDURE — 25000003 PHARM REV CODE 250: Performed by: HOSPITALIST

## 2023-06-11 PROCEDURE — 84100 ASSAY OF PHOSPHORUS: CPT | Performed by: STUDENT IN AN ORGANIZED HEALTH CARE EDUCATION/TRAINING PROGRAM

## 2023-06-11 RX ORDER — ISOSORBIDE MONONITRATE 10 MG/1
10 TABLET ORAL 2 TIMES DAILY
Qty: 60 TABLET | Refills: 11 | Status: ON HOLD | OUTPATIENT
Start: 2023-06-11 | End: 2023-07-16 | Stop reason: HOSPADM

## 2023-06-11 RX ADMIN — ATORVASTATIN CALCIUM 80 MG: 40 TABLET, FILM COATED ORAL at 08:06

## 2023-06-11 RX ADMIN — APIXABAN 5 MG: 5 TABLET, FILM COATED ORAL at 08:06

## 2023-06-11 RX ADMIN — AMIODARONE HYDROCHLORIDE 200 MG: 200 TABLET ORAL at 08:06

## 2023-06-11 RX ADMIN — TORSEMIDE 20 MG: 20 TABLET ORAL at 08:06

## 2023-06-11 RX ADMIN — FERROUS SULFATE TAB 325 MG (65 MG ELEMENTAL FE) 1 EACH: 325 (65 FE) TAB at 08:06

## 2023-06-11 RX ADMIN — SACUBITRIL AND VALSARTAN 1 TABLET: 49; 51 TABLET, FILM COATED ORAL at 08:06

## 2023-06-11 RX ADMIN — CLOPIDOGREL BISULFATE 75 MG: 75 TABLET ORAL at 08:06

## 2023-06-11 NOTE — DISCHARGE SUMMARY
[facility] - Cardiology Stepdown  Cardiology  Discharge Summary      Patient Name: Tera Griffiths  MRN: [id number]  Admission Date: 6/8/2023  Hospital Length of Stay: 3 days  Discharge Date and Time:  06/11/2023 11:24 AM  Attending Physician: Joel Kebede MD    Discharging Provider: Alexis Martinez MD  Primary Care Physician: Carleen Bueno MD    HPI:   57 y.o. year old black male with stage C HFrEF, ICMP, CAD s/p (STEMI s/p PCI to Rcx (8/2021), previous GIB, difficulty with medication compliance,  Afib s/p DCCV, uncontrolled HTN presented with acute onset of shortness of breath.  Patient was discharged 1 month back with similar problems.  He continued to have shortness of breath on minimal exertion but has progressed to a state where he is short of breath even at rest.  Complains of having orthopnea, PND and chest pain.  On presentation his blood pressures were elevated with systolics in 180s for which he was started on Tridil drip.  He was also placed on BiPAP but patient was not able to tolerate due to nausea.  Received IV Lasix in the emergency room and made some urine.  On previous admission he underwent left heart catheterization that showed nonobstructive coronary artery disease.  He is currently on max tolerated dose of GDM T with Entresto  mg b.i.d., Aldactone, Toprol-XL.  Patient has very poor quality of life due to his symptoms.  He is currently undergoing testing and workup for advance options by heart transplant      * No surgery found *     Indwelling Lines/Drains at time of discharge:  Lines/Drains/Airways     None                 Hospital Course:  Admitted for HF exacerbation with initial elevated BP on nitro gtt.  Weaned off gtt and reintroduced home medications as tolerated while diuresing with IV medications.  Transitioned to home PO Torsemide with good UOP.  Symptoms improved.  Restarted on Entresto 49-51.  Attempted to restart home isosorbide however became hypotensive.  Plan to hold  home isosorbide and continue Entresto as well as PO Torsemide with close follow up in cardiology clinic.      Goals of Care Treatment Preferences:  Code Status: Full Code    Health care agent: Zahida Dave  Health care agent number:           What is most important right now is to focus on extending life as long as possible, even it it means sacrificing quality, curative/life-prolongation (regardless of treatment burdens).  Accordingly, we have decided that the best plan to meet the patient's goals includes continuing with treatment.      Vitals:    06/11/23 1114   BP: 117/74   Pulse: 62   Resp: 17   Temp: 97.9 °F (36.6 °C)     Physical Exam  Constitutional:       Appearance: Normal appearance.   HENT:      Head: Normocephalic and atraumatic.   Eyes:      Extraocular Movements: Extraocular movements intact.      Pupils: Pupils are equal, round, and reactive to light.   Cardiovascular:      Rate and Rhythm: Normal rate and regular rhythm.      Pulses: Normal pulses.      Heart sounds: Normal heart sounds.   Pulmonary:      Effort: Pulmonary effort is normal.      Breath sounds: Normal breath sounds.   Abdominal:      General: Abdomen is flat. Bowel sounds are normal.      Palpations: Abdomen is soft.   Musculoskeletal:         General: No tenderness. Normal range of motion.      Cervical back: Normal range of motion.      Right lower leg: No edema.      Left lower leg: No edema.   Skin:     General: Skin is warm.      Capillary Refill: Capillary refill takes less than 2 seconds.   Neurological:      General: No focal deficit present.      Mental Status: He is alert and oriented to person, place, and time.        Consults:     Significant Diagnostic Studies: Labs: All labs within the past 24 hours have been reviewed    Pending Diagnostic Studies:     None          Final Active Diagnoses:    Diagnosis Date Noted POA    PRINCIPAL PROBLEM:  Chronic combined systolic and diastolic heart failure [I50.42]  09/23/2022 Yes    Stage 3 chronic kidney disease [N18.30] 04/19/2023 Yes    HTN (hypertension) [I10] 09/23/2022 Yes    CAD (coronary artery disease) [I25.10] 09/23/2022 Yes    Atrial fibrillation [I48.91] 09/23/2022 Yes    Type 2 diabetes mellitus with circulatory disorder, without long-term current use of insulin [E11.59] 09/23/2022 Yes    Dyslipidemia [E78.5] 09/23/2022 Yes      Problems Resolved During this Admission:     No new Assessment & Plan notes have been filed under this hospital service since the last note was generated.  Service: Cardiology      Discharged Condition: stable    Disposition: Home or Self Care    Follow Up:    Patient Instructions:      Ambulatory referral/consult to Cardiology   Standing Status: Future   Referral Priority: Routine Referral Type: Consultation   Referral Reason: Specialty Services Required   Requested Specialty: Cardiology   Number of Visits Requested: 1     Medications:  Reconciled Home Medications:      Medication List      START taking these medications    sacubitriL-valsartan 49-51 mg per tablet  Commonly known as: ENTRESTO  Take 1 tablet by mouth 2 (two) times daily.  Replaces: ENTRESTO  mg per tablet        CONTINUE taking these medications    amiodarone 200 MG Tab  Commonly known as: PACERONE  Take 1 tablet (200 mg total) by mouth 2 (two) times daily.     apixaban 5 mg Tab  Commonly known as: ELIQUIS  Take 5 mg by mouth 2 (two) times daily.     atorvastatin 80 MG tablet  Commonly known as: LIPITOR  Take 1 tablet (80 mg total) by mouth once daily.     clopidogreL 75 mg tablet  Commonly known as: PLAVIX  Take 1 tablet (75 mg total) by mouth once daily.     ferrous sulfate 325 mg (65 mg iron) Tab tablet  Commonly known as: FEOSOL  Take 325 mg by mouth every other day.     isosorbide mononitrate 10 mg tablet  Commonly known as: ISMO,MONOKET  Take 1 tablet (10 mg total) by mouth 2 (two) times daily. Hold until seen by cardiologist     JARDIANCE 10 mg  tablet  Generic drug: empagliflozin  Take 1 tablet (10 mg total) by mouth once daily.     LIDOcaine 5 %  Commonly known as: LIDODERM  Place 1 patch onto the skin once daily.     metoprolol succinate 25 MG 24 hr tablet  Commonly known as: TOPROL-XL  Take 1 tablet (25 mg total) by mouth every evening.     torsemide 20 MG Tab  Commonly known as: DEMADEX  Take 1 tablet (20 mg total) by mouth once daily.        STOP taking these medications    ENTRESTO  mg per tablet  Generic drug: sacubitriL-valsartan  Replaced by: sacubitriL-valsartan 49-51 mg per tablet            Time spent on the discharge of patient: 35 minutes    Alexis Martinez MD  Cardiology  [facility] - Cardiology Stepdown

## 2023-06-11 NOTE — NURSING
Patient is ready for discharge. Patient stable alert and oriented. IVs removed. No complaints of pain. Discussed discharge plan. Reviewed medications and side effects, appointments, and answered questions with patient and family. Patient requested to retrieve medication from retail pharmacy on the way out for discharge.

## 2023-06-12 ENCOUNTER — PATIENT OUTREACH (OUTPATIENT)
Dept: ADMINISTRATIVE | Facility: CLINIC | Age: 58
End: 2023-06-12
Payer: MEDICAID

## 2023-06-12 NOTE — PROGRESS NOTES
C3 nurse attempted to contact Tera Griffiths  for a TCC post hospital discharge follow up call. No answer. The patient does not have a scheduled HOSFU appointment, and the pt does not have an [facility] PCP.

## 2023-06-12 NOTE — PLAN OF CARE
CHW scheduled pcp f/u   Future Appointments   Date Time Provider Department Center   6/20/2023  9:40 AM Jonathan See MD [facility] PRMCARE [facility]   7/25/2023  1:40 PM Mark See MD [facility] CARDIO [facility]   7/31/2023  9:00 AM LAB, APPOINTMENT [facility] LAB VNP [facility]   7/31/2023  9:30 AM CARDIAC, PET IMAGING [facility] BARON [facility]   7/31/2023 10:30 AM Chelsy Morales DO [facility] HEARTTX [facility]    CHW called pt @ [phone] to inform of apt, CHW left a message on vm.

## 2023-06-12 NOTE — PLAN OF CARE
06/12/23 1025   Final Note   Assessment Type Final Discharge Note   Anticipated Discharge Disposition Home   What phone number can be called within the next 1-3 days to see how you are doing after discharge? [phone]   Hospital Resources/Appts/Education Provided Provided patient/caregiver with written discharge plan information;Appointments scheduled and added to AVS;Appointments scheduled by Navigator/Coordinator   Post-Acute Status   Discharge Delays None known at this time     Per physician discharge  summary :Discharged Condition: stable     Disposition: Home or Self Care     Marichuy Pérez RN    [phone]    Future Appointments   Date Time Provider Department Center   7/25/2023  1:40 PM Mark See MD [facility] CARDIO [facility]   7/31/2023  9:00 AM LAB, APPOINTMENT [facility] LAB VNP [facility]   7/31/2023  9:30 AM CARDIAC, PET IMAGING [facility] BARON [facility]   7/31/2023 10:30 AM Chelsy Morales DO [facility] HEARTTX [facility]

## 2023-06-13 NOTE — PROGRESS NOTES
C3 nurse spoke with Tera Griffiths  for a TCC post hospital discharge follow up call. The patient has a scheduled [facility] appointment with Jonathan See on 06/20/2023 @ 0535.

## 2023-06-20 ENCOUNTER — OFFICE VISIT (OUTPATIENT)
Dept: PRIMARY CARE CLINIC | Facility: CLINIC | Age: 58
End: 2023-06-20
Payer: MEDICAID

## 2023-06-20 VITALS
BODY MASS INDEX: 26.53 KG/M2 | DIASTOLIC BLOOD PRESSURE: 117 MMHG | WEIGHT: 195.88 LBS | HEIGHT: 72 IN | SYSTOLIC BLOOD PRESSURE: 157 MMHG | OXYGEN SATURATION: 98 % | HEART RATE: 72 BPM

## 2023-06-20 DIAGNOSIS — N18.30 STAGE 3 CHRONIC KIDNEY DISEASE, UNSPECIFIED WHETHER STAGE 3A OR 3B CKD: ICD-10-CM

## 2023-06-20 DIAGNOSIS — I48.0 PAROXYSMAL ATRIAL FIBRILLATION: ICD-10-CM

## 2023-06-20 DIAGNOSIS — I25.10 CORONARY ARTERY DISEASE INVOLVING NATIVE CORONARY ARTERY OF NATIVE HEART WITHOUT ANGINA PECTORIS: ICD-10-CM

## 2023-06-20 DIAGNOSIS — I50.42 CHRONIC COMBINED SYSTOLIC AND DIASTOLIC HEART FAILURE: ICD-10-CM

## 2023-06-20 DIAGNOSIS — E11.51 TYPE 2 DIABETES MELLITUS WITH DIABETIC PERIPHERAL ANGIOPATHY WITHOUT GANGRENE, WITHOUT LONG-TERM CURRENT USE OF INSULIN: ICD-10-CM

## 2023-06-20 DIAGNOSIS — I10 PRIMARY HYPERTENSION: Primary | ICD-10-CM

## 2023-06-20 PROCEDURE — 1159F MED LIST DOCD IN RCRD: CPT | Mod: CPTII,,, | Performed by: FAMILY MEDICINE

## 2023-06-20 PROCEDURE — 99213 OFFICE O/P EST LOW 20 MIN: CPT | Mod: PBBFAC,PN | Performed by: FAMILY MEDICINE

## 2023-06-20 PROCEDURE — 99495 TRANSJ CARE MGMT MOD F2F 14D: CPT | Mod: S$PBB,,, | Performed by: FAMILY MEDICINE

## 2023-06-20 PROCEDURE — 1160F RVW MEDS BY RX/DR IN RCRD: CPT | Mod: CPTII,,, | Performed by: FAMILY MEDICINE

## 2023-06-20 PROCEDURE — 3077F PR MOST RECENT SYSTOLIC BLOOD PRESSURE >= 140 MM HG: ICD-10-PCS | Mod: CPTII,,, | Performed by: FAMILY MEDICINE

## 2023-06-20 PROCEDURE — 1160F PR REVIEW ALL MEDS BY PRESCRIBER/CLIN PHARMACIST DOCUMENTED: ICD-10-PCS | Mod: CPTII,,, | Performed by: FAMILY MEDICINE

## 2023-06-20 PROCEDURE — 1111F DSCHRG MED/CURRENT MED MERGE: CPT | Mod: CPTII,,, | Performed by: FAMILY MEDICINE

## 2023-06-20 PROCEDURE — 3008F PR BODY MASS INDEX (BMI) DOCUMENTED: ICD-10-PCS | Mod: CPTII,,, | Performed by: FAMILY MEDICINE

## 2023-06-20 PROCEDURE — 99999 PR PBB SHADOW E&M-EST. PATIENT-LVL III: ICD-10-PCS | Mod: PBBFAC,,, | Performed by: FAMILY MEDICINE

## 2023-06-20 PROCEDURE — 99495 TCM SERVICES (MODERATE COMPLEXITY): ICD-10-PCS | Mod: S$PBB,,, | Performed by: FAMILY MEDICINE

## 2023-06-20 PROCEDURE — 1159F PR MEDICATION LIST DOCUMENTED IN MEDICAL RECORD: ICD-10-PCS | Mod: CPTII,,, | Performed by: FAMILY MEDICINE

## 2023-06-20 PROCEDURE — 3080F PR MOST RECENT DIASTOLIC BLOOD PRESSURE >= 90 MM HG: ICD-10-PCS | Mod: CPTII,,, | Performed by: FAMILY MEDICINE

## 2023-06-20 PROCEDURE — 3044F PR MOST RECENT HEMOGLOBIN A1C LEVEL <7.0%: ICD-10-PCS | Mod: CPTII,,, | Performed by: FAMILY MEDICINE

## 2023-06-20 PROCEDURE — 1111F PR DISCHARGE MEDS RECONCILED W/ CURRENT OUTPATIENT MED LIST: ICD-10-PCS | Mod: CPTII,,, | Performed by: FAMILY MEDICINE

## 2023-06-20 PROCEDURE — 4010F PR ACE/ARB THEARPY RXD/TAKEN: ICD-10-PCS | Mod: CPTII,,, | Performed by: FAMILY MEDICINE

## 2023-06-20 PROCEDURE — 3077F SYST BP >= 140 MM HG: CPT | Mod: CPTII,,, | Performed by: FAMILY MEDICINE

## 2023-06-20 PROCEDURE — 3008F BODY MASS INDEX DOCD: CPT | Mod: CPTII,,, | Performed by: FAMILY MEDICINE

## 2023-06-20 PROCEDURE — 3044F HG A1C LEVEL LT 7.0%: CPT | Mod: CPTII,,, | Performed by: FAMILY MEDICINE

## 2023-06-20 PROCEDURE — 3080F DIAST BP >= 90 MM HG: CPT | Mod: CPTII,,, | Performed by: FAMILY MEDICINE

## 2023-06-20 PROCEDURE — 4010F ACE/ARB THERAPY RXD/TAKEN: CPT | Mod: CPTII,,, | Performed by: FAMILY MEDICINE

## 2023-06-20 PROCEDURE — 99999 PR PBB SHADOW E&M-EST. PATIENT-LVL III: CPT | Mod: PBBFAC,,, | Performed by: FAMILY MEDICINE

## 2023-06-20 NOTE — PATIENT INSTRUCTIONS
Normal blood pressure: 120/80    Restart isosorbide mononitrate 10mg in the morning and 10mg at night

## 2023-06-20 NOTE — PROGRESS NOTES
Transitional Care Note    Family and/or Caretaker present at visit?  Yes.  Diagnostic tests reviewed/disposition: I have reviewed all completed as well as pending diagnostic tests at the time of discharge.  Disease/illness education: yes  Home health/community services discussion/referrals: Patient does not have home health established from hospital visit.  They do not need home health.  If needed, we will set up home health for the patient.   Establishment or re-establishment of referral orders for community resources: No other necessary community resources.   Discussion with other health care providers: No discussion with other health care providers necessary.           Clinic Note  6/20/2023      Subjective:       Patient ID:  Tera is a 58 y.o. male being seen for an new visit.      Chief Complaint:  hospital follow-up     Hospital follow-up- patient with a history of heart failure with reduced ejection fraction, left ventricular EF of 18%, STEMI s/p PCI RCA 2021, history of GI bleeding, atrial fibrillation .  Patient was recently hospitalized for heart failure exacerbation.   Patient has had multiple hospitalizations within the last several months for something similar.  During hospitalization, he was started on nitroglycerin drip.  He is currently on his Entresto 49-51,  amiodarone, Eliquis, atorvastatin, Plavix, Jardiance, metoprolol, torsemide 20 mg q.d..  During hospitalization isosorbide mononitrate was restarted, however patient became hypotensive.  Patient is still holding the medication until follow-up with Cardiology, visit with Cardiology not until 1 month from now.  Patient doing well since being discharged, denies any chest pain, dyspnea, orthopnea, PND, lower extremity swelling.      History reviewed. No pertinent family history.  Social History     Socioeconomic History    Marital status: Single   Tobacco Use    Smoking status: Never    Smokeless tobacco: Never   Substance and Sexual Activity     Alcohol use: Never    Drug use: Never     Social Determinants of Health     Financial Resource Strain: Unknown    Difficulty of Paying Living Expenses: Patient refused   Food Insecurity: Unknown    Worried About Running Out of Food in the Last Year: Patient refused    Ran Out of Food in the Last Year: Patient refused   Transportation Needs: Unknown    Lack of Transportation (Medical): Patient refused    Lack of Transportation (Non-Medical): Patient refused   Physical Activity: Unknown    Days of Exercise per Week: Patient refused    Minutes of Exercise per Session: Patient refused   Stress: Unknown    Feeling of Stress : Patient refused   Social Connections: Unknown    Frequency of Communication with Friends and Family: Patient refused    Frequency of Social Gatherings with Friends and Family: Patient refused    Attends Jainism Services: Patient refused    Active Member of Clubs or Organizations: Patient refused    Attends Club or Organization Meetings: Patient refused    Marital Status: Patient refused   Housing Stability: Unknown    Unable to Pay for Housing in the Last Year: Patient refused    Unstable Housing in the Last Year: Patient refused     Past Surgical History:   Procedure Laterality Date    CARDIAC DEFIBRILLATOR PLACEMENT Left     HERNIA REPAIR      LEFT HEART CATHETERIZATION Left 4/18/2023    Procedure: Left heart cath;  Surgeon: Atilio Marks MD;  Location: [facility] CATH LAB;  Service: Cardiology;  Laterality: Left;    RIGHT HEART CATHETERIZATION Right 9/30/2022    Procedure: INSERTION, CATHETER, RIGHT HEART;  Surgeon: Caden Aden MD;  Location: [facility] CATH LAB;  Service: Cardiology;  Laterality: Right;    TREATMENT OF CARDIAC ARRHYTHMIA N/A 11/22/2022    Procedure: CARDIOVERSION;  Surgeon: Marvin Sanon MD;  Location: [facility] EP LAB;  Service: Cardiology;  Laterality: N/A;  afib, KIA, DCCV, anes, MB, 3 Prep     Medication List with Changes/Refills   Current Medications    AMIODARONE (PACERONE)  200 MG TAB    Take 1 tablet (200 mg total) by mouth 2 (two) times daily.    APIXABAN (ELIQUIS) 5 MG TAB    Take 5 mg by mouth 2 (two) times daily.    ATORVASTATIN (LIPITOR) 80 MG TABLET    Take 1 tablet (80 mg total) by mouth once daily.    CLOPIDOGREL (PLAVIX) 75 MG TABLET    Take 1 tablet (75 mg total) by mouth once daily.    EMPAGLIFLOZIN (JARDIANCE) 10 MG TABLET    Take 1 tablet (10 mg total) by mouth once daily.    FERROUS SULFATE (FEOSOL) 325 MG (65 MG IRON) TAB TABLET    Take 325 mg by mouth every other day.    ISOSORBIDE MONONITRATE (ISMO,MONOKET) 10 MG TABLET    Take 1 tablet (10 mg total) by mouth 2 (two) times daily. Hold until seen by cardiologist    LIDOCAINE (LIDODERM) 5 %    Place 1 patch onto the skin once daily.    METOPROLOL SUCCINATE (TOPROL-XL) 25 MG 24 HR TABLET    Take 1 tablet (25 mg total) by mouth every evening.    SACUBITRIL-VALSARTAN (ENTRESTO) 49-51 MG PER TABLET    Take 1 tablet by mouth 2 (two) times daily.    TORSEMIDE (DEMADEX) 20 MG TAB    Take 1 tablet (20 mg total) by mouth once daily.     Patient Active Problem List   Diagnosis    CAD (coronary artery disease)    History of ST elevation myocardial infarction (STEMI)    Atrial fibrillation    Type 2 diabetes mellitus with circulatory disorder, without long-term current use of insulin    Chronic combined systolic and diastolic heart failure    Dyslipidemia    ICD (implantable cardioverter-defibrillator) in place    HTN (hypertension)    Cardiac arrest with ventricular fibrillation    Arteriovenous malformation of cerebral vessels    NSTEMI (non-ST elevated myocardial infarction)    Stage 3 chronic kidney disease    Ischemic cardiomyopathy    Hepatitis B core antibody positive     Review of Systems   Constitutional:  Negative for chills, fever, malaise/fatigue and weight loss.   HENT:  Negative for congestion, sinus pain and sore throat.    Respiratory:  Negative for cough, shortness of breath and wheezing.    Cardiovascular:   Negative for chest pain and palpitations.   Gastrointestinal:  Negative for constipation, diarrhea, nausea and vomiting.   Genitourinary:  Negative for dysuria, frequency and urgency.   Musculoskeletal:  Negative for myalgias.   Skin:  Negative for rash.   Neurological:  Negative for headaches.       Objective:      BP (!) 157/117   Pulse 72   Ht 6' (1.829 m)   Wt 88.9 kg (195 lb 14.1 oz)   SpO2 98%   BMI 26.57 kg/m²   Estimated body mass index is 26.57 kg/m² as calculated from the following:    Height as of this encounter: 6' (1.829 m).    Weight as of this encounter: 88.9 kg (195 lb 14.1 oz).  Physical Exam  Vitals reviewed.   Constitutional:       General: He is not in acute distress.     Appearance: He is not diaphoretic.   HENT:      Head: Normocephalic and atraumatic.   Eyes:      Conjunctiva/sclera: Conjunctivae normal.   Cardiovascular:      Rate and Rhythm: Normal rate and regular rhythm.      Heart sounds: Normal heart sounds.   Pulmonary:      Effort: Pulmonary effort is normal. No respiratory distress.      Breath sounds: Normal breath sounds. No wheezing.   Abdominal:      General: Bowel sounds are normal.      Palpations: Abdomen is soft.   Musculoskeletal:         General: Normal range of motion.      Cervical back: Normal range of motion.   Skin:     General: Skin is warm and dry.      Findings: No erythema or rash.   Neurological:      Mental Status: He is alert and oriented to person, place, and time.   Psychiatric:         Mood and Affect: Mood and affect normal.         Behavior: Behavior normal.         Thought Content: Thought content normal.         Judgment: Judgment normal.         Assessment and Plan:     1. Primary hypertension  -   Blood pressure elevated today 157/117, still elevated on repeat x2.  Restart home isosorbide mononitrate 10 mg  b.I.d..   follow-up in 4 days for nursing blood pressure check to make sure that patient does not become hypotensive.   Medication  reconciliation completed with patient, patient reports compliance with his other medications including Entresto 49-51, torsemide 20 mg q.d., metoprolol 25 mg q.d., Jardiance 10 mg q.d., Plavix 75 mg q.d., atorvastatin 80 mg q.d., Eliquis 5 mg b.I.d., amiodarone 200 mg b.I.d..  Unclear if patient is    2. Chronic combined systolic and diastolic heart failure  -   controlled, has an appointment with cardiology next month    3. Coronary artery disease involving native coronary artery of native heart without angina pectoris  -  stable on Plavix and statin    4. Paroxysmal atrial fibrillation    5. Type 2 diabetes mellitus with diabetic peripheral angiopathy without gangrene, without long-term current use of insulin  -  controlled    6. Stage 3 chronic kidney disease, unspecified whether stage 3a or 3b CKD  -  recent creatinine  improving/at baseline on discharge        Follow up:   Follow up in about 2 months (around 8/20/2023) for please schedule for nurse BP check this Friday, f/u wt Dr. See in 2 months.     Other Orders Placed This Visit:  No orders of the defined types were placed in this encounter.          Jonathan See MD        This note is dictated on M*Modal word recognition program.  There are word recognition mistakes that are occasionally missed on review.

## 2023-07-13 ENCOUNTER — HOSPITAL ENCOUNTER (INPATIENT)
Facility: HOSPITAL | Age: 58
LOS: 3 days | Discharge: HOME | DRG: 304 | End: 2023-07-16
Attending: EMERGENCY MEDICINE | Admitting: INTERNAL MEDICINE
Payer: MEDICAID

## 2023-07-13 DIAGNOSIS — I50.9 ACUTE ON CHRONIC CONGESTIVE HEART FAILURE, UNSPECIFIED HEART FAILURE TYPE: ICD-10-CM

## 2023-07-13 DIAGNOSIS — I21.4 NSTEMI (NON-ST ELEVATION MYOCARDIAL INFARCTION): ICD-10-CM

## 2023-07-13 DIAGNOSIS — I16.1 HYPERTENSIVE EMERGENCY: ICD-10-CM

## 2023-07-13 DIAGNOSIS — I21.4 NON-ST ELEVATION MYOCARDIAL INFARCTION (NSTEMI): ICD-10-CM

## 2023-07-13 DIAGNOSIS — I16.0 HYPERTENSIVE URGENCY: ICD-10-CM

## 2023-07-13 DIAGNOSIS — I21.4 NSTEMI (NON-ST ELEVATED MYOCARDIAL INFARCTION): Primary | ICD-10-CM

## 2023-07-13 DIAGNOSIS — I10 HYPERTENSION, UNSPECIFIED TYPE: ICD-10-CM

## 2023-07-13 DIAGNOSIS — R76.8 HEPATITIS B CORE ANTIBODY POSITIVE: ICD-10-CM

## 2023-07-13 DIAGNOSIS — I50.42 CHRONIC COMBINED SYSTOLIC AND DIASTOLIC HEART FAILURE: ICD-10-CM

## 2023-07-13 DIAGNOSIS — R07.9 CHEST PAIN: ICD-10-CM

## 2023-07-13 DIAGNOSIS — I25.10 CORONARY ARTERY DISEASE INVOLVING NATIVE CORONARY ARTERY OF NATIVE HEART WITHOUT ANGINA PECTORIS: ICD-10-CM

## 2023-07-13 PROBLEM — I48.0 PAROXYSMAL A-FIB: Status: ACTIVE | Noted: 2022-09-23

## 2023-07-13 LAB
ABO + RH BLD: NORMAL
ALBUMIN SERPL BCP-MCNC: 3.8 G/DL (ref 3.5–5.2)
ALBUMIN SERPL BCP-MCNC: 4.1 G/DL (ref 3.5–5.2)
ALP SERPL-CCNC: 65 U/L (ref 55–135)
ALT SERPL W/O P-5'-P-CCNC: 14 U/L (ref 10–44)
ANION GAP SERPL CALC-SCNC: 11 MMOL/L (ref 8–16)
ANION GAP SERPL CALC-SCNC: 11 MMOL/L (ref 8–16)
APTT PPP: 26.2 SEC (ref 21–32)
AST SERPL-CCNC: 16 U/L (ref 10–40)
BASOPHILS # BLD AUTO: 0.02 K/UL (ref 0–0.2)
BASOPHILS # BLD AUTO: 0.02 K/UL (ref 0–0.2)
BASOPHILS NFR BLD: 0.5 % (ref 0–1.9)
BASOPHILS NFR BLD: 0.5 % (ref 0–1.9)
BILIRUB SERPL-MCNC: 0.5 MG/DL (ref 0.1–1)
BILIRUB UR QL STRIP: NEGATIVE
BLD GP AB SCN CELLS X3 SERPL QL: NORMAL
BNP SERPL-MCNC: 1727 PG/ML (ref 0–99)
BUN SERPL-MCNC: 18 MG/DL (ref 6–20)
BUN SERPL-MCNC: 18 MG/DL (ref 6–20)
CALCIUM SERPL-MCNC: 9.2 MG/DL (ref 8.7–10.5)
CALCIUM SERPL-MCNC: 9.5 MG/DL (ref 8.7–10.5)
CHLORIDE SERPL-SCNC: 102 MMOL/L (ref 95–110)
CHLORIDE SERPL-SCNC: 104 MMOL/L (ref 95–110)
CHOLEST SERPL-MCNC: 211 MG/DL (ref 120–199)
CHOLEST/HDLC SERPL: 4.2 {RATIO} (ref 2–5)
CLARITY UR REFRACT.AUTO: CLEAR
CO2 SERPL-SCNC: 24 MMOL/L (ref 23–29)
CO2 SERPL-SCNC: 25 MMOL/L (ref 23–29)
COLOR UR AUTO: COLORLESS
CREAT SERPL-MCNC: 1.3 MG/DL (ref 0.5–1.4)
CREAT SERPL-MCNC: 1.3 MG/DL (ref 0.5–1.4)
D DIMER PPP IA.FEU-MCNC: 0.77 MG/L FEU
DIFFERENTIAL METHOD: ABNORMAL
DIFFERENTIAL METHOD: ABNORMAL
EOSINOPHIL # BLD AUTO: 0.1 K/UL (ref 0–0.5)
EOSINOPHIL # BLD AUTO: 0.1 K/UL (ref 0–0.5)
EOSINOPHIL NFR BLD: 2.9 % (ref 0–8)
EOSINOPHIL NFR BLD: 3 % (ref 0–8)
ERYTHROCYTE [DISTWIDTH] IN BLOOD BY AUTOMATED COUNT: 15.2 % (ref 11.5–14.5)
ERYTHROCYTE [DISTWIDTH] IN BLOOD BY AUTOMATED COUNT: 15.3 % (ref 11.5–14.5)
EST. GFR  (NO RACE VARIABLE): >60 ML/MIN/1.73 M^2
EST. GFR  (NO RACE VARIABLE): >60 ML/MIN/1.73 M^2
ESTIMATED AVG GLUCOSE: 114 MG/DL (ref 68–131)
GLUCOSE SERPL-MCNC: 95 MG/DL (ref 70–110)
GLUCOSE SERPL-MCNC: 96 MG/DL (ref 70–110)
GLUCOSE UR QL STRIP: NEGATIVE
HBA1C MFR BLD: 5.6 % (ref 4–5.6)
HCT VFR BLD AUTO: 40.3 % (ref 40–54)
HCT VFR BLD AUTO: 45.1 % (ref 40–54)
HDLC SERPL-MCNC: 50 MG/DL (ref 40–75)
HDLC SERPL: 23.7 % (ref 20–50)
HGB BLD-MCNC: 12.8 G/DL (ref 14–18)
HGB BLD-MCNC: 13.7 G/DL (ref 14–18)
HGB UR QL STRIP: NEGATIVE
IMM GRANULOCYTES # BLD AUTO: 0.01 K/UL (ref 0–0.04)
IMM GRANULOCYTES # BLD AUTO: 0.01 K/UL (ref 0–0.04)
IMM GRANULOCYTES NFR BLD AUTO: 0.2 % (ref 0–0.5)
IMM GRANULOCYTES NFR BLD AUTO: 0.3 % (ref 0–0.5)
INR PPP: 1 (ref 0.8–1.2)
KETONES UR QL STRIP: NEGATIVE
LACTATE SERPL-SCNC: 1 MMOL/L (ref 0.5–2.2)
LDLC SERPL CALC-MCNC: 147.6 MG/DL (ref 63–159)
LEUKOCYTE ESTERASE UR QL STRIP: NEGATIVE
LYMPHOCYTES # BLD AUTO: 1.1 K/UL (ref 1–4.8)
LYMPHOCYTES # BLD AUTO: 1.5 K/UL (ref 1–4.8)
LYMPHOCYTES NFR BLD: 29.1 % (ref 18–48)
LYMPHOCYTES NFR BLD: 34.2 % (ref 18–48)
MAGNESIUM SERPL-MCNC: 2 MG/DL (ref 1.6–2.6)
MCH RBC QN AUTO: 27.1 PG (ref 27–31)
MCH RBC QN AUTO: 27.5 PG (ref 27–31)
MCHC RBC AUTO-ENTMCNC: 30.4 G/DL (ref 32–36)
MCHC RBC AUTO-ENTMCNC: 31.8 G/DL (ref 32–36)
MCV RBC AUTO: 87 FL (ref 82–98)
MCV RBC AUTO: 89 FL (ref 82–98)
MONOCYTES # BLD AUTO: 0.5 K/UL (ref 0.3–1)
MONOCYTES # BLD AUTO: 0.6 K/UL (ref 0.3–1)
MONOCYTES NFR BLD: 12.6 % (ref 4–15)
MONOCYTES NFR BLD: 12.9 % (ref 4–15)
NEUTROPHILS # BLD AUTO: 2 K/UL (ref 1.8–7.7)
NEUTROPHILS # BLD AUTO: 2.2 K/UL (ref 1.8–7.7)
NEUTROPHILS NFR BLD: 49.3 % (ref 38–73)
NEUTROPHILS NFR BLD: 54.5 % (ref 38–73)
NITRITE UR QL STRIP: NEGATIVE
NONHDLC SERPL-MCNC: 161 MG/DL
NRBC BLD-RTO: 0 /100 WBC
NRBC BLD-RTO: 0 /100 WBC
PH UR STRIP: 7 [PH] (ref 5–8)
PHOSPHATE SERPL-MCNC: 3.3 MG/DL (ref 2.7–4.5)
PLATELET # BLD AUTO: 268 K/UL (ref 150–450)
PLATELET # BLD AUTO: 273 K/UL (ref 150–450)
PMV BLD AUTO: 11.6 FL (ref 9.2–12.9)
PMV BLD AUTO: 12.2 FL (ref 9.2–12.9)
POC CARDIAC TROPONIN I: 0.12 NG/ML (ref 0–0.08)
POCT GLUCOSE: 94 MG/DL (ref 70–110)
POTASSIUM SERPL-SCNC: 3.6 MMOL/L (ref 3.5–5.1)
POTASSIUM SERPL-SCNC: 4 MMOL/L (ref 3.5–5.1)
PROT SERPL-MCNC: 7.6 G/DL (ref 6–8.4)
PROT UR QL STRIP: NEGATIVE
PROTHROMBIN TIME: 10.9 SEC (ref 9–12.5)
RBC # BLD AUTO: 4.66 M/UL (ref 4.6–6.2)
RBC # BLD AUTO: 5.05 M/UL (ref 4.6–6.2)
SAMPLE: ABNORMAL
SARS-COV-2 RDRP RESP QL NAA+PROBE: NEGATIVE
SODIUM SERPL-SCNC: 138 MMOL/L (ref 136–145)
SODIUM SERPL-SCNC: 139 MMOL/L (ref 136–145)
SP GR UR STRIP: 1 (ref 1–1.03)
SPECIMEN OUTDATE: NORMAL
TRIGL SERPL-MCNC: 67 MG/DL (ref 30–150)
TROPONIN I SERPL DL<=0.01 NG/ML-MCNC: 0.14 NG/ML (ref 0–0.03)
TROPONIN I SERPL DL<=0.01 NG/ML-MCNC: 0.15 NG/ML (ref 0–0.03)
URN SPEC COLLECT METH UR: ABNORMAL
WBC # BLD AUTO: 3.64 K/UL (ref 3.9–12.7)
WBC # BLD AUTO: 4.41 K/UL (ref 3.9–12.7)

## 2023-07-13 PROCEDURE — 85730 THROMBOPLASTIN TIME PARTIAL: CPT | Performed by: STUDENT IN AN ORGANIZED HEALTH CARE EDUCATION/TRAINING PROGRAM

## 2023-07-13 PROCEDURE — 93005 ELECTROCARDIOGRAM TRACING: CPT

## 2023-07-13 PROCEDURE — 25000003 PHARM REV CODE 250: Performed by: STUDENT IN AN ORGANIZED HEALTH CARE EDUCATION/TRAINING PROGRAM

## 2023-07-13 PROCEDURE — 85025 COMPLETE CBC W/AUTO DIFF WBC: CPT | Mod: 91 | Performed by: INTERNAL MEDICINE

## 2023-07-13 PROCEDURE — 86900 BLOOD TYPING SEROLOGIC ABO: CPT

## 2023-07-13 PROCEDURE — 25000003 PHARM REV CODE 250

## 2023-07-13 PROCEDURE — 63600175 PHARM REV CODE 636 W HCPCS: Performed by: STUDENT IN AN ORGANIZED HEALTH CARE EDUCATION/TRAINING PROGRAM

## 2023-07-13 PROCEDURE — 99285 EMERGENCY DEPT VISIT HI MDM: CPT | Mod: 25

## 2023-07-13 PROCEDURE — 93010 EKG 12-LEAD: ICD-10-PCS | Mod: ,,, | Performed by: INTERNAL MEDICINE

## 2023-07-13 PROCEDURE — 93010 ELECTROCARDIOGRAM REPORT: CPT | Mod: ,,, | Performed by: INTERNAL MEDICINE

## 2023-07-13 PROCEDURE — 25500020 PHARM REV CODE 255: Performed by: EMERGENCY MEDICINE

## 2023-07-13 PROCEDURE — 85379 FIBRIN DEGRADATION QUANT: CPT | Performed by: STUDENT IN AN ORGANIZED HEALTH CARE EDUCATION/TRAINING PROGRAM

## 2023-07-13 PROCEDURE — 83735 ASSAY OF MAGNESIUM: CPT | Performed by: INTERNAL MEDICINE

## 2023-07-13 PROCEDURE — 83880 ASSAY OF NATRIURETIC PEPTIDE: CPT | Performed by: STUDENT IN AN ORGANIZED HEALTH CARE EDUCATION/TRAINING PROGRAM

## 2023-07-13 PROCEDURE — 80053 COMPREHEN METABOLIC PANEL: CPT | Performed by: EMERGENCY MEDICINE

## 2023-07-13 PROCEDURE — 25000242 PHARM REV CODE 250 ALT 637 W/ HCPCS: Performed by: STUDENT IN AN ORGANIZED HEALTH CARE EDUCATION/TRAINING PROGRAM

## 2023-07-13 PROCEDURE — 96365 THER/PROPH/DIAG IV INF INIT: CPT

## 2023-07-13 PROCEDURE — 96375 TX/PRO/DX INJ NEW DRUG ADDON: CPT

## 2023-07-13 PROCEDURE — 20000000 HC ICU ROOM

## 2023-07-13 PROCEDURE — 80061 LIPID PANEL: CPT

## 2023-07-13 PROCEDURE — 85610 PROTHROMBIN TIME: CPT | Performed by: STUDENT IN AN ORGANIZED HEALTH CARE EDUCATION/TRAINING PROGRAM

## 2023-07-13 PROCEDURE — 81003 URINALYSIS AUTO W/O SCOPE: CPT | Performed by: STUDENT IN AN ORGANIZED HEALTH CARE EDUCATION/TRAINING PROGRAM

## 2023-07-13 PROCEDURE — 94761 N-INVAS EAR/PLS OXIMETRY MLT: CPT

## 2023-07-13 PROCEDURE — 85025 COMPLETE CBC W/AUTO DIFF WBC: CPT | Performed by: STUDENT IN AN ORGANIZED HEALTH CARE EDUCATION/TRAINING PROGRAM

## 2023-07-13 PROCEDURE — 84484 ASSAY OF TROPONIN QUANT: CPT | Performed by: STUDENT IN AN ORGANIZED HEALTH CARE EDUCATION/TRAINING PROGRAM

## 2023-07-13 PROCEDURE — 80069 RENAL FUNCTION PANEL: CPT | Performed by: INTERNAL MEDICINE

## 2023-07-13 PROCEDURE — 83036 HEMOGLOBIN GLYCOSYLATED A1C: CPT

## 2023-07-13 PROCEDURE — U0002 COVID-19 LAB TEST NON-CDC: HCPCS | Performed by: STUDENT IN AN ORGANIZED HEALTH CARE EDUCATION/TRAINING PROGRAM

## 2023-07-13 PROCEDURE — 83605 ASSAY OF LACTIC ACID: CPT | Performed by: STUDENT IN AN ORGANIZED HEALTH CARE EDUCATION/TRAINING PROGRAM

## 2023-07-13 RX ORDER — NITROGLYCERIN 0.4 MG/1
0.4 TABLET SUBLINGUAL
Status: COMPLETED | OUTPATIENT
Start: 2023-07-13 | End: 2023-07-13

## 2023-07-13 RX ORDER — SODIUM,POTASSIUM PHOSPHATES 280-250MG
2 POWDER IN PACKET (EA) ORAL
Status: DISCONTINUED | OUTPATIENT
Start: 2023-07-13 | End: 2023-07-16 | Stop reason: HOSPADM

## 2023-07-13 RX ORDER — METOPROLOL SUCCINATE 25 MG/1
25 TABLET, EXTENDED RELEASE ORAL NIGHTLY
Status: DISCONTINUED | OUTPATIENT
Start: 2023-07-13 | End: 2023-07-14

## 2023-07-13 RX ORDER — LANOLIN ALCOHOL/MO/W.PET/CERES
800 CREAM (GRAM) TOPICAL
Status: DISCONTINUED | OUTPATIENT
Start: 2023-07-13 | End: 2023-07-16 | Stop reason: HOSPADM

## 2023-07-13 RX ORDER — FUROSEMIDE 10 MG/ML
80 INJECTION INTRAMUSCULAR; INTRAVENOUS
Status: COMPLETED | OUTPATIENT
Start: 2023-07-13 | End: 2023-07-13

## 2023-07-13 RX ORDER — ASPIRIN 325 MG
325 TABLET ORAL
Status: COMPLETED | OUTPATIENT
Start: 2023-07-13 | End: 2023-07-13

## 2023-07-13 RX ORDER — IBUPROFEN 200 MG
24 TABLET ORAL
Status: DISCONTINUED | OUTPATIENT
Start: 2023-07-13 | End: 2023-07-16 | Stop reason: HOSPADM

## 2023-07-13 RX ORDER — OXYCODONE AND ACETAMINOPHEN 5; 325 MG/1; MG/1
1 TABLET ORAL EVERY 6 HOURS PRN
Status: DISCONTINUED | OUTPATIENT
Start: 2023-07-13 | End: 2023-07-16 | Stop reason: HOSPADM

## 2023-07-13 RX ORDER — SODIUM CHLORIDE 0.9 % (FLUSH) 0.9 %
10 SYRINGE (ML) INJECTION
Status: DISCONTINUED | OUTPATIENT
Start: 2023-07-13 | End: 2023-07-16 | Stop reason: HOSPADM

## 2023-07-13 RX ORDER — AMIODARONE HYDROCHLORIDE 200 MG/1
200 TABLET ORAL 2 TIMES DAILY
Status: DISCONTINUED | OUTPATIENT
Start: 2023-07-13 | End: 2023-07-16 | Stop reason: HOSPADM

## 2023-07-13 RX ORDER — ATORVASTATIN CALCIUM 40 MG/1
80 TABLET, FILM COATED ORAL DAILY
Status: DISCONTINUED | OUTPATIENT
Start: 2023-07-14 | End: 2023-07-16 | Stop reason: HOSPADM

## 2023-07-13 RX ORDER — NAPROXEN SODIUM 220 MG/1
81 TABLET, FILM COATED ORAL DAILY
Status: DISCONTINUED | OUTPATIENT
Start: 2023-07-14 | End: 2023-07-13

## 2023-07-13 RX ORDER — HEPARIN SODIUM,PORCINE/D5W 25000/250
0-40 INTRAVENOUS SOLUTION INTRAVENOUS CONTINUOUS
Status: DISCONTINUED | OUTPATIENT
Start: 2023-07-13 | End: 2023-07-13

## 2023-07-13 RX ORDER — GLUCAGON 1 MG
1 KIT INJECTION
Status: DISCONTINUED | OUTPATIENT
Start: 2023-07-13 | End: 2023-07-16 | Stop reason: HOSPADM

## 2023-07-13 RX ORDER — IBUPROFEN 200 MG
16 TABLET ORAL
Status: DISCONTINUED | OUTPATIENT
Start: 2023-07-13 | End: 2023-07-16 | Stop reason: HOSPADM

## 2023-07-13 RX ORDER — SODIUM CHLORIDE 0.9 % (FLUSH) 0.9 %
10 SYRINGE (ML) INJECTION EVERY 12 HOURS PRN
Status: DISCONTINUED | OUTPATIENT
Start: 2023-07-13 | End: 2023-07-16 | Stop reason: HOSPADM

## 2023-07-13 RX ORDER — NICARDIPINE HYDROCHLORIDE 0.2 MG/ML
0-15 INJECTION INTRAVENOUS CONTINUOUS
Status: DISCONTINUED | OUTPATIENT
Start: 2023-07-13 | End: 2023-07-13

## 2023-07-13 RX ORDER — ACETAMINOPHEN 325 MG/1
650 TABLET ORAL EVERY 4 HOURS PRN
Status: DISCONTINUED | OUTPATIENT
Start: 2023-07-13 | End: 2023-07-16 | Stop reason: HOSPADM

## 2023-07-13 RX ORDER — MORPHINE SULFATE 2 MG/ML
1 INJECTION, SOLUTION INTRAMUSCULAR; INTRAVENOUS ONCE
Status: COMPLETED | OUTPATIENT
Start: 2023-07-13 | End: 2023-07-13

## 2023-07-13 RX ORDER — NITROGLYCERIN 20 MG/100ML
0-400 INJECTION INTRAVENOUS CONTINUOUS
Status: DISCONTINUED | OUTPATIENT
Start: 2023-07-13 | End: 2023-07-14

## 2023-07-13 RX ORDER — CLOPIDOGREL BISULFATE 75 MG/1
75 TABLET ORAL DAILY
Status: DISCONTINUED | OUTPATIENT
Start: 2023-07-14 | End: 2023-07-16 | Stop reason: HOSPADM

## 2023-07-13 RX ORDER — LANOLIN ALCOHOL/MO/W.PET/CERES
1 CREAM (GRAM) TOPICAL DAILY
Status: DISCONTINUED | OUTPATIENT
Start: 2023-07-14 | End: 2023-07-16 | Stop reason: HOSPADM

## 2023-07-13 RX ORDER — NALOXONE HCL 0.4 MG/ML
0.02 VIAL (ML) INJECTION
Status: DISCONTINUED | OUTPATIENT
Start: 2023-07-13 | End: 2023-07-16 | Stop reason: HOSPADM

## 2023-07-13 RX ORDER — ONDANSETRON 4 MG/1
8 TABLET, ORALLY DISINTEGRATING ORAL EVERY 8 HOURS PRN
Status: DISCONTINUED | OUTPATIENT
Start: 2023-07-13 | End: 2023-07-16 | Stop reason: HOSPADM

## 2023-07-13 RX ADMIN — OXYCODONE HYDROCHLORIDE AND ACETAMINOPHEN 1 TABLET: 5; 325 TABLET ORAL at 10:07

## 2023-07-13 RX ADMIN — APIXABAN 5 MG: 5 TABLET, FILM COATED ORAL at 10:07

## 2023-07-13 RX ADMIN — NITROGLYCERIN 5 MCG/MIN: 20 INJECTION INTRAVENOUS at 04:07

## 2023-07-13 RX ADMIN — IOHEXOL 100 ML: 350 INJECTION, SOLUTION INTRAVENOUS at 04:07

## 2023-07-13 RX ADMIN — MORPHINE SULFATE 1 MG: 2 INJECTION, SOLUTION INTRAMUSCULAR; INTRAVENOUS at 08:07

## 2023-07-13 RX ADMIN — ASPIRIN 325 MG ORAL TABLET 325 MG: 325 PILL ORAL at 02:07

## 2023-07-13 RX ADMIN — METOPROLOL SUCCINATE 25 MG: 25 TABLET, EXTENDED RELEASE ORAL at 08:07

## 2023-07-13 RX ADMIN — NITROGLYCERIN 0.4 MG: 0.4 TABLET, ORALLY DISINTEGRATING SUBLINGUAL at 03:07

## 2023-07-13 RX ADMIN — HEPARIN SODIUM 12 UNITS/KG/HR: 10000 INJECTION, SOLUTION INTRAVENOUS at 05:07

## 2023-07-13 RX ADMIN — AMIODARONE HYDROCHLORIDE 200 MG: 200 TABLET ORAL at 08:07

## 2023-07-13 RX ADMIN — FUROSEMIDE 80 MG: 10 INJECTION, SOLUTION INTRAMUSCULAR; INTRAVENOUS at 02:07

## 2023-07-13 NOTE — Clinical Note
Diagnosis: Chest pain [042677]   Admitting Provider:: MINISTERIO DC [[id number]]   Future Attending Provider: MINISTERIO DC [[id number]]   Reason for IP Medical Treatment  (Clinical interventions that can only be accomplished in the IP setting? ) :: hep gtt, cardene gtt, nitro gtt   I certify that Inpatient services for greater than or equal to 2 midnights are medically necessary:: Yes   Plans for Post-Acute care--if anticipated (pick the single best option):: A. No post acute care anticipated at this time

## 2023-07-13 NOTE — ED PROVIDER NOTES
Encounter Date: 7/13/2023       History     Chief Complaint   Patient presents with    Multiple Complaints     Patient presents to ER with complaints of HA, chest pain, and shortness of breath since this morning.      58-year-old male with past medical history of HTN, HLD, a-fib on eliquis, CHF (EF 18% on 03/2023), NSTEMI (06/2023) presenting to ED with complaint of chest pain, headache and shortness of breath.  Patient reports that his shortness of breath first started last night waking him up from sleep.  He also had an associated headache with pressure behind his right eye that has been persistent since last night.  He reports that he began to have left-sided chest pain which first started at 10:00 a.m. this morning and has been waxing and waning since that time.  He also reports a subjective fever earlier this morning.  Patient also reports tingling around his mouth since last night.  He states that he has been compliant with his medications.  He takes Plavix and Eliquis. Reports history of feeling fluid in his stomach for which he takes Lasix. No long car rides or flights. Denies leg swelling or calf tenderness.  No unilateral weakness or numbness, vision changes, nausea, vomiting, diarrhea.     Review of patient's allergies indicates:  No Known Allergies  Past Medical History:   Diagnosis Date    Atrial fibrillation     Encounter for blood transfusion      Past Surgical History:   Procedure Laterality Date    CARDIAC DEFIBRILLATOR PLACEMENT Left     HERNIA REPAIR      LEFT HEART CATHETERIZATION Left 4/18/2023    Procedure: Left heart cath;  Surgeon: Atilio Marks MD;  Location: [facility] CATH LAB;  Service: Cardiology;  Laterality: Left;    RIGHT HEART CATHETERIZATION Right 9/30/2022    Procedure: INSERTION, CATHETER, RIGHT HEART;  Surgeon: Caden Aden MD;  Location: [facility] CATH LAB;  Service: Cardiology;  Laterality: Right;    TREATMENT OF CARDIAC ARRHYTHMIA N/A 11/22/2022    Procedure: CARDIOVERSION;   Surgeon: Marvin Sanon MD;  Location: [facility] EP LAB;  Service: Cardiology;  Laterality: N/A;  afib, KIA, DCCV, anes, MB, 3 Prep     History reviewed. No pertinent family history.  Social History     Tobacco Use    Smoking status: Never    Smokeless tobacco: Never   Substance Use Topics    Alcohol use: Never    Drug use: Never     Review of Systems   Constitutional:  Negative for chills and fever.   HENT:  Negative for congestion and sore throat.    Eyes:  Positive for pain (2/2 headache). Negative for discharge.   Respiratory:  Positive for shortness of breath. Negative for wheezing.    Cardiovascular:  Positive for chest pain. Negative for leg swelling.   Gastrointestinal:  Negative for abdominal pain, nausea and vomiting.   Genitourinary:  Negative for difficulty urinating and dysuria.   Musculoskeletal:  Negative for back pain and joint swelling.   Skin:  Negative for color change and rash.   Neurological:  Positive for headaches. Negative for weakness and numbness.   Hematological:  Does not bruise/bleed easily.     Physical Exam     Initial Vitals [07/13/23 1330]   BP Pulse Resp Temp SpO2   (!) 204/102 88 (!) 27 97.4 °F (36.3 °C) (!) 92 %      MAP       --         Physical Exam    Nursing note and vitals reviewed.  Constitutional: He is not diaphoretic. No distress.   HENT:   Head: Normocephalic and atraumatic.   Mouth/Throat: Oropharynx is clear and moist.   Eyes: Conjunctivae and EOM are normal. Pupils are equal, round, and reactive to light.   Neck: Neck supple.   Normal range of motion.  Cardiovascular:  Normal rate, regular rhythm, normal heart sounds and intact distal pulses.           Pulmonary/Chest: Breath sounds normal. He has no wheezes. He has no rhonchi. He has no rales.   Abdominal: Abdomen is soft. He exhibits distension. There is no abdominal tenderness.   Musculoskeletal:         General: No tenderness or edema. Normal range of motion.      Cervical back: Normal range of motion and neck  supple.     Neurological: He is alert and oriented to person, place, and time. He has normal strength. No cranial nerve deficit or sensory deficit.   Skin: Skin is warm and dry. Capillary refill takes less than 2 seconds.       ED Course   Procedures  Labs Reviewed   CBC W/ AUTO DIFFERENTIAL - Abnormal; Notable for the following components:       Result Value    WBC 3.64 (*)     Hemoglobin 13.7 (*)     MCHC 30.4 (*)     RDW 15.2 (*)     All other components within normal limits   TROPONIN I - Abnormal; Notable for the following components:    Troponin I 0.142 (*)     All other components within normal limits   B-TYPE NATRIURETIC PEPTIDE - Abnormal; Notable for the following components:    BNP 1,727 (*)     All other components within normal limits   D DIMER, QUANTITATIVE - Abnormal; Notable for the following components:    D-Dimer 0.77 (*)     All other components within normal limits   URINALYSIS, REFLEX TO URINE CULTURE - Abnormal; Notable for the following components:    Color, UA Colorless (*)     All other components within normal limits    Narrative:     Specimen Source->Urine   TROPONIN ISTAT - Abnormal; Notable for the following components:    POC Cardiac Troponin I 0.12 (*)     All other components within normal limits   TROPONIN I - Abnormal; Notable for the following components:    Troponin I 0.148 (*)     All other components within normal limits   LIPID PANEL - Abnormal; Notable for the following components:    Cholesterol 211 (*)     All other components within normal limits   SARS-COV-2 RNA AMPLIFICATION, QUAL   COMPREHENSIVE METABOLIC PANEL   APTT    Narrative:     Draw baseline aPTT prior to starting the heparin bolus or  infusion  (if patient is on warfarin prior to heparin therapy)   PROTIME-INR    Narrative:     Draw baseline aPTT prior to starting the heparin bolus or  infusion  (if patient is on warfarin prior to heparin therapy)   HEMOGLOBIN A1C   LACTIC ACID, PLASMA   TYPE & SCREEN   POCT  TROPONIN   POCT TROPONIN        ECG Results              EKG 12-lead (Final result)  Result time 07/13/23 15:07:01      Final result by Interface, Lab In [facility] (07/13/23 15:07:01)                   Narrative:    Test Reason : R07.9,    Vent. Rate : 077 BPM     Atrial Rate : 069 BPM     P-R Int : 000 ms          QRS Dur : 144 ms      QT Int : 472 ms       P-R-T Axes : 000 008 195 degrees     QTc Int : 534 ms    Atrial fibrillation with premature ventricular or aberrantly conducted  complexes  Left bundle branch block -like IVCD  Abnormal ECG  When compared with ECG of 13-JUL-2023 13:32,  Mild changes  Confirmed by Panda ESTRADA MD ([id number]) on 7/13/2023 3:06:56 PM    Referred By: [email or web address]   SELF           Confirmed By:Panda ESTRADA MD                                     EKG 12-lead (Final result)  Result time 07/13/23 15:46:06      Final result by Interface, Lab In [facility] (07/13/23 15:46:06)                   Narrative:    Test Reason : R07.9,    Vent. Rate : 087 BPM     Atrial Rate : 041 BPM     P-R Int : 000 ms          QRS Dur : 140 ms      QT Int : 468 ms       P-R-T Axes : 000 -05 164 degrees     QTc Int : 563 ms    Atrial fibrillation with intermittent ventricular paced complexes  Abnormal ECG  When compared with ECG of 08-JUN-2023 10:48,  T wave inversion more evident in Lateral leads  Confirmed by Janene Arroyo MD ([id number]) on 7/13/2023 3:45:58 PM    Referred By: [email or web address]   SELF           Confirmed By:Janene Arroyo MD                                  Imaging Results              CTA Chest Non-Coronary (PE Studies) (In process)  Result time 07/13/23 18:34:40                     X-Ray Chest AP Portable (Final result)  Result time 07/13/23 15:49:24      Final result by Leandro Ruffin MD (07/13/23 15:49:24)                   Impression:      1. Interstitial findings suggest congestive change/edema.  No convincing large focal consolidation.      Electronically signed by: Leandro Ruffin  MD  Date:    07/13/2023  Time:    15:49               Narrative:    EXAMINATION:  XR CHEST AP PORTABLE    CLINICAL HISTORY:  Chest Pain;    TECHNIQUE:  Single frontal view of the chest was performed.    COMPARISON:  06/08/2023    FINDINGS:  The cardiomediastinal silhouette is prominent, similar to the previous exam.  There is left chest wall pacer.  There is no pleural effusion.  The trachea is midline.  The lungs are symmetrically expanded bilaterally with coarse central hilar interstitial attenuation.  No large focal consolidation seen.  There is no pneumothorax.  The osseous structures are remarkable for degenerative change..                                       Medications   heparin 25,000 units in dextrose 5% 250 mL (100 units/mL) infusion LOW INTENSITY nomogram - [facility] (12 Units/kg/hr × 82 kg (Adjusted) Intravenous New Bag 7/13/23 1740)   heparin 25,000 units in dextrose 5% (100 units/ml) IV bolus from bag - ADDITIONAL PRN BOLUS - 60 units/kg (max bolus 4000 units) (has no administration in time range)   heparin 25,000 units in dextrose 5% (100 units/ml) IV bolus from bag - ADDITIONAL PRN BOLUS - 30 units/kg (max bolus 4000 units) (has no administration in time range)   nitroGLYCERIN in 5 % dextrose 50 mg/250 mL (200 mcg/mL) infusion (15 mcg/min Intravenous Rate/Dose Change 7/13/23 1717)   sodium chloride 0.9% flush 10 mL (has no administration in time range)   naloxone 0.4 mg/mL injection 0.02 mg (has no administration in time range)   glucose chewable tablet 16 g (has no administration in time range)   glucose chewable tablet 24 g (has no administration in time range)   glucagon (human recombinant) injection 1 mg (has no administration in time range)   dextrose 10% bolus 125 mL 125 mL (has no administration in time range)   dextrose 10% bolus 250 mL 250 mL (has no administration in time range)   clopidogreL tablet 75 mg (has no administration in time range)   aspirin chewable tablet 81 mg (has no  administration in time range)   atorvastatin tablet 80 mg (has no administration in time range)   amiodarone tablet 200 mg (has no administration in time range)   ferrous sulfate tablet 1 each (has no administration in time range)   metoprolol succinate (TOPROL-XL) 24 hr tablet 25 mg (has no administration in time range)   sodium chloride 0.9% flush 10 mL (has no administration in time range)   acetaminophen tablet 650 mg (has no administration in time range)   ondansetron disintegrating tablet 8 mg (has no administration in time range)   niCARdipine 40 mg/200 mL (0.2 mg/mL) infusion (has no administration in time range)   furosemide injection 80 mg (80 mg Intravenous Given 7/13/23 1451)   aspirin tablet 325 mg (325 mg Oral Given 7/13/23 1449)   nitroGLYCERIN SL tablet 0.4 mg (0.4 mg Sublingual Given 7/13/23 1509)   nitroGLYCERIN SL tablet 0.4 mg (0.4 mg Sublingual Given 7/13/23 1551)   heparin 25,000 units in dextrose 5% (100 units/ml) IV bolus from bag INITIAL BOLUS (max bolus 4000 units) (4,000 Units Intravenous Bolus from Bag 7/13/23 1741)   iohexoL (OMNIPAQUE 350) injection 100 mL (100 mLs Intravenous Given 7/13/23 1634)     Medical Decision Making:   History:   Old Medical Records: I decided to obtain old medical records.  Differential Diagnosis:   ACS  Hypertensive emergency  Pulmonary embolism  CHF exacerbation  Ascites  Independently Interpreted Test(s):   I have ordered and independently interpreted EKG Reading(s) - see summary below       <> Summary of EKG Reading(s): Normal sinus rhythm with frequent PVCs.  No STEMI.  Clinical Tests:   Lab Tests: Ordered and Reviewed  Radiological Study: Ordered and Reviewed  Medical Tests: Ordered and Reviewed  ED Management:  Patient is hypertensive and mildly tachypneic.  He is satting well on room air and his lungs are clear to auscultation.  EKG shows no STEMI the with frequent PVCs.  ASA-324 given. Sublingual nitro x2 given with no relief.  Patient remains  significantly hypertensive.  Nitroglycerin gtt started. CXR shows interstitial edema. BNP 1727. 80mg IV Lasix given. Trop elevated to 0.142. Heparin gtt started per ACS protocol. D-dimer also mildly elevated to 0.77. CTA ordered to r/o PE.  Initially planned for patient to be admitted to Hospital Medicine.  However, patient will need ICU services due to nitroglycerin drip.  Cardiology consulted and plan to admit patient to CCU.  Cardene gtt started per cardiology recommendations.             ED Course as of 07/13/23 1837   Thu Jul 13, 2023   1520 POC Cardiac Troponin I(!): 0.12 [PW]   1520 Troponin ISTAT(!) [PW]   1533 D-Dimer(!): 0.77 [DS]      ED Course User Index  [DS] Liana Devi MD  [PW] Florence Velazco MD                 Clinical Impression:   Final diagnoses:  [R07.9] Chest pain  [I21.4] NSTEMI (non-ST elevated myocardial infarction) (Primary)  [I50.9] Acute on chronic congestive heart failure, unspecified heart failure type  [I16.1] Hypertensive emergency        ED Disposition Condition    Admit Stable                Liana Devi MD  Resident  07/13/23 1837

## 2023-07-13 NOTE — HPI
" 58 y.o. male arrived to the ED via EMS for c/o chest pain. Pt w/ cardiac hx and AICD placement reports sudden onset of "sharp" 8/10 mid-sternal chest pain w/ associated SOB. Pt states pain has been continuous, non-radiating, and non-reproducible. Denies any triggering or alleviating factors. Pt endorses nausea w/o vomiting and recent swelling to abdomen. Denies fevers, chills, V/D, dizziness, or recent swelling to BLE.     "

## 2023-07-13 NOTE — ED NOTES
"C/C: 58 y.o. male arrived to the ED via EMS for c/o chest pain. Pt w/ cardiac hx and AICD placement reports sudden onset of "sharp" 8/10 mid-sternal chest pain w/ associated SOB. Pt states pain has been continuous, non-radiating, and non-reproducible. Denies any triggering or alleviating factors. Pt endorses nausea w/o vomiting and recent swelling to abdomen. Denies fevers, chills, V/D, dizziness, or recent swelling to BLE.    APPEARANCE: Pt well-kempt. Awake, alert, and appears to be in moderate discomfort. Pain score 8/10. Placed on cont cardiac monitoring, auto BP cuff, and cont pulse ox. Bed in lowest and locked position w/ side rails up x2.   SKIN: Warm, dry and intact. No visible breakdown or bruising.  MUSCULOSKELETAL: Pt moving all extremities spontaneously, no obvious swelling or deformities noted. Ambulates independently.  RESPIRATORY: Airway open and patent. +SOB and pursed lip breathing. Respirations even, equal bilaterally on inspiration and expiration.   CARDIAC: +tachypnea. Denies CP, 2+ distal pulses; no peripheral edema. +pacemaker placement   ABDOMEN: S/ND. Denies TTP or vomiting. Denies vomiting  or abnormal BM. Last BM this morning, 7/13/23. +nausea   : Pt voids spontaneously, denies dysuria, hematuria, or urinary frequency.   NEUROLOGIC: AAO x 4;  speaking and following commands appropriately. Equal strength in all extremities; denies numbness/tingling. Denies dizziness, lightheadedness, visual changes, or HA.   "

## 2023-07-14 LAB
ANION GAP SERPL CALC-SCNC: 11 MMOL/L (ref 8–16)
ANION GAP SERPL CALC-SCNC: 7 MMOL/L (ref 8–16)
ASCENDING AORTA: 3.16 CM
AV INDEX (PROSTH): 0.34
AV MEAN GRADIENT: 4 MMHG
AV PEAK GRADIENT: 6 MMHG
AV VALVE AREA: 1.55 CM2
AV VELOCITY RATIO: 0.34
BASOPHILS # BLD AUTO: 0.01 K/UL (ref 0–0.2)
BASOPHILS NFR BLD: 0.2 % (ref 0–1.9)
BSA FOR ECHO PROCEDURE: 2.12 M2
BUN SERPL-MCNC: 20 MG/DL (ref 6–20)
BUN SERPL-MCNC: 21 MG/DL (ref 6–20)
CALCIUM SERPL-MCNC: 9.2 MG/DL (ref 8.7–10.5)
CALCIUM SERPL-MCNC: 9.5 MG/DL (ref 8.7–10.5)
CHLORIDE SERPL-SCNC: 102 MMOL/L (ref 95–110)
CHLORIDE SERPL-SCNC: 102 MMOL/L (ref 95–110)
CO2 SERPL-SCNC: 27 MMOL/L (ref 23–29)
CO2 SERPL-SCNC: 30 MMOL/L (ref 23–29)
CREAT SERPL-MCNC: 1.4 MG/DL (ref 0.5–1.4)
CREAT SERPL-MCNC: 1.6 MG/DL (ref 0.5–1.4)
CV ECHO LV RWT: 0.38 CM
DIFFERENTIAL METHOD: ABNORMAL
DOP CALC AO PEAK VEL: 1.2 M/S
DOP CALC AO VTI: 18.78 CM
DOP CALC LVOT AREA: 4.5 CM2
DOP CALC LVOT DIAMETER: 2.4 CM
DOP CALC LVOT PEAK VEL: 0.41 M/S
DOP CALC LVOT STROKE VOLUME: 29.07 CM3
DOP CALCLVOT PEAK VEL VTI: 6.43 CM
ECHO LV POSTERIOR WALL: 1.33 CM (ref 0.6–1.1)
EJECTION FRACTION: 10 %
EOSINOPHIL # BLD AUTO: 0.2 K/UL (ref 0–0.5)
EOSINOPHIL NFR BLD: 3.2 % (ref 0–8)
ERYTHROCYTE [DISTWIDTH] IN BLOOD BY AUTOMATED COUNT: 15.3 % (ref 11.5–14.5)
EST. GFR  (NO RACE VARIABLE): 50.3 ML/MIN/1.73 M^2
EST. GFR  (NO RACE VARIABLE): 59 ML/MIN/1.73 M^2
FRACTIONAL SHORTENING: 10 % (ref 28–44)
GLUCOSE SERPL-MCNC: 105 MG/DL (ref 70–110)
GLUCOSE SERPL-MCNC: 99 MG/DL (ref 70–110)
HCT VFR BLD AUTO: 43.2 % (ref 40–54)
HGB BLD-MCNC: 13.5 G/DL (ref 14–18)
IMM GRANULOCYTES # BLD AUTO: 0.01 K/UL (ref 0–0.04)
IMM GRANULOCYTES NFR BLD AUTO: 0.2 % (ref 0–0.5)
INTERVENTRICULAR SEPTUM: 0.93 CM (ref 0.6–1.1)
LA MAJOR: 9.01 CM
LA MINOR: 8.63 CM
LA WIDTH: 5.54 CM
LEFT ATRIUM SIZE: 5.83 CM
LEFT ATRIUM VOLUME INDEX: 114.7 ML/M2
LEFT ATRIUM VOLUME: 242.03 CM3
LEFT INTERNAL DIMENSION IN SYSTOLE: 6.24 CM (ref 2.1–4)
LEFT VENTRICLE DIASTOLIC VOLUME INDEX: 118.44 ML/M2
LEFT VENTRICLE DIASTOLIC VOLUME: 249.9 ML
LEFT VENTRICLE MASS INDEX: 175 G/M2
LEFT VENTRICLE SYSTOLIC VOLUME INDEX: 93.3 ML/M2
LEFT VENTRICLE SYSTOLIC VOLUME: 196.96 ML
LEFT VENTRICULAR INTERNAL DIMENSION IN DIASTOLE: 6.93 CM (ref 3.5–6)
LEFT VENTRICULAR MASS: 369.46 G
LYMPHOCYTES # BLD AUTO: 2.1 K/UL (ref 1–4.8)
LYMPHOCYTES NFR BLD: 41.3 % (ref 18–48)
MAGNESIUM SERPL-MCNC: 2 MG/DL (ref 1.6–2.6)
MCH RBC QN AUTO: 27.3 PG (ref 27–31)
MCHC RBC AUTO-ENTMCNC: 31.3 G/DL (ref 32–36)
MCV RBC AUTO: 87 FL (ref 82–98)
MONOCYTES # BLD AUTO: 0.6 K/UL (ref 0.3–1)
MONOCYTES NFR BLD: 12 % (ref 4–15)
NEUTROPHILS # BLD AUTO: 2.2 K/UL (ref 1.8–7.7)
NEUTROPHILS NFR BLD: 43.1 % (ref 38–73)
NRBC BLD-RTO: 0 /100 WBC
PHOSPHATE SERPL-MCNC: 3.7 MG/DL (ref 2.7–4.5)
PISA TR MAX VEL: 3.07 M/S
PLATELET # BLD AUTO: 293 K/UL (ref 150–450)
PMV BLD AUTO: 11.6 FL (ref 9.2–12.9)
POCT GLUCOSE: 110 MG/DL (ref 70–110)
POCT GLUCOSE: 111 MG/DL (ref 70–110)
POCT GLUCOSE: 123 MG/DL (ref 70–110)
POCT GLUCOSE: 132 MG/DL (ref 70–110)
POTASSIUM SERPL-SCNC: 3.5 MMOL/L (ref 3.5–5.1)
POTASSIUM SERPL-SCNC: 4 MMOL/L (ref 3.5–5.1)
RA MAJOR: 7.89 CM
RA PRESSURE: 15 MMHG
RA WIDTH: 5.45 CM
RBC # BLD AUTO: 4.95 M/UL (ref 4.6–6.2)
RIGHT VENTRICULAR END-DIASTOLIC DIMENSION: 5 CM
SINUS: 2.88 CM
SODIUM SERPL-SCNC: 139 MMOL/L (ref 136–145)
SODIUM SERPL-SCNC: 140 MMOL/L (ref 136–145)
STJ: 2.97 CM
TDI LATERAL: 0.1 M/S
TDI SEPTAL: 0.05 M/S
TDI: 0.08 M/S
TR MAX PG: 38 MMHG
TV REST PULMONARY ARTERY PRESSURE: 53 MMHG
WBC # BLD AUTO: 4.99 K/UL (ref 3.9–12.7)

## 2023-07-14 PROCEDURE — 25000003 PHARM REV CODE 250

## 2023-07-14 PROCEDURE — 25000003 PHARM REV CODE 250: Performed by: STUDENT IN AN ORGANIZED HEALTH CARE EDUCATION/TRAINING PROGRAM

## 2023-07-14 PROCEDURE — 94761 N-INVAS EAR/PLS OXIMETRY MLT: CPT

## 2023-07-14 PROCEDURE — 25500020 PHARM REV CODE 255: Performed by: INTERNAL MEDICINE

## 2023-07-14 PROCEDURE — 20600001 HC STEP DOWN PRIVATE ROOM

## 2023-07-14 PROCEDURE — 84100 ASSAY OF PHOSPHORUS: CPT | Performed by: INTERNAL MEDICINE

## 2023-07-14 PROCEDURE — 85025 COMPLETE CBC W/AUTO DIFF WBC: CPT | Performed by: STUDENT IN AN ORGANIZED HEALTH CARE EDUCATION/TRAINING PROGRAM

## 2023-07-14 PROCEDURE — 80048 BASIC METABOLIC PNL TOTAL CA: CPT | Mod: 91 | Performed by: INTERNAL MEDICINE

## 2023-07-14 PROCEDURE — 99233 SBSQ HOSP IP/OBS HIGH 50: CPT | Mod: ,,, | Performed by: INTERNAL MEDICINE

## 2023-07-14 PROCEDURE — 99900035 HC TECH TIME PER 15 MIN (STAT)

## 2023-07-14 PROCEDURE — 27000221 HC OXYGEN, UP TO 24 HOURS

## 2023-07-14 PROCEDURE — 83735 ASSAY OF MAGNESIUM: CPT | Performed by: INTERNAL MEDICINE

## 2023-07-14 PROCEDURE — 80048 BASIC METABOLIC PNL TOTAL CA: CPT | Performed by: INTERNAL MEDICINE

## 2023-07-14 PROCEDURE — 99233 PR SUBSEQUENT HOSPITAL CARE,LEVL III: ICD-10-PCS | Mod: ,,, | Performed by: INTERNAL MEDICINE

## 2023-07-14 RX ORDER — HYDROMORPHONE HYDROCHLORIDE 1 MG/ML
0.5 INJECTION, SOLUTION INTRAMUSCULAR; INTRAVENOUS; SUBCUTANEOUS EVERY 12 HOURS PRN
Status: DISCONTINUED | OUTPATIENT
Start: 2023-07-14 | End: 2023-07-16 | Stop reason: HOSPADM

## 2023-07-14 RX ORDER — CARVEDILOL 6.25 MG/1
6.25 TABLET ORAL 2 TIMES DAILY
Status: DISCONTINUED | OUTPATIENT
Start: 2023-07-14 | End: 2023-07-15

## 2023-07-14 RX ORDER — POTASSIUM CHLORIDE 20 MEQ/1
40 TABLET, EXTENDED RELEASE ORAL ONCE
Status: DISCONTINUED | OUTPATIENT
Start: 2023-07-14 | End: 2023-07-14

## 2023-07-14 RX ORDER — AMOXICILLIN 250 MG
1 CAPSULE ORAL 2 TIMES DAILY
Status: DISCONTINUED | OUTPATIENT
Start: 2023-07-14 | End: 2023-07-16 | Stop reason: HOSPADM

## 2023-07-14 RX ORDER — DICLOFENAC SODIUM 10 MG/G
4 GEL TOPICAL 3 TIMES DAILY
Status: DISCONTINUED | OUTPATIENT
Start: 2023-07-14 | End: 2023-07-16 | Stop reason: HOSPADM

## 2023-07-14 RX ADMIN — APIXABAN 5 MG: 5 TABLET, FILM COATED ORAL at 08:07

## 2023-07-14 RX ADMIN — SACUBITRIL AND VALSARTAN 1 TABLET: 49; 51 TABLET, FILM COATED ORAL at 08:07

## 2023-07-14 RX ADMIN — CARVEDILOL 6.25 MG: 6.25 TABLET, FILM COATED ORAL at 09:07

## 2023-07-14 RX ADMIN — SENNOSIDES AND DOCUSATE SODIUM 1 TABLET: 50; 8.6 TABLET ORAL at 09:07

## 2023-07-14 RX ADMIN — ONDANSETRON 8 MG: 8 TABLET, ORALLY DISINTEGRATING ORAL at 01:07

## 2023-07-14 RX ADMIN — AMIODARONE HYDROCHLORIDE 200 MG: 200 TABLET ORAL at 08:07

## 2023-07-14 RX ADMIN — FERROUS SULFATE TAB 325 MG (65 MG ELEMENTAL FE) 1 EACH: 325 (65 FE) TAB at 08:07

## 2023-07-14 RX ADMIN — OXYCODONE HYDROCHLORIDE AND ACETAMINOPHEN 1 TABLET: 5; 325 TABLET ORAL at 08:07

## 2023-07-14 RX ADMIN — HUMAN ALBUMIN MICROSPHERES AND PERFLUTREN 0.66 MG: 10; .22 INJECTION, SOLUTION INTRAVENOUS at 02:07

## 2023-07-14 RX ADMIN — ATORVASTATIN CALCIUM 80 MG: 40 TABLET, FILM COATED ORAL at 08:07

## 2023-07-14 RX ADMIN — DICLOFENAC SODIUM 4 G: 10 GEL TOPICAL at 03:07

## 2023-07-14 RX ADMIN — DICLOFENAC SODIUM 4 G: 10 GEL TOPICAL at 08:07

## 2023-07-14 RX ADMIN — DICLOFENAC SODIUM 4 G: 10 GEL TOPICAL at 10:07

## 2023-07-14 RX ADMIN — OXYCODONE HYDROCHLORIDE AND ACETAMINOPHEN 1 TABLET: 5; 325 TABLET ORAL at 07:07

## 2023-07-14 RX ADMIN — CLOPIDOGREL BISULFATE 75 MG: 75 TABLET ORAL at 08:07

## 2023-07-14 RX ADMIN — POTASSIUM BICARBONATE 50 MEQ: 978 TABLET, EFFERVESCENT ORAL at 05:07

## 2023-07-14 NOTE — HOSPITAL COURSE
Admitted for hypertensive urgency and demand ischemia. Pt was restarted on his home GDMT and diuresed, and weaned off NTG gtt on 7/14. Patient reports ongoing, constant 2/10 chest pain however his pain sounds more pleuritic and musculoskeletal rather than anginal. Chest pain relieved with voltaren gel and percocet. Patient was stepped down to the floor on 7/14. Overnight 7/14 patient's blood pressure was low while lying in bed, and his home GDMT was held. Entresto was decreased from 49/51 to 24/26 and Toprol at 25 - patient tolerated these doses well. During admission patient also had fluctuant creatinine though on discharge this was stable. Patient was discharged on stable condition on 7/16 with reduced doses of GDMT, and HTS follow up.

## 2023-07-14 NOTE — PLAN OF CARE
Cardiac ICU Care Plan    POC reviewed with Tera Griffiths and family. Questions and concerns addressed. Pt progressing toward goals. See below and flowsheets for full assessment and VS info.    -pt only had 350 output. MD notified, ordered to continue monitoring intake and output.       Neuro:  [place] Coma Scale  Best Eye Response: 4-->(E4) spontaneous  Best Motor Response: 6-->(M6) obeys commands  Best Verbal Response: 5-->(V5) oriented  [place] Coma Scale Score: 15  Assessment Qualifiers: patient not sedated/intubated  Pupil PERRLA: yes    24 hr Temp:  [97.5 °F (36.4 °C)-97.8 °F (36.6 °C)]      CV:  Rhythm: atrial rhythm   DVT prophylaxis: VTE Required Core Measure: Pharmacological prophylaxis initiated/maintained                            Pulses  Right Radial Pulse: 2+ (normal)  Left Radial Pulse: 2+ (normal)  Right Dorsalis Pedis Pulse: 2+ (normal)  Left Dorsalis Pedis Pulse: 2+ (normal)  Right Posterior Tibial Pulse: 1+ (weak)  Left Posterior Tibial Pulse: 1+ (weak)    Resp:  Flow (L/min): 2       GI/:  GI prophylaxis: yes  Diet/Nutrition Received: 2 gram sodium, low saturated fat/low cholesterol  Last Bowel Movement: 07/13/23      Intake/Output Summary (Last 24 hours) at 7/14/2023 1746  Last data filed at 7/14/2023 1701  Gross per 24 hour   Intake 1047.8 ml   Output 1300 ml   Net -252.2 ml            Labs/Accuchecks:  Recent Labs   Lab 07/13/23  1444 07/13/23 2206 07/14/23  0204   WBC 3.64* 4.41 4.99   RBC 5.05 4.66 4.95   HGB 13.7* 12.8* 13.5*   HCT 45.1 40.3 43.2    273 293      Recent Labs   Lab 07/13/23  1602   INR 1.0   APTT 26.2      Recent Labs     07/13/23  1556 07/13/23 2206 07/14/23  1603      < > 139   K 3.6   < > 4.0   CO2 24   < > 30*      < > 102   BUN 18   < > 21*   CREATININE 1.3   < > 1.4   ALKPHOS 65  --   --    ALT 14  --   --    AST 16  --   --    BILITOT 0.5  --   --     < > = values in this interval not displayed.       Recent Labs   Lab 07/13/23  5748  07/13/23  1718   TROPONINI 0.142* 0.148*    No results for input(s): PH, PCO2, PO2, HCO3, POCSATURATED, BE in the last 72 hours.    Electrolytes: Electrolytes replaced  Accuchecks: ACHS    Gtts/LDAs:      Lines/Drains/Airways       Peripheral Intravenous Line  Duration                  Peripheral IV - Single Lumen 07/13/23 1411 18 G Right Antecubital 1 day         Peripheral IV - Single Lumen 07/13/23 1603 18 G Left Antecubital 1 day         Peripheral IV - Single Lumen 07/13/23 1714 20 G Posterior;Right Hand 1 day                    Skin/Wounds  Bathing/Skin Care: bath, complete;dressed/undressed (07/14/23 1701)  Wounds: No  Wound care consulted: No

## 2023-07-14 NOTE — ASSESSMENT & PLAN NOTE
Mr. Griffiths is a 57 y.o. M with PMHx stage C HFrEF, ICM, CAD s/p (STEMI s/p PCI to Rcx (8/2021), previous GIB,  PAF s/p DCCV, uncontrolled HTN who presented to the ED w/ c/o of sudden SOB and chest pressure. Patient was discharged 1 month ago with the same clinical presentation and required nitro gtt for HTN urgency. He presented in hypertensive urgency and rate controlled AF. He was started on a nitro gtt in the ED and was given 80mg of IV lasix with good urine output of 2L. They were unable to wean his drip as BP >200/115. Patient was then admitted to CCU.     Plan:  - Now off NTG gtt  - Will uptitrate PO medications as able to - on entresto and carvedilol  - Chest pressure with tenderness on exam not suggestive for ischemia   - Voltaren gel pain therapy for chest pain  - Step down to the floor today

## 2023-07-14 NOTE — HPI
Mr. Griffiths is a 57 y.o. M with PMHx stage C HFrEF, ICM, CAD s/p (STEMI s/p PCI to Rcx (8/2021), previous GIB,  PAF s/p DCCV, uncontrolled HTN who presented to the ED w/ c/o of sudden SOB and chest pressure. Patient was discharged 1 month ago with the same clinical presentation and required nitro gtt for HTN urgency. He presented today in hypertensive urgency and rate controlled AF. He was started on a nitro gtt in the ED and was given 80mg of IV lasix with good urine output of 2L. They were unable to wean his drip as diastolic BP >115.     ECG: LVH, LBBB, AF rate controlled. Bedside echo showed global hypokinesis w/ an LVEF 10-15%. No pericardial effusion and a pleural effusion present. He admits orthopnea, SHELBY, and chest pressure. Denies palpitations, syncope, dizziness or any other complaints. Says he has been complaint with all his medications and was doing well until this morning.         CTA:   No evidence of central PE.  The distal pulmonary tree are poorly evaluated given motion limitations.  Suggest correlation for possible DVT study.     Stable cardiomegaly without evidence of decompensated CHF.     Stable pulmonary trunk ectasia as well as intrapulmonary arterial dilatation, which is often seen in pulmonary arterial hypertension.

## 2023-07-14 NOTE — ASSESSMENT & PLAN NOTE
-Continue diuresis as needed -goal net negative 1-2L  - Continue GDMT: metoprolol suc 25mg and entresto 49-51  - Will obtain a formal echo in the am

## 2023-07-14 NOTE — PLAN OF CARE
[facility] - Cardiac Intensive Care  Initial Discharge Assessment       Primary Care Provider: Carleen Bueno MD    Admission Diagnosis: Non-ST elevation myocardial infarction (NSTEMI) [I21.4]  NSTEMI (non-ST elevation myocardial infarction) [I21.4]  NSTEMI (non-ST elevated myocardial infarction) [I21.4]  Chest pain [R07.9]  Hypertensive emergency [I16.1]  Acute on chronic congestive heart failure, unspecified heart failure type [I50.9]    Admission Date: 7/13/2023  Expected Discharge Date: 7/15/2023    Transition of Care Barriers: Underinsured    Payor: MEDICAID / Plan: [facility] [employer] PLAN Clerk [email or web address] ([place] MEDICAID) / Product Type: Managed Medicaid /     Extended Emergency Contact Information  Primary Emergency Contact: MADELYN CARTAGENA  Mobile Phone: [phone]  Relation: Sister  Preferred language: English   needed? No  Secondary Emergency Contact: MAHNAZ CARTAGENA  Mobile Phone: [phone]  Relation: Relative  Preferred language: English   needed? No    Discharge Plan A: Home with family  Discharge Plan B: Home with family, Home Health      [facility] [address] [place], [place] - [address]  [address]  [id number]  [facility] [address]  Phone: [phone] Fax: [phone]      Initial Assessment (most recent)       Adult Discharge Assessment - 07/14/23 1321          Discharge Assessment    Assessment Type Discharge Planning Assessment     Confirmed/corrected address, phone number and insurance Yes     Confirmed Demographics Correct on Facesheet     Source of Information patient     Communicated YUSEF with patient/caregiver Date not available/Unable to determine     Reason For Admission non ST elevation     People in Home other relative(s);sibling(s)     Facility Arrived From: home     Do you expect to return to your current living situation? Yes     Do you have help at home or someone to help you manage your care at home? Yes     Who are your caregiver(s) and  their phone number(s)? Bisi Griffiths (cousin)     Prior to hospitilization cognitive status: Alert/Oriented     Current cognitive status: Alert/Oriented     Equipment Currently Used at Home none     Readmission within 30 days? No     Patient currently being followed by outpatient case management? No     Do you currently have service(s) that help you manage your care at home? No     Do you take prescription medications? Yes     Do you have prescription coverage? Yes     Coverage medicaid [employer] Community Plan     Do you have any problems affording any of your prescribed medications? No     Is the patient taking medications as prescribed? yes     Who is going to help you get home at discharge? cousin Cristhian or cousin Bisi Griffiths     How do you get to doctors appointments? car, drives self;family or friend will provide     Are you on dialysis? No     Do you take coumadin? No     Discharge Plan A Home with family     Discharge Plan B Home with family;Home Health     DME Needed Upon Discharge  none     Discharge Plan discussed with: Patient     Transition of Care Barriers Underinsured        Physical Activity    On average, how many days per week do you engage in moderate to strenuous exercise (like a brisk walk)? 0 days     On average, how many minutes do you engage in exercise at this level? 0 min        Financial Resource Strain    How hard is it for you to pay for the very basics like food, housing, medical care, and heating? Not hard at all        Housing Stability    In the last 12 months, was there a time when you were not able to pay the mortgage or rent on time? No     In the last 12 months, how many places have you lived? 1     In the last 12 months, was there a time when you did not have a steady place to sleep or slept in a shelter (including now)? No        Transportation Needs    In the past 12 months, has lack of transportation kept you from medical appointments or from getting medications? No     In  the past 12 months, has lack of transportation kept you from meetings, work, or from getting things needed for daily living? No        Food Insecurity    Within the past 12 months, you worried that your food would run out before you got the money to buy more. Never true     Within the past 12 months, the food you bought just didn't last and you didn't have money to get more. Never true        Stress    Do you feel stress - tense, restless, nervous, or anxious, or unable to sleep at night because your mind is troubled all the time - these days? To some extent        Social Connections    In a typical week, how many times do you talk on the phone with family, friends, or neighbors? More than three times a week     How often do you get together with friends or relatives? More than three times a week     How often do you attend Islam or Taoist services? Never     Do you belong to any clubs or organizations such as Islam groups, unions, fraternal or athletic groups, or school groups? No     How often do you attend meetings of the clubs or organizations you belong to? Never     Are you , , , , never , or living with a partner? Never         Alcohol Use    Q1: How often do you have a drink containing alcohol? Never     Q2: How many drinks containing alcohol do you have on a typical day when you are drinking? Patient does not drink     Q3: How often do you have six or more drinks on one occasion? Never        OTHER    Name(s) of People in Home Claribel Griffiths (sister) [phone] or Cristhian Kingdhiraj (cousin) [phone] or Bisi Christinedhiraj (cousin)[phone]                      CM met with patient at the bedside. Discussed the discharge plan and attempted to give him the discharge booklet and he refused stated he just got one recently . Wrote contact numbers on the white board in the room. He verified his name , , insurance and PCP. Patient lives in an apartment upstairs  with 15 steps to port of entry. He rolly with several cousins in the same house . Cristhian or Michaeltte will most likely bring him home at time of discharge. He owns no DME , and is not on coumadin and not dialysis. Will continue to monitor for discharge needs.     Marichuy Pérez RN CM   [phone]

## 2023-07-14 NOTE — NURSING TRANSFER
Nursing Transfer Note      7/14/2023   6:31 PM    Nurse giving handoff:Evelyn Ochoa receiving handoff:Leandro    Reason patient is being transferred: Stepped down    Transfer To: 349    Transfer via wheelchair    Transfer with cardiac monitoring    Transported by Evelyn RN    Transfer Vital Signs:  Blood Pressure:113/62  Heart Rate: 56  Temperature: 96.8  Respirations:20    Telemetry: #[id number]  Order for Tele Monitor? Yes      Medicines sent: Voltaren     Any special needs or follow-up needed: Monitor intake and output    Patient belongings transferred with patient: Yes    Chart send with patient: Yes    Upon arrival to floor: cardiac monitor applied, patient oriented to room, call bell in reach, and bed in lowest position

## 2023-07-14 NOTE — ED NOTES
Pt care assumed. Report received by Gina RN. Pt lying in stretcher in low and locked position and side rails raised x2. Call light, pt's belongings, and bedside table within pt's reach. Pt on continuous cardiac monitoring, pulse oximetry, and BP cycling every 30 minutes. Pt in NAD and verbalized no needs at this time.

## 2023-07-14 NOTE — ASSESSMENT & PLAN NOTE
-rate controlled AF. Continue amiodarone 200mg BID  - continue eliquis 5mg BID  - can consider cardioversion once more euvolemic

## 2023-07-14 NOTE — ASSESSMENT & PLAN NOTE
Mr. Griffiths is a 57 y.o. M with PMHx stage C HFrEF, ICM, CAD s/p (STEMI s/p PCI to Rcx (8/2021), previous GIB,  PAF s/p DCCV, uncontrolled HTN who presented to the ED w/ c/o of sudden SOB and chest pressure. Patient was discharged 1 month ago with the same clinical presentation and required nitro gtt for HTN urgency. He presented today in hypertensive urgency and rate controlled AF. He was started on a nitro gtt in the ED and was given 80mg of IV lasix with good urine output of 2L. They were unable to wean his drip as diastolic BP >115. Patient was then admitted to CCU.     Plan:  - Continue nitro gtt to keep BP <200/100 and will add nicardipine if needed.  - Will uptitrate PO medications as able too.  - Will obtain a renal artery US  - Continue diuresing as needed- goal net negative 1-2L  - Chest pressure with tenderness on exam not suggestive for ischemia.

## 2023-07-14 NOTE — SUBJECTIVE & OBJECTIVE
Past Medical History:   Diagnosis Date    Atrial fibrillation     Encounter for blood transfusion        Past Surgical History:   Procedure Laterality Date    CARDIAC DEFIBRILLATOR PLACEMENT Left     HERNIA REPAIR      LEFT HEART CATHETERIZATION Left 4/18/2023    Procedure: Left heart cath;  Surgeon: Atilio Marks MD;  Location: [facility] CATH LAB;  Service: Cardiology;  Laterality: Left;    RIGHT HEART CATHETERIZATION Right 9/30/2022    Procedure: INSERTION, CATHETER, RIGHT HEART;  Surgeon: Caden Aden MD;  Location: [facility] CATH LAB;  Service: Cardiology;  Laterality: Right;    TREATMENT OF CARDIAC ARRHYTHMIA N/A 11/22/2022    Procedure: CARDIOVERSION;  Surgeon: Marvin Sanon MD;  Location: [facility] EP LAB;  Service: Cardiology;  Laterality: N/A;  afib, KIA, DCCV, anes, MB, 3 Prep       Review of patient's allergies indicates:  No Known Allergies    No current facility-administered medications on file prior to encounter.     Current Outpatient Medications on File Prior to Encounter   Medication Sig    amiodarone (PACERONE) 200 MG Tab Take 1 tablet (200 mg total) by mouth 2 (two) times daily.    apixaban (ELIQUIS) 5 mg Tab Take 5 mg by mouth 2 (two) times daily.    atorvastatin (LIPITOR) 80 MG tablet Take 1 tablet (80 mg total) by mouth once daily.    clopidogreL (PLAVIX) 75 mg tablet Take 1 tablet (75 mg total) by mouth once daily.    empagliflozin (JARDIANCE) 10 mg tablet Take 1 tablet (10 mg total) by mouth once daily.    ferrous sulfate (FEOSOL) 325 mg (65 mg iron) Tab tablet Take 325 mg by mouth every other day.    isosorbide mononitrate (ISMO,MONOKET) 10 mg tablet Take 1 tablet (10 mg total) by mouth 2 (two) times daily. Hold until seen by cardiologist (Patient not taking: Reported on 6/13/2023)    LIDOcaine (LIDODERM) 5 % Place 1 patch onto the skin once daily.    metoprolol succinate (TOPROL-XL) 25 MG 24 hr tablet Take 1 tablet (25 mg total) by mouth every evening.    sacubitriL-valsartan (ENTRESTO) 49-51  mg per tablet Take 1 tablet by mouth 2 (two) times daily.    torsemide (DEMADEX) 20 MG Tab Take 1 tablet (20 mg total) by mouth once daily.     Family History    None       Tobacco Use    Smoking status: Never    Smokeless tobacco: Never   Substance and Sexual Activity    Alcohol use: Never    Drug use: Never    Sexual activity: Not on file     Review of Systems   Constitutional: Negative for diaphoresis and malaise/fatigue.   HENT:  Negative for sore throat.    Eyes:  Negative for double vision.   Cardiovascular:  Positive for dyspnea on exertion and orthopnea. Negative for chest pain, leg swelling, palpitations, paroxysmal nocturnal dyspnea and syncope.   Respiratory:  Positive for shortness of breath. Negative for cough.    Musculoskeletal:  Negative for muscle cramps and muscle weakness.   Gastrointestinal:  Negative for bloating, nausea and vomiting.   Genitourinary:  Negative for dysuria and flank pain.   Neurological:  Negative for focal weakness and weakness.   Psychiatric/Behavioral:  Negative for substance abuse. The patient is nervous/anxious.    Objective:     Vital Signs (Most Recent):  Temp: 97.4 °F (36.3 °C) (07/13/23 1330)  Pulse: 62 (07/13/23 1852)  Resp: (!) 25 (07/13/23 1852)  BP: (!) 141/86 (07/13/23 1930)  SpO2: 98 % (07/13/23 1852) Vital Signs (24h Range):  Temp:  [97.4 °F (36.3 °C)] 97.4 °F (36.3 °C)  Pulse:  [62-88] 62  Resp:  [19-27] 25  SpO2:  [92 %-100 %] 98 %  BP: (130-204)/() 141/86     Weight: 88.5 kg (195 lb)  Body mass index is 26.45 kg/m².    SpO2: 98 %         Intake/Output Summary (Last 24 hours) at 7/13/2023 1943  Last data filed at 7/13/2023 1724  Gross per 24 hour   Intake --   Output 2075 ml   Net -2075 ml       Lines/Drains/Airways       Peripheral Intravenous Line  Duration                  Peripheral IV - Single Lumen 07/13/23 1411 18 G Right Antecubital <1 day         Peripheral IV - Single Lumen 07/13/23 1603 18 G Left Antecubital <1 day         Peripheral IV -  Single Lumen 07/13/23 1714 20 G Posterior;Right Hand <1 day                     Physical Exam  Vitals reviewed.   Constitutional:       General: He is not in acute distress.     Appearance: He is ill-appearing. He is not toxic-appearing or diaphoretic.   HENT:      Head: Normocephalic and atraumatic.      Nose: Nose normal.      Mouth/Throat:      Mouth: Mucous membranes are dry.      Pharynx: Oropharynx is clear.   Eyes:      Extraocular Movements: Extraocular movements intact.      Conjunctiva/sclera: Conjunctivae normal.   Cardiovascular:      Rate and Rhythm: Normal rate. Rhythm irregular.      Pulses: Normal pulses.      Comments: Left sided chest tenderness on palpation  Pulmonary:      Effort: No respiratory distress.      Breath sounds: Rales present.      Comments: Decreased breath sounds BL lower lobes  Abdominal:      General: Abdomen is flat. Bowel sounds are normal.      Palpations: Abdomen is soft.   Musculoskeletal:         General: No swelling or tenderness. Normal range of motion.      Cervical back: Normal range of motion and neck supple.   Skin:     General: Skin is warm.      Capillary Refill: Capillary refill takes less than 2 seconds.   Neurological:      General: No focal deficit present.      Mental Status: He is alert and oriented to person, place, and time.   Psychiatric:         Mood and Affect: Mood normal.         Thought Content: Thought content normal.        Significant Labs:   Recent Lab Results  (Last 5 results in the past 24 hours)        07/13/23  1823   07/13/23  1718   07/13/23  1603   07/13/23  1602   07/13/23  1556        Albumin         4.1       Alkaline Phosphatase         65       ALT         14       Anion Gap         11       Appearance, UA     Clear           aPTT       26.2  Comment: Refer to local heparin nomogram for intensity/dose specific   therapeutic   range.           AST         16       Bilirubin (UA)     Negative           BILIRUBIN TOTAL          0.5  Comment: For infants and newborns, interpretation of results should be based  on gestational age, weight and in agreement with clinical  observations.    Premature Infant recommended reference ranges:  Up to 24 hours.............<8.0 mg/dL  Up to 48 hours............<12.0 mg/dL  3-5 days..................<15.0 mg/dL  6-29 days.................<15.0 mg/dL         BUN         18       Calcium         9.5       Chloride         104       Cholesterol   211  Comment: The National Cholesterol Education Program (NCEP) has set the  following guidelines (reference ranges) for Cholesterol:  Optimal.....................<200 mg/dL  Borderline High.............200-239 mg/dL  High........................> or = 240 mg/dL               CO2         24       Color, UA     Colorless           Creatinine         1.3       eGFR         >60.0       Estimated Avg Glucose   114             Glucose         96       Glucose, UA     Negative           Group & Rh   B POS             HDL   50  Comment: The National Cholesterol Education Program (NCEP) has set the  following guidelines (reference values) for HDL Cholesterol:  Low...............<40 mg/dL  Optimal...........>60 mg/dL               HDL/Cholesterol Ratio   23.7             Hemoglobin A1C External   5.6  Comment: ADA Screening Guidelines:  5.7-6.4%  Consistent with prediabetes  >or=6.5%  Consistent with diabetes    High levels of fetal hemoglobin interfere with the HbA1C  assay. Heterozygous hemoglobin variants (HbS, HgC, etc)do  not significantly interfere with this assay.   However, presence of multiple variants may affect accuracy.               INDIRECT SOPHIA   NEG             INR       1.0  Comment: Coumadin Therapy:  2.0 - 3.0 for INR for all indicators except mechanical heart valves  and antiphospholipid syndromes which should use 2.5 - 3.5.           Ketones, UA     Negative           Lactate, Rogelio 1.0  Comment: Falsely low lactic acid results can be found in samples    containing >=13.0 mg/dL total bilirubin and/or >=3.5 mg/dL   direct bilirubin.                 LDL Cholesterol External   147.6  Comment: The National Cholesterol Education Program (NCEP) has set the  following guidelines (reference values) for LDL Cholesterol:  Optimal.......................<130 mg/dL  Borderline High...............130-159 mg/dL  High..........................160-189 mg/dL  Very High.....................>190 mg/dL               Leukocytes, UA     Negative           NITRITE UA     Negative           Non-HDL Cholesterol   161  Comment: Risk category and Non-HDL cholesterol goals:  Coronary heart disease (CHD)or equivalent (10-year risk of CHD >20%):  Non-HDL cholesterol goal     <130 mg/dL  Two or more CHD risk factors and 10-year risk of CHD <= 20%:  Non-HDL cholesterol goal     <160 mg/dL  0 to 1 CHD risk factor:  Non-HDL cholesterol goal     <190 mg/dL               Occult Blood UA     Negative           pH, UA     7.0           Potassium         3.6       PROTEIN TOTAL         7.6       Protein, UA     Negative  Comment: Recommend a 24 hour urine protein or a urine   protein/creatinine ratio if globulin induced proteinuria is  clinically suspected.             Protime       10.9         Sodium         139       Specific [place], UA     1.005           Specimen Outdate   07/16/2023 23:59             Specimen UA     Urine, Clean Catch           Total Cholesterol/HDL Ratio   4.2             Triglycerides   67  Comment: The National Cholesterol Education Program (NCEP) has set the  following guidelines (reference values) for triglycerides:  Normal......................<150 mg/dL  Borderline High.............150-199 mg/dL  High........................200-499 mg/dL               Troponin I   0.148  Comment: The reference interval for Troponin I represents the 99th percentile   cutoff   for our facility and is consistent with 3rd generation assay   performance.                                       Significant Imaging: Echocardiogram: Transthoracic echo (TTE) complete (Cupid Only):   Results for orders placed or performed during the hospital encounter of 03/09/23   Echo   Result Value Ref Range    Ascending aorta 3.59 cm    STJ 2.73 cm    AV mean gradient 5 mmHg    Ao peak rome 1.62 m/s    Ao VTI 27.23 cm    IVS 1.03 0.6 - 1.1 cm    LA size 5.70 cm    Left Atrium Major Axis 7.92 cm    Left Atrium Minor Axis 8.39 cm    LVIDd 7.36 (A) 3.5 - 6.0 cm    LVIDs 6.85 (A) 2.1 - 4.0 cm    LVOT diameter 2.63 cm    LVOT peak VTI 14.07 cm    Posterior Wall 1.07 0.6 - 1.1 cm    MV Peak A [place] 0.40 m/s    E wave deceleration time 221.16 msec    MV Peak E [place] 0.52 m/s    RA Major Axis 7.56 cm    RA Width 5.35 cm    RVDD 5.86 cm    Sinus 3.42 cm    TAPSE 1.69 cm    TR Max [place] 2.19 m/s    TDI LATERAL 0.04 m/s    TDI SEPTAL 0.03 m/s    LA WIDTH 5.03 cm    MV stenosis pressure 1/2 time 64.13 ms    LV Diastolic Volume 285.94 mL    LV Systolic Volume 242.94 mL    LVOT peak rome 0.89 m/s    LA volume (mod) 119.61 cm3    LV LATERAL E/E' RATIO 13.00 m/s    LV SEPTAL E/E' RATIO 17.33 m/s    FS 7 %    LA volume 198.58 cm3    LV mass 373.25 g    Left Ventricle Relative Wall Thickness 0.29 cm    AV valve area 2.81 cm2    AV Velocity Ratio 0.55     AV index (prosthetic) 0.52     MV valve area p 1/2 method 3.43 cm2    E/A ratio 1.30     Mean e' 0.04 m/s    LVOT area 5.4 cm2    LVOT stroke volume 76.40 cm3    AV peak gradient 10 mmHg    E/E' ratio 14.86 m/s    LV Systolic Volume Index 115.7 mL/m2    LV Diastolic Volume Index 136.16 mL/m2    LA Volume Index 94.6 mL/m2    LV Mass Index 178 g/m2    Triscuspid Valve Regurgitation Peak Gradient 19 mmHg    LA Volume Index (Mod) 57.0 mL/m2    BSA 2.11 m2    Right Atrial Pressure (from IVC) 3 mmHg    EF 18 %    TV rest pulmonary artery pressure 22 mmHg    Narrative    · The left ventricle is severely enlarged with severe eccentric   hypertrophy and severely decreased systolic function.  · Severe  left atrial enlargement.  · The estimated ejection fraction is 18%( probably upper teens to at most   low 20s).  · There is severe left ventricular global hypokinesis.  · Grade II left ventricular diastolic dysfunction.  · Moderate right ventricular enlargement with mildly reduced right   ventricular systolic function.  · Severe right atrial enlargement.  · Mild tricuspid regurgitation.  · Normal central venous pressure (3 mmHg).  · The estimated PA systolic pressure is 22 mmHg.  · Small posterior pericardial effusion.

## 2023-07-14 NOTE — PROGRESS NOTES
[facility]  Cardiology  Progress Note    Patient Name: Tera Griffiths  MRN: [id number]  Admission Date: 7/13/2023  Hospital Length of Stay: 1 days  Code Status: Full Code   Attending Physician: Milad Nassar MD   Primary Care Physician: Carleen Bueno MD  Expected Discharge Date:   Principal Problem:Hypertensive urgency    Subjective:     Hospital Course:   Admitted for hypertensive urgency and demand ischemia. Pt weaned off NTG gtt on 7/14, patient reports chest pain however sounds more pleuritic and MSK than anginal. BP meds restarted and stepped down to the floor on 7/14.      Interval History: Ongoing chest pain however sounds MSK and pleuritic rather than anginal. Off NTG gtt.  BP better controlled now.   Will stepdown to floor.  Paused diuresis due to fluctuating renal function.    Review of Systems   Constitutional: Negative for diaphoresis and malaise/fatigue.   HENT:  Negative for sore throat.    Eyes:  Negative for double vision.   Cardiovascular:  Positive for chest pain (non-cardiac). Negative for dyspnea on exertion, leg swelling, orthopnea, palpitations, paroxysmal nocturnal dyspnea and syncope.   Respiratory:  Negative for cough and shortness of breath.    Musculoskeletal:  Negative for muscle cramps and muscle weakness.   Gastrointestinal:  Negative for bloating, nausea and vomiting.   Genitourinary:  Negative for dysuria and flank pain.   Neurological:  Negative for focal weakness and weakness.   Psychiatric/Behavioral:  Negative for substance abuse. The patient is nervous/anxious.    Objective:     Vital Signs (Most Recent):  Temp: 97.7 °F (36.5 °C) (07/14/23 0701)  Pulse: 63 (07/14/23 0959)  Resp: 18 (07/14/23 0920)  BP: 121/79 (07/14/23 0959)  SpO2: 100 % (07/14/23 0920) Vital Signs (24h Range):  Temp:  [97.4 °F (36.3 °C)-97.8 °F (36.6 °C)] 97.7 °F (36.5 °C)  Pulse:  [53-88] 63  Resp:  [17-27] 18  SpO2:  [92 %-100 %] 100 %  BP: (121-204)/() 121/79     Weight: 87.3  kg (192 lb 7.4 oz)  Body mass index is 26.1 kg/m².     SpO2: 100 %         Intake/Output Summary (Last 24 hours) at 7/14/2023 1024  Last data filed at 7/14/2023 0717  Gross per 24 hour   Intake 587.8 ml   Output 3125 ml   Net -2537.2 ml       Lines/Drains/Airways       Peripheral Intravenous Line  Duration                  Peripheral IV - Single Lumen 07/13/23 1411 18 G Right Antecubital <1 day         Peripheral IV - Single Lumen 07/13/23 1603 18 G Left Antecubital <1 day         Peripheral IV - Single Lumen 07/13/23 1714 20 G Posterior;Right Hand <1 day                       Physical Exam  Vitals reviewed.   Constitutional:       Appearance: He is not ill-appearing.   HENT:      Mouth/Throat:      Mouth: Mucous membranes are moist.   Cardiovascular:      Rate and Rhythm: Normal rate. Rhythm irregular.      Pulses: Normal pulses.      Comments: Left sided chest tenderness on palpation  Pulmonary:      Effort: No respiratory distress.      Breath sounds: No rales.   Abdominal:      Palpations: Abdomen is soft.      Tenderness: There is no abdominal tenderness.   Musculoskeletal:         General: No swelling or tenderness. Normal range of motion.   Skin:     General: Skin is warm.      Capillary Refill: Capillary refill takes less than 2 seconds.   Neurological:      General: No focal deficit present.      Mental Status: He is alert and oriented to person, place, and time.   Psychiatric:         Mood and Affect: Mood normal.          Significant Labs: All pertinent lab results from the last 24 hours have been reviewed.    Significant Imaging:  Reviewed    Assessment and Plan:     * Hypertensive urgency  Mr. Griffiths is a 57 y.o. M with PMHx stage C HFrEF, ICM, CAD s/p (STEMI s/p PCI to Rcx (8/2021), previous GIB,  PAF s/p DCCV, uncontrolled HTN who presented to the ED w/ c/o of sudden SOB and chest pressure. Patient was discharged 1 month ago with the same clinical presentation and required nitro gtt for HTN urgency.  He presented in hypertensive urgency and rate controlled AF. He was started on a nitro gtt in the ED and was given 80mg of IV lasix with good urine output of 2L. They were unable to wean his drip as BP >200/115. Patient was then admitted to CCU.     Plan:  - Now off NTG gtt  - Will uptitrate PO medications as able to - on entresto and carvedilol  - Chest pressure with tenderness on exam not suggestive for ischemia   - Voltaren gel pain therapy for chest pain  - Step down to the floor today    Dyslipidemia  -Continue statin    Acute on chronic combined systolic and diastolic heart failure  - Holding diuresis today, will resume PO torsemide tomorrow  - Continue GDMT: entresto 49-5, start carvedilol 6.25mg BID  - Pending repeat echo    Paroxysmal A-fib  - rate controlled AF. Continue amiodarone 200mg BID and carvedilol   - continue eliquis 5mg BID      VTE Risk Mitigation (From admission, onward)         Ordered     apixaban tablet 5 mg  2 times daily         07/13/23 2140     Reason for No Pharmacological VTE Prophylaxis  Once        Question:  Reasons:  Answer:  Already adequately anticoagulated on oral Anticoagulants    07/13/23 1701     IP VTE HIGH RISK PATIENT  Once         07/13/23 1701     Place sequential compression device  Until discontinued         07/13/23 1701                Radha Mason MD  Cardiology  [facility]

## 2023-07-14 NOTE — SUBJECTIVE & OBJECTIVE
Interval History: Ongoing chest pain however sounds MSK and pleuritic rather than anginal. Off NTG gtt.  BP better controlled now.   Will stepdown to floor.  Paused diuresis due to fluctuating renal function.    Review of Systems   Constitutional: Negative for diaphoresis and malaise/fatigue.   HENT:  Negative for sore throat.    Eyes:  Negative for double vision.   Cardiovascular:  Positive for chest pain (non-cardiac). Negative for dyspnea on exertion, leg swelling, orthopnea, palpitations, paroxysmal nocturnal dyspnea and syncope.   Respiratory:  Negative for cough and shortness of breath.    Musculoskeletal:  Negative for muscle cramps and muscle weakness.   Gastrointestinal:  Negative for bloating, nausea and vomiting.   Genitourinary:  Negative for dysuria and flank pain.   Neurological:  Negative for focal weakness and weakness.   Psychiatric/Behavioral:  Negative for substance abuse. The patient is nervous/anxious.    Objective:     Vital Signs (Most Recent):  Temp: 97.7 °F (36.5 °C) (07/14/23 0701)  Pulse: 63 (07/14/23 0959)  Resp: 18 (07/14/23 0920)  BP: 121/79 (07/14/23 0959)  SpO2: 100 % (07/14/23 0920) Vital Signs (24h Range):  Temp:  [97.4 °F (36.3 °C)-97.8 °F (36.6 °C)] 97.7 °F (36.5 °C)  Pulse:  [53-88] 63  Resp:  [17-27] 18  SpO2:  [92 %-100 %] 100 %  BP: (121-204)/() 121/79     Weight: 87.3 kg (192 lb 7.4 oz)  Body mass index is 26.1 kg/m².     SpO2: 100 %         Intake/Output Summary (Last 24 hours) at 7/14/2023 1024  Last data filed at 7/14/2023 0717  Gross per 24 hour   Intake 587.8 ml   Output 3125 ml   Net -2537.2 ml       Lines/Drains/Airways       Peripheral Intravenous Line  Duration                  Peripheral IV - Single Lumen 07/13/23 1411 18 G Right Antecubital <1 day         Peripheral IV - Single Lumen 07/13/23 1603 18 G Left Antecubital <1 day         Peripheral IV - Single Lumen 07/13/23 1714 20 G Posterior;Right Hand <1 day                       Physical Exam  Vitals  reviewed.   Constitutional:       Appearance: He is not ill-appearing.   HENT:      Mouth/Throat:      Mouth: Mucous membranes are moist.   Cardiovascular:      Rate and Rhythm: Normal rate. Rhythm irregular.      Pulses: Normal pulses.      Comments: Left sided chest tenderness on palpation  Pulmonary:      Effort: No respiratory distress.      Breath sounds: No rales.   Abdominal:      Palpations: Abdomen is soft.      Tenderness: There is no abdominal tenderness.   Musculoskeletal:         General: No swelling or tenderness. Normal range of motion.   Skin:     General: Skin is warm.      Capillary Refill: Capillary refill takes less than 2 seconds.   Neurological:      General: No focal deficit present.      Mental Status: He is alert and oriented to person, place, and time.   Psychiatric:         Mood and Affect: Mood normal.          Significant Labs: All pertinent lab results from the last 24 hours have been reviewed.    Significant Imaging:  Reviewed

## 2023-07-14 NOTE — NURSING
Nurses Note -- 4 Eyes      7/13/2023   10:17 PM      Skin assessed during: Admit      [x] No Altered Skin Integrity Present    [x]Prevention Measures Documented      [] Yes- Altered Skin Integrity Present or Discovered   [] LDA Added if Not in Epic (Describe Wound)   [] New Altered Skin Integrity was Present on Admit and Documented in LDA   [] Wound Image Taken    Wound Care Consulted? No    Attending Nurse:  Louise Dobbs RN     Second RN/Staff Member:  Melyssa Goel RN

## 2023-07-14 NOTE — H&P
[facility] - Cardiac Intensive Care  Cardiology  History and Physical     Patient Name: Tera Griffiths  MRN: [id number]  Admission Date: 7/13/2023  Code Status: Full Code   Attending Provider: Joel Kebede MD   Primary Care Physician: Carleen Bueno MD  Principal Problem:Hypertensive urgency    Patient information was obtained from patient and ER records.     Subjective:     Chief Complaint:  SOB     HPI:  Mr. Griffiths is a 57 y.o. M with PMHx stage C HFrEF, ICM, CAD s/p (STEMI s/p PCI to Rcx (8/2021), previous GIB,  PAF s/p DCCV, uncontrolled HTN who presented to the ED w/ c/o of sudden SOB and chest pressure. Patient was discharged 1 month ago with the same clinical presentation and required nitro gtt for HTN urgency. He presented today in hypertensive urgency and rate controlled AF. He was started on a nitro gtt in the ED and was given 80mg of IV lasix with good urine output of 2L. They were unable to wean his drip as diastolic BP >115.     ECG: LVH, LBBB, AF rate controlled. Bedside echo showed global hypokinesis w/ an LVEF 10-15%. No pericardial effusion and a pleural effusion present. He admits orthopnea, HSELBY, and chest pressure. Denies palpitations, syncope, dizziness or any other complaints. Says he has been complaint with all his medications and was doing well until this morning.     Lactic acid WNL.    CTA:   No evidence of central PE.  The distal pulmonary tree are poorly evaluated given motion limitations.  Suggest correlation for possible DVT study.     Stable cardiomegaly without evidence of decompensated CHF.     Stable pulmonary trunk ectasia as well as intrapulmonary arterial dilatation, which is often seen in pulmonary arterial hypertension.       [facility] 4/18/13:  Findings:     Left main was normal  The LAD had luminal irregularity with no obstructive disease.  The left circumflex had luminal irregularity with no obstructive disease.  There was 1 obtuse marginal branch that was completely occluded  and appeared to be an acute occlusion.  It was very small.  The RCA luminal irregularity without obstructive disease.     No other significant coronary disease.        Past Medical History:   Diagnosis Date    Atrial fibrillation     Encounter for blood transfusion        Past Surgical History:   Procedure Laterality Date    CARDIAC DEFIBRILLATOR PLACEMENT Left     HERNIA REPAIR      LEFT HEART CATHETERIZATION Left 4/18/2023    Procedure: Left heart cath;  Surgeon: Atilio Marks MD;  Location: [facility] CATH LAB;  Service: Cardiology;  Laterality: Left;    RIGHT HEART CATHETERIZATION Right 9/30/2022    Procedure: INSERTION, CATHETER, RIGHT HEART;  Surgeon: Caden Aden MD;  Location: [facility] CATH LAB;  Service: Cardiology;  Laterality: Right;    TREATMENT OF CARDIAC ARRHYTHMIA N/A 11/22/2022    Procedure: CARDIOVERSION;  Surgeon: Marvin Sanon MD;  Location: [facility] EP LAB;  Service: Cardiology;  Laterality: N/A;  afib, KIA, DCCV, anes, MB, 3 Prep       Review of patient's allergies indicates:  No Known Allergies    No current facility-administered medications on file prior to encounter.     Current Outpatient Medications on File Prior to Encounter   Medication Sig    amiodarone (PACERONE) 200 MG Tab Take 1 tablet (200 mg total) by mouth 2 (two) times daily.    apixaban (ELIQUIS) 5 mg Tab Take 5 mg by mouth 2 (two) times daily.    atorvastatin (LIPITOR) 80 MG tablet Take 1 tablet (80 mg total) by mouth once daily.    clopidogreL (PLAVIX) 75 mg tablet Take 1 tablet (75 mg total) by mouth once daily.    empagliflozin (JARDIANCE) 10 mg tablet Take 1 tablet (10 mg total) by mouth once daily.    ferrous sulfate (FEOSOL) 325 mg (65 mg iron) Tab tablet Take 325 mg by mouth every other day.    isosorbide mononitrate (ISMO,MONOKET) 10 mg tablet Take 1 tablet (10 mg total) by mouth 2 (two) times daily. Hold until seen by cardiologist (Patient not taking: Reported on 6/13/2023)    LIDOcaine (LIDODERM) 5 % Place 1 patch onto  the skin once daily.    metoprolol succinate (TOPROL-XL) 25 MG 24 hr tablet Take 1 tablet (25 mg total) by mouth every evening.    sacubitriL-valsartan (ENTRESTO) 49-51 mg per tablet Take 1 tablet by mouth 2 (two) times daily.    torsemide (DEMADEX) 20 MG Tab Take 1 tablet (20 mg total) by mouth once daily.     Family History    None       Tobacco Use    Smoking status: Never    Smokeless tobacco: Never   Substance and Sexual Activity    Alcohol use: Never    Drug use: Never    Sexual activity: Not on file     Review of Systems   Constitutional: Negative for diaphoresis and malaise/fatigue.   HENT:  Negative for sore throat.    Eyes:  Negative for double vision.   Cardiovascular:  Positive for dyspnea on exertion and orthopnea. Negative for chest pain, leg swelling, palpitations, paroxysmal nocturnal dyspnea and syncope.   Respiratory:  Positive for shortness of breath. Negative for cough.    Musculoskeletal:  Negative for muscle cramps and muscle weakness.   Gastrointestinal:  Negative for bloating, nausea and vomiting.   Genitourinary:  Negative for dysuria and flank pain.   Neurological:  Negative for focal weakness and weakness.   Psychiatric/Behavioral:  Negative for substance abuse. The patient is nervous/anxious.    Objective:     Vital Signs (Most Recent):  Temp: 97.4 °F (36.3 °C) (07/13/23 1330)  Pulse: 62 (07/13/23 1852)  Resp: (!) 25 (07/13/23 1852)  BP: (!) 141/86 (07/13/23 1930)  SpO2: 98 % (07/13/23 1852) Vital Signs (24h Range):  Temp:  [97.4 °F (36.3 °C)] 97.4 °F (36.3 °C)  Pulse:  [62-88] 62  Resp:  [19-27] 25  SpO2:  [92 %-100 %] 98 %  BP: (130-204)/() 141/86     Weight: 88.5 kg (195 lb)  Body mass index is 26.45 kg/m².    SpO2: 98 %         Intake/Output Summary (Last 24 hours) at 7/13/2023 1943  Last data filed at 7/13/2023 1724  Gross per 24 hour   Intake --   Output 2075 ml   Net -2075 ml       Lines/Drains/Airways       Peripheral Intravenous Line  Duration                  Peripheral IV  - Single Lumen 07/13/23 1411 18 G Right Antecubital <1 day         Peripheral IV - Single Lumen 07/13/23 1603 18 G Left Antecubital <1 day         Peripheral IV - Single Lumen 07/13/23 1714 20 G Posterior;Right Hand <1 day                     Physical Exam  Vitals reviewed.   Constitutional:       General: He is not in acute distress.     Appearance: He is ill-appearing. He is not toxic-appearing or diaphoretic.   HENT:      Head: Normocephalic and atraumatic.      Nose: Nose normal.      Mouth/Throat:      Mouth: Mucous membranes are dry.      Pharynx: Oropharynx is clear.   Eyes:      Extraocular Movements: Extraocular movements intact.      Conjunctiva/sclera: Conjunctivae normal.   Cardiovascular:      Rate and Rhythm: Normal rate. Rhythm irregular.      Pulses: Normal pulses.      Comments: Left sided chest tenderness on palpation  Pulmonary:      Effort: No respiratory distress.      Breath sounds: Rales present.      Comments: Decreased breath sounds BL lower lobes  Abdominal:      General: Abdomen is flat. Bowel sounds are normal.      Palpations: Abdomen is soft.   Musculoskeletal:         General: No swelling or tenderness. Normal range of motion.      Cervical back: Normal range of motion and neck supple.   Skin:     General: Skin is warm.      Capillary Refill: Capillary refill takes less than 2 seconds.   Neurological:      General: No focal deficit present.      Mental Status: He is alert and oriented to person, place, and time.   Psychiatric:         Mood and Affect: Mood normal.         Thought Content: Thought content normal.        Significant Labs:   Recent Lab Results  (Last 5 results in the past 24 hours)        07/13/23  1823   07/13/23  1718   07/13/23  1603   07/13/23  1602   07/13/23  1556        Albumin         4.1       Alkaline Phosphatase         65       ALT         14       Anion Gap         11       Appearance, UA     Clear           aPTT       26.2  Comment: Refer to local heparin  nomogram for intensity/dose specific   therapeutic   range.           AST         16       Bilirubin (UA)     Negative           BILIRUBIN TOTAL         0.5  Comment: For infants and newborns, interpretation of results should be based  on gestational age, weight and in agreement with clinical  observations.    Premature Infant recommended reference ranges:  Up to 24 hours.............<8.0 mg/dL  Up to 48 hours............<12.0 mg/dL  3-5 days..................<15.0 mg/dL  6-29 days.................<15.0 mg/dL         BUN         18       Calcium         9.5       Chloride         104       Cholesterol   211  Comment: The National Cholesterol Education Program (NCEP) has set the  following guidelines (reference ranges) for Cholesterol:  Optimal.....................<200 mg/dL  Borderline High.............200-239 mg/dL  High........................> or = 240 mg/dL               CO2         24       Color, UA     Colorless           Creatinine         1.3       eGFR         >60.0       Estimated Avg Glucose   114             Glucose         96       Glucose, UA     Negative           Group & Rh   B POS             HDL   50  Comment: The National Cholesterol Education Program (NCEP) has set the  following guidelines (reference values) for HDL Cholesterol:  Low...............<40 mg/dL  Optimal...........>60 mg/dL               HDL/Cholesterol Ratio   23.7             Hemoglobin A1C External   5.6  Comment: ADA Screening Guidelines:  5.7-6.4%  Consistent with prediabetes  >or=6.5%  Consistent with diabetes    High levels of fetal hemoglobin interfere with the HbA1C  assay. Heterozygous hemoglobin variants (HbS, HgC, etc)do  not significantly interfere with this assay.   However, presence of multiple variants may affect accuracy.               INDIRECT SOPHIA   NEG             INR       1.0  Comment: Coumadin Therapy:  2.0 - 3.0 for INR for all indicators except mechanical heart valves  and antiphospholipid syndromes which  should use 2.5 - 3.5.           Ketones, UA     Negative           Lactate, Rogelio 1.0  Comment: Falsely low lactic acid results can be found in samples   containing >=13.0 mg/dL total bilirubin and/or >=3.5 mg/dL   direct bilirubin.                 LDL Cholesterol External   147.6  Comment: The National Cholesterol Education Program (NCEP) has set the  following guidelines (reference values) for LDL Cholesterol:  Optimal.......................<130 mg/dL  Borderline High...............130-159 mg/dL  High..........................160-189 mg/dL  Very High.....................>190 mg/dL               Leukocytes, UA     Negative           NITRITE UA     Negative           Non-HDL Cholesterol   161  Comment: Risk category and Non-HDL cholesterol goals:  Coronary heart disease (CHD)or equivalent (10-year risk of CHD >20%):  Non-HDL cholesterol goal     <130 mg/dL  Two or more CHD risk factors and 10-year risk of CHD <= 20%:  Non-HDL cholesterol goal     <160 mg/dL  0 to 1 CHD risk factor:  Non-HDL cholesterol goal     <190 mg/dL               Occult Blood UA     Negative           pH, UA     7.0           Potassium         3.6       PROTEIN TOTAL         7.6       Protein, UA     Negative  Comment: Recommend a 24 hour urine protein or a urine   protein/creatinine ratio if globulin induced proteinuria is  clinically suspected.             Protime       10.9         Sodium         139       Specific [place], UA     1.005           Specimen Outdate   07/16/2023 23:59             Specimen UA     Urine, Clean Catch           Total Cholesterol/HDL Ratio   4.2             Triglycerides   67  Comment: The National Cholesterol Education Program (NCEP) has set the  following guidelines (reference values) for triglycerides:  Normal......................<150 mg/dL  Borderline High.............150-199 mg/dL  High........................200-499 mg/dL               Troponin I   0.148  Comment: The reference interval for Troponin I  represents the 99th percentile   cutoff   for our facility and is consistent with 3rd generation assay   performance.                                      Significant Imaging: Echocardiogram: Transthoracic echo (TTE) complete (Cupid Only):   Results for orders placed or performed during the hospital encounter of 03/09/23   Echo   Result Value Ref Range    Ascending aorta 3.59 cm    STJ 2.73 cm    AV mean gradient 5 mmHg    Ao peak rome 1.62 m/s    Ao VTI 27.23 cm    IVS 1.03 0.6 - 1.1 cm    LA size 5.70 cm    Left Atrium Major Axis 7.92 cm    Left Atrium Minor Axis 8.39 cm    LVIDd 7.36 (A) 3.5 - 6.0 cm    LVIDs 6.85 (A) 2.1 - 4.0 cm    LVOT diameter 2.63 cm    LVOT peak VTI 14.07 cm    Posterior Wall 1.07 0.6 - 1.1 cm    MV Peak A [place] 0.40 m/s    E wave deceleration time 221.16 msec    MV Peak E [place] 0.52 m/s    RA Major Axis 7.56 cm    RA Width 5.35 cm    RVDD 5.86 cm    Sinus 3.42 cm    TAPSE 1.69 cm    TR Max [place] 2.19 m/s    TDI LATERAL 0.04 m/s    TDI SEPTAL 0.03 m/s    LA WIDTH 5.03 cm    MV stenosis pressure 1/2 time 64.13 ms    LV Diastolic Volume 285.94 mL    LV Systolic Volume 242.94 mL    LVOT peak rome 0.89 m/s    LA volume (mod) 119.61 cm3    LV LATERAL E/E' RATIO 13.00 m/s    LV SEPTAL E/E' RATIO 17.33 m/s    FS 7 %    LA volume 198.58 cm3    LV mass 373.25 g    Left Ventricle Relative Wall Thickness 0.29 cm    AV valve area 2.81 cm2    AV Velocity Ratio 0.55     AV index (prosthetic) 0.52     MV valve area p 1/2 method 3.43 cm2    E/A ratio 1.30     Mean e' 0.04 m/s    LVOT area 5.4 cm2    LVOT stroke volume 76.40 cm3    AV peak gradient 10 mmHg    E/E' ratio 14.86 m/s    LV Systolic Volume Index 115.7 mL/m2    LV Diastolic Volume Index 136.16 mL/m2    LA Volume Index 94.6 mL/m2    LV Mass Index 178 g/m2    Triscuspid Valve Regurgitation Peak Gradient 19 mmHg    LA Volume Index (Mod) 57.0 mL/m2    BSA 2.11 m2    Right Atrial Pressure (from IVC) 3 mmHg    EF 18 %    TV rest pulmonary artery pressure 22  mmHg    Narrative    · The left ventricle is severely enlarged with severe eccentric   hypertrophy and severely decreased systolic function.  · Severe left atrial enlargement.  · The estimated ejection fraction is 18%( probably upper teens to at most   low 20s).  · There is severe left ventricular global hypokinesis.  · Grade II left ventricular diastolic dysfunction.  · Moderate right ventricular enlargement with mildly reduced right   ventricular systolic function.  · Severe right atrial enlargement.  · Mild tricuspid regurgitation.  · Normal central venous pressure (3 mmHg).  · The estimated PA systolic pressure is 22 mmHg.  · Small posterior pericardial effusion.        Assessment and Plan:     * Hypertensive urgency  Mr. Griffiths is a 57 y.o. M with PMHx stage C HFrEF, ICM, CAD s/p (STEMI s/p PCI to Rcx (8/2021), previous GIB,  PAF s/p DCCV, uncontrolled HTN who presented to the ED w/ c/o of sudden SOB and chest pressure. Patient was discharged 1 month ago with the same clinical presentation and required nitro gtt for HTN urgency. He presented today in hypertensive urgency and rate controlled AF. He was started on a nitro gtt in the ED and was given 80mg of IV lasix with good urine output of 2L. They were unable to wean his drip as diastolic BP >115. Patient was then admitted to CCU.     Plan:  - Continue nitro gtt to keep BP <200/100 and will add nicardipine if needed.  - Will uptitrate PO medications as able too.  - Will obtain a renal artery US  - Continue diuresing as needed- goal net negative 1-2L  - Chest pressure with tenderness on exam not suggestive for ischemia likely secondary to uncontrolled HTN and hypervolemia.       Dyslipidemia  -Continue statin    Acute on chronic combined systolic and diastolic heart failure  -Continue diuresis as needed -goal net negative 1-2L  - Continue GDMT: metoprolol suc 25mg and entresto 49-51  - Will obtain a formal echo in the am    Paroxysmal A-fib  -rate controlled  AF. Continue amiodarone 200mg BID  - continue eliquis 5mg BID  - can consider cardioversion once more euvolemic        VTE Risk Mitigation (From admission, onward)           Ordered     apixaban tablet 5 mg  2 times daily         07/13/23 2140     Reason for No Pharmacological VTE Prophylaxis  Once        Question:  Reasons:  Answer:  Already adequately anticoagulated on oral Anticoagulants    07/13/23 1701     IP VTE HIGH RISK PATIENT  Once         07/13/23 1701     Place sequential compression device  Until discontinued         07/13/23 1701                    Irma Uriarte MD  Cardiology   [facility] - Cardiac Intensive Care

## 2023-07-14 NOTE — H&P
1815- Patient admitted from CICU. Patient is stable with no complaints of chest pain. Dyspnea on exertion. Vitals stable.

## 2023-07-14 NOTE — ASSESSMENT & PLAN NOTE
- Holding diuresis today, will resume PO torsemide tomorrow  - Continue GDMT: entresto 49-5, start carvedilol 6.25mg BID  - Pending repeat echo

## 2023-07-14 NOTE — CONSULTS
Food & Nutrition  Education    Diet Education: Cardiac education   Time Spent: 6 minutes   Learners: Pt       Nutrition Education provided with handouts:   RD discussed limiting foods high in saturated fats such as fatty meat, poultry, skin, desir, sausage, whole milk, cream and butter. RD additionally discussed incorporating more fruits, vegetables, whole grains, and dried beans in pt's diet. Foods recommended/not recommended discussed, sample menus provided.     Comments: Spoke w/ pt at bedside, Boost added for optimization of protein and calorie intake. Discussed cardiac diet education w/ pt, pt had no additional questions for me today.       All questions and concerns answered. Dietitian's contact information provided.       Follow-Up: Yes    Please Re-consult as needed        Thanks!

## 2023-07-15 LAB
ANION GAP SERPL CALC-SCNC: 10 MMOL/L (ref 8–16)
ANION GAP SERPL CALC-SCNC: 12 MMOL/L (ref 8–16)
BASOPHILS # BLD AUTO: 0.01 K/UL (ref 0–0.2)
BASOPHILS NFR BLD: 0.3 % (ref 0–1.9)
BUN SERPL-MCNC: 27 MG/DL (ref 6–20)
BUN SERPL-MCNC: 28 MG/DL (ref 6–20)
CALCIUM SERPL-MCNC: 8.8 MG/DL (ref 8.7–10.5)
CALCIUM SERPL-MCNC: 9.4 MG/DL (ref 8.7–10.5)
CHLORIDE SERPL-SCNC: 105 MMOL/L (ref 95–110)
CHLORIDE SERPL-SCNC: 99 MMOL/L (ref 95–110)
CO2 SERPL-SCNC: 22 MMOL/L (ref 23–29)
CO2 SERPL-SCNC: 24 MMOL/L (ref 23–29)
CREAT SERPL-MCNC: 1.7 MG/DL (ref 0.5–1.4)
CREAT SERPL-MCNC: 1.8 MG/DL (ref 0.5–1.4)
DIFFERENTIAL METHOD: ABNORMAL
EOSINOPHIL # BLD AUTO: 0.2 K/UL (ref 0–0.5)
EOSINOPHIL NFR BLD: 4.7 % (ref 0–8)
ERYTHROCYTE [DISTWIDTH] IN BLOOD BY AUTOMATED COUNT: 15.1 % (ref 11.5–14.5)
EST. GFR  (NO RACE VARIABLE): 43.6 ML/MIN/1.73 M^2
EST. GFR  (NO RACE VARIABLE): 46.7 ML/MIN/1.73 M^2
GLUCOSE SERPL-MCNC: 109 MG/DL (ref 70–110)
GLUCOSE SERPL-MCNC: 99 MG/DL (ref 70–110)
HCT VFR BLD AUTO: 41.9 % (ref 40–54)
HGB BLD-MCNC: 12.8 G/DL (ref 14–18)
IMM GRANULOCYTES # BLD AUTO: 0.01 K/UL (ref 0–0.04)
IMM GRANULOCYTES NFR BLD AUTO: 0.3 % (ref 0–0.5)
LYMPHOCYTES # BLD AUTO: 1.2 K/UL (ref 1–4.8)
LYMPHOCYTES NFR BLD: 30.5 % (ref 18–48)
MAGNESIUM SERPL-MCNC: 2.1 MG/DL (ref 1.6–2.6)
MAGNESIUM SERPL-MCNC: 2.1 MG/DL (ref 1.6–2.6)
MCH RBC QN AUTO: 26.7 PG (ref 27–31)
MCHC RBC AUTO-ENTMCNC: 30.5 G/DL (ref 32–36)
MCV RBC AUTO: 88 FL (ref 82–98)
MONOCYTES # BLD AUTO: 0.4 K/UL (ref 0.3–1)
MONOCYTES NFR BLD: 11.1 % (ref 4–15)
NEUTROPHILS # BLD AUTO: 2.1 K/UL (ref 1.8–7.7)
NEUTROPHILS NFR BLD: 53.1 % (ref 38–73)
NRBC BLD-RTO: 0 /100 WBC
PHOSPHATE SERPL-MCNC: 3.9 MG/DL (ref 2.7–4.5)
PLATELET # BLD AUTO: 261 K/UL (ref 150–450)
PMV BLD AUTO: 11.7 FL (ref 9.2–12.9)
POCT GLUCOSE: 118 MG/DL (ref 70–110)
POTASSIUM SERPL-SCNC: 3.5 MMOL/L (ref 3.5–5.1)
POTASSIUM SERPL-SCNC: 4.3 MMOL/L (ref 3.5–5.1)
RBC # BLD AUTO: 4.79 M/UL (ref 4.6–6.2)
SODIUM SERPL-SCNC: 133 MMOL/L (ref 136–145)
SODIUM SERPL-SCNC: 139 MMOL/L (ref 136–145)
WBC # BLD AUTO: 3.87 K/UL (ref 3.9–12.7)

## 2023-07-15 PROCEDURE — 25000003 PHARM REV CODE 250

## 2023-07-15 PROCEDURE — 80048 BASIC METABOLIC PNL TOTAL CA: CPT | Mod: 91

## 2023-07-15 PROCEDURE — 85025 COMPLETE CBC W/AUTO DIFF WBC: CPT | Performed by: STUDENT IN AN ORGANIZED HEALTH CARE EDUCATION/TRAINING PROGRAM

## 2023-07-15 PROCEDURE — 80048 BASIC METABOLIC PNL TOTAL CA: CPT | Performed by: STUDENT IN AN ORGANIZED HEALTH CARE EDUCATION/TRAINING PROGRAM

## 2023-07-15 PROCEDURE — 36415 COLL VENOUS BLD VENIPUNCTURE: CPT

## 2023-07-15 PROCEDURE — 99233 SBSQ HOSP IP/OBS HIGH 50: CPT | Mod: ,,, | Performed by: INTERNAL MEDICINE

## 2023-07-15 PROCEDURE — 20600001 HC STEP DOWN PRIVATE ROOM

## 2023-07-15 PROCEDURE — 99233 PR SUBSEQUENT HOSPITAL CARE,LEVL III: ICD-10-PCS | Mod: ,,, | Performed by: INTERNAL MEDICINE

## 2023-07-15 PROCEDURE — 84100 ASSAY OF PHOSPHORUS: CPT | Performed by: STUDENT IN AN ORGANIZED HEALTH CARE EDUCATION/TRAINING PROGRAM

## 2023-07-15 PROCEDURE — 25000003 PHARM REV CODE 250: Performed by: STUDENT IN AN ORGANIZED HEALTH CARE EDUCATION/TRAINING PROGRAM

## 2023-07-15 PROCEDURE — 83735 ASSAY OF MAGNESIUM: CPT

## 2023-07-15 RX ORDER — TORSEMIDE 20 MG/1
20 TABLET ORAL DAILY
Status: DISCONTINUED | OUTPATIENT
Start: 2023-07-15 | End: 2023-07-16 | Stop reason: HOSPADM

## 2023-07-15 RX ORDER — POTASSIUM CHLORIDE 20 MEQ/1
40 TABLET, EXTENDED RELEASE ORAL ONCE
Status: COMPLETED | OUTPATIENT
Start: 2023-07-15 | End: 2023-07-15

## 2023-07-15 RX ORDER — METOPROLOL SUCCINATE 25 MG/1
25 TABLET, EXTENDED RELEASE ORAL NIGHTLY
Status: DISCONTINUED | OUTPATIENT
Start: 2023-07-15 | End: 2023-07-16 | Stop reason: HOSPADM

## 2023-07-15 RX ADMIN — DICLOFENAC SODIUM 4 G: 10 GEL TOPICAL at 03:07

## 2023-07-15 RX ADMIN — DICLOFENAC SODIUM 4 G: 10 GEL TOPICAL at 08:07

## 2023-07-15 RX ADMIN — OXYCODONE HYDROCHLORIDE AND ACETAMINOPHEN 1 TABLET: 5; 325 TABLET ORAL at 08:07

## 2023-07-15 RX ADMIN — AMIODARONE HYDROCHLORIDE 200 MG: 200 TABLET ORAL at 09:07

## 2023-07-15 RX ADMIN — APIXABAN 5 MG: 5 TABLET, FILM COATED ORAL at 08:07

## 2023-07-15 RX ADMIN — POTASSIUM CHLORIDE 40 MEQ: 1500 TABLET, EXTENDED RELEASE ORAL at 09:07

## 2023-07-15 RX ADMIN — ATORVASTATIN CALCIUM 80 MG: 40 TABLET, FILM COATED ORAL at 09:07

## 2023-07-15 RX ADMIN — SENNOSIDES AND DOCUSATE SODIUM 1 TABLET: 50; 8.6 TABLET ORAL at 09:07

## 2023-07-15 RX ADMIN — CLOPIDOGREL BISULFATE 75 MG: 75 TABLET ORAL at 09:07

## 2023-07-15 RX ADMIN — SACUBITRIL AND VALSARTAN 1 TABLET: 24; 26 TABLET, FILM COATED ORAL at 09:07

## 2023-07-15 RX ADMIN — TORSEMIDE 20 MG: 20 TABLET ORAL at 09:07

## 2023-07-15 RX ADMIN — SACUBITRIL AND VALSARTAN 1 TABLET: 24; 26 TABLET, FILM COATED ORAL at 08:07

## 2023-07-15 RX ADMIN — DICLOFENAC SODIUM 4 G: 10 GEL TOPICAL at 09:07

## 2023-07-15 RX ADMIN — AMIODARONE HYDROCHLORIDE 200 MG: 200 TABLET ORAL at 08:07

## 2023-07-15 RX ADMIN — METOPROLOL SUCCINATE 25 MG: 25 TABLET, EXTENDED RELEASE ORAL at 08:07

## 2023-07-15 RX ADMIN — FERROUS SULFATE TAB 325 MG (65 MG ELEMENTAL FE) 1 EACH: 325 (65 FE) TAB at 09:07

## 2023-07-15 RX ADMIN — APIXABAN 5 MG: 5 TABLET, FILM COATED ORAL at 09:07

## 2023-07-15 NOTE — NURSING
Patient alert and responsive to nursing care.  Vital signs WNL.  C/o of pain and discomfort.  Pecocet give for pain managment  No cardiopulmomary distress.    PIV patent and saline locked.  Medication given per MD order. Assistance with ADL care as needed.  Voids via bedside commde.  Fall precaution maintained.  Call light and bedside table within reach. Plan of care reviewed and patient education provided. Will continue to monitor for changes of condition

## 2023-07-15 NOTE — PLAN OF CARE
Problem: Adjustment to Illness (Acute Coronary Syndrome)  Goal: Optimal Adaptation to Illness  Outcome: Ongoing, Progressing     Problem: Dysrhythmia (Acute Coronary Syndrome)  Goal: Normalized Cardiac Rhythm  Outcome: Ongoing, Progressing     Problem: Cardiac-Related Pain (Acute Coronary Syndrome)  Goal: Absence of Cardiac-Related Pain  Outcome: Ongoing, Progressing     Problem: Hemodynamic Instability (Acute Coronary Syndrome)  Goal: Effective Cardiac Pump Function  Outcome: Ongoing, Progressing     Problem: Tissue Perfusion (Acute Coronary Syndrome)  Goal: Adequate Tissue Perfusion  Outcome: Ongoing, Progressing     Problem: Arrhythmia/Dysrhythmia (Cardiac Catheterization)  Goal: Stable Heart Rate and Rhythm  Outcome: Ongoing, Progressing     Problem: Bleeding (Cardiac Catheterization)  Goal: Absence of Bleeding  Outcome: Ongoing, Progressing     Problem: Contrast-Induced Injury Risk (Cardiac Catheterization)  Goal: Absence of Contrast-Induced Injury  Outcome: Ongoing, Progressing     Problem: Embolism (Cardiac Catheterization)  Goal: Absence of Embolism Signs and Symptoms  Outcome: Ongoing, Progressing     Problem: Ongoing Anesthesia/Sedation Effects (Cardiac Catheterization)  Goal: Anesthesia/Sedation Recovery  Outcome: Ongoing, Progressing     Problem: Pain (Cardiac Catheterization)  Goal: Acceptable Pain Control  Outcome: Ongoing, Progressing     Problem: Vascular Access Protection (Cardiac Catheterization)  Goal: Absence of Vascular Access Complication  Outcome: Ongoing, Progressing     Problem: Adult Inpatient Plan of Care  Goal: Plan of Care Review  Outcome: Ongoing, Progressing  Goal: Patient-Specific Goal (Individualized)  Outcome: Ongoing, Progressing  Goal: Absence of Hospital-Acquired Illness or Injury  Outcome: Ongoing, Progressing  Goal: Optimal Comfort and Wellbeing  Outcome: Ongoing, Progressing  Goal: Readiness for Transition of Care  Outcome: Ongoing, Progressing     Problem: Fall Injury  Risk  Goal: Absence of Fall and Fall-Related Injury  Outcome: Ongoing, Progressing

## 2023-07-15 NOTE — ASSESSMENT & PLAN NOTE
Mr. Griffiths is a 57 y.o. M with PMHx stage C HFrEF, ICM, CAD s/p (STEMI s/p PCI to Rcx (8/2021), previous GIB,  PAF s/p DCCV, uncontrolled HTN who presented to the ED w/ c/o of sudden SOB and chest pressure. Patient was discharged 1 month ago with the same clinical presentation and required nitro gtt for HTN urgency. He presented in hypertensive urgency and rate controlled AF. He was started on a nitro gtt in the ED and was given 80mg of IV lasix with good urine output of 2L. They were unable to wean his drip as BP >200/115. Patient was then admitted to CCU.     Since admission patient's home medications have been restarted but he cannot tolerate these doses.    Plan:  - Off NTG gtt 7/14 morning  - Downtitrating GDMT today  - Entresto 24/26 BID and Toprol 25 QHS  - Chest pressure with tenderness on exam not suggestive for ischemia   - Multimodal pain therapy for chest pain

## 2023-07-15 NOTE — PROGRESS NOTES
[facility] Stepdown  Cardiology  Progress Note    Patient Name: Tera Griffiths  MRN: [id number]  Admission Date: 7/13/2023  Hospital Length of Stay: 2 days  Code Status: Full Code   Attending Physician: Milad Nassar MD   Primary Care Physician: Carleen Bueno MD  Expected Discharge Date: 7/15/2023  Principal Problem:Hypertensive urgency    Subjective:     Hospital Course:   Admitted for hypertensive urgency and demand ischemia. Pt weaned off NTG gtt on 7/14, patient reports chest pain however sounds more pleuritic and MSK than anginal. BP meds restarted and stepped down to the floor on 7/14. Overnight 7/14 patient's blood pressure was low while lying in bed, and entresto and carvedilol were held. He also had chest pain again and received Percocet. Entresto was decreased from 49/51 to 24/26 and carvedilol changed back to metoprolol. If Cr stable again tomorrow will dc patient.       Interval History: Overnight BP low, entresto and carvedilol were held. Patient also had his pain recur and he received percocet x2. This morning did not have any more pain. BP also more stable.  Decreased GDMT doses.    Review of Systems   Constitutional: Negative for diaphoresis.   Cardiovascular:  Negative for chest pain (resolved this morning), dyspnea on exertion, orthopnea, palpitations and paroxysmal nocturnal dyspnea.   Respiratory:  Negative for cough and shortness of breath.    Musculoskeletal:  Negative for muscle weakness.   Gastrointestinal:  Negative for nausea and vomiting.   Genitourinary:  Negative for dysuria.   Neurological:  Negative for weakness.   Psychiatric/Behavioral:  The patient is not nervous/anxious.    Objective:     Vital Signs (Most Recent):  Temp: 97.3 °F (36.3 °C) (07/15/23 1142)  Pulse: (!) 53 (07/15/23 1142)  Resp: 18 (07/15/23 1142)  BP: 101/62 (07/15/23 1142)  SpO2: 97 % (07/15/23 1142) Vital Signs (24h Range):  Temp:  [96.8 °F (36 °C)-98.4 °F (36.9 °C)] 97.3 °F (36.3 °C)  Pulse:   [50-62] 53  Resp:  [17-27] 18  SpO2:  [89 %-97 %] 97 %  BP: ()/(46-83) 101/62     Weight: 86.5 kg (190 lb 12.9 oz)  Body mass index is 25.17 kg/m².     SpO2: 97 %         Intake/Output Summary (Last 24 hours) at 7/15/2023 1205  Last data filed at 7/15/2023 0416  Gross per 24 hour   Intake 220 ml   Output 300 ml   Net -80 ml       Lines/Drains/Airways       Peripheral Intravenous Line  Duration                  Peripheral IV - Single Lumen 07/13/23 1411 18 G Right Antecubital 1 day         Peripheral IV - Single Lumen 07/13/23 1603 18 G Left Antecubital 1 day         Peripheral IV - Single Lumen 07/13/23 1714 20 G Posterior;Right Hand 1 day                       Physical Exam  Vitals reviewed.   Constitutional:       Appearance: He is not ill-appearing.   HENT:      Mouth/Throat:      Mouth: Mucous membranes are moist.   Neck:      Comments: Cannot visualize JVD  Cardiovascular:      Rate and Rhythm: Normal rate. Rhythm irregular.      Pulses: Normal pulses.      Comments: Left sided chest tenderness on palpation  Pulmonary:      Effort: Pulmonary effort is normal. No respiratory distress.      Breath sounds: Normal breath sounds. No rales.   Abdominal:      Palpations: Abdomen is soft.      Tenderness: There is no abdominal tenderness.   Musculoskeletal:         General: No swelling or tenderness. Normal range of motion.   Skin:     General: Skin is warm.      Capillary Refill: Capillary refill takes less than 2 seconds.   Neurological:      General: No focal deficit present.      Mental Status: He is alert and oriented to person, place, and time.   Psychiatric:         Mood and Affect: Mood normal.          Significant Labs: All pertinent lab results from the last 24 hours have been reviewed.    Significant Imaging:  Reviewed    Assessment and Plan:     * Hypertensive urgency  Mr. Griffiths is a 57 y.o. M with PMHx stage C HFrEF, ICM, CAD s/p (STEMI s/p PCI to Rcx (8/2021), previous GIB,  PAF s/p DCCV,  uncontrolled HTN who presented to the ED w/ c/o of sudden SOB and chest pressure. Patient was discharged 1 month ago with the same clinical presentation and required nitro gtt for HTN urgency. He presented in hypertensive urgency and rate controlled AF. He was started on a nitro gtt in the ED and was given 80mg of IV lasix with good urine output of 2L. They were unable to wean his drip as BP >200/115. Patient was then admitted to CCU.     Since admission patient's home medications have been restarted but he cannot tolerate these doses.    Plan:  - Off NTG gtt 7/14 morning  - Downtitrating GDMT today  - Entresto 24/26 BID and Toprol 25 QHS  - Chest pressure with tenderness on exam not suggestive for ischemia   - Multimodal pain therapy for chest pain    Stage 3 chronic kidney disease  - Cr fluctuating during admission  - Will resume torsemide today, reduce entresto, and continue to monitor renal function tomorrow    Dyslipidemia  - Continue statin    Acute on chronic combined systolic and diastolic heart failure  - Patient unable to tolerate his home doses of GDMT and while inpatient had low pressures  - Reduce entresto to 24/26mg BID  - Switch carvedilol back to metoprolol  - Repeat echo reported EF as 10% and CVP as 15, restarting home torsemide    Paroxysmal A-fib  - Rate controlled AF. Continue amiodarone 200mg BID and Toprol  - Continue eliquis 5mg BID        VTE Risk Mitigation (From admission, onward)         Ordered     apixaban tablet 5 mg  2 times daily         07/13/23 2140     Reason for No Pharmacological VTE Prophylaxis  Once        Question:  Reasons:  Answer:  Already adequately anticoagulated on oral Anticoagulants    07/13/23 1701     IP VTE HIGH RISK PATIENT  Once         07/13/23 1701     Place sequential compression device  Until discontinued         07/13/23 1701                Radha Mason MD  Cardiology  [facility]

## 2023-07-15 NOTE — ASSESSMENT & PLAN NOTE
- Patient unable to tolerate his home doses of GDMT and while inpatient had low pressures  - Reduce entresto to 24/26mg BID  - Switch carvedilol back to metoprolol  - Repeat echo reported EF as 10% and CVP as 15, restarting home torsemide

## 2023-07-15 NOTE — NURSING
At 1954 notified that the patient blood pressure 87/53 with MAP 66 HR 56.  Check on patient and recheck BP.  Patient BP 74/46 MAP 54 on right arm and 89/63 MAP 71 on left arm.  Patient c/o of dizziness. Called CCU, spoke with the on call Dr Uriarte.  MD orders to hold Coreg and Entresto,  encourage oral fluid intake, elevate the legs and call back if patient continues to hypotensive.  At 2035 patient c/o chest pain 7/10.  Notify CCU, spoke with Dr Uriarte. Patient requested Percocet. MD order Dilaudid 0.5 mg PRN every 12 hours percocet relieved the chest pain.

## 2023-07-15 NOTE — ASSESSMENT & PLAN NOTE
- Cr fluctuating during admission  - Will resume torsemide today, reduce entresto, and continue to monitor renal function tomorrow

## 2023-07-15 NOTE — SUBJECTIVE & OBJECTIVE
Interval History: Overnight BP low, entresto and carvedilol were held. Patient also had his pain recur and he received percocet x2. This morning did not have any more pain. BP also more stable.  Decreased GDMT doses.    Review of Systems   Constitutional: Negative for diaphoresis.   Cardiovascular:  Negative for chest pain (resolved this morning), dyspnea on exertion, orthopnea, palpitations and paroxysmal nocturnal dyspnea.   Respiratory:  Negative for cough and shortness of breath.    Musculoskeletal:  Negative for muscle weakness.   Gastrointestinal:  Negative for nausea and vomiting.   Genitourinary:  Negative for dysuria.   Neurological:  Negative for weakness.   Psychiatric/Behavioral:  The patient is not nervous/anxious.    Objective:     Vital Signs (Most Recent):  Temp: 97.3 °F (36.3 °C) (07/15/23 1142)  Pulse: (!) 53 (07/15/23 1142)  Resp: 18 (07/15/23 1142)  BP: 101/62 (07/15/23 1142)  SpO2: 97 % (07/15/23 1142) Vital Signs (24h Range):  Temp:  [96.8 °F (36 °C)-98.4 °F (36.9 °C)] 97.3 °F (36.3 °C)  Pulse:  [50-62] 53  Resp:  [17-27] 18  SpO2:  [89 %-97 %] 97 %  BP: ()/(46-83) 101/62     Weight: 86.5 kg (190 lb 12.9 oz)  Body mass index is 25.17 kg/m².     SpO2: 97 %         Intake/Output Summary (Last 24 hours) at 7/15/2023 1205  Last data filed at 7/15/2023 0416  Gross per 24 hour   Intake 220 ml   Output 300 ml   Net -80 ml       Lines/Drains/Airways       Peripheral Intravenous Line  Duration                  Peripheral IV - Single Lumen 07/13/23 1411 18 G Right Antecubital 1 day         Peripheral IV - Single Lumen 07/13/23 1603 18 G Left Antecubital 1 day         Peripheral IV - Single Lumen 07/13/23 1714 20 G Posterior;Right Hand 1 day                       Physical Exam  Vitals reviewed.   Constitutional:       Appearance: He is not ill-appearing.   HENT:      Mouth/Throat:      Mouth: Mucous membranes are moist.   Neck:      Comments: Cannot visualize JVD  Cardiovascular:      Rate and  Rhythm: Normal rate. Rhythm irregular.      Pulses: Normal pulses.      Comments: Left sided chest tenderness on palpation  Pulmonary:      Effort: Pulmonary effort is normal. No respiratory distress.      Breath sounds: Normal breath sounds. No rales.   Abdominal:      Palpations: Abdomen is soft.      Tenderness: There is no abdominal tenderness.   Musculoskeletal:         General: No swelling or tenderness. Normal range of motion.   Skin:     General: Skin is warm.      Capillary Refill: Capillary refill takes less than 2 seconds.   Neurological:      General: No focal deficit present.      Mental Status: He is alert and oriented to person, place, and time.   Psychiatric:         Mood and Affect: Mood normal.          Significant Labs: All pertinent lab results from the last 24 hours have been reviewed.    Significant Imaging:  Reviewed

## 2023-07-16 VITALS
BODY MASS INDEX: 25.46 KG/M2 | DIASTOLIC BLOOD PRESSURE: 71 MMHG | OXYGEN SATURATION: 97 % | HEART RATE: 53 BPM | RESPIRATION RATE: 18 BRPM | WEIGHT: 192.13 LBS | SYSTOLIC BLOOD PRESSURE: 114 MMHG | TEMPERATURE: 98 F | HEIGHT: 73 IN

## 2023-07-16 LAB
ANION GAP SERPL CALC-SCNC: 13 MMOL/L (ref 8–16)
BUN SERPL-MCNC: 30 MG/DL (ref 6–20)
CALCIUM SERPL-MCNC: 9 MG/DL (ref 8.7–10.5)
CHLORIDE SERPL-SCNC: 102 MMOL/L (ref 95–110)
CO2 SERPL-SCNC: 25 MMOL/L (ref 23–29)
CREAT SERPL-MCNC: 1.7 MG/DL (ref 0.5–1.4)
EST. GFR  (NO RACE VARIABLE): 46.7 ML/MIN/1.73 M^2
GLUCOSE SERPL-MCNC: 89 MG/DL (ref 70–110)
POTASSIUM SERPL-SCNC: 3.8 MMOL/L (ref 3.5–5.1)
SODIUM SERPL-SCNC: 140 MMOL/L (ref 136–145)

## 2023-07-16 PROCEDURE — 36415 COLL VENOUS BLD VENIPUNCTURE: CPT | Performed by: STUDENT IN AN ORGANIZED HEALTH CARE EDUCATION/TRAINING PROGRAM

## 2023-07-16 PROCEDURE — 99239 HOSP IP/OBS DSCHRG MGMT >30: CPT | Mod: ,,, | Performed by: INTERNAL MEDICINE

## 2023-07-16 PROCEDURE — 99239 PR HOSPITAL DISCHARGE DAY,>30 MIN: ICD-10-PCS | Mod: ,,, | Performed by: INTERNAL MEDICINE

## 2023-07-16 PROCEDURE — 25000003 PHARM REV CODE 250

## 2023-07-16 PROCEDURE — 80048 BASIC METABOLIC PNL TOTAL CA: CPT | Performed by: STUDENT IN AN ORGANIZED HEALTH CARE EDUCATION/TRAINING PROGRAM

## 2023-07-16 PROCEDURE — 25000003 PHARM REV CODE 250: Performed by: STUDENT IN AN ORGANIZED HEALTH CARE EDUCATION/TRAINING PROGRAM

## 2023-07-16 RX ORDER — OXYCODONE AND ACETAMINOPHEN 5; 325 MG/1; MG/1
1 TABLET ORAL EVERY 6 HOURS PRN
Qty: 10 EACH | Refills: 0 | Status: ON HOLD | OUTPATIENT
Start: 2023-07-16 | End: 2023-09-01 | Stop reason: HOSPADM

## 2023-07-16 RX ORDER — METOPROLOL SUCCINATE 25 MG/1
25 TABLET, EXTENDED RELEASE ORAL NIGHTLY
Qty: 30 TABLET | Refills: 11 | Status: ON HOLD | OUTPATIENT
Start: 2023-07-16 | End: 2023-09-01 | Stop reason: HOSPADM

## 2023-07-16 RX ADMIN — TORSEMIDE 20 MG: 20 TABLET ORAL at 08:07

## 2023-07-16 RX ADMIN — CLOPIDOGREL BISULFATE 75 MG: 75 TABLET ORAL at 08:07

## 2023-07-16 RX ADMIN — APIXABAN 5 MG: 5 TABLET, FILM COATED ORAL at 08:07

## 2023-07-16 RX ADMIN — FERROUS SULFATE TAB 325 MG (65 MG ELEMENTAL FE) 1 EACH: 325 (65 FE) TAB at 08:07

## 2023-07-16 RX ADMIN — DICLOFENAC SODIUM 4 G: 10 GEL TOPICAL at 08:07

## 2023-07-16 RX ADMIN — AMIODARONE HYDROCHLORIDE 200 MG: 200 TABLET ORAL at 08:07

## 2023-07-16 RX ADMIN — ATORVASTATIN CALCIUM 80 MG: 40 TABLET, FILM COATED ORAL at 08:07

## 2023-07-16 RX ADMIN — SACUBITRIL AND VALSARTAN 1 TABLET: 24; 26 TABLET, FILM COATED ORAL at 08:07

## 2023-07-16 NOTE — NURSING
Discharge instructions provided to pt at this time.  Understanding verbalized and questions denied.  IV and Cardiac monitor removed at this time, pt tolerated well.  Pt requesting to ambulate off floor to pharmacy at this time, refusing transport.  Pt escorted to elevator with all belongings at this time.

## 2023-07-16 NOTE — PLAN OF CARE
Problem: Adjustment to Illness (Acute Coronary Syndrome)  Goal: Optimal Adaptation to Illness  Outcome: Met     Problem: Dysrhythmia (Acute Coronary Syndrome)  Goal: Normalized Cardiac Rhythm  Outcome: Met     Problem: Cardiac-Related Pain (Acute Coronary Syndrome)  Goal: Absence of Cardiac-Related Pain  Outcome: Met     Problem: Hemodynamic Instability (Acute Coronary Syndrome)  Goal: Effective Cardiac Pump Function  Outcome: Met     Problem: Tissue Perfusion (Acute Coronary Syndrome)  Goal: Adequate Tissue Perfusion  Outcome: Met     Problem: Arrhythmia/Dysrhythmia (Cardiac Catheterization)  Goal: Stable Heart Rate and Rhythm  Outcome: Met     Problem: Bleeding (Cardiac Catheterization)  Goal: Absence of Bleeding  Outcome: Met     Problem: Contrast-Induced Injury Risk (Cardiac Catheterization)  Goal: Absence of Contrast-Induced Injury  Outcome: Met     Problem: Embolism (Cardiac Catheterization)  Goal: Absence of Embolism Signs and Symptoms  Outcome: Met     Problem: Ongoing Anesthesia/Sedation Effects (Cardiac Catheterization)  Goal: Anesthesia/Sedation Recovery  Outcome: Met     Problem: Pain (Cardiac Catheterization)  Goal: Acceptable Pain Control  Outcome: Met     Problem: Vascular Access Protection (Cardiac Catheterization)  Goal: Absence of Vascular Access Complication  Outcome: Met     Problem: Adult Inpatient Plan of Care  Goal: Plan of Care Review  Outcome: Met  Goal: Patient-Specific Goal (Individualized)  Outcome: Met  Goal: Absence of Hospital-Acquired Illness or Injury  Outcome: Met  Goal: Optimal Comfort and Wellbeing  Outcome: Met  Goal: Readiness for Transition of Care  Outcome: Met     Problem: Fall Injury Risk  Goal: Absence of Fall and Fall-Related Injury  Outcome: Met

## 2023-07-16 NOTE — DISCHARGE SUMMARY
[facility]  Cardiology  Discharge Summary      Patient Name: Tera Griffiths  MRN: [id number]  Admission Date: 7/13/2023  Hospital Length of Stay: 3 days  Discharge Date and Time:  07/16/2023 8:59 AM  Attending Physician: Milad Nassar MD    Discharging Provider: Radha Mason MD  Primary Care Physician: Carleen Bueno MD    HPI:   Mr. Griffiths is a 57 y.o. M with PMHx stage C HFrEF, ICM, CAD s/p (STEMI s/p PCI to Rcx (8/2021), previous GIB,  PAF s/p DCCV, uncontrolled HTN who presented to the ED w/ c/o of sudden SOB and chest pressure. Patient was discharged 1 month ago with the same clinical presentation and required nitro gtt for HTN urgency. He presented today in hypertensive urgency and rate controlled AF. He was started on a nitro gtt in the ED and was given 80mg of IV lasix with good urine output of 2L. They were unable to wean his drip as diastolic BP >115.     ECG: LVH, LBBB, AF rate controlled. Bedside echo showed global hypokinesis w/ an LVEF 10-15%. No pericardial effusion and a pleural effusion present. He admits orthopnea, SHELBY, and chest pressure. Denies palpitations, syncope, dizziness or any other complaints. Says he has been complaint with all his medications and was doing well until this morning.         CTA:   No evidence of central PE.  The distal pulmonary tree are poorly evaluated given motion limitations.  Suggest correlation for possible DVT study.     Stable cardiomegaly without evidence of decompensated CHF.     Stable pulmonary trunk ectasia as well as intrapulmonary arterial dilatation, which is often seen in pulmonary arterial hypertension.         * No surgery found *     Indwelling Lines/Drains at time of discharge:  Lines/Drains/Airways     None                 Hospital Course:  Admitted for hypertensive urgency and demand ischemia. Pt was restarted on his home GDMT and diuresed, and weaned off NTG gtt on 7/14. Patient reports ongoing, constant 2/10 chest pain  however his pain sounds more pleuritic and musculoskeletal rather than anginal. Chest pain relieved with voltaren gel and percocet. Patient was stepped down to the floor on 7/14. Overnight 7/14 patient's blood pressure was low while lying in bed, and his home GDMT was held. Entresto was decreased from 49/51 to 24/26 and Toprol at 25 - patient tolerated these doses well. During admission patient also had fluctuant creatinine though on discharge this was stable; repeat BMP ordered in 1 week. Patient was discharged on stable condition on 7/16 with reduced doses of GDMT, and HTS follow up.       Goals of Care Treatment Preferences:  Code Status: Full Code    Health care agent: [facility] agent number:           What is most important right now is to focus on extending life as long as possible, even it it means sacrificing quality, curative/life-prolongation (regardless of treatment burdens).  Accordingly, we have decided that the best plan to meet the patient's goals includes continuing with treatment.      Consults:   Consults (From admission, onward)        Status Ordering Provider     Inpatient consult to Registered Dietitian/Nutritionist  Once        Provider:  (Not yet assigned)    Completed CORINA MACDONALD     Inpatient consult to Social Work/Case Management  Once        Provider:  (Not yet assigned)    Acknowledged CORINA MACDONALD          Physical Exam  Vitals reviewed.   Constitutional:       Appearance: He is not ill-appearing.   HENT:      Mouth/Throat:      Mouth: Mucous membranes are moist.   Cardiovascular:      Rate and Rhythm: Normal rate.     Pulses: Normal pulses.      Comments: Left sided chest tenderness on palpation  Pulmonary:      Effort: Pulmonary effort is normal. No respiratory distress.      Breath sounds: Normal breath sounds. No rales.   Abdominal:      Palpations: Abdomen is soft.      Tenderness: There is no abdominal tenderness.   Musculoskeletal:         General:  No swelling or tenderness. Normal range of motion.   Skin:     General: Skin is warm.      Capillary Refill: Capillary refill takes less than 2 seconds.   Neurological:      General: No focal deficit present.      Mental Status: He is alert and oriented to person, place, and time.   Psychiatric:         Mood and Affect: Mood normal.       Significant Diagnostic Studies: N/A    Pending Diagnostic Studies:     None          Final Active Diagnoses:    Diagnosis Date Noted POA    PRINCIPAL PROBLEM:  Hypertensive urgency [I16.0] 07/13/2023 Unknown    Stage 3 chronic kidney disease [N18.30] 04/19/2023 Yes    Acute on chronic combined systolic and diastolic heart failure [I50.43] 09/23/2022 Unknown    Paroxysmal A-fib [I48.0] 09/23/2022 Unknown    Dyslipidemia [E78.5] 09/23/2022 Yes      Problems Resolved During this Admission:     No new Assessment & Plan notes have been filed under this hospital service since the last note was generated.  Service: Cardiology      Discharged Condition: stable    Disposition: Home or Self Care    Follow Up:    Patient Instructions:      BASIC METABOLIC PANEL   Standing Status: Future Standing Exp. Date: 09/13/24     Ambulatory referral/consult to Transplant, Heart   Standing Status: Future   Referral Priority: Routine Referral Type: Transplants   Number of Visits Requested: 1     Medications:  Reconciled Home Medications:      Medication List      START taking these medications    oxyCODONE-acetaminophen 5-325 mg per tablet  Commonly known as: PERCOCET  Take 1 tablet by mouth every 6 (six) hours as needed for Pain.     sacubitriL-valsartan 24-26 mg per tablet  Commonly known as: ENTRESTO  Take 1 tablet by mouth 2 (two) times daily.  Replaces: sacubitriL-valsartan 49-51 mg per tablet        CONTINUE taking these medications    amiodarone 200 MG Tab  Commonly known as: PACERONE  Take 1 tablet (200 mg total) by mouth 2 (two) times daily.     apixaban 5 mg Tab  Commonly known as:  ELIQUIS  Take 5 mg by mouth 2 (two) times daily.     atorvastatin 80 MG tablet  Commonly known as: LIPITOR  Take 1 tablet (80 mg total) by mouth once daily.     clopidogreL 75 mg tablet  Commonly known as: PLAVIX  Take 1 tablet (75 mg total) by mouth once daily.     ferrous sulfate 325 mg (65 mg iron) Tab tablet  Commonly known as: FEOSOL  Take 325 mg by mouth every other day.     JARDIANCE 10 mg tablet  Generic drug: empagliflozin  Take 1 tablet (10 mg total) by mouth once daily.     LIDOcaine 5 %  Commonly known as: LIDODERM  Place 1 patch onto the skin once daily.     metoprolol succinate 25 MG 24 hr tablet  Commonly known as: TOPROL-XL  Take 1 tablet (25 mg total) by mouth every evening.     torsemide 20 MG Tab  Commonly known as: DEMADEX  Take 1 tablet (20 mg total) by mouth once daily.        STOP taking these medications    isosorbide mononitrate 10 mg tablet  Commonly known as: ISMO,MONOKET     sacubitriL-valsartan 49-51 mg per tablet  Commonly known as: ENTRESTO  Replaced by: sacubitriL-valsartan 24-26 mg per tablet            Time spent on the discharge of patient: 35 minutes    Radha Mason MD  Cardiology  [facility]

## 2023-07-16 NOTE — PLAN OF CARE
[facility]  Discharge Final Note    Primary Care Provider: Carleen Bueno MD    Expected Discharge Date: 7/16/2023    Final Discharge Note (most recent)       Final Note - 07/16/23 1116          Final Note    Assessment Type Final Discharge Note (P)      Anticipated Discharge Disposition Home or Self Care (P)      Hospital Resources/Appts/Education Provided Provided patient/caregiver with written discharge plan information;Appointments scheduled and added to AVS;Provided education on problems/symptoms using teachback (P)         Post-Acute Status    Coverage Medicaid (P)                      Important Message from Medicare             Pt. discharging to home with Self-Care. Pt. states his cousin will transport him home. After review of pt's medical record, no discharge needs identified.     French Carrasquillo LMSW

## 2023-07-17 PROBLEM — I21.4 NSTEMI (NON-ST ELEVATED MYOCARDIAL INFARCTION): Status: RESOLVED | Noted: 2023-04-16 | Resolved: 2023-07-17

## 2023-07-18 ENCOUNTER — PATIENT OUTREACH (OUTPATIENT)
Dept: ADMINISTRATIVE | Facility: CLINIC | Age: 58
End: 2023-07-18
Payer: MEDICAID

## 2023-07-25 ENCOUNTER — OFFICE VISIT (OUTPATIENT)
Dept: CARDIOLOGY | Facility: CLINIC | Age: 58
End: 2023-07-25
Payer: MEDICAID

## 2023-07-25 VITALS
BODY MASS INDEX: 25.99 KG/M2 | WEIGHT: 196.13 LBS | HEART RATE: 78 BPM | OXYGEN SATURATION: 98 % | DIASTOLIC BLOOD PRESSURE: 104 MMHG | SYSTOLIC BLOOD PRESSURE: 144 MMHG | HEIGHT: 73 IN

## 2023-07-25 DIAGNOSIS — R07.9 CHEST PAIN: ICD-10-CM

## 2023-07-25 DIAGNOSIS — I50.42 CHRONIC COMBINED SYSTOLIC AND DIASTOLIC HEART FAILURE: ICD-10-CM

## 2023-07-25 DIAGNOSIS — I46.9 CARDIAC ARREST WITH VENTRICULAR FIBRILLATION: ICD-10-CM

## 2023-07-25 DIAGNOSIS — I25.5 ISCHEMIC CARDIOMYOPATHY: ICD-10-CM

## 2023-07-25 DIAGNOSIS — I16.0 HYPERTENSIVE URGENCY: Primary | ICD-10-CM

## 2023-07-25 DIAGNOSIS — I49.01 CARDIAC ARREST WITH VENTRICULAR FIBRILLATION: ICD-10-CM

## 2023-07-25 DIAGNOSIS — I25.118 CORONARY ARTERY DISEASE OF NATIVE ARTERY OF NATIVE HEART WITH STABLE ANGINA PECTORIS: ICD-10-CM

## 2023-07-25 PROCEDURE — 3008F BODY MASS INDEX DOCD: CPT | Mod: CPTII,,, | Performed by: INTERNAL MEDICINE

## 2023-07-25 PROCEDURE — 3008F PR BODY MASS INDEX (BMI) DOCUMENTED: ICD-10-PCS | Mod: CPTII,,, | Performed by: INTERNAL MEDICINE

## 2023-07-25 PROCEDURE — 99215 PR OFFICE/OUTPT VISIT, EST, LEVL V, 40-54 MIN: ICD-10-PCS | Mod: S$PBB,,, | Performed by: INTERNAL MEDICINE

## 2023-07-25 PROCEDURE — 1159F PR MEDICATION LIST DOCUMENTED IN MEDICAL RECORD: ICD-10-PCS | Mod: CPTII,,, | Performed by: INTERNAL MEDICINE

## 2023-07-25 PROCEDURE — 1159F MED LIST DOCD IN RCRD: CPT | Mod: CPTII,,, | Performed by: INTERNAL MEDICINE

## 2023-07-25 PROCEDURE — 4010F ACE/ARB THERAPY RXD/TAKEN: CPT | Mod: CPTII,,, | Performed by: INTERNAL MEDICINE

## 2023-07-25 PROCEDURE — 1111F DSCHRG MED/CURRENT MED MERGE: CPT | Mod: CPTII,,, | Performed by: INTERNAL MEDICINE

## 2023-07-25 PROCEDURE — 99999 PR PBB SHADOW E&M-EST. PATIENT-LVL IV: ICD-10-PCS | Mod: PBBFAC,,, | Performed by: INTERNAL MEDICINE

## 2023-07-25 PROCEDURE — 1111F PR DISCHARGE MEDS RECONCILED W/ CURRENT OUTPATIENT MED LIST: ICD-10-PCS | Mod: CPTII,,, | Performed by: INTERNAL MEDICINE

## 2023-07-25 PROCEDURE — 3077F SYST BP >= 140 MM HG: CPT | Mod: CPTII,,, | Performed by: INTERNAL MEDICINE

## 2023-07-25 PROCEDURE — 99215 OFFICE O/P EST HI 40 MIN: CPT | Mod: S$PBB,,, | Performed by: INTERNAL MEDICINE

## 2023-07-25 PROCEDURE — 3044F HG A1C LEVEL LT 7.0%: CPT | Mod: CPTII,,, | Performed by: INTERNAL MEDICINE

## 2023-07-25 PROCEDURE — 99999 PR PBB SHADOW E&M-EST. PATIENT-LVL IV: CPT | Mod: PBBFAC,,, | Performed by: INTERNAL MEDICINE

## 2023-07-25 PROCEDURE — 4010F PR ACE/ARB THEARPY RXD/TAKEN: ICD-10-PCS | Mod: CPTII,,, | Performed by: INTERNAL MEDICINE

## 2023-07-25 PROCEDURE — 3077F PR MOST RECENT SYSTOLIC BLOOD PRESSURE >= 140 MM HG: ICD-10-PCS | Mod: CPTII,,, | Performed by: INTERNAL MEDICINE

## 2023-07-25 PROCEDURE — 3080F PR MOST RECENT DIASTOLIC BLOOD PRESSURE >= 90 MM HG: ICD-10-PCS | Mod: CPTII,,, | Performed by: INTERNAL MEDICINE

## 2023-07-25 PROCEDURE — 3080F DIAST BP >= 90 MM HG: CPT | Mod: CPTII,,, | Performed by: INTERNAL MEDICINE

## 2023-07-25 PROCEDURE — 3044F PR MOST RECENT HEMOGLOBIN A1C LEVEL <7.0%: ICD-10-PCS | Mod: CPTII,,, | Performed by: INTERNAL MEDICINE

## 2023-07-25 PROCEDURE — 99214 OFFICE O/P EST MOD 30 MIN: CPT | Mod: PBBFAC,PN | Performed by: INTERNAL MEDICINE

## 2023-07-25 NOTE — PROGRESS NOTES
Cardiology    7/25/2023  2:02 PM    Problem list  Patient Active Problem List   Diagnosis    CAD (coronary artery disease)    History of ST elevation myocardial infarction (STEMI)    Paroxysmal A-fib    Type 2 diabetes mellitus with circulatory disorder, without long-term current use of insulin    Acute on chronic combined systolic and diastolic heart failure    Dyslipidemia    ICD (implantable cardioverter-defibrillator) in place    HTN (hypertension)    Cardiac arrest with ventricular fibrillation    Arteriovenous malformation of cerebral vessels    Stage 3 chronic kidney disease    Ischemic cardiomyopathy    Hepatitis B core antibody positive    Hypertensive urgency       CC:  Hospital discharge f/u    HPI:  He was admitted to [facility] [place] from 7/13 to 7/16 for SOB, HTN urgency and atrial fibrillation with controlled HR.  He has PMH of stage C HFrEF, ICM , CAD s/p (STEMI s/p PCI to RCA (8/2021), and angiogram April showed acute occlusion of small OM that was medically managed), previous GIB,  PAF s/p DCCV who was sent for hospital discharge f/u.  He has appointment for cardiac stress PET and heart failure clinic in 6 days.  He states that he has been compliant with his medications, low salt diet and fluid restrictions but has not been checking his weight.  Has SHELBY when light activity such as bathing.  Has orthopnea but no PND.  No ICD discharge.  No bleeding.      Medications  Current Outpatient Medications   Medication Sig Dispense Refill    amiodarone (PACERONE) 200 MG Tab Take 1 tablet (200 mg total) by mouth 2 (two) times daily. 60 tablet 3    apixaban (ELIQUIS) 5 mg Tab Take 5 mg by mouth 2 (two) times daily.      atorvastatin (LIPITOR) 80 MG tablet Take 1 tablet (80 mg total) by mouth once daily. 30 tablet 11    clopidogreL (PLAVIX) 75 mg tablet Take 1 tablet (75 mg total) by mouth once daily. 30 tablet 11    empagliflozin (JARDIANCE) 10 mg tablet Take 1 tablet (10 mg total) by mouth once daily. 30 tablet 1     ferrous sulfate (FEOSOL) 325 mg (65 mg iron) Tab tablet Take 325 mg by mouth every other day.      metoprolol succinate (TOPROL-XL) 25 MG 24 hr tablet Take 1 tablet (25 mg total) by mouth every evening. 30 tablet 11    oxyCODONE-acetaminophen (PERCOCET) 5-325 mg per tablet Take 1 tablet by mouth every 6 (six) hours as needed for Pain. 10 each 0    sacubitriL-valsartan (ENTRESTO) 24-26 mg per tablet Take 1 tablet by mouth 2 (two) times daily. 60 tablet 11    torsemide (DEMADEX) 20 MG Tab Take 1 tablet (20 mg total) by mouth once daily. 30 tablet 11    LIDOcaine (LIDODERM) 5 % Place 1 patch onto the skin once daily.       No current facility-administered medications for this visit.      Prior to Admission medications    Medication Sig Start Date End Date Taking? Authorizing Provider   amiodarone (PACERONE) 200 MG Tab Take 1 tablet (200 mg total) by mouth 2 (two) times daily. 4/22/23  Yes Barron Finch MD   apixaban (ELIQUIS) 5 mg Tab Take 5 mg by mouth 2 (two) times daily. 9/8/22  Yes Historical Provider   atorvastatin (LIPITOR) 80 MG tablet Take 1 tablet (80 mg total) by mouth once daily. 4/22/23  Yes Barron Finch MD   clopidogreL (PLAVIX) 75 mg tablet Take 1 tablet (75 mg total) by mouth once daily. 4/22/23  Yes Barron Finch MD   empagliflozin (JARDIANCE) 10 mg tablet Take 1 tablet (10 mg total) by mouth once daily. 3/21/23  Yes Chelsy Morales DO   ferrous sulfate (FEOSOL) 325 mg (65 mg iron) Tab tablet Take 325 mg by mouth every other day. 8/5/22  Yes Historical Provider   metoprolol succinate (TOPROL-XL) 25 MG 24 hr tablet Take 1 tablet (25 mg total) by mouth every evening. 7/16/23 7/15/24 Yes Radha Mason MD   oxyCODONE-acetaminophen (PERCOCET) 5-325 mg per tablet Take 1 tablet by mouth every 6 (six) hours as needed for Pain. 7/16/23  Yes Radha Mason MD   sacubitriL-valsartan (ENTRESTO) 24-26 mg per tablet Take 1 tablet by mouth 2 (two) times daily. 7/16/23  Yes Radha Mason MD   torsemide (DEMADEX) 20  MG Tab Take 1 tablet (20 mg total) by mouth once daily. 4/22/23 4/21/24 Yes Barron Finch MD   LIDOcaine (LIDODERM) 5 % Place 1 patch onto the skin once daily. 7/31/22   Historical Provider         History  Past Medical History:   Diagnosis Date    Atrial fibrillation     Encounter for blood transfusion      Past Surgical History:   Procedure Laterality Date    CARDIAC DEFIBRILLATOR PLACEMENT Left     HERNIA REPAIR      LEFT HEART CATHETERIZATION Left 4/18/2023    Procedure: Left heart cath;  Surgeon: Atilio Marks MD;  Location: [facility] CATH LAB;  Service: Cardiology;  Laterality: Left;    RIGHT HEART CATHETERIZATION Right 9/30/2022    Procedure: INSERTION, CATHETER, RIGHT HEART;  Surgeon: Caden Aden MD;  Location: [facility] CATH LAB;  Service: Cardiology;  Laterality: Right;    TREATMENT OF CARDIAC ARRHYTHMIA N/A 11/22/2022    Procedure: CARDIOVERSION;  Surgeon: Marvin Sanon MD;  Location: [facility] EP LAB;  Service: Cardiology;  Laterality: N/A;  afib, KIA, DCCV, anes, MB, 3 Prep     Social History     Socioeconomic History    Marital status: Single   Tobacco Use    Smoking status: Never    Smokeless tobacco: Never   Substance and Sexual Activity    Alcohol use: Never    Drug use: Never     Social Determinants of Health     Financial Resource Strain: Low Risk     Difficulty of Paying Living Expenses: Not hard at all   Food Insecurity: No Food Insecurity    Worried About Running Out of Food in the Last Year: Never true    Ran Out of Food in the Last Year: Never true   Transportation Needs: No Transportation Needs    Lack of Transportation (Medical): No    Lack of Transportation (Non-Medical): No   Physical Activity: Inactive    Days of Exercise per Week: 0 days    Minutes of Exercise per Session: 0 min   Stress: Stress Concern Present    Feeling of Stress : To some extent   Social Connections: Socially Isolated    Frequency of Communication with Friends and Family: More than three times a week    Frequency of  Social Gatherings with Friends and Family: More than three times a week    Attends [employer] Services: Never    Active Member of Clubs or Organizations: No    Attends Club or Organization Meetings: Never    Marital Status: Never    Housing Stability: Low Risk     Unable to Pay for Housing in the Last Year: No    Number of Places Lived in the Last Year: 1    Unstable Housing in the Last Year: No         Allergies  Review of patient's allergies indicates:  No Known Allergies      Review of Systems   Review of Systems   Cardiovascular:  Positive for dyspnea on exertion and orthopnea. Negative for chest pain, claudication, cyanosis, irregular heartbeat, leg swelling, near-syncope, palpitations, paroxysmal nocturnal dyspnea and syncope.   Respiratory:  Positive for shortness of breath. Negative for cough, hemoptysis, sleep disturbances due to breathing, snoring, sputum production and wheezing.    Gastrointestinal:  Negative for hematemesis, hematochezia and melena.       Physical Exam  Wt Readings from Last 1 Encounters:   07/25/23 89 kg (196 lb 1.6 oz)     BP Readings from Last 3 Encounters:   07/25/23 (!) 144/104   07/16/23 114/71   06/20/23 (!) 157/117     Pulse Readings from Last 1 Encounters:   07/25/23 78     Body mass index is 25.87 kg/m².    Physical Exam  Neck:      Vascular: JVD present.   Cardiovascular:      Heart sounds: S1 normal and S2 normal. Murmur heard.     Gallop present. S3 sounds present.      Comments: L side DAYANA.  Enlarged and displaced PMI  Pulmonary:      Breath sounds: Normal breath sounds and air entry.   Musculoskeletal:      Right lower leg: No edema.      Left lower leg: No edema.   Neurological:      Mental Status: He is alert.           Assessment  1. Chest pain  resolved  - Ambulatory referral/consult to Cardiology    2. Coronary artery disease of native artery of native heart with stable angina pectoris  stable  - Ambulatory referral/consult to Cardiology    3. Chronic combined  systolic and diastolic heart failure  Class III   - Ambulatory referral/consult to Cardiology    4. Hypertensive urgency  resolved    5. Ischemic cardiomyopathy  unchanged    6. Cardiac arrest with ventricular fibrillation  unchanged        Plan and Discussion  Discussed that his BP is stable and he is on good GDMT.  Recommend to continue with current medications, keep scheduled appointment on Monday 7/31 with cardiac stress PET and heart failure clinic.  Will defer management to HF clinic.    Follow Up  AMARILIS See MD, F.A.C.C, F.S.C.A.I.        45 minutes were spent in chart review, documentation and review of results, and evaluation, treatment, and counseling of patient on the same day of service.    Disclaimer: This document was created using voice recognition software ([employer]). Although it may be edited, this document may contain errors related to incorrect recognition of the spoken word. Please call the physician if clarification is needed.

## 2023-07-27 ENCOUNTER — TELEPHONE (OUTPATIENT)
Dept: CARDIOLOGY | Facility: HOSPITAL | Age: 58
End: 2023-07-27
Payer: MEDICAID

## 2023-08-05 ENCOUNTER — HOSPITAL ENCOUNTER (INPATIENT)
Facility: HOSPITAL | Age: 58
LOS: 33 days | Discharge: NURSING, REHAB OR LONG-TERM CARE | DRG: 280 | End: 2023-09-07
Attending: STUDENT IN AN ORGANIZED HEALTH CARE EDUCATION/TRAINING PROGRAM | Admitting: STUDENT IN AN ORGANIZED HEALTH CARE EDUCATION/TRAINING PROGRAM
Payer: MEDICAID

## 2023-08-05 DIAGNOSIS — I48.0 PAROXYSMAL A-FIB: ICD-10-CM

## 2023-08-05 DIAGNOSIS — R94.31 QT PROLONGATION: ICD-10-CM

## 2023-08-05 DIAGNOSIS — I48.91 A-FIB: ICD-10-CM

## 2023-08-05 DIAGNOSIS — I26.94 MULTIPLE SUBSEGMENTAL PULMONARY EMBOLI WITHOUT ACUTE COR PULMONALE: ICD-10-CM

## 2023-08-05 DIAGNOSIS — I63.9 STROKE: ICD-10-CM

## 2023-08-05 DIAGNOSIS — A41.9 SEPSIS WITH ACUTE ORGAN DYSFUNCTION WITHOUT SEPTIC SHOCK, DUE TO UNSPECIFIED ORGANISM, UNSPECIFIED TYPE: ICD-10-CM

## 2023-08-05 DIAGNOSIS — I50.9 ACUTE ON CHRONIC CONGESTIVE HEART FAILURE, UNSPECIFIED HEART FAILURE TYPE: Primary | ICD-10-CM

## 2023-08-05 DIAGNOSIS — R65.20 SEPSIS WITH ACUTE ORGAN DYSFUNCTION WITHOUT SEPTIC SHOCK, DUE TO UNSPECIFIED ORGANISM, UNSPECIFIED TYPE: ICD-10-CM

## 2023-08-05 DIAGNOSIS — R94.31 PROLONGED Q-T INTERVAL ON ECG: ICD-10-CM

## 2023-08-05 DIAGNOSIS — R00.0 TACHYCARDIA: ICD-10-CM

## 2023-08-05 DIAGNOSIS — I47.20 V TACH: ICD-10-CM

## 2023-08-05 DIAGNOSIS — I63.512 ACUTE ISCHEMIC LEFT MCA STROKE: ICD-10-CM

## 2023-08-05 DIAGNOSIS — R07.9 CHEST PAIN: ICD-10-CM

## 2023-08-05 DIAGNOSIS — I61.9 ICH (INTRACEREBRAL HEMORRHAGE): ICD-10-CM

## 2023-08-05 DIAGNOSIS — I63.9 CEREBROVASCULAR ACCIDENT (CVA), UNSPECIFIED MECHANISM: ICD-10-CM

## 2023-08-05 DIAGNOSIS — R13.12 OROPHARYNGEAL DYSPHAGIA: ICD-10-CM

## 2023-08-05 DIAGNOSIS — Z43.1 ENCOUNTER FOR PEG (PERCUTANEOUS ENDOSCOPIC GASTROSTOMY): ICD-10-CM

## 2023-08-05 DIAGNOSIS — Z79.899 HIGH RISK MEDICATION USE: ICD-10-CM

## 2023-08-05 DIAGNOSIS — R07.89 CHEST DISCOMFORT: ICD-10-CM

## 2023-08-05 DIAGNOSIS — G81.91 HEMIPARESIS, RIGHT: ICD-10-CM

## 2023-08-05 DIAGNOSIS — I50.42 CHRONIC COMBINED SYSTOLIC AND DIASTOLIC HEART FAILURE: ICD-10-CM

## 2023-08-05 DIAGNOSIS — I63.512 ARTERIAL ISCHEMIC STROKE, MCA (MIDDLE CEREBRAL ARTERY), LEFT, ACUTE: ICD-10-CM

## 2023-08-05 DIAGNOSIS — I25.5 ISCHEMIC CARDIOMYOPATHY: ICD-10-CM

## 2023-08-05 DIAGNOSIS — D72.829 LEUKOCYTOSIS, UNSPECIFIED TYPE: ICD-10-CM

## 2023-08-05 DIAGNOSIS — R50.9 FEVER, UNSPECIFIED FEVER CAUSE: ICD-10-CM

## 2023-08-05 DIAGNOSIS — I63.412 EMBOLIC STROKE INVOLVING LEFT MIDDLE CEREBRAL ARTERY: ICD-10-CM

## 2023-08-05 DIAGNOSIS — I25.10 CORONARY ARTERY DISEASE INVOLVING NATIVE CORONARY ARTERY OF NATIVE HEART WITHOUT ANGINA PECTORIS: ICD-10-CM

## 2023-08-05 DIAGNOSIS — Z91.89 AT RISK FOR LONG QT SYNDROME: ICD-10-CM

## 2023-08-05 PROBLEM — E11.59 TYPE 2 DIABETES MELLITUS WITH CIRCULATORY DISORDER, WITHOUT LONG-TERM CURRENT USE OF INSULIN: Status: RESOLVED | Noted: 2022-09-23 | Resolved: 2023-08-05

## 2023-08-05 LAB
ALBUMIN SERPL BCP-MCNC: 4.3 G/DL (ref 3.5–5.2)
ALP SERPL-CCNC: 71 U/L (ref 55–135)
ALT SERPL W/O P-5'-P-CCNC: 16 U/L (ref 10–44)
AMPHET+METHAMPHET UR QL: NEGATIVE
ANION GAP SERPL CALC-SCNC: 12 MMOL/L (ref 8–16)
AST SERPL-CCNC: 15 U/L (ref 10–40)
BARBITURATES UR QL SCN>200 NG/ML: NEGATIVE
BASOPHILS # BLD AUTO: 0.02 K/UL (ref 0–0.2)
BASOPHILS NFR BLD: 0.3 % (ref 0–1.9)
BENZODIAZ UR QL SCN>200 NG/ML: NEGATIVE
BILIRUB SERPL-MCNC: 0.8 MG/DL (ref 0.1–1)
BNP SERPL-MCNC: 1958 PG/ML (ref 0–99)
BUN SERPL-MCNC: 13 MG/DL (ref 6–20)
BZE UR QL SCN: NEGATIVE
CALCIUM SERPL-MCNC: 10 MG/DL (ref 8.7–10.5)
CANNABINOIDS UR QL SCN: NEGATIVE
CHLORIDE SERPL-SCNC: 106 MMOL/L (ref 95–110)
CO2 SERPL-SCNC: 24 MMOL/L (ref 23–29)
CREAT SERPL-MCNC: 1.7 MG/DL (ref 0.5–1.4)
CREAT UR-MCNC: 9 MG/DL (ref 23–375)
DIFFERENTIAL METHOD: ABNORMAL
EOSINOPHIL # BLD AUTO: 0.1 K/UL (ref 0–0.5)
EOSINOPHIL NFR BLD: 1.3 % (ref 0–8)
ERYTHROCYTE [DISTWIDTH] IN BLOOD BY AUTOMATED COUNT: 15.7 % (ref 11.5–14.5)
EST. GFR  (NO RACE VARIABLE): 46.7 ML/MIN/1.73 M^2
ESTIMATED AVG GLUCOSE: 114 MG/DL (ref 68–131)
ETHANOL UR-MCNC: <10 MG/DL
GLUCOSE SERPL-MCNC: 73 MG/DL (ref 70–110)
HBA1C MFR BLD: 5.6 % (ref 4–5.6)
HCT VFR BLD AUTO: 43.7 % (ref 40–54)
HGB BLD-MCNC: 13.5 G/DL (ref 14–18)
IMM GRANULOCYTES # BLD AUTO: 0.01 K/UL (ref 0–0.04)
IMM GRANULOCYTES NFR BLD AUTO: 0.2 % (ref 0–0.5)
LYMPHOCYTES # BLD AUTO: 1 K/UL (ref 1–4.8)
LYMPHOCYTES NFR BLD: 15.9 % (ref 18–48)
MCH RBC QN AUTO: 27.4 PG (ref 27–31)
MCHC RBC AUTO-ENTMCNC: 30.9 G/DL (ref 32–36)
MCV RBC AUTO: 89 FL (ref 82–98)
METHADONE UR QL SCN>300 NG/ML: NEGATIVE
MONOCYTES # BLD AUTO: 0.6 K/UL (ref 0.3–1)
MONOCYTES NFR BLD: 9.5 % (ref 4–15)
NEUTROPHILS # BLD AUTO: 4.4 K/UL (ref 1.8–7.7)
NEUTROPHILS NFR BLD: 72.8 % (ref 38–73)
NRBC BLD-RTO: 0 /100 WBC
OPIATES UR QL SCN: NEGATIVE
PCP UR QL SCN>25 NG/ML: NEGATIVE
PLATELET # BLD AUTO: 304 K/UL (ref 150–450)
PMV BLD AUTO: 11.3 FL (ref 9.2–12.9)
POC CARDIAC TROPONIN I: 0.04 NG/ML (ref 0–0.08)
POTASSIUM SERPL-SCNC: 4.1 MMOL/L (ref 3.5–5.1)
PROT SERPL-MCNC: 7.8 G/DL (ref 6–8.4)
RBC # BLD AUTO: 4.93 M/UL (ref 4.6–6.2)
SAMPLE: NORMAL
SARS-COV-2 RDRP RESP QL NAA+PROBE: NEGATIVE
SODIUM SERPL-SCNC: 142 MMOL/L (ref 136–145)
TOXICOLOGY INFORMATION: ABNORMAL
TROPONIN I SERPL DL<=0.01 NG/ML-MCNC: 0.06 NG/ML (ref 0–0.03)
TROPONIN I SERPL DL<=0.01 NG/ML-MCNC: 0.07 NG/ML (ref 0–0.03)
WBC # BLD AUTO: 6.03 K/UL (ref 3.9–12.7)

## 2023-08-05 PROCEDURE — 25000242 PHARM REV CODE 250 ALT 637 W/ HCPCS

## 2023-08-05 PROCEDURE — 93005 ELECTROCARDIOGRAM TRACING: CPT

## 2023-08-05 PROCEDURE — 63600175 PHARM REV CODE 636 W HCPCS

## 2023-08-05 PROCEDURE — 25000003 PHARM REV CODE 250

## 2023-08-05 PROCEDURE — 99285 EMERGENCY DEPT VISIT HI MDM: CPT | Mod: 25

## 2023-08-05 PROCEDURE — 83036 HEMOGLOBIN GLYCOSYLATED A1C: CPT | Performed by: STUDENT IN AN ORGANIZED HEALTH CARE EDUCATION/TRAINING PROGRAM

## 2023-08-05 PROCEDURE — G0378 HOSPITAL OBSERVATION PER HR: HCPCS

## 2023-08-05 PROCEDURE — 93010 ELECTROCARDIOGRAM REPORT: CPT | Mod: ,,, | Performed by: INTERNAL MEDICINE

## 2023-08-05 PROCEDURE — 96374 THER/PROPH/DIAG INJ IV PUSH: CPT

## 2023-08-05 PROCEDURE — 99223 1ST HOSP IP/OBS HIGH 75: CPT | Mod: 25,,, | Performed by: INTERNAL MEDICINE

## 2023-08-05 PROCEDURE — 20600001 HC STEP DOWN PRIVATE ROOM

## 2023-08-05 PROCEDURE — U0002 COVID-19 LAB TEST NON-CDC: HCPCS

## 2023-08-05 PROCEDURE — 93010 EKG 12-LEAD: ICD-10-PCS | Mod: ,,, | Performed by: INTERNAL MEDICINE

## 2023-08-05 PROCEDURE — 99223 1ST HOSP IP/OBS HIGH 75: CPT | Mod: ,,, | Performed by: STUDENT IN AN ORGANIZED HEALTH CARE EDUCATION/TRAINING PROGRAM

## 2023-08-05 PROCEDURE — 85025 COMPLETE CBC W/AUTO DIFF WBC: CPT

## 2023-08-05 PROCEDURE — 99223 PR INITIAL HOSPITAL CARE,LEVL III: ICD-10-PCS | Mod: 25,,, | Performed by: INTERNAL MEDICINE

## 2023-08-05 PROCEDURE — 25000003 PHARM REV CODE 250: Performed by: STUDENT IN AN ORGANIZED HEALTH CARE EDUCATION/TRAINING PROGRAM

## 2023-08-05 PROCEDURE — 80307 DRUG TEST PRSMV CHEM ANLYZR: CPT | Performed by: STUDENT IN AN ORGANIZED HEALTH CARE EDUCATION/TRAINING PROGRAM

## 2023-08-05 PROCEDURE — 99223 PR INITIAL HOSPITAL CARE,LEVL III: ICD-10-PCS | Mod: ,,, | Performed by: STUDENT IN AN ORGANIZED HEALTH CARE EDUCATION/TRAINING PROGRAM

## 2023-08-05 PROCEDURE — 80053 COMPREHEN METABOLIC PANEL: CPT

## 2023-08-05 PROCEDURE — 83880 ASSAY OF NATRIURETIC PEPTIDE: CPT

## 2023-08-05 PROCEDURE — 84484 ASSAY OF TROPONIN QUANT: CPT

## 2023-08-05 PROCEDURE — 84484 ASSAY OF TROPONIN QUANT: CPT | Mod: 91 | Performed by: STUDENT IN AN ORGANIZED HEALTH CARE EDUCATION/TRAINING PROGRAM

## 2023-08-05 RX ORDER — FUROSEMIDE 10 MG/ML
80 INJECTION INTRAMUSCULAR; INTRAVENOUS
Status: DISCONTINUED | OUTPATIENT
Start: 2023-08-05 | End: 2023-08-05

## 2023-08-05 RX ORDER — AMIODARONE HYDROCHLORIDE 200 MG/1
200 TABLET ORAL 2 TIMES DAILY
Status: DISCONTINUED | OUTPATIENT
Start: 2023-08-05 | End: 2023-08-11

## 2023-08-05 RX ORDER — FUROSEMIDE 10 MG/ML
80 INJECTION INTRAMUSCULAR; INTRAVENOUS
Status: COMPLETED | OUTPATIENT
Start: 2023-08-05 | End: 2023-08-05

## 2023-08-05 RX ORDER — ISOSORBIDE MONONITRATE 30 MG/1
30 TABLET, EXTENDED RELEASE ORAL DAILY
Status: DISCONTINUED | OUTPATIENT
Start: 2023-08-06 | End: 2023-08-05

## 2023-08-05 RX ORDER — ISOSORBIDE MONONITRATE 60 MG/1
60 TABLET, EXTENDED RELEASE ORAL DAILY
Status: DISCONTINUED | OUTPATIENT
Start: 2023-08-06 | End: 2023-08-05

## 2023-08-05 RX ORDER — ASPIRIN 325 MG
325 TABLET ORAL
Status: COMPLETED | OUTPATIENT
Start: 2023-08-05 | End: 2023-08-05

## 2023-08-05 RX ORDER — LANOLIN ALCOHOL/MO/W.PET/CERES
1 CREAM (GRAM) TOPICAL DAILY
Status: DISCONTINUED | OUTPATIENT
Start: 2023-08-06 | End: 2023-08-11

## 2023-08-05 RX ORDER — ATORVASTATIN CALCIUM 40 MG/1
80 TABLET, FILM COATED ORAL DAILY
Status: DISCONTINUED | OUTPATIENT
Start: 2023-08-06 | End: 2023-08-11

## 2023-08-05 RX ORDER — NITROGLYCERIN 0.4 MG/1
0.4 TABLET SUBLINGUAL EVERY 5 MIN PRN
Status: DISCONTINUED | OUTPATIENT
Start: 2023-08-05 | End: 2023-09-07 | Stop reason: HOSPADM

## 2023-08-05 RX ORDER — METOPROLOL SUCCINATE 25 MG/1
25 TABLET, EXTENDED RELEASE ORAL NIGHTLY
Status: DISCONTINUED | OUTPATIENT
Start: 2023-08-05 | End: 2023-08-05

## 2023-08-05 RX ORDER — SODIUM CHLORIDE 0.9 % (FLUSH) 0.9 %
10 SYRINGE (ML) INJECTION
Status: DISCONTINUED | OUTPATIENT
Start: 2023-08-05 | End: 2023-09-07 | Stop reason: HOSPADM

## 2023-08-05 RX ORDER — ISOSORBIDE MONONITRATE 60 MG/1
60 TABLET, EXTENDED RELEASE ORAL DAILY
Status: DISCONTINUED | OUTPATIENT
Start: 2023-08-05 | End: 2023-08-05

## 2023-08-05 RX ORDER — CLOPIDOGREL BISULFATE 75 MG/1
75 TABLET ORAL DAILY
Status: DISCONTINUED | OUTPATIENT
Start: 2023-08-06 | End: 2023-08-11

## 2023-08-05 RX ORDER — FUROSEMIDE 10 MG/ML
80 INJECTION INTRAMUSCULAR; INTRAVENOUS DAILY
Status: DISCONTINUED | OUTPATIENT
Start: 2023-08-06 | End: 2023-08-07

## 2023-08-05 RX ORDER — ISOSORBIDE MONONITRATE 60 MG/1
60 TABLET, EXTENDED RELEASE ORAL DAILY
Status: DISCONTINUED | OUTPATIENT
Start: 2023-08-06 | End: 2023-08-06

## 2023-08-05 RX ORDER — METOPROLOL SUCCINATE 50 MG/1
50 TABLET, EXTENDED RELEASE ORAL NIGHTLY
Status: DISCONTINUED | OUTPATIENT
Start: 2023-08-05 | End: 2023-08-05

## 2023-08-05 RX ADMIN — METOPROLOL SUCCINATE 50 MG: 50 TABLET, EXTENDED RELEASE ORAL at 05:08

## 2023-08-05 RX ADMIN — ISOSORBIDE MONONITRATE 60 MG: 60 TABLET, EXTENDED RELEASE ORAL at 05:08

## 2023-08-05 RX ADMIN — SACUBITRIL AND VALSARTAN 1 TABLET: 24; 26 TABLET, FILM COATED ORAL at 08:08

## 2023-08-05 RX ADMIN — NITROGLYCERIN 0.4 MG: 0.4 TABLET, ORALLY DISINTEGRATING SUBLINGUAL at 03:08

## 2023-08-05 RX ADMIN — AMIODARONE HYDROCHLORIDE 200 MG: 200 TABLET ORAL at 08:08

## 2023-08-05 RX ADMIN — APIXABAN 5 MG: 5 TABLET, FILM COATED ORAL at 08:08

## 2023-08-05 RX ADMIN — ASPIRIN 325 MG ORAL TABLET 325 MG: 325 PILL ORAL at 02:08

## 2023-08-05 RX ADMIN — FUROSEMIDE 80 MG: 10 INJECTION, SOLUTION INTRAMUSCULAR; INTRAVENOUS at 03:08

## 2023-08-05 NOTE — ASSESSMENT & PLAN NOTE
Patient with Paroxysmal (<7 days) atrial fibrillation which is controlled currently with Beta Blocker and Amiodarone. Patient is currently in atrial fibrillation. Anticoagulation indicated. Anticoagulation done with eliquis.

## 2023-08-05 NOTE — ED PROVIDER NOTES
Encounter Date: 8/5/2023       History     Chief Complaint   Patient presents with    Chest Pain     Has aicd, hx mi      57 y.o. M with PMHx stage C HFrEF, ICM, CAD s/p (STEMI s/p PCI to Rcx (8/2021), previous GIB,  PAF s/p DCCV, uncontrolled HTN who presented to the ED w/ c/o of SOB and chest pressure.  Recent admission to hospital July 17th 2023 discharged after diuresis.  Patient was admitted for chest pain and shortness of breath at that time.  Was diuresed drip at that time.  Presenting today for several days of dry cough, shortness of breath that started yesterday evening and chest pain that started today.  Chest pain is midsternal.  States he has had similar chest pain in the past.  He denies any lower extremity edema, calf pain, nausea, vomiting, diaphoresis.  States he takes a diuretic as needed.  He last took 1 yesterday.  He denies any fevers, rhinorrhea, congestion.    The history is provided by the patient. No  was used.     Review of patient's allergies indicates:  No Known Allergies  Past Medical History:   Diagnosis Date    Atrial fibrillation     Encounter for blood transfusion      Past Surgical History:   Procedure Laterality Date    CARDIAC DEFIBRILLATOR PLACEMENT Left     HERNIA REPAIR      LEFT HEART CATHETERIZATION Left 4/18/2023    Procedure: Left heart cath;  Surgeon: Atilio Marks MD;  Location: [facility] CATH LAB;  Service: Cardiology;  Laterality: Left;    RIGHT HEART CATHETERIZATION Right 9/30/2022    Procedure: INSERTION, CATHETER, RIGHT HEART;  Surgeon: Caden Aden MD;  Location: [facility] CATH LAB;  Service: Cardiology;  Laterality: Right;    TREATMENT OF CARDIAC ARRHYTHMIA N/A 11/22/2022    Procedure: CARDIOVERSION;  Surgeon: Marvin Sanon MD;  Location: [facility] EP LAB;  Service: Cardiology;  Laterality: N/A;  afib, KIA, DCCV, anes, MB, 3 Prep     No family history on file.  Social History     Tobacco Use    Smoking status: Never    Smokeless tobacco: Never    Substance Use Topics    Alcohol use: Never    Drug use: Never     Review of Systems   Constitutional:         See HPI       Physical Exam     Initial Vitals [08/05/23 1258]   BP Pulse Resp Temp SpO2   (!) 140/105 89 20 97.6 °F (36.4 °C) 97 %      MAP       --         Physical Exam    Nursing note and vitals reviewed.  Constitutional: He appears well-developed and well-nourished. He is not diaphoretic. No distress.   HENT:   Head: Normocephalic and atraumatic.   Eyes: Conjunctivae and EOM are normal.   Neck: Neck supple.   Normal range of motion.  Cardiovascular:  Normal heart sounds and intact distal pulses.           Irregularly irregular   Pulmonary/Chest: No respiratory distress. He has no wheezes. He has rhonchi. He has rales.   Satting normally on room air.  Crackles bilateral bases.  Mild tachypnea   Abdominal: Abdomen is soft. Bowel sounds are normal. He exhibits no distension. There is no abdominal tenderness. There is no rebound and no guarding.   Musculoskeletal:         General: No tenderness or edema. Normal range of motion.      Cervical back: Normal range of motion and neck supple.      Comments: Calves nontender     Neurological: He is alert. He has normal strength.   Skin: Skin is warm and dry.   Psychiatric: He has a normal mood and affect. Thought content normal.         ED Course   Procedures  Labs Reviewed   CBC W/ AUTO DIFFERENTIAL - Abnormal; Notable for the following components:       Result Value    Hemoglobin 13.5 (*)     MCHC 30.9 (*)     RDW 15.7 (*)     Lymph % 15.9 (*)     All other components within normal limits   COMPREHENSIVE METABOLIC PANEL - Abnormal; Notable for the following components:    Creatinine 1.7 (*)     eGFR 46.7 (*)     All other components within normal limits   TROPONIN I - Abnormal; Notable for the following components:    Troponin I 0.065 (*)     All other components within normal limits   B-TYPE NATRIURETIC PEPTIDE - Abnormal; Notable for the following  components:    BNP 1,958 (*)     All other components within normal limits   SARS-COV-2 RNA AMPLIFICATION, QUAL   TROPONIN ISTAT   POCT TROPONIN   POCT TROPONIN     EKG Readings: (Independently Interpreted)   Initial Reading: No STEMI. Previous EKG: Compared with most recent EKG Rhythm: Atrial Fibrillation. Heart Rate: 73. ST Segments: Normal ST Segments. T Waves: Normal.       Imaging Results              X-Ray Chest AP Portable (Final result)  Result time 08/05/23 14:46:03      Final result by Leandro Ruffin MD (08/05/23 14:46:03)                   Impression:      1. Central hilar findings suggest congestive change/edema.  No large focal consolidation.      Electronically signed by: Leandro Ruffin MD  Date:    08/05/2023  Time:    14:46               Narrative:    EXAMINATION:  XR CHEST AP PORTABLE    CLINICAL HISTORY:  Chest Pain;    TECHNIQUE:  Single frontal view of the chest was performed.    COMPARISON:  07/13/2023    FINDINGS:  The cardiomediastinal silhouette is prominent, similar to the previous exam noting left chest wall pacer..  There is no pleural effusion.  The trachea is midline.  The lungs are symmetrically expanded bilaterally with coarse central hilar interstitial attenuation.  No large focal consolidation seen.  There is no pneumothorax.  The osseous structures are remarkable for degenerative change..                                       Medications   nitroGLYCERIN SL tablet 0.4 mg (0.4 mg Sublingual Given 8/5/23 1533)   amiodarone tablet 200 mg (200 mg Oral Given 8/5/23 2025)   apixaban tablet 5 mg (5 mg Oral Given 8/5/23 2025)   atorvastatin tablet 80 mg (has no administration in time range)   clopidogreL tablet 75 mg (has no administration in time range)   ferrous sulfate tablet 1 each (has no administration in time range)   sacubitriL-valsartan 24-26 mg per tablet 1 tablet (1 tablet Oral Given 8/5/23 2025)   sodium chloride 0.9% flush 10 mL (has no administration in time range)    furosemide injection 80 mg (has no administration in time range)   isosorbide mononitrate 24 hr tablet 60 mg (has no administration in time range)   metoprolol succinate 24 hr tablet 75 mg (has no administration in time range)   aspirin tablet 325 mg (325 mg Oral Given 8/5/23 1435)   furosemide injection 80 mg (80 mg Intravenous Given 8/5/23 1527)     Medical Decision Making:   History:   Old Medical Records: I decided to obtain old medical records.  Old Records Summarized: records from previous admission(s).       <> Summary of Records: Echo from July 13, 2023  · The left ventricle is severely enlarged with severe eccentric hypertrophy and severely decreased systolic function.  · The estimated ejection fraction is approximately 10% ( or less to at most low teens).  · There is severe left ventricular global hypokinesis.  · There is abnormal septal wall motion.  · Left ventricular diastolic dysfunction.  · Severe left atrial enlargement.  · Moderate right ventricular enlargement with severely reduced right ventricular systolic function.  · Severe right atrial enlargement.  · Moderate mitral regurgitation.  · Mild to moderate pulmonic regurgitation.  · Elevated central venous pressure (15 mmHg).  · The estimated PA systolic pressure is 53 mmHg.  · There is moderate pulmonary hypertension.    Initial Assessment:   57 y.o. M with PMHx stage C HFrEF, ICM, CAD s/p (STEMI s/p PCI to Rcx (8/2021), previous GIB,  PAF s/p DCCV, uncontrolled HTN who presented to the ED w/ c/o of SOB and chest pressure.  He presents slightly hypertensive, otherwise with normal vital signs.  Differential Diagnosis:   MI/ACS, CHF exacerbation, PTX, PNA, GERD, esophagitis, malignant cardiac dysrhythmia, pleurisy, costocondritis  Clinical Tests:   Lab Tests: Ordered and Reviewed  Radiological Study: Ordered and Reviewed  Medical Tests: Ordered and Reviewed  ED Management:  See ED course.  Initial troponin within normal limits.  BNP elevated.   Chest x-ray concerning for pulmonary edema ordered IV Lasix in the emergency department.  Plan to bring into the hospital for continued monitoring and treatment.             ED Course as of 08/05/23 2131   Sat Aug 05, 2023   1450 POC Cardiac Troponin I: 0.04 [KL]   1450 Hemoglobin(!): 13.5  No significant anemia [KL]   1450 X-Ray Chest AP Portable  Chest x-ray with cardiomegaly and pulmonary edema.  Lasix ordered. [KL]      ED Course User Index  [KL] Coutrney Valera MD                 Clinical Impression:   Final diagnoses:  [R07.89] Chest discomfort  [R07.9] Chest pain  [I50.9] Acute on chronic congestive heart failure, unspecified heart failure type (Primary)        ED Disposition Condition    Observation Stable                Courtney Valera MD  Resident  08/05/23 2131

## 2023-08-05 NOTE — HPI
58 year old with HFrEF (10%, DD, pHTN), CAD (h/o STEMI, s/p PCI to Rcx-08/2021), pAF s/p DCCV, HTN, CKD3b who presented to the ED with persistent chest pain since 10 AM this morning. Patient states that he has had similar pain since his discharge and has been occurring even after being discharged with no relief. Patient states chest pain is a 8/10 in severity, non-radiating with no associated aggravating or alleviating factors. Patient was recently discharged on 07/16- required nitro gtt for persistent chest pain in the setting of hypertensive emergency. Patient this afternoon still complains of chest pain, unchanged. ECG unchanged from prior. He denies any nausea, vomiting, hematemesis, cough, palpitations or shortness of breath. Reports having worsening abdominal distension and feels he is still retaining fluid. Reports compliance with his medication.

## 2023-08-05 NOTE — ASSESSMENT & PLAN NOTE
- patient w/h/o TTE w/EF of 10-15%, last TTE 07/13/2023  - elevated BNP- 1958, CXR w/pulm edema, rales on physical exam- increase in weight from discharge  - concern for heart failure exacerbation  - started on CHF pathway  - has responded very well to lasix 80mg IV qday in the past- 2L out so far  - continue with lasix 80mg IV qday  - strict I's and O's  - daily weights  - 2g Na restriction, 1.5L fluid restriction  - tele, K>4, Mg>2

## 2023-08-05 NOTE — PROGRESS NOTES
Patient arrived to the unit from Ed. Complains of chest tightness and pressure 8.5/10. MD Eileen notified. Patient was previously administered 2 doses of SL in ED with no relief. Cardiology consulted.       08/05/23 1720   Vital Signs   Temp 97.7 °F (36.5 °C)   Temp Source Oral   Pulse 75   Heart Rate Source Monitor   SpO2 99 %   Device (Oxygen Therapy) room air   BP (!) 157/105   MAP (mmHg) 128   BP Location Right arm   BP Method Automatic   Patient Position Sitting

## 2023-08-05 NOTE — Clinical Note
Diagnosis: Chest discomfort [514210]   Future Attending Provider: HUMERA PIERRE [[id number]]   Admitting Provider:: HUMERA PIERRE [[id number]]

## 2023-08-05 NOTE — SUBJECTIVE & OBJECTIVE
Past Medical History:   Diagnosis Date    Atrial fibrillation     Encounter for blood transfusion        Past Surgical History:   Procedure Laterality Date    CARDIAC DEFIBRILLATOR PLACEMENT Left     HERNIA REPAIR      LEFT HEART CATHETERIZATION Left 4/18/2023    Procedure: Left heart cath;  Surgeon: Atilio Marks MD;  Location: [facility] CATH LAB;  Service: Cardiology;  Laterality: Left;    RIGHT HEART CATHETERIZATION Right 9/30/2022    Procedure: INSERTION, CATHETER, RIGHT HEART;  Surgeon: Caden Aden MD;  Location: [facility] CATH LAB;  Service: Cardiology;  Laterality: Right;    TREATMENT OF CARDIAC ARRHYTHMIA N/A 11/22/2022    Procedure: CARDIOVERSION;  Surgeon: Marvin Sanon MD;  Location: [facility] EP LAB;  Service: Cardiology;  Laterality: N/A;  afib, KIA, DCCV, anes, MB, 3 Prep       Review of patient's allergies indicates:  No Known Allergies    No current facility-administered medications on file prior to encounter.     Current Outpatient Medications on File Prior to Encounter   Medication Sig    amiodarone (PACERONE) 200 MG Tab Take 1 tablet (200 mg total) by mouth 2 (two) times daily.    apixaban (ELIQUIS) 5 mg Tab Take 5 mg by mouth 2 (two) times daily.    atorvastatin (LIPITOR) 80 MG tablet Take 1 tablet (80 mg total) by mouth once daily.    clopidogreL (PLAVIX) 75 mg tablet Take 1 tablet (75 mg total) by mouth once daily.    empagliflozin (JARDIANCE) 10 mg tablet Take 1 tablet (10 mg total) by mouth once daily.    ferrous sulfate (FEOSOL) 325 mg (65 mg iron) Tab tablet Take 325 mg by mouth every other day.    LIDOcaine (LIDODERM) 5 % Place 1 patch onto the skin once daily.    metoprolol succinate (TOPROL-XL) 25 MG 24 hr tablet Take 1 tablet (25 mg total) by mouth every evening.    oxyCODONE-acetaminophen (PERCOCET) 5-325 mg per tablet Take 1 tablet by mouth every 6 (six) hours as needed for Pain.    sacubitriL-valsartan (ENTRESTO) 24-26 mg per tablet Take 1 tablet by mouth 2 (two) times daily.     torsemide (DEMADEX) 20 MG Tab Take 1 tablet (20 mg total) by mouth once daily.     Family History    None       Tobacco Use    Smoking status: Never    Smokeless tobacco: Never   Substance and Sexual Activity    Alcohol use: Never    Drug use: Never    Sexual activity: Not on file       Objective:     Vital Signs (Most Recent):  Temp: 97.7 °F (36.5 °C) (08/05/23 1720)  Pulse: 75 (08/05/23 1720)  Resp: 20 (08/05/23 1630)  BP: (!) 157/105 (08/05/23 1720)  SpO2: 99 % (08/05/23 1720) Vital Signs (24h Range):  Temp:  [97.6 °F (36.4 °C)-97.7 °F (36.5 °C)] 97.7 °F (36.5 °C)  Pulse:  [69-89] 75  Resp:  [20-28] 20  SpO2:  [95 %-99 %] 99 %  BP: (140-162)/() 157/105     Weight: 87.5 kg (192 lb 14.4 oz)  Body mass index is 26.16 kg/m².    SpO2: 99 %         Intake/Output Summary (Last 24 hours) at 8/5/2023 1834  Last data filed at 8/5/2023 1754  Gross per 24 hour   Intake 222 ml   Output 2400 ml   Net -2178 ml       Lines/Drains/Airways       Peripheral Intravenous Line  Duration                  Peripheral IV - Single Lumen 08/05/23 1408 18 G Left Antecubital <1 day                     Physical Exam  Vitals and nursing note reviewed.   Constitutional:       Appearance: Normal appearance.   HENT:      Head: Atraumatic.   Eyes:      Conjunctiva/sclera: Conjunctivae normal.   Cardiovascular:      Rate and Rhythm: Normal rate and regular rhythm.      Comments: - JVP appears elevated on exam  Pulmonary:      Effort: Pulmonary effort is normal.   Abdominal:      General: There is distension.      Palpations: Abdomen is soft.      Tenderness: There is no abdominal tenderness. There is guarding.   Musculoskeletal:      Right lower leg: No edema.      Left lower leg: No edema.   Skin:     General: Skin is warm.   Neurological:      Mental Status: He is alert.          Significant Labs: All pertinent lab results from the last 24 hours have been reviewed.    Significant Imaging:

## 2023-08-05 NOTE — ASSESSMENT & PLAN NOTE
"Patient's FSGs are controlled on current medication regimen.  Last A1c reviewed-   Lab Results   Component Value Date    HGBA1C 5.6 07/13/2023     Most recent fingerstick glucose reviewed- No results for input(s): "POCTGLUCOSE" in the last 24 hours.  Current correctional scale  Low  Maintain anti-hyperglycemic dose as follows-   Antihyperglycemics (From admission, onward)    None        Hold Oral hypoglycemics while patient is in the hospital.  "
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
## 2023-08-05 NOTE — ASSESSMENT & PLAN NOTE
- persistent, substernal chest pain- does not appear to be reproducible on physical exam  - trop 0.065- has been higher in the past, last angiogram 04/2023  Findings:  1. Left main was normal  2. The LAD had luminal irregularity with no obstructive disease.  3. The left circumflex had luminal irregularity with no obstructive disease.  There was 1 obtuse marginal branch that was completely occluded and appeared to be an acute occlusion.  It was very small.  4. The RCA luminal irregularity without obstructive disease.  - he received nitroglycerin x2 w/no relief  - can give imdur 30mg PO x1 now  - up-titrate metoprolol to 50mg qday  - s/p asa 325mg, continue plavix 75mg (no load at this time), ecg w/no acute changes- holding on heparin gtt at this time  - repeat ECG and trop now with persistent chest pain  - I think pain is moreso 2/2 fluid-overloaded state, but with cardiac history and description + location of pain, cardiology consulted for further input

## 2023-08-05 NOTE — SUBJECTIVE & OBJECTIVE
Past Medical History:   Diagnosis Date    Atrial fibrillation     Encounter for blood transfusion        Past Surgical History:   Procedure Laterality Date    CARDIAC DEFIBRILLATOR PLACEMENT Left     HERNIA REPAIR      LEFT HEART CATHETERIZATION Left 4/18/2023    Procedure: Left heart cath;  Surgeon: Atilio Marks MD;  Location: [facility] CATH LAB;  Service: Cardiology;  Laterality: Left;    RIGHT HEART CATHETERIZATION Right 9/30/2022    Procedure: INSERTION, CATHETER, RIGHT HEART;  Surgeon: Caden Aden MD;  Location: [facility] CATH LAB;  Service: Cardiology;  Laterality: Right;    TREATMENT OF CARDIAC ARRHYTHMIA N/A 11/22/2022    Procedure: CARDIOVERSION;  Surgeon: Marvin Sanon MD;  Location: [facility] EP LAB;  Service: Cardiology;  Laterality: N/A;  afib, KIA, DCCV, anes, MB, 3 Prep       Review of patient's allergies indicates:  No Known Allergies    No current facility-administered medications on file prior to encounter.     Current Outpatient Medications on File Prior to Encounter   Medication Sig    amiodarone (PACERONE) 200 MG Tab Take 1 tablet (200 mg total) by mouth 2 (two) times daily.    apixaban (ELIQUIS) 5 mg Tab Take 5 mg by mouth 2 (two) times daily.    atorvastatin (LIPITOR) 80 MG tablet Take 1 tablet (80 mg total) by mouth once daily.    clopidogreL (PLAVIX) 75 mg tablet Take 1 tablet (75 mg total) by mouth once daily.    empagliflozin (JARDIANCE) 10 mg tablet Take 1 tablet (10 mg total) by mouth once daily.    ferrous sulfate (FEOSOL) 325 mg (65 mg iron) Tab tablet Take 325 mg by mouth every other day.    LIDOcaine (LIDODERM) 5 % Place 1 patch onto the skin once daily.    metoprolol succinate (TOPROL-XL) 25 MG 24 hr tablet Take 1 tablet (25 mg total) by mouth every evening.    oxyCODONE-acetaminophen (PERCOCET) 5-325 mg per tablet Take 1 tablet by mouth every 6 (six) hours as needed for Pain.    sacubitriL-valsartan (ENTRESTO) 24-26 mg per tablet Take 1 tablet by mouth 2 (two) times daily.     torsemide (DEMADEX) 20 MG Tab Take 1 tablet (20 mg total) by mouth once daily.     Family History    None       Tobacco Use    Smoking status: Never    Smokeless tobacco: Never   Substance and Sexual Activity    Alcohol use: Never    Drug use: Never    Sexual activity: Not on file     Review of Systems   Constitutional:  Negative for activity change, appetite change, chills, fatigue and fever.   Respiratory:  Positive for chest tightness and shortness of breath. Negative for cough and wheezing.    Cardiovascular:  Positive for chest pain. Negative for palpitations and leg swelling.   Gastrointestinal:  Positive for abdominal distention and nausea. Negative for abdominal pain, constipation, diarrhea and vomiting.   Genitourinary:  Negative for difficulty urinating and dysuria.   Skin:  Negative for rash.   Neurological:  Negative for dizziness and weakness.   All other systems reviewed and are negative.    Objective:     Vital Signs (Most Recent):  Temp: 97.7 °F (36.5 °C) (08/05/23 1720)  Pulse: 75 (08/05/23 1720)  Resp: 20 (08/05/23 1630)  BP: (!) 157/105 (08/05/23 1720)  SpO2: 99 % (08/05/23 1720) Vital Signs (24h Range):  Temp:  [97.6 °F (36.4 °C)-97.7 °F (36.5 °C)] 97.7 °F (36.5 °C)  Pulse:  [69-89] 75  Resp:  [20-28] 20  SpO2:  [95 %-99 %] 99 %  BP: (140-162)/() 157/105     Weight: 87.5 kg (192 lb 14.4 oz)  Body mass index is 26.16 kg/m².     Physical Exam  Gen:  appears stated age, appears comfortable  Neuro: AAOx3, motor, sensory, and strength grossly intact BL  HEENT: NTNC, EOMI, PERRL, MMM  CVS: RRR, no m/r/g; S1/S2 auscultated with no S3 or S4; capillary refill < 2 sec  Chest: some TTP of chest wall, but patient reports that this is different than his current chest discomfort  Resp: BL rales, no belabored breathing or accessory muscle use appreciated   Abd: BS+ in all 4 quadrants; NT, abdominal distension, soft to palpation; no organomegaly appreciated   Extrem: pulses full, equal, and regular over  all 4 extremities; no UE or LE edema BL     Significant Labs: All pertinent labs within the past 24 hours have been reviewed.  CBC:   Recent Labs   Lab 08/05/23  1410   WBC 6.03   HGB 13.5*   HCT 43.7        CMP:   Recent Labs   Lab 08/05/23  1410      K 4.1      CO2 24   GLU 73   BUN 13   CREATININE 1.7*   CALCIUM 10.0   PROT 7.8   ALBUMIN 4.3   BILITOT 0.8   ALKPHOS 71   AST 15   ALT 16   ANIONGAP 12     Cardiac Markers:   Recent Labs   Lab 08/05/23  1410   BNP 1,958*     Troponin:   Recent Labs   Lab 08/05/23  1410   TROPONINI 0.065*       Significant Imaging: I have reviewed all pertinent imaging results/findings within the past 24 hours.    CXR:  FINDINGS:  The cardiomediastinal silhouette is prominent, similar to the previous exam noting left chest wall pacer..  There is no pleural effusion.  The trachea is midline.  The lungs are symmetrically expanded bilaterally with coarse central hilar interstitial attenuation.  No large focal consolidation seen.  There is no pneumothorax.  The osseous structures are remarkable for degenerative change..     Impression:     1. Central hilar findings suggest congestive change/edema.  No large focal consolidation.    TTE 07/13/23:  Summary    The left ventricle is severely enlarged with severe eccentric hypertrophy and severely decreased systolic function.  The estimated ejection fraction is approximately 10% ( or less to at most low teens).  There is severe left ventricular global hypokinesis.  There is abnormal septal wall motion.  Left ventricular diastolic dysfunction.  Severe left atrial enlargement.  Moderate right ventricular enlargement with severely reduced right ventricular systolic function.  Severe right atrial enlargement.  Moderate mitral regurgitation.  Mild to moderate pulmonic regurgitation.  Elevated central venous pressure (15 mmHg).  The estimated PA systolic pressure is 53 mmHg.  There is moderate pulmonary hypertension.

## 2023-08-05 NOTE — CONSULTS
[facility] - Cardiology Stepdown  Cardiology  Consult Note    Patient Name: Tera Griffiths  MRN: [id number]  Admission Date: 8/5/2023  Hospital Length of Stay: 0 days  Code Status: Full Code   Attending Provider: Wilma Arellano MD   Consulting Provider: Arlene Sauceda MD  Primary Care Physician: Carleen Bueno MD  Principal Problem:Acute on chronic combined systolic and diastolic heart failure    Patient information was obtained from patient and ER records.     Consults  Subjective:     Chief Complaint:  Chest Pain    HPI:   58 year old with HFrEF (10%, DD, pHTN), CAD (h/o STEMI, s/p PCI to Rcx-08/2021), pAF s/p DCCV, HTN, CKD3b who presented to the ED with persistent chest pain since 10 AM this morning. Patient states that he has had similar pain since his discharge and has been occurring even after being discharged with no relief. Patient states chest pain is a 8/10 in severity, non-radiating with no associated aggravating or alleviating factors. Patient was recently discharged on 07/16- required nitro gtt for persistent chest pain in the setting of hypertensive emergency. Patient this afternoon still complains of chest pain, unchanged. ECG unchanged from prior. He denies any nausea, vomiting, hematemesis, cough, palpitations or shortness of breath. Reports having worsening abdominal distension and feels he is still retaining fluid. Reports compliance with his medication.     Labs:  CBC: WBC/HGB/HCT/PLT: 6.03/13.5/43.7/304  BMP: Na/K/Cl/CO2/BUN/Cr: 142/4.1/105/24/13/1.7  Troponin: 0.05    EKG: Atrial Fibrillation with LBBB unchanged    Past Medical History:   Diagnosis Date    Atrial fibrillation     Encounter for blood transfusion        Past Surgical History:   Procedure Laterality Date    CARDIAC DEFIBRILLATOR PLACEMENT Left     HERNIA REPAIR      LEFT HEART CATHETERIZATION Left 4/18/2023    Procedure: Left heart cath;  Surgeon: Atilio Marks MD;  Location: [facility] CATH LAB;  Service: Cardiology;   Laterality: Left;    RIGHT HEART CATHETERIZATION Right 9/30/2022    Procedure: INSERTION, CATHETER, RIGHT HEART;  Surgeon: Caden Aden MD;  Location: [facility] CATH LAB;  Service: Cardiology;  Laterality: Right;    TREATMENT OF CARDIAC ARRHYTHMIA N/A 11/22/2022    Procedure: CARDIOVERSION;  Surgeon: Marvin Sanon MD;  Location: [facility] EP LAB;  Service: Cardiology;  Laterality: N/A;  afib, KIA, DCCV, anes, MB, 3 Prep       Review of patient's allergies indicates:  No Known Allergies    No current facility-administered medications on file prior to encounter.     Current Outpatient Medications on File Prior to Encounter   Medication Sig    amiodarone (PACERONE) 200 MG Tab Take 1 tablet (200 mg total) by mouth 2 (two) times daily.    apixaban (ELIQUIS) 5 mg Tab Take 5 mg by mouth 2 (two) times daily.    atorvastatin (LIPITOR) 80 MG tablet Take 1 tablet (80 mg total) by mouth once daily.    clopidogreL (PLAVIX) 75 mg tablet Take 1 tablet (75 mg total) by mouth once daily.    empagliflozin (JARDIANCE) 10 mg tablet Take 1 tablet (10 mg total) by mouth once daily.    ferrous sulfate (FEOSOL) 325 mg (65 mg iron) Tab tablet Take 325 mg by mouth every other day.    LIDOcaine (LIDODERM) 5 % Place 1 patch onto the skin once daily.    metoprolol succinate (TOPROL-XL) 25 MG 24 hr tablet Take 1 tablet (25 mg total) by mouth every evening.    oxyCODONE-acetaminophen (PERCOCET) 5-325 mg per tablet Take 1 tablet by mouth every 6 (six) hours as needed for Pain.    sacubitriL-valsartan (ENTRESTO) 24-26 mg per tablet Take 1 tablet by mouth 2 (two) times daily.    torsemide (DEMADEX) 20 MG Tab Take 1 tablet (20 mg total) by mouth once daily.     Family History    None       Tobacco Use    Smoking status: Never    Smokeless tobacco: Never   Substance and Sexual Activity    Alcohol use: Never    Drug use: Never    Sexual activity: Not on file       Objective:     Vital Signs (Most Recent):  Temp: 97.7 °F (36.5 °C) (08/05/23 1720)  Pulse:  75 (08/05/23 1720)  Resp: 20 (08/05/23 1630)  BP: (!) 157/105 (08/05/23 1720)  SpO2: 99 % (08/05/23 1720) Vital Signs (24h Range):  Temp:  [97.6 °F (36.4 °C)-97.7 °F (36.5 °C)] 97.7 °F (36.5 °C)  Pulse:  [69-89] 75  Resp:  [20-28] 20  SpO2:  [95 %-99 %] 99 %  BP: (140-162)/() 157/105     Weight: 87.5 kg (192 lb 14.4 oz)  Body mass index is 26.16 kg/m².    SpO2: 99 %         Intake/Output Summary (Last 24 hours) at 8/5/2023 1834  Last data filed at 8/5/2023 1754  Gross per 24 hour   Intake 222 ml   Output 2400 ml   Net -2178 ml       Lines/Drains/Airways       Peripheral Intravenous Line  Duration                  Peripheral IV - Single Lumen 08/05/23 1408 18 G Left Antecubital <1 day                     Physical Exam  Vitals and nursing note reviewed.   Constitutional:       Appearance: Normal appearance.   HENT:      Head: Atraumatic.   Eyes:      Conjunctiva/sclera: Conjunctivae normal.   Cardiovascular:      Rate and Rhythm: Normal rate and regular rhythm.      Comments: - JVP appears elevated on exam  Pulmonary:      Effort: Pulmonary effort is normal.   Abdominal:      General: There is distension.      Palpations: Abdomen is soft.      Tenderness: There is no abdominal tenderness. There is guarding.   Musculoskeletal:      Right lower leg: No edema.      Left lower leg: No edema.   Skin:     General: Skin is warm.   Neurological:      Mental Status: He is alert.          Significant Labs: All pertinent lab results from the last 24 hours have been reviewed.    Significant Imaging:    Echocardiogram  (7/14/2023):  The left ventricle is severely enlarged with severe eccentric hypertrophy and severely decreased systolic function.  The estimated ejection fraction is approximately 10% ( or less to at most low teens).  There is severe left ventricular global hypokinesis.  There is abnormal septal wall motion.  Left ventricular diastolic dysfunction.  Severe left atrial enlargement.  Moderate right ventricular  enlargement with severely reduced right ventricular systolic function.  Severe right atrial enlargement.  Moderate mitral regurgitation.  Mild to moderate pulmonic regurgitation.  Elevated central venous pressure (15 mmHg).  The estimated PA systolic pressure is 53 mmHg.  There is moderate pulmonary hypertension.    Left Heart Catheterization (4/18/2023):  Left main was normal  The LAD had luminal irregularity with no obstructive disease.  The left circumflex had luminal irregularity with no obstructive disease.  There was 1 obtuse marginal branch that was completely occluded and appeared to be an acute occlusion.  It was very small.  The RCA luminal irregularity without obstructive disease.    Assessment and Plan:     NSTEMI (non-ST elevated myocardial infarction)  - Patient with persistent chest pain, unchanged; would recommend up-titrating medical therapy  - Recommend increasing Imdur 30 mg, daily to 60 mg, daily  - Increase Toprol 50 mg, daily to 75 mg, daily  - If BP remains elevated; can consider adding Amlodipine  - If patient has worsening chest pain repeat ECG and Troponin    Thank you for your consult. I will follow-up with patient. Please contact us if you have any additional questions.    VTE Risk Mitigation (From admission, onward)           Ordered     apixaban tablet 5 mg  2 times daily         08/05/23 6989                  Arlene Sauceda MD  Cardiology   [facility] - Card[facility] Stepdown  I have personally taken the history and examined the patient and agree with the resident's note as stated above.  Increase BP meds like more BB, Isosorbide to  mg , could add amlodipine 5-10 mg if needed. Does not need IV NTG now . Prognosis dismal with 10% LVEF.

## 2023-08-06 LAB
ANION GAP SERPL CALC-SCNC: 9 MMOL/L (ref 8–16)
BUN SERPL-MCNC: 13 MG/DL (ref 6–20)
CALCIUM SERPL-MCNC: 9 MG/DL (ref 8.7–10.5)
CHLORIDE SERPL-SCNC: 106 MMOL/L (ref 95–110)
CO2 SERPL-SCNC: 24 MMOL/L (ref 23–29)
CREAT SERPL-MCNC: 1.4 MG/DL (ref 0.5–1.4)
ERYTHROCYTE [DISTWIDTH] IN BLOOD BY AUTOMATED COUNT: 15.1 % (ref 11.5–14.5)
EST. GFR  (NO RACE VARIABLE): 59 ML/MIN/1.73 M^2
GLUCOSE SERPL-MCNC: 97 MG/DL (ref 70–110)
HCT VFR BLD AUTO: 41 % (ref 40–54)
HGB BLD-MCNC: 12.6 G/DL (ref 14–18)
MAGNESIUM SERPL-MCNC: 2 MG/DL (ref 1.6–2.6)
MCH RBC QN AUTO: 26.8 PG (ref 27–31)
MCHC RBC AUTO-ENTMCNC: 30.7 G/DL (ref 32–36)
MCV RBC AUTO: 87 FL (ref 82–98)
PLATELET # BLD AUTO: 294 K/UL (ref 150–450)
PMV BLD AUTO: 11.6 FL (ref 9.2–12.9)
POTASSIUM SERPL-SCNC: 3.7 MMOL/L (ref 3.5–5.1)
RBC # BLD AUTO: 4.71 M/UL (ref 4.6–6.2)
SODIUM SERPL-SCNC: 139 MMOL/L (ref 136–145)
WBC # BLD AUTO: 5.37 K/UL (ref 3.9–12.7)

## 2023-08-06 PROCEDURE — 99233 SBSQ HOSP IP/OBS HIGH 50: CPT | Mod: ,,, | Performed by: STUDENT IN AN ORGANIZED HEALTH CARE EDUCATION/TRAINING PROGRAM

## 2023-08-06 PROCEDURE — 99233 SBSQ HOSP IP/OBS HIGH 50: CPT | Mod: ,,, | Performed by: INTERNAL MEDICINE

## 2023-08-06 PROCEDURE — 99233 PR SUBSEQUENT HOSPITAL CARE,LEVL III: ICD-10-PCS | Mod: ,,, | Performed by: STUDENT IN AN ORGANIZED HEALTH CARE EDUCATION/TRAINING PROGRAM

## 2023-08-06 PROCEDURE — G0378 HOSPITAL OBSERVATION PER HR: HCPCS

## 2023-08-06 PROCEDURE — 80048 BASIC METABOLIC PNL TOTAL CA: CPT | Performed by: STUDENT IN AN ORGANIZED HEALTH CARE EDUCATION/TRAINING PROGRAM

## 2023-08-06 PROCEDURE — 25000003 PHARM REV CODE 250: Performed by: INTERNAL MEDICINE

## 2023-08-06 PROCEDURE — 83735 ASSAY OF MAGNESIUM: CPT | Performed by: STUDENT IN AN ORGANIZED HEALTH CARE EDUCATION/TRAINING PROGRAM

## 2023-08-06 PROCEDURE — 63600175 PHARM REV CODE 636 W HCPCS: Performed by: STUDENT IN AN ORGANIZED HEALTH CARE EDUCATION/TRAINING PROGRAM

## 2023-08-06 PROCEDURE — 99233 PR SUBSEQUENT HOSPITAL CARE,LEVL III: ICD-10-PCS | Mod: ,,, | Performed by: INTERNAL MEDICINE

## 2023-08-06 PROCEDURE — 36415 COLL VENOUS BLD VENIPUNCTURE: CPT | Performed by: STUDENT IN AN ORGANIZED HEALTH CARE EDUCATION/TRAINING PROGRAM

## 2023-08-06 PROCEDURE — 20600001 HC STEP DOWN PRIVATE ROOM

## 2023-08-06 PROCEDURE — 25000003 PHARM REV CODE 250: Performed by: STUDENT IN AN ORGANIZED HEALTH CARE EDUCATION/TRAINING PROGRAM

## 2023-08-06 PROCEDURE — 85027 COMPLETE CBC AUTOMATED: CPT | Performed by: STUDENT IN AN ORGANIZED HEALTH CARE EDUCATION/TRAINING PROGRAM

## 2023-08-06 RX ORDER — TORSEMIDE 20 MG/1
40 TABLET ORAL DAILY
Qty: 180 TABLET | Refills: 0 | OUTPATIENT
Start: 2023-08-06 | End: 2023-11-04

## 2023-08-06 RX ORDER — ISOSORBIDE MONONITRATE 30 MG/1
90 TABLET, EXTENDED RELEASE ORAL DAILY
Status: DISCONTINUED | OUTPATIENT
Start: 2023-08-07 | End: 2023-08-10

## 2023-08-06 RX ORDER — METOPROLOL SUCCINATE 25 MG/1
75 TABLET, EXTENDED RELEASE ORAL NIGHTLY
Qty: 90 TABLET | Refills: 11 | OUTPATIENT
Start: 2023-08-06 | End: 2024-08-05

## 2023-08-06 RX ORDER — POTASSIUM CHLORIDE 750 MG/1
30 CAPSULE, EXTENDED RELEASE ORAL ONCE
Status: COMPLETED | OUTPATIENT
Start: 2023-08-06 | End: 2023-08-06

## 2023-08-06 RX ORDER — ISOSORBIDE MONONITRATE 30 MG/1
90 TABLET, EXTENDED RELEASE ORAL DAILY
Qty: 90 TABLET | Refills: 2 | OUTPATIENT
Start: 2023-08-07 | End: 2023-11-05

## 2023-08-06 RX ORDER — EZETIMIBE 10 MG/1
10 TABLET ORAL NIGHTLY
Status: DISCONTINUED | OUTPATIENT
Start: 2023-08-06 | End: 2023-08-11

## 2023-08-06 RX ORDER — METOPROLOL SUCCINATE 25 MG/1
75 TABLET, EXTENDED RELEASE ORAL DAILY
Status: DISCONTINUED | OUTPATIENT
Start: 2023-08-06 | End: 2023-08-10

## 2023-08-06 RX ADMIN — SACUBITRIL AND VALSARTAN 1 TABLET: 49; 51 TABLET, FILM COATED ORAL at 08:08

## 2023-08-06 RX ADMIN — METOPROLOL SUCCINATE 75 MG: 50 TABLET, EXTENDED RELEASE ORAL at 09:08

## 2023-08-06 RX ADMIN — EZETIMIBE 10 MG: 10 TABLET ORAL at 08:08

## 2023-08-06 RX ADMIN — SACUBITRIL AND VALSARTAN 1 TABLET: 24; 26 TABLET, FILM COATED ORAL at 08:08

## 2023-08-06 RX ADMIN — APIXABAN 5 MG: 5 TABLET, FILM COATED ORAL at 08:08

## 2023-08-06 RX ADMIN — FUROSEMIDE 80 MG: 10 INJECTION, SOLUTION INTRAVENOUS at 08:08

## 2023-08-06 RX ADMIN — CLOPIDOGREL BISULFATE 75 MG: 75 TABLET ORAL at 08:08

## 2023-08-06 RX ADMIN — POTASSIUM CHLORIDE 30 MEQ: 10 CAPSULE, COATED, EXTENDED RELEASE ORAL at 06:08

## 2023-08-06 RX ADMIN — AMIODARONE HYDROCHLORIDE 200 MG: 200 TABLET ORAL at 08:08

## 2023-08-06 RX ADMIN — ATORVASTATIN CALCIUM 80 MG: 40 TABLET, FILM COATED ORAL at 08:08

## 2023-08-06 RX ADMIN — ISOSORBIDE MONONITRATE 60 MG: 60 TABLET, EXTENDED RELEASE ORAL at 08:08

## 2023-08-06 RX ADMIN — FERROUS SULFATE TAB 325 MG (65 MG ELEMENTAL FE) 1 EACH: 325 (65 FE) TAB at 08:08

## 2023-08-06 NOTE — PLAN OF CARE
Pt AO4. Pt AD NIDA. Pt updated on plan of care. Pt denies any pain distress at this time. Call light and belongings within pt reach. Pt encouraged to contact staff of any needs and concerns. Pt able to make needs known. Will continue plan of care.   Problem: Adult Inpatient Plan of Care  Goal: Plan of Care Review  Outcome: Ongoing, Progressing  Goal: Patient-Specific Goal (Individualized)  Outcome: Ongoing, Progressing  Goal: Absence of Hospital-Acquired Illness or Injury  Outcome: Ongoing, Progressing  Goal: Optimal Comfort and Wellbeing  Outcome: Ongoing, Progressing  Goal: Readiness for Transition of Care  Outcome: Ongoing, Progressing     Problem: Diabetes Comorbidity  Goal: Blood Glucose Level Within Targeted Range  Outcome: Ongoing, Progressing

## 2023-08-06 NOTE — HOSPITAL COURSE
Initially admitted 08/05 for ADHF and NSTEMI, course complicated by new L MCA stroke. HM consulted for UTI. WBC increasing to 21.65. CT C/A/P pending. KUB negative for any ileus or SBO. Ucx positive for largely pan-sensitive Klebsiella. CTAP wc with non-obstructing calculus in L kidney, perinephric stranding c/f pyelonephritis.

## 2023-08-06 NOTE — PROGRESS NOTES
[facility] - Cardiology Stepdown  Cardiology  Progress Note    Patient Name: Tera Griffiths  MRN: [id number]  Admission Date: 8/5/2023  Hospital Length of Stay: 0 days  Code Status: Full Code   Attending Physician: Wilma Arellano MD   Primary Care Physician: Carleen Bueno MD  Expected Discharge Date:   Principal Problem:Acute on chronic combined systolic and diastolic heart failure    Subjective:       Interval History: No acute events overnight, afebrile, hemodynamically stable. He reports constant episodes of chest discomfort throughout the night, worsened by deep breaths. However, reports symptoms appear to be improving overall.       Review of Systems   Constitutional: Negative for chills and fever.   Cardiovascular:  Positive for chest pain (chest discomfort, worsened by inspiration) and dyspnea on exertion. Negative for palpitations.   Respiratory:  Positive for cough and shortness of breath.    Gastrointestinal:  Negative for constipation, diarrhea, nausea and vomiting.   Neurological:  Negative for light-headedness.     Objective:     Vital Signs (Most Recent):  Temp: 97.1 °F (36.2 °C) (08/06/23 1152)  Pulse: 68 (08/06/23 1201)  Resp: 18 (08/06/23 1152)  BP: 100/61 (08/06/23 1152)  SpO2: 95 % (08/06/23 1152) Vital Signs (24h Range):  Temp:  [97.1 °F (36.2 °C)-98.5 °F (36.9 °C)] 97.1 °F (36.2 °C)  Pulse:  [55-89] 68  Resp:  [18-28] 18  SpO2:  [92 %-99 %] 95 %  BP: (100-162)/() 100/61     Weight: 87.5 kg (192 lb 14.4 oz)  Body mass index is 26.16 kg/m².     SpO2: 95 %         Intake/Output Summary (Last 24 hours) at 8/6/2023 1208  Last data filed at 8/6/2023 1000  Gross per 24 hour   Intake 852 ml   Output 4600 ml   Net -3748 ml       Lines/Drains/Airways       Peripheral Intravenous Line  Duration                  Peripheral IV - Single Lumen 08/05/23 1408 18 G Left Antecubital <1 day                       Physical Exam  Vitals and nursing note reviewed.   Constitutional:       General: He is not  in acute distress.     Appearance: Normal appearance. He is not ill-appearing, toxic-appearing or diaphoretic.   HENT:      Head: Normocephalic and atraumatic.   Eyes:      General: No scleral icterus.        Right eye: No discharge.         Left eye: No discharge.   Cardiovascular:      Rate and Rhythm: Normal rate and regular rhythm.   Pulmonary:      Effort: Pulmonary effort is normal. No respiratory distress.      Breath sounds: No wheezing.   Abdominal:      General: There is distension.      Palpations: Abdomen is soft.      Tenderness: There is no abdominal tenderness.   Musculoskeletal:      Right lower leg: No edema.      Left lower leg: No edema.   Skin:     General: Skin is warm and dry.      Coloration: Skin is not jaundiced.   Neurological:      Mental Status: He is alert and oriented to person, place, and time. Mental status is at baseline.                LABS:  Recent Labs   Lab 08/05/23  1410 08/06/23  0637    139   K 4.1 3.7    106   CO2 24 24   BUN 13 13   CREATININE 1.7* 1.4   GLU 73 97   ANIONGAP 12 9     Recent Labs   Lab 08/06/23  0637   MG 2.0     Recent Labs   Lab 08/05/23  1410   AST 15   ALT 16   ALKPHOS 71   BILITOT 0.8   ALBUMIN 4.3      Recent Labs   Lab 08/05/23  1410 08/06/23  0637   WBC 6.03 5.37   HGB 13.5* 12.6*   HCT 43.7 41.0    294   GRAN 72.8  4.4  --        Recent Labs   Lab 08/05/23  1410 08/05/23  1738   TROPONINI 0.065* 0.059*     Lab Results   Component Value Date    BNP 1,958 (H) 08/05/2023    BNP 1,727 (H) 07/13/2023    BNP 2,871 (H) 06/08/2023              IMAGING:    TTE:  EF   Date Value Ref Range Status   07/14/2023 10 % Final   03/10/2023 18 % Final   01/05/2023 15 % Final       Significant Imaging: I have reviewed all pertinent imaging results/findings within the past 24 hours.      INPATIENT MEDICATIONS:      Scheduled Meds:   amiodarone  200 mg Oral BID    apixaban  5 mg Oral BID    atorvastatin  80 mg Oral Daily    clopidogreL  75 mg Oral  "Daily    ezetimibe  10 mg Oral QHS    ferrous sulfate  1 tablet Oral Daily    furosemide (LASIX) injection  80 mg Intravenous Daily    [START ON 8/7/2023] isosorbide mononitrate  90 mg Oral Daily    metoprolol succinate  75 mg Oral Daily    sacubitriL-valsartan  1 tablet Oral BID     PRN Meds:nitroGLYCERIN, sodium chloride 0.9%        Assessment and Plan:     NSTEMI (non-ST elevated myocardial infarction)  Acute on chronic combined systolic and diastolic heart failure  CAD (coronary artery disease)  Dyslipidemia  58 year old with HFrEF (10%, DD, pHTN), CAD (h/o STEMI, s/p PCI to Rcx-08/2021), pAF s/p DCCV, HTN, CKD3b who presented to the ED with persistent chest pain since morning prior to admission    Per initial Cardiology fellow evaluation 08/05/23: "Patient states that he has had similar pain since his discharge and has been occurring even after being discharged with no relief. Patient states chest pain is a 8/10 in severity, non-radiating with no associated aggravating or alleviating factors. Patient was recently discharged on 07/16- required nitro gtt for persistent chest pain in the setting of hypertensive emergency.  ECG unchanged from prior. He denies any nausea, vomiting, hematemesis, cough, palpitations or shortness of breath. Reports having worsening abdominal distension and feels he is still retaining fluid. Reports compliance with his medication"      Intake/Output Summary (Last 24 hours) at 8/6/2023 1214  Last data filed at 8/6/2023 1000  Gross per 24 hour   Intake 852 ml   Output 4600 ml   Net -3748 ml     Net IO Since Admission: -3,748 mL [08/06/23 1214]    -Suspect patient symptoms are primarily being driven by congestion as reports some alleviation in symptoms with volume removal via diuresis    RECOMMENDATIONS  -Continue diuresis with Furosemide IV to target euvolemia  - Recommend increasing Imdur 60 mg daily to 90 mg daily  - Increase Entresto dose to 49-51mg BID  - Up-titrate BB as " tolerated  -Add amlodipine 5-10 mg if needed for further BP control  -Dyslipidemia not at goal. Switch Atorvastatin to Rosuvastatin 40mg, Continue Ezetemibe. Consider Repatha initiation on discharge for further control          Ugo Lui,   Cardiology  [facility] - Cardiology Stepdown  I have personally taken the history and examined the patient and agree with the resident's note as stated above.Can increase meds . Later could use PCSK9I but  not certain of compliance. Would DC home on Rosuva 40 instead of the Atorva as it is a bit more  potent plus the Ezetimide.

## 2023-08-06 NOTE — NURSING
Tele tech informed RN of nonsustained 10 beats of vtach and MD Gates made aware and at bedside. Pt asymptomatic and sitting in chair eating breakfast,. Pt denies any pain distress at this time. No new orders at this time.    RN introduced self to pt. Pt AO4. Pt denies any pain/distress at this time. Pt encouraged to contact staff of any needs and concerns. Call light and belongings within pt reach. Pt is safe to ambulate independently. Pt on 1.5 FR. Pt updated on plan of care. Orders meds given. Will continue plan of care.

## 2023-08-06 NOTE — PLAN OF CARE
[facility] - Cardiology Stepdown  Initial Discharge Assessment       Primary Care Provider: Carleen Bueno MD    Admission Diagnosis: Chest discomfort [R07.89]  Chest pain [R07.9]    Admission Date: 8/5/2023  Expected Discharge Date:     Transition of Care Barriers: (P) None    Payor: MEDICAID / Plan: [facility] COMMUNITY PLAN [facility] ([place] MEDICAID) / Product Type: Managed Medicaid /     Extended Emergency Contact Information  Primary Emergency Contact: MADELYN CARTAGENA  Mobile Phone: [phone]  Relation: Sister  Preferred language: English   needed? No  Secondary Emergency Contact: MAHNAZ CARTAGENA  Mobile Phone: [phone]  Relation: Relative  Preferred language: English   needed? No    Discharge Plan A: (P) Home with family  Discharge Plan B: (P) Home      [facility] [address] [place] [address] [id number]  [facility] [address]  Phone: [phone] Fax: [phone]      Initial Assessment (most recent)       Adult Discharge Assessment - 08/06/23 1111          Discharge Assessment    Assessment Type Discharge Planning Assessment (P)      Confirmed/corrected address, phone number and insurance Yes (P)      Confirmed Demographics Correct on Facesheet (P)      Source of Information patient (P)      Does patient/caregiver understand observation status No (P)      Was observation education provided? Yes (P)      Communicated YUSEF with patient/caregiver Date not available/Unable to determine (P)      Reason For Admission Chest Discomfort, Heart Failure (P)      People in Home other relative(s) (P)      Do you expect to return to your current living situation? Yes (P)      Do you have help at home or someone to help you manage your care at home? Yes (P)      Who are your caregiver(s) and their phone number(s)? Cousin (P)      Prior to hospitilization cognitive status: Alert/Oriented (P)      Current cognitive status: Alert/Oriented (P)       Equipment Currently Used at Home none (P)      Readmission within 30 days? Yes (P)      Patient currently being followed by outpatient case management? No (P)      Do you currently have service(s) that help you manage your care at home? No (P)      Do you take prescription medications? Yes (P)      Do you have prescription coverage? Yes (P)      Coverage Medicaid (P)      Do you have any problems affording any of your prescribed medications? No (P)      Is the patient taking medications as prescribed? yes (P)      Who is going to help you get home at discharge? Family-Cousin or Sister (P)      How do you get to doctors appointments? family or friend will provide (P)      Are you on dialysis? No (P)      Do you take coumadin? No (P)      Discharge Plan A Home with family (P)      Discharge Plan B Home (P)      DME Needed Upon Discharge  none (P)      Discharge Plan discussed with: Patient (P)      Transition of Care Barriers None (P)         Physical Activity    On average, how many days per week do you engage in moderate to strenuous exercise (like a brisk walk)? 0 days (P)      On average, how many minutes do you engage in exercise at this level? 0 min (P)         Financial Resource Strain    How hard is it for you to pay for the very basics like food, housing, medical care, and heating? Not hard at all (P)         Housing Stability    In the last 12 months, was there a time when you were not able to pay the mortgage or rent on time? No (P)      In the last 12 months, how many places have you lived? 1 (P)      In the last 12 months, was there a time when you did not have a steady place to sleep or slept in a shelter (including now)? No (P)         Transportation Needs    In the past 12 months, has lack of transportation kept you from medical appointments or from getting medications? No (P)      In the past 12 months, has lack of transportation kept you from meetings, work, or from getting things needed for daily  living? No (P)         Food Insecurity    Within the past 12 months, you worried that your food would run out before you got the money to buy more. Never true (P)      Within the past 12 months, the food you bought just didn't last and you didn't have money to get more. Never true (P)         Stress    Do you feel stress - tense, restless, nervous, or anxious, or unable to sleep at night because your mind is troubled all the time - these days? Not at all (P)         Social Connections    In a typical week, how many times do you talk on the phone with family, friends, or neighbors? More than three times a week (P)      How often do you get together with friends or relatives? Twice a week (P)      How often do you attend Alevism or Amish services? Never (P)      Do you belong to any clubs or organizations such as Alevism groups, unions, fraternal or athletic groups, or school groups? No (P)      How often do you attend meetings of the clubs or organizations you belong to? Never (P)      Are you , , , , never , or living with a partner? Never  (P)         Alcohol Use    Q1: How often do you have a drink containing alcohol? Never (P)      Q2: How many drinks containing alcohol do you have on a typical day when you are drinking? Patient does not drink (P)      Q3: How often do you have six or more drinks on one occasion? Never (P)         OTHER    Name(s) of People in Home Cousin (P)                    SW met with pt at bedside to complete Initial Discharge Planning Assessment. Pt. Is somewhat short of breath this morning, sitting up in chair. Pt. Is single and lives with his cousin in their apartment in [place], [place]. ZENOBIA to correct pt's address to reflect: [address], [address], [place] [address]. There are 15 steps to access the entry of the home. Pt. States he has no problems maneuvering the steps. No DME, Home Health, Dialysis or Coumadin. Pt. does not drive,  family provides transportation and will transport pt home upon discharge. SW educating pt on Observation status.     French Carrasquillo LMSW

## 2023-08-06 NOTE — ASSESSMENT & PLAN NOTE
- persistent, substernal chest pain- does not appear to be reproducible on physical exam  - trop 0.065- has been higher in the past, last angiogram 04/2023  Findings:  1. Left main was normal  2. The LAD had luminal irregularity with no obstructive disease.  3. The left circumflex had luminal irregularity with no obstructive disease.  There was 1 obtuse marginal branch that was completely occluded and appeared to be an acute occlusion.  It was very small.  4. The RCA luminal irregularity without obstructive disease.  - he received nitroglycerin x2 w/no relief  - up-titrate metoprolol to 50mg qday  - s/p asa 325mg, continue plavix 75mg (no load at this time), ecg w/no acute changes- holding on heparin gtt at this time  - repeat ECG and trop now with persistent chest pain  - cards consulted- increased imdur to 90mg, continue met 75mg qday, increase entresto to 49-51

## 2023-08-06 NOTE — PROGRESS NOTES
"[facility] - Cardiology [facility] Medicine  Progress Note    Patient Name: Tera Griffiths  MRN: [id number]  Patient Class: OP- Observation   Admission Date: 8/5/2023  Length of Stay: 0 days  Attending Physician: Wilma Arellano MD  Primary Care Provider: Carleen Bueno MD        Subjective:     Principal Problem:Acute on chronic combined systolic and diastolic heart failure        HPI:  Mr. Haley is a 58yoM w/Pmhx of of CHFrEF (10%, DD, pHTN), CAD (h/o STEMI, s/p PCI to Rcx-08/2021), pAF s/p DCCV, HTN, CKD3b who presented to [facility] with sudden onset chest pain. Pain started at 8am, located substernal, 8/10, described as a "tight, twisting pain." Denies radiation. No aggravating factors (I.e, pressure, movement, deep breaths). No alleviating factors (I.e., nitroglycerin)- he received nitroglycerin x2 with no improvement. Associated signs and symptoms:  nausea, worsening abdominal distension, shortness of breath. No diaphoresis (that occurred with his previous STEMI). Denies recreational drug use. When asked etiology of heart failure- he reports "I must have been born with something wrong with my heart."     Upon presentation to ED, elevated BP- 140s-160s/100s, RR 23, 97% on RA. Labs notable for sCr 1.7 (looks like new baseline since 04/2023), BNP 1958 (on entresto too), trop I 0.065 (persistently elevated in the past). ECG w/no acute ST or T wave changes- frequent PVCs appreciated. CXR w/cardiomegaly and pulmonary edema. He was given 2x nitroglycerin with no improvement of pain. Received lasix 80mg IV x1 in ED- 2L output so far. Given asa 325mg x1 too. Admitted to hospital medicine for further workup and evaluation.     Of note, he was just discharged from [facility] 07/16- required nitro gtt for persistent chest pain, underwent aggressive diuresis. During this time, his chest pain remained constant despite nitroglycerin gtt- 2/10- pain was relieved with voltaran gel and percocet. Concern that chest pain 2/2 " pleurisy and musculoskeletal pain.       Overview/Hospital Course:  Cardiology consulted due to persistent chest pain. Started imdur-further up-titrated, increased metoprolol. Entresto further up-titrated as tolerated.       Interval History: Still with chest discomfort- 7/10, but appears very comfortable at bedside. Still with some shortness of breath- good UOP w/lasix 80mg IV x1.     Objective:     Vital Signs (Most Recent):  Temp: 97.1 °F (36.2 °C) (08/06/23 1152)  Pulse: 60 (08/06/23 1542)  Resp: 18 (08/06/23 1152)  BP: 100/61 (08/06/23 1152)  SpO2: 95 % (08/06/23 1152) Vital Signs (24h Range):  Temp:  [97.1 °F (36.2 °C)-98.5 °F (36.9 °C)] 97.1 °F (36.2 °C)  Pulse:  [55-77] 60  Resp:  [18-20] 18  SpO2:  [92 %-99 %] 95 %  BP: (100-160)/() 100/61     Weight: 87.5 kg (192 lb 14.4 oz)  Body mass index is 26.16 kg/m².    Intake/Output Summary (Last 24 hours) at 8/6/2023 1607  Last data filed at 8/6/2023 1000  Gross per 24 hour   Intake 852 ml   Output 4600 ml   Net -3748 ml      Physical Exam  Gen:  appears stated age, appears comfortable  Neuro: AAOx3, motor, sensory, and strength grossly intact BL  HEENT: NTNC, EOMI, PERRL, MMM  CVS: RRR, no m/r/g; S1/S2 auscultated with no S3 or S4; capillary refill < 2 sec  Chest: some TTP of chest wall, but patient reports that this is different than his current chest discomfort, ICD in the left chest wall   Resp: BL rales, no belabored breathing or accessory muscle use appreciated   Abd: BS+ in all 4 quadrants; NT, abdominal distension, soft to palpation; no organomegaly appreciated   Extrem: pulses full, equal, and regular over all 4 extremities; no UE or LE edema BL    Significant Labs: All pertinent labs within the past 24 hours have been reviewed.  CBC:   Recent Labs   Lab 08/05/23  1410 08/06/23  0637   WBC 6.03 5.37   HGB 13.5* 12.6*   HCT 43.7 41.0    294     CMP:   Recent Labs   Lab 08/05/23  1410 08/06/23  0637    139   K 4.1 3.7    106   CO2  24 24   GLU 73 97   BUN 13 13   CREATININE 1.7* 1.4   CALCIUM 10.0 9.0   PROT 7.8  --    ALBUMIN 4.3  --    BILITOT 0.8  --    ALKPHOS 71  --    AST 15  --    ALT 16  --    ANIONGAP 12 9     Cardiac Markers:   Recent Labs   Lab 08/05/23  1410   BNP 1,958*     Troponin:   Recent Labs   Lab 08/05/23  1410 08/05/23  1738   TROPONINI 0.065* 0.059*       Significant Imaging: I have reviewed all pertinent imaging results/findings within the past 24 hours.      Assessment/Plan:      * Acute on chronic combined systolic and diastolic heart failure  - patient w/h/o TTE w/EF of 10-15%, last TTE 07/13/2023  - elevated BNP- 1958, CXR w/pulm edema, rales on physical exam- increase in weight from discharge  - concern for heart failure exacerbation  - started on CHF pathway  - has responded very well to lasix 80mg IV qday in the past- 2L out so far  - continue with lasix 80mg IV qday  - strict I's and O's  - daily weights  - 2g Na restriction, 1.5L fluid restriction  - tele, K>4, Mg>2      NSTEMI (non-ST elevated myocardial infarction)  - persistent, substernal chest pain- does not appear to be reproducible on physical exam  - trop 0.065- has been higher in the past, last angiogram 04/2023  Findings:  1. Left main was normal  2. The LAD had luminal irregularity with no obstructive disease.  3. The left circumflex had luminal irregularity with no obstructive disease.  There was 1 obtuse marginal branch that was completely occluded and appeared to be an acute occlusion.  It was very small.  4. The RCA luminal irregularity without obstructive disease.  - he received nitroglycerin x2 w/no relief  - up-titrate metoprolol to 50mg qday  - s/p asa 325mg, continue plavix 75mg (no load at this time), ecg w/no acute changes- holding on heparin gtt at this time  - repeat ECG and trop now with persistent chest pain  - cards consulted- increased imdur to 90mg, continue met 75mg qday, increase entresto to 49-51        Ischemic cardiomyopathy  -  h/o      Stage 3 chronic kidney disease  - Cr baseline at admission  - Renally dosing all medications  - Avoid nephrotoxins  - Will continue to monitor on daily labs      ICD (implantable cardioverter-defibrillator) in place  - history of cardiac arrest 2/2 v-tach      Dyslipidemia  - continue HI statin      Paroxysmal A-fib  Patient with Paroxysmal (<7 days) atrial fibrillation which is controlled currently with Beta Blocker and Amiodarone. Patient is currently in atrial fibrillation. Anticoagulation indicated. Anticoagulation done with eliquis.    CAD (coronary artery disease)  - continue met 75mg qday  - increase imdur to 90mg qday, increase entresto to 49-51          VTE Risk Mitigation (From admission, onward)         Ordered     apixaban tablet 5 mg  2 times daily         08/05/23 1657                Discharge Planning   YUSEF: 8/7/2023     Code Status: Full Code   Is the patient medically ready for discharge?: No    Reason for patient still in hospital (select all that apply): Patient trending condition  Discharge Plan A: Home with family                    Wilma Arellano MD  Department of Hospital Medicine   [facility] - Cardiology Stepdown

## 2023-08-06 NOTE — SUBJECTIVE & OBJECTIVE
Interval History: Still with chest discomfort- 7/10, but appears very comfortable at bedside. Still with some shortness of breath- good UOP w/lasix 80mg IV x1.     Objective:     Vital Signs (Most Recent):  Temp: 97.1 °F (36.2 °C) (08/06/23 1152)  Pulse: 60 (08/06/23 1542)  Resp: 18 (08/06/23 1152)  BP: 100/61 (08/06/23 1152)  SpO2: 95 % (08/06/23 1152) Vital Signs (24h Range):  Temp:  [97.1 °F (36.2 °C)-98.5 °F (36.9 °C)] 97.1 °F (36.2 °C)  Pulse:  [55-77] 60  Resp:  [18-20] 18  SpO2:  [92 %-99 %] 95 %  BP: (100-160)/() 100/61     Weight: 87.5 kg (192 lb 14.4 oz)  Body mass index is 26.16 kg/m².    Intake/Output Summary (Last 24 hours) at 8/6/2023 1607  Last data filed at 8/6/2023 1000  Gross per 24 hour   Intake 852 ml   Output 4600 ml   Net -3748 ml      Physical Exam  Gen:  appears stated age, appears comfortable  Neuro: AAOx3, motor, sensory, and strength grossly intact BL  HEENT: NTNC, EOMI, PERRL, MMM  CVS: RRR, no m/r/g; S1/S2 auscultated with no S3 or S4; capillary refill < 2 sec  Chest: some TTP of chest wall, but patient reports that this is different than his current chest discomfort, ICD in the left chest wall   Resp: BL rales, no belabored breathing or accessory muscle use appreciated   Abd: BS+ in all 4 quadrants; NT, abdominal distension, soft to palpation; no organomegaly appreciated   Extrem: pulses full, equal, and regular over all 4 extremities; no UE or LE edema BL    Significant Labs: All pertinent labs within the past 24 hours have been reviewed.  CBC:   Recent Labs   Lab 08/05/23  1410 08/06/23  0637   WBC 6.03 5.37   HGB 13.5* 12.6*   HCT 43.7 41.0    294     CMP:   Recent Labs   Lab 08/05/23  1410 08/06/23  0637    139   K 4.1 3.7    106   CO2 24 24   GLU 73 97   BUN 13 13   CREATININE 1.7* 1.4   CALCIUM 10.0 9.0   PROT 7.8  --    ALBUMIN 4.3  --    BILITOT 0.8  --    ALKPHOS 71  --    AST 15  --    ALT 16  --    ANIONGAP 12 9     Cardiac Markers:   Recent Labs    Lab 08/05/23  1410   BNP 1,958*     Troponin:   Recent Labs   Lab 08/05/23  1410 08/05/23  1738   TROPONINI 0.065* 0.059*       Significant Imaging: I have reviewed all pertinent imaging results/findings within the past 24 hours.

## 2023-08-06 NOTE — CONSULTS
"Food & Nutrition  Education     Diet Education: Salt and fluid restriction  Time Spent: -  Learners: Pt     Nutrition Education provided with handouts: "Heart Failure Nutrition Therapy"     Comments: RD consulted for education on salt and fluid restriction. Unable to speak with pt 2/2 soundly sleeping during time of RD visit attempt. Unsure intake PTA. RD private messaged RN who states that pt consumed 90% of both breakfast and lunch. No issues with n/v/d/c/chewing/swallowing today. UBW fluctuates between 190-195# per chart review, #. No s/s of malnutrition, appears well-nourished. Educational handout left on bedside table for pt to review. LBM 8/5.     All questions and concerns answered. Dietitian's contact information provided.         Follow-Up: Yes     Please Re-consult as needed           Thanks!   Melodie Glez RDN,LDN    "

## 2023-08-06 NOTE — SUBJECTIVE & OBJECTIVE
Interval History: No acute events overnight, afebrile, hemodynamically stable. He reports constant episodes of chest discomfort throughout the night, worsened by deep breaths. However, reports symptoms appear to be improving overall.       Review of Systems   Constitutional: Negative for chills and fever.   Cardiovascular:  Positive for chest pain (chest discomfort, worsened by inspiration) and dyspnea on exertion. Negative for palpitations.   Respiratory:  Positive for cough and shortness of breath.    Gastrointestinal:  Negative for constipation, diarrhea, nausea and vomiting.   Neurological:  Negative for light-headedness.     Objective:     Vital Signs (Most Recent):  Temp: 97.1 °F (36.2 °C) (08/06/23 1152)  Pulse: 68 (08/06/23 1201)  Resp: 18 (08/06/23 1152)  BP: 100/61 (08/06/23 1152)  SpO2: 95 % (08/06/23 1152) Vital Signs (24h Range):  Temp:  [97.1 °F (36.2 °C)-98.5 °F (36.9 °C)] 97.1 °F (36.2 °C)  Pulse:  [55-89] 68  Resp:  [18-28] 18  SpO2:  [92 %-99 %] 95 %  BP: (100-162)/() 100/61     Weight: 87.5 kg (192 lb 14.4 oz)  Body mass index is 26.16 kg/m².     SpO2: 95 %         Intake/Output Summary (Last 24 hours) at 8/6/2023 1208  Last data filed at 8/6/2023 1000  Gross per 24 hour   Intake 852 ml   Output 4600 ml   Net -3748 ml       Lines/Drains/Airways       Peripheral Intravenous Line  Duration                  Peripheral IV - Single Lumen 08/05/23 1408 18 G Left Antecubital <1 day                       Physical Exam  Vitals and nursing note reviewed.   Constitutional:       General: He is not in acute distress.     Appearance: Normal appearance. He is not ill-appearing, toxic-appearing or diaphoretic.   HENT:      Head: Normocephalic and atraumatic.   Eyes:      General: No scleral icterus.        Right eye: No discharge.         Left eye: No discharge.   Cardiovascular:      Rate and Rhythm: Normal rate and regular rhythm.   Pulmonary:      Effort: Pulmonary effort is normal. No respiratory  distress.      Breath sounds: No wheezing.   Abdominal:      General: There is distension.      Palpations: Abdomen is soft.      Tenderness: There is no abdominal tenderness.   Musculoskeletal:      Right lower leg: No edema.      Left lower leg: No edema.   Skin:     General: Skin is warm and dry.      Coloration: Skin is not jaundiced.   Neurological:      Mental Status: He is alert and oriented to person, place, and time. Mental status is at baseline.                LABS:  Recent Labs   Lab 08/05/23  1410 08/06/23  0637    139   K 4.1 3.7    106   CO2 24 24   BUN 13 13   CREATININE 1.7* 1.4   GLU 73 97   ANIONGAP 12 9     Recent Labs   Lab 08/06/23  0637   MG 2.0     Recent Labs   Lab 08/05/23  1410   AST 15   ALT 16   ALKPHOS 71   BILITOT 0.8   ALBUMIN 4.3      Recent Labs   Lab 08/05/23  1410 08/06/23  0637   WBC 6.03 5.37   HGB 13.5* 12.6*   HCT 43.7 41.0    294   GRAN 72.8  4.4  --        Recent Labs   Lab 08/05/23  1410 08/05/23  1738   TROPONINI 0.065* 0.059*     Lab Results   Component Value Date    BNP 1,958 (H) 08/05/2023    BNP 1,727 (H) 07/13/2023    BNP 2,871 (H) 06/08/2023              IMAGING:    TTE:  EF   Date Value Ref Range Status   07/14/2023 10 % Final   03/10/2023 18 % Final   01/05/2023 15 % Final       Significant Imaging: I have reviewed all pertinent imaging results/findings within the past 24 hours.      INPATIENT MEDICATIONS:      Scheduled Meds:   amiodarone  200 mg Oral BID    apixaban  5 mg Oral BID    atorvastatin  80 mg Oral Daily    clopidogreL  75 mg Oral Daily    ezetimibe  10 mg Oral QHS    ferrous sulfate  1 tablet Oral Daily    furosemide (LASIX) injection  80 mg Intravenous Daily    [START ON 8/7/2023] isosorbide mononitrate  90 mg Oral Daily    metoprolol succinate  75 mg Oral Daily    sacubitriL-valsartan  1 tablet Oral BID     PRN Meds:nitroGLYCERIN, sodium chloride 0.9%

## 2023-08-06 NOTE — CONSULTS
Please see Cardiology Consult Note      Arlene Sauceda MD  Cardiovascular Disease PGY IV  [facility]

## 2023-08-07 PROBLEM — I16.0 HYPERTENSIVE URGENCY: Status: RESOLVED | Noted: 2023-07-13 | Resolved: 2023-08-07

## 2023-08-07 PROBLEM — I49.01 CARDIAC ARREST WITH VENTRICULAR FIBRILLATION: Status: RESOLVED | Noted: 2022-09-23 | Resolved: 2023-08-07

## 2023-08-07 PROBLEM — I46.9 CARDIAC ARREST WITH VENTRICULAR FIBRILLATION: Status: RESOLVED | Noted: 2022-09-23 | Resolved: 2023-08-07

## 2023-08-07 LAB
ANION GAP SERPL CALC-SCNC: 12 MMOL/L (ref 8–16)
BUN SERPL-MCNC: 17 MG/DL (ref 6–20)
CALCIUM SERPL-MCNC: 9 MG/DL (ref 8.7–10.5)
CHLORIDE SERPL-SCNC: 104 MMOL/L (ref 95–110)
CO2 SERPL-SCNC: 21 MMOL/L (ref 23–29)
CREAT SERPL-MCNC: 1.5 MG/DL (ref 0.5–1.4)
ERYTHROCYTE [DISTWIDTH] IN BLOOD BY AUTOMATED COUNT: 15.4 % (ref 11.5–14.5)
EST. GFR  (NO RACE VARIABLE): 54.3 ML/MIN/1.73 M^2
GLUCOSE SERPL-MCNC: 93 MG/DL (ref 70–110)
HCT VFR BLD AUTO: 43.1 % (ref 40–54)
HGB BLD-MCNC: 13.5 G/DL (ref 14–18)
MAGNESIUM SERPL-MCNC: 2.1 MG/DL (ref 1.6–2.6)
MCH RBC QN AUTO: 27.2 PG (ref 27–31)
MCHC RBC AUTO-ENTMCNC: 31.3 G/DL (ref 32–36)
MCV RBC AUTO: 87 FL (ref 82–98)
PLATELET # BLD AUTO: 287 K/UL (ref 150–450)
PMV BLD AUTO: 11.8 FL (ref 9.2–12.9)
POCT GLUCOSE: 130 MG/DL (ref 70–110)
POTASSIUM SERPL-SCNC: 4.8 MMOL/L (ref 3.5–5.1)
RBC # BLD AUTO: 4.96 M/UL (ref 4.6–6.2)
SODIUM SERPL-SCNC: 137 MMOL/L (ref 136–145)
WBC # BLD AUTO: 6.49 K/UL (ref 3.9–12.7)

## 2023-08-07 PROCEDURE — 93010 EKG 12-LEAD: ICD-10-PCS | Mod: ,,, | Performed by: INTERNAL MEDICINE

## 2023-08-07 PROCEDURE — 99233 SBSQ HOSP IP/OBS HIGH 50: CPT | Mod: ,,, | Performed by: STUDENT IN AN ORGANIZED HEALTH CARE EDUCATION/TRAINING PROGRAM

## 2023-08-07 PROCEDURE — 93010 ELECTROCARDIOGRAM REPORT: CPT | Mod: ,,, | Performed by: INTERNAL MEDICINE

## 2023-08-07 PROCEDURE — 99232 PR SUBSEQUENT HOSPITAL CARE,LEVL II: ICD-10-PCS | Mod: 25,,, | Performed by: INTERNAL MEDICINE

## 2023-08-07 PROCEDURE — 99233 PR SUBSEQUENT HOSPITAL CARE,LEVL III: ICD-10-PCS | Mod: ,,, | Performed by: STUDENT IN AN ORGANIZED HEALTH CARE EDUCATION/TRAINING PROGRAM

## 2023-08-07 PROCEDURE — 25000003 PHARM REV CODE 250: Performed by: INTERNAL MEDICINE

## 2023-08-07 PROCEDURE — 83735 ASSAY OF MAGNESIUM: CPT | Performed by: STUDENT IN AN ORGANIZED HEALTH CARE EDUCATION/TRAINING PROGRAM

## 2023-08-07 PROCEDURE — 63600175 PHARM REV CODE 636 W HCPCS: Performed by: STUDENT IN AN ORGANIZED HEALTH CARE EDUCATION/TRAINING PROGRAM

## 2023-08-07 PROCEDURE — 25000003 PHARM REV CODE 250: Performed by: STUDENT IN AN ORGANIZED HEALTH CARE EDUCATION/TRAINING PROGRAM

## 2023-08-07 PROCEDURE — 20600001 HC STEP DOWN PRIVATE ROOM

## 2023-08-07 PROCEDURE — 94761 N-INVAS EAR/PLS OXIMETRY MLT: CPT

## 2023-08-07 PROCEDURE — 93005 ELECTROCARDIOGRAM TRACING: CPT

## 2023-08-07 PROCEDURE — 85027 COMPLETE CBC AUTOMATED: CPT | Performed by: STUDENT IN AN ORGANIZED HEALTH CARE EDUCATION/TRAINING PROGRAM

## 2023-08-07 PROCEDURE — 80048 BASIC METABOLIC PNL TOTAL CA: CPT | Performed by: STUDENT IN AN ORGANIZED HEALTH CARE EDUCATION/TRAINING PROGRAM

## 2023-08-07 PROCEDURE — 99232 SBSQ HOSP IP/OBS MODERATE 35: CPT | Mod: 25,,, | Performed by: INTERNAL MEDICINE

## 2023-08-07 PROCEDURE — 36415 COLL VENOUS BLD VENIPUNCTURE: CPT | Performed by: STUDENT IN AN ORGANIZED HEALTH CARE EDUCATION/TRAINING PROGRAM

## 2023-08-07 PROCEDURE — 27000221 HC OXYGEN, UP TO 24 HOURS

## 2023-08-07 RX ORDER — EVOLOCUMAB 140 MG/ML
140 INJECTION, SOLUTION SUBCUTANEOUS
Qty: 2 ML | Refills: 0 | Status: SHIPPED | OUTPATIENT
Start: 2023-08-07 | End: 2023-09-04

## 2023-08-07 RX ORDER — ROSUVASTATIN CALCIUM 40 MG/1
40 TABLET, COATED ORAL NIGHTLY
Qty: 90 TABLET | Refills: 3 | Status: SHIPPED | OUTPATIENT
Start: 2023-08-07 | End: 2023-09-07 | Stop reason: HOSPADM

## 2023-08-07 RX ORDER — FUROSEMIDE 10 MG/ML
80 INJECTION INTRAMUSCULAR; INTRAVENOUS
Status: DISCONTINUED | OUTPATIENT
Start: 2023-08-07 | End: 2023-08-10

## 2023-08-07 RX ADMIN — APIXABAN 5 MG: 5 TABLET, FILM COATED ORAL at 08:08

## 2023-08-07 RX ADMIN — IOHEXOL 100 ML: 350 INJECTION, SOLUTION INTRAVENOUS at 11:08

## 2023-08-07 RX ADMIN — AMIODARONE HYDROCHLORIDE 200 MG: 200 TABLET ORAL at 08:08

## 2023-08-07 RX ADMIN — ISOSORBIDE MONONITRATE 90 MG: 60 TABLET, EXTENDED RELEASE ORAL at 08:08

## 2023-08-07 RX ADMIN — CLOPIDOGREL BISULFATE 75 MG: 75 TABLET ORAL at 08:08

## 2023-08-07 RX ADMIN — FERROUS SULFATE TAB 325 MG (65 MG ELEMENTAL FE) 1 EACH: 325 (65 FE) TAB at 08:08

## 2023-08-07 RX ADMIN — SACUBITRIL AND VALSARTAN 1 TABLET: 49; 51 TABLET, FILM COATED ORAL at 08:08

## 2023-08-07 RX ADMIN — ATORVASTATIN CALCIUM 80 MG: 40 TABLET, FILM COATED ORAL at 08:08

## 2023-08-07 RX ADMIN — FUROSEMIDE 80 MG: 10 INJECTION, SOLUTION INTRAVENOUS at 09:08

## 2023-08-07 RX ADMIN — METOPROLOL SUCCINATE 75 MG: 50 TABLET, EXTENDED RELEASE ORAL at 08:08

## 2023-08-07 RX ADMIN — FUROSEMIDE 80 MG: 10 INJECTION, SOLUTION INTRAVENOUS at 08:08

## 2023-08-07 RX ADMIN — EZETIMIBE 10 MG: 10 TABLET ORAL at 08:08

## 2023-08-07 NOTE — PROGRESS NOTES
[facility] ([facility]) Team notified of pt referral via [facility] (automated inbasket notification) .    Patient screened today August 7, 2023 by provider and RN for enrollment to program.      Pt was deemed not a candidate for enrollment at this time related to patient is [place] [facility] [place].Pt followed by [facility], message sent to schedulers for hospital f/u appt.     Pt will require additional follow up planning per primary team.     If pt status, diagnosis, or treatment plan changes , please place AMB referral to [facility] ([id number]) for HFTCC enrollment re-evalution.

## 2023-08-07 NOTE — PLAN OF CARE
Problem: Adult Inpatient Plan of Care  Goal: Absence of Hospital-Acquired Illness or Injury  Intervention: Identify and Manage Fall Risk  Flowsheets (Taken 8/7/2023 7105)  Safety Promotion/Fall Prevention:   assistive device/personal item within reach   Fall Risk reviewed with patient/family   nonskid shoes/socks when out of bed   room near unit station   side rails raised x 2

## 2023-08-07 NOTE — ASSESSMENT & PLAN NOTE
"Acute on chronic combined systolic and diastolic heart failure  CAD (coronary artery disease)  Dyslipidemia  58 year old with HFrEF (10%, DD, pHTN), CAD (h/o STEMI, s/p PCI to Rcx-08/2021), pAF s/p DCCV, HTN, CKD3b who presented to the ED with persistent chest pain since 10 AM on the morning prior to admission    Per initial Cardiology fellow evaluation: "Patient states that he has had similar pain since his discharge and has been occurring even after being discharged with no relief. Patient states chest pain is a 8/10 in severity, non-radiating with no associated aggravating or alleviating factors. Patient was recently discharged on 07/16- required nitro gtt for persistent chest pain in the setting of hypertensive emergency.  ECG unchanged from prior. He denies any nausea, vomiting, hematemesis, cough, palpitations or shortness of breath. Reports having worsening abdominal distension and feels he is still retaining fluid. Reports compliance with his medication"    Intake/Output Summary (Last 24 hours) at 8/7/2023 0819  Last data filed at 8/6/2023 1806  Gross per 24 hour   Intake 539 ml   Output 1401 ml   Net -862 ml     Net IO Since Admission: -4,250 mL [08/07/23 0819]    -Suspect patient symptoms are primarily being driven by congestion as reports some alleviation in symptoms with volume removal via diuresis    RECOMMENDATIONS  -Continue diuresis with Furosemide IV to target euvolemia  - Continue Imdur  90 mg daily  - Continue Entresto 49-51mg BID  - Up-titrate BB as tolerated  -Add amlodipine 5-10 mg if needed for further BP control  -Dyslipidemia not at goal. Switch Atorvastatin to Rosuvastatin 40mg on discharge as not on inpatient formulary. Continue Ezetemibe. Consider Repatha initiation on discharge or outpatient followup for further control.     "

## 2023-08-07 NOTE — PLAN OF CARE
08/07/23 0942   Post-Acute Status   Post-Acute Authorization Other     Referrals for care at home and ready responders placed. Explained purpose for the consults and patient stated he was grateful and accepting . Offered to answer any questions and he stated he did not have any at this time. Contact numbers placed on the white board. Will continue to monitor for discharge needs.     Marichuy Pérez RN    [phone]

## 2023-08-07 NOTE — SUBJECTIVE & OBJECTIVE
Interval History: No acute events overnight, afebrile, hemodynamically stable. He reports decreasing intensity and frequency of chest discomfort. Reports improvement in abdominal swelling.      Review of Systems   Constitutional: Negative for chills and fever.   Cardiovascular:  Positive for chest pain (chest discomfort, worsened by respiration (improving)) and dyspnea on exertion. Negative for palpitations.   Respiratory:  Positive for cough and shortness of breath.    Gastrointestinal:  Negative for constipation, diarrhea, nausea and vomiting.   Neurological:  Negative for light-headedness.     Objective:     Vital Signs (Most Recent):  Temp: 98.1 °F (36.7 °C) (08/07/23 0730)  Pulse: 64 (08/07/23 0730)  Resp: 18 (08/07/23 0730)  BP: 123/81 (08/07/23 0730)  SpO2: 98 % (08/07/23 0730) Vital Signs (24h Range):  Temp:  [97.1 °F (36.2 °C)-99.5 °F (37.5 °C)] 98.1 °F (36.7 °C)  Pulse:  [54-70] 64  Resp:  [18-20] 18  SpO2:  [89 %-98 %] 98 %  BP: ()/(52-81) 123/81     Weight: 85.5 kg (188 lb 7.9 oz)  Body mass index is 25.56 kg/m².     SpO2: 98 %         Intake/Output Summary (Last 24 hours) at 8/7/2023 0955  Last data filed at 8/6/2023 1806  Gross per 24 hour   Intake 299 ml   Output 1401 ml   Net -1102 ml       Lines/Drains/Airways       Peripheral Intravenous Line  Duration                  Peripheral IV - Single Lumen 08/05/23 1408 18 G Left Antecubital 1 day                       Physical Exam  Vitals and nursing note reviewed.   Constitutional:       General: He is not in acute distress.     Appearance: Normal appearance. He is not ill-appearing, toxic-appearing or diaphoretic.   HENT:      Head: Normocephalic and atraumatic.   Eyes:      General: No scleral icterus.        Right eye: No discharge.         Left eye: No discharge.   Cardiovascular:      Rate and Rhythm: Normal rate and regular rhythm.   Pulmonary:      Effort: Pulmonary effort is normal. No respiratory distress.      Breath sounds: No wheezing.    Abdominal:      General: There is distension.      Palpations: Abdomen is soft.      Tenderness: There is no abdominal tenderness.   Musculoskeletal:      Right lower leg: No edema.      Left lower leg: No edema.   Skin:     General: Skin is warm and dry.      Coloration: Skin is not jaundiced.   Neurological:      Mental Status: He is alert and oriented to person, place, and time. Mental status is at baseline.                  LABS:  Recent Labs   Lab 08/05/23  1410 08/06/23  0637 08/07/23  0408    139 137   K 4.1 3.7 4.8    106 104   CO2 24 24 21*   BUN 13 13 17   CREATININE 1.7* 1.4 1.5*   GLU 73 97 93   ANIONGAP 12 9 12     Recent Labs   Lab 08/06/23  0637 08/07/23  0408   MG 2.0 2.1     Recent Labs   Lab 08/05/23  1410   AST 15   ALT 16   ALKPHOS 71   BILITOT 0.8   ALBUMIN 4.3      Recent Labs   Lab 08/05/23  1410 08/06/23  0637 08/07/23  0408   WBC 6.03 5.37 6.49   HGB 13.5* 12.6* 13.5*   HCT 43.7 41.0 43.1    294 287   GRAN 72.8  4.4  --   --        Recent Labs   Lab 08/05/23  1410 08/05/23  1738   TROPONINI 0.065* 0.059*     Lab Results   Component Value Date    BNP 1,958 (H) 08/05/2023    BNP 1,727 (H) 07/13/2023    BNP 2,871 (H) 06/08/2023            IMAGING:      TTE:  EF   Date Value Ref Range Status   07/14/2023 10 % Final   03/10/2023 18 % Final   01/05/2023 15 % Final         Significant Imaging: I have reviewed all pertinent imaging results/findings within the past 24 hours.      INPATIENT MEDICATIONS:    Scheduled Meds:   amiodarone  200 mg Oral BID    apixaban  5 mg Oral BID    atorvastatin  80 mg Oral Daily    clopidogreL  75 mg Oral Daily    ezetimibe  10 mg Oral QHS    ferrous sulfate  1 tablet Oral Daily    furosemide (LASIX) injection  80 mg Intravenous Daily    isosorbide mononitrate  90 mg Oral Daily    metoprolol succinate  75 mg Oral Daily    sacubitriL-valsartan  1 tablet Oral BID     PRN Meds:nitroGLYCERIN, sodium chloride 0.9%

## 2023-08-07 NOTE — PROGRESS NOTES
[facility]  Cardiology  Progress Note    Patient Name: Tera Griffiths  MRN: [id number]  Admission Date: 8/5/2023  Hospital Length of Stay: 0 days  Code Status: Full Code   Attending Physician: Jah Saul MD   Primary Care Physician: Carleen Bueno MD  Expected Discharge Date: 8/7/2023  Principal Problem:Acute on chronic combined systolic and diastolic heart failure    Subjective:       Interval History: No acute events overnight, afebrile, hemodynamically stable. He reports decreasing intensity and frequency of chest discomfort. Reports improvement in abdominal swelling.      Review of Systems   Constitutional: Negative for chills and fever.   Cardiovascular:  Positive for chest pain (chest discomfort, worsened by respiration (improving)) and dyspnea on exertion. Negative for palpitations.   Respiratory:  Positive for cough and shortness of breath.    Gastrointestinal:  Negative for constipation, diarrhea, nausea and vomiting.   Neurological:  Negative for light-headedness.     Objective:     Vital Signs (Most Recent):  Temp: 98.1 °F (36.7 °C) (08/07/23 0730)  Pulse: 64 (08/07/23 0730)  Resp: 18 (08/07/23 0730)  BP: 123/81 (08/07/23 0730)  SpO2: 98 % (08/07/23 0730) Vital Signs (24h Range):  Temp:  [97.1 °F (36.2 °C)-99.5 °F (37.5 °C)] 98.1 °F (36.7 °C)  Pulse:  [54-70] 64  Resp:  [18-20] 18  SpO2:  [89 %-98 %] 98 %  BP: ()/(52-81) 123/81     Weight: 85.5 kg (188 lb 7.9 oz)  Body mass index is 25.56 kg/m².     SpO2: 98 %         Intake/Output Summary (Last 24 hours) at 8/7/2023 0955  Last data filed at 8/6/2023 1806  Gross per 24 hour   Intake 299 ml   Output 1401 ml   Net -1102 ml       Lines/Drains/Airways       Peripheral Intravenous Line  Duration                  Peripheral IV - Single Lumen 08/05/23 1408 18 G Left Antecubital 1 day                       Physical Exam  Vitals and nursing note reviewed.   Constitutional:       General: He is not in acute distress.     Appearance:  Normal appearance. He is not ill-appearing, toxic-appearing or diaphoretic.   HENT:      Head: Normocephalic and atraumatic.   Eyes:      General: No scleral icterus.        Right eye: No discharge.         Left eye: No discharge.   Cardiovascular:      Rate and Rhythm: Normal rate and regular rhythm.   Pulmonary:      Effort: Pulmonary effort is normal. No respiratory distress.      Breath sounds: No wheezing.   Abdominal:      General: There is distension.      Palpations: Abdomen is soft.      Tenderness: There is no abdominal tenderness.   Musculoskeletal:      Right lower leg: No edema.      Left lower leg: No edema.   Skin:     General: Skin is warm and dry.      Coloration: Skin is not jaundiced.   Neurological:      Mental Status: He is alert and oriented to person, place, and time. Mental status is at baseline.                  LABS:  Recent Labs   Lab 08/05/23  1410 08/06/23  0637 08/07/23  0408    139 137   K 4.1 3.7 4.8    106 104   CO2 24 24 21*   BUN 13 13 17   CREATININE 1.7* 1.4 1.5*   GLU 73 97 93   ANIONGAP 12 9 12     Recent Labs   Lab 08/06/23  0637 08/07/23  0408   MG 2.0 2.1     Recent Labs   Lab 08/05/23  1410   AST 15   ALT 16   ALKPHOS 71   BILITOT 0.8   ALBUMIN 4.3      Recent Labs   Lab 08/05/23  1410 08/06/23  0637 08/07/23  0408   WBC 6.03 5.37 6.49   HGB 13.5* 12.6* 13.5*   HCT 43.7 41.0 43.1    294 287   GRAN 72.8  4.4  --   --        Recent Labs   Lab 08/05/23  1410 08/05/23  1738   TROPONINI 0.065* 0.059*     Lab Results   Component Value Date    BNP 1,958 (H) 08/05/2023    BNP 1,727 (H) 07/13/2023    BNP 2,871 (H) 06/08/2023            IMAGING:      TTE:  EF   Date Value Ref Range Status   07/14/2023 10 % Final   03/10/2023 18 % Final   01/05/2023 15 % Final         Significant Imaging: I have reviewed all pertinent imaging results/findings within the past 24 hours.      INPATIENT MEDICATIONS:    Scheduled Meds:   amiodarone  200 mg Oral BID    apixaban  5 mg  "Oral BID    atorvastatin  80 mg Oral Daily    clopidogreL  75 mg Oral Daily    ezetimibe  10 mg Oral QHS    ferrous sulfate  1 tablet Oral Daily    furosemide (LASIX) injection  80 mg Intravenous Daily    isosorbide mononitrate  90 mg Oral Daily    metoprolol succinate  75 mg Oral Daily    sacubitriL-valsartan  1 tablet Oral BID     PRN Meds:nitroGLYCERIN, sodium chloride 0.9%        Assessment and Plan:         NSTEMI (non-ST elevated myocardial infarction)  Acute on chronic combined systolic and diastolic heart failure  CAD (coronary artery disease)  Dyslipidemia  58 year old with HFrEF (10%, DD, pHTN), CAD (h/o STEMI, s/p PCI to Rcx-08/2021), pAF s/p DCCV, HTN, CKD3b who presented to the ED with persistent chest pain since 10 AM on the morning prior to admission    Per initial Cardiology fellow evaluation: "Patient states that he has had similar pain since his discharge and has been occurring even after being discharged with no relief. Patient states chest pain is a 8/10 in severity, non-radiating with no associated aggravating or alleviating factors. Patient was recently discharged on 07/16- required nitro gtt for persistent chest pain in the setting of hypertensive emergency.  ECG unchanged from prior. He denies any nausea, vomiting, hematemesis, cough, palpitations or shortness of breath. Reports having worsening abdominal distension and feels he is still retaining fluid. Reports compliance with his medication"    Intake/Output Summary (Last 24 hours) at 8/7/2023 0819  Last data filed at 8/6/2023 1806  Gross per 24 hour   Intake 539 ml   Output 1401 ml   Net -862 ml     Net IO Since Admission: -4,250 mL [08/07/23 0819]    -Suspect patient symptoms are primarily being driven by congestion as reports some alleviation in symptoms with volume removal via diuresis    RECOMMENDATIONS  -Continue diuresis with Furosemide IV to target euvolemia  - Continue Imdur  90 mg daily  - Continue Entresto 49-51mg BID  - " Up-titrate BB as tolerated  -Add amlodipine 5-10 mg if needed for further BP control  -Dyslipidemia not at goal. Switch Atorvastatin to Rosuvastatin 40mg on discharge as not on inpatient formulary. Continue Ezetemibe. Consider Repatha initiation on discharge or outpatient followup for further dyslipidemia control.       Ugo Lui, DO  Cardiology  [facility] - [facility]  I have personally taken the history and examined the patient and agree with the resident's note as stated above.Diuresing still . Long term prognosis very poor

## 2023-08-08 ENCOUNTER — ANESTHESIA EVENT (OUTPATIENT)
Dept: ENDOSCOPY | Facility: HOSPITAL | Age: 58
DRG: 280 | End: 2023-08-08
Payer: MEDICAID

## 2023-08-08 ENCOUNTER — ANESTHESIA (OUTPATIENT)
Dept: ENDOSCOPY | Facility: HOSPITAL | Age: 58
DRG: 280 | End: 2023-08-08
Payer: MEDICAID

## 2023-08-08 PROBLEM — I63.512 ARTERIAL ISCHEMIC STROKE, MCA (MIDDLE CEREBRAL ARTERY), LEFT, ACUTE: Status: ACTIVE | Noted: 2023-08-08

## 2023-08-08 PROBLEM — G81.91 HEMIPARESIS, RIGHT: Status: ACTIVE | Noted: 2023-08-08

## 2023-08-08 PROBLEM — R47.01 GLOBAL APHASIA: Status: ACTIVE | Noted: 2023-08-08

## 2023-08-08 PROBLEM — I26.94 MULTIPLE SUBSEGMENTAL PULMONARY EMBOLI WITHOUT ACUTE COR PULMONALE: Status: ACTIVE | Noted: 2021-12-10

## 2023-08-08 LAB
ABO + RH BLD: NORMAL
ANION GAP SERPL CALC-SCNC: 13 MMOL/L (ref 8–16)
APTT PPP: 29.6 SEC (ref 21–32)
ASCENDING AORTA: 3.24 CM
AV INDEX (PROSTH): 0.23
AV MEAN GRADIENT: 6 MMHG
AV PEAK GRADIENT: 8 MMHG
AV VALVE AREA BY VELOCITY RATIO: 1.02 CM²
AV VALVE AREA: 0.79 CM²
AV VELOCITY RATIO: 0.29
BLD GP AB SCN CELLS X3 SERPL QL: NORMAL
BSA FOR ECHO PROCEDURE: 2.07 M2
BUN SERPL-MCNC: 17 MG/DL (ref 6–20)
CALCIUM SERPL-MCNC: 9.2 MG/DL (ref 8.7–10.5)
CHLORIDE SERPL-SCNC: 102 MMOL/L (ref 95–110)
CHOLEST SERPL-MCNC: 166 MG/DL (ref 120–199)
CHOLEST/HDLC SERPL: 3.6 {RATIO} (ref 2–5)
CO2 SERPL-SCNC: 20 MMOL/L (ref 23–29)
CREAT SERPL-MCNC: 1.5 MG/DL (ref 0.5–1.4)
CV ECHO LV RWT: 0.32 CM
DOP CALC AO PEAK VEL: 1.4 M/S
DOP CALC AO VTI: 24.63 CM
DOP CALC LVOT AREA: 3.5 CM2
DOP CALC LVOT DIAMETER: 2.11 CM
DOP CALC LVOT PEAK VEL: 0.41 M/S
DOP CALC LVOT STROKE VOLUME: 19.4 CM3
DOP CALCLVOT PEAK VEL VTI: 5.55 CM
ECHO LV POSTERIOR WALL: 1.05 CM (ref 0.6–1.1)
EJECTION FRACTION: 20 %
ERYTHROCYTE [DISTWIDTH] IN BLOOD BY AUTOMATED COUNT: 15.3 % (ref 11.5–14.5)
EST. GFR  (NO RACE VARIABLE): 54.3 ML/MIN/1.73 M^2
FRACTIONAL SHORTENING: 11 % (ref 28–44)
GLUCOSE SERPL-MCNC: 121 MG/DL (ref 70–110)
HCT VFR BLD AUTO: 46.8 % (ref 40–54)
HDLC SERPL-MCNC: 46 MG/DL (ref 40–75)
HDLC SERPL: 27.7 % (ref 20–50)
HGB BLD-MCNC: 14.7 G/DL (ref 14–18)
INR PPP: 1.1 (ref 0.8–1.2)
INTERVENTRICULAR SEPTUM: 0.89 CM (ref 0.6–1.1)
LA MAJOR: 7 CM
LA MINOR: 7.04 CM
LA WIDTH: 5.8 CM
LDLC SERPL CALC-MCNC: 101.4 MG/DL (ref 63–159)
LEFT ATRIUM SIZE: 5.71 CM
LEFT ATRIUM VOLUME INDEX: 95 ML/M2
LEFT ATRIUM VOLUME: 197.61 CM3
LEFT INTERNAL DIMENSION IN SYSTOLE: 5.87 CM (ref 2.1–4)
LEFT VENTRICLE DIASTOLIC VOLUME INDEX: 106.39 ML/M2
LEFT VENTRICLE DIASTOLIC VOLUME: 221.3 ML
LEFT VENTRICLE MASS INDEX: 133 G/M2
LEFT VENTRICLE SYSTOLIC VOLUME INDEX: 82.2 ML/M2
LEFT VENTRICLE SYSTOLIC VOLUME: 171.01 ML
LEFT VENTRICULAR INTERNAL DIMENSION IN DIASTOLE: 6.57 CM (ref 3.5–6)
LEFT VENTRICULAR MASS: 277.41 G
MAGNESIUM SERPL-MCNC: 2 MG/DL (ref 1.6–2.6)
MCH RBC QN AUTO: 27 PG (ref 27–31)
MCHC RBC AUTO-ENTMCNC: 31.4 G/DL (ref 32–36)
MCV RBC AUTO: 86 FL (ref 82–98)
NONHDLC SERPL-MCNC: 120 MG/DL
PHOSPHATE SERPL-MCNC: 2.4 MG/DL (ref 2.7–4.5)
PLATELET # BLD AUTO: 309 K/UL (ref 150–450)
PMV BLD AUTO: 11.2 FL (ref 9.2–12.9)
POCT GLUCOSE: 105 MG/DL (ref 70–110)
POCT GLUCOSE: 142 MG/DL (ref 70–110)
POTASSIUM SERPL-SCNC: 3.8 MMOL/L (ref 3.5–5.1)
POTASSIUM SERPL-SCNC: 4.6 MMOL/L (ref 3.5–5.1)
PROTHROMBIN TIME: 12.2 SEC (ref 9–12.5)
RA MAJOR: 8.03 CM
RA PRESSURE ESTIMATED: 3 MMHG
RA WIDTH: 5.27 CM
RBC # BLD AUTO: 5.45 M/UL (ref 4.6–6.2)
RIGHT VENTRICLE DIASTOLIC BASEL DIMENSION: 4.2 CM
RIGHT VENTRICULAR END-DIASTOLIC DIMENSION: 4.2 CM
SINUS: 3.13 CM
SODIUM SERPL-SCNC: 135 MMOL/L (ref 136–145)
SPECIMEN OUTDATE: NORMAL
STJ: 2.83 CM
TDI LATERAL: 0.06 M/S
TDI SEPTAL: 0.05 M/S
TDI: 0.06 M/S
TRIGL SERPL-MCNC: 93 MG/DL (ref 30–150)
TSH SERPL DL<=0.005 MIU/L-ACNC: 1.13 UIU/ML (ref 0.4–4)
WBC # BLD AUTO: 6.33 K/UL (ref 3.9–12.7)
Z-SCORE OF LEFT VENTRICULAR DIMENSION IN END DIASTOLE: 0.31
Z-SCORE OF LEFT VENTRICULAR DIMENSION IN END SYSTOLE: 3.24

## 2023-08-08 PROCEDURE — 25500020 PHARM REV CODE 255: Performed by: PSYCHIATRY & NEUROLOGY

## 2023-08-08 PROCEDURE — D9220A PRA ANESTHESIA: ICD-10-PCS | Mod: CRNA,,, | Performed by: NURSE ANESTHETIST, CERTIFIED REGISTERED

## 2023-08-08 PROCEDURE — 63600175 PHARM REV CODE 636 W HCPCS

## 2023-08-08 PROCEDURE — 85610 PROTHROMBIN TIME: CPT

## 2023-08-08 PROCEDURE — 84132 ASSAY OF SERUM POTASSIUM: CPT | Performed by: PSYCHIATRY & NEUROLOGY

## 2023-08-08 PROCEDURE — 97112 NEUROMUSCULAR REEDUCATION: CPT

## 2023-08-08 PROCEDURE — 92610 EVALUATE SWALLOWING FUNCTION: CPT

## 2023-08-08 PROCEDURE — 80061 LIPID PANEL: CPT | Performed by: STUDENT IN AN ORGANIZED HEALTH CARE EDUCATION/TRAINING PROGRAM

## 2023-08-08 PROCEDURE — 63600175 PHARM REV CODE 636 W HCPCS: Performed by: NURSE ANESTHETIST, CERTIFIED REGISTERED

## 2023-08-08 PROCEDURE — 97530 THERAPEUTIC ACTIVITIES: CPT

## 2023-08-08 PROCEDURE — 99223 1ST HOSP IP/OBS HIGH 75: CPT | Mod: FS,,, | Performed by: NURSE PRACTITIONER

## 2023-08-08 PROCEDURE — 83735 ASSAY OF MAGNESIUM: CPT | Performed by: STUDENT IN AN ORGANIZED HEALTH CARE EDUCATION/TRAINING PROGRAM

## 2023-08-08 PROCEDURE — 99291 CRITICAL CARE FIRST HOUR: CPT | Mod: ,,, | Performed by: PSYCHIATRY & NEUROLOGY

## 2023-08-08 PROCEDURE — 27000221 HC OXYGEN, UP TO 24 HOURS

## 2023-08-08 PROCEDURE — 63600175 PHARM REV CODE 636 W HCPCS: Performed by: STUDENT IN AN ORGANIZED HEALTH CARE EDUCATION/TRAINING PROGRAM

## 2023-08-08 PROCEDURE — 99223 PR INITIAL HOSPITAL CARE,LEVL III: ICD-10-PCS | Mod: FS,,, | Performed by: NURSE PRACTITIONER

## 2023-08-08 PROCEDURE — D9220A PRA ANESTHESIA: Mod: ANES,,, | Performed by: ANESTHESIOLOGY

## 2023-08-08 PROCEDURE — 63600175 PHARM REV CODE 636 W HCPCS: Performed by: PSYCHIATRY & NEUROLOGY

## 2023-08-08 PROCEDURE — 25000003 PHARM REV CODE 250

## 2023-08-08 PROCEDURE — 80048 BASIC METABOLIC PNL TOTAL CA: CPT | Performed by: STUDENT IN AN ORGANIZED HEALTH CARE EDUCATION/TRAINING PROGRAM

## 2023-08-08 PROCEDURE — 84100 ASSAY OF PHOSPHORUS: CPT | Performed by: STUDENT IN AN ORGANIZED HEALTH CARE EDUCATION/TRAINING PROGRAM

## 2023-08-08 PROCEDURE — 85027 COMPLETE CBC AUTOMATED: CPT | Performed by: STUDENT IN AN ORGANIZED HEALTH CARE EDUCATION/TRAINING PROGRAM

## 2023-08-08 PROCEDURE — 36415 COLL VENOUS BLD VENIPUNCTURE: CPT | Performed by: NURSE PRACTITIONER

## 2023-08-08 PROCEDURE — 93010 EKG 12-LEAD: ICD-10-PCS | Mod: ,,, | Performed by: INTERNAL MEDICINE

## 2023-08-08 PROCEDURE — 37000009 HC ANESTHESIA EA ADD 15 MINS

## 2023-08-08 PROCEDURE — 94761 N-INVAS EAR/PLS OXIMETRY MLT: CPT

## 2023-08-08 PROCEDURE — 25000003 PHARM REV CODE 250: Performed by: NURSE ANESTHETIST, CERTIFIED REGISTERED

## 2023-08-08 PROCEDURE — 93010 ELECTROCARDIOGRAM REPORT: CPT | Mod: ,,, | Performed by: INTERNAL MEDICINE

## 2023-08-08 PROCEDURE — 99291 PR CRITICAL CARE, E/M 30-74 MINUTES: ICD-10-PCS | Mod: ,,, | Performed by: PSYCHIATRY & NEUROLOGY

## 2023-08-08 PROCEDURE — D9220A PRA ANESTHESIA: Mod: CRNA,,, | Performed by: NURSE ANESTHETIST, CERTIFIED REGISTERED

## 2023-08-08 PROCEDURE — 97166 OT EVAL MOD COMPLEX 45 MIN: CPT

## 2023-08-08 PROCEDURE — 93005 ELECTROCARDIOGRAM TRACING: CPT

## 2023-08-08 PROCEDURE — 92523 SPEECH SOUND LANG COMPREHEN: CPT

## 2023-08-08 PROCEDURE — 86900 BLOOD TYPING SEROLOGIC ABO: CPT | Performed by: STUDENT IN AN ORGANIZED HEALTH CARE EDUCATION/TRAINING PROGRAM

## 2023-08-08 PROCEDURE — 84443 ASSAY THYROID STIM HORMONE: CPT | Performed by: STUDENT IN AN ORGANIZED HEALTH CARE EDUCATION/TRAINING PROGRAM

## 2023-08-08 PROCEDURE — D9220A PRA ANESTHESIA: ICD-10-PCS | Mod: ANES,,, | Performed by: ANESTHESIOLOGY

## 2023-08-08 PROCEDURE — 20000000 HC ICU ROOM

## 2023-08-08 PROCEDURE — 85730 THROMBOPLASTIN TIME PARTIAL: CPT

## 2023-08-08 PROCEDURE — 99233 PR SUBSEQUENT HOSPITAL CARE,LEVL III: ICD-10-PCS | Mod: ,,, | Performed by: INTERNAL MEDICINE

## 2023-08-08 PROCEDURE — 99233 SBSQ HOSP IP/OBS HIGH 50: CPT | Mod: ,,, | Performed by: INTERNAL MEDICINE

## 2023-08-08 PROCEDURE — 25500020 PHARM REV CODE 255: Performed by: STUDENT IN AN ORGANIZED HEALTH CARE EDUCATION/TRAINING PROGRAM

## 2023-08-08 PROCEDURE — 97162 PT EVAL MOD COMPLEX 30 MIN: CPT

## 2023-08-08 PROCEDURE — 37000008 HC ANESTHESIA 1ST 15 MINUTES

## 2023-08-08 RX ORDER — ACETAMINOPHEN 325 MG/1
650 TABLET ORAL EVERY 6 HOURS PRN
Status: DISCONTINUED | OUTPATIENT
Start: 2023-08-08 | End: 2023-08-12

## 2023-08-08 RX ORDER — EPINEPHRINE 1 MG/ML
INJECTION, SOLUTION, CONCENTRATE INTRAVENOUS
Status: DISCONTINUED | OUTPATIENT
Start: 2023-08-08 | End: 2023-08-08

## 2023-08-08 RX ORDER — ASPIRIN 300 MG/1
300 SUPPOSITORY RECTAL DAILY
Status: DISCONTINUED | OUTPATIENT
Start: 2023-08-08 | End: 2023-08-11

## 2023-08-08 RX ORDER — POTASSIUM CHLORIDE 7.45 MG/ML
60 INJECTION INTRAVENOUS
Status: DISCONTINUED | OUTPATIENT
Start: 2023-08-08 | End: 2023-08-24

## 2023-08-08 RX ORDER — SODIUM CHLORIDE 0.9 % (FLUSH) 0.9 %
10 SYRINGE (ML) INJECTION
Status: DISCONTINUED | OUTPATIENT
Start: 2023-08-08 | End: 2023-09-07 | Stop reason: HOSPADM

## 2023-08-08 RX ORDER — POTASSIUM CHLORIDE 7.45 MG/ML
80 INJECTION INTRAVENOUS
Status: DISCONTINUED | OUTPATIENT
Start: 2023-08-08 | End: 2023-08-24

## 2023-08-08 RX ORDER — ASPIRIN 81 MG/1
81 TABLET ORAL DAILY
Status: DISCONTINUED | OUTPATIENT
Start: 2023-08-08 | End: 2023-08-08

## 2023-08-08 RX ORDER — ROCURONIUM BROMIDE 10 MG/ML
INJECTION, SOLUTION INTRAVENOUS
Status: DISCONTINUED | OUTPATIENT
Start: 2023-08-08 | End: 2023-08-08

## 2023-08-08 RX ORDER — MAGNESIUM SULFATE HEPTAHYDRATE 40 MG/ML
2 INJECTION, SOLUTION INTRAVENOUS
Status: DISCONTINUED | OUTPATIENT
Start: 2023-08-08 | End: 2023-08-24

## 2023-08-08 RX ORDER — MIDAZOLAM HYDROCHLORIDE 1 MG/ML
INJECTION INTRAMUSCULAR; INTRAVENOUS
Status: DISPENSED
Start: 2023-08-08 | End: 2023-08-08

## 2023-08-08 RX ORDER — DEXAMETHASONE SODIUM PHOSPHATE 4 MG/ML
INJECTION, SOLUTION INTRA-ARTICULAR; INTRALESIONAL; INTRAMUSCULAR; INTRAVENOUS; SOFT TISSUE
Status: DISCONTINUED | OUTPATIENT
Start: 2023-08-08 | End: 2023-08-08

## 2023-08-08 RX ORDER — ONDANSETRON 2 MG/ML
4 INJECTION INTRAMUSCULAR; INTRAVENOUS EVERY 8 HOURS PRN
Status: DISCONTINUED | OUTPATIENT
Start: 2023-08-08 | End: 2023-09-07 | Stop reason: HOSPADM

## 2023-08-08 RX ORDER — PROPOFOL 10 MG/ML
VIAL (ML) INTRAVENOUS
Status: DISCONTINUED | OUTPATIENT
Start: 2023-08-08 | End: 2023-08-08

## 2023-08-08 RX ORDER — AMOXICILLIN 250 MG
1 CAPSULE ORAL 2 TIMES DAILY
Status: DISCONTINUED | OUTPATIENT
Start: 2023-08-08 | End: 2023-08-11

## 2023-08-08 RX ORDER — METOPROLOL TARTRATE 1 MG/ML
5 INJECTION, SOLUTION INTRAVENOUS EVERY 5 MIN PRN
Status: COMPLETED | OUTPATIENT
Start: 2023-08-08 | End: 2023-08-18

## 2023-08-08 RX ORDER — VASOPRESSIN 20 [USP'U]/ML
INJECTION, SOLUTION INTRAMUSCULAR; SUBCUTANEOUS
Status: DISCONTINUED | OUTPATIENT
Start: 2023-08-08 | End: 2023-08-08

## 2023-08-08 RX ORDER — ONDANSETRON 2 MG/ML
INJECTION INTRAMUSCULAR; INTRAVENOUS
Status: DISCONTINUED | OUTPATIENT
Start: 2023-08-08 | End: 2023-08-08

## 2023-08-08 RX ORDER — HEPARIN SODIUM,PORCINE/D5W 25000/250
0-40 INTRAVENOUS SOLUTION INTRAVENOUS CONTINUOUS
Status: DISPENSED | OUTPATIENT
Start: 2023-08-08 | End: 2023-08-11

## 2023-08-08 RX ORDER — EZETIMIBE 10 MG/1
10 TABLET ORAL DAILY
Status: ON HOLD | COMMUNITY
Start: 2023-07-21 | End: 2023-09-01 | Stop reason: HOSPADM

## 2023-08-08 RX ORDER — MAGNESIUM SULFATE HEPTAHYDRATE 40 MG/ML
4 INJECTION, SOLUTION INTRAVENOUS
Status: DISCONTINUED | OUTPATIENT
Start: 2023-08-08 | End: 2023-08-24

## 2023-08-08 RX ORDER — SUCCINYLCHOLINE CHLORIDE 20 MG/ML
INJECTION INTRAMUSCULAR; INTRAVENOUS
Status: DISCONTINUED | OUTPATIENT
Start: 2023-08-08 | End: 2023-08-08

## 2023-08-08 RX ORDER — LABETALOL HYDROCHLORIDE 5 MG/ML
INJECTION, SOLUTION INTRAVENOUS
Status: DISCONTINUED | OUTPATIENT
Start: 2023-08-08 | End: 2023-08-08

## 2023-08-08 RX ORDER — FENTANYL CITRATE 50 UG/ML
INJECTION, SOLUTION INTRAMUSCULAR; INTRAVENOUS
Status: DISCONTINUED | OUTPATIENT
Start: 2023-08-08 | End: 2023-08-08

## 2023-08-08 RX ORDER — MIDAZOLAM HYDROCHLORIDE 1 MG/ML
1 INJECTION INTRAMUSCULAR; INTRAVENOUS ONCE
Status: COMPLETED | OUTPATIENT
Start: 2023-08-08 | End: 2023-08-08

## 2023-08-08 RX ORDER — IODIXANOL 320 MG/ML
100 INJECTION, SOLUTION INTRAVASCULAR
Status: COMPLETED | OUTPATIENT
Start: 2023-08-08 | End: 2023-08-08

## 2023-08-08 RX ORDER — POTASSIUM CHLORIDE 7.45 MG/ML
40 INJECTION INTRAVENOUS
Status: DISCONTINUED | OUTPATIENT
Start: 2023-08-08 | End: 2023-08-24

## 2023-08-08 RX ADMIN — SODIUM CHLORIDE, SODIUM GLUCONATE, SODIUM ACETATE, POTASSIUM CHLORIDE, MAGNESIUM CHLORIDE, SODIUM PHOSPHATE, DIBASIC, AND POTASSIUM PHOSPHATE: .53; .5; .37; .037; .03; .012; .00082 INJECTION, SOLUTION INTRAVENOUS at 01:08

## 2023-08-08 RX ADMIN — IOHEXOL 100 ML: 350 INJECTION, SOLUTION INTRAVENOUS at 05:08

## 2023-08-08 RX ADMIN — EPINEPHRINE 10 MCG: 1 INJECTION, SOLUTION, CONCENTRATE INTRAVENOUS at 01:08

## 2023-08-08 RX ADMIN — ONDANSETRON 4 MG: 2 INJECTION INTRAMUSCULAR; INTRAVENOUS at 01:08

## 2023-08-08 RX ADMIN — EPINEPHRINE 15 MCG: 1 INJECTION, SOLUTION, CONCENTRATE INTRAVENOUS at 01:08

## 2023-08-08 RX ADMIN — EPINEPHRINE 0.02 MCG/KG/MIN: 1 INJECTION INTRAMUSCULAR; INTRAVENOUS; SUBCUTANEOUS at 01:08

## 2023-08-08 RX ADMIN — EPINEPHRINE 5 MCG: 1 INJECTION, SOLUTION, CONCENTRATE INTRAVENOUS at 01:08

## 2023-08-08 RX ADMIN — FUROSEMIDE 80 MG: 10 INJECTION, SOLUTION INTRAVENOUS at 09:08

## 2023-08-08 RX ADMIN — HUMAN ALBUMIN MICROSPHERES AND PERFLUTREN 0.66 MG: 10; .22 INJECTION, SOLUTION INTRAVENOUS at 12:08

## 2023-08-08 RX ADMIN — FENTANYL CITRATE 50 MCG: 50 INJECTION, SOLUTION INTRAMUSCULAR; INTRAVENOUS at 01:08

## 2023-08-08 RX ADMIN — VASOPRESSIN 3 UNITS: 20 INJECTION INTRAVENOUS at 01:08

## 2023-08-08 RX ADMIN — HEPARIN SODIUM AND DEXTROSE 12 UNITS/KG/HR: 10000; 5 INJECTION INTRAVENOUS at 06:08

## 2023-08-08 RX ADMIN — SUGAMMADEX 400 MG: 100 INJECTION, SOLUTION INTRAVENOUS at 02:08

## 2023-08-08 RX ADMIN — ROCURONIUM BROMIDE 50 MG: 10 INJECTION, SOLUTION INTRAVENOUS at 01:08

## 2023-08-08 RX ADMIN — PROPOFOL 50 MG: 10 INJECTION, EMULSION INTRAVENOUS at 01:08

## 2023-08-08 RX ADMIN — MIDAZOLAM 1 MG: 1 INJECTION INTRAMUSCULAR; INTRAVENOUS at 09:08

## 2023-08-08 RX ADMIN — LABETALOL HYDROCHLORIDE 5 MG: 5 INJECTION, SOLUTION INTRAVENOUS at 02:08

## 2023-08-08 RX ADMIN — POTASSIUM CHLORIDE 40 MEQ: 7.46 INJECTION, SOLUTION INTRAVENOUS at 10:08

## 2023-08-08 RX ADMIN — IODIXANOL 55 ML: 320 INJECTION, SOLUTION INTRAVASCULAR at 02:08

## 2023-08-08 RX ADMIN — DEXAMETHASONE SODIUM PHOSPHATE 4 MG: 4 INJECTION, SOLUTION INTRAMUSCULAR; INTRAVENOUS at 01:08

## 2023-08-08 RX ADMIN — SUCCINYLCHOLINE CHLORIDE 160 MG: 20 INJECTION, SOLUTION INTRAMUSCULAR; INTRAVENOUS at 01:08

## 2023-08-08 RX ADMIN — ASPIRIN 300 MG: 300 SUPPOSITORY RECTAL at 10:08

## 2023-08-08 RX ADMIN — VASOPRESSIN 1 UNITS: 20 INJECTION INTRAVENOUS at 01:08

## 2023-08-08 NOTE — PLAN OF CARE
[facility] Neuro Critical Care  Discharge Reassessment    Primary Care Provider: Carleen Bueno MD    Expected Discharge Date: 8/10/2023    Pt had angio today and was transferred post op to NCC unit. Pending new therapy eval for post acute needs.     Reassessment (most recent)       Discharge Reassessment - 08/08/23 1128          Discharge Reassessment    Assessment Type Discharge Planning Reassessment (P)      Did the patient's condition or plan change since previous assessment? Yes (P)      Discharge Plan discussed with: Patient (P)      Communicated YUSEF with patient/caregiver Date not available/Unable to determine (P)      Discharge Plan A Home with family (P)      Discharge Plan B Rehab (P)      DME Needed Upon Discharge  other (see comments) (P)    TBD    Transition of Care Barriers None (P)      Why the patient remains in the hospital Requires continued medical care (P)         Post-Acute Status    Discharge Delays None known at this time (P)                    NAVNEET PintoW  Neurocritical Care   [facility]  [id number]

## 2023-08-08 NOTE — ANESTHESIA PREPROCEDURE EVALUATION
[facility]  Anesthesia Pre-Operative Evaluation   08/08/2023        Tera Griffiths, 1965  [id number]  Procedure(s) (LRB):  ANGIOGRAM-CEREBRAL (N/A)    Subjective    Tera Griffiths is a 58 y.o. male w/ a significant PMHx of systolic and diastolic heart failure (10%, DD, pHTN 53mmHg), CAD (h/o STEMI, s/p PCI to Rcx-08/2021), pAF s/p DCCV, HTN, CKD3b initially admitted for acute on chronic HF exacerbation. Now presenting with MCA stroke.     Patient now presents for above procedure(s) - Class A thrombectomy.     Paper consent completed over the phone with his sister Claribel Griffiths at [phone].    Level of Care: ICU  Hemodynamics: No vasopressor or inotropic support.  Respiratory Status: NC at 5LPM  IV Access: PIV 18G Left AC, PIV 20G R forearm  NPO: Last ate sandwich at 2000 on 8/7/23    Prev Airway: None documented.    LDA:        Peripheral IV - Single Lumen 08/07/23 2336 18 G Right Antecubital (Active)   Number of days: 0       Drips: None documented.      Patient Active Problem List   Diagnosis    CAD (coronary artery disease)    History of ST elevation myocardial infarction (STEMI)    Paroxysmal A-fib    Acute on chronic combined systolic and diastolic heart failure    Dyslipidemia    ICD (implantable cardioverter-defibrillator) in place    HTN (hypertension)    Arteriovenous malformation of cerebral vessels    NSTEMI (non-ST elevated myocardial infarction)    Stage 3 chronic kidney disease    Ischemic cardiomyopathy    Hepatitis B core antibody positive    Arterial ischemic stroke, MCA (middle cerebral artery), left, acute    BPH (benign prostatic hyperplasia)    Multiple subsegmental pulmonary emboli without acute cor pulmonale       Review of patient's allergies indicates:  No Known Allergies    Current Inpatient Medications:    amiodarone  200 mg Oral BID    apixaban  5 mg Oral BID    atorvastatin  80 mg Oral Daily    clopidogreL  75 mg Oral Daily     ezetimibe  10 mg Oral QHS    ferrous sulfate  1 tablet Oral Daily    furosemide (LASIX) injection  80 mg Intravenous Q12H    isosorbide mononitrate  90 mg Oral Daily    metoprolol succinate  75 mg Oral Daily    sacubitriL-valsartan  1 tablet Oral BID       No current facility-administered medications on file prior to encounter.     Current Outpatient Medications on File Prior to Encounter   Medication Sig Dispense Refill    amiodarone (PACERONE) 200 MG Tab Take 1 tablet (200 mg total) by mouth 2 (two) times daily. 60 tablet 3    apixaban (ELIQUIS) 5 mg Tab Take 5 mg by mouth 2 (two) times daily.      atorvastatin (LIPITOR) 80 MG tablet Take 1 tablet (80 mg total) by mouth once daily. 30 tablet 11    clopidogreL (PLAVIX) 75 mg tablet Take 1 tablet (75 mg total) by mouth once daily. 30 tablet 11    empagliflozin (JARDIANCE) 10 mg tablet Take 1 tablet (10 mg total) by mouth once daily. 30 tablet 1    ferrous sulfate (FEOSOL) 325 mg (65 mg iron) Tab tablet Take 325 mg by mouth every other day.      LIDOcaine (LIDODERM) 5 % Place 1 patch onto the skin once daily.      metoprolol succinate (TOPROL-XL) 25 MG 24 hr tablet Take 1 tablet (25 mg total) by mouth every evening. 30 tablet 11    oxyCODONE-acetaminophen (PERCOCET) 5-325 mg per tablet Take 1 tablet by mouth every 6 (six) hours as needed for Pain. 10 each 0    sacubitriL-valsartan (ENTRESTO) 24-26 mg per tablet Take 1 tablet by mouth 2 (two) times daily. 60 tablet 11    torsemide (DEMADEX) 20 MG Tab Take 1 tablet (20 mg total) by mouth once daily. 30 tablet 11       Past Surgical History:   Procedure Laterality Date    CARDIAC DEFIBRILLATOR PLACEMENT Left     HERNIA REPAIR      LEFT HEART CATHETERIZATION Left 4/18/2023    Procedure: Left heart cath;  Surgeon: Atilio Marks MD;  Location: [facility] CATH LAB;  Service: Cardiology;  Laterality: Left;    RIGHT HEART CATHETERIZATION Right 9/30/2022    Procedure: INSERTION, CATHETER, RIGHT HEART;   Surgeon: Caden Aden MD;  Location: [facility] CATH LAB;  Service: Cardiology;  Laterality: Right;    TREATMENT OF CARDIAC ARRHYTHMIA N/A 11/22/2022    Procedure: CARDIOVERSION;  Surgeon: Marvin Sanon MD;  Location: [facility] EP LAB;  Service: Cardiology;  Laterality: N/A;  afib, KIA, DCCV, anes, MB, 3 Prep       Social History:  Tobacco Use: Low Risk  (8/8/2023)    Patient History     Smoking Tobacco Use: Never     Smokeless Tobacco Use: Never     Passive Exposure: Not on file       Alcohol Use: Not At Risk (8/6/2023)    AUDIT-C     Frequency of Alcohol Consumption: Never     Average Number of Drinks: Patient does not drink     Frequency of Binge Drinking: Never       Objective    Vital Signs Range:  BMI Readings from Last 1 Encounters:   08/07/23 25.56 kg/m²       Temp:  [36.9 °C (98.4 °F)]   Pulse:  [54-83]   Resp:  [20]   BP: (116-201)/()   SpO2:  [96 %-98 %]        Significant Labs:        Component Value Date/Time    WBC 6.49 08/07/2023 0408    HGB 13.5 (L) 08/07/2023 0408    HCT 43.1 08/07/2023 0408    HCT 46 06/08/2023 1136     08/07/2023 0408     08/07/2023 0408    K 4.8 08/07/2023 0408     08/07/2023 0408    CO2 21 (L) 08/07/2023 0408    GLU 93 08/07/2023 0408    BUN 17 08/07/2023 0408    CREATININE 1.5 (H) 08/07/2023 0408    MG 2.1 08/07/2023 0408    PHOS 3.9 07/15/2023 0440    CALCIUM 9.0 08/07/2023 0408    ALBUMIN 4.3 08/05/2023 1410    PROT 7.8 08/05/2023 1410    ALKPHOS 71 08/05/2023 1410    BILITOT 0.8 08/05/2023 1410    AST 15 08/05/2023 1410    ALT 16 08/05/2023 1410    INR 1.0 07/13/2023 1602    HGBA1C 5.6 08/05/2023 1738        Please see Results Review for additional labs.     Diagnostic Studies: All relevant studies, reviewed.      EKG:   Results for orders placed or performed during the hospital encounter of 08/05/23   EKG 12-lead    Collection Time: 08/05/23  5:17 PM    Narrative    Test Reason : R07.9,    Vent. Rate : 073 BPM     Atrial Rate : 000 BPM     P-R  Int : 000 ms          QRS Dur : 142 ms      QT Int : 474 ms       P-R-T Axes : 000 004 183 degrees     QTc Int : 522 ms    Atrial fibrillation  Left bundle branch block  Abnormal ECG  When compared with ECG of 05-AUG-2023 14:39,  No significant change was found  Confirmed by PETRA MANUEL MD ([id number]) on 8/6/2023 8:58:41 AM    Referred By: [email or web address]   SELF           Confirmed By:PETRA MANUEL MD       ECHO:  Results for orders placed during the hospital encounter of 07/13/23    Echo    Interpretation Summary  · The left ventricle is severely enlarged with severe eccentric hypertrophy and severely decreased systolic function.  · The estimated ejection fraction is approximately 10% ( or less to at most low teens).  · There is severe left ventricular global hypokinesis.  · There is abnormal septal wall motion.  · Left ventricular diastolic dysfunction.  · Severe left atrial enlargement.  · Moderate right ventricular enlargement with severely reduced right ventricular systolic function.  · Severe right atrial enlargement.  · Moderate mitral regurgitation.  · Mild to moderate pulmonic regurgitation.  · Elevated central venous pressure (15 mmHg).  · The estimated PA systolic pressure is 53 mmHg.  · There is moderate pulmonary hypertension.        Pre-op Assessment    I have reviewed the Patient Summary Reports.     I have reviewed the Nursing Notes. I have reviewed the NPO Status.   I have reviewed the Medications.     Review of Systems  Anesthesia Hx:  History of prior surgery of interest to airway management or planning:  Denies Personal Hx of Anesthesia complications.   Cardiovascular:   Pacemaker Hypertension CAD  Dysrhythmias atrial fibrillation CHF    Endocrine:   Diabetes        Physical Exam  General: Confusion and Somnolent    Airway:  Mallampati: III / II  Mouth Opening: Normal  TM Distance: Normal  Tongue: Normal  Neck ROM: Normal ROM    Dental:  Periodontal disease        Anesthesia Plan  Type of Anesthesia,  risks & benefits discussed:    Anesthesia Type: Gen ETT, MAC  Intra-op Monitoring Plan: Standard ASA Monitors and Art Line  Post Op Pain Control Plan: multimodal analgesia and IV/PO Opioids PRN  Induction:  IV and rapid sequence  Airway Plan: Video, Post-Induction  Informed Consent: Informed consent signed with the Patient representative and all parties understand the risks and agree with anesthesia plan.  All questions answered.   ASA Score: 4 Emergent  Day of Surgery Review of History & Physical: H&P Update referred to the surgeon/provider.    Ready For Surgery From Anesthesia Perspective.     .

## 2023-08-08 NOTE — ASSESSMENT & PLAN NOTE
- s/p [facility] (11/2022)  - A/C with Eliquis  - Rate controlled on admit  - Metoprolol 75mg QD  - Amiodarone 200mg BID

## 2023-08-08 NOTE — ASSESSMENT & PLAN NOTE
- Baseline Cr 1.7  - At/below baseline  - Strict I/O's  - Avoid nephrotoxic agents where appropriate  - Renally-dose meds  - Daily CMP, phos, Mg  - Monitor lytes, replete Mg/phos/K to > 2/3/4   (2) probably inadequate

## 2023-08-08 NOTE — PT/OT/SLP EVAL
Physical Therapy Evaluation and Treatment    Patient Name: Tera Griffiths   MRN: [id number]  Recent Surgery: Procedure(s) (LRB):  ANGIOGRAM-CEREBRAL (N/A) Day of Surgery  Co-evaluation w/ OT 2/2 suspected pt complexity and requirement of assistance from 2 skilled therapists to maximize treatment potential and maintain pt safety   Recommendations:     Discharge Recommendations: rehabilitation facility   Discharge Equipment Recommendations: to be determined by next level of care   Barriers to discharge: Increased level of assist, Inaccessible home, and Decreased caregiver support    Assessment:     Tera Griffiths is a 58 y.o. male admitted with a medical diagnosis of Arterial ischemic stroke, MCA (middle cerebral artery), left, acute. He presents with the following impairments/functional limitations: weakness, impaired endurance, impaired self care skills, impaired functional mobility, gait instability, impaired balance, impaired cognition, decreased coordination, decreased upper extremity function, decreased lower extremity function, decreased safety awareness. Pt w/ AMS, no command following or verbalization, and demonstrating R neglect w/ spontaneous movement noted in LUE and BLE.    Rehab Prognosis: Good; patient would benefit from acute skilled PT services to address these deficits and reach maximum level of function.  Recent Surgery: Procedure(s) (LRB):  ANGIOGRAM-CEREBRAL (N/A) Day of Surgery    Plan:     During this hospitalization, patient to be seen 4 x/week to address the identified rehab impairments via gait training, therapeutic activities, therapeutic exercises, neuromuscular re-education and progress toward the following goals:    Plan of Care Expires: 09/08/23    Subjective     Chief Complaint: NA 2/2 AMS  Patient/Family Comments/Goals: pt's sister wants to see him progress w/ PT/OT/SLP  Pain/Comfort:  Location 1:  (NA 2/2 AMS)  Pain Addressed 1: Reposition, Distraction    Patients cultural,  spiritual, Church conflicts given the current situation: no    Social History:  Living Environment: Patient  lives w/ their cousin  in a second floor apartment, number of outside stairs: 15 .  Prior Level of Function: Prior to admission, patient  was independent w/ ADLs. He is unable to work 2/2 SOB due to cardiac issues, and can walk ~20' before needing a break at baseline .  Equipment Used at Home: none    DME owned (not currently used): none  Assistance Upon Discharge: unknown    Objective:     Communicated with RN prior to session. Patient found HOB elevated with telemetry, blood pressure cuff, pulse ox (continuous), restraints, peripheral IV upon PT entry to room.    General Precautions: Standard, aspiration, aphasia, fall, NPO   Orthopedic Precautions:N/A   Braces: N/A  Respiratory Status: Room air    Exams:  Cognitive Exam: Patient is oriented to unable to assess due to aphasia, follows commands 0% of the time  RLE ROM: WFL  RLE Strength:  spontaneously demonstrates 3/5, unable to formally assess  LLE ROM: WFL  LLE Strength:  spontaneously demonstrates 3/5, unable to formally assess  Sensation: ASHLEY 2/2 AMS    Functional Mobility:  Bed Mobility:  Scooting: total assistance of 2 persons  Supine to Sit: total assistance of 2 persons  Sit to Supine: minimum assistance  Transfers:  NT 2/2 poor sitting balance  Balance:   Static Sitting:  CGA-maxA  at EOB  Dynamic Sitting:  min-maxA  at EOB    Therapeutic Activities and Exercises:  Patient educated on role of acute care PT and PT POC, safety while in hospital including calling nurse for mobility, and call light usage  Patient educated about importance of OOB mobility  Sitting balance at EOB w/ cueing and assistance for upright posture, midline orientation, safety awareness, and use of LUE to assist w/ balance    Patient not clear to transfer with RN/PCT, medi-chair transfer via drawsheet only.    AM-PAC 6 CLICK MOBILITY  Turning over in bed (including adjusting  bedclothes, sheets and blankets)?: 2  Sitting down on and standing up from a chair with arms (e.g., wheelchair, bedside commode, etc.): 1  Moving from lying on back to sitting on the side of the bed?: 1  Moving to and from a bed to a chair (including a wheelchair)?: 1  Need to walk in hospital room?: 1  Climbing 3-5 steps with a railing?: 1  Basic Mobility Total Score: 7     Patient left HOB elevated with all lines intact, call button in reach, RN notified, bed alarm on, and restraints reapplied at end of session.    GOALS:   Multidisciplinary Problems       Physical Therapy Goals          Problem: Physical Therapy    Goal Priority Disciplines Outcome Goal Variances Interventions   Physical Therapy Goal     PT, PT/OT Ongoing, Progressing     Description: Goals to be completed by: 9/8/23    Pt will perform sup<>sit transfers w/ minimum assistance  Pt will have sufficient dynamic balance to sit EOB while performing ADLs/therex w/ stand by assistance for 10 min  Pt will be able to stand up from EOB w/ moderate assistance using LRAD  Pt will ambulate 20 feet w/ maximal assistance using LRAD  Pt will be independent w/ HEP therex on BLE w/ good form and ROM   Pt will follow >50 % of single-step commands throughout treatment session                        History:     Past Medical History:   Diagnosis Date    Acute on chronic combined systolic and diastolic heart failure 9/23/2022    Atrial fibrillation     CAD (coronary artery disease) 9/23/2022    Dyslipidemia 9/23/2022    Encounter for blood transfusion     HTN (hypertension) 9/23/2022    ICD (implantable cardioverter-defibrillator) in place 9/23/2022    Paroxysmal A-fib 9/23/2022    Stage 3 chronic kidney disease 4/19/2023       Past Surgical History:   Procedure Laterality Date    CARDIAC DEFIBRILLATOR PLACEMENT Left     HERNIA REPAIR      LEFT HEART CATHETERIZATION Left 4/18/2023    Procedure: Left heart cath;  Surgeon: Atilio Marks MD;  Location: [facility] CATH LAB;   Service: Cardiology;  Laterality: Left;    RIGHT HEART CATHETERIZATION Right 9/30/2022    Procedure: INSERTION, CATHETER, RIGHT HEART;  Surgeon: Caden Aden MD;  Location: [facility] CATH LAB;  Service: Cardiology;  Laterality: Right;    TREATMENT OF CARDIAC ARRHYTHMIA N/A 11/22/2022    Procedure: CARDIOVERSION;  Surgeon: Marvin Sanon MD;  Location: [facility] EP LAB;  Service: Cardiology;  Laterality: N/A;  afib, KIA, DCCV, anes, MB, 3 Prep       Time Tracking:     PT Received On: 08/08/23  PT Start Time: 1145  PT Stop Time: 1207  PT Total Time (min): 22 min     Billable Minutes: Evaluation 10 and Therapeutic Activity 12 08/08/2023

## 2023-08-08 NOTE — ANESTHESIA PROCEDURE NOTES
Intubation    Date/Time: 8/8/2023 1:31 AM    Performed by: Mariya Mayes CRNA  Authorized by: William Varela MD    Intubation:     Induction:  Rapid sequence induction    Intubated:  Postinduction    Mask Ventilation:  N/a    Attempts:  1    Attempted By:  CRNA    Method of Intubation:  Video laryngoscopy    Blade:  Villalba 3    Laryngeal View Grade: Grade I - full view of cords      Difficult Airway Encountered?: No      Complications:  None    Airway Device:  Oral endotracheal tube    Airway Device Size:  7.5    Style/Cuff Inflation:  Cuffed    Inflation Amount (mL):  7    Tube secured:  23    Secured at:  The lips    Placement Verified By:  Capnometry    Complicating Factors:  None    Findings Post-Intubation:  BS equal bilateral and atraumatic/condition of teeth unchanged

## 2023-08-08 NOTE — CARE UPDATE
Patient returned from IR. Diagnostic angio end time 0208, will remain flat until 0408. R femoral dressing clean, dry, and intact. Continuing post-angio monitoring; VS and assessments per flowsheets; will continue current plan of care.

## 2023-08-08 NOTE — PLAN OF CARE
PT Eval complete, appropriate goals created    Problem: Physical Therapy  Goal: Physical Therapy Goal  Description: Goals to be completed by: 9/8/23    Pt will perform sup<>sit transfers w/ minimum assistance  Pt will have sufficient dynamic balance to sit EOB while performing ADLs/therex w/ stand by assistance for 10 min  Pt will be able to stand up from EOB w/ moderate assistance using LRAD  Pt will ambulate 20 feet w/ maximal assistance using LRAD  Pt will be independent w/ HEP therex on BLE w/ good form and ROM   Pt will follow >50 % of single-step commands throughout treatment session   Outcome: Ongoing, Progressing

## 2023-08-08 NOTE — PROGRESS NOTES
"HPI:  58M w/ PMH CHFrEF (10%, DD, pHTN), CAD (h/o STEMI, s/p PCI to Rcx-08/2021), pAF s/p DCCV, HTN, CKD3b who presented to [facility] [place] [place] with CHF exascerbation yesterday now w/ L MCA syndrome and L M2 LVO. Patient is aphasic and unable to provide history so below is the HPI from his admission:     Pain (chest) started at 8am, located substernal, 8/10, described as a "tight, twisting pain." Denies radiation. No aggravating factors (I.e, pressure, movement, deep breaths). No alleviating factors (I.e., nitroglycerin)- he received nitroglycerin x2 with no improvement. Associated signs and symptoms:  nausea, worsening abdominal distension, shortness of breath. No diaphoresis (that occurred with his previous STEMI). Denies recreational drug use. When asked etiology of heart failure- he reports "I must have been born with something wrong with my heart."      Upon presentation to ED, elevated BP- 140s-160s/100s, RR 23, 97% on RA. Labs notable for sCr 1.7 (looks like new baseline since 04/2023), BNP 1958 (on entresto too), trop I 0.065 (persistently elevated in the past). ECG w/no acute ST or T wave changes- frequent PVCs appreciated. CXR w/cardiomegaly and pulmonary edema. He was given 2x nitroglycerin with no improvement of pain. Received lasix 80mg IV x1 in ED- 2L output so far. Given asa 325mg x1 too. Admitted to hospital medicine for further workup and evaluation.      Of note, he was just discharged from [facility] 07/16- required nitro gtt for persistent chest pain, underwent aggressive diuresis. During this time, his chest pain remained constant despite nitroglycerin gtt- 2/10- pain was relieved with voltaran gel and percocet. Concern that chest pain 2/2 pleurisy and musculoskeletal pain.      Interval: Patient LKN @ 10PM w/ RSW/RFD/Aphasia/Lgaze; TNK not given 2/2 systemic anticoagulation. NIHSS 9 on evaluation       Subjective:  8/8: Patient s/p DSA without intervention (no LVO found) overnight, groins/pulses intact, exam " improving, remains expressive>receptive aphasic       Exam:  E4V2M6, AOx0, improving expressive>receptive aphasia;  CNi on exam, No gaze preference, mild R neglect, PERRL;  FC x4 5/5    Groin site C/D/I       A&P:  58M w/ PMH CHFrEF (10%, DD, pHTN), CAD (h/o STEMI, s/p PCI to Rcx-08/2021), pAF s/p DCCV, HTN, CKD3b who presented to [facility] [place] with CHF exascerbation yesterday now w/ L MCA syndrome and L M2 LVO; now s/p DSA w/ spontaneous resolution of his L M2 LVO on 8/8:    --Admitted to Neuro-ICU; Stroke/NCC primary      -q1h neuro-checks  --All labs and diagnostics reviewed  --No further imaging needed per Neuro-IR s/p angiogram; defer all further imaging to NCC/Stroke  --Groin checks complete; Catheter site c/d/i without evidence of hematoma  --SBP <180; goals per primary team  --No further surgical/interventional management at this time  --Continue maximal medical therapy and stroke work-up per NCC/Stroke teams; stable for Anti-plt/coag medications per primary team  --Neuro-IR will sign-off; please call for any additional questions    Dispo: Per stroke/NCC teams

## 2023-08-08 NOTE — ASSESSMENT & PLAN NOTE
- Initially admitted for CHF exacerbation and c/o CP  - Repeat TTE pending; last EF 10%  - BNP elevated on admission - 1956   - Weigh patient QD   - 1.5L fluid restriction   - Strict I/Os Q1H  - C/w GDMT   - Lasix 80mg BID IV to euvolemia   - Imdur 90mg QD   - Metoprolol 75mg QD   - Entresto 49-51 QD  - Hold home Jardiance  - Cardiology following

## 2023-08-08 NOTE — TRANSFER OF CARE
Anesthesia Transfer of Care Note    Patient: Tera Griffiths    Procedure(s) Performed: Procedure(s) (LRB):  ANGIOGRAM-CEREBRAL (N/A)    Patient location: ICU    Anesthesia Type: general    Transport from OR: Transported from OR on 6-10 L/min O2 by face mask with adequate spontaneous ventilation    Post pain: adequate analgesia    Post assessment: no apparent anesthetic complications and tolerated procedure well    Post vital signs: stable    Level of consciousness: awake and responds to stimulation    Nausea/Vomiting: no nausea/vomiting    Complications: none    Transfer of care protocol was followed      Last vitals:   Visit Vitals  BP (!) 147/96 (BP Location: Right arm, Patient Position: Lying)   Pulse 68   Temp 36.9 °C (98.4 °F) (Oral)   Resp (!) 28   Ht 6' (1.829 m)   Wt 85.5 kg (188 lb 7.9 oz)   SpO2 100%   BMI 25.56 kg/m²

## 2023-08-08 NOTE — PROGRESS NOTES
Pt arrived to IR Dept  for Cerebral angio. Pt oriented to unit and staff. Plan of care reviewed with patient, patient verbalizes understanding. Comfort measures utilized. Pt safely transferred from stretcher to procedural table. Fall risk reviewed with patient, fall risk interventions maintained. Safety strap applied, positioner pillows utilized to minimize pressure points. Blankets applied. Pt prepped and draped utilizing standard sterile technique. Patient placed on continuous monitoring, as required by sedation policy. Timeouts completed utilizing standard universal time-out, per department and facility policy. Pt  is under the direct care of the Anesthesia team. RN to remain at bedside, continuous monitoring maintained. Pt resting comfortably. Denies pain/discomfort. Will continue to monitor. See flow sheets for monitoring, medication administration, and updates.

## 2023-08-08 NOTE — PLAN OF CARE
"  POC reviewed with Tera Griffiths and family at 1400. Pt unable to verbalize understanding. Questions and concerns addressed. No acute events today. Pt progressing toward goals. Will continue to monitor. See below and flowsheets for full assessment and VS info.     - CTH completed x2.   - Echocardiogram completed.   - NGT placement attempted per RN x3 with versed on board as well. Team decided not to place anthony tube today.   - Linens and gown changed.   - CT PE protocol completed.   - Worked with PT/OT.   - Potassium replaced.       Neuro:  [place] Coma Scale  Best Eye Response: 4-->(E4) spontaneous  Best Motor Response: 5-->(M5) localizes pain  Best Verbal Response: 1-->(V1) none  [place] Coma Scale Score: 10  Assessment Qualifiers: patient not sedated/intubated, no eye obstruction present  Pupil PERRLA: yes     24 hr Temp:  [97.9 °F (36.6 °C)-98.7 °F (37.1 °C)]     CV:   Rhythm: paced rhythm, atrial rhythm  BP goals:   SBP < 180  MAP > 65    Resp:           Plan: N/A    GI/:     Diet/Nutrition Received: NPO  Last Bowel Movement: 08/06/23  Voiding Characteristics: urethral catheter (bladder)    Intake/Output Summary (Last 24 hours) at 8/8/2023 1721  Last data filed at 8/8/2023 1701  Gross per 24 hour   Intake 1126.88 ml   Output 2020 ml   Net -893.12 ml     Unmeasured Output  Urine Occurrence: 1  Stool Occurrence: 0  Emesis Occurrence: 0  Pad Count: 2    Labs/Accuchecks:  Recent Labs   Lab 08/08/23  0045   WBC 6.33   RBC 5.45   HGB 14.7   HCT 46.8         Recent Labs   Lab 08/05/23  1410 08/06/23  0637 08/08/23  0045      < > 135*   K 4.1   < > 3.8   CO2 24   < > 20*      < > 102   BUN 13   < > 17   CREATININE 1.7*   < > 1.5*   ALKPHOS 71  --   --    ALT 16  --   --    AST 15  --   --    BILITOT 0.8  --   --     < > = values in this interval not displayed.    No results for input(s): "PROTIME", "INR", "APTT", "HEPANTIXA" in the last 168 hours.   Recent Labs   Lab 08/05/23  1738   TROPONINI " 0.059*       Electrolytes: Electrolytes replaced  Accuchecks: Q6H    Gtts:      LDA/Wounds:  Lines/Drains/Airways       Drain  Duration                  Urethral Catheter 08/08/23 0015 Silicone <1 day              Peripheral Intravenous Line  Duration                  Peripheral IV - Single Lumen 08/07/23 2336 18 G Left Antecubital <1 day         Peripheral IV - Single Lumen 08/08/23 0030 20 G Posterior;Right Forearm <1 day                  Wounds: No  Wound care consulted: No    Is this a stroke patient? Yes, stroke booklet remains at the bedside for ongoing education of patient and family.       Problem: Adult Inpatient Plan of Care  Goal: Plan of Care Review  Outcome: Ongoing, Progressing     Problem: Adult Inpatient Plan of Care  Goal: Optimal Comfort and Wellbeing  Outcome: Ongoing, Progressing     Problem: Diabetes Comorbidity  Goal: Blood Glucose Level Within Targeted Range  Outcome: Ongoing, Progressing     Problem: Cognitive Impairment (Stroke, Ischemic/Transient Ischemic Attack)  Goal: Optimal Cognitive Function  Outcome: Ongoing, Progressing     Problem: Communication Impairment (Stroke, Ischemic/Transient Ischemic Attack)  Goal: Improved Communication Skills  Outcome: Ongoing, Progressing     Problem: Urinary Elimination Impaired (Stroke, Ischemic/Transient Ischemic Attack)  Goal: Effective Urinary Elimination  Outcome: Ongoing, Progressing     Problem: Restraint, Nonbehavioral (Nonviolent)  Goal: Absence of Harm or Injury  Outcome: Ongoing, Progressing     Problem: Fall Injury Risk  Goal: Absence of Fall and Fall-Related Injury  Outcome: Ongoing, Progressing

## 2023-08-08 NOTE — HPI
59 y/o male in hospital since 8-5-23 with VHF exacerbation. Last known normal was 2200. Nurse noticed at 2300 that patient was not moving his right side,not talking or following commands.   In house stroke code called at 2304.   Upon examination patient with left gaze, right hemiplegia, no sensation on right, right hemianopsia, aphasia and not following commands.   Patient taken to CT scan and had CTA head and neck multiphase complete and it reveals L M1/M2 occlsuion. Decision made to go to IR at 0031.

## 2023-08-08 NOTE — SUBJECTIVE & OBJECTIVE
"Interval History: Chest discomfort improved, continuing diuresis    Objective:     Vital Signs (Most Recent):  Temp: 98.4 °F (36.9 °C) (08/07/23 1931)  Pulse: 63 (08/07/23 1931)  Resp: 20 (08/07/23 1931)  BP: 116/71 (08/07/23 1931)  SpO2: 98 % (08/07/23 1931) Vital Signs (24h Range):  Temp:  [97.8 °F (36.6 °C)-99.5 °F (37.5 °C)] 98.4 °F (36.9 °C)  Pulse:  [52-64] 63  Resp:  [18-20] 20  SpO2:  [89 %-98 %] 98 %  BP: ()/(52-81) 116/71     Weight: 85.5 kg (188 lb 7.9 oz)  Body mass index is 25.56 kg/m².    Intake/Output Summary (Last 24 hours) at 8/7/2023 1947  Last data filed at 8/7/2023 1756  Gross per 24 hour   Intake 744 ml   Output 1200 ml   Net -456 ml      Physical Exam  Gen:  appears stated age, appears comfortable  Neuro: AAOx3, motor, sensory, and strength grossly intact BL  HEENT: NTNC, EOMI, PERRL, MMM  CVS: RRR, no m/r/g; S1/S2 auscultated with no S3 or S4; capillary refill < 2 sec  Chest: some TTP of chest wall, but patient reports that this is different than his current chest discomfort, ICD in the left chest wall   Resp: BL rales, no belabored breathing or accessory muscle use appreciated   Abd: BS+ in all 4 quadrants; NT, abdominal distension, soft to palpation; no organomegaly appreciated   Extrem: pulses full, equal, and regular over all 4 extremities; no UE or LE edema BL    Significant Labs: All pertinent labs within the past 24 hours have been reviewed.  CBC:   Recent Labs   Lab 08/06/23 0637 08/07/23  0408   WBC 5.37 6.49   HGB 12.6* 13.5*   HCT 41.0 43.1    287     CMP:   Recent Labs   Lab 08/06/23 0637 08/07/23  0408    137   K 3.7 4.8    104   CO2 24 21*   GLU 97 93   BUN 13 17   CREATININE 1.4 1.5*   CALCIUM 9.0 9.0   ANIONGAP 9 12     Cardiac Markers:   No results for input(s): "CKMB", "MYOGLOBIN", "BNP", "TROPISTAT" in the last 48 hours.    Troponin:   No results for input(s): "TROPONINI", "TROPONINIHS" in the last 48 hours.      Significant Imaging: I have " reviewed all pertinent imaging results/findings within the past 24 hours.

## 2023-08-08 NOTE — SUBJECTIVE & OBJECTIVE
Past Medical History:   Diagnosis Date    Acute on chronic combined systolic and diastolic heart failure 9/23/2022    Atrial fibrillation     CAD (coronary artery disease) 9/23/2022    Dyslipidemia 9/23/2022    Encounter for blood transfusion     HTN (hypertension) 9/23/2022    ICD (implantable cardioverter-defibrillator) in place 9/23/2022    Paroxysmal A-fib 9/23/2022    Stage 3 chronic kidney disease 4/19/2023     Past Surgical History:   Procedure Laterality Date    CARDIAC DEFIBRILLATOR PLACEMENT Left     HERNIA REPAIR      LEFT HEART CATHETERIZATION Left 4/18/2023    Procedure: Left heart cath;  Surgeon: Atilio Marks MD;  Location: [facility] CATH LAB;  Service: Cardiology;  Laterality: Left;    RIGHT HEART CATHETERIZATION Right 9/30/2022    Procedure: INSERTION, CATHETER, RIGHT HEART;  Surgeon: Caden Aden MD;  Location: [facility] CATH LAB;  Service: Cardiology;  Laterality: Right;    TREATMENT OF CARDIAC ARRHYTHMIA N/A 11/22/2022    Procedure: CARDIOVERSION;  Surgeon: Marvin Sanon MD;  Location: [facility] EP LAB;  Service: Cardiology;  Laterality: N/A;  afib, KIA, DCCV, anes, MB, 3 Prep     History reviewed. No pertinent family history.  Social History     Tobacco Use    Smoking status: Never    Smokeless tobacco: Never   Substance Use Topics    Alcohol use: Never    Drug use: Never     Review of patient's allergies indicates:  No Known Allergies    Medications: I have reviewed the current medication administration record.    Medications Prior to Admission   Medication Sig Dispense Refill Last Dose    amiodarone (PACERONE) 200 MG Tab Take 1 tablet (200 mg total) by mouth 2 (two) times daily. 60 tablet 3 8/5/2023    apixaban (ELIQUIS) 5 mg Tab Take 5 mg by mouth 2 (two) times daily.       atorvastatin (LIPITOR) 80 MG tablet Take 1 tablet (80 mg total) by mouth once daily. 30 tablet 11     clopidogreL (PLAVIX) 75 mg tablet Take 1 tablet (75 mg total) by mouth once daily. 30 tablet 11     empagliflozin  (JARDIANCE) 10 mg tablet Take 1 tablet (10 mg total) by mouth once daily. 30 tablet 1     ferrous sulfate (FEOSOL) 325 mg (65 mg iron) Tab tablet Take 325 mg by mouth every other day.       LIDOcaine (LIDODERM) 5 % Place 1 patch onto the skin once daily.       metoprolol succinate (TOPROL-XL) 25 MG 24 hr tablet Take 1 tablet (25 mg total) by mouth every evening. 30 tablet 11     oxyCODONE-acetaminophen (PERCOCET) 5-325 mg per tablet Take 1 tablet by mouth every 6 (six) hours as needed for Pain. 10 each 0     sacubitriL-valsartan (ENTRESTO) 24-26 mg per tablet Take 1 tablet by mouth 2 (two) times daily. 60 tablet 11     torsemide (DEMADEX) 20 MG Tab Take 1 tablet (20 mg total) by mouth once daily. 30 tablet 11        Review of Systems   Unable to perform ROS: Patient nonverbal     Objective:     Vital Signs (Most Recent):  Temp: 98.4 °F (36.9 °C) (08/07/23 1931)  Pulse: 83 (08/07/23 2306)  Resp: 20 (08/07/23 2306)  BP: (!) 201/102 (08/07/23 2306)  SpO2: 96 % (08/07/23 2306)    Vital Signs Range (Last 24H):  Temp:  [97.8 °F (36.6 °C)-98.4 °F (36.9 °C)]   Pulse:  [52-83]   Resp:  [18-20]   BP: (105-201)/()   SpO2:  [95 %-98 %]        Physical Exam  Vitals and nursing note reviewed.   Constitutional:       Appearance: He is ill-appearing.   HENT:      Head: Normocephalic and atraumatic.   Eyes:      Extraocular Movements: Extraocular movements intact.      Conjunctiva/sclera: Conjunctivae normal.      Pupils: Pupils are equal, round, and reactive to light.   Cardiovascular:      Rate and Rhythm: Normal rate and regular rhythm.   Pulmonary:      Effort: Pulmonary effort is normal.      Breath sounds: Normal breath sounds.   Abdominal:      General: Abdomen is flat. Bowel sounds are normal.      Palpations: Abdomen is soft.   Musculoskeletal:         General: Normal range of motion.      Cervical back: Normal range of motion.   Skin:     General: Skin is warm and dry.   Neurological:      Mental Status: He is  "disoriented.      Sensory: Sensory deficit present.      Motor: Weakness present.              Neurological Exam:   LOC: non verbal  Attention Span: non verbal  Language: Mute  Articulation: Mute/Anarthric  Orientation: Untestable due to severe aphasia   Visual Fields: Hemianopsia right  EOM (CN III, IV, VI): Gaze preference  left  Pupils (CN II, III): PERRL  Facial Sensation (CN V): Normal  Facial Movement (CN VII): Lower facial weakness on the Right  Gag Reflex: present  Reflexes: flexor plantar responses bilaterally  Motor: Arm left  Normal 5/5  Leg left  Normal 5/5  Arm right  Plegia 0/5  Leg right Plegia 0/5  Cerebellum: No evidence of appendicular or axial ataxia  Sensation: Jossue-anesthesia right  Tone: Flaccid  RUE and RLE       Laboratory:  CMP:   Recent Labs   Lab 08/08/23  0045   CALCIUM 9.2   *   K 3.8   CO2 20*      BUN 17   CREATININE 1.5*     CBC:   Recent Labs   Lab 08/08/23 0045   WBC 6.33   RBC 5.45   HGB 14.7   HCT 46.8      MCV 86   MCH 27.0   MCHC 31.4*     Lipid Panel:   Recent Labs   Lab 08/08/23  0045   CHOL 166   LDLCALC 101.4   HDL 46   TRIG 93     Coagulation: No results for input(s): "PT", "INR", "APTT" in the last 168 hours.  Hgb A1C:   Recent Labs   Lab 08/05/23  1738   HGBA1C 5.6     TSH:   Recent Labs   Lab 08/08/23  0045   TSH 1.134       Diagnostic Results:      Brain imaging:      Vessel Imaging:  CTA head and neck multiphase 8-8-23 results:      Cardiac Evaluation:   2D Echo 7-13-23 results:  The left ventricle is severely enlarged with severe eccentric hypertrophy and severely decreased systolic function.  The estimated ejection fraction is approximately 10% ( or less to at most low teens).  There is severe left ventricular global hypokinesis.  There is abnormal septal wall motion.  Left ventricular diastolic dysfunction.  Severe left atrial enlargement.  Moderate right ventricular enlargement with severely reduced right ventricular systolic function.  Severe " right atrial enlargement.  Moderate mitral regurgitation.  Mild to moderate pulmonic regurgitation.  Elevated central venous pressure (15 mmHg).  The estimated PA systolic pressure is 53 mmHg.  There is moderate pulmonary hypertension.

## 2023-08-08 NOTE — SUBJECTIVE & OBJECTIVE
Past Medical History:   Diagnosis Date    Acute on chronic combined systolic and diastolic heart failure 9/23/2022    Atrial fibrillation     CAD (coronary artery disease) 9/23/2022    Dyslipidemia 9/23/2022    Encounter for blood transfusion     HTN (hypertension) 9/23/2022    ICD (implantable cardioverter-defibrillator) in place 9/23/2022    Paroxysmal A-fib 9/23/2022    Stage 3 chronic kidney disease 4/19/2023     Past Surgical History:   Procedure Laterality Date    CARDIAC DEFIBRILLATOR PLACEMENT Left     HERNIA REPAIR      LEFT HEART CATHETERIZATION Left 4/18/2023    Procedure: Left heart cath;  Surgeon: Atilio Marks MD;  Location: [facility] CATH LAB;  Service: Cardiology;  Laterality: Left;    RIGHT HEART CATHETERIZATION Right 9/30/2022    Procedure: INSERTION, CATHETER, RIGHT HEART;  Surgeon: Caden Aden MD;  Location: [facility] CATH LAB;  Service: Cardiology;  Laterality: Right;    TREATMENT OF CARDIAC ARRHYTHMIA N/A 11/22/2022    Procedure: CARDIOVERSION;  Surgeon: Marvin Sanon MD;  Location: [facility] EP LAB;  Service: Cardiology;  Laterality: N/A;  afib, KIA, DCCV, anes, MB, 3 Prep      No current facility-administered medications on file prior to encounter.     Current Outpatient Medications on File Prior to Encounter   Medication Sig Dispense Refill    amiodarone (PACERONE) 200 MG Tab Take 1 tablet (200 mg total) by mouth 2 (two) times daily. 60 tablet 3    apixaban (ELIQUIS) 5 mg Tab Take 5 mg by mouth 2 (two) times daily.      atorvastatin (LIPITOR) 80 MG tablet Take 1 tablet (80 mg total) by mouth once daily. 30 tablet 11    clopidogreL (PLAVIX) 75 mg tablet Take 1 tablet (75 mg total) by mouth once daily. 30 tablet 11    empagliflozin (JARDIANCE) 10 mg tablet Take 1 tablet (10 mg total) by mouth once daily. 30 tablet 1    ferrous sulfate (FEOSOL) 325 mg (65 mg iron) Tab tablet Take 325 mg by mouth every other day.      LIDOcaine (LIDODERM) 5 % Place 1 patch onto the skin once daily.      metoprolol  succinate (TOPROL-XL) 25 MG 24 hr tablet Take 1 tablet (25 mg total) by mouth every evening. 30 tablet 11    oxyCODONE-acetaminophen (PERCOCET) 5-325 mg per tablet Take 1 tablet by mouth every 6 (six) hours as needed for Pain. 10 each 0    sacubitriL-valsartan (ENTRESTO) 24-26 mg per tablet Take 1 tablet by mouth 2 (two) times daily. 60 tablet 11    torsemide (DEMADEX) 20 MG Tab Take 1 tablet (20 mg total) by mouth once daily. 30 tablet 11      Allergies: Patient has no known allergies.    History reviewed. No pertinent family history.  Social History     Tobacco Use    Smoking status: Never    Smokeless tobacco: Never   Substance Use Topics    Alcohol use: Never    Drug use: Never     Review of Systems   Unable to perform ROS: Patient nonverbal     Objective:     Vitals:    Temp: 98.4 °F (36.9 °C)  Pulse: 83  Rhythm: paced rhythm  BP: (!) 201/102  MAP (mmHg): 143  Resp: 20  SpO2: 96 %    Temp  Min: 97.8 °F (36.6 °C)  Max: 98.4 °F (36.9 °C)  Pulse  Min: 52  Max: 83  BP  Min: 105/55  Max: 201/102  MAP (mmHg)  Min: 82  Max: 143  Resp  Min: 18  Max: 20  SpO2  Min: 95 %  Max: 98 %    08/07 0701 - 08/08 0700  In: 1244 [P.O.:744; I.V.:500]  Out: 1700 [Urine:1700]            Physical Exam  Vitals and nursing note reviewed.   Constitutional:       General: He is in acute distress (confused, fearful facial expression).      Appearance: He is normal weight.      Interventions: He is not intubated.Nasal cannula in place.   HENT:      Head: Normocephalic and atraumatic.      Right Ear: External ear normal.      Left Ear: External ear normal.      Nose: Nose normal.      Right Nostril: No epistaxis.      Left Nostril: No epistaxis.      Mouth/Throat:      Lips: Pink. No lesions.      Mouth: Mucous membranes are moist. No lacerations or angioedema.   Eyes:      General: Lids are normal. No scleral icterus.  Neck:      Vascular: Normal carotid pulses.   Cardiovascular:      Rate and Rhythm: Normal rate. Rhythm irregular.       "Pulses:           Radial pulses are 2+ on the right side and 2+ on the left side.        Dorsalis pedis pulses are 1+ on the right side and 1+ on the left side.   Pulmonary:      Effort: No tachypnea, bradypnea or respiratory distress. He is not intubated.   Abdominal:      General: Abdomen is flat.      Palpations: Abdomen is soft.      Tenderness: There is no abdominal tenderness.   Musculoskeletal:      Cervical back: Neck supple. No erythema.      Right lower leg: No edema.      Left lower leg: No edema.   Skin:     General: Skin is warm and dry.      Coloration: Skin is not ashen.   Neurological:      Comments:   Awake, alert, inattentive, and globally aphasic  Not following commands for "thumbs up" and "two fingers"  GCS: 10 (E4V1M5)  CN: Left gaze preference, PERRL (2mm and brisk)  No response to visual threat on R eye  Lower R facial paralysis  Sensory: unable to follow commands or mimic  Decreased/delayed sensation to noxious stimuli on RLE, w/d after several seconds  LUE and LLE localize to pain  Motor: HICKMAN spontaneously (L>R), no tremor   Psychiatric:         Attention and Perception: He is inattentive.         Speech: He is noncommunicative.         Behavior: Behavior is uncooperative.       Unable to test orientation, language, memory, judgment, insight, fund of knowledge, hearing, shoulder shrug, tongue protrusion, coordination, gait due to level of consciousness.       Today I personally reviewed pertinent medications, lines/drains/airways, imaging, cardiology results, laboratory results, microbiology results, notably: CTH, CTA Head      NIH prior to IR intervention     08/08/23 0120   NIH Stroke Scale   1a. Level of Consciousness 0-->Alert, keenly responsive   1b. LOC Questions 2-->Answers neither question correctly   1c. LOC Commands 2-->Performs neither task correctly   2. Best Gaze 1-->Partial gaze palsy, gaze is abnormal in one or both eyes, but forced deviation or total gaze paresis is not " present   3. Visual 2-->Complete hemianopia   4. Facial Palsy 1-->Minor paralysis (flattened nasolabial fold, asymmetry on smiling)   5a. Motor Arm, Left 0-->No drift, limb holds 90 (or 45) degrees for full 10 secs   5b. Motor Arm, Right 2-->Some effort against gravity, limb cannot get to or maintain (if cued) 90 (or 45) degrees, drifts down to bed, but has some effort against gravity   6a. Motor Leg, Left 0-->No drift, leg holds 30 degree position for full 5 secs   6b. Motor Leg, Right 2-->Some effort against gravity, leg falls to bed by 5 secs, but has some effort against gravity   7. Limb Ataxia 0-->Absent   8. Sensory 1-->Mild-to-moderate sensory loss, patient feels pinprick is less sharp or is dull on the affected side, or there is a loss of superficial pain with pinprick, but patient is aware of being touched   9. Best Language 3-->Mute, global aphasia, no usable speech or auditory comprehension   10. Dysarthria 2-->Severe dysarthria, patients speech is so slurred as to be unintelligible in the absence of or out of proportion to any dysphasia, or is mute/anarthric   11. Extinction and Inattention (formerly Neglect) 0-->No abnormality   Total (NIH Stroke Scale) 18

## 2023-08-08 NOTE — H&P
[facility] - Neuro Critical Care  Neurocritical Care  History & Physical    Admit Date: 8/5/2023  Service Date: 08/08/2023  Length of Stay: 1    Subjective:     Chief Complaint: Arterial ischemic stroke, MCA (middle cerebral artery), left, acute    History of Present Illness: Tera Griffiths is a 58-year-old male with PMHx of CHFrEF (10%, DD, pHTN), CAD (h/o STEMI, s/p PCI to Rcx-08/2021), AICD placement (12/2021), persistently elevated troponin, pAF (s/p DCCV), HTN, CKD3b (baseline Cr 1.7) who was admitted to [facility] [place] 8/05 for CHF exacerbation. At 23:06 on 8/07, a Code Stroke was called as patient was found lying half out of bed with RFD and not following commands. CTH without acute abnormality. CTA showed L MCA occlusion. No TNK was administered as patient was already taking Eliquis for pAF. Transferred to NCC Unit for closer neurological monitoring. Patient was then taken Class A to IR where no R ICA or MCA stenosis or occlusion was found.        Past Medical History:   Diagnosis Date    Acute on chronic combined systolic and diastolic heart failure 9/23/2022    Atrial fibrillation     CAD (coronary artery disease) 9/23/2022    Dyslipidemia 9/23/2022    Encounter for blood transfusion     HTN (hypertension) 9/23/2022    ICD (implantable cardioverter-defibrillator) in place 9/23/2022    Paroxysmal A-fib 9/23/2022    Stage 3 chronic kidney disease 4/19/2023     Past Surgical History:   Procedure Laterality Date    CARDIAC DEFIBRILLATOR PLACEMENT Left     HERNIA REPAIR      LEFT HEART CATHETERIZATION Left 4/18/2023    Procedure: Left heart cath;  Surgeon: Atilio Marks MD;  Location: [facility] CATH LAB;  Service: Cardiology;  Laterality: Left;    RIGHT HEART CATHETERIZATION Right 9/30/2022    Procedure: INSERTION, CATHETER, RIGHT HEART;  Surgeon: Caden Aden MD;  Location: [facility] CATH LAB;  Service: Cardiology;  Laterality: Right;    TREATMENT OF CARDIAC ARRHYTHMIA N/A 11/22/2022    Procedure: CARDIOVERSION;  Surgeon:  Marvin Sanon MD;  Location: [facility] EP LAB;  Service: Cardiology;  Laterality: N/A;  afib, KIA, DCCV, anes, MB, 3 Prep      No current facility-administered medications on file prior to encounter.     Current Outpatient Medications on File Prior to Encounter   Medication Sig Dispense Refill    amiodarone (PACERONE) 200 MG Tab Take 1 tablet (200 mg total) by mouth 2 (two) times daily. 60 tablet 3    apixaban (ELIQUIS) 5 mg Tab Take 5 mg by mouth 2 (two) times daily.      atorvastatin (LIPITOR) 80 MG tablet Take 1 tablet (80 mg total) by mouth once daily. 30 tablet 11    clopidogreL (PLAVIX) 75 mg tablet Take 1 tablet (75 mg total) by mouth once daily. 30 tablet 11    empagliflozin (JARDIANCE) 10 mg tablet Take 1 tablet (10 mg total) by mouth once daily. 30 tablet 1    ferrous sulfate (FEOSOL) 325 mg (65 mg iron) Tab tablet Take 325 mg by mouth every other day.      LIDOcaine (LIDODERM) 5 % Place 1 patch onto the skin once daily.      metoprolol succinate (TOPROL-XL) 25 MG 24 hr tablet Take 1 tablet (25 mg total) by mouth every evening. 30 tablet 11    oxyCODONE-acetaminophen (PERCOCET) 5-325 mg per tablet Take 1 tablet by mouth every 6 (six) hours as needed for Pain. 10 each 0    sacubitriL-valsartan (ENTRESTO) 24-26 mg per tablet Take 1 tablet by mouth 2 (two) times daily. 60 tablet 11    torsemide (DEMADEX) 20 MG Tab Take 1 tablet (20 mg total) by mouth once daily. 30 tablet 11      Allergies: Patient has no known allergies.    History reviewed. No pertinent family history.  Social History     Tobacco Use    Smoking status: Never    Smokeless tobacco: Never   Substance Use Topics    Alcohol use: Never    Drug use: Never     Review of Systems   Unable to perform ROS: Patient nonverbal     Objective:     Vitals:    Temp: 98.4 °F (36.9 °C)  Pulse: 83  Rhythm: paced rhythm  BP: (!) 201/102  MAP (mmHg): 143  Resp: 20  SpO2: 96 %    Temp  Min: 97.8 °F (36.6 °C)  Max: 98.4 °F (36.9 °C)  Pulse  Min: 52  Max: 83  BP   "Min: 105/55  Max: 201/102  MAP (mmHg)  Min: 82  Max: 143  Resp  Min: 18  Max: 20  SpO2  Min: 95 %  Max: 98 %    08/07 0701 - 08/08 0700  In: 1244 [P.O.:744; I.V.:500]  Out: 1700 [Urine:1700]            Physical Exam  Vitals and nursing note reviewed.   Constitutional:       General: He is in acute distress (confused, fearful facial expression).      Appearance: He is normal weight.      Interventions: He is not intubated.Nasal cannula in place.   HENT:      Head: Normocephalic and atraumatic.      Right Ear: External ear normal.      Left Ear: External ear normal.      Nose: Nose normal.      Right Nostril: No epistaxis.      Left Nostril: No epistaxis.      Mouth/Throat:      Lips: Pink. No lesions.      Mouth: Mucous membranes are moist. No lacerations or angioedema.   Eyes:      General: Lids are normal. No scleral icterus.  Neck:      Vascular: Normal carotid pulses.   Cardiovascular:      Rate and Rhythm: Normal rate. Rhythm irregular.      Pulses:           Radial pulses are 2+ on the right side and 2+ on the left side.        Dorsalis pedis pulses are 1+ on the right side and 1+ on the left side.   Pulmonary:      Effort: No tachypnea, bradypnea or respiratory distress. He is not intubated.   Abdominal:      General: Abdomen is flat.      Palpations: Abdomen is soft.      Tenderness: There is no abdominal tenderness.   Musculoskeletal:      Cervical back: Neck supple. No erythema.      Right lower leg: No edema.      Left lower leg: No edema.   Skin:     General: Skin is warm and dry.      Coloration: Skin is not ashen.   Neurological:      Comments:   Awake, alert, inattentive, and globally aphasic  Not following commands for "thumbs up" and "two fingers"  GCS: 10 (E4V1M5)  CN: Left gaze preference, PERRL (2mm and brisk)  No response to visual threat on R eye  Lower R facial paralysis  Sensory: unable to follow commands or mimic  Decreased/delayed sensation to noxious stimuli on RLE, w/d after several " seconds  LUE and LLE localize to pain  Motor: HICKMAN spontaneously (L>R), no tremor   Psychiatric:         Attention and Perception: He is inattentive.         Speech: He is noncommunicative.         Behavior: Behavior is uncooperative.       Unable to test orientation, language, memory, judgment, insight, fund of knowledge, hearing, shoulder shrug, tongue protrusion, coordination, gait due to level of consciousness.       Today I personally reviewed pertinent medications, lines/drains/airways, imaging, cardiology results, laboratory results, microbiology results, notably: CTH, CTA Head      NIH prior to IR intervention     08/08/23 0120   NIH Stroke Scale   1a. Level of Consciousness 0-->Alert, keenly responsive   1b. LOC Questions 2-->Answers neither question correctly   1c. LOC Commands 2-->Performs neither task correctly   2. Best Gaze 1-->Partial gaze palsy, gaze is abnormal in one or both eyes, but forced deviation or total gaze paresis is not present   3. Visual 2-->Complete hemianopia   4. Facial Palsy 1-->Minor paralysis (flattened nasolabial fold, asymmetry on smiling)   5a. Motor Arm, Left 0-->No drift, limb holds 90 (or 45) degrees for full 10 secs   5b. Motor Arm, Right 2-->Some effort against gravity, limb cannot get to or maintain (if cued) 90 (or 45) degrees, drifts down to bed, but has some effort against gravity   6a. Motor Leg, Left 0-->No drift, leg holds 30 degree position for full 5 secs   6b. Motor Leg, Right 2-->Some effort against gravity, leg falls to bed by 5 secs, but has some effort against gravity   7. Limb Ataxia 0-->Absent   8. Sensory 1-->Mild-to-moderate sensory loss, patient feels pinprick is less sharp or is dull on the affected side, or there is a loss of superficial pain with pinprick, but patient is aware of being touched   9. Best Language 3-->Mute, global aphasia, no usable speech or auditory comprehension   10. Dysarthria 2-->Severe dysarthria, patients speech is so slurred as  to be unintelligible in the absence of or out of proportion to any dysphasia, or is mute/anarthric   11. Extinction and Inattention (formerly Neglect) 0-->No abnormality   Total (NIH Stroke Scale) 18     Assessment/Plan:     Neuro  * Arterial ischemic stroke, MCA (middle cerebral artery), left, acute  Tera Griffiths is a 58-year-old male with PMHx of CHFrEF (10%, DD, pHTN), CAD (h/o STEMI, s/p PCI to Rcx-08/2021), AICD placement (12/2021), persistently elevated troponin, pAF (s/p DCCV), HTN, CKD3b (baseline Cr 1.7) who was admitted to [facility] [place] 8/05 for CHF exacerbation. At 23:06 on 8/07, a Code Stroke was called as patient was found lying half out of bed with RFD and not following commands. CTH without acute abnormality. CTA showed L MCA occlusion. No TNK was administered as patient was already taking Eliquis for pAF. Transferred to NCC Unit for closer neurological monitoring. Patient was then taken Class A to IR where no R ICA or MCA stenosis or occlusion was found.      - Anticoagulation status: Eliquis, DAPT  - Admit to NCC unit post-angio  - VN consulted, following  - Will need MRI once AICD confirmed compatible  - Q1H Neuro checks, VS, I/Os    - SBP goal <180   - C/w home BP meds   - PRN labetalol, hydralazine  - Na goal >135  - Maintain euvolemia   - Cautious IVF administration in light of cardiac history   - On fluid restriction 1.5L    - Atorvastatin 80mg QD  - Continue Eliquis and DAPT  - Seizure, fall, and aspiration precautions  - Place SCDs    - TTE: pending  - aPTT, PT/INR  - Hgb A1c, TSH, lipid panel  - Daily CBC, CMP, Mg, Phos   - Replete Mg/Phos/K to 2/3/4, respectively  - PT/OT consulted for evaluation  - Perform Soto assessment once HOB flat restriction lifted  - SLP consulted, f/u recs  - Additional consults: SW/CM, PMR, Nutrition    Global aphasia  See Arterial ischemic stroke, MCA (middle cerebral artery), left, acute    Hemiparesis, right  See Arterial ischemic stroke, MCA (middle cerebral  artery), left, acute    Cardiac/Vascular  Ischemic cardiomyopathy  - See Acute on chronic combined systolic and diastolic heart failure; NSTEMI    NSTEMI (non-ST elevated myocardial infarction)  - On DAPT: ASA, Plavix  - EKG with AF  - Troponin elevated at baseline  - See Acute on chronic combined systolic and diastolic heart failure    HTN (hypertension)  - Continue home medications  - See Acute on chronic combined systolic and diastolic heart failure      ICD (implantable cardioverter-defibrillator) in place  - [employer]  - Confirm MRI compatibility  - PMHx cardiac arrest 2/2 V-Tach    Dyslipidemia  - Atorvastatin 80mg QD    Acute on chronic combined systolic and diastolic heart failure  - Initially admitted for CHF exacerbation and c/o CP  - Repeat TTE pending; last EF 10%  - BNP elevated on admission - 1956   - Weigh patient QD   - 1.5L fluid restriction   - Strict I/Os Q1H  - C/w GDMT   - Lasix 80mg BID IV to euvolemia   - Imdur 90mg QD   - Metoprolol 75mg QD   - Entresto 49-51 QD  - Hold home Jardiance  - Cardiology following    Paroxysmal A-fib  - s/p DCCV (11/2022)  - A/C with Eliquis  - Rate controlled on admit  - Metoprolol 75mg QD  - Amiodarone 200mg BID      CAD (coronary artery disease)  See Acute on chronic combined systolic and diastolic heart failure; NSTEMI    Renal/  Stage 3 chronic kidney disease  - Baseline Cr 1.7  - At/below baseline  - Strict I/O's  - Avoid nephrotoxic agents where appropriate  - Renally-dose meds  - Daily CMP, phos, Mg  - Monitor lytes, replete Mg/phos/K to > 2/3/4    Hematology  Multiple subsegmental pulmonary emboli without acute cor pulmonale  - PMHx of   - Noted on CTA Chest 9/2021, 12/2021  - No clear evidence of central PE on CTA Pulmonary 7/2023    - Called by Radiology regarding CTA Head and Neck results   - Filling defect in pulmonary artery concerning for PE   - Repeat CTA Pulmonary   - Continue anticoagulation    - Consider increasing Eliquis dose from PE PPx to  therapeutic    - CTA also w/ incomplete opacification of LA appendage, suspicious for intraluminal thrombus   - STAT ECHO ordered to evaluate for shunt    The patient is being Prophylaxed for:  Venous Thromboembolism with: Mechanical or Chemical  Stress Ulcer with: None  Ventilator Pneumonia with: not applicable    Activity Orders            Progressive Mobility Protocol (mobilize patient to their highest level of functioning at least twice daily) starting at 08/08 0800    Elevate HOB Collagen closure or PERCLOSE plug/device - Elevate HOB 30 degrees with limb immobilized for 2 hours after procedure. starting at 08/08 0215    Turn patient starting at 08/08 0200    Elevate HOB starting at 08/08 0044    Diet NPO: NPO starting at 08/08 0044    Ambulate With Assistance starting at 08/05 1800          Full Code   Due to a high probability of clinically significant, life-threatening deterioration, the patient required my highest level of preparedness to intervene emergently. I personally provided 50 minutes of critical care time (CCT) in directly managing the patient. CCT included obtaining a thorough history, physically examining the patient, continuous monitoring of vitals and pulse oximetry, ordering and review of lab and imaging studies, coordinating urgent treatment and development of a management plan, evaluation of patient's response to treatment, frequent reassessment for potential decompensation, and consultation with other providers. CCT was performed to assess and manage the high probability of imminent, life-threatening deterioration that could result in multi-organ failure. CTT provided was exclusive of separately billable procedures and treatment of other patients. Please see details in H&P above for further information on my assessment and treatment of the patient.    Niru Mao PA-C  Neurocritical Care  [facility] - Neuro Critical Care

## 2023-08-08 NOTE — PROGRESS NOTES
"[facility] - [facility]iology [facility] Medicine  Progress Note    Patient Name: Tera Griffiths  MRN: [id number]  Patient Class: IP- Inpatient   Admission Date: 8/5/2023  Length of Stay: 0 days  Attending Physician: Jah Saul MD  Primary Care Provider: Carleen Bueno MD        Subjective:     Principal Problem:Acute on chronic combined systolic and diastolic heart failure        HPI:  Mr. Haley is a 58yoM w/Pmhx of of CHFrEF (10%, DD, pHTN), CAD (h/o STEMI, s/p PCI to Rcx-08/2021), pAF s/p DCCV, HTN, CKD3b who presented to [facility] with sudden onset chest pain. Pain started at 8am, located substernal, 8/10, described as a "tight, twisting pain." Denies radiation. No aggravating factors (I.e, pressure, movement, deep breaths). No alleviating factors (I.e., nitroglycerin)- he received nitroglycerin x2 with no improvement. Associated signs and symptoms:  nausea, worsening abdominal distension, shortness of breath. No diaphoresis (that occurred with his previous STEMI). Denies recreational drug use. When asked etiology of heart failure- he reports "I must have been born with something wrong with my heart."     Upon presentation to ED, elevated BP- 140s-160s/100s, RR 23, 97% on RA. Labs notable for sCr 1.7 (looks like new baseline since 04/2023), BNP 1956 (on entresto too), trop I 0.065 (persistently elevated in the past). ECG w/no acute ST or T wave changes- frequent PVCs appreciated. CXR w/cardiomegaly and pulmonary edema. He was given 2x nitroglycerin with no improvement of pain. Received lasix 80mg IV x1 in ED- 2L output so far. Given asa 325mg x1 too. Admitted to hospital medicine for further workup and evaluation.     Of note, he was just discharged from [facility] 07/16- required nitro gtt for persistent chest pain, underwent aggressive diuresis. During this time, his chest pain remained constant despite nitroglycerin gtt- 2/10- pain was relieved with voltaran gel and percocet. Concern that chest pain 2/2 pleurisy " and musculoskeletal pain.       Overview/Hospital Course:  Cardiology consulted due to persistent chest pain. Started imdur-further up-titrated, increased metoprolol. Entresto further up-titrated as tolerated. Continued diuresis      Interval History: Chest discomfort improved, continuing diuresis    Objective:     Vital Signs (Most Recent):  Temp: 98.4 °F (36.9 °C) (08/07/23 1931)  Pulse: 63 (08/07/23 1931)  Resp: 20 (08/07/23 1931)  BP: 116/71 (08/07/23 1931)  SpO2: 98 % (08/07/23 1931) Vital Signs (24h Range):  Temp:  [97.8 °F (36.6 °C)-99.5 °F (37.5 °C)] 98.4 °F (36.9 °C)  Pulse:  [52-64] 63  Resp:  [18-20] 20  SpO2:  [89 %-98 %] 98 %  BP: ()/(52-81) 116/71     Weight: 85.5 kg (188 lb 7.9 oz)  Body mass index is 25.56 kg/m².    Intake/Output Summary (Last 24 hours) at 8/7/2023 1947  Last data filed at 8/7/2023 1756  Gross per 24 hour   Intake 744 ml   Output 1200 ml   Net -456 ml      Physical Exam  Gen:  appears stated age, appears comfortable  Neuro: AAOx3, motor, sensory, and strength grossly intact BL  HEENT: NTNC, EOMI, PERRL, MMM  CVS: RRR, no m/r/g; S1/S2 auscultated with no S3 or S4; capillary refill < 2 sec  Chest: some TTP of chest wall, but patient reports that this is different than his current chest discomfort, ICD in the left chest wall   Resp: BL rales, no belabored breathing or accessory muscle use appreciated   Abd: BS+ in all 4 quadrants; NT, abdominal distension, soft to palpation; no organomegaly appreciated   Extrem: pulses full, equal, and regular over all 4 extremities; no UE or LE edema BL    Significant Labs: All pertinent labs within the past 24 hours have been reviewed.  CBC:   Recent Labs   Lab 08/06/23  0637 08/07/23  0408   WBC 5.37 6.49   HGB 12.6* 13.5*   HCT 41.0 43.1    287     CMP:   Recent Labs   Lab 08/06/23  0637 08/07/23  0408    137   K 3.7 4.8    104   CO2 24 21*   GLU 97 93   BUN 13 17   CREATININE 1.4 1.5*   CALCIUM 9.0 9.0   ANIONGAP 9 12  "    Cardiac Markers:   No results for input(s): "CKMB", "MYOGLOBIN", "BNP", "TROPISTAT" in the last 48 hours.    Troponin:   No results for input(s): "TROPONINI", "TROPONINIHS" in the last 48 hours.      Significant Imaging: I have reviewed all pertinent imaging results/findings within the past 24 hours.      Assessment/Plan:      * Acute on chronic combined systolic and diastolic heart failure  - patient w/h/o TTE w/EF of 10-15%, last TTE 07/13/2023  - elevated BNP- 1956, CXR w/pulm edema, rales on physical exam- increase in weight from discharge  - concern for heart failure exacerbation  - started on CHF pathway  - has responded very well to lasix 80mg IV qday in the past- 2L out so far  - Increase lasix to 80mg BID IV  - strict I's and O's  - daily weights  - 2g Na restriction, 1.5L fluid restriction  - tele, K>4, Mg>2      Ischemic cardiomyopathy  - h/o      Stage 3 chronic kidney disease  - Cr baseline at admission  - Renally dosing all medications  - Avoid nephrotoxins  - Will continue to monitor on daily labs      NSTEMI (non-ST elevated myocardial infarction)  - persistent, substernal chest pain- does not appear to be reproducible on physical exam  - trop 0.065- has been higher in the past, last angiogram 04/2023  Findings:  1. Left main was normal  2. The LAD had luminal irregularity with no obstructive disease.  3. The left circumflex had luminal irregularity with no obstructive disease.  There was 1 obtuse marginal branch that was completely occluded and appeared to be an acute occlusion.  It was very small.  4. The RCA luminal irregularity without obstructive disease.  - he received nitroglycerin x2 w/no relief  - up-titrate metoprolol to 50mg qday  - s/p asa 325mg, continue plavix 75mg (no load at this time), ecg w/no acute changes- holding on heparin gtt at this time  - repeat ECG and trop now with persistent chest pain  - cards consulted- increased imdur to 90mg, continue met 75mg qday, increase " entresto to 49-51        ICD (implantable cardioverter-defibrillator) in place  - history of cardiac arrest 2/2 v-tach      Dyslipidemia  - continue HI statin      Paroxysmal A-fib  Patient with Paroxysmal (<7 days) atrial fibrillation which is controlled currently with Beta Blocker and Amiodarone. Patient is currently in atrial fibrillation. Anticoagulation indicated. Anticoagulation done with eliquis.    CAD (coronary artery disease)  - continue met 75mg qday  - increase imdur to 90mg qday, increase entresto to 49-51          VTE Risk Mitigation (From admission, onward)         Ordered     apixaban tablet 5 mg  2 times daily         08/05/23 8841                Discharge Planning   YUSEF: 8/8/2023     Code Status: Full Code   Is the patient medically ready for discharge?: No    Reason for patient still in hospital (select all that apply): Patient trending condition, Treatment and Consult recommendations  Discharge Plan A: Home with family                  Jah Saul MD  Department of Hospital Medicine   [facility] - Cardiology Stepdown
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
## 2023-08-08 NOTE — PLAN OF CARE
"[facility] Care Plan    POC reviewed with Tera Griffiths  at 0300. Pt needed reinforcement.. Questions and concerns addressed. Brought down to IR for Angiogram.   Pt progressing toward goals. Will continue to monitor. See below and flowsheets for full assessment and VS info.           Is this a stroke patient? yes- Stroke booklet reviewed with patient, risk factors identified for patient and stroke booklet remains at bedside for ongoing education.     Neuro:  [place] Coma Scale  Best Eye Response: 3-->(E3) to speech  Best Motor Response: 5-->(M5) localizes pain  Best Verbal Response: 1-->(V1) none  [place] Coma Scale Score: 9  Assessment Qualifiers: patient not sedated/intubated, no eye obstruction present  Pupil PERRLA: yes     24hr Temp:  [98 °F (36.7 °C)-98.7 °F (37.1 °C)]     CV:   Rhythm: paced rhythm, atrial rhythm  BP goals:   SBP < 180  MAP > 65    Resp:             GI/:     Diet/Nutrition Received: NPO  Last Bowel Movement: 08/06/23  Voiding Characteristics: urethral catheter (bladder)    Intake/Output Summary (Last 24 hours) at 8/8/2023 0807  Last data filed at 8/8/2023 0701  Gross per 24 hour   Intake 944 ml   Output 2175 ml   Net -1231 ml     Unmeasured Output  Urine Occurrence: 1  Stool Occurrence: 0  Emesis Occurrence: 0  Pad Count: 2    Labs/Accuchecks:  Recent Labs   Lab 08/08/23  0045   WBC 6.33   RBC 5.45   HGB 14.7   HCT 46.8         Recent Labs   Lab 08/05/23  1410 08/06/23  0637 08/08/23  0045      < > 135*   K 4.1   < > 3.8   CO2 24   < > 20*      < > 102   BUN 13   < > 17   CREATININE 1.7*   < > 1.5*   ALKPHOS 71  --   --    ALT 16  --   --    AST 15  --   --    BILITOT 0.8  --   --     < > = values in this interval not displayed.    No results for input(s): "PROTIME", "INR", "APTT", "HEPANTIXA" in the last 168 hours.   Recent Labs   Lab 08/05/23  1738   TROPONINI 0.059*       Electrolytes: No replacement orders  Accuchecks: Q6H    Gtts:      LDA/Wounds:  Lines/Drains/Airways "       Drain  Duration                  Urethral Catheter 08/08/23 0015 Silicone <1 day              Peripheral Intravenous Line  Duration                  Peripheral IV - Single Lumen 08/07/23 2336 18 G Left Antecubital <1 day         Peripheral IV - Single Lumen 08/08/23 0030 20 G Posterior;Right Forearm <1 day                  Wounds: No  Wound care consulted: No

## 2023-08-08 NOTE — CONSULTS
Inpatient consult to Physical Medicine Rehab  Consult performed by: Ele Madison NP  Consult ordered by: Niru Mao PA-C  Reason for consult: Assess rehab needs      Reviewed patient history and current admission.  Rehab team following.  Full consult to follow.    SINDI Sewell, FNP-C  Physical Medicine & Rehabilitation   08/08/2023

## 2023-08-08 NOTE — ASSESSMENT & PLAN NOTE
- patient w/h/o TTE w/EF of 10-15%, last TTE 07/13/2023  - elevated BNP- 1958, CXR w/pulm edema, rales on physical exam- increase in weight from discharge  - concern for heart failure exacerbation  - started on CHF pathway  - has responded very well to lasix 80mg IV qday in the past- 2L out so far  - Increase lasix to 80mg BID IV  - strict I's and O's  - daily weights  - 2g Na restriction, 1.5L fluid restriction  - tele, K>4, Mg>2

## 2023-08-08 NOTE — CONSULTS
"HPI:  58M w/ PMH CHFrEF (10%, DD, pHTN), CAD (h/o STEMI, s/p PCI to Rcx-08/2021), pAF s/p DCCV, HTN, CKD3b who [place] [facility] [place]cer[place] yesterday now w/ L MCA syndrome and L M2 LVO. Patient is aphasic and unable to provide history so below is the HPI from his admission:    Pain (chest) started at 8am, located substernal, 8/10, described as a "tight, twisting pain." Denies radiation. No aggravating factors (I.e, pressure, movement, deep breaths). No alleviating factors (I.e., nitroglycerin)- he received nitroglycerin x2 with no improvement. Associated signs and symptoms:  nausea, worsening abdominal distension, shortness of breath. No diaphoresis (that occurred with his previous STEMI). Denies recreational drug use. When asked etiology of heart failure- he reports "I must have been born with something wrong with my heart."      Upon presentation to ED, elevated BP- 140s-160s/100s, RR 23, 97% on RA. Labs notable for sCr 1.7 (looks like new baseline since 04/2023), BNP 1958 (on entresto too), trop I 0.065 (persistently elevated in the past). ECG w/no acute ST or T wave changes- frequent PVCs appreciated. CXR w/cardiomegaly and pulmonary edema. He was given 2x nitroglycerin with no improvement of pain. Received lasix 80mg IV x1 in ED- 2L output so far. Given asa 325mg x1 too. Admitted to hospital medicine for further workup and evaluation.      Of note, he was just discharged from [facility] 07/16- required nitro gtt for persistent chest pain, underwent aggressive diuresis. During this time, his chest pain remained constant despite nitroglycerin gtt- 2/10- pain was relieved with voltaran gel and percocet. Concern that chest pain 2/2 pleurisy and musculoskeletal pain.     Interval: Patient LKN @ 10PM w/ RSW/RFD/Aphasia/Lgaze; TNK not given 2/2 systemic anticoagulation. NIHSS 9 on evaluation    ROS:  Unable to provide due to aphasia    Exam:  E4V1M5, AOx0, dense global aphasia;  R FD, L gaze, R neglect, " PERRL;  Romario, not following commands, RUE/RLE 4-/5, LUE/LLE 5/5    A&P:  58M w/ PMH CHFrEF (10%, DD, pHTN), CAD (h/o STEMI, s/p PCI to Rcx-08/2021), pAF s/p DCCV, HTN, CKD3b who presented to [facility] with CHF exascerbation yesterday now w/ L MCA syndrome and L M2 LVO:    --Patient evaluated at bedside by Neuro-IR  --All labs and diagnostics reviewed  --CTA w/ LVO of the L M2 artery  --Patient to undergo emergent IR angio for possible intervention          -ASA 2/Mallampati 2  --Further reccomendations to follow procedure    Dispo: Per stroke/NCC teams

## 2023-08-08 NOTE — NURSING
PCT alerted RN to pt lying half out of bed. Upon assessment pt unresponsive, not following commands, and had right-sided facial droop. Code stroke called.           08/07/23 2306   Vital Signs   Pulse 83   Heart Rate Source Monitor   Resp 20   SpO2 96 %   BP (!) 201/102   MAP (mmHg) 143   Patient Position Lying

## 2023-08-08 NOTE — CODE/ RAPID DOCUMENTATION
RAPID RESPONSE NURSE STROKE CODE NOTE         Admit Date: 2023  LOS: 1  Code Status: Full Code   Date of Consult: 2023  : 1965  Age: 58 y.o.  Weight:   Wt Readings from Last 1 Encounters:   23 83.9 kg (184 lb 15.5 oz)     Sex: male  Race: Black or    Bed: [address] [place] [address] A:   MRN: [id number]  Time Rapid Response Team page Received: 2304  Time Rapid Response Team at Bedside: 2308  Time Rapid Response Team left Bedside: 0030  Was the patient discharged from an ICU this admission? No   Was the patient discharged from a PACU within last 24 hours? No   Did the patient receive conscious sedation/general anesthesia in last 24 hours? No  Was the patient in the ED within the past 24 hours? No  Was the patient on NIPPV within the past 24 hours? No   Did this progress into an ARC or CPA: No  Attending Physician: Mis Goncalves MD  Primary Service: Neurology       SITUATION    Notified by pager.  Called to evaluate the patient for Neuro    BACKGROUND    Why is the patient in the hospital?: Arterial ischemic stroke, MCA (middle cerebral artery), left, acute  Patient has a past medical history of Acute on chronic combined systolic and diastolic heart failure, Atrial fibrillation, CAD (coronary artery disease), Dyslipidemia, Encounter for blood transfusion, HTN (hypertension), ICD (implantable cardioverter-defibrillator) in place, Paroxysmal A-fib, and Stage 3 chronic kidney disease.    ASSESSMENT    Last VS: /72 (BP Location: Right arm, Patient Position: Lying)   Pulse 69   Temp 98.7 °F (37.1 °C) (Axillary)   Resp (!) 32   Ht 6' (1.829 m)   Wt 83.9 kg (184 lb 15.5 oz)   SpO2 99%   BMI 25.09 kg/m²     24H Vital Sign Range:  Temp:  [97.8 °F (36.6 °C)-98.7 °F (37.1 °C)]   Pulse:  [52-83]   Resp:  [18-32]   BP: (105-201)/()   SpO2:  [95 %-100 %]     Last know well time: 2200    Glucose time: 2310   Glucose result: 130    Physical Exam  Neurological:      Mental Status: He is  disoriented.      GCS: GCS eye subscore is 4. GCS verbal subscore is 1. GCS motor subscore is 4.      Cranial Nerves: Facial asymmetry present.      Comments: Right arm and leg flaccid, no response to painful stimuli. Right facial droop.  Pt unable to speak, only grunting and moaning. Pt cannot turn head to overcome midline. Gross right visual field inattention. Pt does not follow commands. Pt incontinent of urine. Pt has purposeful movement of left hand grabbing clothing and leg but not to command.        Time Stroke Code initiated: 2304  Stroke team Arrival time: 2310  Stroke Code activation triggers: Aphasia, Right unilateral weakness,right facial droop  Vascular Neurology provider you spoke with: Coleen Emerson NP    Time arrived at CT: 2337  Time CT completed: 2345    VAN positive or negative: positive    Thrombolytic decision: No  Thrombolytic bolus (mg and time):   Thrombolytic infusion (mg and time):   Thrombolytic end time:     IR decision: Yes  IR arrival: 0120  IR end time: 0209     RECOMMENDATIONS    We recommend: ICU transfer    FOLLOW-UP/PLAN    Call the Rapid Response Nurse, Andreea Choi RN at [phone] for additional questions or concerns.    PHYSICIAN ESCALATION    Orders received and case discussed with Coleen     Disposition: Tx in ICU bed 9068.        RRN IV START       IV started during emergency event. 18g placed to left AC, per Verona Zhang RN   Please call Rapid Response RNAndreea RN with any questions or concerns at [phone].

## 2023-08-08 NOTE — PLAN OF CARE
Problem: Occupational Therapy  Goal: Occupational Therapy Goal  Description: Goals to be met by: 9/8/23     Patient will increase functional independence with ADLs by performing:    UE Dressing with Moderate Assistance.  LE Dressing with Moderate Assistance.  Grooming while standing with Moderate Assistance.  Toileting from toilet with Moderate Assistance for hygiene and clothing management.     8/8/2023 1427 by Rupa Mckeon OT  Outcome: Ongoing, Progressing

## 2023-08-08 NOTE — ASSESSMENT & PLAN NOTE
"On Evening of 08/07/23,  concerns for abnormal neuro exam so stroke evaluation was done with CTA imaging which also noted: Right main pulmonary artery filling defect concerning for acute pulmonary thromboembolism.       Prior imaging evaluation had noted:    12/2021: "Segmental and subsegmental pulmonary emboli seen within the left and right lower lobes respectively. No evidence of acute right heart strain."    11/2021: " Small filling defects within the distal subsegmental branches of the bilateral lower lobes concerning for distal PE. Complete evaluation is limited given motion. "        RECOMMENDATIONS  -Repeat TTE and CTA PE pending to confirm acute PE.   -Patient reported adherence with home meds on prior evaluation during this hospitalization.  -If acute PE confirmed on imaging, would reevaluate adherence to DOAC when patient is alert and oriented.  - if patient has been non-adherent with DOAC,  and emphasize importance of adherence and continue DOAC  - if patient confirms adherence with DOAC, consider switch to Warfarin instead due to concerns for nonresponsive to DOAC.   "

## 2023-08-08 NOTE — PLAN OF CARE
Problem: SLP  Goal: SLP Goal  Description: Speech Language Pathology Goals  Goals expected to be met by 8/15  1. Pt will participate in ongoing assessment of swallow.   2. Pt will answer simple y/n questions with 60% accy given max cues.   3. Pt will follow simple commands with 50% accy given max cues.   4. Pt will vocalize in response to any stim x5/session given max cues.   5. Pt will localize to stim on R x2/session given max cues.  Outcome: Ongoing, Progressing    Evaluation completed.  Rec cont npo with strict aspiration precautions at this time.

## 2023-08-08 NOTE — CARE UPDATE
Patient arrived to [facility] Room 9068 from [facility] Room 354 by Rapid Response RN x2 and Primary RNMarisa    Report received from:  CARL Cunningham    Type of stroke/diagnosis: L MCA occlusion    Current symptoms: bilateral pupils 4mm and briskly reactive; moves all extremities spontaneously (not to command); globally aphasic; R facial droop    Skin Assessment done: Yes  Wounds noted: none  *If wounds noted, was Wound Care consulted? N/A  *If wounds noted, LDA placed? N/A  Skin Assessment Verified by: Myself, RNs Kimberly Baptiste, and Elizabeth Soto Completed? Pending    Patient Belongings on Admit: none    NCC notified: Marvin Castano MD

## 2023-08-08 NOTE — CONSULTS
[facility] - Neuro Critical Care  Vascular Neurology  Comprehensive Stroke Center  Consult Note    Inpatient consult to Vascular (Stroke) Neurology  Consult performed by: Coleen Emerson NP  Consult ordered by: Niru Mao PA-C      In House stroke Code  Assessment/Plan:     Patient is a 58 y.o. year old male with:    * Arterial ischemic stroke, MCA (middle cerebral artery), left, acute  59 y/o male with severe EF 10% from CHF plus afib now with L MCA stroke due to M1/M2 occlusion. No thrombolytics due to apixaban, went  to IR    Antithrombotics: apixaban    Statins: lipitor 80 mg daily    Aggressive risk factor modification: HTN, HLD, A-Fib, CAD, CHF     Rehab efforts: The patient has been evaluated by a stroke team provider and the therapy needs have been fully considered based off the presenting complaints and exam findings. The following therapy evaluations are needed: PT evaluate and treat, OT evaluate and treat, SLP evaluate and treat, PM&R evaluate for appropriate placement    Diagnostics ordered/pending: None  need to check if AICD compatible with MRI    VTE prophylaxis: Mechanical prophylaxis: Place SCDs  None: Reason for No Pharmacological VTE Prophylaxis: Currently on anticoagulation    BP parameters: Infarct: Post sucessful thrombectomy, SBP <140        Acute on chronic combined systolic and diastolic heart failure  Stroke risk factor  Per cardiology    Paroxysmal A-fib  Stroke risk factor  Rate controlled  On Apixaban    Dyslipidemia  Stroke risk factor  LDL  Lipitor 80 mg daily    HTN (hypertension)  Stroke risk factor  SBP <140    Stage 3 chronic kidney disease  Stroke risk factor  Avoid nephrotoxins  'renal dose meds    CAD (coronary artery disease)  Stroke risk factor      Hemiparesis, right  Due to stroke  Aggressive therapy          STROKE DOCUMENTATION     Acute Stroke Times   Last Known Normal Date: 08/07/23  Last Known Normal Time: 2200  Symptom Onset Date: 08/07/20  Symptom Onset  Time: 2200  Stroke Team Called Date: 08/07/20  Stroke Team Called Time: 2304  Stroke Team Arrival Date: 08/07/20  Stroke Team Arrival Time: 2310  Thrombolytic Therapy Recommended: No  CTA Interpretation Time: 2329 (images viewed by Dr Jacome)  Thrombectomy Recommended: Yes  Decision to Treat Time for IR: 0031    NIH Scale:  1a. Level of Consciousness: 3-->Responds only with reflex motor or autonomic effects or totally unresponsive, flaccid, and areflexic  1b. LOC Questions: 2-->Answers neither question correctly  1c. LOC Commands: 2-->Performs neither task correctly  2. Best Gaze: 2-->Forced deviation, or total gaze paresis not overcome by the oculocephalic maneuver  3. Visual: 2-->Complete hemianopia  4. Facial Palsy: 1-->Minor paralysis (flattened nasolabial fold, asymmetry on smiling)  5a. Motor Arm, Left: 0-->No drift, limb holds 90 (or 45) degrees for full 10 secs  5b. Motor Arm, Right: 4-->No movement  6a. Motor Leg, Left: 0-->No drift, leg holds 30 degree position for full 5 secs  6b. Motor Leg, Right: 4-->No movement  7. Limb Ataxia: 0-->Absent  8. Sensory: 2-->Severe to total sensory loss, patient is not aware of being touched in the face, arm, and leg  9. Best Language: 3-->Mute, global aphasia, no usable speech or auditory comprehension  10. Dysarthria: 0-->Normal  11. Extinction and Inattention (formerly Neglect): 0-->No abnormality  Total (NIH Stroke Scale): 25    Modified Milli Score: 1  [place] Coma Scale:8   ABCD2 Score:    [id number] Score:   HAS -BLED Score:   ICH Score:   Hunt & Valladares Classification:       Thrombolysis Candidate? No, Current use of direct thrombin inhibitors (dabigatran) or direct factor Xa inhibitors within 48 hours    Delays to Thrombolysis?  Not Applicable    Interventional Revascularization Candidate?   Is the patient eligible for mechanical endovascular reperfusion (ASIF)?  Yes    Delays to Thrombectomy? No    Hemorrhagic change of an Ischemic Stroke: Does this patient  have an ischemic stroke with hemorrhagic changes? No     Subjective:     History of Present Illness:  57 y/o male in hospital since 8-5-23 with VHF exacerbation. Last known normal was 2200. Nurse noticed at 2300 that patient was not moving his right side,not talking or following commands.   In house stroke code called at 2304.   Upon examination patient with left gaze, right hemiplegia, no sensation on right, right hemianopsia, aphasia and not following commands.   Patient taken to CT scan and had CTA head and neck multiphase complete and it reveals L M1/M2 occlsuion. Decision made to go to IR at 0031.           Past Medical History:   Diagnosis Date    Acute on chronic combined systolic and diastolic heart failure 9/23/2022    Atrial fibrillation     CAD (coronary artery disease) 9/23/2022    Dyslipidemia 9/23/2022    Encounter for blood transfusion     HTN (hypertension) 9/23/2022    ICD (implantable cardioverter-defibrillator) in place 9/23/2022    Paroxysmal A-fib 9/23/2022    Stage 3 chronic kidney disease 4/19/2023     Past Surgical History:   Procedure Laterality Date    CARDIAC DEFIBRILLATOR PLACEMENT Left     HERNIA REPAIR      LEFT HEART CATHETERIZATION Left 4/18/2023    Procedure: Left heart cath;  Surgeon: Atilio Marks MD;  Location: [facility] CATH LAB;  Service: Cardiology;  Laterality: Left;    RIGHT HEART CATHETERIZATION Right 9/30/2022    Procedure: INSERTION, CATHETER, RIGHT HEART;  Surgeon: Caden Aden MD;  Location: [facility] CATH LAB;  Service: Cardiology;  Laterality: Right;    TREATMENT OF CARDIAC ARRHYTHMIA N/A 11/22/2022    Procedure: CARDIOVERSION;  Surgeon: Marvin Sanon MD;  Location: [facility] EP LAB;  Service: Cardiology;  Laterality: N/A;  afib, KIA, DCCV, anes, MB, 3 Prep     History reviewed. No pertinent family history.  Social History     Tobacco Use    Smoking status: Never    Smokeless tobacco: Never   Substance Use Topics    Alcohol use: Never    Drug use: Never      Review of patient's allergies indicates:  No Known Allergies    Medications: I have reviewed the current medication administration record.    Medications Prior to Admission   Medication Sig Dispense Refill Last Dose    amiodarone (PACERONE) 200 MG Tab Take 1 tablet (200 mg total) by mouth 2 (two) times daily. 60 tablet 3 8/5/2023    apixaban (ELIQUIS) 5 mg Tab Take 5 mg by mouth 2 (two) times daily.       atorvastatin (LIPITOR) 80 MG tablet Take 1 tablet (80 mg total) by mouth once daily. 30 tablet 11     clopidogreL (PLAVIX) 75 mg tablet Take 1 tablet (75 mg total) by mouth once daily. 30 tablet 11     empagliflozin (JARDIANCE) 10 mg tablet Take 1 tablet (10 mg total) by mouth once daily. 30 tablet 1     ferrous sulfate (FEOSOL) 325 mg (65 mg iron) Tab tablet Take 325 mg by mouth every other day.       LIDOcaine (LIDODERM) 5 % Place 1 patch onto the skin once daily.       metoprolol succinate (TOPROL-XL) 25 MG 24 hr tablet Take 1 tablet (25 mg total) by mouth every evening. 30 tablet 11     oxyCODONE-acetaminophen (PERCOCET) 5-325 mg per tablet Take 1 tablet by mouth every 6 (six) hours as needed for Pain. 10 each 0     sacubitriL-valsartan (ENTRESTO) 24-26 mg per tablet Take 1 tablet by mouth 2 (two) times daily. 60 tablet 11     torsemide (DEMADEX) 20 MG Tab Take 1 tablet (20 mg total) by mouth once daily. 30 tablet 11        Review of Systems   Unable to perform ROS: Patient nonverbal     Objective:     Vital Signs (Most Recent):  Temp: 98.4 °F (36.9 °C) (08/07/23 1931)  Pulse: 83 (08/07/23 2306)  Resp: 20 (08/07/23 2306)  BP: (!) 201/102 (08/07/23 2306)  SpO2: 96 % (08/07/23 2306)    Vital Signs Range (Last 24H):  Temp:  [97.8 °F (36.6 °C)-98.4 °F (36.9 °C)]   Pulse:  [52-83]   Resp:  [18-20]   BP: (105-201)/()   SpO2:  [95 %-98 %]        Physical Exam  Vitals and nursing note reviewed.   Constitutional:       Appearance: He is ill-appearing.   HENT:      Head: Normocephalic and atraumatic.  "  Eyes:      Extraocular Movements: Extraocular movements intact.      Conjunctiva/sclera: Conjunctivae normal.      Pupils: Pupils are equal, round, and reactive to light.   Cardiovascular:      Rate and Rhythm: Normal rate and regular rhythm.   Pulmonary:      Effort: Pulmonary effort is normal.      Breath sounds: Normal breath sounds.   Abdominal:      General: Abdomen is flat. Bowel sounds are normal.      Palpations: Abdomen is soft.   Musculoskeletal:         General: Normal range of motion.      Cervical back: Normal range of motion.   Skin:     General: Skin is warm and dry.   Neurological:      Mental Status: He is disoriented.      Sensory: Sensory deficit present.      Motor: Weakness present.              Neurological Exam:   LOC: non verbal  Attention Span: non verbal  Language: Mute  Articulation: Mute/Anarthric  Orientation: Untestable due to severe aphasia   Visual Fields: Hemianopsia right  EOM (CN III, IV, VI): Gaze preference  left  Pupils (CN II, III): PERRL  Facial Sensation (CN V): Normal  Facial Movement (CN VII): Lower facial weakness on the Right  Gag Reflex: present  Reflexes: flexor plantar responses bilaterally  Motor: Arm left  Normal 5/5  Leg left  Normal 5/5  Arm right  Plegia 0/5  Leg right Plegia 0/5  Cerebellum: No evidence of appendicular or axial ataxia  Sensation: Jossue-anesthesia right  Tone: Flaccid  RUE and RLE       Laboratory:  CMP:   Recent Labs   Lab 08/08/23 0045   CALCIUM 9.2   *   K 3.8   CO2 20*      BUN 17   CREATININE 1.5*     CBC:   Recent Labs   Lab 08/08/23 0045   WBC 6.33   RBC 5.45   HGB 14.7   HCT 46.8      MCV 86   MCH 27.0   MCHC 31.4*     Lipid Panel:   Recent Labs   Lab 08/08/23 0045   CHOL 166   LDLCALC 101.4   HDL 46   TRIG 93     Coagulation: No results for input(s): "PT", "INR", "APTT" in the last 168 hours.  Hgb A1C:   Recent Labs   Lab 08/05/23  1738   HGBA1C 5.6     TSH:   Recent Labs   Lab 08/08/23 0045   TSH 1.134 "       Diagnostic Results:      Brain imaging:      Vessel Imaging:  CTA head and neck multiphase 8-8-23 results:      Cardiac Evaluation:   2D Echo 7-13-23 results:   The left ventricle is severely enlarged with severe eccentric hypertrophy and severely decreased systolic function.   The estimated ejection fraction is approximately 10% ( or less to at most low teens).   There is severe left ventricular global hypokinesis.   There is abnormal septal wall motion.   Left ventricular diastolic dysfunction.   Severe left atrial enlargement.   Moderate right ventricular enlargement with severely reduced right ventricular systolic function.   Severe right atrial enlargement.   Moderate mitral regurgitation.   Mild to moderate pulmonic regurgitation.   Elevated central venous pressure (15 mmHg).   The estimated PA systolic pressure is 53 mmHg.   There is moderate pulmonary hypertension.        Coleen Emerson NP  [facility]  Department of Vascular Neurology   [facility] - Neuro Critical Care

## 2023-08-08 NOTE — NURSING
Belongings brought down by prev RN: Black backpack containing: jeans, black socks, , black slides, keys, black shirt, white shirt, phone, . Wallet: with $36 cash

## 2023-08-08 NOTE — HPI
Tera Griffiths is a 58-year-old male with PMHx of CHFrEF (10%, DD, pHTN), CAD (h/o STEMI, s/p PCI to Rcx-08/2021), AICD placement (12/2021), persistently elevated troponin, pAF (s/p DCCV), HTN, CKD3b (baseline Cr 1.7) who was admitted to [facility] 8/05 for CHF exacerbation. At 23:06 on 8/07, a Code Stroke was called as patient was found lying half out of bed with RFD and not following commands. CTH without acute abnormality. CTA showed L MCA occlusion. No TNK was administered as patient was already taking Eliquis for pAF. Transferred to NCC Unit for closer neurological monitoring. Patient was then taken Class A to IR where no R ICA or MCA stenosis or occlusion was found.
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
## 2023-08-08 NOTE — PT/OT/SLP EVAL
Occupational Therapy  Co Evaluation    Name: Tera Griffiths  MRN: [id number]  Admitting Diagnosis: Arterial ischemic stroke, MCA (middle cerebral artery), left, acute  Recent Surgery: Procedure(s) (LRB):  ANGIOGRAM-CEREBRAL (N/A) Day of Surgery    Recommendations:     Discharge Recommendations: rehabilitation facility  Discharge Equipment Recommendations:  to be determined by next level of care  Barriers to discharge:       Assessment:     Tera Griffiths is a 58 y.o. male with a medical diagnosis of Arterial ischemic stroke, MCA (middle cerebral artery), left, acute.  He presents with decreased functional status due to medical condition. Performance deficits affecting function: weakness, impaired endurance, impaired self care skills, impaired functional mobility, gait instability, impaired balance, impaired cognition, decreased coordination, decreased upper extremity function, decreased lower extremity function, visual deficits, impaired fine motor, decreased ROM, impaired coordination.      Rehab Prognosis: Fair; patient would benefit from acute skilled OT services to address these deficits and reach maximum level of function.       Plan:     Patient to be seen 4 x/week to address the above listed problems via self-care/home management, therapeutic activities, therapeutic exercises, neuromuscular re-education  Plan of Care Expires: 09/08/23  Plan of Care Reviewed with: patient, sibling    Subjective     Chief Complaint: Patient unable to verbalize at this time   Patient/Family Comments/goals: Gain functional status     Occupational Profile: Social history completed by pt sister due to patient language deficit.   Living Environment: Patient lives with cousin in a second story apartment with 15 steps and no elevator.   Previous level of function: Patient was independent before medical event   Roles and Routines: Patient does not work due to heart condition   Equipment Used at Home: none  Assistance upon  Discharge: Unknown     Pain/Comfort:  Pain Rating 1: other (see comments) (Patient unable to verbalize at this time)    Patients cultural, spiritual, Jehovah's witness conflicts given the current situation:      Objective:     Communicated with: RN prior to session.  Patient found supine with telemetry, blood pressure cuff, pulse ox (continuous), restraints, peripheral IV upon OT entry to room.    General Precautions: Standard, aspiration, aphasia, NPO, fall  Orthopedic Precautions: N/A  Braces: N/A  Respiratory Status: Room air    Occupational Performance:    Bed Mobility:    Patient completed Rolling/Turning to Left with  total assistance x 2  Patient completed Scooting/Bridging with total assistance x 2   Patient completed Supine to Sit with total assistance and 2 persons  Patient completed Sit to Supine with minimum assistance  Sitting EOB: CGA-Max     Activities of Daily Living:  Grooming: total assistance EOB  Upper Body Dressing: total assistance EOB  Lower Body Dressing: total assistance EOB    Cognitive/Visual Perceptual:  Cognitive/Psychosocial Skills: Patient unable to follow directions at this time. Patient with language deificts leading to confusion and difficulty communicating with therapist.   Visual/Perceptual:  Patient with L sided gaze; Patient unable to track to R side.     Physical Exam:  Upper Extremity Range of Motion:     -       Right Upper Extremity:Patient displaying signs of R sided neglect at this time; patient unable to follow directions for testing but displays movement at elbow and wrist joints. Patient has PROM through full range.   -       Left Upper Extremity: Patient unable to follow directions for testing by displays functional range through all joints   Upper Extremity Strength:   -       Right upper extremity: decreased   -       Left upper extremity: displays functional strength    AMPAC 6 Click ADL:  AMPAC Total Score: 6    Treatment & Education:  Patient displays R sided neglect and  L gaze at this time. Patient is unable to communicate due to language deficits and aphasia. Patient perseverated during activity frequently manipulating bedding on his L side. OT intervention should focus on RUE use and cognitive retraining to allow for patient participation in functional tasks. Co evaluation completed due to patient increased medical complexity.     Patient left supine with all lines intact, call button in reach, and RN notified    GOALS:   Multidisciplinary Problems       Occupational Therapy Goals          Problem: Occupational Therapy    Goal Priority Disciplines Outcome Interventions   Occupational Therapy Goal     OT, PT/OT Ongoing, Progressing    Description: Goals to be met by: 9/8/23     Patient will increase functional independence with ADLs by performing:    UE Dressing with Moderate Assistance.  LE Dressing with Moderate Assistance.  Grooming while standing with Moderate Assistance.  Toileting from toilet with Moderate Assistance for hygiene and clothing management.                          History:     Past Medical History:   Diagnosis Date    Acute on chronic combined systolic and diastolic heart failure 9/23/2022    Atrial fibrillation     CAD (coronary artery disease) 9/23/2022    Dyslipidemia 9/23/2022    Encounter for blood transfusion     HTN (hypertension) 9/23/2022    ICD (implantable cardioverter-defibrillator) in place 9/23/2022    Paroxysmal A-fib 9/23/2022    Stage 3 chronic kidney disease 4/19/2023         Past Surgical History:   Procedure Laterality Date    CARDIAC DEFIBRILLATOR PLACEMENT Left     HERNIA REPAIR      LEFT HEART CATHETERIZATION Left 4/18/2023    Procedure: Left heart cath;  Surgeon: Atilio Marks MD;  Location: [facility] CATH LAB;  Service: Cardiology;  Laterality: Left;    RIGHT HEART CATHETERIZATION Right 9/30/2022    Procedure: INSERTION, CATHETER, RIGHT HEART;  Surgeon: Caden Aden MD;  Location: [facility] CATH LAB;  Service: Cardiology;  Laterality:  Right;    TREATMENT OF CARDIAC ARRHYTHMIA N/A 11/22/2022    Procedure: CARDIOVERSION;  Surgeon: Marvin Sanon MD;  Location: [facility] EP LAB;  Service: Cardiology;  Laterality: N/A;  afib, KIA, DCCV, anes, MB, 3 Prep       Time Tracking:     OT Date of Treatment: 08/08/23  OT Start Time: 1145  OT Stop Time: 1207  OT Total Time (min): 22 min    Billable Minutes:Evaluation 10 minutes  Neuromuscular Re-education 12 minutes     8/8/2023

## 2023-08-08 NOTE — BRIEF OP NOTE
Procedural Date:  08/08/23    Attending:  Simba Noble MD    Fellow(s):  Clovis Wright MD    Preoperative Diagnoses:   L M2 LVO     Postoperative Diagnosis:  No L ICA intracranial flow-limiting stenosis (no LVO seen)     Procedure:   1-vessel cerebral angiogram (L CCA/ICA) without intervention     Written Informed Consent Obtained:   Consent obtained via phone from family     Specimen Removed:   None     Estimated Blood Loss:  Minimal     Brief Procedure report:   A 8 Ecuadorean sheath was placed into the right femoral artery and a 8 Ecuadorean Broota catheter with 5 Ecuadorean Lucio diagnostic catheter was advanced into the aortic arch.  The L common carotid & L internal carotid arteries were subselected and angiography of the brain was performed. There is no evidence of aneurysm, fistula, malformation, or significant stenoocclusive disease seen.  A right femoral artery angiogram was performed, the sheath removed and hemostasis achieved via 8 Ecuadorean angioseal closure device.  No hematoma was present at the time of hemostasis.   The patient tolerated the procedure well.     Preliminary Interpretation:   1.    No evidence of left ICA or MCA stenosis. No large or medium vessel occlusion.     (Please see Imaging report for full details)    Disposition:  Returning to Neuro-ICU
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
## 2023-08-08 NOTE — ASSESSMENT & PLAN NOTE
Tera Griffiths is a 58-year-old male with PMHx of CHFrEF (10%, DD, pHTN), CAD (h/o STEMI, s/p PCI to Rcx-08/2021), AICD placement (12/2021), persistently elevated troponin, pAF (s/p DCCV), HTN, CKD3b (baseline Cr 1.7) who was admitted to [facility] 8/05 for CHF exacerbation. At 23:06 on 8/07, a Code Stroke was called as patient was found lying half out of bed with RFD and not following commands. CTH without acute abnormality. CTA showed L MCA occlusion. No TNK was administered as patient was already taking Eliquis for pAF. Transferred to NCC Unit for closer neurological monitoring. Patient was then taken Class A to IR where no R ICA or MCA stenosis or occlusion was found.      - Anticoagulation status: Eliquis, DAPT  - Admit to NCC unit post-angio  - VN consulted, following  - Will need MRI once AICD confirmed compatible  - Q1H Neuro checks, VS, I/Os    - SBP goal <140   - C/w home BP meds   - PRN labetalol, hydralazine  - Na goal >135  - Maintain euvolemia   - Cautious IVF administration in light of cardiac history   - On fluid restriction 1.5L    - Atorvastatin 80mg QD  - Continue Eliquis and DAPT  - Seizure, fall, and aspiration precautions  - Place SCDs    - TTE: pending  - aPTT, PT/INR  - Hgb A1c, TSH, lipid panel  - Daily CBC, CMP, Mg, Phos   - Replete Mg/Phos/K to 2/3/4, respectively  - PT/OT consulted for evaluation  - Perform Soto assessment once HOB flat restriction lifted  - SLP consulted, f/u recs  - Additional consults: SW/CM, PMR, Nutrition

## 2023-08-08 NOTE — SEDATION DOCUMENTATION
Hemostasis achieved via right groin with use of Angioseal closure device 0208. Pt to lay flat for 2 hours until 0408

## 2023-08-08 NOTE — ASSESSMENT & PLAN NOTE
- Continue home medications  - See Acute on chronic combined systolic and diastolic heart failure

## 2023-08-08 NOTE — SUBJECTIVE & OBJECTIVE
Interval History: Yesterday evening, concerns for abnormal neuro exam so stroke evaluation was done with imaging and exam which was concerning for L MCA syndrome and acute occlusion of the superior left M2 segment. He underwent cerebral angiogram without intervention as no LVO found. CTA overnight also noted pulmonary artery filling defect and left atrial thrombus. TTE and CTA PE pending.     Patient evaluation limited this morning due to lethargy from recent midazolam injection for NGT placement attempt and change in mental status overnight.    Review of Systems   Unable to perform ROS: Patient nonverbal     Objective:     Vital Signs (Most Recent):  Temp: 98.2 °F (36.8 °C) (08/08/23 1101)  Pulse: (!) 59 (08/08/23 1101)  Resp: (!) 24 (08/08/23 1101)  BP: 110/73 (08/08/23 1101)  SpO2: 98 % (08/08/23 1101) Vital Signs (24h Range):  Temp:  [98 °F (36.7 °C)-98.7 °F (37.1 °C)] 98.2 °F (36.8 °C)  Pulse:  [54-83] 59  Resp:  [20-32] 24  SpO2:  [96 %-100 %] 98 %  BP: (104-201)/() 110/73     Weight: 83.5 kg (184 lb)  Body mass index is 24.28 kg/m².     SpO2: 98 %         Intake/Output Summary (Last 24 hours) at 8/8/2023 1144  Last data filed at 8/8/2023 1101  Gross per 24 hour   Intake 944.23 ml   Output 2465 ml   Net -1520.77 ml       Lines/Drains/Airways       Drain  Duration                  Urethral Catheter 08/08/23 0015 Silicone <1 day              Peripheral Intravenous Line  Duration                  Peripheral IV - Single Lumen 08/07/23 2336 18 G Left Antecubital <1 day         Peripheral IV - Single Lumen 08/08/23 0030 20 G Posterior;Right Forearm <1 day                       Physical Exam  Vitals and nursing note reviewed.   Constitutional:       General: He is not in acute distress.     Appearance: He is not ill-appearing, toxic-appearing or diaphoretic.      Interventions: He is sedated.   HENT:      Head: Normocephalic and atraumatic.   Eyes:      General: No scleral icterus.        Right eye: No  discharge.         Left eye: No discharge.   Cardiovascular:      Rate and Rhythm: Normal rate. Rhythm irregular.   Pulmonary:      Effort: Pulmonary effort is normal. No respiratory distress.      Breath sounds: No wheezing.   Abdominal:      General: There is no distension.      Palpations: Abdomen is soft.   Musculoskeletal:      Right lower leg: No edema.      Left lower leg: No edema.   Skin:     General: Skin is warm and dry.      Coloration: Skin is not jaundiced.   Neurological:      Mental Status: He is lethargic.      Comments: Patient somnolent s/p recent midazolam administration            LABS:  Recent Labs   Lab 08/06/23  0637 08/07/23  0408 08/08/23  0045    137 135*   K 3.7 4.8 3.8    104 102   CO2 24 21* 20*   BUN 13 17 17   CREATININE 1.4 1.5* 1.5*   GLU 97 93 121*   ANIONGAP 9 12 13     Recent Labs   Lab 08/06/23  0637 08/07/23  0408 08/08/23  0045   MG 2.0 2.1 2.0   PHOS  --   --  2.4*     Recent Labs   Lab 08/05/23  1410   AST 15   ALT 16   ALKPHOS 71   BILITOT 0.8   ALBUMIN 4.3      Recent Labs   Lab 08/05/23  1410 08/06/23  0637 08/07/23  0408 08/08/23  0045   WBC 6.03 5.37 6.49 6.33   HGB 13.5* 12.6* 13.5* 14.7   HCT 43.7 41.0 43.1 46.8    294 287 309   GRAN 72.8  4.4  --   --   --        Recent Labs   Lab 08/05/23  1410 08/05/23  1738   TROPONINI 0.065* 0.059*     Lab Results   Component Value Date    BNP 1,958 (H) 08/05/2023    BNP 1,727 (H) 07/13/2023    BNP 2,871 (H) 06/08/2023              IMAGING:  EKGs:  Results for orders placed or performed during the hospital encounter of 08/05/23   EKG 12-lead    Collection Time: 08/07/23 11:57 PM    Narrative    Test Reason : I48.91,    Vent. Rate : 076 BPM     Atrial Rate : 000 BPM     P-R Int : 000 ms          QRS Dur : 146 ms      QT Int : 486 ms       P-R-T Axes : 000 003 161 degrees     QTc Int : 546 ms    Atrial fibrillation with frequent ventricular-paced complexes  Left bundle branch block like IVCD  Abnormal ECG  When  compared with ECG of 05-AUG-2023 17:17,  Electronic ventricular pacemaker now noted  Confirmed by Panda ESTRADA MD ([id number]) on 8/8/2023 7:52:44 AM    Referred By: [email or web address]   SELF           Confirmed By:Panda ESTRADA MD       EF   Date Value Ref Range Status   07/14/2023 10 % Final   03/10/2023 18 % Final   01/05/2023 15 % Final     Significant Imaging: I have reviewed all pertinent imaging results/findings within the past 24 hours.      INPATIENT MEDICATIONS:      Scheduled Meds:   amiodarone  200 mg Oral BID    aspirin  300 mg Rectal Daily    atorvastatin  80 mg Oral Daily    clopidogreL  75 mg Oral Daily    ezetimibe  10 mg Oral QHS    ferrous sulfate  1 tablet Oral Daily    furosemide (LASIX) injection  80 mg Intravenous Q12H    isosorbide mononitrate  90 mg Oral Daily    metoprolol succinate  75 mg Oral Daily    midazolam        [COMPLETED] perflutren protein-a microsphr  3 mL Intravenous Once    sacubitriL-valsartan  1 tablet Oral BID    senna-docusate 8.6-50 mg  1 tablet Oral BID     PRN Meds:acetaminophen, magnesium sulfate IVPB, magnesium sulfate IVPB, midazolam, nitroGLYCERIN, ondansetron, potassium chloride **AND** potassium chloride **AND** potassium chloride, sodium chloride 0.9%, sodium chloride 0.9%, sodium chloride 0.9%, sodium phosphate 15 mmol in dextrose 5 % (D5W) 250 mL IVPB, sodium phosphate 20.01 mmol in dextrose 5 % (D5W) 250 mL IVPB, sodium phosphate 30 mmol in dextrose 5 % (D5W) 250 mL IVPB

## 2023-08-08 NOTE — PROGRESS NOTES
[facility]  Cardiology  Progress Note    Patient Name: Tera Griffiths  MRN: [id number]  Admission Date: 8/5/2023  Hospital Length of Stay: 1 days  Code Status: Full Code   Attending Physician: Landen Billingsley MD   Primary Care Physician: Carleen Bueno MD  Expected Discharge Date: 8/10/2023  Principal Problem:Arterial ischemic stroke, MCA (middle cerebral artery), left, acute    Subjective:       Interval History: Yesterday evening, concerns for abnormal neuro exam so stroke evaluation was done with imaging and exam which was concerning for L MCA syndrome and acute occlusion of the superior left M2 segment. He underwent cerebral angiogram without intervention as no LVO found. CTA overnight also noted pulmonary artery filling defect and left atrial thrombus. TTE and CTA PE pending.     Patient evaluation limited this morning due to lethargy from recent midazolam injection for NGT placement attempt and change in mental status overnight.    Review of Systems   Unable to perform ROS: Patient nonverbal     Objective:     Vital Signs (Most Recent):  Temp: 98.2 °F (36.8 °C) (08/08/23 1101)  Pulse: (!) 59 (08/08/23 1101)  Resp: (!) 24 (08/08/23 1101)  BP: 110/73 (08/08/23 1101)  SpO2: 98 % (08/08/23 1101) Vital Signs (24h Range):  Temp:  [98 °F (36.7 °C)-98.7 °F (37.1 °C)] 98.2 °F (36.8 °C)  Pulse:  [54-83] 59  Resp:  [20-32] 24  SpO2:  [96 %-100 %] 98 %  BP: (104-201)/() 110/73     Weight: 83.5 kg (184 lb)  Body mass index is 24.28 kg/m².     SpO2: 98 %         Intake/Output Summary (Last 24 hours) at 8/8/2023 1144  Last data filed at 8/8/2023 1101  Gross per 24 hour   Intake 944.23 ml   Output 2465 ml   Net -1520.77 ml       Lines/Drains/Airways       Drain  Duration                  Urethral Catheter 08/08/23 0015 Silicone <1 day              Peripheral Intravenous Line  Duration                  Peripheral IV - Single Lumen 08/07/23 2336 18 G Left Antecubital <1 day         Peripheral  IV - Single Lumen 08/08/23 0030 20 G Posterior;Right Forearm <1 day                       Physical Exam  Vitals and nursing note reviewed.   Constitutional:       General: He is not in acute distress.     Appearance: He is not ill-appearing, toxic-appearing or diaphoretic.      Interventions: He is sedated.   HENT:      Head: Normocephalic and atraumatic.   Eyes:      General: No scleral icterus.        Right eye: No discharge.         Left eye: No discharge.   Cardiovascular:      Rate and Rhythm: Normal rate. Rhythm irregular.   Pulmonary:      Effort: Pulmonary effort is normal. No respiratory distress.      Breath sounds: No wheezing.   Abdominal:      General: There is no distension.      Palpations: Abdomen is soft.   Musculoskeletal:      Right lower leg: No edema.      Left lower leg: No edema.   Skin:     General: Skin is warm and dry.      Coloration: Skin is not jaundiced.   Neurological:      Mental Status: He is lethargic.      Comments: Patient somnolent s/p recent midazolam administration            LABS:  Recent Labs   Lab 08/06/23  0637 08/07/23  0408 08/08/23  0045    137 135*   K 3.7 4.8 3.8    104 102   CO2 24 21* 20*   BUN 13 17 17   CREATININE 1.4 1.5* 1.5*   GLU 97 93 121*   ANIONGAP 9 12 13     Recent Labs   Lab 08/06/23  0637 08/07/23  0408 08/08/23  0045   MG 2.0 2.1 2.0   PHOS  --   --  2.4*     Recent Labs   Lab 08/05/23  1410   AST 15   ALT 16   ALKPHOS 71   BILITOT 0.8   ALBUMIN 4.3      Recent Labs   Lab 08/05/23  1410 08/06/23  0637 08/07/23  0408 08/08/23  0045   WBC 6.03 5.37 6.49 6.33   HGB 13.5* 12.6* 13.5* 14.7   HCT 43.7 41.0 43.1 46.8    294 287 309   GRAN 72.8  4.4  --   --   --        Recent Labs   Lab 08/05/23  1410 08/05/23  1738   TROPONINI 0.065* 0.059*     Lab Results   Component Value Date    BNP 1,958 (H) 08/05/2023    BNP 1,727 (H) 07/13/2023    BNP 2,871 (H) 06/08/2023              IMAGING:  EKGs:  Results for orders placed or performed during the  hospital encounter of 08/05/23   EKG 12-lead    Collection Time: 08/07/23 11:57 PM    Narrative    Test Reason : I48.91,    Vent. Rate : 076 BPM     Atrial Rate : 000 BPM     P-R Int : 000 ms          QRS Dur : 146 ms      QT Int : 486 ms       P-R-T Axes : 000 003 161 degrees     QTc Int : 546 ms    Atrial fibrillation with frequent ventricular-paced complexes  Left bundle branch block like IVCD  Abnormal ECG  When compared with ECG of 05-AUG-2023 17:17,  Electronic ventricular pacemaker now noted  Confirmed by Panda ESTRADA MD ([id number]) on 8/8/2023 7:52:44 AM    Referred By: [email or web address]   SELF           Confirmed By:Panda ESTRADA MD       EF   Date Value Ref Range Status   07/14/2023 10 % Final   03/10/2023 18 % Final   01/05/2023 15 % Final     Significant Imaging: I have reviewed all pertinent imaging results/findings within the past 24 hours.      INPATIENT MEDICATIONS:      Scheduled Meds:   amiodarone  200 mg Oral BID    aspirin  300 mg Rectal Daily    atorvastatin  80 mg Oral Daily    clopidogreL  75 mg Oral Daily    ezetimibe  10 mg Oral QHS    ferrous sulfate  1 tablet Oral Daily    furosemide (LASIX) injection  80 mg Intravenous Q12H    isosorbide mononitrate  90 mg Oral Daily    metoprolol succinate  75 mg Oral Daily    midazolam        [COMPLETED] perflutren protein-a microsphr  3 mL Intravenous Once    sacubitriL-valsartan  1 tablet Oral BID    senna-docusate 8.6-50 mg  1 tablet Oral BID     PRN Meds:acetaminophen, magnesium sulfate IVPB, magnesium sulfate IVPB, midazolam, nitroGLYCERIN, ondansetron, potassium chloride **AND** potassium chloride **AND** potassium chloride, sodium chloride 0.9%, sodium chloride 0.9%, sodium chloride 0.9%, sodium phosphate 15 mmol in dextrose 5 % (D5W) 250 mL IVPB, sodium phosphate 20.01 mmol in dextrose 5 % (D5W) 250 mL IVPB, sodium phosphate 30 mmol in dextrose 5 % (D5W) 250 mL IVPB        Assessment and Plan:         Multiple subsegmental pulmonary emboli without acute cor  "pulmonale  On Evening of 08/07/23,  concerns for abnormal neuro exam so stroke evaluation was done with CTA imaging which also noted: Right main pulmonary artery filling defect concerning for acute pulmonary thromboembolism.       Prior imaging evaluation had noted:    12/2021: "Segmental and subsegmental pulmonary emboli seen within the left and right lower lobes respectively. No evidence of acute right heart strain."    11/2021: " Small filling defects within the distal subsegmental branches of the bilateral lower lobes concerning for distal PE. Complete evaluation is limited given motion. "        RECOMMENDATIONS  -Repeat TTE and CTA PE pending to confirm acute PE.   -Patient reported adherence with home meds on prior evaluation during this hospitalization.  -If acute PE confirmed on imaging, would reevaluate adherence to DOAC when patient is alert and oriented.  - if patient has been non-adherent with DOAC,  and emphasize importance of adherence and continue DOAC  - if patient confirms adherence with DOAC, consider switch to Warfarin instead due to concerns for nonresponsive to DOAC.     NSTEMI (non-ST elevated myocardial infarction)  Acute on chronic combined systolic and diastolic heart failure  CAD (coronary artery disease)  Dyslipidemia  58 year old with HFrEF (10%, DD, pHTN), CAD (h/o STEMI, s/p PCI to Rcx-08/2021), pAF s/p DCCV, HTN, CKD3b who presented to the ED with persistent chest pain since 10 AM on the morning prior to admission    Per initial Cardiology fellow evaluation: "Patient states that he has had similar pain since his discharge and has been occurring even after being discharged with no relief. Patient states chest pain is a 8/10 in severity, non-radiating with no associated aggravating or alleviating factors. Patient was recently discharged on 07/16- required nitro gtt for persistent chest pain in the setting of hypertensive emergency.  ECG unchanged from prior. He denies any nausea, " "vomiting, hematemesis, cough, palpitations or shortness of breath. Reports having worsening abdominal distension and feels he is still retaining fluid. Reports compliance with his medication"    Intake/Output Summary (Last 24 hours) at 8/8/2023 0843  Last data filed at 8/8/2023 0701  Gross per 24 hour   Intake 944 ml   Output 2175 ml   Net -1231 ml     Net IO Since Admission: -5,481 mL [08/08/23 0843]    -Suspect patient symptoms are primarily being driven by congestion as reports some alleviation in symptoms with volume removal via diuresis    RECOMMENDATIONS  -Continue diuresis with Furosemide IV to target euvolemia  - Continue Imdur  90 mg daily  - Continue Entresto 49-51mg BID  - Up-titrate BB as tolerated  -Add amlodipine 5-10 mg if needed for further BP control  -Dyslipidemia not at goal. Switch Atorvastatin to Rosuvastatin 40mg on discharge as not on inpatient formulary. Continue Ezetemibe. Consider Repatha initiation on discharge or outpatient followup for further control.           Ugo Lui, DO  Cardiology  [facility]  I have personally taken the history and examined the patient and agree with the resident's note as stated above.I would be very concerned about compliance with warfarin so Eliquis better.Hopefully stroke recovery could be rapid.    "

## 2023-08-08 NOTE — ASSESSMENT & PLAN NOTE
"Acute on chronic combined systolic and diastolic heart failure  CAD (coronary artery disease)  Dyslipidemia  58 year old with HFrEF (10%, DD, pHTN), CAD (h/o STEMI, s/p PCI to Rcx-08/2021), pAF s/p DCCV, HTN, CKD3b who presented to the ED with persistent chest pain since 10 AM on the morning prior to admission    Per initial Cardiology fellow evaluation: "Patient states that he has had similar pain since his discharge and has been occurring even after being discharged with no relief. Patient states chest pain is a 8/10 in severity, non-radiating with no associated aggravating or alleviating factors. Patient was recently discharged on 07/16- required nitro gtt for persistent chest pain in the setting of hypertensive emergency.  ECG unchanged from prior. He denies any nausea, vomiting, hematemesis, cough, palpitations or shortness of breath. Reports having worsening abdominal distension and feels he is still retaining fluid. Reports compliance with his medication"    Intake/Output Summary (Last 24 hours) at 8/8/2023 0843  Last data filed at 8/8/2023 0701  Gross per 24 hour   Intake 944 ml   Output 2175 ml   Net -1231 ml     Net IO Since Admission: -5,481 mL [08/08/23 0843]    -Suspect patient symptoms are primarily being driven by congestion as reports some alleviation in symptoms with volume removal via diuresis    RECOMMENDATIONS  -Continue diuresis with Furosemide IV to target euvolemia  - Continue Imdur  90 mg daily  - Continue Entresto 49-51mg BID  - Up-titrate BB as tolerated  -Add amlodipine 5-10 mg if needed for further BP control  -Dyslipidemia not at goal. Switch Atorvastatin to Rosuvastatin 40mg on discharge as not on inpatient formulary. Continue Ezetemibe. Consider Repatha initiation on discharge or outpatient followup for further control.     "

## 2023-08-08 NOTE — PT/OT/SLP EVAL
"Speech Language Pathology Evaluation  Cognitive/Bedside Swallow    Patient Name:  Tera Griffiths   MRN:  [id number]  Admitting Diagnosis: Arterial ischemic stroke, MCA (middle cerebral artery), left, acute    Recommendations:                  General Recommendations:  Dysphagia therapy, Speech/language therapy, and Cognitive-linguistic therapy  Diet recommendations:  NPO, NPO   Aspiration Precautions: Frequent oral care and Strict aspiration precautions   General Precautions: Standard, aspiration, fall, NPO, aphasia  Communication strategies:  go to room if call light pushed    Assessment:     Tera Griffiths is a 58 y.o. male with an SLP diagnosis of Aphasia, Dysphagia, and Visio-Spatial Impairment.      History:     Past Medical History:   Diagnosis Date    Acute on chronic combined systolic and diastolic heart failure 9/23/2022    Atrial fibrillation     CAD (coronary artery disease) 9/23/2022    Dyslipidemia 9/23/2022    Encounter for blood transfusion     HTN (hypertension) 9/23/2022    ICD (implantable cardioverter-defibrillator) in place 9/23/2022    Paroxysmal A-fib 9/23/2022    Stage 3 chronic kidney disease 4/19/2023       Past Surgical History:   Procedure Laterality Date    CARDIAC DEFIBRILLATOR PLACEMENT Left     HERNIA REPAIR      LEFT HEART CATHETERIZATION Left 4/18/2023    Procedure: Left heart cath;  Surgeon: Atilio Marks MD;  Location: [facility] CATH LAB;  Service: Cardiology;  Laterality: Left;    RIGHT HEART CATHETERIZATION Right 9/30/2022    Procedure: INSERTION, CATHETER, RIGHT HEART;  Surgeon: Caden Aden MD;  Location: [facility] CATH LAB;  Service: Cardiology;  Laterality: Right;    TREATMENT OF CARDIAC ARRHYTHMIA N/A 11/22/2022    Procedure: CARDIOVERSION;  Surgeon: Marvin Sanon MD;  Location: [facility] EP LAB;  Service: Cardiology;  Laterality: N/A;  afib, KIA, DCCV, anes, MB, 3 Prep       Social History: Patient was unable to state, cont to assess.   "History of Present Illness: Tera " "Aye is a 58-year-old male with PMHx of CHFrEF (10%, DD, pHTN), CAD (h/o STEMI, s/p PCI to Rcx-08/2021), AICD placement (12/2021), persistently elevated troponin, pAF (s/p DCCV), HTN, CKD3b (baseline Cr 1.7) who was admitted to [facility] 8/05 for CHF exacerbation. At 23:06 on 8/07, a Code Stroke was called as patient was found lying half out of bed with RFD and not following commands. CTH without acute abnormality. CTA showed L MCA occlusion. No TNK was administered as patient was already taking Eliquis for pAF. Transferred to NCC Unit for closer neurological monitoring. Patient was then taken Class A to IR where no R ICA or MCA stenosis or occlusion was found."      Prior Intubation HX:  IR 8/8    Modified Barium Swallow: none reported     Chest X-Rays: "No significant detrimental interval change in the appearance of the chest since 08/05/2023 at 14:32 is appreciated."        Subjective     Nurse cleared for session.  Session ended 2/2 nurse present to take pt to CT.     Pain/Comfort:  Pain Rating 1:  (NAD)  Pain Rating Post-Intervention 1:  (NAD)    Respiratory Status: Nasal cannula    Objective:     Cognitive Status:    Unable to assess      Receptive Language:   Comprehension:   impaired  Questions Simple yes/no no response via any modality  Commands  One step 0% despite max cues    Expressive Language:  Verbal:    Minimal generalized voicing noted spontaneously.   Automatic Speech  Counting word approximation x1, no other word approximations elicited to any stim       Motor Speech:  Cont to assess    Voice:   Cont to assess, appeared functional in limited sample    Visual-Spatial:  Right Neglect      Reading:   tba      Written Expression:   tba    Oral Musculature Evaluation  Oral Musculature: unable to assess due to poor participation/comprehension, facial asymmetry present, right weakness  Dentition: present and adequate  Secretion Management: adequate  Oral Labial Strength and Mobility: impaired " retraction  Volitional Cough: not elicited  Volitional Swallow: not elicited  Voice Prior to PO Intake: minimal vocalizations, clear, low intensity    Bedside Swallow Eval:   Consistencies Assessed:  Thin liquids ice chip x1      Oral Phase:   Poor oral acceptance  Limited active manipulation     Pharyngeal Phase:   No pharyngeal swallow elicited    Compensatory Strategies  None    Treatment: Education provided re: role of SLP, cont npo, s/s aspiration, and POC.  Pt will require reinforcement.       Goals:   Multidisciplinary Problems       SLP Goals          Problem: SLP    Goal Priority Disciplines Outcome   SLP Goal     SLP Ongoing, Progressing   Description: Speech Language Pathology Goals  Goals expected to be met by 8/15  1. Pt will participate in ongoing assessment of swallow.   2. Pt will answer simple y/n questions with 60% accy given max cues.   3. Pt will follow simple commands with 50% accy given max cues.   4. Pt will vocalize in response to any stim x5/session given max cues.   5. Pt will localize to stim on R x2/session given max cues.                              Plan:     Patient to be seen:  4 x/week   Plan of Care expires:  09/07/23  Plan of Care reviewed with:  patient   SLP Follow-Up:  Yes       Discharge recommendations:  Discharge Facility/Level of Care Needs:  (tbd)   Barriers to Discharge:  Level of Skilled Assistance Needed      Time Tracking:     SLP Treatment Date:   08/08/23  Speech Start Time:  0745  Speech Stop Time:  0755     Speech Total Time (min):  10 min    Billable Minutes: Eval 5  and Eval Swallow and Oral Function 5    08/08/2023

## 2023-08-08 NOTE — ASSESSMENT & PLAN NOTE
- On DAPT: ASA, Plavix  - EKG with AF  - Troponin elevated at baseline  - See Acute on chronic combined systolic and diastolic heart failure

## 2023-08-08 NOTE — NURSING
0703: RN called echo and left a message about state echocardiogram for patient.     0718: RN called CT about timing of stat CT's and tech stated they still needed a protocol. Tech stated that she would reach out to radiologist and would get back to me with more info when she heard from them. Will continue to monitor pt.

## 2023-08-09 PROBLEM — I26.99 ACUTE PULMONARY EMBOLISM WITHOUT ACUTE COR PULMONALE: Status: ACTIVE | Noted: 2023-08-09

## 2023-08-09 LAB
ALBUMIN SERPL BCP-MCNC: 4.2 G/DL (ref 3.5–5.2)
ALLENS TEST: NORMAL
ALP SERPL-CCNC: 76 U/L (ref 55–135)
ALT SERPL W/O P-5'-P-CCNC: 11 U/L (ref 10–44)
ANION GAP SERPL CALC-SCNC: 17 MMOL/L (ref 8–16)
APTT PPP: 27.7 SEC (ref 21–32)
APTT PPP: 38.7 SEC (ref 21–32)
APTT PPP: 38.8 SEC (ref 21–32)
APTT PPP: 49.3 SEC (ref 21–32)
AST SERPL-CCNC: 14 U/L (ref 10–40)
BASOPHILS # BLD AUTO: 0 K/UL (ref 0–0.2)
BASOPHILS NFR BLD: 0 % (ref 0–1.9)
BILIRUB SERPL-MCNC: 0.9 MG/DL (ref 0.1–1)
BUN SERPL-MCNC: 22 MG/DL (ref 6–20)
CALCIUM SERPL-MCNC: 10.1 MG/DL (ref 8.7–10.5)
CHLORIDE SERPL-SCNC: 103 MMOL/L (ref 95–110)
CO2 SERPL-SCNC: 20 MMOL/L (ref 23–29)
CREAT SERPL-MCNC: 1.5 MG/DL (ref 0.5–1.4)
DELSYS: NORMAL
DIFFERENTIAL METHOD: ABNORMAL
EOSINOPHIL # BLD AUTO: 0 K/UL (ref 0–0.5)
EOSINOPHIL NFR BLD: 0 % (ref 0–8)
ERYTHROCYTE [DISTWIDTH] IN BLOOD BY AUTOMATED COUNT: 15.3 % (ref 11.5–14.5)
ERYTHROCYTE [SEDIMENTATION RATE] IN BLOOD BY WESTERGREN METHOD: 20 MM/H
EST. GFR  (NO RACE VARIABLE): 54.3 ML/MIN/1.73 M^2
FIO2: 2
FLOW: 2
GLUCOSE SERPL-MCNC: 110 MG/DL (ref 70–110)
HCO3 UR-SCNC: 24.6 MMOL/L (ref 24–28)
HCT VFR BLD AUTO: 50.6 % (ref 40–54)
HGB BLD-MCNC: 16.2 G/DL (ref 14–18)
IMM GRANULOCYTES # BLD AUTO: 0.02 K/UL (ref 0–0.04)
IMM GRANULOCYTES NFR BLD AUTO: 0.3 % (ref 0–0.5)
LYMPHOCYTES # BLD AUTO: 0.9 K/UL (ref 1–4.8)
LYMPHOCYTES NFR BLD: 11.2 % (ref 18–48)
MAGNESIUM SERPL-MCNC: 2.1 MG/DL (ref 1.6–2.6)
MCH RBC QN AUTO: 27.1 PG (ref 27–31)
MCHC RBC AUTO-ENTMCNC: 32 G/DL (ref 32–36)
MCV RBC AUTO: 85 FL (ref 82–98)
MODE: NORMAL
MONOCYTES # BLD AUTO: 0.8 K/UL (ref 0.3–1)
MONOCYTES NFR BLD: 10.8 % (ref 4–15)
NEUTROPHILS # BLD AUTO: 5.9 K/UL (ref 1.8–7.7)
NEUTROPHILS NFR BLD: 77.7 % (ref 38–73)
NRBC BLD-RTO: 0 /100 WBC
PCO2 BLDA: 35.8 MMHG (ref 35–45)
PH SMN: 7.45 [PH] (ref 7.35–7.45)
PHOSPHATE SERPL-MCNC: 3.2 MG/DL (ref 2.7–4.5)
PLATELET # BLD AUTO: 355 K/UL (ref 150–450)
PMV BLD AUTO: 11.1 FL (ref 9.2–12.9)
PO2 BLDA: 89 MMHG (ref 80–100)
POC BE: 1 MMOL/L
POC SATURATED O2: 97 % (ref 95–100)
POC TCO2: 26 MMOL/L (ref 23–27)
POCT GLUCOSE: 208 MG/DL (ref 70–110)
POTASSIUM SERPL-SCNC: 4.3 MMOL/L (ref 3.5–5.1)
PROT SERPL-MCNC: 8.9 G/DL (ref 6–8.4)
RBC # BLD AUTO: 5.97 M/UL (ref 4.6–6.2)
SAMPLE: NORMAL
SITE: NORMAL
SODIUM SERPL-SCNC: 140 MMOL/L (ref 136–145)
SP02: 98
WBC # BLD AUTO: 7.6 K/UL (ref 3.9–12.7)

## 2023-08-09 PROCEDURE — 94761 N-INVAS EAR/PLS OXIMETRY MLT: CPT

## 2023-08-09 PROCEDURE — 25000003 PHARM REV CODE 250

## 2023-08-09 PROCEDURE — 85730 THROMBOPLASTIN TIME PARTIAL: CPT

## 2023-08-09 PROCEDURE — 80053 COMPREHEN METABOLIC PANEL: CPT | Performed by: PHYSICIAN ASSISTANT

## 2023-08-09 PROCEDURE — 51798 US URINE CAPACITY MEASURE: CPT

## 2023-08-09 PROCEDURE — 63600175 PHARM REV CODE 636 W HCPCS: Performed by: STUDENT IN AN ORGANIZED HEALTH CARE EDUCATION/TRAINING PROGRAM

## 2023-08-09 PROCEDURE — 92507 TX SP LANG VOICE COMM INDIV: CPT

## 2023-08-09 PROCEDURE — 99900035 HC TECH TIME PER 15 MIN (STAT)

## 2023-08-09 PROCEDURE — 82803 BLOOD GASES ANY COMBINATION: CPT

## 2023-08-09 PROCEDURE — 99233 SBSQ HOSP IP/OBS HIGH 50: CPT | Mod: ,,, | Performed by: PSYCHIATRY & NEUROLOGY

## 2023-08-09 PROCEDURE — 20000000 HC ICU ROOM

## 2023-08-09 PROCEDURE — 36600 WITHDRAWAL OF ARTERIAL BLOOD: CPT

## 2023-08-09 PROCEDURE — 99291 PR CRITICAL CARE, E/M 30-74 MINUTES: ICD-10-PCS | Mod: ,,, | Performed by: PSYCHIATRY & NEUROLOGY

## 2023-08-09 PROCEDURE — 63600175 PHARM REV CODE 636 W HCPCS

## 2023-08-09 PROCEDURE — 99291 CRITICAL CARE FIRST HOUR: CPT | Mod: ,,, | Performed by: PSYCHIATRY & NEUROLOGY

## 2023-08-09 PROCEDURE — 99223 PR INITIAL HOSPITAL CARE,LEVL III: ICD-10-PCS | Mod: ,,, | Performed by: STUDENT IN AN ORGANIZED HEALTH CARE EDUCATION/TRAINING PROGRAM

## 2023-08-09 PROCEDURE — 85025 COMPLETE CBC W/AUTO DIFF WBC: CPT | Performed by: PHYSICIAN ASSISTANT

## 2023-08-09 PROCEDURE — 84100 ASSAY OF PHOSPHORUS: CPT | Performed by: PHYSICIAN ASSISTANT

## 2023-08-09 PROCEDURE — 99233 PR SUBSEQUENT HOSPITAL CARE,LEVL III: ICD-10-PCS | Mod: ,,, | Performed by: PSYCHIATRY & NEUROLOGY

## 2023-08-09 PROCEDURE — 85730 THROMBOPLASTIN TIME PARTIAL: CPT | Mod: 91 | Performed by: PSYCHIATRY & NEUROLOGY

## 2023-08-09 PROCEDURE — 92526 ORAL FUNCTION THERAPY: CPT

## 2023-08-09 PROCEDURE — 27000221 HC OXYGEN, UP TO 24 HOURS

## 2023-08-09 PROCEDURE — 99223 1ST HOSP IP/OBS HIGH 75: CPT | Mod: ,,, | Performed by: STUDENT IN AN ORGANIZED HEALTH CARE EDUCATION/TRAINING PROGRAM

## 2023-08-09 PROCEDURE — 83735 ASSAY OF MAGNESIUM: CPT | Performed by: PHYSICIAN ASSISTANT

## 2023-08-09 RX ORDER — DEXMEDETOMIDINE HYDROCHLORIDE 4 UG/ML
0-1.4 INJECTION, SOLUTION INTRAVENOUS CONTINUOUS
Status: DISCONTINUED | OUTPATIENT
Start: 2023-08-09 | End: 2023-08-17

## 2023-08-09 RX ORDER — GLUCAGON 1 MG
1 KIT INJECTION
Status: DISCONTINUED | OUTPATIENT
Start: 2023-08-09 | End: 2023-08-19

## 2023-08-09 RX ORDER — INSULIN ASPART 100 [IU]/ML
1-10 INJECTION, SOLUTION INTRAVENOUS; SUBCUTANEOUS EVERY 6 HOURS PRN
Status: DISCONTINUED | OUTPATIENT
Start: 2023-08-09 | End: 2023-08-19

## 2023-08-09 RX ADMIN — FUROSEMIDE 80 MG: 10 INJECTION, SOLUTION INTRAVENOUS at 09:08

## 2023-08-09 RX ADMIN — ASPIRIN 300 MG: 300 SUPPOSITORY RECTAL at 10:08

## 2023-08-09 RX ADMIN — HEPARIN SODIUM AND DEXTROSE 16 UNITS/KG/HR: 10000; 5 INJECTION INTRAVENOUS at 04:08

## 2023-08-09 RX ADMIN — FUROSEMIDE 80 MG: 10 INJECTION, SOLUTION INTRAVENOUS at 08:08

## 2023-08-09 RX ADMIN — DEXMEDETOMIDINE HYDROCHLORIDE 0.2 MCG/KG/HR: 4 INJECTION INTRAVENOUS at 09:08

## 2023-08-09 NOTE — ASSESSMENT & PLAN NOTE
- Was on DAPT: ASA, Plavix; currently only receiving ASA rectally (no access)  - EKG with AF  - Troponin elevated at baseline  - See Acute on chronic combined systolic and diastolic heart failure

## 2023-08-09 NOTE — ASSESSMENT & PLAN NOTE
Stroke risk factor  .4  Continue home lipitor 80 mg daily  On zetia, cardiology recommending to consider repatha

## 2023-08-09 NOTE — PLAN OF CARE
[facility] Care Plan    POC reviewed with Tera Griffiths and family at 1400. Pt unable to verbalize understanding. Questions and concerns addressed. No acute events today. Pt not progressing toward goals. Will continue to monitor. See below and flowsheets for full assessment and VS info.     Failed to place NGT  ABG obtained  Bath completed  aPTT not therapeutic. Redraw @ 2130  Ugalde discontinued. Condom catheter placed. Pt spontaneously voiding  Heparin gtt increased to 16 u/kg/hr  Plan for PEG tomorrow        Is this a stroke patient? yes- Stroke booklet reviewed with family, risk factors identified for patient and stroke booklet remains at bedside for ongoing education.     Neuro:  [place] Coma Scale  Best Eye Response: 4-->(E4) spontaneous  Best Motor Response: 5-->(M5) localizes pain  Best Verbal Response: 1-->(V1) none  [place] Coma Scale Score: 10  Assessment Qualifiers: patient not sedated/intubated  Pupil PERRLA: yes     24 hr Temp:  [97.8 °F (36.6 °C)-98.8 °F (37.1 °C)]     CV:   Rhythm: paced rhythm, atrial rhythm  BP goals:   SBP < 180  MAP > 65    Resp:           Plan: N/A    GI/:     Diet/Nutrition Received: NPO  Last Bowel Movement: 08/06/23  Voiding Characteristics: urethral catheter (bladder)    Intake/Output Summary (Last 24 hours) at 8/9/2023 1716  Last data filed at 8/9/2023 1705  Gross per 24 hour   Intake 262.53 ml   Output 1925 ml   Net -1662.47 ml     Unmeasured Output  Urine Occurrence: 1  Stool Occurrence: 0  Emesis Occurrence: 0  Pad Count: 2    Labs/Accuchecks:  Recent Labs   Lab 08/09/23  0045   WBC 7.60   RBC 5.97   HGB 16.2   HCT 50.6         Recent Labs   Lab 08/09/23  0045      K 4.3   CO2 20*      BUN 22*   CREATININE 1.5*   ALKPHOS 76   ALT 11   AST 14   BILITOT 0.9      Recent Labs   Lab 08/08/23  1838 08/09/23  0045 08/09/23  1453   INR 1.1  --   --    APTT 29.6   < > 38.7*    < > = values in this interval not displayed.      Recent Labs   Lab 08/05/23  1738    TROPONINI 0.059*       Electrolytes: N/A - electrolytes WDL  Accuchecks: Q6H    Gtts:   dexmedeTOMIDine (Precedex) infusion (titrating) Stopped (08/09/23 1143)    heparin (porcine) in D5W 16 Units/kg/hr (08/09/23 1705)       LDA/Wounds:  Lines/Drains/Airways       Drain  Duration             Male External Urinary Catheter 08/09/23 1305 Large <1 day              Peripheral Intravenous Line  Duration                  Peripheral IV - Single Lumen 08/08/23 0030 20 G Posterior;Right Forearm 1 day         Peripheral IV - Single Lumen 08/09/23 1452 20 G Anterior;Right Forearm <1 day                  Wounds: No  Wound care consulted: No
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
## 2023-08-09 NOTE — SUBJECTIVE & OBJECTIVE
Neurologic Chief Complaint: L MCA syndrome    Subjective:     Interval History: Patient is seen for follow-up neurological assessment and treatment recommendations: exam limited by sedation with precedex, currently on heparin gtt. GI consulted by NCC for peg placement due to multiple failed attempts at NGT placement    HPI, Past Medical, Family, and Social History remains the same as documented in the initial encounter.     Review of Systems   Constitutional:  Negative for fever.   HENT:  Positive for trouble swallowing.    Respiratory:  Negative for cough.    Gastrointestinal:  Negative for vomiting.   Neurological:  Positive for speech difficulty, weakness and numbness.     Scheduled Meds:   amiodarone  200 mg Oral BID    aspirin  300 mg Rectal Daily    atorvastatin  80 mg Oral Daily    clopidogreL  75 mg Oral Daily    ezetimibe  10 mg Oral QHS    ferrous sulfate  1 tablet Oral Daily    furosemide (LASIX) injection  80 mg Intravenous Q12H    isosorbide mononitrate  90 mg Oral Daily    metoprolol succinate  75 mg Oral Daily    sacubitriL-valsartan  1 tablet Oral BID    senna-docusate 8.6-50 mg  1 tablet Oral BID     Continuous Infusions:   dexmedeTOMIDine (Precedex) infusion (titrating) Stopped (08/09/23 1143)    heparin (porcine) in D5W 15 Units/kg/hr (08/09/23 1405)     PRN Meds:acetaminophen, dextrose 10%, dextrose 10%, glucagon (human recombinant), insulin aspart U-100, magnesium sulfate IVPB, magnesium sulfate IVPB, metoprolol, nitroGLYCERIN, ondansetron, potassium chloride **AND** potassium chloride **AND** potassium chloride, sodium chloride 0.9%, sodium chloride 0.9%, sodium chloride 0.9%, sodium phosphate 15 mmol in dextrose 5 % (D5W) 250 mL IVPB, sodium phosphate 20.01 mmol in dextrose 5 % (D5W) 250 mL IVPB, sodium phosphate 30 mmol in dextrose 5 % (D5W) 250 mL IVPB    Objective:     Vital Signs (Most Recent):  Temp: 97.8 °F (36.6 °C) (08/09/23 1105)  Pulse: 64 (08/09/23 1405)  Resp: (!) 21 (08/09/23  "1405)  BP: 115/69 (08/09/23 1405)  SpO2: (!) 94 % (08/09/23 1405)  BP Location: Right arm    Vital Signs Range (Last 24H):  Temp:  [97.8 °F (36.6 °C)-98.7 °F (37.1 °C)]   Pulse:  [53-90]   Resp:  [18-67]   BP: ()/()   SpO2:  [80 %-100 %]   BP Location: Right arm       Physical Exam  Vitals reviewed.   Constitutional:       General: He is not in acute distress.     Appearance: He is well-developed.   HENT:      Head: Normocephalic and atraumatic.   Cardiovascular:      Rate and Rhythm: Normal rate.   Pulmonary:      Effort: No respiratory distress.   Skin:     General: Skin is warm and dry.              Neurological Exam:   LOC: obtunded  Attention Span: poor  Language: Global aphasia  Articulation: Untestable due to severe aphasia   Orientation: Untestable due to severe aphasia   Motor: Arm left  Paresis: 3/5  Leg left  Paresis: 3/5  Arm right  Plegia 0/5  Leg right Paresis: 2/5  Tone: Flaccid  RUE    Laboratory:  CMP:   Recent Labs   Lab 08/09/23  0045   CALCIUM 10.1   ALBUMIN 4.2   PROT 8.9*      K 4.3   CO2 20*      BUN 22*   CREATININE 1.5*   ALKPHOS 76   ALT 11   AST 14   BILITOT 0.9     CBC:   Recent Labs   Lab 08/09/23  0045   WBC 7.60   RBC 5.97   HGB 16.2   HCT 50.6      MCV 85   MCH 27.1   MCHC 32.0     Lipid Panel:   Recent Labs   Lab 08/08/23  0045   CHOL 166   LDLCALC 101.4   HDL 46   TRIG 93     Coagulation:   Recent Labs   Lab 08/08/23  1838 08/09/23  0045 08/09/23  0621   INR 1.1  --   --    APTT 29.6   < > 38.8*    < > = values in this interval not displayed.     Platelet Aggregation Study: No results for input(s): "PLTAGG", "PLTAGINTERP", "PLTAGREGLACO", "ADPPLTAGGREG" in the last 168 hours.  Hgb A1C:   Recent Labs   Lab 08/05/23  1738   HGBA1C 5.6     TSH:   Recent Labs   Lab 08/08/23  0045   TSH 1.134       Diagnostic Results     Brain Imaging   CT Head 8/8/23 1704  Impression:     1. Redemonstration of acute infarctions in the left MCA and PCA territories.  No " evidence to suggest hemorrhagic transformation.  2. Suspected small subacute infarction in the right caudate.  3. Numerous remote infarctions as detailed above.    CT Head 8/8/23 0826  Impression:     Moderate developing hypoattenuation left inferior frontal lobe and left occipital lobe concerning for developing recent infarcts post thrombectomy.     Allowing for scattered hyperdensity related to recently contrast administration for thrombectomy there is no definite acute intracranial hemorrhage.     Previous hypodensity right caudate no longer seen which may be related to contrast administration for thrombectomy and possible subacute age infarction.     Superimposed remote infarcts with moderate to large encephalomalacia right MCA territory unchanged    Vessel Imaging   IR Angio 8/8/23  Preliminary Interpretation:   1.    No evidence of left ICA or MCA stenosis. No large or medium vessel occlusion.    CTA Multiphase 8/7/23  Impression:     CT head:     New right caudate nucleus hypodensity, likely representing age-indeterminate infarct.  Could be further evaluated with MRI.     Multifocal encephalomalacia involving the right frontal, temporoparietal, and left occipital lobes.     CTA head and neck:     Acute occlusion of the superior left M2 segment.     Left PCA occlusion of undetermined age.     Filling defect in the right main pulmonary artery, concerning for acute pulmonary thromboembolism.  Consider follow-up pulmonary CTA to more fully assess the extent of thromboemboli, the clot burden, and the presence or absence of right heart strain.     Not fully detailed above:     - Incomplete opacification of the left atrial appendage, suspicious for an intraluminal thrombus.  Follow-up echocardiogram, or cardiac MRI or CTA, recommended.     - The presence of acute thrombi or thromboemboli in both the systemic arterial and pulmonary arterial circulation raises concern for the possibility of underlying intracardiac or  extracardiac right-to-left shunt, and/or a hypercoagulable state.  Correlate clinically.     - Incompletely visualized, but possibly large, pericardial effusion.  Artifacts compromise accurate assessment of its attenuation.    Cardiac Imaging   TTE with bubble 8/8/23    Left Ventricle: The left ventricle is moderately dilated. Increased ventricular mass. Normal wall thickness. There is moderate eccentric hypertrophy. Severe global hypokinesis present. Septal motion is consistent with pacing. There is severely reduced systolic function with a visually estimated ejection fraction of 15 - 20%. Ejection fraction by visual approximation is 20%. Unable to assess diastolic function due to arrhythmia.    Left Atrium: Left atrium is severely dilated. Radha 85mL/m2.    Right Ventricle: Mild right ventricular enlargement. Wall thickness is normal. Right ventricle wall motion  is normal. Systolic function is severely reduced. Pacemaker lead present in the ventricle.    Right Atrium: Right atrium is severely dilated.    Aortic Valve: There is mild aortic valve sclerosis. There is normal leaflet mobility. There is no significant regurgitation.    Mitral Valve: There is bileaflet sclerosis. There is normal leaflet mobility. There is mild regurgitation.    Tricuspid Valve: The tricuspid valve is structurally normal. There is normal leaflet mobility. There is mild regurgitation.    Pulmonic Valve: The pulmonic valve is structurally normal. There is normal leaflet mobility. There is no significant regurgitation.    Aorta: Aortic root is normal in size measuring 3.13 cm. Ascending aorta is normal measuring 3.24 cm.    IVC/SVC: Normal venous pressure at 3 mmHg.    Pericardium: The pericardium is normal. There is no pericardial effusion.

## 2023-08-09 NOTE — ASSESSMENT & PLAN NOTE
- Initially admitted for CHF exacerbation and c/o CP  - Repeat TTE pending; last EF 10%  - BNP elevated on admission - 1956   - Weigh patient QD   - 1.5L fluid restriction   - Strict I/Os Q1H  - C/w GDMT   - Lasix 80mg BID IV to euvolemia   - Imdur 90mg QD; no access   - Metoprolol 75mg QD; no access   - Entresto 49-51 QD; no access  - Hold home Jardiance  - Cardiology following

## 2023-08-09 NOTE — SUBJECTIVE & OBJECTIVE
Interval History: Patient on minimum intensity heparin gtt for PE, most recent PTT near therapeutic level (38.8); will continue titrating to goal. Patient is restless and agitated, will start Precedex gtt. TTE with no LA thrombus.    Review of Systems   Unable to perform ROS: Patient nonverbal     Objective:     Vitals:  Temp: 98.4 °F (36.9 °C)  Pulse: 74  Rhythm: paced rhythm, atrial rhythm  BP: (!) 141/103  MAP (mmHg): 118  Resp: (!) 21  SpO2: 97 %    Temp  Min: 97.9 °F (36.6 °C)  Max: 98.4 °F (36.9 °C)  Pulse  Min: 58  Max: 90  BP  Min: 110/73  Max: 174/109  MAP (mmHg)  Min: 84  Max: 146  Resp  Min: 20  Max: 67  SpO2  Min: 94 %  Max: 100 %    08/08 0701 - 08/09 0700  In: 404.9 [I.V.:5]  Out: 2445 [Urine:2445]   Unmeasured Output  Urine Occurrence: 1  Stool Occurrence: 0  Emesis Occurrence: 0  Pad Count: 2        Physical Exam  Vitals and nursing note reviewed.   Constitutional:       Comments: Appears alert but nonverbal   HENT:      Head: Normocephalic and atraumatic.   Eyes:      Comments: Right gaze preference   Cardiovascular:      Rate and Rhythm: Normal rate and regular rhythm.   Pulmonary:      Effort: Pulmonary effort is normal.   Neurological:      Comments: GCS E4 V1 M6  Withdraws in RLE  Spontaneous movement RUE/LUE/LLE  Right hemineglect  Right facial droop        Medications:  Continuousheparin (porcine) in D5W, Last Rate: 15 Units/kg/hr (08/09/23 0728)    Scheduledamiodarone, 200 mg, BID  aspirin, 300 mg, Daily  atorvastatin, 80 mg, Daily  clopidogreL, 75 mg, Daily  ezetimibe, 10 mg, QHS  ferrous sulfate, 1 tablet, Daily  furosemide (LASIX) injection, 80 mg, Q12H  isosorbide mononitrate, 90 mg, Daily  metoprolol succinate, 75 mg, Daily  sacubitriL-valsartan, 1 tablet, BID  senna-docusate 8.6-50 mg, 1 tablet, BID    PRNacetaminophen, 650 mg, Q6H PRN  magnesium sulfate IVPB, 2 g, PRN  magnesium sulfate IVPB, 4 g, PRN  metoprolol, 5 mg, Q5 Min PRN  nitroGLYCERIN, 0.4 mg, Q5 Min PRN  ondansetron, 4 mg,  Q8H PRN  potassium chloride, 40 mEq, PRN   And  potassium chloride, 60 mEq, PRN   And  potassium chloride, 80 mEq, PRN  sodium chloride 0.9%, 10 mL, PRN  sodium chloride 0.9%, 10 mL, PRN  sodium chloride 0.9%, 10 mL, PRN  sodium phosphate 15 mmol in dextrose 5 % (D5W) 250 mL IVPB, 15 mmol, PRN  sodium phosphate 20.01 mmol in dextrose 5 % (D5W) 250 mL IVPB, 20.01 mmol, PRN  sodium phosphate 30 mmol in dextrose 5 % (D5W) 250 mL IVPB, 30 mmol, PRN      Today I personally reviewed pertinent medications, imaging, cardiology results, laboratory results, notably: CTA PE, TTE    Diet  Diet NPO

## 2023-08-09 NOTE — HOSPITAL COURSE
Initially admitted 08/05 for ADHF and NSTEMI, course complicated by new L MCA stroke. PEG tube placed in Neuro ICU. WBC increasing to 21.65. KUB negative for any ileus or SBO. Ucx positive for largely pan-sensitive Klebsiella. CTAP wc with non-obstructing calculus in L kidney, perinephric stranding c/f pyelonephritis. Completed course of IV CTX. Hospital course continued to be complicated by non-redirectable agitation, briefly requiring restraints.  Pt pulled out PEG tube, replaced by monk catheter for tube feeds via General Surgery. Responded well to scheduled Depakote and Zyprexa. G-tube successfully replaced 8/28 without complication. Patient off of restraints. Depakote increased for persistent agitation. EEG ordered to evaluate for potential seizures in setting of unclear encephalopathy. Repeat CTH evolving left parieto-occipital, left frontal, and right caudate infarcts.  Petechial hemorrhage along the occipital cortical margin. Patient deemed to benefit from rehabilitation.

## 2023-08-09 NOTE — PLAN OF CARE
[facility] Care Plan    POC reviewed with Tera Griffiths and family at 0300. Pt family verbalized understanding. Questions and concerns addressed. No acute events overnight. Pt progressing toward goals. Will continue to monitor. See below and flowsheets for full assessment and VS info.           Is this a stroke patient? yes- Stroke booklet reviewed with patient and family, risk factors identified for patient and stroke booklet remains at bedside for ongoing education.     Neuro:  [place] Coma Scale  Best Eye Response: 4-->(E4) spontaneous  Best Motor Response: 5-->(M5) localizes pain  Best Verbal Response: 1-->(V1) none  [place] Coma Scale Score: 10  Assessment Qualifiers: patient not sedated/intubated  Pupil PERRLA: yes     24hr Temp:  [97.9 °F (36.6 °C)-98.4 °F (36.9 °C)]     CV:   Rhythm: paced rhythm, atrial rhythm  BP goals:   SBP < 180  MAP > 65    Resp:           Plan: N/A    GI/:     Diet/Nutrition Received: NPO  Last Bowel Movement: 08/06/23  Voiding Characteristics: urethral catheter (bladder)    Intake/Output Summary (Last 24 hours) at 8/9/2023 0637  Last data filed at 8/9/2023 0505  Gross per 24 hour   Intake 404.88 ml   Output 2345 ml   Net -1940.12 ml     Unmeasured Output  Urine Occurrence: 1  Stool Occurrence: 0  Emesis Occurrence: 0  Pad Count: 2    Labs/Accuchecks:  Recent Labs   Lab 08/09/23  0045   WBC 7.60   RBC 5.97   HGB 16.2   HCT 50.6         Recent Labs   Lab 08/09/23  0045      K 4.3   CO2 20*      BUN 22*   CREATININE 1.5*   ALKPHOS 76   ALT 11   AST 14   BILITOT 0.9      Recent Labs   Lab 08/08/23  1838 08/09/23  0045   INR 1.1  --    APTT 29.6 27.7      Recent Labs   Lab 08/05/23  1738   TROPONINI 0.059*       Electrolytes: N/A - electrolytes WDL  Accuchecks: Q6H    Gtts:   heparin (porcine) in D5W 14 Units/kg/hr (08/09/23 0131)       LDA/Wounds:  Lines/Drains/Airways       Drain  Duration                  Urethral Catheter 08/08/23 0015 Silicone 1 day               Peripheral Intravenous Line  Duration                  Peripheral IV - Single Lumen 08/07/23 2336 18 G Left Antecubital 1 day         Peripheral IV - Single Lumen 08/08/23 0030 20 G Posterior;Right Forearm 1 day                  Wounds: No  Wound care consulted: No

## 2023-08-09 NOTE — CONSULTS
[facility] Consult Note    Attending: Viet Irwin MD   Admit Date: 8/5/2023  Today's Date: 08/09/2023      SUBJECTIVE:     HPI:  Mr. Tera Griffiths is a 58 year old male for whom GI is consulted for PEG tube placement. He has a PMH significant for atrial fibrillation (on Apixban), CAD (associated with STEMI in 08/2021 and NTSEMI in 04/2023; status post PCI in 08/2021; on PLAVIX), combined CHF (EF 10% in 07/2023; status post ICD placement; associated with pulmonary hypertension), and HTN. History limited to chart review and speaking to family due to patient's mental status.     Hospital Course:   Patient was admitted to [facility] on 08/05 for management of CHF exacerbation. Hospital course associated with development of left MCA stroke on 08/07 without evidence of vessel occlusion or stenosis on angiogram and evidence of bilateral PE on CTA requiring Heparin drip. GI consulted on 08/09 for PEG placement after multiple unsuccessful bedside attempts to place NG and need to continue PO medications.     On initial bedside interview, patient was non-verbal. His family denies patient having a history of prior abdominal surgeries.       Review of patient's allergies indicates:  No Known Allergies    Past Medical History:   Diagnosis Date    Acute on chronic combined systolic and diastolic heart failure 9/23/2022    Atrial fibrillation     CAD (coronary artery disease) 9/23/2022    Dyslipidemia 9/23/2022    Encounter for blood transfusion     HTN (hypertension) 9/23/2022    ICD (implantable cardioverter-defibrillator) in place 9/23/2022    Paroxysmal A-fib 9/23/2022    Stage 3 chronic kidney disease 4/19/2023     Past Surgical History:   Procedure Laterality Date    CARDIAC DEFIBRILLATOR PLACEMENT Left     CEREBRAL ANGIOGRAM N/A 8/8/2023    Procedure: ANGIOGRAM-CEREBRAL;  Surgeon: Kenzie Rodriguez;  Location: [facility];  Service: Anesthesiology;  Laterality: N/A;  LVO (angiogram)    HERNIA REPAIR       LEFT HEART CATHETERIZATION Left 4/18/2023    Procedure: Left heart cath;  Surgeon: Atilio Marks MD;  Location: [facility] CATH LAB;  Service: Cardiology;  Laterality: Left;    RIGHT HEART CATHETERIZATION Right 9/30/2022    Procedure: INSERTION, CATHETER, RIGHT HEART;  Surgeon: Caden Aden MD;  Location: [facility] CATH LAB;  Service: Cardiology;  Laterality: Right;    TREATMENT OF CARDIAC ARRHYTHMIA N/A 11/22/2022    Procedure: CARDIOVERSION;  Surgeon: Marvin Sanon MD;  Location: [facility] EP LAB;  Service: Cardiology;  Laterality: N/A;  afib, KIA, DCCV, anes, MB, 3 Prep     History reviewed. No pertinent family history.  Social History     Tobacco Use    Smoking status: Never    Smokeless tobacco: Never   Substance Use Topics    Alcohol use: Never    Drug use: Never       All home medications reviewed.    Review of Systems   Unable to perform ROS: Patient nonverbal       OBJECTIVE:     Vital Signs Trends/History Reviewed  Vitals:    08/09/23 1305 08/09/23 1405 08/09/23 1505 08/09/23 1605   BP: 136/87 115/69 104/72 114/85   BP Location:   Right arm    Patient Position:   Lying    Pulse: 70 64 63 70   Resp: (!) 22 (!) 21 19 (!) 26   Temp:   98.8 °F (37.1 °C)    TempSrc:   Axillary    SpO2: 100% (!) 94% 97% (!) 94%   Weight:       Height:             Physical Exam  Constitutional:       Appearance: Normal appearance. He is not ill-appearing.   Eyes:      General: No scleral icterus.  Cardiovascular:      Rate and Rhythm: Regular rhythm. Tachycardia present.      Pulses: Normal pulses.      Heart sounds: Normal heart sounds.   Pulmonary:      Effort: Pulmonary effort is normal. No respiratory distress.      Breath sounds: Normal breath sounds.   Abdominal:      General: Bowel sounds are normal. There is no distension.      Palpations: Abdomen is soft.      Tenderness: There is no abdominal tenderness.   Skin:     General: Skin is warm and dry.      Findings: No bruising or rash.   Neurological:      Mental Status: He  is alert.           Laboratory: All labs within last 24 hours reviewed.     Imaging: All imaging within last 24 hours reviewed.       Infusions:     dexmedeTOMIDine (Precedex) infusion (titrating) Stopped (08/09/23 1143)    heparin (porcine) in D5W 16 Units/kg/hr (08/09/23 1536)       Scheduled Medications:    amiodarone  200 mg Oral BID    aspirin  300 mg Rectal Daily    atorvastatin  80 mg Oral Daily    clopidogreL  75 mg Oral Daily    ezetimibe  10 mg Oral QHS    ferrous sulfate  1 tablet Oral Daily    furosemide (LASIX) injection  80 mg Intravenous Q12H    isosorbide mononitrate  90 mg Oral Daily    metoprolol succinate  75 mg Oral Daily    sacubitriL-valsartan  1 tablet Oral BID    senna-docusate 8.6-50 mg  1 tablet Oral BID       PRN Medications:   acetaminophen, dextrose 10%, dextrose 10%, glucagon (human recombinant), insulin aspart U-100, magnesium sulfate IVPB, magnesium sulfate IVPB, metoprolol, nitroGLYCERIN, ondansetron, potassium chloride **AND** potassium chloride **AND** potassium chloride, sodium chloride 0.9%, sodium chloride 0.9%, sodium chloride 0.9%, sodium phosphate 15 mmol in dextrose 5 % (D5W) 250 mL IVPB, sodium phosphate 20.01 mmol in dextrose 5 % (D5W) 250 mL IVPB, sodium phosphate 30 mmol in dextrose 5 % (D5W) 250 mL IVPB    ASSESSMENT & RECOMMENDATIONS     This is a 58 year old of male with a PMH significant for atrial fibrillation (on Apixban), CAD (associated with STEMI in 08/2021 and NTSEMI in 04/2023; status post PCI in 08/2021; on PLAVIX), combined CHF (EF 10% in 07/2023; status post ICD placement; associated with pulmonary hypertension), and HTN who was admitted to [facility] on 08/05 for management of CHF exacerbation. Hospital course associated with development of left MCA stroke on 08/08 without evidence of vessel occlusion or stenosis on angiogram and evidence of bilateral PE on CTA requiring Heparin drip. GI consulted on 08/09 for PEG placement given inability to place NG at  bedside.     Our recommendations are as follows:     Unable to place PEG tube until 08/11 at the earliest due to current endoscopy case load.   Will order pre-procedure antibiotics for infection prophylaxis on day of procedure.   Please hold tube anticoagulation on evening of 08/11.     Thank you for allowing us to participate in the care of this patient. Please call with questions.      Marvel Luu MD, PGY-VI  Gastroenterology Fellow  [facility]

## 2023-08-09 NOTE — ASSESSMENT & PLAN NOTE
- PMHx of   - Noted on CTA Chest 9/2021, 12/2021    See Acute pulmonary embolism without acute cor pulmonale

## 2023-08-09 NOTE — PT/OT/SLP PROGRESS
Speech Language Pathology Treatment    Patient Name:  Tera Griffiths   MRN:  [id number]  Admitting Diagnosis: Arterial ischemic stroke, MCA (middle cerebral artery), left, acute    Recommendations:                 General Recommendations:  Dysphagia therapy and Speech/language therapy  Diet recommendations:  NPO, Liquid Diet Level: NPO   Aspiration Precautions: Standard aspiration precautions   General Precautions: Standard, aspiration, fall, NPO, aphasia  Communication strategies:   Pt     Assessment:     Tera Griffiths is a 58 y.o. male with an SLP diagnosis of Dysphagia.      Subjective     Awake cues to rouse throughout session     Pain/Comfort:  Pain Rating 1: 0/10  Pain Rating Post-Intervention 1: 0/10    Respiratory Status: Room air    Objective:     Has the patient been evaluated by SLP for swallowing?   Yes  Keep patient NPO? Yes     Pt repositioned upright for session and required mod cues to rouse initially. Ice chips placed to lips with head turn and  tight labial seal  across attempts. SLP provided oral care to increase participation. Straw and spoon placed to lips with no attempt to orally accept trials. Pt made no attempts to verbalize, answer yes/no questions or follow single step commands despite max cues. Pt occasionally grunted during session consisting of grunts. Recommend continue NPO diet at this time. SLP will follow up.     Goals:   Multidisciplinary Problems       SLP Goals          Problem: SLP    Goal Priority Disciplines Outcome   SLP Goal     SLP Ongoing, Progressing   Description: Speech Language Pathology Goals  Goals expected to be met by 8/15  1. Pt will participate in ongoing assessment of swallow.   2. Pt will answer simple y/n questions with 60% accy given max cues.   3. Pt will follow simple commands with 50% accy given max cues.   4. Pt will vocalize in response to any stim x5/session given max cues.   5. Pt will localize to stim on R x2/session given max cues.                               Plan:     Patient to be seen:  4 x/week   Plan of Care expires:  09/07/23  Plan of Care reviewed with:  patient   SLP Follow-Up:  Yes       Discharge recommendations:   (tbd)       Time Tracking:     SLP Treatment Date:   08/09/23  Speech Start Time:  1004  Speech Stop Time:  1013     Speech Total Time (min):  9 min    Billable Minutes: Speech Therapy Individual 5 and Treatment Swallowing Dysfunction 9    08/09/2023

## 2023-08-09 NOTE — HOSPITAL COURSE
08/09/2023: NAEON. Patient on minimum intensity heparin gtt for PE, most recent PTT near therapeutic level (38.8); will continue titrating to goal. Patient is restless and agitated, will start Precedex gtt. TTE with no LA thrombus.  08/10/2023: NAEON. Plan for PEG tube tomorrow, will hold heparin starting at midnight. Heparin therapeutic x 2; repeat CTH stable. POA confirmed as Zahida Dave per document signed January 2023.   08/11/2023: NAEON. Heparin held at midnight for PEG tube placement today with GI.  08/12/2023: GI unable to place PEG, NGT placed. IR re-consulted for PEG placement. However, NGT now clogged, patient with no enteral access. General surgery consulted for PEG placement.   08/13/2023: NGT able to be flushed through distal port. IR reconsulted for PEG tube. Hold heparin gtt at midnight. Contrast ordered for midnight to be given through NGT.   8/14/2023: Plan for G-tube on Thursday, Holding heparin on Wednesday at [facility], Ordered for pacer interrogation prior to surgery.   8/15/2023: LR bolus x 1 and repeat prn, pacer interrogation planned for today  08/16/2023: No events.   8/17/23: Peg today  08/18/2023: Restarted on Precedex gtt overnight for agitation. Weaned off this morning, clonidine started. Monitoring QTC, EKG pending. S/p PEG tube placement POD 1. Advancing TF. Transition Heparin gtt to apixaban. Stable to SD to VN.
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
## 2023-08-09 NOTE — ANESTHESIA POSTPROCEDURE EVALUATION
Anesthesia Post Evaluation    Patient: Tera Griffiths    Procedure(s) Performed: Procedure(s) (LRB):  ANGIOGRAM-CEREBRAL (N/A)    Final Anesthesia Type: general      Patient location during evaluation: ICU  Patient participation: No - Unable to Participate, Coma/Other Inability to Communicate  Level of consciousness: confused  Post-procedure vital signs: reviewed and stable  Pain management: adequate  Airway patency: patent  MIGUEL mitigation strategies: Extubation while patient is awake and Verification of full reversal of neuromuscular block  PONV status at discharge: No PONV  Anesthetic complications: no      Cardiovascular status: stable (Afib)  Respiratory status: unassisted and spontaneous ventilation  Hydration status: euvolemic  Follow-up not needed.          Vitals Value Taken Time   /70 08/08/23 1901   Temp 36.6 °C (97.9 °F) 08/08/23 1501   Pulse 86 08/08/23 1925   Resp 29 08/08/23 1925   SpO2 95 % 08/08/23 1925   Vitals shown include unvalidated device data.      No case tracking events are documented in the log.      Pain/Joel Score: Pain Rating Post Med Admin: 0 (8/8/2023 11:24 AM)
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
## 2023-08-09 NOTE — PLAN OF CARE
After multiple unsuccessful attempts at placing an NGT, including after Versed yesterday and on Precedex today, GI was consulted for placement of a PEG tube. At this time, it is unlikely that the patient will pass a swallow test in the near future. That being said, we believe it is imperative to establish access in order to continue his po medication; including GDMT for his heart failure. We spoke to his sister at bedside, who has consented to an early PEG. Will follow with GI. IR unable to place PEG as barium is needed for placement and patient unable to swallow/no NGT.    Additionally, his sister claims that a few months ago, the patient filled out paperwork designating her as his POA. In the most recent document on Epic (01/2023), his ex-wife Zahida Dave is designated as his POA. His sister was advised to bring in her copy of the new POA document.    Lucía Ledezma MD  IM-Neurology, PGY-1

## 2023-08-09 NOTE — ASSESSMENT & PLAN NOTE
- PMHx of multiple subsegmental Pes on CTAs 9/2021 & 12/2021  - 8/8: CTA Evidence of pulmonary embolism involving the right distal interlobar artery and the segmental/subsegmental bilateral posterior pulmonary arteries without heart strain.   - On therapeutic minimum intensity heparin gtt

## 2023-08-09 NOTE — ASSESSMENT & PLAN NOTE
- Baseline Cr 1.7  - At/below baseline  - Strict I/O's  - Avoid nephrotoxic agents where appropriate  - Renally-dose meds  - Daily CMP, phos, Mg  - Monitor lytes, replete Mg/phos/K to > 2/3/4

## 2023-08-09 NOTE — PLAN OF CARE
08/09/23 1626   Post-Acute Status   Post-Acute Authorization Placement   Post-Acute Placement Status Referrals Sent  (rehab)     SW attempted to meet with the Pt family at bedside, but no one was present and Pt is not able to participate in dc conversation.  Spoke with Pt sister to review discharge recommendation of rehab and she is agreeable to possible plan. Discussed rehab criteria as being able to tolerate three hours of therapy and need for a MD to see Pt three times a week. If this changes, plan B would be a SNF facility.     Reported provided list Rehab facilities in-network with patient's payor plan are at bedside. Notified that blast referral sent to below listed facilities from list based on proximity to home/family support:   [facility] (likely preference per sister)  2.   [facility]  3.   [facility]    If an additional preferred facility not listed above is identified, additional referral to be sent. If above facilities unable to accept, will send additional referrals to in-network providers.     Naomi Hong LCSW  Neurocritical Care   [facility]  [id number]

## 2023-08-09 NOTE — ASSESSMENT & PLAN NOTE
- s/p [facility] (11/2022)  -D/C Eliquis, on therapeutic heparin  - Rate controlled on admit  - Holding Metoprolol 75mg QD & Amiodarone 200mg BID; no access

## 2023-08-09 NOTE — ASSESSMENT & PLAN NOTE
- Continue home medications; no access; Cardene gtt if needed  - See Acute on chronic combined systolic and diastolic heart failure

## 2023-08-10 ENCOUNTER — ANESTHESIA EVENT (OUTPATIENT)
Dept: ENDOSCOPY | Facility: HOSPITAL | Age: 58
DRG: 280 | End: 2023-08-10
Payer: MEDICAID

## 2023-08-10 PROBLEM — R13.10 DYSPHAGIA: Status: ACTIVE | Noted: 2023-08-10

## 2023-08-10 PROBLEM — N17.9 AKI (ACUTE KIDNEY INJURY): Status: ACTIVE | Noted: 2023-08-10

## 2023-08-10 LAB
ALBUMIN SERPL BCP-MCNC: 3.8 G/DL (ref 3.5–5.2)
ALP SERPL-CCNC: 72 U/L (ref 55–135)
ALT SERPL W/O P-5'-P-CCNC: 7 U/L (ref 10–44)
ANION GAP SERPL CALC-SCNC: 17 MMOL/L (ref 8–16)
APTT PPP: 44.2 SEC (ref 21–32)
AST SERPL-CCNC: 14 U/L (ref 10–40)
BASOPHILS # BLD AUTO: 0.02 K/UL (ref 0–0.2)
BASOPHILS NFR BLD: 0.3 % (ref 0–1.9)
BILIRUB SERPL-MCNC: 1 MG/DL (ref 0.1–1)
BUN SERPL-MCNC: 39 MG/DL (ref 6–20)
CALCIUM SERPL-MCNC: 10.2 MG/DL (ref 8.7–10.5)
CHLORIDE SERPL-SCNC: 104 MMOL/L (ref 95–110)
CO2 SERPL-SCNC: 20 MMOL/L (ref 23–29)
CREAT SERPL-MCNC: 2.2 MG/DL (ref 0.5–1.4)
DIFFERENTIAL METHOD: ABNORMAL
EOSINOPHIL # BLD AUTO: 0 K/UL (ref 0–0.5)
EOSINOPHIL NFR BLD: 0.3 % (ref 0–8)
ERYTHROCYTE [DISTWIDTH] IN BLOOD BY AUTOMATED COUNT: 16.2 % (ref 11.5–14.5)
EST. GFR  (NO RACE VARIABLE): 34.3 ML/MIN/1.73 M^2
GLUCOSE SERPL-MCNC: 103 MG/DL (ref 70–110)
HCT VFR BLD AUTO: 52.8 % (ref 40–54)
HGB BLD-MCNC: 16 G/DL (ref 14–18)
IMM GRANULOCYTES # BLD AUTO: 0.02 K/UL (ref 0–0.04)
IMM GRANULOCYTES NFR BLD AUTO: 0.3 % (ref 0–0.5)
LYMPHOCYTES # BLD AUTO: 1 K/UL (ref 1–4.8)
LYMPHOCYTES NFR BLD: 16.2 % (ref 18–48)
MAGNESIUM SERPL-MCNC: 2.4 MG/DL (ref 1.6–2.6)
MCH RBC QN AUTO: 26.5 PG (ref 27–31)
MCHC RBC AUTO-ENTMCNC: 30.3 G/DL (ref 32–36)
MCV RBC AUTO: 88 FL (ref 82–98)
MONOCYTES # BLD AUTO: 0.7 K/UL (ref 0.3–1)
MONOCYTES NFR BLD: 11.3 % (ref 4–15)
NEUTROPHILS # BLD AUTO: 4.4 K/UL (ref 1.8–7.7)
NEUTROPHILS NFR BLD: 71.6 % (ref 38–73)
NRBC BLD-RTO: 0 /100 WBC
PHOSPHATE SERPL-MCNC: 4.9 MG/DL (ref 2.7–4.5)
PLATELET # BLD AUTO: 338 K/UL (ref 150–450)
PMV BLD AUTO: 11.9 FL (ref 9.2–12.9)
POCT GLUCOSE: 84 MG/DL (ref 70–110)
POCT GLUCOSE: 99 MG/DL (ref 70–110)
POTASSIUM SERPL-SCNC: 4.5 MMOL/L (ref 3.5–5.1)
PROT SERPL-MCNC: 8.3 G/DL (ref 6–8.4)
RBC # BLD AUTO: 6.03 M/UL (ref 4.6–6.2)
SODIUM SERPL-SCNC: 141 MMOL/L (ref 136–145)
TROPONIN I SERPL DL<=0.01 NG/ML-MCNC: 0.05 NG/ML (ref 0–0.03)
TROPONIN I SERPL DL<=0.01 NG/ML-MCNC: 0.05 NG/ML (ref 0–0.03)
WBC # BLD AUTO: 6.19 K/UL (ref 3.9–12.7)

## 2023-08-10 PROCEDURE — 97535 SELF CARE MNGMENT TRAINING: CPT

## 2023-08-10 PROCEDURE — 92526 ORAL FUNCTION THERAPY: CPT

## 2023-08-10 PROCEDURE — 84100 ASSAY OF PHOSPHORUS: CPT | Performed by: PHYSICIAN ASSISTANT

## 2023-08-10 PROCEDURE — 99233 PR SUBSEQUENT HOSPITAL CARE,LEVL III: ICD-10-PCS | Mod: ,,, | Performed by: PSYCHIATRY & NEUROLOGY

## 2023-08-10 PROCEDURE — 84484 ASSAY OF TROPONIN QUANT: CPT | Mod: 91

## 2023-08-10 PROCEDURE — 85730 THROMBOPLASTIN TIME PARTIAL: CPT

## 2023-08-10 PROCEDURE — 85025 COMPLETE CBC W/AUTO DIFF WBC: CPT | Performed by: PHYSICIAN ASSISTANT

## 2023-08-10 PROCEDURE — 63600175 PHARM REV CODE 636 W HCPCS

## 2023-08-10 PROCEDURE — 99233 SBSQ HOSP IP/OBS HIGH 50: CPT | Mod: ,,, | Performed by: PSYCHIATRY & NEUROLOGY

## 2023-08-10 PROCEDURE — 80053 COMPREHEN METABOLIC PANEL: CPT | Performed by: PHYSICIAN ASSISTANT

## 2023-08-10 PROCEDURE — 99291 CRITICAL CARE FIRST HOUR: CPT | Mod: ,,, | Performed by: PSYCHIATRY & NEUROLOGY

## 2023-08-10 PROCEDURE — 27000221 HC OXYGEN, UP TO 24 HOURS

## 2023-08-10 PROCEDURE — 97116 GAIT TRAINING THERAPY: CPT

## 2023-08-10 PROCEDURE — 92507 TX SP LANG VOICE COMM INDIV: CPT

## 2023-08-10 PROCEDURE — 94761 N-INVAS EAR/PLS OXIMETRY MLT: CPT

## 2023-08-10 PROCEDURE — 25000003 PHARM REV CODE 250

## 2023-08-10 PROCEDURE — 97112 NEUROMUSCULAR REEDUCATION: CPT

## 2023-08-10 PROCEDURE — 97166 OT EVAL MOD COMPLEX 45 MIN: CPT

## 2023-08-10 PROCEDURE — 99900035 HC TECH TIME PER 15 MIN (STAT)

## 2023-08-10 PROCEDURE — 83735 ASSAY OF MAGNESIUM: CPT | Performed by: PHYSICIAN ASSISTANT

## 2023-08-10 PROCEDURE — 63600175 PHARM REV CODE 636 W HCPCS: Performed by: STUDENT IN AN ORGANIZED HEALTH CARE EDUCATION/TRAINING PROGRAM

## 2023-08-10 PROCEDURE — 20000000 HC ICU ROOM

## 2023-08-10 PROCEDURE — 99291 PR CRITICAL CARE, E/M 30-74 MINUTES: ICD-10-PCS | Mod: ,,, | Performed by: PSYCHIATRY & NEUROLOGY

## 2023-08-10 RX ADMIN — HEPARIN SODIUM AND DEXTROSE 16 UNITS/KG/HR: 10000; 5 INJECTION INTRAVENOUS at 11:08

## 2023-08-10 RX ADMIN — SODIUM CHLORIDE 250 ML: 9 INJECTION, SOLUTION INTRAVENOUS at 02:08

## 2023-08-10 RX ADMIN — DEXMEDETOMIDINE HYDROCHLORIDE 0.2 MCG/KG/HR: 4 INJECTION INTRAVENOUS at 11:08

## 2023-08-10 RX ADMIN — SODIUM CHLORIDE 250 ML: 9 INJECTION, SOLUTION INTRAVENOUS at 09:08

## 2023-08-10 RX ADMIN — ASPIRIN 300 MG: 300 SUPPOSITORY RECTAL at 08:08

## 2023-08-10 NOTE — PT/OT/SLP PROGRESS
Physical Therapy Treatment    Patient Name:  Tera Griffiths   MRN:  [id number]  Co-tx w/ OT 2/2 suspected pt complexity and requirement of 2 skilled therapists to assist in order to maximize pt treatment   Recommendations:     Discharge Recommendations: rehabilitation facility  Discharge Equipment Recommendations: to be determined by next level of care  Barriers to discharge: Inaccessible home    Assessment:     Tera Griffiths is a 58 y.o. male admitted with a medical diagnosis of Arterial ischemic stroke, MCA (middle cerebral artery), left, acute.  He presents with the following impairments/functional limitations: weakness, impaired endurance, impaired self care skills, impaired functional mobility, gait instability, impaired balance, impaired cognition, decreased coordination, decreased upper extremity function, decreased lower extremity function, visual deficits, impaired fine motor. Pt w/ good strength and mobility, moving all extremities spontaneously w/ full strength but not following any commands. Pt fixated on trying to pull off lines/tubes needing constant HHA for safety. He requires therapist to physically initiate all functional mobility but then is able to assist for transfers and ambulation. Rec d/c to IPR for continued treatment in order to maximize functional return as pt w/ high motivation, good activity tolerance, and w/ potential for significant improvements in functional mobility.      Rehab Prognosis: Good; patient would benefit from acute skilled PT services to address these deficits and reach maximum level of function.    Recent Surgery: Procedure(s) (LRB):  ANGIOGRAM-CEREBRAL (N/A) 2 Days Post-Op    Plan:     During this hospitalization, patient to be seen 4 x/week to address the identified rehab impairments via gait training, therapeutic activities, therapeutic exercises, neuromuscular re-education and progress toward the following goals:    Plan of Care Expires:  09/08/23    Subjective      Chief Complaint: ASHLEY 2/2 nonverbal AMS  Patient/Family Comments/goals: ASHLEY 2/2 nonverbal AMS  Pain/Comfort:  Location 1:  (ASHLEY 2/2 nonverbal AMS)  Pain Addressed 1: Reposition, Distraction      Objective:     Communicated with Rn prior to session.  Patient found HOB elevated with telemetry, blood pressure cuff, pulse ox (continuous), restraints, peripheral IV, Condom Catheter upon PT entry to room.     General Precautions: Standard, aspiration, aphasia, fall, NPO  Orthopedic Precautions: N/A  Braces: N/A  Respiratory Status: Nasal cannula, flow 4 L/min     Functional Mobility:  Bed Mobility:     Supine to Sit: moderate assistance  Sit to Supine: moderate assistance  Transfers:     Sit to Stand:  minimum assistance and of 2 persons with hand-held assist  Gait: 4' to the L w/ side steps Gopal x2 HHA  Balance: EOB sitting balance Gopal-SBA  for 15min      AM-PAC 6 CLICK MOBILITY  Turning over in bed (including adjusting bedclothes, sheets and blankets)?: 2  Sitting down on and standing up from a chair with arms (e.g., wheelchair, bedside commode, etc.): 2  Moving from lying on back to sitting on the side of the bed?: 2  Moving to and from a bed to a chair (including a wheelchair)?: 2  Need to walk in hospital room?: 2  Climbing 3-5 steps with a railing?: 1  Basic Mobility Total Score: 11       Treatment & Education:  Pt educated on PT POC/goals, d/c recs, and continued treatment. All questions answered and pt in agreement w/ POC.  Sitting balance w/cueing for safety, upright posture, and attention to task  Gait training w/ physical cueing for each step w/ good step width and height    Patient left HOB elevated with all lines intact, call button in reach, bed alarm on, restraints reapplied at end of session, and RN present..    GOALS:   Multidisciplinary Problems       Physical Therapy Goals          Problem: Physical Therapy    Goal Priority Disciplines Outcome Goal Variances Interventions   Physical Therapy Goal      PT, PT/OT Ongoing, Progressing     Description: Goals to be completed by: 9/8/23    Pt will perform sup<>sit transfers w/ minimum assistance  Pt will have sufficient dynamic balance to sit EOB while performing ADLs/therex w/ stand by assistance for 10 min  Pt will be able to stand up from EOB w/ moderate assistance using LRAD  Pt will ambulate 20 feet w/ maximal assistance using LRAD  Pt will be independent w/ HEP therex on BLE w/ good form and ROM   Pt will follow >50 % of single-step commands throughout treatment session                        Time Tracking:     PT Received On: 08/10/23  PT Start Time: 0822     PT Stop Time: 0845  PT Total Time (min): 23 min     Billable Minutes: Gait Training 8 and Neuromuscular Re-education 15    Treatment Type: Treatment  PT/PTA: PT     Number of PTA visits since last PT visit: 0     08/10/2023

## 2023-08-10 NOTE — PT/OT/SLP PROGRESS
Speech Language Pathology Treatment    Patient Name:  Tera Griffiths   MRN:  [id number]  Admitting Diagnosis: Arterial ischemic stroke, MCA (middle cerebral artery), left, acute    Recommendations:                 General Recommendations:  Dysphagia therapy, Speech/language therapy, and Cognitive-linguistic therapy  Diet recommendations:  NPO, Liquid Diet Level: NPO   Aspiration Precautions: Strict aspiration precautions   General Precautions: Standard, aspiration, aphasia, NPO  Communication strategies:  provide increased time to answer    Assessment:     Tera Griffiths is a 58 y.o. male with an SLP diagnosis of Aphasia and Dysphagia.  He presents with limited participation and refusal of all PO trials.     Subjective     Nurse cleared for session.     Pain/Comfort:  Pain Rating 1:  (NAD)  Pain Rating Post-Intervention 1:  (NAD)    Respiratory Status: Nasal cannula    Objective:     Has the patient been evaluated by SLP for swallowing?   Yes  Keep patient NPO? Yes     Pt alert t/o session with limited attention to SLP. No response to simple y/n questions via any modality despite max cues.  Simple command followed x1 given max cues.  Audible wheeze/groaning noted though no purposeful voicing in response to prompts.  No visual attn to SLP on R.  No attempts to identify objects in field of 2.  Oral care attempted though pt turning head away and swatting at SLP with L hand mitt.  Puree placed on pt's lips though no attempt by pt to lick lips, using mitt to wipe pudding from lips.  Ice chip presented though pt turning head away from trials.  Additional trials deferred.  Education provided re: role of SLP, cont npo, and POC.  No response by pt.     Goals:   Multidisciplinary Problems       SLP Goals          Problem: SLP    Goal Priority Disciplines Outcome   SLP Goal     SLP Ongoing, Progressing   Description: Speech Language Pathology Goals  Goals expected to be met by 8/15  1. Pt will participate in ongoing  assessment of swallow.   2. Pt will answer simple y/n questions with 60% accy given max cues.   3. Pt will follow simple commands with 50% accy given max cues.   4. Pt will vocalize in response to any stim x5/session given max cues.   5. Pt will localize to stim on R x2/session given max cues.                              Plan:     Patient to be seen:  4 x/week   Plan of Care expires:  09/07/23  Plan of Care reviewed with:  patient   SLP Follow-Up:  Yes       Discharge recommendations:  rehabilitation facility   Barriers to Discharge:  Level of Skilled Assistance Needed      Time Tracking:     SLP Treatment Date:   08/10/23  Speech Start Time:  0722  Speech Stop Time:  0734     Speech Total Time (min):  12 min    Billable Minutes: Speech Therapy Individual 7 and Treatment Swallowing Dysfunction 5    08/10/2023

## 2023-08-10 NOTE — PLAN OF CARE
Problem: Occupational Therapy  Goal: Occupational Therapy Goal  Description: Goals to be met by: 9/8/23     Patient will increase functional independence with ADLs by performing:    UE Dressing with Moderate Assistance.  LE Dressing with Moderate Assistance.  Grooming while standing with Moderate Assistance.  Toileting from toilet with Moderate Assistance for hygiene and clothing management.     Outcome: Ongoing, Progressing

## 2023-08-10 NOTE — ANESTHESIA PREPROCEDURE EVALUATION
[facility]  Anesthesia Pre-Operative Evaluation   08/10/2023        Tera Griffiths, 1965  [id number]  Procedure(s) (LRB):  EGD (ESOPHAGOGASTRODUODENOSCOPY) (N/A)    Subjective    Tera Griffiths is a 58 y.o. male w/ a significant PMHx of HFrEF 10%, pHTN, CAD s/p  PCI 2021, AICD(Medtronic), AF s/p DCCV, HTN, CKD admitted for ADHF who was then stepped up to [facility] after  L MCA occlusion. Pt now has PE and is on heparin gtt. On entresto.     Patient now presents for above procedure(s).     Prev Airway:  Induction:  Rapid sequence induction    Intubated:  Postinduction    Mask Ventilation:  N/a    Attempts:  1    Attempted By:  CRNA    Method of Intubation:  Video laryngoscopy    Blade:  Villalba 3    Laryngeal View Grade: Grade I - full view of cords      Difficult Airway Encountered?: No      Complications:  None    Airway Device:  Oral endotracheal tube    Airway Device Size:  7.5    Style/Cuff Inflation:  Cuffed    Inflation Amount (mL):  7    Tube secured:  23    Secured at:  The lips    Placement Verified By:  Capnometry    Complicating Factors:  None    Findings Post-Intubation:  BS equal bilateral and atraumatic/condition of teeth unchanged    LDA:       Peripheral IV - Single Lumen 08/08/23 0030 20 G Posterior;Right Forearm (Active)   Site Assessment Clean;Dry;Intact 08/10/23 0800   Extremity Assessment Distal to IV No abnormal discoloration;No redness;No swelling;No warmth 08/09/23 1705   Line Status Infusing 08/09/23 1705   Dressing Status Clean;Dry;Intact 08/10/23 0800   Dressing Intervention Integrity maintained 08/09/23 1705   Dressing Change Due 08/12/23 08/09/23 1705   Site Change Due 08/12/23 08/09/23 0705   Reason Not Rotated Not due 08/09/23 1705   Number of days: 2            Peripheral IV - Single Lumen 08/09/23 1452 20 G Anterior;Right Forearm (Active)   Site Assessment Clean;Dry;Intact 08/10/23 0800   Dressing Status Clean;Dry;Intact 08/10/23 0800    Number of days: 0       Male External Urinary Catheter 08/09/23 1305 Large (Active)   Collection Container Standard drainage bag 08/10/23 0705   Skin no redness;no breakdown 08/10/23 0705   Tolerance no signs/symptoms of discomfort 08/10/23 0705   Output (mL) 200 mL 08/10/23 0616   Number of days: 0       Drips:    dexmedeTOMIDine (Precedex) infusion (titrating) Stopped (08/09/23 1143)    heparin (porcine) in D5W 16 Units/kg/hr (08/09/23 1805)       Patient Active Problem List   Diagnosis    CAD (coronary artery disease)    History of ST elevation myocardial infarction (STEMI)    Paroxysmal A-fib    Acute on chronic combined systolic and diastolic heart failure    Dyslipidemia    ICD (implantable cardioverter-defibrillator) in place    Primary hypertension    Arteriovenous malformation of cerebral vessels    NSTEMI (non-ST elevated myocardial infarction)    Stage 3 chronic kidney disease    Ischemic cardiomyopathy    Hepatitis B core antibody positive    Arterial ischemic stroke, MCA (middle cerebral artery), left, acute    BPH (benign prostatic hyperplasia)    Multiple subsegmental pulmonary emboli without acute cor pulmonale    Hemiparesis, right    Global aphasia    Acute pulmonary embolism without acute cor pulmonale       Review of patient's allergies indicates:  No Known Allergies    Current Inpatient Medications:    amiodarone  200 mg Oral BID    aspirin  300 mg Rectal Daily    atorvastatin  80 mg Oral Daily    clopidogreL  75 mg Oral Daily    ezetimibe  10 mg Oral QHS    ferrous sulfate  1 tablet Oral Daily    furosemide (LASIX) injection  80 mg Intravenous Q12H    isosorbide mononitrate  90 mg Oral Daily    metoprolol succinate  75 mg Oral Daily    sacubitriL-valsartan  1 tablet Oral BID    senna-docusate 8.6-50 mg  1 tablet Oral BID       No current facility-administered medications on file prior to encounter.     Current Outpatient Medications on File Prior to Encounter    Medication Sig Dispense Refill    amiodarone (PACERONE) 200 MG Tab Take 1 tablet (200 mg total) by mouth 2 (two) times daily. 60 tablet 3    apixaban (ELIQUIS) 5 mg Tab Take 5 mg by mouth 2 (two) times daily.      atorvastatin (LIPITOR) 80 MG tablet Take 1 tablet (80 mg total) by mouth once daily. 30 tablet 11    clopidogreL (PLAVIX) 75 mg tablet Take 1 tablet (75 mg total) by mouth once daily. 30 tablet 11    empagliflozin (JARDIANCE) 10 mg tablet Take 1 tablet (10 mg total) by mouth once daily. 30 tablet 1    ezetimibe (ZETIA) 10 mg tablet Take 10 mg by mouth once daily.      ferrous sulfate (FEOSOL) 325 mg (65 mg iron) Tab tablet Take 325 mg by mouth every other day.      LIDOcaine (LIDODERM) 5 % Place 1 patch onto the skin once daily.      metoprolol succinate (TOPROL-XL) 25 MG 24 hr tablet Take 1 tablet (25 mg total) by mouth every evening. 30 tablet 11    oxyCODONE-acetaminophen (PERCOCET) 5-325 mg per tablet Take 1 tablet by mouth every 6 (six) hours as needed for Pain. 10 each 0    sacubitriL-valsartan (ENTRESTO) 24-26 mg per tablet Take 1 tablet by mouth 2 (two) times daily. 60 tablet 11    torsemide (DEMADEX) 20 MG Tab Take 1 tablet (20 mg total) by mouth once daily. 30 tablet 11       Past Surgical History:   Procedure Laterality Date    CARDIAC DEFIBRILLATOR PLACEMENT Left     CEREBRAL ANGIOGRAM N/A 8/8/2023    Procedure: ANGIOGRAM-CEREBRAL;  Surgeon: Kenzie Rodriguez;  Location: [facility];  Service: Anesthesiology;  Laterality: N/A;  LVO (angiogram)    HERNIA REPAIR      LEFT HEART CATHETERIZATION Left 4/18/2023    Procedure: Left heart cath;  Surgeon: Atilio Marks MD;  Location: [facility] CATH LAB;  Service: Cardiology;  Laterality: Left;    RIGHT HEART CATHETERIZATION Right 9/30/2022    Procedure: INSERTION, CATHETER, RIGHT HEART;  Surgeon: Caden Aden MD;  Location: [facility] CATH LAB;  Service: Cardiology;  Laterality: Right;    TREATMENT OF CARDIAC ARRHYTHMIA N/A 11/22/2022     Procedure: CARDIOVERSION;  Surgeon: Marvin Sanon MD;  Location: [facility] EP LAB;  Service: Cardiology;  Laterality: N/A;  afib, KIA, DCCV, anes, MB, 3 Prep       Social History:  Tobacco Use: Low Risk  (8/9/2023)    Patient History     Smoking Tobacco Use: Never     Smokeless Tobacco Use: Never     Passive Exposure: Not on file       Alcohol Use: Not At Risk (8/6/2023)    AUDIT-C     Frequency of Alcohol Consumption: Never     Average Number of Drinks: Patient does not drink     Frequency of Binge Drinking: Never       Objective    Vital Signs Range:  BMI Readings from Last 1 Encounters:   08/08/23 24.28 kg/m²       Temp:  [37.1 °C (98.8 °F)]   Pulse:  [53-67]   Resp:  [17-38]   BP: ()/(57-88)   SpO2:  [89 %-98 %]        Significant Labs:        Component Value Date/Time    WBC 6.19 08/10/2023 0013    HGB 16.0 08/10/2023 0013    HCT 52.8 08/10/2023 0013    HCT 46 06/08/2023 1136     08/10/2023 0013     08/10/2023 0013    K 4.5 08/10/2023 0013     08/10/2023 0013    CO2 20 (L) 08/10/2023 0013     08/10/2023 0013    BUN 39 (H) 08/10/2023 0013    CREATININE 2.2 (H) 08/10/2023 0013    MG 2.4 08/10/2023 0013    PHOS 4.9 (H) 08/10/2023 0013    CALCIUM 10.2 08/10/2023 0013    ALBUMIN 3.8 08/10/2023 0013    PROT 8.3 08/10/2023 0013    ALKPHOS 72 08/10/2023 0013    BILITOT 1.0 08/10/2023 0013    AST 14 08/10/2023 0013    ALT 7 (L) 08/10/2023 0013    INR 1.1 08/08/2023 1838    HGBA1C 5.6 08/05/2023 1738        Please see Results Review for additional labs.     Diagnostic Studies: All relevant studies, reviewed.      EKG:   Results for orders placed or performed during the hospital encounter of 08/05/23   EKG, 12 - Lead    Collection Time: 08/08/23 12:54 AM    Narrative    Test Reason : I63.512,    Vent. Rate : 073 BPM     Atrial Rate : 234 BPM     P-R Int : 000 ms          QRS Dur : 152 ms      QT Int : 504 ms       P-R-T Axes : 000 011 199 degrees     QTc Int : 555 ms    Atrial  fibrillation with a demand pacemaker  Intraventricular conduction delay  Abnormal ECG  When compared with ECG of 07-AUG-2023 23:57,  No significant change was found  Confirmed by Kristen Machuca MD ([id number]) on 8/8/2023 3:46:12 PM    Referred By: GEORGIEERR   SELF           Confirmed By:Kristen Machuca MD       ECHO:  Results for orders placed during the hospital encounter of 08/05/23    Echo Saline Bubble? Yes    Interpretation Summary    Left Ventricle: The left ventricle is moderately dilated. Increased ventricular mass. Normal wall thickness. There is moderate eccentric hypertrophy. Severe global hypokinesis present. Septal motion is consistent with pacing. There is severely reduced systolic function with a visually estimated ejection fraction of 15 - 20%. Ejection fraction by visual approximation is 20%. Unable to assess diastolic function due to arrhythmia.    Left Atrium: Left atrium is severely dilated. Radha 85mL/m2.    Right Ventricle: Mild right ventricular enlargement. Wall thickness is normal. Right ventricle wall motion  is normal. Systolic function is severely reduced. Pacemaker lead present in the ventricle.    Right Atrium: Right atrium is severely dilated.    Aortic Valve: There is mild aortic valve sclerosis. There is normal leaflet mobility. There is no significant regurgitation.    Mitral Valve: There is bileaflet sclerosis. There is normal leaflet mobility. There is mild regurgitation.    Tricuspid Valve: The tricuspid valve is structurally normal. There is normal leaflet mobility. There is mild regurgitation.    Pulmonic Valve: The pulmonic valve is structurally normal. There is normal leaflet mobility. There is no significant regurgitation.    Aorta: Aortic root is normal in size measuring 3.13 cm. Ascending aorta is normal measuring 3.24 cm.    IVC/SVC: Normal venous pressure at 3 mmHg.    Pericardium: The pericardium is normal. There is no pericardial effusion.        Pre-op  Assessment    I have reviewed the Patient Summary Reports.     I have reviewed the Nursing Notes. I have reviewed the NPO Status.   I have reviewed the Medications.     Review of Systems  Anesthesia Hx:  Neg history of prior surgery. Denies Family Hx of Anesthesia complications.   Denies Personal Hx of Anesthesia complications.   Social:  Non-Smoker    Hematology/Oncology:         -- Denies Anemia:   Cardiovascular:   Pacemaker Hypertension Past MI CAD   Denies CABG/stent.   Denies CHF. ECG has been reviewed.    Pulmonary:   Denies COPD.  Denies Asthma.    Renal/:   Chronic Renal Disease    Hepatic/GI:   Denies GERD. Denies Liver Disease.    Musculoskeletal:  Denies Spine Disorders    Neurological:   CVA Denies Seizures.    Endocrine:   Denies Diabetes. Denies Hypothyroidism.        Physical Exam  General: Confusion and Somnolent    Airway:  Mallampati: III / II  Mouth Opening: Normal  TM Distance: Normal  Tongue: Normal  Neck ROM: Normal ROM    Dental:  Periodontal disease        Anesthesia Plan  Type of Anesthesia, risks & benefits discussed:    Anesthesia Type: Gen ETT, Gen Supraglottic Airway, MAC, Gen Natural Airway  Intra-op Monitoring Plan: Standard ASA Monitors  Post Op Pain Control Plan: multimodal analgesia and IV/PO Opioids PRN  Induction:  IV  Airway Plan: Direct and Video, Post-Induction  Informed Consent: Informed consent signed with the Patient and all parties understand the risks and agree with anesthesia plan.  All questions answered.   ASA Score: 4  Day of Surgery Review of History & Physical: H&P Update referred to the surgeon/provider.    Ready For Surgery From Anesthesia Perspective.     .
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
## 2023-08-10 NOTE — ASSESSMENT & PLAN NOTE
- Cr today 2.2 from 1.7 yesterday  - Holding Lasix today, will re-evaluate tomorrow  - 250 cc NS bolus

## 2023-08-10 NOTE — PT/OT/SLP PROGRESS
Occupational Therapy   Treatment    Name: Tera Griffiths  MRN: [id number]  Admitting Diagnosis:  Arterial ischemic stroke, MCA (middle cerebral artery), left, acute  2 Days Post-Op    Recommendations:     Discharge Recommendations: rehabilitation facility  Discharge Equipment Recommendations:   (TBD)  Barriers to discharge:   (Unknown support and home environment.)    Assessment:     Tera Griffiths is a 58 y.o. male with a medical diagnosis of Arterial ischemic stroke, MCA (middle cerebral artery), left, acute.  He presents with performance deficits affecting function including weakness, impaired endurance, impaired self care skills, impaired functional mobility, gait instability, impaired balance, decreased upper extremity function, decreased lower extremity function, decreased coordination, decreased safety awareness, abnormal tone, decreased ROM, impaired fine motor, visual deficits, impaired cognition.   Pt tolerated Tx without incident but he continues to require significant assist perform self care tasks, functional mobility and functional transfers .  Pt demonstrating severe (R) side inattention and neglect and frequently attempts to grab restraints and reach for any line or strap within reach.  He needs consistent hands on to avoid his pulling out of lines.  He would continue to benefit from OT intervention to further his functional (I)ce and safety.     Rehab Prognosis:  Good; patient would benefit from acute skilled OT services to address these deficits and reach maximum level of function.       Plan:     Patient to be seen 4 x/week to address the above listed problems via self-care/home management, therapeutic activities, neuromuscular re-education, cognitive retraining  Plan of Care Expires: 09/11/23  Plan of Care Reviewed with: patient    Subjective     Chief Complaint: Pt non verbal during Tx session   Patient/Family Comments/goals: None stated  Pain/Comfort:  Pain Rating 1:  (Pt without  vocalization but with no apparent pain)  Pain Rating Post-Intervention 1:  (Pt without vocalization but with no apparent pain)    Objective:     Communicated with: nurse prior to session.  Patient found supine with peripheral IV, telemetry, restraints, pulse ox (continuous), blood pressure cuff, Condom Catheter upon OT entry to room.    General Precautions: Standard, aspiration, aphasia, NPO, fall    Orthopedic Precautions:N/A  Braces: N/A  Respiratory Status: Nasal caNULA 4 LIT     Occupational Performance:     Bed Mobility:    Patient completed Rolling/Turning to Left with  moderate assistance  Patient completed Scooting/Bridging with moderate assistance  Patient completed Supine to Sit with moderate assistance  Patient completed Sit to Supine with moderate assistance   Pt was able to sit EOB for 10 minutes with SBA to Min (A) required with Pt making several attempts to grab lines and straps with (A) to keep Pt from pulling at lines.     Functional Mobility/Transfers:  Patient completed Sit <> Stand Transfer with minimum assistance and of 2 persons  with  hand-held assist   Functional Mobility: Pt was able to take 4 side steps toward HOB with Min (A) x 2 helpers.     Activities of Daily Living:  Grooming: Grooming: total assistance with Pt unable to follow command to wash his face even with hand over hand assistance. Pt appeared confused and unclear of what to do with towel even with cueing.    [facility] 6 Click ADL: 6    Treatment & Education:  Pt edu on POC, but with no signs of comprehending instructions.  Pt remained non verbal throughout session with gaze to (L) of midline and never approaching midline.      Patient left supine with all lines intact and restraints reapplied at end of session    GOALS:   Multidisciplinary Problems       Occupational Therapy Goals          Problem: Occupational Therapy    Goal Priority Disciplines Outcome Interventions   Occupational Therapy Goal     OT, PT/OT Ongoing, Progressing     Description: Goals to be met by: 9/8/23     Patient will increase functional independence with ADLs by performing:    UE Dressing with Moderate Assistance.  LE Dressing with Moderate Assistance.  Grooming while standing with Moderate Assistance.  Toileting from toilet with Moderate Assistance for hygiene and clothing management.                          Time Tracking:     OT Date of Treatment: 08/10/23  OT Start Time: 0820  OT Stop Time: 0843  OT Total Time (min): 23 min    Billable Minutes:Self Care/Home Management 10  Therapeutic Activity 13               8/10/2023

## 2023-08-10 NOTE — CARE UPDATE
Cardiology Update:    Asked by primary team to evaluate EKG changes on telemetry and concerns for ischemia. Reviewed telemetry and remains in Afib with RVR with an LBBB and ST changes secondary to LBBB. When HR increases, LBBB widens consistent with aberrancy. No evidence of ischemic ST-T changes on telemetry on EKG. Troponin 0.04 and consistent with prior levels earlier in admission. Electrolytes WNL with EMERSON Cr 2.2. Beside echo with small, collapsible IVC.     Recommend current regimen for Afib as rate-controlled (HR < 110bpm)  Continue anticoagulation  Holding diuretics as euvolemic on exam and EMERSON  Keep K>4, Mag>2; strict I/Os, daily weights  Monitor on telemetry  Plan for PEG tomorrow with GI in order to reliably take PO  No other cardiac testing needed prior to procedure    Relayed to primary team as well. Contact cardiology for any further questions.    Juan Saxena MD PGY6  Cardiovascular Medicine Fellow  [facility]  Pager: [phone]

## 2023-08-10 NOTE — PLAN OF CARE
[facility] - Neuro Critical Care  Discharge Reassessment    Primary Care Provider: Carleen Bueno MD    Expected Discharge Date: 8/14/2023  Per RN:  - Heparin gtt Therapeutic x2  - Precedex used intermittently for agitation   - OR today for PEG placement    Pt may be medically ready for dc soon. Plan: rehab  Preference currently [facility]- spoke with Carlene in admissions ([phone]) and they are following. Pt would need to be bridged and tolerating TF. He would also need to do a bit more with therapy.    Received careport message from Gin with [facility] ([phone]) reporting they are following and will see the Pt today.    Reassessment (most recent)       Discharge Reassessment - 08/10/23 1010          Discharge Reassessment    Assessment Type Discharge Planning Reassessment (P)      Did the patient's condition or plan change since previous assessment? No (P)      Discharge Plan discussed with: Sibling (P)      Name(s) and Number(s) Claribel Grfifiths (sister)[phone] (P)      Communicated YUSEF with patient/caregiver Date not available/Unable to determine (P)      Discharge Plan A Rehab (P)      Discharge Plan B Rehab (P)      DME Needed Upon Discharge  none (P)      Transition of Care Barriers None (P)      Why the patient remains in the hospital Requires continued medical care (P)         Post-Acute Status    Discharge Delays None known at this time (P)                      Naomi Hong LCSW  Neurocritical Care   [facility]  [id number]

## 2023-08-10 NOTE — PLAN OF CARE
[facility] Care Plan    POC reviewed with Tera Griffiths and family at 0300. Pt family verbalized understanding. Questions and concerns addressed. No acute events overnight. Pt progressing toward goals. Will continue to monitor. See below and flowsheets for full assessment and VS info.     - Heparin gtt Therapeutic x2  - CTH completed   - Linens changed bath given   - Precedex used intermittently for agitation   - OR today for PEG placement         Is this a stroke patient? yes- Stroke booklet reviewed with patient and family, risk factors identified for patient and stroke booklet remains at bedside for ongoing education.     Neuro:  [place] Coma Scale  Best Eye Response: 4-->(E4) spontaneous  Best Motor Response: 5-->(M5) localizes pain  Best Verbal Response: 1-->(V1) none  [place] Coma Scale Score: 10  Assessment Qualifiers: patient not sedated/intubated  Pupil PERRLA: yes     24hr Temp:  [97.8 °F (36.6 °C)-98.8 °F (37.1 °C)]     CV:   Rhythm: paced rhythm, atrial rhythm  BP goals:   SBP < 180  MAP > 65    Resp:           Plan: N/A    GI/:     Diet/Nutrition Received: NPO  Last Bowel Movement: 08/06/23  Voiding Characteristics: urethral catheter (bladder)    Intake/Output Summary (Last 24 hours) at 8/10/2023 0619  Last data filed at 8/10/2023 0616  Gross per 24 hour   Intake 275.61 ml   Output 890 ml   Net -614.39 ml     Unmeasured Output  Urine Occurrence: 1  Stool Occurrence: 0  Emesis Occurrence: 0  Pad Count: 2    Labs/Accuchecks:  Recent Labs   Lab 08/10/23  0013   WBC 6.19   RBC 6.03   HGB 16.0   HCT 52.8         Recent Labs   Lab 08/10/23  0013      K 4.5   CO2 20*      BUN 39*   CREATININE 2.2*   ALKPHOS 72   ALT 7*   AST 14   BILITOT 1.0      Recent Labs   Lab 08/08/23  1838 08/09/23  0045 08/10/23  0013   INR 1.1  --   --    APTT 29.6   < > 44.2*    < > = values in this interval not displayed.      Recent Labs   Lab 08/05/23  1738   TROPONINI 0.059*       Electrolytes: N/A -  electrolytes WDL  Accuchecks: Q6H    Gtts:   dexmedeTOMIDine (Precedex) infusion (titrating) Stopped (08/09/23 1143)    heparin (porcine) in D5W 16 Units/kg/hr (08/09/23 1805)       LDA/Wounds:  Lines/Drains/Airways       Drain  Duration             Male External Urinary Catheter 08/09/23 1305 Large <1 day              Peripheral Intravenous Line  Duration                  Peripheral IV - Single Lumen 08/08/23 0030 20 G Posterior;Right Forearm 2 days         Peripheral IV - Single Lumen 08/09/23 1452 20 G Anterior;Right Forearm <1 day                  Wounds: No  Wound care consulted: No
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
## 2023-08-10 NOTE — ASSESSMENT & PLAN NOTE
- s/p [facility] (11/2022)  -D/C Eliquis, on therapeutic heparin  - Rate controlled on admit  - Holding Metoprolol 75mg QD & Amiodarone 200mg BID; no access

## 2023-08-10 NOTE — SUBJECTIVE & OBJECTIVE
Interval History:  Plan for PEG tube tomorrow, will hold heparin starting at midnight. Heparin therapeutic x 2; repeat CTH stable. POA confirmed as Zahida Dave per document signed January 2023.     Review of Systems   Unable to perform ROS: Patient nonverbal     Objective:     Vitals:  Temp: 96.5 °F (35.8 °C)  Pulse: 63  Rhythm: paced rhythm  BP: 114/67  MAP (mmHg): 85  Resp: 17  SpO2: 100 %    Temp  Min: 96.5 °F (35.8 °C)  Max: 98.8 °F (37.1 °C)  Pulse  Min: 50  Max: 70  BP  Min: 75/60  Max: 136/87  MAP (mmHg)  Min: 65  Max: 106  Resp  Min: 17  Max: 43  SpO2  Min: 80 %  Max: 100 %    08/09 0701 - 08/10 0700  In: 275.6 [I.V.:275.6]  Out: 790 [Urine:790]   Unmeasured Output  Urine Occurrence: 1  Stool Occurrence: (P) 0  Emesis Occurrence: 0  Pad Count: 2        Physical Exam  Vitals and nursing note reviewed.   Constitutional:       Comments: Appears alert   HENT:      Head: Normocephalic and atraumatic.   Eyes:      Comments: Refuses pupillary assessment   Cardiovascular:      Rate and Rhythm: Normal rate and regular rhythm.   Pulmonary:      Effort: Pulmonary effort is normal.   Abdominal:      Palpations: Abdomen is soft.   Neurological:      Comments: GCS E4 V2 M4  Withdraws RLE; spontaneous movements in other extremities  Right tyesha-neglect  Right facial droop            Medications:  ContinuousdexmedeTOMIDine (Precedex) infusion (titrating), Last Rate: Stopped (08/09/23 1143)  heparin (porcine) in D5W, Last Rate: 16 Units/kg/hr (08/09/23 1805)    Scheduledamiodarone, 200 mg, BID  aspirin, 300 mg, Daily  atorvastatin, 80 mg, Daily  clopidogreL, 75 mg, Daily  ezetimibe, 10 mg, QHS  ferrous sulfate, 1 tablet, Daily  senna-docusate 8.6-50 mg, 1 tablet, BID    PRNacetaminophen, 650 mg, Q6H PRN  dextrose 10%, 12.5 g, PRN  dextrose 10%, 25 g, PRN  glucagon (human recombinant), 1 mg, PRN  insulin aspart U-100, 1-10 Units, Q6H PRN  magnesium sulfate IVPB, 2 g, PRN  magnesium sulfate IVPB, 4 g, PRN  metoprolol, 5 mg,  Q5 Min PRN  nitroGLYCERIN, 0.4 mg, Q5 Min PRN  ondansetron, 4 mg, Q8H PRN  potassium chloride, 40 mEq, PRN   And  potassium chloride, 60 mEq, PRN   And  potassium chloride, 80 mEq, PRN  sodium chloride 0.9%, 10 mL, PRN  sodium chloride 0.9%, 10 mL, PRN  sodium chloride 0.9%, 10 mL, PRN  sodium phosphate 15 mmol in dextrose 5 % (D5W) 250 mL IVPB, 15 mmol, PRN  sodium phosphate 20.01 mmol in dextrose 5 % (D5W) 250 mL IVPB, 20.01 mmol, PRN  sodium phosphate 30 mmol in dextrose 5 % (D5W) 250 mL IVPB, 30 mmol, PRN      Today I personally reviewed pertinent medications, laboratory results,   Diet  Diet NPO

## 2023-08-10 NOTE — SUBJECTIVE & OBJECTIVE
Neurologic Chief Complaint: L MCA syndrome    Subjective:     Interval History: Patient is seen for follow-up neurological assessment and treatment recommendations: Peg tube pending with GI tomorrow. Repeat CTH with evolving L MCA/PCA infarct. Drowsy today, sontinue ICU care with close neurological monitoring.    HPI, Past Medical, Family, and Social History remains the same as documented in the initial encounter.     Review of Systems   Unable to perform ROS: Patient nonverbal   HENT:  Positive for trouble swallowing.    Gastrointestinal:  Negative for vomiting.   Neurological:  Positive for weakness.     Scheduled Meds:   amiodarone  200 mg Oral BID    aspirin  300 mg Rectal Daily    atorvastatin  80 mg Oral Daily    clopidogreL  75 mg Oral Daily    ezetimibe  10 mg Oral QHS    ferrous sulfate  1 tablet Oral Daily    senna-docusate 8.6-50 mg  1 tablet Oral BID     Continuous Infusions:   dexmedeTOMIDine (Precedex) infusion (titrating) Stopped (08/10/23 0513)    heparin (porcine) in D5W 16 Units/kg/hr (08/10/23 1305)     PRN Meds:acetaminophen, dextrose 10%, dextrose 10%, glucagon (human recombinant), insulin aspart U-100, magnesium sulfate IVPB, magnesium sulfate IVPB, metoprolol, nitroGLYCERIN, ondansetron, potassium chloride **AND** potassium chloride **AND** potassium chloride, sodium chloride 0.9%, sodium chloride 0.9%, sodium chloride 0.9%, sodium phosphate 15 mmol in dextrose 5 % (D5W) 250 mL IVPB, sodium phosphate 20.01 mmol in dextrose 5 % (D5W) 250 mL IVPB, sodium phosphate 30 mmol in dextrose 5 % (D5W) 250 mL IVPB    Objective:     Vital Signs (Most Recent):  Temp: 97.6 °F (36.4 °C) (08/10/23 1105)  Pulse: 63 (08/10/23 1305)  Resp: (!) 39 (08/10/23 1305)  BP: 122/74 (08/10/23 1305)  SpO2: 99 % (08/10/23 1305)  BP Location: Right arm    Vital Signs Range (Last 24H):  Temp:  [96.5 °F (35.8 °C)-98.8 °F (37.1 °C)]   Pulse:  [50-70]   Resp:  [17-43]   BP: ()/(57-88)   SpO2:  [89 %-100 %]   BP Location:  "Right arm       Physical Exam  Vitals reviewed.   Constitutional:       General: He is not in acute distress.     Appearance: He is well-developed.   HENT:      Head: Normocephalic and atraumatic.   Eyes:      Comments: Left gaze preference   Cardiovascular:      Rate and Rhythm: Normal rate.   Pulmonary:      Effort: No respiratory distress.   Abdominal:      General: Abdomen is flat.   Musculoskeletal:      Comments: rsw   Skin:     General: Skin is warm and dry.   Neurological:      Mental Status: He is lethargic.      Sensory: Sensory deficit present.      Motor: Weakness present.              Neurological Exam:   LOC: drowsy  Attention Span: poor  Language: Global aphasia  Articulation: Untestable due to severe aphasia   Orientation: Untestable due to severe aphasia   Motor: Arm left  Paresis: 3/5  Leg left  Paresis: 3/5  Arm right  Plegia 0/5  Leg right Paresis: 1/5  Tone: Flaccid  RUE    Laboratory:  CMP:   Recent Labs   Lab 08/10/23  0013   CALCIUM 10.2   ALBUMIN 3.8   PROT 8.3      K 4.5   CO2 20*      BUN 39*   CREATININE 2.2*   ALKPHOS 72   ALT 7*   AST 14   BILITOT 1.0       CBC:   Recent Labs   Lab 08/10/23  0013   WBC 6.19   RBC 6.03   HGB 16.0   HCT 52.8      MCV 88   MCH 26.5*   MCHC 30.3*       Lipid Panel:   Recent Labs   Lab 08/08/23  0045   CHOL 166   LDLCALC 101.4   HDL 46   TRIG 93       Coagulation:   Recent Labs   Lab 08/08/23  1838 08/09/23  0045 08/10/23  0013   INR 1.1  --   --    APTT 29.6   < > 44.2*    < > = values in this interval not displayed.       Platelet Aggregation Study: No results for input(s): "PLTAGG", "PLTAGINTERP", "PLTAGREGLACO", "ADPPLTAGGREG" in the last 168 hours.  Hgb A1C:   Recent Labs   Lab 08/05/23  1738   HGBA1C 5.6       TSH:   Recent Labs   Lab 08/08/23  0045   TSH 1.134         Diagnostic Results     [facility]     [facility] 8/10/2023  No detrimental change compared to prior.     Evolving acute infarcts within the left MCA and PCA territories.  " No hemorrhagic conversion.  No significant mass effect     Suspected small subacute infarction within the right caudate.     Multiple areas remote infarctions as detailed above.    CT Head 8/8/23 1704  Impression:     1. Redemonstration of acute infarctions in the left MCA and PCA territories.  No evidence to suggest hemorrhagic transformation.  2. Suspected small subacute infarction in the right caudate.  3. Numerous remote infarctions as detailed above.    CT Head 8/8/23 0826  Impression:     Moderate developing hypoattenuation left inferior frontal lobe and left occipital lobe concerning for developing recent infarcts post thrombectomy.     Allowing for scattered hyperdensity related to recently contrast administration for thrombectomy there is no definite acute intracranial hemorrhage.     Previous hypodensity right caudate no longer seen which may be related to contrast administration for thrombectomy and possible subacute age infarction.     Superimposed remote infarcts with moderate to large encephalomalacia right MCA territory unchanged    Vessel Imaging   IR Angio 8/8/23  Preliminary Interpretation:   1.    No evidence of left ICA or MCA stenosis. No large or medium vessel occlusion.    CTA Multiphase 8/7/23  Impression:     CT head:     New right caudate nucleus hypodensity, likely representing age-indeterminate infarct.  Could be further evaluated with MRI.     Multifocal encephalomalacia involving the right frontal, temporoparietal, and left occipital lobes.     CTA head and neck:     Acute occlusion of the superior left M2 segment.     Left PCA occlusion of undetermined age.     Filling defect in the right main pulmonary artery, concerning for acute pulmonary thromboembolism.  Consider follow-up pulmonary CTA to more fully assess the extent of thromboemboli, the clot burden, and the presence or absence of right heart strain.     Not fully detailed above:     - Incomplete opacification of the left atrial  appendage, suspicious for an intraluminal thrombus.  Follow-up echocardiogram, or cardiac MRI or CTA, recommended.     - The presence of acute thrombi or thromboemboli in both the systemic arterial and pulmonary arterial circulation raises concern for the possibility of underlying intracardiac or extracardiac right-to-left shunt, and/or a hypercoagulable state.  Correlate clinically.     - Incompletely visualized, but possibly large, pericardial effusion.  Artifacts compromise accurate assessment of its attenuation.    Cardiac Imaging   TTE with bubble 8/8/23    Left Ventricle: The left ventricle is moderately dilated. Increased ventricular mass. Normal wall thickness. There is moderate eccentric hypertrophy. Severe global hypokinesis present. Septal motion is consistent with pacing. There is severely reduced systolic function with a visually estimated ejection fraction of 15 - 20%. Ejection fraction by visual approximation is 20%. Unable to assess diastolic function due to arrhythmia.    Left Atrium: Left atrium is severely dilated. Radha 85mL/m2.    Right Ventricle: Mild right ventricular enlargement. Wall thickness is normal. Right ventricle wall motion  is normal. Systolic function is severely reduced. Pacemaker lead present in the ventricle.    Right Atrium: Right atrium is severely dilated.    Aortic Valve: There is mild aortic valve sclerosis. There is normal leaflet mobility. There is no significant regurgitation.    Mitral Valve: There is bileaflet sclerosis. There is normal leaflet mobility. There is mild regurgitation.    Tricuspid Valve: The tricuspid valve is structurally normal. There is normal leaflet mobility. There is mild regurgitation.    Pulmonic Valve: The pulmonic valve is structurally normal. There is normal leaflet mobility. There is no significant regurgitation.    Aorta: Aortic root is normal in size measuring 3.13 cm. Ascending aorta is normal measuring 3.24 cm.    IVC/SVC: Normal venous  pressure at 3 mmHg.    Pericardium: The pericardium is normal. There is no pericardial effusion.

## 2023-08-10 NOTE — PLAN OF CARE
[facility] Care Plan    POC reviewed with Tera Kingemanityler and family at 1400. Pt unable to verbalize understanding. Questions and concerns addressed. No acute events today. Pt progressing toward goals. Will continue to monitor. See below and flowsheets for full assessment and VS info.     Heparin drip continued. Plan to stop heparin infusion at midnight for PEG placement.   2*250 ml NS bonus administered.   Lasix discontinued.   PEG tube placement planned for tomorrow.   KUB obtained.   Lower extremity ultrasound ordered.   EKG and troponin obtained.         Is this a stroke patient? Stroke booklet reviewed with patient and family, risk factors identified for patient and family member.     Neuro:  Nazareth Coma Scale  Best Eye Response: 3-->(E3) to speech  Best Motor Response: 4-->(M4) withdraws from pain  Best Verbal Response: 1-->(V1) none  Nazareth Coma Scale Score: 8  Assessment Qualifiers: patient not sedated/intubated  Pupil PERRLA: yes     24 hr Temp:  [96.5 °F (35.8 °C)-98.8 °F (37.1 °C)]     CV:   Rhythm: paced rhythm  BP goals:   SBP < 180  MAP > 65    Resp:           Plan: N/A    GI/:     Diet/Nutrition Received: NPO  Last Bowel Movement: 08/06/23  Voiding Characteristics: other (see comments) (condom cath)    Intake/Output Summary (Last 24 hours) at 8/10/2023 1812  Last data filed at 8/10/2023 1705  Gross per 24 hour   Intake 798.45 ml   Output 900 ml   Net -101.55 ml     Unmeasured Output  Urine Occurrence: 1  Stool Occurrence: 0  Emesis Occurrence: 0  Pad Count: 2    Labs/Accuchecks:  Recent Labs   Lab 08/10/23  0013   WBC 6.19   RBC 6.03   HGB 16.0   HCT 52.8         Recent Labs   Lab 08/10/23  0013      K 4.5   CO2 20*      BUN 39*   CREATININE 2.2*   ALKPHOS 72   ALT 7*   AST 14   BILITOT 1.0      Recent Labs   Lab 08/08/23  1838 08/09/23  0045 08/10/23  0013   INR 1.1  --   --    APTT 29.6   < > 44.2*    < > = values in this interval not displayed.      Recent Labs   Lab  08/10/23  1453   TROPONINI 0.048*       Electrolytes: N/A - electrolytes WDL  Accuchecks: Q6H    Gtts:   dexmedeTOMIDine (Precedex) infusion (titrating) Stopped (08/10/23 0513)    heparin (porcine) in D5W 16 Units/kg/hr (08/10/23 1705)       LDA/Wounds:  Lines/Drains/Airways       Drain  Duration             Male External Urinary Catheter 08/09/23 1305 Large 1 day              Peripheral Intravenous Line  Duration                  Peripheral IV - Single Lumen 08/08/23 0030 20 G Posterior;Right Forearm 2 days         Peripheral IV - Single Lumen 08/09/23 1452 20 G Anterior;Right Forearm 1 day                  Wounds: No  Wound care consulted: No
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
## 2023-08-10 NOTE — ASSESSMENT & PLAN NOTE
- Initially admitted for CHF exacerbation and c/o CP  - Repeat TTE pending; last EF 10%  - BNP elevated on admission - 1956   - Weigh patient QD   - 1.5L fluid restriction   - Strict I/Os Q1H  - GDMT   - Holding Lasix 80mg BID IV today due to EMERSON   - Imdur 90mg QD; no access   - Metoprolol 75mg QD; no access   - Entresto 49-51 QD; no access  - Hold home Jardiance  - Cardiology following

## 2023-08-11 ENCOUNTER — ANESTHESIA (OUTPATIENT)
Dept: ENDOSCOPY | Facility: HOSPITAL | Age: 58
DRG: 280 | End: 2023-08-11
Payer: MEDICAID

## 2023-08-11 LAB
ABO + RH BLD: NORMAL
ALBUMIN SERPL BCP-MCNC: 3.9 G/DL (ref 3.5–5.2)
ALP SERPL-CCNC: 72 U/L (ref 55–135)
ALT SERPL W/O P-5'-P-CCNC: 9 U/L (ref 10–44)
ANION GAP SERPL CALC-SCNC: 16 MMOL/L (ref 8–16)
APTT PPP: 32.5 SEC (ref 21–32)
AST SERPL-CCNC: 20 U/L (ref 10–40)
BASOPHILS # BLD AUTO: 0.03 K/UL (ref 0–0.2)
BASOPHILS NFR BLD: 0.4 % (ref 0–1.9)
BILIRUB SERPL-MCNC: 0.9 MG/DL (ref 0.1–1)
BLD GP AB SCN CELLS X3 SERPL QL: NORMAL
BUN SERPL-MCNC: 44 MG/DL (ref 6–20)
CALCIUM SERPL-MCNC: 10.6 MG/DL (ref 8.7–10.5)
CHLORIDE SERPL-SCNC: 107 MMOL/L (ref 95–110)
CO2 SERPL-SCNC: 20 MMOL/L (ref 23–29)
CREAT SERPL-MCNC: 1.7 MG/DL (ref 0.5–1.4)
DIFFERENTIAL METHOD: ABNORMAL
EOSINOPHIL # BLD AUTO: 0 K/UL (ref 0–0.5)
EOSINOPHIL NFR BLD: 0.4 % (ref 0–8)
ERYTHROCYTE [DISTWIDTH] IN BLOOD BY AUTOMATED COUNT: 15.8 % (ref 11.5–14.5)
EST. GFR  (NO RACE VARIABLE): 46.7 ML/MIN/1.73 M^2
GLUCOSE SERPL-MCNC: 85 MG/DL (ref 70–110)
HCT VFR BLD AUTO: 52.7 % (ref 40–54)
HGB BLD-MCNC: 16.1 G/DL (ref 14–18)
IMM GRANULOCYTES # BLD AUTO: 0.01 K/UL (ref 0–0.04)
IMM GRANULOCYTES NFR BLD AUTO: 0.1 % (ref 0–0.5)
LYMPHOCYTES # BLD AUTO: 1.1 K/UL (ref 1–4.8)
LYMPHOCYTES NFR BLD: 16 % (ref 18–48)
MAGNESIUM SERPL-MCNC: 2.4 MG/DL (ref 1.6–2.6)
MCH RBC QN AUTO: 26.9 PG (ref 27–31)
MCHC RBC AUTO-ENTMCNC: 30.6 G/DL (ref 32–36)
MCV RBC AUTO: 88 FL (ref 82–98)
MONOCYTES # BLD AUTO: 0.8 K/UL (ref 0.3–1)
MONOCYTES NFR BLD: 12.1 % (ref 4–15)
NEUTROPHILS # BLD AUTO: 4.8 K/UL (ref 1.8–7.7)
NEUTROPHILS NFR BLD: 71 % (ref 38–73)
NRBC BLD-RTO: 0 /100 WBC
PHOSPHATE SERPL-MCNC: 3.8 MG/DL (ref 2.7–4.5)
PLATELET # BLD AUTO: 312 K/UL (ref 150–450)
PMV BLD AUTO: 11.7 FL (ref 9.2–12.9)
POCT GLUCOSE: 69 MG/DL (ref 70–110)
POCT GLUCOSE: 87 MG/DL (ref 70–110)
POCT GLUCOSE: 90 MG/DL (ref 70–110)
POCT GLUCOSE: 91 MG/DL (ref 70–110)
POTASSIUM SERPL-SCNC: 4.7 MMOL/L (ref 3.5–5.1)
PROT SERPL-MCNC: 8.7 G/DL (ref 6–8.4)
RBC # BLD AUTO: 5.99 M/UL (ref 4.6–6.2)
SODIUM SERPL-SCNC: 143 MMOL/L (ref 136–145)
SPECIMEN OUTDATE: NORMAL
WBC # BLD AUTO: 6.76 K/UL (ref 3.9–12.7)

## 2023-08-11 PROCEDURE — 85025 COMPLETE CBC W/AUTO DIFF WBC: CPT | Performed by: PHYSICIAN ASSISTANT

## 2023-08-11 PROCEDURE — D9220A PRA ANESTHESIA: ICD-10-PCS | Mod: ANES,,, | Performed by: ANESTHESIOLOGY

## 2023-08-11 PROCEDURE — 84100 ASSAY OF PHOSPHORUS: CPT | Performed by: PHYSICIAN ASSISTANT

## 2023-08-11 PROCEDURE — 37000009 HC ANESTHESIA EA ADD 15 MINS: Performed by: STUDENT IN AN ORGANIZED HEALTH CARE EDUCATION/TRAINING PROGRAM

## 2023-08-11 PROCEDURE — 99233 PR SUBSEQUENT HOSPITAL CARE,LEVL III: ICD-10-PCS | Mod: ,,, | Performed by: PSYCHIATRY & NEUROLOGY

## 2023-08-11 PROCEDURE — 97530 THERAPEUTIC ACTIVITIES: CPT

## 2023-08-11 PROCEDURE — 43241 EGD TUBE/CATH INSERTION: CPT | Mod: ,,, | Performed by: STUDENT IN AN ORGANIZED HEALTH CARE EDUCATION/TRAINING PROGRAM

## 2023-08-11 PROCEDURE — 94761 N-INVAS EAR/PLS OXIMETRY MLT: CPT

## 2023-08-11 PROCEDURE — 99291 CRITICAL CARE FIRST HOUR: CPT | Mod: ,,, | Performed by: PSYCHIATRY & NEUROLOGY

## 2023-08-11 PROCEDURE — 25000003 PHARM REV CODE 250

## 2023-08-11 PROCEDURE — 63600175 PHARM REV CODE 636 W HCPCS

## 2023-08-11 PROCEDURE — 92507 TX SP LANG VOICE COMM INDIV: CPT

## 2023-08-11 PROCEDURE — 99233 SBSQ HOSP IP/OBS HIGH 50: CPT | Mod: ,,, | Performed by: PSYCHIATRY & NEUROLOGY

## 2023-08-11 PROCEDURE — 37000008 HC ANESTHESIA 1ST 15 MINUTES: Performed by: STUDENT IN AN ORGANIZED HEALTH CARE EDUCATION/TRAINING PROGRAM

## 2023-08-11 PROCEDURE — 43241 PR EGD, FLEX, W/INSERT, INTRALUMINAL TUBE/CATH: ICD-10-PCS | Mod: ,,, | Performed by: STUDENT IN AN ORGANIZED HEALTH CARE EDUCATION/TRAINING PROGRAM

## 2023-08-11 PROCEDURE — 85730 THROMBOPLASTIN TIME PARTIAL: CPT

## 2023-08-11 PROCEDURE — 83735 ASSAY OF MAGNESIUM: CPT | Performed by: PHYSICIAN ASSISTANT

## 2023-08-11 PROCEDURE — D9220A PRA ANESTHESIA: ICD-10-PCS | Mod: CRNA,,, | Performed by: NURSE ANESTHETIST, CERTIFIED REGISTERED

## 2023-08-11 PROCEDURE — 86900 BLOOD TYPING SEROLOGIC ABO: CPT | Performed by: PSYCHIATRY & NEUROLOGY

## 2023-08-11 PROCEDURE — 99291 PR CRITICAL CARE, E/M 30-74 MINUTES: ICD-10-PCS | Mod: ,,, | Performed by: PSYCHIATRY & NEUROLOGY

## 2023-08-11 PROCEDURE — 27000221 HC OXYGEN, UP TO 24 HOURS

## 2023-08-11 PROCEDURE — 25000003 PHARM REV CODE 250: Performed by: NURSE ANESTHETIST, CERTIFIED REGISTERED

## 2023-08-11 PROCEDURE — D9220A PRA ANESTHESIA: Mod: ANES,,, | Performed by: ANESTHESIOLOGY

## 2023-08-11 PROCEDURE — 20000000 HC ICU ROOM

## 2023-08-11 PROCEDURE — 99900035 HC TECH TIME PER 15 MIN (STAT)

## 2023-08-11 PROCEDURE — 63600175 PHARM REV CODE 636 W HCPCS: Performed by: NURSE ANESTHETIST, CERTIFIED REGISTERED

## 2023-08-11 PROCEDURE — 80053 COMPREHEN METABOLIC PANEL: CPT | Performed by: PHYSICIAN ASSISTANT

## 2023-08-11 PROCEDURE — D9220A PRA ANESTHESIA: Mod: CRNA,,, | Performed by: NURSE ANESTHETIST, CERTIFIED REGISTERED

## 2023-08-11 PROCEDURE — 43241 EGD TUBE/CATH INSERTION: CPT | Performed by: STUDENT IN AN ORGANIZED HEALTH CARE EDUCATION/TRAINING PROGRAM

## 2023-08-11 RX ORDER — HYDRALAZINE HYDROCHLORIDE 10 MG/1
10 TABLET, FILM COATED ORAL ONCE
Status: DISCONTINUED | OUTPATIENT
Start: 2023-08-11 | End: 2023-08-11

## 2023-08-11 RX ORDER — MIDAZOLAM HYDROCHLORIDE 1 MG/ML
INJECTION, SOLUTION INTRAMUSCULAR; INTRAVENOUS
Status: DISCONTINUED | OUTPATIENT
Start: 2023-08-11 | End: 2023-08-11

## 2023-08-11 RX ORDER — NAPROXEN SODIUM 220 MG/1
81 TABLET, FILM COATED ORAL DAILY
Status: DISCONTINUED | OUTPATIENT
Start: 2023-08-12 | End: 2023-08-14

## 2023-08-11 RX ORDER — LABETALOL HCL 20 MG/4 ML
10 SYRINGE (ML) INTRAVENOUS EVERY 6 HOURS PRN
Status: DISCONTINUED | OUTPATIENT
Start: 2023-08-11 | End: 2023-09-07 | Stop reason: HOSPADM

## 2023-08-11 RX ORDER — ETOMIDATE 2 MG/ML
INJECTION INTRAVENOUS
Status: DISCONTINUED | OUTPATIENT
Start: 2023-08-11 | End: 2023-08-11

## 2023-08-11 RX ORDER — EZETIMIBE 10 MG/1
10 TABLET ORAL NIGHTLY
Status: DISCONTINUED | OUTPATIENT
Start: 2023-08-11 | End: 2023-08-24

## 2023-08-11 RX ORDER — HYDRALAZINE HYDROCHLORIDE 20 MG/ML
10 INJECTION INTRAMUSCULAR; INTRAVENOUS EVERY 6 HOURS PRN
Status: DISCONTINUED | OUTPATIENT
Start: 2023-08-11 | End: 2023-09-07 | Stop reason: HOSPADM

## 2023-08-11 RX ORDER — AMOXICILLIN 250 MG
1 CAPSULE ORAL 2 TIMES DAILY
Status: DISCONTINUED | OUTPATIENT
Start: 2023-08-11 | End: 2023-08-24

## 2023-08-11 RX ORDER — HEPARIN SODIUM,PORCINE/D5W 25000/250
0-40 INTRAVENOUS SOLUTION INTRAVENOUS CONTINUOUS
Status: DISPENSED | OUTPATIENT
Start: 2023-08-11 | End: 2023-08-16

## 2023-08-11 RX ORDER — METOPROLOL TARTRATE 25 MG/1
25 TABLET, FILM COATED ORAL 2 TIMES DAILY
Status: DISCONTINUED | OUTPATIENT
Start: 2023-08-11 | End: 2023-08-24

## 2023-08-11 RX ORDER — LABETALOL HCL 20 MG/4 ML
SYRINGE (ML) INTRAVENOUS
Status: COMPLETED
Start: 2023-08-11 | End: 2023-08-11

## 2023-08-11 RX ORDER — AMIODARONE HYDROCHLORIDE 200 MG/1
200 TABLET ORAL 2 TIMES DAILY
Status: DISCONTINUED | OUTPATIENT
Start: 2023-08-11 | End: 2023-08-24

## 2023-08-11 RX ORDER — ATORVASTATIN CALCIUM 40 MG/1
80 TABLET, FILM COATED ORAL DAILY
Status: DISCONTINUED | OUTPATIENT
Start: 2023-08-12 | End: 2023-08-24

## 2023-08-11 RX ORDER — LABETALOL HCL 20 MG/4 ML
10 SYRINGE (ML) INTRAVENOUS EVERY 4 HOURS PRN
Status: DISCONTINUED | OUTPATIENT
Start: 2023-08-11 | End: 2023-08-11

## 2023-08-11 RX ORDER — DEXMEDETOMIDINE HYDROCHLORIDE 100 UG/ML
INJECTION, SOLUTION INTRAVENOUS
Status: DISCONTINUED | OUTPATIENT
Start: 2023-08-11 | End: 2023-08-11

## 2023-08-11 RX ORDER — PROPOFOL 10 MG/ML
VIAL (ML) INTRAVENOUS CONTINUOUS PRN
Status: DISCONTINUED | OUTPATIENT
Start: 2023-08-11 | End: 2023-08-11

## 2023-08-11 RX ORDER — CLOPIDOGREL BISULFATE 75 MG/1
75 TABLET ORAL DAILY
Status: DISCONTINUED | OUTPATIENT
Start: 2023-08-12 | End: 2023-08-14

## 2023-08-11 RX ORDER — PHENYLEPHRINE HYDROCHLORIDE 10 MG/ML
INJECTION INTRAVENOUS
Status: DISCONTINUED | OUTPATIENT
Start: 2023-08-11 | End: 2023-08-11

## 2023-08-11 RX ORDER — MIDAZOLAM HYDROCHLORIDE 1 MG/ML
INJECTION INTRAMUSCULAR; INTRAVENOUS
Status: COMPLETED
Start: 2023-08-11 | End: 2023-08-11

## 2023-08-11 RX ORDER — LANOLIN ALCOHOL/MO/W.PET/CERES
1 CREAM (GRAM) TOPICAL DAILY
Status: DISCONTINUED | OUTPATIENT
Start: 2023-08-12 | End: 2023-08-24

## 2023-08-11 RX ADMIN — Medication 10 MG: at 05:08

## 2023-08-11 RX ADMIN — PROPOFOL 25 MCG/KG/MIN: 10 INJECTION, EMULSION INTRAVENOUS at 03:08

## 2023-08-11 RX ADMIN — ETOMIDATE 2 MG: 2 INJECTION INTRAVENOUS at 03:08

## 2023-08-11 RX ADMIN — ETOMIDATE 4 MG: 2 INJECTION INTRAVENOUS at 03:08

## 2023-08-11 RX ADMIN — MIDAZOLAM HYDROCHLORIDE 2 MG: 1 INJECTION, SOLUTION INTRAMUSCULAR; INTRAVENOUS at 03:08

## 2023-08-11 RX ADMIN — DEXMEDETOMIDINE 6 MCG: 100 INJECTION, SOLUTION, CONCENTRATE INTRAVENOUS at 02:08

## 2023-08-11 RX ADMIN — DEXMEDETOMIDINE HYDROCHLORIDE 0.2 MCG/KG/HR: 4 INJECTION INTRAVENOUS at 10:08

## 2023-08-11 RX ADMIN — SENNOSIDES AND DOCUSATE SODIUM 1 TABLET: 50; 8.6 TABLET ORAL at 08:08

## 2023-08-11 RX ADMIN — EZETIMIBE 10 MG: 10 TABLET ORAL at 08:08

## 2023-08-11 RX ADMIN — PHENYLEPHRINE HYDROCHLORIDE 50 MCG: 10 INJECTION INTRAVENOUS at 03:08

## 2023-08-11 RX ADMIN — SODIUM CHLORIDE: 0.9 INJECTION, SOLUTION INTRAVENOUS at 02:08

## 2023-08-11 RX ADMIN — SODIUM CHLORIDE 250 ML: 0.9 INJECTION, SOLUTION INTRAVENOUS at 09:08

## 2023-08-11 RX ADMIN — METOPROLOL TARTRATE 25 MG: 25 TABLET, FILM COATED ORAL at 08:08

## 2023-08-11 RX ADMIN — AMIODARONE HYDROCHLORIDE 200 MG: 200 TABLET ORAL at 08:08

## 2023-08-11 NOTE — SUBJECTIVE & OBJECTIVE
Interval History:  Heparin held at midnight for PEG tube placement today with GI.    Review of Systems   Unable to perform ROS: Patient nonverbal     Objective:     Vitals:  Temp: 97.1 °F (36.2 °C)  Pulse: (!) 58  Rhythm: paced rhythm  BP: 121/89  MAP (mmHg): 101  Resp: (!) 26  SpO2: (!) 92 %  Oxygen Concentration (%): 28    Temp  Min: 97.1 °F (36.2 °C)  Max: 97.8 °F (36.6 °C)  Pulse  Min: 58  Max: 85  BP  Min: 95/69  Max: 163/110  MAP (mmHg)  Min: 72  Max: 129  Resp  Min: 11  Max: 39  SpO2  Min: 85 %  Max: 100 %  Oxygen Concentration (%)  Min: 28  Max: 28    08/10 0701 - 08/11 0700  In: 917.1 [I.V.:454.2]  Out: 675 [Urine:675]   Unmeasured Output  Urine Occurrence: 1  Stool Occurrence: 0  Emesis Occurrence: 0  Pad Count: 2        Physical Exam  Vitals and nursing note reviewed.   Constitutional:       Comments: Appears alert   HENT:      Head: Normocephalic and atraumatic.   Eyes:      Comments: Left gaze preference   Cardiovascular:      Rate and Rhythm: Normal rate. Rhythm irregular.   Pulmonary:      Comments: Effort normal on 2L NC  Neurological:      Comments: GCS E4 V2 M6  Moves all extremities spontaneously  RLE gross motor movements; not purposeful  Right facial droop  Right hemineglect        Medications:  ContinuousdexmedeTOMIDine (Precedex) infusion (titrating), Last Rate: Stopped (08/11/23 0530)    Scheduledamiodarone, 200 mg, BID  aspirin, 300 mg, Daily  atorvastatin, 80 mg, Daily  clopidogreL, 75 mg, Daily  ezetimibe, 10 mg, QHS  ferrous sulfate, 1 tablet, Daily  senna-docusate 8.6-50 mg, 1 tablet, BID  sodium chloride 0.9%, 250 mL, Once    PRNacetaminophen, 650 mg, Q6H PRN  dextrose 10%, 12.5 g, PRN  dextrose 10%, 25 g, PRN  glucagon (human recombinant), 1 mg, PRN  insulin aspart U-100, 1-10 Units, Q6H PRN  magnesium sulfate IVPB, 2 g, PRN  magnesium sulfate IVPB, 4 g, PRN  metoprolol, 5 mg, Q5 Min PRN  nitroGLYCERIN, 0.4 mg, Q5 Min PRN  ondansetron, 4 mg, Q8H PRN  potassium chloride, 40 mEq, PRN    And  potassium chloride, 60 mEq, PRN   And  potassium chloride, 80 mEq, PRN  sodium chloride 0.9%, 10 mL, PRN  sodium chloride 0.9%, 10 mL, PRN  sodium chloride 0.9%, 10 mL, PRN  sodium phosphate 15 mmol in dextrose 5 % (D5W) 250 mL IVPB, 15 mmol, PRN  sodium phosphate 20.01 mmol in dextrose 5 % (D5W) 250 mL IVPB, 20.01 mmol, PRN  sodium phosphate 30 mmol in dextrose 5 % (D5W) 250 mL IVPB, 30 mmol, PRN      Today I personally reviewed pertinent medications, imaging, laboratory results,    Diet  Diet NPO

## 2023-08-11 NOTE — ASSESSMENT & PLAN NOTE
- PMHx of multiple subsegmental Pes on CTAs 9/2021 & 12/2021  - 8/8: CTA Evidence of pulmonary embolism involving the right distal interlobar artery and the segmental/subsegmental bilateral posterior pulmonary arteries without heart strain.   - Heparin held for procedure today; will resume per GI

## 2023-08-11 NOTE — PLAN OF CARE
ZENOBIA spoke with Nika with [facility] ([phone]) regarding this Pt. She advised that their MD reviewed it and feels that at this time, he is more SNF appropriate. If he progresses a bit more with therapy once he is medically cleared, they will reconsider and need a new referral or call.     Naomi Hong, BEAR  Neurocritical Care   [facility]  [phone]

## 2023-08-11 NOTE — PROGRESS NOTES
[facility] - [facility]  Adult Nutrition  Progress Note    SUMMARY       Recommendations    1. EN recs:   - Continuous: Isosource 1.5 @ goal rate of 55 mL/hr- provides 1980 kcals, 90 g pro, and 1008 mL fluid.   - Bolus: Isosource 1.5 (5 cartons/day)- provides 1875 kcals, 85 g pro, and 955 mL fluid.     2. If renal formula desired:    - Continuous: Novasource @ goal rate of 40 mL/hr- provides 1920 kcals, 87 g pro, and 688 mL fluid.   - Bolus: Novasource (4 cartons/day) - provides 1900 kcals, 86 g pro, and 680 mL fluid.     3. RD following.     Goals: Will meet % EEN/EPN by next RD f/u.  Nutrition Goal Status: new  Communication of RD Recs:  (POC)    Assessment and Plan    Nutrition Problem  Inadequate oral intake     Related to (etiology):   Inability to consume sufficient needs     Signs and Symptoms (as evidenced by):   NPO status      Interventions/Recommendations (treatment strategy):  Collaboration of nutrition care with other providers   EN     Nutrition Diagnosis Status:   New    Reason for Assessment    Reason For Assessment: RD follow-up  Diagnosis: stroke/CVA  Relevant Medical History: CAD, PAF, CHF, CKD 3, [place]  Interdisciplinary Rounds: did not attend  General Information Comments: RD f/u. Unable to speak with pt 2/2 being aphasic. Pt NPO per SLP recs today. Noted PEG placement schedule with GI today. Noted # and wt during last RD visit was 192# (unsure reason for wt difference). LBM 8/6.    (8/6): Unsure intake PTA. RD private messaged RN who states that pt consumed 90% of both breakfast and lunch. No issues with n/v/d/c/chewing/swallowing today. UBW fluctuates between 190-195# per chart review, #. No s/s of malnutrition, appears well-nourished. Educational handout left on bedside table for pt to review.     Nutrition Discharge Planning: Pending clinical course    Nutrition Risk Screen    Nutrition Risk Screen: difficulty chewing/swallowing    Nutrition/Diet  "History    Spiritual, Cultural Beliefs, Nondenominational Practices, Values that Affect Care: no  Food Allergies: NKFA  Factors Affecting Nutritional Intake: NPO, difficulty/impaired swallowing, impaired cognitive status/motor control    Anthropometrics    Temp: 97.1 °F (36.2 °C)  Height Method: Stated  Height: 6' 1" (185.4 cm)  Height (inches): 73 in  Weight Method: Bed Scale  Weight: 83.5 kg (184 lb 1.4 oz)  Weight (lb): 184.09 lb  Ideal Body Weight (IBW), Male: 184 lb  % Ideal Body Weight, Male (lb): 100.05 %  BMI (Calculated): 24.3  BMI Grade: 18.5-24.9 - normal    Lab/Procedures/Meds    Pertinent Labs Reviewed: reviewed  Pertinent Labs Comments: Creatinine 1.7, BUN 44, GFR 46.7, Calcium 10.6, ALT 9, BNP 1958  Pertinent Medications Reviewed: reviewed  Pertinent Medications Comments: aspirin, atorvastatin, senna-docusate    Estimated/Assessed Needs    Weight Used For Calorie Calculations: 83.5 kg (184 lb 1.4 oz)  Energy Calorie Requirements (kcal): 2088 kcals  Energy Need Method: Kcal/kg (25 kcal/kg)  Protein Requirements: 84 g (1.0 g/kg)  Weight Used For Protein Calculations: 83.5 kg (184 lb 1.4 oz)  Fluid Requirements (mL): 1 mL/kcal or fluid per MD  Estimated Fluid Requirement Method: RDA Method  RDA Method (mL): 2088    Nutrition Prescription Ordered    Current Diet Order: NPO    Evaluation of Received Nutrient/Fluid Intake    I/O: -7.7 L since admit  % Intake of Estimated Energy Needs: 0%  % Meal Intake: NPO    Nutrition Risk    Level of Risk/Frequency of Follow-up:  (1 time/week)     Monitor and Evaluation    Food and Nutrient Intake: enteral nutrition intake  Food and Nutrient Adminstration: enteral and parenteral nutrition administration  Knowledge/Beliefs/Attitudes: food and nutrition knowledge/skill, beliefs and attitudes  Physical Activity and Function: nutrition-related ADLs and IADLs  Anthropometric Measurements: body mass index, weight change, weight, height/length  Biochemical Data, Medical Tests and " Procedures: lipid profile, inflammatory profile, glucose/endocrine profile, gastrointestinal profile, electrolyte and renal panel  Nutrition-Focused Physical Findings: overall appearance     Nutrition Follow-Up    RD Follow-up?: Yes    Melodie Colon RDN,LDN

## 2023-08-11 NOTE — ASSESSMENT & PLAN NOTE
- s/p [facility] (11/2022)  -D/C Eliquis, on therapeutic heparin  - Rate controlled on admit  - Holding Metoprolol 75mg QD & Amiodarone 200mg BID; no access

## 2023-08-11 NOTE — TRANSFER OF CARE
"Anesthesia Transfer of Care Note    Patient: Tera Griffiths    Procedure(s) Performed: Procedure(s) (LRB):  EGD (ESOPHAGOGASTRODUODENOSCOPY) (N/A)    Patient location: ICU    Anesthesia Type: general    Transport from OR: Transported from OR on room air with adequate spontaneous ventilation. Transported from OR on 2-3 L/min O2 by NC with adequate spontaneous ventilation    Post pain: adequate analgesia    Post assessment: no apparent anesthetic complications and tolerated procedure well    Post vital signs: stable    Level of consciousness: awake, alert and oriented    Nausea/Vomiting: no nausea/vomiting    Complications: none    Transfer of care protocol was followed      Last vitals:   Visit Vitals  BP (!) 110/88   Pulse 71   Temp 36 °C (96.8 °F) (Axillary)   Resp 19   Ht 6' 1" (1.854 m)   Wt 83.5 kg (184 lb 1.4 oz)   SpO2 98%   BMI 24.29 kg/m²     "

## 2023-08-11 NOTE — ASSESSMENT & PLAN NOTE
- Cr 8/10 2.2 from 1.7 on 8/9; given 250 NS bolus x 2  - Hold Lasix for now  - another 250 cc bolus  - strict I/O  - daily CMPs

## 2023-08-11 NOTE — PROVATION PATIENT INSTRUCTIONS
Discharge Summary/Instructions after an Endoscopic Procedure  Patient Name: Tera Griffiths  Patient MRN: [id number]  Patient YOB: 1965 Friday, August 11, 2023  Colette Martinez MD  Dear patient,  As a result of recent federal legislation (The Federal Cures Act), you may   receive lab or pathology results from your procedure in your [facility]   account before your physician is able to contact you. Your physician or   their representative will relay the results to you with their   recommendations at their soonest availability.  Thank you,  RESTRICTIONS:  During your procedure today, you received medications for sedation.  These   medications may affect your judgment, balance and coordination.  Therefore,   for 24 hours, you have the following restrictions:   - DO NOT drive a car, operate machinery, make legal/financial decisions,   sign important papers or drink alcohol.    ACTIVITY:  Today: no heavy lifting, straining or running due to procedural   sedation/anesthesia.  The following day: return to full activity including work.  DIET:  Eat and drink normally unless instructed otherwise.     TREATMENT FOR COMMON SIDE EFFECTS:  - Mild abdominal pain, nausea, belching, bloating or excessive gas:  rest,   eat lightly and use a heating pad.  - Sore Throat: treat with throat lozenges and/or gargle with warm salt   water.  - Because air was used during the procedure, expelling large amounts of air   from your rectum or belching is normal.  - If a bowel prep was taken, you may not have a bowel movement for 1-3 days.    This is normal.  SYMPTOMS TO WATCH FOR AND REPORT TO YOUR PHYSICIAN:  1. Abdominal pain or bloating, other than gas cramps.  2. Chest pain.  3. Back pain.  4. Signs of infection such as: chills or fever occurring within 24 hours   after the procedure.  5. Rectal bleeding, which would show as bright red, maroon, or black stools.   (A tablespoon of blood from the rectum is not serious, especially  if   hemorrhoids are present.)  6. Vomiting.  7. Weakness or dizziness.  GO DIRECTLY TO THE NEAREST EMERGENCY ROOM IF YOU HAVE ANY OF THE FOLLOWING:      Difficulty breathing              Chills and/or fever over 101 F   Persistent vomiting and/or vomiting blood   Severe abdominal pain   Severe chest pain   Black, tarry stools   Bleeding- more than one tablespoon   Any other symptom or condition that you feel may need urgent attention  Your doctor recommends these additional instructions:  If any biopsies were taken, your doctors clinic will contact you in 1 to 2   weeks with any results.  - Continue present medications.   - Refer to an interventional radiologist.   - Return patient to ICU for ongoing care.  For questions, problems or results please call your physician - Colette Martinez MD at Work:  [phone].  [facility] [place] [facility], EMERGENCY ROOM PHONE NUMBER: [phone]  IF A COMPLICATION OR EMERGENCY SITUATION ARISES AND YOU ARE UNABLE TO REACH   YOUR PHYSICIAN - GO DIRECTLY TO THE EMERGENCY ROOM.  Colette Martinez MD  8/11/2023 4:10:01 PM  This report has been verified and signed electronically.  Dear patient,  As a result of recent federal legislation (The Federal Cures Act), you may   receive lab or pathology results from your procedure in your [facility]   account before your physician is able to contact you. Your physician or   their representative will relay the results to you with their   recommendations at their soonest availability.  Thank you,  PROVATION
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
## 2023-08-11 NOTE — PLAN OF CARE
Problem: Adult Inpatient Plan of Care  Goal: Optimal Comfort and Wellbeing  Outcome: Ongoing, Progressing   [facility] Care Plan    POC reviewed with Tera Griffiths and family at 0300. Pt unable to verbalize understanding. Questions and concerns addressed with family at bedside. No acute events overnight. Pt progressing toward goals. Will continue to monitor. See below and flowsheets for full assessment and VS info.           Is this a stroke patient? yes- Stroke booklet reviewed with patient and family, risk factors identified for patient and stroke booklet remains at bedside for ongoing education.     Neuro:  [place] Coma Scale  Best Eye Response: 4-->(E4) spontaneous  Best Motor Response: 5-->(M5) localizes pain  Best Verbal Response: 1-->(V1) none  [place] Coma Scale Score: 10  Assessment Qualifiers: patient not sedated/intubated  Pupil PERRLA: other (see comments) (resist)     24hr Temp:  [96.5 °F (35.8 °C)-97.8 °F (36.6 °C)]     CV:   Rhythm: paced rhythm  BP goals:   SBP < 180  MAP > 65    Resp:           Plan:  peg tube placement    GI/:     Diet/Nutrition Received: NPO  Last Bowel Movement: 08/06/23  Voiding Characteristics: external catheter    Intake/Output Summary (Last 24 hours) at 8/11/2023 0506  Last data filed at 8/11/2023 0502  Gross per 24 hour   Intake 915.08 ml   Output 875 ml   Net 40.08 ml     Unmeasured Output  Urine Occurrence: 1  Stool Occurrence: 0  Emesis Occurrence: 0  Pad Count: 2    Labs/Accuchecks:  Recent Labs   Lab 08/11/23  0155   WBC 6.76   RBC 5.99   HGB 16.1   HCT 52.7         Recent Labs   Lab 08/11/23  0155      K 4.7   CO2 20*      BUN 44*   CREATININE 1.7*   ALKPHOS 72   ALT 9*   AST 20   BILITOT 0.9      Recent Labs   Lab 08/08/23  1838 08/09/23  0045 08/11/23  0155   INR 1.1  --   --    APTT 29.6   < > 32.5*    < > = values in this interval not displayed.      Recent Labs   Lab 08/10/23  1830   TROPONINI 0.053*       Electrolytes: N/A - electrolytes  WDL  Accuchecks: Q6H    Gtts:   dexmedeTOMIDine (Precedex) infusion (titrating) 0.2 mcg/kg/hr (08/11/23 0502)       LDA/Wounds:  Lines/Drains/Airways       Drain  Duration             Male External Urinary Catheter 08/09/23 1305 Large 1 day              Peripheral Intravenous Line  Duration                  Peripheral IV - Single Lumen 08/09/23 1452 20 G Anterior;Right Forearm 1 day         Peripheral IV - Single Lumen 08/10/23 1847 20 G Left Antecubital <1 day                  Wounds: No  Wound care consulted: No

## 2023-08-11 NOTE — ANESTHESIA POSTPROCEDURE EVALUATION
Anesthesia Post Evaluation    Patient: Tera Griffiths    Procedure(s) Performed: Procedure(s) (LRB):  EGD (ESOPHAGOGASTRODUODENOSCOPY) (N/A)    Final Anesthesia Type: general      Patient location during evaluation: ICU  Patient participation: Yes- Able to Participate  Level of consciousness: awake and alert  Post-procedure vital signs: reviewed and stable  Pain management: adequate  Airway patency: patent    PONV status at discharge: No PONV  Anesthetic complications: no      Cardiovascular status: blood pressure returned to baseline  Respiratory status: unassisted  Hydration status: euvolemic  Follow-up not needed.          Vitals Value Taken Time   /86 08/11/23 1640   Temp  08/11/23 1642   Pulse 85 08/11/23 1641   Resp 25 08/11/23 1641   SpO2 99 % 08/11/23 1641   Vitals shown include unvalidated device data.      No case tracking events are documented in the log.      Pain/Joel Score: No data recorded

## 2023-08-11 NOTE — PT/OT/SLP PROGRESS
"Physical Therapy Treatment    Patient Name: Tera Griffiths   MRN: [id number]    Recommendations:     Discharge Recommendations: nursing facility, skilled  Discharge Equipment Recommendations:  (TBD at next level of care)  Barriers to discharge: Increased level of assist and Inaccessible home    Assessment:     Tera Griffiths is a 58 y.o. male admitted with a medical diagnosis of Arterial ischemic stroke, MCA (middle cerebral artery), left, acute.He presents with the following impairments/functional limitations: weakness, impaired endurance, impaired self care skills, impaired functional mobility, gait instability, impaired balance, impaired cognition, decreased upper extremity function, decreased lower extremity function, decreased safety awareness, impaired coordination. Pt with fair tolerance to treatment session on this day. Pt demonstrating excellent strength and good sitting balance however following zero motor commands at this time. Pt continuing to demonstrate significant R neglect. Pt fixated on removal of L hand restraint while sitting at EOB and during brief standing trial and becoming agitated requiring HHA for safety throughout. Pt would continue to benefit from skilled acute PT in order to address current deficits and progress functional mobility.     Rehab Prognosis: Good; patient continues to benefit from acute skilled PT services to address these deficits and reach maximum level of function.  Recent Surgery: Procedure(s) (LRB):  EGD (ESOPHAGOGASTRODUODENOSCOPY) (N/A) Day of Surgery    Plan:     During this hospitalization, patient to be seen 4 x/week to address the identified rehab impairments via gait training, therapeutic activities, therapeutic exercises, neuromuscular re-education and progress toward the following goals:    Plan of Care Expires:  09/08/23    Subjective     Chief Complaint: Pt nonverbal with AMS  Patient/Family Comments/Goals: per family: "Y'all need to take this stuff off his " "hands so he can start using his muscles and learn to do stuff for himself."  Pain/Comfort:  Pain Rating 1:  (pt nonverbal; no signs of distress noted)    Objective:     Communicated with RN prior to session. Patient found HOB elevated with restraints, oxygen, blood pressure cuff, Condom Catheter, telemetry, peripheral IV, pulse ox (continuous) upon PT entry to room.     General Precautions: Standard, aspiration, aphasia, fall, NPO  Orthopedic Precautions: N/A  Braces: N/A  Pt on 2L of O2 throughout session.    Functional Mobility:  Bed Mobility:    Supine to Sit: moderate assistance for LE management and trunk management with verbal and tactile cues provided for coordination of skill  Sit to Supine: moderate assistance for LE management and trunk management  Transfers:    Sit to Stand: moderate assistance of 2 persons with hand-held assist with cues for hand placement and upright posture.  Balance:   Static Sitting: Good, able to maintain for 2 minute(s) with minimum assistance  Dynamic Sitting: not assessed this visit  Static Standing: Fair, able to maintain for 15 seconds with moderate assistance of 2 persons    AM-PAC 6 CLICK MOBILITY  Turning over in bed (including adjusting bedclothes, sheets and blankets)?: 2  Sitting down on and standing up from a chair with arms (e.g., wheelchair, bedside commode, etc.): 2  Moving from lying on back to sitting on the side of the bed?: 2  Moving to and from a bed to a chair (including a wheelchair)?: 1  Need to walk in hospital room?: 1  Climbing 3-5 steps with a railing?: 1  Basic Mobility Total Score: 9     Therapeutic Activities and Exercises:  Patient and pt family educated on role of acute care PT and PT POC and call light usage.  Pt and pt's family educated on reasoning for and importance of use of restraints. Pt's family verbalized understanding.    Patient left HOB elevated with all lines intact, call button in reach, RN notified, and restraints reapplied at end of " session.    GOALS:   Multidisciplinary Problems       Physical Therapy Goals          Problem: Physical Therapy    Goal Priority Disciplines Outcome Goal Variances Interventions   Physical Therapy Goal     PT, PT/OT Ongoing, Progressing     Description: Goals to be completed by: 9/8/23    Pt will perform sup<>sit transfers w/ minimum assistance  Pt will have sufficient dynamic balance to sit EOB while performing ADLs/therex w/ stand by assistance for 10 min  Pt will be able to stand up from EOB w/ moderate assistance using LRAD  Pt will ambulate 20 feet w/ maximal assistance using LRAD  Pt will be independent w/ HEP therex on BLE w/ good form and ROM   Pt will follow >50 % of single-step commands throughout treatment session                        Time Tracking:     PT Received On: 08/11/23  PT Start Time: 1416     PT Stop Time: 1434  PT Total Time (min): 18 min     Billable Minutes: Therapeutic Activity 18 min      Treatment Type: Treatment  PT/PTA: PT     Number of PTA visits since last PT visit: 0     08/11/2023
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
## 2023-08-11 NOTE — ASSESSMENT & PLAN NOTE
Echo Saline Bubble? Yes    Result Date: 8/8/2023    Left Ventricle: The left ventricle is moderately dilated. Increased   ventricular mass. Normal wall thickness. There is moderate eccentric   hypertrophy. Severe global hypokinesis present. Septal motion is   consistent with pacing. There is severely reduced systolic function with a   visually estimated ejection fraction of 15 - 20%. Ejection fraction by   visual approximation is 20%. Unable to assess diastolic function due to   arrhythmia.    Left Atrium: Left atrium is severely dilated. Radha 85mL/m2.    Right Ventricle: Mild right ventricular enlargement. Wall thickness is   normal. Right ventricle wall motion  is normal. Systolic function is   severely reduced. Pacemaker lead present in the ventricle.    Right Atrium: Right atrium is severely dilated.    Aortic Valve: There is mild aortic valve sclerosis. There is normal   leaflet mobility. There is no significant regurgitation.    Mitral Valve: There is bileaflet sclerosis. There is normal leaflet   mobility. There is mild regurgitation.    Tricuspid Valve: The tricuspid valve is structurally normal. There is   normal leaflet mobility. There is mild regurgitation.    Pulmonic Valve: The pulmonic valve is structurally normal. There is   normal leaflet mobility. There is no significant regurgitation.    Aorta: Aortic root is normal in size measuring 3.13 cm. Ascending aorta   is normal measuring 3.24 cm.    IVC/SVC: Normal venous pressure at 3 mmHg.    Pericardium: The pericardium is normal. There is no pericardial   effusion.      - Initially admitted for CHF exacerbation and c/o CP  - BNP elevated on admission - 1956   - Weigh patient QD   - 1.5L fluid restriction   - Strict I/Os Q1H  - GDMT   - Holding Lasix 80mg BID IV today due to EMERSON   - Imdur 90mg QD; no access   - Metoprolol 75mg QD; no access   - Entresto 49-51 QD; no access  - Hold home Jardiance  - Cardiology signed-off  - Cardiology noticed  LLE cooler than RLE; US ordered, results pending
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
## 2023-08-11 NOTE — PT/OT/SLP PROGRESS
Speech Language Pathology Treatment    Patient Name:  Tera Griffiths   MRN:  [id number]  Admitting Diagnosis: Arterial ischemic stroke, MCA (middle cerebral artery), left, acute    Recommendations:                 General Recommendations:  Dysphagia therapy, Speech/language therapy, and Cognitive-linguistic therapy  Diet recommendations:  NPO, Liquid Diet Level: NPO   Aspiration Precautions: Frequent oral care and Strict aspiration precautions   General Precautions: Standard, aphasia, aspiration, fall, NPO  Communication strategies:  provide increased time to answer and go to room if call light pushed    Assessment:     Tera Griffiths is a 58 y.o. male with an SLP diagnosis of Aphasia and Dysphagia.      Subjective     PO trials not attempted 2/2 pt npo for peg placement today.     Pain/Comfort:  Pain Rating 1:  (NAD)  Pain Rating Post-Intervention 1:  (NAD)    Respiratory Status: Nasal cannula    Objective:     Has the patient been evaluated by SLP for swallowing?   Yes  Keep patient NPO? Yes     Pt seen bedside, alert though nonverbal  he did not follow simple commands or answer simple y/n questions via any modality.  Increased vocalizations noted during prompts for auto speech task though no true words appreciated.  Pt did not localize to SLP on R.  Education provided re: cont npo, language stim, and POC.  Education to be ongoing.     Goals:   Multidisciplinary Problems       SLP Goals          Problem: SLP    Goal Priority Disciplines Outcome   SLP Goal     SLP Ongoing, Progressing   Description: Speech Language Pathology Goals  Goals expected to be met by 8/15  1. Pt will participate in ongoing assessment of swallow.   2. Pt will answer simple y/n questions with 60% accy given max cues.   3. Pt will follow simple commands with 50% accy given max cues.   4. Pt will vocalize in response to any stim x5/session given max cues.   5. Pt will localize to stim on R x2/session given max cues.                               Plan:     Patient to be seen:  4 x/week   Plan of Care expires:  09/07/23  Plan of Care reviewed with:  patient   SLP Follow-Up:  Yes       Discharge recommendations:  rehabilitation facility   Barriers to Discharge:  Level of Skilled Assistance Needed      Time Tracking:     SLP Treatment Date:   08/11/23  Speech Start Time:  0717  Speech Stop Time:  0727     Speech Total Time (min):  10 min    Billable Minutes: Speech Therapy Individual 10    08/11/2023

## 2023-08-11 NOTE — TREATMENT PLAN
Post-Procedure Gastroenterology Treatment Plan:     Tera Griffiths is status post EGD with the following findings:     - Normal esophagus.   - Gastritis.   - Normal examined duodenum.   - Due to inadequate transillumination PEG was not completed. The needle/trocar was not passed.   - Nasogastric tube was placed and confirmed to be in stomach endoscopically with direct visualization.     Our recommendations are as follows:     -Okay to begin using NG tube.   -Okay to resume anticoagulation.   -If G-tube needed, recommend IR consultation since NG tube can be used to assist with placement.     No further endoscopic interventions planned. We are signing off. Please call with questions.         Marvel Luu MD, PGY-VI  Gastroenterology Fellow  [facility]

## 2023-08-11 NOTE — PLAN OF CARE
Recommendations     1. EN recs:              - Continuous: Isosource 1.5 @ goal rate of 55 mL/hr- provides 1980 kcals, 90 g pro, and 1008 mL fluid.              - Bolus: Isosource 1.5 (5 cartons/day)- provides 1875 kcals, 85 g pro, and 955 mL fluid.      2. If renal formula desired:               - Continuous: Novasource @ goal rate of 40 mL/hr- provides 1920 kcals, 87 g pro, and 688 mL fluid.              - Bolus: Novasource (4 cartons/day) - provides 1900 kcals, 86 g pro, and 680 mL fluid.      3. RD following.      Goals: Will meet % EEN/EPN by next RD f/u.  Nutrition Goal Status: new  Communication of RD Recs:  (POC)    Melodie Glez RDN,LDN

## 2023-08-12 PROBLEM — I63.412 EMBOLIC STROKE INVOLVING LEFT MIDDLE CEREBRAL ARTERY: Status: ACTIVE | Noted: 2023-08-08

## 2023-08-12 LAB
ALBUMIN SERPL BCP-MCNC: 3.5 G/DL (ref 3.5–5.2)
ALP SERPL-CCNC: 63 U/L (ref 55–135)
ALT SERPL W/O P-5'-P-CCNC: 9 U/L (ref 10–44)
ANION GAP SERPL CALC-SCNC: 13 MMOL/L (ref 8–16)
ANION GAP SERPL CALC-SCNC: 16 MMOL/L (ref 8–16)
APTT PPP: 47.6 SEC (ref 21–32)
APTT PPP: 49.4 SEC (ref 21–32)
AST SERPL-CCNC: 19 U/L (ref 10–40)
BASOPHILS # BLD AUTO: 0.02 K/UL (ref 0–0.2)
BASOPHILS NFR BLD: 0.4 % (ref 0–1.9)
BILIRUB SERPL-MCNC: 0.9 MG/DL (ref 0.1–1)
BUN SERPL-MCNC: 33 MG/DL (ref 6–20)
BUN SERPL-MCNC: 36 MG/DL (ref 6–20)
CALCIUM SERPL-MCNC: 10.5 MG/DL (ref 8.7–10.5)
CALCIUM SERPL-MCNC: 9.9 MG/DL (ref 8.7–10.5)
CHLORIDE SERPL-SCNC: 110 MMOL/L (ref 95–110)
CHLORIDE SERPL-SCNC: 110 MMOL/L (ref 95–110)
CO2 SERPL-SCNC: 16 MMOL/L (ref 23–29)
CO2 SERPL-SCNC: 22 MMOL/L (ref 23–29)
CREAT SERPL-MCNC: 1.4 MG/DL (ref 0.5–1.4)
CREAT SERPL-MCNC: 1.7 MG/DL (ref 0.5–1.4)
DIFFERENTIAL METHOD: ABNORMAL
EOSINOPHIL # BLD AUTO: 0 K/UL (ref 0–0.5)
EOSINOPHIL NFR BLD: 0.2 % (ref 0–8)
ERYTHROCYTE [DISTWIDTH] IN BLOOD BY AUTOMATED COUNT: 15.2 % (ref 11.5–14.5)
EST. GFR  (NO RACE VARIABLE): 46.7 ML/MIN/1.73 M^2
EST. GFR  (NO RACE VARIABLE): 59 ML/MIN/1.73 M^2
GLUCOSE SERPL-MCNC: 86 MG/DL (ref 70–110)
GLUCOSE SERPL-MCNC: 94 MG/DL (ref 70–110)
HCT VFR BLD AUTO: 51.9 % (ref 40–54)
HGB BLD-MCNC: 15.2 G/DL (ref 14–18)
IMM GRANULOCYTES # BLD AUTO: 0.01 K/UL (ref 0–0.04)
IMM GRANULOCYTES NFR BLD AUTO: 0.2 % (ref 0–0.5)
LYMPHOCYTES # BLD AUTO: 0.6 K/UL (ref 1–4.8)
LYMPHOCYTES NFR BLD: 11.6 % (ref 18–48)
MAGNESIUM SERPL-MCNC: 2.4 MG/DL (ref 1.6–2.6)
MCH RBC QN AUTO: 26.6 PG (ref 27–31)
MCHC RBC AUTO-ENTMCNC: 29.3 G/DL (ref 32–36)
MCV RBC AUTO: 91 FL (ref 82–98)
MONOCYTES # BLD AUTO: 0.6 K/UL (ref 0.3–1)
MONOCYTES NFR BLD: 11.6 % (ref 4–15)
NEUTROPHILS # BLD AUTO: 4.2 K/UL (ref 1.8–7.7)
NEUTROPHILS NFR BLD: 76 % (ref 38–73)
NRBC BLD-RTO: 0 /100 WBC
PHOSPHATE SERPL-MCNC: 3.2 MG/DL (ref 2.7–4.5)
PLATELET # BLD AUTO: 277 K/UL (ref 150–450)
PMV BLD AUTO: 11.9 FL (ref 9.2–12.9)
POCT GLUCOSE: 76 MG/DL (ref 70–110)
POCT GLUCOSE: 87 MG/DL (ref 70–110)
POCT GLUCOSE: 94 MG/DL (ref 70–110)
POTASSIUM SERPL-SCNC: 4.4 MMOL/L (ref 3.5–5.1)
POTASSIUM SERPL-SCNC: 4.5 MMOL/L (ref 3.5–5.1)
PROT SERPL-MCNC: 7.9 G/DL (ref 6–8.4)
RBC # BLD AUTO: 5.71 M/UL (ref 4.6–6.2)
SODIUM SERPL-SCNC: 142 MMOL/L (ref 136–145)
SODIUM SERPL-SCNC: 145 MMOL/L (ref 136–145)
WBC # BLD AUTO: 5.45 K/UL (ref 3.9–12.7)

## 2023-08-12 PROCEDURE — 85730 THROMBOPLASTIN TIME PARTIAL: CPT | Mod: 91 | Performed by: PSYCHIATRY & NEUROLOGY

## 2023-08-12 PROCEDURE — 85025 COMPLETE CBC W/AUTO DIFF WBC: CPT | Performed by: PHYSICIAN ASSISTANT

## 2023-08-12 PROCEDURE — 84100 ASSAY OF PHOSPHORUS: CPT | Performed by: PHYSICIAN ASSISTANT

## 2023-08-12 PROCEDURE — 99233 PR SUBSEQUENT HOSPITAL CARE,LEVL III: ICD-10-PCS | Mod: ,,, | Performed by: PSYCHIATRY & NEUROLOGY

## 2023-08-12 PROCEDURE — 85730 THROMBOPLASTIN TIME PARTIAL: CPT

## 2023-08-12 PROCEDURE — 80048 BASIC METABOLIC PNL TOTAL CA: CPT | Mod: XB | Performed by: PSYCHIATRY & NEUROLOGY

## 2023-08-12 PROCEDURE — 99223 PR INITIAL HOSPITAL CARE,LEVL III: ICD-10-PCS | Mod: ,,, | Performed by: STUDENT IN AN ORGANIZED HEALTH CARE EDUCATION/TRAINING PROGRAM

## 2023-08-12 PROCEDURE — 25000003 PHARM REV CODE 250: Performed by: NURSE PRACTITIONER

## 2023-08-12 PROCEDURE — 94761 N-INVAS EAR/PLS OXIMETRY MLT: CPT

## 2023-08-12 PROCEDURE — 80053 COMPREHEN METABOLIC PANEL: CPT | Performed by: PHYSICIAN ASSISTANT

## 2023-08-12 PROCEDURE — 83735 ASSAY OF MAGNESIUM: CPT | Performed by: PHYSICIAN ASSISTANT

## 2023-08-12 PROCEDURE — 99900035 HC TECH TIME PER 15 MIN (STAT)

## 2023-08-12 PROCEDURE — 20000000 HC ICU ROOM

## 2023-08-12 PROCEDURE — 27000221 HC OXYGEN, UP TO 24 HOURS

## 2023-08-12 PROCEDURE — 99223 1ST HOSP IP/OBS HIGH 75: CPT | Mod: ,,, | Performed by: STUDENT IN AN ORGANIZED HEALTH CARE EDUCATION/TRAINING PROGRAM

## 2023-08-12 PROCEDURE — 25000003 PHARM REV CODE 250

## 2023-08-12 PROCEDURE — 99233 SBSQ HOSP IP/OBS HIGH 50: CPT | Mod: ,,, | Performed by: PSYCHIATRY & NEUROLOGY

## 2023-08-12 RX ORDER — POLYETHYLENE GLYCOL 3350 17 G/17G
17 POWDER, FOR SOLUTION ORAL DAILY
Status: DISCONTINUED | OUTPATIENT
Start: 2023-08-12 | End: 2023-08-24

## 2023-08-12 RX ORDER — ACETAMINOPHEN 325 MG/1
650 TABLET ORAL EVERY 6 HOURS PRN
Status: DISCONTINUED | OUTPATIENT
Start: 2023-08-12 | End: 2023-08-24

## 2023-08-12 NOTE — NURSING
"      [facility] PLAN OF CARE NOTE    Dx: Embolic stroke involving left middle cerebral artery    Vital Signs: BP (!) 146/98   Pulse 70   Temp 97.5 °F (36.4 °C) (Axillary)   Resp 18   Ht 6' 1" (1.854 m)   Wt 83.5 kg (184 lb 1.4 oz)   SpO2 100%   BMI 24.29 kg/m²     Neuro: Global aphasia    Respiratory: Nasal Cannula    Cardiac: Atrial fibrillation, frequent ectopy. AICD    Diet: NPO    Gtts: Precedex and Heparin     Urine Output: External catheter  835 cc/shift     Labs/Accuchecks: Daily labs, accuchecks q6, PTT per heparin nomogram    Skin precautions maintained including:  Sacrum and heels with foam dressing in place for pressure protection. Frequent weight shift encouraged / assistance provided Q2 hr to prevent further breakdown. Bed plugged in and mattress inflated; continuous rotational turning bed feature / [employer] bed utilized. Adhesive use limited. Heels elevated off bed. Positioned off wounds. Pressure points protected and positioning supports utilized.  Skin-to-device areas padded. Skin-to-skin areas padded   "

## 2023-08-12 NOTE — ASSESSMENT & PLAN NOTE
58 year old M w/ CHFrEF (10%, DD, pHTN), CAD (h/o STEMI, s/p PCI to Rcx-08/2021), AICD placement (12/2021), pAF (s/p DCCV), HTN, CKD3b admitted to  8/52 for CHF exacerbation. Course c/b CVA and resulting dysphasia, patient now requiring enteral access. Failed PEG tube placement by GI, NGT clogged and difficult to obtain preventing IR attempt.     High surgical risk given comorbidities. Will discuss timing of procedure with staff and risks/benefits as well as consent process with power of , patients girlfriend.     - Possible OR Monday 8/14  - Hold heparin midnight 8/14  - Remainder of care per primary    160.02

## 2023-08-12 NOTE — SUBJECTIVE & OBJECTIVE
Review of Systems: Unable to obtain a complete ROS due to level of consciousness.     Vitals:   Temp: 97.5 °F (36.4 °C)  Pulse: 70  Rhythm: paced rhythm  BP: (!) 146/98  MAP (mmHg): 117  Resp: 18  SpO2: 100 %    Temp  Min: 97.5 °F (36.4 °C)  Max: 98.3 °F (36.8 °C)  Pulse  Min: 67  Max: 96  BP  Min: 107/78  Max: 193/105  MAP (mmHg)  Min: 82  Max: 144  Resp  Min: 17  Max: 43  SpO2  Min: 91 %  Max: 100 %  Oxygen Concentration (%)  Min: 28  Max: 28    08/11 0701 - 08/12 0700  In: 953.4 [I.V.:188.8]  Out: 905 [Urine:905]   Unmeasured Output  Urine Occurrence: 1  Stool Occurrence: 0  Emesis Occurrence: 0  Pad Count: 1     Examination:   Constitutional: Well-nourished and -developed. No apparent distress.   Eyes: Conjunctiva clear, anicteric. Lids no lesions.  Head/Ears/Nose/Mouth/Throat/Neck: Moist mucous membranes. External ears, nose atraumatic.   Cardiovascular: Regular rhythm. No leg edema.  Respiratory: Comfortable respirations. Clear to auscultation.  Gastrointestinal: Soft, nondistended, nontender. + bowel sounds.    Neurologic:  -GCS E 4 V 1 M 5  -Drowsy. Opens eyes to voice. Aphasic. Unable to follow commands.  -Cranial nerves: L gaze preference, PERRL, R facial droop, + cough  -Motor: R hemiparesis, L side moves spontaneous/antigravity  Unable to test orientation, language, memory, judgment, insight, fund of knowledge, shoulder shrug, tongue protrusion, coordination, gait due to level of consciousness.    Medications:   ContinuousdexmedeTOMIDine (Precedex) infusion (titrating), Last Rate: 0.2 mcg/kg/hr (08/12/23 0757)  heparin (porcine) in D5W, Last Rate: 16 Units/kg/hr (08/12/23 0800)    Scheduledamiodarone, 200 mg, BID  aspirin, 81 mg, Daily  atorvastatin, 80 mg, Daily  clopidogreL, 75 mg, Daily  ezetimibe, 10 mg, QHS  ferrous sulfate, 1 tablet, Daily  metoprolol tartrate, 25 mg, BID  polyethylene glycol, 17 g, Daily  senna-docusate 8.6-50 mg, 1 tablet, BID    PRNacetaminophen, 650 mg, Q6H PRN  dextrose 10%,  "12.5 g, PRN  dextrose 10%, 25 g, PRN  glucagon (human recombinant), 1 mg, PRN  hydrALAZINE, 10 mg, Q6H PRN  insulin aspart U-100, 1-10 Units, Q6H PRN  labetalol, 10 mg, Q6H PRN  magnesium sulfate IVPB, 2 g, PRN  magnesium sulfate IVPB, 4 g, PRN  metoprolol, 5 mg, Q5 Min PRN  nitroGLYCERIN, 0.4 mg, Q5 Min PRN  ondansetron, 4 mg, Q8H PRN  potassium chloride, 40 mEq, PRN   And  potassium chloride, 60 mEq, PRN   And  potassium chloride, 80 mEq, PRN  sodium chloride 0.9%, 10 mL, PRN  sodium chloride 0.9%, 10 mL, PRN  sodium chloride 0.9%, 10 mL, PRN  sodium phosphate 15 mmol in dextrose 5 % (D5W) 250 mL IVPB, 15 mmol, PRN  sodium phosphate 20.01 mmol in dextrose 5 % (D5W) 250 mL IVPB, 20.01 mmol, PRN  sodium phosphate 30 mmol in dextrose 5 % (D5W) 250 mL IVPB, 30 mmol, PRN       Today I independently reviewed pertinent medications, lines/drains/airways, imaging, cardiology results, laboratory results, microbiology results, notably:     ISTAT: No results for input(s): "PH", "PCO2", "PO2", "POCSATURATED", "HCO3", "BE", "POCNA", "POCK", "POCTCO2", "POCGLU", "POCICA", "POCLAC", "SAMPLE" in the last 24 hours.   Chem:   Recent Labs   Lab 08/12/23 0226 08/12/23  1530    145   K 4.4 4.5    110   CO2 16* 22*   GLU 94 86   BUN 36* 33*   CREATININE 1.7* 1.4   CALCIUM 9.9 10.5   MG 2.4  --    PHOS 3.2  --    ANIONGAP 16 13   PROT 7.9  --    ALBUMIN 3.5  --    BILITOT 0.9  --    ALKPHOS 63  --    AST 19  --    ALT 9*  --      Heme:   Recent Labs   Lab 08/12/23 0226   WBC 5.45   HGB 15.2   HCT 51.9        Endo:   Recent Labs   Lab 08/12/23  0019 08/12/23  0603 08/12/23  1522   POCTGLUCOSE 76 87 94        "
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
## 2023-08-12 NOTE — CONSULTS
[facility]  General Surgery  Consult Note    Patient Name: Tera Griffiths  MRN: [id number]  Code Status: Full Code  Admission Date: 8/5/2023  Hospital Length of Stay: 5 days  Attending Physician: Jose Palomino DO  Primary Care Provider: Carleen Bueno MD    Patient information was obtained from relative(s), past medical records and primary team.     Inpatient consult to General Surgery  Consult performed by: Areli Richter MD  Consult ordered by: Gin Everett NP        Subjective:     Principal Problem: Embolic stroke involving left middle cerebral artery    History of Present Illness: 59 yo M with atrial fibrillation (on Apixban), CAD (associated with STEMI in 08/2021 and NTSEMI in 04/2023; status post PCI in 08/2021; on PLAVIX), combined CHF (EF 10% in 07/2023; status post ICD placement; associated with pulmonary hypertension), and HTN. Patient admitted to  08/05 for CHF exacerbation. Hospital course c/b left MCA stroke on 08/07 without evidence of vessel occlusion or stenosis on angiogram and evidence of bilateral PE on CTA requiring Heparin drip. He has since developed dysphagia hindering safe PO intake. General surgery consulted for placement of PEG tube.     Of note, GI was consulted for PEG placement and were unable to place PEG tube due to lack of transillumination during endoscopy. They were able to place NGT under direct visualization (nursing staff had previously been unable to place NGT) and recommended IR peg tube placement, however the NGT has become clogged following medication administration this morning.       No current facility-administered medications on file prior to encounter.     Current Outpatient Medications on File Prior to Encounter   Medication Sig    amiodarone (PACERONE) 200 MG Tab Take 1 tablet (200 mg total) by mouth 2 (two) times daily.    apixaban (ELIQUIS) 5 mg Tab Take 5 mg by mouth 2 (two) times daily.    atorvastatin (LIPITOR) 80 MG  tablet Take 1 tablet (80 mg total) by mouth once daily.    clopidogreL (PLAVIX) 75 mg tablet Take 1 tablet (75 mg total) by mouth once daily.    empagliflozin (JARDIANCE) 10 mg tablet Take 1 tablet (10 mg total) by mouth once daily.    ezetimibe (ZETIA) 10 mg tablet Take 10 mg by mouth once daily.    ferrous sulfate (FEOSOL) 325 mg (65 mg iron) Tab tablet Take 325 mg by mouth every other day.    LIDOcaine (LIDODERM) 5 % Place 1 patch onto the skin once daily.    metoprolol succinate (TOPROL-XL) 25 MG 24 hr tablet Take 1 tablet (25 mg total) by mouth every evening.    oxyCODONE-acetaminophen (PERCOCET) 5-325 mg per tablet Take 1 tablet by mouth every 6 (six) hours as needed for Pain.    sacubitriL-valsartan (ENTRESTO) 24-26 mg per tablet Take 1 tablet by mouth 2 (two) times daily.    torsemide (DEMADEX) 20 MG Tab Take 1 tablet (20 mg total) by mouth once daily.       Review of patient's allergies indicates:  No Known Allergies    Past Medical History:   Diagnosis Date    Acute on chronic combined systolic and diastolic heart failure 9/23/2022    Atrial fibrillation     CAD (coronary artery disease) 9/23/2022    Dyslipidemia 9/23/2022    Embolic stroke involving left middle cerebral artery 8/8/2023    Encounter for blood transfusion     HTN (hypertension) 9/23/2022    ICD (implantable cardioverter-defibrillator) in place 9/23/2022    Paroxysmal A-fib 9/23/2022    Stage 3 chronic kidney disease 4/19/2023     Past Surgical History:   Procedure Laterality Date    CARDIAC DEFIBRILLATOR PLACEMENT Left     CEREBRAL ANGIOGRAM N/A 8/8/2023    Procedure: ANGIOGRAM-CEREBRAL;  Surgeon: Kenzie Rodriugez;  Location: [facility];  Service: Anesthesiology;  Laterality: N/A;  LVO (angiogram)    ESOPHAGOGASTRODUODENOSCOPY N/A 8/11/2023    Procedure: EGD (ESOPHAGOGASTRODUODENOSCOPY);  Surgeon: Colette Martinez MD;  Location: [address]);  Service: Gastroenterology;  Laterality: N/A;    HERNIA REPAIR       LEFT HEART CATHETERIZATION Left 4/18/2023    Procedure: Left heart cath;  Surgeon: Atilio Marks MD;  Location: [facility] CATH LAB;  Service: Cardiology;  Laterality: Left;    RIGHT HEART CATHETERIZATION Right 9/30/2022    Procedure: INSERTION, CATHETER, RIGHT HEART;  Surgeon: Caden Aden MD;  Location: [facility] CATH LAB;  Service: Cardiology;  Laterality: Right;    TREATMENT OF CARDIAC ARRHYTHMIA N/A 11/22/2022    Procedure: CARDIOVERSION;  Surgeon: Marvin Sanon MD;  Location: [facility] EP LAB;  Service: Cardiology;  Laterality: N/A;  afib, KIA, DCCV, anes, MB, 3 Prep     Family History    None       Tobacco Use    Smoking status: Never    Smokeless tobacco: Never   Substance and Sexual Activity    Alcohol use: Never    Drug use: Never    Sexual activity: Not on file     Review of Systems   Unable to perform ROS: Mental status change     Objective:     Vital Signs (Most Recent):  Temp: 97.5 °F (36.4 °C) (08/12/23 0700)  Pulse: 70 (08/12/23 1000)  Resp: 18 (08/12/23 1000)  BP: (!) 146/98 (08/12/23 1000)  SpO2: 100 % (08/12/23 1000) Vital Signs (24h Range):  Temp:  [97.5 °F (36.4 °C)-98.3 °F (36.8 °C)] 97.5 °F (36.4 °C)  Pulse:  [67-92] 70  Resp:  [17-33] 18  SpO2:  [91 %-100 %] 100 %  BP: (107-177)/() 146/98     Weight: 83.5 kg (184 lb 1.4 oz)  Body mass index is 24.29 kg/m².     Physical Exam  Vitals and nursing note reviewed.   HENT:      Head: Normocephalic and atraumatic.      Nose:      Comments: NGT that does not flush or draw back   Cardiovascular:      Rate and Rhythm: Normal rate and regular rhythm.   Pulmonary:      Effort: Pulmonary effort is normal. No respiratory distress.   Abdominal:      General: Abdomen is flat. There is no distension.      Palpations: Abdomen is soft. There is no mass.      Tenderness: There is no abdominal tenderness.      Hernia: No hernia is present.      Comments: Transverse umbilical scar  Epigastric lap incisions    Musculoskeletal:      Right lower leg: No  edema.      Left lower leg: No edema.      Comments: Hands in mits   Skin:     General: Skin is warm and dry.   Neurological:      Comments: Tracks with eyes, aphasic, doesn't follow commands            I have reviewed all pertinent lab results within the past 24 hours.    Significant Diagnostics:  I have reviewed all pertinent imaging results/findings within the past 24 hours.      Assessment/Plan:     * Embolic stroke involving left middle cerebral artery  58 year old M w/ CHFrEF (10%, DD, pHTN), CAD (h/o STEMI, s/p PCI to Rcx-08/2021), AICD placement (12/2021), pAF (s/p DCCV), HTN, CKD3b admitted to  8/52 for CHF exacerbation. Course c/b CVA and resulting dysphasia, patient now requiring enteral access. Failed PEG tube placement by GI, NGT clogged and difficult to obtain preventing IR attempt.     High surgical risk given comorbidities. Will discuss timing of procedure with staff and risks/benefits as well as consent process with power of , patients girlfriend.     - Possible OR Monday 8/14  - Hold heparin midnight 8/14  - Remainder of care per primary       VTE Risk Mitigation (From admission, onward)         Ordered     heparin 25,000 units in dextrose 5% 250 ml (100 units/mL) infusion MINIMAL INTENSITY nomogram - [facility]  Continuous        Question Answer Comment   Heparin Infusion Adjustment (DO NOT MODIFY ANSWER) \\[email or web address]\epic\Images\Pharmacy\HeparinInfusions\heparin MINIMAL  INTENSITY nomogram for [facility] [id number]    Begin at (in units/kg/hr) 12 08/11/23 1734     heparin 25,000 units in dextrose 5% 250 ml (100 units/mL) infusion MINIMAL INTENSITY nomogram - [facility]  Continuous        Question Answer Comment   Heparin Infusion Adjustment (DO NOT MODIFY ANSWER) \\[email or web address]\epic\Images\Pharmacy\HeparinInfusions\heparin MINIMAL  INTENSITY nomogram for [facility] [id number]    Begin at (in units/kg/hr) 12 08/08/23 1750                Thank you for your consult. I will follow-up with patient. Please  contact us if you have any additional questions.    Areli Richter MD  General Surgery  [facility]

## 2023-08-12 NOTE — SUBJECTIVE & OBJECTIVE
No current facility-administered medications on file prior to encounter.     Current Outpatient Medications on File Prior to Encounter   Medication Sig    amiodarone (PACERONE) 200 MG Tab Take 1 tablet (200 mg total) by mouth 2 (two) times daily.    apixaban (ELIQUIS) 5 mg Tab Take 5 mg by mouth 2 (two) times daily.    atorvastatin (LIPITOR) 80 MG tablet Take 1 tablet (80 mg total) by mouth once daily.    clopidogreL (PLAVIX) 75 mg tablet Take 1 tablet (75 mg total) by mouth once daily.    empagliflozin (JARDIANCE) 10 mg tablet Take 1 tablet (10 mg total) by mouth once daily.    ezetimibe (ZETIA) 10 mg tablet Take 10 mg by mouth once daily.    ferrous sulfate (FEOSOL) 325 mg (65 mg iron) Tab tablet Take 325 mg by mouth every other day.    LIDOcaine (LIDODERM) 5 % Place 1 patch onto the skin once daily.    metoprolol succinate (TOPROL-XL) 25 MG 24 hr tablet Take 1 tablet (25 mg total) by mouth every evening.    oxyCODONE-acetaminophen (PERCOCET) 5-325 mg per tablet Take 1 tablet by mouth every 6 (six) hours as needed for Pain.    sacubitriL-valsartan (ENTRESTO) 24-26 mg per tablet Take 1 tablet by mouth 2 (two) times daily.    torsemide (DEMADEX) 20 MG Tab Take 1 tablet (20 mg total) by mouth once daily.       Review of patient's allergies indicates:  No Known Allergies    Past Medical History:   Diagnosis Date    Acute on chronic combined systolic and diastolic heart failure 9/23/2022    Atrial fibrillation     CAD (coronary artery disease) 9/23/2022    Dyslipidemia 9/23/2022    Embolic stroke involving left middle cerebral artery 8/8/2023    Encounter for blood transfusion     HTN (hypertension) 9/23/2022    ICD (implantable cardioverter-defibrillator) in place 9/23/2022    Paroxysmal A-fib 9/23/2022    Stage 3 chronic kidney disease 4/19/2023     Past Surgical History:   Procedure Laterality Date    CARDIAC DEFIBRILLATOR PLACEMENT Left     CEREBRAL ANGIOGRAM N/A 8/8/2023    Procedure: ANGIOGRAM-CEREBRAL;  Surgeon:  SurgeonKenzie;  Location: [facility];  Service: Anesthesiology;  Laterality: N/A;  LVO (angiogram)    ESOPHAGOGASTRODUODENOSCOPY N/A 8/11/2023    Procedure: EGD (ESOPHAGOGASTRODUODENOSCOPY);  Surgeon: Colette Martinez MD;  Location: [facility] ENDO (2ND FLR);  Service: Gastroenterology;  Laterality: N/A;    HERNIA REPAIR      LEFT HEART CATHETERIZATION Left 4/18/2023    Procedure: Left heart cath;  Surgeon: Atilio Marks MD;  Location: [facility] CATH LAB;  Service: Cardiology;  Laterality: Left;    RIGHT HEART CATHETERIZATION Right 9/30/2022    Procedure: INSERTION, CATHETER, RIGHT HEART;  Surgeon: Caden Aden MD;  Location: [facility] CATH LAB;  Service: Cardiology;  Laterality: Right;    TREATMENT OF CARDIAC ARRHYTHMIA N/A 11/22/2022    Procedure: CARDIOVERSION;  Surgeon: Marvin Sanon MD;  Location: [facility] EP LAB;  Service: Cardiology;  Laterality: N/A;  afib, KIA, DCCV, anes, MB, 3 Prep     Family History    None       Tobacco Use    Smoking status: Never    Smokeless tobacco: Never   Substance and Sexual Activity    Alcohol use: Never    Drug use: Never    Sexual activity: Not on file     Review of Systems   Unable to perform ROS: Mental status change     Objective:     Vital Signs (Most Recent):  Temp: 97.5 °F (36.4 °C) (08/12/23 0700)  Pulse: 70 (08/12/23 1000)  Resp: 18 (08/12/23 1000)  BP: (!) 146/98 (08/12/23 1000)  SpO2: 100 % (08/12/23 1000) Vital Signs (24h Range):  Temp:  [97.5 °F (36.4 °C)-98.3 °F (36.8 °C)] 97.5 °F (36.4 °C)  Pulse:  [67-92] 70  Resp:  [17-33] 18  SpO2:  [91 %-100 %] 100 %  BP: (107-177)/() 146/98     Weight: 83.5 kg (184 lb 1.4 oz)  Body mass index is 24.29 kg/m².     Physical Exam  Vitals and nursing note reviewed.   HENT:      Head: Normocephalic and atraumatic.      Nose:      Comments: NGT that does not flush or draw back   Cardiovascular:      Rate and Rhythm: Normal rate and regular rhythm.   Pulmonary:      Effort: Pulmonary effort is normal. No respiratory distress.    Abdominal:      General: Abdomen is flat. There is no distension.      Palpations: Abdomen is soft. There is no mass.      Tenderness: There is no abdominal tenderness.      Hernia: No hernia is present.      Comments: Transverse umbilical scar  Epigastric lap incisions    Musculoskeletal:      Right lower leg: No edema.      Left lower leg: No edema.      Comments: Hands in mits   Skin:     General: Skin is warm and dry.   Neurological:      Comments: Tracks with eyes, aphasic, doesn't follow commands            I have reviewed all pertinent lab results within the past 24 hours.    Significant Diagnostics:  I have reviewed all pertinent imaging results/findings within the past 24 hours.

## 2023-08-12 NOTE — ASSESSMENT & PLAN NOTE
- PMHx of multiple subsegmental Pes on CTAs 9/2021 & 12/2021  - 8/8: CTA Evidence of pulmonary embolism involving the right distal interlobar artery and the segmental/subsegmental bilateral posterior pulmonary arteries without heart strain.   - Continue heparin gtt

## 2023-08-12 NOTE — ASSESSMENT & PLAN NOTE
SLP following  GI team consulted for PEG tube, unable to place on 8/11, NGT placed instead  Plans for IR to perform PEG, however, NGT became occluded 8/12  General Surgery consulted 8/12 for PEG tube placement, plans for OR possibly Monday  Once obtain enteral access- will restart medications, transition Heparin gtt to oral AC, and start nutrition

## 2023-08-12 NOTE — ASSESSMENT & PLAN NOTE
- s/p DCCV (11/2022)  - Previously on apixaban  - Continue heparin gtt  - Rate controlled on admit  - Holding Metoprolol 75mg QD & Amiodarone 200mg BID; no enteral access

## 2023-08-12 NOTE — HPI
59 yo M with atrial fibrillation (on Apixban), CAD (associated with STEMI in 08/2021 and NTSEMI in 04/2023; status post PCI in 08/2021; on PLAVIX), combined CHF (EF 10% in 07/2023; status post ICD placement; associated with pulmonary hypertension), and HTN. Patient admitted to  08/05 for CHF exacerbation. Hospital course c/b left MCA stroke on 08/07 without evidence of vessel occlusion or stenosis on angiogram and evidence of bilateral PE on CTA requiring Heparin drip. He has since developed dysphagia hindering safe PO intake. General surgery consulted for placement of PEG tube.     Of note, GI was consulted for PEG placement and were unable to place PEG tube due to lack of transillumination during endoscopy. They were able to place NGT under direct visualization (nursing staff had previously been unable to place NGT) and recommended IR peg tube placement, however the NGT has become clogged following medication administration this morning.

## 2023-08-12 NOTE — ASSESSMENT & PLAN NOTE
- Baseline Cr 1.7  - At/below baseline  - Strict I/O's  - Avoid nephrotoxic agents where appropriate  - Renally-dose meds

## 2023-08-12 NOTE — ASSESSMENT & PLAN NOTE
Echo Saline Bubble? Yes    Result Date: 8/8/2023    Left Ventricle: The left ventricle is moderately dilated. Increased   ventricular mass. Normal wall thickness. There is moderate eccentric   hypertrophy. Severe global hypokinesis present. Septal motion is   consistent with pacing. There is severely reduced systolic function with a   visually estimated ejection fraction of 15 - 20%. Ejection fraction by   visual approximation is 20%. Unable to assess diastolic function due to   arrhythmia.    Left Atrium: Left atrium is severely dilated. Radha 85mL/m2.    Right Ventricle: Mild right ventricular enlargement. Wall thickness is   normal. Right ventricle wall motion  is normal. Systolic function is   severely reduced. Pacemaker lead present in the ventricle.    Right Atrium: Right atrium is severely dilated.    Aortic Valve: There is mild aortic valve sclerosis. There is normal   leaflet mobility. There is no significant regurgitation.    Mitral Valve: There is bileaflet sclerosis. There is normal leaflet   mobility. There is mild regurgitation.    Tricuspid Valve: The tricuspid valve is structurally normal. There is   normal leaflet mobility. There is mild regurgitation.    Pulmonic Valve: The pulmonic valve is structurally normal. There is   normal leaflet mobility. There is no significant regurgitation.    Aorta: Aortic root is normal in size measuring 3.13 cm. Ascending aorta   is normal measuring 3.24 cm.    IVC/SVC: Normal venous pressure at 3 mmHg.    Pericardium: The pericardium is normal. There is no pericardial   effusion.      - Initially admitted for CHF exacerbation and c/o CP  - BNP elevated on admission - 1956   - Weigh patient QD   - 1.5L fluid restriction   - Strict I/Os Q1H  - GDMT   - Holding Lasix 80mg BID IV today due to EMERSON   - Imdur 90mg QD; no access   - Metoprolol 75mg QD; no access   - Entresto 49-51 QD; no access  - Hold home Jardiance  - Cardiology signed-off

## 2023-08-12 NOTE — PLAN OF CARE
Problem: Restraint, Nonbehavioral (Nonviolent)  Goal: Absence of Harm or Injury  Outcome: Ongoing, Progressing   [facility] Care Plan    POC reviewed with Tera Griffiths and family at 0300. Pt unable to verbalize understanding. Questions and concerns addressed. No acute events overnight. Pt progressing toward goals. Will continue to monitor. See below and flowsheets for full assessment and VS info.           Is this a stroke patient? yes- Stroke booklet reviewed with patient and family, risk factors identified for patient and stroke booklet remains at bedside for ongoing education.     Neuro:  [place] Coma Scale  Best Eye Response: 4-->(E4) spontaneous  Best Motor Response: 5-->(M5) localizes pain  Best Verbal Response: 1-->(V1) none  [place] Coma Scale Score: 10  Assessment Qualifiers: patient not sedated/intubated  Pupil PERRLA: other (see comments) (resist)     24hr Temp:  [96.8 °F (36 °C)-98.3 °F (36.8 °C)]     CV:   Rhythm: paced rhythm  BP goals:   SBP < 180  MAP > 65    Resp:      Oxygen Concentration (%): 28    Plan: trach/PEG discussions    GI/:     Diet/Nutrition Received: NPO  Last Bowel Movement: 08/06/23  Voiding Characteristics: external catheter    Intake/Output Summary (Last 24 hours) at 8/12/2023 0521  Last data filed at 8/12/2023 0305  Gross per 24 hour   Intake 566.58 ml   Output 830 ml   Net -263.42 ml     Unmeasured Output  Urine Occurrence: 1  Stool Occurrence: 0  Emesis Occurrence: 0  Pad Count: 1    Labs/Accuchecks:  Recent Labs   Lab 08/12/23 0226   WBC 5.45   RBC 5.71   HGB 15.2   HCT 51.9         Recent Labs   Lab 08/12/23 0226      K 4.4   CO2 16*      BUN 36*   CREATININE 1.7*   ALKPHOS 63   ALT 9*   AST 19   BILITOT 0.9      Recent Labs   Lab 08/08/23  1838 08/09/23  0045 08/12/23 0226   INR 1.1  --   --    APTT 29.6   < > 47.6*    < > = values in this interval not displayed.      Recent Labs   Lab 08/10/23  1830   TROPONINI 0.053*       Electrolytes: N/A -  electrolytes WDL  Accuchecks: Q6H    Gtts:   dexmedeTOMIDine (Precedex) infusion (titrating) 0.2 mcg/kg/hr (08/11/23 2234)    heparin (porcine) in D5W 16 Units/kg/hr (08/11/23 1745)       LDA/Wounds:  Lines/Drains/Airways       Drain  Duration             Male External Urinary Catheter 08/09/23 1305 Large 2 days         NG/OG Tube 08/11/23 1530 [place] sump Left nostril <1 day              Peripheral Intravenous Line  Duration                  Peripheral IV - Single Lumen 08/09/23 1452 20 G Anterior;Right Forearm 2 days         Peripheral IV - Single Lumen 08/10/23 1847 20 G Left Antecubital 1 day                  Wounds: No  Wound care consulted: No
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
## 2023-08-12 NOTE — ASSESSMENT & PLAN NOTE
- Was on DAPT: ASA, Plavix  - On hold due to no enteral access  - EKG with AF, negative for STEMI  - Troponin elevated at baseline
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
## 2023-08-13 LAB
ALBUMIN SERPL BCP-MCNC: 3.5 G/DL (ref 3.5–5.2)
ALP SERPL-CCNC: 63 U/L (ref 55–135)
ALT SERPL W/O P-5'-P-CCNC: 7 U/L (ref 10–44)
ANION GAP SERPL CALC-SCNC: 13 MMOL/L (ref 8–16)
APTT PPP: 42.4 SEC (ref 21–32)
APTT PPP: 50.4 SEC (ref 21–32)
APTT PPP: 59.6 SEC (ref 21–32)
AST SERPL-CCNC: 18 U/L (ref 10–40)
BASOPHILS # BLD AUTO: 0.01 K/UL (ref 0–0.2)
BASOPHILS NFR BLD: 0.1 % (ref 0–1.9)
BILIRUB SERPL-MCNC: 0.9 MG/DL (ref 0.1–1)
BUN SERPL-MCNC: 30 MG/DL (ref 6–20)
CALCIUM SERPL-MCNC: 10.1 MG/DL (ref 8.7–10.5)
CHLORIDE SERPL-SCNC: 111 MMOL/L (ref 95–110)
CO2 SERPL-SCNC: 21 MMOL/L (ref 23–29)
CREAT SERPL-MCNC: 1.4 MG/DL (ref 0.5–1.4)
DIFFERENTIAL METHOD: ABNORMAL
EOSINOPHIL # BLD AUTO: 0 K/UL (ref 0–0.5)
EOSINOPHIL NFR BLD: 0 % (ref 0–8)
ERYTHROCYTE [DISTWIDTH] IN BLOOD BY AUTOMATED COUNT: 15.3 % (ref 11.5–14.5)
EST. GFR  (NO RACE VARIABLE): 59 ML/MIN/1.73 M^2
GLUCOSE SERPL-MCNC: 106 MG/DL (ref 70–110)
HCT VFR BLD AUTO: 49 % (ref 40–54)
HGB BLD-MCNC: 15 G/DL (ref 14–18)
IMM GRANULOCYTES # BLD AUTO: 0.02 K/UL (ref 0–0.04)
IMM GRANULOCYTES NFR BLD AUTO: 0.3 % (ref 0–0.5)
LYMPHOCYTES # BLD AUTO: 0.7 K/UL (ref 1–4.8)
LYMPHOCYTES NFR BLD: 9.5 % (ref 18–48)
MAGNESIUM SERPL-MCNC: 2.4 MG/DL (ref 1.6–2.6)
MCH RBC QN AUTO: 26.8 PG (ref 27–31)
MCHC RBC AUTO-ENTMCNC: 30.6 G/DL (ref 32–36)
MCV RBC AUTO: 88 FL (ref 82–98)
MONOCYTES # BLD AUTO: 0.8 K/UL (ref 0.3–1)
MONOCYTES NFR BLD: 11.4 % (ref 4–15)
NEUTROPHILS # BLD AUTO: 5.8 K/UL (ref 1.8–7.7)
NEUTROPHILS NFR BLD: 78.7 % (ref 38–73)
NRBC BLD-RTO: 0 /100 WBC
PHOSPHATE SERPL-MCNC: 3 MG/DL (ref 2.7–4.5)
PLATELET # BLD AUTO: 312 K/UL (ref 150–450)
PMV BLD AUTO: 12.1 FL (ref 9.2–12.9)
POCT GLUCOSE: 111 MG/DL (ref 70–110)
POCT GLUCOSE: 112 MG/DL (ref 70–110)
POCT GLUCOSE: 115 MG/DL (ref 70–110)
POCT GLUCOSE: 85 MG/DL (ref 70–110)
POTASSIUM SERPL-SCNC: 4.5 MMOL/L (ref 3.5–5.1)
PROT SERPL-MCNC: 8 G/DL (ref 6–8.4)
RBC # BLD AUTO: 5.6 M/UL (ref 4.6–6.2)
SODIUM SERPL-SCNC: 145 MMOL/L (ref 136–145)
WBC # BLD AUTO: 7.36 K/UL (ref 3.9–12.7)

## 2023-08-13 PROCEDURE — 99233 SBSQ HOSP IP/OBS HIGH 50: CPT | Mod: ,,, | Performed by: PSYCHIATRY & NEUROLOGY

## 2023-08-13 PROCEDURE — 94761 N-INVAS EAR/PLS OXIMETRY MLT: CPT

## 2023-08-13 PROCEDURE — 80053 COMPREHEN METABOLIC PANEL: CPT | Performed by: PHYSICIAN ASSISTANT

## 2023-08-13 PROCEDURE — 84100 ASSAY OF PHOSPHORUS: CPT | Performed by: PHYSICIAN ASSISTANT

## 2023-08-13 PROCEDURE — 85730 THROMBOPLASTIN TIME PARTIAL: CPT

## 2023-08-13 PROCEDURE — 99900035 HC TECH TIME PER 15 MIN (STAT)

## 2023-08-13 PROCEDURE — 83735 ASSAY OF MAGNESIUM: CPT | Performed by: PHYSICIAN ASSISTANT

## 2023-08-13 PROCEDURE — 99233 PR SUBSEQUENT HOSPITAL CARE,LEVL III: ICD-10-PCS | Mod: ,,, | Performed by: PSYCHIATRY & NEUROLOGY

## 2023-08-13 PROCEDURE — 85730 THROMBOPLASTIN TIME PARTIAL: CPT | Mod: 91 | Performed by: PSYCHIATRY & NEUROLOGY

## 2023-08-13 PROCEDURE — 63600175 PHARM REV CODE 636 W HCPCS

## 2023-08-13 PROCEDURE — 25000003 PHARM REV CODE 250

## 2023-08-13 PROCEDURE — 27000221 HC OXYGEN, UP TO 24 HOURS

## 2023-08-13 PROCEDURE — 20000000 HC ICU ROOM

## 2023-08-13 PROCEDURE — 85025 COMPLETE CBC W/AUTO DIFF WBC: CPT | Performed by: PHYSICIAN ASSISTANT

## 2023-08-13 RX ADMIN — HEPARIN SODIUM 14 UNITS/KG/HR: 10000 INJECTION, SOLUTION INTRAVENOUS at 05:08

## 2023-08-13 RX ADMIN — DEXMEDETOMIDINE HYDROCHLORIDE 0.2 MCG/KG/HR: 4 INJECTION INTRAVENOUS at 02:08

## 2023-08-13 RX ADMIN — DEXMEDETOMIDINE HYDROCHLORIDE 0.6 MCG/KG/HR: 4 INJECTION INTRAVENOUS at 03:08

## 2023-08-13 NOTE — SUBJECTIVE & OBJECTIVE
Neurologic Chief Complaint: L MCA syndrome    Subjective:     Interval History: Patient is seen for follow-up neurological assessment and treatment recommendations:  exam limited by precedex. GI unable to place peg on 8/11 but was able to place NGT. Plans for IR PEG placement, but NG now clogged and at or above GE junction with difficulty advancing tube. Possible peg placement tomorrow with general surgery    HPI, Past Medical, Family, and Social History remains the same as documented in the initial encounter.     Review of Systems   Constitutional:  Negative for fever.   HENT:  Positive for trouble swallowing.    Respiratory:  Negative for cough.    Gastrointestinal:  Negative for vomiting.   Neurological:  Positive for facial asymmetry, speech difficulty, weakness and numbness.   Psychiatric/Behavioral:          Restless     Scheduled Meds:   amiodarone  200 mg Per NG tube BID    aspirin  81 mg Per NG tube Daily    atorvastatin  80 mg Per NG tube Daily    [START ON 8/14/2023] barium  450 mL Per NG tube Once    clopidogreL  75 mg Per NG tube Daily    ezetimibe  10 mg Per NG tube QHS    ferrous sulfate  1 tablet Per NG tube Daily    metoprolol tartrate  25 mg Per NG tube BID    polyethylene glycol  17 g Per NG tube Daily    senna-docusate 8.6-50 mg  1 tablet Per NG tube BID     Continuous Infusions:   dexmedeTOMIDine (Precedex) infusion (titrating) 0.6 mcg/kg/hr (08/13/23 1613)    heparin (porcine) in D5W 14 Units/kg/hr (08/13/23 1613)     PRN Meds:acetaminophen, dextrose 10%, dextrose 10%, glucagon (human recombinant), hydrALAZINE, insulin aspart U-100, labetalol, magnesium sulfate IVPB, magnesium sulfate IVPB, metoprolol, nitroGLYCERIN, ondansetron, potassium chloride **AND** potassium chloride **AND** potassium chloride, sodium chloride 0.9%, sodium chloride 0.9%, sodium chloride 0.9%, sodium phosphate 15 mmol in dextrose 5 % (D5W) 250 mL IVPB, sodium phosphate 20.01 mmol in dextrose 5 % (D5W) 250 mL IVPB, sodium  phosphate 30 mmol in dextrose 5 % (D5W) 250 mL IVPB    Objective:     Vital Signs (Most Recent):  Temp: 97.9 °F (36.6 °C) (08/13/23 1501)  Pulse: 65 (08/13/23 1601)  Resp: (!) 28 (08/13/23 1601)  BP: (!) 138/97 (08/13/23 1601)  SpO2: 100 % (08/13/23 1601)  BP Location: Right arm    Vital Signs Range (Last 24H):  Temp:  [97.7 °F (36.5 °C)-99.5 °F (37.5 °C)]   Pulse:  []   Resp:  [18-28]   BP: ()/()   SpO2:  [93 %-100 %]   BP Location: Right arm       Physical Exam  Vitals reviewed.   Constitutional:       General: He is not in acute distress.     Appearance: He is well-developed.   HENT:      Head: Normocephalic and atraumatic.   Cardiovascular:      Rate and Rhythm: Normal rate.   Pulmonary:      Effort: Pulmonary effort is normal. No respiratory distress.   Skin:     General: Skin is warm and dry.   Neurological:      Mental Status: He is alert.              Neurological Exam:   LOC: alert  Attention Span: poor  Language: Global aphasia, not following commands  Articulation: Untestable due to severe aphasia   Orientation: Untestable due to severe aphasia   Visual Fields: Hemianopsia right  EOM (CN III, IV, VI): Gaze preference  left  Facial Movement (CN VII): Lower facial weakness on the Right  Motor: Arm left  Normal 5/5  Leg left  Normal 5/5  Arm right  Paresis: 2/5  Leg right Paresis: 2/5  Tone: Normal tone throughout    Laboratory:  CMP:   Recent Labs   Lab 08/13/23  0237   CALCIUM 10.1   ALBUMIN 3.5   PROT 8.0      K 4.5   CO2 21*   *   BUN 30*   CREATININE 1.4   ALKPHOS 63   ALT 7*   AST 18   BILITOT 0.9       CBC:   Recent Labs   Lab 08/13/23  0237   WBC 7.36   RBC 5.60   HGB 15.0   HCT 49.0      MCV 88   MCH 26.8*   MCHC 30.6*       Lipid Panel:   Recent Labs   Lab 08/08/23  0045   CHOL 166   LDLCALC 101.4   HDL 46   TRIG 93       Coagulation:   Recent Labs   Lab 08/08/23  1838 08/09/23  0045 08/13/23  1124   INR 1.1  --   --    APTT 29.6   < > 50.4*    < > = values in  "this interval not displayed.       Platelet Aggregation Study: No results for input(s): "PLTAGG", "PLTAGINTERP", "PLTAGREGLACO", "ADPPLTAGGREG" in the last 168 hours.  Hgb A1C:   No results for input(s): "HGBA1C" in the last 168 hours.    TSH:   Recent Labs   Lab 08/08/23  0045   TSH 1.134         Diagnostic Results     Brain Imaging   CT Head 8/10/23  Impression:     No detrimental change compared to prior.     Evolving acute infarcts within the left MCA and PCA territories.  No hemorrhagic conversion.  No significant mass effect     Suspected small subacute infarction within the right caudate.     Multiple areas remote infarctions as detailed above.      CT Head 8/8/23 1704  Impression:     1. Redemonstration of acute infarctions in the left MCA and PCA territories.  No evidence to suggest hemorrhagic transformation.  2. Suspected small subacute infarction in the right caudate.  3. Numerous remote infarctions as detailed above.    CT Head 8/8/23 0826  Impression:     Moderate developing hypoattenuation left inferior frontal lobe and left occipital lobe concerning for developing recent infarcts post thrombectomy.     Allowing for scattered hyperdensity related to recently contrast administration for thrombectomy there is no definite acute intracranial hemorrhage.     Previous hypodensity right caudate no longer seen which may be related to contrast administration for thrombectomy and possible subacute age infarction.     Superimposed remote infarcts with moderate to large encephalomalacia right MCA territory unchanged    Vessel Imaging   IR Angio 8/8/23  Preliminary Interpretation:   1.    No evidence of left ICA or MCA stenosis. No large or medium vessel occlusion.    CTA Multiphase 8/7/23  Impression:     CT head:     New right caudate nucleus hypodensity, likely representing age-indeterminate infarct.  Could be further evaluated with MRI.     Multifocal encephalomalacia involving the right frontal, " temporoparietal, and left occipital lobes.     CTA head and neck:     Acute occlusion of the superior left M2 segment.     Left PCA occlusion of undetermined age.     Filling defect in the right main pulmonary artery, concerning for acute pulmonary thromboembolism.  Consider follow-up pulmonary CTA to more fully assess the extent of thromboemboli, the clot burden, and the presence or absence of right heart strain.     Not fully detailed above:     - Incomplete opacification of the left atrial appendage, suspicious for an intraluminal thrombus.  Follow-up echocardiogram, or cardiac MRI or CTA, recommended.     - The presence of acute thrombi or thromboemboli in both the systemic arterial and pulmonary arterial circulation raises concern for the possibility of underlying intracardiac or extracardiac right-to-left shunt, and/or a hypercoagulable state.  Correlate clinically.     - Incompletely visualized, but possibly large, pericardial effusion.  Artifacts compromise accurate assessment of its attenuation.    Cardiac Imaging   TTE with bubble 8/8/23    Left Ventricle: The left ventricle is moderately dilated. Increased ventricular mass. Normal wall thickness. There is moderate eccentric hypertrophy. Severe global hypokinesis present. Septal motion is consistent with pacing. There is severely reduced systolic function with a visually estimated ejection fraction of 15 - 20%. Ejection fraction by visual approximation is 20%. Unable to assess diastolic function due to arrhythmia.    Left Atrium: Left atrium is severely dilated. Radha 85mL/m2.    Right Ventricle: Mild right ventricular enlargement. Wall thickness is normal. Right ventricle wall motion  is normal. Systolic function is severely reduced. Pacemaker lead present in the ventricle.    Right Atrium: Right atrium is severely dilated.    Aortic Valve: There is mild aortic valve sclerosis. There is normal leaflet mobility. There is no significant regurgitation.     Mitral Valve: There is bileaflet sclerosis. There is normal leaflet mobility. There is mild regurgitation.    Tricuspid Valve: The tricuspid valve is structurally normal. There is normal leaflet mobility. There is mild regurgitation.    Pulmonic Valve: The pulmonic valve is structurally normal. There is normal leaflet mobility. There is no significant regurgitation.    Aorta: Aortic root is normal in size measuring 3.13 cm. Ascending aorta is normal measuring 3.24 cm.    IVC/SVC: Normal venous pressure at 3 mmHg.    Pericardium: The pericardium is normal. There is no pericardial effusion.

## 2023-08-13 NOTE — ASSESSMENT & PLAN NOTE
- [employer]  - PMHx cardiac arrest 2/2 V-Tach   89 year admitted with fever, lethargic  had clogged hsieh  patient was in septic shock  found to be c. dif positive, positive blood culture for enterococcus and   e. coli, ct showed only colit  required high dose pressors.  Patient made  himself dnr/DNI and

## 2023-08-13 NOTE — PLAN OF CARE
POC reviewed with Tera Griffiths and family at 1400. Pt unable to verbalize understanding. Questions and concerns addressed. No acute events today. Pt progressing toward goals. Will continue to monitor. See below and flowsheets for full assessment and VS info.     - NGT troubleshooting, advancements with following KUB x-rays.   - Need working NGT for IR on 8/14 for PEG placement.   - New PIV placed R hand.   - PTT lab draw Q 6.   - Precedex titrated as tolerated.     Neuro:  [place] Coma Scale  Best Eye Response: 3-->(E3) to speech  Best Motor Response: 5-->(M5) localizes pain  Best Verbal Response: 1-->(V1) none  [place] Coma Scale Score: 9  Assessment Qualifiers: patient not sedated/intubated, no eye obstruction present  Pupil PERRLA: yes     24 hr Temp:  [97.7 °F (36.5 °C)-99.5 °F (37.5 °C)]     CV:   Rhythm: paced rhythm  BP goals:   SBP < 180  MAP > 65    Resp:      Oxygen Concentration (%): 28    Plan: N/A    GI/:     Diet/Nutrition Received: NPO  Last Bowel Movement: 08/06/23  Voiding Characteristics: external catheter    Intake/Output Summary (Last 24 hours) at 8/13/2023 1537  Last data filed at 8/13/2023 1501  Gross per 24 hour   Intake 542.17 ml   Output 730 ml   Net -187.83 ml     Unmeasured Output  Urine Occurrence: 2  Stool Occurrence: 0  Emesis Occurrence: 0  Pad Count: 1    Labs/Accuchecks:  Recent Labs   Lab 08/13/23  0237   WBC 7.36   RBC 5.60   HGB 15.0   HCT 49.0         Recent Labs   Lab 08/13/23  0237      K 4.5   CO2 21*   *   BUN 30*   CREATININE 1.4   ALKPHOS 63   ALT 7*   AST 18   BILITOT 0.9      Recent Labs   Lab 08/08/23  1838 08/09/23  0045 08/13/23  1124   INR 1.1  --   --    APTT 29.6   < > 50.4*    < > = values in this interval not displayed.      Recent Labs   Lab 08/10/23  1830   TROPONINI 0.053*       Electrolytes: N/A - electrolytes WDL  Accuchecks: Q6H    Gtts:   dexmedeTOMIDine (Precedex) infusion (titrating) 0.6 mcg/kg/hr (08/13/23 5405)    heparin  (porcine) in D5W 14 Units/kg/hr (08/13/23 1501)       LDA/Wounds:  Lines/Drains/Airways       Drain  Duration             Male External Urinary Catheter 08/09/23 1305 Large 4 days         NG/OG Tube 08/11/23 1530 [place] sump Left nostril 2 days              Peripheral Intravenous Line  Duration                  Peripheral IV - Single Lumen 08/09/23 1452 20 G Anterior;Right Forearm 4 days         Peripheral IV - Single Lumen 08/10/23 1847 20 G Left Antecubital 2 days         Peripheral IV - Single Lumen 08/13/23 1125 20 G Posterior;Right Hand <1 day                  Wounds: No  Wound care consulted: No    Is this a stroke patient? Yes, stroke booklet remains at bedside for ongoing education of patient and family.       Problem: Adult Inpatient Plan of Care  Goal: Plan of Care Review  Outcome: Ongoing, Progressing     Problem: Adult Inpatient Plan of Care  Goal: Optimal Comfort and Wellbeing  Outcome: Ongoing, Progressing     Problem: Adjustment to Illness (Stroke, Ischemic/Transient Ischemic Attack)  Goal: Optimal Coping  Outcome: Ongoing, Progressing     Problem: Communication Impairment (Stroke, Ischemic/Transient Ischemic Attack)  Goal: Improved Communication Skills  Outcome: Ongoing, Progressing     Problem: Functional Ability Impaired (Stroke, Ischemic/Transient Ischemic Attack)  Goal: Optimal Functional Ability  Outcome: Ongoing, Progressing     Problem: Fall Injury Risk  Goal: Absence of Fall and Fall-Related Injury  Outcome: Ongoing, Progressing     Problem: Swallowing Impairment (Stroke, Ischemic/Transient Ischemic Attack)  Goal: Optimal Eating and Swallowing without Aspiration  Outcome: Ongoing, Progressing     Problem: Restraint, Nonbehavioral (Nonviolent)  Goal: Absence of Harm or Injury  Outcome: Ongoing, Progressing
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
## 2023-08-13 NOTE — ASSESSMENT & PLAN NOTE
Due to stroke  SLP to evaluate and treat  GI unable to place peg on 8/11 but was able to place NGT  Plans for IR PEG placement, but NG now clogged and at or above GE junction with difficulty advancing tube. Possible peg placement tomorrow with general surgery

## 2023-08-13 NOTE — ASSESSMENT & PLAN NOTE
SLP following  GI team consulted for PEG tube, unable to place on 8/11, NGT placed instead  NGT now flushing through side port, IR consulted for PEG placement  Once obtain enteral access- will restart medications, transition Heparin gtt to oral AC, and start nutrition  Hold heparin gtt at midnight, contrast ordered via NGT for midnight tonight, PEG planned for tomorrow

## 2023-08-13 NOTE — PLAN OF CARE
Problem: Urinary Elimination Impaired (Stroke, Ischemic/Transient Ischemic Attack)  Goal: Effective Urinary Elimination  Outcome: Ongoing, Progressing     Problem: Restraint, Nonbehavioral (Nonviolent)  Goal: Absence of Harm or Injury  Outcome: Ongoing, Progressing   [facility] Care Plan    POC reviewed with Tera Griffiths and family at 0300. Pt unable to verbalize understanding. Questions and concerns addressed. No acute events overnight. Pt progressing toward goals. Will continue to monitor. See below and flowsheets for full assessment and VS info.           Is this a stroke patient? yes- Stroke booklet reviewed with patient and family, risk factors identified for patient and stroke booklet remains at bedside for ongoing education.     Neuro:  [place] Coma Scale  Best Eye Response: 4-->(E4) spontaneous  Best Motor Response: 5-->(M5) localizes pain  Best Verbal Response: 1-->(V1) none  [place] Coma Scale Score: 10  Assessment Qualifiers: patient not sedated/intubated  Pupil PERRLA: other (see comments) (resist)     24hr Temp:  [97.5 °F (36.4 °C)-97.8 °F (36.6 °C)]     CV:   Rhythm: paced rhythm  BP goals:   SBP < 180  MAP > 65    Resp:      Oxygen Concentration (%): 28    Plan: N/A    GI/:     Diet/Nutrition Received: NPO  Last Bowel Movement: 08/06/23  Voiding Characteristics: external catheter    Intake/Output Summary (Last 24 hours) at 8/13/2023 0544  Last data filed at 8/13/2023 0305  Gross per 24 hour   Intake 366.96 ml   Output 1385 ml   Net -1018.04 ml     Unmeasured Output  Urine Occurrence: 1  Stool Occurrence: 0  Emesis Occurrence: 0  Pad Count: 1    Labs/Accuchecks:  Recent Labs   Lab 08/13/23 0237   WBC 7.36   RBC 5.60   HGB 15.0   HCT 49.0         Recent Labs   Lab 08/13/23 0237      K 4.5   CO2 21*   *   BUN 30*   CREATININE 1.4   ALKPHOS 63   ALT 7*   AST 18   BILITOT 0.9      Recent Labs   Lab 08/08/23  1838 08/09/23  0045 08/13/23 0237   INR 1.1  --   --    APTT 29.6   < >  59.6*    < > = values in this interval not displayed.      Recent Labs   Lab 08/10/23  1830   TROPONINI 0.053*       Electrolytes: N/A - electrolytes WDL  Accuchecks: Q6H    Gtts:   dexmedeTOMIDine (Precedex) infusion (titrating) 0.2 mcg/kg/hr (08/13/23 0215)    heparin (porcine) in D5W 14 Units/kg/hr (08/13/23 0514)       LDA/Wounds:  Lines/Drains/Airways       Drain  Duration             Male External Urinary Catheter 08/09/23 1305 Large 3 days         NG/OG Tube 08/11/23 1530 [place] sump Left nostril 1 day              Peripheral Intravenous Line  Duration                  Peripheral IV - Single Lumen 08/09/23 1452 20 G Anterior;Right Forearm 3 days         Peripheral IV - Single Lumen 08/10/23 1847 20 G Left Antecubital 2 days                  Wounds: No  Wound care consulted: No

## 2023-08-13 NOTE — SUBJECTIVE & OBJECTIVE
Review of Systems: Unable to obtain a complete ROS due to level of consciousness.     Vitals:   Temp: 99.5 °F (37.5 °C)  Pulse: 63  Rhythm: paced rhythm  BP: 119/82  MAP (mmHg): 96  Resp: (!) 25  SpO2: 98 %    Temp  Min: 97.7 °F (36.5 °C)  Max: 99.5 °F (37.5 °C)  Pulse  Min: 61  Max: 101  BP  Min: 98/74  Max: 163/102  MAP (mmHg)  Min: 76  Max: 129  Resp  Min: 18  Max: 28  SpO2  Min: 93 %  Max: 100 %    08/12 0701 - 08/13 0700  In: 394.7 [I.V.:394.7]  Out: 1385 [Urine:1385]   Unmeasured Output  Urine Occurrence: 1  Stool Occurrence: 0  Emesis Occurrence: 0  Pad Count: 1     Examination:   Constitutional: Well-nourished and -developed. No apparent distress.   Eyes: Conjunctiva clear, anicteric. Lids no lesions.  Head/Ears/Nose/Mouth/Throat/Neck: Moist mucous membranes. External ears, nose atraumatic.   Cardiovascular: Regular rhythm. No leg edema.  Respiratory: Comfortable respirations. Clear to auscultation.  Gastrointestinal: Soft, nondistended, nontender. + bowel sounds.    Neurologic:  -GCS E 4 V 1 M 5  -Drowsy. Opens eyes to voice. Aphasic. Unable to follow commands.  -Cranial nerves: L gaze preference, PERRL, R facial droop, + cough  -Motor: R hemiparesis, L side moves spontaneous/antigravity  Unable to test orientation, language, memory, judgment, insight, fund of knowledge, shoulder shrug, tongue protrusion, coordination, gait due to level of consciousness.    Medications:   ContinuousdexmedeTOMIDine (Precedex) infusion (titrating), Last Rate: 0.4 mcg/kg/hr (08/13/23 1301)  heparin (porcine) in D5W, Last Rate: 14 Units/kg/hr (08/13/23 1301)    Scheduledamiodarone, 200 mg, BID  aspirin, 81 mg, Daily  atorvastatin, 80 mg, Daily  [START ON 8/14/2023] barium, 450 mL, Once  clopidogreL, 75 mg, Daily  ezetimibe, 10 mg, QHS  ferrous sulfate, 1 tablet, Daily  metoprolol tartrate, 25 mg, BID  polyethylene glycol, 17 g, Daily  senna-docusate 8.6-50 mg, 1 tablet, BID    PRNacetaminophen, 650 mg, Q6H PRN  dextrose 10%,  "12.5 g, PRN  dextrose 10%, 25 g, PRN  glucagon (human recombinant), 1 mg, PRN  hydrALAZINE, 10 mg, Q6H PRN  insulin aspart U-100, 1-10 Units, Q6H PRN  labetalol, 10 mg, Q6H PRN  magnesium sulfate IVPB, 2 g, PRN  magnesium sulfate IVPB, 4 g, PRN  metoprolol, 5 mg, Q5 Min PRN  nitroGLYCERIN, 0.4 mg, Q5 Min PRN  ondansetron, 4 mg, Q8H PRN  potassium chloride, 40 mEq, PRN   And  potassium chloride, 60 mEq, PRN   And  potassium chloride, 80 mEq, PRN  sodium chloride 0.9%, 10 mL, PRN  sodium chloride 0.9%, 10 mL, PRN  sodium chloride 0.9%, 10 mL, PRN  sodium phosphate 15 mmol in dextrose 5 % (D5W) 250 mL IVPB, 15 mmol, PRN  sodium phosphate 20.01 mmol in dextrose 5 % (D5W) 250 mL IVPB, 20.01 mmol, PRN  sodium phosphate 30 mmol in dextrose 5 % (D5W) 250 mL IVPB, 30 mmol, PRN       Today I independently reviewed pertinent medications, lines/drains/airways, imaging, cardiology results, laboratory results, microbiology results, notably:     ISTAT: No results for input(s): "PH", "PCO2", "PO2", "POCSATURATED", "HCO3", "BE", "POCNA", "POCK", "POCTCO2", "POCGLU", "POCICA", "POCLAC", "SAMPLE" in the last 24 hours.   Chem:   Recent Labs   Lab 08/13/23  0237      K 4.5   *   CO2 21*      BUN 30*   CREATININE 1.4   CALCIUM 10.1   MG 2.4   PHOS 3.0   ANIONGAP 13   PROT 8.0   ALBUMIN 3.5   BILITOT 0.9   ALKPHOS 63   AST 18   ALT 7*     Heme:   Recent Labs   Lab 08/13/23  0237   WBC 7.36   HGB 15.0   HCT 49.0        Endo:   Recent Labs   Lab 08/12/23  1522 08/13/23  0639 08/13/23  1227   POCTGLUCOSE 94 85 115*      "
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
## 2023-08-14 LAB
ABO + RH BLD: NORMAL
ALBUMIN SERPL BCP-MCNC: 3.1 G/DL (ref 3.5–5.2)
ALP SERPL-CCNC: 56 U/L (ref 55–135)
ALT SERPL W/O P-5'-P-CCNC: <5 U/L (ref 10–44)
ANION GAP SERPL CALC-SCNC: 12 MMOL/L (ref 8–16)
APTT PPP: 26.3 SEC (ref 21–32)
APTT PPP: 32.8 SEC (ref 21–32)
APTT PPP: 35 SEC (ref 21–32)
AST SERPL-CCNC: 12 U/L (ref 10–40)
BASOPHILS # BLD AUTO: 0.02 K/UL (ref 0–0.2)
BASOPHILS NFR BLD: 0.2 % (ref 0–1.9)
BILIRUB SERPL-MCNC: 0.8 MG/DL (ref 0.1–1)
BLD GP AB SCN CELLS X3 SERPL QL: NORMAL
BUN SERPL-MCNC: 31 MG/DL (ref 6–20)
CALCIUM SERPL-MCNC: 10.3 MG/DL (ref 8.7–10.5)
CHLORIDE SERPL-SCNC: 112 MMOL/L (ref 95–110)
CO2 SERPL-SCNC: 21 MMOL/L (ref 23–29)
CREAT SERPL-MCNC: 1.5 MG/DL (ref 0.5–1.4)
DIFFERENTIAL METHOD: ABNORMAL
EOSINOPHIL # BLD AUTO: 0 K/UL (ref 0–0.5)
EOSINOPHIL NFR BLD: 0.1 % (ref 0–8)
ERYTHROCYTE [DISTWIDTH] IN BLOOD BY AUTOMATED COUNT: 15.3 % (ref 11.5–14.5)
EST. GFR  (NO RACE VARIABLE): 54.3 ML/MIN/1.73 M^2
GLUCOSE SERPL-MCNC: 110 MG/DL (ref 70–110)
HCT VFR BLD AUTO: 50.4 % (ref 40–54)
HGB BLD-MCNC: 15 G/DL (ref 14–18)
IMM GRANULOCYTES # BLD AUTO: 0.03 K/UL (ref 0–0.04)
IMM GRANULOCYTES NFR BLD AUTO: 0.3 % (ref 0–0.5)
LYMPHOCYTES # BLD AUTO: 0.7 K/UL (ref 1–4.8)
LYMPHOCYTES NFR BLD: 6.9 % (ref 18–48)
MAGNESIUM SERPL-MCNC: 2.4 MG/DL (ref 1.6–2.6)
MCH RBC QN AUTO: 26.6 PG (ref 27–31)
MCHC RBC AUTO-ENTMCNC: 29.8 G/DL (ref 32–36)
MCV RBC AUTO: 90 FL (ref 82–98)
MONOCYTES # BLD AUTO: 1.2 K/UL (ref 0.3–1)
MONOCYTES NFR BLD: 12.3 % (ref 4–15)
NEUTROPHILS # BLD AUTO: 8.1 K/UL (ref 1.8–7.7)
NEUTROPHILS NFR BLD: 80.2 % (ref 38–73)
NRBC BLD-RTO: 0 /100 WBC
PHOSPHATE SERPL-MCNC: 3.5 MG/DL (ref 2.7–4.5)
PLATELET # BLD AUTO: 271 K/UL (ref 150–450)
PMV BLD AUTO: 12 FL (ref 9.2–12.9)
POCT GLUCOSE: 104 MG/DL (ref 70–110)
POCT GLUCOSE: 93 MG/DL (ref 70–110)
POCT GLUCOSE: 98 MG/DL (ref 70–110)
POTASSIUM SERPL-SCNC: 4.3 MMOL/L (ref 3.5–5.1)
PROT SERPL-MCNC: 7.5 G/DL (ref 6–8.4)
RBC # BLD AUTO: 5.63 M/UL (ref 4.6–6.2)
SODIUM SERPL-SCNC: 145 MMOL/L (ref 136–145)
SPECIMEN OUTDATE: NORMAL
WBC # BLD AUTO: 10.04 K/UL (ref 3.9–12.7)

## 2023-08-14 PROCEDURE — 20000000 HC ICU ROOM

## 2023-08-14 PROCEDURE — 25000242 PHARM REV CODE 250 ALT 637 W/ HCPCS

## 2023-08-14 PROCEDURE — 86900 BLOOD TYPING SEROLOGIC ABO: CPT | Performed by: PSYCHIATRY & NEUROLOGY

## 2023-08-14 PROCEDURE — 97530 THERAPEUTIC ACTIVITIES: CPT

## 2023-08-14 PROCEDURE — 27200966 HC CLOSED SUCTION SYSTEM

## 2023-08-14 PROCEDURE — 63600175 PHARM REV CODE 636 W HCPCS

## 2023-08-14 PROCEDURE — 99900035 HC TECH TIME PER 15 MIN (STAT)

## 2023-08-14 PROCEDURE — 85730 THROMBOPLASTIN TIME PARTIAL: CPT

## 2023-08-14 PROCEDURE — 99233 SBSQ HOSP IP/OBS HIGH 50: CPT | Mod: ,,, | Performed by: PSYCHIATRY & NEUROLOGY

## 2023-08-14 PROCEDURE — 85730 THROMBOPLASTIN TIME PARTIAL: CPT | Mod: 91 | Performed by: PSYCHIATRY & NEUROLOGY

## 2023-08-14 PROCEDURE — 99233 SBSQ HOSP IP/OBS HIGH 50: CPT | Mod: ,,, | Performed by: NURSE PRACTITIONER

## 2023-08-14 PROCEDURE — 25000003 PHARM REV CODE 250

## 2023-08-14 PROCEDURE — 25000003 PHARM REV CODE 250: Performed by: PSYCHIATRY & NEUROLOGY

## 2023-08-14 PROCEDURE — 83735 ASSAY OF MAGNESIUM: CPT | Performed by: PHYSICIAN ASSISTANT

## 2023-08-14 PROCEDURE — 85025 COMPLETE CBC W/AUTO DIFF WBC: CPT | Performed by: PHYSICIAN ASSISTANT

## 2023-08-14 PROCEDURE — 27000221 HC OXYGEN, UP TO 24 HOURS

## 2023-08-14 PROCEDURE — 94640 AIRWAY INHALATION TREATMENT: CPT

## 2023-08-14 PROCEDURE — 97112 NEUROMUSCULAR REEDUCATION: CPT

## 2023-08-14 PROCEDURE — 99233 PR SUBSEQUENT HOSPITAL CARE,LEVL III: ICD-10-PCS | Mod: ,,, | Performed by: PSYCHIATRY & NEUROLOGY

## 2023-08-14 PROCEDURE — 94761 N-INVAS EAR/PLS OXIMETRY MLT: CPT

## 2023-08-14 PROCEDURE — 80053 COMPREHEN METABOLIC PANEL: CPT | Performed by: PHYSICIAN ASSISTANT

## 2023-08-14 PROCEDURE — 63600175 PHARM REV CODE 636 W HCPCS: Performed by: NURSE PRACTITIONER

## 2023-08-14 PROCEDURE — 84100 ASSAY OF PHOSPHORUS: CPT | Performed by: PHYSICIAN ASSISTANT

## 2023-08-14 PROCEDURE — 99233 PR SUBSEQUENT HOSPITAL CARE,LEVL III: ICD-10-PCS | Mod: ,,, | Performed by: NURSE PRACTITIONER

## 2023-08-14 RX ORDER — ACETYLCYSTEINE 100 MG/ML
4 SOLUTION ORAL; RESPIRATORY (INHALATION)
Status: DISCONTINUED | OUTPATIENT
Start: 2023-08-14 | End: 2023-08-22

## 2023-08-14 RX ORDER — MUPIROCIN 20 MG/G
OINTMENT TOPICAL 2 TIMES DAILY
Status: DISPENSED | OUTPATIENT
Start: 2023-08-14 | End: 2023-08-19

## 2023-08-14 RX ORDER — ASPIRIN 300 MG/1
300 SUPPOSITORY RECTAL DAILY
Status: DISCONTINUED | OUTPATIENT
Start: 2023-08-14 | End: 2023-08-17

## 2023-08-14 RX ORDER — IPRATROPIUM BROMIDE AND ALBUTEROL SULFATE 2.5; .5 MG/3ML; MG/3ML
3 SOLUTION RESPIRATORY (INHALATION)
Status: DISCONTINUED | OUTPATIENT
Start: 2023-08-14 | End: 2023-08-22

## 2023-08-14 RX ADMIN — IPRATROPIUM BROMIDE AND ALBUTEROL SULFATE 3 ML: .5; 3 SOLUTION RESPIRATORY (INHALATION) at 07:08

## 2023-08-14 RX ADMIN — MUPIROCIN: 20 OINTMENT TOPICAL at 10:08

## 2023-08-14 RX ADMIN — HEPARIN SODIUM 12 UNITS/KG/HR: 10000 INJECTION, SOLUTION INTRAVENOUS at 10:08

## 2023-08-14 RX ADMIN — DEXMEDETOMIDINE HYDROCHLORIDE 0.4 MCG/KG/HR: 4 INJECTION INTRAVENOUS at 01:08

## 2023-08-14 RX ADMIN — ACETYLCYSTEINE 4 ML: 100 INHALANT RESPIRATORY (INHALATION) at 07:08

## 2023-08-14 RX ADMIN — SODIUM CHLORIDE 250 ML: 9 INJECTION, SOLUTION INTRAVENOUS at 05:08

## 2023-08-14 RX ADMIN — IPRATROPIUM BROMIDE AND ALBUTEROL SULFATE 3 ML: .5; 3 SOLUTION RESPIRATORY (INHALATION) at 06:08

## 2023-08-14 RX ADMIN — ASPIRIN 300 MG: 300 SUPPOSITORY RECTAL at 10:08

## 2023-08-14 RX ADMIN — HEPARIN SODIUM 13 UNITS/KG/HR: 10000 INJECTION, SOLUTION INTRAVENOUS at 05:08

## 2023-08-14 RX ADMIN — IPRATROPIUM BROMIDE AND ALBUTEROL SULFATE 3 ML: .5; 3 SOLUTION RESPIRATORY (INHALATION) at 02:08

## 2023-08-14 RX ADMIN — ACETYLCYSTEINE 4 ML: 100 INHALANT RESPIRATORY (INHALATION) at 02:08

## 2023-08-14 RX ADMIN — DEXMEDETOMIDINE HYDROCHLORIDE 0.3 MCG/KG/HR: 4 INJECTION INTRAVENOUS at 07:08

## 2023-08-14 RX ADMIN — ACETYLCYSTEINE 4 ML: 100 INHALANT RESPIRATORY (INHALATION) at 06:08

## 2023-08-14 NOTE — PT/OT/SLP PROGRESS
Physical Therapy Treatment    Patient Name:  Tera Griffiths   MRN:  [id number]  Co-tx w/ OT 2/2 suspected pt complexity and requirement of 2 skilled therapists to assist in order to maximize pt treatment   Recommendations:     Discharge Recommendations: nursing facility, skilled  Discharge Equipment Recommendations: to be determined by next level of care  Barriers to discharge: Inaccessible home and Decreased caregiver support    Assessment:     Tera Griffiths is a 58 y.o. male admitted with a medical diagnosis of Embolic stroke involving left middle cerebral artery.  He presents with the following impairments/functional limitations: weakness, impaired endurance, impaired self care skills, impaired functional mobility, gait instability, impaired balance, impaired cognition, decreased coordination, decreased upper extremity function, decreased lower extremity function, decreased safety awareness. Pt remains strong w/ no command following and fixated on restraints and removing lines/tubes, unable to be redirected. He resisted treatment today, actively fighting attempts to stand and any attempts at ADLs. Rec d/c to SNF for continued treatment as pt w/ significant decline in functional mobility from baseline, but unable to tolerate the 3hrs/day of therapy required for IPR.     Rehab Prognosis: Good; patient would benefit from acute skilled PT services to address these deficits and reach maximum level of function.    Recent Surgery: Procedure(s) (LRB):  EGD (ESOPHAGOGASTRODUODENOSCOPY) (N/A) 3 Days Post-Op    Plan:     During this hospitalization, patient to be seen 3 x/week to address the identified rehab impairments via gait training, therapeutic activities, therapeutic exercises, neuromuscular re-education and progress toward the following goals:    Plan of Care Expires:  09/08/23    Subjective     Chief Complaint: ASHLEY 2/2 AMS nonverbal  Patient/Family Comments/goals: ASHLEY 2/2 AMS nonverbal  Pain/Comfort:  Location  1:  (ASHLEY 2/2 AMS nonverbal)  Pain Addressed 1: Reposition, Distraction      Objective:     Communicated with RN prior to session.  Patient found HOB elevated with restraints, blood pressure cuff, Condom Catheter, telemetry, peripheral IV, pulse ox (continuous) upon PT entry to room.     General Precautions: Standard, aspiration, aphasia, fall, NPO  Orthopedic Precautions: N/A  Braces: N/A  Respiratory Status: Room air     Functional Mobility:  Bed Mobility:     Scooting: total assistance and of 2 persons  Supine to Sit: total assistance and of 2 persons  Sit to Supine: total assistance and of 2 persons  Transfers:  Sit to Stand:  total assistance and of 2 persons with hand-held assist and inability to achieve full stand 2/2 pt resisting  Balance: EOB sitting CGA-total A 2/2 intermittent participation      AM-PAC 6 CLICK MOBILITY  Turning over in bed (including adjusting bedclothes, sheets and blankets)?: 2  Sitting down on and standing up from a chair with arms (e.g., wheelchair, bedside commode, etc.): 2  Moving from lying on back to sitting on the side of the bed?: 2  Moving to and from a bed to a chair (including a wheelchair)?: 1  Need to walk in hospital room?: 1  Climbing 3-5 steps with a railing?: 1  Basic Mobility Total Score: 9       Treatment & Education:  Pt educated on PT POC/goals, d/c recs, and continued treatment.   Sitting balance w/ verbal, visual, and tactile cueing for upright posture, midline orientation, attention to task, and safety awareness, pt following 0% of commands  Rolling L/R in bed to reposition pt w/ wedges and pillows to offload pressure, avoid muscle contractures, and prevent skin breakdown.       Patient left HOB elevated with all lines intact, call button in reach, bed alarm on, restraints reapplied at end of session, and RN notified..    GOALS:   Multidisciplinary Problems       Physical Therapy Goals          Problem: Physical Therapy    Goal Priority Disciplines Outcome Goal  Variances Interventions   Physical Therapy Goal     PT, PT/OT Ongoing, Progressing     Description: Goals to be completed by: 9/8/23    Pt will perform sup<>sit transfers w/ minimum assistance  Pt will have sufficient dynamic balance to sit EOB while performing ADLs/therex w/ stand by assistance for 10 min  Pt will be able to stand up from EOB w/ moderate assistance using LRAD  Pt will ambulate 20 feet w/ maximal assistance using LRAD  Pt will be independent w/ HEP therex on BLE w/ good form and ROM   Pt will follow >50 % of single-step commands throughout treatment session                        Time Tracking:     PT Received On: 08/14/23  PT Start Time: 1329     PT Stop Time: 1352  PT Total Time (min): 23 min     Billable Minutes: Neuromuscular Re-education 23    Treatment Type: Treatment  PT/PTA: PT     Number of PTA visits since last PT visit: 0     08/14/2023

## 2023-08-14 NOTE — ASSESSMENT & PLAN NOTE
- s/p DCCV (11/2022)  - Previously on apixaban  - Continue heparin gtt  - Rate controlled on admit  - Holding Metoprolol 75mg QD & Amiodarone 200mg BID; no enteral access  - 8/14/2023 Plan for G-tube on Thursday, Holding heparin on Wednesday at [facility], Ordered for pacer interrogation prior to surgery.

## 2023-08-14 NOTE — PLAN OF CARE
Problem: Occupational Therapy  Goal: Occupational Therapy Goal  Description: Goals to be met by: 9/8/23     Patient will increase functional independence with ADLs by performing:    UE Dressing with Moderate Assistance.  LE Dressing with Moderate Assistance.  Grooming while standing with Moderate Assistance.  Toileting from toilet with Moderate Assistance for hygiene and clothing management.     Outcome: Ongoing, Progressing   Goals remain appropriate     Patient to be seen 3x a week due to current medical status

## 2023-08-14 NOTE — ASSESSMENT & PLAN NOTE
- [employer]  - PMHx cardiac arrest 2/2 V-Tach  8/14/2023 Ordered for pacer interrogation prior to surgery.

## 2023-08-14 NOTE — PLAN OF CARE
[facility] - Neuro Critical Care  Discharge Reassessment    Primary Care Provider: Carleen Bueno MD    Expected Discharge Date: 8/22/2023    Patient to have peg tube placed.  Therapy is now recommending SNF, however, patient has Medicaid and limited to no SNF benefits.  SW to send multiple SNF referrals in attempt to find acceptance.     Reassessment (most recent)       Discharge Reassessment - 08/14/23 1210          Discharge Reassessment    Assessment Type Discharge Planning Reassessment     Did the patient's condition or plan change since previous assessment? No     Communicated YUSEF with patient/caregiver Date not available/Unable to determine     Discharge Plan A Skilled Nursing Facility     Discharge Plan B Rehab     DME Needed Upon Discharge  none     Transition of Care Barriers Underinsured     Why the patient remains in the hospital Requires continued medical care                   Mica Vaughan RN, CCRN-K, [facility]  Neuro-Critical Care   [phone]
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
## 2023-08-14 NOTE — ASSESSMENT & PLAN NOTE
Stroke risk factor  Currently on 1.5 L fluid restriction and GDMT  Cardiology was consulted by NCC

## 2023-08-14 NOTE — PLAN OF CARE
POC reviewed with Tera Griffiths and family at 1400. Family verbalized understanding. Questions and concerns addressed. No acute events today. Pt progressing toward goals. Will continue to monitor. See below and flowsheets for full assessment and VS info.     - OR for PEG tube planned for Thursday.   - Heparin gtt restarted.   - Pt doing well on room air.   - Worked with PT/OT.   - Precedex titrated as tolerated.       Neuro:  [place] Coma Scale  Best Eye Response: 3-->(E3) to speech  Best Motor Response: 5-->(M5) localizes pain  Best Verbal Response: 1-->(V1) none  [place] Coma Scale Score: 9  Assessment Qualifiers: patient not sedated/intubated, no eye obstruction present  Pupil PERRLA: yes     24 hr Temp:  [97.2 °F (36.2 °C)-98.4 °F (36.9 °C)]     CV:   Rhythm: paced rhythm  BP goals:   SBP < 180  MAP > 65    Resp:      Oxygen Concentration (%): 28    Plan: N/A    GI/:     Diet/Nutrition Received: NPO  Last Bowel Movement: 08/06/23  Voiding Characteristics: external catheter    Intake/Output Summary (Last 24 hours) at 8/14/2023 1442  Last data filed at 8/14/2023 1401  Gross per 24 hour   Intake 571.21 ml   Output 875 ml   Net -303.79 ml     Unmeasured Output  Urine Occurrence: 1  Stool Occurrence: 0  Emesis Occurrence: 0  Pad Count: 1    Labs/Accuchecks:  Recent Labs   Lab 08/14/23  0239   WBC 10.04   RBC 5.63   HGB 15.0   HCT 50.4         Recent Labs   Lab 08/14/23  0239      K 4.3   CO2 21*   *   BUN 31*   CREATININE 1.5*   ALKPHOS 56   ALT <5*   AST 12   BILITOT 0.8      Recent Labs   Lab 08/08/23  1838 08/09/23  0045 08/14/23  1037   INR 1.1  --   --    APTT 29.6   < > 26.3    < > = values in this interval not displayed.      Recent Labs   Lab 08/10/23  1830   TROPONINI 0.053*       Electrolytes: N/A - electrolytes WDL  Accuchecks: Q6H    Gtts:   dexmedeTOMIDine (Precedex) infusion (titrating) 0.3 mcg/kg/hr (08/14/23 1401)    heparin (porcine) in D5W 12 Units/kg/hr (08/14/23 1401)        LDA/Wounds:  Lines/Drains/Airways       Drain  Duration             Male External Urinary Catheter 08/09/23 1305 Large 5 days              Peripheral Intravenous Line  Duration                  Peripheral IV - Single Lumen 08/10/23 1847 20 G Left Antecubital 3 days         Peripheral IV - Single Lumen 08/13/23 1125 20 G Posterior;Right Hand 1 day         Peripheral IV - Single Lumen 08/14/23 22 G Left;Posterior Hand <1 day                  Wounds: No  Wound care consulted: No    Is this a stroke patient? Yes, stroke booklet remains at bedside for ongoing education of patient and family.       Problem: Adult Inpatient Plan of Care  Goal: Plan of Care Review  Outcome: Ongoing, Progressing     Problem: Adult Inpatient Plan of Care  Goal: Optimal Comfort and Wellbeing  Outcome: Ongoing, Progressing     Problem: Cerebral Tissue Perfusion (Stroke, Ischemic/Transient Ischemic Attack)  Goal: Optimal Cerebral Tissue Perfusion  Outcome: Ongoing, Progressing     Problem: Cognitive Impairment (Stroke, Ischemic/Transient Ischemic Attack)  Goal: Optimal Cognitive Function  Outcome: Ongoing, Progressing     Problem: Functional Ability Impaired (Stroke, Ischemic/Transient Ischemic Attack)  Goal: Optimal Functional Ability  Outcome: Ongoing, Progressing     Problem: Communication Impairment (Stroke, Ischemic/Transient Ischemic Attack)  Goal: Improved Communication Skills  Outcome: Ongoing, Progressing     Problem: Swallowing Impairment (Stroke, Ischemic/Transient Ischemic Attack)  Goal: Optimal Eating and Swallowing without Aspiration  Outcome: Ongoing, Progressing     Problem: Restraint, Nonbehavioral (Nonviolent)  Goal: Absence of Harm or Injury  Outcome: Ongoing, Progressing

## 2023-08-14 NOTE — ASSESSMENT & PLAN NOTE
Due to stroke  SLP to evaluate and treat  GI unable to place peg on 8/11 but was able to place NGT  Plans for PEG placement on Thursday
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
## 2023-08-14 NOTE — PT/OT/SLP PROGRESS
Occupational Therapy   CoTreatment    Name: Tera Griffiths  MRN: [id number]  Admitting Diagnosis:  Embolic stroke involving left middle cerebral artery  3 Days Post-Op    Recommendations:     Discharge Recommendations: rehabilitation facility  Discharge Equipment Recommendations:  to be determined by next level of care  Barriers to discharge:  Other (Comment) (Increased A needed)    Assessment:     Tera Griffiths is a 58 y.o. male with a medical diagnosis of Embolic stroke involving left middle cerebral artery.  He presents with decreased functional status due to medical diagnosis. Performance deficits affecting function are weakness, impaired endurance, impaired self care skills, impaired functional mobility, impaired balance, decreased lower extremity function, decreased ROM, decreased upper extremity function, impaired fine motor, decreased coordination, decreased safety awareness, gait instability.     Rehab Prognosis:  Fair; patient would benefit from acute skilled OT services to address these deficits and reach maximum level of function.       Plan:     Patient to be seen 3 x/week to address the above listed problems via self-care/home management, therapeutic activities, therapeutic exercises, neuromuscular re-education, cognitive retraining  Plan of Care Expires: 09/11/23  Plan of Care Reviewed with: patient, family    Subjective     Chief Complaint: Patient unable to verbalize   Patient/Family Comments/goals: Gain functional status   Pain/Comfort:  Pain Rating 1:  (Unable to verbalize at this time)    Objective:     Communicated with: RN prior to session.  Patient found supine with peripheral IV upon OT entry to room.    General Precautions: Standard, aspiration, aphasia, fall, NPO    Orthopedic Precautions:N/A  Braces: N/A  Respiratory Status: Room air     Occupational Performance:     Bed Mobility:    Patient completed Rolling/Turning to Right with total assistance and 2 persons  Patient completed  Scooting/Bridging with total assistance and 2 persons  Patient completed Supine to Sit with total assistance and 2 persons  Patient completed Sit to Supine with total assistance and 2 persons     Functional Mobility/Transfers:  Not attempted at this time    Activities of Daily Living:  Grooming: maximal assistance due to patient cognitive deficits      [facility] 6 Click ADL: 6    Treatment & Education:  OT performed ROM and attempted to perform patient stimulus for following directions via verbal and tactile cueing. Patient unable to follow directions at this time and reacted inappropriately to therapeutic activities. OT attempted facial grooming while pt sat EOB with CGA; patient unable to perform; needing max A and Jena assist for completion. Co treatment performed due to patient complex medical status and need for two therapist for safe treatment.     Patient left supine with all lines intact, call button in reach, and restraints reapplied at end of session    GOALS:   Multidisciplinary Problems       Occupational Therapy Goals          Problem: Occupational Therapy    Goal Priority Disciplines Outcome Interventions   Occupational Therapy Goal     OT, PT/OT Ongoing, Progressing    Description: Goals to be met by: 9/8/23     Patient will increase functional independence with ADLs by performing:    UE Dressing with Moderate Assistance.  LE Dressing with Moderate Assistance.  Grooming while standing with Moderate Assistance.  Toileting from toilet with Moderate Assistance for hygiene and clothing management.                          Time Tracking:     OT Date of Treatment: 08/14/23  OT Start Time: 1329  OT Stop Time: 1352  OT Total Time (min): 23 minutes    Billable Minutes:Therapeutic Activity 23 minutes    OT/ANDREA: OT          8/14/2023

## 2023-08-14 NOTE — SUBJECTIVE & OBJECTIVE
Review of Systems: Unable to obtain a complete ROS due to level of consciousness.     Vitals:   Temp: 97.2 °F (36.2 °C)  Pulse: 78  Rhythm: paced rhythm  BP: (!) 173/103  MAP (mmHg): 117  Resp: 20  SpO2: 96 %    Temp  Min: 97.2 °F (36.2 °C)  Max: 98.4 °F (36.9 °C)  Pulse  Min: 57  Max: 81  BP  Min: 86/65  Max: 173/103  MAP (mmHg)  Min: 72  Max: 117  Resp  Min: 20  Max: 39  SpO2  Min: 94 %  Max: 100 %    08/13 0701 - 08/14 0700  In: 574 [I.V.:397.7]  Out: 705 [Urine:705]   Unmeasured Output  Urine Occurrence: 2  Stool Occurrence: 0  Emesis Occurrence: 0  Pad Count: 1     Examination:   Constitutional: Well-nourished and -developed. No apparent distress.   Eyes: Conjunctiva clear, anicteric. Lids no lesions.  Head/Ears/Nose/Mouth/Throat/Neck: Moist mucous membranes. External ears, nose atraumatic.   Cardiovascular: Regular rhythm. No leg edema.  Respiratory: Comfortable respirations. Clear to auscultation.  Gastrointestinal: Soft, nondistended, nontender. + bowel sounds.    Neurologic:  -GCS E 4 V 1 M 4  -Drowsy. Opens eyes to voice. Aphasic. Unable to follow commands.  -Cranial nerves: L gaze preference, PERRL, R facial droop, + cough  -Motor: R hemiparesis, L side moves spontaneous/antigravity  Unable to test orientation, language, memory, judgment, insight, fund of knowledge, shoulder shrug, tongue protrusion, coordination, gait due to level of consciousness.    Medications:   ContinuousdexmedeTOMIDine (Precedex) infusion (titrating), Last Rate: 0.2 mcg/kg/hr (08/14/23 1115)  heparin (porcine) in D5W, Last Rate: 12 Units/kg/hr (08/14/23 1101)    Scheduledacetylcysteine 100 mg/ml (10%), 4 mL, TID WAKE  albuterol-ipratropium, 3 mL, TID WAKE  amiodarone, 200 mg, BID  aspirin, 300 mg, Daily  atorvastatin, 80 mg, Daily  ezetimibe, 10 mg, QHS  ferrous sulfate, 1 tablet, Daily  metoprolol tartrate, 25 mg, BID  mupirocin, , BID  polyethylene glycol, 17 g, Daily  senna-docusate 8.6-50 mg, 1 tablet, BID  sodium chloride  0.9%, 250 mL, Once    PRNacetaminophen, 650 mg, Q6H PRN  dextrose 10%, 12.5 g, PRN  dextrose 10%, 25 g, PRN  glucagon (human recombinant), 1 mg, PRN  hydrALAZINE, 10 mg, Q6H PRN  insulin aspart U-100, 1-10 Units, Q6H PRN  labetalol, 10 mg, Q6H PRN  magnesium sulfate IVPB, 2 g, PRN  magnesium sulfate IVPB, 4 g, PRN  metoprolol, 5 mg, Q5 Min PRN  nitroGLYCERIN, 0.4 mg, Q5 Min PRN  ondansetron, 4 mg, Q8H PRN  potassium chloride, 40 mEq, PRN   And  potassium chloride, 60 mEq, PRN   And  potassium chloride, 80 mEq, PRN  sodium chloride 0.9%, 10 mL, PRN  sodium chloride 0.9%, 10 mL, PRN  sodium chloride 0.9%, 10 mL, PRN  sodium phosphate 15 mmol in dextrose 5 % (D5W) 250 mL IVPB, 15 mmol, PRN  sodium phosphate 20.01 mmol in dextrose 5 % (D5W) 250 mL IVPB, 20.01 mmol, PRN  sodium phosphate 30 mmol in dextrose 5 % (D5W) 250 mL IVPB, 30 mmol, PRN       Today I independently reviewed pertinent medications, lines/drains/airways, imaging, cardiology results, laboratory results, microbiology results, notably:

## 2023-08-14 NOTE — PLAN OF CARE
Problem: Respiratory Compromise (Stroke, Ischemic/Transient Ischemic Attack)  Goal: Effective Oxygenation and Ventilation  Outcome: Ongoing, Progressing     Problem: Restraint, Nonbehavioral (Nonviolent)  Goal: Absence of Harm or Injury  Outcome: Ongoing, Progressing   [facility] Care Plan    POC reviewed with Tera Griffiths and family at 0300. Pt unable to verbalize understanding. Questions and concerns addressed family at bedside.  Pt progressing toward goals. Will continue to monitor. See below and flowsheets for full assessment and VS info.   - V Fib episode x1, EKG done, provider notified.   - 250 ml bolus over 2 hours   - Heparin gtt off for pending surgery  - Precedex titrated for agitation/restlessness         Is this a stroke patient? yes- Stroke booklet reviewed with patient and family, risk factors identified for patient and stroke booklet remains at bedside for ongoing education.     Neuro:  [place] Coma Scale  Best Eye Response: 4-->(E4) spontaneous  Best Motor Response: 5-->(M5) localizes pain  Best Verbal Response: 1-->(V1) none  Thermal Coma Scale Score: 10  Assessment Qualifiers: patient not sedated/intubated  Pupil PERRLA: yes     24hr Temp:  [97.9 °F (36.6 °C)-99.5 °F (37.5 °C)]     CV:   Rhythm: paced rhythm  BP goals:   SBP < 180  MAP > 65    Resp:      Oxygen Concentration (%): 28    Plan:  peg tube placement     GI/:     Diet/Nutrition Received: NPO  Last Bowel Movement: 08/06/23  Voiding Characteristics: external catheter    Intake/Output Summary (Last 24 hours) at 8/14/2023 0643  Last data filed at 8/14/2023 0639  Gross per 24 hour   Intake 766.79 ml   Output 705 ml   Net 61.79 ml     Unmeasured Output  Urine Occurrence: 2  Stool Occurrence: 0  Emesis Occurrence: 0  Pad Count: 1    Labs/Accuchecks:  Recent Labs   Lab 08/14/23  0239   WBC 10.04   RBC 5.63   HGB 15.0   HCT 50.4         Recent Labs   Lab 08/14/23  0239      K 4.3   CO2 21*   *   BUN 31*   CREATININE 1.5*    ALKPHOS 56   ALT <5*   AST 12   BILITOT 0.8      Recent Labs   Lab 08/08/23  1838 08/09/23  0045 08/14/23  0239   INR 1.1  --   --    APTT 29.6   < > 32.8*    < > = values in this interval not displayed.      Recent Labs   Lab 08/10/23  1830   TROPONINI 0.053*       Electrolytes: N/A - electrolytes WDL  Accuchecks: Q6H    Gtts:   dexmedeTOMIDine (Precedex) infusion (titrating) 0.2 mcg/kg/hr (08/14/23 0639)    heparin (porcine) in D5W Stopped (08/14/23 0104)       LDA/Wounds:  Lines/Drains/Airways       Drain  Duration             Male External Urinary Catheter 08/09/23 1305 Large 4 days              Peripheral Intravenous Line  Duration                  Peripheral IV - Single Lumen 08/10/23 1847 20 G Left Antecubital 3 days         Peripheral IV - Single Lumen 08/13/23 1125 20 G Posterior;Right Hand <1 day         Peripheral IV - Single Lumen 08/14/23 22 G Left;Posterior Hand <1 day                  Wounds: No  Wound care consulted: No

## 2023-08-14 NOTE — ASSESSMENT & PLAN NOTE
SLP following  GI team consulted for PEG tube, unable to place on 8/11, NGT placed instead  NGT now flushing through side port, IR consulted for PEG placement  Once obtain enteral access- will restart medications, transition Heparin gtt to oral AC, and start nutrition  Hold heparin gtt at midnight, contrast ordered via NGT for midnight tonight, PEG planned for tomorrow  8/14/2023 Plan for G-tube on Thursday, Holding heparin on Wednesday at [facility]

## 2023-08-14 NOTE — CONSULTS
Please see consult note from 8/12 for full note. Plan pending clarification regarding if plavix can be held. We are happy to attempt PEG tube placement, however if unsuccessful will likely not be able to offer him open G tube given his co-morbidities and associated increased risk of complication.    Further plan to follow    Ele Flores MD  General Surgery, PGY-V  Pager: [phone]  8/14/2023 11:19 AM

## 2023-08-14 NOTE — SUBJECTIVE & OBJECTIVE
Neurologic Chief Complaint: L MCA syndrome    Subjective:     Interval History: Patient is seen for follow-up neurological assessment and treatment recommendations:   Patient was agitated and required precedex gtt. On heparin gtt which will be held on Wednesday for PEG placement on Thursday per NCC note. Overnight, patient with episode of Vfib.    HPI, Past Medical, Family, and Social History remains the same as documented in the initial encounter.     Review of Systems   Unable to perform ROS: Other (sedated)     Scheduled Meds:   acetylcysteine 100 mg/ml (10%)  4 mL Nebulization TID WAKE    albuterol-ipratropium  3 mL Nebulization TID WAKE    amiodarone  200 mg Per NG tube BID    aspirin  300 mg Rectal Daily    atorvastatin  80 mg Per NG tube Daily    ezetimibe  10 mg Per NG tube QHS    ferrous sulfate  1 tablet Per NG tube Daily    metoprolol tartrate  25 mg Per NG tube BID    mupirocin   Nasal BID    polyethylene glycol  17 g Per NG tube Daily    senna-docusate 8.6-50 mg  1 tablet Per NG tube BID    sodium chloride 0.9%  250 mL Intravenous Once     Continuous Infusions:   dexmedeTOMIDine (Precedex) infusion (titrating) 0.3 mcg/kg/hr (08/14/23 1301)    heparin (porcine) in D5W 12 Units/kg/hr (08/14/23 1301)     PRN Meds:acetaminophen, dextrose 10%, dextrose 10%, glucagon (human recombinant), hydrALAZINE, insulin aspart U-100, labetalol, magnesium sulfate IVPB, magnesium sulfate IVPB, metoprolol, nitroGLYCERIN, ondansetron, potassium chloride **AND** potassium chloride **AND** potassium chloride, sodium chloride 0.9%, sodium chloride 0.9%, sodium chloride 0.9%, sodium phosphate 15 mmol in dextrose 5 % (D5W) 250 mL IVPB, sodium phosphate 20.01 mmol in dextrose 5 % (D5W) 250 mL IVPB, sodium phosphate 30 mmol in dextrose 5 % (D5W) 250 mL IVPB    Objective:     Vital Signs (Most Recent):  Temp: 97.2 °F (36.2 °C) (08/14/23 1101)  Pulse: 66 (08/14/23 1301)  Resp: 19 (08/14/23 1301)  BP: 119/84 (08/14/23 1301)  SpO2: 97  "% (08/14/23 1301)  BP Location: Right arm    Vital Signs Range (Last 24H):  Temp:  [97.2 °F (36.2 °C)-98.4 °F (36.9 °C)]   Pulse:  [57-81]   Resp:  [19-39]   BP: ()/()   SpO2:  [94 %-100 %]   BP Location: Right arm       Physical Exam  Vitals and nursing note reviewed.   Constitutional:       General: He is not in acute distress.     Appearance: He is well-developed.      Comments: Sedated on precedex   HENT:      Head: Normocephalic and atraumatic.   Cardiovascular:      Rate and Rhythm: Normal rate.   Pulmonary:      Effort: Pulmonary effort is normal. No respiratory distress.   Skin:     General: Skin is warm and dry.   Neurological:      Comments: Patient with right hemiparesis, moves L spont  Aphasic, follows no commands              Neurological Exam:   LOC: drowsy  Attention Span: poor  Language: Global aphasia, not following commands  Articulation: Untestable due to severe aphasia   Orientation: Untestable due to severe aphasia   Visual Fields: Hemianopsia right  EOM (CN III, IV, VI): Gaze preference  left  Facial Movement (CN VII): Lower facial weakness on the Right  Motor: moves left spontaneous, right hemiparesis      Laboratory:  CMP:   Recent Labs   Lab 08/14/23  0239   CALCIUM 10.3   ALBUMIN 3.1*   PROT 7.5      K 4.3   CO2 21*   *   BUN 31*   CREATININE 1.5*   ALKPHOS 56   ALT <5*   AST 12   BILITOT 0.8       CBC:   Recent Labs   Lab 08/14/23  0239   WBC 10.04   RBC 5.63   HGB 15.0   HCT 50.4      MCV 90   MCH 26.6*   MCHC 29.8*       Lipid Panel:   Recent Labs   Lab 08/08/23  0045   CHOL 166   LDLCALC 101.4   HDL 46   TRIG 93       Coagulation:   Recent Labs   Lab 08/08/23  1838 08/09/23  0045 08/14/23  1037   INR 1.1  --   --    APTT 29.6   < > 26.3    < > = values in this interval not displayed.       Platelet Aggregation Study: No results for input(s): "PLTAGG", "PLTAGINTERP", "PLTAGREGLACO", "ADPPLTAGGREG" in the last 168 hours.  Hgb A1C:   No results for input(s): " ""HGBA1C" in the last 168 hours.    TSH:   Recent Labs   Lab 08/08/23  0045   TSH 1.134         Diagnostic Results     Brain Imaging   CT Head 8/10/23  Impression:     No detrimental change compared to prior.     Evolving acute infarcts within the left MCA and PCA territories.  No hemorrhagic conversion.  No significant mass effect     Suspected small subacute infarction within the right caudate.     Multiple areas remote infarctions as detailed above.      CT Head 8/8/23 1704  Impression:     1. Redemonstration of acute infarctions in the left MCA and PCA territories.  No evidence to suggest hemorrhagic transformation.  2. Suspected small subacute infarction in the right caudate.  3. Numerous remote infarctions as detailed above.    CT Head 8/8/23 0826  Impression:     Moderate developing hypoattenuation left inferior frontal lobe and left occipital lobe concerning for developing recent infarcts post thrombectomy.     Allowing for scattered hyperdensity related to recently contrast administration for thrombectomy there is no definite acute intracranial hemorrhage.     Previous hypodensity right caudate no longer seen which may be related to contrast administration for thrombectomy and possible subacute age infarction.     Superimposed remote infarcts with moderate to large encephalomalacia right MCA territory unchanged    Vessel Imaging   IR Angio 8/8/23  Preliminary Interpretation:   1.    No evidence of left ICA or MCA stenosis. No large or medium vessel occlusion.    CTA Multiphase 8/7/23  Impression:     CT head:     New right caudate nucleus hypodensity, likely representing age-indeterminate infarct.  Could be further evaluated with MRI.     Multifocal encephalomalacia involving the right frontal, temporoparietal, and left occipital lobes.     CTA head and neck:     Acute occlusion of the superior left M2 segment.     Left PCA occlusion of undetermined age.     Filling defect in the right main pulmonary artery, " concerning for acute pulmonary thromboembolism.  Consider follow-up pulmonary CTA to more fully assess the extent of thromboemboli, the clot burden, and the presence or absence of right heart strain.     Not fully detailed above:     - Incomplete opacification of the left atrial appendage, suspicious for an intraluminal thrombus.  Follow-up echocardiogram, or cardiac MRI or CTA, recommended.     - The presence of acute thrombi or thromboemboli in both the systemic arterial and pulmonary arterial circulation raises concern for the possibility of underlying intracardiac or extracardiac right-to-left shunt, and/or a hypercoagulable state.  Correlate clinically.     - Incompletely visualized, but possibly large, pericardial effusion.  Artifacts compromise accurate assessment of its attenuation.    Cardiac Imaging   TTE with bubble 8/8/23    Left Ventricle: The left ventricle is moderately dilated. Increased ventricular mass. Normal wall thickness. There is moderate eccentric hypertrophy. Severe global hypokinesis present. Septal motion is consistent with pacing. There is severely reduced systolic function with a visually estimated ejection fraction of 15 - 20%. Ejection fraction by visual approximation is 20%. Unable to assess diastolic function due to arrhythmia.    Left Atrium: Left atrium is severely dilated. Radah 85mL/m2.    Right Ventricle: Mild right ventricular enlargement. Wall thickness is normal. Right ventricle wall motion  is normal. Systolic function is severely reduced. Pacemaker lead present in the ventricle.    Right Atrium: Right atrium is severely dilated.    Aortic Valve: There is mild aortic valve sclerosis. There is normal leaflet mobility. There is no significant regurgitation.    Mitral Valve: There is bileaflet sclerosis. There is normal leaflet mobility. There is mild regurgitation.    Tricuspid Valve: The tricuspid valve is structurally normal. There is normal leaflet mobility. There is mild  regurgitation.    Pulmonic Valve: The pulmonic valve is structurally normal. There is normal leaflet mobility. There is no significant regurgitation.    Aorta: Aortic root is normal in size measuring 3.13 cm. Ascending aorta is normal measuring 3.24 cm.    IVC/SVC: Normal venous pressure at 3 mmHg.    Pericardium: The pericardium is normal. There is no pericardial effusion.

## 2023-08-14 NOTE — ASSESSMENT & PLAN NOTE
Stroke risk factor  .4  Continue home atorvastatin 80 mg daily  On zetia, cardiology recommending to consider repatha
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
## 2023-08-15 ENCOUNTER — DOCUMENTATION ONLY (OUTPATIENT)
Dept: ELECTROPHYSIOLOGY | Facility: CLINIC | Age: 58
End: 2023-08-15
Payer: MEDICAID

## 2023-08-15 LAB
ALBUMIN SERPL BCP-MCNC: 3.1 G/DL (ref 3.5–5.2)
ALP SERPL-CCNC: 59 U/L (ref 55–135)
ALT SERPL W/O P-5'-P-CCNC: 6 U/L (ref 10–44)
ANION GAP SERPL CALC-SCNC: 11 MMOL/L (ref 8–16)
ANISOCYTOSIS BLD QL SMEAR: SLIGHT
APTT PPP: 42.1 SEC (ref 21–32)
APTT PPP: 43 SEC (ref 21–32)
AST SERPL-CCNC: 19 U/L (ref 10–40)
BASOPHILS # BLD AUTO: 0.02 K/UL (ref 0–0.2)
BASOPHILS NFR BLD: 0.3 % (ref 0–1.9)
BILIRUB SERPL-MCNC: 0.7 MG/DL (ref 0.1–1)
BUN SERPL-MCNC: 31 MG/DL (ref 6–20)
BURR CELLS BLD QL SMEAR: ABNORMAL
CALCIUM SERPL-MCNC: 9.6 MG/DL (ref 8.7–10.5)
CHLORIDE SERPL-SCNC: 117 MMOL/L (ref 95–110)
CO2 SERPL-SCNC: 19 MMOL/L (ref 23–29)
CREAT SERPL-MCNC: 1.6 MG/DL (ref 0.5–1.4)
DIFFERENTIAL METHOD: ABNORMAL
EOSINOPHIL # BLD AUTO: 0 K/UL (ref 0–0.5)
EOSINOPHIL NFR BLD: 0.1 % (ref 0–8)
ERYTHROCYTE [DISTWIDTH] IN BLOOD BY AUTOMATED COUNT: 15.5 % (ref 11.5–14.5)
EST. GFR  (NO RACE VARIABLE): 50.3 ML/MIN/1.73 M^2
GLUCOSE SERPL-MCNC: 107 MG/DL (ref 70–110)
HCT VFR BLD AUTO: 46.4 % (ref 40–54)
HGB BLD-MCNC: 13.8 G/DL (ref 14–18)
IMM GRANULOCYTES # BLD AUTO: 0.03 K/UL (ref 0–0.04)
IMM GRANULOCYTES NFR BLD AUTO: 0.4 % (ref 0–0.5)
LYMPHOCYTES # BLD AUTO: 0.8 K/UL (ref 1–4.8)
LYMPHOCYTES NFR BLD: 10 % (ref 18–48)
MAGNESIUM SERPL-MCNC: 2.4 MG/DL (ref 1.6–2.6)
MCH RBC QN AUTO: 26.8 PG (ref 27–31)
MCHC RBC AUTO-ENTMCNC: 29.7 G/DL (ref 32–36)
MCV RBC AUTO: 90 FL (ref 82–98)
MONOCYTES # BLD AUTO: 0.9 K/UL (ref 0.3–1)
MONOCYTES NFR BLD: 11.4 % (ref 4–15)
NEUTROPHILS # BLD AUTO: 6.1 K/UL (ref 1.8–7.7)
NEUTROPHILS NFR BLD: 77.8 % (ref 38–73)
NRBC BLD-RTO: 0 /100 WBC
OVALOCYTES BLD QL SMEAR: ABNORMAL
PHOSPHATE SERPL-MCNC: 3.7 MG/DL (ref 2.7–4.5)
PLATELET # BLD AUTO: 249 K/UL (ref 150–450)
PLATELET BLD QL SMEAR: ABNORMAL
PMV BLD AUTO: 12.8 FL (ref 9.2–12.9)
POCT GLUCOSE: 106 MG/DL (ref 70–110)
POCT GLUCOSE: 99 MG/DL (ref 70–110)
POIKILOCYTOSIS BLD QL SMEAR: ABNORMAL
POTASSIUM SERPL-SCNC: 4.8 MMOL/L (ref 3.5–5.1)
PROT SERPL-MCNC: 7.1 G/DL (ref 6–8.4)
RBC # BLD AUTO: 5.14 M/UL (ref 4.6–6.2)
SODIUM SERPL-SCNC: 147 MMOL/L (ref 136–145)
WBC # BLD AUTO: 7.79 K/UL (ref 3.9–12.7)

## 2023-08-15 PROCEDURE — 25000003 PHARM REV CODE 250

## 2023-08-15 PROCEDURE — 25000003 PHARM REV CODE 250: Performed by: PSYCHIATRY & NEUROLOGY

## 2023-08-15 PROCEDURE — 80053 COMPREHEN METABOLIC PANEL: CPT | Performed by: PSYCHIATRY & NEUROLOGY

## 2023-08-15 PROCEDURE — 99233 PR SUBSEQUENT HOSPITAL CARE,LEVL III: ICD-10-PCS | Mod: ,,, | Performed by: NURSE PRACTITIONER

## 2023-08-15 PROCEDURE — 85730 THROMBOPLASTIN TIME PARTIAL: CPT | Mod: 91

## 2023-08-15 PROCEDURE — 97530 THERAPEUTIC ACTIVITIES: CPT

## 2023-08-15 PROCEDURE — 84100 ASSAY OF PHOSPHORUS: CPT | Performed by: PSYCHIATRY & NEUROLOGY

## 2023-08-15 PROCEDURE — 99233 PR SUBSEQUENT HOSPITAL CARE,LEVL III: ICD-10-PCS | Mod: ,,, | Performed by: PSYCHIATRY & NEUROLOGY

## 2023-08-15 PROCEDURE — 92526 ORAL FUNCTION THERAPY: CPT

## 2023-08-15 PROCEDURE — 99233 SBSQ HOSP IP/OBS HIGH 50: CPT | Mod: ,,, | Performed by: PSYCHIATRY & NEUROLOGY

## 2023-08-15 PROCEDURE — 83735 ASSAY OF MAGNESIUM: CPT | Performed by: PSYCHIATRY & NEUROLOGY

## 2023-08-15 PROCEDURE — 85730 THROMBOPLASTIN TIME PARTIAL: CPT | Performed by: PSYCHIATRY & NEUROLOGY

## 2023-08-15 PROCEDURE — 63600175 PHARM REV CODE 636 W HCPCS

## 2023-08-15 PROCEDURE — 94640 AIRWAY INHALATION TREATMENT: CPT

## 2023-08-15 PROCEDURE — 85025 COMPLETE CBC W/AUTO DIFF WBC: CPT | Performed by: PHYSICIAN ASSISTANT

## 2023-08-15 PROCEDURE — 63600175 PHARM REV CODE 636 W HCPCS: Performed by: NURSE PRACTITIONER

## 2023-08-15 PROCEDURE — 92507 TX SP LANG VOICE COMM INDIV: CPT

## 2023-08-15 PROCEDURE — 20000000 HC ICU ROOM

## 2023-08-15 PROCEDURE — 99900035 HC TECH TIME PER 15 MIN (STAT)

## 2023-08-15 PROCEDURE — 97112 NEUROMUSCULAR REEDUCATION: CPT

## 2023-08-15 PROCEDURE — 25000242 PHARM REV CODE 250 ALT 637 W/ HCPCS

## 2023-08-15 PROCEDURE — 99233 SBSQ HOSP IP/OBS HIGH 50: CPT | Mod: ,,, | Performed by: NURSE PRACTITIONER

## 2023-08-15 PROCEDURE — 94761 N-INVAS EAR/PLS OXIMETRY MLT: CPT

## 2023-08-15 RX ADMIN — ACETYLCYSTEINE 4 ML: 100 INHALANT RESPIRATORY (INHALATION) at 08:08

## 2023-08-15 RX ADMIN — SODIUM CHLORIDE, POTASSIUM CHLORIDE, SODIUM LACTATE AND CALCIUM CHLORIDE 250 ML: 600; 310; 30; 20 INJECTION, SOLUTION INTRAVENOUS at 03:08

## 2023-08-15 RX ADMIN — DEXMEDETOMIDINE HYDROCHLORIDE 0.2 MCG/KG/HR: 4 INJECTION INTRAVENOUS at 12:08

## 2023-08-15 RX ADMIN — IPRATROPIUM BROMIDE AND ALBUTEROL SULFATE 3 ML: .5; 3 SOLUTION RESPIRATORY (INHALATION) at 01:08

## 2023-08-15 RX ADMIN — HEPARIN SODIUM 13 UNITS/KG/HR: 10000 INJECTION, SOLUTION INTRAVENOUS at 09:08

## 2023-08-15 RX ADMIN — IPRATROPIUM BROMIDE AND ALBUTEROL SULFATE 3 ML: .5; 3 SOLUTION RESPIRATORY (INHALATION) at 08:08

## 2023-08-15 RX ADMIN — ASPIRIN 300 MG: 300 SUPPOSITORY RECTAL at 09:08

## 2023-08-15 RX ADMIN — ACETYLCYSTEINE 4 ML: 100 INHALANT RESPIRATORY (INHALATION) at 01:08

## 2023-08-15 RX ADMIN — SODIUM CHLORIDE, POTASSIUM CHLORIDE, SODIUM LACTATE AND CALCIUM CHLORIDE 250 ML: 600; 310; 30; 20 INJECTION, SOLUTION INTRAVENOUS at 10:08

## 2023-08-15 RX ADMIN — MUPIROCIN: 20 OINTMENT TOPICAL at 09:08

## 2023-08-15 RX ADMIN — DEXMEDETOMIDINE HYDROCHLORIDE 0.6 MCG/KG/HR: 4 INJECTION INTRAVENOUS at 11:08

## 2023-08-15 NOTE — SUBJECTIVE & OBJECTIVE
Neurologic Chief Complaint: L MCA syndrome    Subjective:     Interval History: Patient is seen for follow-up neurological assessment and treatment recommendations:   Patient on precedex gtt/heparin gtt. On rectal ASA. Pending PEG placement on Thursday. Per [facility] notes, heparin gtt to be held on tomorrow. Given LR x 1 today and pacer interrogated.    HPI, Past Medical, Family, and Social History remains the same as documented in the initial encounter.     Review of Systems   Unable to perform ROS: Other (sedated)     Scheduled Meds:   acetylcysteine 100 mg/ml (10%)  4 mL Nebulization TID WAKE    albuterol-ipratropium  3 mL Nebulization TID WAKE    amiodarone  200 mg Per NG tube BID    aspirin  300 mg Rectal Daily    atorvastatin  80 mg Per NG tube Daily    ezetimibe  10 mg Per NG tube QHS    ferrous sulfate  1 tablet Per NG tube Daily    metoprolol tartrate  25 mg Per NG tube BID    mupirocin   Nasal BID    polyethylene glycol  17 g Per NG tube Daily    senna-docusate 8.6-50 mg  1 tablet Per NG tube BID     Continuous Infusions:   dexmedeTOMIDine (Precedex) infusion (titrating) 0.2 mcg/kg/hr (08/15/23 1242)    heparin (porcine) in D5W 13 Units/kg/hr (08/15/23 1115)     PRN Meds:acetaminophen, dextrose 10%, dextrose 10%, glucagon (human recombinant), hydrALAZINE, insulin aspart U-100, labetalol, magnesium sulfate IVPB, magnesium sulfate IVPB, metoprolol, nitroGLYCERIN, ondansetron, potassium chloride **AND** potassium chloride **AND** potassium chloride, sodium chloride 0.9%, sodium chloride 0.9%, sodium chloride 0.9%, sodium phosphate 15 mmol in dextrose 5 % (D5W) 250 mL IVPB, sodium phosphate 20.01 mmol in dextrose 5 % (D5W) 250 mL IVPB, sodium phosphate 30 mmol in dextrose 5 % (D5W) 250 mL IVPB    Objective:     Vital Signs (Most Recent):  Temp: 98.2 °F (36.8 °C) (08/15/23 0705)  Pulse: 76 (08/15/23 1005)  Resp: 16 (08/15/23 1005)  BP: (!) 133/92 (08/15/23 1005)  SpO2: 99 % (08/15/23 1005)  BP Location: Right  "arm    Vital Signs Range (Last 24H):  Temp:  [98 °F (36.7 °C)-98.4 °F (36.9 °C)]   Pulse:  [60-86]   Resp:  [12-27]   BP: (103-154)/()   SpO2:  [95 %-100 %]   BP Location: Right arm       Physical Exam  Vitals and nursing note reviewed.   Constitutional:       General: He is not in acute distress.     Appearance: He is well-developed.      Comments: Sedated on precedex   HENT:      Head: Normocephalic and atraumatic.   Cardiovascular:      Rate and Rhythm: Normal rate.   Pulmonary:      Effort: Pulmonary effort is normal. No respiratory distress.   Skin:     General: Skin is warm and dry.   Neurological:      Comments: Patient with right hemiparesis, moves L spont  Aphasic, does not follow commands              Neurological Exam:   LOC: drowsy  Attention Span: poor  Language: Global aphasia, not following commands  Articulation: Untestable due to severe aphasia   Orientation: Untestable due to severe aphasia   EOM (CN III, IV, VI): Gaze preference  left  Facial Movement (CN VII): Lower facial weakness on the Right  Motor: moves left spontaneous, right hemiparesis      Laboratory:  CMP:   Recent Labs   Lab 08/15/23  0621   CALCIUM 9.6   ALBUMIN 3.1*   PROT 7.1   *   K 4.8   CO2 19*   *   BUN 31*   CREATININE 1.6*   ALKPHOS 59   ALT 6*   AST 19   BILITOT 0.7       CBC:   Recent Labs   Lab 08/15/23  0122   WBC 7.79   RBC 5.14   HGB 13.8*   HCT 46.4      MCV 90   MCH 26.8*   MCHC 29.7*       Lipid Panel:   No results for input(s): "CHOL", "LDLCALC", "HDL", "TRIG" in the last 168 hours.    Coagulation:   Recent Labs   Lab 08/08/23  1838 08/09/23  0045 08/15/23  0621   INR 1.1  --   --    APTT 29.6   < > 43.0*    < > = values in this interval not displayed.       Platelet Aggregation Study: No results for input(s): "PLTAGG", "PLTAGINTERP", "PLTAGREGLACO", "ADPPLTAGGREG" in the last 168 hours.  Hgb A1C:   No results for input(s): "HGBA1C" in the last 168 hours.    TSH:   No results for input(s): " ""TSH" in the last 168 hours.      Diagnostic Results     Brain Imaging   CT Head 8/10/23  Impression:     No detrimental change compared to prior.     Evolving acute infarcts within the left MCA and PCA territories.  No hemorrhagic conversion.  No significant mass effect     Suspected small subacute infarction within the right caudate.     Multiple areas remote infarctions as detailed above.      CT Head 8/8/23 1704  Impression:     1. Redemonstration of acute infarctions in the left MCA and PCA territories.  No evidence to suggest hemorrhagic transformation.  2. Suspected small subacute infarction in the right caudate.  3. Numerous remote infarctions as detailed above.    CT Head 8/8/23 0826  Impression:     Moderate developing hypoattenuation left inferior frontal lobe and left occipital lobe concerning for developing recent infarcts post thrombectomy.     Allowing for scattered hyperdensity related to recently contrast administration for thrombectomy there is no definite acute intracranial hemorrhage.     Previous hypodensity right caudate no longer seen which may be related to contrast administration for thrombectomy and possible subacute age infarction.     Superimposed remote infarcts with moderate to large encephalomalacia right MCA territory unchanged    Vessel Imaging   IR Angio 8/8/23  Preliminary Interpretation:   1.    No evidence of left ICA or MCA stenosis. No large or medium vessel occlusion.    CTA Multiphase 8/7/23  Impression:     CT head:     New right caudate nucleus hypodensity, likely representing age-indeterminate infarct.  Could be further evaluated with MRI.     Multifocal encephalomalacia involving the right frontal, temporoparietal, and left occipital lobes.     CTA head and neck:     Acute occlusion of the superior left M2 segment.     Left PCA occlusion of undetermined age.     Filling defect in the right main pulmonary artery, concerning for acute pulmonary thromboembolism.  Consider " follow-up pulmonary CTA to more fully assess the extent of thromboemboli, the clot burden, and the presence or absence of right heart strain.     Not fully detailed above:     - Incomplete opacification of the left atrial appendage, suspicious for an intraluminal thrombus.  Follow-up echocardiogram, or cardiac MRI or CTA, recommended.     - The presence of acute thrombi or thromboemboli in both the systemic arterial and pulmonary arterial circulation raises concern for the possibility of underlying intracardiac or extracardiac right-to-left shunt, and/or a hypercoagulable state.  Correlate clinically.     - Incompletely visualized, but possibly large, pericardial effusion.  Artifacts compromise accurate assessment of its attenuation.    Cardiac Imaging   TTE with bubble 8/8/23    Left Ventricle: The left ventricle is moderately dilated. Increased ventricular mass. Normal wall thickness. There is moderate eccentric hypertrophy. Severe global hypokinesis present. Septal motion is consistent with pacing. There is severely reduced systolic function with a visually estimated ejection fraction of 15 - 20%. Ejection fraction by visual approximation is 20%. Unable to assess diastolic function due to arrhythmia.    Left Atrium: Left atrium is severely dilated. Radha 85mL/m2.    Right Ventricle: Mild right ventricular enlargement. Wall thickness is normal. Right ventricle wall motion  is normal. Systolic function is severely reduced. Pacemaker lead present in the ventricle.    Right Atrium: Right atrium is severely dilated.    Aortic Valve: There is mild aortic valve sclerosis. There is normal leaflet mobility. There is no significant regurgitation.    Mitral Valve: There is bileaflet sclerosis. There is normal leaflet mobility. There is mild regurgitation.    Tricuspid Valve: The tricuspid valve is structurally normal. There is normal leaflet mobility. There is mild regurgitation.    Pulmonic Valve: The pulmonic valve is  structurally normal. There is normal leaflet mobility. There is no significant regurgitation.    Aorta: Aortic root is normal in size measuring 3.13 cm. Ascending aorta is normal measuring 3.24 cm.    IVC/SVC: Normal venous pressure at 3 mmHg.    Pericardium: The pericardium is normal. There is no pericardial effusion.

## 2023-08-15 NOTE — PT/OT/SLP PROGRESS
Occupational Therapy  Treatment    Name: Tera Griffiths  MRN: [id number]  Admitting Diagnosis:  Embolic stroke involving left middle cerebral artery  4 Days Post-Op    Recommendations:     Discharge Recommendations: nursing facility, skilled  Discharge Equipment Recommendations:  to be determined by next level of care  Barriers to discharge:   (increased A required)    Assessment:     Tera Griffiths is a 58 y.o. male with a medical diagnosis of Embolic stroke involving left middle cerebral artery.  He presents with performance deficits affecting function are weakness, impaired endurance, decreased upper extremity function, impaired self care skills, impaired functional mobility, gait instability, impaired balance, decreased coordination, impaired cognition, decreased lower extremity function, decreased safety awareness. Pt nonverbal and not following commands. RN approved treatment. Pt required Total A for all mobility, due to impaired coordination, L gaze preference and overall decreased safety. Pt was very fidgety throughout session, but was able to work on midline sitting and attempting midline gaze. Pt participates well with assistance for initiation of tasks.      Rehab Prognosis:  Fair; patient would benefit from acute skilled OT services to address these deficits and reach maximum level of function.       Plan:     Patient to be seen 3 x/week to address the above listed problems via self-care/home management, therapeutic activities, therapeutic exercises, neuromuscular re-education, cognitive retraining  Plan of Care Expires: 09/11/23  Plan of Care Reviewed with: patient    Subjective     Chief Complaint: Restraints/mitts  Patient/Family Comments/goals: Get well  Pain/Comfort:  Pain Rating 1:  (unable to verbalize, but appeared in NAD)    Objective:     Communicated with: RN prior to session.  Patient found HOB elevated with restraints, peripheral IV upon OT entry to room.    General Precautions:  Standard, aphasia, aspiration, fall, NPO    Orthopedic Precautions:N/A  Braces: N/A  Respiratory Status: Room air     Occupational Performance:     Bed Mobility:    Patient completed Scooting/Bridging with SBA to HOB on pt's own volition, but to command was unable and required Total Ax2 to complete full scoot to HOB  Patient completed Supine to Sit with total assistance  Patient completed Sit to Supine with total assistance   EOB sitting with SBA to Min A.    Functional Mobility/Transfers:  Pt very resistive posteriorly and unable to stand safely.    Activities of Daily Living:  Upper Body Dressing: total assistance gown  Lower Body Dressing: total assistance socks      [facility] 6 Click ADL: 6    Treatment & Education:  Pt worked on EOB sitting, midline orientation of trunk and head.  Pt worked on UE WB, but was unable to maintain attention to task.    Patient left HOB elevated with all lines intact, call button in reach, and RN present    GOALS:   Multidisciplinary Problems       Occupational Therapy Goals          Problem: Occupational Therapy    Goal Priority Disciplines Outcome Interventions   Occupational Therapy Goal     OT, PT/OT Ongoing, Progressing    Description: Goals to be met by: 9/8/23     Patient will increase functional independence with ADLs by performing:    UE Dressing with Moderate Assistance.  LE Dressing with Moderate Assistance.  Grooming while standing with Moderate Assistance.  Toileting from toilet with Moderate Assistance for hygiene and clothing management.                          Time Tracking:     OT Date of Treatment: 08/15/23  OT Start Time: 1538  OT Stop Time: 1602  OT Total Time (min): 24 min    Billable Minutes:Therapeutic Activity 12 minutes  Neuromuscular Re-education 12 minutes    OT/ANDREA: OT       TYSHAWN Melton  8/15/2023  Pager: [phone]

## 2023-08-15 NOTE — ASSESSMENT & PLAN NOTE
- [employer]  - PMHx cardiac arrest 2/2 V-Tach  8/15/2023 Ordered for pacer interrogation prior to surgery.

## 2023-08-15 NOTE — PLAN OF CARE
Problem: Occupational Therapy  Goal: Occupational Therapy Goal  Description: Goals to be met by: 9/8/23     Patient will increase functional independence with ADLs by performing:    UE Dressing with Moderate Assistance.  LE Dressing with Moderate Assistance.  Grooming while standing with Moderate Assistance.  Toileting from toilet with Moderate Assistance for hygiene and clothing management.     Outcome: Ongoing, Progressing     Goals remain appropriate. Cont POC.

## 2023-08-15 NOTE — ASSESSMENT & PLAN NOTE
SLP following  GI team consulted for PEG tube, unable to place on 8/11, NGT placed instead  NGT now flushing through side port, IR consulted for PEG placement  Once obtain enteral access- will restart medications, transition Heparin gtt to oral AC, and start nutrition  Hold heparin gtt at midnight, contrast ordered via NGT for midnight tonight, PEG planned for tomorrow  8/15/2023 Plan for G-tube on Thursday, Holding heparin on Wednesday at [facility]
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
## 2023-08-15 NOTE — ASSESSMENT & PLAN NOTE
- Baseline Cr 1.7  - At/below baseline  - Strict I/O's  - Avoid nephrotoxic agents where appropriate  - Renally-dose meds  - LR bolus x 1 and repeat prn
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
## 2023-08-15 NOTE — PROGRESS NOTES
Cardiac device interrogation and/or reprogramming completed by industry representative, Char LONG with [employer] on 8/15/23; please refer to report located in the Media tab.
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
## 2023-08-15 NOTE — SUBJECTIVE & OBJECTIVE
Review of Systems: Unable to obtain a complete ROS due to level of consciousness.     Vitals:   Temp: 98 °F (36.7 °C)  Pulse: 75  Rhythm: paced rhythm  BP: (!) 131/98  MAP (mmHg): 111  Resp: 18  SpO2: 100 %    Temp  Min: 97.2 °F (36.2 °C)  Max: 98.4 °F (36.9 °C)  Pulse  Min: 63  Max: 86  BP  Min: 103/77  Max: 173/103  MAP (mmHg)  Min: 78  Max: 117  Resp  Min: 12  Max: 27  SpO2  Min: 94 %  Max: 100 %    08/14 0701 - 08/15 0700  In: 415.8 [I.V.:343]  Out: 665 [Urine:665]   Unmeasured Output  Urine Occurrence: 1  Stool Occurrence: 0  Emesis Occurrence: 0  Pad Count: 1     Examination:   Constitutional: Well-nourished and -developed. No apparent distress.   Eyes: Conjunctiva clear, anicteric. Lids no lesions.  Head/Ears/Nose/Mouth/Throat/Neck: Moist mucous membranes. External ears, nose atraumatic.   Cardiovascular: Regular rhythm. No leg edema.  Respiratory: Comfortable respirations. Clear to auscultation.  Gastrointestinal: Soft, nondistended, nontender. + bowel sounds.    Neurologic:  -GCS E 4 V 1 M 4  -Drowsy. Aphasic. Unable to follow commands.  -Cranial nerves: L gaze preference, PERRL, R facial droop, + cough  -Motor: R hemiparesis, LUE moves spontaneous/antigravity, Withdrawals to bilat LE  Unable to test orientation, language, memory, judgment, insight, fund of knowledge, shoulder shrug, tongue protrusion, coordination, gait due to level of consciousness.    Medications:   ContinuousdexmedeTOMIDine (Precedex) infusion (titrating), Last Rate: 0.3 mcg/kg/hr (08/15/23 0643)  heparin (porcine) in D5W, Last Rate: 13 Units/kg/hr (08/15/23 0946)    Scheduledacetylcysteine 100 mg/ml (10%), 4 mL, TID WAKE  albuterol-ipratropium, 3 mL, TID WAKE  amiodarone, 200 mg, BID  aspirin, 300 mg, Daily  atorvastatin, 80 mg, Daily  ezetimibe, 10 mg, QHS  ferrous sulfate, 1 tablet, Daily  lactated ringers, 250 mL, Once  metoprolol tartrate, 25 mg, BID  mupirocin, , BID  polyethylene glycol, 17 g, Daily  senna-docusate 8.6-50 mg, 1  tablet, BID  sodium chloride 0.9%, 250 mL, Once    PRNacetaminophen, 650 mg, Q6H PRN  dextrose 10%, 12.5 g, PRN  dextrose 10%, 25 g, PRN  glucagon (human recombinant), 1 mg, PRN  hydrALAZINE, 10 mg, Q6H PRN  insulin aspart U-100, 1-10 Units, Q6H PRN  labetalol, 10 mg, Q6H PRN  magnesium sulfate IVPB, 2 g, PRN  magnesium sulfate IVPB, 4 g, PRN  metoprolol, 5 mg, Q5 Min PRN  nitroGLYCERIN, 0.4 mg, Q5 Min PRN  ondansetron, 4 mg, Q8H PRN  potassium chloride, 40 mEq, PRN   And  potassium chloride, 60 mEq, PRN   And  potassium chloride, 80 mEq, PRN  sodium chloride 0.9%, 10 mL, PRN  sodium chloride 0.9%, 10 mL, PRN  sodium chloride 0.9%, 10 mL, PRN  sodium phosphate 15 mmol in dextrose 5 % (D5W) 250 mL IVPB, 15 mmol, PRN  sodium phosphate 20.01 mmol in dextrose 5 % (D5W) 250 mL IVPB, 20.01 mmol, PRN  sodium phosphate 30 mmol in dextrose 5 % (D5W) 250 mL IVPB, 30 mmol, PRN       Today I independently reviewed pertinent medications, lines/drains/airways, imaging, cardiology results, laboratory results, microbiology results, notably:

## 2023-08-15 NOTE — ASSESSMENT & PLAN NOTE
- s/p DCCV (11/2022)  - Previously on apixaban  - Continue heparin gtt  - Rate controlled on admit  - Holding Metoprolol 75mg QD & Amiodarone 200mg BID; no enteral access  - 8/15/2023 Plan for G-tube on Thursday, Holding heparin on Wednesday at [facility], Ordered for pacer interrogation prior to surgery with plan for today.

## 2023-08-15 NOTE — PLAN OF CARE
08/15/23 1245   Post-Acute Status   Post-Acute Authorization Placement   Post-Acute Placement Status Referrals Sent  (SNF)     SW observed therapy recs have changed to SNF from Rehab. Pt has [employer] and there are not a lot of options for SNF in network. Sent blast referrals via Careport to obtain options to provide to family.    Naomi Hong, BEAR  Neurocritical Care   [facility]  [id number]

## 2023-08-15 NOTE — PLAN OF CARE
Problem: Diabetes Comorbidity  Goal: Blood Glucose Level Within Targeted Range  Outcome: Ongoing, Progressing     Problem: Restraint, Nonbehavioral (Nonviolent)  Goal: Absence of Harm or Injury  Outcome: Ongoing, Progressing   [facility] Care Plan    POC reviewed with Tera Griffiths and family at 0300. Pt unable to verbalize understanding. Questions and concerns addressed with family at bedside. No acute events overnight. Pt progressing toward goals. Will continue to monitor. See below and flowsheets for full assessment and VS info.           Is this a stroke patient? yes- Stroke booklet reviewed with patient and family, risk factors identified for patient and stroke booklet remains at bedside for ongoing education.     Neuro:  [place] Coma Scale  Best Eye Response: 4-->(E4) spontaneous  Best Motor Response: 5-->(M5) localizes pain  Best Verbal Response: 1-->(V1) none  [place] Coma Scale Score: 10  Assessment Qualifiers: patient not sedated/intubated  Pupil PERRLA: yes     24hr Temp:  [97.2 °F (36.2 °C)-98.4 °F (36.9 °C)]     CV:   Rhythm: paced rhythm  BP goals:   SBP < 180  MAP > 65    Resp:      Oxygen Concentration (%): 28    Plan:  peg tube placement on Thursday    GI/:     Diet/Nutrition Received: NPO  Last Bowel Movement: 08/06/23  Voiding Characteristics: external catheter    Intake/Output Summary (Last 24 hours) at 8/15/2023 0514  Last data filed at 8/15/2023 0405  Gross per 24 hour   Intake 529.83 ml   Output 635 ml   Net -105.17 ml     Unmeasured Output  Urine Occurrence: 1  Stool Occurrence: 0  Emesis Occurrence: 0  Pad Count: 1    Labs/Accuchecks:  Recent Labs   Lab 08/15/23  0122   WBC 7.79   RBC 5.14   HGB 13.8*   HCT 46.4         Recent Labs   Lab 08/14/23  0239      K 4.3   CO2 21*   *   BUN 31*   CREATININE 1.5*   ALKPHOS 56   ALT <5*   AST 12   BILITOT 0.8      Recent Labs   Lab 08/08/23  1838 08/09/23  0045 08/15/23  0122   INR 1.1  --   --    APTT 29.6   < > 42.1*    < > =  values in this interval not displayed.      Recent Labs   Lab 08/10/23  1830   TROPONINI 0.053*       Electrolytes: N/A - electrolytes WDL  Accuchecks: Q6H    Gtts:   dexmedeTOMIDine (Precedex) infusion (titrating) 0.3 mcg/kg/hr (08/14/23 1931)    heparin (porcine) in D5W 13 Units/kg/hr (08/14/23 1729)       LDA/Wounds:  Lines/Drains/Airways       Drain  Duration             Male External Urinary Catheter 08/09/23 1305 Large 5 days              Peripheral Intravenous Line  Duration                  Peripheral IV - Single Lumen 08/10/23 1847 20 G Left Antecubital 4 days         Peripheral IV - Single Lumen 08/13/23 1125 20 G Posterior;Right Hand 1 day         Peripheral IV - Single Lumen 08/14/23 22 G Left;Posterior Hand 1 day                  Wounds: No  Wound care consulted: No

## 2023-08-15 NOTE — PT/OT/SLP PROGRESS
Speech Language Pathology Treatment    Patient Name:  Tera Griffiths   MRN:  [id number]  Admitting Diagnosis: Embolic stroke involving left middle cerebral artery    Recommendations:                 General Recommendations:  Dysphagia therapy, Speech/language therapy, and Cognitive-linguistic therapy  Diet recommendations:  NPO, Liquid Diet Level: NPO   Aspiration Precautions: Frequent oral care and Strict aspiration precautions   General Precautions: Standard, aphasia, aspiration, fall, NPO  Communication strategies:  provide increased time to answer and go to room if call light pushed    Assessment:     Tera Griffiths is a 58 y.o. male with an SLP diagnosis of Aphasia and Dysphagia.      Subjective     Pt nonverbal     Pain/Comfort:  Pain Rating 1:  (NAD)  Pain Rating Post-Intervention 1:  (NAD)    Respiratory Status: Room air    Objective:     Has the patient been evaluated by SLP for swallowing?   Yes  Keep patient NPO? Yes     Pt seen bedside with family present.  He roused easily to participate in therapy.  No response to simple y/n questions or simple commands.  No vocalizations elicited despite max cues.  Pt continues to close L eye when attempting to focus on items/people.  HOB raised upright for safety with PO trials.  Ice chip presented to right inside lips.  Pt leaning forward and actively expectorating trial.  Given encouragement, pt did accept 1/4 tsp of puree though pharyngeal swallow not elicited.  Pt again leaning forward and expectorating trial into a napkin.  Education provided to pt and his family re: role of SLP, cont npo, aspiration risk, language stim, and POC.  Family verbalized understanding.          Goals:   Multidisciplinary Problems       SLP Goals          Problem: SLP    Goal Priority Disciplines Outcome   SLP Goal     SLP Ongoing, Progressing   Description: Speech Language Pathology Goals  Goals expected to be met by 8/15  1. Pt will participate in ongoing assessment of swallow.    2. Pt will answer simple y/n questions with 60% accy given max cues.   3. Pt will follow simple commands with 50% accy given max cues.   4. Pt will vocalize in response to any stim x5/session given max cues.   5. Pt will localize to stim on R x2/session given max cues.                              Plan:     Patient to be seen:  4 x/week   Plan of Care expires:  09/07/23  Plan of Care reviewed with:  patient, family   SLP Follow-Up:  Yes       Discharge recommendations:  nursing facility, skilled   Barriers to Discharge:  Level of Skilled Assistance Needed      Time Tracking:     SLP Treatment Date:   08/15/23  Speech Start Time:  0840  Speech Stop Time:  0857     Speech Total Time (min):  17 min    Billable Minutes: Speech Therapy Individual 9 and Treatment Swallowing Dysfunction 8    08/15/2023

## 2023-08-15 NOTE — PLAN OF CARE
[facility] Care Plan    POC reviewed with Tera Griffiths and family at 1400. Pt verbalized understanding. Questions and concerns addressed. No acute events today. Pt progressing toward goals. Will continue to monitor. See below and flowsheets for full assessment and VS info.             Is this a stroke patient? yes- Stroke booklet reviewed with patient and family, risk factors identified for patient and stroke booklet remains at bedside for ongoing education.     Neuro:  [place]ber[place] Coma Scale  Best Eye Response: 4-->(E4) spontaneous  Best Motor Response: 5-->(M5) localizes pain  Best Verbal Response: 1-->(V1) none  [place]n Coma Scale Score: 10  Assessment Qualifiers: patient not sedated/intubated  Pupil PERRLA: yes     24 hr Temp:  [97.7 °F (36.5 °C)-98.4 °F (36.9 °C)]     CV:   Rhythm: paced rhythm  BP goals:   SBP < 180  MAP > 65    Resp:      Oxygen Concentration (%): 28    Plan: N/A    GI/:     Diet/Nutrition Received: NPO  Last Bowel Movement: 08/15/23  Voiding Characteristics: external catheter    Intake/Output Summary (Last 24 hours) at 8/15/2023 1843  Last data filed at 8/15/2023 1805  Gross per 24 hour   Intake 921.33 ml   Output 720 ml   Net 201.33 ml     Unmeasured Output  Urine Occurrence: 1  Stool Occurrence: 0  Emesis Occurrence: 0  Pad Count: 1    Labs/Accuchecks:  Recent Labs   Lab 08/15/23  0122   WBC 7.79   RBC 5.14   HGB 13.8*   HCT 46.4         Recent Labs   Lab 08/15/23  0621   *   K 4.8   CO2 19*   *   BUN 31*   CREATININE 1.6*   ALKPHOS 59   ALT 6*   AST 19   BILITOT 0.7      Recent Labs   Lab 08/15/23  0621   APTT 43.0*      Recent Labs   Lab 08/10/23  1830   TROPONINI 0.053*       Electrolytes: N/A - electrolytes WDL  Accuchecks: none    Gtts:   dexmedeTOMIDine (Precedex) infusion (titrating) 0.3 mcg/kg/hr (08/15/23 1805)    heparin (porcine) in D5W 13 Units/kg/hr (08/15/23 1805)       LDA/Wounds:  Lines/Drains/Airways       Drain  Duration             Male External Urinary  Catheter 08/09/23 1305 Large 6 days              Peripheral Intravenous Line  Duration                  Peripheral IV - Single Lumen 08/10/23 1847 20 G Left Antecubital 4 days         Peripheral IV - Single Lumen 08/13/23 1125 20 G Posterior;Right Hand 2 days         Peripheral IV - Single Lumen 08/14/23 22 G Left;Posterior Hand 1 day                  Wounds: No  Wound care consulted: No

## 2023-08-16 ENCOUNTER — ANESTHESIA EVENT (OUTPATIENT)
Dept: SURGERY | Facility: HOSPITAL | Age: 58
DRG: 280 | End: 2023-08-16
Payer: MEDICAID

## 2023-08-16 LAB
ALBUMIN SERPL BCP-MCNC: 3 G/DL (ref 3.5–5.2)
ALP SERPL-CCNC: 55 U/L (ref 55–135)
ALT SERPL W/O P-5'-P-CCNC: 6 U/L (ref 10–44)
ANION GAP SERPL CALC-SCNC: 10 MMOL/L (ref 8–16)
APTT PPP: 45.3 SEC (ref 21–32)
AST SERPL-CCNC: 12 U/L (ref 10–40)
BASOPHILS # BLD AUTO: 0.02 K/UL (ref 0–0.2)
BASOPHILS NFR BLD: 0.3 % (ref 0–1.9)
BILIRUB SERPL-MCNC: 0.7 MG/DL (ref 0.1–1)
BUN SERPL-MCNC: 26 MG/DL (ref 6–20)
CALCIUM SERPL-MCNC: 10.3 MG/DL (ref 8.7–10.5)
CHLORIDE SERPL-SCNC: 116 MMOL/L (ref 95–110)
CO2 SERPL-SCNC: 22 MMOL/L (ref 23–29)
CREAT SERPL-MCNC: 1.4 MG/DL (ref 0.5–1.4)
DIFFERENTIAL METHOD: ABNORMAL
EOSINOPHIL # BLD AUTO: 0 K/UL (ref 0–0.5)
EOSINOPHIL NFR BLD: 0.7 % (ref 0–8)
ERYTHROCYTE [DISTWIDTH] IN BLOOD BY AUTOMATED COUNT: 15.2 % (ref 11.5–14.5)
EST. GFR  (NO RACE VARIABLE): 59 ML/MIN/1.73 M^2
GLUCOSE SERPL-MCNC: 100 MG/DL (ref 70–110)
HCT VFR BLD AUTO: 49.5 % (ref 40–54)
HGB BLD-MCNC: 15 G/DL (ref 14–18)
IMM GRANULOCYTES # BLD AUTO: 0.01 K/UL (ref 0–0.04)
IMM GRANULOCYTES NFR BLD AUTO: 0.2 % (ref 0–0.5)
LYMPHOCYTES # BLD AUTO: 0.9 K/UL (ref 1–4.8)
LYMPHOCYTES NFR BLD: 15.4 % (ref 18–48)
MAGNESIUM SERPL-MCNC: 2.3 MG/DL (ref 1.6–2.6)
MCH RBC QN AUTO: 27.2 PG (ref 27–31)
MCHC RBC AUTO-ENTMCNC: 30.3 G/DL (ref 32–36)
MCV RBC AUTO: 90 FL (ref 82–98)
MONOCYTES # BLD AUTO: 0.6 K/UL (ref 0.3–1)
MONOCYTES NFR BLD: 10.6 % (ref 4–15)
NEUTROPHILS # BLD AUTO: 4.3 K/UL (ref 1.8–7.7)
NEUTROPHILS NFR BLD: 72.8 % (ref 38–73)
NRBC BLD-RTO: 0 /100 WBC
PHOSPHATE SERPL-MCNC: 3.7 MG/DL (ref 2.7–4.5)
PLATELET # BLD AUTO: 306 K/UL (ref 150–450)
PMV BLD AUTO: 12.5 FL (ref 9.2–12.9)
POTASSIUM SERPL-SCNC: 4.3 MMOL/L (ref 3.5–5.1)
PROT SERPL-MCNC: 7.5 G/DL (ref 6–8.4)
RBC # BLD AUTO: 5.51 M/UL (ref 4.6–6.2)
SODIUM SERPL-SCNC: 148 MMOL/L (ref 136–145)
WBC # BLD AUTO: 5.85 K/UL (ref 3.9–12.7)

## 2023-08-16 PROCEDURE — 83735 ASSAY OF MAGNESIUM: CPT | Performed by: PHYSICIAN ASSISTANT

## 2023-08-16 PROCEDURE — 25000003 PHARM REV CODE 250: Performed by: PSYCHIATRY & NEUROLOGY

## 2023-08-16 PROCEDURE — 94640 AIRWAY INHALATION TREATMENT: CPT

## 2023-08-16 PROCEDURE — 94761 N-INVAS EAR/PLS OXIMETRY MLT: CPT

## 2023-08-16 PROCEDURE — 97112 NEUROMUSCULAR REEDUCATION: CPT

## 2023-08-16 PROCEDURE — 85025 COMPLETE CBC W/AUTO DIFF WBC: CPT | Performed by: PHYSICIAN ASSISTANT

## 2023-08-16 PROCEDURE — 63600175 PHARM REV CODE 636 W HCPCS: Performed by: NURSE PRACTITIONER

## 2023-08-16 PROCEDURE — 25000242 PHARM REV CODE 250 ALT 637 W/ HCPCS

## 2023-08-16 PROCEDURE — 99291 CRITICAL CARE FIRST HOUR: CPT | Mod: ,,, | Performed by: PSYCHIATRY & NEUROLOGY

## 2023-08-16 PROCEDURE — 80053 COMPREHEN METABOLIC PANEL: CPT | Performed by: PHYSICIAN ASSISTANT

## 2023-08-16 PROCEDURE — 84100 ASSAY OF PHOSPHORUS: CPT | Performed by: PHYSICIAN ASSISTANT

## 2023-08-16 PROCEDURE — 99291 PR CRITICAL CARE, E/M 30-74 MINUTES: ICD-10-PCS | Mod: ,,, | Performed by: PSYCHIATRY & NEUROLOGY

## 2023-08-16 PROCEDURE — 85730 THROMBOPLASTIN TIME PARTIAL: CPT

## 2023-08-16 PROCEDURE — 99233 PR SUBSEQUENT HOSPITAL CARE,LEVL III: ICD-10-PCS | Mod: ,,, | Performed by: PSYCHIATRY & NEUROLOGY

## 2023-08-16 PROCEDURE — 92507 TX SP LANG VOICE COMM INDIV: CPT

## 2023-08-16 PROCEDURE — 25000003 PHARM REV CODE 250

## 2023-08-16 PROCEDURE — 20000000 HC ICU ROOM

## 2023-08-16 PROCEDURE — 97110 THERAPEUTIC EXERCISES: CPT | Mod: CQ

## 2023-08-16 PROCEDURE — 99233 SBSQ HOSP IP/OBS HIGH 50: CPT | Mod: ,,, | Performed by: PSYCHIATRY & NEUROLOGY

## 2023-08-16 RX ADMIN — HEPARIN SODIUM 13 UNITS/KG/HR: 10000 INJECTION, SOLUTION INTRAVENOUS at 09:08

## 2023-08-16 RX ADMIN — DEXMEDETOMIDINE HYDROCHLORIDE 0.5 MCG/KG/HR: 4 INJECTION INTRAVENOUS at 02:08

## 2023-08-16 RX ADMIN — ACETYLCYSTEINE 4 ML: 100 INHALANT RESPIRATORY (INHALATION) at 08:08

## 2023-08-16 RX ADMIN — ACETYLCYSTEINE 4 ML: 100 INHALANT RESPIRATORY (INHALATION) at 01:08

## 2023-08-16 RX ADMIN — ASPIRIN 300 MG: 300 SUPPOSITORY RECTAL at 09:08

## 2023-08-16 RX ADMIN — DEXMEDETOMIDINE HYDROCHLORIDE 0.2 MCG/KG/HR: 4 INJECTION INTRAVENOUS at 01:08

## 2023-08-16 RX ADMIN — MUPIROCIN: 20 OINTMENT TOPICAL at 09:08

## 2023-08-16 RX ADMIN — IPRATROPIUM BROMIDE AND ALBUTEROL SULFATE 3 ML: .5; 3 SOLUTION RESPIRATORY (INHALATION) at 08:08

## 2023-08-16 RX ADMIN — IPRATROPIUM BROMIDE AND ALBUTEROL SULFATE 3 ML: .5; 3 SOLUTION RESPIRATORY (INHALATION) at 01:08

## 2023-08-16 NOTE — SUBJECTIVE & OBJECTIVE
Interval History:  No events overnight. Plan for PEG tomorrow to gain enteral access, begin second antiplatelet agent at that point per Vascular Neurology recommendations.     Review of Systems: Unable to obtain a complete ROS due to level of consciousness.     Vitals:   Pulse: 69  Rhythm: paced rhythm  BP: (!) 139/98  MAP (mmHg): 117  Resp: (!) 28  SpO2: 95 %    Temp  Min: 98.2 °F (36.8 °C)  Max: 98.3 °F (36.8 °C)  Pulse  Min: 57  Max: 72  BP  Min: 113/76  Max: 163/95  MAP (mmHg)  Min: 89  Max: 131  Resp  Min: 16  Max: 28  SpO2  Min: 90 %  Max: 99 %    08/15 0701 - 08/16 0700  In: 954.3 [I.V.:457]  Out: 610 [Urine:610]   Unmeasured Output  Urine Occurrence: 1  Stool Occurrence: 0  Emesis Occurrence: 0  Pad Count: 1     Examination:   Constitutional: Well-nourished and -developed. No apparent distress.   Eyes: Conjunctiva clear, anicteric. Lids no lesions.  Head/Ears/Nose/Mouth/Throat/Neck: Moist mucous membranes. External ears, nose atraumatic.   Cardiovascular: Regular rhythm. No leg edema.  Respiratory: Comfortable respirations.   Gastrointestinal: Soft, nondistended, nontender.     Neurologic:  -GCS E3 V1 M5  -Drowsy with intermittent agitation (sitting up, attempt to get out of bed). Aphasic. Unable to follow commands.  -Cranial nerves: L gaze preference, PERRL, R facial droop  -Motor: R hemiparesis, LUE moves spontaneous/antigravity, Withdrawals to bilat LE    Unable to test orientation, language, memory, judgment, insight, fund of knowledge, shoulder shrug, tongue protrusion, coordination, gait due to level of consciousness.    Medications:   ContinuousdexmedeTOMIDine (Precedex) infusion (titrating), Last Rate: 0.2 mcg/kg/hr (08/16/23 1319)  heparin (porcine) in D5W, Last Rate: 13 Units/kg/hr (08/16/23 1005)    Scheduledacetylcysteine 100 mg/ml (10%), 4 mL, TID WAKE  albuterol-ipratropium, 3 mL, TID WAKE  amiodarone, 200 mg, BID  aspirin, 300 mg, Daily  atorvastatin, 80 mg, Daily  ezetimibe, 10 mg,  QHS  ferrous sulfate, 1 tablet, Daily  metoprolol tartrate, 25 mg, BID  mupirocin, , BID  polyethylene glycol, 17 g, Daily  senna-docusate 8.6-50 mg, 1 tablet, BID    PRNacetaminophen, 650 mg, Q6H PRN  dextrose 10%, 12.5 g, PRN  dextrose 10%, 25 g, PRN  glucagon (human recombinant), 1 mg, PRN  hydrALAZINE, 10 mg, Q6H PRN  insulin aspart U-100, 1-10 Units, Q6H PRN  labetalol, 10 mg, Q6H PRN  magnesium sulfate IVPB, 2 g, PRN  magnesium sulfate IVPB, 4 g, PRN  metoprolol, 5 mg, Q5 Min PRN  nitroGLYCERIN, 0.4 mg, Q5 Min PRN  ondansetron, 4 mg, Q8H PRN  potassium chloride, 40 mEq, PRN   And  potassium chloride, 60 mEq, PRN   And  potassium chloride, 80 mEq, PRN  sodium chloride 0.9%, 10 mL, PRN  sodium chloride 0.9%, 10 mL, PRN  sodium chloride 0.9%, 10 mL, PRN  sodium phosphate 15 mmol in dextrose 5 % (D5W) 250 mL IVPB, 15 mmol, PRN  sodium phosphate 20.01 mmol in dextrose 5 % (D5W) 250 mL IVPB, 20.01 mmol, PRN  sodium phosphate 30 mmol in dextrose 5 % (D5W) 250 mL IVPB, 30 mmol, PRN       Today I independently reviewed pertinent medications, lines/drains/airways, imaging, cardiology results, laboratory results, microbiology results, notably:     - CTH with evolving left PCA and MCA territory insults

## 2023-08-16 NOTE — PT/OT/SLP PROGRESS
Physical Therapy Treatment    Patient Name:  Tera Griffiths   MRN:  [id number]    Recommendations:     Discharge Recommendations: nursing facility, skilled  Discharge Equipment Recommendations:  (TBD at next level of care)  Barriers to discharge: Inaccessible home and Decreased caregiver support    Assessment:     Tera Griffiths is a 58 y.o. male admitted with a medical diagnosis of Embolic stroke involving left middle cerebral artery.  He presents with the following impairments/functional limitations: weakness, impaired endurance, impaired self care skills, impaired functional mobility, gait instability, impaired balance, decreased upper extremity function, decreased lower extremity function, decreased safety awareness, abnormal tone, decreased ROM, impaired coordination, impaired fine motor, impaired cardiopulmonary response to activity . Treatment limited as Patient had just finished working with O.T. Patient was resistant at times, but unable to follow any motor commands.    Rehab Prognosis: Fair; patient would benefit from acute skilled PT services to address these deficits and reach maximum level of function.    Recent Surgery: Procedure(s) (LRB):  EGD (ESOPHAGOGASTRODUODENOSCOPY) (N/A) 5 Days Post-Op    Plan:     During this hospitalization, patient to be seen 3 x/week to address the identified rehab impairments via gait training, therapeutic activities, therapeutic exercises, neuromuscular re-education and progress toward the following goals:    Plan of Care Expires:  09/08/23    Subjective     Chief Complaint: N/A, Aphasic  Patient/Family Comments/goals: N/A, Aphasic  Pain/Comfort:  Pain Rating 1: 0/10  Pain Rating Post-Intervention 1: 0/10      Objective:     Communicated with NSG prior to session.  Patient found HOB elevated with telemetry, pulse ox (continuous), peripheral IV, restraints upon PT entry to room.     General Precautions: Standard, aphasia, aspiration, fall, NPO  Orthopedic Precautions:  N/A  Braces: N/A  Respiratory Status: Room air     Functional Mobility:  Bed Mobility:     Scooting: dependence      AM-PAC 6 CLICK MOBILITY  Turning over in bed (including adjusting bedclothes, sheets and blankets)?: 1  Sitting down on and standing up from a chair with arms (e.g., wheelchair, bedside commode, etc.): 1  Moving from lying on back to sitting on the side of the bed?: 1  Moving to and from a bed to a chair (including a wheelchair)?: 1  Need to walk in hospital room?: 1  Climbing 3-5 steps with a railing?: 1  Basic Mobility Total Score: 6       Treatment & Education:  B LE PROM x 30 reps on all available planes of motion.    Patient left HOB elevated with all lines intact and call button in reach..    GOALS:   Multidisciplinary Problems       Physical Therapy Goals          Problem: Physical Therapy    Goal Priority Disciplines Outcome Goal Variances Interventions   Physical Therapy Goal     PT, PT/OT Ongoing, Progressing     Description: Goals to be completed by: 9/8/23    Pt will perform sup<>sit transfers w/ minimum assistance  Pt will have sufficient dynamic balance to sit EOB while performing ADLs/therex w/ stand by assistance for 10 min  Pt will be able to stand up from EOB w/ moderate assistance using LRAD  Pt will ambulate 20 feet w/ maximal assistance using LRAD  Pt will be independent w/ HEP therex on BLE w/ good form and ROM   Pt will follow >50 % of single-step commands throughout treatment session                        Time Tracking:     PT Received On: 08/16/23  PT Start Time: 1442     PT Stop Time: 1452  PT Total Time (min): 10 min     Billable Minutes: Therapeutic Exercise 10    Treatment Type: Treatment  PT/PTA: PTA     Number of PTA visits since last PT visit: 1 08/16/2023
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
## 2023-08-16 NOTE — PLAN OF CARE
Problem: Cognitive Impairment (Stroke, Ischemic/Transient Ischemic Attack)  Goal: Optimal Cognitive Function  Outcome: Ongoing, Progressing     Problem: Respiratory Compromise (Stroke, Ischemic/Transient Ischemic Attack)  Goal: Effective Oxygenation and Ventilation  Outcome: Ongoing, Progressing   [facility] Care Plan    POC reviewed with Tera Griffiths and family at 0300. Pt unable to verbalize understanding. Questions and concerns addressed. No acute events overnight. Pt progressing toward goals. Will continue to monitor. See below and flowsheets for full assessment and VS info.           Is this a stroke patient? yes- Stroke booklet reviewed with patient and family, risk factors identified for patient and stroke booklet remains at bedside for ongoing education.     Neuro:  [place] Coma Scale  Best Eye Response: 4-->(E4) spontaneous  Best Motor Response: 5-->(M5) localizes pain  Best Verbal Response: 1-->(V1) none  [place] Coma Scale Score: 10  Assessment Qualifiers: patient not sedated/intubated  Pupil PERRLA: yes     24hr Temp:  [97.7 °F (36.5 °C)-98.3 °F (36.8 °C)]     CV:   Rhythm: paced rhythm  BP goals:   SBP < 180  MAP > 65    Resp:      Oxygen Concentration (%): 28    Plan: trach/PEG discussions    GI/:     Diet/Nutrition Received: NPO  Last Bowel Movement: 08/06/23  Voiding Characteristics: external catheter    Intake/Output Summary (Last 24 hours) at 8/16/2023 0539  Last data filed at 8/16/2023 0305  Gross per 24 hour   Intake 921.33 ml   Output 595 ml   Net 326.33 ml     Unmeasured Output  Urine Occurrence: 1  Stool Occurrence: 0  Emesis Occurrence: 0  Pad Count: 1    Labs/Accuchecks:  Recent Labs   Lab 08/16/23 0215   WBC 5.85   RBC 5.51   HGB 15.0   HCT 49.5         Recent Labs   Lab 08/16/23 0215   *   K 4.3   CO2 22*   *   BUN 26*   CREATININE 1.4   ALKPHOS 55   ALT 6*   AST 12   BILITOT 0.7      Recent Labs   Lab 08/16/23 0215   APTT 45.3*      Recent Labs   Lab  08/10/23  1830   TROPONINI 0.053*       Electrolytes: N/A - electrolytes WDL  Accuchecks: Q6H    Gtts:   dexmedeTOMIDine (Precedex) infusion (titrating) 0.5 mcg/kg/hr (08/16/23 0213)    heparin (porcine) in D5W 13 Units/kg/hr (08/15/23 1805)       LDA/Wounds:  Lines/Drains/Airways       Drain  Duration             Male External Urinary Catheter 08/09/23 1305 Large 6 days              Peripheral Intravenous Line  Duration                  Peripheral IV - Single Lumen 08/10/23 1847 20 G Left Antecubital 5 days         Peripheral IV - Single Lumen 08/13/23 1125 20 G Posterior;Right Hand 2 days         Peripheral IV - Single Lumen 08/14/23 22 G Left;Posterior Hand 2 days                  Wounds: No  Wound care consulted: No

## 2023-08-16 NOTE — CARE UPDATE
General Surgery Care Update    Patient planned for PEG versus lap assisted PEG on 8/17/23. Consent previously obtained. Patient remains HDS at this time.     - Please pause heparin gtt at midnight prior to procedure.   - Can continue ASA per rectum  - Will need to have EP check his cardiac device prior to anesthesia tomorrow.    Please call with questions or concerns.    Ele Flores MD  General Surgery, PGY-V  Pager: [phone]  8/16/2023 10:46 AM

## 2023-08-16 NOTE — ASSESSMENT & PLAN NOTE
- s/p DCCV (11/2022)  - Previously on apixaban  - Continue heparin gtt  - Remains rate controlled despite no access for metoprolol and amiodarone, restart after PEG placement tomorrow .

## 2023-08-16 NOTE — ASSESSMENT & PLAN NOTE
Echo from 8/8/2023    Left Ventricle: The left ventricle is moderately dilated. Increased   ventricular mass. Normal wall thickness. There is moderate eccentric   hypertrophy. Severe global hypokinesis present. Septal motion is   consistent with pacing. There is severely reduced systolic function with a   visually estimated ejection fraction of 15 - 20%. Ejection fraction by   visual approximation is 20%. Unable to assess diastolic function due to   arrhythmia.    Left Atrium: Left atrium is severely dilated. Radha 85mL/m2.    Right Ventricle: Mild right ventricular enlargement. Wall thickness is   normal. Right ventricle wall motion  is normal. Systolic function is   severely reduced. Pacemaker lead present in the ventricle.    Right Atrium: Right atrium is severely dilated.    Aortic Valve: There is mild aortic valve sclerosis. There is normal   leaflet mobility. There is no significant regurgitation.    Mitral Valve: There is bileaflet sclerosis. There is normal leaflet   mobility. There is mild regurgitation.    Tricuspid Valve: The tricuspid valve is structurally normal. There is   normal leaflet mobility. There is mild regurgitation.    Pulmonic Valve: The pulmonic valve is structurally normal. There is   normal leaflet mobility. There is no significant regurgitation.    Aorta: Aortic root is normal in size measuring 3.13 cm. Ascending aorta   is normal measuring 3.24 cm.    IVC/SVC: Normal venous pressure at 3 mmHg.    Pericardium: The pericardium is normal. There is no pericardial   effusion.  - Initially admitted for CHF exacerbation and c/o CP  - BNP elevated on admission - 1956   - Weigh patient QD   - 1.5L fluid restriction- currently no enteral access, remains net negative throughout admission    - Strict I/Os Q1H  - GDMT   - Holding Lasix 80mg BID IV due to EMERSON, continue to reevaluate daily    - Imdur 90mg QD; no access   - Metoprolol 75mg QD; no access   - Entresto 49-51 QD; no access  -  Hold home Jardiance  - Cardiology signed-off

## 2023-08-16 NOTE — ANESTHESIA PREPROCEDURE EVALUATION
[facility]  Anesthesia Pre-Operative Evaluation         Patient Name: Tera Griffiths  YOB: 1965  MRN: [id number]    SUBJECTIVE:     Pre-operative evaluation for Procedure(s) (LRB):  EGD, WITH PEG TUBE INSERTION (N/A)     08/16/2023    Tera Griffiths is a 58 y.o. male w/ a significant PMHx of CHFrEF (10%, DD, pHTN), CAD (h/o STEMI, s/p PCI to Rcx-08/2021), AICD placement (12/2021), persistently elevated troponin, pAF (s/p DCCV), HTN, CKD3b (baseline Cr 1.7) [place] [facility] [place]F exacerbation. At 23:06 on 8/07, a Code Stroke was called as patient was found lying half out of bed with RFD and not following commands. CTH without acute abnormality. CTA showed L MCA occlusion. No TNK was administered as patient was already taking Eliquis for pAF. Transferred to NCC Unit for closer neurological monitoring. Patient was then taken Class A to IR where no R ICA or MCA stenosis or occlusion was found.     PAPER CONSENT IN CHART    Patient now presents for the above procedure(s).      LDA:        Peripheral IV - Single Lumen 08/10/23 1847 20 G Left Antecubital (Active)   Site Assessment Clean;Dry;Intact 08/16/23 0505   Extremity Assessment Distal to IV No abnormal discoloration;No redness;No swelling;No warmth 08/16/23 0505   Line Status Flushed;Saline locked 08/16/23 0505   Dressing Status Clean;Dry;Intact 08/16/23 0505   Dressing Intervention Integrity maintained 08/16/23 0505   Dressing Change Due 08/14/23 08/16/23 0505   Site Change Due 08/14/23 08/15/23 0705   Reason Not Rotated Not due 08/16/23 0505   Number of days: 5            Peripheral IV - Single Lumen 08/13/23 1125 20 G Posterior;Right Hand (Active)   Site Assessment Clean;Dry;Intact 08/16/23 0505   Extremity Assessment Distal to IV No abnormal discoloration;No redness;No swelling;No warmth 08/16/23 0505   Line Status Flushed;Saline locked 08/16/23 0505   Dressing Status Clean;Dry;Intact 08/16/23 6225   Dressing  Intervention Integrity maintained 08/16/23 0505   Dressing Change Due 08/17/23 08/16/23 0505   Site Change Due 08/17/23 08/15/23 1905   Reason Not Rotated Not due 08/16/23 0505   Number of days: 2            Peripheral IV - Single Lumen 08/14/23 22 G Left;Posterior Hand (Active)   Site Assessment Clean;Dry;Intact 08/16/23 0505   Extremity Assessment Distal to IV No abnormal discoloration;No redness;No swelling;No warmth 08/16/23 0505   Line Status Flushed;Saline locked 08/16/23 0505   Dressing Status Clean;Dry;Intact 08/16/23 0505   Dressing Intervention Integrity maintained 08/16/23 0505   Dressing Change Due 08/18/23 08/16/23 0505   Site Change Due 08/18/23 08/15/23 1905   Reason Not Rotated Not due 08/16/23 0505   Number of days: 2       Male External Urinary Catheter 08/09/23 1305 Large (Active)   Collection Container Urimeter 08/16/23 0505   Securement Method secured to top of thigh w/ adhesive device 08/16/23 0505   Skin no redness;no breakdown 08/16/23 0505   Tolerance no signs/symptoms of discomfort 08/16/23 0505   Output (mL) 75 mL 08/16/23 0605   Catheter Change Date 08/12/23 08/16/23 0505   Catheter Change Time 1455 08/14/23 1701   Number of days: 6       Prev airway: Induction:  Rapid sequence induction    Intubated:  Postinduction    Mask Ventilation:  N/a    Attempts:  1    Attempted By:  CRNA    Method of Intubation:  Video laryngoscopy    Blade:  Villalba 3    Laryngeal View Grade: Grade I - full view of cords      Drips:    dexmedeTOMIDine (Precedex) infusion (titrating) 0.4 mcg/kg/hr (08/16/23 0653)    heparin (porcine) in D5W 13 Units/kg/hr (08/16/23 0653)       Patient Active Problem List   Diagnosis    CAD (coronary artery disease)    History of ST elevation myocardial infarction (STEMI)    Paroxysmal A-fib    Acute on chronic combined systolic and diastolic heart failure    Dyslipidemia    ICD (implantable cardioverter-defibrillator) in place    Primary hypertension    Arteriovenous  malformation of cerebral vessels    NSTEMI (non-ST elevated myocardial infarction)    Stage 3 chronic kidney disease    Ischemic cardiomyopathy    Hepatitis B core antibody positive    Embolic stroke involving left middle cerebral artery    BPH (benign prostatic hyperplasia)    Multiple subsegmental pulmonary emboli without acute cor pulmonale    Hemiparesis, right    Global aphasia    Acute pulmonary embolism without acute cor pulmonale    EMERSON (acute kidney injury)    Dysphagia       Review of patient's allergies indicates:  No Known Allergies    Current Inpatient Medications:   acetylcysteine 100 mg/ml (10%)  4 mL Nebulization TID WAKE    albuterol-ipratropium  3 mL Nebulization TID WAKE    amiodarone  200 mg Per NG tube BID    aspirin  300 mg Rectal Daily    atorvastatin  80 mg Per NG tube Daily    ezetimibe  10 mg Per NG tube QHS    ferrous sulfate  1 tablet Per NG tube Daily    metoprolol tartrate  25 mg Per NG tube BID    mupirocin   Nasal BID    polyethylene glycol  17 g Per NG tube Daily    senna-docusate 8.6-50 mg  1 tablet Per NG tube BID       No current facility-administered medications on file prior to encounter.     Current Outpatient Medications on File Prior to Encounter   Medication Sig Dispense Refill    amiodarone (PACERONE) 200 MG Tab Take 1 tablet (200 mg total) by mouth 2 (two) times daily. 60 tablet 3    apixaban (ELIQUIS) 5 mg Tab Take 5 mg by mouth 2 (two) times daily.      atorvastatin (LIPITOR) 80 MG tablet Take 1 tablet (80 mg total) by mouth once daily. 30 tablet 11    clopidogreL (PLAVIX) 75 mg tablet Take 1 tablet (75 mg total) by mouth once daily. 30 tablet 11    empagliflozin (JARDIANCE) 10 mg tablet Take 1 tablet (10 mg total) by mouth once daily. 30 tablet 1    ezetimibe (ZETIA) 10 mg tablet Take 10 mg by mouth once daily.      ferrous sulfate (FEOSOL) 325 mg (65 mg iron) Tab tablet Take 325 mg by mouth every other day.      LIDOcaine (LIDODERM) 5 %  Place 1 patch onto the skin once daily.      metoprolol succinate (TOPROL-XL) 25 MG 24 hr tablet Take 1 tablet (25 mg total) by mouth every evening. 30 tablet 11    oxyCODONE-acetaminophen (PERCOCET) 5-325 mg per tablet Take 1 tablet by mouth every 6 (six) hours as needed for Pain. 10 each 0    sacubitriL-valsartan (ENTRESTO) 24-26 mg per tablet Take 1 tablet by mouth 2 (two) times daily. 60 tablet 11    torsemide (DEMADEX) 20 MG Tab Take 1 tablet (20 mg total) by mouth once daily. 30 tablet 11       Past Surgical History:   Procedure Laterality Date    CARDIAC DEFIBRILLATOR PLACEMENT Left     CEREBRAL ANGIOGRAM N/A 8/8/2023    Procedure: ANGIOGRAM-CEREBRAL;  Surgeon: Kenzie Rodriguez;  Location: [facility];  Service: Anesthesiology;  Laterality: N/A;  LVO (angiogram)    ESOPHAGOGASTRODUODENOSCOPY N/A 8/11/2023    Procedure: EGD (ESOPHAGOGASTRODUODENOSCOPY);  Surgeon: Colette Martinez MD;  [facility]  Service: Gastroenterology;  Laterality: N/A;    HERNIA REPAIR      LEFT HEART CATHETERIZATION Left 4/18/2023    Procedure: Left heart cath;  Surgeon: Atilio Marks MD;  Location: [facility] CATH LAB;  Service: Cardiology;  Laterality: Left;    RIGHT HEART CATHETERIZATION Right 9/30/2022    Procedure: INSERTION, CATHETER, RIGHT HEART;  Surgeon: aCden Aden MD;  Location: [facility] CATH LAB;  Service: Cardiology;  Laterality: Right;    TREATMENT OF CARDIAC ARRHYTHMIA N/A 11/22/2022    Procedure: CARDIOVERSION;  Surgeon: Marvin Sanon MD;  Location: [facility] EP LAB;  Service: Cardiology;  Laterality: N/A;  afib, KIA, DCCV, anes, MB, 3 Prep           OBJECTIVE:     Vital Signs Range (Last 24H):  Temp:  [36.5 °C (97.7 °F)-36.8 °C (98.3 °F)]   Pulse:  [58-78]   Resp:  [10-26]   BP: (113-171)/()   SpO2:  [90 %-100 %]       Significant Labs:  Lab Results   Component Value Date    WBC 5.85 08/16/2023    HGB 15.0 08/16/2023    HCT 49.5 08/16/2023     08/16/2023    CHOL 166 08/08/2023     TRIG 93 08/08/2023    HDL 46 08/08/2023    ALT 6 (L) 08/16/2023    AST 12 08/16/2023     (H) 08/16/2023    K 4.3 08/16/2023     (H) 08/16/2023    CREATININE 1.4 08/16/2023    BUN 26 (H) 08/16/2023    CO2 22 (L) 08/16/2023    TSH 1.134 08/08/2023    PSA 0.75 01/23/2023    INR 1.1 08/08/2023    HGBA1C 5.6 08/05/2023       Diagnostic Studies: No relevant studies.    EKG:   Results for orders placed or performed during the hospital encounter of 08/05/23   EKG 12-lead    Collection Time: 08/14/23  4:15 AM    Narrative    Test Reason :     Vent. Rate : 069 BPM     Atrial Rate : 357 BPM     P-R Int : 000 ms          QRS Dur : 146 ms      QT Int : 498 ms       P-R-T Axes : 000 038 230 degrees     QTc Int : 533 ms    Atrial fibrillation with premature ventricular or aberrantly conducted  complexes and with ventricular escape complexes  Left bundle branch block  Abnormal ECG  When compared with ECG of 10-AUG-2023 14:52,  Sinus rhythm is now with ventricular escape complexes  T wave inversion more evident in Inferior leads  Confirmed by LILY RAY MD ([id number]) on 8/14/2023 1:03:18 PM    Referred By: [email or web address]   SELF           Confirmed By:LILY RAY MD       2D ECHO:  TTE:  Results for orders placed or performed during the hospital encounter of 08/05/23   Echo Saline Bubble? Yes   Result Value Ref Range    Ascending aorta 3.24 cm    STJ 2.83 cm    AV mean gradient 6 mmHg    Ao peak benton 1.40 m/s    Ao VTI 24.63 cm    IVS 0.89 0.6 - 1.1 cm    LA size 5.71 cm    Left Atrium Major Axis 7.0 cm    Left Atrium Minor Axis 7.04 cm    LVIDd 6.57 (A) 3.5 - 6.0 cm    LVIDs 5.87 (A) 2.1 - 4.0 cm    LVOT diameter 2.11 cm    LVOT peak VTI 5.55 cm    Posterior Wall 1.05 0.6 - 1.1 cm    RA Major Axis 8.03 cm    RA Width 5.27 cm    RVDD 4.20 cm    Sinus 3.13 cm    LA WIDTH 5.80 cm    LV Diastolic Volume 221.30 mL    LV Systolic Volume 171.01 mL    LVOT peak benton 0.41 m/s    TDI LATERAL 0.06 m/s    TDI SEPTAL 0.05 m/s    FS 11 %     LA volume 197.61 cm3    LV mass 277.41 g    ZLVIDD 0.31     ZLVIDS 3.24     Left Ventricle Relative Wall Thickness 0.32 cm    AV valve area 0.79 cm²    AV Velocity Ratio 0.29     AV index (prosthetic) 0.23     Mean e' 0.06 m/s    LVOT area 3.5 cm2    LVOT stroke volume 19.40 cm3    AV peak gradient 8 mmHg    LV Systolic Volume Index 82.2 mL/m2    LV Diastolic Volume Index 106.39 mL/m2    LA Volume Index 95.0 mL/m2    LV Mass Index 133 g/m2    KARIN by Velocity Ratio 1.02 cm²    BSA 2.07 m2    Est. RA pres 3 mmHg    EF 20 %    RV-vieira basal d 4.2 cm    Narrative      Left Ventricle: The left ventricle is moderately dilated. Increased   ventricular mass. Normal wall thickness. There is moderate eccentric   hypertrophy. Severe global hypokinesis present. Septal motion is   consistent with pacing. There is severely reduced systolic function with a   visually estimated ejection fraction of 15 - 20%. Ejection fraction by   visual approximation is 20%. Unable to assess diastolic function due to   arrhythmia.    Left Atrium: Left atrium is severely dilated. Radha 85mL/m2.    Right Ventricle: Mild right ventricular enlargement. Wall thickness is   normal. Right ventricle wall motion  is normal. Systolic function is   severely reduced. Pacemaker lead present in the ventricle.    Right Atrium: Right atrium is severely dilated.    Aortic Valve: There is mild aortic valve sclerosis. There is normal   leaflet mobility. There is no significant regurgitation.    Mitral Valve: There is bileaflet sclerosis. There is normal leaflet   mobility. There is mild regurgitation.    Tricuspid Valve: The tricuspid valve is structurally normal. There is   normal leaflet mobility. There is mild regurgitation.    Pulmonic Valve: The pulmonic valve is structurally normal. There is   normal leaflet mobility. There is no significant regurgitation.    Aorta: Aortic root is normal in size measuring 3.13 cm. Ascending aorta   is normal  measuring 3.24 cm.    IVC/SVC: Normal venous pressure at 3 mmHg.    Pericardium: The pericardium is normal. There is no pericardial   effusion.         KIA:  Results for orders placed or performed during the hospital encounter of 11/22/22   Transesophageal echo (KIA)   Result Value Ref Range    BSA 2.04 m2    EF 10 %    Narrative    · This was a limited study prior to DCCV.  · Normal appearing left atrial appendage. No thrombus is present in the   appendage. Low normal to slightly reduced appendage velocities.  · The left ventricle is mildly enlarged with severely decreased systolic   function.  · The estimated ejection fraction is 10%.  · There is severe left ventricular global hypokinesis.  · Severe left atrial enlargement.  · Moderate right ventricular enlargement with moderately to severely   reduced right ventricular systolic function.  · Severe right atrial enlargement.  · Mild-to-moderate mitral regurgitation.  · Mild tricuspid regurgitation.  · No plaque present.          ASSESSMENT/PLAN:       Pre-op Assessment    I have reviewed the Patient Summary Reports.     I have reviewed the Nursing Notes. I have reviewed the NPO Status.   I have reviewed the Medications.     Review of Systems  Anesthesia Hx:  No problems with previous Anesthesia  History of prior surgery of interest to airway management or planning: Previous anesthesia: General Denies Family Hx of Anesthesia complications.   Denies Personal Hx of Anesthesia complications.   Hematology/Oncology:  Hematology Normal   Oncology Normal     EENT/Dental:EENT/Dental Normal   Cardiovascular:   Hypertension Past MI CAD  Dysrhythmias atrial fibrillation    Pulmonary:  Pulmonary Normal    Renal/:   Chronic Renal Disease, CKD    Hepatic/GI:  Hepatic/GI Normal    Musculoskeletal:  Musculoskeletal Normal    Neurological:   CVA    Endocrine:  Endocrine Normal    Dermatological:  Skin Normal    Psych:  Psychiatric Normal           Physical Exam  General:  Somnolent  Sedated  Airway:  Mallampati: III / II  Mouth Opening: Normal  TM Distance: Normal  Tongue: Normal  Neck ROM: Normal ROM    Dental:  Periodontal disease        Anesthesia Plan  Type of Anesthesia, risks & benefits discussed:    Anesthesia Type: Gen ETT, Gen Natural Airway  Intra-op Monitoring Plan: Standard ASA Monitors  Post Op Pain Control Plan: multimodal analgesia and IV/PO Opioids PRN  Induction:  IV  Airway Plan: Direct, Post-Induction  Informed Consent: Informed consent signed with the Patient and all parties understand the risks and agree with anesthesia plan.  All questions answered.   ASA Score: 4  Day of Surgery Review of History & Physical: H&P Update referred to the surgeon/provider.    Ready For Surgery From Anesthesia Perspective.     .

## 2023-08-16 NOTE — SUBJECTIVE & OBJECTIVE
Neurologic Chief Complaint: L MCA syndrome    Subjective:     Interval History: Patient is seen for follow-up neurological assessment and treatment recommendations:     Patient pending PEG placement tomorrow. Remains on precedex and heparin gtt. Exam stable.     HPI, Past Medical, Family, and Social History remains the same as documented in the initial encounter.     Review of Systems   Unable to perform ROS: Other (sedated)     Scheduled Meds:   acetylcysteine 100 mg/ml (10%)  4 mL Nebulization TID WAKE    albuterol-ipratropium  3 mL Nebulization TID WAKE    amiodarone  200 mg Per NG tube BID    aspirin  300 mg Rectal Daily    atorvastatin  80 mg Per NG tube Daily    ezetimibe  10 mg Per NG tube QHS    ferrous sulfate  1 tablet Per NG tube Daily    metoprolol tartrate  25 mg Per NG tube BID    mupirocin   Nasal BID    polyethylene glycol  17 g Per NG tube Daily    senna-docusate 8.6-50 mg  1 tablet Per NG tube BID     Continuous Infusions:   dexmedeTOMIDine (Precedex) infusion (titrating) 0.4 mcg/kg/hr (08/16/23 0653)    heparin (porcine) in D5W 13 Units/kg/hr (08/16/23 0915)     PRN Meds:acetaminophen, dextrose 10%, dextrose 10%, glucagon (human recombinant), hydrALAZINE, insulin aspart U-100, labetalol, magnesium sulfate IVPB, magnesium sulfate IVPB, metoprolol, nitroGLYCERIN, ondansetron, potassium chloride **AND** potassium chloride **AND** potassium chloride, sodium chloride 0.9%, sodium chloride 0.9%, sodium chloride 0.9%, sodium phosphate 15 mmol in dextrose 5 % (D5W) 250 mL IVPB, sodium phosphate 20.01 mmol in dextrose 5 % (D5W) 250 mL IVPB, sodium phosphate 30 mmol in dextrose 5 % (D5W) 250 mL IVPB    Objective:     Vital Signs (Most Recent):  Temp: 98.3 °F (36.8 °C) (08/16/23 0305)  Pulse: 71 (08/16/23 0807)  Resp: 20 (08/16/23 0807)  BP: (!) 143/98 (08/16/23 0807)  SpO2: (!) 91 % (08/16/23 0807)  BP Location: Right arm    Vital Signs Range (Last 24H):  Temp:  [97.7 °F (36.5 °C)-98.3 °F (36.8 °C)]  "  Pulse:  [58-78]   Resp:  [10-26]   BP: (113-171)/()   SpO2:  [90 %-100 %]   BP Location: Right arm       Physical Exam  Vitals and nursing note reviewed.   Constitutional:       General: He is not in acute distress.     Appearance: He is well-developed.      Comments: Sedated on precedex   HENT:      Head: Normocephalic and atraumatic.   Cardiovascular:      Rate and Rhythm: Normal rate.   Pulmonary:      Effort: Pulmonary effort is normal. No respiratory distress.   Abdominal:      General: There is no distension.   Skin:     General: Skin is warm and dry.   Neurological:      Comments: Aphasic, does not follow commands  L gaze              Neurological Exam:   LOC: drowsy  Attention Span: poor  Language: Global aphasia, not following commands  Articulation: Untestable due to severe aphasia   Orientation: Untestable due to severe aphasia   EOM (CN III, IV, VI): Gaze preference  left  Facial Movement (CN VII): Lower facial weakness on the Right  Motor: moves left spontaneous, right hemiparesis  Unable to assess drift and strength as patient does not follow commands, and gets agitated      Laboratory:  CMP:   Recent Labs   Lab 08/16/23 0215   CALCIUM 10.3   ALBUMIN 3.0*   PROT 7.5   *   K 4.3   CO2 22*   *   BUN 26*   CREATININE 1.4   ALKPHOS 55   ALT 6*   AST 12   BILITOT 0.7       CBC:   Recent Labs   Lab 08/16/23 0215   WBC 5.85   RBC 5.51   HGB 15.0   HCT 49.5      MCV 90   MCH 27.2   MCHC 30.3*       Lipid Panel:   No results for input(s): "CHOL", "LDLCALC", "HDL", "TRIG" in the last 168 hours.    Coagulation:   Recent Labs   Lab 08/16/23 0215   APTT 45.3*       Platelet Aggregation Study: No results for input(s): "PLTAGG", "PLTAGINTERP", "PLTAGREGLACO", "ADPPLTAGGREG" in the last 168 hours.  Hgb A1C:   No results for input(s): "HGBA1C" in the last 168 hours.    TSH:   No results for input(s): "TSH" in the last 168 hours.      Diagnostic Results     Brain Imaging   CT Head " 8/10/23  Impression:     No detrimental change compared to prior.     Evolving acute infarcts within the left MCA and PCA territories.  No hemorrhagic conversion.  No significant mass effect     Suspected small subacute infarction within the right caudate.     Multiple areas remote infarctions as detailed above.      CT Head 8/8/23 1704  Impression:     1. Redemonstration of acute infarctions in the left MCA and PCA territories.  No evidence to suggest hemorrhagic transformation.  2. Suspected small subacute infarction in the right caudate.  3. Numerous remote infarctions as detailed above.    CT Head 8/8/23 0826  Impression:     Moderate developing hypoattenuation left inferior frontal lobe and left occipital lobe concerning for developing recent infarcts post thrombectomy.     Allowing for scattered hyperdensity related to recently contrast administration for thrombectomy there is no definite acute intracranial hemorrhage.     Previous hypodensity right caudate no longer seen which may be related to contrast administration for thrombectomy and possible subacute age infarction.     Superimposed remote infarcts with moderate to large encephalomalacia right MCA territory unchanged    Vessel Imaging   IR Angio 8/8/23  Preliminary Interpretation:   1.    No evidence of left ICA or MCA stenosis. No large or medium vessel occlusion.    CTA Multiphase 8/7/23  Impression:     CT head:     New right caudate nucleus hypodensity, likely representing age-indeterminate infarct.  Could be further evaluated with MRI.     Multifocal encephalomalacia involving the right frontal, temporoparietal, and left occipital lobes.     CTA head and neck:     Acute occlusion of the superior left M2 segment.     Left PCA occlusion of undetermined age.     Filling defect in the right main pulmonary artery, concerning for acute pulmonary thromboembolism.  Consider follow-up pulmonary CTA to more fully assess the extent of thromboemboli, the clot  burden, and the presence or absence of right heart strain.     Not fully detailed above:     - Incomplete opacification of the left atrial appendage, suspicious for an intraluminal thrombus.  Follow-up echocardiogram, or cardiac MRI or CTA, recommended.     - The presence of acute thrombi or thromboemboli in both the systemic arterial and pulmonary arterial circulation raises concern for the possibility of underlying intracardiac or extracardiac right-to-left shunt, and/or a hypercoagulable state.  Correlate clinically.     - Incompletely visualized, but possibly large, pericardial effusion.  Artifacts compromise accurate assessment of its attenuation.    Cardiac Imaging   TTE with bubble 8/8/23    Left Ventricle: The left ventricle is moderately dilated. Increased ventricular mass. Normal wall thickness. There is moderate eccentric hypertrophy. Severe global hypokinesis present. Septal motion is consistent with pacing. There is severely reduced systolic function with a visually estimated ejection fraction of 15 - 20%. Ejection fraction by visual approximation is 20%. Unable to assess diastolic function due to arrhythmia.    Left Atrium: Left atrium is severely dilated. Radha 85mL/m2.    Right Ventricle: Mild right ventricular enlargement. Wall thickness is normal. Right ventricle wall motion  is normal. Systolic function is severely reduced. Pacemaker lead present in the ventricle.    Right Atrium: Right atrium is severely dilated.    Aortic Valve: There is mild aortic valve sclerosis. There is normal leaflet mobility. There is no significant regurgitation.    Mitral Valve: There is bileaflet sclerosis. There is normal leaflet mobility. There is mild regurgitation.    Tricuspid Valve: The tricuspid valve is structurally normal. There is normal leaflet mobility. There is mild regurgitation.    Pulmonic Valve: The pulmonic valve is structurally normal. There is normal leaflet mobility. There is no significant  regurgitation.    Aorta: Aortic root is normal in size measuring 3.13 cm. Ascending aorta is normal measuring 3.24 cm.    IVC/SVC: Normal venous pressure at 3 mmHg.    Pericardium: The pericardium is normal. There is no pericardial effusion.

## 2023-08-16 NOTE — PLAN OF CARE
[facility] Care Plan    POC reviewed with Tera Griffiths and family at 1400. Pt verbalized understanding. Questions and concerns addressed. No acute events today. Pt progressing toward goals. Will continue to monitor. See below and flowsheets for full assessment and VS info.             Is this a stroke patient? yes- Stroke booklet reviewed with patient and family, risk factors identified for patient and stroke booklet remains at bedside for ongoing education.     Neuro:  Stanton Coma Scale  Best Eye Response: 4-->(E4) spontaneous  Best Motor Response: 5-->(M5) localizes pain  Best Verbal Response: 1-->(V1) none  Stanton Coma Scale Score: 10  Assessment Qualifiers: patient not sedated/intubated  Pupil PERRLA: yes     24 hr Temp:  [97.9 °F (36.6 °C)-98.3 °F (36.8 °C)]     CV:   Rhythm: paced rhythm  BP goals:   SBP < 180  MAP > 65    Resp:      Oxygen Concentration (%): 28    Plan: N/A    GI/:     Diet/Nutrition Received: NPO  Last Bowel Movement: 08/15/23  Voiding Characteristics: external catheter    Intake/Output Summary (Last 24 hours) at 8/16/2023 1824  Last data filed at 8/16/2023 1805  Gross per 24 hour   Intake 436.44 ml   Output 625 ml   Net -188.56 ml     Unmeasured Output  Urine Occurrence: 1  Stool Occurrence: 0  Emesis Occurrence: 0  Pad Count: 1    Labs/Accuchecks:  Recent Labs   Lab 08/16/23 0215   WBC 5.85   RBC 5.51   HGB 15.0   HCT 49.5         Recent Labs   Lab 08/16/23 0215   *   K 4.3   CO2 22*   *   BUN 26*   CREATININE 1.4   ALKPHOS 55   ALT 6*   AST 12   BILITOT 0.7      Recent Labs   Lab 08/16/23 0215   APTT 45.3*      Recent Labs   Lab 08/10/23  1830   TROPONINI 0.053*       Electrolytes: N/A - electrolytes WDL  Accuchecks: Q6H    Gtts:   dexmedeTOMIDine (Precedex) infusion (titrating) 0.2 mcg/kg/hr (08/16/23 1805)    heparin (porcine) in D5W 13 Units/kg/hr (08/16/23 1805)       LDA/Wounds:  Lines/Drains/Airways       Drain  Duration             Male External Urinary  Catheter 08/09/23 1305 Large 7 days              Peripheral Intravenous Line  Duration                  Peripheral IV - Single Lumen 08/10/23 1847 20 G Left Antecubital 5 days         Peripheral IV - Single Lumen 08/13/23 1125 20 G Posterior;Right Hand 3 days         Peripheral IV - Single Lumen 08/14/23 22 G Left;Posterior Hand 2 days                  Wounds: No  Wound care consulted: No

## 2023-08-16 NOTE — ASSESSMENT & PLAN NOTE
SLP following  GI team consulted for PEG tube, unable to place on 8/11, NGT placed instead  NGT now flushing through side port, IR consulted for PEG placement  Once obtain enteral access- will restart medications, transition Heparin gtt to oral AC, and start nutrition  8/16/2023 Plan for G-tube on Thursday, Holding heparin on Wednesday at [facility]

## 2023-08-16 NOTE — ASSESSMENT & PLAN NOTE
Baseline Cr 1.7  - Stable  - Strict I/O's  - Avoid nephrotoxic agents where appropriate  - Renally-dose meds

## 2023-08-16 NOTE — ASSESSMENT & PLAN NOTE
- PMHx of multiple subsegmental PEs on CTAs 9/2021 & 12/2021  - 8/8: CTA Evidence of pulmonary embolism involving the right distal interlobar artery and the segmental/subsegmental bilateral posterior pulmonary arteries without heart strain.   - Continue heparin gtt, consider transition to OAC once PEG is in place   - Tolerating room air

## 2023-08-16 NOTE — CONSULTS
Inpatient consult to Physical Medicine Rehab  Consult performed by: Carola Abarca NP  Consult ordered by: Viet Irwin MD      Consult received.  Reviewed patient history and current admission.  PM&R following. Will follow up with pt once medically stable and able to participate with therapies.     Carola Abarca NP  Physical Medicine & Rehabilitation   08/16/2023

## 2023-08-16 NOTE — PLAN OF CARE
08/16/23 0935   Post-Acute Status   Post-Acute Authorization Placement   Coverage Medicaid   Discharge Delays None known at this time   Discharge Plan   Discharge Plan A Rehab     Update for Rehab Placement:  1.[facility] Phone: [phone]- interested, but need more information  Comments:Jerry will following  2. [facility] Phone: [phone]  - Referral Received Comments: Saw the patient at bedside, but patient was unable to discuss future care. Will reach out to family this afternoon. [facility] [place] [phone]   3.[facility] Phone: [phone]- no response     SW will continue to follow.    9:44 AM   SW placed PMR FOR REHAB PLACEMENT        4:42 PM   SW placed referral to [facility] for Pt.        Asmita Urena LMSW  PRN - [facility]  [id number]
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
## 2023-08-16 NOTE — ASSESSMENT & PLAN NOTE
- [employer]  - PMHx cardiac arrest 2/2 V-Tach  8/16/2023 Ordered for pacer interrogation prior to surgery.
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
## 2023-08-16 NOTE — PT/OT/SLP PROGRESS
Speech Language Pathology Treatment    Patient Name:  Tera Griffiths   MRN:  [id number]  Admitting Diagnosis: Embolic stroke involving left middle cerebral artery    Recommendations:                 General Recommendations:  Dysphagia therapy, Speech/language therapy, and Cognitive-linguistic therapy  Diet recommendations:  NPO, Liquid Diet Level: NPO   Aspiration Precautions: Frequent oral care and Strict aspiration precautions   General Precautions: Standard, aspiration, aphasia, fall, NPO  Communication strategies:  provide increased time to answer and go to room if call light pushed    Assessment:     Tera Griffiths is a 58 y.o. male with an SLP diagnosis of Aphasia and Dysphagia.      Subjective     SLP reviewed Pt with RN pre/post session  Pt presents easily distracted   SLP unable to elicit vocalization     Pain/Comfort:  Pain Rating 1: other (see comments) (unable to assess 2/2  underlying Aphasia)  Pain Addressed 1: Nurse notified    Respiratory Status: Room air    Objective:     Has the patient been evaluated by SLP for swallowing?   Yes  Keep patient NPO? Yes   Current Respiratory Status:   Room air     Pt found awake in bed with restraints and soft L mitten in place. Sibling at the bedside at start of session then exited room as session continued. Pt did not follow commands provided max cues.  Pt did not respond to yes/no questions via head nod, verbal or gesture response provided max cues. SLP unable to elicit vocalization via spontaneous, automatic or responsive speech tasks provided max cues. Pt with frequent movement, looked around the room often and required continues cues to redirect to structured tasks.  He was educated on compensatory strategies for communication; however, did not demonstrate or verbalize understanding secondary to underlying Aphasia.  PO trials deferred due to increased restlessness as session continued and aspiration risk. RN notified of findings upon SLP exit.     Goals:    Multidisciplinary Problems       SLP Goals          Problem: SLP    Goal Priority Disciplines Outcome   SLP Goal     SLP Ongoing, Progressing   Description: Speech Language Pathology Goals  Goals expected to be met by 8/15  1. Pt will participate in ongoing assessment of swallow.   2. Pt will answer simple y/n questions with 60% accy given max cues.   3. Pt will follow simple commands with 50% accy given max cues.   4. Pt will vocalize in response to any stim x5/session given max cues.   5. Pt will localize to stim on R x2/session given max cues.                              Plan:     Patient to be seen:  4 x/week   Plan of Care expires:  09/07/23  Plan of Care reviewed with:  patient, sibling   SLP Follow-Up:  Yes       Discharge recommendations:  nursing facility, skilled   Barriers to Discharge:   NPO    Time Tracking:     SLP Treatment Date:   08/16/23  Speech Start Time:  1125  Speech Stop Time:  1135     Speech Total Time (min):  10 min    Billable Minutes: Speech Therapy Individual 10    08/16/2023

## 2023-08-16 NOTE — PT/OT/SLP PROGRESS
Occupational Therapy   Treatment    Name: Tera Griffiths  MRN: [id number]  Admitting Diagnosis:  Embolic stroke involving left middle cerebral artery  5 Days Post-Op    Recommendations:     Discharge Recommendations: nursing facility, skilled  Discharge Equipment Recommendations:  to be determined by next level of care  Barriers to discharge:  Other (Comment) (Increased A needed)    Assessment:     Tera Griffiths is a 58 y.o. male with a medical diagnosis of Embolic stroke involving left middle cerebral artery.  He presents with decreased functional status due to medical diagnosis. Performance deficits affecting function are weakness, impaired endurance, impaired self care skills, decreased lower extremity function, decreased ROM, decreased upper extremity function, decreased coordination, gait instability, impaired coordination, impaired cognition, decreased safety awareness, impaired functional mobility.     Rehab Prognosis:  Fair; patient would benefit from acute skilled OT services to address these deficits and reach maximum level of function.       Plan:     Patient to be seen 3 x/week to address the above listed problems via self-care/home management, therapeutic activities, therapeutic exercises, neuromuscular re-education  Plan of Care Expires: 09/11/23  Plan of Care Reviewed with: patient, sibling    Subjective     Chief Complaint: Patient unable to verbalize   Patient/Family Comments/goals: Gain functional status     Objective:     Communicated with: RN prior to session.  Patient found supine with restraints, peripheral IV upon OT entry to room.    General Precautions: Standard, aphasia, aspiration, fall, NPO    Orthopedic Precautions:N/A  Braces: N/A  Respiratory Status: Room air     Occupational Performance:     Bed Mobility:    Patient completed Rolling/Turning to Right with maximal assistance and 2 persons  Patient completed Scooting/Bridging with maximal assistance and 2 persons  Patient completed  Supine to Sit with maximal assistance and 2 persons  Patient completed Sit to Supine with maximal assistance and 2 persons     Functional Mobility/Transfers:  Not completed at this time     Activities of Daily Living:  Grooming: maximal assistance to complete facial grooming EOB      AMPAC 6 Click ADL: 6    Treatment & Education:  Patient needing significant cueing this session to complete therapeutic activities with OT. Patient with L side gaze; OT attempted to elicit response to pt right by providing tactile/visual/verbal cueing but patient with no significant response at this time. Patient not following directions at this time. OT performed AAROM with BUE in all planes.     Patient left supine with all lines intact, call button in reach, and RN present    GOALS:   Multidisciplinary Problems       Occupational Therapy Goals          Problem: Occupational Therapy    Goal Priority Disciplines Outcome Interventions   Occupational Therapy Goal     OT, PT/OT Ongoing, Progressing    Description: Goals to be met by: 9/8/23     Patient will increase functional independence with ADLs by performing:    UE Dressing with Moderate Assistance.  LE Dressing with Moderate Assistance.  Grooming while standing with Moderate Assistance.  Toileting from toilet with Moderate Assistance for hygiene and clothing management.                          Time Tracking:     OT Date of Treatment: 08/16/23  OT Start Time: 1414  OT Stop Time: 1437  OT Total Time (min): 23 min    Billable Minutes:Neuromuscular Re-education 23 minutes     OT/ANDREA: OT          8/16/2023

## 2023-08-17 ENCOUNTER — ANESTHESIA (OUTPATIENT)
Dept: SURGERY | Facility: HOSPITAL | Age: 58
DRG: 280 | End: 2023-08-17
Payer: MEDICAID

## 2023-08-17 LAB
ABO + RH BLD: NORMAL
ALBUMIN SERPL BCP-MCNC: 3.1 G/DL (ref 3.5–5.2)
ALP SERPL-CCNC: 64 U/L (ref 55–135)
ALT SERPL W/O P-5'-P-CCNC: 9 U/L (ref 10–44)
ANION GAP SERPL CALC-SCNC: 13 MMOL/L (ref 8–16)
ANISOCYTOSIS BLD QL SMEAR: SLIGHT
APTT PPP: 21.3 SEC (ref 21–32)
APTT PPP: 22.1 SEC (ref 21–32)
APTT PPP: 36.8 SEC (ref 21–32)
AST SERPL-CCNC: 17 U/L (ref 10–40)
BASOPHILS # BLD AUTO: 0.01 K/UL (ref 0–0.2)
BASOPHILS # BLD AUTO: 0.03 K/UL (ref 0–0.2)
BASOPHILS NFR BLD: 0.1 % (ref 0–1.9)
BASOPHILS NFR BLD: 0.5 % (ref 0–1.9)
BILIRUB SERPL-MCNC: 0.6 MG/DL (ref 0.1–1)
BLD GP AB SCN CELLS X3 SERPL QL: NORMAL
BUN SERPL-MCNC: 28 MG/DL (ref 6–20)
CALCIUM SERPL-MCNC: 10.6 MG/DL (ref 8.7–10.5)
CHLORIDE SERPL-SCNC: 119 MMOL/L (ref 95–110)
CO2 SERPL-SCNC: 18 MMOL/L (ref 23–29)
CREAT SERPL-MCNC: 1.3 MG/DL (ref 0.5–1.4)
DIFFERENTIAL METHOD: ABNORMAL
DIFFERENTIAL METHOD: ABNORMAL
EOSINOPHIL # BLD AUTO: 0 K/UL (ref 0–0.5)
EOSINOPHIL # BLD AUTO: 0 K/UL (ref 0–0.5)
EOSINOPHIL NFR BLD: 0.2 % (ref 0–8)
EOSINOPHIL NFR BLD: 0.3 % (ref 0–8)
ERYTHROCYTE [DISTWIDTH] IN BLOOD BY AUTOMATED COUNT: 15.3 % (ref 11.5–14.5)
ERYTHROCYTE [DISTWIDTH] IN BLOOD BY AUTOMATED COUNT: 16.9 % (ref 11.5–14.5)
EST. GFR  (NO RACE VARIABLE): >60 ML/MIN/1.73 M^2
GLUCOSE SERPL-MCNC: 100 MG/DL (ref 70–110)
HCT VFR BLD AUTO: 49.5 % (ref 40–54)
HCT VFR BLD AUTO: 56.4 % (ref 40–54)
HGB BLD-MCNC: 15.3 G/DL (ref 14–18)
HGB BLD-MCNC: 17.4 G/DL (ref 14–18)
IMM GRANULOCYTES # BLD AUTO: 0.04 K/UL (ref 0–0.04)
IMM GRANULOCYTES # BLD AUTO: 0.04 K/UL (ref 0–0.04)
IMM GRANULOCYTES NFR BLD AUTO: 0.4 % (ref 0–0.5)
IMM GRANULOCYTES NFR BLD AUTO: 0.6 % (ref 0–0.5)
INR PPP: 1.2 (ref 0.8–1.2)
LYMPHOCYTES # BLD AUTO: 0.7 K/UL (ref 1–4.8)
LYMPHOCYTES # BLD AUTO: 1 K/UL (ref 1–4.8)
LYMPHOCYTES NFR BLD: 10.4 % (ref 18–48)
LYMPHOCYTES NFR BLD: 9.3 % (ref 18–48)
MAGNESIUM SERPL-MCNC: 2.1 MG/DL (ref 1.6–2.6)
MCH RBC QN AUTO: 26.7 PG (ref 27–31)
MCH RBC QN AUTO: 27 PG (ref 27–31)
MCHC RBC AUTO-ENTMCNC: 30.9 G/DL (ref 32–36)
MCHC RBC AUTO-ENTMCNC: 30.9 G/DL (ref 32–36)
MCV RBC AUTO: 86 FL (ref 82–98)
MCV RBC AUTO: 88 FL (ref 82–98)
MONOCYTES # BLD AUTO: 0.6 K/UL (ref 0.3–1)
MONOCYTES # BLD AUTO: 1.6 K/UL (ref 0.3–1)
MONOCYTES NFR BLD: 14.9 % (ref 4–15)
MONOCYTES NFR BLD: 8.6 % (ref 4–15)
NEUTROPHILS # BLD AUTO: 5.2 K/UL (ref 1.8–7.7)
NEUTROPHILS # BLD AUTO: 8.2 K/UL (ref 1.8–7.7)
NEUTROPHILS NFR BLD: 75.1 % (ref 38–73)
NEUTROPHILS NFR BLD: 79.6 % (ref 38–73)
NRBC BLD-RTO: 0 /100 WBC
NRBC BLD-RTO: 0 /100 WBC
PHOSPHATE SERPL-MCNC: 4.1 MG/DL (ref 2.7–4.5)
PLATELET # BLD AUTO: 347 K/UL (ref 150–450)
PLATELET # BLD AUTO: 363 K/UL (ref 150–450)
PLATELET BLD QL SMEAR: ABNORMAL
PMV BLD AUTO: 12.6 FL (ref 9.2–12.9)
PMV BLD AUTO: 12.7 FL (ref 9.2–12.9)
POCT GLUCOSE: 103 MG/DL (ref 70–110)
POCT GLUCOSE: 29 MG/DL (ref 70–110)
POCT GLUCOSE: 37 MG/DL (ref 70–110)
POCT GLUCOSE: >500 MG/DL (ref 70–110)
POTASSIUM SERPL-SCNC: 4.2 MMOL/L (ref 3.5–5.1)
PROT SERPL-MCNC: 7.9 G/DL (ref 6–8.4)
PROTHROMBIN TIME: 13 SEC (ref 9–12.5)
RBC # BLD AUTO: 5.73 M/UL (ref 4.6–6.2)
RBC # BLD AUTO: 6.44 M/UL (ref 4.6–6.2)
SODIUM SERPL-SCNC: 150 MMOL/L (ref 136–145)
SPECIMEN OUTDATE: NORMAL
WBC # BLD AUTO: 10.93 K/UL (ref 3.9–12.7)
WBC # BLD AUTO: 6.52 K/UL (ref 3.9–12.7)

## 2023-08-17 PROCEDURE — 85610 PROTHROMBIN TIME: CPT | Performed by: NURSE PRACTITIONER

## 2023-08-17 PROCEDURE — D9220A PRA ANESTHESIA: Mod: ANES,,, | Performed by: ANESTHESIOLOGY

## 2023-08-17 PROCEDURE — 85730 THROMBOPLASTIN TIME PARTIAL: CPT | Mod: 91 | Performed by: PSYCHIATRY & NEUROLOGY

## 2023-08-17 PROCEDURE — 93010 ELECTROCARDIOGRAM REPORT: CPT | Mod: ,,, | Performed by: INTERNAL MEDICINE

## 2023-08-17 PROCEDURE — 85730 THROMBOPLASTIN TIME PARTIAL: CPT | Mod: 91 | Performed by: NURSE PRACTITIONER

## 2023-08-17 PROCEDURE — D9220A PRA ANESTHESIA: ICD-10-PCS | Mod: ANES,,, | Performed by: ANESTHESIOLOGY

## 2023-08-17 PROCEDURE — 84100 ASSAY OF PHOSPHORUS: CPT | Performed by: PHYSICIAN ASSISTANT

## 2023-08-17 PROCEDURE — 99232 PR SUBSEQUENT HOSPITAL CARE,LEVL II: ICD-10-PCS | Mod: ,,, | Performed by: PSYCHIATRY & NEUROLOGY

## 2023-08-17 PROCEDURE — 99232 SBSQ HOSP IP/OBS MODERATE 35: CPT | Mod: ,,, | Performed by: PSYCHIATRY & NEUROLOGY

## 2023-08-17 PROCEDURE — 25000003 PHARM REV CODE 250: Performed by: NURSE ANESTHETIST, CERTIFIED REGISTERED

## 2023-08-17 PROCEDURE — 93010 EKG 12-LEAD: ICD-10-PCS | Mod: ,,, | Performed by: INTERNAL MEDICINE

## 2023-08-17 PROCEDURE — 99232 SBSQ HOSP IP/OBS MODERATE 35: CPT | Mod: 25,ICN,, | Performed by: STUDENT IN AN ORGANIZED HEALTH CARE EDUCATION/TRAINING PROGRAM

## 2023-08-17 PROCEDURE — 37000009 HC ANESTHESIA EA ADD 15 MINS: Performed by: STUDENT IN AN ORGANIZED HEALTH CARE EDUCATION/TRAINING PROGRAM

## 2023-08-17 PROCEDURE — 85025 COMPLETE CBC W/AUTO DIFF WBC: CPT | Performed by: NURSE PRACTITIONER

## 2023-08-17 PROCEDURE — 99291 PR CRITICAL CARE, E/M 30-74 MINUTES: ICD-10-PCS | Mod: ,,, | Performed by: NURSE PRACTITIONER

## 2023-08-17 PROCEDURE — 80053 COMPREHEN METABOLIC PANEL: CPT | Performed by: PHYSICIAN ASSISTANT

## 2023-08-17 PROCEDURE — 25000242 PHARM REV CODE 250 ALT 637 W/ HCPCS

## 2023-08-17 PROCEDURE — 36000706: Performed by: STUDENT IN AN ORGANIZED HEALTH CARE EDUCATION/TRAINING PROGRAM

## 2023-08-17 PROCEDURE — 94761 N-INVAS EAR/PLS OXIMETRY MLT: CPT

## 2023-08-17 PROCEDURE — 43246 EGD PLACE GASTROSTOMY TUBE: CPT | Mod: ,,, | Performed by: STUDENT IN AN ORGANIZED HEALTH CARE EDUCATION/TRAINING PROGRAM

## 2023-08-17 PROCEDURE — 92507 TX SP LANG VOICE COMM INDIV: CPT

## 2023-08-17 PROCEDURE — 86900 BLOOD TYPING SEROLOGIC ABO: CPT | Performed by: PSYCHIATRY & NEUROLOGY

## 2023-08-17 PROCEDURE — 63600175 PHARM REV CODE 636 W HCPCS: Performed by: NURSE PRACTITIONER

## 2023-08-17 PROCEDURE — 85730 THROMBOPLASTIN TIME PARTIAL: CPT

## 2023-08-17 PROCEDURE — 94640 AIRWAY INHALATION TREATMENT: CPT

## 2023-08-17 PROCEDURE — 20000000 HC ICU ROOM

## 2023-08-17 PROCEDURE — 93005 ELECTROCARDIOGRAM TRACING: CPT

## 2023-08-17 PROCEDURE — 85025 COMPLETE CBC W/AUTO DIFF WBC: CPT | Mod: 91 | Performed by: PHYSICIAN ASSISTANT

## 2023-08-17 PROCEDURE — D9220A PRA ANESTHESIA: ICD-10-PCS | Mod: CRNA,,, | Performed by: NURSE ANESTHETIST, CERTIFIED REGISTERED

## 2023-08-17 PROCEDURE — 25000003 PHARM REV CODE 250: Performed by: PHYSICIAN ASSISTANT

## 2023-08-17 PROCEDURE — 25000003 PHARM REV CODE 250

## 2023-08-17 PROCEDURE — 25000003 PHARM REV CODE 250: Performed by: NURSE PRACTITIONER

## 2023-08-17 PROCEDURE — 25000003 PHARM REV CODE 250: Performed by: PSYCHIATRY & NEUROLOGY

## 2023-08-17 PROCEDURE — 99291 CRITICAL CARE FIRST HOUR: CPT | Mod: ,,, | Performed by: NURSE PRACTITIONER

## 2023-08-17 PROCEDURE — 63600175 PHARM REV CODE 636 W HCPCS: Performed by: NURSE ANESTHETIST, CERTIFIED REGISTERED

## 2023-08-17 PROCEDURE — 37000008 HC ANESTHESIA 1ST 15 MINUTES: Performed by: STUDENT IN AN ORGANIZED HEALTH CARE EDUCATION/TRAINING PROGRAM

## 2023-08-17 PROCEDURE — D9220A PRA ANESTHESIA: Mod: CRNA,,, | Performed by: NURSE ANESTHETIST, CERTIFIED REGISTERED

## 2023-08-17 PROCEDURE — 36000707: Performed by: STUDENT IN AN ORGANIZED HEALTH CARE EDUCATION/TRAINING PROGRAM

## 2023-08-17 PROCEDURE — 27201423 OPTIME MED/SURG SUP & DEVICES STERILE SUPPLY: Performed by: STUDENT IN AN ORGANIZED HEALTH CARE EDUCATION/TRAINING PROGRAM

## 2023-08-17 PROCEDURE — 43246 PR EGD, FLEX, W/PLCMT, GASTROSTOMY TUBE: ICD-10-PCS | Mod: ,,, | Performed by: STUDENT IN AN ORGANIZED HEALTH CARE EDUCATION/TRAINING PROGRAM

## 2023-08-17 PROCEDURE — 83735 ASSAY OF MAGNESIUM: CPT | Performed by: PHYSICIAN ASSISTANT

## 2023-08-17 PROCEDURE — 99232 PR SUBSEQUENT HOSPITAL CARE,LEVL II: ICD-10-PCS | Mod: 25,ICN,, | Performed by: STUDENT IN AN ORGANIZED HEALTH CARE EDUCATION/TRAINING PROGRAM

## 2023-08-17 RX ORDER — DEXMEDETOMIDINE HYDROCHLORIDE 4 UG/ML
0-1.4 INJECTION, SOLUTION INTRAVENOUS CONTINUOUS
Status: DISCONTINUED | OUTPATIENT
Start: 2023-08-17 | End: 2023-08-18

## 2023-08-17 RX ORDER — MIDAZOLAM HYDROCHLORIDE 1 MG/ML
INJECTION, SOLUTION INTRAMUSCULAR; INTRAVENOUS
Status: DISCONTINUED | OUTPATIENT
Start: 2023-08-17 | End: 2023-08-17

## 2023-08-17 RX ORDER — BISACODYL 10 MG
10 SUPPOSITORY, RECTAL RECTAL DAILY PRN
Status: DISCONTINUED | OUTPATIENT
Start: 2023-08-17 | End: 2023-09-07 | Stop reason: HOSPADM

## 2023-08-17 RX ORDER — ETOMIDATE 2 MG/ML
INJECTION INTRAVENOUS
Status: DISCONTINUED | OUTPATIENT
Start: 2023-08-17 | End: 2023-08-17

## 2023-08-17 RX ORDER — NAPROXEN SODIUM 220 MG/1
81 TABLET, FILM COATED ORAL DAILY
Status: DISCONTINUED | OUTPATIENT
Start: 2023-08-18 | End: 2023-09-07 | Stop reason: HOSPADM

## 2023-08-17 RX ORDER — DEXMEDETOMIDINE HYDROCHLORIDE 100 UG/ML
INJECTION, SOLUTION INTRAVENOUS
Status: DISCONTINUED | OUTPATIENT
Start: 2023-08-17 | End: 2023-08-17

## 2023-08-17 RX ORDER — DEXMEDETOMIDINE HYDROCHLORIDE 4 UG/ML
0-1.4 INJECTION, SOLUTION INTRAVENOUS CONTINUOUS
Status: DISCONTINUED | OUTPATIENT
Start: 2023-08-17 | End: 2023-08-17

## 2023-08-17 RX ORDER — HEPARIN SODIUM,PORCINE/D5W 25000/250
0-40 INTRAVENOUS SOLUTION INTRAVENOUS CONTINUOUS
Status: DISCONTINUED | OUTPATIENT
Start: 2023-08-17 | End: 2023-08-18

## 2023-08-17 RX ORDER — PROPOFOL 10 MG/ML
VIAL (ML) INTRAVENOUS
Status: DISCONTINUED | OUTPATIENT
Start: 2023-08-17 | End: 2023-08-17

## 2023-08-17 RX ADMIN — METOPROLOL TARTRATE 5 MG: 5 INJECTION, SOLUTION INTRAVENOUS at 04:08

## 2023-08-17 RX ADMIN — DEXMEDETOMIDINE HYDROCHLORIDE 0.5 MCG/KG/HR: 4 INJECTION INTRAVENOUS at 02:08

## 2023-08-17 RX ADMIN — SENNOSIDES AND DOCUSATE SODIUM 1 TABLET: 50; 8.6 TABLET ORAL at 09:08

## 2023-08-17 RX ADMIN — SODIUM CHLORIDE: 9 INJECTION, SOLUTION INTRAVENOUS at 07:08

## 2023-08-17 RX ADMIN — IPRATROPIUM BROMIDE AND ALBUTEROL SULFATE 3 ML: .5; 3 SOLUTION RESPIRATORY (INHALATION) at 08:08

## 2023-08-17 RX ADMIN — METOPROLOL TARTRATE 25 MG: 25 TABLET, FILM COATED ORAL at 09:08

## 2023-08-17 RX ADMIN — AMIODARONE HYDROCHLORIDE 200 MG: 200 TABLET ORAL at 09:08

## 2023-08-17 RX ADMIN — ACETYLCYSTEINE 4 ML: 100 INHALANT RESPIRATORY (INHALATION) at 01:08

## 2023-08-17 RX ADMIN — DEXTROSE MONOHYDRATE 250 ML: 100 INJECTION, SOLUTION INTRAVENOUS at 01:08

## 2023-08-17 RX ADMIN — HEPARIN SODIUM 12 UNITS/KG/HR: 10000 INJECTION, SOLUTION INTRAVENOUS at 02:08

## 2023-08-17 RX ADMIN — PROPOFOL 30 MG: 10 INJECTION, EMULSION INTRAVENOUS at 07:08

## 2023-08-17 RX ADMIN — EZETIMIBE 10 MG: 10 TABLET ORAL at 09:08

## 2023-08-17 RX ADMIN — DEXMEDETOMIDINE 6 MCG: 100 INJECTION, SOLUTION, CONCENTRATE INTRAVENOUS at 07:08

## 2023-08-17 RX ADMIN — ETOMIDATE 8 MG: 2 INJECTION, SOLUTION INTRAVENOUS at 07:08

## 2023-08-17 RX ADMIN — IPRATROPIUM BROMIDE AND ALBUTEROL SULFATE 3 ML: .5; 3 SOLUTION RESPIRATORY (INHALATION) at 01:08

## 2023-08-17 RX ADMIN — MIDAZOLAM HYDROCHLORIDE 1 MG: 1 INJECTION, SOLUTION INTRAMUSCULAR; INTRAVENOUS at 07:08

## 2023-08-17 RX ADMIN — SODIUM CHLORIDE, POTASSIUM CHLORIDE, SODIUM LACTATE AND CALCIUM CHLORIDE 250 ML: 600; 310; 30; 20 INJECTION, SOLUTION INTRAVENOUS at 10:08

## 2023-08-17 RX ADMIN — DEXMEDETOMIDINE HYDROCHLORIDE 0.2 MCG/KG/HR: 4 INJECTION INTRAVENOUS at 11:08

## 2023-08-17 RX ADMIN — MUPIROCIN: 20 OINTMENT TOPICAL at 09:08

## 2023-08-17 RX ADMIN — MUPIROCIN: 20 OINTMENT TOPICAL at 08:08

## 2023-08-17 RX ADMIN — METOPROLOL TARTRATE 5 MG: 5 INJECTION, SOLUTION INTRAVENOUS at 06:08

## 2023-08-17 RX ADMIN — ACETYLCYSTEINE 4 ML: 100 INHALANT RESPIRATORY (INHALATION) at 08:08

## 2023-08-17 RX ADMIN — ASPIRIN 300 MG: 300 SUPPOSITORY RECTAL at 10:08

## 2023-08-17 NOTE — ANESTHESIA POSTPROCEDURE EVALUATION
Anesthesia Post Evaluation    Patient: Tera Griffiths    Procedure(s) Performed: Procedure(s) (LRB):  EGD, WITH PEG TUBE INSERTION (N/A)    Final Anesthesia Type: general      Patient location during evaluation: ICU  Patient participation: Yes- Able to Participate  Level of consciousness: awake  Post-procedure vital signs: reviewed and stable  Pain management: adequate  Airway patency: patent    PONV status at discharge: No PONV  Anesthetic complications: no      Cardiovascular status: blood pressure returned to baseline  Respiratory status: unassisted  Hydration status: euvolemic  Follow-up not needed.          Vitals Value Taken Time   /97 08/17/23 0924   Temp  08/17/23 1004   Pulse 64 08/17/23 1003   Resp 19 08/17/23 1003   SpO2 93 % 08/17/23 1003   Vitals shown include unvalidated device data.      No case tracking events are documented in the log.      Pain/Joel Score: No data recorded

## 2023-08-17 NOTE — PT/OT/SLP PROGRESS
Speech Language Pathology Treatment    Patient Name:  Tera Griffiths   MRN:  [id number]  Admitting Diagnosis: Embolic stroke involving left middle cerebral artery    Recommendations:                 General Recommendations:  Dysphagia therapy, Speech/language therapy, and Cognitive-linguistic therapy  Diet recommendations:  NPO, Liquid Diet Level: NPO   Aspiration Precautions: Frequent oral care and Strict aspiration precautions   General Precautions: Standard, aphasia, aspiration, fall, NPO  Communication strategies:  provide increased time to answer and go to room if call light pushed    Assessment:     Tera Griffiths is a 58 y.o. male with an SLP diagnosis of Aphasia and Dysphagia.     Subjective     Pt nonverbal, recently back from peg placement.  Nurse cleared for session.     Pain/Comfort:  Pain Rating 1: 0/10  Pain Rating Post-Intervention 1: 0/10    Respiratory Status: Room air    Objective:     Has the patient been evaluated by SLP for swallowing?   Yes  Keep patient NPO? Yes     Pt seen bedside, alert though restless.  He did not follow commands or answer simple y/n questions.  No verbalizations elicited despite max cues.  PO trials not attempted 2/2 recent peg placement.  Pt did not localize to SLP on R despite max cues.  Education provided re: ongoing language stim and POC.  Education to be ongoing.     Goals:   Multidisciplinary Problems       SLP Goals          Problem: SLP    Goal Priority Disciplines Outcome   SLP Goal     SLP Ongoing, Progressing   Description: Speech Language Pathology Goals  Goals expected to be met by 8/15  1. Pt will participate in ongoing assessment of swallow.   2. Pt will answer simple y/n questions with 60% accy given max cues.   3. Pt will follow simple commands with 50% accy given max cues.   4. Pt will vocalize in response to any stim x5/session given max cues.   5. Pt will localize to stim on R x2/session given max cues.                              Plan:     Patient  to be seen:  4 x/week   Plan of Care expires:  09/07/23  Plan of Care reviewed with:  patient   SLP Follow-Up:  Yes       Discharge recommendations:  nursing facility, skilled   Barriers to Discharge:  Level of Skilled Assistance Needed      Time Tracking:     SLP Treatment Date:   08/17/23  Speech Start Time:  0827  Speech Stop Time:  0834     Speech Total Time (min):  7 min    Billable Minutes: Speech Therapy Individual 7    08/17/2023

## 2023-08-17 NOTE — ASSESSMENT & PLAN NOTE
Patient is a 58 year old M w/ CHFrEF (10%, DD, pHTN), CAD (h/o STEMI, s/p PCI to Rcx-08/2021), AICD placement (12/2021), pAF (s/p DCCV), HTN, CKD3b admitted to  8/52 for CHF exacerbation. Course c/b CVA and resulting dysphasia, patient now requiring enteral access. Failed PEG tube placement by GI.     - NPO, hold TF  - To OR today for PEG vs lap G  - Consented obtained. Discussed elevated risk of periop M&M given medical comorbidity  - Remainder of care per primary team  - Please contact general surgery with any questions, concerns, or clinical status changes    Routine Post-Op PEG Care:   · Please keep tube to gravity for the next 24 hours.  · May administer meds after 6 hours and clamp for 30 minutes after medication administration. Prefer elixirs or liquids but well crushed meds are also okay  · We will give further recs about when to restart tube feeds.   · Please notify General Surgery with any significant changes in patient's vital signs within the first 24 hours after PEG placement.  · Please call with questions about PEG tube.

## 2023-08-17 NOTE — SUBJECTIVE & OBJECTIVE
Neurologic Chief Complaint: L MCA syndrome    Subjective:     Interval History: Patient is seen for follow-up neurological assessment and treatment recommendations:   Peg placed today. Neuro exam stable.     HPI, Past Medical, Family, and Social History remains the same as documented in the initial encounter.     Review of Systems   Unable to perform ROS: Other (sedated)   Neurological:  Positive for speech difficulty.     Scheduled Meds:   acetylcysteine 100 mg/ml (10%)  4 mL Nebulization TID WAKE    albuterol-ipratropium  3 mL Nebulization TID WAKE    amiodarone  200 mg Per NG tube BID    aspirin  300 mg Rectal Daily    atorvastatin  80 mg Per NG tube Daily    ezetimibe  10 mg Per NG tube QHS    ferrous sulfate  1 tablet Per NG tube Daily    lactated ringers  250 mL Intravenous Once    metoprolol tartrate  25 mg Per NG tube BID    mupirocin   Nasal BID    polyethylene glycol  17 g Per NG tube Daily    senna-docusate 8.6-50 mg  1 tablet Per NG tube BID     Continuous Infusions:   dexmedeTOMIDine (Precedex) infusion (titrating)       PRN Meds:acetaminophen, bisacodyL, dextrose 10%, dextrose 10%, glucagon (human recombinant), hydrALAZINE, insulin aspart U-100, labetalol, magnesium sulfate IVPB, magnesium sulfate IVPB, metoprolol, nitroGLYCERIN, ondansetron, potassium chloride **AND** potassium chloride **AND** potassium chloride, sodium chloride 0.9%, sodium chloride 0.9%, sodium chloride 0.9%, sodium phosphate 15 mmol in dextrose 5 % (D5W) 250 mL IVPB, sodium phosphate 20.01 mmol in dextrose 5 % (D5W) 250 mL IVPB, sodium phosphate 30 mmol in dextrose 5 % (D5W) 250 mL IVPB    Objective:     Vital Signs (Most Recent):  Temp: 98 °F (36.7 °C) (08/17/23 0305)  Pulse: 74 (08/17/23 0705)  Resp: (!) 25 (08/17/23 0705)  BP: (!) 148/97 (08/17/23 0705)  SpO2: 97 % (08/17/23 0705)  BP Location: Right arm    Vital Signs Range (Last 24H):  Temp:  [97.9 °F (36.6 °C)-98.2 °F (36.8 °C)]   Pulse:  [58-84]   Resp:  [17-28]   BP:  "(114-152)/()   SpO2:  [95 %-100 %]   BP Location: Right arm       Physical Exam  Vitals and nursing note reviewed.   Constitutional:       General: He is not in acute distress.     Appearance: He is well-developed.      Comments: Sedated on precedex   HENT:      Head: Normocephalic and atraumatic.   Cardiovascular:      Rate and Rhythm: Normal rate.   Pulmonary:      Effort: Pulmonary effort is normal. No respiratory distress.   Abdominal:      General: There is no distension.   Skin:     General: Skin is warm and dry.   Neurological:      Comments: Aphasic, does not follow commands  L gaze              Neurological Exam:   LOC: drowsy  Attention Span: poor  Language: Global aphasia, not following commands  Articulation: Untestable due to severe aphasia   Orientation: Untestable due to severe aphasia   EOM (CN III, IV, VI): Gaze preference  left  Facial Movement (CN VII): Lower facial weakness on the Right  Motor: moves left spontaneous, right hemiparesis  Unable to assess drift and strength as patient does not follow commands, and gets agitated      Laboratory:  CMP:   Recent Labs   Lab 08/17/23  0452   CALCIUM 10.6*   ALBUMIN 3.1*   PROT 7.9   *   K 4.2   CO2 18*   *   BUN 28*   CREATININE 1.3   ALKPHOS 64   ALT 9*   AST 17   BILITOT 0.6       CBC:   Recent Labs   Lab 08/17/23  0452   WBC 6.52   RBC 6.44*   HGB 17.4   HCT 56.4*      MCV 88   MCH 27.0   MCHC 30.9*       Lipid Panel:   No results for input(s): "CHOL", "LDLCALC", "HDL", "TRIG" in the last 168 hours.    Coagulation:   Recent Labs   Lab 08/17/23  0452   APTT 22.1       Platelet Aggregation Study: No results for input(s): "PLTAGG", "PLTAGINTERP", "PLTAGREGLACO", "ADPPLTAGGREG" in the last 168 hours.  Hgb A1C:   No results for input(s): "HGBA1C" in the last 168 hours.    TSH:   No results for input(s): "TSH" in the last 168 hours.      Diagnostic Results     Brain Imaging   CT Head 8/10/23  Impression:     No detrimental change " compared to prior.     Evolving acute infarcts within the left MCA and PCA territories.  No hemorrhagic conversion.  No significant mass effect     Suspected small subacute infarction within the right caudate.     Multiple areas remote infarctions as detailed above.      CT Head 8/8/23 1704  Impression:     1. Redemonstration of acute infarctions in the left MCA and PCA territories.  No evidence to suggest hemorrhagic transformation.  2. Suspected small subacute infarction in the right caudate.  3. Numerous remote infarctions as detailed above.    CT Head 8/8/23 0826  Impression:     Moderate developing hypoattenuation left inferior frontal lobe and left occipital lobe concerning for developing recent infarcts post thrombectomy.     Allowing for scattered hyperdensity related to recently contrast administration for thrombectomy there is no definite acute intracranial hemorrhage.     Previous hypodensity right caudate no longer seen which may be related to contrast administration for thrombectomy and possible subacute age infarction.     Superimposed remote infarcts with moderate to large encephalomalacia right MCA territory unchanged    Vessel Imaging   IR Angio 8/8/23  Preliminary Interpretation:   1.    No evidence of left ICA or MCA stenosis. No large or medium vessel occlusion.    CTA Multiphase 8/7/23  Impression:     CT head:     New right caudate nucleus hypodensity, likely representing age-indeterminate infarct.  Could be further evaluated with MRI.     Multifocal encephalomalacia involving the right frontal, temporoparietal, and left occipital lobes.     CTA head and neck:     Acute occlusion of the superior left M2 segment.     Left PCA occlusion of undetermined age.     Filling defect in the right main pulmonary artery, concerning for acute pulmonary thromboembolism.  Consider follow-up pulmonary CTA to more fully assess the extent of thromboemboli, the clot burden, and the presence or absence of right  heart strain.     Not fully detailed above:     - Incomplete opacification of the left atrial appendage, suspicious for an intraluminal thrombus.  Follow-up echocardiogram, or cardiac MRI or CTA, recommended.     - The presence of acute thrombi or thromboemboli in both the systemic arterial and pulmonary arterial circulation raises concern for the possibility of underlying intracardiac or extracardiac right-to-left shunt, and/or a hypercoagulable state.  Correlate clinically.     - Incompletely visualized, but possibly large, pericardial effusion.  Artifacts compromise accurate assessment of its attenuation.    Cardiac Imaging   TTE with bubble 8/8/23    Left Ventricle: The left ventricle is moderately dilated. Increased ventricular mass. Normal wall thickness. There is moderate eccentric hypertrophy. Severe global hypokinesis present. Septal motion is consistent with pacing. There is severely reduced systolic function with a visually estimated ejection fraction of 15 - 20%. Ejection fraction by visual approximation is 20%. Unable to assess diastolic function due to arrhythmia.    Left Atrium: Left atrium is severely dilated. Radha 85mL/m2.    Right Ventricle: Mild right ventricular enlargement. Wall thickness is normal. Right ventricle wall motion  is normal. Systolic function is severely reduced. Pacemaker lead present in the ventricle.    Right Atrium: Right atrium is severely dilated.    Aortic Valve: There is mild aortic valve sclerosis. There is normal leaflet mobility. There is no significant regurgitation.    Mitral Valve: There is bileaflet sclerosis. There is normal leaflet mobility. There is mild regurgitation.    Tricuspid Valve: The tricuspid valve is structurally normal. There is normal leaflet mobility. There is mild regurgitation.    Pulmonic Valve: The pulmonic valve is structurally normal. There is normal leaflet mobility. There is no significant regurgitation.    Aorta: Aortic root is normal in  size measuring 3.13 cm. Ascending aorta is normal measuring 3.24 cm.    IVC/SVC: Normal venous pressure at 3 mmHg.    Pericardium: The pericardium is normal. There is no pericardial effusion.

## 2023-08-17 NOTE — SUBJECTIVE & OBJECTIVE
Interval History: NAEON. Afebrile. HDS. To OR today for PEG     Medications:  Continuous Infusions:   dexmedeTOMIDine (Precedex) infusion (titrating) 0.6 mcg/kg/hr (08/17/23 0637)     Scheduled Meds:   acetylcysteine 100 mg/ml (10%)  4 mL Nebulization TID WAKE    albuterol-ipratropium  3 mL Nebulization TID WAKE    amiodarone  200 mg Per NG tube BID    aspirin  300 mg Rectal Daily    atorvastatin  80 mg Per NG tube Daily    ezetimibe  10 mg Per NG tube QHS    ferrous sulfate  1 tablet Per NG tube Daily    metoprolol tartrate  25 mg Per NG tube BID    mupirocin   Nasal BID    polyethylene glycol  17 g Per NG tube Daily    senna-docusate 8.6-50 mg  1 tablet Per NG tube BID     PRN Meds:acetaminophen, dextrose 10%, dextrose 10%, glucagon (human recombinant), hydrALAZINE, insulin aspart U-100, labetalol, magnesium sulfate IVPB, magnesium sulfate IVPB, metoprolol, nitroGLYCERIN, ondansetron, potassium chloride **AND** potassium chloride **AND** potassium chloride, sodium chloride 0.9%, sodium chloride 0.9%, sodium chloride 0.9%, sodium phosphate 15 mmol in dextrose 5 % (D5W) 250 mL IVPB, sodium phosphate 20.01 mmol in dextrose 5 % (D5W) 250 mL IVPB, sodium phosphate 30 mmol in dextrose 5 % (D5W) 250 mL IVPB     Review of patient's allergies indicates:  No Known Allergies  Objective:     Vital Signs (Most Recent):  Temp: 98 °F (36.7 °C) (08/17/23 0305)  Pulse: 74 (08/17/23 0605)  Resp: (!) 23 (08/17/23 0605)  BP: (!) 142/95 (08/17/23 0605)  SpO2: 99 % (08/17/23 0605) Vital Signs (24h Range):  Temp:  [97.9 °F (36.6 °C)-98.2 °F (36.8 °C)] 98 °F (36.7 °C)  Pulse:  [57-84] 74  Resp:  [17-28] 23  SpO2:  [91 %-100 %] 99 %  BP: (114-152)/() 142/95     Weight: 83.5 kg (184 lb 1.4 oz)  Body mass index is 24.29 kg/m².    Intake/Output - Last 3 Shifts         08/15 0700 08/16 0659 08/16 0700 08/17 0659 08/17 0700 08/18 0659    I.V. (mL/kg) 457 (5.5) 369.3 (4.4)     IV Piggyback 497.4      Total Intake(mL/kg) 954.3 (11.4)  369.3 (4.4)     Urine (mL/kg/hr) 610 (0.3) 750 (0.4)     Other       Total Output 610 750     Net +344.3 -380.7                     Physical Exam  Vitals and nursing note reviewed.   Constitutional:       General: He is not in acute distress.     Appearance: He is not diaphoretic.      Comments: NGT   HENT:      Head: Normocephalic and atraumatic.   Eyes:      Conjunctiva/sclera: Conjunctivae normal.   Cardiovascular:      Rate and Rhythm: Normal rate.   Pulmonary:      Effort: Pulmonary effort is normal. No respiratory distress.   Abdominal:      General: There is no distension.      Palpations: Abdomen is soft.      Tenderness: There is no abdominal tenderness. There is no guarding or rebound.      Comments: Transverse umbilical scar  Epigastric lap incisions     Skin:     General: Skin is warm and dry.      Coloration: Skin is not jaundiced.          Significant Labs:  I have reviewed all pertinent lab results within the past 24 hours.    Significant Diagnostics:  I have reviewed all pertinent imaging results/findings within the past 24 hours.

## 2023-08-17 NOTE — PLAN OF CARE
[facility] Care Plan    POC reviewed with Tera Griffiths and family at 1400. Pt verbalized understanding. Questions and concerns addressed. No acute events today. Pt progressing toward goals. Will continue to monitor. See below and flowsheets for full assessment and VS info.             Is this a stroke patient? yes- Stroke booklet reviewed with patient, risk factors identified for patient and stroke booklet remains at bedside for ongoing education.     Neuro:  [place] Coma Scale  Best Eye Response: 4-->(E4) spontaneous  Best Motor Response: 5-->(M5) localizes pain  Best Verbal Response: 1-->(V1) none  [place] Coma Scale Score: 10  Assessment Qualifiers: patient chemically sedated or paralyzed  Pupil PERRLA: yes     24 hr Temp:  [97.6 °F (36.4 °C)-98.2 °F (36.8 °C)]     CV:   Rhythm: sinus tachycardia  BP goals:   SBP < 180  MAP > 65    Resp:      Oxygen Concentration (%): 28    Plan: N/A    GI/:     Diet/Nutrition Received: NPO  Last Bowel Movement: 08/06/23  Voiding Characteristics: external catheter    Intake/Output Summary (Last 24 hours) at 8/17/2023 1723  Last data filed at 8/17/2023 1705  Gross per 24 hour   Intake 689.36 ml   Output 750 ml   Net -60.64 ml     Unmeasured Output  Urine Occurrence: 1  Stool Occurrence: 0  Emesis Occurrence: 0  Pad Count: 1    Labs/Accuchecks:  Recent Labs   Lab 08/17/23  1351   WBC 10.93   RBC 5.73   HGB 15.3   HCT 49.5         Recent Labs   Lab 08/17/23  0452   *   K 4.2   CO2 18*   *   BUN 28*   CREATININE 1.3   ALKPHOS 64   ALT 9*   AST 17   BILITOT 0.6      Recent Labs   Lab 08/17/23  1351   INR 1.2   APTT 21.3      Recent Labs   Lab 08/10/23  1830   TROPONINI 0.053*       Electrolytes: N/A - electrolytes WDL  Accuchecks: ACHS    Gtts:   heparin (porcine) in D5W 12 Units/kg/hr (08/17/23 1705)       LDA/Wounds:  Lines/Drains/Airways       Drain  Duration             Male External Urinary Catheter 08/09/23 1305 Large 8 days         Gastrostomy/Enterostomy  08/17/23 0800 Percutaneous endoscopic gastrostomy (PEG) midline <1 day              Peripheral Intravenous Line  Duration                  Peripheral IV - Single Lumen 08/13/23 1125 20 G Posterior;Right Hand 4 days         Peripheral IV - Single Lumen 08/14/23 22 G Left;Posterior Hand 3 days         Peripheral IV - Single Lumen 08/17/23 1417 18 G Anterior;Left;Proximal Forearm <1 day         Peripheral IV - Single Lumen 08/17/23 1418 Anterior;Right Foot <1 day                  Wounds: No  Wound care consulted: No

## 2023-08-17 NOTE — TRANSFER OF CARE
"Anesthesia Transfer of Care Note    Patient: Tera Griffiths    Procedure(s) Performed: Procedure(s) (LRB):  EGD, WITH PEG TUBE INSERTION (N/A)    Patient location: ICU    Anesthesia Type: general    Transport from OR: Transported from OR on 6-10 L/min O2 by face mask with adequate spontaneous ventilation    Post pain: adequate analgesia    Post assessment: no apparent anesthetic complications    Post vital signs: stable    Level of consciousness: awake    Nausea/Vomiting: no nausea/vomiting    Complications: none    Transfer of care protocol was followed      Last vitals:   Visit Vitals  BP (!) 142/95 (BP Location: Right arm, Patient Position: Lying)   Pulse 74   Temp 36.7 °C (98 °F) (Axillary)   Resp (!) 23   Ht 6' 1" (1.854 m)   Wt 83.5 kg (184 lb 1.4 oz)   SpO2 99%   BMI 24.29 kg/m²     "

## 2023-08-17 NOTE — ASSESSMENT & PLAN NOTE
- PMHx of   - Noted on CTA Chest 9/2021, 12/2021  See Acute pulmonary embolism without acute cor pulmonale  - hold AC due to PEG placement

## 2023-08-17 NOTE — PROGRESS NOTES
[facility]  General Surgery  Progress Note    Subjective:     History of Present Illness:  57 yo M with atrial fibrillation (on Apixban), CAD (associated with STEMI in 08/2021 and NTSEMI in 04/2023; status post PCI in 08/2021; on PLAVIX), combined CHF (EF 10% in 07/2023; status post ICD placement; associated with pulmonary hypertension), and HTN. Patient admitted to  08/05 for CHF exacerbation. Hospital course c/b left MCA stroke on 08/07 without evidence of vessel occlusion or stenosis on angiogram and evidence of bilateral PE on CTA requiring Heparin drip. He has since developed dysphagia hindering safe PO intake. General surgery consulted for placement of PEG tube.     Of note, GI was consulted for PEG placement and were unable to place PEG tube due to lack of transillumination during endoscopy. They were able to place NGT under direct visualization (nursing staff had previously been unable to place NGT) and recommended IR peg tube placement, however the NGT has become clogged following medication administration this morning.       Post-Op Info:  Procedure(s) (LRB):  EGD, WITH PEG TUBE INSERTION (N/A)   Day of Surgery     Interval History: NAEON. Afebrile. HDS. To OR today for PEG     Medications:  Continuous Infusions:   dexmedeTOMIDine (Precedex) infusion (titrating) 0.6 mcg/kg/hr (08/17/23 0637)     Scheduled Meds:   acetylcysteine 100 mg/ml (10%)  4 mL Nebulization TID WAKE    albuterol-ipratropium  3 mL Nebulization TID WAKE    amiodarone  200 mg Per NG tube BID    aspirin  300 mg Rectal Daily    atorvastatin  80 mg Per NG tube Daily    ezetimibe  10 mg Per NG tube QHS    ferrous sulfate  1 tablet Per NG tube Daily    metoprolol tartrate  25 mg Per NG tube BID    mupirocin   Nasal BID    polyethylene glycol  17 g Per NG tube Daily    senna-docusate 8.6-50 mg  1 tablet Per NG tube BID     PRN Meds:acetaminophen, dextrose 10%, dextrose 10%, glucagon (human recombinant),  hydrALAZINE, insulin aspart U-100, labetalol, magnesium sulfate IVPB, magnesium sulfate IVPB, metoprolol, nitroGLYCERIN, ondansetron, potassium chloride **AND** potassium chloride **AND** potassium chloride, sodium chloride 0.9%, sodium chloride 0.9%, sodium chloride 0.9%, sodium phosphate 15 mmol in dextrose 5 % (D5W) 250 mL IVPB, sodium phosphate 20.01 mmol in dextrose 5 % (D5W) 250 mL IVPB, sodium phosphate 30 mmol in dextrose 5 % (D5W) 250 mL IVPB     Review of patient's allergies indicates:  No Known Allergies  Objective:     Vital Signs (Most Recent):  Temp: 98 °F (36.7 °C) (08/17/23 0305)  Pulse: 74 (08/17/23 0605)  Resp: (!) 23 (08/17/23 0605)  BP: (!) 142/95 (08/17/23 0605)  SpO2: 99 % (08/17/23 0605) Vital Signs (24h Range):  Temp:  [97.9 °F (36.6 °C)-98.2 °F (36.8 °C)] 98 °F (36.7 °C)  Pulse:  [57-84] 74  Resp:  [17-28] 23  SpO2:  [91 %-100 %] 99 %  BP: (114-152)/() 142/95     Weight: 83.5 kg (184 lb 1.4 oz)  Body mass index is 24.29 kg/m².    Intake/Output - Last 3 Shifts         08/15 0700 08/16 0659 08/16 0700 08/17 0659 08/17 0700 08/18 0659    I.V. (mL/kg) 457 (5.5) 369.3 (4.4)     IV Piggyback 497.4      Total Intake(mL/kg) 954.3 (11.4) 369.3 (4.4)     Urine (mL/kg/hr) 610 (0.3) 750 (0.4)     Other       Total Output 610 750     Net +344.3 -380.7                     Physical Exam  Vitals and nursing note reviewed.   Constitutional:       General: He is not in acute distress.     Appearance: He is not diaphoretic.      Comments: NGT   HENT:      Head: Normocephalic and atraumatic.   Eyes:      Conjunctiva/sclera: Conjunctivae normal.   Cardiovascular:      Rate and Rhythm: Normal rate.   Pulmonary:      Effort: Pulmonary effort is normal. No respiratory distress.   Abdominal:      General: There is no distension.      Palpations: Abdomen is soft.      Tenderness: There is no abdominal tenderness. There is no guarding or rebound.      Comments: Transverse umbilical scar  Epigastric lap  incisions     Skin:     General: Skin is warm and dry.      Coloration: Skin is not jaundiced.          Significant Labs:  I have reviewed all pertinent lab results within the past 24 hours.    Significant Diagnostics:  I have reviewed all pertinent imaging results/findings within the past 24 hours.    Assessment/Plan:     * Embolic stroke involving left middle cerebral artery  Patient is a 58 year old M w/ CHFrEF (10%, DD, pHTN), CAD (h/o STEMI, s/p PCI to Rcx-08/2021), AICD placement (12/2021), pAF (s/p DCCV), HTN, CKD3b admitted to  8/52 for CHF exacerbation. Course c/b CVA and resulting dysphasia, patient now requiring enteral access. Failed PEG tube placement by GI.     - NPO, hold TF  - To OR today for PEG vs lap G  - Consented obtained. Discussed elevated risk of periop M&M given medical comorbidity  - Remainder of care per primary team  - Please contact general surgery with any questions, concerns, or clinical status changes    Routine Post-Op PEG Care:   · Please keep tube to gravity for the next 24 hours.  · May administer meds after 6 hours and clamp for 30 minutes after medication administration. Prefer elixirs or liquids but well crushed meds are also okay  · We will give further recs about when to restart tube feeds.   · Please notify General Surgery with any significant changes in patient's vital signs within the first 24 hours after PEG placement.  · Please call with questions about PEG tube.       Case discussed with Dr. Scott.      Rich Hernandez PA-C  General Surgery  [facility]

## 2023-08-17 NOTE — CARE UPDATE
Care update: Post-Gastrostomy Tube Placement Instructions    Leave g-tube to gravity via monk bag after surgery.   Can keep abdominal binder in place around the clock to help prevent accidental dislodgement.   Ok to use g-tube immediately for medications; prefer elixirs and liquids however well-crushed medications are acceptable. Clamp tube for 30 mins after medication administration.  Tube feeds are ok to start 4hrs after gastrostomy tube placement if no apparent issues.  Please call for any bleeding or malfunction.    Yan Scott MD  Acute Care Surgery and Surgical Critical Care  [facility]  8/17/2023

## 2023-08-17 NOTE — SUBJECTIVE & OBJECTIVE
Interval History:  PEG today, awaiting gen surg recs on when to start heparin drip, suppository ordered    Review of Systems   Reason unable to perform ROS: aphasic.     Unable to obtain a complete ROS due to level of consciousness.  Objective:     Vitals:  Temp: 98 °F (36.7 °C)  Pulse: 74  Rhythm: paced rhythm  BP: (!) 142/95  MAP (mmHg): 111  Resp: (!) 23  SpO2: 99 %    Temp  Min: 97.9 °F (36.6 °C)  Max: 98.2 °F (36.8 °C)  Pulse  Min: 58  Max: 84  BP  Min: 114/72  Max: 152/97  MAP (mmHg)  Min: 88  Max: 126  Resp  Min: 17  Max: 28  SpO2  Min: 95 %  Max: 100 %    08/16 0701 - 08/17 0700  In: 369.3 [I.V.:369.3]  Out: 750 [Urine:750]   Unmeasured Output  Urine Occurrence: 1  Stool Occurrence: 0  Emesis Occurrence: 0  Pad Count: 1        Physical Exam  Vitals and nursing note reviewed.   HENT:      Head: Normocephalic.      Nose: Nose normal.      Mouth/Throat:      Mouth: Mucous membranes are moist.      Pharynx: Oropharynx is clear.   Eyes:      Pupils: Pupils are equal, round, and reactive to light.   Cardiovascular:      Rate and Rhythm: Normal rate and regular rhythm.      Pulses: Normal pulses.      Heart sounds: Normal heart sounds.   Pulmonary:      Effort: Pulmonary effort is normal.      Breath sounds: Normal breath sounds.   Abdominal:      General: Bowel sounds are normal.      Palpations: Abdomen is soft.   Musculoskeletal:         General: Normal range of motion.   Skin:     General: Skin is warm and dry.      Capillary Refill: Capillary refill takes 2 to 3 seconds.   Neurological:      Mental Status: He is alert.      Comments: GCS 10  Aphasic  Does not follow commands  RUE no movement  LUE spontaneous  RLE, LLE spontaneous  Withdrawals to bilat LE       Unable to test orientation, language, memory, judgment, insight, fund of knowledge, hearing, shoulder shrug, tongue protrusion, coordination, gait due to level of consciousness.       Medications:  ContinuousdexmedeTOMIDine (Precedex) infusion  (titrating)    Scheduledacetylcysteine 100 mg/ml (10%), 4 mL, TID WAKE  albuterol-ipratropium, 3 mL, TID WAKE  amiodarone, 200 mg, BID  aspirin, 300 mg, Daily  atorvastatin, 80 mg, Daily  ezetimibe, 10 mg, QHS  ferrous sulfate, 1 tablet, Daily  lactated ringers, 250 mL, Once  metoprolol tartrate, 25 mg, BID  mupirocin, , BID  polyethylene glycol, 17 g, Daily  senna-docusate 8.6-50 mg, 1 tablet, BID    PRNacetaminophen, 650 mg, Q6H PRN  bisacodyL, 10 mg, Daily PRN  dextrose 10%, 12.5 g, PRN  dextrose 10%, 25 g, PRN  glucagon (human recombinant), 1 mg, PRN  hydrALAZINE, 10 mg, Q6H PRN  insulin aspart U-100, 1-10 Units, Q6H PRN  labetalol, 10 mg, Q6H PRN  magnesium sulfate IVPB, 2 g, PRN  magnesium sulfate IVPB, 4 g, PRN  metoprolol, 5 mg, Q5 Min PRN  nitroGLYCERIN, 0.4 mg, Q5 Min PRN  ondansetron, 4 mg, Q8H PRN  potassium chloride, 40 mEq, PRN   And  potassium chloride, 60 mEq, PRN   And  potassium chloride, 80 mEq, PRN  sodium chloride 0.9%, 10 mL, PRN  sodium chloride 0.9%, 10 mL, PRN  sodium chloride 0.9%, 10 mL, PRN  sodium phosphate 15 mmol in dextrose 5 % (D5W) 250 mL IVPB, 15 mmol, PRN  sodium phosphate 20.01 mmol in dextrose 5 % (D5W) 250 mL IVPB, 20.01 mmol, PRN  sodium phosphate 30 mmol in dextrose 5 % (D5W) 250 mL IVPB, 30 mmol, PRN      Today I personally reviewed pertinent medications, lines/drains/airways, imaging, cardiology results, laboratory results, microbiology results, notably:    Diet  Diet NPO  Diet NPO

## 2023-08-17 NOTE — CONSULTS
Nutrition Care Consult     RD consulted for TF recs. Noted PEG placed today. Recommendations provided below. See RD progress note from 8/11 for full nutritional assessment. RD to continue monitoring and following.     Thanks!  Melodie Glez RDN,LDN    Recommendations    EN recs:     - Continuous: Isosource 1.5 @ goal rate of 55 mL/hr- provides 1980 kcals, 90 g pro, and 1008 mL fluid.              - Bolus: Isosource 1.5 (5 cartons/day)- provides 1875 kcals, 85 g pro, and 955 mL fluid.   RD following.     Goals: Will meet % EEN/EPN by next RD f/u.  Nutrition Goal Status: progressing towards goal   Communication of RD Recs:  (POC)

## 2023-08-17 NOTE — PLAN OF CARE
Problem: Sensorimotor Impairment (Stroke, Ischemic/Transient Ischemic Attack)  Goal: Improved Sensorimotor Function  Outcome: Ongoing, Progressing     Problem: Restraint, Nonbehavioral (Nonviolent)  Goal: Absence of Harm or Injury  Outcome: Ongoing, Progressing     Problem: Skin Injury Risk Increased  Goal: Skin Health and Integrity  Outcome: Ongoing, Progressing   [facility] Care Plan    POC reviewed with Tera Griffiths and family at 0300. Pt unable to verbalize understanding. Questions and concerns addressed. No acute events overnight. Pt progressing toward goals. Will continue to monitor. See below and flowsheets for full assessment and VS info.   - Heparin gtt off since midnight  - Precedex titrated per order for agitation   throughout shift.         Is this a stroke patient? yes- Stroke booklet reviewed with patient, risk factors identified for patient and stroke booklet remains at bedside for ongoing education.     Neuro:  [place] Coma Scale  Best Eye Response: 4-->(E4) spontaneous  Best Motor Response: 5-->(M5) localizes pain  Best Verbal Response: 1-->(V1) none  Jack Coma Scale Score: 10  Assessment Qualifiers: patient not sedated/intubated  Pupil PERRLA: yes     24hr Temp:  [97.9 °F (36.6 °C)-98.2 °F (36.8 °C)]     CV:   Rhythm: paced rhythm  BP goals:   SBP < 180  MAP > 65    Resp:      Oxygen Concentration (%): 28    Plan:  peg tube placement today    GI/:     Diet/Nutrition Received: NPO  Last Bowel Movement: 08/06/23  Voiding Characteristics: external catheter    Intake/Output Summary (Last 24 hours) at 8/17/2023 0511  Last data filed at 8/17/2023 0405  Gross per 24 hour   Intake 436.44 ml   Output 750 ml   Net -313.56 ml     Unmeasured Output  Urine Occurrence: 1  Stool Occurrence: 0  Emesis Occurrence: 0  Pad Count: 1    Labs/Accuchecks:  Recent Labs   Lab 08/16/23 0215   WBC 5.85   RBC 5.51   HGB 15.0   HCT 49.5         Recent Labs   Lab 08/16/23 0215   *   K 4.3   CO2 22*   CL  116*   BUN 26*   CREATININE 1.4   ALKPHOS 55   ALT 6*   AST 12   BILITOT 0.7      Recent Labs   Lab 08/16/23  0215   APTT 45.3*      Recent Labs   Lab 08/10/23  1830   TROPONINI 0.053*       Electrolytes: N/A - electrolytes WDL  Accuchecks: Q6H    Gtts:   dexmedeTOMIDine (Precedex) infusion (titrating) 0.5 mcg/kg/hr (08/17/23 0256)       LDA/Wounds:  Lines/Drains/Airways       Drain  Duration             Male External Urinary Catheter 08/09/23 1305 Large 7 days              Peripheral Intravenous Line  Duration                  Peripheral IV - Single Lumen 08/10/23 1847 20 G Left Antecubital 6 days         Peripheral IV - Single Lumen 08/13/23 1125 20 G Posterior;Right Hand 3 days         Peripheral IV - Single Lumen 08/14/23 22 G Left;Posterior Hand 3 days                  Wounds: No  Wound care consulted: No

## 2023-08-17 NOTE — PT/OT/SLP PROGRESS
Occupational Therapy      Patient Name:  Tera Griffiths   MRN:  [id number]    Patient not seen today secondary to patient agitation; patient unsafe for therapy at this time . Will follow-up as appropriate.    8/17/2023

## 2023-08-17 NOTE — ASSESSMENT & PLAN NOTE
SLP following  GI team consulted for PEG tube, unable to place on 8/11, NGT placed instead  NGT now flushing through side port, IR consulted for PEG placement  Once obtain enteral access- will restart medications, transition Heparin gtt to oral AC, and start nutrition  8/17/2023 Plan for G-tube on Thursday, Holding heparin on Wednesday at [place]  8/17/23: PEG placed

## 2023-08-17 NOTE — OP NOTE
[facility]  General Surgery  Operative Note    DATE OF PROCEDURE: 08/17/2023    PREOPERATIVE DIAGNOSES:   1. CVA   2. Dysphagia, unspecified.    POSTOPERATIVE DIAGNOSES:   1. CVA  2. Dysphagia, unspecified.     PROCEDURES PERFORMED:   1. Esophagogastroduodenoscopy  2. Percutaneous endoscopic gastrostomy.    ATTENDING SURGEON: Yan Scott M.D.     HOUSESTAFF SURGEON: Gallito Alvarado MD      ANESTHESIA: Local plus monitored anesthesia care    ESTIMATED BLOOD LOSS: 1 mL     FINDINGS: 20-Finnish percutaneous gastrostomy @ 3.5cm placed without apparent complication.     SPECIMEN: None.     DRAINS: None.     COMPLICATIONS: None.     INDICATIONS: Tera Griffiths is a 58 y.o.male referred to [facility] for CVA. The patient is expected to have prolonged dysphagia as a result and General Surgery was consulted for placement of a percutaneous gastrostomy tube. We did obtain informed consent from the patient family who expressed understanding of the risks and benefits and gave consent to proceed.     OPERATIVE PROCEDURE: The patient was identified in preoperative holding and brought back to the operating room. Anesthesia was induced. A timeout procedure was performed and all team members present agreed this was the correct procedure on the correct patient.    We then directed our attention to the left upper quadrant for gastrostomy tube placement. The patient's abdomen was prepped and draped. An upper endoscope was introduced into the oropharynx and guided down into the esophagus and stomach. The stomach was insufflated with air and we identified an appropriate position for gastrostomy tube placement 2 finger-breadths below the left subcostal margin. Palpation of the anterior abdominal wall at this point was visualized endoscopically and transillumination from the endoscope was visualized through the anterior abdominal wall. We made a small transverse skin incision. At this point, the stomach  was cannulated with a catheter loaded on a needle. This was grasped by a snare which had been passed through the endoscope. The needle was removed, and a guidewire was placed, and the snare was used to grasp the guidewire. The endoscope, snare, and guidewire were all withdrawn from the patient's mouth. A 20-Georgian gastrostomy tube was loaded onto the guidewire and pulled through the anterior abdominal wall via Seldinger technique. Repeat endoscopy was performed with the gastrostomy tube at the 3.5 cm ashok at the skin. There was no blanching of the gastric mucosa, and when the tube was twisted, the button did not grab the mucosa.  We withdrew the endoscope  and the insufflation in the stomach was evacuated, and the endoscope was removed. The gastrostomy tube was secured in place using the supplied devices and connected to a bag for gravity drainage.    The patient tolerated the procedure well and  All sponge, instrument, and needle counts were correct at the termination of the PEG procedure. Pt returned to the ICU.       Dr. Scott was present for the entire case.     Gallito Alvarado MD  Surgery, PGY-3
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
## 2023-08-17 NOTE — ASSESSMENT & PLAN NOTE
- Medtronic  - PMHx cardiac arrest 2/2 V-Tach  8/17/2023 Ordered for pacer interrogation prior to surgery.

## 2023-08-17 NOTE — PLAN OF CARE
[facility] - Neuro Critical Care  Discharge Reassessment    Primary Care Provider: Carleen Bueno MD    Expected Discharge Date: 8/22/2023    Patient S/p peg tube placement today.  Not medically ready for discharge.    Therapy is now recommending SNF.  SW sent blast referral .      [facility] Phone: [phone]  --- Response: No, unable to accept patient  [facility] Phone: [phone]  -- -  Response: No, unable to accept patient  [facility] Phone: [phone]   Response: No, unable to accept patient  [facility] Phone: [phone]   Response: No, unable to accept patient  [facility] Phone: [phone]   Response: No, unable to accept patient  [facility] Phone: [phone]   Response: No, unable to accept patient  [facility] Phone: [phone]  Not in network with insurance. Please let me know if you receive three in network denials.    [facility] Phone: [phone]   No response   [facility] Phone: [phone] No response   [facility] Phone: [phone]  No response             Payor: MEDICAID / Plan: [facility] COMMUNITY PLAN [facility] ([place] MEDICAID) / Product Type: Managed Medicaid /       Reassessment (most recent)       Discharge Reassessment - 08/17/23 1015          Discharge Reassessment    Assessment Type Discharge Planning Reassessment     Did the patient's condition or plan change since previous assessment? No     Communicated YUSEF with patient/caregiver Date not available/Unable to determine     Discharge Plan A Rehab     Discharge Plan B Rehab     DME Needed Upon Discharge  none     Transition of Care Barriers Underinsured     Why the patient remains in the hospital Requires continued medical care                    Mica Vaughan RN, CCRN-K, [facility]  Neuro-Critical Care   [phone]

## 2023-08-17 NOTE — ANESTHESIA PROCEDURE NOTES
Intubation    Date/Time: 8/17/2023 7:44 AM    Performed by: Kenny Livingston CRNA  Authorized by: Anibal Godoy MD    Intubation:     Induction:  Intravenous    Intubated:  Postinduction    Mask Ventilation:  Easy with oral airway    Attempts:  1    Attempted By:  CRNA    Method of Intubation:  Video laryngoscopy    Blade:  Villalba 3    Laryngeal View Grade: Grade I - full view of cords      Difficult Airway Encountered?: No      Complications:  None    Airway Device:  Oral endotracheal tube    Airway Device Size:  7.5    Style/Cuff Inflation:  Cuffed    Inflation Amount (mL):  8    Tube secured:  21    Secured at:  The lips    Placement Verified By:  Capnometry    Complicating Factors:  None    Findings Post-Intubation:  BS equal bilateral

## 2023-08-18 LAB
ALBUMIN SERPL BCP-MCNC: 3.1 G/DL (ref 3.5–5.2)
ALP SERPL-CCNC: 69 U/L (ref 55–135)
ALT SERPL W/O P-5'-P-CCNC: 7 U/L (ref 10–44)
ANION GAP SERPL CALC-SCNC: 11 MMOL/L (ref 8–16)
ANISOCYTOSIS BLD QL SMEAR: SLIGHT
ANISOCYTOSIS BLD QL SMEAR: SLIGHT
APTT PPP: 43.1 SEC (ref 21–32)
AST SERPL-CCNC: 20 U/L (ref 10–40)
BASOPHILS # BLD AUTO: 0.02 K/UL (ref 0–0.2)
BASOPHILS # BLD AUTO: 0.02 K/UL (ref 0–0.2)
BASOPHILS NFR BLD: 0.2 % (ref 0–1.9)
BASOPHILS NFR BLD: 0.2 % (ref 0–1.9)
BILIRUB SERPL-MCNC: 0.8 MG/DL (ref 0.1–1)
BUN SERPL-MCNC: 34 MG/DL (ref 6–20)
BURR CELLS BLD QL SMEAR: ABNORMAL
BURR CELLS BLD QL SMEAR: ABNORMAL
CALCIUM SERPL-MCNC: 10.4 MG/DL (ref 8.7–10.5)
CHLORIDE SERPL-SCNC: 119 MMOL/L (ref 95–110)
CO2 SERPL-SCNC: 21 MMOL/L (ref 23–29)
CREAT SERPL-MCNC: 2 MG/DL (ref 0.5–1.4)
DIFFERENTIAL METHOD: ABNORMAL
DIFFERENTIAL METHOD: ABNORMAL
EOSINOPHIL # BLD AUTO: 0 K/UL (ref 0–0.5)
EOSINOPHIL # BLD AUTO: 0 K/UL (ref 0–0.5)
EOSINOPHIL NFR BLD: 0 % (ref 0–8)
EOSINOPHIL NFR BLD: 0 % (ref 0–8)
ERYTHROCYTE [DISTWIDTH] IN BLOOD BY AUTOMATED COUNT: 15.3 % (ref 11.5–14.5)
ERYTHROCYTE [DISTWIDTH] IN BLOOD BY AUTOMATED COUNT: 15.3 % (ref 11.5–14.5)
EST. GFR  (NO RACE VARIABLE): 38.4 ML/MIN/1.73 M^2
GLUCOSE SERPL-MCNC: 117 MG/DL (ref 70–110)
HCT VFR BLD AUTO: 48.8 % (ref 40–54)
HCT VFR BLD AUTO: 48.8 % (ref 40–54)
HGB BLD-MCNC: 15.2 G/DL (ref 14–18)
HGB BLD-MCNC: 15.2 G/DL (ref 14–18)
IMM GRANULOCYTES # BLD AUTO: 0.06 K/UL (ref 0–0.04)
IMM GRANULOCYTES # BLD AUTO: 0.06 K/UL (ref 0–0.04)
IMM GRANULOCYTES NFR BLD AUTO: 0.5 % (ref 0–0.5)
IMM GRANULOCYTES NFR BLD AUTO: 0.5 % (ref 0–0.5)
LYMPHOCYTES # BLD AUTO: 0.7 K/UL (ref 1–4.8)
LYMPHOCYTES # BLD AUTO: 0.7 K/UL (ref 1–4.8)
LYMPHOCYTES NFR BLD: 6.5 % (ref 18–48)
LYMPHOCYTES NFR BLD: 6.5 % (ref 18–48)
MAGNESIUM SERPL-MCNC: 2.2 MG/DL (ref 1.6–2.6)
MCH RBC QN AUTO: 27.3 PG (ref 27–31)
MCH RBC QN AUTO: 27.3 PG (ref 27–31)
MCHC RBC AUTO-ENTMCNC: 31.1 G/DL (ref 32–36)
MCHC RBC AUTO-ENTMCNC: 31.1 G/DL (ref 32–36)
MCV RBC AUTO: 88 FL (ref 82–98)
MCV RBC AUTO: 88 FL (ref 82–98)
MONOCYTES # BLD AUTO: 1.3 K/UL (ref 0.3–1)
MONOCYTES # BLD AUTO: 1.3 K/UL (ref 0.3–1)
MONOCYTES NFR BLD: 11.4 % (ref 4–15)
MONOCYTES NFR BLD: 11.4 % (ref 4–15)
NEUTROPHILS # BLD AUTO: 9.2 K/UL (ref 1.8–7.7)
NEUTROPHILS # BLD AUTO: 9.2 K/UL (ref 1.8–7.7)
NEUTROPHILS NFR BLD: 81.4 % (ref 38–73)
NEUTROPHILS NFR BLD: 81.4 % (ref 38–73)
NRBC BLD-RTO: 0 /100 WBC
NRBC BLD-RTO: 0 /100 WBC
PHOSPHATE SERPL-MCNC: 3.7 MG/DL (ref 2.7–4.5)
PLATELET # BLD AUTO: 322 K/UL (ref 150–450)
PLATELET # BLD AUTO: 322 K/UL (ref 150–450)
PLATELET BLD QL SMEAR: ABNORMAL
PLATELET BLD QL SMEAR: ABNORMAL
PMV BLD AUTO: 12.5 FL (ref 9.2–12.9)
PMV BLD AUTO: 12.5 FL (ref 9.2–12.9)
POCT GLUCOSE: 109 MG/DL (ref 70–110)
POCT GLUCOSE: 118 MG/DL (ref 70–110)
POCT GLUCOSE: 49 MG/DL (ref 70–110)
POCT GLUCOSE: 52 MG/DL (ref 70–110)
POIKILOCYTOSIS BLD QL SMEAR: SLIGHT
POIKILOCYTOSIS BLD QL SMEAR: SLIGHT
POTASSIUM SERPL-SCNC: 4.5 MMOL/L (ref 3.5–5.1)
PROT SERPL-MCNC: 7.9 G/DL (ref 6–8.4)
RBC # BLD AUTO: 5.57 M/UL (ref 4.6–6.2)
RBC # BLD AUTO: 5.57 M/UL (ref 4.6–6.2)
SODIUM SERPL-SCNC: 151 MMOL/L (ref 136–145)
WBC # BLD AUTO: 11.29 K/UL (ref 3.9–12.7)
WBC # BLD AUTO: 11.29 K/UL (ref 3.9–12.7)

## 2023-08-18 PROCEDURE — 25000003 PHARM REV CODE 250: Performed by: PSYCHIATRY & NEUROLOGY

## 2023-08-18 PROCEDURE — 99233 SBSQ HOSP IP/OBS HIGH 50: CPT | Mod: ,,, | Performed by: STUDENT IN AN ORGANIZED HEALTH CARE EDUCATION/TRAINING PROGRAM

## 2023-08-18 PROCEDURE — 63600175 PHARM REV CODE 636 W HCPCS: Performed by: PHYSICIAN ASSISTANT

## 2023-08-18 PROCEDURE — 99233 SBSQ HOSP IP/OBS HIGH 50: CPT | Mod: ,,, | Performed by: PSYCHIATRY & NEUROLOGY

## 2023-08-18 PROCEDURE — 11000001 HC ACUTE MED/SURG PRIVATE ROOM

## 2023-08-18 PROCEDURE — 25000003 PHARM REV CODE 250

## 2023-08-18 PROCEDURE — 85730 THROMBOPLASTIN TIME PARTIAL: CPT | Performed by: PSYCHIATRY & NEUROLOGY

## 2023-08-18 PROCEDURE — 25000003 PHARM REV CODE 250: Performed by: NURSE PRACTITIONER

## 2023-08-18 PROCEDURE — 93010 EKG 12-LEAD: ICD-10-PCS | Mod: ,,, | Performed by: INTERNAL MEDICINE

## 2023-08-18 PROCEDURE — 84100 ASSAY OF PHOSPHORUS: CPT | Performed by: PSYCHIATRY & NEUROLOGY

## 2023-08-18 PROCEDURE — 97535 SELF CARE MNGMENT TRAINING: CPT

## 2023-08-18 PROCEDURE — 93010 ELECTROCARDIOGRAM REPORT: CPT | Mod: ,,, | Performed by: INTERNAL MEDICINE

## 2023-08-18 PROCEDURE — 92507 TX SP LANG VOICE COMM INDIV: CPT

## 2023-08-18 PROCEDURE — 99233 PR SUBSEQUENT HOSPITAL CARE,LEVL III: ICD-10-PCS | Mod: ,,, | Performed by: STUDENT IN AN ORGANIZED HEALTH CARE EDUCATION/TRAINING PROGRAM

## 2023-08-18 PROCEDURE — 80053 COMPREHEN METABOLIC PANEL: CPT | Performed by: PSYCHIATRY & NEUROLOGY

## 2023-08-18 PROCEDURE — 25000242 PHARM REV CODE 250 ALT 637 W/ HCPCS

## 2023-08-18 PROCEDURE — 94640 AIRWAY INHALATION TREATMENT: CPT

## 2023-08-18 PROCEDURE — 83735 ASSAY OF MAGNESIUM: CPT | Performed by: PSYCHIATRY & NEUROLOGY

## 2023-08-18 PROCEDURE — 99233 PR SUBSEQUENT HOSPITAL CARE,LEVL III: ICD-10-PCS | Mod: ,,, | Performed by: PSYCHIATRY & NEUROLOGY

## 2023-08-18 PROCEDURE — 25000003 PHARM REV CODE 250: Performed by: PHYSICIAN ASSISTANT

## 2023-08-18 PROCEDURE — 97530 THERAPEUTIC ACTIVITIES: CPT

## 2023-08-18 PROCEDURE — 97112 NEUROMUSCULAR REEDUCATION: CPT | Mod: CQ

## 2023-08-18 PROCEDURE — 93005 ELECTROCARDIOGRAM TRACING: CPT

## 2023-08-18 PROCEDURE — 85025 COMPLETE CBC W/AUTO DIFF WBC: CPT | Performed by: PHYSICIAN ASSISTANT

## 2023-08-18 PROCEDURE — 97530 THERAPEUTIC ACTIVITIES: CPT | Mod: CQ

## 2023-08-18 PROCEDURE — 94761 N-INVAS EAR/PLS OXIMETRY MLT: CPT

## 2023-08-18 RX ORDER — CLONIDINE HYDROCHLORIDE 0.1 MG/1
0.1 TABLET ORAL 2 TIMES DAILY
Status: DISCONTINUED | OUTPATIENT
Start: 2023-08-18 | End: 2023-08-19

## 2023-08-18 RX ADMIN — ACETYLCYSTEINE 4 ML: 100 INHALANT RESPIRATORY (INHALATION) at 02:08

## 2023-08-18 RX ADMIN — EZETIMIBE 10 MG: 10 TABLET ORAL at 09:08

## 2023-08-18 RX ADMIN — SENNOSIDES AND DOCUSATE SODIUM 1 TABLET: 50; 8.6 TABLET ORAL at 09:08

## 2023-08-18 RX ADMIN — APIXABAN 5 MG: 5 TABLET, FILM COATED ORAL at 09:08

## 2023-08-18 RX ADMIN — ONDANSETRON 4 MG: 2 INJECTION INTRAMUSCULAR; INTRAVENOUS at 04:08

## 2023-08-18 RX ADMIN — CLONIDINE HYDROCHLORIDE 0.1 MG: 0.1 TABLET ORAL at 09:08

## 2023-08-18 RX ADMIN — IPRATROPIUM BROMIDE AND ALBUTEROL SULFATE 3 ML: .5; 3 SOLUTION RESPIRATORY (INHALATION) at 02:08

## 2023-08-18 RX ADMIN — SENNOSIDES AND DOCUSATE SODIUM 1 TABLET: 50; 8.6 TABLET ORAL at 08:08

## 2023-08-18 RX ADMIN — METOPROLOL TARTRATE 25 MG: 25 TABLET, FILM COATED ORAL at 09:08

## 2023-08-18 RX ADMIN — METOPROLOL TARTRATE 25 MG: 25 TABLET, FILM COATED ORAL at 08:08

## 2023-08-18 RX ADMIN — METOPROLOL TARTRATE 5 MG: 5 INJECTION, SOLUTION INTRAVENOUS at 09:08

## 2023-08-18 RX ADMIN — ASPIRIN 81 MG 81 MG: 81 TABLET ORAL at 08:08

## 2023-08-18 RX ADMIN — FERROUS SULFATE TAB 325 MG (65 MG ELEMENTAL FE) 1 EACH: 325 (65 FE) TAB at 08:08

## 2023-08-18 RX ADMIN — DEXMEDETOMIDINE HYDROCHLORIDE 0.4 MCG/KG/HR: 4 INJECTION INTRAVENOUS at 05:08

## 2023-08-18 RX ADMIN — MUPIROCIN: 20 OINTMENT TOPICAL at 09:08

## 2023-08-18 RX ADMIN — ATORVASTATIN CALCIUM 80 MG: 40 TABLET, FILM COATED ORAL at 08:08

## 2023-08-18 RX ADMIN — POLYETHYLENE GLYCOL 3350 17 G: 17 POWDER, FOR SOLUTION ORAL at 08:08

## 2023-08-18 RX ADMIN — AMIODARONE HYDROCHLORIDE 200 MG: 200 TABLET ORAL at 08:08

## 2023-08-18 RX ADMIN — IPRATROPIUM BROMIDE AND ALBUTEROL SULFATE 3 ML: .5; 3 SOLUTION RESPIRATORY (INHALATION) at 08:08

## 2023-08-18 RX ADMIN — APIXABAN 5 MG: 5 TABLET, FILM COATED ORAL at 10:08

## 2023-08-18 RX ADMIN — ACETYLCYSTEINE 4 ML: 100 INHALANT RESPIRATORY (INHALATION) at 08:08

## 2023-08-18 RX ADMIN — IPRATROPIUM BROMIDE AND ALBUTEROL SULFATE 3 ML: .5; 3 SOLUTION RESPIRATORY (INHALATION) at 07:08

## 2023-08-18 RX ADMIN — ACETYLCYSTEINE 4 ML: 100 INHALANT RESPIRATORY (INHALATION) at 07:08

## 2023-08-18 RX ADMIN — DEXTROSE MONOHYDRATE 250 ML: 100 INJECTION, SOLUTION INTRAVENOUS at 05:08

## 2023-08-18 RX ADMIN — MUPIROCIN: 20 OINTMENT TOPICAL at 10:08

## 2023-08-18 RX ADMIN — AMIODARONE HYDROCHLORIDE 200 MG: 200 TABLET ORAL at 09:08

## 2023-08-18 RX ADMIN — CLONIDINE HYDROCHLORIDE 0.1 MG: 0.1 TABLET ORAL at 10:08

## 2023-08-18 RX ADMIN — CHLORPROMAZINE HYDROCHLORIDE 25 MG: 25 INJECTION INTRAMUSCULAR at 08:08

## 2023-08-18 NOTE — SIGNIFICANT EVENT
Patient agitated, kicking at nurse. Qtc prolonged.   Precedex restarted, initaly max 0.4, but still agitated so increased to 0.6

## 2023-08-18 NOTE — PLAN OF CARE
Problem: Cognitive Impairment (Stroke, Ischemic/Transient Ischemic Attack)  Goal: Optimal Cognitive Function  Outcome: Ongoing, Not Progressing     Problem: Restraint, Nonbehavioral (Nonviolent)  Goal: Absence of Harm or Injury  Outcome: Ongoing, Progressing   Psychiatric Care Plan    POC reviewed with Tera Griffiths and family at 0300. Pt unable to verbalized understanding. Questions and concerns addressed. Pt was kicking and thrashing in bed with despite having restraints on. Precedex started. Pt received 250 bolus of dextrose 10 % due to low blood sugar. Pt progressing toward goals. Will continue to monitor. See below and flowsheets for full assessment and VS info.           Is this a stroke patient? yes- Stroke booklet reviewed with patient and family, risk factors identified for patient and stroke booklet remains at bedside for ongoing education.     Neuro:  [place] Coma Scale  Best Eye Response: 4-->(E4) spontaneous  Best Motor Response: 5-->(M5) localizes pain  Best Verbal Response: 1-->(V1) none  [place] Coma Scale Score: 10  Assessment Qualifiers: patient not sedated/intubated  Pupil PERRLA: yes     24hr Temp:  [97.6 °F (36.4 °C)-98.2 °F (36.8 °C)]     CV:   Rhythm: paced rhythm  BP goals:   SBP < 180  MAP > 65    Resp:      Oxygen Concentration (%): 28    Plan: N/A    GI/:     Diet/Nutrition Received: NPO, tube feeding  Last Bowel Movement: 08/06/23  Voiding Characteristics: external catheter    Intake/Output Summary (Last 24 hours) at 8/18/2023 0554  Last data filed at 8/18/2023 0505  Gross per 24 hour   Intake 1233.03 ml   Output 725 ml   Net 508.03 ml     Unmeasured Output  Urine Occurrence: 1  Stool Occurrence: 0  Emesis Occurrence: 0  Pad Count: 1    Labs/Accuchecks:  Recent Labs   Lab 08/18/23  0302   WBC 11.29  11.29   RBC 5.57  5.57   HGB 15.2  15.2   HCT 48.8  48.8     322      Recent Labs   Lab 08/18/23  0346   *   K 4.5   CO2 21*   *   BUN 34*   CREATININE 2.0*   ALKPHOS  "69   ALT 7*   AST 20   BILITOT 0.8      Recent Labs   Lab 08/17/23  1351 08/17/23 2007 08/18/23  0346   INR 1.2  --   --    APTT 21.3   < > 43.1*    < > = values in this interval not displayed.    No results for input(s): "CPK", "CPKMB", "TROPONINI", "MB" in the last 168 hours.    Electrolytes: N/A - electrolytes WDL  Accuchecks: Q6H    Gtts:   dexmedeTOMIDine (Precedex) infusion (titrating) 0.4 mcg/kg/hr (08/18/23 0534)    heparin (porcine) in D5W 13 Units/kg/hr (08/17/23 2121)       LDA/Wounds:  Lines/Drains/Airways       Drain  Duration             Male External Urinary Catheter 08/09/23 1305 Large 8 days         Gastrostomy/Enterostomy 08/17/23 0800 Percutaneous endoscopic gastrostomy (PEG) midline <1 day              Peripheral Intravenous Line  Duration                  Peripheral IV - Single Lumen 08/13/23 1125 20 G Posterior;Right Hand 4 days         Peripheral IV - Single Lumen 08/14/23 22 G Left;Posterior Hand 4 days         Peripheral IV - Single Lumen 08/17/23 1417 18 G Anterior;Left;Proximal Forearm <1 day         Peripheral IV - Single Lumen 08/17/23 1418 Anterior;Right Foot <1 day                  Wounds: No  Wound care consulted: No   "

## 2023-08-18 NOTE — ASSESSMENT & PLAN NOTE
Baseline Cr 1.7  - Stable  - Strict I/O's  - Avoid nephrotoxic agents where appropriate  - Renally-dose meds    EMERSON on CKD 8/18, UOP adequate  After having no enteral access, now s/p PEG tube  Enteral H2O started, Advancing TF

## 2023-08-18 NOTE — NURSING TRANSFER
Nursing Transfer Note      8/18/2023   5:19 PM    Nurse giving handoff:CARL Kirk  Nurse receiving handoff:CARL Leahy    Reason patient is being transferred: Per MD orders    Transfer To: [address] from [place] [address]     Transfer via stretcher    Transfer with cardiac monitoring    Transported by RN      Telemetry: Telemetry     Order for Tele Monitor? Yes    Additional Lines: Ugalde Catheter    4eyes on Skin: yes    Medicines sent: yes      Patient belongings transferred with patient: No    Chart send with patient: Yes    Notified: spouse    Patient reassessed at: the bedside time 1640 date 08/18   1  Upon arrival to floor: cardiac monitor applied, patient oriented to room, call bell in reach, and bed in lowest position

## 2023-08-18 NOTE — ASSESSMENT & PLAN NOTE
Echo from 8/8/2023    Left Ventricle: The left ventricle is moderately dilated. Increased   ventricular mass. Normal wall thickness. There is moderate eccentric   hypertrophy. Severe global hypokinesis present. Septal motion is   consistent with pacing. There is severely reduced systolic function with a   visually estimated ejection fraction of 15 - 20%. Ejection fraction by   visual approximation is 20%. Unable to assess diastolic function due to   arrhythmia.    Left Atrium: Left atrium is severely dilated. Radha 85mL/m2.    Right Ventricle: Mild right ventricular enlargement. Wall thickness is   normal. Right ventricle wall motion  is normal. Systolic function is   severely reduced. Pacemaker lead present in the ventricle.    Right Atrium: Right atrium is severely dilated.    Aortic Valve: There is mild aortic valve sclerosis. There is normal   leaflet mobility. There is no significant regurgitation.    Mitral Valve: There is bileaflet sclerosis. There is normal leaflet   mobility. There is mild regurgitation.    Tricuspid Valve: The tricuspid valve is structurally normal. There is   normal leaflet mobility. There is mild regurgitation.    Pulmonic Valve: The pulmonic valve is structurally normal. There is   normal leaflet mobility. There is no significant regurgitation.    Aorta: Aortic root is normal in size measuring 3.13 cm. Ascending aorta   is normal measuring 3.24 cm.    IVC/SVC: Normal venous pressure at 3 mmHg.    Pericardium: The pericardium is normal. There is no pericardial   effusion.  - Initially admitted for CHF exacerbation and c/o CP  - BNP elevated on admission - 1956   - Weigh patient QD   - Strict I/Os Q1H  - GDMT   - Holding Lasix 80mg BID IV due to EMERSON, continue to reevaluate daily    - Metoprolol 25mg QD   - Entresto 49-51 QD- on hold  - Hold home Jardiance  - Cardiology signed-off

## 2023-08-18 NOTE — PT/OT/SLP PROGRESS
Physical Therapy Treatment  -cotx with OT d/t assistance needed for safe, skilled intervention    Patient Name:  Tera Griffiths   MRN:  [id number]    Recommendations:     Discharge Recommendations: nursing facility, skilled  Discharge Equipment Recommendations: other (see comments) (TBD)  Barriers to discharge: Inaccessible home and Decreased caregiver support    Assessment:     Tera Griffiths is a 58 y.o. male admitted with a medical diagnosis of Embolic stroke involving left middle cerebral artery.  He presents with the following impairments/functional limitations: weakness, impaired endurance, impaired self care skills, impaired functional mobility, gait instability, impaired balance, visual deficits, impaired cognition, decreased coordination, decreased lower extremity function, decreased safety awareness, pain, impaired coordination, impaired fine motor, impaired cardiopulmonary response to activity.    Pt tolerates fairly with focus on bed mobility, EOB balance/endurance, and transfer from EOB. Pt requiring assistance of 2 persons to safely mobilize and assure pt and staff safety as pt initially mildly agitated flailing LUE and LLE. Pt calmer at EOB with tactile redirection and cueing for spontaneous movements. Pt does not follow any commands or make any attempts to communicate. Significant R neglect but does shift gaze to midline briefly with assist for head/neck positioning. Pt returned to supine as he becomes more  resistive to physical assist and begins shifting himself back into bed. Pt will continue to benefit from therapy services to address impairments listed above.     Rehab Prognosis: Fair; patient would benefit from acute skilled PT services to address these deficits and reach maximum level of function.    Recent Surgery: Procedure(s) (LRB):  EGD, WITH PEG TUBE INSERTION (N/A) 1 Day Post-Op    Plan:     During this hospitalization, patient to be seen 3 x/week to address the identified rehab  impairments via gait training, therapeutic activities, therapeutic exercises, neuromuscular re-education and progress toward the following goals:    Plan of Care Expires:  09/08/23    Subjective     Chief Complaint: non-verbal; distal LLE tender to palpation, pt withdraws/kicks leg with tactile contact to region  Patient/Family Comments/goals: non-verbal  Pain/Comfort:  Pain Rating 1: other (see comments) (unable to localize or rate; pain indicated LLE with tactile contact)  Location - Side 1: Left  Location - Orientation 1: generalized  Location 1: ankle  Pain Addressed 1: Reposition, Cessation of Activity  Pain Rating Post-Intervention 1: 0/10      Objective:     Communicated with RN prior to session.  Patient found supine with Condom Catheter, telemetry, pulse ox (continuous), PEG Tube upon PTA/OT entry to room.     General Precautions: Standard, aspiration, fall  Orthopedic Precautions: N/A  Braces: N/A  Respiratory Status: Room air     Functional Mobility:  Bed Mobility:     Rolling Right: maximal assistance and of 2 persons  Supine to Sit: maximal assistance and of 2 persons  Sit to Supine: maximal assistance and of 2 persons  Transfers:     Sit to Stand:  maximal assistance and of 2 persons with hand-held assist; stands ~2 minutes; cues provided for neck, trunk, and hip extension   Gait: unable d/t assistance needed to sustain stand      AM-PAC 6 CLICK MOBILITY  Turning over in bed (including adjusting bedclothes, sheets and blankets)?: 1  Sitting down on and standing up from a chair with arms (e.g., wheelchair, bedside commode, etc.): 1  Moving from lying on back to sitting on the side of the bed?: 1  Moving to and from a bed to a chair (including a wheelchair)?: 1  Need to walk in hospital room?: 1  Climbing 3-5 steps with a railing?: 1  Basic Mobility Total Score: 6       Treatment & Education:  Pt provided cues for core/trunk activation at EOB, no command following and only spontaneous movement noted.    Assisted with head and neck positioning for neutral positioning; pt tendency for extreme L cervical rotation.   RUE weight bearing facilitated for joint approximation and sensory input.     Patient left HOB elevated with all lines intact and call button in reach.    GOALS:   Multidisciplinary Problems       Physical Therapy Goals          Problem: Physical Therapy    Goal Priority Disciplines Outcome Goal Variances Interventions   Physical Therapy Goal     PT, PT/OT Ongoing, Progressing     Description: Goals to be completed by: 9/8/23    Pt will perform sup<>sit transfers w/ minimum assistance  Pt will have sufficient dynamic balance to sit EOB while performing ADLs/therex w/ stand by assistance for 10 min  Pt will be able to stand up from EOB w/ moderate assistance using LRAD  Pt will ambulate 20 feet w/ maximal assistance using LRAD  Pt will be independent w/ HEP therex on BLE w/ good form and ROM   Pt will follow >50 % of single-step commands throughout treatment session                        Time Tracking:     PT Received On: 08/18/23  PT Start Time: 1020     PT Stop Time: 1045  PT Total Time (min): 25 min     Billable Minutes: Therapeutic Activity 8 and Neuromuscular Re-education 17    Treatment Type: Treatment  PT/PTA: PTA     Number of PTA visits since last PT visit: 2     08/18/2023

## 2023-08-18 NOTE — SUBJECTIVE & OBJECTIVE
Review of Systems: Unable to obtain a complete ROS due to level of consciousness.     Vitals:   Temp: 99.2 °F (37.3 °C)  Pulse: 73  Rhythm: normal sinus rhythm  BP: 123/75  MAP (mmHg): 92  Resp: (!) 22  SpO2: 100 %    Temp  Min: 97.8 °F (36.6 °C)  Max: 99.6 °F (37.6 °C)  Pulse  Min: 63  Max: 119  BP  Min: 83/49  Max: 180/84  MAP (mmHg)  Min: 61  Max: 132  Resp  Min: 17  Max: 41  SpO2  Min: 95 %  Max: 100 %    08/17 0701 - 08/18 0700  In: 1473.9 [I.V.:261]  Out: 650 [Urine:400; Drains:250]   Unmeasured Output  Urine Occurrence: 1  Stool Occurrence: 0  Emesis Occurrence: 0  Pad Count: 1     Examination:   Constitutional: Well-nourished and -developed. No apparent distress.   Eyes: Conjunctiva clear, anicteric. Lids no lesions.  Head/Ears/Nose/Mouth/Throat/Neck: Moist mucous membranes. External ears, nose atraumatic.   Cardiovascular: Regular rhythm. No leg edema.  Respiratory: Comfortable respirations. Clear to auscultation.  Gastrointestinal: PEG tube site CDI, Soft, nondistended, nontender. + bowel sounds.    Neurologic:  -GCS E 4 V 1 M 5  -Drowsy. Opens eyes to voice. Aphasic. Unable to follow commands.  -Cranial nerves: L gaze preference, PERRL, R facial droop, + cough  -Motor: R hemiparesis, L side moves spontaneous/antigravity  Unable to test orientation, language, memory, judgment, insight, fund of knowledge, shoulder shrug, tongue protrusion, coordination, gait due to level of consciousness.    Medications:   Continuous   Scheduledacetylcysteine 100 mg/ml (10%), 4 mL, TID WAKE  albuterol-ipratropium, 3 mL, TID WAKE  amiodarone, 200 mg, BID  apixaban, 5 mg, BID  aspirin, 81 mg, Daily  atorvastatin, 80 mg, Daily  cloNIDine, 0.1 mg, BID  ezetimibe, 10 mg, QHS  ferrous sulfate, 1 tablet, Daily  metoprolol tartrate, 25 mg, BID  mupirocin, , BID  polyethylene glycol, 17 g, Daily  senna-docusate 8.6-50 mg, 1 tablet, BID    PRNacetaminophen, 650 mg, Q6H PRN  bisacodyL, 10 mg, Daily PRN  dextrose 10%, 12.5 g,  "PRN  dextrose 10%, 25 g, PRN  glucagon (human recombinant), 1 mg, PRN  hydrALAZINE, 10 mg, Q6H PRN  insulin aspart U-100, 1-10 Units, Q6H PRN  labetalol, 10 mg, Q6H PRN  magnesium sulfate IVPB, 2 g, PRN  magnesium sulfate IVPB, 4 g, PRN  metoprolol, 5 mg, Q5 Min PRN  nitroGLYCERIN, 0.4 mg, Q5 Min PRN  ondansetron, 4 mg, Q8H PRN  potassium chloride, 40 mEq, PRN   And  potassium chloride, 60 mEq, PRN   And  potassium chloride, 80 mEq, PRN  sodium chloride 0.9%, 10 mL, PRN  sodium chloride 0.9%, 10 mL, PRN  sodium chloride 0.9%, 10 mL, PRN  sodium phosphate 15 mmol in dextrose 5 % (D5W) 250 mL IVPB, 15 mmol, PRN  sodium phosphate 20.01 mmol in dextrose 5 % (D5W) 250 mL IVPB, 20.01 mmol, PRN  sodium phosphate 30 mmol in dextrose 5 % (D5W) 250 mL IVPB, 30 mmol, PRN       Today I independently reviewed pertinent medications, lines/drains/airways, imaging, cardiology results, laboratory results, microbiology results, notably:     ISTAT: No results for input(s): "PH", "PCO2", "PO2", "POCSATURATED", "HCO3", "BE", "POCNA", "POCK", "POCTCO2", "POCGLU", "POCICA", "POCLAC", "SAMPLE" in the last 24 hours.   Chem:   Recent Labs   Lab 08/18/23  0346   *   K 4.5   *   CO2 21*   *   BUN 34*   CREATININE 2.0*   CALCIUM 10.4   MG 2.2   PHOS 3.7   ANIONGAP 11   PROT 7.9   ALBUMIN 3.1*   BILITOT 0.8   ALKPHOS 69   AST 20   ALT 7*     Heme:   Recent Labs   Lab 08/17/23  1351 08/18/23  0302   WBC 10.93 11.29  11.29   HGB 15.3 15.2  15.2   HCT 49.5 48.8  48.8    322  322   INR 1.2  --      Endo:   Recent Labs   Lab 08/18/23  0530 08/18/23  0532 08/18/23  0606   POCTGLUCOSE 52* 49* 109        "
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
## 2023-08-18 NOTE — PLAN OF CARE
08/18/23 0934   Post-Acute Status   Post-Acute Authorization Placement   Post-Acute Placement Status Referrals Sent   Coverage Medicaid   Discharge Plan   Discharge Plan A Skilled Nursing Facility     SW sent out more SNF referrals to the following facilities- [place] via [facility] for review.   1.[facility] Phone: [phone]    2.[facility] Phone: [phone]    3.[facility] Phone: [phone]    4.[facility] Phone: [phone]    5.[facility] Phone: [phone]    6.[facility] Phone: [phone]    7.[facility] [email or web address] Phone: [phone]    8.[facility] Phone: [phone]    [facility] Phone: [phone]    9.[facility] Phone: [phone]      SW will continue to follow patient.\      Asmita Urena LMSW  PRN - [facility]  [id number]

## 2023-08-18 NOTE — SUBJECTIVE & OBJECTIVE
Neurologic Chief Complaint: L MCA syndrome    Subjective:     Interval History: Patient is seen for follow-up neurological assessment and treatment recommendations:   Agitated overnight required 4 point restraint and precedex.    HPI, Past Medical, Family, and Social History remains the same as documented in the initial encounter.     Review of Systems   Unable to perform ROS: Other (sedated)   Neurological:  Positive for speech difficulty.     Scheduled Meds:   acetylcysteine 100 mg/ml (10%)  4 mL Nebulization TID WAKE    albuterol-ipratropium  3 mL Nebulization TID WAKE    amiodarone  200 mg Per NG tube BID    apixaban  5 mg Per G Tube BID    aspirin  81 mg Per G Tube Daily    atorvastatin  80 mg Per NG tube Daily    cloNIDine  0.1 mg Per G Tube BID    ezetimibe  10 mg Per NG tube QHS    ferrous sulfate  1 tablet Per NG tube Daily    metoprolol tartrate  25 mg Per NG tube BID    mupirocin   Nasal BID    polyethylene glycol  17 g Per NG tube Daily    senna-docusate 8.6-50 mg  1 tablet Per NG tube BID     Continuous Infusions:      PRN Meds:acetaminophen, bisacodyL, dextrose 10%, dextrose 10%, glucagon (human recombinant), hydrALAZINE, insulin aspart U-100, labetalol, magnesium sulfate IVPB, magnesium sulfate IVPB, metoprolol, nitroGLYCERIN, ondansetron, potassium chloride **AND** potassium chloride **AND** potassium chloride, sodium chloride 0.9%, sodium chloride 0.9%, sodium chloride 0.9%, sodium phosphate 15 mmol in dextrose 5 % (D5W) 250 mL IVPB, sodium phosphate 20.01 mmol in dextrose 5 % (D5W) 250 mL IVPB, sodium phosphate 30 mmol in dextrose 5 % (D5W) 250 mL IVPB    Objective:     Vital Signs (Most Recent):  Temp: 99.6 °F (37.6 °C) (08/18/23 0701)  Pulse: 79 (08/18/23 0749)  Resp: 17 (08/18/23 0749)  BP: 118/86 (08/18/23 0701)  SpO2: 97 % (08/18/23 0749)  BP Location: Right arm    Vital Signs Range (Last 24H):  Temp:  [97.8 °F (36.6 °C)-99.6 °F (37.6 °C)]   Pulse:  []   Resp:  [17-41]   BP:  "()/()   SpO2:  [94 %-100 %]   BP Location: Right arm       Physical Exam  Vitals and nursing note reviewed.   Constitutional:       General: He is not in acute distress.     Appearance: He is well-developed.      Comments: Sedated on precedex   HENT:      Head: Normocephalic and atraumatic.   Cardiovascular:      Rate and Rhythm: Normal rate.   Pulmonary:      Effort: Pulmonary effort is normal. No respiratory distress.   Abdominal:      General: There is no distension.   Skin:     General: Skin is warm and dry.   Neurological:      Comments: Aphasic, does not follow commands  L gaze              Neurological Exam:   LOC: drowsy  Attention Span: poor  Language: Global aphasia, not following commands  Articulation: Untestable due to severe aphasia   Orientation: Untestable due to severe aphasia   EOM (CN III, IV, VI): Gaze preference  left  Facial Movement (CN VII): Lower facial weakness on the Right  Motor: moves left spontaneous, right hemiparesis  Unable to assess drift and strength as patient does not follow commands, and gets agitated      Laboratory:  CMP:   Recent Labs   Lab 08/18/23  0346   CALCIUM 10.4   ALBUMIN 3.1*   PROT 7.9   *   K 4.5   CO2 21*   *   BUN 34*   CREATININE 2.0*   ALKPHOS 69   ALT 7*   AST 20   BILITOT 0.8       CBC:   Recent Labs   Lab 08/18/23  0302   WBC 11.29  11.29   RBC 5.57  5.57   HGB 15.2  15.2   HCT 48.8  48.8     322   MCV 88  88   MCH 27.3  27.3   MCHC 31.1*  31.1*       Lipid Panel:   No results for input(s): "CHOL", "LDLCALC", "HDL", "TRIG" in the last 168 hours.    Coagulation:   Recent Labs   Lab 08/17/23  1351 08/17/23 2007 08/18/23  0346   INR 1.2  --   --    APTT 21.3   < > 43.1*    < > = values in this interval not displayed.       Platelet Aggregation Study: No results for input(s): "PLTAGG", "PLTAGINTERP", "PLTAGREGLACO", "ADPPLTAGGREG" in the last 168 hours.  Hgb A1C:   No results for input(s): "HGBA1C" in the last 168 " "hours.    TSH:   No results for input(s): "TSH" in the last 168 hours.      Diagnostic Results     Brain Imaging   CT Head 8/10/23  Impression:     No detrimental change compared to prior.     Evolving acute infarcts within the left MCA and PCA territories.  No hemorrhagic conversion.  No significant mass effect     Suspected small subacute infarction within the right caudate.     Multiple areas remote infarctions as detailed above.      CT Head 8/8/23 1704  Impression:     1. Redemonstration of acute infarctions in the left MCA and PCA territories.  No evidence to suggest hemorrhagic transformation.  2. Suspected small subacute infarction in the right caudate.  3. Numerous remote infarctions as detailed above.    CT Head 8/8/23 0826  Impression:     Moderate developing hypoattenuation left inferior frontal lobe and left occipital lobe concerning for developing recent infarcts post thrombectomy.     Allowing for scattered hyperdensity related to recently contrast administration for thrombectomy there is no definite acute intracranial hemorrhage.     Previous hypodensity right caudate no longer seen which may be related to contrast administration for thrombectomy and possible subacute age infarction.     Superimposed remote infarcts with moderate to large encephalomalacia right MCA territory unchanged    Vessel Imaging   IR Angio 8/8/23  Preliminary Interpretation:   1.    No evidence of left ICA or MCA stenosis. No large or medium vessel occlusion.    CTA Multiphase 8/7/23  Impression:     CT head:     New right caudate nucleus hypodensity, likely representing age-indeterminate infarct.  Could be further evaluated with MRI.     Multifocal encephalomalacia involving the right frontal, temporoparietal, and left occipital lobes.     CTA head and neck:     Acute occlusion of the superior left M2 segment.     Left PCA occlusion of undetermined age.     Filling defect in the right main pulmonary artery, concerning for " acute pulmonary thromboembolism.  Consider follow-up pulmonary CTA to more fully assess the extent of thromboemboli, the clot burden, and the presence or absence of right heart strain.     Not fully detailed above:     - Incomplete opacification of the left atrial appendage, suspicious for an intraluminal thrombus.  Follow-up echocardiogram, or cardiac MRI or CTA, recommended.     - The presence of acute thrombi or thromboemboli in both the systemic arterial and pulmonary arterial circulation raises concern for the possibility of underlying intracardiac or extracardiac right-to-left shunt, and/or a hypercoagulable state.  Correlate clinically.     - Incompletely visualized, but possibly large, pericardial effusion.  Artifacts compromise accurate assessment of its attenuation.    Cardiac Imaging   TTE with bubble 8/8/23    Left Ventricle: The left ventricle is moderately dilated. Increased ventricular mass. Normal wall thickness. There is moderate eccentric hypertrophy. Severe global hypokinesis present. Septal motion is consistent with pacing. There is severely reduced systolic function with a visually estimated ejection fraction of 15 - 20%. Ejection fraction by visual approximation is 20%. Unable to assess diastolic function due to arrhythmia.    Left Atrium: Left atrium is severely dilated. Radha 85mL/m2.    Right Ventricle: Mild right ventricular enlargement. Wall thickness is normal. Right ventricle wall motion  is normal. Systolic function is severely reduced. Pacemaker lead present in the ventricle.    Right Atrium: Right atrium is severely dilated.    Aortic Valve: There is mild aortic valve sclerosis. There is normal leaflet mobility. There is no significant regurgitation.    Mitral Valve: There is bileaflet sclerosis. There is normal leaflet mobility. There is mild regurgitation.    Tricuspid Valve: The tricuspid valve is structurally normal. There is normal leaflet mobility. There is mild regurgitation.     Pulmonic Valve: The pulmonic valve is structurally normal. There is normal leaflet mobility. There is no significant regurgitation.    Aorta: Aortic root is normal in size measuring 3.13 cm. Ascending aorta is normal measuring 3.24 cm.    IVC/SVC: Normal venous pressure at 3 mmHg.    Pericardium: The pericardium is normal. There is no pericardial effusion.

## 2023-08-18 NOTE — ASSESSMENT & PLAN NOTE
- PMHx of multiple subsegmental PEs on CTAs 9/2021 & 12/2021  - 8/8: CTA Evidence of pulmonary embolism involving the right distal interlobar artery and the segmental/subsegmental bilateral posterior pulmonary arteries without heart strain.   - Tolerating room air   - Heparin gtt transitioned to apixaban 8/18
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
## 2023-08-18 NOTE — PROGRESS NOTES
[facility] - Neuro Critical Care  General Surgery  Progress Note    Subjective:     History of Present Illness:  57 yo M with atrial fibrillation (on Apixban), CAD (associated with STEMI in 08/2021 and NTSEMI in 04/2023; status post PCI in 08/2021; on PLAVIX), combined CHF (EF 10% in 07/2023; status post ICD placement; associated with pulmonary hypertension), and HTN. Patient admitted to  08/05 for CHF exacerbation. Hospital course c/b left MCA stroke on 08/07 without evidence of vessel occlusion or stenosis on angiogram and evidence of bilateral PE on CTA requiring Heparin drip. He has since developed dysphagia hindering safe PO intake. General surgery consulted for placement of PEG tube.     Of note, GI was consulted for PEG placement and were unable to place PEG tube due to lack of transillumination during endoscopy. They were able to place NGT under direct visualization (nursing staff had previously been unable to place NGT) and recommended IR peg tube placement, however the NGT has become clogged following medication administration this morning.       Post-Op Info:  Procedure(s) (LRB):  EGD, WITH PEG TUBE INSERTION (N/A)   1 Day Post-Op     Interval History: NAEON. Afebrile. HDS. POD1 PEG. Tolerating TF.    Medications:  Continuous Infusions:   heparin (porcine) in D5W 13 Units/kg/hr (08/18/23 0633)     Scheduled Meds:   acetylcysteine 100 mg/ml (10%)  4 mL Nebulization TID WAKE    albuterol-ipratropium  3 mL Nebulization TID WAKE    amiodarone  200 mg Per NG tube BID    aspirin  81 mg Per G Tube Daily    atorvastatin  80 mg Per NG tube Daily    cloNIDine  0.1 mg Per G Tube BID    ezetimibe  10 mg Per NG tube QHS    ferrous sulfate  1 tablet Per NG tube Daily    metoprolol tartrate  25 mg Per NG tube BID    mupirocin   Nasal BID    polyethylene glycol  17 g Per NG tube Daily    senna-docusate 8.6-50 mg  1 tablet Per NG tube BID     PRN Meds:acetaminophen, bisacodyL, dextrose 10%, dextrose 10%,  glucagon (human recombinant), hydrALAZINE, insulin aspart U-100, labetalol, magnesium sulfate IVPB, magnesium sulfate IVPB, metoprolol, nitroGLYCERIN, ondansetron, potassium chloride **AND** potassium chloride **AND** potassium chloride, sodium chloride 0.9%, sodium chloride 0.9%, sodium chloride 0.9%, sodium phosphate 15 mmol in dextrose 5 % (D5W) 250 mL IVPB, sodium phosphate 20.01 mmol in dextrose 5 % (D5W) 250 mL IVPB, sodium phosphate 30 mmol in dextrose 5 % (D5W) 250 mL IVPB     Review of patient's allergies indicates:  No Known Allergies  Objective:     Vital Signs (Most Recent):  Temp: 99.6 °F (37.6 °C) (08/18/23 0701)  Pulse: 79 (08/18/23 0749)  Resp: 17 (08/18/23 0749)  BP: 118/86 (08/18/23 0701)  SpO2: 97 % (08/18/23 0749) Vital Signs (24h Range):  Temp:  [97.8 °F (36.6 °C)-99.6 °F (37.6 °C)] 99.6 °F (37.6 °C)  Pulse:  [] 79  Resp:  [17-41] 17  SpO2:  [94 %-100 %] 97 %  BP: ()/() 118/86     Weight: 83.5 kg (184 lb 1.4 oz)  Body mass index is 24.29 kg/m².    Intake/Output - Last 3 Shifts         08/16 0700 08/17 0659 08/17 0700 08/18 0659 08/18 0700 08/19 0659    I.V. (mL/kg) 369.3 (4.4) 261 (3.1)     NG/GT  670     IV Piggyback  542.9     Total Intake(mL/kg) 369.3 (4.4) 1473.9 (17.7)     Urine (mL/kg/hr) 750 (0.4) 400 (0.2)     Drains  250     Total Output 750 650     Net -380.7 +823.9                    Physical Exam  Vitals and nursing note reviewed.   Constitutional:       General: He is not in acute distress.     Appearance: He is not diaphoretic.   HENT:      Head: Normocephalic and atraumatic.   Eyes:      Conjunctiva/sclera: Conjunctivae normal.   Cardiovascular:      Rate and Rhythm: Normal rate.   Pulmonary:      Effort: Pulmonary effort is normal. No respiratory distress.   Abdominal:      General: There is no distension.      Palpations: Abdomen is soft.      Tenderness: There is no abdominal tenderness. There is no guarding or rebound.      Comments: Transverse umbilical  scar  Epigastric lap incisions    PEG site is clean and dry, TF running   Skin:     General: Skin is warm and dry.      Coloration: Skin is not jaundiced.          Significant Labs:  I have reviewed all pertinent lab results within the past 24 hours.    Significant Diagnostics:  I have reviewed all pertinent imaging results/findings within the past 24 hours.    Assessment/Plan:     * Embolic stroke involving left middle cerebral artery  Patient is a 58 year old M w/ CHFrEF (10%, DD, pHTN), CAD (h/o STEMI, s/p PCI to Rcx-08/2021), AICD placement (12/2021), pAF (s/p DCCV), HTN, CKD3b admitted to  8/52 for CHF exacerbation. Course c/b CVA and resulting dysphasia, patient now requiring enteral access. Failed PEG tube placement by GI. S/p PEG 8/17    - Okay for meds and TF via PEG  - Remainder of care per primary team. General surgery will sign off  - Please contact general surgery with any questions, concerns, or clinical status changes        Case discussed with Dr. Scott.      Rich Hernandez PA-C  General Surgery  [facility] - Neuro Critical Care

## 2023-08-18 NOTE — PLAN OF CARE
08/18/23 1541   Post-Acute Status   Post-Acute Authorization Placement   Coverage Medicaid [facility]   Discharge Plan   Discharge Plan A Skilled Nursing Facility     Update on SNF placement:    [facility] Phone: [phone]  -No, unable to accept patient Reason: Out of Network   2.[facility] Phone: [phone]  -: No, unable to accept patient Reason: Out of Network   3.[facility] Phone: [phone]  - No, unable to accept patient  Reason: Out of Network   [facility] Phone: [phone]  -No, unable to accept patient  Reason: Out of Network  5.[facility] Phone: [phone]  - No, unable to accept patient  Reason: Out of Network    6.[facility] Phone: [phone] - Referral Received   7.[facility] Phone: [phone]  - Referral Received    8.[facility] Phone: [phone]  - Referral Received ,Not in network with insurance. Please let me know if you receive three in network denials    9.[facility] Phone: [phone]  -No response  10. [facility] Phone: [phone]  -No response.  11.[facility] Phone: [phone]  -no response  12.[facility] Phone: [phone]  -no response  13.[facility] Phone: [phone]  - no response.    SW will follow.      Asmita Urena LMSW  PRN - [facility]  [id number]
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
## 2023-08-18 NOTE — PT/OT/SLP PROGRESS
Occupational Therapy   Treatment    Name: Tera Griffiths  MRN: [id number]  Admitting Diagnosis:  Embolic stroke involving left middle cerebral artery  1 Day Post-Op    Recommendations:     Discharge Recommendations: nursing facility, skilled  Discharge Equipment Recommendations:  to be determined by next level of care  Barriers to discharge:   (Unknown support and home environment.)    Assessment:     Tera Griffiths is a 58 y.o. male with a medical diagnosis of Embolic stroke involving left middle cerebral artery.  He presents with performance deficits affecting function are weakness, impaired endurance, impaired self care skills, decreased lower extremity function, decreased ROM, decreased upper extremity function, decreased coordination, gait instability, impaired coordination, impaired cognition, decreased safety awareness, impaired functional mobility.   Pt tolerated Tx without incident but Pt continues to require significant assist to perform self care tasks, functional mobility and functional transfers .  Pt continues to demonstrate (R) side inattention and neglect with no consistent command following at this time. V He would continue to benefit from OT intervention to further his functional (I)ce and safety.     Rehab Prognosis:  Fair; patient would benefit from acute skilled OT services to address these deficits and reach maximum level of function.       Plan:     Patient to be seen 3 x/week to address the above listed problems via self-care/home management, therapeutic activities, therapeutic exercises, neuromuscular re-education  Plan of Care Expires: 09/11/23  Plan of Care Reviewed with: patient, sibling    Subjective     Chief Complaint: No verbalizations made   Patient/Family Comments/goals: Son wants to know if Pt can walk.  Pain/Comfort:  Pain Rating 1:  (Pt with no verbalization of pain but withdrawing (L) LE when foot is touched.)  Location - Side 1: Left  Location - Orientation 1:  generalized  Location 1: ankle (/ foot)  Pain Addressed 1: Cessation of Activity, Reposition  Pain Rating Post-Intervention 1:  (same)    Objective:     Communicated with: nurse prior to session.  Patient found supine with telemetry, pulse ox (continuous), Condom Catheter, peripheral IV upon OT entry to room.    General Precautions: Standard, aspiration, aphasia, vision impaired, NPO, fall    Orthopedic Precautions:N/A  Braces: N/A  Respiratory Status: Room air     Occupational Performance:     Bed Mobility:    Patient completed Rolling/Turning to Right with maximal assistance and 2 persons  Patient completed Scooting/Bridging with maximal assistance and 2 persons  Patient completed Supine to Sit with maximal assistance and 2 persons  Patient completed Sit to Supine with maximal assistance and 2 persons     Functional Mobility/Transfers:  Patient completed Sit <> Stand Transfer with maximal assistance and of 2 persons  with  no assistive device  Pt  tolerated ~ 2 minutes standing with Max (A) of 2 .     Activities of Daily Living:  Grooming: moderate assistance Hand over hand required to perform face washing using (R) hand . Pt spontaneously using (L) hand to wash face but only the (L) side of his face.   Pt sat EOB for up to 15 minutes but needed UE positioning on bed and positioning of (R) LE to achieve midline positioning.     Throughout session attempts made to facilitate Pt's scanning and attention to (R) side of midline with physical; assist required to get Pt to turn head to (R) side of midline.       [facility] 6 Click ADL: 7    Treatment & Education:  Pt's son educated on POC and how to position himself when interacting with hi dad.    -- Self care tasks completed-- as noted above      Patient left supine with HOB 35 degs and all lines intact, call button in reach, and nurse present    GOALS:   Multidisciplinary Problems       Occupational Therapy Goals          Problem: Occupational Therapy    Goal Priority  Disciplines Outcome Interventions   Occupational Therapy Goal     OT, PT/OT Ongoing, Progressing    Description: Goals to be met by: 9/8/23     Patient will increase functional independence with ADLs by performing:    UE Dressing with Moderate Assistance.  LE Dressing with Moderate Assistance.  Grooming while standing with Moderate Assistance.  Toileting from toilet with Moderate Assistance for hygiene and clothing management.                          Time Tracking:     OT Date of Treatment: 08/18/23  OT Start Time: 1020  OT Stop Time: 1045  OT Total Time (min): 25 min    Billable Minutes:Self Care/Home Management 8  Therapeutic Activity 17    OT/ANDREA: OT          8/18/2023

## 2023-08-18 NOTE — PT/OT/SLP PROGRESS
Speech Language Pathology Treatment    Patient Name:  Tera Griffiths   MRN:  [id number]  Admitting Diagnosis: Embolic stroke involving left middle cerebral artery    Recommendations:                 General Recommendations:  Dysphagia therapy  Diet recommendations:  NPO, Liquid Diet Level: NPO   Aspiration Precautions: Strict aspiration precautions   General Precautions: Standard, aspiration, fall  Communication strategies:  go to room if call light pushed    Assessment:     Tera Griffiths is a 58 y.o. male with an SLP diagnosis of Aphasia and Dysphagia.  .    Subjective     No family present  Patient goals: leah     Pain/Comfort:  Pain Rating 1: 0/10  Pain Rating Post-Intervention 1: 0/10    Respiratory Status: Room air    Objective:     Has the patient been evaluated by SLP for swallowing?   Yes  Keep patient NPO? Yes   Current Respiratory Status:        Pt. Seen at bedside and was alert with fair attention to task.  Pt. Reached for therapist's pen but when offered paper, he returned pen to therapist.  Eye contact was fair-poor with frequent redirection to task needed.  Receptively, he did not follow or model any simple commands and made no response to simple yes/no questions.  No vocalizations elicited spontaneously or on command when asked to count, sing or complete automatic sentences.          Goals:   Multidisciplinary Problems       SLP Goals          Problem: SLP    Goal Priority Disciplines Outcome   SLP Goal     SLP Ongoing, Progressing   Description: Speech Language Pathology Goals  Goals expected to be met by 8/15  1. Pt will participate in ongoing assessment of swallow.   2. Pt will answer simple y/n questions with 60% accy given max cues.   3. Pt will follow simple commands with 50% accy given max cues.   4. Pt will vocalize in response to any stim x5/session given max cues.   5. Pt will localize to stim on R x2/session given max cues.                              Plan:     Patient to be seen:  3  x/week   Plan of Care expires:  09/07/23  Plan of Care reviewed with:  patient   SLP Follow-Up:  Yes       Discharge recommendations:  nursing facility, skilled   Barriers to Discharge:  Decreased Care Giver Support    Time Tracking:     SLP Treatment Date:   08/18/23  Speech Start Time:  1120  Speech Stop Time:  1128     Speech Total Time (min):  8 min    Billable Minutes: Speech Therapy Individual 8    08/18/2023

## 2023-08-18 NOTE — ASSESSMENT & PLAN NOTE
SLP following  S/p PEG tube placement 8/17  Meds restarted, heparin gtt transitioned to apixaban, advancing TF
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
## 2023-08-18 NOTE — ASSESSMENT & PLAN NOTE
Patient is a 58 year old M w/ CHFrEF (10%, DD, pHTN), CAD (h/o STEMI, s/p PCI to Rcx-08/2021), AICD placement (12/2021), pAF (s/p DCCV), HTN, CKD3b admitted to  8/52 for CHF exacerbation. Course c/b CVA and resulting dysphasia, patient now requiring enteral access. Failed PEG tube placement by GI. S/p PEG 8/17    - Okay for meds and TF via PEG  - Remainder of care per primary team. General surgery will sign off  - Please contact general surgery with any questions, concerns, or clinical status changes

## 2023-08-19 PROBLEM — E87.0 HYPERNATREMIA: Status: ACTIVE | Noted: 2023-08-19

## 2023-08-19 PROBLEM — D72.829 LEUKOCYTOSIS: Status: ACTIVE | Noted: 2023-08-19

## 2023-08-19 PROBLEM — N18.32 ACUTE RENAL FAILURE SUPERIMPOSED ON STAGE 3B CHRONIC KIDNEY DISEASE: Status: ACTIVE | Noted: 2023-08-10

## 2023-08-19 PROBLEM — A41.9 SEPSIS: Status: ACTIVE | Noted: 2023-08-19

## 2023-08-19 LAB
ALBUMIN SERPL BCP-MCNC: 2.9 G/DL (ref 3.5–5.2)
ALP SERPL-CCNC: 85 U/L (ref 55–135)
ALT SERPL W/O P-5'-P-CCNC: 42 U/L (ref 10–44)
AMYLASE SERPL-CCNC: 111 U/L (ref 20–110)
ANION GAP SERPL CALC-SCNC: 13 MMOL/L (ref 8–16)
APTT PPP: 31 SEC (ref 21–32)
AST SERPL-CCNC: 76 U/L (ref 10–40)
BACTERIA #/AREA URNS AUTO: ABNORMAL /HPF
BASOPHILS # BLD AUTO: 0.02 K/UL (ref 0–0.2)
BASOPHILS NFR BLD: 0.1 % (ref 0–1.9)
BILIRUB SERPL-MCNC: 1.1 MG/DL (ref 0.1–1)
BILIRUB UR QL STRIP: NEGATIVE
BUN SERPL-MCNC: 38 MG/DL (ref 6–20)
CALCIUM SERPL-MCNC: 10.3 MG/DL (ref 8.7–10.5)
CHLORIDE SERPL-SCNC: 113 MMOL/L (ref 95–110)
CLARITY UR REFRACT.AUTO: ABNORMAL
CO2 SERPL-SCNC: 23 MMOL/L (ref 23–29)
COLOR UR AUTO: YELLOW
CREAT SERPL-MCNC: 2.3 MG/DL (ref 0.5–1.4)
CREAT UR-MCNC: 170 MG/DL (ref 23–375)
DIFFERENTIAL METHOD: ABNORMAL
EOSINOPHIL # BLD AUTO: 0 K/UL (ref 0–0.5)
EOSINOPHIL NFR BLD: 0 % (ref 0–8)
ERYTHROCYTE [DISTWIDTH] IN BLOOD BY AUTOMATED COUNT: 16.2 % (ref 11.5–14.5)
EST. GFR  (NO RACE VARIABLE): 32.5 ML/MIN/1.73 M^2
GLUCOSE SERPL-MCNC: 110 MG/DL (ref 70–110)
GLUCOSE UR QL STRIP: NEGATIVE
HCT VFR BLD AUTO: 49.4 % (ref 40–54)
HGB BLD-MCNC: 15 G/DL (ref 14–18)
HGB UR QL STRIP: ABNORMAL
HYALINE CASTS UR QL AUTO: 8 /LPF
IMM GRANULOCYTES # BLD AUTO: 0.08 K/UL (ref 0–0.04)
IMM GRANULOCYTES NFR BLD AUTO: 0.5 % (ref 0–0.5)
KETONES UR QL STRIP: ABNORMAL
LACTATE SERPL-SCNC: 1.8 MMOL/L (ref 0.5–2.2)
LEUKOCYTE ESTERASE UR QL STRIP: ABNORMAL
LIPASE SERPL-CCNC: 41 U/L (ref 4–60)
LYMPHOCYTES # BLD AUTO: 0.8 K/UL (ref 1–4.8)
LYMPHOCYTES NFR BLD: 5.1 % (ref 18–48)
MAGNESIUM SERPL-MCNC: 2.1 MG/DL (ref 1.6–2.6)
MCH RBC QN AUTO: 27 PG (ref 27–31)
MCHC RBC AUTO-ENTMCNC: 30.4 G/DL (ref 32–36)
MCV RBC AUTO: 89 FL (ref 82–98)
MICROSCOPIC COMMENT: ABNORMAL
MONOCYTES # BLD AUTO: 1.8 K/UL (ref 0.3–1)
MONOCYTES NFR BLD: 11.5 % (ref 4–15)
NEUTROPHILS # BLD AUTO: 12.7 K/UL (ref 1.8–7.7)
NEUTROPHILS NFR BLD: 82.8 % (ref 38–73)
NITRITE UR QL STRIP: NEGATIVE
NON-SQ EPI CELLS #/AREA URNS AUTO: 2 /HPF
NRBC BLD-RTO: 0 /100 WBC
OSMOLALITY UR: 731 MOSM/KG (ref 50–1200)
PH UR STRIP: 6 [PH] (ref 5–8)
PHOSPHATE SERPL-MCNC: 2.7 MG/DL (ref 2.7–4.5)
PLATELET # BLD AUTO: 283 K/UL (ref 150–450)
PMV BLD AUTO: 12.3 FL (ref 9.2–12.9)
POCT GLUCOSE: 100 MG/DL (ref 70–110)
POCT GLUCOSE: 100 MG/DL (ref 70–110)
POCT GLUCOSE: 124 MG/DL (ref 70–110)
POCT GLUCOSE: 63 MG/DL (ref 70–110)
POCT GLUCOSE: 67 MG/DL (ref 70–110)
POCT GLUCOSE: 69 MG/DL (ref 70–110)
POCT GLUCOSE: 71 MG/DL (ref 70–110)
POCT GLUCOSE: 83 MG/DL (ref 70–110)
POTASSIUM SERPL-SCNC: 3.8 MMOL/L (ref 3.5–5.1)
PROT SERPL-MCNC: 7.5 G/DL (ref 6–8.4)
PROT UR QL STRIP: ABNORMAL
RBC # BLD AUTO: 5.55 M/UL (ref 4.6–6.2)
RBC #/AREA URNS AUTO: 54 /HPF (ref 0–4)
SODIUM SERPL-SCNC: 149 MMOL/L (ref 136–145)
SODIUM UR-SCNC: 46 MMOL/L (ref 20–250)
SP GR UR STRIP: 1.02 (ref 1–1.03)
SQUAMOUS #/AREA URNS AUTO: 0 /HPF
URN SPEC COLLECT METH UR: ABNORMAL
UUN UR-MCNC: >1200 MG/DL (ref 140–1050)
WBC # BLD AUTO: 15.37 K/UL (ref 3.9–12.7)
WBC #/AREA URNS AUTO: >100 /HPF (ref 0–5)
WBC CLUMPS UR QL AUTO: ABNORMAL

## 2023-08-19 PROCEDURE — 84100 ASSAY OF PHOSPHORUS: CPT | Performed by: PHYSICIAN ASSISTANT

## 2023-08-19 PROCEDURE — 25000003 PHARM REV CODE 250

## 2023-08-19 PROCEDURE — 25000242 PHARM REV CODE 250 ALT 637 W/ HCPCS

## 2023-08-19 PROCEDURE — 11000001 HC ACUTE MED/SURG PRIVATE ROOM

## 2023-08-19 PROCEDURE — 25000003 PHARM REV CODE 250: Performed by: NURSE PRACTITIONER

## 2023-08-19 PROCEDURE — 83605 ASSAY OF LACTIC ACID: CPT

## 2023-08-19 PROCEDURE — 82150 ASSAY OF AMYLASE: CPT | Performed by: PHYSICIAN ASSISTANT

## 2023-08-19 PROCEDURE — 36415 COLL VENOUS BLD VENIPUNCTURE: CPT

## 2023-08-19 PROCEDURE — 94761 N-INVAS EAR/PLS OXIMETRY MLT: CPT

## 2023-08-19 PROCEDURE — 93010 EKG 12-LEAD: ICD-10-PCS | Mod: ,,, | Performed by: INTERNAL MEDICINE

## 2023-08-19 PROCEDURE — 80053 COMPREHEN METABOLIC PANEL: CPT | Performed by: PHYSICIAN ASSISTANT

## 2023-08-19 PROCEDURE — 83935 ASSAY OF URINE OSMOLALITY: CPT

## 2023-08-19 PROCEDURE — 85025 COMPLETE CBC W/AUTO DIFF WBC: CPT | Performed by: PHYSICIAN ASSISTANT

## 2023-08-19 PROCEDURE — 97112 NEUROMUSCULAR REEDUCATION: CPT

## 2023-08-19 PROCEDURE — 93010 ELECTROCARDIOGRAM REPORT: CPT | Mod: ,,, | Performed by: INTERNAL MEDICINE

## 2023-08-19 PROCEDURE — 25000003 PHARM REV CODE 250: Performed by: STUDENT IN AN ORGANIZED HEALTH CARE EDUCATION/TRAINING PROGRAM

## 2023-08-19 PROCEDURE — 99223 1ST HOSP IP/OBS HIGH 75: CPT | Mod: ,,, | Performed by: HOSPITALIST

## 2023-08-19 PROCEDURE — 87186 SC STD MICRODIL/AGAR DIL: CPT

## 2023-08-19 PROCEDURE — 63600175 PHARM REV CODE 636 W HCPCS: Performed by: PHYSICIAN ASSISTANT

## 2023-08-19 PROCEDURE — 84300 ASSAY OF URINE SODIUM: CPT

## 2023-08-19 PROCEDURE — 25000003 PHARM REV CODE 250: Performed by: HOSPITALIST

## 2023-08-19 PROCEDURE — 87088 URINE BACTERIA CULTURE: CPT

## 2023-08-19 PROCEDURE — 83690 ASSAY OF LIPASE: CPT | Performed by: PHYSICIAN ASSISTANT

## 2023-08-19 PROCEDURE — 83735 ASSAY OF MAGNESIUM: CPT | Performed by: PHYSICIAN ASSISTANT

## 2023-08-19 PROCEDURE — 63600175 PHARM REV CODE 636 W HCPCS

## 2023-08-19 PROCEDURE — 99233 PR SUBSEQUENT HOSPITAL CARE,LEVL III: ICD-10-PCS | Mod: ,,, | Performed by: STUDENT IN AN ORGANIZED HEALTH CARE EDUCATION/TRAINING PROGRAM

## 2023-08-19 PROCEDURE — 99233 SBSQ HOSP IP/OBS HIGH 50: CPT | Mod: ,,, | Performed by: STUDENT IN AN ORGANIZED HEALTH CARE EDUCATION/TRAINING PROGRAM

## 2023-08-19 PROCEDURE — 94640 AIRWAY INHALATION TREATMENT: CPT

## 2023-08-19 PROCEDURE — 84540 ASSAY OF URINE/UREA-N: CPT

## 2023-08-19 PROCEDURE — 93005 ELECTROCARDIOGRAM TRACING: CPT

## 2023-08-19 PROCEDURE — 85730 THROMBOPLASTIN TIME PARTIAL: CPT

## 2023-08-19 PROCEDURE — 87086 URINE CULTURE/COLONY COUNT: CPT

## 2023-08-19 PROCEDURE — 25000003 PHARM REV CODE 250: Performed by: PHYSICIAN ASSISTANT

## 2023-08-19 PROCEDURE — 81001 URINALYSIS AUTO W/SCOPE: CPT

## 2023-08-19 PROCEDURE — 87077 CULTURE AEROBIC IDENTIFY: CPT

## 2023-08-19 PROCEDURE — 82570 ASSAY OF URINE CREATININE: CPT

## 2023-08-19 PROCEDURE — 87040 BLOOD CULTURE FOR BACTERIA: CPT

## 2023-08-19 PROCEDURE — 63600175 PHARM REV CODE 636 W HCPCS: Performed by: STUDENT IN AN ORGANIZED HEALTH CARE EDUCATION/TRAINING PROGRAM

## 2023-08-19 PROCEDURE — 99223 PR INITIAL HOSPITAL CARE,LEVL III: ICD-10-PCS | Mod: ,,, | Performed by: HOSPITALIST

## 2023-08-19 RX ORDER — METOPROLOL TARTRATE 1 MG/ML
5 INJECTION, SOLUTION INTRAVENOUS ONCE
Status: DISCONTINUED | OUTPATIENT
Start: 2023-08-19 | End: 2023-08-20

## 2023-08-19 RX ORDER — METOPROLOL TARTRATE 25 MG/1
25 TABLET, FILM COATED ORAL ONCE
Status: DISCONTINUED | OUTPATIENT
Start: 2023-08-19 | End: 2023-08-19

## 2023-08-19 RX ORDER — METOPROLOL TARTRATE 1 MG/ML
5 INJECTION, SOLUTION INTRAVENOUS ONCE
Status: COMPLETED | OUTPATIENT
Start: 2023-08-19 | End: 2023-08-19

## 2023-08-19 RX ADMIN — IPRATROPIUM BROMIDE AND ALBUTEROL SULFATE 3 ML: .5; 3 SOLUTION RESPIRATORY (INHALATION) at 08:08

## 2023-08-19 RX ADMIN — ACETAMINOPHEN 650 MG: 325 TABLET ORAL at 08:08

## 2023-08-19 RX ADMIN — POLYETHYLENE GLYCOL 3350 17 G: 17 POWDER, FOR SOLUTION ORAL at 11:08

## 2023-08-19 RX ADMIN — METOPROLOL TARTRATE 25 MG: 25 TABLET, FILM COATED ORAL at 08:08

## 2023-08-19 RX ADMIN — CHLORPROMAZINE HYDROCHLORIDE 25 MG: 25 INJECTION INTRAMUSCULAR at 08:08

## 2023-08-19 RX ADMIN — PIPERACILLIN SODIUM AND TAZOBACTAM SODIUM 4.5 G: 4; .5 INJECTION, POWDER, FOR SOLUTION INTRAVENOUS at 01:08

## 2023-08-19 RX ADMIN — ACETYLCYSTEINE 4 ML: 100 INHALANT RESPIRATORY (INHALATION) at 08:08

## 2023-08-19 RX ADMIN — METOPROLOL TARTRATE 5 MG: 5 INJECTION, SOLUTION INTRAVENOUS at 04:08

## 2023-08-19 RX ADMIN — PIPERACILLIN SODIUM AND TAZOBACTAM SODIUM 4.5 G: 4; .5 INJECTION, POWDER, FOR SOLUTION INTRAVENOUS at 07:08

## 2023-08-19 RX ADMIN — POTASSIUM BICARBONATE 20 MEQ: 391 TABLET, EFFERVESCENT ORAL at 05:08

## 2023-08-19 RX ADMIN — ASPIRIN 81 MG 81 MG: 81 TABLET ORAL at 11:08

## 2023-08-19 RX ADMIN — APIXABAN 5 MG: 5 TABLET, FILM COATED ORAL at 08:08

## 2023-08-19 RX ADMIN — ATORVASTATIN CALCIUM 80 MG: 40 TABLET, FILM COATED ORAL at 09:08

## 2023-08-19 RX ADMIN — DEXTROSE MONOHYDRATE 125 ML: 10 INJECTION, SOLUTION INTRAVENOUS at 01:08

## 2023-08-19 RX ADMIN — EZETIMIBE 10 MG: 10 TABLET ORAL at 08:08

## 2023-08-19 RX ADMIN — FERROUS SULFATE TAB 325 MG (65 MG ELEMENTAL FE) 1 EACH: 325 (65 FE) TAB at 11:08

## 2023-08-19 RX ADMIN — IPRATROPIUM BROMIDE AND ALBUTEROL SULFATE 3 ML: .5; 3 SOLUTION RESPIRATORY (INHALATION) at 02:08

## 2023-08-19 RX ADMIN — AMIODARONE HYDROCHLORIDE 200 MG: 200 TABLET ORAL at 08:08

## 2023-08-19 RX ADMIN — CLONIDINE HYDROCHLORIDE 0.1 MG: 0.1 TABLET ORAL at 11:08

## 2023-08-19 RX ADMIN — SENNOSIDES AND DOCUSATE SODIUM 1 TABLET: 50; 8.6 TABLET ORAL at 11:08

## 2023-08-19 RX ADMIN — ACETYLCYSTEINE 4 ML: 100 INHALANT RESPIRATORY (INHALATION) at 02:08

## 2023-08-19 RX ADMIN — SENNOSIDES AND DOCUSATE SODIUM 1 TABLET: 50; 8.6 TABLET ORAL at 08:08

## 2023-08-19 RX ADMIN — VANCOMYCIN HYDROCHLORIDE 1750 MG: 500 INJECTION, POWDER, LYOPHILIZED, FOR SOLUTION INTRAVENOUS at 11:08

## 2023-08-19 RX ADMIN — POTASSIUM BICARBONATE 20 MEQ: 391 TABLET, EFFERVESCENT ORAL at 11:08

## 2023-08-19 NOTE — ASSESSMENT & PLAN NOTE
Patient w/ PMHx CKD 3b presenting with EMERSON with sCr 2.3 (baseline sCr <1.7). Patient had multiple episodes of hypotension where blood pressure was as low as 83/49. Pt has a hx of CHF and has been on fluid restriction since 8/5. Enteral H2O was started yesterday with no improvement of the Cr.     Diagnostics: UA, Urine sodium, osmolarity, BUN and creatinine pending.     DDx: Prerenal, ATN, sepsis  - Urine studies pending  - Trend sCr  - Trend RFP; replete electrolytes PRN for goal K > 4, Phos > 3, Mg > 2  - Strict I&Os  - Avoid nephrotoxic agents  - Renally adjust medications

## 2023-08-19 NOTE — ASSESSMENT & PLAN NOTE
58M w CHFrEF (10%, DD, pHTN; entresto/jardiance/torsemide 20) w AICD, CAD (h/o STEMI, s/p PCI to Rcx-08/2021, NSTEMI 4/23), pAF s/p DCCV (amio /apixiban), h/o PEs (9/2021, 12/2021; apixiban), HTN, CKD3b (BL Cr 1.7) admitted to  ([facility]) two weeks ago on 8/5 for for ADHF and NSTEMI. Hospital stay c/b L MCA/PCA infarct, suspected subacute R caudate infarct.and PE in the right distal interlobar artery and the segmental/subsegmental bilateral posterior pulmonary arteries.  Patient developed global aphasia and dysphagia. Patient had a complicated course to have a PEG tube placed which was placed on 08/17. Stepped down to [facility].     - Antithrombotics: DAPT + eliquis  - Statins:atorvastatin 80 mg daily  - Aggressive risk factor modification: HTN, HLD, A-Fib, CAD, CHF  - Rehab efforts: therapy recommending discharge to inpatient rehab  - Diagnostics ordered/pending: None   - VTE prophylaxis: Mechanical prophylaxis: Place SCDs None: Reason for No Pharmacological VTE Prophylaxis: Currently on anticoagulation  - BP parameters: <180

## 2023-08-19 NOTE — ASSESSMENT & PLAN NOTE
Patient with Paroxysmal (<7 days) atrial fibrillation which is controlled currently with Beta Blocker and Amiodarone. Patient is currently in atrial fibrillation. Anticoagulation indicated. Anticoagulation done with eliquis.    Currently on amiodarone 200 b.i.d. and metoprolol 25 b.i.d.

## 2023-08-19 NOTE — PLAN OF CARE
Continue toward set goals. Patient making some progress and will continue to benefit from acute OT services at this time.     Goals to be met by: 9/8/23     Patient will increase functional independence with ADLs by performing:    UE Dressing with Moderate Assistance.  LE Dressing with Moderate Assistance.  Grooming while standing with Moderate Assistance.  Toileting from toilet with Moderate Assistance for hygiene and clothing management.

## 2023-08-19 NOTE — CARE UPDATE
RAPID RESPONSE NURSE PROACTIVE ROUNDING NOTE       Time of Visit: 0900    Admit Date: 2023  LOS: 12  Code Status: Full Code   Date of Visit: 2023  : 1965  Age: 58 y.o.  Sex: male  Race: Black or   Bed: 90690 A:   MRN: [id number]  Was the patient discharged from an ICU this admission? Yes   Was the patient discharged from a PACU within last 24 hours? No   Did the patient receive conscious sedation/general anesthesia in last 24 hours? No  Was the patient in the ED within the past 24 hours? No  Was the patient on NIPPV within the past 24 hours? No   Attending Physician: Elsi Valencia MD  Primary Service: Neurology   Time spent at the bedside: < 15 min    SITUATION    Notified by charge RN via phone call.  Reason for alert: Fever, AMS  Called to evaluate the patient for Neuro    BACKGROUND     Why is the patient in the hospital?: Embolic stroke involving left middle cerebral artery    Patient has a past medical history of Acute on chronic combined systolic and diastolic heart failure, Atrial fibrillation, CAD (coronary artery disease), Dyslipidemia, Embolic stroke involving left middle cerebral artery, Encounter for blood transfusion, HTN (hypertension), ICD (implantable cardioverter-defibrillator) in place, Paroxysmal A-fib, and Stage 3 chronic kidney disease.    Last Vitals:  Temp: 101.3 °F (38.5 °C) (809)  Pulse: 125 (808)  Resp: 18 (819)  BP: 141/79 (808)  SpO2: 95 % (819)    24 Hours Vitals Range:  Temp:  [97.7 °F (36.5 °C)-101.3 °F (38.5 °C)]   Pulse:  []   Resp:  [17-33]   BP: (102-169)/(72-95)   SpO2:  [94 %-100 %]     Labs:  Recent Labs     23  1351 23  0302 23  0330   WBC 10.93 11.29  11.29 15.37*   HGB 15.3 15.2  15.2 15.0   HCT 49.5 48.8  48.8 49.4    322  322 283       Recent Labs     23  0452 23  0346 23  0330   * 151* 149*   K 4.2 4.5 3.8   * 119* 113*   CO2 18* 21* 23   BUN  "28* 34* 38*   CREATININE 1.3 2.0* 2.3*    117* 110   PHOS 4.1 3.7 2.7   MG 2.1 2.2 2.1        No results for input(s): "PH", "PCO2", "PO2", "HCO3", "POCSATURATED", "BE" in the last 72 hours.     ASSESSMENT    Called to bedside for patient reportedly responding only to sternal rub. On exam pt is now awake and alert, but does not follow commands or answer any questions. Pt on RA, tachycardic and febrile with bp WNL.     Physical Exam  HENT:      Mouth/Throat:      Mouth: Mucous membranes are moist.      Pharynx: Oropharynx is clear.   Eyes:      Pupils: Pupils are equal, round, and reactive to light.   Cardiovascular:      Rate and Rhythm: Tachycardia present. Rhythm irregular.   Pulmonary:      Effort: Pulmonary effort is normal.   Abdominal:      General: Abdomen is flat.      Palpations: Abdomen is soft.   Skin:     General: Skin is warm and dry.      Capillary Refill: Capillary refill takes less than 2 seconds.   Neurological:      Mental Status: He is alert.      GCS: GCS eye subscore is 4. GCS verbal subscore is 1. GCS motor subscore is 4.         INTERVENTIONS    The patient was seen for Neurological problem. Staff concerns included decreased responsiveness. The following interventions were performed: POCT glucose, continuous cardiac monitoring continued, and lactic acid.    RECOMMENDATIONS    Septic workup. Maintain tele and IV access. Delirium precautions.     PROVIDER ESCALATION    Yes/No  No    Orders received and case discussed with NA.    Disposition: Remain in room 906.    FOLLOW-UP    charge Niru HENRY  updated on plan of care. Instructed to call the Rapid Response Nurse, Daquan Olivier RN at [phone] for additional questions or concerns.            "

## 2023-08-19 NOTE — ASSESSMENT & PLAN NOTE
Patient patient with a history of hypertension.  On metoprolol, Entresto, and furosemide at home.  Patient had episodes of hypotension yesterday were blood pressure was as low as 84/46    - currently on metoprolol 25 mg b.i.d., hydralazine 10 mg every 6 hours p.r.n., labetalol 10 mg every 6 hours p.r.n.  - will discontinue clonidine given the episode of hypotension

## 2023-08-19 NOTE — ASSESSMENT & PLAN NOTE
Stroke risk factor  Currently on 1.5 L fluid restriction and GDMT  Cardiology was consulted by [facility]  CXR today with congestion of the pulmonary vasculature similar to previous CXR on 8/11, no increased fluid burden
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
## 2023-08-19 NOTE — PT/OT/SLP PROGRESS
Occupational Therapy   Treatment    Name: Tera Griffiths  MRN: [id number]  Admitting Diagnosis:  Embolic stroke involving left middle cerebral artery  2 Days Post-Op    Recommendations:     Discharge Recommendations: nursing facility, skilled  Discharge Equipment Recommendations:  to be determined by next level of care  Barriers to discharge:  Other (Comment) (severely decreased functional abilities to care for self)    Assessment:     Tera Griffitsh is a 58 y.o. male with a medical diagnosis of Embolic stroke involving left middle cerebral artery.  Pt tolerated OT session fairly well with limited participation and motivation due to AMS. Pt is progressing well overall but does remain extremely limited. Pt would continue to benefit from skilled OT services to maximize functional (I) & facilitate safe discharge. Performance deficits affecting function are weakness, impaired endurance, impaired sensation, impaired self care skills, impaired functional mobility, gait instability, impaired balance, visual deficits, impaired cognition, decreased coordination, decreased upper extremity function, decreased lower extremity function, decreased safety awareness, pain, decreased ROM, impaired coordination, impaired cardiopulmonary response to activity.     Rehab Prognosis:  Good; patient would benefit from acute skilled OT services to address these deficits and reach maximum level of function.       Plan:     Patient to be seen 3 x/week to address the above listed problems via self-care/home management, therapeutic activities, therapeutic exercises, neuromuscular re-education  Plan of Care Expires: 09/11/23  Plan of Care Reviewed with: patient    Subjective     Chief Complaint: No complaints as patient is non-verbal  Patient/Family Comments/goals: N/A  Pain/Comfort:  Pain Rating 1: other (see comments) (unable to determine; patient non-verbal)  Pain Addressed 1: Distraction, Reposition    Objective:     Communicated with:  RN Claire prior to session.  Patient found supine with PEG Tube, peripheral IV, restraints, telemetry upon OT entry to room.    General Precautions: Standard, fall    Orthopedic Precautions:N/A  Braces: N/A  Respiratory Status: Room air     Occupational Performance:     Bed Mobility:    Patient completed Rolling/Turning to Left with  total assistance  Patient completed Rolling/Turning to Right with total assistance  Patient completed Scooting/Bridging with total assistance  Patient completed Supine to Sit with total assistance  Patient completed Sit to Supine with total assistance     Functional Mobility/Transfers:  Functional Mobility: Patient completed all activities at EOB. Therapist attempted to have patient stand, however, patient unable to understand/comprehend commands from therapist. EOB sitting activities completed with visual tracking exercises and weight bearing on RUE. Patient sat EOB with CGA for ~10-12 minutes. Patient then transferred back to bed and completed neuro re-education techniques to RUE including PNF patterns and joint compressions.       [facility] 6 Click ADL: 6    Treatment & Education:  Treatment as described above.   -Pt educated to call for assistance and to transfer with hospital staff only  -Provided education regarding role of OT, POC, & discharge recommendations with pt unable to verbalize understanding.       Patient left supine with all lines intact, call button in reach, bed alarm on, restraints reapplied at end of session, nursing notified, and son present    GOALS:   Multidisciplinary Problems       Occupational Therapy Goals          Problem: Occupational Therapy    Goal Priority Disciplines Outcome Interventions   Occupational Therapy Goal     OT, PT/OT Ongoing, Progressing    Description: Goals to be met by: 9/8/23     Patient will increase functional independence with ADLs by performing:    UE Dressing with Moderate Assistance.  LE Dressing with Moderate Assistance.  Grooming  while standing with Moderate Assistance.  Toileting from toilet with Moderate Assistance for hygiene and clothing management.                          Time Tracking:     OT Date of Treatment: 08/19/23  OT Start Time: 1233  OT Stop Time: 1256  OT Total Time (min): 23 min    Billable Minutes:Neuromuscular Re-education 23    OT/ANDREA: OT          8/19/2023

## 2023-08-19 NOTE — ASSESSMENT & PLAN NOTE
Patient's sodium at 149.  Uptrending since 08/10.  Hypernatremia could likely be due to the recent stroke or, 2/2 fluid loss    - continue to monitor   - urine osmolality and sodium pending

## 2023-08-19 NOTE — SUBJECTIVE & OBJECTIVE
Interval History:  Patient was seen this morning.  Patient appeared confused and lethargic although patient open his eyes spontaneously.  He was nonverbal was unable to follow commands likely 2/2 his global aphasia 2/2 stroke.  Upon speaking with his son, he states that his father appears the same as yesterday.  Son had no other complaints or concerns at this time for the patient.    Review of Systems   Reason unable to perform ROS: patient nonverbal.     Objective:     Vital Signs (Most Recent):  Temp: (!) 101.3 °F (38.5 °C) (08/19/23 0809)  Pulse: (!) 125 (08/19/23 0808)  Resp: 18 (08/19/23 0819)  BP: (!) 141/79 (08/19/23 0808)  SpO2: 95 % (08/19/23 0819) Vital Signs (24h Range):  Temp:  [97.7 °F (36.5 °C)-101.3 °F (38.5 °C)] 101.3 °F (38.5 °C)  Pulse:  [] 125  Resp:  [17-33] 18  SpO2:  [94 %-100 %] 95 %  BP: (102-169)/(72-95) 141/79     Weight: 83.5 kg (184 lb 1.4 oz)  Body mass index is 24.29 kg/m².    Intake/Output Summary (Last 24 hours) at 8/19/2023 1118  Last data filed at 8/19/2023 0434  Gross per 24 hour   Intake 1180 ml   Output 401 ml   Net 779 ml         Physical Exam  Vitals reviewed.   Constitutional:       Appearance: Normal appearance. He is ill-appearing.      Interventions: He is restrained.   HENT:      Head: Normocephalic.      Right Ear: External ear normal.      Left Ear: External ear normal.   Eyes:      General: Lids are normal.      Extraocular Movements: Extraocular movements intact.      Conjunctiva/sclera: Conjunctivae normal.   Cardiovascular:      Rate and Rhythm: Regular rhythm. Tachycardia present.      Pulses:           Dorsalis pedis pulses are 2+ on the right side and 2+ on the left side.        Posterior tibial pulses are 2+ on the right side and 2+ on the left side.      Heart sounds: Normal heart sounds.   Pulmonary:      Effort: Tachypnea present.      Breath sounds: Wheezing present.      Comments: Diffuse mild expiratory wheezes   Abdominal:      Comments: PEG tube in  place secured by an abdominal binder; appears clean with no drainage or erythema.   Musculoskeletal:         General: No swelling.      Cervical back: Normal range of motion.      Right lower leg: No edema.      Left lower leg: No edema.   Skin:     General: Skin is warm.      Capillary Refill: Capillary refill takes less than 2 seconds.      Findings: No bruising.   Neurological:      Mental Status: He is easily aroused. He is lethargic.      Comments: Unable to assess as patient unable to follow commands   Psychiatric:         Attention and Perception: He is inattentive.         Speech: He is noncommunicative.         Behavior: Behavior is uncooperative.             Significant Labs: All pertinent labs within the past 24 hours have been reviewed.  Recent Lab Results  (Last 5 results in the past 24 hours)        08/19/23  1146   08/19/23  0954   08/19/23  0822   08/19/23  0613   08/19/23  0612        Albumin               Alkaline Phosphatase               ALT               Anion Gap               aPTT               AST               Baso #               Basophil %               BILIRUBIN TOTAL               BUN               Calcium               Chloride               CO2               Creatinine               Differential Method               eGFR               Eos #               Eosinophil %               Glucose               Gran # (ANC)               Gran %               Hematocrit               Hemoglobin               Immature Grans (Abs)               Immature Granulocytes               Lactate, Rogelio   1.8  Comment: Falsely low lactic acid results can be found in samples   containing >=13.0 mg/dL total bilirubin and/or >=3.5 mg/dL   direct bilirubin.               Lymph #               Lymph %               Magnesium               MCH               MCHC               MCV               Mono #               Mono %               MPV               nRBC               Phosphorus               Platelets                POCT Glucose 100     83   71   69       Potassium               PROTEIN TOTAL               RBC               RDW               Sodium               WBC                                      Significant Imaging: I have reviewed all pertinent imaging results/findings within the past 24 hours.
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
## 2023-08-19 NOTE — ASSESSMENT & PLAN NOTE
- patient w/h/o TTE w/EF of 10-15%, last TTE 07/13/2023  - elevated BNP- 1958, CXR w/pulm edema, rales on physical exam- increase in weight from discharge  - currently on 1.5 L fluid restriction and GDMT

## 2023-08-19 NOTE — PLAN OF CARE
Problem: Adult Inpatient Plan of Care  Goal: Plan of Care Review  Outcome: Ongoing, Progressing  Goal: Patient-Specific Goal (Individualized)  Outcome: Ongoing, Progressing  Goal: Absence of Hospital-Acquired Illness or Injury  Outcome: Ongoing, Progressing  Intervention: Identify and Manage Fall Risk  Flowsheets (Taken 8/19/2023 0418)  Safety Promotion/Fall Prevention:   bed alarm set   high risk medications identified   Fall Risk signage in place   diversional activities provided   side rails raised x 3  Intervention: Prevent and Manage VTE (Venous Thromboembolism) Risk  Flowsheets (Taken 8/19/2023 0418)  VTE Prevention/Management:   remove, assess skin, and reapply sequential compression device   ROM (active) performed     Problem: Diabetes Comorbidity  Goal: Blood Glucose Level Within Targeted Range  Outcome: Ongoing, Progressing  Intervention: Monitor and Manage Glycemia  Flowsheets (Taken 8/19/2023 0418)  Glycemic Management: (D 10% given prn)   blood glucose monitored   other (see comments)     Problem: Cerebral Tissue Perfusion (Stroke, Ischemic/Transient Ischemic Attack)  Goal: Optimal Cerebral Tissue Perfusion  Outcome: Ongoing, Progressing  Intervention: Protect and Optimize Cerebral Perfusion  Flowsheets (Taken 8/19/2023 0418)  Sensory Stimulation Regulation: quiet environment promoted  Cerebral Perfusion Promotion: blood pressure monitored
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
## 2023-08-19 NOTE — ASSESSMENT & PLAN NOTE
58M w CHFrEF (10%, DD, pHTN; entresto/jardiance/torsemide 20) w AICD, CAD (h/o STEMI, s/p PCI to Rcx-08/2021, NSTEMI 4/23), pAF s/p DCCV (amio /apixiban), h/o PEs (9/2021, 12/2021; apixiban), HTN, CKD3b (BL Cr 1.7) admitted to  ([facility]) two weeks ago on 8/5 for for ADHF and NSTEMI. Hospital stay c/b L MCA/PCA infarct, suspected subacute R caudate infarct.and PE in the right distal interlobar artery and the segmental/subsegmental bilateral posterior pulmonary arteries.  Patient developed global aphasia and dysphagia. Patient had a complicated course to have a PEG tube placed which was placed on 08/17. Stepped down to [facility].  On 8/19 at 0800 spiked fever 101.3 with tachy 120s- O2 sats okay. WBC 15.3K (83% gran, no bands) up from 11K. BUN/Cr up at 38/2.3 (from 34/2.0, from 28/1.3).  Unknown source of infection at this time but considering an abdominal infection given that patient recently had a PEG tube placed. Patient also has a condom catheter placed which suspicious for the source of infection.  Also concern for aspiration pneumonia given that patient has dysphagia and and nurse reports episodes of vomiting last night    DDx: aspiration pneumonia, UTI, intra-abdominal infection, pancreatitis  - CXR; Cardiomegaly and pulmonary venous congestion, similar prior   - Started on Vancomycin and Zosyn  - Impression: Cardiomegaly and pulmonary venous congestion, similar prior 08/09  - BCx x2 pending  - Lactate wnl  - Amylase and lipase pending  - CT C/A/P w/o contrast pending

## 2023-08-19 NOTE — PROGRESS NOTES
Pharmacokinetic Initial Assessment: IV Vancomycin    Assessment/Plan:    Initiate intravenous vancomycin with loading dose of 1750 mg once with subsequent doses when random concentrations are less than 20 mcg/mL  Desired empiric serum trough concentration is 15 to 20 mcg/mL  Draw vancomycin random level on 8/20/23 at 06:00.  Pharmacy will continue to follow and monitor vancomycin.      Please contact pharmacy at extension [phone] with any questions regarding this assessment.     Thank you for the consult,   Benjamín Victoria       Patient brief summary:  Tera Griffiths is a 58 y.o. male initiated on antimicrobial therapy with IV Vancomycin for treatment of suspected unknown source with sespis    Drug Allergies:   Review of patient's allergies indicates:  No Known Allergies    Actual Body Weight:   83.5 kg    Renal Function:   Estimated Creatinine Clearance: 40.5 mL/min (A) (based on SCr of 2.3 mg/dL (H)).,     Dialysis Method (if applicable):  N/A    CBC (last 72 hours):  Recent Labs   Lab Result Units 08/17/23  0452 08/17/23  1351 08/18/23  0302 08/19/23  0330   WBC K/uL 6.52 10.93 11.29  11.29 15.37*   Hemoglobin g/dL 17.4 15.3 15.2  15.2 15.0   Hematocrit % 56.4* 49.5 48.8  48.8 49.4   Platelets K/uL 347 363 322  322 283   Gran % % 79.6* 75.1* 81.4*  81.4* 82.8*   Lymph % % 10.4* 9.3* 6.5*  6.5* 5.1*   Mono % % 8.6 14.9 11.4  11.4 11.5   Eosinophil % % 0.3 0.2 0.0  0.0 0.0   Basophil % % 0.5 0.1 0.2  0.2 0.1   Differential Method  Automated Automated Automated  Automated Automated       Metabolic Panel (last 72 hours):  Recent Labs   Lab Result Units 08/17/23  0452 08/18/23  0346 08/19/23  0330   Sodium mmol/L 150* 151* 149*   Potassium mmol/L 4.2 4.5 3.8   Chloride mmol/L 119* 119* 113*   CO2 mmol/L 18* 21* 23   Glucose mg/dL 100 117* 110   BUN mg/dL 28* 34* 38*   Creatinine mg/dL 1.3 2.0* 2.3*   Albumin g/dL 3.1* 3.1* 2.9*   Total Bilirubin mg/dL 0.6 0.8 1.1*   Alkaline Phosphatase U/L 64 69 85   AST U/L 17  "20 76*   ALT U/L 9* 7* 42   Magnesium mg/dL 2.1 2.2 2.1   Phosphorus mg/dL 4.1 3.7 2.7       Drug levels (last 3 results):  No results for input(s): "VANCOMYCINRA", "VANCORANDOM", "VANCOMYCINPE", "VANCOPEAK", "VANCOMYCINTR", "VANCOTROUGH" in the last 72 hours.    Microbiologic Results:  Microbiology Results (last 7 days)       Procedure Component Value Units Date/Time    Blood culture [[id number]]     Order Status: Sent Specimen: Blood     Blood culture [[id number]]     Order Status: Sent Specimen: Blood             "

## 2023-08-19 NOTE — ASSESSMENT & PLAN NOTE
Past medical history of multiple pulmonary emboli.  Previously noted on CTA chest 9/2021 and 12/2021    -8/8: CTA Evidence of pulmonary embolism involving the right distal interlobar artery and the segmental/subsegmental bilateral posterior pulmonary arteries without heart strain.   - previously on heparin drip, now transitioned to apixaban 5 mg bid

## 2023-08-19 NOTE — SUBJECTIVE & OBJECTIVE
Neurologic Chief Complaint: L MCA syndrome    Subjective:     Interval History: Patient is seen for follow-up neurological assessment and treatment recommendations:   Patient febrile this morning to 101.3. New Leukocytosis and Tachycardia. Patient meeting SIRS criteria, Hospital medicine consulted due to balance between fluid resuscitation and HF exacerbation. Infectious work-up pending. Patient continues to be agitated at night.    HPI, Past Medical, Family, and Social History remains the same as documented in the initial encounter.     Review of Systems   Unable to perform ROS: Other (sedated)   Neurological:  Positive for speech difficulty.     Scheduled Meds:   acetylcysteine 100 mg/ml (10%)  4 mL Nebulization TID WAKE    albuterol-ipratropium  3 mL Nebulization TID WAKE    amiodarone  200 mg Per NG tube BID    apixaban  5 mg Per G Tube BID    aspirin  81 mg Per G Tube Daily    atorvastatin  80 mg Per NG tube Daily    ezetimibe  10 mg Per NG tube QHS    ferrous sulfate  1 tablet Per NG tube Daily    metoprolol  5 mg Intravenous Once    metoprolol tartrate  25 mg Per NG tube BID    piperacillin-tazobactam (Zosyn) IV (PEDS and ADULTS) (extended infusion is not appropriate)  4.5 g Intravenous Q8H    polyethylene glycol  17 g Per NG tube Daily    potassium bicarbonate  20 mEq Per G Tube Once    senna-docusate 8.6-50 mg  1 tablet Per NG tube BID     Continuous Infusions:      PRN Meds:acetaminophen, bisacodyL, chlorproMAZINE, dextrose 10%, dextrose 10%, hydrALAZINE, labetalol, magnesium sulfate IVPB, magnesium sulfate IVPB, nitroGLYCERIN, ondansetron, potassium chloride **AND** potassium chloride **AND** potassium chloride, sodium chloride 0.9%, sodium chloride 0.9%, sodium chloride 0.9%, sodium phosphate 15 mmol in dextrose 5 % (D5W) 250 mL IVPB, sodium phosphate 20.01 mmol in dextrose 5 % (D5W) 250 mL IVPB, sodium phosphate 30 mmol in dextrose 5 % (D5W) 250 mL IVPB, Pharmacy to dose Vancomycin consult **AND**  "vancomycin - pharmacy to dose    Objective:     Vital Signs (Most Recent):  Temp: (!) 101 °F (38.3 °C) (08/19/23 1126)  Pulse: 93 (08/19/23 1450)  Resp: 18 (08/19/23 1418)  BP: 120/84 (08/19/23 1126)  SpO2: 95 % (08/19/23 1418)  BP Location: Right arm    Vital Signs Range (Last 24H):  Temp:  [97.7 °F (36.5 °C)-101.3 °F (38.5 °C)]   Pulse:  []   Resp:  [18-32]   BP: (120-169)/(72-95)   SpO2:  [92 %-99 %]   BP Location: Right arm       Physical Exam  Vitals and nursing note reviewed.   Constitutional:       General: He is not in acute distress.     Appearance: He is well-developed.      Comments: Sedated on precedex   HENT:      Head: Normocephalic and atraumatic.   Cardiovascular:      Rate and Rhythm: Normal rate.   Pulmonary:      Effort: Pulmonary effort is normal. No respiratory distress.   Abdominal:      General: There is no distension.   Skin:     General: Skin is warm and dry.   Neurological:      Comments: Aphasic, does not follow commands  L gaze              Neurological Exam:   LOC: drowsy  Attention Span: poor  Language: Global aphasia, not following commands  Articulation: Untestable due to severe aphasia   Orientation: Untestable due to severe aphasia   EOM (CN III, IV, VI): Gaze preference  left  Facial Movement (CN VII): Lower facial weakness on the Right  Motor: moves left spontaneous, right hemiparesis  Unable to assess drift and strength as patient does not follow commands, and gets agitated      Laboratory:  CMP:   Recent Labs   Lab 08/19/23  0330   CALCIUM 10.3   ALBUMIN 2.9*   PROT 7.5   *   K 3.8   CO2 23   *   BUN 38*   CREATININE 2.3*   ALKPHOS 85   ALT 42   AST 76*   BILITOT 1.1*       CBC:   Recent Labs   Lab 08/19/23  0330   WBC 15.37*   RBC 5.55   HGB 15.0   HCT 49.4      MCV 89   MCH 27.0   MCHC 30.4*       Lipid Panel:   No results for input(s): "CHOL", "LDLCALC", "HDL", "TRIG" in the last 168 hours.    Coagulation:   Recent Labs   Lab 08/17/23  1903 " "08/17/23 2007 08/19/23  0330   INR 1.2  --   --    APTT 21.3   < > 31.0    < > = values in this interval not displayed.       Platelet Aggregation Study: No results for input(s): "PLTAGG", "PLTAGINTERP", "PLTAGREGLACO", "ADPPLTAGGREG" in the last 168 hours.  Hgb A1C:   No results for input(s): "HGBA1C" in the last 168 hours.    TSH:   No results for input(s): "TSH" in the last 168 hours.      Diagnostic Results     Brain Imaging   CT Head 8/10/23  Impression:     No detrimental change compared to prior.     Evolving acute infarcts within the left MCA and PCA territories.  No hemorrhagic conversion.  No significant mass effect     Suspected small subacute infarction within the right caudate.     Multiple areas remote infarctions as detailed above.      CT Head 8/8/23 1704  Impression:     1. Redemonstration of acute infarctions in the left MCA and PCA territories.  No evidence to suggest hemorrhagic transformation.  2. Suspected small subacute infarction in the right caudate.  3. Numerous remote infarctions as detailed above.    CT Head 8/8/23 0826  Impression:     Moderate developing hypoattenuation left inferior frontal lobe and left occipital lobe concerning for developing recent infarcts post thrombectomy.     Allowing for scattered hyperdensity related to recently contrast administration for thrombectomy there is no definite acute intracranial hemorrhage.     Previous hypodensity right caudate no longer seen which may be related to contrast administration for thrombectomy and possible subacute age infarction.     Superimposed remote infarcts with moderate to large encephalomalacia right MCA territory unchanged    Vessel Imaging   IR Angio 8/8/23  Preliminary Interpretation:   1.    No evidence of left ICA or MCA stenosis. No large or medium vessel occlusion.    CTA Multiphase 8/7/23  Impression:     CT head:     New right caudate nucleus hypodensity, likely representing age-indeterminate infarct.  Could be " further evaluated with MRI.     Multifocal encephalomalacia involving the right frontal, temporoparietal, and left occipital lobes.     CTA head and neck:     Acute occlusion of the superior left M2 segment.     Left PCA occlusion of undetermined age.     Filling defect in the right main pulmonary artery, concerning for acute pulmonary thromboembolism.  Consider follow-up pulmonary CTA to more fully assess the extent of thromboemboli, the clot burden, and the presence or absence of right heart strain.     Not fully detailed above:     - Incomplete opacification of the left atrial appendage, suspicious for an intraluminal thrombus.  Follow-up echocardiogram, or cardiac MRI or CTA, recommended.     - The presence of acute thrombi or thromboemboli in both the systemic arterial and pulmonary arterial circulation raises concern for the possibility of underlying intracardiac or extracardiac right-to-left shunt, and/or a hypercoagulable state.  Correlate clinically.     - Incompletely visualized, but possibly large, pericardial effusion.  Artifacts compromise accurate assessment of its attenuation.    Cardiac Imaging   TTE with bubble 8/8/23    Left Ventricle: The left ventricle is moderately dilated. Increased ventricular mass. Normal wall thickness. There is moderate eccentric hypertrophy. Severe global hypokinesis present. Septal motion is consistent with pacing. There is severely reduced systolic function with a visually estimated ejection fraction of 15 - 20%. Ejection fraction by visual approximation is 20%. Unable to assess diastolic function due to arrhythmia.    Left Atrium: Left atrium is severely dilated. Radha 85mL/m2.    Right Ventricle: Mild right ventricular enlargement. Wall thickness is normal. Right ventricle wall motion  is normal. Systolic function is severely reduced. Pacemaker lead present in the ventricle.    Right Atrium: Right atrium is severely dilated.    Aortic Valve: There is mild aortic valve  sclerosis. There is normal leaflet mobility. There is no significant regurgitation.    Mitral Valve: There is bileaflet sclerosis. There is normal leaflet mobility. There is mild regurgitation.    Tricuspid Valve: The tricuspid valve is structurally normal. There is normal leaflet mobility. There is mild regurgitation.    Pulmonic Valve: The pulmonic valve is structurally normal. There is normal leaflet mobility. There is no significant regurgitation.    Aorta: Aortic root is normal in size measuring 3.13 cm. Ascending aorta is normal measuring 3.24 cm.    IVC/SVC: Normal venous pressure at 3 mmHg.    Pericardium: The pericardium is normal. There is no pericardial effusion.

## 2023-08-19 NOTE — ASSESSMENT & PLAN NOTE
Patient with new fever, leukocytosis, and tachycardia today  Initiated SIRS protocol  CXR, UA, and blood cultures pending  No lactic acidosis  Initiated broad spectrum antiobiotics with Vanc/Zosyn, will narrow based on infectious w/u  Did not provide fluid resuscitation, as patient has an EF of 15-20%  Consulted Hospital medicine for recommendations on fluid management.   YOANDY (acute kidney injury)

## 2023-08-20 PROBLEM — K59.00 CONSTIPATION: Status: ACTIVE | Noted: 2023-08-20

## 2023-08-20 LAB
ALBUMIN SERPL BCP-MCNC: 2.5 G/DL (ref 3.5–5.2)
ALP SERPL-CCNC: 77 U/L (ref 55–135)
ALT SERPL W/O P-5'-P-CCNC: 29 U/L (ref 10–44)
ANION GAP SERPL CALC-SCNC: 10 MMOL/L (ref 8–16)
ANISOCYTOSIS BLD QL SMEAR: SLIGHT
ANISOCYTOSIS BLD QL SMEAR: SLIGHT
APTT PPP: 32.7 SEC (ref 21–32)
AST SERPL-CCNC: 37 U/L (ref 10–40)
BASOPHILS # BLD AUTO: 0.02 K/UL (ref 0–0.2)
BASOPHILS # BLD AUTO: 0.06 K/UL (ref 0–0.2)
BASOPHILS NFR BLD: 0.1 % (ref 0–1.9)
BASOPHILS NFR BLD: 0.3 % (ref 0–1.9)
BILIRUB SERPL-MCNC: 1.5 MG/DL (ref 0.1–1)
BUN SERPL-MCNC: 42 MG/DL (ref 6–20)
BURR CELLS BLD QL SMEAR: ABNORMAL
CALCIUM SERPL-MCNC: 9.5 MG/DL (ref 8.7–10.5)
CHLORIDE SERPL-SCNC: 112 MMOL/L (ref 95–110)
CO2 SERPL-SCNC: 21 MMOL/L (ref 23–29)
CREAT SERPL-MCNC: 2.4 MG/DL (ref 0.5–1.4)
DIFFERENTIAL METHOD: ABNORMAL
DIFFERENTIAL METHOD: ABNORMAL
EOSINOPHIL # BLD AUTO: 0 K/UL (ref 0–0.5)
EOSINOPHIL # BLD AUTO: 0 K/UL (ref 0–0.5)
EOSINOPHIL NFR BLD: 0 % (ref 0–8)
EOSINOPHIL NFR BLD: 0 % (ref 0–8)
ERYTHROCYTE [DISTWIDTH] IN BLOOD BY AUTOMATED COUNT: 15.7 % (ref 11.5–14.5)
ERYTHROCYTE [DISTWIDTH] IN BLOOD BY AUTOMATED COUNT: 15.8 % (ref 11.5–14.5)
EST. GFR  (NO RACE VARIABLE): 30.9 ML/MIN/1.73 M^2
GIANT PLATELETS BLD QL SMEAR: PRESENT
GLUCOSE SERPL-MCNC: 109 MG/DL (ref 70–110)
HCT VFR BLD AUTO: 44.8 % (ref 40–54)
HCT VFR BLD AUTO: 46.8 % (ref 40–54)
HGB BLD-MCNC: 13.9 G/DL (ref 14–18)
HGB BLD-MCNC: 14.7 G/DL (ref 14–18)
HYPOCHROMIA BLD QL SMEAR: ABNORMAL
HYPOCHROMIA BLD QL SMEAR: ABNORMAL
IMM GRANULOCYTES # BLD AUTO: 0.14 K/UL (ref 0–0.04)
IMM GRANULOCYTES # BLD AUTO: 0.32 K/UL (ref 0–0.04)
IMM GRANULOCYTES NFR BLD AUTO: 0.6 % (ref 0–0.5)
IMM GRANULOCYTES NFR BLD AUTO: 1.3 % (ref 0–0.5)
LYMPHOCYTES # BLD AUTO: 0.9 K/UL (ref 1–4.8)
LYMPHOCYTES # BLD AUTO: 1 K/UL (ref 1–4.8)
LYMPHOCYTES NFR BLD: 4 % (ref 18–48)
LYMPHOCYTES NFR BLD: 4.2 % (ref 18–48)
MAGNESIUM SERPL-MCNC: 2.1 MG/DL (ref 1.6–2.6)
MCH RBC QN AUTO: 26.6 PG (ref 27–31)
MCH RBC QN AUTO: 27.5 PG (ref 27–31)
MCHC RBC AUTO-ENTMCNC: 31 G/DL (ref 32–36)
MCHC RBC AUTO-ENTMCNC: 31.4 G/DL (ref 32–36)
MCV RBC AUTO: 86 FL (ref 82–98)
MCV RBC AUTO: 88 FL (ref 82–98)
MONOCYTES # BLD AUTO: 2.6 K/UL (ref 0.3–1)
MONOCYTES # BLD AUTO: 2.6 K/UL (ref 0.3–1)
MONOCYTES NFR BLD: 10.7 % (ref 4–15)
MONOCYTES NFR BLD: 11.9 % (ref 4–15)
NEUTROPHILS # BLD AUTO: 18 K/UL (ref 1.8–7.7)
NEUTROPHILS # BLD AUTO: 20.1 K/UL (ref 1.8–7.7)
NEUTROPHILS NFR BLD: 83.2 % (ref 38–73)
NEUTROPHILS NFR BLD: 83.7 % (ref 38–73)
NRBC BLD-RTO: 0 /100 WBC
NRBC BLD-RTO: 0 /100 WBC
OVALOCYTES BLD QL SMEAR: ABNORMAL
OVALOCYTES BLD QL SMEAR: ABNORMAL
PHOSPHATE SERPL-MCNC: 2.8 MG/DL (ref 2.7–4.5)
PLATELET # BLD AUTO: 272 K/UL (ref 150–450)
PLATELET # BLD AUTO: 298 K/UL (ref 150–450)
PLATELET BLD QL SMEAR: ABNORMAL
PMV BLD AUTO: 13.6 FL (ref 9.2–12.9)
PMV BLD AUTO: 13.6 FL (ref 9.2–12.9)
POCT GLUCOSE: 121 MG/DL (ref 70–110)
POIKILOCYTOSIS BLD QL SMEAR: SLIGHT
POIKILOCYTOSIS BLD QL SMEAR: SLIGHT
POLYCHROMASIA BLD QL SMEAR: ABNORMAL
POTASSIUM SERPL-SCNC: 4.7 MMOL/L (ref 3.5–5.1)
PROT SERPL-MCNC: 7.1 G/DL (ref 6–8.4)
RBC # BLD AUTO: 5.22 M/UL (ref 4.6–6.2)
RBC # BLD AUTO: 5.35 M/UL (ref 4.6–6.2)
SODIUM SERPL-SCNC: 143 MMOL/L (ref 136–145)
SPHEROCYTES BLD QL SMEAR: ABNORMAL
SPHEROCYTES BLD QL SMEAR: ABNORMAL
VANCOMYCIN SERPL-MCNC: 15.5 UG/ML
WBC # BLD AUTO: 21.65 K/UL (ref 3.9–12.7)
WBC # BLD AUTO: 23.96 K/UL (ref 3.9–12.7)

## 2023-08-20 PROCEDURE — 85730 THROMBOPLASTIN TIME PARTIAL: CPT

## 2023-08-20 PROCEDURE — 63600175 PHARM REV CODE 636 W HCPCS

## 2023-08-20 PROCEDURE — 25000003 PHARM REV CODE 250

## 2023-08-20 PROCEDURE — 85025 COMPLETE CBC W/AUTO DIFF WBC: CPT | Performed by: PHYSICIAN ASSISTANT

## 2023-08-20 PROCEDURE — 99233 PR SUBSEQUENT HOSPITAL CARE,LEVL III: ICD-10-PCS | Mod: ,,, | Performed by: STUDENT IN AN ORGANIZED HEALTH CARE EDUCATION/TRAINING PROGRAM

## 2023-08-20 PROCEDURE — 94640 AIRWAY INHALATION TREATMENT: CPT

## 2023-08-20 PROCEDURE — 31720 CLEARANCE OF AIRWAYS: CPT

## 2023-08-20 PROCEDURE — 25000242 PHARM REV CODE 250 ALT 637 W/ HCPCS

## 2023-08-20 PROCEDURE — 99233 SBSQ HOSP IP/OBS HIGH 50: CPT | Mod: ,,, | Performed by: HOSPITALIST

## 2023-08-20 PROCEDURE — 99233 SBSQ HOSP IP/OBS HIGH 50: CPT | Mod: ,,, | Performed by: STUDENT IN AN ORGANIZED HEALTH CARE EDUCATION/TRAINING PROGRAM

## 2023-08-20 PROCEDURE — 25000003 PHARM REV CODE 250: Performed by: NURSE PRACTITIONER

## 2023-08-20 PROCEDURE — 99233 PR SUBSEQUENT HOSPITAL CARE,LEVL III: ICD-10-PCS | Mod: ,,, | Performed by: HOSPITALIST

## 2023-08-20 PROCEDURE — 80053 COMPREHEN METABOLIC PANEL: CPT | Performed by: PHYSICIAN ASSISTANT

## 2023-08-20 PROCEDURE — 36415 COLL VENOUS BLD VENIPUNCTURE: CPT | Performed by: STUDENT IN AN ORGANIZED HEALTH CARE EDUCATION/TRAINING PROGRAM

## 2023-08-20 PROCEDURE — 11000001 HC ACUTE MED/SURG PRIVATE ROOM

## 2023-08-20 PROCEDURE — 25000003 PHARM REV CODE 250: Performed by: STUDENT IN AN ORGANIZED HEALTH CARE EDUCATION/TRAINING PROGRAM

## 2023-08-20 PROCEDURE — 85025 COMPLETE CBC W/AUTO DIFF WBC: CPT | Mod: 91 | Performed by: HOSPITALIST

## 2023-08-20 PROCEDURE — 63600175 PHARM REV CODE 636 W HCPCS: Performed by: PHYSICIAN ASSISTANT

## 2023-08-20 PROCEDURE — 94761 N-INVAS EAR/PLS OXIMETRY MLT: CPT

## 2023-08-20 PROCEDURE — 80202 ASSAY OF VANCOMYCIN: CPT | Performed by: STUDENT IN AN ORGANIZED HEALTH CARE EDUCATION/TRAINING PROGRAM

## 2023-08-20 PROCEDURE — 84100 ASSAY OF PHOSPHORUS: CPT | Performed by: PHYSICIAN ASSISTANT

## 2023-08-20 PROCEDURE — 63600175 PHARM REV CODE 636 W HCPCS: Performed by: STUDENT IN AN ORGANIZED HEALTH CARE EDUCATION/TRAINING PROGRAM

## 2023-08-20 PROCEDURE — 83735 ASSAY OF MAGNESIUM: CPT | Performed by: PHYSICIAN ASSISTANT

## 2023-08-20 PROCEDURE — 25000003 PHARM REV CODE 250: Performed by: PHYSICIAN ASSISTANT

## 2023-08-20 PROCEDURE — 36415 COLL VENOUS BLD VENIPUNCTURE: CPT | Performed by: HOSPITALIST

## 2023-08-20 RX ORDER — PSEUDOEPHEDRINE/ACETAMINOPHEN 30MG-500MG
100 TABLET ORAL
Status: COMPLETED | OUTPATIENT
Start: 2023-08-20 | End: 2023-08-20

## 2023-08-20 RX ORDER — SYRING-NEEDL,DISP,INSUL,0.3 ML 29 G X1/2"
296 SYRINGE, EMPTY DISPOSABLE MISCELLANEOUS
Status: COMPLETED | OUTPATIENT
Start: 2023-08-20 | End: 2023-08-20

## 2023-08-20 RX ADMIN — Medication 100 ML: at 10:08

## 2023-08-20 RX ADMIN — EZETIMIBE 10 MG: 10 TABLET ORAL at 09:08

## 2023-08-20 RX ADMIN — PIPERACILLIN SODIUM AND TAZOBACTAM SODIUM 4.5 G: 4; .5 INJECTION, POWDER, FOR SOLUTION INTRAVENOUS at 06:08

## 2023-08-20 RX ADMIN — ACETAMINOPHEN 650 MG: 325 TABLET ORAL at 08:08

## 2023-08-20 RX ADMIN — APIXABAN 5 MG: 5 TABLET, FILM COATED ORAL at 10:08

## 2023-08-20 RX ADMIN — BE HEALTH MAGNESIUM CITRATE ORAL SOLUTION - LEMON 296 ML: 1.75 LIQUID ORAL at 10:08

## 2023-08-20 RX ADMIN — VANCOMYCIN HYDROCHLORIDE 1250 MG: 1.25 INJECTION, POWDER, LYOPHILIZED, FOR SOLUTION INTRAVENOUS at 11:08

## 2023-08-20 RX ADMIN — PIPERACILLIN SODIUM AND TAZOBACTAM SODIUM 4.5 G: 4; .5 INJECTION, POWDER, FOR SOLUTION INTRAVENOUS at 02:08

## 2023-08-20 RX ADMIN — IPRATROPIUM BROMIDE AND ALBUTEROL SULFATE 3 ML: .5; 3 SOLUTION RESPIRATORY (INHALATION) at 07:08

## 2023-08-20 RX ADMIN — ATORVASTATIN CALCIUM 80 MG: 40 TABLET, FILM COATED ORAL at 10:08

## 2023-08-20 RX ADMIN — FERROUS SULFATE TAB 325 MG (65 MG ELEMENTAL FE) 1 EACH: 325 (65 FE) TAB at 10:08

## 2023-08-20 RX ADMIN — PIPERACILLIN SODIUM AND TAZOBACTAM SODIUM 4.5 G: 4; .5 INJECTION, POWDER, FOR SOLUTION INTRAVENOUS at 10:08

## 2023-08-20 RX ADMIN — ACETYLCYSTEINE 4 ML: 100 INHALANT RESPIRATORY (INHALATION) at 08:08

## 2023-08-20 RX ADMIN — AMIODARONE HYDROCHLORIDE 200 MG: 200 TABLET ORAL at 09:08

## 2023-08-20 RX ADMIN — IPRATROPIUM BROMIDE AND ALBUTEROL SULFATE 3 ML: .5; 3 SOLUTION RESPIRATORY (INHALATION) at 08:08

## 2023-08-20 RX ADMIN — SENNOSIDES AND DOCUSATE SODIUM 1 TABLET: 50; 8.6 TABLET ORAL at 10:08

## 2023-08-20 RX ADMIN — METOPROLOL TARTRATE 25 MG: 25 TABLET, FILM COATED ORAL at 09:08

## 2023-08-20 RX ADMIN — APIXABAN 5 MG: 5 TABLET, FILM COATED ORAL at 09:08

## 2023-08-20 RX ADMIN — POLYETHYLENE GLYCOL 3350 17 G: 17 POWDER, FOR SOLUTION ORAL at 10:08

## 2023-08-20 RX ADMIN — ACETYLCYSTEINE 4 ML: 100 INHALANT RESPIRATORY (INHALATION) at 07:08

## 2023-08-20 RX ADMIN — SODIUM CHLORIDE 500 ML: 0.9 INJECTION, SOLUTION INTRAVENOUS at 09:08

## 2023-08-20 RX ADMIN — SENNOSIDES AND DOCUSATE SODIUM 1 TABLET: 50; 8.6 TABLET ORAL at 09:08

## 2023-08-20 RX ADMIN — CHLORPROMAZINE HYDROCHLORIDE 25 MG: 25 INJECTION INTRAMUSCULAR at 10:08

## 2023-08-20 RX ADMIN — METOPROLOL TARTRATE 25 MG: 25 TABLET, FILM COATED ORAL at 10:08

## 2023-08-20 RX ADMIN — ACETYLCYSTEINE 4 ML: 100 INHALANT RESPIRATORY (INHALATION) at 02:08

## 2023-08-20 RX ADMIN — IPRATROPIUM BROMIDE AND ALBUTEROL SULFATE 3 ML: .5; 3 SOLUTION RESPIRATORY (INHALATION) at 02:08

## 2023-08-20 RX ADMIN — AMIODARONE HYDROCHLORIDE 200 MG: 200 TABLET ORAL at 10:08

## 2023-08-20 RX ADMIN — ASPIRIN 81 MG 81 MG: 81 TABLET ORAL at 10:08

## 2023-08-20 NOTE — ASSESSMENT & PLAN NOTE
Miralax and dulcolax scheduled  Soap suds enema 8/19 with no BM  Brown bomb ordered today, but patient had bowel movement before administration  Holding tube feeds

## 2023-08-20 NOTE — PROGRESS NOTES
Pharmacokinetic Assessment Follow Up: IV Vancomycin    Vancomycin serum concentration assessment(s):    The random level was drawn correctly and can be used to guide therapy at this time. The measurement is within the desired definitive target range of 15 to 20 mcg/mL.    Vancomycin Regimen Plan:    Vancomycin 1250 mg IV x1 dose today  Re-dose when the random level is less than 20 mcg/mL, next level to be drawn at 04:30 with am labs on 8/21/23    Drug levels (last 3 results):  Recent Labs   Lab Result Units 08/20/23  0651   Vancomycin, Random ug/mL 15.5       Pharmacy will continue to follow and monitor vancomycin.    Please contact pharmacy at extension [phone] for questions regarding this assessment.    Thank you for the consult,   Benjamín Victoria       Patient brief summary:  Tera Griffiths is a 58 y.o. male initiated on antimicrobial therapy with IV Vancomycin for treatment of sepsis of unknown sources    The patient's current regimen is pulse dosing    Drug Allergies:   Review of patient's allergies indicates:  No Known Allergies    Actual Body Weight:   83.5 kg    Renal Function:   Estimated Creatinine Clearance: 38.8 mL/min (A) (based on SCr of 2.4 mg/dL (H)).,     Dialysis Method (if applicable):  N/A    CBC (last 72 hours):  Recent Labs   Lab Result Units 08/17/23  1351 08/18/23  0302 08/19/23  0330 08/20/23  0651   WBC K/uL 10.93 11.29  11.29 15.37* 21.65*   Hemoglobin g/dL 15.3 15.2  15.2 15.0 13.9*   Hematocrit % 49.5 48.8  48.8 49.4 44.8   Platelets K/uL 363 322  322 283 272   Gran % % 75.1* 81.4*  81.4* 82.8*  --    Lymph % % 9.3* 6.5*  6.5* 5.1*  --    Mono % % 14.9 11.4  11.4 11.5  --    Eosinophil % % 0.2 0.0  0.0 0.0  --    Basophil % % 0.1 0.2  0.2 0.1  --    Differential Method  Automated Automated  Automated Automated  --        Metabolic Panel (last 72 hours):  Recent Labs   Lab Result Units 08/18/23  0346 08/19/23  0330 08/19/23  1240 08/19/23  1241 08/20/23  0651   Sodium mmol/L 151*  149*  --   --  143   Sodium, Urine mmol/L  --   --  46  --   --    Potassium mmol/L 4.5 3.8  --   --  4.7   Chloride mmol/L 119* 113*  --   --  112*   CO2 mmol/L 21* 23  --   --  21*   Glucose mg/dL 117* 110  --   --  109   Glucose, UA   --   --   --  Negative  --    BUN mg/dL 34* 38*  --   --  42*   Creatinine mg/dL 2.0* 2.3*  --   --  2.4*   Creatinine, Urine mg/dL  --   --  170.0  --   --    Albumin g/dL 3.1* 2.9*  --   --  2.5*   Total Bilirubin mg/dL 0.8 1.1*  --   --  1.5*   Alkaline Phosphatase U/L 69 85  --   --  77   AST U/L 20 76*  --   --  37   ALT U/L 7* 42  --   --  29   Magnesium mg/dL 2.2 2.1  --   --  2.1   Phosphorus mg/dL 3.7 2.7  --   --  2.8       Vancomycin Administrations:  vancomycin given in the last 96 hours                     vancomycin 1.75 g in 5 % dextrose 500 mL IVPB (mg) 1,750 mg New Bag 08/19/23 1120                    Microbiologic Results:  Microbiology Results (last 7 days)       Procedure Component Value Units Date/Time    Blood culture [[id number]] Collected: 08/19/23 0956    Order Status: Completed Specimen: Blood Updated: 08/19/23 1715     Blood Culture, Routine No Growth to date    Blood culture [[id number]] Collected: 08/19/23 0955    Order Status: Completed Specimen: Blood Updated: 08/19/23 1715     Blood Culture, Routine No Growth to date    Urine culture [[id number]] Collected: 08/19/23 1241    Order Status: No result Specimen: Urine Updated: 08/19/23 1867

## 2023-08-20 NOTE — PLAN OF CARE
Problem: Adult Inpatient Plan of Care  Goal: Plan of Care Review  Outcome: Ongoing, Progressing  Goal: Patient-Specific Goal (Individualized)  Outcome: Ongoing, Progressing  Goal: Absence of Hospital-Acquired Illness or Injury  Outcome: Ongoing, Progressing  Intervention: Identify and Manage Fall Risk  Flowsheets (Taken 8/20/2023 0438)  Safety Promotion/Fall Prevention:   assistive device/personal item within reach   side rails raised x 3   medications reviewed   high risk medications identified   Fall Risk signage in place  Intervention: Prevent Skin Injury  Flowsheets (Taken 8/20/2023 0438)  Body Position: position changed independently  Skin Protection: adhesive use limited  Intervention: Prevent and Manage VTE (Venous Thromboembolism) Risk  Flowsheets (Taken 8/20/2023 0438)  VTE Prevention/Management: remove, assess skin, and reapply sequential compression device  Range of Motion:   active ROM (range of motion) encouraged   ROM (range of motion) performed     Problem: Diabetes Comorbidity  Goal: Blood Glucose Level Within Targeted Range  Outcome: Ongoing, Progressing  Intervention: Monitor and Manage Glycemia  Flowsheets (Taken 8/20/2023 0438)  Glycemic Management: blood glucose monitored     Problem: Cerebral Tissue Perfusion (Stroke, Ischemic/Transient Ischemic Attack)  Goal: Optimal Cerebral Tissue Perfusion  Outcome: Ongoing, Progressing  Intervention: Protect and Optimize Cerebral Perfusion  Flowsheets (Taken 8/20/2023 0438)  Cerebral Perfusion Promotion: blood pressure monitored

## 2023-08-20 NOTE — NURSING
Pt's son, Tera Jr here to visit patient, Per prior phone converstion with Zahida Dave, pt's POA (copy in chart) Rn called to verify pt's son is ok to visit.  She stated that son is approved person, and that she will be in Monday to clarify list of approved visitors.

## 2023-08-20 NOTE — ASSESSMENT & PLAN NOTE
Patient with new fever, leukocytosis, and tachycardia 8/19  Initiated SIRS protocol  No lactic acidosis  Initiated broad spectrum antiobiotics with Vanc/Zosyn, will narrow based on infectious w/u  Did not provide fluid resuscitation, as patient has an EF of 15-20%  Consulted Hospital medicine, appreciate recs  CXR w/ congestion of pulmonary vasculature stable with prior CXR  UA with bacteria and many leukocytes  No growth to date on blood cultures  CT A/P pending today for abdominal tenderness

## 2023-08-20 NOTE — NURSING
Bedside handoff completed, pt with hiccups, emisis of brown color.  Hygiene  and oral care completed,     pt attempting to spit at staff during this encounter.  Surgical mask placed on pt for staff safety    GRAHAM held, MD team updated, prn compazine requested from pharmacy,
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
## 2023-08-20 NOTE — SUBJECTIVE & OBJECTIVE
Neurologic Chief Complaint: L MCA syndrome    Subjective:     Interval History: Patient is seen for follow-up neurological assessment and treatment recommendations:   Episode of vomitus overnight. Patient pending CT A/P today. Repeat enema ordered, but patient had significant bowel movement prior to administration of the enema. White count still uptrending. Following growth of cultures. NGTD.    HPI, Past Medical, Family, and Social History remains the same as documented in the initial encounter.     Review of Systems   Unable to perform ROS: Other (sedated)   Neurological:  Positive for speech difficulty.     Scheduled Meds:   acetylcysteine 100 mg/ml (10%)  4 mL Nebulization TID WAKE    albuterol-ipratropium  3 mL Nebulization TID WAKE    amiodarone  200 mg Per NG tube BID    apixaban  5 mg Per G Tube BID    aspirin  81 mg Per G Tube Daily    atorvastatin  80 mg Per NG tube Daily    ezetimibe  10 mg Per NG tube QHS    ferrous sulfate  1 tablet Per NG tube Daily    metoprolol tartrate  25 mg Per NG tube BID    piperacillin-tazobactam (Zosyn) IV (PEDS and ADULTS) (extended infusion is not appropriate)  4.5 g Intravenous Q8H    polyethylene glycol  17 g Per NG tube Daily    senna-docusate 8.6-50 mg  1 tablet Per NG tube BID     Continuous Infusions:      PRN Meds:acetaminophen, bisacodyL, chlorproMAZINE, dextrose 10%, dextrose 10%, hydrALAZINE, labetalol, magnesium sulfate IVPB, magnesium sulfate IVPB, nitroGLYCERIN, ondansetron, potassium chloride **AND** potassium chloride **AND** potassium chloride, sodium chloride 0.9%, sodium chloride 0.9%, sodium chloride 0.9%, sodium phosphate 15 mmol in dextrose 5 % (D5W) 250 mL IVPB, sodium phosphate 20.01 mmol in dextrose 5 % (D5W) 250 mL IVPB, sodium phosphate 30 mmol in dextrose 5 % (D5W) 250 mL IVPB, Pharmacy to dose Vancomycin consult **AND** vancomycin - pharmacy to dose    Objective:     Vital Signs (Most Recent):  Temp: 98.9 °F (37.2 °C) (08/20/23 1540)  Pulse: (!)  "57 (08/20/23 1540)  Resp: 18 (08/20/23 1540)  BP: (!) 153/85 (08/20/23 1540)  SpO2: 95 % (08/20/23 1540)  BP Location: Left arm    Vital Signs Range (Last 24H):  Temp:  [98.9 °F (37.2 °C)-100.9 °F (38.3 °C)]   Pulse:  []   Resp:  [18-20]   BP: (124-167)/(85-98)   SpO2:  [91 %-97 %]   BP Location: Left arm       Physical Exam  Vitals and nursing note reviewed.   Constitutional:       General: He is not in acute distress.     Appearance: He is well-developed.      Comments: Sedated on precedex   HENT:      Head: Normocephalic and atraumatic.   Cardiovascular:      Rate and Rhythm: Normal rate.   Pulmonary:      Effort: Pulmonary effort is normal. No respiratory distress.   Abdominal:      General: There is no distension.   Skin:     General: Skin is warm and dry.   Neurological:      Comments: Aphasic, does not follow commands  L gaze              Neurological Exam:   LOC: drowsy  Attention Span: poor  Language: Global aphasia, not following commands  Articulation: Untestable due to severe aphasia   Orientation: Untestable due to severe aphasia   EOM (CN III, IV, VI): Gaze preference  left  Facial Movement (CN VII): Lower facial weakness on the Right  Motor: moves left spontaneous, right hemiparesis  Unable to assess drift and strength as patient does not follow commands, and gets agitated      Laboratory:  CMP:   Recent Labs   Lab 08/20/23  0651   CALCIUM 9.5   ALBUMIN 2.5*   PROT 7.1      K 4.7   CO2 21*   *   BUN 42*   CREATININE 2.4*   ALKPHOS 77   ALT 29   AST 37   BILITOT 1.5*       CBC:   Recent Labs   Lab 08/20/23  0651   WBC 21.65*   RBC 5.22   HGB 13.9*   HCT 44.8      MCV 86   MCH 26.6*   MCHC 31.0*       Lipid Panel:   No results for input(s): "CHOL", "LDLCALC", "HDL", "TRIG" in the last 168 hours.    Coagulation:   Recent Labs   Lab 08/17/23  1351 08/17/23 2007 08/20/23  0651   INR 1.2  --   --    APTT 21.3   < > 32.7*    < > = values in this interval not displayed. " "      Platelet Aggregation Study: No results for input(s): "PLTAGG", "PLTAGINTERP", "PLTAGREGLACO", "ADPPLTAGGREG" in the last 168 hours.  Hgb A1C:   No results for input(s): "HGBA1C" in the last 168 hours.    TSH:   No results for input(s): "TSH" in the last 168 hours.      Diagnostic Results     Brain Imaging   CT Head 8/10/23  Impression:     No detrimental change compared to prior.     Evolving acute infarcts within the left MCA and PCA territories.  No hemorrhagic conversion.  No significant mass effect     Suspected small subacute infarction within the right caudate.     Multiple areas remote infarctions as detailed above.      CT Head 8/8/23 1704  Impression:     1. Redemonstration of acute infarctions in the left MCA and PCA territories.  No evidence to suggest hemorrhagic transformation.  2. Suspected small subacute infarction in the right caudate.  3. Numerous remote infarctions as detailed above.    CT Head 8/8/23 0826  Impression:     Moderate developing hypoattenuation left inferior frontal lobe and left occipital lobe concerning for developing recent infarcts post thrombectomy.     Allowing for scattered hyperdensity related to recently contrast administration for thrombectomy there is no definite acute intracranial hemorrhage.     Previous hypodensity right caudate no longer seen which may be related to contrast administration for thrombectomy and possible subacute age infarction.     Superimposed remote infarcts with moderate to large encephalomalacia right MCA territory unchanged    Vessel Imaging   IR Angio 8/8/23  Preliminary Interpretation:   1.    No evidence of left ICA or MCA stenosis. No large or medium vessel occlusion.    CTA Multiphase 8/7/23  Impression:     CT head:     New right caudate nucleus hypodensity, likely representing age-indeterminate infarct.  Could be further evaluated with MRI.     Multifocal encephalomalacia involving the right frontal, temporoparietal, and left occipital " lobes.     CTA head and neck:     Acute occlusion of the superior left M2 segment.     Left PCA occlusion of undetermined age.     Filling defect in the right main pulmonary artery, concerning for acute pulmonary thromboembolism.  Consider follow-up pulmonary CTA to more fully assess the extent of thromboemboli, the clot burden, and the presence or absence of right heart strain.     Not fully detailed above:     - Incomplete opacification of the left atrial appendage, suspicious for an intraluminal thrombus.  Follow-up echocardiogram, or cardiac MRI or CTA, recommended.     - The presence of acute thrombi or thromboemboli in both the systemic arterial and pulmonary arterial circulation raises concern for the possibility of underlying intracardiac or extracardiac right-to-left shunt, and/or a hypercoagulable state.  Correlate clinically.     - Incompletely visualized, but possibly large, pericardial effusion.  Artifacts compromise accurate assessment of its attenuation.    Cardiac Imaging   TTE with bubble 8/8/23    Left Ventricle: The left ventricle is moderately dilated. Increased ventricular mass. Normal wall thickness. There is moderate eccentric hypertrophy. Severe global hypokinesis present. Septal motion is consistent with pacing. There is severely reduced systolic function with a visually estimated ejection fraction of 15 - 20%. Ejection fraction by visual approximation is 20%. Unable to assess diastolic function due to arrhythmia.    Left Atrium: Left atrium is severely dilated. Radha 85mL/m2.    Right Ventricle: Mild right ventricular enlargement. Wall thickness is normal. Right ventricle wall motion  is normal. Systolic function is severely reduced. Pacemaker lead present in the ventricle.    Right Atrium: Right atrium is severely dilated.    Aortic Valve: There is mild aortic valve sclerosis. There is normal leaflet mobility. There is no significant regurgitation.    Mitral Valve: There is bileaflet  sclerosis. There is normal leaflet mobility. There is mild regurgitation.    Tricuspid Valve: The tricuspid valve is structurally normal. There is normal leaflet mobility. There is mild regurgitation.    Pulmonic Valve: The pulmonic valve is structurally normal. There is normal leaflet mobility. There is no significant regurgitation.    Aorta: Aortic root is normal in size measuring 3.13 cm. Ascending aorta is normal measuring 3.24 cm.    IVC/SVC: Normal venous pressure at 3 mmHg.    Pericardium: The pericardium is normal. There is no pericardial effusion.

## 2023-08-20 NOTE — CODE/ RAPID DOCUMENTATION
"RAPID RESPONSE NURSE CHART REVIEW       Chart Reviewed: 08/20/2023, 10:47 AM    MRN: [id number]  Bed: 906/906 A    Dx: Embolic stroke involving left middle cerebral artery    Tera Griffiths has a past medical history of Acute on chronic combined systolic and diastolic heart failure, Atrial fibrillation, CAD (coronary artery disease), Dyslipidemia, Embolic stroke involving left middle cerebral artery, Encounter for blood transfusion, H/O heart artery stent, HTN (hypertension), ICD (implantable cardioverter-defibrillator) in place, Paroxysmal A-fib, and Stage 3 chronic kidney disease.    Last VS: BP (!) 144/87 (BP Location: Right forearm, Patient Position: Lying)   Pulse (!) 114   Temp 99 °F (37.2 °C) (Axillary)   Resp 20   Ht 6' 1" (1.854 m)   Wt 83.5 kg (184 lb 1.4 oz)   SpO2 96%   BMI 24.29 kg/m²     24H Vital Sign Range:  Temp:  [98.9 °F (37.2 °C)-101 °F (38.3 °C)]   Pulse:  []   Resp:  [18-20]   BP: (120-167)/(84-90)   SpO2:  [91 %-97 %]     Level of Consciousness (AVPU): responds to voice    Recent Labs     08/18/23  0302 08/19/23  0330 08/20/23  0651   WBC 11.29  11.29 15.37* 21.65*   HGB 15.2  15.2 15.0 13.9*   HCT 48.8  48.8 49.4 44.8     322 283 272       Recent Labs     08/18/23  0346 08/19/23  0330 08/20/23  0651   * 149* 143   K 4.5 3.8 4.7   * 113* 112*   CO2 21* 23 21*   BUN 34* 38* 42*   CREATININE 2.0* 2.3* 2.4*   * 110 109   PHOS 3.7 2.7 2.8   MG 2.2 2.1 2.1        OXYGEN:  Flow (L/min):  (1)  Oxygen Concentration (%): 28       MEWS score: 4    Rounding completed with charge CARL Marrufo. No concerns verbalized at this time. Instructed to call [phone] for further concerns or assistance.    Kathe Dennis RN       "
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
## 2023-08-21 PROBLEM — D72.829 LEUKOCYTOSIS: Status: RESOLVED | Noted: 2023-08-19 | Resolved: 2023-08-21

## 2023-08-21 PROBLEM — R50.9 FEVER: Status: ACTIVE | Noted: 2023-08-21

## 2023-08-21 PROBLEM — N39.0 COMPLICATED UTI (URINARY TRACT INFECTION): Status: ACTIVE | Noted: 2023-08-21

## 2023-08-21 LAB
ALBUMIN SERPL BCP-MCNC: 2.2 G/DL (ref 3.5–5.2)
ALP SERPL-CCNC: 74 U/L (ref 55–135)
ALT SERPL W/O P-5'-P-CCNC: 22 U/L (ref 10–44)
ANION GAP SERPL CALC-SCNC: 13 MMOL/L (ref 8–16)
ANISOCYTOSIS BLD QL SMEAR: SLIGHT
APTT PPP: 33.4 SEC (ref 21–32)
AST SERPL-CCNC: 30 U/L (ref 10–40)
BASOPHILS # BLD AUTO: 0.02 K/UL (ref 0–0.2)
BASOPHILS NFR BLD: 0.1 % (ref 0–1.9)
BILIRUB SERPL-MCNC: 1.4 MG/DL (ref 0.1–1)
BUN SERPL-MCNC: 48 MG/DL (ref 6–20)
CALCIUM SERPL-MCNC: 9.3 MG/DL (ref 8.7–10.5)
CHLORIDE SERPL-SCNC: 111 MMOL/L (ref 95–110)
CO2 SERPL-SCNC: 17 MMOL/L (ref 23–29)
CREAT SERPL-MCNC: 2.6 MG/DL (ref 0.5–1.4)
DIFFERENTIAL METHOD: ABNORMAL
EOSINOPHIL # BLD AUTO: 0 K/UL (ref 0–0.5)
EOSINOPHIL NFR BLD: 0.1 % (ref 0–8)
ERYTHROCYTE [DISTWIDTH] IN BLOOD BY AUTOMATED COUNT: 15.8 % (ref 11.5–14.5)
EST. GFR  (NO RACE VARIABLE): 28.1 ML/MIN/1.73 M^2
GLUCOSE SERPL-MCNC: 105 MG/DL (ref 70–110)
HCT VFR BLD AUTO: 42.2 % (ref 40–54)
HGB BLD-MCNC: 12.9 G/DL (ref 14–18)
IMM GRANULOCYTES # BLD AUTO: 0.11 K/UL (ref 0–0.04)
IMM GRANULOCYTES NFR BLD AUTO: 0.6 % (ref 0–0.5)
LYMPHOCYTES # BLD AUTO: 0.9 K/UL (ref 1–4.8)
LYMPHOCYTES NFR BLD: 4.5 % (ref 18–48)
MAGNESIUM SERPL-MCNC: 2.2 MG/DL (ref 1.6–2.6)
MCH RBC QN AUTO: 26.5 PG (ref 27–31)
MCHC RBC AUTO-ENTMCNC: 30.6 G/DL (ref 32–36)
MCV RBC AUTO: 87 FL (ref 82–98)
MONOCYTES # BLD AUTO: 2 K/UL (ref 0.3–1)
MONOCYTES NFR BLD: 10.4 % (ref 4–15)
NEUTROPHILS # BLD AUTO: 16.3 K/UL (ref 1.8–7.7)
NEUTROPHILS NFR BLD: 84.3 % (ref 38–73)
NRBC BLD-RTO: 0 /100 WBC
PHOSPHATE SERPL-MCNC: 3.2 MG/DL (ref 2.7–4.5)
PLATELET # BLD AUTO: 298 K/UL (ref 150–450)
PLATELET BLD QL SMEAR: ABNORMAL
PMV BLD AUTO: 13.7 FL (ref 9.2–12.9)
POCT GLUCOSE: 106 MG/DL (ref 70–110)
POCT GLUCOSE: 126 MG/DL (ref 70–110)
POCT GLUCOSE: 86 MG/DL (ref 70–110)
POCT GLUCOSE: 97 MG/DL (ref 70–110)
POTASSIUM SERPL-SCNC: 4.4 MMOL/L (ref 3.5–5.1)
PROT SERPL-MCNC: 6.8 G/DL (ref 6–8.4)
RBC # BLD AUTO: 4.86 M/UL (ref 4.6–6.2)
SODIUM SERPL-SCNC: 141 MMOL/L (ref 136–145)
VANCOMYCIN SERPL-MCNC: 18.1 UG/ML
WBC # BLD AUTO: 19.35 K/UL (ref 3.9–12.7)

## 2023-08-21 PROCEDURE — 25000242 PHARM REV CODE 250 ALT 637 W/ HCPCS

## 2023-08-21 PROCEDURE — 85730 THROMBOPLASTIN TIME PARTIAL: CPT

## 2023-08-21 PROCEDURE — 97112 NEUROMUSCULAR REEDUCATION: CPT

## 2023-08-21 PROCEDURE — 99233 SBSQ HOSP IP/OBS HIGH 50: CPT | Mod: ,,, | Performed by: STUDENT IN AN ORGANIZED HEALTH CARE EDUCATION/TRAINING PROGRAM

## 2023-08-21 PROCEDURE — 84100 ASSAY OF PHOSPHORUS: CPT | Performed by: PHYSICIAN ASSISTANT

## 2023-08-21 PROCEDURE — 25000003 PHARM REV CODE 250: Performed by: HOSPITALIST

## 2023-08-21 PROCEDURE — 99223 1ST HOSP IP/OBS HIGH 75: CPT | Mod: ,,, | Performed by: INTERNAL MEDICINE

## 2023-08-21 PROCEDURE — 99233 PR SUBSEQUENT HOSPITAL CARE,LEVL III: ICD-10-PCS | Mod: ,,, | Performed by: STUDENT IN AN ORGANIZED HEALTH CARE EDUCATION/TRAINING PROGRAM

## 2023-08-21 PROCEDURE — 99222 1ST HOSP IP/OBS MODERATE 55: CPT | Mod: ,,, | Performed by: NURSE PRACTITIONER

## 2023-08-21 PROCEDURE — 94640 AIRWAY INHALATION TREATMENT: CPT

## 2023-08-21 PROCEDURE — 25000003 PHARM REV CODE 250: Performed by: NURSE PRACTITIONER

## 2023-08-21 PROCEDURE — 63600175 PHARM REV CODE 636 W HCPCS

## 2023-08-21 PROCEDURE — 80202 ASSAY OF VANCOMYCIN: CPT | Performed by: STUDENT IN AN ORGANIZED HEALTH CARE EDUCATION/TRAINING PROGRAM

## 2023-08-21 PROCEDURE — 11000001 HC ACUTE MED/SURG PRIVATE ROOM

## 2023-08-21 PROCEDURE — 97535 SELF CARE MNGMENT TRAINING: CPT

## 2023-08-21 PROCEDURE — 83735 ASSAY OF MAGNESIUM: CPT | Performed by: PHYSICIAN ASSISTANT

## 2023-08-21 PROCEDURE — 97530 THERAPEUTIC ACTIVITIES: CPT

## 2023-08-21 PROCEDURE — 99233 PR SUBSEQUENT HOSPITAL CARE,LEVL III: ICD-10-PCS | Mod: ,,, | Performed by: HOSPITALIST

## 2023-08-21 PROCEDURE — 94761 N-INVAS EAR/PLS OXIMETRY MLT: CPT

## 2023-08-21 PROCEDURE — 85025 COMPLETE CBC W/AUTO DIFF WBC: CPT | Performed by: PHYSICIAN ASSISTANT

## 2023-08-21 PROCEDURE — 99223 PR INITIAL HOSPITAL CARE,LEVL III: ICD-10-PCS | Mod: ,,, | Performed by: INTERNAL MEDICINE

## 2023-08-21 PROCEDURE — 92507 TX SP LANG VOICE COMM INDIV: CPT

## 2023-08-21 PROCEDURE — 25000003 PHARM REV CODE 250

## 2023-08-21 PROCEDURE — 99233 SBSQ HOSP IP/OBS HIGH 50: CPT | Mod: ,,, | Performed by: HOSPITALIST

## 2023-08-21 PROCEDURE — 99222 PR INITIAL HOSPITAL CARE,LEVL II: ICD-10-PCS | Mod: ,,, | Performed by: NURSE PRACTITIONER

## 2023-08-21 PROCEDURE — 36415 COLL VENOUS BLD VENIPUNCTURE: CPT | Performed by: STUDENT IN AN ORGANIZED HEALTH CARE EDUCATION/TRAINING PROGRAM

## 2023-08-21 PROCEDURE — 63600175 PHARM REV CODE 636 W HCPCS: Performed by: HOSPITALIST

## 2023-08-21 PROCEDURE — 80053 COMPREHEN METABOLIC PANEL: CPT | Performed by: PHYSICIAN ASSISTANT

## 2023-08-21 RX ORDER — SODIUM CHLORIDE, SODIUM LACTATE, POTASSIUM CHLORIDE, CALCIUM CHLORIDE 600; 310; 30; 20 MG/100ML; MG/100ML; MG/100ML; MG/100ML
INJECTION, SOLUTION INTRAVENOUS CONTINUOUS
Status: DISCONTINUED | OUTPATIENT
Start: 2023-08-21 | End: 2023-08-21

## 2023-08-21 RX ORDER — SODIUM CHLORIDE, SODIUM LACTATE, POTASSIUM CHLORIDE, CALCIUM CHLORIDE 600; 310; 30; 20 MG/100ML; MG/100ML; MG/100ML; MG/100ML
INJECTION, SOLUTION INTRAVENOUS CONTINUOUS
Status: ACTIVE | OUTPATIENT
Start: 2023-08-22 | End: 2023-08-22

## 2023-08-21 RX ADMIN — IPRATROPIUM BROMIDE AND ALBUTEROL SULFATE 3 ML: .5; 3 SOLUTION RESPIRATORY (INHALATION) at 07:08

## 2023-08-21 RX ADMIN — METOPROLOL TARTRATE 25 MG: 25 TABLET, FILM COATED ORAL at 09:08

## 2023-08-21 RX ADMIN — ACETYLCYSTEINE 4 ML: 100 INHALANT RESPIRATORY (INHALATION) at 07:08

## 2023-08-21 RX ADMIN — ASPIRIN 81 MG 81 MG: 81 TABLET ORAL at 09:08

## 2023-08-21 RX ADMIN — EZETIMIBE 10 MG: 10 TABLET ORAL at 09:08

## 2023-08-21 RX ADMIN — APIXABAN 5 MG: 5 TABLET, FILM COATED ORAL at 09:08

## 2023-08-21 RX ADMIN — IPRATROPIUM BROMIDE AND ALBUTEROL SULFATE 3 ML: .5; 3 SOLUTION RESPIRATORY (INHALATION) at 01:08

## 2023-08-21 RX ADMIN — IPRATROPIUM BROMIDE AND ALBUTEROL SULFATE 3 ML: .5; 3 SOLUTION RESPIRATORY (INHALATION) at 08:08

## 2023-08-21 RX ADMIN — MEROPENEM 2 G: 1 INJECTION INTRAVENOUS at 07:08

## 2023-08-21 RX ADMIN — ATORVASTATIN CALCIUM 80 MG: 40 TABLET, FILM COATED ORAL at 09:08

## 2023-08-21 RX ADMIN — SODIUM CHLORIDE, SODIUM LACTATE, POTASSIUM CHLORIDE, AND CALCIUM CHLORIDE: 600; 310; 30; 20 INJECTION, SOLUTION INTRAVENOUS at 07:08

## 2023-08-21 RX ADMIN — FERROUS SULFATE TAB 325 MG (65 MG ELEMENTAL FE) 1 EACH: 325 (65 FE) TAB at 09:08

## 2023-08-21 RX ADMIN — ACETYLCYSTEINE 4 ML: 100 INHALANT RESPIRATORY (INHALATION) at 08:08

## 2023-08-21 RX ADMIN — POLYETHYLENE GLYCOL 3350 17 G: 17 POWDER, FOR SOLUTION ORAL at 09:08

## 2023-08-21 RX ADMIN — PIPERACILLIN SODIUM AND TAZOBACTAM SODIUM 4.5 G: 4; .5 INJECTION, POWDER, FOR SOLUTION INTRAVENOUS at 02:08

## 2023-08-21 RX ADMIN — SENNOSIDES AND DOCUSATE SODIUM 1 TABLET: 50; 8.6 TABLET ORAL at 09:08

## 2023-08-21 RX ADMIN — ACETAMINOPHEN 650 MG: 325 TABLET ORAL at 09:08

## 2023-08-21 RX ADMIN — AMIODARONE HYDROCHLORIDE 200 MG: 200 TABLET ORAL at 09:08

## 2023-08-21 RX ADMIN — ACETYLCYSTEINE 4 ML: 100 INHALANT RESPIRATORY (INHALATION) at 01:08

## 2023-08-21 RX ADMIN — MEROPENEM 2 G: 1 INJECTION INTRAVENOUS at 09:08

## 2023-08-21 NOTE — ASSESSMENT & PLAN NOTE
Stroke risk factor  Currently on 1.5 L fluid restriction and GDMT  Cardiology was consulted by NCC  CXR (8/19) with congestion of the pulmonary vasculature similar to previous CXR on 8/11, no increased fluid burden

## 2023-08-21 NOTE — PT/OT/SLP PROGRESS
Physical Therapy Co-Treatment    Patient Name: Tera Griffiths   MRN: [id number]    Co-treatment performed for this visit due to patient need for two skilled therapists to ensure patient and staff safety and to accommodate for patient activity tolerance/pain management   Recommendations:     Discharge Recommendations: nursing facility, skilled  Discharge Equipment Recommendations: to be determined by next level of care  Barriers to discharge: Increased level of assist, Inaccessible home, and Decreased caregiver support    Assessment:     Tera Griffiths is a 58 y.o. male admitted with a medical diagnosis of Embolic stroke involving left middle cerebral artery. He presents with the following impairments/functional limitations: weakness, impaired endurance, impaired self care skills, impaired functional mobility, gait instability, impaired balance, impaired cognition, visual deficits, impaired coordination, decreased upper extremity function, decreased lower extremity function, decreased safety awareness. Pt with fair tolerance to treatment session on this day. Pt continuing to participate minimally in therapy 2/2 AMS and fixation on restraint removal with absent command following. Functional mobility remains limited requiring max to total assist of 2 persons to perform. Standing trial deferred 2/2 pt attempting to lay supine following command for STS with pt resisting assist provided. Pt would continue to benefit from skilled acute PT in order to address current deficits and progress functional mobility.     Rehab Prognosis: Fair; patient continues to benefit from acute skilled PT services to address these deficits and reach maximum level of function.  Recent Surgery: Procedure(s) (LRB):  EGD, WITH PEG TUBE INSERTION (N/A) 4 Days Post-Op    Plan:     During this hospitalization, patient to be seen 3 x/week to address the identified rehab impairments via gait training, therapeutic activities, therapeutic exercises,  neuromuscular re-education and progress toward the following goals:    Plan of Care Expires:  09/08/23    Subjective     Chief Complaint: Unable to assess, patient non-verbal; pt intermittently making vocalizations, but no noticeable distress  Patient/Family Comments/Goals: n/a  Pain/Comfort:  Pain Rating 1:  (unable to assess; pt nonverbal)    Objective:     Communicated with RN prior to session. Patient found supine with bed alarm, PEG Tube, peripheral IV, SCD, telemetry, restraints upon PT entry to room.     General Precautions: Standard, fall  Orthopedic Precautions: N/A  Braces: N/A    Functional Mobility:  Bed Mobility:    Rolling Left:  maximal assistance  Rolling Right: maximal assistance  Scooting: total assistance x1 rep to L to HOB  Supine to Sit: maximal assistance of 2 persons for LE management and trunk management  Sit to Supine: total assistance of 2 persons for LE management and trunk management  Balance:   Static Sitting: SBA to total at EOB, 10 min  Pt positioned in RUE WB while sitting at EOB with therapist applying joint compression  Dynamic Sitting: SBA to total at EOB  Pt with 0% command following while at EOB; attempted BUE reaching x5 and BLE knee extension x5  Pt with complete LOB posteriorly with passive L knee extension requiring total assist to recover    AM-PAC 6 CLICK MOBILITY  Turning over in bed (including adjusting bedclothes, sheets and blankets)?: 2  Sitting down on and standing up from a chair with arms (e.g., wheelchair, bedside commode, etc.): 1  Moving from lying on back to sitting on the side of the bed?: 1  Moving to and from a bed to a chair (including a wheelchair)?: 1  Need to walk in hospital room?: 1  Climbing 3-5 steps with a railing?: 1  Basic Mobility Total Score: 7     Therapeutic Activities and Exercises:  Patient educated on role of acute care PT and PT POC, safety while in hospital including calling nurse for mobility, and call light usage  Patient encountered  with soiled brief. Extra time spent changing brief and performing pericare with total assistance of 2 persons  Pt educated on the effects of bed rest and the importance of OOB activity.     Patient left HOB elevated with all lines intact, call button in reach, bed alarm on, and restraints reapplied at end of session.    GOALS:   Multidisciplinary Problems       Physical Therapy Goals          Problem: Physical Therapy    Goal Priority Disciplines Outcome Goal Variances Interventions   Physical Therapy Goal     PT, PT/OT Ongoing, Progressing     Description: Goals to be completed by: 9/8/23    Pt will perform sup<>sit transfers w/ minimum assistance  Pt will have sufficient dynamic balance to sit EOB while performing ADLs/therex w/ stand by assistance for 10 min  Pt will be able to stand up from EOB w/ moderate assistance using LRAD  Pt will ambulate 20 feet w/ maximal assistance using LRAD  Pt will be independent w/ HEP therex on BLE w/ good form and ROM   Pt will follow >50 % of single-step commands throughout treatment session                        Time Tracking:     PT Received On: 08/21/23  PT Start Time: 0822     PT Stop Time: 0856  PT Total Time (min): 34 min     Billable Minutes: Therapeutic Activity 20 and Neuromuscular Re-education 14      Treatment Type: Treatment  PT/PTA: PT     Number of PTA visits since last PT visit: 0     08/21/2023

## 2023-08-21 NOTE — PLAN OF CARE
Problem: Adult Inpatient Plan of Care  Goal: Plan of Care Review  Outcome: Ongoing, Progressing  Goal: Patient-Specific Goal (Individualized)  Outcome: Ongoing, Progressing  Goal: Absence of Hospital-Acquired Illness or Injury  Outcome: Ongoing, Progressing  Intervention: Identify and Manage Fall Risk  Flowsheets (Taken 8/21/2023 0502)  Safety Promotion/Fall Prevention:   assistive device/personal item within reach   bed alarm set   side rails raised x 2   muscle strengthening facilitated   nonskid shoes/socks when out of bed  Intervention: Prevent Skin Injury  Flowsheets (Taken 8/21/2023 0502)  Body Position: turned  Skin Protection:   adhesive use limited   skin-to-device areas padded  Intervention: Prevent and Manage VTE (Venous Thromboembolism) Risk  Flowsheets (Taken 8/21/2023 0502)  VTE Prevention/Management:   remove, assess skin, and reapply sequential compression device   ROM (active) performed     Problem: Diabetes Comorbidity  Goal: Blood Glucose Level Within Targeted Range  Outcome: Ongoing, Progressing  Intervention: Monitor and Manage Glycemia  Flowsheets (Taken 8/21/2023 0502)  Glycemic Management: blood glucose monitored     Problem: Bowel Elimination Impaired (Stroke, Ischemic/Transient Ischemic Attack)  Goal: Effective Bowel Elimination  Outcome: Ongoing, Progressing  Intervention: Promote Effective Bowel Elimination  Flowsheets (Taken 8/21/2023 0502)  Bowel Elimination Management: hygiene measures promoted  Bowel Program: maintenance program followed     Problem: Cerebral Tissue Perfusion (Stroke, Ischemic/Transient Ischemic Attack)  Goal: Optimal Cerebral Tissue Perfusion  Outcome: Ongoing, Progressing  Intervention: Protect and Optimize Cerebral Perfusion  Flowsheets (Taken 8/21/2023 0502)  Cerebral Perfusion Promotion: blood pressure monitored     Problem: Restraint, Nonbehavioral (Nonviolent)  Goal: Absence of Harm or Injury  Outcome: Ongoing, Progressing  Intervention: Protect Dignity,  Rights, and Personal Wellbeing  Flowsheets (Taken 8/21/2023 0289)  Trust Relationship/Rapport:   care explained   choices provided   emotional support provided

## 2023-08-21 NOTE — ASSESSMENT & PLAN NOTE
Patient with new fever, leukocytosis, and tachycardia 8/19  Initiated SIRS protocol  No lactic acidosis  D/c'd Vanc/zosyn and transitioned to Merrem   ID consulted.   Leukocytosis downtrending  Consulted Hospital medicine, appreciate recs  CXR w/ congestion of pulmonary vasculature stable with prior CXR  UA with bacteria and many leukocytes    Cultures growing GNRs  No growth to date on blood cultures  CT A/P pending

## 2023-08-21 NOTE — ASSESSMENT & PLAN NOTE
Patient has not had a bowel movement for the last 2 weeks per stroke team.    - Plan to give enema today.  However, patient had a spontaneous bowel movement prior to enema.
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
## 2023-08-21 NOTE — ASSESSMENT & PLAN NOTE
Patient w/ PMHx CKD 3b presenting with EMERSON with sCr 2.3 (baseline sCr <1.7). Patient had multiple episodes of hypotension where blood pressure was as low as 83/49. Pt has a hx of CHF and has been on fluid restriction since 8/5. Enteral H2O was started yesterday with no improvement of the Cr.  Patient also received contrast when getting a CTA which could be contributing to the EMERSON along with the combination of vancomycin and Zosyn for treatment of his sepsis.    Diagnostics: UA, UrNA - 46, osmolarity - 731, UrCr - 170. FeNa - 0.5%     DDx: Likely prerenal with possible mild component of ATN, now complicated by sepsis  - Trend sCr  - Trend RFP; replete electrolytes PRN for goal K > 4, Phos > 3, Mg > 2  - Strict I&Os  - Avoid nephrotoxic agents  - Renally adjust medications  - Cr 2.0 -> 2.3 ->2.4 -> 2.6  - on continuous  mL/hr

## 2023-08-21 NOTE — ASSESSMENT & PLAN NOTE
Miralax and dulcolax scheduled  Soap suds enema 8/19 with no BM  Brown bomb ordered, but patient had bowel movement 8/20 before administration  Tube feeds resumed

## 2023-08-21 NOTE — ASSESSMENT & PLAN NOTE
Patient w/ PMHx CKD 3b presenting with EMERSON with sCr 2.3 (baseline sCr <1.7). Patient had multiple episodes of hypotension where blood pressure was as low as 83/49. Pt has a hx of CHF and has been on fluid restriction since 8/5. Enteral H2O was started yesterday with no improvement of the Cr.  Patient also received contrast when getting a CTA which could be contributing to the EMERSON along with the combination of vancomycin and Zosyn for treatment of his sepsis.    Diagnostics: UA, UrNA - 46, osmolarity - 731, UrCr - 170. FeNa - 0.5%     DDx: Likely prerenal with possible mild component of ATN  - Trend sCr  - Trend RFP; replete electrolytes PRN for goal K > 4, Phos > 3, Mg > 2  - Strict I&Os  - Avoid nephrotoxic agents  - Renally adjust medications

## 2023-08-21 NOTE — ASSESSMENT & PLAN NOTE
"57y/o M  w HFrEF, CAD, AICD placement (12/2021), pAF, HTN, CKD3b who was admitted to [facility] 8/05 for CHF exacerbation. Hospital course c/b L MCA and L PCA infarct 8/07, PE now on AC, dysphagia s/p PEG placement 8/17. Febrile and tachycardic on 8/19 with uptrending leukocytosis. Septic workup thus far: CXR w/o consolidation. KUB w/o obstructive gas pattern. UA suggestive of UTI with UCx growing GNR.    ID consulted for: "Want to start meropenum on him for coverage of ESBL UTI. Need help with dosing. CrCl of 41." Previously on vanc + zosyn 8/19-8/20. WBC downtrending. Started on nikhil today.     Recommendations:  --f/u UCx, BCx  --ok for nikhil 2g q 12h; will tailor abx pending culture results  --if fever curve not improving will need CT chest/abd/pel  "

## 2023-08-21 NOTE — HPI
"Mr. Griffiths is a 59y/o M pt with PMHx of CHFrEF (10%, DD, pHTN), CAD (h/o STEMI, s/p PCI to Rcx-08/2021), AICD placement (12/2021), pAF (s/p DCCV), HTN, CKD3b who was admitted to [facility] [place] 8/05 for CHF exacerbation.     Hospital course c/b L MCA and L PCA infarct 8/07, PE now on AC dysphagia s/p PEG placement 8/17. Febrile and tachycardic on 8/19 with uptrending leukocytosis. Septic workup thus far: CXR w/o consolidation. KUB w/o obstructive gas pattern. UA suggestive of UTI with UCx growing GNR.    ID consulted for: "Want to start meropenum on him for coverage of ESBL UTI. Need help with dosing. CrCl of 41"  "

## 2023-08-21 NOTE — SUBJECTIVE & OBJECTIVE
Interval History:  NAEON.  Patient febrile overnight with a temperature of a 101° F. Patient also had 2 episodes of tachycardia with the highest heart rate being 109/ Patient was seen this morning. Patient appears to be the same in terms of mentation.  Upon exam, patient appeared to be a little more drowsy compared to yesterday.  Otherwise, hx limited d/t global aphasia.     Review of Systems   Reason unable to perform ROS: patient nonverbal.     Objective:     Vital Signs (Most Recent):  Temp: 99.3 °F (37.4 °C) (08/21/23 1553)  Pulse: 67 (08/21/23 1553)  Resp: 17 (08/21/23 1553)  BP: (!) 141/93 (08/21/23 1553)  SpO2: 95 % (08/21/23 1553) Vital Signs (24h Range):  Temp:  [98 °F (36.7 °C)-101 °F (38.3 °C)] 99.3 °F (37.4 °C)  Pulse:  [] 67  Resp:  [16-19] 17  SpO2:  [94 %-100 %] 95 %  BP: (138-150)/() 141/93     Weight: 83.5 kg (184 lb 1.4 oz)  Body mass index is 24.29 kg/m².    Intake/Output Summary (Last 24 hours) at 8/21/2023 1657  Last data filed at 8/21/2023 0428  Gross per 24 hour   Intake 900 ml   Output --   Net 900 ml         Physical Exam  Vitals reviewed.   Constitutional:       Appearance: Normal appearance. He is ill-appearing.      Interventions: He is restrained.   HENT:      Head: Normocephalic.      Right Ear: External ear normal.      Left Ear: External ear normal.   Eyes:      General: Lids are normal.      Extraocular Movements: Extraocular movements intact.      Conjunctiva/sclera: Conjunctivae normal.   Cardiovascular:      Rate and Rhythm: Normal rate and regular rhythm.      Pulses:           Dorsalis pedis pulses are 2+ on the right side and 2+ on the left side.        Posterior tibial pulses are 2+ on the right side and 2+ on the left side.      Heart sounds: Normal heart sounds.   Pulmonary:      Effort: Tachypnea present.      Breath sounds: Normal breath sounds.   Abdominal:      Comments: PEG tube in place secured by an abdominal binder; appears clean with no drainage or  erythema.   Musculoskeletal:         General: No swelling.      Cervical back: Normal range of motion.      Right lower leg: No edema.      Left lower leg: No edema.   Skin:     General: Skin is warm.      Capillary Refill: Capillary refill takes less than 2 seconds.      Findings: No bruising.   Neurological:      Mental Status: He is easily aroused. He is lethargic.      Comments: Unable to assess as patient unable to follow commands   Psychiatric:         Attention and Perception: He is inattentive.         Speech: He is noncommunicative.         Behavior: Behavior is uncooperative.             Significant Labs: All pertinent labs within the past 24 hours have been reviewed.  Recent Lab Results  (Last 5 results in the past 24 hours)        08/21/23  1555   08/21/23  1151   08/21/23  0553   08/21/23  0553   08/21/23  0000        Albumin       2.2         Alkaline Phosphatase       74         ALT       22         Anion Gap       13         Aniso       Slight         aPTT       33.4  Comment: Refer to local heparin nomogram for intensity/dose specific   therapeutic   range.           AST       30         Baso #       0.02         Basophil %       0.1         BILIRUBIN TOTAL       1.4  Comment: For infants and newborns, interpretation of results should be based  on gestational age, weight and in agreement with clinical  observations.    Premature Infant recommended reference ranges:  Up to 24 hours.............<8.0 mg/dL  Up to 48 hours............<12.0 mg/dL  3-5 days..................<15.0 mg/dL  6-29 days.................<15.0 mg/dL           BUN       48         Calcium       9.3         Chloride       111         CO2       17         Creatinine       2.6         Differential Method       Automated         eGFR       28.1         Eos #       0.0         Eosinophil %       0.1         Glucose       105         Gran # (ANC)       16.3         Gran %       84.3         Hematocrit       42.2         Hemoglobin        12.9         Immature Grans (Abs)       0.11  Comment: Mild elevation in immature granulocytes is non specific and   can be seen in a variety of conditions including stress response,   acute inflammation, trauma and pregnancy. Correlation with other   laboratory and clinical findings is essential.           Immature Granulocytes       0.6         Lymph #       0.9         Lymph %       4.5         Magnesium       2.2         MCH       26.5         MCHC       30.6         MCV       87         Mono #       2.0         Mono %       10.4         MPV       13.7         nRBC       0         Phosphorus       3.2         Platelet Estimate       Appears normal         Platelets       298         POCT Glucose 86   106   97     126       Potassium       4.4         PROTEIN TOTAL       6.8         RBC       4.86         RDW       15.8         Sodium       141         Vancomycin, Random       18.1         WBC       19.35                                Significant Imaging: I have reviewed all pertinent imaging results/findings within the past 24 hours.
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
## 2023-08-21 NOTE — PROGRESS NOTES
Pharmacokinetic Assessment Follow Up: IV Vancomycin    Vancomycin serum concentration assessment(s):    -Vancomycin random level returned with AM labs and can be used to guide therapy.  -A level of 18.1 mcg/mL is within goal 15-20 mcg/mL for sepsis.  -[facility]. Labs in process as of this review. Last trending up.     Vancomycin Regimen Plan:    -Give vancomycin 1000 mg IV x 1 today.  -Random level with AM labs on 8/22.  -Re-dose for level < 20 mcg/mL and based off renal function.     Drug levels (last 3 results):  Recent Labs   Lab Result Units 08/20/23  0651 08/21/23  0553   Vancomycin, Random ug/mL 15.5 18.1       Pharmacy will continue to follow and monitor vancomycin.    Please contact pharmacy at extension [phone] for questions regarding this assessment.    Thank you for the consult,   Anirudh See       Patient brief summary:  Tera Griffiths is a 58 y.o. male initiated on antimicrobial therapy with IV Vancomycin for treatment of sepsis    Drug Allergies:   Review of patient's allergies indicates:  No Known Allergies    Actual Body Weight:   83.5 kg    Renal Function:   Estimated Creatinine Clearance: 38.8 mL/min (A) (based on SCr of 2.4 mg/dL (H)).,     Dialysis Method (if applicable):  N/A    CBC (last 72 hours):  Recent Labs   Lab Result Units 08/19/23  0330 08/20/23  0651 08/20/23  1858   WBC K/uL 15.37* 21.65* 23.96*   Hemoglobin g/dL 15.0 13.9* 14.7   Hematocrit % 49.4 44.8 46.8   Platelets K/uL 283 272 298   Gran % % 82.8* 83.2* 83.7*   Lymph % % 5.1* 4.2* 4.0*   Mono % % 11.5 11.9 10.7   Eosinophil % % 0.0 0.0 0.0   Basophil % % 0.1 0.1 0.3   Differential Method  Automated Automated Automated       Metabolic Panel (last 72 hours):  Recent Labs   Lab Result Units 08/19/23  0330 08/19/23  1240 08/19/23  1241 08/20/23  0651   Sodium mmol/L 149*  --   --  143   Sodium, Urine mmol/L  --  46  --   --    Potassium mmol/L 3.8  --   --  4.7   Chloride mmol/L 113*  --   --  112*   CO2 mmol/L 23  --   --  21*    Glucose mg/dL 110  --   --  109   Glucose, UA   --   --  Negative  --    BUN mg/dL 38*  --   --  42*   Creatinine mg/dL 2.3*  --   --  2.4*   Creatinine, Urine mg/dL  --  170.0  --   --    Albumin g/dL 2.9*  --   --  2.5*   Total Bilirubin mg/dL 1.1*  --   --  1.5*   Alkaline Phosphatase U/L 85  --   --  77   AST U/L 76*  --   --  37   ALT U/L 42  --   --  29   Magnesium mg/dL 2.1  --   --  2.1   Phosphorus mg/dL 2.7  --   --  2.8       Vancomycin Administrations:  vancomycin given in the last 96 hours                     vancomycin 1,250 mg in dextrose 5 % (D5W) 250 mL IVPB (Vial-Mate) (mg) 1,250 mg New Bag 08/20/23 1100    vancomycin 1.75 g in 5 % dextrose 500 mL IVPB (mg) 1,750 mg New Bag 08/19/23 1120                    Microbiologic Results:  Microbiology Results (last 7 days)       Procedure Component Value Units Date/Time    Urine culture [[id number]]  (Abnormal) Collected: 08/19/23 1241    Order Status: Completed Specimen: Urine Updated: 08/20/23 1706     Urine Culture, Routine GRAM NEGATIVE WILFRIDO  >100,000 cfu/ml  Identification and susceptibility pending      Narrative:      Specimen Source->Urine    Blood culture [[id number]] Collected: 08/19/23 0956    Order Status: Completed Specimen: Blood Updated: 08/20/23 1212     Blood Culture, Routine No Growth to date      No Growth to date    Blood culture [[id number]] Collected: 08/19/23 0955    Order Status: Completed Specimen: Blood Updated: 08/20/23 1212     Blood Culture, Routine No Growth to date      No Growth to date

## 2023-08-21 NOTE — SUBJECTIVE & OBJECTIVE
Neurologic Chief Complaint: L MCA syndrome    Subjective:     Interval History: Patient is seen for follow-up neurological assessment and treatment recommendations:   Ucx with GNRs. Vanc/Zosyn discontinued and Merrem added. ID consulted. Fluid resuscitating with LR per hospital medicine. White count downtrending. Constipation resolved. CT a/p pending.     HPI, Past Medical, Family, and Social History remains the same as documented in the initial encounter.     Review of Systems   Unable to perform ROS: Other (sedated)   Neurological:  Positive for speech difficulty.     Scheduled Meds:   acetylcysteine 100 mg/ml (10%)  4 mL Nebulization TID WAKE    albuterol-ipratropium  3 mL Nebulization TID WAKE    amiodarone  200 mg Per NG tube BID    apixaban  5 mg Per G Tube BID    aspirin  81 mg Per G Tube Daily    atorvastatin  80 mg Per NG tube Daily    ezetimibe  10 mg Per NG tube QHS    ferrous sulfate  1 tablet Per NG tube Daily    meropenem (MERREM) IVPB  2 g Intravenous Q12H    metoprolol tartrate  25 mg Per NG tube BID    polyethylene glycol  17 g Per NG tube Daily    senna-docusate 8.6-50 mg  1 tablet Per NG tube BID     Continuous Infusions:   lactated ringers 100 mL/hr at 08/21/23 0715       PRN Meds:acetaminophen, bisacodyL, chlorproMAZINE, dextrose 10%, dextrose 10%, hydrALAZINE, labetalol, magnesium sulfate IVPB, magnesium sulfate IVPB, nitroGLYCERIN, ondansetron, potassium chloride **AND** potassium chloride **AND** potassium chloride, sodium chloride 0.9%, sodium chloride 0.9%, sodium chloride 0.9%, sodium phosphate 15 mmol in dextrose 5 % (D5W) 250 mL IVPB, sodium phosphate 20.01 mmol in dextrose 5 % (D5W) 250 mL IVPB, sodium phosphate 30 mmol in dextrose 5 % (D5W) 250 mL IVPB    Objective:     Vital Signs (Most Recent):  Temp: 99.3 °F (37.4 °C) (08/21/23 1553)  Pulse: 67 (08/21/23 1553)  Resp: 17 (08/21/23 1553)  BP: (!) 141/93 (08/21/23 1553)  SpO2: 95 % (08/21/23 1553)  BP Location: Left arm    Vital Signs  "Range (Last 24H):  Temp:  [98 °F (36.7 °C)-101 °F (38.3 °C)]   Pulse:  []   Resp:  [16-19]   BP: (138-150)/()   SpO2:  [94 %-100 %]   BP Location: Left arm       Physical Exam  Vitals and nursing note reviewed.   Constitutional:       General: He is not in acute distress.     Appearance: He is well-developed.      Comments: Sedated on precedex   HENT:      Head: Normocephalic and atraumatic.   Cardiovascular:      Rate and Rhythm: Normal rate.   Pulmonary:      Effort: Pulmonary effort is normal. No respiratory distress.   Abdominal:      General: There is no distension.   Skin:     General: Skin is warm and dry.   Neurological:      Comments: Aphasic, does not follow commands  L gaze              Neurological Exam:   LOC: drowsy  Attention Span: poor  Language: Global aphasia, not following commands  Articulation: Untestable due to severe aphasia   Orientation: Untestable due to severe aphasia   EOM (CN III, IV, VI): Gaze preference  left  Facial Movement (CN VII): Lower facial weakness on the Right  Motor: moves left spontaneous, right hemiparesis  Unable to assess drift and strength as patient does not follow commands, and gets agitated      Laboratory:  CMP:   Recent Labs   Lab 08/21/23  0553   CALCIUM 9.3   ALBUMIN 2.2*   PROT 6.8      K 4.4   CO2 17*   *   BUN 48*   CREATININE 2.6*   ALKPHOS 74   ALT 22   AST 30   BILITOT 1.4*       CBC:   Recent Labs   Lab 08/21/23  0553   WBC 19.35*   RBC 4.86   HGB 12.9*   HCT 42.2      MCV 87   MCH 26.5*   MCHC 30.6*       Lipid Panel:   No results for input(s): "CHOL", "LDLCALC", "HDL", "TRIG" in the last 168 hours.    Coagulation:   Recent Labs   Lab 08/17/23  1351 08/17/23 2007 08/21/23  0553   INR 1.2  --   --    APTT 21.3   < > 33.4*    < > = values in this interval not displayed.       Platelet Aggregation Study: No results for input(s): "PLTAGG", "PLTAGINTERP", "PLTAGREGLACO", "ADPPLTAGGREG" in the last 168 hours.  Hgb A1C:   No " "results for input(s): "HGBA1C" in the last 168 hours.    TSH:   No results for input(s): "TSH" in the last 168 hours.      Diagnostic Results     Brain Imaging   CT Head 8/10/23  Impression:     No detrimental change compared to prior.     Evolving acute infarcts within the left MCA and PCA territories.  No hemorrhagic conversion.  No significant mass effect     Suspected small subacute infarction within the right caudate.     Multiple areas remote infarctions as detailed above.      CT Head 8/8/23 1704  Impression:     1. Redemonstration of acute infarctions in the left MCA and PCA territories.  No evidence to suggest hemorrhagic transformation.  2. Suspected small subacute infarction in the right caudate.  3. Numerous remote infarctions as detailed above.    CT Head 8/8/23 0826  Impression:     Moderate developing hypoattenuation left inferior frontal lobe and left occipital lobe concerning for developing recent infarcts post thrombectomy.     Allowing for scattered hyperdensity related to recently contrast administration for thrombectomy there is no definite acute intracranial hemorrhage.     Previous hypodensity right caudate no longer seen which may be related to contrast administration for thrombectomy and possible subacute age infarction.     Superimposed remote infarcts with moderate to large encephalomalacia right MCA territory unchanged    Vessel Imaging   IR Angio 8/8/23  Preliminary Interpretation:   1.    No evidence of left ICA or MCA stenosis. No large or medium vessel occlusion.    CTA Multiphase 8/7/23  Impression:     CT head:     New right caudate nucleus hypodensity, likely representing age-indeterminate infarct.  Could be further evaluated with MRI.     Multifocal encephalomalacia involving the right frontal, temporoparietal, and left occipital lobes.     CTA head and neck:     Acute occlusion of the superior left M2 segment.     Left PCA occlusion of undetermined age.     Filling defect in the " right main pulmonary artery, concerning for acute pulmonary thromboembolism.  Consider follow-up pulmonary CTA to more fully assess the extent of thromboemboli, the clot burden, and the presence or absence of right heart strain.     Not fully detailed above:     - Incomplete opacification of the left atrial appendage, suspicious for an intraluminal thrombus.  Follow-up echocardiogram, or cardiac MRI or CTA, recommended.     - The presence of acute thrombi or thromboemboli in both the systemic arterial and pulmonary arterial circulation raises concern for the possibility of underlying intracardiac or extracardiac right-to-left shunt, and/or a hypercoagulable state.  Correlate clinically.     - Incompletely visualized, but possibly large, pericardial effusion.  Artifacts compromise accurate assessment of its attenuation.    Cardiac Imaging   TTE with bubble 8/8/23    Left Ventricle: The left ventricle is moderately dilated. Increased ventricular mass. Normal wall thickness. There is moderate eccentric hypertrophy. Severe global hypokinesis present. Septal motion is consistent with pacing. There is severely reduced systolic function with a visually estimated ejection fraction of 15 - 20%. Ejection fraction by visual approximation is 20%. Unable to assess diastolic function due to arrhythmia.    Left Atrium: Left atrium is severely dilated. Radha 85mL/m2.    Right Ventricle: Mild right ventricular enlargement. Wall thickness is normal. Right ventricle wall motion  is normal. Systolic function is severely reduced. Pacemaker lead present in the ventricle.    Right Atrium: Right atrium is severely dilated.    Aortic Valve: There is mild aortic valve sclerosis. There is normal leaflet mobility. There is no significant regurgitation.    Mitral Valve: There is bileaflet sclerosis. There is normal leaflet mobility. There is mild regurgitation.    Tricuspid Valve: The tricuspid valve is structurally normal. There is normal  leaflet mobility. There is mild regurgitation.    Pulmonic Valve: The pulmonic valve is structurally normal. There is normal leaflet mobility. There is no significant regurgitation.    Aorta: Aortic root is normal in size measuring 3.13 cm. Ascending aorta is normal measuring 3.24 cm.    IVC/SVC: Normal venous pressure at 3 mmHg.    Pericardium: The pericardium is normal. There is no pericardial effusion.

## 2023-08-21 NOTE — PLAN OF CARE
OT POC reviewed; goals remain appropriate at this time  Problem: Occupational Therapy  Goal: Occupational Therapy Goal  Description: Goals to be met by: 9/8/23     Patient will increase functional independence with ADLs by performing:    UE Dressing with Moderate Assistance.  LE Dressing with Moderate Assistance.  Grooming while standing with Moderate Assistance.  Toileting from toilet with Moderate Assistance for hygiene and clothing management.     Outcome: Ongoing, Progressing

## 2023-08-21 NOTE — PROGRESS NOTES
VANCOMYCIN DOSING BY PHARMACY DISCONTINUATION NOTE    Tera Griffiths is a 58 y.o. male who had been consulted for vancomycin dosing.    The pharmacy consult for vancomycin dosing has been discontinued.     Vancomycin Dosing by Pharmacy Consult will sign-off. Please reconsult if necessary. Thank you for allowing us to participate in this patient's care.     Anirudh See, PharmD, BCPS  Ext. [phone]

## 2023-08-21 NOTE — PT/OT/SLP PROGRESS
Occupational Therapy   Co-Treatment with Physical Therapy  Co-treatment performed due to patient's multiple deficits requiring two skilled therapists to appropriately and safely assess patient's strength, endurance, functional mobility, and ADL performance while facilitating functional tasks in addition to accommodating for patient's activity tolerance and medical acuity.    Name: Tera Griffiths  MRN: [id number]  Admitting Diagnosis:  Embolic stroke involving left middle cerebral artery  4 Days Post-Op    Recommendations:     Discharge Recommendations: nursing facility, skilled  Discharge Equipment Recommendations:  to be determined by next level of care  Barriers to discharge:  Other (Comment) (increased level of skilled assistance required)    Assessment:     Tera Griffiths is a 58 y.o. male with a medical diagnosis of Embolic stroke involving left middle cerebral artery.  He presents with the following performance deficits affecting function: weakness, impaired endurance, impaired self care skills, impaired functional mobility, gait instability, impaired balance, impaired cognition, decreased coordination, decreased upper extremity function, decreased lower extremity function, decreased safety awareness, impaired coordination. Pt tolerated session fairly well with limited participation and motivation 2/2 AMS, no command following on this date. Pt progressing well, but continues to be severely limited 2/2 AMS. Pt with noted L downward gaze throughout session, no visual tracking noted on this date. Pt found soiled upon OT arrival, required Max A to roll B, TA for basia hygiene, and TA to change brief.     Rehab Prognosis:  Good; patient would benefit from acute skilled OT services to address these deficits and reach maximum level of function.       Plan:     Patient to be seen 3 x/week to address the above listed problems via self-care/home management, therapeutic exercises, therapeutic activities, neuromuscular  re-education, cognitive retraining  Plan of Care Expires: 09/11/23  Plan of Care Reviewed with: patient    Subjective     Chief Complaint: No complaints, pt is nonverbal  Patient/Family Comments/goals: N/A - pt is nonverbal  Pain/Comfort:  Pain Rating 1:  (unable to assess - pt nonverbal)    Objective:     Communicated with: RN, MD prior to session.  Patient found HOB elevated with bed alarm, SCD, restraints, telemetry, peripheral IV, PEG Tube upon OT entry to room.    General Precautions: Standard, fall    Orthopedic Precautions:N/A  Braces: N/A  Respiratory Status: Room air     Occupational Performance:     Bed Mobility:    Patient completed Rolling/Turning to Left with  maximal assistance  Patient completed Rolling/Turning to Right with maximal assistance  Patient completed Scooting/Bridging with total assistance  Patient completed Supine to Sit with maximal assistance and 2 persons  Patient completed Sit to Supine with total assistance and 2 persons     Functional Mobility/Transfers:  Deferred on this date 2/2 pt not following commands, focus of session instead on ADLs and neuromuscular re-education to promote tone normalization of RUE.    Activities of Daily Living:  Feeding:  dependence PEG in place  Grooming: dependence to wipe face 2/2 poor management of oral secretions  Lower Body Dressing: total assistance to change brief  Toileting: dependence to complete basia hygiene 2/2 incontinence of bowel.      [facility] 6 Click ADL: 6    Treatment & Education:  Seated EOB with SBA-Max A for sitting balance, pt completed neuromuscular re-education ax to promote tone normalization of RUE for functional use. Pt completed neuro-re-ed techniques including weightbearing, joint compressions, and PNF patterns. Pt tolerated well.  Pt educated on the following:  - role of OT and OT POC  - use of call light to request for assistance with all functional mobility to ensure safety during hospital stay  - importance of continued  mobilization  - benefits of continued participation in therapy.   - All pt questions within OT scope of practice addressed, pt verbalized understanding.      Patient left HOB elevated with all lines intact, call button in reach, bed alarm on, restraints reapplied at end of session, and RN notified    GOALS:   Multidisciplinary Problems       Occupational Therapy Goals          Problem: Occupational Therapy    Goal Priority Disciplines Outcome Interventions   Occupational Therapy Goal     OT, PT/OT Ongoing, Progressing    Description: Goals to be met by: 9/8/23     Patient will increase functional independence with ADLs by performing:    UE Dressing with Moderate Assistance.  LE Dressing with Moderate Assistance.  Grooming while standing with Moderate Assistance.  Toileting from toilet with Moderate Assistance for hygiene and clothing management.                          Time Tracking:     OT Date of Treatment: 08/21/23  OT Start Time: 0822  OT Stop Time: 0856  OT Total Time (min): 34 min    Billable Minutes:Self Care/Home Management 14  Neuromuscular Re-education 20    OT/ANDREA: OT          8/21/2023

## 2023-08-21 NOTE — CONSULTS
"[facility] Neuro[facility])  Infectious Disease  Consult Note    Patient Name: Tera Griffiths  MRN: [id number]  Admission Date: 8/5/2023  Hospital Length of Stay: 14 days  Attending Physician: Elsi Valencia MD  Primary Care Provider: Carleen Bueno MD     Isolation Status: No active isolations    Patient information was obtained from patient, past medical records and ER records.      Inpatient consult to Infectious Diseases  Consult performed by: Celsa Pierce MD  Consult ordered by: Jun Burks DO      Inpatient consult to Infectious Diseases  Consult performed by: Celsa Pierce MD  Consult ordered by: Azeb Marks MD        Assessment/Plan:     Oncology  Leukocytosis  57y/o M  w HFrEF, CAD, AICD placement (12/2021), pAF, HTN, CKD3b who was admitted to [facility] 8/05 for CHF exacerbation. Hospital course c/b L MCA and L PCA infarct 8/07, PE now on AC, dysphagia s/p PEG placement 8/17. Febrile and tachycardic on 8/19 with uptrending leukocytosis. Septic workup thus far: CXR w/o consolidation. KUB w/o obstructive gas pattern. UA suggestive of UTI with UCx growing GNR.    ID consulted for: "Want to start meropenum on him for coverage of ESBL UTI. Need help with dosing. CrCl of 41." Previously on vanc + zosyn 8/19-8/20. WBC downtrending. Started on nikhil today.     Recommendations:  --f/u UCx, BCx  --ok for nikhil 2g q 12h; will tailor abx pending culture results  --f/u CT chest/abd/pel        Thank you for your consult. I will follow-up with patient. Please contact us if you have any additional questions.    Celsa Pierce MD  Infectious Disease  [facility])    Subjective:     Principal Problem: Embolic stroke involving left middle cerebral artery    HPI: Mr. Griffiths is a 57y/o M pt with PMHx of CHFrEF (10%, DD, pHTN), CAD (h/o STEMI, s/p PCI to Rcx-08/2021), AICD placement (12/2021), pAF (s/p DCCV), HTN, CKD3b who was admitted to [facility] 8/05 for CHF " "exacerbation.     Hospital course c/b L MCA and L PCA infarct 8/07, PE now on AC dysphagia s/p PEG placement 8/17. Febrile and tachycardic on 8/19 with uptrending leukocytosis. Septic workup thus far: CXR w/o consolidation. KUB w/o obstructive gas pattern. UA suggestive of UTI with UCx growing GNR.    ID consulted for: "Want to start meropenum on him for coverage of ESBL UTI. Need help with dosing. CrCl of 41"      Past Medical History:   Diagnosis Date    Acute on chronic combined systolic and diastolic heart failure 09/23/2022    Atrial fibrillation     CAD (coronary artery disease) 09/23/2022    Dyslipidemia 09/23/2022    Embolic stroke involving left middle cerebral artery 08/08/2023    Encounter for blood transfusion     H/O heart artery stent     HTN (hypertension) 09/23/2022    ICD (implantable cardioverter-defibrillator) in place 09/23/2022    Paroxysmal A-fib 09/23/2022    Stage 3 chronic kidney disease 04/19/2023       Past Surgical History:   Procedure Laterality Date    CARDIAC DEFIBRILLATOR PLACEMENT Left     CEREBRAL ANGIOGRAM N/A 8/8/2023    Procedure: ANGIOGRAM-CEREBRAL;  Surgeon: Kenzie Rodriguez;  Location: [facility];  Service: Anesthesiology;  Laterality: N/A;  LVO (angiogram)    ESOPHAGOGASTRODUODENOSCOPY N/A 8/11/2023    Procedure: EGD (ESOPHAGOGASTRODUODENOSCOPY);  Surgeon: Colette Martinez MD;  Location: [address]);  Service: Gastroenterology;  Laterality: N/A;    ESOPHAGOGASTRODUODENOSCOPY W/ PEG N/A 8/17/2023    Procedure: EGD, WITH PEG TUBE INSERTION;  Surgeon: Yan cSott MD;  Location: [facility] OR [address] [place] [address];  Service: General;  Laterality: N/A;  PEG, possible lap G    HERNIA REPAIR      LEFT HEART CATHETERIZATION Left 4/18/2023    Procedure: Left heart cath;  Surgeon: Atilio Marks MD;  Location: [facility] CATH LAB;  Service: Cardiology;  Laterality: Left;    RIGHT HEART CATHETERIZATION Right 9/30/2022    Procedure: INSERTION, CATHETER, RIGHT HEART;  Surgeon: Caden Aden MD;  " Location: [facility] CATH LAB;  Service: Cardiology;  Laterality: Right;    TREATMENT OF CARDIAC ARRHYTHMIA N/A 11/22/2022    Procedure: CARDIOVERSION;  Surgeon: Marvin Sanon MD;  Location: [facility] EP LAB;  Service: Cardiology;  Laterality: N/A;  afib, KIA, DCCV, anes, MB, 3 Prep       Review of patient's allergies indicates:  No Known Allergies    Medications:  Medications Prior to Admission   Medication Sig    amiodarone (PACERONE) 200 MG Tab Take 1 tablet (200 mg total) by mouth 2 (two) times daily.    apixaban (ELIQUIS) 5 mg Tab Take 5 mg by mouth 2 (two) times daily.    atorvastatin (LIPITOR) 80 MG tablet Take 1 tablet (80 mg total) by mouth once daily.    clopidogreL (PLAVIX) 75 mg tablet Take 1 tablet (75 mg total) by mouth once daily.    empagliflozin (JARDIANCE) 10 mg tablet Take 1 tablet (10 mg total) by mouth once daily.    ezetimibe (ZETIA) 10 mg tablet Take 10 mg by mouth once daily.    ferrous sulfate (FEOSOL) 325 mg (65 mg iron) Tab tablet Take 325 mg by mouth every other day.    LIDOcaine (LIDODERM) 5 % Place 1 patch onto the skin once daily.    metoprolol succinate (TOPROL-XL) 25 MG 24 hr tablet Take 1 tablet (25 mg total) by mouth every evening.    oxyCODONE-acetaminophen (PERCOCET) 5-325 mg per tablet Take 1 tablet by mouth every 6 (six) hours as needed for Pain.    sacubitriL-valsartan (ENTRESTO) 24-26 mg per tablet Take 1 tablet by mouth 2 (two) times daily.    torsemide (DEMADEX) 20 MG Tab Take 1 tablet (20 mg total) by mouth once daily.     Antibiotics (From admission, onward)      Start     Stop Route Frequency Ordered    08/21/23 0700  meropenem (MERREM) 2 g in sodium chloride 0.9% 100 mL IVPB         -- IV Every 12 hours (non-standard times) 08/21/23 0610    08/14/23 0900  mupirocin 2 % ointment         08/19/23 0859 Nasl 2 times daily 08/14/23 0507          Antifungals (From admission, onward)      None          Antivirals (From admission, onward)      None             Immunization History    Administered Date(s) Administered    COVID-19, MRNA, LN-S, PF (Pfizer) (Gray Cap) 01/26/2022    COVID-19, MRNA, LN-S, PF (Pfizer) (Purple Cap) 01/06/2022, 01/26/2022    Hepatitis A, Adult 02/17/2023    Influenza (FLUBLOK) - Quadrivalent - Recombinant - PF *Preferred* (egg allergy) 10/06/2022    Influenza - Quadrivalent - MDCK - PF 02/13/2019, 09/24/2019, 10/22/2020, 12/13/2021    Pneumococcal Conjugate - 20 Valent 02/17/2023    Pneumococcal Polysaccharide - 23 Valent 12/03/2019    Tdap 12/03/2019    Varicella 02/17/2023       Family History    None       Social History     Socioeconomic History    Marital status: Single   Tobacco Use    Smoking status: Never    Smokeless tobacco: Never   Substance and Sexual Activity    Alcohol use: Never    Drug use: Never     Social Determinants of Health     Financial Resource Strain: Low Risk  (8/6/2023)    Overall Financial Resource Strain (CARDIA)     Difficulty of Paying Living Expenses: Not hard at all   Food Insecurity: No Food Insecurity (8/6/2023)    Hunger Vital Sign     Worried About Running Out of Food in the Last Year: Never true     Ran Out of Food in the Last Year: Never true   Transportation Needs: No Transportation Needs (8/6/2023)    PRAPARE - Transportation     Lack of Transportation (Medical): No     Lack of Transportation (Non-Medical): No   Physical Activity: Inactive (8/6/2023)    Exercise Vital Sign     Days of Exercise per Week: 0 days     Minutes of Exercise per Session: 0 min   Stress: No Stress Concern Present (8/6/2023)    [employer] [place] [employer] - Occupational Stress Questionnaire     Feeling of Stress : Not at all   Recent Concern: Stress - Stress Concern Present (7/14/2023)    [employer] [place] [employer] - Occupational Stress Questionnaire     Feeling of Stress : To some extent   Social Connections: Socially Isolated (8/6/2023)    Social Connection and Isolation Panel [NHANES]     Frequency of Communication with  Friends and Family: More than three times a week     Frequency of Social Gatherings with Friends and Family: Twice a week     Attends Zoroastrian Services: Never     Active Member of Clubs or Organizations: No     Attends Club or Organization Meetings: Never     Marital Status: Never    Housing Stability: Low Risk  (8/6/2023)    Housing Stability Vital Sign     Unable to Pay for Housing in the Last Year: No     Number of Places Lived in the Last Year: 1     Unstable Housing in the Last Year: No     Review of Systems   Unable to perform ROS: Patient nonverbal     Objective:     Vital Signs (Most Recent):  Temp: 99.6 °F (37.6 °C) (08/21/23 1150)  Pulse: (!) 112 (08/21/23 1455)  Resp: 19 (08/21/23 1307)  BP: (!) 150/100 (08/21/23 1150)  SpO2: 96 % (08/21/23 1307) Vital Signs (24h Range):  Temp:  [98 °F (36.7 °C)-101 °F (38.3 °C)] 99.6 °F (37.6 °C)  Pulse:  [] 112  Resp:  [16-19] 19  SpO2:  [94 %-100 %] 96 %  BP: (138-153)/() 150/100     Weight: 83.5 kg (184 lb 1.4 oz)  Body mass index is 24.29 kg/m².    Estimated Creatinine Clearance: 35.9 mL/min (A) (based on SCr of 2.6 mg/dL (H)).     Physical Exam  Constitutional:       General: He is not in acute distress.  HENT:      Head: Normocephalic and atraumatic.   Eyes:      Pupils: Pupils are equal, round, and reactive to light.   Cardiovascular:      Rate and Rhythm: Normal rate and regular rhythm.   Pulmonary:      Effort: Pulmonary effort is normal. No respiratory distress.      Breath sounds: Normal breath sounds.   Abdominal:      General: Abdomen is flat. There is no distension.      Palpations: Abdomen is soft.      Tenderness: There is no abdominal tenderness.      Comments: PEG tube site clean   Skin:     Findings: No lesion or rash.   Neurological:      Mental Status: He is alert.      Comments: Not following commands, non-verbal          Significant Labs: Blood Culture:   Recent Labs   Lab 03/10/23  1149 08/19/23  0955 08/19/23  0956   LABBLOO  No growth after 5 days. No Growth to date  No Growth to date  No Growth to date No Growth to date  No Growth to date  No Growth to date     Urine Culture:   Recent Labs   Lab 08/19/23  1241   LABURIN GRAM NEGATIVE WILFRIDO  >100,000 cfu/ml  Identification and susceptibility pending  *     All pertinent labs within the past 24 hours have been reviewed.    Significant Imaging: I have reviewed all pertinent imaging results/findings within the past 24 hours.
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
## 2023-08-21 NOTE — ASSESSMENT & PLAN NOTE
58M w CHFrEF (10%, DD, pHTN; entresto/jardiance/torsemide 20) w AICD, CAD (h/o STEMI, s/p PCI to Rcx-08/2021, NSTEMI 4/23), pAF s/p DCCV (amio /apixiban), h/o PEs (9/2021, 12/2021; apixiban), HTN, CKD3b (BL Cr 1.7) admitted to  ([facility]) two weeks ago on 8/5 for for ADHF and NSTEMI. Hospital stay c/b L MCA/PCA infarct, suspected subacute R caudate infarct.and PE in the right distal interlobar artery and the segmental/subsegmental bilateral posterior pulmonary arteries.  Patient developed global aphasia and dysphagia. Patient had a complicated course to have a PEG tube placed which was placed on 08/17. Stepped down to [facility].  On 8/19 at 0800 spiked fever 101.3 with tachy 120s- O2 sats okay. WBC 15.3K (83% gran, no bands) up from 11K. BUN/Cr up at 38/2.3 (from 34/2.0, from 28/1.3).  Unknown source of infection at this time but considering an abdominal infection given that patient recently had a PEG tube placed. Patient also has a condom catheter placed which suspicious for the source of infection.  Also concern for aspiration pneumonia given that patient has dysphagia and and nurse reports episodes of vomiting last night.  Patient also has been constipated for the last 2 weeks, concern for possible ileus or small-bowel obstruction.    DDx: likely 2/2 to complicated UTI, still considering intra-abdominal infection  - r/o pancreatitis, pneumonia, SBO, ileus  - CXR; Cardiomegaly and pulmonary venous congestion, similar prior   - Started on Vancomycin and Zosyn.  Now on Meropenum 2 g (on Day 1)  - Impression: Cardiomegaly and pulmonary venous congestion, similar prior 08/09  - BCx x2 - NTG  - Lactate wnl  - Amylase and lipase WNL  - CT C/A/P w/o contrast pending     - Recommend stopping tube feeds given concern for ileus/SBO 2/2 to pt's constipation  - KUB - nonobstructive bowel gas pattern   - UA significant for many bacteria, WBC >100/hpf, negative nitrite. Concern for UTI. UCx positive for Gram negative rods   -  can consider possible dental abscess given pt's halitosis  - ID consult for meropenum

## 2023-08-21 NOTE — PT/OT/SLP PROGRESS
Speech Language Pathology Treatment    Patient Name:  Tera Griffiths   MRN:  [id number]  Admitting Diagnosis: Embolic stroke involving left middle cerebral artery    Recommendations:                 General Recommendations:  Dysphagia therapy, Speech/language therapy, and Cognitive-linguistic therapy  Diet recommendations:  NPO, Liquid Diet Level: NPO   Aspiration Precautions: Frequent oral care and Strict aspiration precautions   General Precautions: Standard, aspiration, aphasia, fall  Communication strategies:  provide increased time to answer and go to room if call light pushed    Assessment:     Tera Griffiths is a 58 y.o. male with an SLP diagnosis of Aphasia and Dysphagia.      Subjective     SLP Reviewed Pt with RN pre/post attempts, RN cleared for tx  Pt presents lethargic  Pt with some moans and groans, otherwise unable to elicit vocalization    Pain/Comfort:  Pain Rating 1: other (see comments) (difficult to assess 2/2 underlying Aphasia and decreased sustained alertness, RN notified)  Pain Addressed 1: Nurse notified    Respiratory Status: Room air    Objective:     Has the patient been evaluated by SLP for swallowing?   Yes  Keep patient NPO? Yes   Current Respiratory Status:    Room air     Pt found asleep in bed with bilateral soft wrist restraints and mittens in place. He woke per moderate verbal cues. He demonstrated a L gaze preference and did not track to SLP on R provided cues. Pt with decreased sustained alertness and easily fell back to sleep without consistent cues. SLP unable to elicit response to yes/no questions via head nod, eye gaze or verbal response. SLP unable to elicit commands provided max cues. Pt at times moved his mitten across his head.SLP exited room to notified RN of findings.  Upon return to room, MD team rounding with Pt. SLP explained she would return to room following MD rounding to continue session.   Upon return to room later in am, Patient found awake in bed with  bilateral soft wrist restraints and mittens in place. SLP unable to elicit response to yes/no questions.  Pt with occasional moans and groans upon attempts to elicit vocalization via automatic speech tasks for counting, otherwise SLP unable to elicit vocalization via spontaneous speech, automatic speech (phrase completion, object naming) or conversational speech. Pt with minimal sustained ey contact and drifted back to sleep state when not cued to redirect to structured task. PO trials deferred due to decreased sustained alertness, decreased command following and aspiration risk. Pt was educated on ongoing SLP POC, compensatory strategies for communication; however, he did not demonstrate or verbalize understanding 2/2 underlying Aphasia. No family present. Pt remained partially upright in bed upon SPL exit/handoff with RN. RN notified of findings.     Goals:   Multidisciplinary Problems       SLP Goals          Problem: SLP    Goal Priority Disciplines Outcome   SLP Goal     SLP Ongoing, Progressing   Description: Speech Language Pathology Goals  Goals expected to be met by 8/15  1. Pt will participate in ongoing assessment of swallow.   2. Pt will answer simple y/n questions with 60% accy given max cues.   3. Pt will follow simple commands with 50% accy given max cues.   4. Pt will vocalize in response to any stim x5/session given max cues.   5. Pt will localize to stim on R x2/session given max cues.                              Plan:     Patient to be seen:  3 x/week   Plan of Care expires:  09/07/23  Plan of Care reviewed with:  patient   SLP Follow-Up:  Yes       Discharge recommendations:  nursing facility, skilled     Time Tracking:     SLP Treatment Date:   08/21/23  Speech Start Time:  1005/10:26  Speech Stop Time:  1013 / 10:33   Speech Total Time (min):  8 min/ 6 min  Pt seen across two sessions 2/2 MD rounding.   Billable Minutes: Speech Therapy Individual 12 min    08/21/2023

## 2023-08-21 NOTE — SUBJECTIVE & OBJECTIVE
Past Medical History:   Diagnosis Date    Acute on chronic combined systolic and diastolic heart failure 09/23/2022    Atrial fibrillation     CAD (coronary artery disease) 09/23/2022    Dyslipidemia 09/23/2022    Embolic stroke involving left middle cerebral artery 08/08/2023    Encounter for blood transfusion     H/O heart artery stent     HTN (hypertension) 09/23/2022    ICD (implantable cardioverter-defibrillator) in place 09/23/2022    Paroxysmal A-fib 09/23/2022    Stage 3 chronic kidney disease 04/19/2023       Past Surgical History:   Procedure Laterality Date    CARDIAC DEFIBRILLATOR PLACEMENT Left     CEREBRAL ANGIOGRAM N/A 8/8/2023    Procedure: ANGIOGRAM-CEREBRAL;  Surgeon: Kenzie Rodriguez;  Location: [facility];  Service: Anesthesiology;  Laterality: N/A;  LVO (angiogram)    ESOPHAGOGASTRODUODENOSCOPY N/A 8/11/2023    Procedure: EGD (ESOPHAGOGASTRODUODENOSCOPY);  Surgeon: Colette Martinez MD;  Location: [facility] (2ND FLR);  Service: Gastroenterology;  Laterality: N/A;    ESOPHAGOGASTRODUODENOSCOPY W/ PEG N/A 8/17/2023    Procedure: EGD, WITH PEG TUBE INSERTION;  Surgeon: Yan Scott MD;  Location: [facility] OR 2ND FLR;  Service: General;  Laterality: N/A;  PEG, possible lap G    HERNIA REPAIR      LEFT HEART CATHETERIZATION Left 4/18/2023    Procedure: Left heart cath;  Surgeon: Atilio Marks MD;  Location: [facility] CATH LAB;  Service: Cardiology;  Laterality: Left;    RIGHT HEART CATHETERIZATION Right 9/30/2022    Procedure: INSERTION, CATHETER, RIGHT HEART;  Surgeon: Caden Aden MD;  Location: [facility] CATH [facility];  Service: Cardiology;  Laterality: Right;    TREATMENT OF CARDIAC ARRHYTHMIA N/A 11/22/2022    Procedure: CARDIOVERSION;  Surgeon: Marvin Sanon MD;  Location: [facility] EP LAB;  Service: Cardiology;  Laterality: N/A;  afib, KIA, DCCV, anes, MB, 3 Prep       Review of patient's allergies indicates:  No Known Allergies    Medications:  Medications Prior to Admission   Medication Sig    amiodarone  (PACERONE) 200 MG Tab Take 1 tablet (200 mg total) by mouth 2 (two) times daily.    apixaban (ELIQUIS) 5 mg Tab Take 5 mg by mouth 2 (two) times daily.    atorvastatin (LIPITOR) 80 MG tablet Take 1 tablet (80 mg total) by mouth once daily.    clopidogreL (PLAVIX) 75 mg tablet Take 1 tablet (75 mg total) by mouth once daily.    empagliflozin (JARDIANCE) 10 mg tablet Take 1 tablet (10 mg total) by mouth once daily.    ezetimibe (ZETIA) 10 mg tablet Take 10 mg by mouth once daily.    ferrous sulfate (FEOSOL) 325 mg (65 mg iron) Tab tablet Take 325 mg by mouth every other day.    LIDOcaine (LIDODERM) 5 % Place 1 patch onto the skin once daily.    metoprolol succinate (TOPROL-XL) 25 MG 24 hr tablet Take 1 tablet (25 mg total) by mouth every evening.    oxyCODONE-acetaminophen (PERCOCET) 5-325 mg per tablet Take 1 tablet by mouth every 6 (six) hours as needed for Pain.    sacubitriL-valsartan (ENTRESTO) 24-26 mg per tablet Take 1 tablet by mouth 2 (two) times daily.    torsemide (DEMADEX) 20 MG Tab Take 1 tablet (20 mg total) by mouth once daily.     Antibiotics (From admission, onward)      Start     Stop Route Frequency Ordered    08/21/23 0700  meropenem (MERREM) 2 g in sodium chloride 0.9% 100 mL IVPB         -- IV Every 12 hours (non-standard times) 08/21/23 0610    08/14/23 0900  mupirocin 2 % ointment         08/19/23 0859 Nasl 2 times daily 08/14/23 0507          Antifungals (From admission, onward)      None          Antivirals (From admission, onward)      None             Immunization History   Administered Date(s) Administered    COVID-19, MRNA, LN-S, PF (Pfizer) (Gray Cap) 01/26/2022    COVID-19, MRNA, LN-S, PF (Pfizer) (Purple Cap) 01/06/2022, 01/26/2022    Hepatitis A, Adult 02/17/2023    Influenza (FLUBLOK) - Quadrivalent - Recombinant - PF *Preferred* (egg allergy) 10/06/2022    Influenza - Quadrivalent - MDCK - PF 02/13/2019, 09/24/2019, 10/22/2020, 12/13/2021    Pneumococcal Conjugate - 20 Valent  02/17/2023    Pneumococcal Polysaccharide - 23 Valent 12/03/2019    Tdap 12/03/2019    Varicella 02/17/2023       Family History    None       Social History     Socioeconomic History    Marital status: Single   Tobacco Use    Smoking status: Never    Smokeless tobacco: Never   Substance and Sexual Activity    Alcohol use: Never    Drug use: Never     Social Determinants of Health     Financial Resource Strain: Low Risk  (8/6/2023)    Overall Financial Resource Strain (CARDIA)     Difficulty of Paying Living Expenses: Not hard at all   Food Insecurity: No Food Insecurity (8/6/2023)    Hunger Vital Sign     Worried About Running Out of Food in the Last Year: Never true     Ran Out of Food in the Last Year: Never true   Transportation Needs: No Transportation Needs (8/6/2023)    PRAPARE - Transportation     Lack of Transportation (Medical): No     Lack of Transportation (Non-Medical): No   Physical Activity: Inactive (8/6/2023)    Exercise Vital Sign     Days of Exercise per Week: 0 days     Minutes of Exercise per Session: 0 min   Stress: No Stress Concern Present (8/6/2023)    [employer] [place] [employer] - Occupational Stress Questionnaire     Feeling of Stress : Not at all   Recent Concern: Stress - Stress Concern Present (7/14/2023)    [place] [employer] - Occupational Stress Questionnaire     Feeling of Stress : To some extent   Social Connections: Socially Isolated (8/6/2023)    Social Connection and Isolation Panel [NHANES]     Frequency of Communication with Friends and Family: More than three times a week     Frequency of Social Gatherings with Friends and Family: Twice a week     Attends Jehov[employer]'s witness Services: Never     Active Member of Clubs or Organizations: No     Attends Club or Organization Meetings: Never     Marital Status: Never    Housing Stability: Low Risk  (8/6/2023)    Housing Stability Vital Sign     Unable to Pay for Housing in the Last Year: No      Number of Places Lived in the Last Year: 1     Unstable Housing in the Last Year: No     Review of Systems   Unable to perform ROS: Patient nonverbal     Objective:     Vital Signs (Most Recent):  Temp: 99.6 °F (37.6 °C) (08/21/23 1150)  Pulse: (!) 112 (08/21/23 1455)  Resp: 19 (08/21/23 1307)  BP: (!) 150/100 (08/21/23 1150)  SpO2: 96 % (08/21/23 1307) Vital Signs (24h Range):  Temp:  [98 °F (36.7 °C)-101 °F (38.3 °C)] 99.6 °F (37.6 °C)  Pulse:  [] 112  Resp:  [16-19] 19  SpO2:  [94 %-100 %] 96 %  BP: (138-153)/() 150/100     Weight: 83.5 kg (184 lb 1.4 oz)  Body mass index is 24.29 kg/m².    Estimated Creatinine Clearance: 35.9 mL/min (A) (based on SCr of 2.6 mg/dL (H)).     Physical Exam  Constitutional:       General: He is not in acute distress.  HENT:      Head: Normocephalic and atraumatic.   Eyes:      Pupils: Pupils are equal, round, and reactive to light.   Cardiovascular:      Rate and Rhythm: Normal rate and regular rhythm.   Pulmonary:      Effort: Pulmonary effort is normal. No respiratory distress.      Breath sounds: Normal breath sounds.   Abdominal:      General: Abdomen is flat. There is no distension.      Palpations: Abdomen is soft.      Tenderness: There is no abdominal tenderness.      Comments: PEG tube site clean   Skin:     Findings: No lesion or rash.   Neurological:      Mental Status: He is alert.      Comments: Not following commands, non-verbal          Significant Labs: Blood Culture:   Recent Labs   Lab 03/10/23  1149 08/19/23  0955 08/19/23  0956   LABBLOO No growth after 5 days. No Growth to date  No Growth to date  No Growth to date No Growth to date  No Growth to date  No Growth to date     Urine Culture:   Recent Labs   Lab 08/19/23  1241   LABURIN GRAM NEGATIVE WILFRIDO  >100,000 cfu/ml  Identification and susceptibility pending  *     All pertinent labs within the past 24 hours have been reviewed.    Significant Imaging: I have reviewed all pertinent imaging  results/findings within the past 24 hours.
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
## 2023-08-21 NOTE — SUBJECTIVE & OBJECTIVE
Interval History:  NAEON.  Patient febrile overnight with a temperature of a 101° F. patient also had 4 episodes of tachycardia with the highest heart rate being 114 and patient remains hypertensive with a blood pressure as high as 167/90.  Patient was seen this morning.  Per son, patient appears to be the same in terms of mentation.  Upon exam, patient appeared to be a little more active and agitated from yesterday.  Spoke with the stroke team who reported that patient has not had a bowel movement in 2 weeks and plan to give him enema.    Review of Systems   Reason unable to perform ROS: patient nonverbal.     Objective:     Vital Signs (Most Recent):  Temp: 98.9 °F (37.2 °C) (08/20/23 1540)  Pulse: (!) 57 (08/20/23 1540)  Resp: 18 (08/20/23 1540)  BP: (!) 153/85 (08/20/23 1540)  SpO2: 95 % (08/20/23 1540) Vital Signs (24h Range):  Temp:  [98.9 °F (37.2 °C)-100.9 °F (38.3 °C)] 98.9 °F (37.2 °C)  Pulse:  [] 57  Resp:  [18-20] 18  SpO2:  [91 %-97 %] 95 %  BP: (124-167)/(85-98) 153/85     Weight: 83.5 kg (184 lb 1.4 oz)  Body mass index is 24.29 kg/m².    Intake/Output Summary (Last 24 hours) at 8/20/2023 1913  Last data filed at 8/20/2023 0438  Gross per 24 hour   Intake 850 ml   Output --   Net 850 ml         Physical Exam  Vitals reviewed.   Constitutional:       Appearance: Normal appearance. He is ill-appearing.      Interventions: He is restrained.   HENT:      Head: Normocephalic.      Right Ear: External ear normal.      Left Ear: External ear normal.   Eyes:      General: Lids are normal.      Extraocular Movements: Extraocular movements intact.      Conjunctiva/sclera: Conjunctivae normal.   Cardiovascular:      Rate and Rhythm: Regular rhythm. Tachycardia present.      Pulses:           Dorsalis pedis pulses are 2+ on the right side and 2+ on the left side.        Posterior tibial pulses are 2+ on the right side and 2+ on the left side.      Heart sounds: Normal heart sounds.   Pulmonary:      Effort:  Tachypnea present.      Breath sounds: Normal breath sounds.   Abdominal:      Comments: PEG tube in place secured by an abdominal binder; appears clean with no drainage or erythema.   Musculoskeletal:         General: No swelling.      Cervical back: Normal range of motion.      Right lower leg: No edema.      Left lower leg: No edema.   Skin:     General: Skin is warm.      Capillary Refill: Capillary refill takes less than 2 seconds.      Findings: No bruising.   Neurological:      Mental Status: He is easily aroused. He is lethargic.      Comments: Unable to assess as patient unable to follow commands   Psychiatric:         Attention and Perception: He is inattentive.         Speech: He is noncommunicative.         Behavior: Behavior is uncooperative.             Significant Labs: All pertinent labs within the past 24 hours have been reviewed.  Recent Lab Results         08/20/23  0651   08/20/23  0615   08/19/23  2338        Albumin 2.5           Alkaline Phosphatase 77           ALT 29           Anion Gap 10           Aniso Slight           aPTT 32.7  Comment: Refer to local heparin nomogram for intensity/dose specific   therapeutic   range.             AST 37  Comment: *Result may be interfered by visible hemolysis           Baso # 0.02           Basophil % 0.1           BILIRUBIN TOTAL 1.5  Comment: For infants and newborns, interpretation of results should be based  on gestational age, weight and in agreement with clinical  observations.    Premature Infant recommended reference ranges:  Up to 24 hours.............<8.0 mg/dL  Up to 48 hours............<12.0 mg/dL  3-5 days..................<15.0 mg/dL  6-29 days.................<15.0 mg/dL             BUN 42           Calcium 9.5           Chloride 112           CO2 21           Creatinine 2.4           Differential Method Automated           eGFR 30.9           Eos # 0.0           Eosinophil % 0.0           Glucose 109           Gran # (ANC) 18.0            Gran % 83.2           Hematocrit 44.8           Hemoglobin 13.9           Hypo Occasional           Immature Grans (Abs) 0.14  Comment: Mild elevation in immature granulocytes is non specific and   can be seen in a variety of conditions including stress response,   acute inflammation, trauma and pregnancy. Correlation with other   laboratory and clinical findings is essential.             Immature Granulocytes 0.6           Lymph # 0.9           Lymph % 4.2           Magnesium 2.1           MCH 26.6           MCHC 31.0           MCV 86           Mono # 2.6           Mono % 11.9           MPV 13.6           nRBC 0           Ovalocytes Occasional           Phosphorus 2.8           Platelets 272           POCT Glucose   121   124       Poikilocytosis Slight           Poly Occasional           Potassium 4.7           PROTEIN TOTAL 7.1           RBC 5.22           RDW 15.7           Sodium 143           Spherocytes Occasional           Vancomycin, Random 15.5           WBC 21.65                   Significant Imaging: I have reviewed all pertinent imaging results/findings within the past 24 hours.

## 2023-08-21 NOTE — ASSESSMENT & PLAN NOTE
58M w CHFrEF (10%, DD, pHTN; entresto/jardiance/torsemide 20) w AICD, CAD (h/o STEMI, s/p PCI to Rcx-08/2021, NSTEMI 4/23), pAF s/p DCCV (amio /apixiban), h/o PEs (9/2021, 12/2021; apixiban), HTN, CKD3b (BL Cr 1.7) admitted to  ([facility]) two weeks ago on 8/5 for for ADHF and NSTEMI. Hospital stay c/b L MCA/PCA infarct, suspected subacute R caudate infarct.and PE in the right distal interlobar artery and the segmental/subsegmental bilateral posterior pulmonary arteries.  Patient developed global aphasia and dysphagia. Patient had a complicated course to have a PEG tube placed which was placed on 08/17. Stepped down to [facility].  On 8/19 at 0800 spiked fever 101.3 with tachy 120s- O2 sats okay. WBC 15.3K (83% gran, no bands) up from 11K. BUN/Cr up at 38/2.3 (from 34/2.0, from 28/1.3).  Unknown source of infection at this time but considering an abdominal infection given that patient recently had a PEG tube placed. Patient also has a condom catheter placed which suspicious for the source of infection.  Also concern for aspiration pneumonia given that patient has dysphagia and and nurse reports episodes of vomiting last night.  Patient also has been constipated for the last 2 weeks, concern for possible ileus or small-bowel obstruction.    DDx: aspiration pneumonia, UTI, intra-abdominal infection, pancreatitis, ileus, SBO  - CXR; Cardiomegaly and pulmonary venous congestion, similar prior   - Started on Vancomycin and Zosyn.  Concern for the combination of vancomycin and Zosyn contributing to the EMERSON.  Can consider switching to vancomycin, cefepime,and Flagyl or vancomycin and meropenem.  - Impression: Cardiomegaly and pulmonary venous congestion, similar prior 08/09  - BCx x2 - NTG  - Lactate wnl  - Amylase and lipase WNL  - CT C/A/P w/o contrast pending     - Recommend stopping tube feeds given concern for ileus/SBO 2/2 to pt's constipation  - KUB - nonobstructive bowel gas pattern   - UA significant for many  bacteria, WBC >100/hpf, negative nitrite. Concern for UTI. UCx pending  - can consider possible dental abscess given pt's halitosis  - ID consult for meropenum to cover ESBL

## 2023-08-22 PROBLEM — E46 MALNUTRITION: Status: ACTIVE | Noted: 2023-08-22

## 2023-08-22 PROBLEM — R45.1 AGITATION: Status: ACTIVE | Noted: 2023-08-22

## 2023-08-22 LAB
ALBUMIN SERPL BCP-MCNC: 2.3 G/DL (ref 3.5–5.2)
ALP SERPL-CCNC: 98 U/L (ref 55–135)
ALT SERPL W/O P-5'-P-CCNC: 19 U/L (ref 10–44)
ANION GAP SERPL CALC-SCNC: 11 MMOL/L (ref 8–16)
AST SERPL-CCNC: 27 U/L (ref 10–40)
BACTERIA UR CULT: ABNORMAL
BASOPHILS # BLD AUTO: 0.03 K/UL (ref 0–0.2)
BASOPHILS NFR BLD: 0.2 % (ref 0–1.9)
BILIRUB SERPL-MCNC: 1.2 MG/DL (ref 0.1–1)
BUN SERPL-MCNC: 50 MG/DL (ref 6–20)
CALCIUM SERPL-MCNC: 9.7 MG/DL (ref 8.7–10.5)
CHLORIDE SERPL-SCNC: 112 MMOL/L (ref 95–110)
CO2 SERPL-SCNC: 22 MMOL/L (ref 23–29)
CREAT SERPL-MCNC: 2.4 MG/DL (ref 0.5–1.4)
DIFFERENTIAL METHOD: ABNORMAL
EOSINOPHIL # BLD AUTO: 0 K/UL (ref 0–0.5)
EOSINOPHIL NFR BLD: 0.3 % (ref 0–8)
ERYTHROCYTE [DISTWIDTH] IN BLOOD BY AUTOMATED COUNT: 15.5 % (ref 11.5–14.5)
EST. GFR  (NO RACE VARIABLE): 30.9 ML/MIN/1.73 M^2
GLUCOSE SERPL-MCNC: 105 MG/DL (ref 70–110)
HCT VFR BLD AUTO: 43.6 % (ref 40–54)
HGB BLD-MCNC: 13.3 G/DL (ref 14–18)
IMM GRANULOCYTES # BLD AUTO: 0.08 K/UL (ref 0–0.04)
IMM GRANULOCYTES NFR BLD AUTO: 0.5 % (ref 0–0.5)
LYMPHOCYTES # BLD AUTO: 0.7 K/UL (ref 1–4.8)
LYMPHOCYTES NFR BLD: 4.6 % (ref 18–48)
MAGNESIUM SERPL-MCNC: 2.5 MG/DL (ref 1.6–2.6)
MCH RBC QN AUTO: 26.9 PG (ref 27–31)
MCHC RBC AUTO-ENTMCNC: 30.5 G/DL (ref 32–36)
MCV RBC AUTO: 88 FL (ref 82–98)
MONOCYTES # BLD AUTO: 1.9 K/UL (ref 0.3–1)
MONOCYTES NFR BLD: 11.9 % (ref 4–15)
NEUTROPHILS # BLD AUTO: 12.9 K/UL (ref 1.8–7.7)
NEUTROPHILS NFR BLD: 82.5 % (ref 38–73)
NRBC BLD-RTO: 0 /100 WBC
PHOSPHATE SERPL-MCNC: 3 MG/DL (ref 2.7–4.5)
PLATELET # BLD AUTO: 357 K/UL (ref 150–450)
PMV BLD AUTO: 13.3 FL (ref 9.2–12.9)
POCT GLUCOSE: 110 MG/DL (ref 70–110)
POCT GLUCOSE: 118 MG/DL (ref 70–110)
POCT GLUCOSE: 130 MG/DL (ref 70–110)
POTASSIUM SERPL-SCNC: 4.4 MMOL/L (ref 3.5–5.1)
PROT SERPL-MCNC: 6.9 G/DL (ref 6–8.4)
RBC # BLD AUTO: 4.94 M/UL (ref 4.6–6.2)
SODIUM SERPL-SCNC: 145 MMOL/L (ref 136–145)
WBC # BLD AUTO: 15.63 K/UL (ref 3.9–12.7)

## 2023-08-22 PROCEDURE — 25000242 PHARM REV CODE 250 ALT 637 W/ HCPCS

## 2023-08-22 PROCEDURE — 94761 N-INVAS EAR/PLS OXIMETRY MLT: CPT

## 2023-08-22 PROCEDURE — 25000242 PHARM REV CODE 250 ALT 637 W/ HCPCS: Performed by: STUDENT IN AN ORGANIZED HEALTH CARE EDUCATION/TRAINING PROGRAM

## 2023-08-22 PROCEDURE — 25000003 PHARM REV CODE 250: Performed by: PHYSICIAN ASSISTANT

## 2023-08-22 PROCEDURE — 99232 PR SUBSEQUENT HOSPITAL CARE,LEVL II: ICD-10-PCS | Mod: ,,, | Performed by: NURSE PRACTITIONER

## 2023-08-22 PROCEDURE — 25000003 PHARM REV CODE 250: Performed by: HOSPITALIST

## 2023-08-22 PROCEDURE — 63600175 PHARM REV CODE 636 W HCPCS: Performed by: HOSPITALIST

## 2023-08-22 PROCEDURE — 25000003 PHARM REV CODE 250

## 2023-08-22 PROCEDURE — 99233 PR SUBSEQUENT HOSPITAL CARE,LEVL III: ICD-10-PCS | Mod: ,,, | Performed by: STUDENT IN AN ORGANIZED HEALTH CARE EDUCATION/TRAINING PROGRAM

## 2023-08-22 PROCEDURE — 99233 SBSQ HOSP IP/OBS HIGH 50: CPT | Mod: ,,, | Performed by: INTERNAL MEDICINE

## 2023-08-22 PROCEDURE — 99232 SBSQ HOSP IP/OBS MODERATE 35: CPT | Mod: ,,, | Performed by: HOSPITALIST

## 2023-08-22 PROCEDURE — 97530 THERAPEUTIC ACTIVITIES: CPT

## 2023-08-22 PROCEDURE — 99233 SBSQ HOSP IP/OBS HIGH 50: CPT | Mod: ,,, | Performed by: STUDENT IN AN ORGANIZED HEALTH CARE EDUCATION/TRAINING PROGRAM

## 2023-08-22 PROCEDURE — 11000001 HC ACUTE MED/SURG PRIVATE ROOM

## 2023-08-22 PROCEDURE — 25000003 PHARM REV CODE 250: Performed by: NURSE PRACTITIONER

## 2023-08-22 PROCEDURE — 99233 PR SUBSEQUENT HOSPITAL CARE,LEVL III: ICD-10-PCS | Mod: ,,, | Performed by: INTERNAL MEDICINE

## 2023-08-22 PROCEDURE — 92507 TX SP LANG VOICE COMM INDIV: CPT

## 2023-08-22 PROCEDURE — 63600175 PHARM REV CODE 636 W HCPCS: Performed by: PHYSICIAN ASSISTANT

## 2023-08-22 PROCEDURE — 80053 COMPREHEN METABOLIC PANEL: CPT | Performed by: PHYSICIAN ASSISTANT

## 2023-08-22 PROCEDURE — 84100 ASSAY OF PHOSPHORUS: CPT | Performed by: PHYSICIAN ASSISTANT

## 2023-08-22 PROCEDURE — 83735 ASSAY OF MAGNESIUM: CPT | Performed by: PHYSICIAN ASSISTANT

## 2023-08-22 PROCEDURE — 94640 AIRWAY INHALATION TREATMENT: CPT

## 2023-08-22 PROCEDURE — 99232 SBSQ HOSP IP/OBS MODERATE 35: CPT | Mod: ,,, | Performed by: NURSE PRACTITIONER

## 2023-08-22 PROCEDURE — 99232 PR SUBSEQUENT HOSPITAL CARE,LEVL II: ICD-10-PCS | Mod: ,,, | Performed by: HOSPITALIST

## 2023-08-22 PROCEDURE — 85025 COMPLETE CBC W/AUTO DIFF WBC: CPT | Performed by: PHYSICIAN ASSISTANT

## 2023-08-22 PROCEDURE — 36415 COLL VENOUS BLD VENIPUNCTURE: CPT | Performed by: PHYSICIAN ASSISTANT

## 2023-08-22 RX ORDER — METOPROLOL TARTRATE 1 MG/ML
5 INJECTION, SOLUTION INTRAVENOUS ONCE
Status: COMPLETED | OUTPATIENT
Start: 2023-08-22 | End: 2023-08-22

## 2023-08-22 RX ORDER — IPRATROPIUM BROMIDE AND ALBUTEROL SULFATE 2.5; .5 MG/3ML; MG/3ML
3 SOLUTION RESPIRATORY (INHALATION) 2 TIMES DAILY
Status: DISCONTINUED | OUTPATIENT
Start: 2023-08-22 | End: 2023-09-02

## 2023-08-22 RX ORDER — VALPROIC ACID 250 MG/5ML
250 SOLUTION ORAL EVERY 8 HOURS
Status: DISCONTINUED | OUTPATIENT
Start: 2023-08-22 | End: 2023-08-24

## 2023-08-22 RX ORDER — ACETYLCYSTEINE 100 MG/ML
4 SOLUTION ORAL; RESPIRATORY (INHALATION) 2 TIMES DAILY
Status: DISCONTINUED | OUTPATIENT
Start: 2023-08-22 | End: 2023-09-02

## 2023-08-22 RX ADMIN — APIXABAN 5 MG: 5 TABLET, FILM COATED ORAL at 10:08

## 2023-08-22 RX ADMIN — CEFTRIAXONE 1 G: 1 INJECTION, POWDER, FOR SOLUTION INTRAMUSCULAR; INTRAVENOUS at 12:08

## 2023-08-22 RX ADMIN — IPRATROPIUM BROMIDE AND ALBUTEROL SULFATE 3 ML: .5; 3 SOLUTION RESPIRATORY (INHALATION) at 07:08

## 2023-08-22 RX ADMIN — METOPROLOL TARTRATE 25 MG: 25 TABLET, FILM COATED ORAL at 10:08

## 2023-08-22 RX ADMIN — CHLORPROMAZINE HYDROCHLORIDE 25 MG: 25 INJECTION INTRAMUSCULAR at 11:08

## 2023-08-22 RX ADMIN — FERROUS SULFATE TAB 325 MG (65 MG ELEMENTAL FE) 1 EACH: 325 (65 FE) TAB at 10:08

## 2023-08-22 RX ADMIN — SENNOSIDES AND DOCUSATE SODIUM 1 TABLET: 50; 8.6 TABLET ORAL at 10:08

## 2023-08-22 RX ADMIN — VALPROIC ACID 250 MG: 250 SOLUTION ORAL at 02:08

## 2023-08-22 RX ADMIN — METOROPROLOL TARTRATE 5 MG: 5 INJECTION, SOLUTION INTRAVENOUS at 01:08

## 2023-08-22 RX ADMIN — AMIODARONE HYDROCHLORIDE 200 MG: 200 TABLET ORAL at 10:08

## 2023-08-22 RX ADMIN — ACETAMINOPHEN 650 MG: 325 TABLET ORAL at 10:08

## 2023-08-22 RX ADMIN — ACETYLCYSTEINE 4 ML: 100 INHALANT RESPIRATORY (INHALATION) at 08:08

## 2023-08-22 RX ADMIN — ASPIRIN 81 MG 81 MG: 81 TABLET ORAL at 10:08

## 2023-08-22 RX ADMIN — ATORVASTATIN CALCIUM 80 MG: 40 TABLET, FILM COATED ORAL at 10:08

## 2023-08-22 RX ADMIN — EZETIMIBE 10 MG: 10 TABLET ORAL at 10:08

## 2023-08-22 RX ADMIN — IPRATROPIUM BROMIDE AND ALBUTEROL SULFATE 3 ML: 2.5; .5 SOLUTION RESPIRATORY (INHALATION) at 08:08

## 2023-08-22 RX ADMIN — ACETYLCYSTEINE 4 ML: 100 INHALANT RESPIRATORY (INHALATION) at 07:08

## 2023-08-22 NOTE — ASSESSMENT & PLAN NOTE
Stroke risk factor  Currently on 1.5 L fluid restriction and GDMT  Cardiology was consulted by [facility]  CXR (8/19) with congestion of the pulmonary vasculature similar to previous CXR on 8/11, no increased fluid burden  Strict I's/O's - Condom cath ordered 8/22

## 2023-08-22 NOTE — ASSESSMENT & PLAN NOTE
Patient patient with a history of hypertension.  On metoprolol, Entresto, and furosemide at home.  Patient had episodes of hypotension yesterday were blood pressure was as low as 84/46    -- Titrate BP medications to BP goal per Vascular Neurology  -- Preference to cardiac selective medications such as BB, Entresto  -- Judicious use of IV medications given BP lability
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
## 2023-08-22 NOTE — SUBJECTIVE & OBJECTIVE
Interval History:  Fever curve improving. WBC downtrending.    Review of Systems   Unable to perform ROS: Patient nonverbal     Objective:     Vital Signs (Most Recent):  Temp: 99 °F (37.2 °C) (08/22/23 1107)  Pulse: 96 (08/22/23 1149)  Resp: 18 (08/22/23 1107)  BP: (!) 139/98 (08/22/23 1107)  SpO2: (!) 92 % (08/22/23 1107) Vital Signs (24h Range):  Temp:  [99 °F (37.2 °C)-100.4 °F (38 °C)] 99 °F (37.2 °C)  Pulse:  [] 96  Resp:  [16-20] 18  SpO2:  [92 %-97 %] 92 %  BP: (112-162)/(78-99) 139/98     Weight: 83.5 kg (184 lb 1.4 oz)  Body mass index is 24.29 kg/m².    Estimated Creatinine Clearance: 38.8 mL/min (A) (based on SCr of 2.4 mg/dL (H)).     Physical Exam  Constitutional:       General: He is not in acute distress.  HENT:      Head: Normocephalic and atraumatic.   Eyes:      Pupils: Pupils are equal, round, and reactive to light.   Cardiovascular:      Rate and Rhythm: Normal rate and regular rhythm.   Pulmonary:      Effort: Pulmonary effort is normal. No respiratory distress.      Breath sounds: Normal breath sounds.   Abdominal:      General: Abdomen is flat. There is no distension.      Palpations: Abdomen is soft.      Tenderness: There is no abdominal tenderness.      Comments: PEG tube site clean   Skin:     Findings: No lesion or rash.   Neurological:      Mental Status: He is alert.      Comments: Not following commands, non-verbal          Significant Labs: Blood Culture:   Recent Labs   Lab 03/10/23  1149 08/19/23  0955 08/19/23  0956   LABBLOO No growth after 5 days. No Growth to date  No Growth to date  No Growth to date  No Growth to date No Growth to date  No Growth to date  No Growth to date  No Growth to date     Urine Culture:   Recent Labs   Lab 08/19/23  1241   LABURIN KLEBSIELLA PNEUMONIAE  >100,000 cfu/ml  *       Significant Imaging: I have reviewed all pertinent imaging results/findings within the past 24 hours.

## 2023-08-22 NOTE — ASSESSMENT & PLAN NOTE
58M w CHFrEF (10%, DD, pHTN; entresto/jardiance/torsemide 20) w AICD, CAD (h/o STEMI, s/p PCI to Rcx-08/2021, NSTEMI 4/23), pAF s/p DCCV (amio /apixiban), h/o PEs (9/2021, 12/2021; apixiban), HTN, CKD3b (BL Cr 1.7) admitted to  ([facility]) two weeks ago on 8/5 for for ADHF and NSTEMI. Hospital stay c/b L MCA/PCA infarct, suspected subacute R caudate infarct.and PE in the right distal interlobar artery and the segmental/subsegmental bilateral posterior pulmonary arteries.  Patient developed global aphasia and dysphagia. Patient had a complicated course to have a PEG tube placed which was placed on 08/17. Stepped down to [facility].     - Aggressive risk factor modification: HTN, HLD, A-Fib, CAD, CHF  - Medical therapy and BP goal per Vascular Neurology team  - Rehab efforts: therapy recommending discharge to inpatient rehab  - Diagnostics ordered/pending: None
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
## 2023-08-22 NOTE — ASSESSMENT & PLAN NOTE
Patient with new fever, leukocytosis, and tachycardia 8/19  Initiated SIRS protocol  No lactic acidosis  D/c'd Vanc/zosyn and transitioned to Merrem   Antibiotics narrowed to Ceftriaxone. Will finish on 8/25 for full 7 day course.   ID consulted.   Leukocytosis downtrending  Consulted Hospital medicine, appreciate recs  CXR w/ congestion of pulmonary vasculature stable with prior CXR  UA with bacteria and many leukocytes    Cultures growing Klebsiella Pneumoniae  No growth to date on blood cultures  CT A/P (8/22) with perinephric fat stranding consistent with complicated UTI vs pyelonephritis

## 2023-08-22 NOTE — SUBJECTIVE & OBJECTIVE
Interval History: Fever to 100.4. No further complaints at this time, ROS limited d/t non-verbal status.    Review of Systems   Unable to perform ROS: Patient nonverbal     Objective:     Vital Signs (Most Recent):  Temp: 99 °F (37.2 °C) (08/22/23 1107)  Pulse: 96 (08/22/23 1528)  Resp: 18 (08/22/23 1107)  BP: (!) 139/98 (08/22/23 1107)  SpO2: (!) 92 % (08/22/23 1107) Vital Signs (24h Range):  Temp:  [99 °F (37.2 °C)-100.4 °F (38 °C)] 99 °F (37.2 °C)  Pulse:  [] 96  Resp:  [16-20] 18  SpO2:  [92 %-97 %] 92 %  BP: (112-162)/(78-99) 139/98     Weight: 83.5 kg (184 lb 1.4 oz)  Body mass index is 24.29 kg/m².    Intake/Output Summary (Last 24 hours) at 8/22/2023 1553  Last data filed at 8/22/2023 0541  Gross per 24 hour   Intake 3040.47 ml   Output --   Net 3040.47 ml         Physical Exam  Vitals and nursing note reviewed.   Constitutional:       General: He is not in acute distress.     Appearance: Normal appearance. He is well-developed.   HENT:      Head: Normocephalic and atraumatic.   Eyes:      Extraocular Movements: Extraocular movements intact.      Pupils: Pupils are equal, round, and reactive to light.   Cardiovascular:      Rate and Rhythm: Normal rate and regular rhythm.      Pulses: Normal pulses.      Heart sounds: Normal heart sounds. No murmur heard.     No friction rub. No gallop.   Pulmonary:      Effort: Pulmonary effort is normal. No respiratory distress.      Breath sounds: No rales.   Chest:      Chest wall: No tenderness.   Abdominal:      General: Abdomen is flat. There is no distension.      Palpations: Abdomen is soft.      Tenderness: There is no abdominal tenderness. There is no guarding or rebound.   Musculoskeletal:      Cervical back: Normal range of motion and neck supple.      Right lower leg: No edema.      Left lower leg: No edema.   Skin:     General: Skin is warm and dry.   Neurological:      Mental Status: He is alert.      Comments: Aphasic, does not follow commands  L gaze  deviation, grossly intact ROM             Significant Labs: All pertinent labs within the past 24 hours have been reviewed.    Significant Imaging: I have reviewed all pertinent imaging results/findings within the past 24 hours.

## 2023-08-22 NOTE — ASSESSMENT & PLAN NOTE
- L MCA and L PCA infarct 8/7/23  - Etiology likely cardioembolic per VN    - Related to prolonged/acute hospital course.     Recommendations  -  Encourage mobility, OOB in chair at least 3 hours per day, and early ambulation as appropriate  -  PT/OT evaluate and treat  -  Pain management  -  Monitor for and prevent skin breakdown and pressure ulcers  · Early mobility, repositioning/weight shifting every 20-30 minutes when sitting, turn patient every 2 hours, proper mattress/overlay and chair cushioning, pressure relief/heel protector boots  -  DVT prophylaxis    -  Reviewed discharge options (IP rehab, SNF, HH therapy, and OP therapy)

## 2023-08-22 NOTE — ASSESSMENT & PLAN NOTE
Patient with Paroxysmal (<7 days) atrial fibrillation which is controlled currently with Beta Blocker and Amiodarone. Patient is currently in atrial fibrillation. Anticoagulation indicated. Anticoagulation done with eliquis.    -- Continue amiodarone 200 mg bid  -- Up-titrate beta blocker as tolerated

## 2023-08-22 NOTE — PROGRESS NOTES
Declan Salomon - Neurosurgery ([facility])  Physical Medicine & Rehab  Progress Note    Patient Name: Tear Griffiths  MRN: [id number]  Admission Date: 8/5/2023  Length of Stay: 15 days  Attending Physician: Elsi Valencia MD    Subjective:     Principal Problem:Embolic stroke involving left middle cerebral artery    Hospital Course:   8/21/23: Participated w/ OT. Bed mob maxA- totalA. Grooming dependence.       Interval History 8/22/2023:  Patient is seen for follow-up PM&R evaluation and recommendations: CT done. Restraints continue in place.     HPI, Past Medical, Family, and Social History remains the same as documented in the initial encounter.    Scheduled Medications:    acetylcysteine 100 mg/ml (10%)  4 mL Nebulization BID    albuterol-ipratropium  3 mL Nebulization BID    amiodarone  200 mg Per NG tube BID    apixaban  5 mg Per G Tube BID    aspirin  81 mg Per G Tube Daily    atorvastatin  80 mg Per NG tube Daily    cefTRIAXone (ROCEPHIN) IVPB  1 g Intravenous Q24H    ezetimibe  10 mg Per NG tube QHS    ferrous sulfate  1 tablet Per NG tube Daily    metoprolol tartrate  25 mg Per NG tube BID    polyethylene glycol  17 g Per NG tube Daily    senna-docusate 8.6-50 mg  1 tablet Per NG tube BID    valproic acid (as sodium salt)  250 mg Per G Tube Q8H       Diagnostic Results: Labs: Reviewed  ECG: Reviewed  CT: Reviewed    PRN Medications: acetaminophen, bisacodyL, chlorproMAZINE, dextrose 10%, dextrose 10%, hydrALAZINE, labetalol, magnesium sulfate IVPB, magnesium sulfate IVPB, nitroGLYCERIN, ondansetron, potassium chloride **AND** potassium chloride **AND** potassium chloride, sodium chloride 0.9%, sodium chloride 0.9%, sodium chloride 0.9%, sodium phosphate 15 mmol in dextrose 5 % (D5W) 250 mL IVPB, sodium phosphate 20.01 mmol in dextrose 5 % (D5W) 250 mL IVPB, sodium phosphate 30 mmol in dextrose 5 % (D5W) 250 mL IVPB    Review of Systems   Constitutional:  Positive for activity change.   HENT:  Positive for  trouble swallowing.    Respiratory:  Negative for cough and shortness of breath.    Neurological:  Positive for weakness.   Psychiatric/Behavioral:  Positive for agitation, behavioral problems and confusion.      Objective:     Vital Signs (Most Recent):  Temp: 99.1 °F (37.3 °C) (08/22/23 1613)  Pulse: 84 (08/22/23 1613)  Resp: 18 (08/22/23 1613)  BP: (!) 132/94 (08/22/23 1613)  SpO2: (!) 94 % (08/22/23 1613)    Vital Signs (24h Range):  Temp:  [99 °F (37.2 °C)-100.4 °F (38 °C)] 99.1 °F (37.3 °C)  Pulse:  [] 84  Resp:  [16-20] 18  SpO2:  [92 %-97 %] 94 %  BP: (112-162)/(78-99) 132/94         Physical Exam  Vitals and nursing note reviewed.   Constitutional:       Comments: alert  HENT:      Mouth/Throat:      Mouth: Mucous membranes are moist.   Musculoskeletal:      Comments: RSW, not following commands  Skin:     General: Skin is warm.   Neurological:      Motor: Weakness present.      Gait: Gait abnormal.      Comments: Aphasia   Psychiatric:      Comments: Bilateral mitten restraints     Assessment/Plan:      * Embolic stroke involving left middle cerebral artery  - L MCA and L PCA infarct 8/7/23  - Etiology likely cardioembolic per VN    - Related to prolonged/acute hospital course.     Recommendations  -  Encourage mobility, OOB in chair at least 3 hours per day, and early ambulation as appropriate  -  PT/OT evaluate and treat  -  Pain management  -  Monitor for and prevent skin breakdown and pressure ulcers  · Early mobility, repositioning/weight shifting every 20-30 minutes when sitting, turn patient every 2 hours, proper mattress/overlay and chair cushioning, pressure relief/heel protector boots  -  DVT prophylaxis    -  Reviewed discharge options (IP rehab, SNF, HH therapy, and OP therapy)    Sepsis  - ID consulted and continue Zeynep pending cx results, fu on CT chest/abd/pel     Dysphagia  - SLP following     Hemiparesis, right  - PT & OT following     Multiple subsegmental pulmonary emboli without  acute cor pulmonale  - CTA done and incidently found PE, now on Eliquis    Paroxysmal A-fib  - Eliquis    PM&R Recommendation:     At this time, the PM&R team has reviewed this patient's ongoing medical case including inpatient diagnosis, medical history, clinical examination, labs, vitals, current social and functional history.    We will continue to follow for improvement in agitation and participation with therapy as a rehab candidate.    Ele Madison NP  Department of Physical Medicine & Rehab   [facility])

## 2023-08-22 NOTE — ASSESSMENT & PLAN NOTE
Admitted with ADHF and NSTEMI c/b L MCA stroke, further c/b sepsis presentation and hypotension.     DDx: Likely 2/2 to complicated UTI    -- UCx with largely pan-sensitive Klebsiella  -- CTAP wc demonstrating possible L pyelonephritis + L non-obstructive renal calculus; despite patient clinical improvement, low threshold to reach out to Urology / IR for evaluation of possibly infected calculus  -- Previously on meropenem, downgraded to CTX for 7 day course given complicated UTI  -- ID following; appreciate assistance  -- Judicious IVF for volume resuscitation to euvolemia; monitor respiratory status s/o CHF

## 2023-08-22 NOTE — PT/OT/SLP PROGRESS
Speech Language Pathology Treatment    Patient Name:  Tera Griffiths   MRN:  [id number]  Admitting Diagnosis: Embolic stroke involving left middle cerebral artery    Recommendations:                 General Recommendations:  Dysphagia therapy and Speech/language therapy  Diet recommendations:  NPO, Liquid Diet Level: NPO   Aspiration Precautions: Strict aspiration precautions   General Precautions: Standard, aspiration, aphasia, fall  Communication strategies:  yes/no questions only, provide increased time to answer, and go to room if call light pushed    Assessment:     Tera Griffiths is a 58 y.o. male with an SLP diagnosis of Aphasia and Dysphagia.  He presents with severe aphasia.    Subjective     Pt nonverbal  Patient goals: UTO     Pain/Comfort:  Pain Rating 1:  (unable to assess due to aphasia; did not appear in pain)  Pain Rating Post-Intervention 1:  (no change)    Respiratory Status: Room air    Objective:     Has the patient been evaluated by SLP for swallowing?   Yes  Keep patient NPO? Yes   Current Respiratory Status:        Nursing cleared pt for session. Pt initially awake and with bilateral restraints/mittens. Pt turned head to midline but unable to attend to therapist on left side. Pt with hiccups throughout session and occasional moaning. He did not attempt to count or model vowel sounds with therapist. He did not respond to any simple y/n questions and did not model any commands. Pt often pushing therapist hand away. He did not attempt to ID objects in a f=2. When presented with ice chip, pt turned head and pushed therapist away. Left gaze noted. Education provided to pt but pt unable to express understanding.     Goals:   Multidisciplinary Problems       SLP Goals          Problem: SLP    Goal Priority Disciplines Outcome   SLP Goal     SLP Ongoing, Progressing   Description: Speech Language Pathology Goals  Goals expected to be met by 8/15  1. Pt will participate in ongoing assessment of  swallow.   2. Pt will answer simple y/n questions with 60% accy given max cues.   3. Pt will follow simple commands with 50% accy given max cues.   4. Pt will vocalize in response to any stim x5/session given max cues.   5. Pt will localize to stim on R x2/session given max cues.                              Plan:     Patient to be seen:  3 x/week   Plan of Care expires:  09/07/23  Plan of Care reviewed with:  patient   SLP Follow-Up:  Yes       Discharge recommendations:  nursing facility, skilled       Time Tracking:     SLP Treatment Date:   08/22/23  Speech Start Time:  1000  Speech Stop Time:  1008     Speech Total Time (min):  8 min    Billable Minutes: Speech Therapy Individual 8    08/22/2023

## 2023-08-22 NOTE — ASSESSMENT & PLAN NOTE
Previously required Precedex gtt in NCC, as patient was displaying violent behavior  2 point soft restraints on UEs with mitts  Depakote 250mg Q8h  Will deescalate restraints as patient tolerates
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
## 2023-08-22 NOTE — HOSPITAL COURSE
8/21/23: Participated w/ OT. Bed mob maxA- totalA. Grooming dependence.   8/30/23: Participated w/ OT. Bed mob CGA- modA. Toileting maxA.   9/4/23: Participated w/ OT. Bed mob Gopal. Sat EOB 12 mins SBA. [facility] 6.

## 2023-08-22 NOTE — SUBJECTIVE & OBJECTIVE
Interval History 8/22/2023:  Patient is seen for follow-up PM&R evaluation and recommendations: CT done. Restraints continue in place.     HPI, Past Medical, Family, and Social History remains the same as documented in the initial encounter.    Scheduled Medications:    acetylcysteine 100 mg/ml (10%)  4 mL Nebulization BID    albuterol-ipratropium  3 mL Nebulization BID    amiodarone  200 mg Per NG tube BID    apixaban  5 mg Per G Tube BID    aspirin  81 mg Per G Tube Daily    atorvastatin  80 mg Per NG tube Daily    cefTRIAXone (ROCEPHIN) IVPB  1 g Intravenous Q24H    ezetimibe  10 mg Per NG tube QHS    ferrous sulfate  1 tablet Per NG tube Daily    metoprolol tartrate  25 mg Per NG tube BID    polyethylene glycol  17 g Per NG tube Daily    senna-docusate 8.6-50 mg  1 tablet Per NG tube BID    valproic acid (as sodium salt)  250 mg Per G Tube Q8H       Diagnostic Results: Labs: Reviewed  ECG: Reviewed  CT: Reviewed    PRN Medications: acetaminophen, bisacodyL, chlorproMAZINE, dextrose 10%, dextrose 10%, hydrALAZINE, labetalol, magnesium sulfate IVPB, magnesium sulfate IVPB, nitroGLYCERIN, ondansetron, potassium chloride **AND** potassium chloride **AND** potassium chloride, sodium chloride 0.9%, sodium chloride 0.9%, sodium chloride 0.9%, sodium phosphate 15 mmol in dextrose 5 % (D5W) 250 mL IVPB, sodium phosphate 20.01 mmol in dextrose 5 % (D5W) 250 mL IVPB, sodium phosphate 30 mmol in dextrose 5 % (D5W) 250 mL IVPB    Review of Systems   Constitutional:  Positive for activity change.   HENT:  Positive for trouble swallowing.    Respiratory:  Negative for cough and shortness of breath.    Neurological:  Positive for weakness.   Psychiatric/Behavioral:  Positive for agitation, behavioral problems and confusion.      Objective:     Vital Signs (Most Recent):  Temp: 99.1 °F (37.3 °C) (08/22/23 1613)  Pulse: 84 (08/22/23 1613)  Resp: 18 (08/22/23 1613)  BP: (!) 132/94 (08/22/23 1613)  SpO2: (!) 94 % (08/22/23 1613)     Vital Signs (24h Range):  Temp:  [99 °F (37.2 °C)-100.4 °F (38 °C)] 99.1 °F (37.3 °C)  Pulse:  [] 84  Resp:  [16-20] 18  SpO2:  [92 %-97 %] 94 %  BP: (112-162)/(78-99) 132/94         Physical Exam  Vitals and nursing note reviewed.   Constitutional:       Comments: drowsy   HENT:      Mouth/Throat:      Mouth: Mucous membranes are moist.   Musculoskeletal:      Comments: Unable to assess 2/2 drowsiness   Skin:     General: Skin is warm.   Neurological:      Motor: Weakness present.      Gait: Gait abnormal.      Comments: Aphasia   Psychiatric:      Comments: Bilateral mitten restraints
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
## 2023-08-22 NOTE — ASSESSMENT & PLAN NOTE
Past medical history of multiple pulmonary emboli.  Previously noted on CTA chest 9/2021 and 12/2021    -- 08/08: CTA Evidence of pulmonary embolism involving the right distal interlobar artery and the segmental/subsegmental bilateral posterior pulmonary arteries without heart strain.   -- Previously on heparin drip, now transitioned to apixaban 5 mg bid

## 2023-08-22 NOTE — PLAN OF CARE
Problem: Occupational Therapy  Goal: Occupational Therapy Goal  Description: Goals to be met by: 9/8/23     Patient will increase functional independence with ADLs by performing:    UE Dressing with Moderate Assistance.  LE Dressing with Moderate Assistance.  Grooming while standing with Moderate Assistance.  Toileting from toilet with Moderate Assistance for hygiene and clothing management.     Outcome: Ongoing, Progressing     Patient POC to be updated to 3x a week
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
## 2023-08-22 NOTE — ASSESSMENT & PLAN NOTE
-- Patient w/h/o TTE w/EF of 10-15%, last TTE 07/13/2023  -- Elevated BNP 1958, CXR w/pulm edema, rales on physical exam- increase in weight from discharge  -- Currently on 1.5 L fluid restriction and GDMT  -- Volume status remains stable

## 2023-08-22 NOTE — ASSESSMENT & PLAN NOTE
Resolved  Miralax and dulcolax scheduled  Soap suds enema 8/19 with no BM  Brown bomb ordered, but patient had bowel movement 8/20 before administration  Tube feeds resumed

## 2023-08-22 NOTE — PT/OT/SLP PROGRESS
Occupational Therapy   Treatment    Name: Tera Griffiths  MRN: [id number]  Admitting Diagnosis:  Embolic stroke involving left middle cerebral artery  5 Days Post-Op    Recommendations:     Discharge Recommendations: nursing facility, skilled  Discharge Equipment Recommendations:  to be determined by next level of care  Barriers to discharge:  Other (Comment)    Assessment:     Tera Griffiths is a 58 y.o. male with a medical diagnosis of Embolic stroke involving left middle cerebral artery.  He presents with decreased functional status due to CVA. Performance deficits affecting function are weakness, impaired endurance, impaired self care skills, impaired functional mobility, gait instability, impaired balance, impaired cognition, decreased coordination, decreased upper extremity function, decreased lower extremity function, decreased safety awareness, impaired coordination.     Rehab Prognosis:  Fair; patient would benefit from acute skilled OT services to address these deficits and reach maximum level of function.       Plan:     Patient to be seen 3 x/week to address the above listed problems via self-care/home management, therapeutic activities, therapeutic exercises, neuromuscular re-education  Plan of Care Expires: 09/11/23  Plan of Care Reviewed with: patient    Subjective     Chief Complaint: Not able to verbalize at this time   Patient/Family Comments/goals: Gain functional status     Objective:     Communicated with: RN prior to session.  Patient found supine with bed alarm, SCD, restraints, telemetry, peripheral IV, PEG Tube upon OT entry to room.    General Precautions: Standard, aphasia, aspiration    Orthopedic Precautions:N/A  Braces: N/A  Respiratory Status: Room air     Occupational Performance:     Bed Mobility:    Patient completed Rolling/Turning to Right with total assistance and 2 persons  Patient completed Scooting/Bridging with total assistance and 2 persons  Patient completed Supine to  Sit with total assistance and 2 persons  Patient completed Sit to Supine with total assistance and 2 persons     Activities of Daily Living:  Grooming: maximal assistance Patient completed facial grooming and oral care swabbing with max A-total A. While pt sat EOB PT completed oral swabbing with mouthwash/sponge and suction.       [facility] 6 Click ADL: 6    Treatment & Education:  While patient sat EOB  OT attempted to stimulate patient to encourage R side attention. OT attempted verbal/tactile/auditory stimulation with no success at this time. OT completed ROM with BUE and attempted direction following with physical and verbal cues. Patient unable to complete activities at this time.     Patient left supine with all lines intact and call button in reach    GOALS:   Multidisciplinary Problems       Occupational Therapy Goals          Problem: Occupational Therapy    Goal Priority Disciplines Outcome Interventions   Occupational Therapy Goal     OT, PT/OT Ongoing, Progressing    Description: Goals to be met by: 9/8/23     Patient will increase functional independence with ADLs by performing:    UE Dressing with Moderate Assistance.  LE Dressing with Moderate Assistance.  Grooming while standing with Moderate Assistance.  Toileting from toilet with Moderate Assistance for hygiene and clothing management.                          Time Tracking:     OT Date of Treatment: 08/22/23  OT Start Time: 1330  OT Stop Time: 1355  OT Total Time (min): 25 min    Billable Minutes:Therapeutic Activity 25 minutes     OT/ANDREA: OT          8/22/2023

## 2023-08-22 NOTE — SUBJECTIVE & OBJECTIVE
Neurologic Chief Complaint: L MCA syndrome    Subjective:     Interval History: Patient is seen for follow-up neurological assessment and treatment recommendations:   Leukocytosis continues to downtrend. Ucx with K. Pneumoniae sensitive to rocephin. Deescalated abx, per Hospital Medicine. CT A/P today.     HPI, Past Medical, Family, and Social History remains the same as documented in the initial encounter.     Review of Systems   Unable to perform ROS: Other   Neurological:  Positive for speech difficulty.     Scheduled Meds:   acetylcysteine 100 mg/ml (10%)  4 mL Nebulization BID    albuterol-ipratropium  3 mL Nebulization BID    amiodarone  200 mg Per NG tube BID    apixaban  5 mg Per G Tube BID    aspirin  81 mg Per G Tube Daily    atorvastatin  80 mg Per NG tube Daily    cefTRIAXone (ROCEPHIN) IVPB  1 g Intravenous Q24H    ezetimibe  10 mg Per NG tube QHS    ferrous sulfate  1 tablet Per NG tube Daily    metoprolol tartrate  25 mg Per NG tube BID    polyethylene glycol  17 g Per NG tube Daily    senna-docusate 8.6-50 mg  1 tablet Per NG tube BID    valproic acid (as sodium salt)  250 mg Per G Tube Q8H     Continuous Infusions:        PRN Meds:acetaminophen, bisacodyL, chlorproMAZINE, dextrose 10%, dextrose 10%, hydrALAZINE, labetalol, magnesium sulfate IVPB, magnesium sulfate IVPB, nitroGLYCERIN, ondansetron, potassium chloride **AND** potassium chloride **AND** potassium chloride, sodium chloride 0.9%, sodium chloride 0.9%, sodium chloride 0.9%, sodium phosphate 15 mmol in dextrose 5 % (D5W) 250 mL IVPB, sodium phosphate 20.01 mmol in dextrose 5 % (D5W) 250 mL IVPB, sodium phosphate 30 mmol in dextrose 5 % (D5W) 250 mL IVPB    Objective:     Vital Signs (Most Recent):  Temp: 99 °F (37.2 °C) (08/22/23 1107)  Pulse: 96 (08/22/23 1149)  Resp: 18 (08/22/23 1107)  BP: (!) 139/98 (08/22/23 1107)  SpO2: (!) 92 % (08/22/23 1107)  BP Location: Left arm    Vital Signs Range (Last 24H):  Temp:  [99 °F (37.2 °C)-100.4 °F  "(38 °C)]   Pulse:  []   Resp:  [16-20]   BP: (112-162)/(78-99)   SpO2:  [92 %-97 %]   BP Location: Left arm       Physical Exam  Vitals and nursing note reviewed.   Constitutional:       General: He is not in acute distress.     Appearance: He is well-developed.   HENT:      Head: Normocephalic and atraumatic.   Cardiovascular:      Rate and Rhythm: Normal rate.   Pulmonary:      Effort: Pulmonary effort is normal. No respiratory distress.   Abdominal:      General: There is no distension.   Skin:     General: Skin is warm and dry.   Neurological:      Comments: Aphasic, does not follow commands  L gaze              Neurological Exam:   LOC: drowsy  Attention Span: poor  Language: Global aphasia, not following commands  Articulation: Untestable due to severe aphasia   Orientation: Untestable due to severe aphasia   EOM (CN III, IV, VI): Gaze preference  left  Facial Movement (CN VII): Lower facial weakness on the Right  Motor: moves left spontaneous, right hemiparesis  Unable to assess drift and strength as patient does not follow commands, and gets agitated      Laboratory:  CMP:   Recent Labs   Lab 08/22/23  0333   CALCIUM 9.7   ALBUMIN 2.3*   PROT 6.9      K 4.4   CO2 22*   *   BUN 50*   CREATININE 2.4*   ALKPHOS 98   ALT 19   AST 27   BILITOT 1.2*       CBC:   Recent Labs   Lab 08/22/23  0333   WBC 15.63*   RBC 4.94   HGB 13.3*   HCT 43.6      MCV 88   MCH 26.9*   MCHC 30.5*       Lipid Panel:   No results for input(s): "CHOL", "LDLCALC", "HDL", "TRIG" in the last 168 hours.    Coagulation:   Recent Labs   Lab 08/17/23  1351 08/17/23 2007 08/21/23  0553   INR 1.2  --   --    APTT 21.3   < > 33.4*    < > = values in this interval not displayed.       Platelet Aggregation Study: No results for input(s): "PLTAGG", "PLTAGINTERP", "PLTAGREGLACO", "ADPPLTAGGREG" in the last 168 hours.  Hgb A1C:   No results for input(s): "HGBA1C" in the last 168 hours.    TSH:   No results for input(s): "TSH" " in the last 168 hours.      Diagnostic Results     Brain Imaging   CT Head 8/10/23  Impression:     No detrimental change compared to prior.     Evolving acute infarcts within the left MCA and PCA territories.  No hemorrhagic conversion.  No significant mass effect     Suspected small subacute infarction within the right caudate.     Multiple areas remote infarctions as detailed above.      CT Head 8/8/23 1704  Impression:     1. Redemonstration of acute infarctions in the left MCA and PCA territories.  No evidence to suggest hemorrhagic transformation.  2. Suspected small subacute infarction in the right caudate.  3. Numerous remote infarctions as detailed above.    CT Head 8/8/23 0826  Impression:     Moderate developing hypoattenuation left inferior frontal lobe and left occipital lobe concerning for developing recent infarcts post thrombectomy.     Allowing for scattered hyperdensity related to recently contrast administration for thrombectomy there is no definite acute intracranial hemorrhage.     Previous hypodensity right caudate no longer seen which may be related to contrast administration for thrombectomy and possible subacute age infarction.     Superimposed remote infarcts with moderate to large encephalomalacia right MCA territory unchanged    Vessel Imaging   IR Angio 8/8/23  Preliminary Interpretation:   1.    No evidence of left ICA or MCA stenosis. No large or medium vessel occlusion.    CTA Multiphase 8/7/23  Impression:     CT head:     New right caudate nucleus hypodensity, likely representing age-indeterminate infarct.  Could be further evaluated with MRI.     Multifocal encephalomalacia involving the right frontal, temporoparietal, and left occipital lobes.     CTA head and neck:     Acute occlusion of the superior left M2 segment.     Left PCA occlusion of undetermined age.     Filling defect in the right main pulmonary artery, concerning for acute pulmonary thromboembolism.  Consider  follow-up pulmonary CTA to more fully assess the extent of thromboemboli, the clot burden, and the presence or absence of right heart strain.     Not fully detailed above:     - Incomplete opacification of the left atrial appendage, suspicious for an intraluminal thrombus.  Follow-up echocardiogram, or cardiac MRI or CTA, recommended.     - The presence of acute thrombi or thromboemboli in both the systemic arterial and pulmonary arterial circulation raises concern for the possibility of underlying intracardiac or extracardiac right-to-left shunt, and/or a hypercoagulable state.  Correlate clinically.     - Incompletely visualized, but possibly large, pericardial effusion.  Artifacts compromise accurate assessment of its attenuation.    Cardiac Imaging   TTE with bubble 8/8/23    Left Ventricle: The left ventricle is moderately dilated. Increased ventricular mass. Normal wall thickness. There is moderate eccentric hypertrophy. Severe global hypokinesis present. Septal motion is consistent with pacing. There is severely reduced systolic function with a visually estimated ejection fraction of 15 - 20%. Ejection fraction by visual approximation is 20%. Unable to assess diastolic function due to arrhythmia.    Left Atrium: Left atrium is severely dilated. Radha 85mL/m2.    Right Ventricle: Mild right ventricular enlargement. Wall thickness is normal. Right ventricle wall motion  is normal. Systolic function is severely reduced. Pacemaker lead present in the ventricle.    Right Atrium: Right atrium is severely dilated.    Aortic Valve: There is mild aortic valve sclerosis. There is normal leaflet mobility. There is no significant regurgitation.    Mitral Valve: There is bileaflet sclerosis. There is normal leaflet mobility. There is mild regurgitation.    Tricuspid Valve: The tricuspid valve is structurally normal. There is normal leaflet mobility. There is mild regurgitation.    Pulmonic Valve: The pulmonic valve is  structurally normal. There is normal leaflet mobility. There is no significant regurgitation.    Aorta: Aortic root is normal in size measuring 3.13 cm. Ascending aorta is normal measuring 3.24 cm.    IVC/SVC: Normal venous pressure at 3 mmHg.    Pericardium: The pericardium is normal. There is no pericardial effusion.

## 2023-08-22 NOTE — ASSESSMENT & PLAN NOTE
Patient w/ PMHx CKD 3b presenting with EMERSON with sCr 2.3 (baseline sCr <1.7). Patient had multiple episodes of hypotension where blood pressure was as low as 83/49. Pt has a hx of CHF and has been on fluid restriction since 8/5. Enteral H2O was started yesterday with no improvement of the Cr.  Patient also received contrast when getting a CTA which could be contributing to the EMERSON along with the combination of vancomycin and Zosyn for treatment of his sepsis.    Diagnostics: UA, UrNA - 46, osmolarity - 731, UrCr - 170. FeNa - 0.5%     DDx: Likely prerenal with possible mild component of ATN, now complicated by sepsis    - STABLE  - Trend sCr  - Trend RFP; replete electrolytes PRN for goal K > 4, Phos > 3, Mg > 2  - Strict I&Os  - Avoid nephrotoxic agents  - Renally adjust medications  - Continue IVF PRN to maintain euvoluemic status; judicious if continuous fluids offered given CHF

## 2023-08-22 NOTE — PROGRESS NOTES
"[facility])  Infectious Disease  Progress Note    Patient Name: Tera Griffiths  MRN: [id number]  Admission Date: 8/5/2023  Length of Stay: 15 days  Attending Physician: Elsi Valencia MD  Primary Care Provider: Carleen Bueno MD    Isolation Status: No active isolations  Assessment/Plan:      Leukocytosis  Pyelonephritis  59y/o M  w HFrEF, CAD, AICD placement (12/2021) who was admitted to [facility] 8/05 for CHF exacerbation. Hospital course c/b L MCA and L PCA infarct 8/07, PE now on AC, dysphagia s/p PEG placement 8/17. Febrile and tachycardic on 8/19 with uptrending leukocytosis. Septic workup thus far: CXR w/o consolidation. KUB w/o obstructive gas pattern. UA suggestive of UTI with UCx growing kleb pna. Ct revealed perinephric fat stranding noted greater on the left than on the right.  QTc 517; FQ contraindicated     Recommendations:  --agree with transitioning to ceftriaxone to complete a 7 day course. EOT 8/25.      A/p discussed with primary team.            A/p discussed with primary team.     Anticipated Disposition: tbd    Thank you for your consult. ID will sign-off.  Please contact us if you have any additional questions.    Celsa Pierce MD  Infectious Disease  [facility])    Subjective:     Principal Problem:Embolic stroke involving left middle cerebral artery    HPI: Mr. Griffiths is a 59y/o M pt with PMHx of CHFrEF (10%, DD, pHTN), CAD (h/o STEMI, s/p PCI to Rcx-08/2021), AICD placement (12/2021), pAF (s/p DCCV), HTN, CKD3b who was admitted to [facility] 8/05 for CHF exacerbation.     Hospital course c/b L MCA and L PCA infarct 8/07, PE now on AC dysphagia s/p PEG placement 8/17. Febrile and tachycardic on 8/19 with uptrending leukocytosis. Septic workup thus far: CXR w/o consolidation. KUB w/o obstructive gas pattern. UA suggestive of UTI with UCx growing GNR.    ID consulted for: "Want to start meropenum on him for coverage of ESBL UTI. Need help with dosing. " "CrCl of 41"    Interval History:  Fever curve improving. WBC downtrending.    Review of Systems   Unable to perform ROS: Patient nonverbal     Objective:     Vital Signs (Most Recent):  Temp: 99 °F (37.2 °C) (08/22/23 1107)  Pulse: 96 (08/22/23 1149)  Resp: 18 (08/22/23 1107)  BP: (!) 139/98 (08/22/23 1107)  SpO2: (!) 92 % (08/22/23 1107) Vital Signs (24h Range):  Temp:  [99 °F (37.2 °C)-100.4 °F (38 °C)] 99 °F (37.2 °C)  Pulse:  [] 96  Resp:  [16-20] 18  SpO2:  [92 %-97 %] 92 %  BP: (112-162)/(78-99) 139/98     Weight: 83.5 kg (184 lb 1.4 oz)  Body mass index is 24.29 kg/m².    Estimated Creatinine Clearance: 38.8 mL/min (A) (based on SCr of 2.4 mg/dL (H)).     Physical Exam  Constitutional:       General: He is not in acute distress.  HENT:      Head: Normocephalic and atraumatic.   Eyes:      Pupils: Pupils are equal, round, and reactive to light.   Cardiovascular:      Rate and Rhythm: Normal rate and regular rhythm.   Pulmonary:      Effort: Pulmonary effort is normal. No respiratory distress.      Breath sounds: Normal breath sounds.   Abdominal:      General: Abdomen is flat. There is no distension.      Palpations: Abdomen is soft.      Tenderness: There is no abdominal tenderness.      Comments: PEG tube site clean   Skin:     Findings: No lesion or rash.   Neurological:      Mental Status: He is alert.      Comments: Not following commands, non-verbal          Significant Labs: Blood Culture:   Recent Labs   Lab 03/10/23  1149 08/19/23  0955 08/19/23  0956   LABBLOO No growth after 5 days. No Growth to date  No Growth to date  No Growth to date  No Growth to date No Growth to date  No Growth to date  No Growth to date  No Growth to date     Urine Culture:   Recent Labs   Lab 08/19/23  1241   LABURIN KLEBSIELLA PNEUMONIAE  >100,000 cfu/ml  *       Significant Imaging: I have reviewed all pertinent imaging results/findings within the past 24 hours.    "

## 2023-08-23 LAB
ALBUMIN SERPL BCP-MCNC: 2.1 G/DL (ref 3.5–5.2)
ALP SERPL-CCNC: 108 U/L (ref 55–135)
ALT SERPL W/O P-5'-P-CCNC: 18 U/L (ref 10–44)
ANION GAP SERPL CALC-SCNC: 10 MMOL/L (ref 8–16)
AST SERPL-CCNC: 30 U/L (ref 10–40)
BASOPHILS # BLD AUTO: 0.02 K/UL (ref 0–0.2)
BASOPHILS NFR BLD: 0.2 % (ref 0–1.9)
BILIRUB SERPL-MCNC: 0.6 MG/DL (ref 0.1–1)
BUN SERPL-MCNC: 40 MG/DL (ref 6–20)
CALCIUM SERPL-MCNC: 9.5 MG/DL (ref 8.7–10.5)
CHLORIDE SERPL-SCNC: 114 MMOL/L (ref 95–110)
CO2 SERPL-SCNC: 22 MMOL/L (ref 23–29)
CREAT SERPL-MCNC: 1.7 MG/DL (ref 0.5–1.4)
DIFFERENTIAL METHOD: ABNORMAL
EOSINOPHIL # BLD AUTO: 0.1 K/UL (ref 0–0.5)
EOSINOPHIL NFR BLD: 0.9 % (ref 0–8)
ERYTHROCYTE [DISTWIDTH] IN BLOOD BY AUTOMATED COUNT: 15.6 % (ref 11.5–14.5)
EST. GFR  (NO RACE VARIABLE): 46.7 ML/MIN/1.73 M^2
GLUCOSE SERPL-MCNC: 102 MG/DL (ref 70–110)
HCT VFR BLD AUTO: 39.4 % (ref 40–54)
HGB BLD-MCNC: 12.1 G/DL (ref 14–18)
IMM GRANULOCYTES # BLD AUTO: 0.08 K/UL (ref 0–0.04)
IMM GRANULOCYTES NFR BLD AUTO: 0.7 % (ref 0–0.5)
LYMPHOCYTES # BLD AUTO: 0.8 K/UL (ref 1–4.8)
LYMPHOCYTES NFR BLD: 6.7 % (ref 18–48)
MAGNESIUM SERPL-MCNC: 2.5 MG/DL (ref 1.6–2.6)
MCH RBC QN AUTO: 27.5 PG (ref 27–31)
MCHC RBC AUTO-ENTMCNC: 30.7 G/DL (ref 32–36)
MCV RBC AUTO: 90 FL (ref 82–98)
MONOCYTES # BLD AUTO: 1.3 K/UL (ref 0.3–1)
MONOCYTES NFR BLD: 11.4 % (ref 4–15)
NEUTROPHILS # BLD AUTO: 9.4 K/UL (ref 1.8–7.7)
NEUTROPHILS NFR BLD: 80.1 % (ref 38–73)
NRBC BLD-RTO: 0 /100 WBC
PHOSPHATE SERPL-MCNC: 2.5 MG/DL (ref 2.7–4.5)
PLATELET # BLD AUTO: 312 K/UL (ref 150–450)
PMV BLD AUTO: 13.9 FL (ref 9.2–12.9)
POCT GLUCOSE: 118 MG/DL (ref 70–110)
POCT GLUCOSE: 130 MG/DL (ref 70–110)
POCT GLUCOSE: 85 MG/DL (ref 70–110)
POTASSIUM SERPL-SCNC: 4.5 MMOL/L (ref 3.5–5.1)
PROT SERPL-MCNC: 6.4 G/DL (ref 6–8.4)
RBC # BLD AUTO: 4.4 M/UL (ref 4.6–6.2)
SODIUM SERPL-SCNC: 146 MMOL/L (ref 136–145)
WBC # BLD AUTO: 11.71 K/UL (ref 3.9–12.7)

## 2023-08-23 PROCEDURE — 63600175 PHARM REV CODE 636 W HCPCS: Performed by: PHYSICIAN ASSISTANT

## 2023-08-23 PROCEDURE — 63600175 PHARM REV CODE 636 W HCPCS: Performed by: HOSPITALIST

## 2023-08-23 PROCEDURE — 25000242 PHARM REV CODE 250 ALT 637 W/ HCPCS: Performed by: STUDENT IN AN ORGANIZED HEALTH CARE EDUCATION/TRAINING PROGRAM

## 2023-08-23 PROCEDURE — 84100 ASSAY OF PHOSPHORUS: CPT | Performed by: PHYSICIAN ASSISTANT

## 2023-08-23 PROCEDURE — 11000001 HC ACUTE MED/SURG PRIVATE ROOM

## 2023-08-23 PROCEDURE — 94640 AIRWAY INHALATION TREATMENT: CPT

## 2023-08-23 PROCEDURE — 92507 TX SP LANG VOICE COMM INDIV: CPT

## 2023-08-23 PROCEDURE — 36415 COLL VENOUS BLD VENIPUNCTURE: CPT | Performed by: PHYSICIAN ASSISTANT

## 2023-08-23 PROCEDURE — 25000003 PHARM REV CODE 250

## 2023-08-23 PROCEDURE — 99232 PR SUBSEQUENT HOSPITAL CARE,LEVL II: ICD-10-PCS | Mod: ,,, | Performed by: HOSPITALIST

## 2023-08-23 PROCEDURE — 83735 ASSAY OF MAGNESIUM: CPT | Performed by: PHYSICIAN ASSISTANT

## 2023-08-23 PROCEDURE — 99232 SBSQ HOSP IP/OBS MODERATE 35: CPT | Mod: ,,, | Performed by: HOSPITALIST

## 2023-08-23 PROCEDURE — 94668 MNPJ CHEST WALL SBSQ: CPT

## 2023-08-23 PROCEDURE — 25000003 PHARM REV CODE 250: Performed by: NURSE PRACTITIONER

## 2023-08-23 PROCEDURE — 99233 SBSQ HOSP IP/OBS HIGH 50: CPT | Mod: ,,, | Performed by: STUDENT IN AN ORGANIZED HEALTH CARE EDUCATION/TRAINING PROGRAM

## 2023-08-23 PROCEDURE — 94761 N-INVAS EAR/PLS OXIMETRY MLT: CPT

## 2023-08-23 PROCEDURE — 99233 PR SUBSEQUENT HOSPITAL CARE,LEVL III: ICD-10-PCS | Mod: ,,, | Performed by: STUDENT IN AN ORGANIZED HEALTH CARE EDUCATION/TRAINING PROGRAM

## 2023-08-23 PROCEDURE — 25000003 PHARM REV CODE 250: Performed by: HOSPITALIST

## 2023-08-23 PROCEDURE — 85025 COMPLETE CBC W/AUTO DIFF WBC: CPT | Performed by: PHYSICIAN ASSISTANT

## 2023-08-23 PROCEDURE — 80053 COMPREHEN METABOLIC PANEL: CPT | Performed by: PHYSICIAN ASSISTANT

## 2023-08-23 PROCEDURE — 97530 THERAPEUTIC ACTIVITIES: CPT

## 2023-08-23 RX ADMIN — ACETAMINOPHEN 650 MG: 325 TABLET ORAL at 10:08

## 2023-08-23 RX ADMIN — ACETYLCYSTEINE 4 ML: 100 INHALANT RESPIRATORY (INHALATION) at 07:08

## 2023-08-23 RX ADMIN — APIXABAN 5 MG: 5 TABLET, FILM COATED ORAL at 09:08

## 2023-08-23 RX ADMIN — IPRATROPIUM BROMIDE AND ALBUTEROL SULFATE 3 ML: 2.5; .5 SOLUTION RESPIRATORY (INHALATION) at 07:08

## 2023-08-23 RX ADMIN — CEFTRIAXONE 1 G: 1 INJECTION, POWDER, FOR SOLUTION INTRAMUSCULAR; INTRAVENOUS at 12:08

## 2023-08-23 RX ADMIN — ASPIRIN 81 MG 81 MG: 81 TABLET ORAL at 09:08

## 2023-08-23 RX ADMIN — AMIODARONE HYDROCHLORIDE 200 MG: 200 TABLET ORAL at 09:08

## 2023-08-23 RX ADMIN — EZETIMIBE 10 MG: 10 TABLET ORAL at 09:08

## 2023-08-23 RX ADMIN — VALPROIC ACID 250 MG: 250 SOLUTION ORAL at 06:08

## 2023-08-23 RX ADMIN — METOPROLOL TARTRATE 25 MG: 25 TABLET, FILM COATED ORAL at 09:08

## 2023-08-23 RX ADMIN — FERROUS SULFATE TAB 325 MG (65 MG ELEMENTAL FE) 1 EACH: 325 (65 FE) TAB at 09:08

## 2023-08-23 RX ADMIN — ATORVASTATIN CALCIUM 80 MG: 40 TABLET, FILM COATED ORAL at 09:08

## 2023-08-23 RX ADMIN — VALPROIC ACID 250 MG: 250 SOLUTION ORAL at 02:08

## 2023-08-23 RX ADMIN — VALPROIC ACID 250 MG: 250 SOLUTION ORAL at 09:08

## 2023-08-23 RX ADMIN — VALPROIC ACID 250 MG: 250 SOLUTION ORAL at 12:08

## 2023-08-23 RX ADMIN — SENNOSIDES AND DOCUSATE SODIUM 1 TABLET: 50; 8.6 TABLET ORAL at 09:08

## 2023-08-23 RX ADMIN — ONDANSETRON 4 MG: 2 INJECTION INTRAMUSCULAR; INTRAVENOUS at 09:08

## 2023-08-23 NOTE — PROGRESS NOTES
Declan Salomon - Neurosurgery ([facility])  Adult Nutrition  Progress Note    SUMMARY       Recommendations    EN recs:                - Continuous: Isosource 1.5 @ goal rate of 55 mL/hr- provides 1980 kcals, 90 g pro, and 1008 mL fluid.              - Bolus: Isosource 1.5 (5 cartons/day)- provides 1875 kcals, 85 g pro, and 955 mL fluid.   RD following.     Goals: Will meet % EEN/EPN by next RD f/u.  Nutrition Goal Status: new  Communication of RD Recs:  (POC)    Assessment and Plan    Nutrition Problem  Inadequate oral intake     Related to (etiology):   Inability to consume sufficient needs     Signs and Symptoms (as evidenced by):   NPO status      Interventions/Recommendations (treatment strategy):  Collaboration of nutrition care with other providers   EN     Nutrition Diagnosis Status:   Continues    Reason for Assessment    Reason For Assessment: RD follow-up  Diagnosis: stroke/CVA  Relevant Medical History: CAD, PAF, CHF, CKD 3, EMERSON  Interdisciplinary Rounds: did not attend  General Information Comments: RD f/u. Unable to speak with pt 2/2 being aphasic. Remains NPO- note current continuous TF regimen running via PEG. Noted CBW remains 184#. LBM 8/23.     (8/6): Unsure intake PTA. RD private messaged RN who states that pt consumed 90% of both breakfast and lunch. No issues with n/v/d/c/chewing/swallowing today. UBW fluctuates between 190-195# per chart review, #. No s/s of malnutrition, appears well-nourished. Educational handout left on bedside table for pt to review.   Nutrition Discharge Planning: Pending clinical course    Nutrition Risk Screen    Nutrition Risk Screen: tube feeding or parenteral nutrition, difficulty chewing/swallowing    Nutrition/Diet History    Spiritual, Cultural Beliefs, Scientologist Practices, Values that Affect Care: no  Food Allergies: NKFA  Factors Affecting Nutritional Intake: NPO, difficulty/impaired swallowing, impaired cognitive status/motor  "control    Anthropometrics    Temp: 97.6 °F (36.4 °C)  Height Method: Stated  Height: 6' 1" (185.4 cm)  Height (inches): 73 in  Weight Method: Bed Scale  Weight: 83.5 kg (184 lb 1.4 oz)  Weight (lb): 184.09 lb  Ideal Body Weight (IBW), Male: 184 lb  % Ideal Body Weight, Male (lb): 100.05 %  BMI (Calculated): 24.3  BMI Grade: 18.5-24.9 - normal    Lab/Procedures/Meds    Pertinent Labs Reviewed: reviewed  Pertinent Labs Comments: Sodium 146, BUN 40, Creatinine 1.7, GFR 46.7, Phos 2.5, Albumin 2.1  Pertinent Medications Reviewed: reviewed  Pertinent Medications Comments: aspirin, atorvastatin, metoprolol tartrate, senna-docusate    Estimated/Assessed Needs    Weight Used For Calorie Calculations: 83.5 kg (184 lb 1.4 oz)  Energy Calorie Requirements (kcal): 2088 kcals  Energy Need Method: Kcal/kg (25 kcal/kg)  Protein Requirements: 84 g (1.0 g/kg)  Weight Used For Protein Calculations: 83.5 kg (184 lb 1.4 oz)  Fluid Requirements (mL): 1 mL/kcal or fluid per MD  Estimated Fluid Requirement Method: RDA Method  RDA Method (mL): 2088    Nutrition Prescription Ordered    Current Diet Order: NPO  Current Nutrition Support Formula Ordered: Isosource 1.5  Current Nutrition Support Rate Ordered: 55 (ml)  Current Nutrition Support Frequency Ordered: mL/hr    Evaluation of Received Nutrient/Fluid Intake    Enteral Calories (kcal): 1980  Enteral Protein (gm): 90  Enteral (Free Water) Fluid (mL): 1008  % Kcal Needs: 95%  % Protein Needs: 107%  I/O: +5.9 L since 8/9  % Intake of Estimated Energy Needs: 95%  % Meal Intake: NPO    Nutrition Risk    Level of Risk/Frequency of Follow-up:  (1 time/week)     Monitor and Evaluation    Food and Nutrient Intake: enteral nutrition intake  Food and Nutrient Adminstration: enteral and parenteral nutrition administration  Knowledge/Beliefs/Attitudes: food and nutrition knowledge/skill, beliefs and attitudes  Physical Activity and Function: nutrition-related ADLs and IADLs  Anthropometric " Measurements: body mass index, weight change, weight, height/length  Biochemical Data, Medical Tests and Procedures: lipid profile, inflammatory profile, glucose/endocrine profile, gastrointestinal profile, electrolyte and renal panel  Nutrition-Focused Physical Findings: overall appearance     Nutrition Follow-Up    RD Follow-up?: Yes    Melodie Glez RDN,LDN
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
## 2023-08-23 NOTE — PLAN OF CARE
Recommendations     EN recs:                - Continuous: Isosource 1.5 @ goal rate of 55 mL/hr- provides 1980 kcals, 90 g pro, and 1008 mL fluid.              - Bolus: Isosource 1.5 (5 cartons/day)- provides 1875 kcals, 85 g pro, and 955 mL fluid.   RD following.      Goals: Will meet % EEN/EPN by next RD f/u.  Nutrition Goal Status: new  Communication of RD Recs:  (POC)    Meoldie Glez RDN,LDN

## 2023-08-23 NOTE — PT/OT/SLP PROGRESS
Physical Therapy Treatment    Patient Name:  Tera Griffiths   MRN:  [id number]  Rehab technician utilized to assist w/ treatment session 2/2 pt requiring two person assist for functional mobility   Recommendations:     Discharge Recommendations: nursing facility, skilled  Discharge Equipment Recommendations: to be determined by next level of care  Barriers to discharge: Inaccessible home and Decreased caregiver support    Assessment:     Tera Griffiths is a 58 y.o. male admitted with a medical diagnosis of Embolic stroke involving left middle cerebral artery.  He presents with the following impairments/functional limitations: weakness, impaired endurance, impaired self care skills, impaired functional mobility, gait instability, impaired balance, impaired cognition, decreased coordination, decreased upper extremity function, decreased lower extremity function, decreased safety awareness. Pt continues to require maxA for functional mobility and follows no commands. Able to sit EOB w/ SBA for dynamic balance but resisted when therapist attempted to initiate sts transfer.     Rehab Prognosis: Good; patient would benefit from acute skilled PT services to address these deficits and reach maximum level of function.    Recent Surgery: Procedure(s) (LRB):  EGD, WITH PEG TUBE INSERTION (N/A) 6 Days Post-Op    Plan:     During this hospitalization, patient to be seen 3 x/week to address the identified rehab impairments via gait training, therapeutic activities, therapeutic exercises, neuromuscular re-education and progress toward the following goals:    Plan of Care Expires:  09/08/23    Subjective     Chief Complaint: ASHLEY 2/2 aphasia/nonverbal  Patient/Family Comments/goals: ASHLEY 2/2 aphasia/nonverbal  Pain/Comfort:  Location 1:  (ASHLEY 2/2 aphasia/nonverbal)      Objective:     Communicated with RN prior to session.  Patient found HOB elevated with bed alarm, restraints, telemetry, PEG Tube, PureWick upon PT entry to room.  Telemetry had been ripped off by pt and one glove restraint removed    General Precautions: Standard, aphasia, aspiration, fall  Orthopedic Precautions: N/A  Braces: N/A  Respiratory Status: Room air     Functional Mobility:  Bed Mobility:     Rolling Left:  maximal assistance  Rolling Right: maximal assistance  Supine to Sit: maximal assistance  Sit to Supine: contact guard assistance  Transfers:     Sit to Stand:  attempted w/ pt resisting   Balance: EOB sitting balance supervision static SBA dynamic      AM-PAC 6 CLICK MOBILITY  Turning over in bed (including adjusting bedclothes, sheets and blankets)?: 2  Sitting down on and standing up from a chair with arms (e.g., wheelchair, bedside commode, etc.): 1  Moving from lying on back to sitting on the side of the bed?: 2  Moving to and from a bed to a chair (including a wheelchair)?: 1  Need to walk in hospital room?: 1  Climbing 3-5 steps with a railing?: 1  Basic Mobility Total Score: 8       Treatment & Education:  Pt educated on PT POC/goals, d/c recs, and continued treatment.   Pt received soiled, inc time required to clean pt and change linens dependently w/ assistance from RN  Sitting balance at EOB w/ attempts to get pt to participate in therex but pt instead just touching arm and back of therapist and grabbing her hand; only functional task he successfully completed in sitting was to wash his face      Patient left left sidelying with all lines intact, call button in reach, bed alarm on, restraints reapplied at end of session, and RN present..    GOALS:   Multidisciplinary Problems       Physical Therapy Goals          Problem: Physical Therapy    Goal Priority Disciplines Outcome Goal Variances Interventions   Physical Therapy Goal     PT, PT/OT Ongoing, Progressing     Description: Goals to be completed by: 9/8/23    Pt will perform sup<>sit transfers w/ minimum assistance  Pt will have sufficient dynamic balance to sit EOB while performing ADLs/therex w/  stand by assistance for 10 min  Pt will be able to stand up from EOB w/ moderate assistance using LRAD  Pt will ambulate 20 feet w/ maximal assistance using LRAD  Pt will be independent w/ HEP therex on BLE w/ good form and ROM   Pt will follow >50 % of single-step commands throughout treatment session                        Time Tracking:     PT Received On: 08/23/23  PT Start Time: 0901     PT Stop Time: 0925  PT Total Time (min): 24 min     Billable Minutes: Therapeutic Activity 24    Treatment Type: Treatment  PT/PTA: PT     Number of PTA visits since last PT visit: 0     08/23/2023

## 2023-08-23 NOTE — PT/OT/SLP PROGRESS
Speech Language Pathology Treatment    Patient Name:  Tera Griffiths   MRN:  [id number]  Admitting Diagnosis: Embolic stroke involving left middle cerebral artery    Recommendations:                 General Recommendations:  Dysphagia therapy and Speech/language therapy  Diet recommendations:  NPO, Liquid Diet Level: NPO   Aspiration Precautions: Frequent oral care and Strict aspiration precautions   General Precautions: Standard, aspiration, aphasia, fall  Communication strategies:  provide increased time to answer and go to room if call light pushed    Assessment:     Tera Griffiths is a 58 y.o. male with an SLP diagnosis of Aphasia, Dysphagia, and Visio-Spatial Impairment.  He presents with Severe Aphasia.      Subjective     SLP reviewed Pt with RN pre/post session  Pt presents lethargic  Pt with some moans/groans, otherwise nonverbal     Pain/Comfort:  Pain Rating 1: other (see comments) (did not rate)    Respiratory Status: Room air    Objective:     Has the patient been evaluated by SLP for swallowing?   Yes  Keep patient NPO? Yes   Current Respiratory Status:   Room air      Pt found upright, asleep in bed with bilateral soft mitten restraints and PEG in place. He demonstrated a L gaze preference and did not track to SLP on R side provided max cues. Pt tracked SLP to midline provided max cues 33% of attempts.  SLP unable to elicit vocalizations via automatic speech or spontaneous speech tasks provided max cues; however, Pt with some occasional spontaneous moans and groans. SLP unable to elicit response to yes/no questions via head nod or verbal response. SLP unable to elicit commands.  Pt with waxing/waning alertness and minimal sustained eye contact which required consistent cues to redirect. PCT in the room to review vitals and repositioned Pt in bed. Pt with persistent lethargy and minimal sustained eye contact following repositioning. PO trials deferred 2/2 decreased sustained alertness, decreased  command following and aspiration risk. He was educated on ongoing SLP POC, communication strategies and safety precautions; however, he did not demonstrate or verbalize understanding 2/2 underlying Aphasia and lethargy. No questions noted. No family present. Pt remained upright in bed upon SLP exit. RN notified of findings upon SLP exit.     Goals:   Multidisciplinary Problems       SLP Goals          Problem: SLP    Goal Priority Disciplines Outcome   SLP Goal     SLP Ongoing, Progressing   Description: Speech Language Pathology Goals  Goals expected to be met by 8/15  1. Pt will participate in ongoing assessment of swallow.   2. Pt will answer simple y/n questions with 60% accy given max cues.   3. Pt will follow simple commands with 50% accy given max cues.   4. Pt will vocalize in response to any stim x5/session given max cues.   5. Pt will localize to stim on R x2/session given max cues.                              Plan:     Patient to be seen:  3 x/week   Plan of Care expires:  09/07/23  Plan of Care reviewed with:  patient   SLP Follow-Up:  Yes       Discharge recommendations:  nursing facility, skilled     Time Tracking:     SLP Treatment Date:   08/23/23  Speech Start Time:  1114  Speech Stop Time:  1130     Speech Total Time (min):  16 min    Billable Minutes: Speech Therapy Individual 16    08/23/2023

## 2023-08-23 NOTE — SUBJECTIVE & OBJECTIVE
Interval History: Temperature elevation to 100.3. No further complaints at this time, although ROS limited d/t non-verbal status. Does not appear to be in acute distress    Review of Systems   Unable to perform ROS: Patient nonverbal     Objective:     Vital Signs (Most Recent):  Temp: 97.9 °F (36.6 °C) (08/23/23 0738)  Pulse: 96 (AFib) (08/23/23 0805)  Resp: 20 (08/23/23 0738)  BP: (!) 175/96 (08/23/23 0738)  SpO2: 97 % (08/23/23 0738) Vital Signs (24h Range):  Temp:  [97.9 °F (36.6 °C)-100.3 °F (37.9 °C)] 97.9 °F (36.6 °C)  Pulse:  [57-96] 96  Resp:  [16-20] 20  SpO2:  [92 %-99 %] 97 %  BP: (132-177)/() 175/96     Weight: 83.5 kg (184 lb 1.4 oz)  Body mass index is 24.29 kg/m².    Intake/Output Summary (Last 24 hours) at 8/23/2023 0838  Last data filed at 8/23/2023 0215  Gross per 24 hour   Intake 655 ml   Output --   Net 655 ml           Physical Exam  Vitals and nursing note reviewed.   Constitutional:       General: He is not in acute distress.     Appearance: Normal appearance. He is well-developed.   HENT:      Head: Normocephalic and atraumatic.   Eyes:      Extraocular Movements: Extraocular movements intact.      Pupils: Pupils are equal, round, and reactive to light.   Cardiovascular:      Rate and Rhythm: Normal rate and regular rhythm.      Pulses: Normal pulses.      Heart sounds: Normal heart sounds. No murmur heard.     No friction rub. No gallop.   Pulmonary:      Effort: Pulmonary effort is normal. No respiratory distress.      Breath sounds: No rales.   Chest:      Chest wall: No tenderness.   Abdominal:      General: Abdomen is flat. There is no distension.      Palpations: Abdomen is soft.      Tenderness: There is no abdominal tenderness. There is no guarding or rebound.   Musculoskeletal:      Cervical back: Normal range of motion and neck supple.      Right lower leg: No edema.      Left lower leg: No edema.   Skin:     General: Skin is warm and dry.   Neurological:      Mental Status:  He is alert.      Comments: Aphasic, does not follow commands  L gaze deviation, grossly intact ROM             Significant Labs: All pertinent labs within the past 24 hours have been reviewed.    Significant Imaging: I have reviewed all pertinent imaging results/findings within the past 24 hours.

## 2023-08-23 NOTE — ASSESSMENT & PLAN NOTE
Patient with new fever, leukocytosis, and tachycardia 8/19  Initiated SIRS protocol  No lactic acidosis  D/c'd Vanc/zosyn and transitioned to Merrem   Antibiotics narrowed to Ceftriaxone. Will finish on 8/25 for full 7 day course.   ID consulted.   Leukocytosis downtrending  Consulted Hospital medicine, appreciate recs  CXR w/ congestion of pulmonary vasculature stable with prior CXR  UA with bacteria and many leukocytes    Cultures growing Klebsiella Pneumoniae  No growth to date on blood cultures  CT A/P (8/22) with 5mm nonobstructive L renal calculus and perinephric fat stranding consistent with complicated UTI vs pyelonephritis   Urology consulted for possible septic calculus. No need for acute intervention.

## 2023-08-23 NOTE — ASSESSMENT & PLAN NOTE
Admitted with ADHF and NSTEMI c/b L MCA stroke, further c/b sepsis presentation and hypotension.     DDx: Likely 2/2 to complicated UTI    -- UCx with largely pan-sensitive Klebsiella  -- CTAP wc demonstrating possible L pyelonephritis + L non-obstructive renal calculus; despite patient clinical improvement, low threshold to reach out to Urology / IR for evaluation of possibly infected calculus if patient does not clinically improve and/or decompensates  -- Previously on meropenem, downgraded to CTX for 7-10 day course given complicated UTI  -- ID following; appreciate assistance  -- Judicious IVF for volume resuscitation to euvolemia; monitor respiratory status s/o CHF

## 2023-08-23 NOTE — ASSESSMENT & PLAN NOTE
Previously required Precedex gtt in NCC, as patient was displaying violent behavior  Discontinued soft wrist restraints  Continuing with b/l mitts  Depakote 250mg Q8h  Will continue to deescalate restraints as patient tolerates

## 2023-08-23 NOTE — SUBJECTIVE & OBJECTIVE
Neurologic Chief Complaint: L MCA syndrome    Subjective:     Interval History: Patient is seen for follow-up neurological assessment and treatment recommendations:     NAEO. VSS and Neurologically stable. Infectious process continues to improve. Discontinued soft UE restraints today. Patient tolerating well without agitation. Rocephin day 5/7.    HPI, Past Medical, Family, and Social History remains the same as documented in the initial encounter.     Review of Systems   Unable to perform ROS: Other   Neurological:  Positive for speech difficulty.     Scheduled Meds:   acetylcysteine 100 mg/ml (10%)  4 mL Nebulization BID    albuterol-ipratropium  3 mL Nebulization BID    amiodarone  200 mg Per NG tube BID    apixaban  5 mg Per G Tube BID    aspirin  81 mg Per G Tube Daily    atorvastatin  80 mg Per NG tube Daily    cefTRIAXone (ROCEPHIN) IVPB  1 g Intravenous Q24H    ezetimibe  10 mg Per NG tube QHS    ferrous sulfate  1 tablet Per NG tube Daily    metoprolol tartrate  25 mg Per NG tube BID    polyethylene glycol  17 g Per NG tube Daily    senna-docusate 8.6-50 mg  1 tablet Per NG tube BID    valproic acid (as sodium salt)  250 mg Per G Tube Q8H     Continuous Infusions:        PRN Meds:acetaminophen, bisacodyL, chlorproMAZINE, dextrose 10%, dextrose 10%, hydrALAZINE, labetalol, magnesium sulfate IVPB, magnesium sulfate IVPB, nitroGLYCERIN, ondansetron, potassium chloride **AND** potassium chloride **AND** potassium chloride, sodium chloride 0.9%, sodium chloride 0.9%, sodium chloride 0.9%, sodium phosphate 15 mmol in dextrose 5 % (D5W) 250 mL IVPB, sodium phosphate 20.01 mmol in dextrose 5 % (D5W) 250 mL IVPB, sodium phosphate 30 mmol in dextrose 5 % (D5W) 250 mL IVPB    Objective:     Vital Signs (Most Recent):  Temp: 97.6 °F (36.4 °C) (08/23/23 1127)  Pulse: 90 (08/23/23 1507)  Resp: 18 (08/23/23 1127)  BP: (!) 141/93 (08/23/23 1127)  SpO2: 97 % (08/23/23 1300)  BP Location: Right arm    Vital Signs Range (Last  "24H):  Temp:  [97.6 °F (36.4 °C)-100.3 °F (37.9 °C)]   Pulse:  [57-98]   Resp:  [16-20]   BP: (132-177)/()   SpO2:  [94 %-99 %]   BP Location: Right arm       Physical Exam  Vitals and nursing note reviewed.   Constitutional:       General: He is not in acute distress.     Appearance: He is well-developed.   HENT:      Head: Normocephalic and atraumatic.   Cardiovascular:      Rate and Rhythm: Normal rate.   Pulmonary:      Effort: Pulmonary effort is normal. No respiratory distress.   Abdominal:      General: There is no distension.   Skin:     General: Skin is warm and dry.   Neurological:      Comments: Aphasic, does not follow commands  L gaze              Neurological Exam:   LOC: drowsy  Attention Span: poor  Language: Global aphasia, not following commands  Articulation: Untestable due to severe aphasia   Orientation: Untestable due to severe aphasia   EOM (CN III, IV, VI): Gaze preference  left  Facial Movement (CN VII): Lower facial weakness on the Right  Motor: moves left spontaneous, right hemiparesis  Unable to assess drift and strength as patient does not follow commands, and gets agitated      Laboratory:  CMP:   Recent Labs   Lab 08/23/23  0401   CALCIUM 9.5   ALBUMIN 2.1*   PROT 6.4   *   K 4.5   CO2 22*   *   BUN 40*   CREATININE 1.7*   ALKPHOS 108   ALT 18   AST 30   BILITOT 0.6       CBC:   Recent Labs   Lab 08/23/23  0401   WBC 11.71   RBC 4.40*   HGB 12.1*   HCT 39.4*      MCV 90   MCH 27.5   MCHC 30.7*       Lipid Panel:   No results for input(s): "CHOL", "LDLCALC", "HDL", "TRIG" in the last 168 hours.    Coagulation:   Recent Labs   Lab 08/17/23  1351 08/17/23 2007 08/21/23  0553   INR 1.2  --   --    APTT 21.3   < > 33.4*    < > = values in this interval not displayed.       Platelet Aggregation Study: No results for input(s): "PLTAGG", "PLTAGINTERP", "PLTAGREGLACO", "ADPPLTAGGREG" in the last 168 hours.  Hgb A1C:   No results for input(s): "HGBA1C" in the last 168 " "hours.    TSH:   No results for input(s): "TSH" in the last 168 hours.      Diagnostic Results     Brain Imaging   CT Head 8/10/23  Impression:     No detrimental change compared to prior.     Evolving acute infarcts within the left MCA and PCA territories.  No hemorrhagic conversion.  No significant mass effect     Suspected small subacute infarction within the right caudate.     Multiple areas remote infarctions as detailed above.      CT Head 8/8/23 1704  Impression:     1. Redemonstration of acute infarctions in the left MCA and PCA territories.  No evidence to suggest hemorrhagic transformation.  2. Suspected small subacute infarction in the right caudate.  3. Numerous remote infarctions as detailed above.    CT Head 8/8/23 0826  Impression:     Moderate developing hypoattenuation left inferior frontal lobe and left occipital lobe concerning for developing recent infarcts post thrombectomy.     Allowing for scattered hyperdensity related to recently contrast administration for thrombectomy there is no definite acute intracranial hemorrhage.     Previous hypodensity right caudate no longer seen which may be related to contrast administration for thrombectomy and possible subacute age infarction.     Superimposed remote infarcts with moderate to large encephalomalacia right MCA territory unchanged    Vessel Imaging   IR Angio 8/8/23  Preliminary Interpretation:   1.    No evidence of left ICA or MCA stenosis. No large or medium vessel occlusion.    CTA Multiphase 8/7/23  Impression:     CT head:     New right caudate nucleus hypodensity, likely representing age-indeterminate infarct.  Could be further evaluated with MRI.     Multifocal encephalomalacia involving the right frontal, temporoparietal, and left occipital lobes.     CTA head and neck:     Acute occlusion of the superior left M2 segment.     Left PCA occlusion of undetermined age.     Filling defect in the right main pulmonary artery, concerning for " acute pulmonary thromboembolism.  Consider follow-up pulmonary CTA to more fully assess the extent of thromboemboli, the clot burden, and the presence or absence of right heart strain.     Not fully detailed above:     - Incomplete opacification of the left atrial appendage, suspicious for an intraluminal thrombus.  Follow-up echocardiogram, or cardiac MRI or CTA, recommended.     - The presence of acute thrombi or thromboemboli in both the systemic arterial and pulmonary arterial circulation raises concern for the possibility of underlying intracardiac or extracardiac right-to-left shunt, and/or a hypercoagulable state.  Correlate clinically.     - Incompletely visualized, but possibly large, pericardial effusion.  Artifacts compromise accurate assessment of its attenuation.    Cardiac Imaging   TTE with bubble 8/8/23    Left Ventricle: The left ventricle is moderately dilated. Increased ventricular mass. Normal wall thickness. There is moderate eccentric hypertrophy. Severe global hypokinesis present. Septal motion is consistent with pacing. There is severely reduced systolic function with a visually estimated ejection fraction of 15 - 20%. Ejection fraction by visual approximation is 20%. Unable to assess diastolic function due to arrhythmia.    Left Atrium: Left atrium is severely dilated. Radha 85mL/m2.    Right Ventricle: Mild right ventricular enlargement. Wall thickness is normal. Right ventricle wall motion  is normal. Systolic function is severely reduced. Pacemaker lead present in the ventricle.    Right Atrium: Right atrium is severely dilated.    Aortic Valve: There is mild aortic valve sclerosis. There is normal leaflet mobility. There is no significant regurgitation.    Mitral Valve: There is bileaflet sclerosis. There is normal leaflet mobility. There is mild regurgitation.    Tricuspid Valve: The tricuspid valve is structurally normal. There is normal leaflet mobility. There is mild regurgitation.     Pulmonic Valve: The pulmonic valve is structurally normal. There is normal leaflet mobility. There is no significant regurgitation.    Aorta: Aortic root is normal in size measuring 3.13 cm. Ascending aorta is normal measuring 3.24 cm.    IVC/SVC: Normal venous pressure at 3 mmHg.    Pericardium: The pericardium is normal. There is no pericardial effusion.

## 2023-08-23 NOTE — ASSESSMENT & PLAN NOTE
"Patient w/ PMHx CKD 3b presenting with EMERSON with sCr 2.3 (baseline sCr <1.7). Patient had multiple episodes of hypotension where blood pressure was as low as 83/49. Pt has a hx of CHF and has been on fluid restriction since 8/5. Enteral H2O was started yesterday with no improvement of the Cr.  Patient also received contrast when getting a CTA which could be contributing to the EMERSON along with the combination of vancomycin and Zosyn for treatment of his sepsis.    Diagnostics: UA, UrNA - 46, osmolarity - 731, UrCr - 170. FeNa - 0.5%     DDx: Likely prerenal with possible mild component of ATN, now complicated by sepsis    Serum creatinine: 1.7 mg/dL (H) 08/23/23 0401  Estimated creatinine clearance: 54.8 mL/min (A)  Protein Creatinine Ratios:    Creatinine, Urine   Date Value Ref Range Status   08/19/2023 170.0 23.0 - 375.0 mg/dL Final   08/05/2023 9.0 (L) 23.0 - 375.0 mg/dL Final   06/09/2023 56.0 23.0 - 375.0 mg/dL Final     No results found for: "PROTEINURINE"  No results found for: "UTPCR"    -- STABLE/IMPROVING  -- Trend sCr  -- Trend RFP  -- Strict I&Os  -- Avoid nephrotoxic agents  -- Renally adjust medications  -- Continue IVF PRN to maintain euvoluemic status; judicious if continuous fluids offered given CHF  "

## 2023-08-23 NOTE — PLAN OF CARE
Problem: Adult Inpatient Plan of Care  Goal: Plan of Care Review  Outcome: Ongoing, Progressing  Goal: Optimal Comfort and Wellbeing  Outcome: Ongoing, Progressing     Problem: Cognitive Impairment (Stroke, Ischemic/Transient Ischemic Attack)  Goal: Optimal Cognitive Function  Outcome: Ongoing, Progressing     Problem: Restraint, Nonbehavioral (Nonviolent)  Goal: Absence of Harm or Injury  Outcome: Ongoing, Progressing    POC reviewed with the patient- Patient unable to verbalize understanding. Bed locked in lowest position with bed alarm set, call light within reach. Safety precautions maintained. VSS, see flowsheets. Trialed patient out of bilateral wrist restraints- Patient did well for majority of the day but became agitated toward end of shift with attempts to remove devices. RN contacted primary team to resume restraints for the night. Will continue to monitor for changes to POC and clinical condition.
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
## 2023-08-24 LAB
ALBUMIN SERPL BCP-MCNC: 2.2 G/DL (ref 3.5–5.2)
ALP SERPL-CCNC: 131 U/L (ref 55–135)
ALT SERPL W/O P-5'-P-CCNC: 17 U/L (ref 10–44)
ANION GAP SERPL CALC-SCNC: 10 MMOL/L (ref 8–16)
AST SERPL-CCNC: 34 U/L (ref 10–40)
BACTERIA BLD CULT: NORMAL
BACTERIA BLD CULT: NORMAL
BASOPHILS # BLD AUTO: 0.01 K/UL (ref 0–0.2)
BASOPHILS NFR BLD: 0.1 % (ref 0–1.9)
BILIRUB SERPL-MCNC: 0.5 MG/DL (ref 0.1–1)
BUN SERPL-MCNC: 34 MG/DL (ref 6–20)
CALCIUM SERPL-MCNC: 8.7 MG/DL (ref 8.7–10.5)
CHLORIDE SERPL-SCNC: 112 MMOL/L (ref 95–110)
CO2 SERPL-SCNC: 21 MMOL/L (ref 23–29)
CREAT SERPL-MCNC: 1.8 MG/DL (ref 0.5–1.4)
DIFFERENTIAL METHOD: ABNORMAL
EOSINOPHIL # BLD AUTO: 0.2 K/UL (ref 0–0.5)
EOSINOPHIL NFR BLD: 1.8 % (ref 0–8)
ERYTHROCYTE [DISTWIDTH] IN BLOOD BY AUTOMATED COUNT: 15.6 % (ref 11.5–14.5)
EST. GFR  (NO RACE VARIABLE): 43.6 ML/MIN/1.73 M^2
GLUCOSE SERPL-MCNC: 104 MG/DL (ref 70–110)
HCT VFR BLD AUTO: 37.1 % (ref 40–54)
HGB BLD-MCNC: 11.4 G/DL (ref 14–18)
IMM GRANULOCYTES # BLD AUTO: 0.07 K/UL (ref 0–0.04)
IMM GRANULOCYTES NFR BLD AUTO: 0.6 % (ref 0–0.5)
LYMPHOCYTES # BLD AUTO: 0.8 K/UL (ref 1–4.8)
LYMPHOCYTES NFR BLD: 6.7 % (ref 18–48)
MAGNESIUM SERPL-MCNC: 2.4 MG/DL (ref 1.6–2.6)
MCH RBC QN AUTO: 27.1 PG (ref 27–31)
MCHC RBC AUTO-ENTMCNC: 30.7 G/DL (ref 32–36)
MCV RBC AUTO: 88 FL (ref 82–98)
MONOCYTES # BLD AUTO: 1.1 K/UL (ref 0.3–1)
MONOCYTES NFR BLD: 9.8 % (ref 4–15)
NEUTROPHILS # BLD AUTO: 9.2 K/UL (ref 1.8–7.7)
NEUTROPHILS NFR BLD: 81 % (ref 38–73)
NRBC BLD-RTO: 0 /100 WBC
PHOSPHATE SERPL-MCNC: 3.1 MG/DL (ref 2.7–4.5)
PLATELET # BLD AUTO: 359 K/UL (ref 150–450)
PMV BLD AUTO: 13.6 FL (ref 9.2–12.9)
POCT GLUCOSE: 114 MG/DL (ref 70–110)
POCT GLUCOSE: 97 MG/DL (ref 70–110)
POCT GLUCOSE: 98 MG/DL (ref 70–110)
POTASSIUM SERPL-SCNC: 4.3 MMOL/L (ref 3.5–5.1)
PROT SERPL-MCNC: 6.4 G/DL (ref 6–8.4)
RBC # BLD AUTO: 4.2 M/UL (ref 4.6–6.2)
SODIUM SERPL-SCNC: 143 MMOL/L (ref 136–145)
WBC # BLD AUTO: 11.36 K/UL (ref 3.9–12.7)

## 2023-08-24 PROCEDURE — 97112 NEUROMUSCULAR REEDUCATION: CPT

## 2023-08-24 PROCEDURE — 99232 PR SUBSEQUENT HOSPITAL CARE,LEVL II: ICD-10-PCS | Mod: ,,, | Performed by: HOSPITALIST

## 2023-08-24 PROCEDURE — 85025 COMPLETE CBC W/AUTO DIFF WBC: CPT | Performed by: PHYSICIAN ASSISTANT

## 2023-08-24 PROCEDURE — 25000242 PHARM REV CODE 250 ALT 637 W/ HCPCS: Performed by: STUDENT IN AN ORGANIZED HEALTH CARE EDUCATION/TRAINING PROGRAM

## 2023-08-24 PROCEDURE — 80053 COMPREHEN METABOLIC PANEL: CPT | Performed by: PHYSICIAN ASSISTANT

## 2023-08-24 PROCEDURE — 63600175 PHARM REV CODE 636 W HCPCS

## 2023-08-24 PROCEDURE — 99232 SBSQ HOSP IP/OBS MODERATE 35: CPT | Mod: ,,, | Performed by: HOSPITALIST

## 2023-08-24 PROCEDURE — 25000003 PHARM REV CODE 250

## 2023-08-24 PROCEDURE — 94640 AIRWAY INHALATION TREATMENT: CPT

## 2023-08-24 PROCEDURE — 94761 N-INVAS EAR/PLS OXIMETRY MLT: CPT

## 2023-08-24 PROCEDURE — 25000003 PHARM REV CODE 250: Performed by: HOSPITALIST

## 2023-08-24 PROCEDURE — 84100 ASSAY OF PHOSPHORUS: CPT | Performed by: PHYSICIAN ASSISTANT

## 2023-08-24 PROCEDURE — 11000001 HC ACUTE MED/SURG PRIVATE ROOM

## 2023-08-24 PROCEDURE — 99233 SBSQ HOSP IP/OBS HIGH 50: CPT | Mod: ,,, | Performed by: STUDENT IN AN ORGANIZED HEALTH CARE EDUCATION/TRAINING PROGRAM

## 2023-08-24 PROCEDURE — 36415 COLL VENOUS BLD VENIPUNCTURE: CPT | Performed by: PHYSICIAN ASSISTANT

## 2023-08-24 PROCEDURE — 25000003 PHARM REV CODE 250: Performed by: NURSE PRACTITIONER

## 2023-08-24 PROCEDURE — 99233 PR SUBSEQUENT HOSPITAL CARE,LEVL III: ICD-10-PCS | Mod: ,,, | Performed by: STUDENT IN AN ORGANIZED HEALTH CARE EDUCATION/TRAINING PROGRAM

## 2023-08-24 PROCEDURE — 83735 ASSAY OF MAGNESIUM: CPT | Performed by: PHYSICIAN ASSISTANT

## 2023-08-24 PROCEDURE — 63600175 PHARM REV CODE 636 W HCPCS: Performed by: HOSPITALIST

## 2023-08-24 RX ORDER — AMOXICILLIN 250 MG
1 CAPSULE ORAL 2 TIMES DAILY
Status: DISCONTINUED | OUTPATIENT
Start: 2023-08-24 | End: 2023-09-07 | Stop reason: HOSPADM

## 2023-08-24 RX ORDER — ACETAMINOPHEN 325 MG/1
650 TABLET ORAL EVERY 6 HOURS PRN
Status: DISCONTINUED | OUTPATIENT
Start: 2023-08-24 | End: 2023-09-07 | Stop reason: HOSPADM

## 2023-08-24 RX ORDER — LANOLIN ALCOHOL/MO/W.PET/CERES
1 CREAM (GRAM) TOPICAL DAILY
Status: DISCONTINUED | OUTPATIENT
Start: 2023-08-24 | End: 2023-09-07 | Stop reason: HOSPADM

## 2023-08-24 RX ORDER — VALPROIC ACID 250 MG/5ML
500 SOLUTION ORAL EVERY 8 HOURS
Status: DISCONTINUED | OUTPATIENT
Start: 2023-08-24 | End: 2023-08-29

## 2023-08-24 RX ORDER — ATORVASTATIN CALCIUM 40 MG/1
80 TABLET, FILM COATED ORAL DAILY
Status: DISCONTINUED | OUTPATIENT
Start: 2023-08-24 | End: 2023-09-07 | Stop reason: HOSPADM

## 2023-08-24 RX ORDER — AMIODARONE HYDROCHLORIDE 200 MG/1
200 TABLET ORAL 2 TIMES DAILY
Status: DISCONTINUED | OUTPATIENT
Start: 2023-08-24 | End: 2023-08-25

## 2023-08-24 RX ORDER — METOPROLOL TARTRATE 25 MG/1
25 TABLET, FILM COATED ORAL 2 TIMES DAILY
Status: DISCONTINUED | OUTPATIENT
Start: 2023-08-24 | End: 2023-09-07 | Stop reason: HOSPADM

## 2023-08-24 RX ORDER — EZETIMIBE 10 MG/1
10 TABLET ORAL NIGHTLY
Status: DISCONTINUED | OUTPATIENT
Start: 2023-08-24 | End: 2023-09-07 | Stop reason: HOSPADM

## 2023-08-24 RX ORDER — POLYETHYLENE GLYCOL 3350 17 G/17G
17 POWDER, FOR SOLUTION ORAL DAILY
Status: DISCONTINUED | OUTPATIENT
Start: 2023-08-24 | End: 2023-09-07 | Stop reason: HOSPADM

## 2023-08-24 RX ORDER — ACETAMINOPHEN 325 MG/1
650 TABLET ORAL ONCE
Status: COMPLETED | OUTPATIENT
Start: 2023-08-24 | End: 2023-08-24

## 2023-08-24 RX ORDER — OLANZAPINE 10 MG/2ML
5 INJECTION, POWDER, FOR SOLUTION INTRAMUSCULAR ONCE AS NEEDED
Status: COMPLETED | OUTPATIENT
Start: 2023-08-24 | End: 2023-08-25

## 2023-08-24 RX ORDER — VALPROIC ACID 250 MG/5ML
250 SOLUTION ORAL ONCE
Status: COMPLETED | OUTPATIENT
Start: 2023-08-24 | End: 2023-08-24

## 2023-08-24 RX ADMIN — ACETAMINOPHEN 650 MG: 325 TABLET ORAL at 10:08

## 2023-08-24 RX ADMIN — ACETAMINOPHEN 650 MG: 325 TABLET ORAL at 06:08

## 2023-08-24 RX ADMIN — IPRATROPIUM BROMIDE AND ALBUTEROL SULFATE 3 ML: 2.5; .5 SOLUTION RESPIRATORY (INHALATION) at 08:08

## 2023-08-24 RX ADMIN — AMIODARONE HYDROCHLORIDE 200 MG: 200 TABLET ORAL at 09:08

## 2023-08-24 RX ADMIN — EZETIMIBE 10 MG: 10 TABLET ORAL at 09:08

## 2023-08-24 RX ADMIN — CEFTRIAXONE 1 G: 1 INJECTION, POWDER, FOR SOLUTION INTRAMUSCULAR; INTRAVENOUS at 01:08

## 2023-08-24 RX ADMIN — VALPROIC ACID 250 MG: 250 SOLUTION ORAL at 05:08

## 2023-08-24 RX ADMIN — APIXABAN 5 MG: 5 TABLET, FILM COATED ORAL at 09:08

## 2023-08-24 RX ADMIN — ACETYLCYSTEINE 4 ML: 100 INHALANT RESPIRATORY (INHALATION) at 08:08

## 2023-08-24 RX ADMIN — METOPROLOL TARTRATE 25 MG: 25 TABLET, FILM COATED ORAL at 09:08

## 2023-08-24 RX ADMIN — SODIUM CHLORIDE, POTASSIUM CHLORIDE, SODIUM LACTATE AND CALCIUM CHLORIDE 500 ML: 600; 310; 30; 20 INJECTION, SOLUTION INTRAVENOUS at 09:08

## 2023-08-24 RX ADMIN — VALPROIC ACID 500 MG: 250 SOLUTION ORAL at 01:08

## 2023-08-24 RX ADMIN — FERROUS SULFATE TAB 325 MG (65 MG ELEMENTAL FE) 1 EACH: 325 (65 FE) TAB at 09:08

## 2023-08-24 RX ADMIN — VALPROIC ACID 500 MG: 250 SOLUTION ORAL at 09:08

## 2023-08-24 RX ADMIN — ASPIRIN 81 MG 81 MG: 81 TABLET ORAL at 09:08

## 2023-08-24 RX ADMIN — ATORVASTATIN CALCIUM 80 MG: 40 TABLET, FILM COATED ORAL at 09:08

## 2023-08-24 RX ADMIN — VALPROIC ACID 250 MG: 250 SOLUTION ORAL at 10:08

## 2023-08-24 RX ADMIN — ACETAMINOPHEN 650 MG: 325 TABLET ORAL at 03:08
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
## 2023-08-24 NOTE — PLAN OF CARE
Problem: Adult Inpatient Plan of Care  Goal: Plan of Care Review  Outcome: Ongoing, Progressing  Goal: Patient-Specific Goal (Individualized)  Outcome: Ongoing, Progressing  Goal: Absence of Hospital-Acquired Illness or Injury  Outcome: Ongoing, Progressing  Goal: Optimal Comfort and Wellbeing  Outcome: Ongoing, Progressing  Goal: Readiness for Transition of Care  Outcome: Ongoing, Progressing   POC updated and reviewed with the patient at the bedside. ASHLEY pt orientation and pt is not following commands. Tele maintained. Fall and safety precautions maintained, no signs of injury noted during shift. Patient was repositioned for comfort with bed locked in low position, side rails up x 3, bed alarm set, with call light within reach. Instructed patient to call staff for mobility, verbalized understanding. Stroke book and education reviewed at the bedside, see education flowsheets for details. No acute signs of distress noted at this time.

## 2023-08-24 NOTE — SUBJECTIVE & OBJECTIVE
Neurologic Chief Complaint: L MCA syndrome    Subjective:     Interval History: Patient is seen for follow-up neurological assessment and treatment recommendations:     Patient did not tolerate trial out of wrist restraints, as patient was removing mitts and pulling at lines. Increased Depakote and consulting Psych for assistance in managing agitation in the setting of restraint de-escalation. Gentle fluid bolus for stabilization of EMERSON. Rocephin day 6/7.     HPI, Past Medical, Family, and Social History remains the same as documented in the initial encounter.     Review of Systems   Unable to perform ROS: Other   Neurological:  Positive for speech difficulty.     Scheduled Meds:   acetylcysteine 100 mg/ml (10%)  4 mL Nebulization BID    albuterol-ipratropium  3 mL Nebulization BID    amiodarone  200 mg Per G Tube BID    apixaban  5 mg Per G Tube BID    aspirin  81 mg Per G Tube Daily    atorvastatin  80 mg Per G Tube Daily    cefTRIAXone (ROCEPHIN) IVPB  1 g Intravenous Q24H    ezetimibe  10 mg Per G Tube QHS    ferrous sulfate  1 tablet Per G Tube Daily    metoprolol tartrate  25 mg Per G Tube BID    polyethylene glycol  17 g Per G Tube Daily    senna-docusate 8.6-50 mg  1 tablet Per G Tube BID    valproic acid (as sodium salt)  500 mg Per G Tube Q8H     Continuous Infusions:        PRN Meds:acetaminophen, bisacodyL, chlorproMAZINE, dextrose 10%, dextrose 10%, hydrALAZINE, labetalol, nitroGLYCERIN, OLANZapine, ondansetron, sodium chloride 0.9%, sodium chloride 0.9%, sodium chloride 0.9%    Objective:     Vital Signs (Most Recent):  Temp: 98.9 °F (37.2 °C) (08/24/23 1137)  Pulse: 72 (08/24/23 1137)  Resp: 18 (08/24/23 1137)  BP: (!) 147/106 (08/24/23 1137)  SpO2: 98 % (08/24/23 1137)  BP Location: Right arm    Vital Signs Range (Last 24H):  Temp:  [97.9 °F (36.6 °C)-99 °F (37.2 °C)]   Pulse:  [53-92]   Resp:  [17-20]   BP: (138-158)/()   SpO2:  [97 %-98 %]   BP Location: Right arm       Physical Exam  Vitals  "and nursing note reviewed.   Constitutional:       General: He is not in acute distress.     Appearance: He is well-developed.   HENT:      Head: Normocephalic and atraumatic.   Cardiovascular:      Rate and Rhythm: Normal rate.   Pulmonary:      Effort: Pulmonary effort is normal. No respiratory distress.   Abdominal:      General: There is no distension.   Skin:     General: Skin is warm and dry.   Neurological:      Comments: Aphasic, does not follow commands  L gaze              Neurological Exam:   LOC: drowsy  Attention Span: poor  Language: Global aphasia, not following commands  Articulation: Untestable due to severe aphasia   Orientation: Untestable due to severe aphasia   EOM (CN III, IV, VI): Gaze preference  left  Facial Movement (CN VII): Lower facial weakness on the Right  Motor: Spontaneously moving all extremities. Will not follow commands.  Unable to assess drift and strength as patient does not follow commands, and gets agitated      Laboratory:  CMP:   Recent Labs   Lab 08/24/23  0456   CALCIUM 8.7   ALBUMIN 2.2*   PROT 6.4      K 4.3   CO2 21*   *   BUN 34*   CREATININE 1.8*   ALKPHOS 131   ALT 17   AST 34   BILITOT 0.5       CBC:   Recent Labs   Lab 08/24/23  0456   WBC 11.36   RBC 4.20*   HGB 11.4*   HCT 37.1*      MCV 88   MCH 27.1   MCHC 30.7*       Lipid Panel:   No results for input(s): "CHOL", "LDLCALC", "HDL", "TRIG" in the last 168 hours.    Coagulation:   Recent Labs   Lab 08/21/23  0553   APTT 33.4*       Platelet Aggregation Study: No results for input(s): "PLTAGG", "PLTAGINTERP", "PLTAGREGLACO", "ADPPLTAGGREG" in the last 168 hours.  Hgb A1C:   No results for input(s): "HGBA1C" in the last 168 hours.    TSH:   No results for input(s): "TSH" in the last 168 hours.      Diagnostic Results     Brain Imaging   CT Head 8/10/23  Impression:     No detrimental change compared to prior.     Evolving acute infarcts within the left MCA and PCA territories.  No hemorrhagic " conversion.  No significant mass effect     Suspected small subacute infarction within the right caudate.     Multiple areas remote infarctions as detailed above.      CT Head 8/8/23 1704  Impression:     1. Redemonstration of acute infarctions in the left MCA and PCA territories.  No evidence to suggest hemorrhagic transformation.  2. Suspected small subacute infarction in the right caudate.  3. Numerous remote infarctions as detailed above.    CT Head 8/8/23 0826  Impression:     Moderate developing hypoattenuation left inferior frontal lobe and left occipital lobe concerning for developing recent infarcts post thrombectomy.     Allowing for scattered hyperdensity related to recently contrast administration for thrombectomy there is no definite acute intracranial hemorrhage.     Previous hypodensity right caudate no longer seen which may be related to contrast administration for thrombectomy and possible subacute age infarction.     Superimposed remote infarcts with moderate to large encephalomalacia right MCA territory unchanged    Vessel Imaging   IR Angio 8/8/23  Preliminary Interpretation:   1.    No evidence of left ICA or MCA stenosis. No large or medium vessel occlusion.    CTA Multiphase 8/7/23  Impression:     CT head:     New right caudate nucleus hypodensity, likely representing age-indeterminate infarct.  Could be further evaluated with MRI.     Multifocal encephalomalacia involving the right frontal, temporoparietal, and left occipital lobes.     CTA head and neck:     Acute occlusion of the superior left M2 segment.     Left PCA occlusion of undetermined age.     Filling defect in the right main pulmonary artery, concerning for acute pulmonary thromboembolism.  Consider follow-up pulmonary CTA to more fully assess the extent of thromboemboli, the clot burden, and the presence or absence of right heart strain.     Not fully detailed above:     - Incomplete opacification of the left atrial appendage,  suspicious for an intraluminal thrombus.  Follow-up echocardiogram, or cardiac MRI or CTA, recommended.     - The presence of acute thrombi or thromboemboli in both the systemic arterial and pulmonary arterial circulation raises concern for the possibility of underlying intracardiac or extracardiac right-to-left shunt, and/or a hypercoagulable state.  Correlate clinically.     - Incompletely visualized, but possibly large, pericardial effusion.  Artifacts compromise accurate assessment of its attenuation.    Cardiac Imaging   TTE with bubble 8/8/23    Left Ventricle: The left ventricle is moderately dilated. Increased ventricular mass. Normal wall thickness. There is moderate eccentric hypertrophy. Severe global hypokinesis present. Septal motion is consistent with pacing. There is severely reduced systolic function with a visually estimated ejection fraction of 15 - 20%. Ejection fraction by visual approximation is 20%. Unable to assess diastolic function due to arrhythmia.    Left Atrium: Left atrium is severely dilated. Radha 85mL/m2.    Right Ventricle: Mild right ventricular enlargement. Wall thickness is normal. Right ventricle wall motion  is normal. Systolic function is severely reduced. Pacemaker lead present in the ventricle.    Right Atrium: Right atrium is severely dilated.    Aortic Valve: There is mild aortic valve sclerosis. There is normal leaflet mobility. There is no significant regurgitation.    Mitral Valve: There is bileaflet sclerosis. There is normal leaflet mobility. There is mild regurgitation.    Tricuspid Valve: The tricuspid valve is structurally normal. There is normal leaflet mobility. There is mild regurgitation.    Pulmonic Valve: The pulmonic valve is structurally normal. There is normal leaflet mobility. There is no significant regurgitation.    Aorta: Aortic root is normal in size measuring 3.13 cm. Ascending aorta is normal measuring 3.24 cm.    IVC/SVC: Normal venous pressure at 3  mmHg.    Pericardium: The pericardium is normal. There is no pericardial effusion.

## 2023-08-24 NOTE — ASSESSMENT & PLAN NOTE
Improving  Patient with new fever, leukocytosis, and tachycardia 8/19  Initiated SIRS protocol  No lactic acidosis  D/c'd Vanc/zosyn and transitioned to Merrem   Antibiotics narrowed to Ceftriaxone. Will finish on 8/25 for full 7 day course.   ID consulted.   Leukocytosis downtrending  Consulted Hospital medicine, appreciate recs  CXR w/ congestion of pulmonary vasculature stable with prior CXR  UA with bacteria and many leukocytes    Cultures growing Klebsiella Pneumoniae  No growth to date on blood cultures  CT A/P (8/22) with 5mm nonobstructive L renal calculus and perinephric fat stranding consistent with complicated UTI vs pyelonephritis   Urology consulted for possible septic calculus. No need for acute intervention.
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
## 2023-08-24 NOTE — SUBJECTIVE & OBJECTIVE
Interval History: AF HDS. No further complaints at this time, although ROS limited d/t non-verbal status. Does not appear to be in acute distress, although is more agitated requiring non-violent restraints.    Review of Systems   Unable to perform ROS: Patient nonverbal     Objective:     Vital Signs (Most Recent):  Temp: 98.2 °F (36.8 °C) (08/24/23 0730)  Pulse: 89 (08/24/23 0802)  Resp: 18 (08/24/23 0802)  BP: (!) 152/98 (08/24/23 0730)  SpO2: 97 % (08/24/23 0802) Vital Signs (24h Range):  Temp:  [97.6 °F (36.4 °C)-99 °F (37.2 °C)] 98.2 °F (36.8 °C)  Pulse:  [53-98] 89  Resp:  [17-20] 18  SpO2:  [97 %-98 %] 97 %  BP: (138-158)/() 152/98     Weight: 83.5 kg (184 lb 1.4 oz)  Body mass index is 24.29 kg/m².    Intake/Output Summary (Last 24 hours) at 8/24/2023 0838  Last data filed at 8/24/2023 0015  Gross per 24 hour   Intake 600 ml   Output 600 ml   Net 0 ml           Physical Exam  Vitals and nursing note reviewed.   Constitutional:       General: He is not in acute distress.     Appearance: Normal appearance. He is well-developed.   HENT:      Head: Normocephalic and atraumatic.   Eyes:      Extraocular Movements: Extraocular movements intact.      Pupils: Pupils are equal, round, and reactive to light.   Cardiovascular:      Rate and Rhythm: Normal rate and regular rhythm.      Pulses: Normal pulses.      Heart sounds: Normal heart sounds. No murmur heard.     No friction rub. No gallop.   Pulmonary:      Effort: Pulmonary effort is normal. No respiratory distress.      Breath sounds: No rales.   Chest:      Chest wall: No tenderness.   Abdominal:      General: Abdomen is flat. There is no distension.      Palpations: Abdomen is soft.      Tenderness: There is no abdominal tenderness. There is no guarding or rebound.   Musculoskeletal:      Cervical back: Normal range of motion and neck supple.      Right lower leg: No edema.      Left lower leg: No edema.   Skin:     General: Skin is warm and dry.    Neurological:      Mental Status: He is alert.      Comments: Aphasic, does not follow commands  L gaze deviation, grossly intact ROM             Significant Labs: All pertinent labs within the past 24 hours have been reviewed.    Significant Imaging: I have reviewed all pertinent imaging results/findings within the past 24 hours.

## 2023-08-24 NOTE — ASSESSMENT & PLAN NOTE
Stroke risk factor  Currently on 1.5 L fluid restriction and GDMT  Cardiology was consulted by N[facility]  CXR (8/19) with congestion of the pulmonary vasculature similar to previous CXR on 8/11, no increased fluid burden  Strict I's/O's - Condom cath

## 2023-08-24 NOTE — PT/OT/SLP PROGRESS
Occupational Therapy   Treatment    Name: Tera Griffiths  MRN: [id number]  Admitting Diagnosis:  Embolic stroke involving left middle cerebral artery  7 Days Post-Op    Recommendations:     Discharge Recommendations: nursing facility, skilled  Discharge Equipment Recommendations:  to be determined by next level of care  Barriers to discharge:  Other (Comment) (Increased A needed)    Assessment:     Tera Griffiths is a 58 y.o. male with a medical diagnosis of Embolic stroke involving left middle cerebral artery.  He presents with decreased functional status post CVA. Performance deficits affecting function are weakness, impaired endurance, impaired self care skills, impaired functional mobility, gait instability, impaired balance, impaired cognition, decreased coordination, decreased upper extremity function, decreased lower extremity function, decreased safety awareness, decreased ROM.     Rehab Prognosis:  Fair; patient would benefit from acute skilled OT services to address these deficits and reach maximum level of function.       Plan:     Patient to be seen 3 x/week to address the above listed problems via self-care/home management, therapeutic activities, therapeutic exercises, neuromuscular re-education  Plan of Care Expires: 09/11/23  Plan of Care Reviewed with: patient    Subjective     Chief Complaint: Unable to verbalize   Patient/Family Comments/goals: Gain functional status   Objective:     Communicated with: RN prior to session.  Patient found supine with bed alarm, telemetry, PEG Tube, PureWick upon OT entry to room.    General Precautions: Standard, aphasia, aspiration    Orthopedic Precautions:N/A  Braces: N/A  Respiratory Status: Room air     Occupational Performance:     Bed Mobility:    Patient completed Rolling/Turning to Right with moderate assistance and 2 persons  Patient completed Scooting/Bridging with moderate assistance and 2 persons  Patient completed Supine to Sit with moderate  assistance and 2 persons  Patient completed Sit to Supine with moderate assistance and 2 persons     Activities of Daily Living:  Grooming: moderate assistance seated EOB patient completed facial grooming  and oral care; patient able to identify oral swab as demonstrating by patient reaching for swab from OT and placing in mouth      [facility] 6 Click ADL: 6    Treatment & Education:  Patient remains unable to follow consistent commands at this time. OT provided stimulation to patient R side in attempt to elicit response with no success. At this time patient is limited in ADL function due to cognitive deficits and unable to stay on task. OT attempted all directions verbal, tactile, and auditory with limited success. Patient able to demonstrate spontaneous reach/bilateral coordination when son entered room and patient grabbed snacks son was carrying.     Patient left supine with all lines intact, bed alarm on, and son present    GOALS:   Multidisciplinary Problems       Occupational Therapy Goals          Problem: Occupational Therapy    Goal Priority Disciplines Outcome Interventions   Occupational Therapy Goal     OT, PT/OT Ongoing, Progressing    Description: Goals to be met by: 9/8/23     Patient will increase functional independence with ADLs by performing:    UE Dressing with Moderate Assistance.  LE Dressing with Moderate Assistance.  Grooming while standing with Moderate Assistance.  Toileting from toilet with Moderate Assistance for hygiene and clothing management.                          Time Tracking:     OT Date of Treatment: 08/24/23  OT Start Time: 1300  OT Stop Time: 1325  OT Total Time (min): 25 min    Billable Minutes:Neuromuscular Re-education 25 minutes    OT/ANDREA: OT          8/24/2023

## 2023-08-24 NOTE — PLAN OF CARE
[facility] (Hospital)  Discharge Reassessment    Primary Care Provider: Carleen Bueno MD    Expected Discharge Date: 8/25/2023    Reassessment (most recent)       Discharge Reassessment - 08/24/23 1431          Discharge Reassessment    Assessment Type Discharge Planning Reassessment (P)      Did the patient's condition or plan change since previous assessment? Yes (P)      Communicated YUSEF with patient/caregiver Date not available/Unable to determine (P)      Discharge Plan A Skilled Nursing Facility (P)      Discharge Plan B Rehab (P)      Transition of Care Barriers Social;Substance Abuse (P)      Why the patient remains in the hospital Social issues;Placement issues (P)         Post-Acute Status    Post-Acute Authorization Placement (P)      Post-Acute Placement Status Pending medical clearance/testing (P)      Coverage Medicaid (P)      Discharge Delays Patient and Family Barriers (P)                  Patient being followed by [facility], no SNF benefits.  Patient has been agitated and requiring restraints.  Depakote being increased and exploring other PRN's to assist with agitation.    SW will continue to follow.      Becca Buchanan LMSW  [facility]  [phone]
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
## 2023-08-24 NOTE — CARE UPDATE
Notified by patient's RN that he had become agitated and started to pull lines, bite his mittens, and was attempting to pull at his PEG. Restraints had to be reordered and patient with abdominal binder already in place.

## 2023-08-25 ENCOUNTER — TELEPHONE (OUTPATIENT)
Dept: ENDOSCOPY | Facility: HOSPITAL | Age: 58
End: 2023-08-25
Payer: MEDICAID

## 2023-08-25 LAB
ALBUMIN SERPL BCP-MCNC: 2.3 G/DL (ref 3.5–5.2)
ALP SERPL-CCNC: 133 U/L (ref 55–135)
ALT SERPL W/O P-5'-P-CCNC: 17 U/L (ref 10–44)
ANION GAP SERPL CALC-SCNC: 11 MMOL/L (ref 8–16)
AST SERPL-CCNC: 35 U/L (ref 10–40)
BASOPHILS # BLD AUTO: 0.02 K/UL (ref 0–0.2)
BASOPHILS NFR BLD: 0.2 % (ref 0–1.9)
BILIRUB SERPL-MCNC: 0.5 MG/DL (ref 0.1–1)
BUN SERPL-MCNC: 25 MG/DL (ref 6–20)
CALCIUM SERPL-MCNC: 9.1 MG/DL (ref 8.7–10.5)
CHLORIDE SERPL-SCNC: 105 MMOL/L (ref 95–110)
CO2 SERPL-SCNC: 22 MMOL/L (ref 23–29)
CREAT SERPL-MCNC: 1.5 MG/DL (ref 0.5–1.4)
DIFFERENTIAL METHOD: ABNORMAL
EOSINOPHIL # BLD AUTO: 0.2 K/UL (ref 0–0.5)
EOSINOPHIL NFR BLD: 1.7 % (ref 0–8)
ERYTHROCYTE [DISTWIDTH] IN BLOOD BY AUTOMATED COUNT: 15.9 % (ref 11.5–14.5)
EST. GFR  (NO RACE VARIABLE): 54.3 ML/MIN/1.73 M^2
GLUCOSE SERPL-MCNC: 105 MG/DL (ref 70–110)
HCT VFR BLD AUTO: 37.8 % (ref 40–54)
HGB BLD-MCNC: 11.5 G/DL (ref 14–18)
IMM GRANULOCYTES # BLD AUTO: 0.09 K/UL (ref 0–0.04)
IMM GRANULOCYTES NFR BLD AUTO: 0.7 % (ref 0–0.5)
LYMPHOCYTES # BLD AUTO: 1 K/UL (ref 1–4.8)
LYMPHOCYTES NFR BLD: 8.6 % (ref 18–48)
MAGNESIUM SERPL-MCNC: 2.3 MG/DL (ref 1.6–2.6)
MCH RBC QN AUTO: 26.8 PG (ref 27–31)
MCHC RBC AUTO-ENTMCNC: 30.4 G/DL (ref 32–36)
MCV RBC AUTO: 88 FL (ref 82–98)
MONOCYTES # BLD AUTO: 1.2 K/UL (ref 0.3–1)
MONOCYTES NFR BLD: 9.7 % (ref 4–15)
NEUTROPHILS # BLD AUTO: 9.5 K/UL (ref 1.8–7.7)
NEUTROPHILS NFR BLD: 79.1 % (ref 38–73)
NRBC BLD-RTO: 0 /100 WBC
PHOSPHATE SERPL-MCNC: 2.6 MG/DL (ref 2.7–4.5)
PLATELET # BLD AUTO: 390 K/UL (ref 150–450)
PMV BLD AUTO: 13.2 FL (ref 9.2–12.9)
POCT GLUCOSE: 120 MG/DL (ref 70–110)
POCT GLUCOSE: 130 MG/DL (ref 70–110)
POCT GLUCOSE: 147 MG/DL (ref 70–110)
POCT GLUCOSE: 153 MG/DL (ref 70–110)
POTASSIUM SERPL-SCNC: 4.8 MMOL/L (ref 3.5–5.1)
PROT SERPL-MCNC: 6.8 G/DL (ref 6–8.4)
RBC # BLD AUTO: 4.29 M/UL (ref 4.6–6.2)
SODIUM SERPL-SCNC: 138 MMOL/L (ref 136–145)
WBC # BLD AUTO: 12.02 K/UL (ref 3.9–12.7)

## 2023-08-25 PROCEDURE — 97530 THERAPEUTIC ACTIVITIES: CPT

## 2023-08-25 PROCEDURE — 90792 PR PSYCHIATRIC DIAGNOSTIC EVALUATION W/MEDICAL SERVICES: ICD-10-PCS | Mod: AF,HB,, | Performed by: PSYCHIATRY & NEUROLOGY

## 2023-08-25 PROCEDURE — S0166 INJ OLANZAPINE 2.5MG: HCPCS | Performed by: PHYSICIAN ASSISTANT

## 2023-08-25 PROCEDURE — 99233 PR SUBSEQUENT HOSPITAL CARE,LEVL III: ICD-10-PCS | Mod: ,,, | Performed by: PSYCHIATRY & NEUROLOGY

## 2023-08-25 PROCEDURE — 25000003 PHARM REV CODE 250

## 2023-08-25 PROCEDURE — 63600175 PHARM REV CODE 636 W HCPCS: Performed by: HOSPITALIST

## 2023-08-25 PROCEDURE — 94640 AIRWAY INHALATION TREATMENT: CPT

## 2023-08-25 PROCEDURE — 83735 ASSAY OF MAGNESIUM: CPT | Performed by: PHYSICIAN ASSISTANT

## 2023-08-25 PROCEDURE — 11000001 HC ACUTE MED/SURG PRIVATE ROOM

## 2023-08-25 PROCEDURE — 85025 COMPLETE CBC W/AUTO DIFF WBC: CPT | Performed by: PHYSICIAN ASSISTANT

## 2023-08-25 PROCEDURE — 25000003 PHARM REV CODE 250: Performed by: NURSE PRACTITIONER

## 2023-08-25 PROCEDURE — 99232 PR SUBSEQUENT HOSPITAL CARE,LEVL II: ICD-10-PCS | Mod: ,,, | Performed by: HOSPITALIST

## 2023-08-25 PROCEDURE — 92526 ORAL FUNCTION THERAPY: CPT

## 2023-08-25 PROCEDURE — S0166 INJ OLANZAPINE 2.5MG: HCPCS

## 2023-08-25 PROCEDURE — 36415 COLL VENOUS BLD VENIPUNCTURE: CPT | Performed by: PHYSICIAN ASSISTANT

## 2023-08-25 PROCEDURE — 25000003 PHARM REV CODE 250: Performed by: PHYSICIAN ASSISTANT

## 2023-08-25 PROCEDURE — 80053 COMPREHEN METABOLIC PANEL: CPT | Performed by: PHYSICIAN ASSISTANT

## 2023-08-25 PROCEDURE — 90792 PSYCH DIAG EVAL W/MED SRVCS: CPT | Mod: AF,HB,, | Performed by: PSYCHIATRY & NEUROLOGY

## 2023-08-25 PROCEDURE — 99233 SBSQ HOSP IP/OBS HIGH 50: CPT | Mod: ,,, | Performed by: PSYCHIATRY & NEUROLOGY

## 2023-08-25 PROCEDURE — 84100 ASSAY OF PHOSPHORUS: CPT | Performed by: PHYSICIAN ASSISTANT

## 2023-08-25 PROCEDURE — 25000242 PHARM REV CODE 250 ALT 637 W/ HCPCS: Performed by: STUDENT IN AN ORGANIZED HEALTH CARE EDUCATION/TRAINING PROGRAM

## 2023-08-25 PROCEDURE — 99232 SBSQ HOSP IP/OBS MODERATE 35: CPT | Mod: ,,, | Performed by: HOSPITALIST

## 2023-08-25 PROCEDURE — 25000003 PHARM REV CODE 250: Performed by: HOSPITALIST

## 2023-08-25 PROCEDURE — 97535 SELF CARE MNGMENT TRAINING: CPT

## 2023-08-25 RX ORDER — AMIODARONE HYDROCHLORIDE 200 MG/1
200 TABLET ORAL DAILY
Status: DISCONTINUED | OUTPATIENT
Start: 2023-08-26 | End: 2023-09-07 | Stop reason: HOSPADM

## 2023-08-25 RX ORDER — OLANZAPINE 10 MG/2ML
5 INJECTION, POWDER, FOR SOLUTION INTRAMUSCULAR ONCE AS NEEDED
Status: COMPLETED | OUTPATIENT
Start: 2023-08-25 | End: 2023-08-25

## 2023-08-25 RX ORDER — NYSTATIN 100000 [USP'U]/ML
500000 SUSPENSION ORAL 4 TIMES DAILY
Status: DISPENSED | OUTPATIENT
Start: 2023-08-25 | End: 2023-09-04

## 2023-08-25 RX ADMIN — ATORVASTATIN CALCIUM 80 MG: 40 TABLET, FILM COATED ORAL at 09:08

## 2023-08-25 RX ADMIN — VALPROIC ACID 500 MG: 250 SOLUTION ORAL at 09:08

## 2023-08-25 RX ADMIN — NYSTATIN 500000 UNITS: 100000 SUSPENSION ORAL at 05:08

## 2023-08-25 RX ADMIN — SENNOSIDES AND DOCUSATE SODIUM 1 TABLET: 50; 8.6 TABLET ORAL at 09:08

## 2023-08-25 RX ADMIN — NYSTATIN 500000 UNITS: 100000 SUSPENSION ORAL at 01:08

## 2023-08-25 RX ADMIN — APIXABAN 5 MG: 5 TABLET, FILM COATED ORAL at 09:08

## 2023-08-25 RX ADMIN — EZETIMIBE 10 MG: 10 TABLET ORAL at 09:08

## 2023-08-25 RX ADMIN — ASPIRIN 81 MG 81 MG: 81 TABLET ORAL at 09:08

## 2023-08-25 RX ADMIN — ACETAMINOPHEN 650 MG: 325 TABLET ORAL at 11:08

## 2023-08-25 RX ADMIN — IPRATROPIUM BROMIDE AND ALBUTEROL SULFATE 3 ML: 2.5; .5 SOLUTION RESPIRATORY (INHALATION) at 07:08

## 2023-08-25 RX ADMIN — ACETYLCYSTEINE 4 ML: 100 INHALANT RESPIRATORY (INHALATION) at 07:08

## 2023-08-25 RX ADMIN — FERROUS SULFATE TAB 325 MG (65 MG ELEMENTAL FE) 1 EACH: 325 (65 FE) TAB at 09:08

## 2023-08-25 RX ADMIN — NYSTATIN 500000 UNITS: 100000 SUSPENSION ORAL at 09:08

## 2023-08-25 RX ADMIN — OLANZAPINE 5 MG: 10 INJECTION, POWDER, LYOPHILIZED, FOR SOLUTION INTRAMUSCULAR at 07:08

## 2023-08-25 RX ADMIN — AMIODARONE HYDROCHLORIDE 200 MG: 200 TABLET ORAL at 09:08

## 2023-08-25 RX ADMIN — VALPROIC ACID 500 MG: 250 SOLUTION ORAL at 01:08

## 2023-08-25 RX ADMIN — OLANZAPINE 5 MG: 10 INJECTION, POWDER, FOR SOLUTION INTRAMUSCULAR at 02:08

## 2023-08-25 RX ADMIN — METOPROLOL TARTRATE 25 MG: 25 TABLET, FILM COATED ORAL at 09:08

## 2023-08-25 RX ADMIN — CEFTRIAXONE 1 G: 1 INJECTION, POWDER, FOR SOLUTION INTRAMUSCULAR; INTRAVENOUS at 11:08

## 2023-08-25 RX ADMIN — VALPROIC ACID 500 MG: 250 SOLUTION ORAL at 05:08

## 2023-08-25 RX ADMIN — ACETAMINOPHEN 650 MG: 325 TABLET ORAL at 04:08

## 2023-08-25 NOTE — SUBJECTIVE & OBJECTIVE
Neurologic Chief Complaint: L MCA syndrome    Subjective:     Interval History: Patient is seen for follow-up neurological assessment and treatment recommendations:     Removing restraints once again today with Depakote 500 q8h. Psych consulted, will follow recs for stabilization of agitation. Family at bedside today. Day 7/7 of Rocephin. Neuro exam stable.    HPI, Past Medical, Family, and Social History remains the same as documented in the initial encounter.     Review of Systems   Unable to perform ROS: Other   Neurological:  Positive for speech difficulty.     Scheduled Meds:   acetylcysteine 100 mg/ml (10%)  4 mL Nebulization BID    albuterol-ipratropium  3 mL Nebulization BID    [START ON 8/26/2023] amiodarone  200 mg Per G Tube Daily    apixaban  5 mg Per G Tube BID    aspirin  81 mg Per G Tube Daily    atorvastatin  80 mg Per G Tube Daily    ezetimibe  10 mg Per G Tube QHS    ferrous sulfate  1 tablet Per G Tube Daily    metoprolol tartrate  25 mg Per G Tube BID    nystatin  500,000 Units Oral QID    polyethylene glycol  17 g Per G Tube Daily    senna-docusate 8.6-50 mg  1 tablet Per G Tube BID    valproic acid (as sodium salt)  500 mg Per G Tube Q8H     Continuous Infusions:        PRN Meds:acetaminophen, bisacodyL, chlorproMAZINE, dextrose 10%, dextrose 10%, hydrALAZINE, labetalol, nitroGLYCERIN, OLANZapine, ondansetron, sodium chloride 0.9%, sodium chloride 0.9%, sodium chloride 0.9%    Objective:     Vital Signs (Most Recent):  Temp: 98.6 °F (37 °C) (08/25/23 1144)  Pulse: 106 (08/25/23 1144)  Resp: 18 (08/25/23 1144)  BP: 135/89 (08/25/23 1144)  SpO2: 98 % (08/25/23 1144)  BP Location: Right arm    Vital Signs Range (Last 24H):  Temp:  [97.6 °F (36.4 °C)-99 °F (37.2 °C)]   Pulse:  []   Resp:  [17-21]   BP: (124-180)/()   SpO2:  [95 %-98 %]   BP Location: Right arm       Physical Exam  Vitals and nursing note reviewed.   Constitutional:       General: He is not in acute distress.      "Appearance: He is well-developed.   HENT:      Head: Normocephalic and atraumatic.   Cardiovascular:      Rate and Rhythm: Normal rate.   Pulmonary:      Effort: Pulmonary effort is normal. No respiratory distress.   Abdominal:      General: There is no distension.   Skin:     General: Skin is warm and dry.   Neurological:      Comments: Aphasic, does not follow commands  L gaze              Neurological Exam:   LOC: drowsy  Attention Span: poor  Language: Global aphasia, not following commands  Articulation: Untestable due to severe aphasia   Orientation: Untestable due to severe aphasia   EOM (CN III, IV, VI): Gaze preference  left  Facial Movement (CN VII): Lower facial weakness on the Right  Motor: Spontaneously moving all extremities. Will not follow commands.  Unable to assess drift and strength as patient does not follow commands, and gets agitated      Laboratory:  CMP:   Recent Labs   Lab 08/25/23  0504   CALCIUM 9.1   ALBUMIN 2.3*   PROT 6.8      K 4.8   CO2 22*      BUN 25*   CREATININE 1.5*   ALKPHOS 133   ALT 17   AST 35   BILITOT 0.5       CBC:   Recent Labs   Lab 08/25/23  0504   WBC 12.02   RBC 4.29*   HGB 11.5*   HCT 37.8*      MCV 88   MCH 26.8*   MCHC 30.4*       Lipid Panel:   No results for input(s): "CHOL", "LDLCALC", "HDL", "TRIG" in the last 168 hours.    Coagulation:   Recent Labs   Lab 08/21/23  0553   APTT 33.4*       Platelet Aggregation Study: No results for input(s): "PLTAGG", "PLTAGINTERP", "PLTAGREGLACO", "ADPPLTAGGREG" in the last 168 hours.  Hgb A1C:   No results for input(s): "HGBA1C" in the last 168 hours.    TSH:   No results for input(s): "TSH" in the last 168 hours.      Diagnostic Results     Brain Imaging   CT Head 8/10/23  Impression:     No detrimental change compared to prior.     Evolving acute infarcts within the left MCA and PCA territories.  No hemorrhagic conversion.  No significant mass effect     Suspected small subacute infarction within the " right caudate.     Multiple areas remote infarctions as detailed above.      CT Head 8/8/23 1704  Impression:     1. Redemonstration of acute infarctions in the left MCA and PCA territories.  No evidence to suggest hemorrhagic transformation.  2. Suspected small subacute infarction in the right caudate.  3. Numerous remote infarctions as detailed above.    CT Head 8/8/23 0826  Impression:     Moderate developing hypoattenuation left inferior frontal lobe and left occipital lobe concerning for developing recent infarcts post thrombectomy.     Allowing for scattered hyperdensity related to recently contrast administration for thrombectomy there is no definite acute intracranial hemorrhage.     Previous hypodensity right caudate no longer seen which may be related to contrast administration for thrombectomy and possible subacute age infarction.     Superimposed remote infarcts with moderate to large encephalomalacia right MCA territory unchanged    Vessel Imaging   IR Angio 8/8/23  Preliminary Interpretation:   1.    No evidence of left ICA or MCA stenosis. No large or medium vessel occlusion.    CTA Multiphase 8/7/23  Impression:     CT head:     New right caudate nucleus hypodensity, likely representing age-indeterminate infarct.  Could be further evaluated with MRI.     Multifocal encephalomalacia involving the right frontal, temporoparietal, and left occipital lobes.     CTA head and neck:     Acute occlusion of the superior left M2 segment.     Left PCA occlusion of undetermined age.     Filling defect in the right main pulmonary artery, concerning for acute pulmonary thromboembolism.  Consider follow-up pulmonary CTA to more fully assess the extent of thromboemboli, the clot burden, and the presence or absence of right heart strain.     Not fully detailed above:     - Incomplete opacification of the left atrial appendage, suspicious for an intraluminal thrombus.  Follow-up echocardiogram, or cardiac MRI or CTA,  recommended.     - The presence of acute thrombi or thromboemboli in both the systemic arterial and pulmonary arterial circulation raises concern for the possibility of underlying intracardiac or extracardiac right-to-left shunt, and/or a hypercoagulable state.  Correlate clinically.     - Incompletely visualized, but possibly large, pericardial effusion.  Artifacts compromise accurate assessment of its attenuation.    Cardiac Imaging   TTE with bubble 8/8/23    Left Ventricle: The left ventricle is moderately dilated. Increased ventricular mass. Normal wall thickness. There is moderate eccentric hypertrophy. Severe global hypokinesis present. Septal motion is consistent with pacing. There is severely reduced systolic function with a visually estimated ejection fraction of 15 - 20%. Ejection fraction by visual approximation is 20%. Unable to assess diastolic function due to arrhythmia.    Left Atrium: Left atrium is severely dilated. Radha 85mL/m2.    Right Ventricle: Mild right ventricular enlargement. Wall thickness is normal. Right ventricle wall motion  is normal. Systolic function is severely reduced. Pacemaker lead present in the ventricle.    Right Atrium: Right atrium is severely dilated.    Aortic Valve: There is mild aortic valve sclerosis. There is normal leaflet mobility. There is no significant regurgitation.    Mitral Valve: There is bileaflet sclerosis. There is normal leaflet mobility. There is mild regurgitation.    Tricuspid Valve: The tricuspid valve is structurally normal. There is normal leaflet mobility. There is mild regurgitation.    Pulmonic Valve: The pulmonic valve is structurally normal. There is normal leaflet mobility. There is no significant regurgitation.    Aorta: Aortic root is normal in size measuring 3.13 cm. Ascending aorta is normal measuring 3.24 cm.    IVC/SVC: Normal venous pressure at 3 mmHg.    Pericardium: The pericardium is normal. There is no pericardial effusion.
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
## 2023-08-25 NOTE — PLAN OF CARE
Problem: Adult Inpatient Plan of Care  Goal: Plan of Care Review  Outcome: Ongoing, Progressing     Problem: Restraint, Nonbehavioral (Nonviolent)  Goal: Absence of Harm or Injury  Outcome: Ongoing, Progressing
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
## 2023-08-25 NOTE — NURSING
Restraints were removed at 1049 am this morning. Pt has been appropriate. He is currently resting, sleeping. He has not attempted to remove any medical devices thus far. Abdominal binder in place.          Pt became agitated around 1400 today pulled out his IV threw his telemetry box on the ground and attempted to get out of bed with alarm and avasys in the room. Wife was present at time of IV removal. MD was notified and came to bedside. Restraints were ordered and placed on patient.

## 2023-08-25 NOTE — ASSESSMENT & PLAN NOTE
Previously required Precedex gtt in NCC, as patient was displaying violent behavior  Discontinued soft wrist restraints  Mitts discontinued  Depakote 500mg Q8h  Zyprexa PRN for non-redirectable agitation  Will continue to deescalate restraints as patient tolerates

## 2023-08-25 NOTE — ASSESSMENT & PLAN NOTE
Admitted with ADHF and NSTEMI c/b L MCA stroke, further c/b sepsis presentation and hypotension.     DDx: Likely 2/2 to complicated UTI    -- UCx with largely pan-sensitive Klebsiella  -- CTAP wc demonstrating possible L pyelonephritis + L non-obstructive renal calculus; despite patient clinical improvement, low threshold to reach out to Urology / IR for evaluation of possibly infected calculus if patient does not clinically improve and/or decompensates  -- Previously on meropenem, downgraded to CTX for 7 day course EOT 08/25  -- Judicious IVF for volume resuscitation to euvolemia; monitor respiratory status s/o CHF

## 2023-08-25 NOTE — PLAN OF CARE
Problem: Adult Inpatient Plan of Care  Goal: Plan of Care Review  Outcome: Ongoing, Progressing  Flowsheets (Taken 2023)  Plan of Care Reviewed With:   patient   son  Goal: Optimal Comfort and Wellbeing  Outcome: Ongoing, Progressing     Problem: Diabetes Comorbidity  Goal: Blood Glucose Level Within Targeted Range  Outcome: Ongoing, Progressing     Problem: Adjustment to Illness (Stroke, Ischemic/Transient Ischemic Attack)  Goal: Optimal Coping  Outcome: Ongoing, Progressing     Problem: Cerebral Tissue Perfusion (Stroke, Ischemic/Transient Ischemic Attack)  Goal: Optimal Cerebral Tissue Perfusion  Outcome: Ongoing, Progressing  Intervention: Protect and Optimize Cerebral Perfusion  Flowsheets (Taken 2023)  Cerebral Perfusion Promotion: blood pressure monitored     Problem: Cognitive Impairment (Stroke, Ischemic/Transient Ischemic Attack)  Goal: Optimal Cognitive Function  Outcome: Ongoing, Progressing     Problem: Functional Ability Impaired (Stroke, Ischemic/Transient Ischemic Attack)  Goal: Optimal Functional Ability  Outcome: Ongoing, Progressing     Problem: Fall Injury Risk  Goal: Absence of Fall and Fall-Related Injury  Outcome: Ongoing, Progressing     Problem: Restraint, Nonbehavioral (Nonviolent)  Goal: Absence of Harm or Injury  Outcome: Ongoing, Progressing     Problem: Skin Injury Risk Increased  Goal: Skin Health and Integrity  Outcome: Ongoing, Progressing           POC updated and reviewed with the patient/son at the bedside. Questions regarding POC were encouraged and addressed. VSS, see flowsheets. Patient is globally aphasic at this time. Tele  maintained. Fall and safety precautions maintained, no signs of injury noted during shift. Soft wrist and mitt restraints in place, order due to  23 at 1632.  Patient was repositioned for comfort with bed locked in low position, side rails up x 3, bed alarm set, with call light within reach. Instructed patient to call staff  for mobility, verbalized understanding. Stroke book and education reviewed at the bedside with patient's son, see education flowsheets for details. No acute signs of distress noted at this time.

## 2023-08-25 NOTE — SUBJECTIVE & OBJECTIVE
Interval History: AF HDS. No complaints at this time. Patient resting comfortably with decreased agitation from days prior.    Review of Systems   Unable to perform ROS: Patient nonverbal     Objective:     Vital Signs (Most Recent):  Temp: 99 °F (37.2 °C) (08/25/23 0740)  Pulse: (!) 54 (08/25/23 0757)  Resp: 20 (08/25/23 0757)  BP: (!) 171/12 (08/25/23 0741)  SpO2: 95 % (08/25/23 0757) Vital Signs (24h Range):  Temp:  [97.6 °F (36.4 °C)-99 °F (37.2 °C)] 99 °F (37.2 °C)  Pulse:  [40-92] 54  Resp:  [17-21] 20  SpO2:  [95 %-98 %] 95 %  BP: (124-180)/() 171/12     Weight: 83.5 kg (184 lb 1.4 oz)  Body mass index is 24.29 kg/m².    Intake/Output Summary (Last 24 hours) at 8/25/2023 0832  Last data filed at 8/25/2023 0826  Gross per 24 hour   Intake 1450 ml   Output 150 ml   Net 1300 ml           Physical Exam  Vitals and nursing note reviewed.   Constitutional:       General: He is not in acute distress.     Appearance: Normal appearance. He is well-developed.   HENT:      Head: Normocephalic and atraumatic.   Eyes:      Extraocular Movements: Extraocular movements intact.      Pupils: Pupils are equal, round, and reactive to light.   Cardiovascular:      Rate and Rhythm: Normal rate and regular rhythm.      Pulses: Normal pulses.      Heart sounds: Normal heart sounds. No murmur heard.     No friction rub. No gallop.   Pulmonary:      Effort: Pulmonary effort is normal. No respiratory distress.      Breath sounds: No rales.   Chest:      Chest wall: No tenderness.   Abdominal:      General: Abdomen is flat. There is no distension.      Palpations: Abdomen is soft.      Tenderness: There is no abdominal tenderness. There is no guarding or rebound.   Musculoskeletal:      Cervical back: Normal range of motion and neck supple.      Right lower leg: No edema.      Left lower leg: No edema.   Skin:     General: Skin is warm and dry.   Neurological:      Mental Status: He is alert.      Comments: Aphasic, does not  follow commands  L gaze deviation, grossly intact ROM             Significant Labs: All pertinent labs within the past 24 hours have been reviewed.    Significant Imaging: I have reviewed all pertinent imaging results/findings within the past 24 hours.

## 2023-08-25 NOTE — PT/OT/SLP PROGRESS
"Speech Language Pathology Treatment    Patient Name:  Tera Griffiths   MRN:  [id number]  Admitting Diagnosis: Embolic stroke involving left middle cerebral artery    Recommendations:                 General Recommendations:  Dysphagia therapy and Speech/language therapy  Diet recommendations:  NPO, Liquid Diet Level: NPO   Aspiration Precautions: Frequent oral care and Strict aspiration precautions   General Precautions: Standard, aspiration, aphasia, fall  Communication strategies:  provide increased time to answer and go to room if call light pushed    Assessment:     Tera Griffiths is a 58 y.o. male with an SLP diagnosis of Aphasia and Dysphagia.    Subjective     "He drank some cold drink last night and it seemed to go just fine."     Pain/Comfort:  Pain Rating 1:  (unable to r/o pain as pt moaning.  Nurse alerted.)  Pain Rating Post-Intervention 1:  (no change)    Respiratory Status: Room air    Objective:     Has the patient been evaluated by SLP for swallowing?   Yes  Keep patient NPO? Yes     Pt seen bedside with family present.  Family reporting pt drinking soft drinks form family last night.  SLP reviewed rationale for npo recs and ongoing trials with SLP.  HOB raised upright from safety with PO trials.  Pt moaning t/o session.  No shaping of moaning into true words.  No response to simple y/n questions.  No commands followed.  Pt presented with thin liquids of different flavors x2, nectar thick liquid x1, and puree x1.  Pt self presenting trials.  Pharyngeal swallow not elicited.  Pt did expectorate thin trial x1/2.  Pudding noted to coat lingual surface with yawn though pt refused oral care and suction.  Education provided re: high aspiration risk in absence of observable pharyngeal swallow and ongoing SLP POC.  Family verbalized understanding.     PT reached out to SLP re: pt's wife at bedside, upset that staff is not providing PO.  SLP returned to room to discuss role of SLP, recent sessions, " rationale for npo, mechanism of swallow, aspiration risk, s/s aspiration, absent pharyngeal swallow, and POC.  Pt's wife reports pt swallowing thin liquids with her at night and plans to attempt tuna fish.  SLP reviewed risks associated with aspiration, safest PO consistencies (thin liquids) if pleasure feeds desired despite risks, and continued recs for npo pending observable safe swallow with SLP. Zandra (wife) verbalized understanding and agreement.  SLP to continue to follow. ([id number])    Goals:   Multidisciplinary Problems       SLP Goals          Problem: SLP    Goal Priority Disciplines Outcome   SLP Goal     SLP Ongoing, Progressing   Description: Speech Language Pathology Goals  Goals expected to be met by 8/15  1. Pt will participate in ongoing assessment of swallow.   2. Pt will answer simple y/n questions with 60% accy given max cues.   3. Pt will follow simple commands with 50% accy given max cues.   4. Pt will vocalize in response to any stim x5/session given max cues.   5. Pt will localize to stim on R x2/session given max cues.                              Plan:     Patient to be seen:  3 x/week   Plan of Care expires:  09/07/23  Plan of Care reviewed with:  patient, son   SLP Follow-Up:  Yes       Discharge recommendations:  nursing facility, skilled   Barriers to Discharge:  Level of Skilled Assistance Needed      Time Tracking:     SLP Treatment Date:   08/25/23  Speech Start Time:  1100  Speech Stop Time:  1115     Speech Total Time (min):  15 min + 18 mins     Billable Minutes: Treatment Swallowing Dysfunction 7 and Self Care/Home Management Training 26    08/25/2023

## 2023-08-25 NOTE — PT/OT/SLP PROGRESS
Physical Therapy Treatment    Patient Name:  Tera Griffiths   MRN:  [id number]    Recommendations:     Discharge Recommendations: nursing facility, skilled  Discharge Equipment Recommendations: to be determined by next level of care  Barriers to discharge: Inaccessible home and Decreased caregiver support    Assessment:     Tera Griffiths is a 58 y.o. male admitted with a medical diagnosis of Embolic stroke involving left middle cerebral artery.  He presents with the following impairments/functional limitations: weakness, impaired endurance, impaired self care skills, impaired functional mobility, gait instability, impaired balance, visual deficits, decreased coordination, impaired cognition, decreased safety awareness. Pt continues to moan intermittently but otherwise is nonverbal, does not follow any commands but w/ physical initiation was able to perform bed mobility w/ Gopal. Pt resisted sit to stand transfer and laid himself back down, so session ended as pt then began resisting all mobility.    Rehab Prognosis: Fair; patient would benefit from acute skilled PT services to address these deficits and reach maximum level of function.    Recent Surgery: Procedure(s) (LRB):  EGD, WITH PEG TUBE INSERTION (N/A) 8 Days Post-Op    Plan:     During this hospitalization, patient to be seen 3 x/week to address the identified rehab impairments via therapeutic activities, gait training, therapeutic exercises, neuromuscular re-education and progress toward the following goals:    Plan of Care Expires:  09/08/23    Subjective     Chief Complaint: ASHLEY nonverbal  Patient/Family Comments/goals: pt's family asking about giving pt something to drink, reminded of NPO status  Pain/Comfort:  Location 1:  (ASHLEY nonverbal)      Objective:     Communicated with RN prior to session.  Patient found HOB elevated with bed alarm, telemetry, PEG Tube, PureWick, restraints upon PT entry to room.     General Precautions: Standard, aphasia,  aspiration, fall  Orthopedic Precautions: N/A  Braces: N/A  Respiratory Status: Room air     Functional Mobility:  Bed Mobility:     Supine to Sit: minimum assistance  Sit to Supine: contact guard assistance  Transfers:     Sit to Stand:  unable 2/2 pt resisting and returning himself to supine   Balance: EOB sitting balance SBA      AM-PAC 6 CLICK MOBILITY  Turning over in bed (including adjusting bedclothes, sheets and blankets)?: 3  Sitting down on and standing up from a chair with arms (e.g., wheelchair, bedside commode, etc.): 1  Moving from lying on back to sitting on the side of the bed?: 3  Moving to and from a bed to a chair (including a wheelchair)?: 1  Need to walk in hospital room?: 1  Climbing 3-5 steps with a railing?: 1  Basic Mobility Total Score: 10       Treatment & Education:  Pt educated on PT POC/goals, d/c recs, and continued treatment. All questions answered and pt in agreement w/ POC.  Sitting balance w/ attempts to get pt to participate in putting on socks, washing face, and performing therex w/ no participation achieved    Patient left HOB elevated with all lines intact, call button in reach, bed alarm on, restraints reapplied at end of session, and RN notified..    GOALS:   Multidisciplinary Problems       Physical Therapy Goals          Problem: Physical Therapy    Goal Priority Disciplines Outcome Goal Variances Interventions   Physical Therapy Goal     PT, PT/OT Ongoing, Progressing     Description: Goals to be completed by: 9/8/23    Pt will perform sup<>sit transfers w/ minimum assistance  Pt will have sufficient dynamic balance to sit EOB while performing ADLs/therex w/ stand by assistance for 10 min  Pt will be able to stand up from EOB w/ moderate assistance using LRAD  Pt will ambulate 20 feet w/ maximal assistance using LRAD  Pt will be independent w/ HEP therex on BLE w/ good form and ROM   Pt will follow >50 % of single-step commands throughout treatment session                         Time Tracking:     PT Received On: 08/25/23  PT Start Time: 0832     PT Stop Time: 0849  PT Total Time (min): 17 min     Billable Minutes: Therapeutic Activity 17    Treatment Type: Treatment  PT/PTA: PT     Number of PTA visits since last PT visit: 0     08/25/2023

## 2023-08-25 NOTE — PT/OT/SLP PROGRESS
Occupational Therapy      Patient Name:  Tera Griffiths   MRN:  [id number]    Patient not seen today secondary to patient agitation at time of attempt. Will follow-up as appropriate.    8/25/2023

## 2023-08-26 LAB
ALBUMIN SERPL BCP-MCNC: 2.4 G/DL (ref 3.5–5.2)
ALP SERPL-CCNC: 134 U/L (ref 55–135)
ALT SERPL W/O P-5'-P-CCNC: 18 U/L (ref 10–44)
ANION GAP SERPL CALC-SCNC: 8 MMOL/L (ref 8–16)
AST SERPL-CCNC: 31 U/L (ref 10–40)
BASOPHILS # BLD AUTO: 0.02 K/UL (ref 0–0.2)
BASOPHILS NFR BLD: 0.2 % (ref 0–1.9)
BILIRUB SERPL-MCNC: 0.5 MG/DL (ref 0.1–1)
BUN SERPL-MCNC: 24 MG/DL (ref 6–20)
CALCIUM SERPL-MCNC: 9 MG/DL (ref 8.7–10.5)
CHLORIDE SERPL-SCNC: 107 MMOL/L (ref 95–110)
CO2 SERPL-SCNC: 26 MMOL/L (ref 23–29)
CREAT SERPL-MCNC: 1.6 MG/DL (ref 0.5–1.4)
DIFFERENTIAL METHOD: ABNORMAL
EOSINOPHIL # BLD AUTO: 0.2 K/UL (ref 0–0.5)
EOSINOPHIL NFR BLD: 1.7 % (ref 0–8)
ERYTHROCYTE [DISTWIDTH] IN BLOOD BY AUTOMATED COUNT: 15.6 % (ref 11.5–14.5)
EST. GFR  (NO RACE VARIABLE): 50.3 ML/MIN/1.73 M^2
GLUCOSE SERPL-MCNC: 98 MG/DL (ref 70–110)
HCT VFR BLD AUTO: 36 % (ref 40–54)
HGB BLD-MCNC: 11.1 G/DL (ref 14–18)
IMM GRANULOCYTES # BLD AUTO: 0.11 K/UL (ref 0–0.04)
IMM GRANULOCYTES NFR BLD AUTO: 1 % (ref 0–0.5)
LYMPHOCYTES # BLD AUTO: 0.8 K/UL (ref 1–4.8)
LYMPHOCYTES NFR BLD: 7.6 % (ref 18–48)
MAGNESIUM SERPL-MCNC: 2.2 MG/DL (ref 1.6–2.6)
MCH RBC QN AUTO: 27.1 PG (ref 27–31)
MCHC RBC AUTO-ENTMCNC: 30.8 G/DL (ref 32–36)
MCV RBC AUTO: 88 FL (ref 82–98)
MONOCYTES # BLD AUTO: 1 K/UL (ref 0.3–1)
MONOCYTES NFR BLD: 9.1 % (ref 4–15)
NEUTROPHILS # BLD AUTO: 8.4 K/UL (ref 1.8–7.7)
NEUTROPHILS NFR BLD: 80.4 % (ref 38–73)
NRBC BLD-RTO: 0 /100 WBC
PHOSPHATE SERPL-MCNC: 2.5 MG/DL (ref 2.7–4.5)
PLATELET # BLD AUTO: 416 K/UL (ref 150–450)
PMV BLD AUTO: 12.4 FL (ref 9.2–12.9)
POCT GLUCOSE: 106 MG/DL (ref 70–110)
POCT GLUCOSE: 114 MG/DL (ref 70–110)
POCT GLUCOSE: 53 MG/DL (ref 70–110)
POCT GLUCOSE: 55 MG/DL (ref 70–110)
POCT GLUCOSE: 56 MG/DL (ref 70–110)
POCT GLUCOSE: 62 MG/DL (ref 70–110)
POCT GLUCOSE: 82 MG/DL (ref 70–110)
POCT GLUCOSE: 95 MG/DL (ref 70–110)
POCT GLUCOSE: 96 MG/DL (ref 70–110)
POTASSIUM SERPL-SCNC: 4.4 MMOL/L (ref 3.5–5.1)
PROT SERPL-MCNC: 6.7 G/DL (ref 6–8.4)
RBC # BLD AUTO: 4.1 M/UL (ref 4.6–6.2)
SODIUM SERPL-SCNC: 141 MMOL/L (ref 136–145)
VALPROATE SERPL-MCNC: 22.4 UG/ML (ref 50–100)
WBC # BLD AUTO: 10.5 K/UL (ref 3.9–12.7)

## 2023-08-26 PROCEDURE — 25000242 PHARM REV CODE 250 ALT 637 W/ HCPCS

## 2023-08-26 PROCEDURE — 94640 AIRWAY INHALATION TREATMENT: CPT

## 2023-08-26 PROCEDURE — S0166 INJ OLANZAPINE 2.5MG: HCPCS

## 2023-08-26 PROCEDURE — 80164 ASSAY DIPROPYLACETIC ACD TOT: CPT

## 2023-08-26 PROCEDURE — 99232 SBSQ HOSP IP/OBS MODERATE 35: CPT | Mod: AF,HB,, | Performed by: PSYCHIATRY & NEUROLOGY

## 2023-08-26 PROCEDURE — 99233 PR SUBSEQUENT HOSPITAL CARE,LEVL III: ICD-10-PCS | Mod: ,,, | Performed by: PSYCHIATRY & NEUROLOGY

## 2023-08-26 PROCEDURE — 83735 ASSAY OF MAGNESIUM: CPT | Performed by: PHYSICIAN ASSISTANT

## 2023-08-26 PROCEDURE — 63600175 PHARM REV CODE 636 W HCPCS

## 2023-08-26 PROCEDURE — 36415 COLL VENOUS BLD VENIPUNCTURE: CPT | Performed by: PHYSICIAN ASSISTANT

## 2023-08-26 PROCEDURE — 99900035 HC TECH TIME PER 15 MIN (STAT)

## 2023-08-26 PROCEDURE — 63600175 PHARM REV CODE 636 W HCPCS: Mod: JZ,JG | Performed by: PSYCHIATRY & NEUROLOGY

## 2023-08-26 PROCEDURE — 25000003 PHARM REV CODE 250

## 2023-08-26 PROCEDURE — 84100 ASSAY OF PHOSPHORUS: CPT | Performed by: PHYSICIAN ASSISTANT

## 2023-08-26 PROCEDURE — 99233 SBSQ HOSP IP/OBS HIGH 50: CPT | Mod: ,,, | Performed by: PSYCHIATRY & NEUROLOGY

## 2023-08-26 PROCEDURE — 80053 COMPREHEN METABOLIC PANEL: CPT | Performed by: PHYSICIAN ASSISTANT

## 2023-08-26 PROCEDURE — 99232 PR SUBSEQUENT HOSPITAL CARE,LEVL II: ICD-10-PCS | Mod: AF,HB,, | Performed by: PSYCHIATRY & NEUROLOGY

## 2023-08-26 PROCEDURE — 25000003 PHARM REV CODE 250: Performed by: NURSE PRACTITIONER

## 2023-08-26 PROCEDURE — 25000242 PHARM REV CODE 250 ALT 637 W/ HCPCS: Performed by: STUDENT IN AN ORGANIZED HEALTH CARE EDUCATION/TRAINING PROGRAM

## 2023-08-26 PROCEDURE — 94761 N-INVAS EAR/PLS OXIMETRY MLT: CPT

## 2023-08-26 PROCEDURE — 85025 COMPLETE CBC W/AUTO DIFF WBC: CPT | Performed by: PHYSICIAN ASSISTANT

## 2023-08-26 PROCEDURE — 11000001 HC ACUTE MED/SURG PRIVATE ROOM

## 2023-08-26 RX ORDER — OLANZAPINE 10 MG/2ML
5 INJECTION, POWDER, FOR SOLUTION INTRAMUSCULAR EVERY 8 HOURS PRN
Status: DISCONTINUED | OUTPATIENT
Start: 2023-08-26 | End: 2023-09-07 | Stop reason: HOSPADM

## 2023-08-26 RX ORDER — ACETYLCYSTEINE 100 MG/ML
4 SOLUTION ORAL; RESPIRATORY (INHALATION) ONCE
Status: COMPLETED | OUTPATIENT
Start: 2023-08-26 | End: 2023-08-26

## 2023-08-26 RX ORDER — IPRATROPIUM BROMIDE AND ALBUTEROL SULFATE 2.5; .5 MG/3ML; MG/3ML
3 SOLUTION RESPIRATORY (INHALATION) ONCE
Status: COMPLETED | OUTPATIENT
Start: 2023-08-26 | End: 2023-08-26

## 2023-08-26 RX ORDER — GLUCAGON 1 MG
1 KIT INJECTION
Status: DISCONTINUED | OUTPATIENT
Start: 2023-08-26 | End: 2023-09-07 | Stop reason: HOSPADM

## 2023-08-26 RX ADMIN — NYSTATIN 500000 UNITS: 100000 SUSPENSION ORAL at 09:08

## 2023-08-26 RX ADMIN — ACETAMINOPHEN 650 MG: 325 TABLET ORAL at 02:08

## 2023-08-26 RX ADMIN — IPRATROPIUM BROMIDE AND ALBUTEROL SULFATE 3 ML: 2.5; .5 SOLUTION RESPIRATORY (INHALATION) at 08:08

## 2023-08-26 RX ADMIN — APIXABAN 5 MG: 5 TABLET, FILM COATED ORAL at 09:08

## 2023-08-26 RX ADMIN — GLUCAGON 1 MG: KIT at 06:08

## 2023-08-26 RX ADMIN — IPRATROPIUM BROMIDE AND ALBUTEROL SULFATE 3 ML: 2.5; .5 SOLUTION RESPIRATORY (INHALATION) at 09:08

## 2023-08-26 RX ADMIN — OLANZAPINE 5 MG: 10 INJECTION, POWDER, LYOPHILIZED, FOR SOLUTION INTRAMUSCULAR at 06:08

## 2023-08-26 RX ADMIN — ACETYLCYSTEINE 4 ML: 100 INHALANT RESPIRATORY (INHALATION) at 09:08

## 2023-08-26 RX ADMIN — VALPROIC ACID 500 MG: 250 SOLUTION ORAL at 10:08

## 2023-08-26 RX ADMIN — EZETIMIBE 10 MG: 10 TABLET ORAL at 09:08

## 2023-08-26 RX ADMIN — ACETAMINOPHEN 650 MG: 325 TABLET ORAL at 11:08

## 2023-08-26 RX ADMIN — ACETYLCYSTEINE 4 ML: 100 INHALANT RESPIRATORY (INHALATION) at 01:08

## 2023-08-26 RX ADMIN — ACETYLCYSTEINE 4 ML: 100 INHALANT RESPIRATORY (INHALATION) at 08:08

## 2023-08-26 RX ADMIN — METOPROLOL TARTRATE 25 MG: 25 TABLET, FILM COATED ORAL at 09:08

## 2023-08-26 RX ADMIN — NYSTATIN 500000 UNITS: 100000 SUSPENSION ORAL at 01:08

## 2023-08-26 RX ADMIN — SENNOSIDES AND DOCUSATE SODIUM 1 TABLET: 50; 8.6 TABLET ORAL at 09:08

## 2023-08-26 RX ADMIN — GLUCAGON 1 MG: KIT at 07:08

## 2023-08-26 RX ADMIN — IPRATROPIUM BROMIDE AND ALBUTEROL SULFATE 3 ML: .5; 3 SOLUTION RESPIRATORY (INHALATION) at 01:08

## 2023-08-26 RX ADMIN — HYDRALAZINE HYDROCHLORIDE 10 MG: 20 INJECTION INTRAMUSCULAR; INTRAVENOUS at 02:08

## 2023-08-26 NOTE — ASSESSMENT & PLAN NOTE
Due to stroke  SLP to evaluate and treat  s/p PEG placement    PEG tube pulled out 8/26/23, General Surgery will replace

## 2023-08-26 NOTE — SUBJECTIVE & OBJECTIVE
Neurologic Chief Complaint: L MCA syndrome    Subjective:     Interval History: Patient is seen for follow-up neurological assessment and treatment recommendations: Pt became agitated and removed PEG tube, general surgery consulted to replaced PEG tube. Now in 4 point restraints. Will continue to work on management of delirium and agitation.    HPI, Past Medical, Family, and Social History remains the same as documented in the initial encounter.     Review of Systems   Unable to perform ROS: Other   Neurological:  Positive for speech difficulty.     Scheduled Meds:   acetylcysteine 100 mg/ml (10%)  4 mL Nebulization BID    albuterol-ipratropium  3 mL Nebulization BID    amiodarone  200 mg Per G Tube Daily    apixaban  5 mg Per G Tube BID    aspirin  81 mg Per G Tube Daily    atorvastatin  80 mg Per G Tube Daily    ezetimibe  10 mg Per G Tube QHS    ferrous sulfate  1 tablet Per G Tube Daily    metoprolol tartrate  25 mg Per G Tube BID    nystatin  500,000 Units Oral QID    polyethylene glycol  17 g Per G Tube Daily    senna-docusate 8.6-50 mg  1 tablet Per G Tube BID    valproic acid (as sodium salt)  500 mg Per G Tube Q8H     Continuous Infusions:        PRN Meds:acetaminophen, bisacodyL, chlorproMAZINE, dextrose 10%, dextrose 10%, diatrizoate meglumineand-diatrizoate sodium, hydrALAZINE, labetalol, nitroGLYCERIN, OLANZapine, ondansetron, sodium chloride 0.9%, sodium chloride 0.9%, sodium chloride 0.9%    Objective:     Vital Signs (Most Recent):  Temp: 96.6 °F (35.9 °C) (08/26/23 0828)  Pulse: (!) 118 (08/26/23 0838)  Resp: 20 (08/26/23 0838)  BP: (!) 167/79 (08/26/23 0828)  SpO2: 96 % (08/26/23 0838)  BP Location: Left arm    Vital Signs Range (Last 24H):  Temp:  [96.6 °F (35.9 °C)-99.5 °F (37.5 °C)]   Pulse:  []   Resp:  [17-21]   BP: (131-176)/()   SpO2:  [95 %-100 %]   BP Location: Left arm       Physical Exam  Vitals and nursing note reviewed.   Constitutional:       General: He is not in acute  "distress.     Appearance: He is well-developed.   HENT:      Head: Normocephalic and atraumatic.   Cardiovascular:      Rate and Rhythm: Normal rate.   Pulmonary:      Effort: Pulmonary effort is normal. No respiratory distress.   Abdominal:      General: There is no distension.   Skin:     General: Skin is warm and dry.   Neurological:      Comments: Aphasic, does not follow commands  L gaze              Neurological Exam:   LOC: drowsy  Attention Span: poor  Language: Global aphasia, not following commands  Articulation: Untestable due to severe aphasia   Orientation: Untestable due to severe aphasia   EOM (CN III, IV, VI): Gaze preference  left  Facial Movement (CN VII): Lower facial weakness on the Right  Motor: Spontaneously moving all extremities. Will not follow commands.  Unable to assess drift and strength as patient does not follow commands, and gets agitated      Laboratory:  CMP:   Recent Labs   Lab 08/26/23  0230   CALCIUM 9.0   ALBUMIN 2.4*   PROT 6.7      K 4.4   CO2 26      BUN 24*   CREATININE 1.6*   ALKPHOS 134   ALT 18   AST 31   BILITOT 0.5       CBC:   Recent Labs   Lab 08/26/23  0231   WBC 10.50   RBC 4.10*   HGB 11.1*   HCT 36.0*      MCV 88   MCH 27.1   MCHC 30.8*       Lipid Panel:   No results for input(s): "CHOL", "LDLCALC", "HDL", "TRIG" in the last 168 hours.    Coagulation:   Recent Labs   Lab 08/21/23  0553   APTT 33.4*       Platelet Aggregation Study: No results for input(s): "PLTAGG", "PLTAGINTERP", "PLTAGREGLACO", "ADPPLTAGGREG" in the last 168 hours.  Hgb A1C:   No results for input(s): "HGBA1C" in the last 168 hours.    TSH:   No results for input(s): "TSH" in the last 168 hours.      Diagnostic Results     Brain Imaging   CT Head 8/10/23  Impression:     No detrimental change compared to prior.     Evolving acute infarcts within the left MCA and PCA territories.  No hemorrhagic conversion.  No significant mass effect     Suspected small subacute infarction " within the right caudate.     Multiple areas remote infarctions as detailed above.      CT Head 8/8/23 1704  Impression:     1. Redemonstration of acute infarctions in the left MCA and PCA territories.  No evidence to suggest hemorrhagic transformation.  2. Suspected small subacute infarction in the right caudate.  3. Numerous remote infarctions as detailed above.    CT Head 8/8/23 0826  Impression:     Moderate developing hypoattenuation left inferior frontal lobe and left occipital lobe concerning for developing recent infarcts post thrombectomy.     Allowing for scattered hyperdensity related to recently contrast administration for thrombectomy there is no definite acute intracranial hemorrhage.     Previous hypodensity right caudate no longer seen which may be related to contrast administration for thrombectomy and possible subacute age infarction.     Superimposed remote infarcts with moderate to large encephalomalacia right MCA territory unchanged    Vessel Imaging   IR Angio 8/8/23  Preliminary Interpretation:   1.    No evidence of left ICA or MCA stenosis. No large or medium vessel occlusion.    CTA Multiphase 8/7/23  Impression:     CT head:     New right caudate nucleus hypodensity, likely representing age-indeterminate infarct.  Could be further evaluated with MRI.     Multifocal encephalomalacia involving the right frontal, temporoparietal, and left occipital lobes.     CTA head and neck:     Acute occlusion of the superior left M2 segment.     Left PCA occlusion of undetermined age.     Filling defect in the right main pulmonary artery, concerning for acute pulmonary thromboembolism.  Consider follow-up pulmonary CTA to more fully assess the extent of thromboemboli, the clot burden, and the presence or absence of right heart strain.     Not fully detailed above:     - Incomplete opacification of the left atrial appendage, suspicious for an intraluminal thrombus.  Follow-up echocardiogram, or cardiac  MRI or CTA, recommended.     - The presence of acute thrombi or thromboemboli in both the systemic arterial and pulmonary arterial circulation raises concern for the possibility of underlying intracardiac or extracardiac right-to-left shunt, and/or a hypercoagulable state.  Correlate clinically.     - Incompletely visualized, but possibly large, pericardial effusion.  Artifacts compromise accurate assessment of its attenuation.    Cardiac Imaging   TTE with bubble 8/8/23    Left Ventricle: The left ventricle is moderately dilated. Increased ventricular mass. Normal wall thickness. There is moderate eccentric hypertrophy. Severe global hypokinesis present. Septal motion is consistent with pacing. There is severely reduced systolic function with a visually estimated ejection fraction of 15 - 20%. Ejection fraction by visual approximation is 20%. Unable to assess diastolic function due to arrhythmia.    Left Atrium: Left atrium is severely dilated. Radha 85mL/m2.    Right Ventricle: Mild right ventricular enlargement. Wall thickness is normal. Right ventricle wall motion  is normal. Systolic function is severely reduced. Pacemaker lead present in the ventricle.    Right Atrium: Right atrium is severely dilated.    Aortic Valve: There is mild aortic valve sclerosis. There is normal leaflet mobility. There is no significant regurgitation.    Mitral Valve: There is bileaflet sclerosis. There is normal leaflet mobility. There is mild regurgitation.    Tricuspid Valve: The tricuspid valve is structurally normal. There is normal leaflet mobility. There is mild regurgitation.    Pulmonic Valve: The pulmonic valve is structurally normal. There is normal leaflet mobility. There is no significant regurgitation.    Aorta: Aortic root is normal in size measuring 3.13 cm. Ascending aorta is normal measuring 3.24 cm.    IVC/SVC: Normal venous pressure at 3 mmHg.    Pericardium: The pericardium is normal. There is no pericardial  effusion.

## 2023-08-26 NOTE — ASSESSMENT & PLAN NOTE
Admitted with ADHF and NSTEMI c/b L MCA stroke, further c/b sepsis presentation and hypotension.     DDx: Likely 2/2 to complicated UTI    -- UCx with largely pan-sensitive Klebsiella  -- CTAP wc demonstrating possible L pyelonephritis + L non-obstructive renal calculus; despite patient clinical improvement, low threshold to reach out to Urology / IR for evaluation of possibly infected calculus if patient does not clinically improve and/or decompensates  -- S/P completed antibiotic 7 day course EOT 08/25 for complicated UTI  -- Judicious IVF for volume resuscitation to euvolemia; monitor respiratory status s/o CHF

## 2023-08-26 NOTE — CARE UPDATE
General Surgery    Consult Acknowledged. Full note to follow. Will perform bedside tube study when contrast is delivered to ensure new catheter is in the stomach.     Jeramie Chavira MD  General Surgery PGY-4  08/26/2023

## 2023-08-26 NOTE — PLAN OF CARE
Problem: Adult Inpatient Plan of Care  Goal: Plan of Care Review  Outcome: Ongoing, Progressing  Goal: Patient-Specific Goal (Individualized)  Outcome: Ongoing, Progressing  Goal: Absence of Hospital-Acquired Illness or Injury  Outcome: Ongoing, Progressing  Goal: Optimal Comfort and Wellbeing  Outcome: Ongoing, Progressing  Goal: Readiness for Transition of Care  Outcome: Ongoing, Progressing     Problem: Diabetes Comorbidity  Goal: Blood Glucose Level Within Targeted Range  Outcome: Ongoing, Progressing     Problem: Infection  Goal: Absence of Infection Signs and Symptoms  Outcome: Ongoing, Progressing     Problem: Adjustment to Illness (Stroke, Ischemic/Transient Ischemic Attack)  Goal: Optimal Coping  Outcome: Ongoing, Progressing     Problem: Cognitive Impairment (Stroke, Ischemic/Transient Ischemic Attack)  Goal: Optimal Cognitive Function  Outcome: Ongoing, Progressing     Problem: Communication Impairment (Stroke, Ischemic/Transient Ischemic Attack)  Goal: Improved Communication Skills  Outcome: Ongoing, Progressing     Problem: Functional Ability Impaired (Stroke, Ischemic/Transient Ischemic Attack)  Goal: Optimal Functional Ability  Outcome: Ongoing, Progressing     Problem: Fall Injury Risk  Goal: Absence of Fall and Fall-Related Injury  Outcome: Ongoing, Progressing     Problem: Restraint, Nonbehavioral (Nonviolent)  Goal: Absence of Harm or Injury  Outcome: Ongoing, Progressing     Problem: Skin Injury Risk Increased  Goal: Skin Health and Integrity  Outcome: Ongoing, Progressing     Problem: Glycemic Control Impaired (Sepsis/Septic Shock)  Goal: Blood Glucose Level Within Desired Range  Outcome: Ongoing, Progressing
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
## 2023-08-26 NOTE — PROGRESS NOTES
"CONSULTATION LIAISON PSYCHIATRY PROGRESS NOTE    Patient Name: Tera Griffiths  MRN: [id number]  Patient Class: IP- Inpatient  Admission Date: 8/5/2023  Attending Physician: Morris Shea MD      SUBJECTIVE:   Tera Griffiths is a 58 y.o. male with no known past psychiatric history of and pertinent past medical history of CHF, CAD, AF, HTN, and CKD who was admitted 8/5 for CHF exacerbation. Hospital course c/b L MCA embolic stroke.      Psychiatry consulted for "currently in UE restraints due to pulling out lines/PEG. consult to psych to seek help for nonredirectable agitation. trying to avoid physical restraints"    This morning, patient pulled out PEG tube while in restraints (missed two scheduled Depakene doses today as a consequence) then receive PRN Zyprexa IM ~0645hrs. Pt was seen in 4-pt restraints in bed asleep this morning. Unable to wake for interview due to recent PRN.     OBJECTIVE:    Mental Status Exam:  General Appearance: appears stated age, dressed in hospital garb  Behavior:  sleeping through attempts to initiate interview.  Involuntary Movements and Motor Activity: no abnormal involuntary movements noted; no tics, no tremors, no akathisia, no dystonia, no evidence of tardive dyskinesia; no psychomotor agitation or retardation  Gait and Station: unable to assess - patient lying down or seated  Speech and Language:  unable to assess  Mood: unable to assess  Affect:  unable to assess  Thought Process and Associations: Unable to Assess  Thought Content and Perceptions:: Unable to Assess  Sensorium and Orientation: Unable to Formally Assess  Recent and Remote Memory: Unable to  Formally Assess  Attention and Concentration: Unable to Formally Assess  Fund of Knowledge: Unable to Formally Assess  Insight:  unable to assess  Judgment:  unable to assess    CAM ICU positive? unable to assess      ASSESSMENT & RECOMMENDATIONS   Delirium 2/2 underlying medical etiologies    Depakene 500mg TID  PRN " Zyprexa 5mg IM  Rec to obtain updated EKG (last 8/19 w/ 107bpm w/ Qtc 517ms), allan prior to starting scheduled antipsychotics.    DELIRIUM  DELIRIUM BEHAVIOR MANAGEMENT  As a reminder, changes in mentation & attention with either disorganized thinking, easy distractibility, and fluctuating level of consciousness are commonly observed with delirium.  Please utilize chemical restraints with PRN meds for agitation 1st. If needed for immediate safety reasons, can additionally utilize physical restraints. However, please avoid use of physical restraints, or have periods of patient being out of physical restraints if possible (e.g. while sleeping) as excessive use can promote rebound worsening of delirium/agitation.  Keep window shades open and room lit during day and room dim at night in order to promote normal sleep-wake cycles.  Encourage family at bedside. [place] patient often to situation, location, date.  Recommend to discourage Narcotics, Benzos and Anti-cholinergic medication use as they commonly promote and worsen delirium. If aforementioned medications need to be resumed for optimal patient outcomes after careful consideration, please exercise judicious use with clear documentation.  Please continue medical workup for causative etiology of delirium.     RISK ASSESSMENT  NO NEED FOR PEC, UNCONTESTED    FOLLOW UP  Will follow up while in house    DISPOSITION - once medically cleared:  Defer to medical team    Please contact ON CALL psychiatry service (24/7) for any acute issues that may arise.    Dr. James Kelyl   Psychiatry  [facility]  8/27/2023 8:41 AM      --------------------------------------------------------------------------------------------------------------------------------------------------------------------------------------------------------------------------------------    CONTINUED OBJECTIVE clinical data & findings reviewed and noted for above decision making    Current  Medications:   Scheduled Meds:    acetylcysteine 100 mg/ml (10%)  4 mL Nebulization BID    albuterol-ipratropium  3 mL Nebulization BID    amiodarone  200 mg Per G Tube Daily    apixaban  5 mg Per G Tube BID    aspirin  81 mg Per G Tube Daily    atorvastatin  80 mg Per G Tube Daily    ezetimibe  10 mg Per G Tube QHS    ferrous sulfate  1 tablet Per G Tube Daily    metoprolol tartrate  25 mg Per G Tube BID    nystatin  500,000 Units Oral QID    polyethylene glycol  17 g Per G Tube Daily    senna-docusate 8.6-50 mg  1 tablet Per G Tube BID    valproic acid (as sodium salt)  500 mg Per G Tube Q8H     PRN Meds: acetaminophen, bisacodyL, chlorproMAZINE, dextrose 10%, dextrose 10%, diatrizoate meglumineand-diatrizoate sodium, glucagon (human recombinant), hydrALAZINE, labetalol, nitroGLYCERIN, OLANZapine, ondansetron, sodium chloride 0.9%, sodium chloride 0.9%, sodium chloride 0.9%    Allergies:   Review of patient's allergies indicates:  No Known Allergies    Vitals  Vitals:    08/27/23 0028   BP: (!) 166/100   Pulse: 92   Resp: 16   Temp: 96.2 °F (35.7 °C)       Labs/Imaging/Studies:  Recent Results (from the past 24 hour(s))   Comprehensive metabolic panel    Collection Time: 08/26/23  2:30 AM   Result Value Ref Range    Sodium 141 136 - 145 mmol/L    Potassium 4.4 3.5 - 5.1 mmol/L    Chloride 107 95 - 110 mmol/L    CO2 26 23 - 29 mmol/L    Glucose 98 70 - 110 mg/dL    BUN 24 (H) 6 - 20 mg/dL    Creatinine 1.6 (H) 0.5 - 1.4 mg/dL    Calcium 9.0 8.7 - 10.5 mg/dL    Total Protein 6.7 6.0 - 8.4 g/dL    Albumin 2.4 (L) 3.5 - 5.2 g/dL    Total Bilirubin 0.5 0.1 - 1.0 mg/dL    Alkaline Phosphatase 134 55 - 135 U/L    AST 31 10 - 40 U/L    ALT 18 10 - 44 U/L    eGFR 50.3 (A) >60 mL/min/1.73 m^2    Anion Gap 8 8 - 16 mmol/L   Magnesium    Collection Time: 08/26/23  2:30 AM   Result Value Ref Range    Magnesium 2.2 1.6 - 2.6 mg/dL   Phosphorus    Collection Time: 08/26/23  2:30 AM   Result Value Ref Range    Phosphorus 2.5 (L)  2.7 - 4.5 mg/dL   Valproic Acid    Collection Time: 08/26/23  2:30 AM   Result Value Ref Range    Valproic Acid Level 22.4 (L) 50.0 - 100.0 ug/mL   CBC auto differential    Collection Time: 08/26/23  2:31 AM   Result Value Ref Range    WBC 10.50 3.90 - 12.70 K/uL    RBC 4.10 (L) 4.60 - 6.20 M/uL    Hemoglobin 11.1 (L) 14.0 - 18.0 g/dL    Hematocrit 36.0 (L) 40.0 - 54.0 %    MCV 88 82 - 98 fL    MCH 27.1 27.0 - 31.0 pg    MCHC 30.8 (L) 32.0 - 36.0 g/dL    RDW 15.6 (H) 11.5 - 14.5 %    Platelets 416 150 - 450 K/uL    MPV 12.4 9.2 - 12.9 fL    Immature Granulocytes 1.0 (H) 0.0 - 0.5 %    Gran # (ANC) 8.4 (H) 1.8 - 7.7 K/uL    Immature Grans (Abs) 0.11 (H) 0.00 - 0.04 K/uL    Lymph # 0.8 (L) 1.0 - 4.8 K/uL    Mono # 1.0 0.3 - 1.0 K/uL    Eos # 0.2 0.0 - 0.5 K/uL    Baso # 0.02 0.00 - 0.20 K/uL    nRBC 0 0 /100 WBC    Gran % 80.4 (H) 38.0 - 73.0 %    Lymph % 7.6 (L) 18.0 - 48.0 %    Mono % 9.1 4.0 - 15.0 %    Eosinophil % 1.7 0.0 - 8.0 %    Basophil % 0.2 0.0 - 1.9 %    Differential Method Automated    POCT glucose    Collection Time: 08/26/23  6:47 AM   Result Value Ref Range    POCT Glucose 95 70 - 110 mg/dL   POCT glucose    Collection Time: 08/26/23  6:33 PM   Result Value Ref Range    POCT Glucose 55 (L) 70 - 110 mg/dL   POCT glucose    Collection Time: 08/26/23  7:03 PM   Result Value Ref Range    POCT Glucose 56 (L) 70 - 110 mg/dL   POCT glucose    Collection Time: 08/26/23  7:10 PM   Result Value Ref Range    POCT Glucose 62 (L) 70 - 110 mg/dL   POCT glucose    Collection Time: 08/26/23  7:32 PM   Result Value Ref Range    POCT Glucose 53 (L) 70 - 110 mg/dL   POCT glucose    Collection Time: 08/26/23  8:01 PM   Result Value Ref Range    POCT Glucose 82 70 - 110 mg/dL   POCT glucose    Collection Time: 08/26/23  9:31 PM   Result Value Ref Range    POCT Glucose 106 70 - 110 mg/dL   POCT glucose    Collection Time: 08/27/23 12:30 AM   Result Value Ref Range    POCT Glucose 91 70 - 110 mg/dL     Imaging Results               X-Ray Chest AP Portable (Final result)  Result time 08/05/23 14:46:03      Final result by Leandro Ruffin MD (08/05/23 14:46:03)                   Impression:      1. Central hilar findings suggest congestive change/edema.  No large focal consolidation.      Electronically signed by: Leandro Ruffin MD  Date:    08/05/2023  Time:    14:46               Narrative:    EXAMINATION:  XR CHEST AP PORTABLE    CLINICAL HISTORY:  Chest Pain;    TECHNIQUE:  Single frontal view of the chest was performed.    COMPARISON:  07/13/2023    FINDINGS:  The cardiomediastinal silhouette is prominent, similar to the previous exam noting left chest wall pacer..  There is no pleural effusion.  The trachea is midline.  The lungs are symmetrically expanded bilaterally with coarse central hilar interstitial attenuation.  No large focal consolidation seen.  There is no pneumothorax.  The osseous structures are remarkable for degenerative change..

## 2023-08-26 NOTE — ASSESSMENT & PLAN NOTE
Previously required Precedex gtt in NCC, as patient was displaying violent behavior. Pulling out lines and PEG tube.  Discontinued soft wrist restraints  Mitts discontinued  Depakote 500mg Q8h  Zyprexa PRN for non-redirectable agitation  Will continue to deescalate restraints as patient tolerates

## 2023-08-26 NOTE — SUBJECTIVE & OBJECTIVE
Interval History: AF HDS. Intermittently agitated last night and this morning, now in restraints per primary. Completed UTI antibiotics yesterday.    Review of Systems   Unable to perform ROS: Patient nonverbal     Objective:     Vital Signs (Most Recent):  Temp: 96.6 °F (35.9 °C) (08/26/23 0828)  Pulse: (!) 118 (08/26/23 0838)  Resp: 20 (08/26/23 0838)  BP: (!) 167/79 (08/26/23 0828)  SpO2: 96 % (08/26/23 0838) Vital Signs (24h Range):  Temp:  [96.6 °F (35.9 °C)-99.5 °F (37.5 °C)] 96.6 °F (35.9 °C)  Pulse:  [] 118  Resp:  [17-21] 20  SpO2:  [95 %-100 %] 96 %  BP: (131-176)/() 167/79     Weight: 83.5 kg (184 lb 1.4 oz)  Body mass index is 24.29 kg/m².    Intake/Output Summary (Last 24 hours) at 8/26/2023 0854  Last data filed at 8/26/2023 0722  Gross per 24 hour   Intake 6968.01 ml   Output 350 ml   Net 6618.01 ml           Physical Exam  Vitals and nursing note reviewed.   Constitutional:       General: He is not in acute distress.     Appearance: Normal appearance. He is well-developed.   HENT:      Head: Normocephalic and atraumatic.   Eyes:      Extraocular Movements: Extraocular movements intact.      Pupils: Pupils are equal, round, and reactive to light.   Cardiovascular:      Rate and Rhythm: Normal rate and regular rhythm.      Pulses: Normal pulses.      Heart sounds: Normal heart sounds. No murmur heard.     No friction rub. No gallop.   Pulmonary:      Effort: Pulmonary effort is normal. No respiratory distress.      Breath sounds: No rales.   Chest:      Chest wall: No tenderness.   Abdominal:      General: Abdomen is flat. There is no distension.      Palpations: Abdomen is soft.      Tenderness: There is no abdominal tenderness. There is no guarding or rebound.   Musculoskeletal:      Cervical back: Normal range of motion and neck supple.      Right lower leg: No edema.      Left lower leg: No edema.   Skin:     General: Skin is warm and dry.   Neurological:      Mental Status: He is  alert.      Comments: Aphasic, does not follow commands  L gaze deviation, grossly intact ROM             Significant Labs: All pertinent labs within the past 24 hours have been reviewed.    Significant Imaging: I have reviewed all pertinent imaging results/findings within the past 24 hours.

## 2023-08-26 NOTE — CONSULTS
General Surgery    Reason for Consult: Dislodged PEG    History of Present Illness:  58 y.o. male with atrial fibrillation (on Apixban), CAD (associated with STEMI in 08/2021 and NTSEMI in 04/2023; status post PCI in 08/2021; on PLAVIX), combined CHF (EF 10% in 07/2023; status post ICD placement; associated with pulmonary hypertension), and HTN. Patient admitted to  08/05 for CHF exacerbation. Hospital course complicated by Stroke requiring PEG placement on 8/17/23.    This morning, the patient inadvertently removed his PEG tube. A monk was placed by nursing. General Surgery was consulted for evaluation    Allergies: NKDA  PMHx: As Above    Past Surgical History:   Procedure Laterality Date    CARDIAC DEFIBRILLATOR PLACEMENT Left      CEREBRAL ANGIOGRAM N/A 8/8/2023     Procedure: ANGIOGRAM-CEREBRAL;  Surgeon: Kenzie Rodriguez;  Location: [facility];  Service: Anesthesiology;  Laterality: N/A;  LVO (angiogram)    ESOPHAGOGASTRODUODENOSCOPY N/A 8/11/2023     Procedure: EGD (ESOPHAGOGASTRODUODENOSCOPY);  Surgeon: Colette Martinez MD;  Location: [facility] ENDO (2ND FLR);  Service: Gastroenterology;  Laterality: N/A;    HERNIA REPAIR        LEFT HEART CATHETERIZATION Left 4/18/2023     Procedure: Left heart cath;  Surgeon: Atilio Marks MD;  Location: [facility] CATH LAB;  Service: Cardiology;  Laterality: Left;    RIGHT HEART CATHETERIZATION Right 9/30/2022     Procedure: INSERTION, CATHETER, RIGHT HEART;  Surgeon: Caden Aden MD;  Location: [facility] CATH LAB;  Service: Cardiology;  Laterality: Right;    TREATMENT OF CARDIAC ARRHYTHMIA N/A 11/22/2022     Procedure: CARDIOVERSION;  Surgeon: Marvin Sanon MD;  Location: [facility] EP LAB;  Service: Cardiology;  Laterality: N/A;  afib, KIA, DCCV, anes, MB, 3 Prep       Review of Systems   Unable to perform ROS: Patient nonverbal        Vital Signs (Most Recent)  Temp: 99.2 °F (37.3 °C) (08/26/23 1119)  Pulse: (!) 119 (08/26/23 1119)  Resp: 17 (08/26/23 1119)  BP: (!) 159/118  "(08/26/23 1119)  SpO2: 98 % (08/26/23 1119)    Physical Exam   Constitutional: No distress.   Eyes: Pupils are equal, round, and reactive to light. Conjunctivae are normal.   Cardiovascular: Regular rhythm. Tachycardia present. Pulmonary:      Effort: Pulmonary effort is normal.      Breath sounds: Normal breath sounds.     Abdominal:   Soft, nondistended, nontender. Monk in place at prior PEG site   Musculoskeletal:      Cervical back: Normal range of motion.   Neurological: He is alert.   Skin: Skin is warm and dry.   Vitals reviewed.       Labs- Reviewed  Imaging- Reviewed    Assessment and Plan:  58 y.o. male w/ dislodgement of his PEG tube that was placed on 8/17/23. This was immediately replaced with a monk by nursing.     -Bedside tube study performed by administering 50 cc of grastrograffin through the catheter. Xray demonstrated contrast in the GI system with no extravasation.  -Catheter secured to the skin.  -OK to use this catheter for tube feeds. No need for further catheter exchange or "PEG replacement" as long as this catheter remains functional.    Jeramie Chavira MD  General Surgery PGY-4  08/26/2023       "

## 2023-08-26 NOTE — CARE UPDATE
6:41 AM  PEG was pulled while in restraints. Nursing instructed to place monk. General Surgery consulted.

## 2023-08-26 NOTE — PLAN OF CARE
Problem: Adult Inpatient Plan of Care  Goal: Plan of Care Review  Outcome: Ongoing, Progressing  Goal: Patient-Specific Goal (Individualized)  Outcome: Ongoing, Progressing  Goal: Absence of Hospital-Acquired Illness or Injury  Outcome: Ongoing, Progressing  Goal: Optimal Comfort and Wellbeing  Outcome: Ongoing, Progressing  Goal: Readiness for Transition of Care  Outcome: Ongoing, Progressing   POC and meds reviewed , patient remains disoriented , slept half of shift.Feeding per pegtube resumed.4 points restrains maintained due to agitation per moments.Bed is in low position,bed rails up X2,bed alarm on,monitoring camera in progress.Will continue to monitor.
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
## 2023-08-27 LAB
ALBUMIN SERPL BCP-MCNC: 2.6 G/DL (ref 3.5–5.2)
ALP SERPL-CCNC: 148 U/L (ref 55–135)
ALT SERPL W/O P-5'-P-CCNC: 20 U/L (ref 10–44)
ANION GAP SERPL CALC-SCNC: 11 MMOL/L (ref 8–16)
AST SERPL-CCNC: 42 U/L (ref 10–40)
BASOPHILS # BLD AUTO: 0.02 K/UL (ref 0–0.2)
BASOPHILS NFR BLD: 0.2 % (ref 0–1.9)
BILIRUB SERPL-MCNC: 0.7 MG/DL (ref 0.1–1)
BUN SERPL-MCNC: 24 MG/DL (ref 6–20)
CALCIUM SERPL-MCNC: 9.3 MG/DL (ref 8.7–10.5)
CHLORIDE SERPL-SCNC: 104 MMOL/L (ref 95–110)
CO2 SERPL-SCNC: 24 MMOL/L (ref 23–29)
CREAT SERPL-MCNC: 1.5 MG/DL (ref 0.5–1.4)
DIFFERENTIAL METHOD: ABNORMAL
EOSINOPHIL # BLD AUTO: 0.2 K/UL (ref 0–0.5)
EOSINOPHIL NFR BLD: 2.4 % (ref 0–8)
ERYTHROCYTE [DISTWIDTH] IN BLOOD BY AUTOMATED COUNT: 15.9 % (ref 11.5–14.5)
EST. GFR  (NO RACE VARIABLE): 54.3 ML/MIN/1.73 M^2
GLUCOSE SERPL-MCNC: 94 MG/DL (ref 70–110)
HCT VFR BLD AUTO: 37.1 % (ref 40–54)
HGB BLD-MCNC: 11.2 G/DL (ref 14–18)
IMM GRANULOCYTES # BLD AUTO: 0.06 K/UL (ref 0–0.04)
IMM GRANULOCYTES NFR BLD AUTO: 0.6 % (ref 0–0.5)
LYMPHOCYTES # BLD AUTO: 1.1 K/UL (ref 1–4.8)
LYMPHOCYTES NFR BLD: 11.6 % (ref 18–48)
MAGNESIUM SERPL-MCNC: 2.4 MG/DL (ref 1.6–2.6)
MCH RBC QN AUTO: 26.4 PG (ref 27–31)
MCHC RBC AUTO-ENTMCNC: 30.2 G/DL (ref 32–36)
MCV RBC AUTO: 88 FL (ref 82–98)
MONOCYTES # BLD AUTO: 1.1 K/UL (ref 0.3–1)
MONOCYTES NFR BLD: 11.6 % (ref 4–15)
NEUTROPHILS # BLD AUTO: 7.2 K/UL (ref 1.8–7.7)
NEUTROPHILS NFR BLD: 73.6 % (ref 38–73)
NRBC BLD-RTO: 0 /100 WBC
PHOSPHATE SERPL-MCNC: 2.9 MG/DL (ref 2.7–4.5)
PLATELET # BLD AUTO: 404 K/UL (ref 150–450)
PMV BLD AUTO: 13.3 FL (ref 9.2–12.9)
POCT GLUCOSE: 171 MG/DL (ref 70–110)
POCT GLUCOSE: 91 MG/DL (ref 70–110)
POCT GLUCOSE: 93 MG/DL (ref 70–110)
POCT GLUCOSE: 99 MG/DL (ref 70–110)
POTASSIUM SERPL-SCNC: 4.7 MMOL/L (ref 3.5–5.1)
PROT SERPL-MCNC: 7.2 G/DL (ref 6–8.4)
RBC # BLD AUTO: 4.24 M/UL (ref 4.6–6.2)
SODIUM SERPL-SCNC: 139 MMOL/L (ref 136–145)
WBC # BLD AUTO: 9.72 K/UL (ref 3.9–12.7)

## 2023-08-27 PROCEDURE — 83735 ASSAY OF MAGNESIUM: CPT | Performed by: PHYSICIAN ASSISTANT

## 2023-08-27 PROCEDURE — 93005 ELECTROCARDIOGRAM TRACING: CPT

## 2023-08-27 PROCEDURE — S0166 INJ OLANZAPINE 2.5MG: HCPCS

## 2023-08-27 PROCEDURE — 99232 PR SUBSEQUENT HOSPITAL CARE,LEVL II: ICD-10-PCS | Mod: AF,HB,, | Performed by: PSYCHIATRY & NEUROLOGY

## 2023-08-27 PROCEDURE — 99232 SBSQ HOSP IP/OBS MODERATE 35: CPT | Mod: AF,HB,, | Performed by: PSYCHIATRY & NEUROLOGY

## 2023-08-27 PROCEDURE — 94640 AIRWAY INHALATION TREATMENT: CPT

## 2023-08-27 PROCEDURE — 85025 COMPLETE CBC W/AUTO DIFF WBC: CPT | Performed by: PHYSICIAN ASSISTANT

## 2023-08-27 PROCEDURE — 99233 PR SUBSEQUENT HOSPITAL CARE,LEVL III: ICD-10-PCS | Mod: ,,, | Performed by: PSYCHIATRY & NEUROLOGY

## 2023-08-27 PROCEDURE — 25000003 PHARM REV CODE 250

## 2023-08-27 PROCEDURE — 93010 ELECTROCARDIOGRAM REPORT: CPT | Mod: 76,,, | Performed by: INTERNAL MEDICINE

## 2023-08-27 PROCEDURE — 25000242 PHARM REV CODE 250 ALT 637 W/ HCPCS: Performed by: NURSE PRACTITIONER

## 2023-08-27 PROCEDURE — 84100 ASSAY OF PHOSPHORUS: CPT | Performed by: PHYSICIAN ASSISTANT

## 2023-08-27 PROCEDURE — 36415 COLL VENOUS BLD VENIPUNCTURE: CPT | Performed by: PHYSICIAN ASSISTANT

## 2023-08-27 PROCEDURE — 93010 EKG 12-LEAD: ICD-10-PCS | Mod: 76,,, | Performed by: INTERNAL MEDICINE

## 2023-08-27 PROCEDURE — 25000003 PHARM REV CODE 250: Performed by: NURSE PRACTITIONER

## 2023-08-27 PROCEDURE — 99233 SBSQ HOSP IP/OBS HIGH 50: CPT | Mod: ,,, | Performed by: PSYCHIATRY & NEUROLOGY

## 2023-08-27 PROCEDURE — 80053 COMPREHEN METABOLIC PANEL: CPT | Performed by: PHYSICIAN ASSISTANT

## 2023-08-27 PROCEDURE — 99900035 HC TECH TIME PER 15 MIN (STAT)

## 2023-08-27 PROCEDURE — 94761 N-INVAS EAR/PLS OXIMETRY MLT: CPT

## 2023-08-27 PROCEDURE — 25000242 PHARM REV CODE 250 ALT 637 W/ HCPCS: Performed by: STUDENT IN AN ORGANIZED HEALTH CARE EDUCATION/TRAINING PROGRAM

## 2023-08-27 PROCEDURE — 11000001 HC ACUTE MED/SURG PRIVATE ROOM

## 2023-08-27 PROCEDURE — 93010 ELECTROCARDIOGRAM REPORT: CPT | Mod: ,,, | Performed by: INTERNAL MEDICINE

## 2023-08-27 RX ORDER — OLANZAPINE 10 MG/2ML
5 INJECTION, POWDER, FOR SOLUTION INTRAMUSCULAR NIGHTLY
Status: DISCONTINUED | OUTPATIENT
Start: 2023-08-27 | End: 2023-09-03

## 2023-08-27 RX ORDER — LEVALBUTEROL 1.25 MG/.5ML
1.25 SOLUTION, CONCENTRATE RESPIRATORY (INHALATION) EVERY 4 HOURS PRN
Status: DISCONTINUED | OUTPATIENT
Start: 2023-08-27 | End: 2023-09-02

## 2023-08-27 RX ORDER — OLANZAPINE 10 MG/2ML
5 INJECTION, POWDER, FOR SOLUTION INTRAMUSCULAR ONCE
Status: DISCONTINUED | OUTPATIENT
Start: 2023-08-27 | End: 2023-08-30

## 2023-08-27 RX ORDER — IPRATROPIUM BROMIDE 0.5 MG/2.5ML
0.5 SOLUTION RESPIRATORY (INHALATION) EVERY 4 HOURS PRN
Status: DISCONTINUED | OUTPATIENT
Start: 2023-08-27 | End: 2023-09-02

## 2023-08-27 RX ADMIN — EZETIMIBE 10 MG: 10 TABLET ORAL at 08:08

## 2023-08-27 RX ADMIN — ACETYLCYSTEINE 4 ML: 100 INHALANT RESPIRATORY (INHALATION) at 08:08

## 2023-08-27 RX ADMIN — OLANZAPINE 5 MG: 10 INJECTION, POWDER, LYOPHILIZED, FOR SOLUTION INTRAMUSCULAR at 03:08

## 2023-08-27 RX ADMIN — VALPROIC ACID 500 MG: 250 SOLUTION ORAL at 01:08

## 2023-08-27 RX ADMIN — VALPROIC ACID 500 MG: 250 SOLUTION ORAL at 04:08

## 2023-08-27 RX ADMIN — NYSTATIN 500000 UNITS: 100000 SUSPENSION ORAL at 01:08

## 2023-08-27 RX ADMIN — OLANZAPINE 5 MG: 10 INJECTION, POWDER, LYOPHILIZED, FOR SOLUTION INTRAMUSCULAR at 08:08

## 2023-08-27 RX ADMIN — OLANZAPINE 5 MG: 10 INJECTION, POWDER, LYOPHILIZED, FOR SOLUTION INTRAMUSCULAR at 02:08

## 2023-08-27 RX ADMIN — NYSTATIN 500000 UNITS: 100000 SUSPENSION ORAL at 08:08

## 2023-08-27 RX ADMIN — SENNOSIDES AND DOCUSATE SODIUM 1 TABLET: 50; 8.6 TABLET ORAL at 08:08

## 2023-08-27 RX ADMIN — IPRATROPIUM BROMIDE AND ALBUTEROL SULFATE 3 ML: 2.5; .5 SOLUTION RESPIRATORY (INHALATION) at 08:08

## 2023-08-27 RX ADMIN — METOPROLOL TARTRATE 25 MG: 25 TABLET, FILM COATED ORAL at 08:08

## 2023-08-27 RX ADMIN — FERROUS SULFATE TAB 325 MG (65 MG ELEMENTAL FE) 1 EACH: 325 (65 FE) TAB at 08:08

## 2023-08-27 RX ADMIN — ATORVASTATIN CALCIUM 80 MG: 40 TABLET, FILM COATED ORAL at 08:08

## 2023-08-27 RX ADMIN — AMIODARONE HYDROCHLORIDE 200 MG: 200 TABLET ORAL at 08:08

## 2023-08-27 RX ADMIN — APIXABAN 5 MG: 5 TABLET, FILM COATED ORAL at 08:08

## 2023-08-27 RX ADMIN — VALPROIC ACID 500 MG: 250 SOLUTION ORAL at 08:08

## 2023-08-27 RX ADMIN — ASPIRIN 81 MG 81 MG: 81 TABLET ORAL at 08:08

## 2023-08-27 RX ADMIN — IPRATROPIUM BROMIDE 0.5 MG: 0.5 SOLUTION RESPIRATORY (INHALATION) at 04:08

## 2023-08-27 RX ADMIN — NYSTATIN 500000 UNITS: 100000 SUSPENSION ORAL at 04:08

## 2023-08-27 NOTE — PLAN OF CARE
"Notified by Primary that pt remained very agitated tonight after PRN Zyprexa 5mg IM at 0215hrs. Though pt needs another EKG to reassess Qtc (517 on 8/19; EKG order placed by primary) and ideally should receive Depakote from missing earlier doses, advised that acute behavorial management takes priority from a safety standpoint. Rec 1x PRN Zyprexa 5mg IM now. Please obtain EKG when safely able. Remainder of A&P per Primary.    James Kelly MD ([place])  [facility], PGY-II    "

## 2023-08-27 NOTE — PLAN OF CARE
Problem: Adult Inpatient Plan of Care  Goal: Plan of Care Review  Outcome: Ongoing, Progressing  Goal: Patient-Specific Goal (Individualized)  Outcome: Ongoing, Progressing  Goal: Absence of Hospital-Acquired Illness or Injury  Outcome: Ongoing, Progressing  Intervention: Identify and Manage Fall Risk  Flowsheets (Taken 8/27/2023 0438)  Safety Promotion/Fall Prevention:   side rails raised x 3   bed alarm set   Fall Risk reviewed with patient/family   diversional activities provided   medications reviewed  Intervention: Prevent Skin Injury  Flowsheets (Taken 8/27/2023 0438)  Body Position:   position changed independently   weight shifting  Skin Protection: adhesive use limited     Problem: Diabetes Comorbidity  Goal: Blood Glucose Level Within Targeted Range  Outcome: Ongoing, Progressing  Intervention: Monitor and Manage Glycemia  Flowsheets (Taken 8/27/2023 0438)  Glycemic Management:   blood glucose monitored   other (see comments)

## 2023-08-27 NOTE — ASSESSMENT & PLAN NOTE
Improving  Patient with new fever, leukocytosis, and tachycardia 8/19  Initiated SIRS protocol  No lactic acidosis  D/c'd Vanc/zosyn and transitioned to Merrem   Antibiotics narrowed to Ceftriaxone. Finished on 8/25 for full 7 day course.   ID consulted.   Leukocytosis downtrending  Consulted Hospital medicine, appreciate recs  CXR w/ congestion of pulmonary vasculature stable with prior CXR  UA with bacteria and many leukocytes    Cultures growing Klebsiella Pneumoniae  No growth to date on blood cultures  CT A/P (8/22) with 5mm nonobstructive L renal calculus and perinephric fat stranding consistent with complicated UTI vs pyelonephritis   Urology consulted for possible septic calculus. No need for acute intervention.

## 2023-08-27 NOTE — PLAN OF CARE
Problem: Adult Inpatient Plan of Care  Goal: Plan of Care Review  Outcome: Ongoing, Progressing  Goal: Patient-Specific Goal (Individualized)  Outcome: Ongoing, Progressing  Goal: Absence of Hospital-Acquired Illness or Injury  Outcome: Ongoing, Progressing  Goal: Optimal Comfort and Wellbeing  Outcome: Ongoing, Progressing  Goal: Readiness for Transition of Care  Outcome: Ongoing, Progressing   POC and meds reviewed with brother, patient is lying in bed, feeding per peg tube in progress,turned 2qhours,perineal care done.4points resrtraints in place.No injuries noted.Will continue to monitor.

## 2023-08-27 NOTE — SUBJECTIVE & OBJECTIVE
Neurologic Chief Complaint: L MCA syndrome    Subjective:     Interval History: Patient is seen for follow-up neurological assessment and treatment recommendations: Pt still having non-redirectable agitation. Now in 4 point restraints. Will continue to work on management of delirium and agitation. Qtc in 400's, will schedule Zyprexa QHS. Monitor Qtc going forward.     HPI, Past Medical, Family, and Social History remains the same as documented in the initial encounter.     Review of Systems   Unable to perform ROS: Other   Neurological:  Positive for speech difficulty.     Scheduled Meds:   acetylcysteine 100 mg/ml (10%)  4 mL Nebulization BID    albuterol-ipratropium  3 mL Nebulization BID    amiodarone  200 mg Per G Tube Daily    apixaban  5 mg Per G Tube BID    aspirin  81 mg Per G Tube Daily    atorvastatin  80 mg Per G Tube Daily    ezetimibe  10 mg Per G Tube QHS    ferrous sulfate  1 tablet Per G Tube Daily    metoprolol tartrate  25 mg Per G Tube BID    nystatin  500,000 Units Oral QID    OLANZapine  5 mg Intramuscular Once    OLANZapine  5 mg Intramuscular QHS    polyethylene glycol  17 g Per G Tube Daily    senna-docusate 8.6-50 mg  1 tablet Per G Tube BID    valproic acid (as sodium salt)  500 mg Per G Tube Q8H     Continuous Infusions:        PRN Meds:acetaminophen, bisacodyL, chlorproMAZINE, dextrose 10%, dextrose 10%, diatrizoate meglumineand-diatrizoate sodium, glucagon (human recombinant), hydrALAZINE, levalbuterol **AND** ipratropium, labetalol, nitroGLYCERIN, OLANZapine, ondansetron, sodium chloride 0.9%, sodium chloride 0.9%, sodium chloride 0.9%    Objective:     Vital Signs (Most Recent):  Temp: 98.4 °F (36.9 °C) (08/27/23 0720)  Pulse: 65 (08/27/23 0820)  Resp: (!) 22 (08/27/23 0820)  BP: (!) 170/108 (08/27/23 0720)  SpO2: 95 % (08/27/23 0820)  BP Location: Left arm    Vital Signs Range (Last 24H):  Temp:  [96.2 °F (35.7 °C)-99.5 °F (37.5 °C)]   Pulse:  []   Resp:  [16-22]   BP:  "(133-170)/()   SpO2:  [95 %-99 %]   BP Location: Left arm       Physical Exam  Vitals and nursing note reviewed.   Constitutional:       General: He is not in acute distress.     Appearance: He is well-developed.   HENT:      Head: Normocephalic and atraumatic.   Cardiovascular:      Rate and Rhythm: Normal rate.   Pulmonary:      Effort: Pulmonary effort is normal. No respiratory distress.   Abdominal:      General: There is no distension.   Skin:     General: Skin is warm and dry.   Neurological:      Comments: Aphasic, does not follow commands  L gaze              Neurological Exam:   LOC: drowsy  Attention Span: poor  Language: Global aphasia, not following commands  Articulation: Untestable due to severe aphasia   Orientation: Untestable due to severe aphasia   EOM (CN III, IV, VI): Gaze preference  left  Facial Movement (CN VII): Lower facial weakness on the Right  Motor: Spontaneously moving all extremities. Will not follow commands.  Unable to assess drift and strength as patient does not follow commands, and gets agitated      Laboratory:  CMP:   Recent Labs   Lab 08/27/23  0402   CALCIUM 9.3   ALBUMIN 2.6*   PROT 7.2      K 4.7   CO2 24      BUN 24*   CREATININE 1.5*   ALKPHOS 148*   ALT 20   AST 42*   BILITOT 0.7       CBC:   Recent Labs   Lab 08/27/23  0402   WBC 9.72   RBC 4.24*   HGB 11.2*   HCT 37.1*      MCV 88   MCH 26.4*   MCHC 30.2*       Lipid Panel:   No results for input(s): "CHOL", "LDLCALC", "HDL", "TRIG" in the last 168 hours.    Coagulation:   Recent Labs   Lab 08/21/23  0553   APTT 33.4*       Platelet Aggregation Study: No results for input(s): "PLTAGG", "PLTAGINTERP", "PLTAGREGLACO", "ADPPLTAGGREG" in the last 168 hours.  Hgb A1C:   No results for input(s): "HGBA1C" in the last 168 hours.    TSH:   No results for input(s): "TSH" in the last 168 hours.      Diagnostic Results     Brain Imaging   CT Head 8/10/23  Impression:     No detrimental change compared to " prior.     Evolving acute infarcts within the left MCA and PCA territories.  No hemorrhagic conversion.  No significant mass effect     Suspected small subacute infarction within the right caudate.     Multiple areas remote infarctions as detailed above.      CT Head 8/8/23 1704  Impression:     1. Redemonstration of acute infarctions in the left MCA and PCA territories.  No evidence to suggest hemorrhagic transformation.  2. Suspected small subacute infarction in the right caudate.  3. Numerous remote infarctions as detailed above.    CT Head 8/8/23 0826  Impression:     Moderate developing hypoattenuation left inferior frontal lobe and left occipital lobe concerning for developing recent infarcts post thrombectomy.     Allowing for scattered hyperdensity related to recently contrast administration for thrombectomy there is no definite acute intracranial hemorrhage.     Previous hypodensity right caudate no longer seen which may be related to contrast administration for thrombectomy and possible subacute age infarction.     Superimposed remote infarcts with moderate to large encephalomalacia right MCA territory unchanged    Vessel Imaging   IR Angio 8/8/23  Preliminary Interpretation:   1.    No evidence of left ICA or MCA stenosis. No large or medium vessel occlusion.    CTA Multiphase 8/7/23  Impression:     CT head:     New right caudate nucleus hypodensity, likely representing age-indeterminate infarct.  Could be further evaluated with MRI.     Multifocal encephalomalacia involving the right frontal, temporoparietal, and left occipital lobes.     CTA head and neck:     Acute occlusion of the superior left M2 segment.     Left PCA occlusion of undetermined age.     Filling defect in the right main pulmonary artery, concerning for acute pulmonary thromboembolism.  Consider follow-up pulmonary CTA to more fully assess the extent of thromboemboli, the clot burden, and the presence or absence of right heart  strain.     Not fully detailed above:     - Incomplete opacification of the left atrial appendage, suspicious for an intraluminal thrombus.  Follow-up echocardiogram, or cardiac MRI or CTA, recommended.     - The presence of acute thrombi or thromboemboli in both the systemic arterial and pulmonary arterial circulation raises concern for the possibility of underlying intracardiac or extracardiac right-to-left shunt, and/or a hypercoagulable state.  Correlate clinically.     - Incompletely visualized, but possibly large, pericardial effusion.  Artifacts compromise accurate assessment of its attenuation.    Cardiac Imaging   TTE with bubble 8/8/23    Left Ventricle: The left ventricle is moderately dilated. Increased ventricular mass. Normal wall thickness. There is moderate eccentric hypertrophy. Severe global hypokinesis present. Septal motion is consistent with pacing. There is severely reduced systolic function with a visually estimated ejection fraction of 15 - 20%. Ejection fraction by visual approximation is 20%. Unable to assess diastolic function due to arrhythmia.    Left Atrium: Left atrium is severely dilated. Radha 85mL/m2.    Right Ventricle: Mild right ventricular enlargement. Wall thickness is normal. Right ventricle wall motion  is normal. Systolic function is severely reduced. Pacemaker lead present in the ventricle.    Right Atrium: Right atrium is severely dilated.    Aortic Valve: There is mild aortic valve sclerosis. There is normal leaflet mobility. There is no significant regurgitation.    Mitral Valve: There is bileaflet sclerosis. There is normal leaflet mobility. There is mild regurgitation.    Tricuspid Valve: The tricuspid valve is structurally normal. There is normal leaflet mobility. There is mild regurgitation.    Pulmonic Valve: The pulmonic valve is structurally normal. There is normal leaflet mobility. There is no significant regurgitation.    Aorta: Aortic root is normal in size  measuring 3.13 cm. Ascending aorta is normal measuring 3.24 cm.    IVC/SVC: Normal venous pressure at 3 mmHg.    Pericardium: The pericardium is normal. There is no pericardial effusion.

## 2023-08-27 NOTE — PROGRESS NOTES
"CONSULTATION LIAISON PSYCHIATRY PROGRESS NOTE    Patient Name: Tera Griffiths  MRN: [id number]  Patient Class: IP- Inpatient  Admission Date: 8/5/2023  Attending Physician: Morris Shea MD      SUBJECTIVE:   Tera Griffiths is a 58 y.o. male with no known past psychiatric history of and pertinent past medical history of CHF, CAD, AF, HTN, and CKD who was admitted 8/5 for CHF exacerbation. Hospital course c/b L MCA embolic stroke.      Psychiatry consulted for "currently in UE restraints due to pulling out lines/PEG. consult to psych to seek help for nonredirectable agitation. trying to avoid physical restraints"    Interval History  Psychiatry paged overnight due to agitation after receiving PRN Zyprexa 5 mg at 0200. Recommended patient receive additional dose of Zyprexa 5 mg PRN and encouraged EKG.  Initial EKG at 0353 with Qtc of 543 in the setting of Afib. Repeat at 8AM with Qtc of 467 in setting of Afib. On evaluation this AM patient markedly sedated, unable to participate in exam. Occasionally opens eyes with tactile stimulation, but not verbal. Unable to squeeze hands with verbal commands. Interview terminated at this time.     OBJECTIVE:    Mental Status Exam:  General Appearance: appears stated age, dressed in hospital garb  Behavior:  sleeping through attempts to initiate interview.  Involuntary Movements and Motor Activity: no abnormal involuntary movements noted; no tics, no tremors, no akathisia, no dystonia, no evidence of tardive dyskinesia; no psychomotor agitation or retardation  Gait and Station: unable to assess - patient lying down or seated  Speech and Language:  unable to assess  Mood: unable to assess  Affect:  unable to assess  Thought Process and Associations: Unable to Assess  Thought Content and Perceptions:: Unable to Assess  Sensorium and Orientation: Unable to Formally Assess  Recent and Remote Memory: Unable to  Formally Assess  Attention and Concentration: Unable to Formally " Assess  Fund of Knowledge: Unable to Formally Assess  Insight:  unable to assess  Judgment:  unable to assess    CAM ICU positive? unable to assess      ASSESSMENT & RECOMMENDATIONS   Delirium 2/2 underlying medical etiologies    Depakene 500mg TID  Agree with plan to start scheduled Zyprexa 5 mg QHS due to continued agitation   Continue Zyprexa 5 mg PRN for breakthrough, non-redirectable agitation  Most recent EKG from 8/27 at 8AM with Qtc of 467. Should be noted that reliability can be affected by Afib. Continue to monitor Qtc with goal of <500    DELIRIUM  DELIRIUM BEHAVIOR MANAGEMENT  As a reminder, changes in mentation & attention with either disorganized thinking, easy distractibility, and fluctuating level of consciousness are commonly observed with delirium.  Please utilize chemical restraints with PRN meds for agitation 1st. If needed for immediate safety reasons, can additionally utilize physical restraints. However, please avoid use of physical restraints, or have periods of patient being out of physical restraints if possible (e.g. while sleeping) as excessive use can promote rebound worsening of delirium/agitation.  Keep window shades open and room lit during day and room dim at night in order to promote normal sleep-wake cycles.  Encourage family at bedside. Corea patient often to situation, location, date.  Recommend to discourage Narcotics, Benzos and Anti-cholinergic medication use as they commonly promote and worsen delirium. If aforementioned medications need to be resumed for optimal patient outcomes after careful consideration, please exercise judicious use with clear documentation.  Please continue medical workup for causative etiology of delirium.     RISK ASSESSMENT  NO NEED FOR PEC, UNCONTESTED    FOLLOW UP  Will follow up while in house    DISPOSITION - once medically cleared:  Defer to medical team    Please contact ON CALL psychiatry service (24/7) for any acute issues that may  arise.    Dr. Marvin Murdock  [facility] PGY2  CL Psychiatry  [facility]  8/27/2023 8:41 AM      --------------------------------------------------------------------------------------------------------------------------------------------------------------------------------------------------------------------------------------    CONTINUED OBJECTIVE clinical data & findings reviewed and noted for above decision making    Current Medications:   Scheduled Meds:    acetylcysteine 100 mg/ml (10%)  4 mL Nebulization BID    albuterol-ipratropium  3 mL Nebulization BID    amiodarone  200 mg Per G Tube Daily    apixaban  5 mg Per G Tube BID    aspirin  81 mg Per G Tube Daily    atorvastatin  80 mg Per G Tube Daily    ezetimibe  10 mg Per G Tube QHS    ferrous sulfate  1 tablet Per G Tube Daily    metoprolol tartrate  25 mg Per G Tube BID    nystatin  500,000 Units Oral QID    OLANZapine  5 mg Intramuscular Once    OLANZapine  5 mg Intramuscular QHS    polyethylene glycol  17 g Per G Tube Daily    senna-docusate 8.6-50 mg  1 tablet Per G Tube BID    valproic acid (as sodium salt)  500 mg Per G Tube Q8H     PRN Meds: acetaminophen, bisacodyL, chlorproMAZINE, dextrose 10%, dextrose 10%, diatrizoate meglumineand-diatrizoate sodium, glucagon (human recombinant), hydrALAZINE, levalbuterol **AND** ipratropium, labetalol, nitroGLYCERIN, OLANZapine, ondansetron, sodium chloride 0.9%, sodium chloride 0.9%, sodium chloride 0.9%    Allergies:   Review of patient's allergies indicates:  No Known Allergies    Vitals  Vitals:    08/27/23 1521   BP: (!) 136/96   Pulse: 74   Resp: 18   Temp: 99.5 °F (37.5 °C)       Labs/Imaging/Studies:  Recent Results (from the past 24 hour(s))   POCT glucose    Collection Time: 08/26/23  6:33 PM   Result Value Ref Range    POCT Glucose 55 (L) 70 - 110 mg/dL   POCT glucose    Collection Time: 08/26/23  7:03 PM   Result Value Ref Range    POCT Glucose 56 (L) 70 - 110 mg/dL    POCT glucose    Collection Time: 08/26/23  7:10 PM   Result Value Ref Range    POCT Glucose 62 (L) 70 - 110 mg/dL   POCT glucose    Collection Time: 08/26/23  7:32 PM   Result Value Ref Range    POCT Glucose 53 (L) 70 - 110 mg/dL   POCT glucose    Collection Time: 08/26/23  8:01 PM   Result Value Ref Range    POCT Glucose 82 70 - 110 mg/dL   POCT glucose    Collection Time: 08/26/23  9:31 PM   Result Value Ref Range    POCT Glucose 106 70 - 110 mg/dL   POCT glucose    Collection Time: 08/27/23 12:30 AM   Result Value Ref Range    POCT Glucose 91 70 - 110 mg/dL   Comprehensive metabolic panel    Collection Time: 08/27/23  4:02 AM   Result Value Ref Range    Sodium 139 136 - 145 mmol/L    Potassium 4.7 3.5 - 5.1 mmol/L    Chloride 104 95 - 110 mmol/L    CO2 24 23 - 29 mmol/L    Glucose 94 70 - 110 mg/dL    BUN 24 (H) 6 - 20 mg/dL    Creatinine 1.5 (H) 0.5 - 1.4 mg/dL    Calcium 9.3 8.7 - 10.5 mg/dL    Total Protein 7.2 6.0 - 8.4 g/dL    Albumin 2.6 (L) 3.5 - 5.2 g/dL    Total Bilirubin 0.7 0.1 - 1.0 mg/dL    Alkaline Phosphatase 148 (H) 55 - 135 U/L    AST 42 (H) 10 - 40 U/L    ALT 20 10 - 44 U/L    eGFR 54.3 (A) >60 mL/min/1.73 m^2    Anion Gap 11 8 - 16 mmol/L   CBC auto differential    Collection Time: 08/27/23  4:02 AM   Result Value Ref Range    WBC 9.72 3.90 - 12.70 K/uL    RBC 4.24 (L) 4.60 - 6.20 M/uL    Hemoglobin 11.2 (L) 14.0 - 18.0 g/dL    Hematocrit 37.1 (L) 40.0 - 54.0 %    MCV 88 82 - 98 fL    MCH 26.4 (L) 27.0 - 31.0 pg    MCHC 30.2 (L) 32.0 - 36.0 g/dL    RDW 15.9 (H) 11.5 - 14.5 %    Platelets 404 150 - 450 K/uL    MPV 13.3 (H) 9.2 - 12.9 fL    Immature Granulocytes 0.6 (H) 0.0 - 0.5 %    Gran # (ANC) 7.2 1.8 - 7.7 K/uL    Immature Grans (Abs) 0.06 (H) 0.00 - 0.04 K/uL    Lymph # 1.1 1.0 - 4.8 K/uL    Mono # 1.1 (H) 0.3 - 1.0 K/uL    Eos # 0.2 0.0 - 0.5 K/uL    Baso # 0.02 0.00 - 0.20 K/uL    nRBC 0 0 /100 WBC    Gran % 73.6 (H) 38.0 - 73.0 %    Lymph % 11.6 (L) 18.0 - 48.0 %    Mono % 11.6 4.0 -  15.0 %    Eosinophil % 2.4 0.0 - 8.0 %    Basophil % 0.2 0.0 - 1.9 %    Differential Method Automated    Magnesium    Collection Time: 08/27/23  4:02 AM   Result Value Ref Range    Magnesium 2.4 1.6 - 2.6 mg/dL   Phosphorus    Collection Time: 08/27/23  4:02 AM   Result Value Ref Range    Phosphorus 2.9 2.7 - 4.5 mg/dL   POCT glucose    Collection Time: 08/27/23  5:55 AM   Result Value Ref Range    POCT Glucose 93 70 - 110 mg/dL   POCT glucose    Collection Time: 08/27/23 12:17 PM   Result Value Ref Range    POCT Glucose 171 (H) 70 - 110 mg/dL     Imaging Results              X-Ray Chest AP Portable (Final result)  Result time 08/05/23 14:46:03      Final result by Leandro Ruffin MD (08/05/23 14:46:03)                   Impression:      1. Central hilar findings suggest congestive change/edema.  No large focal consolidation.      Electronically signed by: Leandro Ruffin MD  Date:    08/05/2023  Time:    14:46               Narrative:    EXAMINATION:  XR CHEST AP PORTABLE    CLINICAL HISTORY:  Chest Pain;    TECHNIQUE:  Single frontal view of the chest was performed.    COMPARISON:  07/13/2023    FINDINGS:  The cardiomediastinal silhouette is prominent, similar to the previous exam noting left chest wall pacer..  There is no pleural effusion.  The trachea is midline.  The lungs are symmetrically expanded bilaterally with coarse central hilar interstitial attenuation.  No large focal consolidation seen.  There is no pneumothorax.  The osseous structures are remarkable for degenerative change..

## 2023-08-27 NOTE — ASSESSMENT & PLAN NOTE
Previously required Precedex gtt in [facility], as patient was displaying violent behavior. Pulling out lines and PEG tube. PEG tube replaced with monk catheter via General Surgery.    4 point restraints   Depakote 500mg Q8h and Zyprexa QHS    Will continue to deescalate restraints as patient tolerates
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
## 2023-08-28 LAB
POCT GLUCOSE: 106 MG/DL (ref 70–110)
POCT GLUCOSE: 112 MG/DL (ref 70–110)
POCT GLUCOSE: 113 MG/DL (ref 70–110)
POCT GLUCOSE: 142 MG/DL (ref 70–110)

## 2023-08-28 PROCEDURE — 99232 PR SUBSEQUENT HOSPITAL CARE,LEVL II: ICD-10-PCS | Mod: AF,HB,, | Performed by: PSYCHIATRY & NEUROLOGY

## 2023-08-28 PROCEDURE — 25000003 PHARM REV CODE 250

## 2023-08-28 PROCEDURE — 94640 AIRWAY INHALATION TREATMENT: CPT

## 2023-08-28 PROCEDURE — 25000003 PHARM REV CODE 250: Performed by: NURSE PRACTITIONER

## 2023-08-28 PROCEDURE — 97530 THERAPEUTIC ACTIVITIES: CPT

## 2023-08-28 PROCEDURE — S0166 INJ OLANZAPINE 2.5MG: HCPCS

## 2023-08-28 PROCEDURE — 25000242 PHARM REV CODE 250 ALT 637 W/ HCPCS: Performed by: NURSE PRACTITIONER

## 2023-08-28 PROCEDURE — 99232 SBSQ HOSP IP/OBS MODERATE 35: CPT | Mod: AF,HB,, | Performed by: PSYCHIATRY & NEUROLOGY

## 2023-08-28 PROCEDURE — 99233 PR SUBSEQUENT HOSPITAL CARE,LEVL III: ICD-10-PCS | Mod: ,,, | Performed by: PSYCHIATRY & NEUROLOGY

## 2023-08-28 PROCEDURE — 11000001 HC ACUTE MED/SURG PRIVATE ROOM

## 2023-08-28 PROCEDURE — 97112 NEUROMUSCULAR REEDUCATION: CPT

## 2023-08-28 PROCEDURE — 99233 SBSQ HOSP IP/OBS HIGH 50: CPT | Mod: ,,, | Performed by: PSYCHIATRY & NEUROLOGY

## 2023-08-28 PROCEDURE — 25000242 PHARM REV CODE 250 ALT 637 W/ HCPCS: Performed by: STUDENT IN AN ORGANIZED HEALTH CARE EDUCATION/TRAINING PROGRAM

## 2023-08-28 PROCEDURE — 97535 SELF CARE MNGMENT TRAINING: CPT

## 2023-08-28 PROCEDURE — 94761 N-INVAS EAR/PLS OXIMETRY MLT: CPT

## 2023-08-28 PROCEDURE — 99900035 HC TECH TIME PER 15 MIN (STAT)

## 2023-08-28 RX ADMIN — NYSTATIN 500000 UNITS: 100000 SUSPENSION ORAL at 01:08

## 2023-08-28 RX ADMIN — ASPIRIN 81 MG 81 MG: 81 TABLET ORAL at 09:08

## 2023-08-28 RX ADMIN — EZETIMIBE 10 MG: 10 TABLET ORAL at 09:08

## 2023-08-28 RX ADMIN — VALPROIC ACID 500 MG: 250 SOLUTION ORAL at 01:08

## 2023-08-28 RX ADMIN — ACETYLCYSTEINE 4 ML: 100 INHALANT RESPIRATORY (INHALATION) at 08:08

## 2023-08-28 RX ADMIN — LEVALBUTEROL 1.25 MG: 1.25 SOLUTION, CONCENTRATE RESPIRATORY (INHALATION) at 06:08

## 2023-08-28 RX ADMIN — METOPROLOL TARTRATE 25 MG: 25 TABLET, FILM COATED ORAL at 09:08

## 2023-08-28 RX ADMIN — APIXABAN 5 MG: 5 TABLET, FILM COATED ORAL at 09:08

## 2023-08-28 RX ADMIN — NYSTATIN 500000 UNITS: 100000 SUSPENSION ORAL at 09:08

## 2023-08-28 RX ADMIN — VALPROIC ACID 500 MG: 250 SOLUTION ORAL at 09:08

## 2023-08-28 RX ADMIN — IPRATROPIUM BROMIDE AND ALBUTEROL SULFATE 3 ML: 2.5; .5 SOLUTION RESPIRATORY (INHALATION) at 08:08

## 2023-08-28 RX ADMIN — ACETYLCYSTEINE 4 ML: 100 INHALANT RESPIRATORY (INHALATION) at 07:08

## 2023-08-28 RX ADMIN — ACETAMINOPHEN 650 MG: 325 TABLET ORAL at 04:08

## 2023-08-28 RX ADMIN — ATORVASTATIN CALCIUM 80 MG: 40 TABLET, FILM COATED ORAL at 09:08

## 2023-08-28 RX ADMIN — FERROUS SULFATE TAB 325 MG (65 MG ELEMENTAL FE) 1 EACH: 325 (65 FE) TAB at 09:08

## 2023-08-28 RX ADMIN — OLANZAPINE 5 MG: 10 INJECTION, POWDER, LYOPHILIZED, FOR SOLUTION INTRAMUSCULAR at 05:08

## 2023-08-28 RX ADMIN — IPRATROPIUM BROMIDE AND ALBUTEROL SULFATE 3 ML: 2.5; .5 SOLUTION RESPIRATORY (INHALATION) at 07:08

## 2023-08-28 RX ADMIN — SENNOSIDES AND DOCUSATE SODIUM 1 TABLET: 50; 8.6 TABLET ORAL at 09:08

## 2023-08-28 RX ADMIN — VALPROIC ACID 500 MG: 250 SOLUTION ORAL at 04:08

## 2023-08-28 RX ADMIN — OLANZAPINE 5 MG: 10 INJECTION, POWDER, LYOPHILIZED, FOR SOLUTION INTRAMUSCULAR at 03:08

## 2023-08-28 RX ADMIN — AMIODARONE HYDROCHLORIDE 200 MG: 200 TABLET ORAL at 09:08

## 2023-08-28 RX ADMIN — OLANZAPINE 5 MG: 10 INJECTION, POWDER, LYOPHILIZED, FOR SOLUTION INTRAMUSCULAR at 09:08

## 2023-08-28 NOTE — ASSESSMENT & PLAN NOTE
S/p PEG placement 8/14- self removed 8/26  On tube feeds  Nutrition consulted  Currently has monk in place for feeds, functional    -discussing with [facility] regarding replacement of PEG

## 2023-08-28 NOTE — PROCEDURES
General Surgery Procedure Note:    Patient with PEG tube placement on 8/17/23 who has sinced pulled out his PEG tube with interval placement of monk catheter. Per primary team needs G tube for placement. Therefore surgery contacted.    Procedure in detail:  Suture cut. Monk balloon deflated. Monk removed. Placement of 18F Balloon Chencho Gastrostomy tube. Aspiration revealing gastric contents. G tube flushed with sterile water. Cuff at 3.5 cm at the skin. Patient tolerated the procedure well without immediate complication. Procedure concluded.    Patient okay to begin tube feedings immediately. General Surgery will sign off.     Terrell Oleary MD  [facility] PGY-3

## 2023-08-28 NOTE — ASSESSMENT & PLAN NOTE
Previously required Precedex gtt in [facility], as patient was displaying violent behavior. Pulling out lines and PEG tube. PEG tube replaced with monk catheter via General Surgery.  4 point restraints     -Depakote 500mg Q8h and Zyprexa QHS  -Will continue to deescalate restraints as patient tolerates

## 2023-08-28 NOTE — ASSESSMENT & PLAN NOTE
(resolved)  Patient with new fever, leukocytosis, and tachycardia 8/19  Initiated SIRS protocol  No lactic acidosis  D/c'd Vanc/zosyn and transitioned to Merrem   Antibiotics narrowed to Ceftriaxone. Finished on 8/25 for full 7 day course.   ID consulted.   Leukocytosis downtrending  Consulted Hospital medicine, appreciate recs  CXR w/ congestion of pulmonary vasculature stable with prior CXR  UA with bacteria and many leukocytes    Cultures growing Klebsiella Pneumoniae  No growth to date on blood cultures  CT A/P (8/22) with 5mm nonobstructive L renal calculus and perinephric fat stranding consistent with complicated UTI vs pyelonephritis   Urology consulted for possible septic calculus. No need for acute intervention.    Finished ABX course

## 2023-08-28 NOTE — PLAN OF CARE
Patient staffed in AM rounds.  Patient pulled his PEG out and now has a monk in place.  Possible infection in monk at this time.  [facility]rehab following and cannot accept patient with monk, PEG must be reinserted.  Gen Surg consulted.  Patient being following by psych and still in restraints due to pulling out lines, tubes, etc... Patient not med cleared.  SW will continue to follow.      Becca Buchanan LMSW  [facility]  [phone]

## 2023-08-28 NOTE — ASSESSMENT & PLAN NOTE
Stroke risk factor  Cardiology was consulted by N[facility]  CXR (8/19) with congestion of the pulmonary vasculature similar to previous CXR on 8/11, no increased fluid burden    No UOP documented  Sating well on RA   In no acute respiratory distress    -Strict I's/O's - Condom cath  -Currently on 1.5 L fluid restriction  -will re-initiate GDMT as tolerated, currently limited by EMERSON

## 2023-08-28 NOTE — CONSULTS
Declan Salomon - Neurosurgery ([facility])  Wound Care    Patient Name:  Tera Griffiths   MRN:  [id number]  Date: 8/28/2023  Diagnosis: Embolic stroke involving left middle cerebral artery    History:     Past Medical History:   Diagnosis Date    Acute on chronic combined systolic and diastolic heart failure 09/23/2022    Atrial fibrillation     CAD (coronary artery disease) 09/23/2022    Dyslipidemia 09/23/2022    Embolic stroke involving left middle cerebral artery 08/08/2023    Encounter for blood transfusion     H/O heart artery stent     HTN (hypertension) 09/23/2022    ICD (implantable cardioverter-defibrillator) in place 09/23/2022    Paroxysmal A-fib 09/23/2022    Stage 3 chronic kidney disease 04/19/2023       Social History     Socioeconomic History    Marital status: Single   Tobacco Use    Smoking status: Never    Smokeless tobacco: Never   Substance and Sexual Activity    Alcohol use: Never    Drug use: Never     Social Determinants of Health     Financial Resource Strain: Low Risk  (8/6/2023)    Overall Financial Resource Strain (CARDIA)     Difficulty of Paying Living Expenses: Not hard at all   Food Insecurity: No Food Insecurity (8/6/2023)    Hunger Vital Sign     Worried About Running Out of Food in the Last Year: Never true     Ran Out of Food in the Last Year: Never true   Transportation Needs: No Transportation Needs (8/6/2023)    PRAPARE - Transportation     Lack of Transportation (Medical): No     Lack of Transportation (Non-Medical): No   Physical Activity: Inactive (8/6/2023)    Exercise Vital Sign     Days of Exercise per Week: 0 days     Minutes of Exercise per Session: 0 min   Stress: No Stress Concern Present (8/6/2023)    [employer] - Occupational Stress Questionnaire     Feeling of Stress : Not at all   Recent Concern: Stress - Stress Concern Present (7/14/2023)    [employer] - Occupational Stress Questionnaire     Feeling of Stress  : To some extent   Social Connections: Socially Isolated (8/6/2023)    Social Connection and Isolation Panel [NHANES]     Frequency of Communication with Friends and Family: More than three times a week     Frequency of Social Gatherings with Friends and Family: Twice a week     Attends Alevism Services: Never     Active Member of Clubs or Organizations: No     Attends Club or Organization Meetings: Never     Marital Status: Never    Housing Stability: Low Risk  (8/6/2023)    Housing Stability Vital Sign     Unable to Pay for Housing in the Last Year: No     Number of Places Lived in the Last Year: 1     Unstable Housing in the Last Year: No       Precautions:     Allergies as of 08/05/2023    (No Known Allergies)       WO Assessment Details/Treatment     Patient seen for wound care consultation for PEG site.   Reviewed chart for this encounter.   See Flow Sheet for findings.    Pt found lying in bed, ok for care at this time. Pt in BL wrist restraints/mitts. ABD binder removed to reveal tan/dirty split gauze dressing to PEG site w/ definite odor. Dressing removed and site cleansed w/ NS, patted dry. Skin around PEG intact w/o signs of breakdown, slight ooze of creamy tan drainage from insertion site. Applied Cavilon barrier spray followed by a piece of Hydrofera blue cut to sandwich PEG tube for management of drainage.    RECOMMENDATIONS:  Q3 days - PEG site - cleanse gently w/ NS, pat dry. Apply Cavilon barrier spray to skin followed by hydrofera blue cut to fit around PEG (shiny side facing away from skin, can order from central)    Discussed POC with patient and primary nurse.   See EMR for orders & patient education.      Nursing to continue care.  Nursing to maintain pressure injury prevention interventions.           08/28/23 1252   WOCN Assessment   WOCN Total Time (mins) 15   Visit Date 08/28/23   Visit Time 1252   Consult Type New   WOCN Speciality Wound   Intervention  assessed;changed;applied;chart review;coordination of care;orders   Teaching on-going        Gastrostomy/Enterostomy 08/17/23 0800 Percutaneous endoscopic gastrostomy (PEG) midline   Placement Date/Time: 08/17/23 0800   Type: Percutaneous endoscopic gastrostomy (PEG)  Location: midline   Securement sutured to abdomen   Drainage tan   Insertion Site no redness;no warmth;no tenderness;no swelling;suture(s);tan drainage   Site Care site cleansed w/ sterile normal saline;other (see comments)  (Hydrofera blue)
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
## 2023-08-28 NOTE — PLAN OF CARE
OT POC reviewed, goals remain appropriate  Problem: Occupational Therapy  Goal: Occupational Therapy Goal  Description: Goals to be met by: 9/8/23     Patient will increase functional independence with ADLs by performing:    UE Dressing with Moderate Assistance.  LE Dressing with Moderate Assistance.  Grooming while standing with Moderate Assistance.  Toileting from toilet with Moderate Assistance for hygiene and clothing management.     Outcome: Ongoing, Progressing

## 2023-08-28 NOTE — ASSESSMENT & PLAN NOTE
Due to stroke  SLP to evaluate and treat  s/p PEG placement  PEG tube self removed 8/26/23    - Discussing with GSGY regarding PEG replacement.   - continue tube feeds

## 2023-08-28 NOTE — SUBJECTIVE & OBJECTIVE
Neurologic Chief Complaint: L MCA Syndrome    Subjective:     Interval History: Patient is seen for follow-up neurological assessment and treatment recommendations: Patient In 4 point restraints, will trial removal. Now on scheduled Seroquel QHS. Will monitor for Qtc prolongation. Pending O-IPR    HPI, Past Medical, Family, and Social History remains the same as documented in the initial encounter.     Review of Systems   Unable to perform ROS: Other   Neurological:  Positive for speech difficulty.     Scheduled Meds:   acetylcysteine 100 mg/ml (10%)  4 mL Nebulization BID    albuterol-ipratropium  3 mL Nebulization BID    amiodarone  200 mg Per G Tube Daily    apixaban  5 mg Per G Tube BID    aspirin  81 mg Per G Tube Daily    atorvastatin  80 mg Per G Tube Daily    ezetimibe  10 mg Per G Tube QHS    ferrous sulfate  1 tablet Per G Tube Daily    metoprolol tartrate  25 mg Per G Tube BID    nystatin  500,000 Units Oral QID    OLANZapine  5 mg Intramuscular Once    OLANZapine  5 mg Intramuscular QHS    polyethylene glycol  17 g Per G Tube Daily    senna-docusate 8.6-50 mg  1 tablet Per G Tube BID    valproic acid (as sodium salt)  500 mg Per G Tube Q8H     Continuous Infusions:  PRN Meds:acetaminophen, bisacodyL, chlorproMAZINE, dextrose 10%, dextrose 10%, diatrizoate meglumineand-diatrizoate sodium, glucagon (human recombinant), hydrALAZINE, levalbuterol **AND** ipratropium, labetalol, nitroGLYCERIN, OLANZapine, ondansetron, sodium chloride 0.9%, sodium chloride 0.9%, sodium chloride 0.9%    Objective:     Vital Signs (Most Recent):  Temp: 99 °F (37.2 °C) (08/28/23 0830)  Pulse: 79 (08/28/23 0830)  Resp: 16 (08/28/23 0830)  BP: 129/88 (08/28/23 0830)  SpO2: 97 % (08/28/23 0830)  BP Location: Left arm    Vital Signs Range (Last 24H):  Temp:  [98.2 °F (36.8 °C)-99.5 °F (37.5 °C)]   Pulse:  []   Resp:  [16-20]   BP: (129-186)/()   SpO2:  [95 %-100 %]   BP Location: Left arm       Physical Exam  Vitals and  nursing note reviewed.   Constitutional:       General: He is not in acute distress.     Appearance: He is well-developed.   HENT:      Head: Normocephalic and atraumatic.   Cardiovascular:      Rate and Rhythm: Normal rate.   Pulmonary:      Effort: Pulmonary effort is normal. No respiratory distress.   Abdominal:      General: There is no distension.   Skin:     General: Skin is warm and dry.   Neurological:      Comments: Aphasic, does not follow commands  L gaze              Neurological Exam:   LOC: drowsy  Attention Span: poor  Language: Global aphasia  Articulation: Untestable due to severe aphasia   Orientation: Untestable due to severe aphasia   EOM (CN III, IV, VI): Gaze preference  left  Facial Movement (CN VII): Lower facial weakness on the Right  Motor: Spontaneously moves all 4 extremities, will not follow commands    Laboratory:  No Labs today    Diagnostic Results   Brain Imaging   CT Head 8/10/23  Impression:     No detrimental change compared to prior.     Evolving acute infarcts within the left MCA and PCA territories.  No hemorrhagic conversion.  No significant mass effect     Suspected small subacute infarction within the right caudate.     Multiple areas remote infarctions as detailed above.        CT Head 8/8/23 1704  Impression:     1. Redemonstration of acute infarctions in the left MCA and PCA territories.  No evidence to suggest hemorrhagic transformation.  2. Suspected small subacute infarction in the right caudate.  3. Numerous remote infarctions as detailed above.     CT Head 8/8/23 0898  Impression:     Moderate developing hypoattenuation left inferior frontal lobe and left occipital lobe concerning for developing recent infarcts post thrombectomy.     Allowing for scattered hyperdensity related to recently contrast administration for thrombectomy there is no definite acute intracranial hemorrhage.     Previous hypodensity right caudate no longer seen which may be related to contrast  administration for thrombectomy and possible subacute age infarction.     Superimposed remote infarcts with moderate to large encephalomalacia right MCA territory unchanged     Vessel Imaging   IR Angio 8/8/23  Preliminary Interpretation:   1.    No evidence of left ICA or MCA stenosis. No large or medium vessel occlusion.     CTA Multiphase 8/7/23  Impression:     CT head:     New right caudate nucleus hypodensity, likely representing age-indeterminate infarct.  Could be further evaluated with MRI.     Multifocal encephalomalacia involving the right frontal, temporoparietal, and left occipital lobes.     CTA head and neck:     Acute occlusion of the superior left M2 segment.     Left PCA occlusion of undetermined age.     Filling defect in the right main pulmonary artery, concerning for acute pulmonary thromboembolism.  Consider follow-up pulmonary CTA to more fully assess the extent of thromboemboli, the clot burden, and the presence or absence of right heart strain.     Not fully detailed above:     - Incomplete opacification of the left atrial appendage, suspicious for an intraluminal thrombus.  Follow-up echocardiogram, or cardiac MRI or CTA, recommended.     - The presence of acute thrombi or thromboemboli in both the systemic arterial and pulmonary arterial circulation raises concern for the possibility of underlying intracardiac or extracardiac right-to-left shunt, and/or a hypercoagulable state.  Correlate clinically.     - Incompletely visualized, but possibly large, pericardial effusion.  Artifacts compromise accurate assessment of its attenuation.     Cardiac Imaging   TTE with bubble 8/8/23    Left Ventricle: The left ventricle is moderately dilated. Increased ventricular mass. Normal wall thickness. There is moderate eccentric hypertrophy. Severe global hypokinesis present. Septal motion is consistent with pacing. There is severely reduced systolic function with a visually estimated ejection fraction  of 15 - 20%. Ejection fraction by visual approximation is 20%. Unable to assess diastolic function due to arrhythmia.    Left Atrium: Left atrium is severely dilated. Radha 85mL/m2.    Right Ventricle: Mild right ventricular enlargement. Wall thickness is normal. Right ventricle wall motion  is normal. Systolic function is severely reduced. Pacemaker lead present in the ventricle.    Right Atrium: Right atrium is severely dilated.    Aortic Valve: There is mild aortic valve sclerosis. There is normal leaflet mobility. There is no significant regurgitation.    Mitral Valve: There is bileaflet sclerosis. There is normal leaflet mobility. There is mild regurgitation.    Tricuspid Valve: The tricuspid valve is structurally normal. There is normal leaflet mobility. There is mild regurgitation.    Pulmonic Valve: The pulmonic valve is structurally normal. There is normal leaflet mobility. There is no significant regurgitation.    Aorta: Aortic root is normal in size measuring 3.13 cm. Ascending aorta is normal measuring 3.24 cm.    IVC/SVC: Normal venous pressure at 3 mmHg.    Pericardium: The pericardium is normal. There is no pericardial effusion.

## 2023-08-28 NOTE — PT/OT/SLP PROGRESS
Physical Therapy Co-Treatment    Patient Name: Tera Griffiths   MRN: [id number]    Co-treatment performed for this visit due to patient need for two skilled therapists to ensure patient and staff safety and to accommodate for patient activity tolerance/pain management   Recommendations:     Discharge Recommendations: nursing facility, skilled  Discharge Equipment Recommendations: to be determined by next level of care  Barriers to discharge: Increased level of assist and Decreased caregiver support    Assessment:     Tera Griffiths is a 58 y.o. male admitted with a medical diagnosis of Embolic stroke involving left middle cerebral artery. He presents with the following impairments/functional limitations: weakness, impaired endurance, impaired self care skills, impaired functional mobility, gait instability, impaired balance, impaired cognition, impaired coordination, decreased upper extremity function, decreased lower extremity function, decreased safety awareness, pain. Pt with fair tolerance to treatment session on this day. Pt difficult to arouse and appeared to be going in/out of sleep throughout session. Pt with eyes closed majority of session; eyes open intermittently however did not attend or make eye contact. Pt with 0% command following with max verbal and tactile cueing. Pt intermittently moaning and whimpering, but otherwise remains nonverbal. Pt continues to require total assist with functional mobility and static sitting balance with pt resisting activities while seated at EOB. Pt would continue to benefit from skilled acute PT in order to address current deficits and progress functional mobility.     Rehab Prognosis: Fair; patient continues to benefit from acute skilled PT services to address these deficits and reach maximum level of function.  Recent Surgery: Procedure(s) (LRB):  EGD, WITH PEG TUBE INSERTION (N/A) 11 Days Post-Op    Plan:     During this hospitalization, patient to be seen 3  x/week to address the identified rehab impairments via gait training, therapeutic activities, therapeutic exercises, neuromuscular re-education and progress toward the following goals:    Plan of Care Expires:  09/08/23    Subjective     Chief Complaint: Unable to assess, patient non-verbal  Patient/Family Comments/Goals: Unable to assess, patient non-verbal  Pain/Comfort:  Pain Rating 1:  (unable to assess as pt nonverbal; pt moaning and whimpering intermittently)  Pain Addressed 1: Reposition, Distraction, Cessation of Activity, Nurse notified    Objective:     Communicated with RN prior to session. Patient found HOB elevated with PEG Tube, telemetry, restraints, SCD upon PT entry to room.     General Precautions: Standard, aspiration, aphasia, fall  Orthopedic Precautions: N/A  Braces: N/A    Functional Mobility:  Bed Mobility:    Rolling Left: total assistance  Rolling Right: total assistance  Supine to Sit: total assistance of 2 persons for LE management and trunk management  Sit to Supine: total assistance of 2 persons for LE management and trunk management  Balance:   Static Sitting: Poor, able to maintain for 10 minute(s) with contact guard assistance to total assistance  Pt with posterior lean throughout, not able to correct independently despite max verbal and tactile cues. Once corrected by PT, pt able to maintain upright posture for ~30s  Attempted anterior trunk lean stretch while seated at EOB x3; however, pt actively resisting anterior lean  Pt completed x2 (ea side) lateral lean onto elbow while seated at EOB with total assist  Dynamic Sitting: not assessed this visit    AM-PAC 6 CLICK MOBILITY  Turning over in bed (including adjusting bedclothes, sheets and blankets)?: 1  Sitting down on and standing up from a chair with arms (e.g., wheelchair, bedside commode, etc.): 1  Moving from lying on back to sitting on the side of the bed?: 1  Moving to and from a bed to a chair (including a wheelchair)?:  1  Need to walk in hospital room?: 1  Climbing 3-5 steps with a railing?: 1  Basic Mobility Total Score: 6     Therapeutic Activities and Exercises:  Patient educated on role of acute care PT and PT POC, safety while in hospital including calling nurse for mobility, and call light usage  Pt educated on the importance of maximum participation in therapy session  Pt encouraged throughout to attend to therapist, make eye contact, and perform voluntary motor actions. Pt following 0% of commands at this time.  Pt soiled at end of session. Brief and pads changed with total assistance of 2 persons. RN notified that abdominal binder was damp.    Patient left HOB elevated with all lines intact, call button in reach, RN notified, bed alarm on, and restraints reapplied at end of session.    GOALS:   Multidisciplinary Problems       Physical Therapy Goals          Problem: Physical Therapy    Goal Priority Disciplines Outcome Goal Variances Interventions   Physical Therapy Goal     PT, PT/OT Ongoing, Progressing     Description: Goals to be completed by: 9/8/23    Pt will perform sup<>sit transfers w/ minimum assistance  Pt will have sufficient dynamic balance to sit EOB while performing ADLs/therex w/ stand by assistance for 10 min  Pt will be able to stand up from EOB w/ moderate assistance using LRAD  Pt will ambulate 20 feet w/ maximal assistance using LRAD  Pt will be independent w/ HEP therex on BLE w/ good form and ROM   Pt will follow >50 % of single-step commands throughout treatment session                        Time Tracking:     PT Received On: 08/28/23  PT Start Time: 0826     PT Stop Time: 0855  PT Total Time (min): 29 min     Billable Minutes: Therapeutic Activity 19 and Neuromuscular Re-education 10      Treatment Type: Treatment  PT/PTA: PT     Number of PTA visits since last PT visit: 0     08/28/2023

## 2023-08-28 NOTE — PROGRESS NOTES
"CONSULTATION LIAISON PSYCHIATRY PROGRESS NOTE    Patient Name: Tera Griffiths  MRN: [id number]  Patient Class: IP- Inpatient  Admission Date: 8/5/2023  Attending Physician: Morris Shea MD      SUBJECTIVE:   Tera Griffiths is a 58 y.o. male with no known past psychiatric history of and pertinent past medical history of CHF, CAD, AF, HTN, and CKD who was admitted 8/5 for CHF exacerbation. Hospital course c/b L MCA embolic stroke.      Psychiatry consulted for "currently in UE restraints due to pulling out lines/PEG. consult to psych to seek help for nonredirectable agitation. trying to avoid physical restraints"    Interval History  Patient received behavioral PRNs overnight due to non-redirectable agitaiton. Occasionally opens eyes with tactile stimulation, but not verbal. Unable to squeeze hands with verbal commands which has been similar to last encounter     OBJECTIVE:    Mental Status Exam:  General Appearance: appears stated age, dressed in hospital garb  Behavior:  sleeping through attempts to initiate interview.  Involuntary Movements and Motor Activity: no abnormal involuntary movements noted; no tics, no tremors, no akathisia, no dystonia, no evidence of tardive dyskinesia; no psychomotor agitation or retardation  Gait and Station: unable to assess - patient lying down or seated  Speech and Language:  unable to assess  Mood: unable to assess  Affect:  unable to assess  Thought Process and Associations: Unable to Assess  Thought Content and Perceptions:: Unable to Assess  Sensorium and Orientation: Unable to Formally Assess  Recent and Remote Memory: Unable to  Formally Assess  Attention and Concentration: Unable to Formally Assess  Fund of Knowledge: Unable to Formally Assess  Insight:  unable to assess  Judgment:  unable to assess    CAM ICU positive? unable to assess      ASSESSMENT & RECOMMENDATIONS   Delirium 2/2 underlying medical etiologies    Increase depakote to 500 qam, 500 at noon, and " 1000 mg QHS   Last VPA level subtheraputic at 22.4 on 8/26/23  Agree with plan to start scheduled Zyprexa 5 mg QHS due to continued agitation   Continue Zyprexa 5 mg PRN for breakthrough, non-redirectable agitation  Most recent EKG from 8/27 at 8AM with Qtc of 467. Should be noted that reliability can be affected by Afib. Continue to monitor Qtc with goal of <500    DELIRIUM  DELIRIUM BEHAVIOR PRECAUTIONS  As a reminder, changes in mentation & attention with either disorganized thinking, easy distractibility, and fluctuating level of consciousness are commonly observed with delirium.  Please utilize chemical restraints with PRN meds for agitation 1st. If needed for immediate safety reasons, can additionally utilize physical restraints. However, please avoid use of physical restraints, or have periods of patient being out of physical restraints if possible (e.g. while sleeping) as excessive use can promote rebound worsening of delirium/agitation.  Keep window shades open and room lit during day and room dim at night in order to promote normal sleep-wake cycles.  Encourage family at bedside. [place] patient often to situation, location, date.  Recommend to discourage Narcotics, Benzos and Anti-cholinergic medication use as they commonly promote and worsen delirium. If aforementioned medications need to be resumed for optimal patient outcomes after careful consideration, please exercise judicious use with clear documentation.  Please continue medical workup for causative etiology of delirium.     RISK ASSESSMENT  NO NEED FOR PEC, UNCONTESTED    FOLLOW UP  Will follow up while in house    DISPOSITION - once medically cleared:  Defer to medical team    Please contact ON CALL psychiatry service (24/7) for any acute issues that may arise.    Dr. Frank Haro  [facility] PGY4   Psychiatry  [facility]  8/28/2023 8:41  AM      --------------------------------------------------------------------------------------------------------------------------------------------------------------------------------------------------------------------------------------    CONTINUED OBJECTIVE clinical data & findings reviewed and noted for above decision making    Current Medications:   Scheduled Meds:    acetylcysteine 100 mg/ml (10%)  4 mL Nebulization BID    albuterol-ipratropium  3 mL Nebulization BID    amiodarone  200 mg Per G Tube Daily    apixaban  5 mg Per G Tube BID    aspirin  81 mg Per G Tube Daily    atorvastatin  80 mg Per G Tube Daily    ezetimibe  10 mg Per G Tube QHS    ferrous sulfate  1 tablet Per G Tube Daily    metoprolol tartrate  25 mg Per G Tube BID    nystatin  500,000 Units Oral QID    OLANZapine  5 mg Intramuscular Once    OLANZapine  5 mg Intramuscular QHS    polyethylene glycol  17 g Per G Tube Daily    senna-docusate 8.6-50 mg  1 tablet Per G Tube BID    valproic acid (as sodium salt)  500 mg Per G Tube Q8H     PRN Meds: acetaminophen, bisacodyL, chlorproMAZINE, dextrose 10%, dextrose 10%, diatrizoate meglumineand-diatrizoate sodium, glucagon (human recombinant), hydrALAZINE, levalbuterol **AND** ipratropium, labetalol, nitroGLYCERIN, OLANZapine, ondansetron, sodium chloride 0.9%, sodium chloride 0.9%, sodium chloride 0.9%    Allergies:   Review of patient's allergies indicates:  No Known Allergies    Vitals  Vitals:    08/28/23 1115   BP: 125/82   Pulse: 93   Resp: 20   Temp: 99 °F (37.2 °C)       Labs/Imaging/Studies:  Recent Results (from the past 24 hour(s))   POCT glucose    Collection Time: 08/27/23  6:31 PM   Result Value Ref Range    POCT Glucose 99 70 - 110 mg/dL   POCT glucose    Collection Time: 08/28/23  5:39 AM   Result Value Ref Range    POCT Glucose 112 (H) 70 - 110 mg/dL   POCT glucose    Collection Time: 08/28/23 10:30 AM   Result Value Ref Range    POCT Glucose 142 (H) 70 - 110 mg/dL     Imaging  Results              X-Ray Chest AP Portable (Final result)  Result time 08/05/23 14:46:03      Final result by Leandro Ruffin MD (08/05/23 14:46:03)                   Impression:      1. Central hilar findings suggest congestive change/edema.  No large focal consolidation.      Electronically signed by: Leandro Ruffin MD  Date:    08/05/2023  Time:    14:46               Narrative:    EXAMINATION:  XR CHEST AP PORTABLE    CLINICAL HISTORY:  Chest Pain;    TECHNIQUE:  Single frontal view of the chest was performed.    COMPARISON:  07/13/2023    FINDINGS:  The cardiomediastinal silhouette is prominent, similar to the previous exam noting left chest wall pacer..  There is no pleural effusion.  The trachea is midline.  The lungs are symmetrically expanded bilaterally with coarse central hilar interstitial attenuation.  No large focal consolidation seen.  There is no pneumothorax.  The osseous structures are remarkable for degenerative change..

## 2023-08-28 NOTE — PLAN OF CARE
Problem: Adult Inpatient Plan of Care  Goal: Plan of Care Review  Outcome: Ongoing, Progressing  Goal: Readiness for Transition of Care  Outcome: Ongoing, Progressing     Problem: Diabetes Comorbidity  Goal: Blood Glucose Level Within Targeted Range  Outcome: Ongoing, Progressing     Problem: Adjustment to Illness (Stroke, Ischemic/Transient Ischemic Attack)  Goal: Optimal Coping  Outcome: Ongoing, Progressing     Problem: Bowel Elimination Impaired (Stroke, Ischemic/Transient Ischemic Attack)  Goal: Effective Bowel Elimination  Outcome: Ongoing, Progressing     Problem: Cognitive Impairment (Stroke, Ischemic/Transient Ischemic Attack)  Goal: Optimal Cognitive Function  Outcome: Ongoing, Progressing     Problem: Communication Impairment (Stroke, Ischemic/Transient Ischemic Attack)  Goal: Improved Communication Skills  Outcome: Ongoing, Progressing     Problem: Functional Ability Impaired (Stroke, Ischemic/Transient Ischemic Attack)  Goal: Optimal Functional Ability  Outcome: Ongoing, Progressing     Poc reviewed with patient/family. G-tube replaced. Patient given 1x dose of zyprexa for agitation. Safety measures maintained. Patient bed in low position. Bed alarm set. Patient call bell with in reach. Patient belongings with in reach. VSS. Full assessment in chart. FRANK

## 2023-08-28 NOTE — PT/OT/SLP PROGRESS
Occupational Therapy   Co-Treatment with Physical Therapy    Name: Tera Griffiths  MRN: [id number]  Admitting Diagnosis:  Embolic stroke involving left middle cerebral artery  11 Days Post-Op  Co-treatment performed due to patient's multiple deficits requiring two skilled therapists to appropriately and safely assess patient's strength, endurance, functional mobility, and ADL performance while facilitating functional tasks in addition to accommodating for patient's activity tolerance and medical acuity.    Recommendations:     Discharge Recommendations: nursing facility, skilled  Discharge Equipment Recommendations:  to be determined by next level of care  Barriers to discharge:  Other (Comment) (increased level of skilled assist required)    Assessment:     Tera Griffiths is a 58 y.o. male with a medical diagnosis of Embolic stroke involving left middle cerebral artery.  He presents with the following performance deficits affecting function: weakness, impaired endurance, impaired self care skills, impaired functional mobility, gait instability, impaired balance, impaired cognition, decreased coordination, decreased upper extremity function, decreased lower extremity function, decreased safety awareness, pain. Pt with increased lethargy on this date, appeared to be going in/out of sleep throughout session. Pt required max verbal and tactile cues to maintain eyes open - however, even with eyes open did not attend to or make eye contact. Pt followed 0 commands on this date despite max verbal and tactile cues, demo'd intermittent moaning throughout session but was otherwise nonverbal. Pt continues to require total assist for functional mobility and sitting balance, pt resisting OOB activities. Pt would continue to benefit from skilled acute OT in order to address current deficits.     Rehab Prognosis:  Fair; patient would benefit from acute skilled OT services to address these deficits and reach maximum level of  function.       Plan:     Patient to be seen 3 x/week to address the above listed problems via self-care/home management, therapeutic activities, therapeutic exercises, neuromuscular re-education  Plan of Care Expires: 09/11/23  Plan of Care Reviewed with: patient    Subjective     Chief Complaint: unable to assess, pt nonverbal   Patient/Family Comments/goals: unable to assess, pt nonverbal  Pain/Comfort:  Pain Rating 1:  (ASHLEY - pt nonverbal - pt occasionally moaning and whimpering)    Objective:     Communicated with: RN prior to session.  Patient found HOB elevated with PEG Tube, SCD, restraints, telemetry upon OT entry to room.    General Precautions: Standard, aphasia, aspiration, fall    Orthopedic Precautions:N/A  Braces: N/A  Respiratory Status: Room air     Occupational Performance:     Bed Mobility:    Patient completed Rolling/Turning to Left with  total assistance  Patient completed Rolling/Turning to Right with total assistance  Patient completed Scooting/Bridging with total assistance  Patient completed Supine to Sit with total assistance and 2 persons  Patient completed Sit to Supine with total assistance and 2 persons     Functional Mobility/Transfers:  Deferred on this date 2/2 pt with no command following on this date    Activities of Daily Living:  Feeding:  dependence PEG tube  Grooming: total assistance to complete face hygiene  Lower Body Dressing: total assistance to change brief  Toileting: dependence Cath in place      [facility] 6 Click ADL: 6    Treatment & Education:  Pt with 0 command following on this date, encouraged throughout session to maintain eyes open and perform voluntary motor actions.   Pt educated on the following:  - role of OT and OT POC  - use of call light to request for assistance with all functional mobility to ensure safety during hospital stay  - importance of continued mobilization  - benefits of continued participation in therapy.   - Pt educated on importance of calling  for staff assist for functional mobility/transfers.  - All pt questions within OT scope of practice addressed, pt verbalized understanding.      Patient left HOB elevated with all lines intact, call button in reach, bed alarm on, restraints reapplied at end of session, and RN notified    GOALS:   Multidisciplinary Problems       Occupational Therapy Goals          Problem: Occupational Therapy    Goal Priority Disciplines Outcome Interventions   Occupational Therapy Goal     OT, PT/OT Ongoing, Progressing    Description: Goals to be met by: 9/8/23     Patient will increase functional independence with ADLs by performing:    UE Dressing with Moderate Assistance.  LE Dressing with Moderate Assistance.  Grooming while standing with Moderate Assistance.  Toileting from toilet with Moderate Assistance for hygiene and clothing management.                          Time Tracking:     OT Date of Treatment: 08/28/23  OT Start Time: 0826  OT Stop Time: 0855  OT Total Time (min): 29 min    Billable Minutes:Self Care/Home Management 10  Neuromuscular Re-education 19    OT/ANDREA: OT          8/28/2023

## 2023-08-29 LAB
ALBUMIN SERPL BCP-MCNC: 2.6 G/DL (ref 3.5–5.2)
ALP SERPL-CCNC: 109 U/L (ref 55–135)
ALT SERPL W/O P-5'-P-CCNC: 17 U/L (ref 10–44)
ANION GAP SERPL CALC-SCNC: 7 MMOL/L (ref 8–16)
AST SERPL-CCNC: 29 U/L (ref 10–40)
BASOPHILS # BLD AUTO: 0.01 K/UL (ref 0–0.2)
BASOPHILS NFR BLD: 0.1 % (ref 0–1.9)
BILIRUB SERPL-MCNC: 0.6 MG/DL (ref 0.1–1)
BUN SERPL-MCNC: 20 MG/DL (ref 6–20)
CALCIUM SERPL-MCNC: 9.2 MG/DL (ref 8.7–10.5)
CHLORIDE SERPL-SCNC: 106 MMOL/L (ref 95–110)
CO2 SERPL-SCNC: 27 MMOL/L (ref 23–29)
CREAT SERPL-MCNC: 1.6 MG/DL (ref 0.5–1.4)
DIFFERENTIAL METHOD: ABNORMAL
EOSINOPHIL # BLD AUTO: 0.2 K/UL (ref 0–0.5)
EOSINOPHIL NFR BLD: 3.4 % (ref 0–8)
ERYTHROCYTE [DISTWIDTH] IN BLOOD BY AUTOMATED COUNT: 15.3 % (ref 11.5–14.5)
EST. GFR  (NO RACE VARIABLE): 50.3 ML/MIN/1.73 M^2
GLUCOSE SERPL-MCNC: 93 MG/DL (ref 70–110)
HCT VFR BLD AUTO: 35 % (ref 40–54)
HGB BLD-MCNC: 10.8 G/DL (ref 14–18)
IMM GRANULOCYTES # BLD AUTO: 0.02 K/UL (ref 0–0.04)
IMM GRANULOCYTES NFR BLD AUTO: 0.3 % (ref 0–0.5)
LYMPHOCYTES # BLD AUTO: 0.8 K/UL (ref 1–4.8)
LYMPHOCYTES NFR BLD: 12 % (ref 18–48)
MCH RBC QN AUTO: 27.3 PG (ref 27–31)
MCHC RBC AUTO-ENTMCNC: 30.9 G/DL (ref 32–36)
MCV RBC AUTO: 89 FL (ref 82–98)
MONOCYTES # BLD AUTO: 0.7 K/UL (ref 0.3–1)
MONOCYTES NFR BLD: 10.5 % (ref 4–15)
NEUTROPHILS # BLD AUTO: 5 K/UL (ref 1.8–7.7)
NEUTROPHILS NFR BLD: 73.7 % (ref 38–73)
NRBC BLD-RTO: 0 /100 WBC
PLATELET # BLD AUTO: 482 K/UL (ref 150–450)
PMV BLD AUTO: 11.4 FL (ref 9.2–12.9)
POCT GLUCOSE: 117 MG/DL (ref 70–110)
POCT GLUCOSE: 85 MG/DL (ref 70–110)
POCT GLUCOSE: 99 MG/DL (ref 70–110)
POTASSIUM SERPL-SCNC: 5 MMOL/L (ref 3.5–5.1)
PROT SERPL-MCNC: 7.2 G/DL (ref 6–8.4)
RBC # BLD AUTO: 3.95 M/UL (ref 4.6–6.2)
SODIUM SERPL-SCNC: 140 MMOL/L (ref 136–145)
WBC # BLD AUTO: 6.84 K/UL (ref 3.9–12.7)

## 2023-08-29 PROCEDURE — 25000242 PHARM REV CODE 250 ALT 637 W/ HCPCS: Performed by: STUDENT IN AN ORGANIZED HEALTH CARE EDUCATION/TRAINING PROGRAM

## 2023-08-29 PROCEDURE — 25000003 PHARM REV CODE 250: Performed by: NURSE PRACTITIONER

## 2023-08-29 PROCEDURE — 94761 N-INVAS EAR/PLS OXIMETRY MLT: CPT

## 2023-08-29 PROCEDURE — 80053 COMPREHEN METABOLIC PANEL: CPT

## 2023-08-29 PROCEDURE — 94640 AIRWAY INHALATION TREATMENT: CPT

## 2023-08-29 PROCEDURE — 11000001 HC ACUTE MED/SURG PRIVATE ROOM

## 2023-08-29 PROCEDURE — 36415 COLL VENOUS BLD VENIPUNCTURE: CPT

## 2023-08-29 PROCEDURE — 25000003 PHARM REV CODE 250

## 2023-08-29 PROCEDURE — 92526 ORAL FUNCTION THERAPY: CPT

## 2023-08-29 PROCEDURE — 99233 PR SUBSEQUENT HOSPITAL CARE,LEVL III: ICD-10-PCS | Mod: ,,, | Performed by: PSYCHIATRY & NEUROLOGY

## 2023-08-29 PROCEDURE — 92507 TX SP LANG VOICE COMM INDIV: CPT

## 2023-08-29 PROCEDURE — 99232 PR SUBSEQUENT HOSPITAL CARE,LEVL II: ICD-10-PCS | Mod: AF,HB,, | Performed by: PSYCHIATRY & NEUROLOGY

## 2023-08-29 PROCEDURE — 99233 SBSQ HOSP IP/OBS HIGH 50: CPT | Mod: ,,, | Performed by: PSYCHIATRY & NEUROLOGY

## 2023-08-29 PROCEDURE — 99232 SBSQ HOSP IP/OBS MODERATE 35: CPT | Mod: AF,HB,, | Performed by: PSYCHIATRY & NEUROLOGY

## 2023-08-29 PROCEDURE — 85025 COMPLETE CBC W/AUTO DIFF WBC: CPT

## 2023-08-29 PROCEDURE — S0166 INJ OLANZAPINE 2.5MG: HCPCS

## 2023-08-29 RX ORDER — QUETIAPINE FUMARATE 25 MG/1
50 TABLET, FILM COATED ORAL NIGHTLY
Status: DISCONTINUED | OUTPATIENT
Start: 2023-08-29 | End: 2023-09-01

## 2023-08-29 RX ORDER — VALPROIC ACID 250 MG/5ML
1000 SOLUTION ORAL NIGHTLY
Status: DISCONTINUED | OUTPATIENT
Start: 2023-08-29 | End: 2023-09-01

## 2023-08-29 RX ORDER — VALPROIC ACID 250 MG/5ML
500 SOLUTION ORAL DAILY
Status: DISCONTINUED | OUTPATIENT
Start: 2023-08-30 | End: 2023-09-07 | Stop reason: HOSPADM

## 2023-08-29 RX ORDER — VALPROIC ACID 250 MG/5ML
500 SOLUTION ORAL
Status: DISCONTINUED | OUTPATIENT
Start: 2023-08-29 | End: 2023-09-07 | Stop reason: HOSPADM

## 2023-08-29 RX ORDER — VALPROIC ACID 250 MG/5ML
500 SOLUTION ORAL DAILY
Status: DISCONTINUED | OUTPATIENT
Start: 2023-08-30 | End: 2023-08-29

## 2023-08-29 RX ADMIN — SENNOSIDES AND DOCUSATE SODIUM 1 TABLET: 50; 8.6 TABLET ORAL at 09:08

## 2023-08-29 RX ADMIN — OLANZAPINE 5 MG: 10 INJECTION, POWDER, LYOPHILIZED, FOR SOLUTION INTRAMUSCULAR at 09:08

## 2023-08-29 RX ADMIN — EZETIMIBE 10 MG: 10 TABLET ORAL at 09:08

## 2023-08-29 RX ADMIN — QUETIAPINE FUMARATE 50 MG: 25 TABLET ORAL at 09:08

## 2023-08-29 RX ADMIN — NYSTATIN 500000 UNITS: 100000 SUSPENSION ORAL at 01:08

## 2023-08-29 RX ADMIN — VALPROIC ACID 500 MG: 250 SOLUTION ORAL at 01:08

## 2023-08-29 RX ADMIN — FERROUS SULFATE TAB 325 MG (65 MG ELEMENTAL FE) 1 EACH: 325 (65 FE) TAB at 09:08

## 2023-08-29 RX ADMIN — ASPIRIN 81 MG 81 MG: 81 TABLET ORAL at 09:08

## 2023-08-29 RX ADMIN — ACETAMINOPHEN 650 MG: 325 TABLET ORAL at 01:08

## 2023-08-29 RX ADMIN — IPRATROPIUM BROMIDE AND ALBUTEROL SULFATE 3 ML: 2.5; .5 SOLUTION RESPIRATORY (INHALATION) at 07:08

## 2023-08-29 RX ADMIN — METOPROLOL TARTRATE 25 MG: 25 TABLET, FILM COATED ORAL at 09:08

## 2023-08-29 RX ADMIN — AMIODARONE HYDROCHLORIDE 200 MG: 200 TABLET ORAL at 09:08

## 2023-08-29 RX ADMIN — IPRATROPIUM BROMIDE AND ALBUTEROL SULFATE 3 ML: 2.5; .5 SOLUTION RESPIRATORY (INHALATION) at 09:08

## 2023-08-29 RX ADMIN — VALPROIC ACID 1000 MG: 250 SOLUTION ORAL at 09:08

## 2023-08-29 RX ADMIN — ACETYLCYSTEINE 4 ML: 100 INHALANT RESPIRATORY (INHALATION) at 09:08

## 2023-08-29 RX ADMIN — ACETYLCYSTEINE 4 ML: 100 INHALANT RESPIRATORY (INHALATION) at 07:08

## 2023-08-29 RX ADMIN — ATORVASTATIN CALCIUM 80 MG: 40 TABLET, FILM COATED ORAL at 09:08

## 2023-08-29 RX ADMIN — POLYETHYLENE GLYCOL 3350 17 G: 17 POWDER, FOR SOLUTION ORAL at 09:08

## 2023-08-29 RX ADMIN — VALPROIC ACID 500 MG: 250 SOLUTION ORAL at 05:08

## 2023-08-29 RX ADMIN — NYSTATIN 500000 UNITS: 100000 SUSPENSION ORAL at 09:08

## 2023-08-29 RX ADMIN — NYSTATIN 500000 UNITS: 100000 SUSPENSION ORAL at 05:08

## 2023-08-29 RX ADMIN — APIXABAN 5 MG: 5 TABLET, FILM COATED ORAL at 09:08

## 2023-08-29 NOTE — ASSESSMENT & PLAN NOTE
Stroke risk factor  Cardiology was consulted by [facility]  CXR (8/19) with congestion of the pulmonary vasculature similar to previous CXR on 8/11, no increased fluid burden    No UOP documented  Sating well on RA   In no acute respiratory distress    -Strict I's/O's - Condom cath  -Currently on 1.5 L fluid restriction  -will re-initiate GDMT as tolerated, currently limited by EMERSON and borderline hyperkalemia

## 2023-08-29 NOTE — PROGRESS NOTES
"CONSULTATION LIAISON PSYCHIATRY PROGRESS NOTE    Patient Name: Tera Griffiths  MRN: [id number]  Patient Class: IP- Inpatient  Admission Date: 8/5/2023  Attending Physician: Morris Shea MD      SUBJECTIVE:   Tera Griffiths is a 58 y.o. male with no known past psychiatric history of and pertinent past medical history of CHF, CAD, AF, HTN, and CKD who was admitted 8/5 for CHF exacerbation. Hospital course c/b L MCA embolic stroke.      Psychiatry consulted for "currently in UE restraints due to pulling out lines/PEG. consult to psych to seek help for nonredirectable agitation. trying to avoid physical restraints"    Interval History  NO behavioral PRNs overnight. Patient intermittently opens eyes with tactile/verbal prompting Unable to squeeze hands with verbal commands which has been similar to last encounter. Brother present at bedside during morning rounds has question about medication management and agitation    OBJECTIVE:    Mental Status Exam:  General Appearance: appears stated age, dressed in hospital garb  Behavior:  sleeping through attempts to initiate interview.  Involuntary Movements and Motor Activity: no abnormal involuntary movements noted; no tics, no tremors, no akathisia, no dystonia, no evidence of tardive dyskinesia; no psychomotor agitation or retardation  Gait and Station: unable to assess - patient lying down or seated  Speech and Language:  unable to assess  Mood: unable to assess  Affect:  unable to assess  Thought Process and Associations: Unable to Assess  Thought Content and Perceptions:: Unable to Assess  Sensorium and Orientation: Unable to Formally Assess  Recent and Remote Memory: Unable to  Formally Assess  Attention and Concentration: Unable to Formally Assess  Fund of Knowledge: Unable to Formally Assess  Insight:  unable to assess  Judgment:  unable to assess    CAM ICU positive? unable to assess      ASSESSMENT & RECOMMENDATIONS   Delirium 2/2 underlying medical " etiologies    Continue depakote to 500 qam, 500 at noon, and 1000 mg QHS   Last VPA level subtheraputic at 22.4 on 8/26/23  Start Seroquel 50 mg QHS per G tube  Continue Zyprexa 5 mg PRN for breakthrough, non-redirectable agitation  Most recent EKG from 8/27 at 8AM with Qtc of 467. Should be noted that reliability can be affected by Afib. Continue to monitor Qtc with goal of <500    DELIRIUM  DELIRIUM BEHAVIOR PRECAUTIONS  As a reminder, changes in mentation & attention with either disorganized thinking, easy distractibility, and fluctuating level of consciousness are commonly observed with delirium.  Please utilize chemical restraints with PRN meds for agitation 1st. If needed for immediate safety reasons, can additionally utilize physical restraints. However, please avoid use of physical restraints, or have periods of patient being out of physical restraints if possible (e.g. while sleeping) as excessive use can promote rebound worsening of delirium/agitation.  Keep window shades open and room lit during day and room dim at night in order to promote normal sleep-wake cycles.  Encourage family at bedside. [place] patient often to situation, location, date.  Recommend to discourage Narcotics, Benzos and Anti-cholinergic medication use as they commonly promote and worsen delirium. If aforementioned medications need to be resumed for optimal patient outcomes after careful consideration, please exercise judicious use with clear documentation.  Please continue medical workup for causative etiology of delirium.     RISK ASSESSMENT  NO NEED FOR PEC, UNCONTESTED    FOLLOW UP  Will follow up while in house    DISPOSITION - once medically cleared:  Defer to medical team    Please contact ON CALL psychiatry service (24/7) for any acute issues that may arise.    Dr. Frank Haro  [facility] PGY4  CL Psychiatry  [facility]  8/29/2023 8:41  AM      --------------------------------------------------------------------------------------------------------------------------------------------------------------------------------------------------------------------------------------    CONTINUED OBJECTIVE clinical data & findings reviewed and noted for above decision making    Current Medications:   Scheduled Meds:    acetylcysteine 100 mg/ml (10%)  4 mL Nebulization BID    albuterol-ipratropium  3 mL Nebulization BID    amiodarone  200 mg Per G Tube Daily    apixaban  5 mg Per G Tube BID    aspirin  81 mg Per G Tube Daily    atorvastatin  80 mg Per G Tube Daily    ezetimibe  10 mg Per G Tube QHS    ferrous sulfate  1 tablet Per G Tube Daily    metoprolol tartrate  25 mg Per G Tube BID    nystatin  500,000 Units Oral QID    OLANZapine  5 mg Intramuscular Once    OLANZapine  5 mg Intramuscular QHS    polyethylene glycol  17 g Per G Tube Daily    senna-docusate 8.6-50 mg  1 tablet Per G Tube BID    valproic acid (as sodium salt)  1,000 mg Per G Tube QHS    [START ON 8/30/2023] valproic acid (as sodium salt)  500 mg Per G Tube Daily    valproic acid (as sodium salt)  500 mg Per G Tube Q24H     PRN Meds: acetaminophen, bisacodyL, chlorproMAZINE, dextrose 10%, dextrose 10%, diatrizoate meglumineand-diatrizoate sodium, glucagon (human recombinant), hydrALAZINE, levalbuterol **AND** ipratropium, labetalol, nitroGLYCERIN, OLANZapine, ondansetron, sodium chloride 0.9%, sodium chloride 0.9%, sodium chloride 0.9%    Allergies:   Review of patient's allergies indicates:  No Known Allergies    Vitals  Vitals:    08/29/23 1432   BP:    Pulse: 76   Resp:    Temp:        Labs/Imaging/Studies:  Recent Results (from the past 24 hour(s))   POCT glucose    Collection Time: 08/28/23  3:13 PM   Result Value Ref Range    POCT Glucose 106 70 - 110 mg/dL   POCT glucose    Collection Time: 08/29/23  5:47 AM   Result Value Ref Range    POCT Glucose 85 70 - 110 mg/dL   CBC auto  differential    Collection Time: 08/29/23  8:07 AM   Result Value Ref Range    WBC 6.84 3.90 - 12.70 K/uL    RBC 3.95 (L) 4.60 - 6.20 M/uL    Hemoglobin 10.8 (L) 14.0 - 18.0 g/dL    Hematocrit 35.0 (L) 40.0 - 54.0 %    MCV 89 82 - 98 fL    MCH 27.3 27.0 - 31.0 pg    MCHC 30.9 (L) 32.0 - 36.0 g/dL    RDW 15.3 (H) 11.5 - 14.5 %    Platelets 482 (H) 150 - 450 K/uL    MPV 11.4 9.2 - 12.9 fL    Immature Granulocytes 0.3 0.0 - 0.5 %    Gran # (ANC) 5.0 1.8 - 7.7 K/uL    Immature Grans (Abs) 0.02 0.00 - 0.04 K/uL    Lymph # 0.8 (L) 1.0 - 4.8 K/uL    Mono # 0.7 0.3 - 1.0 K/uL    Eos # 0.2 0.0 - 0.5 K/uL    Baso # 0.01 0.00 - 0.20 K/uL    nRBC 0 0 /100 WBC    Gran % 73.7 (H) 38.0 - 73.0 %    Lymph % 12.0 (L) 18.0 - 48.0 %    Mono % 10.5 4.0 - 15.0 %    Eosinophil % 3.4 0.0 - 8.0 %    Basophil % 0.1 0.0 - 1.9 %    Differential Method Automated    Comprehensive metabolic panel    Collection Time: 08/29/23  8:07 AM   Result Value Ref Range    Sodium 140 136 - 145 mmol/L    Potassium 5.0 3.5 - 5.1 mmol/L    Chloride 106 95 - 110 mmol/L    CO2 27 23 - 29 mmol/L    Glucose 93 70 - 110 mg/dL    BUN 20 6 - 20 mg/dL    Creatinine 1.6 (H) 0.5 - 1.4 mg/dL    Calcium 9.2 8.7 - 10.5 mg/dL    Total Protein 7.2 6.0 - 8.4 g/dL    Albumin 2.6 (L) 3.5 - 5.2 g/dL    Total Bilirubin 0.6 0.1 - 1.0 mg/dL    Alkaline Phosphatase 109 55 - 135 U/L    AST 29 10 - 40 U/L    ALT 17 10 - 44 U/L    eGFR 50.3 (A) >60 mL/min/1.73 m^2    Anion Gap 7 (L) 8 - 16 mmol/L   POCT glucose    Collection Time: 08/29/23 12:27 PM   Result Value Ref Range    POCT Glucose 117 (H) 70 - 110 mg/dL     Imaging Results              X-Ray Chest AP Portable (Final result)  Result time 08/05/23 14:46:03      Final result by Leandro Ruffin MD (08/05/23 14:46:03)                   Impression:      1. Central hilar findings suggest congestive change/edema.  No large focal consolidation.      Electronically signed by: Leandro Ruffin MD  Date:    08/05/2023  Time:    14:46                Narrative:    EXAMINATION:  XR CHEST AP PORTABLE    CLINICAL HISTORY:  Chest Pain;    TECHNIQUE:  Single frontal view of the chest was performed.    COMPARISON:  07/13/2023    FINDINGS:  The cardiomediastinal silhouette is prominent, similar to the previous exam noting left chest wall pacer..  There is no pleural effusion.  The trachea is midline.  The lungs are symmetrically expanded bilaterally with coarse central hilar interstitial attenuation.  No large focal consolidation seen.  There is no pneumothorax.  The osseous structures are remarkable for degenerative change..

## 2023-08-29 NOTE — SUBJECTIVE & OBJECTIVE
Neurologic Chief Complaint: L MCA syndrome    Subjective:     Interval History: Patient is seen for follow-up neurological assessment and treatment recommendations: G-tube replaced, tolerating Tube feeds. pending placement.    HPI, Past Medical, Family, and Social History remains the same as documented in the initial encounter.     Review of Systems   Unable to perform ROS: Other   Neurological:  Positive for speech difficulty.     Scheduled Meds:   acetylcysteine 100 mg/ml (10%)  4 mL Nebulization BID    albuterol-ipratropium  3 mL Nebulization BID    amiodarone  200 mg Per G Tube Daily    apixaban  5 mg Per G Tube BID    aspirin  81 mg Per G Tube Daily    atorvastatin  80 mg Per G Tube Daily    ezetimibe  10 mg Per G Tube QHS    ferrous sulfate  1 tablet Per G Tube Daily    metoprolol tartrate  25 mg Per G Tube BID    nystatin  500,000 Units Oral QID    OLANZapine  5 mg Intramuscular Once    OLANZapine  5 mg Intramuscular QHS    polyethylene glycol  17 g Per G Tube Daily    senna-docusate 8.6-50 mg  1 tablet Per G Tube BID    valproic acid (as sodium salt)  500 mg Per G Tube Q8H     Continuous Infusions:  PRN Meds:acetaminophen, bisacodyL, chlorproMAZINE, dextrose 10%, dextrose 10%, diatrizoate meglumineand-diatrizoate sodium, glucagon (human recombinant), hydrALAZINE, levalbuterol **AND** ipratropium, labetalol, nitroGLYCERIN, OLANZapine, ondansetron, sodium chloride 0.9%, sodium chloride 0.9%, sodium chloride 0.9%    Objective:     Vital Signs (Most Recent):  Temp: 98.9 °F (37.2 °C) (08/29/23 0716)  Pulse: 71 (08/29/23 0925)  Resp: 20 (08/29/23 0901)  BP: (!) 158/120 (08/29/23 0925)  SpO2: (!) 92 % (08/29/23 0901)  BP Location: Left arm    Vital Signs Range (Last 24H):  Temp:  [97.6 °F (36.4 °C)-99 °F (37.2 °C)]   Pulse:  []   Resp:  [16-22]   BP: (125-158)/()   SpO2:  [92 %-100 %]   BP Location: Left arm       Physical Exam  Vitals and nursing note reviewed.   Constitutional:       General: He is  not in acute distress.     Appearance: He is well-developed.   HENT:      Head: Normocephalic and atraumatic.   Cardiovascular:      Rate and Rhythm: Normal rate.   Pulmonary:      Effort: Pulmonary effort is normal. No respiratory distress.   Abdominal:      General: There is no distension.   Skin:     General: Skin is warm and dry.   Neurological:      Comments: Aphasic, does not follow commands  L gaze              Neurological Exam:   LOC: drowsy  Attention Span: poor  Language: Global aphasia  Articulation: Untestable due to severe aphasia   Orientation: Untestable due to severe aphasia   EOM (CN III, IV, VI): Gaze preference  left  Facial Movement (CN VII): Lower facial weakness on the Right  Motor: Spontaneously moves all 4 extremities, will not follow commands    Laboratory:  CMP:   Recent Labs   Lab 08/29/23  0807   CALCIUM 9.2   ALBUMIN 2.6*   PROT 7.2      K 5.0   CO2 27      BUN 20   CREATININE 1.6*   ALKPHOS 109   ALT 17   AST 29   BILITOT 0.6     CBC:   Recent Labs   Lab 08/29/23  0807   WBC 6.84   RBC 3.95*   HGB 10.8*   HCT 35.0*   *   MCV 89   MCH 27.3   MCHC 30.9*       Diagnostic Results   Brain Imaging   CT Head 8/10/23  Impression:     No detrimental change compared to prior.     Evolving acute infarcts within the left MCA and PCA territories.  No hemorrhagic conversion.  No significant mass effect     Suspected small subacute infarction within the right caudate.     Multiple areas remote infarctions as detailed above.        CT Head 8/8/23 1704  Impression:     1. Redemonstration of acute infarctions in the left MCA and PCA territories.  No evidence to suggest hemorrhagic transformation.  2. Suspected small subacute infarction in the right caudate.  3. Numerous remote infarctions as detailed above.     CT Head 8/8/23 0865  Impression:     Moderate developing hypoattenuation left inferior frontal lobe and left occipital lobe concerning for developing recent infarcts post  thrombectomy.     Allowing for scattered hyperdensity related to recently contrast administration for thrombectomy there is no definite acute intracranial hemorrhage.     Previous hypodensity right caudate no longer seen which may be related to contrast administration for thrombectomy and possible subacute age infarction.     Superimposed remote infarcts with moderate to large encephalomalacia right MCA territory unchanged     Vessel Imaging   IR Angio 8/8/23  Preliminary Interpretation:   1.    No evidence of left ICA or MCA stenosis. No large or medium vessel occlusion.     CTA Multiphase 8/7/23  Impression:     CT head:     New right caudate nucleus hypodensity, likely representing age-indeterminate infarct.  Could be further evaluated with MRI.     Multifocal encephalomalacia involving the right frontal, temporoparietal, and left occipital lobes.     CTA head and neck:     Acute occlusion of the superior left M2 segment.     Left PCA occlusion of undetermined age.     Filling defect in the right main pulmonary artery, concerning for acute pulmonary thromboembolism.  Consider follow-up pulmonary CTA to more fully assess the extent of thromboemboli, the clot burden, and the presence or absence of right heart strain.     Not fully detailed above:     - Incomplete opacification of the left atrial appendage, suspicious for an intraluminal thrombus.  Follow-up echocardiogram, or cardiac MRI or CTA, recommended.     - The presence of acute thrombi or thromboemboli in both the systemic arterial and pulmonary arterial circulation raises concern for the possibility of underlying intracardiac or extracardiac right-to-left shunt, and/or a hypercoagulable state.  Correlate clinically.     - Incompletely visualized, but possibly large, pericardial effusion.  Artifacts compromise accurate assessment of its attenuation.     Cardiac Imaging   TTE with bubble 8/8/23    Left Ventricle: The left ventricle is moderately dilated.  Increased ventricular mass. Normal wall thickness. There is moderate eccentric hypertrophy. Severe global hypokinesis present. Septal motion is consistent with pacing. There is severely reduced systolic function with a visually estimated ejection fraction of 15 - 20%. Ejection fraction by visual approximation is 20%. Unable to assess diastolic function due to arrhythmia.    Left Atrium: Left atrium is severely dilated. Radha 85mL/m2.    Right Ventricle: Mild right ventricular enlargement. Wall thickness is normal. Right ventricle wall motion  is normal. Systolic function is severely reduced. Pacemaker lead present in the ventricle.    Right Atrium: Right atrium is severely dilated.    Aortic Valve: There is mild aortic valve sclerosis. There is normal leaflet mobility. There is no significant regurgitation.    Mitral Valve: There is bileaflet sclerosis. There is normal leaflet mobility. There is mild regurgitation.    Tricuspid Valve: The tricuspid valve is structurally normal. There is normal leaflet mobility. There is mild regurgitation.    Pulmonic Valve: The pulmonic valve is structurally normal. There is normal leaflet mobility. There is no significant regurgitation.    Aorta: Aortic root is normal in size measuring 3.13 cm. Ascending aorta is normal measuring 3.24 cm.    IVC/SVC: Normal venous pressure at 3 mmHg.    Pericardium: The pericardium is normal. There is no pericardial effusion.

## 2023-08-29 NOTE — PLAN OF CARE
Problem: Adult Inpatient Plan of Care  Goal: Plan of Care Review  Outcome: Ongoing, Progressing  Goal: Readiness for Transition of Care  Outcome: Ongoing, Progressing     Problem: Adjustment to Illness (Stroke, Ischemic/Transient Ischemic Attack)  Goal: Optimal Coping  Outcome: Ongoing, Progressing     Problem: Cognitive Impairment (Stroke, Ischemic/Transient Ischemic Attack)  Goal: Optimal Cognitive Function  Outcome: Ongoing, Progressing     Problem: Communication Impairment (Stroke, Ischemic/Transient Ischemic Attack)  Goal: Improved Communication Skills  Outcome: Ongoing, Progressing     Problem: Functional Ability Impaired (Stroke, Ischemic/Transient Ischemic Attack)  Goal: Optimal Functional Ability  Outcome: Ongoing, Progressing     Problem: Sensorimotor Impairment (Stroke, Ischemic/Transient Ischemic Attack)  Goal: Improved Sensorimotor Function  Outcome: Ongoing, Progressing     Problem: Restraint, Nonbehavioral (Nonviolent)  Goal: Absence of Harm or Injury  Outcome: Ongoing, Progressing

## 2023-08-29 NOTE — PLAN OF CARE
Problem: Adult Inpatient Plan of Care  Goal: Plan of Care Review  Outcome: Ongoing, Progressing  Goal: Patient-Specific Goal (Individualized)  Outcome: Ongoing, Progressing  Goal: Absence of Hospital-Acquired Illness or Injury  Outcome: Ongoing, Progressing  Goal: Readiness for Transition of Care  Outcome: Ongoing, Progressing     Problem: Diabetes Comorbidity  Goal: Blood Glucose Level Within Targeted Range  Outcome: Ongoing, Progressing     Problem: Infection  Goal: Absence of Infection Signs and Symptoms  Outcome: Ongoing, Progressing     Problem: Bowel Elimination Impaired (Stroke, Ischemic/Transient Ischemic Attack)  Goal: Effective Bowel Elimination  Outcome: Ongoing, Progressing     Problem: Cerebral Tissue Perfusion (Stroke, Ischemic/Transient Ischemic Attack)  Goal: Optimal Cerebral Tissue Perfusion  Outcome: Ongoing, Progressing
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
## 2023-08-29 NOTE — PLAN OF CARE
Problem: Adult Inpatient Plan of Care  Goal: Plan of Care Review  Outcome: Ongoing, Progressing  Goal: Absence of Hospital-Acquired Illness or Injury  Outcome: Ongoing, Progressing  Goal: Optimal Comfort and Wellbeing  Outcome: Ongoing, Progressing     Problem: Diabetes Comorbidity  Goal: Blood Glucose Level Within Targeted Range  Outcome: Ongoing, Progressing     Problem: Infection  Goal: Absence of Infection Signs and Symptoms  Outcome: Ongoing, Progressing     Problem: Bowel Elimination Impaired (Stroke, Ischemic/Transient Ischemic Attack)  Goal: Effective Bowel Elimination  Outcome: Ongoing, Progressing     Problem: Cognitive Impairment (Stroke, Ischemic/Transient Ischemic Attack)  Goal: Optimal Cognitive Function  Outcome: Ongoing, Progressing     Problem: Communication Impairment (Stroke, Ischemic/Transient Ischemic Attack)  Goal: Improved Communication Skills  Outcome: Ongoing, Progressing     Problem: Functional Ability Impaired (Stroke, Ischemic/Transient Ischemic Attack)  Goal: Optimal Functional Ability  Outcome: Ongoing, Progressing     Problem: Swallowing Impairment (Stroke, Ischemic/Transient Ischemic Attack)  Goal: Optimal Eating and Swallowing without Aspiration  Outcome: Ongoing, Progressing     Problem: Urinary Elimination Impaired (Stroke, Ischemic/Transient Ischemic Attack)  Goal: Effective Urinary Elimination  Outcome: Ongoing, Progressing     Problem: Restraint, Nonbehavioral (Nonviolent)  Goal: Absence of Harm or Injury  Outcome: Ongoing, Progressing

## 2023-08-29 NOTE — ASSESSMENT & PLAN NOTE
Due to stroke  SLP to evaluate and treat  s/p PEG placement  PEG tube self removed 8/26/23  G-tube replaced 8/28    - continue tube feeds

## 2023-08-29 NOTE — ASSESSMENT & PLAN NOTE
S/p PEG placement 8/14- self removed 8/26  On tube feeds  Nutrition consulted  Currently has monk in place for feeds, functional  G-tube replaced

## 2023-08-29 NOTE — PT/OT/SLP PROGRESS
Speech Language Pathology Treatment    Patient Name:  Tera Griffiths   MRN:  [id number]  Admitting Diagnosis: Embolic stroke involving left middle cerebral artery    Recommendations:                  General Recommendations:  Dysphagia therapy and Speech/language therapy  Diet recommendations:  NPO, Liquid Diet Level: NPO   Aspiration Precautions: Frequent oral care and Strict aspiration precautions   General Precautions: Standard, aspiration, aphasia, fall  Communication strategies:  provide increased time to answer and go to room if call light pushed    Assessment:     Tera Griffiths is a 58 y.o. male with an SLP diagnosis of Aphasia and Dysphagia.      Subjective     Nurse cleared for session. Pt nonverbal.     Pain/Comfort:  Pain Rating 1:  (some moaning, unclear)  Pain Rating Post-Intervention 1:  (pt resting comfortably)    Respiratory Status: Room air    Objective:     Has the patient been evaluated by SLP for swallowing?   Yes  Keep patient NPO? Yes     Pt seen bedside, lethargic though able to be roused with min cues.  Intermittent moaning elicited though no true words.  He did not respond to simple y/n questions or simple commands.  Pt offered several different flavors of thin liquids and puree.  Pt self presented thin berry boost x1 with prolonged oral holding.  No pharyngeal swallow elicited.  Pt resistant to oral suction to clear thin liquid trial.  SLP unable to r/o aspiration.  Pt pointing at puree [facility] when offered two different flavors, pt pulling away and turning head away from SLP. Education provided to pt and his brother re: continued recs for npo, aspiration risk, and POC.  Education to be ongoing.     Goals:   Multidisciplinary Problems       SLP Goals          Problem: SLP    Goal Priority Disciplines Outcome   SLP Goal     SLP Ongoing, Progressing   Description: Speech Language Pathology Goals  Goals expected to be met by 8/15  1. Pt will participate in ongoing assessment of swallow.    2. Pt will answer simple y/n questions with 60% accy given max cues.   3. Pt will follow simple commands with 50% accy given max cues.   4. Pt will vocalize in response to any stim x5/session given max cues.   5. Pt will localize to stim on R x2/session given max cues.                              Plan:     Patient to be seen:  3 x/week   Plan of Care expires:  09/07/23  Plan of Care reviewed with:  patient, sibling   SLP Follow-Up:  Yes       Discharge recommendations:  nursing facility, skilled   Barriers to Discharge:  Level of Skilled Assistance Needed      Time Tracking:     SLP Treatment Date:   08/29/23  Speech Start Time:  0944  Speech Stop Time:  1000     Speech Total Time (min):  16 min    Billable Minutes: Speech Therapy Individual 8 and Treatment Swallowing Dysfunction 8    08/29/2023

## 2023-08-29 NOTE — ASSESSMENT & PLAN NOTE
Pediatric Electrophysiology Clinic Note     Referring Physician:  Solo Fabian MD  [address] [place] [facility] [address]  [phone]    Reason for Consultation:  Atrial septal defect     RE: Sung England    2017     Problem List:  Patient Active Problem List   Diagnosis   • Prolonged QT interval in  period, resolved   • ASD (atrial septal defect)          History:   This note is in reference to the recent Pediatric Electrophysiology Clinic visit on 23 at [facility] for Sung [place], the 6 year old male with the above problem list.  He was accompanied by his mother.     Sung has done well in the past few months.  His energy has been good, and he has had no heart racing or chest pain.  His mother has had no concerns.    A complete 10-point review of systems was otherwise negative.    Sung's past medical history is summarized in the problem list above.    ALLERGIES:   Allergen Reactions   • Penicillins Other (See Comments)     He is taking no medications.    Social History     Social History Narrative    Parents and 2sisters      He lives in [place], [place].    There has been no change to the family history.    Physical Exam:    /58 (BP Location: RUE - Right upper extremity, Patient Position: Sitting, Cuff Size: Pediatric)   Pulse 93   Ht 3' 6.52\" (1.08 m)   Wt 19 kg (41 lb 14.2 oz)   BMI 16.29 kg/m²   BSA 0.75 m²     On physical exam, he was an alert and pleasant boy in no distress. The lungs were clear to auscultation bilaterally.  The point of maximal impulse was normally placed, and there were no lifts, heaves, or thrills.  There was a normal S1 and a physiologically split S2.  There was a regular rate and rhythm, and there were no murmurs, gallops, or rubs.  The abdomen was soft and nontender, with no hepatomegaly.  The extremities were warm and well-perfused, and there was no edema.   "Acute on chronic combined systolic and diastolic heart failure  CAD (coronary artery disease)  Dyslipidemia  58 year old with HFrEF (10%, DD, pHTN), CAD (h/o STEMI, s/p PCI to Rcx-08/2021), pAF s/p DCCV, HTN, CKD3b who presented to the ED with persistent chest pain since 10 AM on the morning prior to admission    Per initial Cardiology fellow evaluation: "Patient states that he has had similar pain since his discharge and has been occurring even after being discharged with no relief. Patient states chest pain is a 8/10 in severity, non-radiating with no associated aggravating or alleviating factors. Patient was recently discharged on 07/16- required nitro gtt for persistent chest pain in the setting of hypertensive emergency.  ECG unchanged from prior. He denies any nausea, vomiting, hematemesis, cough, palpitations or shortness of breath. Reports having worsening abdominal distension and feels he is still retaining fluid. Reports compliance with his medication"    Intake/Output Summary (Last 24 hours) at 8/6/2023 1214  Last data filed at 8/6/2023 1000  Gross per 24 hour   Intake 852 ml   Output 4600 ml   Net -3748 ml     Net IO Since Admission: -3,748 mL [08/06/23 1214]    -Suspect patient symptoms are primarily being driven by congestion as reports some alleviation in symptoms with volume removal via diuresis    RECOMMENDATIONS  -Continue diuresis with Furosemide IV to target euvolemia  - Recommend increasing Imdur 60 mg daily to 90 mg daily  - Increase Entresto dose to 49-51mg   - Up-titrate BB as tolerated  -Add amlodipine 5-10 mg if needed for further BP control  -Dyslipidemia not at goal. Switch Atorvastatin to Rosuvastatin 40mg, Continue Ezetemibe.   "       Laboratory Studies:  A 12-lead electrocardiogram was performed, and I reviewed and interpreted it; it demonstrated sinus rhythm with a rate of 86 beats per minute.  The VT, QRS, and QTc intervals were normal.  There was no evidence of chamber enlargement or ventricular hypertrophy.  There was no evidence of pre-excitation.      I reviewed and interpreted the echocardiogram, which demonstrated the followin. Small atrial septal defect (2mm) with left-to-right shunt.  2. Normal size of the right atrium and right ventricle.  3. Normal left ventricular size and function.       Assessment:    Sung is a 6-year-old boy with a small secundum atrial septal defect.  Today's echo demonstrates a small ASD, and there is still a chance that this may close spontaneously.       Recommendations:  1.  There are no restrictions to activities.  2.  I would like to see him back in one year.       Thank you for allowing me to participate in the care of Sung.  Please contact me if you have any questions.     Sincerely,    Mike Christian MD  Pediatric Cardiology / Electrophysiology

## 2023-08-29 NOTE — ASSESSMENT & PLAN NOTE
Previously required Precedex gtt in [facility], as patient was displaying violent behavior. Pulling out lines and PEG tube. PEG tube replaced with monk catheter via General Surgery.    - Depakote 500mg Q8h and Zyprexa QHS  - removing restraints today

## 2023-08-30 LAB
MAGNESIUM SERPL-MCNC: 2 MG/DL (ref 1.6–2.6)
PHOSPHATE SERPL-MCNC: 3 MG/DL (ref 2.7–4.5)
POCT GLUCOSE: 107 MG/DL (ref 70–110)
POCT GLUCOSE: 74 MG/DL (ref 70–110)

## 2023-08-30 PROCEDURE — 99900035 HC TECH TIME PER 15 MIN (STAT)

## 2023-08-30 PROCEDURE — 36415 COLL VENOUS BLD VENIPUNCTURE: CPT

## 2023-08-30 PROCEDURE — 99232 PR SUBSEQUENT HOSPITAL CARE,LEVL II: ICD-10-PCS | Mod: ,,, | Performed by: PSYCHIATRY & NEUROLOGY

## 2023-08-30 PROCEDURE — 83735 ASSAY OF MAGNESIUM: CPT

## 2023-08-30 PROCEDURE — 94640 AIRWAY INHALATION TREATMENT: CPT

## 2023-08-30 PROCEDURE — 25000003 PHARM REV CODE 250

## 2023-08-30 PROCEDURE — 97535 SELF CARE MNGMENT TRAINING: CPT

## 2023-08-30 PROCEDURE — 25000242 PHARM REV CODE 250 ALT 637 W/ HCPCS: Performed by: STUDENT IN AN ORGANIZED HEALTH CARE EDUCATION/TRAINING PROGRAM

## 2023-08-30 PROCEDURE — 84100 ASSAY OF PHOSPHORUS: CPT

## 2023-08-30 PROCEDURE — S0166 INJ OLANZAPINE 2.5MG: HCPCS

## 2023-08-30 PROCEDURE — 94761 N-INVAS EAR/PLS OXIMETRY MLT: CPT

## 2023-08-30 PROCEDURE — 99233 SBSQ HOSP IP/OBS HIGH 50: CPT | Mod: ,,, | Performed by: NURSE PRACTITIONER

## 2023-08-30 PROCEDURE — 99233 PR SUBSEQUENT HOSPITAL CARE,LEVL III: ICD-10-PCS | Mod: ,,, | Performed by: NURSE PRACTITIONER

## 2023-08-30 PROCEDURE — 11000001 HC ACUTE MED/SURG PRIVATE ROOM

## 2023-08-30 PROCEDURE — 99232 SBSQ HOSP IP/OBS MODERATE 35: CPT | Mod: ,,, | Performed by: PSYCHIATRY & NEUROLOGY

## 2023-08-30 PROCEDURE — 25000003 PHARM REV CODE 250: Performed by: NURSE PRACTITIONER

## 2023-08-30 PROCEDURE — 97110 THERAPEUTIC EXERCISES: CPT

## 2023-08-30 PROCEDURE — 94668 MNPJ CHEST WALL SBSQ: CPT

## 2023-08-30 RX ADMIN — ASPIRIN 81 MG 81 MG: 81 TABLET ORAL at 09:08

## 2023-08-30 RX ADMIN — APIXABAN 5 MG: 5 TABLET, FILM COATED ORAL at 09:08

## 2023-08-30 RX ADMIN — QUETIAPINE FUMARATE 50 MG: 25 TABLET ORAL at 09:08

## 2023-08-30 RX ADMIN — VALPROIC ACID 500 MG: 250 SOLUTION ORAL at 12:08

## 2023-08-30 RX ADMIN — NYSTATIN 500000 UNITS: 100000 SUSPENSION ORAL at 09:08

## 2023-08-30 RX ADMIN — VALPROIC ACID 500 MG: 250 SOLUTION ORAL at 09:08

## 2023-08-30 RX ADMIN — NYSTATIN 500000 UNITS: 100000 SUSPENSION ORAL at 01:08

## 2023-08-30 RX ADMIN — IPRATROPIUM BROMIDE AND ALBUTEROL SULFATE 3 ML: 2.5; .5 SOLUTION RESPIRATORY (INHALATION) at 08:08

## 2023-08-30 RX ADMIN — SENNOSIDES AND DOCUSATE SODIUM 1 TABLET: 50; 8.6 TABLET ORAL at 09:08

## 2023-08-30 RX ADMIN — ATORVASTATIN CALCIUM 80 MG: 40 TABLET, FILM COATED ORAL at 09:08

## 2023-08-30 RX ADMIN — EZETIMIBE 10 MG: 10 TABLET ORAL at 09:08

## 2023-08-30 RX ADMIN — METOPROLOL TARTRATE 25 MG: 25 TABLET, FILM COATED ORAL at 09:08

## 2023-08-30 RX ADMIN — VALPROIC ACID 1000 MG: 250 SOLUTION ORAL at 09:08

## 2023-08-30 RX ADMIN — AMIODARONE HYDROCHLORIDE 200 MG: 200 TABLET ORAL at 09:08

## 2023-08-30 RX ADMIN — OLANZAPINE 5 MG: 10 INJECTION, POWDER, LYOPHILIZED, FOR SOLUTION INTRAMUSCULAR at 09:08

## 2023-08-30 RX ADMIN — ACETYLCYSTEINE 4 ML: 100 INHALANT RESPIRATORY (INHALATION) at 08:08

## 2023-08-30 RX ADMIN — FERROUS SULFATE TAB 325 MG (65 MG ELEMENTAL FE) 1 EACH: 325 (65 FE) TAB at 09:08

## 2023-08-30 RX ADMIN — POLYETHYLENE GLYCOL 3350 17 G: 17 POWDER, FOR SOLUTION ORAL at 09:08

## 2023-08-30 RX ADMIN — NYSTATIN 500000 UNITS: 100000 SUSPENSION ORAL at 05:08

## 2023-08-30 NOTE — ASSESSMENT & PLAN NOTE
Previously required Precedex gtt in [facility], as patient was displaying violent behavior. Pulling out lines and PEG tube. PEG tube replaced with monk catheter via General Surgery.    - Depakote 500mg BID and 1000 QHS  - removing restraints today

## 2023-08-30 NOTE — PT/OT/SLP PROGRESS
Physical Therapy Treatment    Patient Name:  Tera Griffiths   MRN:  [id number]  Co-tx w/ OT 2/2 pt w/ poor activity tolerance, no command following, and poor safety awareness requiring 2 skilled assist for safe mobilization  Recommendations:     Discharge Recommendations: nursing facility, skilled  Discharge Equipment Recommendations: to be determined by next level of care  Barriers to discharge: Inaccessible home, Decreased caregiver support, and inc level of assist needed    Assessment:     Tera Griffiths is a 58 y.o. male admitted with a medical diagnosis of Embolic stroke involving left middle cerebral artery.  He presents with the following impairments/functional limitations: weakness, impaired endurance, impaired self care skills, impaired functional mobility, gait instability, impaired balance, impaired cognition, visual deficits, decreased coordination, decreased safety awareness. Pt continues to follow no commands, attempted to utilize visual feedback through use of mirror w/ pt closing eyes most of the session and uninterested in mirror when he did look at it.     Rehab Prognosis: Fair; patient would benefit from acute skilled PT services to address these deficits and reach maximum level of function.    Recent Surgery: Procedure(s) (LRB):  EGD, WITH PEG TUBE INSERTION (N/A) 13 Days Post-Op    Plan:     During this hospitalization, patient to be seen 3 x/week to address the identified rehab impairments via gait training, therapeutic activities, therapeutic exercises, neuromuscular re-education and progress toward the following goals:    Plan of Care Expires:  09/08/23    Subjective     Chief Complaint: moaning throughout session  Patient/Family Comments/goals: NA  Pain/Comfort:  Location 1:  (moaning throughout session, ASHLEY 2/2 AMS)  Pain Addressed 1: Reposition, Distraction      Objective:     Communicated with RN prior to session.  Patient found HOB elevated with PEG Tube, SCD, restraints, telemetry,  Manasa upon PT entry to room.     General Precautions: Standard, aphasia, aspiration, NPO, fall  Orthopedic Precautions: N/A  Braces: N/A  Respiratory Status: Room air     Functional Mobility:  Bed Mobility:     Supine to Sit: moderate assistance  Sit to Supine: contact guard assistance  Balance: EOB sitting CGA-maxA when pt resisting      AM-PAC 6 CLICK MOBILITY  Turning over in bed (including adjusting bedclothes, sheets and blankets)?: 3  Sitting down on and standing up from a chair with arms (e.g., wheelchair, bedside commode, etc.): 1  Moving from lying on back to sitting on the side of the bed?: 2  Moving to and from a bed to a chair (including a wheelchair)?: 1  Need to walk in hospital room?: 1  Climbing 3-5 steps with a railing?: 1  Basic Mobility Total Score: 9       Treatment & Education:  Pt educated on PT POC/goals, d/c recs, and continued treatment.  Supine <>sit x2 ea way  Sitting balance w/ assistance and cueing for upright posture, attention to task, and safety awareness to lines/tubes  Rolling L/R in bed w/ maxA to clean pt and linens 2/2 pt received w/ saturated brief, pads, and abdominal binder    Patient left right sidelying with all lines intact, call button in reach, bed alarm on, restraints reapplied at end of session, RN notified, and sitter present..    GOALS:   Multidisciplinary Problems       Physical Therapy Goals          Problem: Physical Therapy    Goal Priority Disciplines Outcome Goal Variances Interventions   Physical Therapy Goal     PT, PT/OT Ongoing, Progressing     Description: Goals to be completed by: 9/8/23    Pt will perform sup<>sit transfers w/ minimum assistance  Pt will have sufficient dynamic balance to sit EOB while performing ADLs/therex w/ stand by assistance for 10 min  Pt will be able to stand up from EOB w/ moderate assistance using LRAD  Pt will ambulate 20 feet w/ maximal assistance using LRAD  Pt will be independent w/ HEP therex on BLE w/ good form and ROM    Pt will follow >50 % of single-step commands throughout treatment session                        Time Tracking:     PT Received On: 08/30/23  PT Start Time: 1029     PT Stop Time: 1048  PT Total Time (min): 19 min     Billable Minutes: Therapeutic Exercise 19    Treatment Type: Treatment  PT/PTA: PT     Number of PTA visits since last PT visit: 0     08/30/2023

## 2023-08-30 NOTE — PLAN OF CARE
[facility] - Neurosurgery (Hospital)  Discharge Reassessment    Primary Care Provider: Carleen Bueno MD    Expected Discharge Date: 9/1/2023    Reassessment (most recent)       Discharge Reassessment - 08/30/23 1439          Discharge Reassessment    Assessment Type Discharge Planning Reassessment (P)      Did the patient's condition or plan change since previous assessment? Yes (P)      Discharge Plan A Rehab (P)      Transition of Care Barriers Social;Does not adhere to care plan (P)      Why the patient remains in the hospital Placement issues;Social issues;Other (see comment) (P)    restraints       Post-Acute Status    Post-Acute Authorization Placement (P)      Post-Acute Placement Status Pending medical clearance/testing (P)      Discharge Delays Change in Medical Condition (P)                    Patient continues to he impulsive and confused.  He currently has a sitter and camera due to him pulling out lines.  Patient is being followed by psych and they are working on a regime to reduce these behaviors.  Patient cannot be discharged to rehab until he is restraint free for at least 24 hours.      Becca Buchanan, BRENT  [facility]  [phone]

## 2023-08-30 NOTE — PLAN OF CARE
Recommendations     EN recs:                - Continuous: Isosource 1.5 @ goal rate of 55 mL/hr- provides 1980 kcals, 90 g pro, and 1008 mL fluid.              - Bolus: Isosource 1.5 (5 cartons/day)- provides 1875 kcals, 85 g pro, and 955 mL fluid.   RD following.      Goals: Will meet % EEN/EPN by next RD f/u.  Nutrition Goal Status: goal met  Communication of RD Recs:  (POC)    Melodie Glez RDN,LDN

## 2023-08-30 NOTE — PT/OT/SLP PROGRESS
Occupational Therapy   Co-Treatment w/ PT     Name: Tera Griffiths  MRN: [id number]  Admitting Diagnosis:  Embolic stroke involving left middle cerebral artery  13 Days Post-Op    Recommendations:     Discharge Recommendations: nursing facility, skilled  Discharge Equipment Recommendations:  to be determined by next level of care  Barriers to discharge:  Other (Comment) (Increased A needed)    Assessment:     Tera Griffiths is a 58 y.o. male with a medical diagnosis of Embolic stroke involving left middle cerebral artery.  He presents with decreased functional status due to medical diagnosis. Performance deficits affecting function are weakness, impaired endurance, impaired self care skills, impaired functional mobility, gait instability, impaired balance, impaired cognition, decreased ROM, abnormal tone, decreased upper extremity function, impaired coordination.     Rehab Prognosis:  Fair; patient would benefit from acute skilled OT services to address these deficits and reach maximum level of function.       Plan:     Patient to be seen 3 x/week to address the above listed problems via self-care/home management, therapeutic activities, therapeutic exercises, neuromuscular re-education  Plan of Care Expires: 09/11/23  Plan of Care Reviewed with: patient    Subjective     Chief Complaint: Patient unable to verbalize  Patient/Family Comments/goals: gain functional status      Objective:     Communicated with: RN prior to session.  Patient found supine with restraints, PEG Tube, SCD, PureWick upon OT entry to room.    General Precautions: Standard, aphasia, aspiration, NPO, fall    Orthopedic Precautions:N/A  Braces: N/A  Respiratory Status: Room air     Occupational Performance:     Bed Mobility:    Patient completed Rolling/Turning to Right with contact guard assistance  Patient completed Scooting/Bridging with contact guard assistance  Patient completed Supine to Sit with moderate assistance  Patient completed  Sit to Supine with moderate assistance       Activities of Daily Living:  Toileting: maximal assistance for hygiene and purewick management   Grooming: Max A; OT performed facial grooming; patient resisted       AMPAC 6 Click ADL:      Treatment & Education:  OT/PT attempted mirror therapy for visual feedback for patient in hopes of illiciting response. Patient did not sustain attention to mirror therefore unable to continue treatment activity. OT attempted to provide visual, verbal, auditory stimuli with no consistent response from patient. OT/PT performed ADL tasks with patient and ended session. Co treatment performed due to patient increased need for A.    Patient left supine with all lines intact, call button in reach, and bed alarm on    GOALS:   Multidisciplinary Problems       Occupational Therapy Goals          Problem: Occupational Therapy    Goal Priority Disciplines Outcome Interventions   Occupational Therapy Goal     OT, PT/OT Ongoing, Progressing    Description: Goals to be met by: 9/8/23     Patient will increase functional independence with ADLs by performing:    UE Dressing with Moderate Assistance.  LE Dressing with Moderate Assistance.  Grooming while standing with Moderate Assistance.  Toileting from toilet with Moderate Assistance for hygiene and clothing management.                          Time Tracking:     OT Date of Treatment: 08/30/23  OT Start Time: 1029  OT Stop Time: 1047  OT Total Time (min): 18 min    Billable Minutes:Self Care/Home Management 18 minutes    OT/ANDREA: OT          8/30/2023

## 2023-08-30 NOTE — MEDICAL/APP STUDENT
"CONSULTATION LIAISON PSYCHIATRY PROGRESS NOTE    Patient Name: Tera Griffiths  MRN: [id number]  Patient Class: IP- Inpatient  Admission Date: 8/5/2023  Attending Physician: Morris Shea MD      SUBJECTIVE:   Tera Griffiths is a 58 y.o. male with no past psychiatric history & past pertinent medical history of HTN, HLD, Afib, CAD, and HF  presents to the ED/admitted to the hospital for Embolic stroke involving left middle cerebral artery    Psychiatry consulted for "currently in UE restraints due to pulling out lines/PEG. consult to psych to seek help for nonredirectable agitation. trying to avoid physical restraints"    Interval History  Nurse reports no behavioral problems or PRNs overnight. Pt laying in bed quietly this morning, sitter at bedside. PT not responding to questions or commands, occasional groaning       OBJECTIVE:    Mental Status Exam:  General Appearance: appears stated age, dressed in hospital garb  Behavior:  sleeping through attempts to interview  Involuntary Movements and Motor Activity: no abnormal involuntary movements noted; no tics, no tremors, no akathisia, no dystonia, no evidence of tardive dyskinesia; no psychomotor agitation or retardation  Gait and Station: unable to assess - patient lying down or seated  Speech and Language:  unable to assess  Mood: "unable to assess"  Affect:  unable to assess  Thought Process and Associations: Unable to Assess  Thought Content and Perceptions:: Unable to Assess  Sensorium and Orientation: Unable to Formally Assess  Recent and Remote Memory: Unable to  Formally Assess  Attention and Concentration: Unable to Formally Assess  Fund of Knowledge: Unable to Formally Assess  Insight:  unable to assess  Judgment:  unable to assess    CAM ICU positive? Unable to assess      ASSESSMENT & RECOMMENDATIONS   Delirium 2/2 underlying medical etiologies     Continue depakote to 500 qam, 500 at noon, and 1000 mg QHS   Last VPA level subtheraputic at 22.4 on " 8/26/23  Start Seroquel 50 mg QHS per G tube  Continue Zyprexa 5 mg PRN for breakthrough, non-redirectable agitation  Most recent EKG from 8/27 at 8AM with Qtc of 467. Should be noted that reliability can be affected by Afib. Continue to monitor Qtc with goal of <500     DELIRIUM  DELIRIUM BEHAVIOR PRECAUTIONS  As a reminder, changes in mentation & attention with either disorganized thinking, easy distractibility, and fluctuating level of consciousness are commonly observed with delirium.  Please utilize chemical restraints with PRN meds for agitation 1st. If needed for immediate safety reasons, can additionally utilize physical restraints. However, please avoid use of physical restraints, or have periods of patient being out of physical restraints if possible (e.g. while sleeping) as excessive use can promote rebound worsening of delirium/agitation.  Keep window shades open and room lit during day and room dim at night in order to promote normal sleep-wake cycles.  Encourage family at bedside. [place] patient often to situation, location, date.  Recommend to discourage Narcotics, Benzos and Anti-cholinergic medication use as they commonly promote and worsen delirium. If aforementioned medications need to be resumed for optimal patient outcomes after careful consideration, please exercise judicious use with clear documentation.  Please continue medical workup for causative etiology of delirium.     RISK ASSESSMENT  NO NEED FOR PEC,  UNCONTESTED    FOLLOW UP  Will follow up while in house  DISPOSITION - once medically cleared:  Defer to medical team    Please contact ON CALL psychiatry service (24/7) for any acute issues that may arise.    Theodore Lugo MS3  CL Psychiatry  [facility]  8/30/2023 10:24  AM        --------------------------------------------------------------------------------------------------------------------------------------------------------------------------------------------------------------------------------------    CONTINUED OBJECTIVE clinical data & findings reviewed and noted for above decision making    Current Medications:   Scheduled Meds:    acetylcysteine 100 mg/ml (10%)  4 mL Nebulization BID    albuterol-ipratropium  3 mL Nebulization BID    amiodarone  200 mg Per G Tube Daily    apixaban  5 mg Per G Tube BID    aspirin  81 mg Per G Tube Daily    atorvastatin  80 mg Per G Tube Daily    ezetimibe  10 mg Per G Tube QHS    ferrous sulfate  1 tablet Per G Tube Daily    metoprolol tartrate  25 mg Per G Tube BID    nystatin  500,000 Units Oral QID    OLANZapine  5 mg Intramuscular QHS    polyethylene glycol  17 g Per G Tube Daily    QUEtiapine  50 mg Per G Tube QHS    senna-docusate 8.6-50 mg  1 tablet Per G Tube BID    valproic acid (as sodium salt)  1,000 mg Per G Tube QHS    valproic acid (as sodium salt)  500 mg Per G Tube Daily    valproic acid (as sodium salt)  500 mg Per G Tube Q24H     PRN Meds: acetaminophen, bisacodyL, chlorproMAZINE, dextrose 10%, dextrose 10%, diatrizoate meglumineand-diatrizoate sodium, glucagon (human recombinant), hydrALAZINE, levalbuterol **AND** ipratropium, labetalol, nitroGLYCERIN, OLANZapine, ondansetron, sodium chloride 0.9%, sodium chloride 0.9%, sodium chloride 0.9%    Allergies:   Review of patient's allergies indicates:  No Known Allergies    Vitals  Vitals:    08/30/23 0842   BP:    Pulse: 89   Resp: 19   Temp:        Labs/Imaging/Studies:  Recent Results (from the past 24 hour(s))   POCT glucose    Collection Time: 08/29/23 12:27 PM   Result Value Ref Range    POCT Glucose 117 (H) 70 - 110 mg/dL   POCT glucose    Collection Time: 08/29/23  4:27 PM   Result Value Ref Range    POCT Glucose 99 70 - 110 mg/dL   POCT glucose    Collection Time:  08/29/23 11:59 PM   Result Value Ref Range    POCT Glucose 107 70 - 110 mg/dL   Phosphorus    Collection Time: 08/30/23  5:40 AM   Result Value Ref Range    Phosphorus 3.0 2.7 - 4.5 mg/dL   Magnesium    Collection Time: 08/30/23  5:40 AM   Result Value Ref Range    Magnesium 2.0 1.6 - 2.6 mg/dL   POCT glucose    Collection Time: 08/30/23  6:13 AM   Result Value Ref Range    POCT Glucose 74 70 - 110 mg/dL     Imaging Results              X-Ray Chest AP Portable (Final result)  Result time 08/05/23 14:46:03      Final result by Leandro Ruffin MD (08/05/23 14:46:03)                   Impression:      1. Central hilar findings suggest congestive change/edema.  No large focal consolidation.      Electronically signed by: Leandro Ruffin MD  Date:    08/05/2023  Time:    14:46               Narrative:    EXAMINATION:  XR CHEST AP PORTABLE    CLINICAL HISTORY:  Chest Pain;    TECHNIQUE:  Single frontal view of the chest was performed.    COMPARISON:  07/13/2023    FINDINGS:  The cardiomediastinal silhouette is prominent, similar to the previous exam noting left chest wall pacer..  There is no pleural effusion.  The trachea is midline.  The lungs are symmetrically expanded bilaterally with coarse central hilar interstitial attenuation.  No large focal consolidation seen.  There is no pneumothorax.  The osseous structures are remarkable for degenerative change..
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
## 2023-08-30 NOTE — PROGRESS NOTES
Declan Salomon - Neurosurgery ([facility])  Adult Nutrition  Progress Note    SUMMARY       Recommendations    EN recs:                - Continuous: Isosource 1.5 @ goal rate of 55 mL/hr- provides 1980 kcals, 90 g pro, and 1008 mL fluid.              - Bolus: Isosource 1.5 (5 cartons/day)- provides 1875 kcals, 85 g pro, and 955 mL fluid.   RD following.     Goals: Will meet % EEN/EPN by next RD f/u.  Nutrition Goal Status: goal met  Communication of RD Recs:  (POC)    Assessment and Plan    Nutrition Problem  Inadequate oral intake     Related to (etiology):   Inability to consume sufficient needs     Signs and Symptoms (as evidenced by):   NPO status      Interventions/Recommendations (treatment strategy):  Collaboration of nutrition care with other providers   EN     Nutrition Diagnosis Status:   Continues    Reason for Assessment    Reason For Assessment: RD follow-up  Diagnosis: stroke/CVA  Relevant Medical History: CAD, PAF, CHF, CKD 3, EMERSON  Interdisciplinary Rounds: did not attend  General Information Comments: RD f/u. Unable to speak with pt 2/2 being aphasic. Remains NPO- noted current continuous TF regimen running via PEG. Noted CBW remains 184#. LBM 8/28.     (8/6): Unsure intake PTA. RD private messaged RN who states that pt consumed 90% of both breakfast and lunch. No issues with n/v/d/c/chewing/swallowing today. UBW fluctuates between 190-195# per chart review, #. No s/s of malnutrition, appears well-nourished. Educational handout left on bedside table for pt to review.   Nutrition Discharge Planning: Pending clinical course    Nutrition Risk Screen    Nutrition Risk Screen: tube feeding or parenteral nutrition, difficulty chewing/swallowing    Nutrition/Diet History    Spiritual, Cultural Beliefs, Christianity Practices, Values that Affect Care: no  Food Allergies: NKFA  Factors Affecting Nutritional Intake: NPO, difficulty/impaired swallowing, impaired cognitive status/motor  "control    Anthropometrics    Temp: 98 °F (36.7 °C)  Height Method: Stated  Height: 6' 1" (185.4 cm)  Height (inches): 73 in  Weight Method: Bed Scale  Weight: 83.5 kg (184 lb 1.4 oz)  Weight (lb): 184.09 lb  Ideal Body Weight (IBW), Male: 184 lb  % Ideal Body Weight, Male (lb): 100.05 %  BMI (Calculated): 24.3  BMI Grade: 18.5-24.9 - normal    Lab/Procedures/Meds    Pertinent Labs Reviewed: reviewed  Pertinent Labs Comments: Creatinine 1.6, GFR 50.3, Albumin 2.6  Pertinent Medications Reviewed: reviewed  Pertinent Medications Comments: aspirin, atorvastatin, metoprolol tartrate, nystatin, senna-docusate    Estimated/Assessed Needs    Weight Used For Calorie Calculations: 83.5 kg (184 lb 1.4 oz)  Energy Calorie Requirements (kcal): 2088 kcals  Energy Need Method: Kcal/kg (25 kcal/kg)  Protein Requirements: 84 g (1.0 g/kg)  Weight Used For Protein Calculations: 83.5 kg (184 lb 1.4 oz)  Fluid Requirements (mL): 1 mL/kcal or fluid per MD  Estimated Fluid Requirement Method: RDA Method  RDA Method (mL): 2088    Nutrition Prescription Ordered    Current Diet Order: NPO  Current Nutrition Support Formula Ordered: Isosource 1.5  Current Nutrition Support Rate Ordered: 55 (ml)  Current Nutrition Support Frequency Ordered: mL/hr    Evaluation of Received Nutrient/Fluid Intake    Enteral Calories (kcal): 1980  Enteral Protein (gm): 90  Enteral (Free Water) Fluid (mL): 1008  % Kcal Needs: 95%  % Protein Needs: 107%  I/O: +17.0 L since 8/16  Energy Calories Required: meeting needs  Protein Required: meeting needs  % Intake of Estimated Energy Needs: 95%  % Meal Intake: NPO    Nutrition Risk    Level of Risk/Frequency of Follow-up:  (1 time/week)     Monitor and Evaluation    Food and Nutrient Intake: enteral nutrition intake  Food and Nutrient Adminstration: enteral and parenteral nutrition administration  Knowledge/Beliefs/Attitudes: food and nutrition knowledge/skill, beliefs and attitudes  Physical Activity and Function: " nutrition-related ADLs and IADLs  Anthropometric Measurements: body mass index, weight change, weight, height/length  Biochemical Data, Medical Tests and Procedures: lipid profile, inflammatory profile, glucose/endocrine profile, gastrointestinal profile, electrolyte and renal panel  Nutrition-Focused Physical Findings: overall appearance     Nutrition Follow-Up    RD Follow-up?: Yes    Melodie Glez RDN,LDN

## 2023-08-30 NOTE — SUBJECTIVE & OBJECTIVE
Neurologic Chief Complaint: L MCA syndrome    Subjective:     Interval History: Patient is seen for follow-up neurological assessment and treatment recommendations: UE restraints replaced yesterday afternoon, G-tube in place, tolerating Tube feeds. pending placement.     HPI, Past Medical, Family, and Social History remains the same as documented in the initial encounter.     Review of Systems   Unable to perform ROS: Other     Scheduled Meds:   acetylcysteine 100 mg/ml (10%)  4 mL Nebulization BID    albuterol-ipratropium  3 mL Nebulization BID    amiodarone  200 mg Per G Tube Daily    apixaban  5 mg Per G Tube BID    aspirin  81 mg Per G Tube Daily    atorvastatin  80 mg Per G Tube Daily    ezetimibe  10 mg Per G Tube QHS    ferrous sulfate  1 tablet Per G Tube Daily    metoprolol tartrate  25 mg Per G Tube BID    nystatin  500,000 Units Oral QID    OLANZapine  5 mg Intramuscular QHS    polyethylene glycol  17 g Per G Tube Daily    QUEtiapine  50 mg Per G Tube QHS    senna-docusate 8.6-50 mg  1 tablet Per G Tube BID    valproic acid (as sodium salt)  1,000 mg Per G Tube QHS    valproic acid (as sodium salt)  500 mg Per G Tube Daily    valproic acid (as sodium salt)  500 mg Per G Tube Q24H     Continuous Infusions:  PRN Meds:acetaminophen, bisacodyL, chlorproMAZINE, dextrose 10%, dextrose 10%, diatrizoate meglumineand-diatrizoate sodium, glucagon (human recombinant), hydrALAZINE, levalbuterol **AND** ipratropium, labetalol, nitroGLYCERIN, OLANZapine, ondansetron, sodium chloride 0.9%, sodium chloride 0.9%, sodium chloride 0.9%    Objective:     Vital Signs (Most Recent):  Temp: 97 °F (36.1 °C) (08/30/23 0617)  Pulse: 89 (08/30/23 0842)  Resp: 19 (08/30/23 0842)  BP: (!) 149/90 (08/30/23 0617)  SpO2: 95 % (08/30/23 0842)  BP Location: Right arm    Vital Signs Range (Last 24H):  Temp:  [97 °F (36.1 °C)-98.2 °F (36.8 °C)]   Pulse:  []   Resp:  [18-20]   BP: (118-149)/(60-98)   SpO2:  [92 %-99 %]   BP Location:  "Right arm       Physical Exam  Vitals and nursing note reviewed.   Constitutional:       General: He is not in acute distress.     Appearance: He is well-developed.   HENT:      Head: Normocephalic and atraumatic.   Cardiovascular:      Rate and Rhythm: Normal rate.   Pulmonary:      Effort: Pulmonary effort is normal. No respiratory distress.   Abdominal:      General: There is no distension.   Skin:     General: Skin is warm and dry.   Neurological:      Comments: Aphasic, does not follow commands  L gaze              Neurological Exam:   LOC: drowsy  Attention Span: poor  Language: Global aphasia  Articulation: Untestable due to severe aphasia   Orientation: Untestable due to severe aphasia   EOM (CN III, IV, VI): Gaze preference  left  Facial Movement (CN VII): Lower facial weakness on the Right  Motor: Spontaneously moves all 4 extremities, will not follow commands    Laboratory:  CMP:   No results for input(s): "GLUCOSE", "CALCIUM", "ALBUMIN", "PROT", "NA", "K", "CO2", "CL", "BUN", "CREATININE", "ALKPHOS", "ALT", "AST", "BILITOT" in the last 24 hours.    CBC:   Recent Labs   Lab 08/29/23  0807   WBC 6.84   RBC 3.95*   HGB 10.8*   HCT 35.0*   *   MCV 89   MCH 27.3   MCHC 30.9*         Diagnostic Results   Brain Imaging   CT Head 8/10/23  Impression:     No detrimental change compared to prior.     Evolving acute infarcts within the left MCA and PCA territories.  No hemorrhagic conversion.  No significant mass effect     Suspected small subacute infarction within the right caudate.     Multiple areas remote infarctions as detailed above.        CT Head 8/8/23 1704  Impression:     1. Redemonstration of acute infarctions in the left MCA and PCA territories.  No evidence to suggest hemorrhagic transformation.  2. Suspected small subacute infarction in the right caudate.  3. Numerous remote infarctions as detailed above.     CT Head 8/8/23 0820  Impression:     Moderate developing hypoattenuation left " inferior frontal lobe and left occipital lobe concerning for developing recent infarcts post thrombectomy.     Allowing for scattered hyperdensity related to recently contrast administration for thrombectomy there is no definite acute intracranial hemorrhage.     Previous hypodensity right caudate no longer seen which may be related to contrast administration for thrombectomy and possible subacute age infarction.     Superimposed remote infarcts with moderate to large encephalomalacia right MCA territory unchanged     Vessel Imaging   IR Angio 8/8/23  Preliminary Interpretation:   1.    No evidence of left ICA or MCA stenosis. No large or medium vessel occlusion.     CTA Multiphase 8/7/23  Impression:     CT head:     New right caudate nucleus hypodensity, likely representing age-indeterminate infarct.  Could be further evaluated with MRI.     Multifocal encephalomalacia involving the right frontal, temporoparietal, and left occipital lobes.     CTA head and neck:     Acute occlusion of the superior left M2 segment.     Left PCA occlusion of undetermined age.     Filling defect in the right main pulmonary artery, concerning for acute pulmonary thromboembolism.  Consider follow-up pulmonary CTA to more fully assess the extent of thromboemboli, the clot burden, and the presence or absence of right heart strain.     Not fully detailed above:     - Incomplete opacification of the left atrial appendage, suspicious for an intraluminal thrombus.  Follow-up echocardiogram, or cardiac MRI or CTA, recommended.     - The presence of acute thrombi or thromboemboli in both the systemic arterial and pulmonary arterial circulation raises concern for the possibility of underlying intracardiac or extracardiac right-to-left shunt, and/or a hypercoagulable state.  Correlate clinically.     - Incompletely visualized, but possibly large, pericardial effusion.  Artifacts compromise accurate assessment of its attenuation.     Cardiac  Imaging   TTE with bubble 8/8/23    Left Ventricle: The left ventricle is moderately dilated. Increased ventricular mass. Normal wall thickness. There is moderate eccentric hypertrophy. Severe global hypokinesis present. Septal motion is consistent with pacing. There is severely reduced systolic function with a visually estimated ejection fraction of 15 - 20%. Ejection fraction by visual approximation is 20%. Unable to assess diastolic function due to arrhythmia.    Left Atrium: Left atrium is severely dilated. Radha 85mL/m2.    Right Ventricle: Mild right ventricular enlargement. Wall thickness is normal. Right ventricle wall motion  is normal. Systolic function is severely reduced. Pacemaker lead present in the ventricle.    Right Atrium: Right atrium is severely dilated.    Aortic Valve: There is mild aortic valve sclerosis. There is normal leaflet mobility. There is no significant regurgitation.    Mitral Valve: There is bileaflet sclerosis. There is normal leaflet mobility. There is mild regurgitation.    Tricuspid Valve: The tricuspid valve is structurally normal. There is normal leaflet mobility. There is mild regurgitation.    Pulmonic Valve: The pulmonic valve is structurally normal. There is normal leaflet mobility. There is no significant regurgitation.    Aorta: Aortic root is normal in size measuring 3.13 cm. Ascending aorta is normal measuring 3.24 cm.    IVC/SVC: Normal venous pressure at 3 mmHg.    Pericardium: The pericardium is normal. There is no pericardial effusion.

## 2023-08-31 LAB
ALBUMIN SERPL BCP-MCNC: 2.6 G/DL (ref 3.5–5.2)
ALP SERPL-CCNC: 97 U/L (ref 55–135)
ALT SERPL W/O P-5'-P-CCNC: 12 U/L (ref 10–44)
ANION GAP SERPL CALC-SCNC: 10 MMOL/L (ref 8–16)
AST SERPL-CCNC: 27 U/L (ref 10–40)
BASOPHILS # BLD AUTO: 0.02 K/UL (ref 0–0.2)
BASOPHILS NFR BLD: 0.4 % (ref 0–1.9)
BILIRUB SERPL-MCNC: 0.5 MG/DL (ref 0.1–1)
BUN SERPL-MCNC: 27 MG/DL (ref 6–20)
CALCIUM SERPL-MCNC: 9.2 MG/DL (ref 8.7–10.5)
CHLORIDE SERPL-SCNC: 107 MMOL/L (ref 95–110)
CO2 SERPL-SCNC: 26 MMOL/L (ref 23–29)
CREAT SERPL-MCNC: 1.7 MG/DL (ref 0.5–1.4)
DIFFERENTIAL METHOD: ABNORMAL
EOSINOPHIL # BLD AUTO: 0.2 K/UL (ref 0–0.5)
EOSINOPHIL NFR BLD: 4 % (ref 0–8)
ERYTHROCYTE [DISTWIDTH] IN BLOOD BY AUTOMATED COUNT: 15.6 % (ref 11.5–14.5)
EST. GFR  (NO RACE VARIABLE): 46.7 ML/MIN/1.73 M^2
GLUCOSE SERPL-MCNC: 77 MG/DL (ref 70–110)
HCT VFR BLD AUTO: 34 % (ref 40–54)
HGB BLD-MCNC: 10.4 G/DL (ref 14–18)
IMM GRANULOCYTES # BLD AUTO: 0.01 K/UL (ref 0–0.04)
IMM GRANULOCYTES NFR BLD AUTO: 0.2 % (ref 0–0.5)
LYMPHOCYTES # BLD AUTO: 1.1 K/UL (ref 1–4.8)
LYMPHOCYTES NFR BLD: 20.7 % (ref 18–48)
MCH RBC QN AUTO: 27.3 PG (ref 27–31)
MCHC RBC AUTO-ENTMCNC: 30.6 G/DL (ref 32–36)
MCV RBC AUTO: 89 FL (ref 82–98)
MONOCYTES # BLD AUTO: 0.6 K/UL (ref 0.3–1)
MONOCYTES NFR BLD: 10.1 % (ref 4–15)
NEUTROPHILS # BLD AUTO: 3.5 K/UL (ref 1.8–7.7)
NEUTROPHILS NFR BLD: 64.6 % (ref 38–73)
NRBC BLD-RTO: 0 /100 WBC
PLATELET # BLD AUTO: 518 K/UL (ref 150–450)
PMV BLD AUTO: 11.8 FL (ref 9.2–12.9)
POCT GLUCOSE: 102 MG/DL (ref 70–110)
POCT GLUCOSE: 114 MG/DL (ref 70–110)
POTASSIUM SERPL-SCNC: 4.9 MMOL/L (ref 3.5–5.1)
PROT SERPL-MCNC: 7.1 G/DL (ref 6–8.4)
RBC # BLD AUTO: 3.81 M/UL (ref 4.6–6.2)
SODIUM SERPL-SCNC: 143 MMOL/L (ref 136–145)
WBC # BLD AUTO: 5.46 K/UL (ref 3.9–12.7)

## 2023-08-31 PROCEDURE — 25000003 PHARM REV CODE 250

## 2023-08-31 PROCEDURE — 80053 COMPREHEN METABOLIC PANEL: CPT

## 2023-08-31 PROCEDURE — 94761 N-INVAS EAR/PLS OXIMETRY MLT: CPT

## 2023-08-31 PROCEDURE — 92526 ORAL FUNCTION THERAPY: CPT

## 2023-08-31 PROCEDURE — 25000242 PHARM REV CODE 250 ALT 637 W/ HCPCS: Performed by: STUDENT IN AN ORGANIZED HEALTH CARE EDUCATION/TRAINING PROGRAM

## 2023-08-31 PROCEDURE — 94640 AIRWAY INHALATION TREATMENT: CPT

## 2023-08-31 PROCEDURE — S0166 INJ OLANZAPINE 2.5MG: HCPCS

## 2023-08-31 PROCEDURE — 99233 SBSQ HOSP IP/OBS HIGH 50: CPT | Mod: ,,, | Performed by: PSYCHIATRY & NEUROLOGY

## 2023-08-31 PROCEDURE — 11000001 HC ACUTE MED/SURG PRIVATE ROOM

## 2023-08-31 PROCEDURE — 99233 PR SUBSEQUENT HOSPITAL CARE,LEVL III: ICD-10-PCS | Mod: ,,, | Performed by: PSYCHIATRY & NEUROLOGY

## 2023-08-31 PROCEDURE — 36415 COLL VENOUS BLD VENIPUNCTURE: CPT

## 2023-08-31 PROCEDURE — 93010 EKG 12-LEAD: ICD-10-PCS | Mod: ,,, | Performed by: INTERNAL MEDICINE

## 2023-08-31 PROCEDURE — 25000003 PHARM REV CODE 250: Performed by: NURSE PRACTITIONER

## 2023-08-31 PROCEDURE — 85025 COMPLETE CBC W/AUTO DIFF WBC: CPT

## 2023-08-31 PROCEDURE — 93005 ELECTROCARDIOGRAM TRACING: CPT

## 2023-08-31 PROCEDURE — 93010 ELECTROCARDIOGRAM REPORT: CPT | Mod: ,,, | Performed by: INTERNAL MEDICINE

## 2023-08-31 RX ADMIN — ASPIRIN 81 MG 81 MG: 81 TABLET ORAL at 09:08

## 2023-08-31 RX ADMIN — OLANZAPINE 5 MG: 10 INJECTION, POWDER, LYOPHILIZED, FOR SOLUTION INTRAMUSCULAR at 09:08

## 2023-08-31 RX ADMIN — IPRATROPIUM BROMIDE AND ALBUTEROL SULFATE 3 ML: 2.5; .5 SOLUTION RESPIRATORY (INHALATION) at 08:08

## 2023-08-31 RX ADMIN — ACETAMINOPHEN 650 MG: 325 TABLET ORAL at 12:08

## 2023-08-31 RX ADMIN — ATORVASTATIN CALCIUM 80 MG: 40 TABLET, FILM COATED ORAL at 09:08

## 2023-08-31 RX ADMIN — METOPROLOL TARTRATE 25 MG: 25 TABLET, FILM COATED ORAL at 09:08

## 2023-08-31 RX ADMIN — VALPROIC ACID 500 MG: 250 SOLUTION ORAL at 11:08

## 2023-08-31 RX ADMIN — APIXABAN 5 MG: 5 TABLET, FILM COATED ORAL at 09:08

## 2023-08-31 RX ADMIN — NYSTATIN 500000 UNITS: 100000 SUSPENSION ORAL at 09:08

## 2023-08-31 RX ADMIN — QUETIAPINE FUMARATE 50 MG: 25 TABLET ORAL at 09:08

## 2023-08-31 RX ADMIN — EZETIMIBE 10 MG: 10 TABLET ORAL at 09:08

## 2023-08-31 RX ADMIN — SENNOSIDES AND DOCUSATE SODIUM 1 TABLET: 50; 8.6 TABLET ORAL at 09:08

## 2023-08-31 RX ADMIN — AMIODARONE HYDROCHLORIDE 200 MG: 200 TABLET ORAL at 09:08

## 2023-08-31 RX ADMIN — ACETYLCYSTEINE 4 ML: 100 INHALANT RESPIRATORY (INHALATION) at 08:08

## 2023-08-31 RX ADMIN — FERROUS SULFATE TAB 325 MG (65 MG ELEMENTAL FE) 1 EACH: 325 (65 FE) TAB at 09:08

## 2023-08-31 RX ADMIN — VALPROIC ACID 500 MG: 250 SOLUTION ORAL at 09:08

## 2023-08-31 RX ADMIN — VALPROIC ACID 1000 MG: 250 SOLUTION ORAL at 09:08

## 2023-08-31 RX ADMIN — NYSTATIN 500000 UNITS: 100000 SUSPENSION ORAL at 01:08

## 2023-08-31 RX ADMIN — POLYETHYLENE GLYCOL 3350 17 G: 17 POWDER, FOR SOLUTION ORAL at 09:08

## 2023-08-31 NOTE — PLAN OF CARE
Patient was staffed in AM rounds.  Patient no longer requiring restraints, sitter or camera at this time.  Patient is medically ready and SW reached out to [facility] to come back and assess.  SW will continue to follow.      1:17 PM  [facility] has evaluated the patient.  He will need to be out of restraints for 24 hours and they will staff with their provider and re-evaluate tomorrow.      Becca Buchanan, BRENT  [facility]  [phone]

## 2023-08-31 NOTE — ASSESSMENT & PLAN NOTE
Previously required Precedex gtt in NCC, as patient was displaying violent behavior. Pulling out lines and PEG tube. PEG tube replaced with monk catheter via General Surgery.    - Depakote 500mg BID and 1000 QHS  - Off restraints for 24 hrs
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
## 2023-08-31 NOTE — PT/OT/SLP PROGRESS
"Speech Language Pathology Treatment    Patient Name:  Tera Griffiths   MRN:  [id number]  Admitting Diagnosis: Embolic stroke involving left middle cerebral artery    Recommendations:                 General Recommendations:  Dysphagia therapy, Speech/language therapy, and Cognitive-linguistic therapy  Diet recommendations:  NPO, Liquid Diet Level: NPO   Aspiration Precautions: Frequent oral care and Strict aspiration precautions   General Precautions: Standard, aphasia, aspiration, NPO  Communication strategies:  provide increased time to answer and go to room if call light pushed    Assessment:     Tera Griffiths is a 58 y.o. male with an SLP diagnosis of Aphasia and Dysphagia.      Subjective     "He's sleeping today."     Pain/Comfort:  Pain Rating 1:  (NAD)  Pain Rating Post-Intervention 1:  (NAD)    Respiratory Status: Room air    Objective:     Has the patient been evaluated by SLP for swallowing?   Yes  Keep patient NPO? Yes     Pt seen bedside with family present.  Mod cues provided for pt to rouse.  Multiple po trial options including thin liquids and puree offered to pt by SLP and family members.  Pt holding carton and applesauce cup though did not attempt to self present and was resistant when presented by family or SLP.  PO trials discontinued to avoid increased agitation.  Pt did not follow simple commands or answer simple y/n questions noted.  Moaning noted x1.  Pt transitioning back to sleep.  Education provided to family re: ongoing SLP POC.  Family in agreement.     Goals:   Multidisciplinary Problems       SLP Goals          Problem: SLP    Goal Priority Disciplines Outcome   SLP Goal     SLP Ongoing, Progressing   Description: Speech Language Pathology Goals  Goals expected to be met by 8/15  1. Pt will participate in ongoing assessment of swallow.   2. Pt will answer simple y/n questions with 60% accy given max cues.   3. Pt will follow simple commands with 50% accy given max cues.   4. Pt " will vocalize in response to any stim x5/session given max cues.   5. Pt will localize to stim on R x2/session given max cues.                              Plan:     Patient to be seen:  3 x/week   Plan of Care expires:  09/07/23  Plan of Care reviewed with:  patient   SLP Follow-Up:  Yes       Discharge recommendations:  nursing facility, skilled   Barriers to Discharge:  Level of Skilled Assistance Needed      Time Tracking:     SLP Treatment Date:   08/31/23  Speech Start Time:  1105  Speech Stop Time:  1115     Speech Total Time (min):  10 min    Billable Minutes: Treatment Swallowing Dysfunction 10    08/31/2023

## 2023-08-31 NOTE — ASSESSMENT & PLAN NOTE
Stable  Noted jj  Creatinine baseline 1.3-1.5  Cr today 1.7, may be uptrending vs normalizing    - Urine studies pending  - caution with fluids if pre-renal. EF 15%  - CMP Q48h

## 2023-08-31 NOTE — SUBJECTIVE & OBJECTIVE
Neurologic Chief Complaint: L MCA syndrome    Subjective:     Interval History: Patient is seen for follow-up neurological assessment and treatment recommendations: Tolerating tube feeds. Agitaiton well controlled, now off restraints x24 hrs, canceling video sitter. Pending placement    HPI, Past Medical, Family, and Social History remains the same as documented in the initial encounter.     Review of Systems   Unable to perform ROS: Other (clinical condition)     Scheduled Meds:   acetylcysteine 100 mg/ml (10%)  4 mL Nebulization BID    albuterol-ipratropium  3 mL Nebulization BID    amiodarone  200 mg Per G Tube Daily    apixaban  5 mg Per G Tube BID    aspirin  81 mg Per G Tube Daily    atorvastatin  80 mg Per G Tube Daily    ezetimibe  10 mg Per G Tube QHS    ferrous sulfate  1 tablet Per G Tube Daily    metoprolol tartrate  25 mg Per G Tube BID    nystatin  500,000 Units Oral QID    OLANZapine  5 mg Intramuscular QHS    polyethylene glycol  17 g Per G Tube Daily    QUEtiapine  50 mg Per G Tube QHS    senna-docusate 8.6-50 mg  1 tablet Per G Tube BID    valproic acid (as sodium salt)  1,000 mg Per G Tube QHS    valproic acid (as sodium salt)  500 mg Per G Tube Daily    valproic acid (as sodium salt)  500 mg Per G Tube Q24H     Continuous Infusions:  PRN Meds:acetaminophen, bisacodyL, chlorproMAZINE, dextrose 10%, dextrose 10%, diatrizoate meglumineand-diatrizoate sodium, glucagon (human recombinant), hydrALAZINE, levalbuterol **AND** ipratropium, labetalol, nitroGLYCERIN, OLANZapine, ondansetron, sodium chloride 0.9%, sodium chloride 0.9%, sodium chloride 0.9%    Objective:     Vital Signs (Most Recent):  Temp: 97.9 °F (36.6 °C) (08/31/23 0805)  Pulse: 65 (08/31/23 0821)  Resp: 18 (08/31/23 0821)  BP: 123/79 (08/31/23 0805)  SpO2: 97 % (08/31/23 0821)  BP Location: Right arm    Vital Signs Range (Last 24H):  Temp:  [97.7 °F (36.5 °C)-98.9 °F (37.2 °C)]   Pulse:  []   Resp:  [16-18]   BP: ()/(73-37)    SpO2:  [95 %-98 %]   BP Location: Right arm       Physical Exam  Vitals and nursing note reviewed.   Constitutional:       General: He is not in acute distress.     Appearance: He is well-developed.   HENT:      Head: Normocephalic and atraumatic.   Cardiovascular:      Rate and Rhythm: Normal rate.   Pulmonary:      Effort: Pulmonary effort is normal. No respiratory distress.   Abdominal:      General: There is no distension.   Skin:     General: Skin is warm and dry.   Neurological:      Comments: Aphasic, does not follow commands  L gaze          Neurological Exam:   LOC: drowsy  Attention Span: poor  Language: Global aphasia  Articulation: Untestable due to severe aphasia   Orientation: Untestable due to severe aphasia   EOM (CN III, IV, VI): Gaze preference  left  Facial Movement (CN VII): Lower facial weakness on the Right  Motor: Spontaneously moves all 4 extremities, will not follow commands    Laboratory:  CMP:   Recent Labs   Lab 08/31/23  0440   CALCIUM 9.2   ALBUMIN 2.6*   PROT 7.1      K 4.9   CO2 26      BUN 27*   CREATININE 1.7*   ALKPHOS 97   ALT 12   AST 27   BILITOT 0.5     CBC:   Recent Labs   Lab 08/31/23  0440   WBC 5.46   RBC 3.81*   HGB 10.4*   HCT 34.0*   *   MCV 89   MCH 27.3   MCHC 30.6*       Diagnostic Results     Brain Imaging   CT Head 8/10/23  Impression:     No detrimental change compared to prior.     Evolving acute infarcts within the left MCA and PCA territories.  No hemorrhagic conversion.  No significant mass effect     Suspected small subacute infarction within the right caudate.     Multiple areas remote infarctions as detailed above.        CT Head 8/8/23 170  Impression:     1. Redemonstration of acute infarctions in the left MCA and PCA territories.  No evidence to suggest hemorrhagic transformation.  2. Suspected small subacute infarction in the right caudate.  3. Numerous remote infarctions as detailed above.     CT Head 8/8/23 9278  Impression:      Moderate developing hypoattenuation left inferior frontal lobe and left occipital lobe concerning for developing recent infarcts post thrombectomy.     Allowing for scattered hyperdensity related to recently contrast administration for thrombectomy there is no definite acute intracranial hemorrhage.     Previous hypodensity right caudate no longer seen which may be related to contrast administration for thrombectomy and possible subacute age infarction.     Superimposed remote infarcts with moderate to large encephalomalacia right MCA territory unchanged     Vessel Imaging   IR Angio 8/8/23  Preliminary Interpretation:   1.    No evidence of left ICA or MCA stenosis. No large or medium vessel occlusion.     CTA Multiphase 8/7/23  Impression:     CT head:     New right caudate nucleus hypodensity, likely representing age-indeterminate infarct.  Could be further evaluated with MRI.     Multifocal encephalomalacia involving the right frontal, temporoparietal, and left occipital lobes.     CTA head and neck:     Acute occlusion of the superior left M2 segment.     Left PCA occlusion of undetermined age.     Filling defect in the right main pulmonary artery, concerning for acute pulmonary thromboembolism.  Consider follow-up pulmonary CTA to more fully assess the extent of thromboemboli, the clot burden, and the presence or absence of right heart strain.     Not fully detailed above:     - Incomplete opacification of the left atrial appendage, suspicious for an intraluminal thrombus.  Follow-up echocardiogram, or cardiac MRI or CTA, recommended.     - The presence of acute thrombi or thromboemboli in both the systemic arterial and pulmonary arterial circulation raises concern for the possibility of underlying intracardiac or extracardiac right-to-left shunt, and/or a hypercoagulable state.  Correlate clinically.     - Incompletely visualized, but possibly large, pericardial effusion.  Artifacts compromise accurate  assessment of its attenuation.     Cardiac Imaging   TTE with bubble 8/8/23    Left Ventricle: The left ventricle is moderately dilated. Increased ventricular mass. Normal wall thickness. There is moderate eccentric hypertrophy. Severe global hypokinesis present. Septal motion is consistent with pacing. There is severely reduced systolic function with a visually estimated ejection fraction of 15 - 20%. Ejection fraction by visual approximation is 20%. Unable to assess diastolic function due to arrhythmia.    Left Atrium: Left atrium is severely dilated. Radha 85mL/m2.    Right Ventricle: Mild right ventricular enlargement. Wall thickness is normal. Right ventricle wall motion  is normal. Systolic function is severely reduced. Pacemaker lead present in the ventricle.    Right Atrium: Right atrium is severely dilated.    Aortic Valve: There is mild aortic valve sclerosis. There is normal leaflet mobility. There is no significant regurgitation.    Mitral Valve: There is bileaflet sclerosis. There is normal leaflet mobility. There is mild regurgitation.    Tricuspid Valve: The tricuspid valve is structurally normal. There is normal leaflet mobility. There is mild regurgitation.    Pulmonic Valve: The pulmonic valve is structurally normal. There is normal leaflet mobility. There is no significant regurgitation.    Aorta: Aortic root is normal in size measuring 3.13 cm. Ascending aorta is normal measuring 3.24 cm.    IVC/SVC: Normal venous pressure at 3 mmHg.    Pericardium: The pericardium is normal. There is no pericardial effusion.

## 2023-08-31 NOTE — SUBJECTIVE & OBJECTIVE
Interval History 8/31/2023:  Patient is seen for follow-up PM&R evaluation and recommendations: Continues with camera sitter, personal sitter, and restraints.     HPI, Past Medical, Family, and Social History remains the same as documented in the initial encounter.    Scheduled Medications:    acetylcysteine 100 mg/ml (10%)  4 mL Nebulization BID    albuterol-ipratropium  3 mL Nebulization BID    amiodarone  200 mg Per G Tube Daily    apixaban  5 mg Per G Tube BID    aspirin  81 mg Per G Tube Daily    atorvastatin  80 mg Per G Tube Daily    ezetimibe  10 mg Per G Tube QHS    ferrous sulfate  1 tablet Per G Tube Daily    metoprolol tartrate  25 mg Per G Tube BID    nystatin  500,000 Units Oral QID    OLANZapine  5 mg Intramuscular QHS    polyethylene glycol  17 g Per G Tube Daily    QUEtiapine  50 mg Per G Tube QHS    senna-docusate 8.6-50 mg  1 tablet Per G Tube BID    valproic acid (as sodium salt)  1,000 mg Per G Tube QHS    valproic acid (as sodium salt)  500 mg Per G Tube Daily    valproic acid (as sodium salt)  500 mg Per G Tube Q24H       Diagnostic Results: Labs: Reviewed  ECG: Reviewed  CT: Reviewed    PRN Medications: acetaminophen, bisacodyL, chlorproMAZINE, dextrose 10%, dextrose 10%, diatrizoate meglumineand-diatrizoate sodium, glucagon (human recombinant), hydrALAZINE, levalbuterol **AND** ipratropium, labetalol, nitroGLYCERIN, OLANZapine, ondansetron, sodium chloride 0.9%, sodium chloride 0.9%, sodium chloride 0.9%    Review of Systems   Constitutional:  Positive for activity change.   HENT:  Positive for trouble swallowing.    Respiratory:  Negative for cough and shortness of breath.    Neurological:  Positive for weakness.   Psychiatric/Behavioral:  Positive for agitation, behavioral problems and confusion.      Objective:     Vital Signs (Most Recent):  Temp: 97.9 °F (36.6 °C) (08/31/23 0805)  Pulse: 65 (08/31/23 0821)  Resp: 18 (08/31/23 0821)  BP: 123/79 (08/31/23 0805)  SpO2: 97 % (08/31/23  0821)    Vital Signs (24h Range):  Temp:  [97.7 °F (36.5 °C)-98.9 °F (37.2 °C)] 97.9 °F (36.6 °C)  Pulse:  [] 65  Resp:  [16-18] 18  SpO2:  [95 %-98 %] 97 %  BP: ()/(73-88) 123/79         Physical Exam  Vitals and nursing note reviewed.   Constitutional:       Comments: drowsy   HENT:      Mouth/Throat:      Mouth: Mucous membranes are moist.   Musculoskeletal:      Comments: Unable to assess 2/2 drowsiness   Skin:     General: Skin is warm.   Neurological:      Motor: Weakness present.      Gait: Gait abnormal.      Comments: Aphasia   Psychiatric:      Comments: Bilateral mitten restraints

## 2023-08-31 NOTE — PROGRESS NOTES
[facility] - Neurosurgery ([facility])  Physical Medicine & Rehab  Progress Note    Patient Name: Tera Griffiths  MRN: [id number]  Admission Date: 8/5/2023  Length of Stay: 24 days  Attending Physician: Morris Shea MD    Subjective:     Principal Problem:Embolic stroke involving left middle cerebral artery    Hospital Course:   8/21/23: Participated w/ OT. Bed mob maxA- totalA. Grooming dependence.   8/30/23: Participated w/ OT. Bed mob CGA- modA. Toileting maxA.     Interval History 8/31/2023:  Patient is seen for follow-up PM&R evaluation and recommendations: Continues with camera sitter, personal sitter, and restraints.     HPI, Past Medical, Family, and Social History remains the same as documented in the initial encounter.    Scheduled Medications:    acetylcysteine 100 mg/ml (10%)  4 mL Nebulization BID    albuterol-ipratropium  3 mL Nebulization BID    amiodarone  200 mg Per G Tube Daily    apixaban  5 mg Per G Tube BID    aspirin  81 mg Per G Tube Daily    atorvastatin  80 mg Per G Tube Daily    ezetimibe  10 mg Per G Tube QHS    ferrous sulfate  1 tablet Per G Tube Daily    metoprolol tartrate  25 mg Per G Tube BID    nystatin  500,000 Units Oral QID    OLANZapine  5 mg Intramuscular QHS    polyethylene glycol  17 g Per G Tube Daily    QUEtiapine  50 mg Per G Tube QHS    senna-docusate 8.6-50 mg  1 tablet Per G Tube BID    valproic acid (as sodium salt)  1,000 mg Per G Tube QHS    valproic acid (as sodium salt)  500 mg Per G Tube Daily    valproic acid (as sodium salt)  500 mg Per G Tube Q24H     Diagnostic Results:   Labs: Reviewed  ECG: Reviewed  CT: Reviewed    PRN Medications: acetaminophen, bisacodyL, chlorproMAZINE, dextrose 10%, dextrose 10%, diatrizoate meglumineand-diatrizoate sodium, glucagon (human recombinant), hydrALAZINE, levalbuterol **AND** ipratropium, labetalol, nitroGLYCERIN, OLANZapine, ondansetron, sodium chloride 0.9%, sodium chloride 0.9%, sodium chloride  0.9%    Review of Systems   Constitutional:  Positive for activity change.   HENT:  Positive for trouble swallowing.    Respiratory:  Negative for cough and shortness of breath.    Neurological:  Positive for weakness.   Psychiatric/Behavioral:  Positive for agitation, behavioral problems and confusion.      Objective:     Vital Signs (Most Recent):  Temp: 97.9 °F (36.6 °C) (08/31/23 0805)  Pulse: 65 (08/31/23 0821)  Resp: 18 (08/31/23 0821)  BP: 123/79 (08/31/23 0805)  SpO2: 97 % (08/31/23 0821)    Vital Signs (24h Range):  Temp:  [97.7 °F (36.5 °C)-98.9 °F (37.2 °C)] 97.9 °F (36.6 °C)  Pulse:  [] 65  Resp:  [16-18] 18  SpO2:  [95 %-98 %] 97 %  BP: ()/(73-88) 123/79         Physical Exam  Vitals and nursing note reviewed.   Constitutional:       Comments: drowsy   HENT:      Mouth/Throat:      Mouth: Mucous membranes are moist.   Musculoskeletal:      Comments: Unable to assess 2/2 drowsiness   Skin:     General: Skin is warm.   Neurological:      Motor: Weakness present.      Gait: Gait abnormal.      Comments: Aphasia   Psychiatric:      Comments: Bilateral mitten restraints     Assessment/Plan:      * Embolic stroke involving left middle cerebral artery  - L MCA and L PCA infarct 8/7/23  - Etiology likely cardioembolic per VN    - Related to prolonged/acute hospital course.     Recommendations  -  Encourage mobility, OOB in chair at least 3 hours per day, and early ambulation as appropriate  -  PT/OT evaluate and treat  -  Pain management  -  Monitor for and prevent skin breakdown and pressure ulcers  · Early mobility, repositioning/weight shifting every 20-30 minutes when sitting, turn patient every 2 hours, proper mattress/overlay and chair cushioning, pressure relief/heel protector boots  -  DVT prophylaxis    -  Reviewed discharge options (IP rehab, SNF, HH therapy, and OP therapy)    Dysphagia  - SLP following     Hemiparesis, right  - PT & OT following     Multiple subsegmental pulmonary emboli  without acute cor pulmonale  - CTA done and incidently found PE, now on Eliquis    Paroxysmal A-fib  - Eliquis    PM&R Recommendation:     At this time, the PM&R team has reviewed this patient's ongoing medical case including inpatient diagnosis, medical history, clinical examination, labs, vitals, current social and functional history.     At this time, we will continue to follow for improvement in agitation and participation with PT & OT.    Ele Madison NP  Department of Physical Medicine & Rehab   [facility] [place])

## 2023-09-01 LAB
CREAT UR-MCNC: 140 MG/DL (ref 23–375)
POCT GLUCOSE: 56 MG/DL (ref 70–110)
POCT GLUCOSE: 60 MG/DL (ref 70–110)
SODIUM UR-SCNC: 61 MMOL/L (ref 20–250)

## 2023-09-01 PROCEDURE — 11000001 HC ACUTE MED/SURG PRIVATE ROOM

## 2023-09-01 PROCEDURE — 92526 ORAL FUNCTION THERAPY: CPT

## 2023-09-01 PROCEDURE — 99231 SBSQ HOSP IP/OBS SF/LOW 25: CPT | Mod: AF,HB,, | Performed by: PSYCHIATRY & NEUROLOGY

## 2023-09-01 PROCEDURE — 25000003 PHARM REV CODE 250

## 2023-09-01 PROCEDURE — 97530 THERAPEUTIC ACTIVITIES: CPT

## 2023-09-01 PROCEDURE — 25000242 PHARM REV CODE 250 ALT 637 W/ HCPCS: Performed by: STUDENT IN AN ORGANIZED HEALTH CARE EDUCATION/TRAINING PROGRAM

## 2023-09-01 PROCEDURE — 99231 PR SUBSEQUENT HOSPITAL CARE,LEVL I: ICD-10-PCS | Mod: AF,HB,, | Performed by: PSYCHIATRY & NEUROLOGY

## 2023-09-01 PROCEDURE — 25000003 PHARM REV CODE 250: Performed by: NURSE PRACTITIONER

## 2023-09-01 PROCEDURE — 99233 SBSQ HOSP IP/OBS HIGH 50: CPT | Mod: ,,, | Performed by: PSYCHIATRY & NEUROLOGY

## 2023-09-01 PROCEDURE — 94640 AIRWAY INHALATION TREATMENT: CPT

## 2023-09-01 PROCEDURE — 84300 ASSAY OF URINE SODIUM: CPT

## 2023-09-01 PROCEDURE — 94761 N-INVAS EAR/PLS OXIMETRY MLT: CPT

## 2023-09-01 PROCEDURE — 82570 ASSAY OF URINE CREATININE: CPT

## 2023-09-01 PROCEDURE — S0166 INJ OLANZAPINE 2.5MG: HCPCS

## 2023-09-01 PROCEDURE — 92507 TX SP LANG VOICE COMM INDIV: CPT

## 2023-09-01 PROCEDURE — 99900035 HC TECH TIME PER 15 MIN (STAT)

## 2023-09-01 PROCEDURE — 99233 PR SUBSEQUENT HOSPITAL CARE,LEVL III: ICD-10-PCS | Mod: ,,, | Performed by: PSYCHIATRY & NEUROLOGY

## 2023-09-01 PROCEDURE — 97112 NEUROMUSCULAR REEDUCATION: CPT

## 2023-09-01 RX ORDER — AMIODARONE HYDROCHLORIDE 200 MG/1
200 TABLET ORAL DAILY
Qty: 30 TABLET | Refills: 11 | Status: ON HOLD
Start: 2023-09-02 | End: 2023-10-18 | Stop reason: HOSPADM

## 2023-09-01 RX ORDER — QUETIAPINE FUMARATE 25 MG/1
25 TABLET, FILM COATED ORAL NIGHTLY
Qty: 30 TABLET | Refills: 11 | Status: ON HOLD
Start: 2023-09-01 | End: 2023-10-10 | Stop reason: HOSPADM

## 2023-09-01 RX ORDER — QUETIAPINE FUMARATE 25 MG/1
25 TABLET, FILM COATED ORAL NIGHTLY
Status: DISCONTINUED | OUTPATIENT
Start: 2023-09-01 | End: 2023-09-07 | Stop reason: HOSPADM

## 2023-09-01 RX ORDER — VALPROIC ACID 250 MG/5ML
500 SOLUTION ORAL NIGHTLY
Status: DISCONTINUED | OUTPATIENT
Start: 2023-09-01 | End: 2023-09-02

## 2023-09-01 RX ORDER — METOPROLOL TARTRATE 25 MG/1
25 TABLET, FILM COATED ORAL 2 TIMES DAILY
Qty: 60 TABLET | Refills: 11 | Status: ON HOLD
Start: 2023-09-01 | End: 2023-10-19 | Stop reason: HOSPADM

## 2023-09-01 RX ORDER — VALPROIC ACID 250 MG/5ML
500 SOLUTION ORAL DAILY
Qty: 300 ML | Refills: 11 | Status: ON HOLD
Start: 2023-09-01 | End: 2023-12-22 | Stop reason: HOSPADM

## 2023-09-01 RX ORDER — EZETIMIBE 10 MG/1
10 TABLET ORAL NIGHTLY
Qty: 90 TABLET | Refills: 3 | Status: ON HOLD | OUTPATIENT
Start: 2023-09-01 | End: 2023-10-19 | Stop reason: SDUPTHER

## 2023-09-01 RX ORDER — VALPROIC ACID 250 MG/5ML
500 SOLUTION ORAL NIGHTLY
Qty: 300 ML | Refills: 11
Start: 2023-09-01 | End: 2023-09-06 | Stop reason: SDUPTHER

## 2023-09-01 RX ORDER — VALPROIC ACID 250 MG/5ML
500 SOLUTION ORAL DAILY
Qty: 300 ML | Refills: 11 | Status: ON HOLD
Start: 2023-09-02 | End: 2023-12-22 | Stop reason: HOSPADM

## 2023-09-01 RX ORDER — ATORVASTATIN CALCIUM 80 MG/1
80 TABLET, FILM COATED ORAL DAILY
Qty: 90 TABLET | Refills: 3 | Status: ON HOLD
Start: 2023-09-02 | End: 2023-10-19 | Stop reason: SDUPTHER

## 2023-09-01 RX ORDER — NAPROXEN SODIUM 220 MG/1
81 TABLET, FILM COATED ORAL DAILY
Refills: 0 | Status: ON HOLD
Start: 2023-09-02 | End: 2023-10-19 | Stop reason: SDUPTHER

## 2023-09-01 RX ADMIN — SENNOSIDES AND DOCUSATE SODIUM 1 TABLET: 50; 8.6 TABLET ORAL at 09:09

## 2023-09-01 RX ADMIN — POLYETHYLENE GLYCOL 3350 17 G: 17 POWDER, FOR SOLUTION ORAL at 09:09

## 2023-09-01 RX ADMIN — APIXABAN 5 MG: 5 TABLET, FILM COATED ORAL at 09:09

## 2023-09-01 RX ADMIN — IPRATROPIUM BROMIDE AND ALBUTEROL SULFATE 3 ML: 2.5; .5 SOLUTION RESPIRATORY (INHALATION) at 07:09

## 2023-09-01 RX ADMIN — NYSTATIN 500000 UNITS: 100000 SUSPENSION ORAL at 09:09

## 2023-09-01 RX ADMIN — ACETYLCYSTEINE 4 ML: 100 INHALANT RESPIRATORY (INHALATION) at 07:09

## 2023-09-01 RX ADMIN — METOPROLOL TARTRATE 25 MG: 25 TABLET, FILM COATED ORAL at 09:09

## 2023-09-01 RX ADMIN — VALPROIC ACID 500 MG: 250 SOLUTION ORAL at 09:09

## 2023-09-01 RX ADMIN — AMIODARONE HYDROCHLORIDE 200 MG: 200 TABLET ORAL at 09:09

## 2023-09-01 RX ADMIN — FERROUS SULFATE TAB 325 MG (65 MG ELEMENTAL FE) 1 EACH: 325 (65 FE) TAB at 09:09

## 2023-09-01 RX ADMIN — QUETIAPINE FUMARATE 25 MG: 25 TABLET ORAL at 09:09

## 2023-09-01 RX ADMIN — NYSTATIN 500000 UNITS: 100000 SUSPENSION ORAL at 05:09

## 2023-09-01 RX ADMIN — OLANZAPINE 5 MG: 10 INJECTION, POWDER, LYOPHILIZED, FOR SOLUTION INTRAMUSCULAR at 09:09

## 2023-09-01 RX ADMIN — NYSTATIN 500000 UNITS: 100000 SUSPENSION ORAL at 12:09

## 2023-09-01 RX ADMIN — EZETIMIBE 10 MG: 10 TABLET ORAL at 09:09

## 2023-09-01 RX ADMIN — VALPROIC ACID 500 MG: 250 SOLUTION ORAL at 12:09

## 2023-09-01 RX ADMIN — ATORVASTATIN CALCIUM 80 MG: 40 TABLET, FILM COATED ORAL at 09:09

## 2023-09-01 RX ADMIN — ASPIRIN 81 MG 81 MG: 81 TABLET ORAL at 09:09

## 2023-09-01 NOTE — PT/OT/SLP PROGRESS
Speech Language Pathology Treatment    Patient Name:  Tera Griffiths   MRN:  [id number]  Admitting Diagnosis: Embolic stroke involving left middle cerebral artery    Recommendations:                 General Recommendations:  Dysphagia therapy, Speech/language therapy, and Cognitive-linguistic therapy  Diet recommendations:  NPO, Liquid Diet Level: NPO   Aspiration Precautions: Frequent oral care and Strict aspiration precautions   General Precautions: Standard, aphasia, aspiration, NPO  Communication strategies:  provide increased time to answer and go to room if call light pushed    Assessment:     Tera Griffiths is a 58 y.o. male with an SLP diagnosis of Aphasia and Dysphagia.      Subjective     Pt alert though nonverbal.    Pain/Comfort:  Pain Rating 1:  (NAD)  Pain Rating Post-Intervention 1:  (NAD)    Respiratory Status: Room air    Objective:     Has the patient been evaluated by SLP for swallowing?   Yes  Keep patient NPO? Yes     Pt seen bedside, roused easily with min cues.  Improved eye contact with increased attempts to communicate with SLP.  Pt noted to hold SLP hand with generalized vocalizations.  He did not answer y/n questions or follow simple commands though increased attempts noted.  HOB raised upright.  He self presented thin liquid x1 with delayed swallow initiation noted on 1/2 trials.  Double swallow noted. No overt s/s aspiration though pt appeared uncomfortable, pushing additional trials away.  Education provided re: ongoing POC.  Pt resting comfortably at end of session.      Goals:   Multidisciplinary Problems       SLP Goals          Problem: SLP    Goal Priority Disciplines Outcome   SLP Goal     SLP Ongoing, Progressing   Description: Speech Language Pathology Goals  Goals expected to be met by 8/15  1. Pt will participate in ongoing assessment of swallow.   2. Pt will answer simple y/n questions with 60% accy given max cues.   3. Pt will follow simple commands with 50% accy given  max cues.   4. Pt will vocalize in response to any stim x5/session given max cues.   5. Pt will localize to stim on R x2/session given max cues.                              Plan:     Patient to be seen:  3 x/week   Plan of Care expires:  09/07/23  Plan of Care reviewed with:  patient   SLP Follow-Up:  Yes       Discharge recommendations:  nursing facility, skilled   Barriers to Discharge:  Level of Skilled Assistance Needed     Time Tracking:     SLP Treatment Date:   9/1/23  Speech Start Time:  1047  Speech Stop Time:  1101   Speech Total Time (min):  14 mins    Billable Minutes: Treatment Swallowing Dysfunction 7 mins Speech Language Treatment 7 mins    9/1/23
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
## 2023-09-01 NOTE — NURSING
Patient restless and pulling at PEG at start of shift. POA was at the bedside at that time and appeared to be agitating the patient and attempting to feed him applesauce. Patient has an ordered diet of NPO. POA also noted to be trying to give the patient water. POA educated on the importance to maintain NPO status due to swallowing difficulties and prior failed swallow screens.POA verbalized understanding. Spoke to stroke team about patient pulling at PEG site and POA's refusal of scheduled Zyprexa IM. Received orders for soft restraints. Restraints never initiated, patient was verbally redirected. After restarting tube feeding as ordered, patient removed formula feeding line from PEG site. TF held at that time, provider notified. Coleen Emerson, NP with stroke team said it was okay to hold TF at that time. New abdominal binder applied to patient backwards to help detour patient from removing binder and prevent tugging of the PEG tube. Abdominal binder on backwards effective at this time. Patient remains out of restraints and appears to be resting in bed at this time. No signs of injury noted.

## 2023-09-01 NOTE — PT/OT/SLP PROGRESS
Physical Therapy Treatment    Patient Name:  Tera Griffiths   MRN:  [id number]  Co-tx w/ OT 2/2 suspected pt complexity and requirement of 2 skilled therapists to assist in order to maximize pt treatment   Recommendations:     Discharge Recommendations: nursing facility, skilled  Discharge Equipment Recommendations: to be determined by next level of care  Barriers to discharge: Inaccessible home and Decreased caregiver support    Assessment:     Tera Griffiths is a 58 y.o. male admitted with a medical diagnosis of Embolic stroke involving left middle cerebral artery.  He presents with the following impairments/functional limitations: weakness, impaired endurance, impaired self care skills, impaired functional mobility, impaired balance, gait instability, impaired cognition, decreased coordination, visual deficits, decreased upper extremity function, decreased lower extremity function, decreased safety awareness. Pt continues to follow no commands, but did attempt to spontaneously stand w/ RW placed in front of him, able to complete transfer w/ maxA x2.    Rehab Prognosis: Fair; patient would benefit from acute skilled PT services to address these deficits and reach maximum level of function.    Recent Surgery: Procedure(s) (LRB):  EGD, WITH PEG TUBE INSERTION (N/A) 15 Days Post-Op    Plan:     During this hospitalization, patient to be seen 3 x/week to address the identified rehab impairments via gait training, therapeutic activities, therapeutic exercises, neuromuscular re-education and progress toward the following goals:    Plan of Care Expires:  09/08/23    Subjective     Chief Complaint: NA 2/2 AMS nonverbal  Patient/Family Comments/goals: pt moaning/whimpering throughout session  Pain/Comfort:  Location 1:  (ASHLEY 2/2 AMS nonverbal)  Pain Addressed 1: Reposition, Distraction      Objective:     Communicated with RN prior to session.  Patient found supine with PEG Tube, SCD upon PT entry to room.     General  Precautions: Standard, aphasia, aspiration, fall, NPO  Orthopedic Precautions: N/A  Braces: N/A  Respiratory Status: Room air     Functional Mobility:  Bed Mobility:     Rolling Left:  maximal assistance  Rolling Right: maximal assistance  Scooting: minimum assistance  Supine to Sit: moderate assistance  Sit to Supine: contact guard assistance  Transfers:     Sit to Stand:  maximal assistance and of 2 persons with rolling walker  Balance: EOB sitting balance SBA      AM-PAC 6 CLICK MOBILITY  Turning over in bed (including adjusting bedclothes, sheets and blankets)?: 3  Sitting down on and standing up from a chair with arms (e.g., wheelchair, bedside commode, etc.): 2  Moving from lying on back to sitting on the side of the bed?: 2  Moving to and from a bed to a chair (including a wheelchair)?: 1  Need to walk in hospital room?: 1  Climbing 3-5 steps with a railing?: 1  Basic Mobility Total Score: 10       Treatment & Education:  Pt educated on PT POC/goals, d/c recs, and continued treatment.  Sitting balance w/ cueing for posture, participation in therex, and attempts to get pt to stand w/ only one spontaneous attempt achieved from pt    Patient left left sidelying with all lines intact, call button in reach, bed alarm on, and RN notified..    GOALS:   Multidisciplinary Problems       Physical Therapy Goals          Problem: Physical Therapy    Goal Priority Disciplines Outcome Goal Variances Interventions   Physical Therapy Goal     PT, PT/OT Ongoing, Progressing     Description: Goals to be completed by: 9/8/23    Pt will perform sup<>sit transfers w/ minimum assistance  Pt will have sufficient dynamic balance to sit EOB while performing ADLs/therex w/ stand by assistance for 10 min  Pt will be able to stand up from EOB w/ moderate assistance using LRAD  Pt will ambulate 20 feet w/ maximal assistance using LRAD  Pt will be independent w/ HEP therex on BLE w/ good form and ROM   Pt will follow >50 % of single-step  commands throughout treatment session                        Time Tracking:     PT Received On: 09/01/23  PT Start Time: 1014     PT Stop Time: 1030  PT Total Time (min): 16 min     Billable Minutes: Therapeutic Activity 16    Treatment Type: Treatment  PT/PTA: PT     Number of PTA visits since last PT visit: 0     09/01/2023

## 2023-09-01 NOTE — SUBJECTIVE & OBJECTIVE
Neurologic Chief Complaint: L MCA syndrome    Subjective:     Interval History: Patient is seen for follow-up neurological assessment and treatment recommendations: Agitation remains well controlled, now off restraints and video monitoring >24 hrs. Pending placement.    HPI, Past Medical, Family, and Social History remains the same as documented in the initial encounter.    Review of Systems   Unable to perform ROS: Other (clinical condition)     Scheduled Meds:   acetylcysteine 100 mg/ml (10%)  4 mL Nebulization BID    albuterol-ipratropium  3 mL Nebulization BID    amiodarone  200 mg Per G Tube Daily    apixaban  5 mg Per G Tube BID    aspirin  81 mg Per G Tube Daily    atorvastatin  80 mg Per G Tube Daily    ezetimibe  10 mg Per G Tube QHS    ferrous sulfate  1 tablet Per G Tube Daily    metoprolol tartrate  25 mg Per G Tube BID    nystatin  500,000 Units Oral QID    OLANZapine  5 mg Intramuscular QHS    polyethylene glycol  17 g Per G Tube Daily    QUEtiapine  25 mg Per G Tube QHS    senna-docusate 8.6-50 mg  1 tablet Per G Tube BID    valproic acid (as sodium salt)  500 mg Per G Tube Daily    valproic acid (as sodium salt)  500 mg Per G Tube Q24H    valproic acid (as sodium salt)  500 mg Per G Tube QHS     Continuous Infusions:  PRN Meds:acetaminophen, bisacodyL, chlorproMAZINE, dextrose 10%, dextrose 10%, diatrizoate meglumineand-diatrizoate sodium, glucagon (human recombinant), hydrALAZINE, levalbuterol **AND** ipratropium, labetalol, nitroGLYCERIN, OLANZapine, ondansetron, sodium chloride 0.9%, sodium chloride 0.9%, sodium chloride 0.9%    Objective:     Vital Signs (Most Recent):  Temp: 97.6 °F (36.4 °C) (09/01/23 0549)  Pulse: 79 (09/01/23 0728)  Resp: 18 (09/01/23 0728)  BP: (!) 147/81 (09/01/23 0549)  SpO2: 95 % (09/01/23 0728)  BP Location: Left arm    Vital Signs Range (Last 24H):  Temp:  [97 °F (36.1 °C)-98.2 °F (36.8 °C)]   Pulse:  [67-89]   Resp:  [16-18]   BP: (137-147)/()   SpO2:  [95 %-100  "%]   BP Location: Left arm       Physical Exam  Vitals and nursing note reviewed.   Constitutional:       General: He is not in acute distress.     Appearance: He is well-developed.   HENT:      Head: Normocephalic and atraumatic.   Cardiovascular:      Rate and Rhythm: Normal rate.   Pulmonary:      Effort: Pulmonary effort is normal. No respiratory distress.   Abdominal:      General: There is no distension.   Skin:     General: Skin is warm and dry.   Neurological:      Comments: Aphasic, does not follow commands  L gaze              Neurological Exam:   LOC: drowsy  Attention Span: poor  Language: Global aphasia  Articulation: Untestable due to severe aphasia   Orientation: Untestable due to severe aphasia   EOM (CN III, IV, VI): Gaze preference  left  Facial Movement (CN VII): Lower facial weakness on the Right  Motor: Spontaneously moves all 4 extremities, will not follow commands    Laboratory:  CMP: No results for input(s): "GLUCOSE", "CALCIUM", "ALBUMIN", "PROT", "NA", "K", "CO2", "CL", "BUN", "CREATININE", "ALKPHOS", "ALT", "AST", "BILITOT" in the last 24 hours.  CBC:   Recent Labs   Lab 08/31/23  0440   WBC 5.46   RBC 3.81*   HGB 10.4*   HCT 34.0*   *   MCV 89   MCH 27.3   MCHC 30.6*       Diagnostic Results     Brain Imaging   CT Head 8/10/23  Impression:     No detrimental change compared to prior.     Evolving acute infarcts within the left MCA and PCA territories.  No hemorrhagic conversion.  No significant mass effect     Suspected small subacute infarction within the right caudate.     Multiple areas remote infarctions as detailed above.        CT Head 8/8/23 1704  Impression:     1. Redemonstration of acute infarctions in the left MCA and PCA territories.  No evidence to suggest hemorrhagic transformation.  2. Suspected small subacute infarction in the right caudate.  3. Numerous remote infarctions as detailed above.     CT Head 8/8/23 0875  Impression:     Moderate developing " hypoattenuation left inferior frontal lobe and left occipital lobe concerning for developing recent infarcts post thrombectomy.     Allowing for scattered hyperdensity related to recently contrast administration for thrombectomy there is no definite acute intracranial hemorrhage.     Previous hypodensity right caudate no longer seen which may be related to contrast administration for thrombectomy and possible subacute age infarction.     Superimposed remote infarcts with moderate to large encephalomalacia right MCA territory unchanged     Vessel Imaging   IR Angio 8/8/23  Preliminary Interpretation:   1.    No evidence of left ICA or MCA stenosis. No large or medium vessel occlusion.     CTA Multiphase 8/7/23  Impression:     CT head:     New right caudate nucleus hypodensity, likely representing age-indeterminate infarct.  Could be further evaluated with MRI.     Multifocal encephalomalacia involving the right frontal, temporoparietal, and left occipital lobes.     CTA head and neck:     Acute occlusion of the superior left M2 segment.     Left PCA occlusion of undetermined age.     Filling defect in the right main pulmonary artery, concerning for acute pulmonary thromboembolism.  Consider follow-up pulmonary CTA to more fully assess the extent of thromboemboli, the clot burden, and the presence or absence of right heart strain.     Not fully detailed above:     - Incomplete opacification of the left atrial appendage, suspicious for an intraluminal thrombus.  Follow-up echocardiogram, or cardiac MRI or CTA, recommended.     - The presence of acute thrombi or thromboemboli in both the systemic arterial and pulmonary arterial circulation raises concern for the possibility of underlying intracardiac or extracardiac right-to-left shunt, and/or a hypercoagulable state.  Correlate clinically.     - Incompletely visualized, but possibly large, pericardial effusion.  Artifacts compromise accurate assessment of its  attenuation.     Cardiac Imaging   TTE with bubble 8/8/23    Left Ventricle: The left ventricle is moderately dilated. Increased ventricular mass. Normal wall thickness. There is moderate eccentric hypertrophy. Severe global hypokinesis present. Septal motion is consistent with pacing. There is severely reduced systolic function with a visually estimated ejection fraction of 15 - 20%. Ejection fraction by visual approximation is 20%. Unable to assess diastolic function due to arrhythmia.    Left Atrium: Left atrium is severely dilated. Radha 85mL/m2.    Right Ventricle: Mild right ventricular enlargement. Wall thickness is normal. Right ventricle wall motion  is normal. Systolic function is severely reduced. Pacemaker lead present in the ventricle.    Right Atrium: Right atrium is severely dilated.    Aortic Valve: There is mild aortic valve sclerosis. There is normal leaflet mobility. There is no significant regurgitation.    Mitral Valve: There is bileaflet sclerosis. There is normal leaflet mobility. There is mild regurgitation.    Tricuspid Valve: The tricuspid valve is structurally normal. There is normal leaflet mobility. There is mild regurgitation.    Pulmonic Valve: The pulmonic valve is structurally normal. There is normal leaflet mobility. There is no significant regurgitation.    Aorta: Aortic root is normal in size measuring 3.13 cm. Ascending aorta is normal measuring 3.24 cm.    IVC/SVC: Normal venous pressure at 3 mmHg.    Pericardium: The pericardium is normal. There is no pericardial effusion.

## 2023-09-01 NOTE — PROGRESS NOTES
"CONSULTATION LIAISON PSYCHIATRY PROGRESS NOTE    Patient Name: Tera Griffiths  MRN: [id number]  Patient Class: IP- Inpatient  Admission Date: 8/5/2023  Attending Physician: Mansoor Velez MD      SUBJECTIVE:   SUBJECTIVE:   Tera Griffiths is a 58 y.o. male with no known past psychiatric history of and pertinent past medical history of CHF, CAD, AF, HTN, and CKD who was admitted 8/5 for CHF exacerbation. Hospital course c/b L MCA embolic stroke.      Psychiatry consulted for "currently in UE restraints due to pulling out lines/PEG. consult to psych to seek help for nonredirectable agitation. trying to avoid physical restraints"      Today,     NO behavioral PRNs overnight. Patient intermittently opens eyes with tactile/verbal prompting. Pt laying in bed quietly this morning, sitter at bedside. PT not responding to questions or commands, occasional groaning       OBJECTIVE:    Mental Status Exam:  General Appearance: appears stated age, dressed in hospital garb  Behavior:  sleeping through attempts to initiate interview.  Involuntary Movements and Motor Activity: no abnormal involuntary movements noted; no tics, no tremors, no akathisia, no dystonia, no evidence of tardive dyskinesia; no psychomotor agitation or retardation  Gait and Station: unable to assess - patient lying down or seated  Speech and Language:  unable to assess  Mood: unable to assess  Affect:  unable to assess  Thought Process and Associations: Unable to Assess  Thought Content and Perceptions:: Unable to Assess  Sensorium and Orientation: Unable to Formally Assess  Recent and Remote Memory: Unable to  Formally Assess  Attention and Concentration: Unable to Formally Assess  Fund of Knowledge: Unable to Formally Assess  Insight:  unable to assess  Judgment:  unable to assess    CAM ICU positive? Unable to assess      ASSESSMENT & RECOMMENDATIONS   Delirium 2/2 underlying medical etiologies     Continue depakote to 500 qam, 500 at noon, and " 1000 mg QHS   Last VPA level subtheraputic at 22.4 on 8/26/23  Continue Seroquel 25 mg QHS per G tube  Continue Zyprexa 5 mg PRN for breakthrough, non-redirectable agitation  Most recent EKG from 8/31 with Qtc of 531. Should be noted that reliability can be affected by Afib. Continue to monitor Qtc with goal of <500     DELIRIUM  DELIRIUM BEHAVIOR PRECAUTIONS  As a reminder, changes in mentation & attention with either disorganized thinking, easy distractibility, and fluctuating level of consciousness are commonly observed with delirium.  Please utilize chemical restraints with PRN meds for agitation 1st. If needed for immediate safety reasons, can additionally utilize physical restraints. However, please avoid use of physical restraints, or have periods of patient being out of physical restraints if possible (e.g. while sleeping) as excessive use can promote rebound worsening of delirium/agitation.  Keep window shades open and room lit during day and room dim at night in order to promote normal sleep-wake cycles.  Encourage family at bedside. [place] patient often to situation, location, date.  Recommend to discourage Narcotics, Benzos and Anti-cholinergic medication use as they commonly promote and worsen delirium. If aforementioned medications need to be resumed for optimal patient outcomes after careful consideration, please exercise judicious use with clear documentation.  Please continue medical workup for causative etiology of delirium.      RISK ASSESSMENT  NO NEED FOR PEC,  UNCONTESTED     FOLLOW UP  Will follow up while in house  DISPOSITION - once medically cleared:  Defer to medical team    Please contact ON CALL psychiatry service (24/7) for any acute issues that may arise.    Dr. Lj CARBAJAL Psychiatry  [facility]  9/1/2023 3:01  PM        --------------------------------------------------------------------------------------------------------------------------------------------------------------------------------------------------------------------------------------    CONTINUED OBJECTIVE clinical data & findings reviewed and noted for above decision making    Current Medications:   Scheduled Meds:    acetylcysteine 100 mg/ml (10%)  4 mL Nebulization BID    albuterol-ipratropium  3 mL Nebulization BID    amiodarone  200 mg Per G Tube Daily    apixaban  5 mg Per G Tube BID    aspirin  81 mg Per G Tube Daily    atorvastatin  80 mg Per G Tube Daily    ezetimibe  10 mg Per G Tube QHS    ferrous sulfate  1 tablet Per G Tube Daily    metoprolol tartrate  25 mg Per G Tube BID    nystatin  500,000 Units Oral QID    OLANZapine  5 mg Intramuscular QHS    polyethylene glycol  17 g Per G Tube Daily    QUEtiapine  25 mg Per G Tube QHS    senna-docusate 8.6-50 mg  1 tablet Per G Tube BID    valproic acid (as sodium salt)  500 mg Per G Tube Daily    valproic acid (as sodium salt)  500 mg Per G Tube Q24H    valproic acid (as sodium salt)  500 mg Per G Tube QHS     PRN Meds: acetaminophen, bisacodyL, chlorproMAZINE, dextrose 10%, dextrose 10%, diatrizoate meglumineand-diatrizoate sodium, glucagon (human recombinant), hydrALAZINE, levalbuterol **AND** ipratropium, labetalol, nitroGLYCERIN, OLANZapine, ondansetron, sodium chloride 0.9%, sodium chloride 0.9%, sodium chloride 0.9%    Allergies:   Review of patient's allergies indicates:  No Known Allergies    Vitals  Vitals:    09/01/23 1310   BP: (!) 134/93   Pulse: 60   Resp: 20   Temp: 97.5 °F (36.4 °C)       Labs/Imaging/Studies:  Recent Results (from the past 24 hour(s))   POCT glucose    Collection Time: 08/31/23  4:03 PM   Result Value Ref Range    POCT Glucose 102 70 - 110 mg/dL   Sodium, urine, random    Collection Time: 09/01/23  9:25 AM   Result Value Ref Range    Sodium, Urine 61 20 - 250 mmol/L    Creatinine, urine, random    Collection Time: 09/01/23  9:25 AM   Result Value Ref Range    Creatinine, Urine 140.0 23.0 - 375.0 mg/dL     Imaging Results              X-Ray Chest AP Portable (Final result)  Result time 08/05/23 14:46:03      Final result by Leandro Ruffin MD (08/05/23 14:46:03)                   Impression:      1. Central hilar findings suggest congestive change/edema.  No large focal consolidation.      Electronically signed by: Leandro Ruffin MD  Date:    08/05/2023  Time:    14:46               Narrative:    EXAMINATION:  XR CHEST AP PORTABLE    CLINICAL HISTORY:  Chest Pain;    TECHNIQUE:  Single frontal view of the chest was performed.    COMPARISON:  07/13/2023    FINDINGS:  The cardiomediastinal silhouette is prominent, similar to the previous exam noting left chest wall pacer..  There is no pleural effusion.  The trachea is midline.  The lungs are symmetrically expanded bilaterally with coarse central hilar interstitial attenuation.  No large focal consolidation seen.  There is no pneumothorax.  The osseous structures are remarkable for degenerative change..

## 2023-09-01 NOTE — PT/OT/SLP PROGRESS
Occupational Therapy   Co Treatment w/ PT     Name: Tera Griffiths  MRN: [id number]  Admitting Diagnosis:  Embolic stroke involving left middle cerebral artery  15 Days Post-Op    Recommendations:     Discharge Recommendations: nursing facility, skilled  Discharge Equipment Recommendations:  to be determined by next level of care  Barriers to discharge:  Other (Comment) (increased A needed)    Assessment:     Tera Griffiths is a 58 y.o. male with a medical diagnosis of Embolic stroke involving left middle cerebral artery.  He presents with decreased functional status secondary to medical diagnosis. Performance deficits affecting function are weakness, impaired endurance, impaired self care skills, gait instability, impaired functional mobility, decreased lower extremity function, decreased upper extremity function, decreased coordination, impaired cognition, impaired balance, decreased safety awareness, decreased ROM, impaired coordination, impaired fine motor.     Rehab Prognosis:  Fair; patient would benefit from acute skilled OT services to address these deficits and reach maximum level of function.       Plan:     Patient to be seen 3 x/week to address the above listed problems via self-care/home management, therapeutic activities, therapeutic exercises, neuromuscular re-education  Plan of Care Expires: 09/11/23  Plan of Care Reviewed with: patient    Subjective     Chief Complaint: Patient unable to verbalize   Patient/Family Comments/goals: Gain functional status       Objective:     Communicated with: RN prior to session.  Patient found supine with restraints, PEG Tube, SCD, PureWick upon OT entry to room.    General Precautions: Standard, aphasia, aspiration, NPO    Orthopedic Precautions:N/A  Braces: N/A  Respiratory Status: Room air     Occupational Performance:     Bed Mobility:    Patient completed Rolling/Turning to Right with moderate assistance  Patient completed Scooting/Bridging with minimum  assistance  Patient completed Supine to Sit with moderate assistance  Patient completed Sit to Supine with moderate assistance     Functional Mobility/Transfers:  Patient completed Sit <> Stand Transfer with maximal assistance and of 2 persons  with  no assistive device     Activities of Daily Living:  Grooming: minimum assistance sitting EOB; OT initiated facial grooming with patient; patient able to engage in task but then threw washcloth at [facility] 6 Click ADL: 6    Treatment & Education:  Treatment as stated above. Patient able to follow minimal commands this date. Patient needing max initiation and encouragement to engage and frequently attempted to lay down during EOB activity. Patient remains to be at low functionals status this date. Co treatment conducted due to patient increased medical complexity and need for increased safety precautions.     Patient left supine with all lines intact, call button in reach, and bed alarm on    GOALS:   Multidisciplinary Problems       Occupational Therapy Goals          Problem: Occupational Therapy    Goal Priority Disciplines Outcome Interventions   Occupational Therapy Goal     OT, PT/OT Ongoing, Progressing    Description: Goals to be met by: 9/8/23     Patient will increase functional independence with ADLs by performing:    UE Dressing with Moderate Assistance.  LE Dressing with Moderate Assistance.  Grooming while standing with Moderate Assistance.  Toileting from toilet with Moderate Assistance for hygiene and clothing management.                          Time Tracking:     OT Date of Treatment: 09/01/23  OT Start Time: 1013  OT Stop Time: 1030  OT Total Time (min): 17 min    Billable Minutes:Neuromuscular Re-education 17 minutes     OT/ANDREA: OT          9/1/2023

## 2023-09-01 NOTE — PLAN OF CARE
Problem: Adult Inpatient Plan of Care  Goal: Plan of Care Review  Outcome: Ongoing, Progressing  Flowsheets (Taken 9/1/2023 0215)  Plan of Care Reviewed With:   durable power of    patient  Goal: Optimal Comfort and Wellbeing  Outcome: Ongoing, Progressing     Problem: Diabetes Comorbidity  Goal: Blood Glucose Level Within Targeted Range  Outcome: Ongoing, Progressing  Intervention: Monitor and Manage Glycemia  Flowsheets (Taken 9/1/2023 0215)  Glycemic Management: blood glucose monitored     Problem: Adjustment to Illness (Stroke, Ischemic/Transient Ischemic Attack)  Goal: Optimal Coping  Outcome: Ongoing, Progressing     Problem: Bowel Elimination Impaired (Stroke, Ischemic/Transient Ischemic Attack)  Goal: Effective Bowel Elimination  Outcome: Ongoing, Progressing     Problem: Cerebral Tissue Perfusion (Stroke, Ischemic/Transient Ischemic Attack)  Goal: Optimal Cerebral Tissue Perfusion  Outcome: Ongoing, Progressing     Problem: Sensorimotor Impairment (Stroke, Ischemic/Transient Ischemic Attack)  Goal: Improved Sensorimotor Function  Outcome: Ongoing, Progressing     Problem: Urinary Elimination Impaired (Stroke, Ischemic/Transient Ischemic Attack)  Goal: Effective Urinary Elimination  Outcome: Ongoing, Progressing     Problem: Fall Injury Risk  Goal: Absence of Fall and Fall-Related Injury  Outcome: Ongoing, Progressing           POC updated and reviewed with the patient/POA at the bedside. Questions regarding POC were encouraged and addressed. VSS, see flowsheets. Patient is aphasic and orientation status is ASHLEY at this time. Refusing tele, leads and monitor reapplied to patient multiple times but patient continues to remove telemetry. Fall and safety precautions maintained, no signs of injury noted during shift. Patient remains out of soft restraints and doing well with being verbally redirected. Patient was repositioned for comfort with bed locked in low position, side rails up x 3, bed alarm set,  with call light within reach. Instructed patient to call staff for mobility, verbalized understanding. Stroke book and education reviewed at the bedside, see education flowsheets for details. No acute signs of distress noted at this time.

## 2023-09-01 NOTE — PLAN OF CARE
Per stroke team, pt is medically ready for d/c today. SW informed by [facility] that they cannot accept pt due to pt's agitation. Of note, pt has been out of restraints since 8/31/23 and does not have a sitter either. MD team aware. Will continue to follow for needs.    RAZ Baker, CSW    Case Management Department

## 2023-09-02 LAB
ALBUMIN SERPL BCP-MCNC: 2.8 G/DL (ref 3.5–5.2)
ALP SERPL-CCNC: 103 U/L (ref 55–135)
ALT SERPL W/O P-5'-P-CCNC: 14 U/L (ref 10–44)
ANION GAP SERPL CALC-SCNC: 11 MMOL/L (ref 8–16)
AST SERPL-CCNC: 32 U/L (ref 10–40)
BASOPHILS # BLD AUTO: 0.03 K/UL (ref 0–0.2)
BASOPHILS NFR BLD: 0.5 % (ref 0–1.9)
BILIRUB SERPL-MCNC: 0.6 MG/DL (ref 0.1–1)
BUN SERPL-MCNC: 29 MG/DL (ref 6–20)
CALCIUM SERPL-MCNC: 9.4 MG/DL (ref 8.7–10.5)
CHLORIDE SERPL-SCNC: 103 MMOL/L (ref 95–110)
CO2 SERPL-SCNC: 26 MMOL/L (ref 23–29)
CREAT SERPL-MCNC: 1.6 MG/DL (ref 0.5–1.4)
DIFFERENTIAL METHOD: ABNORMAL
EOSINOPHIL # BLD AUTO: 0.2 K/UL (ref 0–0.5)
EOSINOPHIL NFR BLD: 2.7 % (ref 0–8)
ERYTHROCYTE [DISTWIDTH] IN BLOOD BY AUTOMATED COUNT: 15.9 % (ref 11.5–14.5)
EST. GFR  (NO RACE VARIABLE): 50.3 ML/MIN/1.73 M^2
GLUCOSE SERPL-MCNC: 60 MG/DL (ref 70–110)
HCT VFR BLD AUTO: 38.1 % (ref 40–54)
HGB BLD-MCNC: 11.5 G/DL (ref 14–18)
IMM GRANULOCYTES # BLD AUTO: 0.02 K/UL (ref 0–0.04)
IMM GRANULOCYTES NFR BLD AUTO: 0.3 % (ref 0–0.5)
LYMPHOCYTES # BLD AUTO: 1.1 K/UL (ref 1–4.8)
LYMPHOCYTES NFR BLD: 16.1 % (ref 18–48)
MAGNESIUM SERPL-MCNC: 2.3 MG/DL (ref 1.6–2.6)
MCH RBC QN AUTO: 27 PG (ref 27–31)
MCHC RBC AUTO-ENTMCNC: 30.2 G/DL (ref 32–36)
MCV RBC AUTO: 89 FL (ref 82–98)
MONOCYTES # BLD AUTO: 0.6 K/UL (ref 0.3–1)
MONOCYTES NFR BLD: 8.4 % (ref 4–15)
NEUTROPHILS # BLD AUTO: 4.8 K/UL (ref 1.8–7.7)
NEUTROPHILS NFR BLD: 72 % (ref 38–73)
NRBC BLD-RTO: 0 /100 WBC
PHOSPHATE SERPL-MCNC: 3.4 MG/DL (ref 2.7–4.5)
PLATELET # BLD AUTO: 599 K/UL (ref 150–450)
PMV BLD AUTO: 11.6 FL (ref 9.2–12.9)
POCT GLUCOSE: 70 MG/DL (ref 70–110)
POCT GLUCOSE: 72 MG/DL (ref 70–110)
POCT GLUCOSE: 92 MG/DL (ref 70–110)
POCT GLUCOSE: 99 MG/DL (ref 70–110)
POTASSIUM SERPL-SCNC: 4.6 MMOL/L (ref 3.5–5.1)
PROT SERPL-MCNC: 7.8 G/DL (ref 6–8.4)
RBC # BLD AUTO: 4.26 M/UL (ref 4.6–6.2)
SODIUM SERPL-SCNC: 140 MMOL/L (ref 136–145)
WBC # BLD AUTO: 6.66 K/UL (ref 3.9–12.7)

## 2023-09-02 PROCEDURE — 36415 COLL VENOUS BLD VENIPUNCTURE: CPT

## 2023-09-02 PROCEDURE — 93010 EKG 12-LEAD: ICD-10-PCS | Mod: ,,, | Performed by: INTERNAL MEDICINE

## 2023-09-02 PROCEDURE — 25000003 PHARM REV CODE 250

## 2023-09-02 PROCEDURE — 84100 ASSAY OF PHOSPHORUS: CPT

## 2023-09-02 PROCEDURE — 99233 PR SUBSEQUENT HOSPITAL CARE,LEVL III: ICD-10-PCS | Mod: ,,, | Performed by: PSYCHIATRY & NEUROLOGY

## 2023-09-02 PROCEDURE — 93005 ELECTROCARDIOGRAM TRACING: CPT

## 2023-09-02 PROCEDURE — 83735 ASSAY OF MAGNESIUM: CPT

## 2023-09-02 PROCEDURE — 25000003 PHARM REV CODE 250: Performed by: NURSE PRACTITIONER

## 2023-09-02 PROCEDURE — 25000242 PHARM REV CODE 250 ALT 637 W/ HCPCS: Performed by: STUDENT IN AN ORGANIZED HEALTH CARE EDUCATION/TRAINING PROGRAM

## 2023-09-02 PROCEDURE — 80053 COMPREHEN METABOLIC PANEL: CPT

## 2023-09-02 PROCEDURE — 93010 ELECTROCARDIOGRAM REPORT: CPT | Mod: ,,, | Performed by: INTERNAL MEDICINE

## 2023-09-02 PROCEDURE — S0166 INJ OLANZAPINE 2.5MG: HCPCS

## 2023-09-02 PROCEDURE — 94640 AIRWAY INHALATION TREATMENT: CPT

## 2023-09-02 PROCEDURE — 63600175 PHARM REV CODE 636 W HCPCS: Mod: JZ,JG | Performed by: PSYCHIATRY & NEUROLOGY

## 2023-09-02 PROCEDURE — 11000001 HC ACUTE MED/SURG PRIVATE ROOM

## 2023-09-02 PROCEDURE — 99233 SBSQ HOSP IP/OBS HIGH 50: CPT | Mod: ,,, | Performed by: PSYCHIATRY & NEUROLOGY

## 2023-09-02 PROCEDURE — 85025 COMPLETE CBC W/AUTO DIFF WBC: CPT

## 2023-09-02 RX ORDER — IPRATROPIUM BROMIDE AND ALBUTEROL SULFATE 2.5; .5 MG/3ML; MG/3ML
3 SOLUTION RESPIRATORY (INHALATION) EVERY 12 HOURS PRN
Status: DISCONTINUED | OUTPATIENT
Start: 2023-09-02 | End: 2023-09-07 | Stop reason: HOSPADM

## 2023-09-02 RX ORDER — VALPROIC ACID 250 MG/5ML
1000 SOLUTION ORAL NIGHTLY
Status: DISCONTINUED | OUTPATIENT
Start: 2023-09-02 | End: 2023-09-07 | Stop reason: HOSPADM

## 2023-09-02 RX ADMIN — NYSTATIN 500000 UNITS: 100000 SUSPENSION ORAL at 09:09

## 2023-09-02 RX ADMIN — VALPROIC ACID 500 MG: 250 SOLUTION ORAL at 12:09

## 2023-09-02 RX ADMIN — APIXABAN 5 MG: 5 TABLET, FILM COATED ORAL at 08:09

## 2023-09-02 RX ADMIN — APIXABAN 5 MG: 5 TABLET, FILM COATED ORAL at 09:09

## 2023-09-02 RX ADMIN — ACETAMINOPHEN 650 MG: 325 TABLET ORAL at 04:09

## 2023-09-02 RX ADMIN — SENNOSIDES AND DOCUSATE SODIUM 1 TABLET: 50; 8.6 TABLET ORAL at 09:09

## 2023-09-02 RX ADMIN — NYSTATIN 500000 UNITS: 100000 SUSPENSION ORAL at 12:09

## 2023-09-02 RX ADMIN — QUETIAPINE FUMARATE 25 MG: 25 TABLET ORAL at 09:09

## 2023-09-02 RX ADMIN — ATORVASTATIN CALCIUM 80 MG: 40 TABLET, FILM COATED ORAL at 08:09

## 2023-09-02 RX ADMIN — VALPROIC ACID 1000 MG: 250 SOLUTION ORAL at 09:09

## 2023-09-02 RX ADMIN — OLANZAPINE 5 MG: 10 INJECTION, POWDER, LYOPHILIZED, FOR SOLUTION INTRAMUSCULAR at 09:09

## 2023-09-02 RX ADMIN — IPRATROPIUM BROMIDE AND ALBUTEROL SULFATE 3 ML: 2.5; .5 SOLUTION RESPIRATORY (INHALATION) at 08:09

## 2023-09-02 RX ADMIN — ASPIRIN 81 MG 81 MG: 81 TABLET ORAL at 08:09

## 2023-09-02 RX ADMIN — NYSTATIN 500000 UNITS: 100000 SUSPENSION ORAL at 04:09

## 2023-09-02 RX ADMIN — METOPROLOL TARTRATE 25 MG: 25 TABLET, FILM COATED ORAL at 09:09

## 2023-09-02 RX ADMIN — SENNOSIDES AND DOCUSATE SODIUM 1 TABLET: 50; 8.6 TABLET ORAL at 08:09

## 2023-09-02 RX ADMIN — NYSTATIN 500000 UNITS: 100000 SUSPENSION ORAL at 08:09

## 2023-09-02 RX ADMIN — GLUCAGON 1 MG: KIT at 12:09

## 2023-09-02 RX ADMIN — VALPROIC ACID 500 MG: 250 SOLUTION ORAL at 08:09

## 2023-09-02 RX ADMIN — AMIODARONE HYDROCHLORIDE 200 MG: 200 TABLET ORAL at 08:09

## 2023-09-02 RX ADMIN — FERROUS SULFATE TAB 325 MG (65 MG ELEMENTAL FE) 1 EACH: 325 (65 FE) TAB at 08:09

## 2023-09-02 RX ADMIN — METOPROLOL TARTRATE 25 MG: 25 TABLET, FILM COATED ORAL at 08:09

## 2023-09-02 RX ADMIN — POLYETHYLENE GLYCOL 3350 17 G: 17 POWDER, FOR SOLUTION ORAL at 08:09

## 2023-09-02 RX ADMIN — ACETYLCYSTEINE 4 ML: 100 INHALANT RESPIRATORY (INHALATION) at 08:09

## 2023-09-02 RX ADMIN — EZETIMIBE 10 MG: 10 TABLET ORAL at 09:09

## 2023-09-02 NOTE — ASSESSMENT & PLAN NOTE
Previously required Precedex gtt in [facility], as patient was displaying violent behavior. Pulling out lines and PEG tube. PEG tube replaced with monk catheter via General Surgery.    - Depakote 500mg BID and 1000 QHS  - Off all restraints/sitters  - still with redirectable agitation at night  - decreased seroquel from 50mg nightly to 25mg nightly for prolonged QTc
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
## 2023-09-02 NOTE — SUBJECTIVE & OBJECTIVE
Neurologic Chief Complaint: L MCA syndrome    Subjective:     Interval History: Patient is seen for follow-up neurological assessment and treatment recommendations: Patient agitation still uncontrolled at night. Pulling at lines/PEG. Redirectable, no PRNS required. Qtc prolonged >530, no cardiac tele due to intolerance. Will continue to monitor with daily EKGs. PMR requesting tighter control of nightly agitation before admission to rehab    HPI, Past Medical, Family, and Social History remains the same as documented in the initial encounter.     Review of Systems   Unable to perform ROS: Other (clinical condition)     Scheduled Meds:   amiodarone  200 mg Per G Tube Daily    apixaban  5 mg Per G Tube BID    aspirin  81 mg Per G Tube Daily    atorvastatin  80 mg Per G Tube Daily    ezetimibe  10 mg Per G Tube QHS    ferrous sulfate  1 tablet Per G Tube Daily    metoprolol tartrate  25 mg Per G Tube BID    nystatin  500,000 Units Oral QID    OLANZapine  5 mg Intramuscular QHS    polyethylene glycol  17 g Per G Tube Daily    QUEtiapine  25 mg Per G Tube QHS    senna-docusate 8.6-50 mg  1 tablet Per G Tube BID    valproic acid (as sodium salt)  1,000 mg Per G Tube QHS    valproic acid (as sodium salt)  500 mg Per G Tube Daily    valproic acid (as sodium salt)  500 mg Per G Tube Q24H     Continuous Infusions:  PRN Meds:acetaminophen, albuterol-ipratropium, bisacodyL, chlorproMAZINE, dextrose 10%, dextrose 10%, diatrizoate meglumineand-diatrizoate sodium, glucagon (human recombinant), hydrALAZINE, labetalol, nitroGLYCERIN, OLANZapine, ondansetron, sodium chloride 0.9%, sodium chloride 0.9%, sodium chloride 0.9%    Objective:     Vital Signs (Most Recent):  Temp: 98.4 °F (36.9 °C) (09/02/23 0715)  Pulse: 64 (09/02/23 0808)  Resp: 18 (09/02/23 0808)  BP: 131/82 (09/02/23 0715)  SpO2: (!) 91 % (09/02/23 0808)  BP Location: Left arm    Vital Signs Range (Last 24H):  Temp:  [97.5 °F (36.4 °C)-98.9 °F (37.2 °C)]   Pulse:   [53-89]   Resp:  [16-20]   BP: (120-137)/()   SpO2:  [91 %-97 %]   BP Location: Left arm       Physical Exam  Vitals and nursing note reviewed.   Constitutional:       General: He is not in acute distress.     Appearance: He is well-developed.   HENT:      Head: Normocephalic and atraumatic.   Cardiovascular:      Rate and Rhythm: Normal rate.   Pulmonary:      Effort: Pulmonary effort is normal. No respiratory distress.   Abdominal:      General: There is no distension.   Skin:     General: Skin is warm and dry.   Neurological:      Comments: Aphasic, does not follow commands  L gaze            Neurological Exam:   LOC: drowsy  Attention Span: poor  Language: Global aphasia  Articulation: Untestable due to severe aphasia   Orientation: Untestable due to severe aphasia   EOM (CN III, IV, VI): Gaze preference  left  Facial Movement (CN VII): Lower facial weakness on the Right  Motor: Spontaneously moves all 4 extremities, will not follow commands    Laboratory:  CMP:   Recent Labs   Lab 09/02/23  0602   CALCIUM 9.4   ALBUMIN 2.8*   PROT 7.8      K 4.6   CO2 26      BUN 29*   CREATININE 1.6*   ALKPHOS 103   ALT 14   AST 32   BILITOT 0.6     CBC:   Recent Labs   Lab 09/02/23  0602   WBC 6.66   RBC 4.26*   HGB 11.5*   HCT 38.1*   *   MCV 89   MCH 27.0   MCHC 30.2*       Diagnostic Results     Brain Imaging   CT Head 8/10/23  Impression:     No detrimental change compared to prior.     Evolving acute infarcts within the left MCA and PCA territories.  No hemorrhagic conversion.  No significant mass effect     Suspected small subacute infarction within the right caudate.     Multiple areas remote infarctions as detailed above.        CT Head 8/8/23 2932  Impression:     1. Redemonstration of acute infarctions in the left MCA and PCA territories.  No evidence to suggest hemorrhagic transformation.  2. Suspected small subacute infarction in the right caudate.  3. Numerous remote infarctions as  detailed above.     CT Head 8/8/23 0890  Impression:     Moderate developing hypoattenuation left inferior frontal lobe and left occipital lobe concerning for developing recent infarcts post thrombectomy.     Allowing for scattered hyperdensity related to recently contrast administration for thrombectomy there is no definite acute intracranial hemorrhage.     Previous hypodensity right caudate no longer seen which may be related to contrast administration for thrombectomy and possible subacute age infarction.     Superimposed remote infarcts with moderate to large encephalomalacia right MCA territory unchanged     Vessel Imaging   IR Angio 8/8/23  Preliminary Interpretation:   1.    No evidence of left ICA or MCA stenosis. No large or medium vessel occlusion.     CTA Multiphase 8/7/23  Impression:     CT head:     New right caudate nucleus hypodensity, likely representing age-indeterminate infarct.  Could be further evaluated with MRI.     Multifocal encephalomalacia involving the right frontal, temporoparietal, and left occipital lobes.     CTA head and neck:     Acute occlusion of the superior left M2 segment.     Left PCA occlusion of undetermined age.     Filling defect in the right main pulmonary artery, concerning for acute pulmonary thromboembolism.  Consider follow-up pulmonary CTA to more fully assess the extent of thromboemboli, the clot burden, and the presence or absence of right heart strain.     Not fully detailed above:     - Incomplete opacification of the left atrial appendage, suspicious for an intraluminal thrombus.  Follow-up echocardiogram, or cardiac MRI or CTA, recommended.     - The presence of acute thrombi or thromboemboli in both the systemic arterial and pulmonary arterial circulation raises concern for the possibility of underlying intracardiac or extracardiac right-to-left shunt, and/or a hypercoagulable state.  Correlate clinically.     - Incompletely visualized, but possibly large,  pericardial effusion.  Artifacts compromise accurate assessment of its attenuation.     Cardiac Imaging   TTE with bubble 8/8/23    Left Ventricle: The left ventricle is moderately dilated. Increased ventricular mass. Normal wall thickness. There is moderate eccentric hypertrophy. Severe global hypokinesis present. Septal motion is consistent with pacing. There is severely reduced systolic function with a visually estimated ejection fraction of 15 - 20%. Ejection fraction by visual approximation is 20%. Unable to assess diastolic function due to arrhythmia.    Left Atrium: Left atrium is severely dilated. Radha 85mL/m2.    Right Ventricle: Mild right ventricular enlargement. Wall thickness is normal. Right ventricle wall motion  is normal. Systolic function is severely reduced. Pacemaker lead present in the ventricle.    Right Atrium: Right atrium is severely dilated.    Aortic Valve: There is mild aortic valve sclerosis. There is normal leaflet mobility. There is no significant regurgitation.    Mitral Valve: There is bileaflet sclerosis. There is normal leaflet mobility. There is mild regurgitation.    Tricuspid Valve: The tricuspid valve is structurally normal. There is normal leaflet mobility. There is mild regurgitation.    Pulmonic Valve: The pulmonic valve is structurally normal. There is normal leaflet mobility. There is no significant regurgitation.    Aorta: Aortic root is normal in size measuring 3.13 cm. Ascending aorta is normal measuring 3.24 cm.    IVC/SVC: Normal venous pressure at 3 mmHg.    Pericardium: The pericardium is normal. There is no pericardial effusion.

## 2023-09-02 NOTE — PLAN OF CARE
Problem: Adult Inpatient Plan of Care  Goal: Plan of Care Review  Outcome: Ongoing, Progressing  Flowsheets (Taken 9/2/2023 0040)  Plan of Care Reviewed With:   patient   son   family  Goal: Optimal Comfort and Wellbeing  Outcome: Ongoing, Progressing     Problem: Diabetes Comorbidity  Goal: Blood Glucose Level Within Targeted Range  Outcome: Ongoing, Progressing  Intervention: Monitor and Manage Glycemia  Flowsheets (Taken 9/2/2023 0040)  Glycemic Management:   blood glucose monitored   carbohydrate replacement provided     Problem: Adjustment to Illness (Stroke, Ischemic/Transient Ischemic Attack)  Goal: Optimal Coping  Outcome: Ongoing, Progressing     Problem: Bowel Elimination Impaired (Stroke, Ischemic/Transient Ischemic Attack)  Goal: Effective Bowel Elimination  Outcome: Ongoing, Progressing     Problem: Cerebral Tissue Perfusion (Stroke, Ischemic/Transient Ischemic Attack)  Goal: Optimal Cerebral Tissue Perfusion  Outcome: Ongoing, Progressing  Intervention: Protect and Optimize Cerebral Perfusion  Flowsheets (Taken 9/2/2023 0040)  Cerebral Perfusion Promotion: blood pressure monitored     Problem: Communication Impairment (Stroke, Ischemic/Transient Ischemic Attack)  Goal: Improved Communication Skills  Outcome: Ongoing, Progressing     Problem: Functional Ability Impaired (Stroke, Ischemic/Transient Ischemic Attack)  Goal: Optimal Functional Ability  Outcome: Ongoing, Progressing     Problem: Urinary Elimination Impaired (Stroke, Ischemic/Transient Ischemic Attack)  Goal: Effective Urinary Elimination  Outcome: Ongoing, Progressing     Problem: Fall Injury Risk  Goal: Absence of Fall and Fall-Related Injury  Outcome: Ongoing, Progressing           POC updated and reviewed with the patient/patient's son at the bedside. Questions regarding POC were encouraged and addressed. VSS, see flowsheets. Patient is aphasic and orientation status is ASHLEY at this time. Patient continues to refuse telemetry. Attempted  to place tele back on patient but patient removed electrodes.  Fall and safety precautions maintained, no signs of injury noted during shift. Patient was repositioned for comfort with bed locked in low position, side rails up x 3, bed alarm set, with call light within reach. Instructed patient to call staff for mobility, verbalized understanding. Stroke book and education reviewed at the bedside, see education flowsheets for details. No acute signs of distress noted at this time.

## 2023-09-02 NOTE — PROGRESS NOTES
"CONSULTATION LIAISON PSYCHIATRY PROGRESS NOTE    Patient Name: Tera Griffiths  MRN: [id number]  Patient Class: IP- Inpatient  Admission Date: 8/5/2023  Attending Physician: Mansoor Velez MD      SUBJECTIVE:   Tera Griffiths is a 58 y.o. male with no known past psychiatric history of and pertinent past medical history of CHF, CAD, AF, HTN, and CKD who was admitted 8/5 for CHF exacerbation. Hospital course c/b L MCA embolic stroke.      Psychiatry consulted for "currently in UE restraints due to pulling out lines/PEG. consult to psych to seek help for nonredirectable agitation. trying to avoid physical restraints"    Per Nursing Notes 9/2 @0733  Patient pulling at peg tube, checked bg, 70... no piv, will give prn medication for agitation    Overnight, pt had hypoglycemic event.  Later in the morning, required 5 mg zyprexa PRN for  nonredirectable agitation.  Has been pulling at PEG tube and lines. Discussed the pt with the nurse at length. Pt sleeping heavily when I attempted to see him- nurse describes pt groaning, completely disoriented, unable to respond to any questions.      OBJECTIVE:    Mental Status Exam:  General Appearance: appears stated age, dressed in hospital garb  Behavior:  sleeping through attempts to initiate interview.  Involuntary Movements and Motor Activity: no abnormal involuntary movements noted; no tics, no tremors, no akathisia, no dystonia, no evidence of tardive dyskinesia; no psychomotor agitation or retardation  Gait and Station: unable to assess - patient lying down or seated  Speech and Language:  unable to assess  Mood: unable to assess  Affect:  unable to assess  Thought Process and Associations: Unable to Assess  Thought Content and Perceptions:: Unable to Assess  Sensorium and Orientation: Unable to Formally Assess  Recent and Remote Memory: Unable to  Formally Assess  Attention and Concentration: Unable to Formally Assess  Fund of Knowledge: Unable to Formally " Assess  Insight:  unable to assess  Judgment:  unable to assess       ASSESSMENT & RECOMMENDATIONS   (Delirium 2/2 underlying medical etiologies     Continue depakote to 500 qam, 500 at noon, and 1000 mg QHS   Last VPA level subtheraputic at 22.4 on 8/26/23  Recommend repeating VPA level tomorrow AM  Continue Seroquel 25 mg QHS per G tube  Continue Zyprexa 5 mg PRN for breakthrough, non-redirectable agitation  Most recent EKG from 9/2 with Qtc of 534. Should be noted that reliability can be affected by Afib. Continue to monitor Qtc with goal of <500     DELIRIUM  DELIRIUM BEHAVIOR PRECAUTIONS  As a reminder, changes in mentation & attention with either disorganized thinking, easy distractibility, and fluctuating level of consciousness are commonly observed with delirium.  Please utilize chemical restraints with PRN meds for agitation 1st. If needed for immediate safety reasons, can additionally utilize physical restraints. However, please avoid use of physical restraints, or have periods of patient being out of physical restraints if possible (e.g. while sleeping) as excessive use can promote rebound worsening of delirium/agitation.  Keep window shades open and room lit during day and room dim at night in order to promote normal sleep-wake cycles.  Encourage family at bedside. [place] patient often to situation, location, date.  Recommend to discourage Narcotics, Benzos and Anti-cholinergic medication use as they commonly promote and worsen delirium. If aforementioned medications need to be resumed for optimal patient outcomes after careful consideration, please exercise judicious use with clear documentation.  Please continue medical workup for causative etiology of delirium.      RISK ASSESSMENT  NO NEED FOR PEC,  UNCONTESTED     FOLLOW UP  Will follow up while in house  DISPOSITION - once medically cleared:  Defer to medical team    Please contact ON CALL psychiatry service (24/7) for any acute issues that may  arise.            --------------------------------------------------------------------------------------------------------------------------------------------------------------------------------------------------------------------------------------    CONTINUED OBJECTIVE clinical data & findings reviewed and noted for above decision making    Current Medications:   Scheduled Meds:    acetylcysteine 100 mg/ml (10%)  4 mL Nebulization BID    albuterol-ipratropium  3 mL Nebulization BID    amiodarone  200 mg Per G Tube Daily    apixaban  5 mg Per G Tube BID    aspirin  81 mg Per G Tube Daily    atorvastatin  80 mg Per G Tube Daily    ezetimibe  10 mg Per G Tube QHS    ferrous sulfate  1 tablet Per G Tube Daily    metoprolol tartrate  25 mg Per G Tube BID    nystatin  500,000 Units Oral QID    OLANZapine  5 mg Intramuscular QHS    polyethylene glycol  17 g Per G Tube Daily    QUEtiapine  25 mg Per G Tube QHS    senna-docusate 8.6-50 mg  1 tablet Per G Tube BID    valproic acid (as sodium salt)  1,000 mg Per G Tube QHS    valproic acid (as sodium salt)  500 mg Per G Tube Daily    valproic acid (as sodium salt)  500 mg Per G Tube Q24H     PRN Meds: acetaminophen, bisacodyL, chlorproMAZINE, dextrose 10%, dextrose 10%, diatrizoate meglumineand-diatrizoate sodium, glucagon (human recombinant), hydrALAZINE, levalbuterol **AND** ipratropium, labetalol, nitroGLYCERIN, OLANZapine, ondansetron, sodium chloride 0.9%, sodium chloride 0.9%, sodium chloride 0.9%    Allergies:   Review of patient's allergies indicates:  No Known Allergies    Vitals  Vitals:    09/02/23 0611   BP: (!) 137/105   Pulse: 79   Resp: 17   Temp: 98.9 °F (37.2 °C)       Labs/Imaging/Studies:  Recent Results (from the past 24 hour(s))   Sodium, urine, random    Collection Time: 09/01/23  9:25 AM   Result Value Ref Range    Sodium, Urine 61 20 - 250 mmol/L   Creatinine, urine, random    Collection Time: 09/01/23  9:25 AM   Result Value Ref Range    Creatinine,  Urine 140.0 23.0 - 375.0 mg/dL   POCT glucose    Collection Time: 09/01/23 11:43 PM   Result Value Ref Range    POCT Glucose 60 (L) 70 - 110 mg/dL   POCT glucose    Collection Time: 09/01/23 11:48 PM   Result Value Ref Range    POCT Glucose 56 (L) 70 - 110 mg/dL   POCT glucose    Collection Time: 09/02/23 12:20 AM   Result Value Ref Range    POCT Glucose 72 70 - 110 mg/dL   CBC auto differential    Collection Time: 09/02/23  6:02 AM   Result Value Ref Range    WBC 6.66 3.90 - 12.70 K/uL    RBC 4.26 (L) 4.60 - 6.20 M/uL    Hemoglobin 11.5 (L) 14.0 - 18.0 g/dL    Hematocrit 38.1 (L) 40.0 - 54.0 %    MCV 89 82 - 98 fL    MCH 27.0 27.0 - 31.0 pg    MCHC 30.2 (L) 32.0 - 36.0 g/dL    RDW 15.9 (H) 11.5 - 14.5 %    Platelets 599 (H) 150 - 450 K/uL    MPV 11.6 9.2 - 12.9 fL    Immature Granulocytes 0.3 0.0 - 0.5 %    Gran # (ANC) 4.8 1.8 - 7.7 K/uL    Immature Grans (Abs) 0.02 0.00 - 0.04 K/uL    Lymph # 1.1 1.0 - 4.8 K/uL    Mono # 0.6 0.3 - 1.0 K/uL    Eos # 0.2 0.0 - 0.5 K/uL    Baso # 0.03 0.00 - 0.20 K/uL    nRBC 0 0 /100 WBC    Gran % 72.0 38.0 - 73.0 %    Lymph % 16.1 (L) 18.0 - 48.0 %    Mono % 8.4 4.0 - 15.0 %    Eosinophil % 2.7 0.0 - 8.0 %    Basophil % 0.5 0.0 - 1.9 %    Differential Method Automated    Comprehensive metabolic panel    Collection Time: 09/02/23  6:02 AM   Result Value Ref Range    Sodium 140 136 - 145 mmol/L    Potassium 4.6 3.5 - 5.1 mmol/L    Chloride 103 95 - 110 mmol/L    CO2 26 23 - 29 mmol/L    Glucose 60 (L) 70 - 110 mg/dL    BUN 29 (H) 6 - 20 mg/dL    Creatinine 1.6 (H) 0.5 - 1.4 mg/dL    Calcium 9.4 8.7 - 10.5 mg/dL    Total Protein 7.8 6.0 - 8.4 g/dL    Albumin 2.8 (L) 3.5 - 5.2 g/dL    Total Bilirubin 0.6 0.1 - 1.0 mg/dL    Alkaline Phosphatase 103 55 - 135 U/L    AST 32 10 - 40 U/L    ALT 14 10 - 44 U/L    eGFR 50.3 (A) >60 mL/min/1.73 m^2    Anion Gap 11 8 - 16 mmol/L   Phosphorus    Collection Time: 09/02/23  6:02 AM   Result Value Ref Range    Phosphorus 3.4 2.7 - 4.5 mg/dL    Magnesium    Collection Time: 09/02/23  6:02 AM   Result Value Ref Range    Magnesium 2.3 1.6 - 2.6 mg/dL   POCT glucose    Collection Time: 09/02/23  6:17 AM   Result Value Ref Range    POCT Glucose 70 70 - 110 mg/dL     Imaging Results              X-Ray Chest AP Portable (Final result)  Result time 08/05/23 14:46:03      Final result by Leandro Ruffin MD (08/05/23 14:46:03)                   Impression:      1. Central hilar findings suggest congestive change/edema.  No large focal consolidation.      Electronically signed by: Leandro Ruffin MD  Date:    08/05/2023  Time:    14:46               Narrative:    EXAMINATION:  XR CHEST AP PORTABLE    CLINICAL HISTORY:  Chest Pain;    TECHNIQUE:  Single frontal view of the chest was performed.    COMPARISON:  07/13/2023    FINDINGS:  The cardiomediastinal silhouette is prominent, similar to the previous exam noting left chest wall pacer..  There is no pleural effusion.  The trachea is midline.  The lungs are symmetrically expanded bilaterally with coarse central hilar interstitial attenuation.  No large focal consolidation seen.  There is no pneumothorax.  The osseous structures are remarkable for degenerative change..

## 2023-09-02 NOTE — NURSING
BG checked at 0000 as ordered. First BG resulted 60. Re-checked one more time prior to PRN administrations and BG 56. Patient removed tube feed line from kangaroo pump x3, so tube feed was not running at this time. Patient given glucagon 1mg IM for hypoglycemia. PRN D10% 250mL bolus not administered due to loss of PIV access. Patient removed his PIV access and would not cooperate with nursing staff when attempting to get new PIV access.  Patient cleaned up and tube feed restarted at goal rate, see orders for details. BG will be re-checked per protocol. No acute signs of distress noted at this time.      0015-   BG re-checked per protocol. BG now 72, post glucagon 1mg IM administration.          Coleen Emerson NP with stroke team notified of hypoglycemia and interventions. No further orders received at this time.

## 2023-09-03 PROBLEM — G93.40 ENCEPHALOPATHY: Status: ACTIVE | Noted: 2023-09-03

## 2023-09-03 LAB
AMMONIA PLAS-SCNC: 30 UMOL/L (ref 10–50)
BASOPHILS # BLD AUTO: 0.02 K/UL (ref 0–0.2)
BASOPHILS NFR BLD: 0.1 % (ref 0–1.9)
CRP SERPL-MCNC: 52.3 MG/L (ref 0–8.2)
DIFFERENTIAL METHOD: ABNORMAL
EOSINOPHIL # BLD AUTO: 0 K/UL (ref 0–0.5)
EOSINOPHIL NFR BLD: 0.1 % (ref 0–8)
ERYTHROCYTE [DISTWIDTH] IN BLOOD BY AUTOMATED COUNT: 15.9 % (ref 11.5–14.5)
ERYTHROCYTE [SEDIMENTATION RATE] IN BLOOD BY PHOTOMETRIC METHOD: 120 MM/HR (ref 0–23)
HCT VFR BLD AUTO: 37.7 % (ref 40–54)
HGB BLD-MCNC: 11.4 G/DL (ref 14–18)
IMM GRANULOCYTES # BLD AUTO: 0.04 K/UL (ref 0–0.04)
IMM GRANULOCYTES NFR BLD AUTO: 0.3 % (ref 0–0.5)
LYMPHOCYTES # BLD AUTO: 0.3 K/UL (ref 1–4.8)
LYMPHOCYTES NFR BLD: 2.2 % (ref 18–48)
MCH RBC QN AUTO: 26.3 PG (ref 27–31)
MCHC RBC AUTO-ENTMCNC: 30.2 G/DL (ref 32–36)
MCV RBC AUTO: 87 FL (ref 82–98)
MONOCYTES # BLD AUTO: 0.7 K/UL (ref 0.3–1)
MONOCYTES NFR BLD: 4.5 % (ref 4–15)
NEUTROPHILS # BLD AUTO: 13.8 K/UL (ref 1.8–7.7)
NEUTROPHILS NFR BLD: 92.8 % (ref 38–73)
NRBC BLD-RTO: 0 /100 WBC
PATH REV BLD -IMP: NORMAL
PLATELET # BLD AUTO: 519 K/UL (ref 150–450)
PMV BLD AUTO: 11.3 FL (ref 9.2–12.9)
POCT GLUCOSE: 100 MG/DL (ref 70–110)
POCT GLUCOSE: 109 MG/DL (ref 70–110)
POCT GLUCOSE: 114 MG/DL (ref 70–110)
POCT GLUCOSE: 63 MG/DL (ref 70–110)
POCT GLUCOSE: 85 MG/DL (ref 70–110)
POCT GLUCOSE: 93 MG/DL (ref 70–110)
RBC # BLD AUTO: 4.34 M/UL (ref 4.6–6.2)
TSH SERPL DL<=0.005 MIU/L-ACNC: 0.74 UIU/ML (ref 0.4–4)
VALPROATE SERPL-MCNC: 50.9 UG/ML (ref 50–100)
VIT B12 SERPL-MCNC: 1346 PG/ML (ref 210–950)
WBC # BLD AUTO: 14.84 K/UL (ref 3.9–12.7)

## 2023-09-03 PROCEDURE — 82140 ASSAY OF AMMONIA: CPT

## 2023-09-03 PROCEDURE — 25000003 PHARM REV CODE 250

## 2023-09-03 PROCEDURE — 11000001 HC ACUTE MED/SURG PRIVATE ROOM

## 2023-09-03 PROCEDURE — 99233 PR SUBSEQUENT HOSPITAL CARE,LEVL III: ICD-10-PCS | Mod: ,,, | Performed by: PSYCHIATRY & NEUROLOGY

## 2023-09-03 PROCEDURE — 25000003 PHARM REV CODE 250: Performed by: NURSE PRACTITIONER

## 2023-09-03 PROCEDURE — 63600175 PHARM REV CODE 636 W HCPCS: Mod: JZ,JG | Performed by: PSYCHIATRY & NEUROLOGY

## 2023-09-03 PROCEDURE — 85652 RBC SED RATE AUTOMATED: CPT

## 2023-09-03 PROCEDURE — 93010 ELECTROCARDIOGRAM REPORT: CPT | Mod: ,,, | Performed by: INTERNAL MEDICINE

## 2023-09-03 PROCEDURE — 85060 BLOOD SMEAR INTERPRETATION: CPT | Mod: ,,, | Performed by: PATHOLOGY

## 2023-09-03 PROCEDURE — 84425 ASSAY OF VITAMIN B-1: CPT

## 2023-09-03 PROCEDURE — 36415 COLL VENOUS BLD VENIPUNCTURE: CPT

## 2023-09-03 PROCEDURE — 85060 PATHOLOGIST REVIEW: ICD-10-PCS | Mod: ,,, | Performed by: PATHOLOGY

## 2023-09-03 PROCEDURE — 84443 ASSAY THYROID STIM HORMONE: CPT

## 2023-09-03 PROCEDURE — 80164 ASSAY DIPROPYLACETIC ACD TOT: CPT

## 2023-09-03 PROCEDURE — 86140 C-REACTIVE PROTEIN: CPT

## 2023-09-03 PROCEDURE — 85025 COMPLETE CBC W/AUTO DIFF WBC: CPT

## 2023-09-03 PROCEDURE — 99233 SBSQ HOSP IP/OBS HIGH 50: CPT | Mod: ,,, | Performed by: PSYCHIATRY & NEUROLOGY

## 2023-09-03 PROCEDURE — 82607 VITAMIN B-12: CPT

## 2023-09-03 PROCEDURE — 93005 ELECTROCARDIOGRAM TRACING: CPT

## 2023-09-03 PROCEDURE — 93010 EKG 12-LEAD: ICD-10-PCS | Mod: ,,, | Performed by: INTERNAL MEDICINE

## 2023-09-03 RX ADMIN — METOPROLOL TARTRATE 25 MG: 25 TABLET, FILM COATED ORAL at 08:09

## 2023-09-03 RX ADMIN — SENNOSIDES AND DOCUSATE SODIUM 1 TABLET: 50; 8.6 TABLET ORAL at 08:09

## 2023-09-03 RX ADMIN — NYSTATIN 500000 UNITS: 100000 SUSPENSION ORAL at 08:09

## 2023-09-03 RX ADMIN — VALPROIC ACID 500 MG: 250 SOLUTION ORAL at 08:09

## 2023-09-03 RX ADMIN — POLYETHYLENE GLYCOL 3350 17 G: 17 POWDER, FOR SOLUTION ORAL at 08:09

## 2023-09-03 RX ADMIN — NYSTATIN 500000 UNITS: 100000 SUSPENSION ORAL at 05:09

## 2023-09-03 RX ADMIN — APIXABAN 5 MG: 5 TABLET, FILM COATED ORAL at 08:09

## 2023-09-03 RX ADMIN — GLUCAGON 1 MG: KIT at 06:09

## 2023-09-03 RX ADMIN — VALPROIC ACID 1000 MG: 250 SOLUTION ORAL at 08:09

## 2023-09-03 RX ADMIN — QUETIAPINE FUMARATE 25 MG: 25 TABLET ORAL at 08:09

## 2023-09-03 RX ADMIN — ASPIRIN 81 MG 81 MG: 81 TABLET ORAL at 08:09

## 2023-09-03 RX ADMIN — ATORVASTATIN CALCIUM 80 MG: 40 TABLET, FILM COATED ORAL at 08:09

## 2023-09-03 RX ADMIN — FERROUS SULFATE TAB 325 MG (65 MG ELEMENTAL FE) 1 EACH: 325 (65 FE) TAB at 08:09

## 2023-09-03 RX ADMIN — NYSTATIN 500000 UNITS: 100000 SUSPENSION ORAL at 12:09

## 2023-09-03 RX ADMIN — AMIODARONE HYDROCHLORIDE 200 MG: 200 TABLET ORAL at 08:09

## 2023-09-03 RX ADMIN — VALPROIC ACID 500 MG: 250 SOLUTION ORAL at 12:09

## 2023-09-03 RX ADMIN — EZETIMIBE 10 MG: 10 TABLET ORAL at 08:09

## 2023-09-03 NOTE — ASSESSMENT & PLAN NOTE
Previously required Precedex gtt in NCC, as patient was displaying violent behavior. Pulling out lines and PEG tube. PEG tube replaced with monk catheter via General Surgery.    - Depakote 500mg BID and 1000 QHS  - Off all restraints/sitters  - still with redirectable agitation at night  - decreased seroquel from 50mg nightly to 25mg nightly for prolonged QTc  - Vpa therapeutic

## 2023-09-03 NOTE — PROGRESS NOTES
"CONSULTATION LIAISON PSYCHIATRY PROGRESS NOTE    Patient Name: Tera Griffiths  MRN: [id number]  Patient Class: IP- Inpatient  Admission Date: 8/5/2023  Attending Physician: Mansoor Velez MD      SUBJECTIVE:   Tera Griffiths is a 58 y.o. male with no known past psychiatric history of and pertinent past medical history of CHF, CAD, AF, HTN, and CKD who was admitted 8/5 for CHF exacerbation. Hospital course c/b L MCA embolic stroke.      Psychiatry consulted for "currently in UE restraints due to pulling out lines/PEG. consult to psych to seek help for nonredirectable agitation. trying to avoid physical restraints"    Interval events: pt completed 7 day course of Rocephin yesterday. EEG and repeat CTH pending. He did not require any behavioral PRNs overnight. 9/3 VPA level 50.4.       Today, pt resting comfortably in bed. Pt was inattentive for my interview and somnolent. Interview terminated due to limited ability to meaningfully participate.        OBJECTIVE:    Mental Status Exam:  General Appearance: appears stated age, dressed in hospital garb  Behavior:  sleeping through attempts to initiate interview.  Involuntary Movements and Motor Activity: no abnormal involuntary movements noted; no tics, no tremors, no akathisia, no dystonia, no evidence of tardive dyskinesia; no psychomotor agitation or retardation  Gait and Station: unable to assess - patient lying down or seated  Speech and Language:  unable to assess  Mood: unable to assess  Affect:  unable to assess  Thought Process and Associations: Unable to Assess  Thought Content and Perceptions:: Unable to Assess  Sensorium and Orientation: Unable to Formally Assess  Recent and Remote Memory: Unable to  Formally Assess  Attention and Concentration: Unable to Formally Assess  Fund of Knowledge: Unable to Formally Assess  Insight:  unable to assess  Judgment:  unable to assess       CAM ICU positive? Unable to assess       ASSESSMENT & RECOMMENDATIONS "   Delirium 2/2 underlying medical etiologies     Continue depakote to 500 qam, 500 at noon, and 1000 mg QHS   Last VPA level subtheraputic at 22.4 on 8/26/23  9/3 VPA 50.4  Continue Seroquel 25 mg QHS per G tube  Continue Zyprexa 5 mg PRN for breakthrough, non-redirectable agitation  Most recent EKG from 9/2 with Qtc of 534. Should be noted that reliability can be affected by Afib. Continue to monitor Qtc with goal of <500.   Pt has scheduled zyprexa 5mg on his MAR since 8/27, per chart review this correlates with pt's increasing qtc      DELIRIUM  DELIRIUM BEHAVIOR PRECAUTIONS  As a reminder, changes in mentation & attention with either disorganized thinking, easy distractibility, and fluctuating level of consciousness are commonly observed with delirium.  Please utilize chemical restraints with PRN meds for agitation 1st. If needed for immediate safety reasons, can additionally utilize physical restraints. However, please avoid use of physical restraints, or have periods of patient being out of physical restraints if possible (e.g. while sleeping) as excessive use can promote rebound worsening of delirium/agitation.  Keep window shades open and room lit during day and room dim at night in order to promote normal sleep-wake cycles.  Encourage family at bedside. [place] patient often to situation, location, date.  Recommend to discourage Narcotics, Benzos and Anti-cholinergic medication use as they commonly promote and worsen delirium. If aforementioned medications need to be resumed for optimal patient outcomes after careful consideration, please exercise judicious use with clear documentation.  Please continue medical workup for causative etiology of delirium.     RISK ASSESSMENT  NO NEED FOR PEC, UNCONTESTED    FOLLOW UP  Will follow up while in house    DISPOSITION - once medically cleared:  Defer to medical team    Please contact ON CALL psychiatry service (24/7) for any acute issues that may arise.      Dinah Cifuentes MD  [facility] PGY-II    Psychiatry  [facility]  9/3/2023 3:44 PM        --------------------------------------------------------------------------------------------------------------------------------------------------------------------------------------------------------------------------------------    CONTINUED OBJECTIVE clinical data & findings reviewed and noted for above decision making    Current Medications:   Scheduled Meds:    amiodarone  200 mg Per G Tube Daily    apixaban  5 mg Per G Tube BID    aspirin  81 mg Per G Tube Daily    atorvastatin  80 mg Per G Tube Daily    ezetimibe  10 mg Per G Tube QHS    ferrous sulfate  1 tablet Per G Tube Daily    metoprolol tartrate  25 mg Per G Tube BID    nystatin  500,000 Units Oral QID    OLANZapine  5 mg Intramuscular QHS    polyethylene glycol  17 g Per G Tube Daily    QUEtiapine  25 mg Per G Tube QHS    senna-docusate 8.6-50 mg  1 tablet Per G Tube BID    valproic acid (as sodium salt)  1,000 mg Per G Tube QHS    valproic acid (as sodium salt)  500 mg Per G Tube Daily    valproic acid (as sodium salt)  500 mg Per G Tube Q24H     PRN Meds: acetaminophen, albuterol-ipratropium, bisacodyL, chlorproMAZINE, dextrose 10%, dextrose 10%, diatrizoate meglumineand-diatrizoate sodium, glucagon (human recombinant), hydrALAZINE, labetalol, nitroGLYCERIN, OLANZapine, ondansetron, sodium chloride 0.9%, sodium chloride 0.9%, sodium chloride 0.9%    Allergies:   Review of patient's allergies indicates:  No Known Allergies    Vitals  Vitals:    09/03/23 1117   BP: 110/84   Pulse: 88   Resp: 18   Temp: 98.8 °F (37.1 °C)       Labs/Imaging/Studies:  Recent Results (from the past 24 hour(s))   POCT glucose    Collection Time: 09/02/23  4:34 PM   Result Value Ref Range    POCT Glucose 99 70 - 110 mg/dL   POCT glucose    Collection Time: 09/03/23 12:12 AM   Result Value Ref Range    POCT Glucose 100 70 - 110 mg/dL   POCT glucose     Collection Time: 09/03/23  5:43 AM   Result Value Ref Range    POCT Glucose 63 (L) 70 - 110 mg/dL   POCT glucose    Collection Time: 09/03/23  6:32 AM   Result Value Ref Range    POCT Glucose 85 70 - 110 mg/dL   Valproic Acid    Collection Time: 09/03/23  8:37 AM   Result Value Ref Range    Valproic Acid Level 50.9 50.0 - 100.0 ug/mL   Ammonia    Collection Time: 09/03/23 10:15 AM   Result Value Ref Range    Ammonia 30 10 - 50 umol/L   POCT glucose    Collection Time: 09/03/23 11:14 AM   Result Value Ref Range    POCT Glucose 93 70 - 110 mg/dL   TSH    Collection Time: 09/03/23 12:27 PM   Result Value Ref Range    TSH 0.736 0.400 - 4.000 uIU/mL   Vitamin B12    Collection Time: 09/03/23 12:27 PM   Result Value Ref Range    Vitamin B-12 1346 (H) 210 - 950 pg/mL   Pathologist Interpretation Differential    Collection Time: 09/03/23  2:53 PM   Result Value Ref Range    Pathologist Review Review required      Imaging Results              X-Ray Chest AP Portable (Final result)  Result time 08/05/23 14:46:03      Final result by Leandro Ruffin MD (08/05/23 14:46:03)                   Impression:      1. Central hilar findings suggest congestive change/edema.  No large focal consolidation.      Electronically signed by: Leandro Ruffin MD  Date:    08/05/2023  Time:    14:46               Narrative:    EXAMINATION:  XR CHEST AP PORTABLE    CLINICAL HISTORY:  Chest Pain;    TECHNIQUE:  Single frontal view of the chest was performed.    COMPARISON:  07/13/2023    FINDINGS:  The cardiomediastinal silhouette is prominent, similar to the previous exam noting left chest wall pacer..  There is no pleural effusion.  The trachea is midline.  The lungs are symmetrically expanded bilaterally with coarse central hilar interstitial attenuation.  No large focal consolidation seen.  There is no pneumothorax.  The osseous structures are remarkable for degenerative change..

## 2023-09-03 NOTE — SUBJECTIVE & OBJECTIVE
Neurologic Chief Complaint: L MCA syndrome    Subjective:     Interval History: Patient is seen for follow-up neurological assessment and treatment recommendations: Patient agitation still uncontrolled at night. Redirectable. Qtc prolonged >530, no cardiac tele due to intolerance. Will continue to monitor with daily EKGs. PMR requesting tighter control of nightly agitation before admission to rehab. EEG ordered to evaluate for potential seizures in setting of unclear encephalopathy. Repeat CT head non contrast pending    HPI, Past Medical, Family, and Social History remains the same as documented in the initial encounter.     Review of Systems   Unable to perform ROS: Other (clinical condition)     Scheduled Meds:   amiodarone  200 mg Per G Tube Daily    apixaban  5 mg Per G Tube BID    aspirin  81 mg Per G Tube Daily    atorvastatin  80 mg Per G Tube Daily    ezetimibe  10 mg Per G Tube QHS    ferrous sulfate  1 tablet Per G Tube Daily    metoprolol tartrate  25 mg Per G Tube BID    nystatin  500,000 Units Oral QID    OLANZapine  5 mg Intramuscular QHS    polyethylene glycol  17 g Per G Tube Daily    QUEtiapine  25 mg Per G Tube QHS    senna-docusate 8.6-50 mg  1 tablet Per G Tube BID    valproic acid (as sodium salt)  1,000 mg Per G Tube QHS    valproic acid (as sodium salt)  500 mg Per G Tube Daily    valproic acid (as sodium salt)  500 mg Per G Tube Q24H     Continuous Infusions:  PRN Meds:acetaminophen, albuterol-ipratropium, bisacodyL, chlorproMAZINE, dextrose 10%, dextrose 10%, diatrizoate meglumineand-diatrizoate sodium, glucagon (human recombinant), hydrALAZINE, labetalol, nitroGLYCERIN, OLANZapine, ondansetron, sodium chloride 0.9%, sodium chloride 0.9%, sodium chloride 0.9%    Objective:     Vital Signs (Most Recent):  Temp: 98.8 °F (37.1 °C) (09/03/23 1117)  Pulse: 88 (09/03/23 1117)  Resp: 18 (09/03/23 1117)  BP: 110/84 (09/03/23 1117)  SpO2: 96 % (09/03/23 1117)  BP Location: Left arm    Vital Signs  Range (Last 24H):  Temp:  [97.6 °F (36.4 °C)-98.8 °F (37.1 °C)]   Pulse:  [68-88]   Resp:  [18-20]   BP: (110-144)/()   SpO2:  [95 %-100 %]   BP Location: Left arm       Physical Exam  Vitals and nursing note reviewed.   Constitutional:       General: He is not in acute distress.     Appearance: He is well-developed.   HENT:      Head: Normocephalic and atraumatic.   Cardiovascular:      Rate and Rhythm: Normal rate.   Pulmonary:      Effort: Pulmonary effort is normal. No respiratory distress.   Abdominal:      General: There is no distension.   Skin:     General: Skin is warm and dry.   Neurological:      Comments: Aphasic, does not follow commands  L gaze              Neurological Exam:   LOC: drowsy  Attention Span: poor  Language: Global aphasia  Articulation: Untestable due to severe aphasia   Orientation: Untestable due to severe aphasia   EOM (CN III, IV, VI): Gaze preference  left  Facial Movement (CN VII): Lower facial weakness on the Right  Motor: Spontaneously moves all 4 extremities, will not follow commands    Diagnostic Results     Brain Imaging   CT Head 8/10/23  Impression:     No detrimental change compared to prior.     Evolving acute infarcts within the left MCA and PCA territories.  No hemorrhagic conversion.  No significant mass effect     Suspected small subacute infarction within the right caudate.     Multiple areas remote infarctions as detailed above.        CT Head 8/8/23 1704  Impression:     1. Redemonstration of acute infarctions in the left MCA and PCA territories.  No evidence to suggest hemorrhagic transformation.  2. Suspected small subacute infarction in the right caudate.  3. Numerous remote infarctions as detailed above.     CT Head 8/8/23 4449  Impression:     Moderate developing hypoattenuation left inferior frontal lobe and left occipital lobe concerning for developing recent infarcts post thrombectomy.     Allowing for scattered hyperdensity related to recently  contrast administration for thrombectomy there is no definite acute intracranial hemorrhage.     Previous hypodensity right caudate no longer seen which may be related to contrast administration for thrombectomy and possible subacute age infarction.     Superimposed remote infarcts with moderate to large encephalomalacia right MCA territory unchanged     Vessel Imaging   IR Angio 8/8/23  Preliminary Interpretation:   1.    No evidence of left ICA or MCA stenosis. No large or medium vessel occlusion.     CTA Multiphase 8/7/23  Impression:     CT head:     New right caudate nucleus hypodensity, likely representing age-indeterminate infarct.  Could be further evaluated with MRI.     Multifocal encephalomalacia involving the right frontal, temporoparietal, and left occipital lobes.     CTA head and neck:     Acute occlusion of the superior left M2 segment.     Left PCA occlusion of undetermined age.     Filling defect in the right main pulmonary artery, concerning for acute pulmonary thromboembolism.  Consider follow-up pulmonary CTA to more fully assess the extent of thromboemboli, the clot burden, and the presence or absence of right heart strain.     Not fully detailed above:     - Incomplete opacification of the left atrial appendage, suspicious for an intraluminal thrombus.  Follow-up echocardiogram, or cardiac MRI or CTA, recommended.     - The presence of acute thrombi or thromboemboli in both the systemic arterial and pulmonary arterial circulation raises concern for the possibility of underlying intracardiac or extracardiac right-to-left shunt, and/or a hypercoagulable state.  Correlate clinically.     - Incompletely visualized, but possibly large, pericardial effusion.  Artifacts compromise accurate assessment of its attenuation.     Cardiac Imaging   TTE with bubble 8/8/23    Left Ventricle: The left ventricle is moderately dilated. Increased ventricular mass. Normal wall thickness. There is moderate  eccentric hypertrophy. Severe global hypokinesis present. Septal motion is consistent with pacing. There is severely reduced systolic function with a visually estimated ejection fraction of 15 - 20%. Ejection fraction by visual approximation is 20%. Unable to assess diastolic function due to arrhythmia.    Left Atrium: Left atrium is severely dilated. Radha 85mL/m2.    Right Ventricle: Mild right ventricular enlargement. Wall thickness is normal. Right ventricle wall motion  is normal. Systolic function is severely reduced. Pacemaker lead present in the ventricle.    Right Atrium: Right atrium is severely dilated.    Aortic Valve: There is mild aortic valve sclerosis. There is normal leaflet mobility. There is no significant regurgitation.    Mitral Valve: There is bileaflet sclerosis. There is normal leaflet mobility. There is mild regurgitation.    Tricuspid Valve: The tricuspid valve is structurally normal. There is normal leaflet mobility. There is mild regurgitation.    Pulmonic Valve: The pulmonic valve is structurally normal. There is normal leaflet mobility. There is no significant regurgitation.    Aorta: Aortic root is normal in size measuring 3.13 cm. Ascending aorta is normal measuring 3.24 cm.    IVC/SVC: Normal venous pressure at 3 mmHg.    Pericardium: The pericardium is normal. There is no pericardial effusion.

## 2023-09-03 NOTE — ASSESSMENT & PLAN NOTE
Unclear etiology, likely some element of delerium  No history of alcohol abuse  Ammonia levels normal  BUN normal    - EEG pending  - repeat CTH pending  - Vitamin B12, thiamine  - TSH  - Avoid sedating medications  - Monitor electrolytes, BGL

## 2023-09-03 NOTE — PLAN OF CARE
Problem: Adult Inpatient Plan of Care  Goal: Plan of Care Review  Outcome: Ongoing, Progressing  Flowsheets (Taken 9/3/2023 0015)  Plan of Care Reviewed With: patient  Goal: Optimal Comfort and Wellbeing  Outcome: Ongoing, Progressing     Problem: Diabetes Comorbidity  Goal: Blood Glucose Level Within Targeted Range  Outcome: Ongoing, Progressing  Intervention: Monitor and Manage Glycemia  Flowsheets (Taken 9/3/2023 0015)  Glycemic Management: blood glucose monitored     Problem: Adjustment to Illness (Stroke, Ischemic/Transient Ischemic Attack)  Goal: Optimal Coping  Outcome: Ongoing, Progressing     Problem: Bowel Elimination Impaired (Stroke, Ischemic/Transient Ischemic Attack)  Goal: Effective Bowel Elimination  Outcome: Ongoing, Progressing     Problem: Cerebral Tissue Perfusion (Stroke, Ischemic/Transient Ischemic Attack)  Goal: Optimal Cerebral Tissue Perfusion  Outcome: Ongoing, Progressing  Intervention: Protect and Optimize Cerebral Perfusion  Flowsheets (Taken 9/3/2023 0015)  Cerebral Perfusion Promotion: blood pressure monitored  Fluid/Electrolyte Management: fluids provided     Problem: Communication Impairment (Stroke, Ischemic/Transient Ischemic Attack)  Goal: Improved Communication Skills  Outcome: Ongoing, Progressing     Problem: Functional Ability Impaired (Stroke, Ischemic/Transient Ischemic Attack)  Goal: Optimal Functional Ability  Outcome: Ongoing, Progressing     Problem: Sensorimotor Impairment (Stroke, Ischemic/Transient Ischemic Attack)  Goal: Improved Sensorimotor Function  Outcome: Ongoing, Progressing     Problem: Urinary Elimination Impaired (Stroke, Ischemic/Transient Ischemic Attack)  Goal: Effective Urinary Elimination  Outcome: Ongoing, Progressing     Problem: Fall Injury Risk  Goal: Absence of Fall and Fall-Related Injury  Outcome: Ongoing, Progressing         POC updated and reviewed with the patient at the bedside. Questions regarding POC were encouraged and addressed. VSS,  see flowsheets. Patient is globally aphasic at this time. ASHLEY orientation status. Patient refusing tele. Fall and safety precautions maintained, no signs of injury noted during shift. Patient was repositioned for comfort with bed locked in low position, side rails up x 4, bed alarm set, with call light within reach. Instructed patient to call staff for mobility. Stroke book and education reviewed at the bedside, see education flowsheets for details. No acute signs of distress noted at this time.
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
## 2023-09-04 LAB
ALBUMIN SERPL BCP-MCNC: 2.7 G/DL (ref 3.5–5.2)
ALP SERPL-CCNC: 91 U/L (ref 55–135)
ALT SERPL W/O P-5'-P-CCNC: 16 U/L (ref 10–44)
ANION GAP SERPL CALC-SCNC: 8 MMOL/L (ref 8–16)
AST SERPL-CCNC: 26 U/L (ref 10–40)
BASOPHILS # BLD AUTO: 0.01 K/UL (ref 0–0.2)
BASOPHILS NFR BLD: 0.2 % (ref 0–1.9)
BILIRUB SERPL-MCNC: 0.5 MG/DL (ref 0.1–1)
BUN SERPL-MCNC: 24 MG/DL (ref 6–20)
CALCIUM SERPL-MCNC: 9.2 MG/DL (ref 8.7–10.5)
CHLORIDE SERPL-SCNC: 107 MMOL/L (ref 95–110)
CO2 SERPL-SCNC: 26 MMOL/L (ref 23–29)
CREAT SERPL-MCNC: 1.6 MG/DL (ref 0.5–1.4)
DIFFERENTIAL METHOD: ABNORMAL
EOSINOPHIL # BLD AUTO: 0.1 K/UL (ref 0–0.5)
EOSINOPHIL NFR BLD: 1 % (ref 0–8)
ERYTHROCYTE [DISTWIDTH] IN BLOOD BY AUTOMATED COUNT: 15.5 % (ref 11.5–14.5)
EST. GFR  (NO RACE VARIABLE): 50.3 ML/MIN/1.73 M^2
GLUCOSE SERPL-MCNC: 115 MG/DL (ref 70–110)
HCT VFR BLD AUTO: 36.2 % (ref 40–54)
HGB BLD-MCNC: 10.7 G/DL (ref 14–18)
IMM GRANULOCYTES # BLD AUTO: 0.02 K/UL (ref 0–0.04)
IMM GRANULOCYTES NFR BLD AUTO: 0.3 % (ref 0–0.5)
LYMPHOCYTES # BLD AUTO: 0.5 K/UL (ref 1–4.8)
LYMPHOCYTES NFR BLD: 7.5 % (ref 18–48)
MCH RBC QN AUTO: 26.9 PG (ref 27–31)
MCHC RBC AUTO-ENTMCNC: 29.6 G/DL (ref 32–36)
MCV RBC AUTO: 91 FL (ref 82–98)
MONOCYTES # BLD AUTO: 0.8 K/UL (ref 0.3–1)
MONOCYTES NFR BLD: 13.4 % (ref 4–15)
NEUTROPHILS # BLD AUTO: 4.9 K/UL (ref 1.8–7.7)
NEUTROPHILS NFR BLD: 77.6 % (ref 38–73)
NRBC BLD-RTO: 0 /100 WBC
PLATELET # BLD AUTO: 437 K/UL (ref 150–450)
PMV BLD AUTO: 11.3 FL (ref 9.2–12.9)
POCT GLUCOSE: 104 MG/DL (ref 70–110)
POCT GLUCOSE: 113 MG/DL (ref 70–110)
POCT GLUCOSE: 118 MG/DL (ref 70–110)
POCT GLUCOSE: 130 MG/DL (ref 70–110)
POTASSIUM SERPL-SCNC: 4.6 MMOL/L (ref 3.5–5.1)
PROT SERPL-MCNC: 7.3 G/DL (ref 6–8.4)
RBC # BLD AUTO: 3.98 M/UL (ref 4.6–6.2)
SODIUM SERPL-SCNC: 141 MMOL/L (ref 136–145)
WBC # BLD AUTO: 6.29 K/UL (ref 3.9–12.7)

## 2023-09-04 PROCEDURE — 99233 SBSQ HOSP IP/OBS HIGH 50: CPT | Mod: ,,, | Performed by: PSYCHIATRY & NEUROLOGY

## 2023-09-04 PROCEDURE — 80053 COMPREHEN METABOLIC PANEL: CPT

## 2023-09-04 PROCEDURE — 97535 SELF CARE MNGMENT TRAINING: CPT

## 2023-09-04 PROCEDURE — 93005 ELECTROCARDIOGRAM TRACING: CPT

## 2023-09-04 PROCEDURE — 25000003 PHARM REV CODE 250

## 2023-09-04 PROCEDURE — 36415 COLL VENOUS BLD VENIPUNCTURE: CPT

## 2023-09-04 PROCEDURE — S0166 INJ OLANZAPINE 2.5MG: HCPCS

## 2023-09-04 PROCEDURE — 11000001 HC ACUTE MED/SURG PRIVATE ROOM

## 2023-09-04 PROCEDURE — 99232 PR SUBSEQUENT HOSPITAL CARE,LEVL II: ICD-10-PCS | Mod: AF,HB,, | Performed by: STUDENT IN AN ORGANIZED HEALTH CARE EDUCATION/TRAINING PROGRAM

## 2023-09-04 PROCEDURE — 93010 EKG 12-LEAD: ICD-10-PCS | Mod: ,,, | Performed by: INTERNAL MEDICINE

## 2023-09-04 PROCEDURE — 97530 THERAPEUTIC ACTIVITIES: CPT

## 2023-09-04 PROCEDURE — 85025 COMPLETE CBC W/AUTO DIFF WBC: CPT

## 2023-09-04 PROCEDURE — 93010 ELECTROCARDIOGRAM REPORT: CPT | Mod: ,,, | Performed by: INTERNAL MEDICINE

## 2023-09-04 PROCEDURE — 99233 PR SUBSEQUENT HOSPITAL CARE,LEVL III: ICD-10-PCS | Mod: ,,, | Performed by: PSYCHIATRY & NEUROLOGY

## 2023-09-04 PROCEDURE — 25000003 PHARM REV CODE 250: Performed by: NURSE PRACTITIONER

## 2023-09-04 PROCEDURE — 99232 SBSQ HOSP IP/OBS MODERATE 35: CPT | Mod: AF,HB,, | Performed by: STUDENT IN AN ORGANIZED HEALTH CARE EDUCATION/TRAINING PROGRAM

## 2023-09-04 RX ADMIN — APIXABAN 5 MG: 5 TABLET, FILM COATED ORAL at 08:09

## 2023-09-04 RX ADMIN — NYSTATIN 500000 UNITS: 100000 SUSPENSION ORAL at 09:09

## 2023-09-04 RX ADMIN — OLANZAPINE 5 MG: 10 INJECTION, POWDER, LYOPHILIZED, FOR SOLUTION INTRAMUSCULAR at 10:09

## 2023-09-04 RX ADMIN — SENNOSIDES AND DOCUSATE SODIUM 1 TABLET: 50; 8.6 TABLET ORAL at 09:09

## 2023-09-04 RX ADMIN — VALPROIC ACID 500 MG: 250 SOLUTION ORAL at 09:09

## 2023-09-04 RX ADMIN — QUETIAPINE FUMARATE 25 MG: 25 TABLET ORAL at 08:09

## 2023-09-04 RX ADMIN — METOPROLOL TARTRATE 25 MG: 25 TABLET, FILM COATED ORAL at 08:09

## 2023-09-04 RX ADMIN — APIXABAN 5 MG: 5 TABLET, FILM COATED ORAL at 09:09

## 2023-09-04 RX ADMIN — ASPIRIN 81 MG 81 MG: 81 TABLET ORAL at 09:09

## 2023-09-04 RX ADMIN — SENNOSIDES AND DOCUSATE SODIUM 1 TABLET: 50; 8.6 TABLET ORAL at 08:09

## 2023-09-04 RX ADMIN — AMIODARONE HYDROCHLORIDE 200 MG: 200 TABLET ORAL at 09:09

## 2023-09-04 RX ADMIN — EZETIMIBE 10 MG: 10 TABLET ORAL at 08:09

## 2023-09-04 RX ADMIN — POLYETHYLENE GLYCOL 3350 17 G: 17 POWDER, FOR SOLUTION ORAL at 09:09

## 2023-09-04 RX ADMIN — ATORVASTATIN CALCIUM 80 MG: 40 TABLET, FILM COATED ORAL at 09:09

## 2023-09-04 RX ADMIN — METOPROLOL TARTRATE 25 MG: 25 TABLET, FILM COATED ORAL at 09:09

## 2023-09-04 RX ADMIN — VALPROIC ACID 1000 MG: 250 SOLUTION ORAL at 08:09

## 2023-09-04 RX ADMIN — VALPROIC ACID 500 MG: 250 SOLUTION ORAL at 11:09

## 2023-09-04 RX ADMIN — FERROUS SULFATE TAB 325 MG (65 MG ELEMENTAL FE) 1 EACH: 325 (65 FE) TAB at 09:09

## 2023-09-04 NOTE — PT/OT/SLP PROGRESS
Occupational Therapy   Treatment    Name: Tera Griffiths  MRN: [id number]  Admitting Diagnosis:  Embolic stroke involving left middle cerebral artery  18 Days Post-Op    Recommendations:     Discharge Recommendations: nursing facility, skilled  Discharge Equipment Recommendations:  to be determined by next level of care  Barriers to discharge:  Other (Comment) (increased assist needed)    Assessment:     Tera Griffiths is a 58 y.o. male with a medical diagnosis of Embolic stroke involving left middle cerebral artery.  He presents with the following performance deficits affecting function:  weakness, impaired endurance, impaired self care skills, impaired functional mobility, gait instability, impaired balance, impaired cognition, decreased coordination, visual deficits, decreased upper extremity function, decreased lower extremity function, decreased safety awareness. Pt with whimpering and groaning throughout session. Pt with L visual gaze preference. Incontinent of bowel on this date, required max tactile and verbal cues for rolling and TA for basia hygiene. Pt would benefit from SNF when medically stable to facilitate safe return to PLOF, maximize functional independence and quality of life, and decrease caregiver burden.      Rehab Prognosis:  Good; patient would benefit from acute skilled OT services to address these deficits and reach maximum level of function.       Plan:     Patient to be seen 3 x/week to address the above listed problems via self-care/home management, therapeutic activities, therapeutic exercises, neuromuscular re-education, cognitive retraining  Plan of Care Expires: 09/11/23  Plan of Care Reviewed with: patient    Subjective     Chief Complaint: Pt nonverbal  Patient/Family Comments/goals: Pt with whimpering and groaning throughout session  Pain/Comfort:  Pain Rating 1:  (pt unable to state, groaning and whimpering throughout session)    Objective:     Communicated with: RN prior to  session.  Patient found HOB elevated with SCD, PEG Tube upon OT entry to room.    General Precautions: Standard, aphasia, aspiration, fall, NPO    Orthopedic Precautions:N/A  Braces: N/A  Respiratory Status: Room air     Occupational Performance:     Bed Mobility:  - Max verbal and tactile cues, as well as increased time required for all bed mobility  Patient completed Rolling/Turning to Left with  minimum assistance  Patient completed Rolling/Turning to Right with minimum assistance  Patient completed Scooting/Bridging with minimum assistance  Patient completed Supine to Sit with minimum assistance  Patient completed Sit to Supine with minimum assistance     Functional Mobility/Transfers:  Pt tolerated sitting EOB ~12 min with SBA  Standing deferred on this date 2/2 pt with no command following    Activities of Daily Living:  Feeding:  dependence PEG tube in place  Grooming: minimum assistance to complete face hygiene - max verbal and tactile cues provided  Lower Body Dressing: total assistance to change socks  Toileting: dependence incontinent of bowel - condom cath in place      [facility] 6 Click ADL: 6    Treatment & Education:  Attempted BUE AROM ex's with pt, pt not command following. Performed AAROM BUE therapeutic ex's to promote endurance and BUE strength.  - Bicep curls x 10  - Shoulder flex x 10  - Overhead serratus punches x 10    Pt educated on the following:  - role of OT and OT POC  - orientation information  - use of call light to request for assistance with all functional mobility to ensure safety during hospital stay  - importance of continued mobilization  - benefits of continued participation in therapy.   - Pt educated on importance of calling for staff assist for functional mobility/transfers.  - All pt questions within OT scope of practice addressed, pt verbalized understanding.      Patient left HOB elevated with all lines intact, call button in reach, bed alarm on, and PCT present    GOALS:    Multidisciplinary Problems       Occupational Therapy Goals          Problem: Occupational Therapy    Goal Priority Disciplines Outcome Interventions   Occupational Therapy Goal     OT, PT/OT Ongoing, Progressing    Description: Goals to be met by: 9/8/23     Patient will increase functional independence with ADLs by performing:    UE Dressing with Moderate Assistance.  LE Dressing with Moderate Assistance.  Grooming while standing with Moderate Assistance.  Toileting from toilet with Moderate Assistance for hygiene and clothing management.                          Time Tracking:     OT Date of Treatment: 09/04/23  OT Start Time: 0902  OT Stop Time: 0934  OT Total Time (min): 32 min    Billable Minutes:Self Care/Home Management 16  Therapeutic Activity 16    OT/ANDREA: OT          9/4/2023

## 2023-09-04 NOTE — PROGRESS NOTES
"CONSULTATION LIAISON PSYCHIATRY PROGRESS NOTE    Patient Name: Tera Griffiths  MRN: [id number]  Patient Class: IP- Inpatient  Admission Date: 8/5/2023  Attending Physician: Mansoor Velez MD      SUBJECTIVE:   Tera Griffiths is a 58 y.o. male with no known past psychiatric history of and pertinent past medical history of CHF, CAD, AF, HTN, and CKD who was admitted 8/5 for CHF exacerbation. Hospital course c/b L MCA embolic stroke.      Psychiatry consulted for "currently in UE restraints due to pulling out lines/PEG. consult to psych to seek help for nonredirectable agitation. trying to avoid physical restraints"    Interval events: NAEON; no PRNs needed.    Today, pt is accompanied by 2 occupational therapists in room helping with his positioning. Pt is groaning and unable to be redirected. According to OT, she has not had meaningful conversation with him in her time caring for him.     OBJECTIVE:    Mental Status Exam:  General Appearance: appears stated age, dressed in hospital garb  Behavior:  sleeping through attempts to initiate interview.  Involuntary Movements and Motor Activity: no abnormal involuntary movements noted; no tics, no tremors, no akathisia, no dystonia, no evidence of tardive dyskinesia; no psychomotor agitation or retardation  Gait and Station: unable to assess - patient lying down or seated  Speech and Language:  unable to assess  Mood: unable to assess  Affect:  unable to assess  Thought Process and Associations: Unable to Assess  Thought Content and Perceptions:: Unable to Assess  Sensorium and Orientation: Unable to Formally Assess  Recent and Remote Memory: Unable to  Formally Assess  Attention and Concentration: Unable to Formally Assess  Fund of Knowledge: Unable to Formally Assess  Insight:  unable to assess  Judgment:  unable to assess       CAM ICU positive? Unable to assess       ASSESSMENT & RECOMMENDATIONS   Delirium 2/2 underlying medical etiologies     Continue depakote " to 500 qam, 500 at noon, and 1000 mg QHS   Last VPA level subtheraputic at 22.4 on 8/26/23  9/3 VPA 50.4  Continue Seroquel 25 mg QHS per G tube  Continue Zyprexa 5 mg PRN for breakthrough, non-redirectable agitation  Most recent EKG from 9/2 with Qtc of 534. Should be noted that reliability can be affected by Afib. Continue to monitor Qtc with goal of <500.   Pt has scheduled zyprexa 5mg on his MAR since 8/27, per chart review this correlates with pt's increasing qtc      DELIRIUM  DELIRIUM BEHAVIOR PRECAUTIONS  As a reminder, changes in mentation & attention with either disorganized thinking, easy distractibility, and fluctuating level of consciousness are commonly observed with delirium.  Please utilize chemical restraints with PRN meds for agitation 1st. If needed for immediate safety reasons, can additionally utilize physical restraints. However, please avoid use of physical restraints, or have periods of patient being out of physical restraints if possible (e.g. while sleeping) as excessive use can promote rebound worsening of delirium/agitation.  Keep window shades open and room lit during day and room dim at night in order to promote normal sleep-wake cycles.  Encourage family at bedside. [place] patient often to situation, location, date.  Recommend to discourage Narcotics, Benzos and Anti-cholinergic medication use as they commonly promote and worsen delirium. If aforementioned medications need to be resumed for optimal patient outcomes after careful consideration, please exercise judicious use with clear documentation.  Please continue medical workup for causative etiology of delirium.     RISK ASSESSMENT  NO NEED FOR PEC, UNCONTESTED    FOLLOW UP  Will follow up while in house    DISPOSITION - once medically cleared:  Defer to medical team    Please contact ON CALL psychiatry service (24/7) for any acute issues that may arise.    Dr. Thony Mobley MD  [facility] PGY-II   CL  Psychiatry  [facility]  9/4/2023 3:44 PM        --------------------------------------------------------------------------------------------------------------------------------------------------------------------------------------------------------------------------------------    CONTINUED OBJECTIVE clinical data & findings reviewed and noted for above decision making    Current Medications:   Scheduled Meds:    amiodarone  200 mg Per G Tube Daily    apixaban  5 mg Per G Tube BID    aspirin  81 mg Per G Tube Daily    atorvastatin  80 mg Per G Tube Daily    ezetimibe  10 mg Per G Tube QHS    ferrous sulfate  1 tablet Per G Tube Daily    metoprolol tartrate  25 mg Per G Tube BID    nystatin  500,000 Units Oral QID    polyethylene glycol  17 g Per G Tube Daily    QUEtiapine  25 mg Per G Tube QHS    senna-docusate 8.6-50 mg  1 tablet Per G Tube BID    valproic acid (as sodium salt)  1,000 mg Per G Tube QHS    valproic acid (as sodium salt)  500 mg Per G Tube Daily    valproic acid (as sodium salt)  500 mg Per G Tube Q24H     PRN Meds: acetaminophen, albuterol-ipratropium, bisacodyL, chlorproMAZINE, dextrose 10%, dextrose 10%, diatrizoate meglumineand-diatrizoate sodium, glucagon (human recombinant), hydrALAZINE, labetalol, nitroGLYCERIN, OLANZapine, ondansetron, sodium chloride 0.9%, sodium chloride 0.9%, sodium chloride 0.9%    Allergies:   Review of patient's allergies indicates:  No Known Allergies    Vitals  Vitals:    09/04/23 0705   BP: 119/85   Pulse: 79   Resp: 18   Temp: 98.5 °F (36.9 °C)       Labs/Imaging/Studies:  Recent Results (from the past 24 hour(s))   Valproic Acid    Collection Time: 09/03/23  8:37 AM   Result Value Ref Range    Valproic Acid Level 50.9 50.0 - 100.0 ug/mL   Ammonia    Collection Time: 09/03/23 10:15 AM   Result Value Ref Range    Ammonia 30 10 - 50 umol/L   POCT glucose    Collection Time: 09/03/23 11:14 AM   Result Value Ref Range    POCT Glucose 93 70 - 110  mg/dL   TSH    Collection Time: 09/03/23 12:27 PM   Result Value Ref Range    TSH 0.736 0.400 - 4.000 uIU/mL   Vitamin B12    Collection Time: 09/03/23 12:27 PM   Result Value Ref Range    Vitamin B-12 1346 (H) 210 - 950 pg/mL   Sedimentation rate    Collection Time: 09/03/23  2:53 PM   Result Value Ref Range    Sed Rate 120 (H) 0 - 23 mm/Hr   C-reactive protein    Collection Time: 09/03/23  2:53 PM   Result Value Ref Range    CRP 52.3 (H) 0.0 - 8.2 mg/L   Pathologist Interpretation Differential    Collection Time: 09/03/23  2:53 PM   Result Value Ref Range    Pathologist Review Review required    CBC Auto Differential    Collection Time: 09/03/23  2:53 PM   Result Value Ref Range    WBC 14.84 (H) 3.90 - 12.70 K/uL    RBC 4.34 (L) 4.60 - 6.20 M/uL    Hemoglobin 11.4 (L) 14.0 - 18.0 g/dL    Hematocrit 37.7 (L) 40.0 - 54.0 %    MCV 87 82 - 98 fL    MCH 26.3 (L) 27.0 - 31.0 pg    MCHC 30.2 (L) 32.0 - 36.0 g/dL    RDW 15.9 (H) 11.5 - 14.5 %    Platelets 519 (H) 150 - 450 K/uL    MPV 11.3 9.2 - 12.9 fL    Immature Granulocytes 0.3 0.0 - 0.5 %    Gran # (ANC) 13.8 (H) 1.8 - 7.7 K/uL    Immature Grans (Abs) 0.04 0.00 - 0.04 K/uL    Lymph # 0.3 (L) 1.0 - 4.8 K/uL    Mono # 0.7 0.3 - 1.0 K/uL    Eos # 0.0 0.0 - 0.5 K/uL    Baso # 0.02 0.00 - 0.20 K/uL    nRBC 0 0 /100 WBC    Gran % 92.8 (H) 38.0 - 73.0 %    Lymph % 2.2 (L) 18.0 - 48.0 %    Mono % 4.5 4.0 - 15.0 %    Eosinophil % 0.1 0.0 - 8.0 %    Basophil % 0.1 0.0 - 1.9 %    Differential Method Automated    POCT glucose    Collection Time: 09/03/23  4:22 PM   Result Value Ref Range    POCT Glucose 109 70 - 110 mg/dL   POCT glucose    Collection Time: 09/03/23 11:19 PM   Result Value Ref Range    POCT Glucose 114 (H) 70 - 110 mg/dL   POCT glucose    Collection Time: 09/04/23  6:13 AM   Result Value Ref Range    POCT Glucose 113 (H) 70 - 110 mg/dL   CBC auto differential    Collection Time: 09/04/23  7:13 AM   Result Value Ref Range    WBC 6.29 3.90 - 12.70 K/uL    RBC 3.98  (L) 4.60 - 6.20 M/uL    Hemoglobin 10.7 (L) 14.0 - 18.0 g/dL    Hematocrit 36.2 (L) 40.0 - 54.0 %    MCV 91 82 - 98 fL    MCH 26.9 (L) 27.0 - 31.0 pg    MCHC 29.6 (L) 32.0 - 36.0 g/dL    RDW 15.5 (H) 11.5 - 14.5 %    Platelets 437 150 - 450 K/uL    MPV 11.3 9.2 - 12.9 fL    Immature Granulocytes 0.3 0.0 - 0.5 %    Gran # (ANC) 4.9 1.8 - 7.7 K/uL    Immature Grans (Abs) 0.02 0.00 - 0.04 K/uL    Lymph # 0.5 (L) 1.0 - 4.8 K/uL    Mono # 0.8 0.3 - 1.0 K/uL    Eos # 0.1 0.0 - 0.5 K/uL    Baso # 0.01 0.00 - 0.20 K/uL    nRBC 0 0 /100 WBC    Gran % 77.6 (H) 38.0 - 73.0 %    Lymph % 7.5 (L) 18.0 - 48.0 %    Mono % 13.4 4.0 - 15.0 %    Eosinophil % 1.0 0.0 - 8.0 %    Basophil % 0.2 0.0 - 1.9 %    Differential Method Automated    Comprehensive metabolic panel    Collection Time: 09/04/23  7:13 AM   Result Value Ref Range    Sodium 141 136 - 145 mmol/L    Potassium 4.6 3.5 - 5.1 mmol/L    Chloride 107 95 - 110 mmol/L    CO2 26 23 - 29 mmol/L    Glucose 115 (H) 70 - 110 mg/dL    BUN 24 (H) 6 - 20 mg/dL    Creatinine 1.6 (H) 0.5 - 1.4 mg/dL    Calcium 9.2 8.7 - 10.5 mg/dL    Total Protein 7.3 6.0 - 8.4 g/dL    Albumin 2.7 (L) 3.5 - 5.2 g/dL    Total Bilirubin 0.5 0.1 - 1.0 mg/dL    Alkaline Phosphatase 91 55 - 135 U/L    AST 26 10 - 40 U/L    ALT 16 10 - 44 U/L    eGFR 50.3 (A) >60 mL/min/1.73 m^2    Anion Gap 8 8 - 16 mmol/L     Imaging Results              X-Ray Chest AP Portable (Final result)  Result time 08/05/23 14:46:03      Final result by Leandro Ruffin MD (08/05/23 14:46:03)                   Impression:      1. Central hilar findings suggest congestive change/edema.  No large focal consolidation.      Electronically signed by: Leandro Ruffin MD  Date:    08/05/2023  Time:    14:46               Narrative:    EXAMINATION:  XR CHEST AP PORTABLE    CLINICAL HISTORY:  Chest Pain;    TECHNIQUE:  Single frontal view of the chest was performed.    COMPARISON:  07/13/2023    FINDINGS:  The cardiomediastinal silhouette  is prominent, similar to the previous exam noting left chest wall pacer..  There is no pleural effusion.  The trachea is midline.  The lungs are symmetrically expanded bilaterally with coarse central hilar interstitial attenuation.  No large focal consolidation seen.  There is no pneumothorax.  The osseous structures are remarkable for degenerative change..

## 2023-09-04 NOTE — ASSESSMENT & PLAN NOTE
Stable  Noted jj  Creatinine baseline 1.3-1.5  Cr stable    - caution with fluids if pre-renal. EF 15%  - CMP Q48h

## 2023-09-04 NOTE — SUBJECTIVE & OBJECTIVE
Neurologic Chief Complaint: L MCA syndrome    Subjective:     Interval History: Patient is seen for follow-up neurological assessment and treatment recommendations: No clinical changes, EEG pending, CTH pending. No PRNs overnight,  today.    HPI, Past Medical, Family, and Social History remains the same as documented in the initial encounter.    Review of Systems   Unable to perform ROS: Other (clinical condition)       Scheduled Meds:   amiodarone  200 mg Per G Tube Daily    apixaban  5 mg Per G Tube BID    aspirin  81 mg Per G Tube Daily    atorvastatin  80 mg Per G Tube Daily    ezetimibe  10 mg Per G Tube QHS    ferrous sulfate  1 tablet Per G Tube Daily    metoprolol tartrate  25 mg Per G Tube BID    nystatin  500,000 Units Oral QID    polyethylene glycol  17 g Per G Tube Daily    QUEtiapine  25 mg Per G Tube QHS    senna-docusate 8.6-50 mg  1 tablet Per G Tube BID    valproic acid (as sodium salt)  1,000 mg Per G Tube QHS    valproic acid (as sodium salt)  500 mg Per G Tube Daily    valproic acid (as sodium salt)  500 mg Per G Tube Q24H     Continuous Infusions:  PRN Meds:acetaminophen, albuterol-ipratropium, bisacodyL, chlorproMAZINE, dextrose 10%, dextrose 10%, diatrizoate meglumineand-diatrizoate sodium, glucagon (human recombinant), hydrALAZINE, labetalol, nitroGLYCERIN, OLANZapine, ondansetron, sodium chloride 0.9%, sodium chloride 0.9%, sodium chloride 0.9%    Objective:     Vital Signs (Most Recent):  Temp: 98.5 °F (36.9 °C) (09/04/23 0705)  Pulse: 79 (09/04/23 0705)  Resp: 18 (09/04/23 0705)  BP: 119/85 (09/04/23 0705)  SpO2: 96 % (09/04/23 0705)  BP Location: Left arm    Vital Signs Range (Last 24H):  Temp:  [96.3 °F (35.7 °C)-99 °F (37.2 °C)]   Pulse:  [58-88]   Resp:  [18-20]   BP: (110-167)/(81-95)   SpO2:  [94 %-100 %]   BP Location: Left arm       Physical Exam  Vitals and nursing note reviewed.   Constitutional:       General: He is not in acute distress.     Appearance: He is  well-developed.   HENT:      Head: Normocephalic and atraumatic.   Cardiovascular:      Rate and Rhythm: Normal rate.   Pulmonary:      Effort: Pulmonary effort is normal. No respiratory distress.   Abdominal:      General: There is no distension.   Skin:     General: Skin is warm and dry.   Neurological:      Comments: Aphasic, does not follow commands  L gaze          Neurological Exam:   LOC: drowsy  Attention Span: poor  Language: Global aphasia  Articulation: Untestable due to severe aphasia   Orientation: Untestable due to severe aphasia   EOM (CN III, IV, VI): Gaze preference  left  Facial Movement (CN VII): Lower facial weakness on the Right  Motor: Spontaneously moves all 4 extremities, will not follow commands    Laboratory:  CMP:   Recent Labs   Lab 09/04/23  0713   CALCIUM 9.2   ALBUMIN 2.7*   PROT 7.3      K 4.6   CO2 26      BUN 24*   CREATININE 1.6*   ALKPHOS 91   ALT 16   AST 26   BILITOT 0.5     CBC:   Recent Labs   Lab 09/04/23  0713   WBC 6.29   RBC 3.98*   HGB 10.7*   HCT 36.2*      MCV 91   MCH 26.9*   MCHC 29.6*       Diagnostic Results     Brain Imaging   CT Head 8/10/23  Impression:     No detrimental change compared to prior.     Evolving acute infarcts within the left MCA and PCA territories.  No hemorrhagic conversion.  No significant mass effect     Suspected small subacute infarction within the right caudate.     Multiple areas remote infarctions as detailed above.        CT Head 8/8/23 1704  Impression:     1. Redemonstration of acute infarctions in the left MCA and PCA territories.  No evidence to suggest hemorrhagic transformation.  2. Suspected small subacute infarction in the right caudate.  3. Numerous remote infarctions as detailed above.     CT Head 8/8/23 0826  Impression:     Moderate developing hypoattenuation left inferior frontal lobe and left occipital lobe concerning for developing recent infarcts post thrombectomy.     Allowing for scattered  hyperdensity related to recently contrast administration for thrombectomy there is no definite acute intracranial hemorrhage.     Previous hypodensity right caudate no longer seen which may be related to contrast administration for thrombectomy and possible subacute age infarction.     Superimposed remote infarcts with moderate to large encephalomalacia right MCA territory unchanged     Vessel Imaging   IR Angio 8/8/23  Preliminary Interpretation:   1.    No evidence of left ICA or MCA stenosis. No large or medium vessel occlusion.     CTA Multiphase 8/7/23  Impression:     CT head:     New right caudate nucleus hypodensity, likely representing age-indeterminate infarct.  Could be further evaluated with MRI.     Multifocal encephalomalacia involving the right frontal, temporoparietal, and left occipital lobes.     CTA head and neck:     Acute occlusion of the superior left M2 segment.     Left PCA occlusion of undetermined age.     Filling defect in the right main pulmonary artery, concerning for acute pulmonary thromboembolism.  Consider follow-up pulmonary CTA to more fully assess the extent of thromboemboli, the clot burden, and the presence or absence of right heart strain.     Not fully detailed above:     - Incomplete opacification of the left atrial appendage, suspicious for an intraluminal thrombus.  Follow-up echocardiogram, or cardiac MRI or CTA, recommended.     - The presence of acute thrombi or thromboemboli in both the systemic arterial and pulmonary arterial circulation raises concern for the possibility of underlying intracardiac or extracardiac right-to-left shunt, and/or a hypercoagulable state.  Correlate clinically.     - Incompletely visualized, but possibly large, pericardial effusion.  Artifacts compromise accurate assessment of its attenuation.     Cardiac Imaging   TTE with bubble 8/8/23    Left Ventricle: The left ventricle is moderately dilated. Increased ventricular mass. Normal wall  thickness. There is moderate eccentric hypertrophy. Severe global hypokinesis present. Septal motion is consistent with pacing. There is severely reduced systolic function with a visually estimated ejection fraction of 15 - 20%. Ejection fraction by visual approximation is 20%. Unable to assess diastolic function due to arrhythmia.    Left Atrium: Left atrium is severely dilated. Radha 85mL/m2.    Right Ventricle: Mild right ventricular enlargement. Wall thickness is normal. Right ventricle wall motion  is normal. Systolic function is severely reduced. Pacemaker lead present in the ventricle.    Right Atrium: Right atrium is severely dilated.    Aortic Valve: There is mild aortic valve sclerosis. There is normal leaflet mobility. There is no significant regurgitation.    Mitral Valve: There is bileaflet sclerosis. There is normal leaflet mobility. There is mild regurgitation.    Tricuspid Valve: The tricuspid valve is structurally normal. There is normal leaflet mobility. There is mild regurgitation.    Pulmonic Valve: The pulmonic valve is structurally normal. There is normal leaflet mobility. There is no significant regurgitation.    Aorta: Aortic root is normal in size measuring 3.13 cm. Ascending aorta is normal measuring 3.24 cm.    IVC/SVC: Normal venous pressure at 3 mmHg.    Pericardium: The pericardium is normal. There is no pericardial effusion.

## 2023-09-04 NOTE — ASSESSMENT & PLAN NOTE
Unclear etiology, likely some element of delerium  No history of alcohol abuse  Ammonia levels normal  BUN normal  Vit B 12 1300   TSH normal    - EEG pending  - repeat CTH pending  - Thiamine pending  - Avoid sedating medications  - Monitor electrolytes, BGL
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
## 2023-09-04 NOTE — ASSESSMENT & PLAN NOTE
Previously required Precedex gtt in [facility], as patient was displaying violent behavior. Pulling out lines and PEG tube. PEG tube replaced with monk catheter via General Surgery.    - Depakote 500mg BID and 1000 QHS  - Off all restraints  - still with redirectable agitation at night  - Continue seroquel 25mg nightly  - discontinued scheduled nightly zyprexa  - Vpa therapeutic  - sitter at bedside

## 2023-09-05 LAB
MAGNESIUM SERPL-MCNC: 2.2 MG/DL (ref 1.6–2.6)
PATH REV BLD -IMP: NORMAL
PHOSPHATE SERPL-MCNC: 2.8 MG/DL (ref 2.7–4.5)
POCT GLUCOSE: 81 MG/DL (ref 70–110)
POCT GLUCOSE: 93 MG/DL (ref 70–110)

## 2023-09-05 PROCEDURE — 25000003 PHARM REV CODE 250: Performed by: NURSE PRACTITIONER

## 2023-09-05 PROCEDURE — 99232 SBSQ HOSP IP/OBS MODERATE 35: CPT | Mod: ,,, | Performed by: NURSE PRACTITIONER

## 2023-09-05 PROCEDURE — 93005 ELECTROCARDIOGRAM TRACING: CPT

## 2023-09-05 PROCEDURE — 25000003 PHARM REV CODE 250

## 2023-09-05 PROCEDURE — 93010 ELECTROCARDIOGRAM REPORT: CPT | Mod: ,,, | Performed by: INTERNAL MEDICINE

## 2023-09-05 PROCEDURE — 84100 ASSAY OF PHOSPHORUS: CPT

## 2023-09-05 PROCEDURE — 93010 EKG 12-LEAD: ICD-10-PCS | Mod: ,,, | Performed by: INTERNAL MEDICINE

## 2023-09-05 PROCEDURE — 99233 PR SUBSEQUENT HOSPITAL CARE,LEVL III: ICD-10-PCS | Mod: ,,, | Performed by: PSYCHIATRY & NEUROLOGY

## 2023-09-05 PROCEDURE — 99233 SBSQ HOSP IP/OBS HIGH 50: CPT | Mod: ,,, | Performed by: PSYCHIATRY & NEUROLOGY

## 2023-09-05 PROCEDURE — 11000001 HC ACUTE MED/SURG PRIVATE ROOM

## 2023-09-05 PROCEDURE — 36415 COLL VENOUS BLD VENIPUNCTURE: CPT

## 2023-09-05 PROCEDURE — 92507 TX SP LANG VOICE COMM INDIV: CPT

## 2023-09-05 PROCEDURE — 83735 ASSAY OF MAGNESIUM: CPT

## 2023-09-05 PROCEDURE — 97530 THERAPEUTIC ACTIVITIES: CPT

## 2023-09-05 PROCEDURE — 99232 PR SUBSEQUENT HOSPITAL CARE,LEVL II: ICD-10-PCS | Mod: ,,, | Performed by: NURSE PRACTITIONER

## 2023-09-05 RX ADMIN — ATORVASTATIN CALCIUM 80 MG: 40 TABLET, FILM COATED ORAL at 09:09

## 2023-09-05 RX ADMIN — ACETAMINOPHEN 650 MG: 325 TABLET ORAL at 12:09

## 2023-09-05 RX ADMIN — SENNOSIDES AND DOCUSATE SODIUM 1 TABLET: 50; 8.6 TABLET ORAL at 09:09

## 2023-09-05 RX ADMIN — VALPROIC ACID 500 MG: 250 SOLUTION ORAL at 09:09

## 2023-09-05 RX ADMIN — SENNOSIDES AND DOCUSATE SODIUM 1 TABLET: 50; 8.6 TABLET ORAL at 08:09

## 2023-09-05 RX ADMIN — ACETAMINOPHEN 650 MG: 325 TABLET ORAL at 08:09

## 2023-09-05 RX ADMIN — METOPROLOL TARTRATE 25 MG: 25 TABLET, FILM COATED ORAL at 08:09

## 2023-09-05 RX ADMIN — EZETIMIBE 10 MG: 10 TABLET ORAL at 08:09

## 2023-09-05 RX ADMIN — APIXABAN 5 MG: 5 TABLET, FILM COATED ORAL at 09:09

## 2023-09-05 RX ADMIN — AMIODARONE HYDROCHLORIDE 200 MG: 200 TABLET ORAL at 09:09

## 2023-09-05 RX ADMIN — QUETIAPINE FUMARATE 25 MG: 25 TABLET ORAL at 08:09

## 2023-09-05 RX ADMIN — FERROUS SULFATE TAB 325 MG (65 MG ELEMENTAL FE) 1 EACH: 325 (65 FE) TAB at 09:09

## 2023-09-05 RX ADMIN — METOPROLOL TARTRATE 25 MG: 25 TABLET, FILM COATED ORAL at 09:09

## 2023-09-05 RX ADMIN — VALPROIC ACID 500 MG: 250 SOLUTION ORAL at 12:09

## 2023-09-05 RX ADMIN — VALPROIC ACID 1000 MG: 250 SOLUTION ORAL at 08:09

## 2023-09-05 RX ADMIN — ASPIRIN 81 MG 81 MG: 81 TABLET ORAL at 09:09

## 2023-09-05 RX ADMIN — POLYETHYLENE GLYCOL 3350 17 G: 17 POWDER, FOR SOLUTION ORAL at 09:09

## 2023-09-05 RX ADMIN — APIXABAN 5 MG: 5 TABLET, FILM COATED ORAL at 08:09

## 2023-09-05 NOTE — PLAN OF CARE
Problem: Adult Inpatient Plan of Care  Goal: Plan of Care Review  Outcome: Ongoing, Progressing  Flowsheets (Taken 9/4/2023 2338)  Plan of Care Reviewed With: patient  Goal: Optimal Comfort and Wellbeing  Outcome: Ongoing, Progressing     Problem: Diabetes Comorbidity  Goal: Blood Glucose Level Within Targeted Range  Outcome: Ongoing, Progressing  Intervention: Monitor and Manage Glycemia  Flowsheets (Taken 9/4/2023 2338)  Glycemic Management: blood glucose monitored     Problem: Adjustment to Illness (Stroke, Ischemic/Transient Ischemic Attack)  Goal: Optimal Coping  Outcome: Ongoing, Progressing     Problem: Bowel Elimination Impaired (Stroke, Ischemic/Transient Ischemic Attack)  Goal: Effective Bowel Elimination  Outcome: Ongoing, Progressing     Problem: Cerebral Tissue Perfusion (Stroke, Ischemic/Transient Ischemic Attack)  Goal: Optimal Cerebral Tissue Perfusion  Outcome: Ongoing, Progressing  Intervention: Protect and Optimize Cerebral Perfusion  Flowsheets (Taken 9/4/2023 2338)  Sensory Stimulation Regulation:   quiet environment promoted   care clustered   lighting decreased  Cerebral Perfusion Promotion: blood pressure monitored  Fluid/Electrolyte Management: fluids provided     Problem: Cognitive Impairment (Stroke, Ischemic/Transient Ischemic Attack)  Goal: Optimal Cognitive Function  Outcome: Ongoing, Progressing     Problem: Functional Ability Impaired (Stroke, Ischemic/Transient Ischemic Attack)  Goal: Optimal Functional Ability  Outcome: Ongoing, Progressing             POC updated and reviewed with the patient at the bedside. Questions regarding POC were encouraged and addressed. VSS, see flowsheets. Patient is globally aphasic at this time. ASHLEY orientation status. Tele continues to be refused by patient. Fall and safety precautions maintained, no signs of injury noted during shift. Delirium precautions maintained. Patient was repositioned for comfort with bed locked in low position, side rails up  x 4, bed alarm set, with call light within reach. Instructed patient to call staff for mobility. Stroke book at the bedside, see education flowsheets for details. No acute signs of distress noted at this time.

## 2023-09-05 NOTE — SUBJECTIVE & OBJECTIVE
Interval History 9/5/2023:  Patient is seen for follow-up PM&R evaluation and recommendations: Participating with therapy. No restraints at this time. Psychiatry adjusted medications.     HPI, Past Medical, Family, and Social History remains the same as documented in the initial encounter.    Scheduled Medications:    amiodarone  200 mg Per G Tube Daily    apixaban  5 mg Per G Tube BID    aspirin  81 mg Per G Tube Daily    atorvastatin  80 mg Per G Tube Daily    ezetimibe  10 mg Per G Tube QHS    ferrous sulfate  1 tablet Per G Tube Daily    metoprolol tartrate  25 mg Per G Tube BID    polyethylene glycol  17 g Per G Tube Daily    QUEtiapine  25 mg Per G Tube QHS    senna-docusate 8.6-50 mg  1 tablet Per G Tube BID    valproic acid (as sodium salt)  1,000 mg Per G Tube QHS    valproic acid (as sodium salt)  500 mg Per G Tube Daily    valproic acid (as sodium salt)  500 mg Per G Tube Q24H       Diagnostic Results: Labs: Reviewed  ECG: Reviewed  CT: Reviewed    PRN Medications: acetaminophen, albuterol-ipratropium, bisacodyL, chlorproMAZINE, dextrose 10%, dextrose 10%, diatrizoate meglumineand-diatrizoate sodium, glucagon (human recombinant), hydrALAZINE, labetalol, nitroGLYCERIN, OLANZapine, ondansetron, sodium chloride 0.9%, sodium chloride 0.9%, sodium chloride 0.9%    Review of Systems   Constitutional:  Positive for activity change.   HENT:  Positive for trouble swallowing.    Respiratory:  Negative for cough and shortness of breath.    Neurological:  Positive for weakness.   Psychiatric/Behavioral:  Positive for agitation, behavioral problems and confusion.      Objective:     Vital Signs (Most Recent):  Temp: 98.2 °F (36.8 °C) (09/05/23 0825)  Pulse: 80 (09/05/23 0957)  Resp: 20 (09/05/23 0825)  BP: (!) 153/73 (09/05/23 0825)  SpO2: 99 % (09/05/23 0825)    Vital Signs (24h Range):  Temp:  [97.8 °F (36.6 °C)-98.2 °F (36.8 °C)] 98.2 °F (36.8 °C)  Pulse:  [] 80  Resp:  [15-20] 20  SpO2:  [95 %-99 %] 99  %  BP: ()/(58-73) 153/73         Physical Exam  Vitals and nursing note reviewed.   HENT:      Mouth/Throat:      Mouth: Mucous membranes are moist.   Musculoskeletal:      Comments: Spontaneously moves all 4 extremities   Skin:     General: Skin is warm.   Neurological:      Mental Status: He is alert.      Motor: Weakness present.      Gait: Gait abnormal.      Comments: Aphasia  nonverbal   Psychiatric:      Comments: Restless and redirectable with family at bedside

## 2023-09-05 NOTE — PROGRESS NOTES
Declan Salomon - Neurosurgery ([facility])  Physical Medicine & Rehab  Progress Note    Patient Name: Tera Griffiths  MRN: [id number]  Admission Date: 8/5/2023  Length of Stay: 29 days  Attending Physician: Mansoor Velez MD    Subjective:     Principal Problem:Embolic stroke involving left middle cerebral artery    Hospital Course:   8/21/23: Participated w/ OT. Bed mob maxA- totalA. Grooming dependence.   8/30/23: Participated w/ OT. Bed mob CGA- modA. Toileting maxA.   9/4/23: Participated w/ OT. Bed mob Gopal. Sat EOB 12 mins SBA. AMPAC 6.     Interval History 9/5/2023:  Patient is seen for follow-up PM&R evaluation and recommendations: Participating with therapy. No restraints at this time. Psychiatry adjusted medications.     HPI, Past Medical, Family, and Social History remains the same as documented in the initial encounter.    Scheduled Medications:    amiodarone  200 mg Per G Tube Daily    apixaban  5 mg Per G Tube BID    aspirin  81 mg Per G Tube Daily    atorvastatin  80 mg Per G Tube Daily    ezetimibe  10 mg Per G Tube QHS    ferrous sulfate  1 tablet Per G Tube Daily    metoprolol tartrate  25 mg Per G Tube BID    polyethylene glycol  17 g Per G Tube Daily    QUEtiapine  25 mg Per G Tube QHS    senna-docusate 8.6-50 mg  1 tablet Per G Tube BID    valproic acid (as sodium salt)  1,000 mg Per G Tube QHS    valproic acid (as sodium salt)  500 mg Per G Tube Daily    valproic acid (as sodium salt)  500 mg Per G Tube Q24H     Diagnostic Results:   Labs: Reviewed  ECG: Reviewed  CT: Reviewed    PRN Medications: acetaminophen, albuterol-ipratropium, bisacodyL, chlorproMAZINE, dextrose 10%, dextrose 10%, diatrizoate meglumineand-diatrizoate sodium, glucagon (human recombinant), hydrALAZINE, labetalol, nitroGLYCERIN, OLANZapine, ondansetron, sodium chloride 0.9%, sodium chloride 0.9%, sodium chloride 0.9%    Review of Systems   Constitutional:  Positive for activity change.   HENT:  Positive for  trouble swallowing.    Respiratory:  Negative for cough and shortness of breath.    Neurological:  Positive for weakness.   Psychiatric/Behavioral:  Positive for agitation, behavioral problems and confusion.      Objective:     Vital Signs (Most Recent):  Temp: 98.2 °F (36.8 °C) (09/05/23 0825)  Pulse: 80 (09/05/23 0957)  Resp: 20 (09/05/23 0825)  BP: (!) 153/73 (09/05/23 0825)  SpO2: 99 % (09/05/23 0825)    Vital Signs (24h Range):  Temp:  [97.8 °F (36.6 °C)-98.2 °F (36.8 °C)] 98.2 °F (36.8 °C)  Pulse:  [] 80  Resp:  [15-20] 20  SpO2:  [95 %-99 %] 99 %  BP: ()/(58-73) 153/73     Physical Exam  Vitals and nursing note reviewed.   HENT:      Mouth/Throat:      Mouth: Mucous membranes are moist.   Musculoskeletal:      Comments: Spontaneously moves all 4 extremities   Skin:     General: Skin is warm.   Neurological:      Mental Status: He is alert.      Motor: Weakness present.      Gait: Gait abnormal.      Comments: Aphasia  nonverbal   Psychiatric:      Comments: Restless and redirectable with family at bedside     Assessment/Plan:      * Embolic stroke involving left middle cerebral artery  - L MCA and L PCA infarct 8/7/23  - Etiology likely cardioembolic per VN    - Related to prolonged/acute hospital course.     Recommendations  -  Encourage mobility, OOB in chair at least 3 hours per day, and early ambulation as appropriate  -  PT/OT evaluate and treat  -  Pain management  -  Monitor for and prevent skin breakdown and pressure ulcers  · Early mobility, repositioning/weight shifting every 20-30 minutes when sitting, turn patient every 2 hours, proper mattress/overlay and chair cushioning, pressure relief/heel protector boots  -  DVT prophylaxis    -  Reviewed discharge options (IP rehab, SNF, HH therapy, and OP therapy)    Dysphagia  - SLP following     Hemiparesis, right  - PT & OT following     Multiple subsegmental pulmonary emboli without acute cor pulmonale  - CTA done and incidently found PE,  now on Eliquis    Paroxysmal A-fib  - Eliquis    PM&R Recommendation:     At this time, the PM&R team has reviewed this patient's ongoing medical case including inpatient diagnosis, medical history, clinical examination, labs, vitals, current social and functional history to provide the post-acute recommendation as follows:     RECOMMENDATIONS: inpatient rehabilitation due to good motivation/participation with therapies, has been determined to tolerate 3 hours of therapy and good potential for recovery.     The patient will be admitted for comprehensive interdisciplinary inpatient rehabilitation to address the impairments due to medical diagnosis of Embolic stroke involving left middle cerebral artery. The patient will benefit from an inpatient rehabilitation program to promote functional recovery, implement compensatory strategies and will undergo assessment for needs for durable medical equipment for safe discharge to the community. This patient will benefit from a coordinated interdisciplinary rehabilitation program services that require close monitoring and treatment with 24-hour rehabilitative nursing and physical/occupational therapies for 3 hours/day for 5 days/week.This interdisciplinary program will be performed under the direction of a physiatrist.    MEDICAL STABILITY:     At this time, barriers for post-acute rehab admission include no use of restraints or sitters.     We will continue to follow.    Ele Madison NP  Department of Physical Medicine & Rehab   [facility] Neurosurgery [facility])

## 2023-09-05 NOTE — PLAN OF CARE
[facility] met with patient and family again today at bedside.  They have agreed to accept the patient if family is agreeable to stay with patient 24/7.  Eel spoke with son and nephew at bedside and they have agreed to do this.  Auth will be submitted today.  ZENOBIA will continue to follow.      Becca Buchanan, BRENT  [facility]  [phone]

## 2023-09-05 NOTE — ASSESSMENT & PLAN NOTE
Unclear etiology, likely some element of delerium  No history of alcohol abuse  Ammonia levels normal  BUN normal  Vit B 12 1300   TSH normal    - EEG pending  - repeat CTH stable  - Thiamine pending  - Avoid sedating medications  - Monitor electrolytes, BGL
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
## 2023-09-05 NOTE — PROGRESS NOTES
"CONSULTATION LIAISON PSYCHIATRY PROGRESS NOTE    Patient Name: Tera Griffiths  MRN: [id number]  Patient Class: IP- Inpatient  Admission Date: 8/5/2023  Attending Physician: Masnoor Velez MD      SUBJECTIVE:     Tera Griffiths is a 58 y.o. male with no known past psychiatric history of and pertinent past   medical history of CHF, CAD, AF, HTN, and CKD who was admitted 8/5 for CHF exacerbation. Hospital course c/b L MCA embolic stroke.      Psychiatry consulted for "currently in UE restraints due to pulling out lines/PEG. consult to psych to seek help for nonredirectable agitation. trying to avoid physical restraints"    Pt required PRN Zyprexa 5mg IM @ 1021 yesterday morning for non-redirectable agitation.     Today, patient is accompanied by two family members. Patient unable to verbalize complaints or participate in meaningful conversation at this time.        OBJECTIVE:    Mental Status Exam:  General Appearance: appears stated age, dressed in hospital garb  Behavior:  sleeping through attempts to initiate interview.  Involuntary Movements and Motor Activity: no abnormal involuntary movements noted; no tics, no tremors, no akathisia, no dystonia, no evidence of tardive dyskinesia; no psychomotor agitation or retardation  Gait and Station: unable to assess - patient lying down or seated  Speech and Language:  unable to assess  Mood: unable to assess  Affect:  unable to assess  Thought Process and Associations: Unable to Assess  Thought Content and Perceptions:: Unable to Assess  Sensorium and Orientation: Unable to Formally Assess  Recent and Remote Memory: Unable to  Formally Assess  Attention and Concentration: Unable to Formally Assess  Fund of Knowledge: Unable to Formally Assess  Insight:  unable to assess  Judgment:  unable to assess    CAM ICU positive? Unable to assess      ASSESSMENT & RECOMMENDATIONS   Delirium 2/2 underlying medical etiologies     Continue depakote to 500 qam, 500 at noon, and " 1000 mg QHS   Last VPA level 50.9 on 9/03/23  Continue Seroquel 25 mg QHS per G tube  Continue Zyprexa 5 mg PRN for breakthrough, non-redirectable agitation  Most recent EKG from 9/2 with Qtc of 534. Should be noted that reliability can be affected by Afib. Continue to monitor Qtc with goal of <500.   Pt has scheduled zyprexa 5mg on his MAR since 8/27, per chart review this correlates with pt's increasing qtc      DELIRIUM  DELIRIUM BEHAVIOR PRECAUTIONS  As a reminder, changes in mentation & attention with either disorganized thinking, easy distractibility, and fluctuating level of consciousness are commonly observed with delirium.  Please utilize chemical restraints with PRN meds for agitation 1st. If needed for immediate safety reasons, can additionally utilize physical restraints. However, please avoid use of physical restraints, or have periods of patient being out of physical restraints if possible (e.g. while sleeping) as excessive use can promote rebound worsening of delirium/agitation.  Keep window shades open and room lit during day and room dim at night in order to promote normal sleep-wake cycles.  Encourage family at bedside. [place] patient often to situation, location, date.  Recommend to discourage Narcotics, Benzos and Anti-cholinergic medication use as they commonly promote and worsen delirium. If aforementioned medications need to be resumed for optimal patient outcomes after careful consideration, please exercise judicious use with clear documentation.  Please continue medical workup for causative etiology of delirium.      RISK ASSESSMENT  NO NEED FOR PEC, UNCONTESTED     FOLLOW UP  Will follow up while in house     DISPOSITION - once medically cleared:  Defer to medical team    Please contact ON CALL psychiatry service (24/7) for any acute issues that may arise.    Dr. Lj CARBAJAL Psychiatry  [facility]  9/5/2023 9:11  AM        --------------------------------------------------------------------------------------------------------------------------------------------------------------------------------------------------------------------------------------    CONTINUED OBJECTIVE clinical data & findings reviewed and noted for above decision making    Current Medications:   Scheduled Meds:    amiodarone  200 mg Per G Tube Daily    apixaban  5 mg Per G Tube BID    aspirin  81 mg Per G Tube Daily    atorvastatin  80 mg Per G Tube Daily    ezetimibe  10 mg Per G Tube QHS    ferrous sulfate  1 tablet Per G Tube Daily    metoprolol tartrate  25 mg Per G Tube BID    polyethylene glycol  17 g Per G Tube Daily    QUEtiapine  25 mg Per G Tube QHS    senna-docusate 8.6-50 mg  1 tablet Per G Tube BID    valproic acid (as sodium salt)  1,000 mg Per G Tube QHS    valproic acid (as sodium salt)  500 mg Per G Tube Daily    valproic acid (as sodium salt)  500 mg Per G Tube Q24H     PRN Meds: acetaminophen, albuterol-ipratropium, bisacodyL, chlorproMAZINE, dextrose 10%, dextrose 10%, diatrizoate meglumineand-diatrizoate sodium, glucagon (human recombinant), hydrALAZINE, labetalol, nitroGLYCERIN, OLANZapine, ondansetron, sodium chloride 0.9%, sodium chloride 0.9%, sodium chloride 0.9%    Allergies:   Review of patient's allergies indicates:  No Known Allergies    Vitals  Vitals:    09/05/23 0430   BP: (!) 153/73   Pulse: 62   Resp: 16   Temp: 98.1 °F (36.7 °C)       Labs/Imaging/Studies:  Recent Results (from the past 24 hour(s))   POCT glucose    Collection Time: 09/04/23 10:45 AM   Result Value Ref Range    POCT Glucose 130 (H) 70 - 110 mg/dL   POCT glucose    Collection Time: 09/04/23  4:41 PM   Result Value Ref Range    POCT Glucose 104 70 - 110 mg/dL   POCT glucose    Collection Time: 09/04/23 11:24 PM   Result Value Ref Range    POCT Glucose 118 (H) 70 - 110 mg/dL   Phosphorus    Collection Time: 09/05/23  6:21 AM   Result Value Ref Range     Phosphorus 2.8 2.7 - 4.5 mg/dL   Magnesium    Collection Time: 09/05/23  6:21 AM   Result Value Ref Range    Magnesium 2.2 1.6 - 2.6 mg/dL     Imaging Results              X-Ray Chest AP Portable (Final result)  Result time 08/05/23 14:46:03      Final result by Leandro Ruffin MD (08/05/23 14:46:03)                   Impression:      1. Central hilar findings suggest congestive change/edema.  No large focal consolidation.      Electronically signed by: Leandro Ruffin MD  Date:    08/05/2023  Time:    14:46               Narrative:    EXAMINATION:  XR CHEST AP PORTABLE    CLINICAL HISTORY:  Chest Pain;    TECHNIQUE:  Single frontal view of the chest was performed.    COMPARISON:  07/13/2023    FINDINGS:  The cardiomediastinal silhouette is prominent, similar to the previous exam noting left chest wall pacer..  There is no pleural effusion.  The trachea is midline.  The lungs are symmetrically expanded bilaterally with coarse central hilar interstitial attenuation.  No large focal consolidation seen.  There is no pneumothorax.  The osseous structures are remarkable for degenerative change..

## 2023-09-05 NOTE — SUBJECTIVE & OBJECTIVE
Neurologic Chief Complaint: L MCA syndrome    Subjective:     Interval History: Patient is seen for follow-up neurological assessment and treatment recommendations: Repeat CTH evolving left parieto-occipital, left frontal, and right caudate infarcts.  Petechial hemorrhage along the occipital cortical margin. Not agitated this morning. Pending placement.     HPI, Past Medical, Family, and Social History remains the same as documented in the initial encounter.    Review of Systems   Unable to perform ROS: Other (clinical condition)       Scheduled Meds:   amiodarone  200 mg Per G Tube Daily    apixaban  5 mg Per G Tube BID    aspirin  81 mg Per G Tube Daily    atorvastatin  80 mg Per G Tube Daily    ezetimibe  10 mg Per G Tube QHS    ferrous sulfate  1 tablet Per G Tube Daily    metoprolol tartrate  25 mg Per G Tube BID    polyethylene glycol  17 g Per G Tube Daily    QUEtiapine  25 mg Per G Tube QHS    senna-docusate 8.6-50 mg  1 tablet Per G Tube BID    valproic acid (as sodium salt)  1,000 mg Per G Tube QHS    valproic acid (as sodium salt)  500 mg Per G Tube Daily    valproic acid (as sodium salt)  500 mg Per G Tube Q24H     Continuous Infusions:  PRN Meds:acetaminophen, albuterol-ipratropium, bisacodyL, chlorproMAZINE, dextrose 10%, dextrose 10%, diatrizoate meglumineand-diatrizoate sodium, glucagon (human recombinant), hydrALAZINE, labetalol, nitroGLYCERIN, OLANZapine, ondansetron, sodium chloride 0.9%, sodium chloride 0.9%, sodium chloride 0.9%    Objective:     Vital Signs (Most Recent):  Temp: 98.1 °F (36.7 °C) (09/05/23 0430)  Pulse: 80 (09/05/23 0957)  Resp: 16 (09/05/23 0430)  BP: (!) 153/73 (09/05/23 0430)  SpO2: 97 % (09/05/23 0430)  BP Location: Left arm    Vital Signs Range (Last 24H):  Temp:  [97.8 °F (36.6 °C)-98.6 °F (37 °C)]   Pulse:  []   Resp:  [15-20]   BP: ()/(58-79)   SpO2:  [95 %-97 %]   BP Location: Left arm       Physical Exam  Vitals and nursing note reviewed.   Constitutional:  "      General: He is not in acute distress.     Appearance: He is well-developed.   HENT:      Head: Normocephalic and atraumatic.   Cardiovascular:      Rate and Rhythm: Normal rate.   Pulmonary:      Effort: Pulmonary effort is normal. No respiratory distress.   Abdominal:      General: There is no distension.   Skin:     General: Skin is warm and dry.   Neurological:      Comments: Aphasic, does not follow commands  L gaze          Neurological Exam:   LOC: drowsy  Attention Span: poor  Language: Global aphasia  Articulation: Untestable due to severe aphasia   Orientation: Untestable due to severe aphasia   EOM (CN III, IV, VI): Gaze preference  left  Facial Movement (CN VII): Lower facial weakness on the Right  Motor: Spontaneously moves all 4 extremities, will not follow commands    Laboratory:  CMP:   No results for input(s): "GLUCOSE", "CALCIUM", "ALBUMIN", "PROT", "NA", "K", "CO2", "CL", "BUN", "CREATININE", "ALKPHOS", "ALT", "AST", "BILITOT" in the last 24 hours.    CBC:   Recent Labs   Lab 09/04/23  0713   WBC 6.29   RBC 3.98*   HGB 10.7*   HCT 36.2*      MCV 91   MCH 26.9*   MCHC 29.6*         Diagnostic Results     Brain Imaging   CT Head 8/10/23  Impression:     No detrimental change compared to prior.     Evolving acute infarcts within the left MCA and PCA territories.  No hemorrhagic conversion.  No significant mass effect     Suspected small subacute infarction within the right caudate.     Multiple areas remote infarctions as detailed above.        CT Head 8/8/23 1704  Impression:     1. Redemonstration of acute infarctions in the left MCA and PCA territories.  No evidence to suggest hemorrhagic transformation.  2. Suspected small subacute infarction in the right caudate.  3. Numerous remote infarctions as detailed above.     CT Head 8/8/23 3867  Impression:     Moderate developing hypoattenuation left inferior frontal lobe and left occipital lobe concerning for developing recent infarcts " post thrombectomy.     Allowing for scattered hyperdensity related to recently contrast administration for thrombectomy there is no definite acute intracranial hemorrhage.     Previous hypodensity right caudate no longer seen which may be related to contrast administration for thrombectomy and possible subacute age infarction.     Superimposed remote infarcts with moderate to large encephalomalacia right MCA territory unchanged     Vessel Imaging   IR Angio 8/8/23  Preliminary Interpretation:   1.    No evidence of left ICA or MCA stenosis. No large or medium vessel occlusion.     CTA Multiphase 8/7/23  Impression:     CT head:     New right caudate nucleus hypodensity, likely representing age-indeterminate infarct.  Could be further evaluated with MRI.     Multifocal encephalomalacia involving the right frontal, temporoparietal, and left occipital lobes.     CTA head and neck:     Acute occlusion of the superior left M2 segment.     Left PCA occlusion of undetermined age.     Filling defect in the right main pulmonary artery, concerning for acute pulmonary thromboembolism.  Consider follow-up pulmonary CTA to more fully assess the extent of thromboemboli, the clot burden, and the presence or absence of right heart strain.     Not fully detailed above:     - Incomplete opacification of the left atrial appendage, suspicious for an intraluminal thrombus.  Follow-up echocardiogram, or cardiac MRI or CTA, recommended.     - The presence of acute thrombi or thromboemboli in both the systemic arterial and pulmonary arterial circulation raises concern for the possibility of underlying intracardiac or extracardiac right-to-left shunt, and/or a hypercoagulable state.  Correlate clinically.     - Incompletely visualized, but possibly large, pericardial effusion.  Artifacts compromise accurate assessment of its attenuation.     Cardiac Imaging   TTE with bubble 8/8/23    Left Ventricle: The left ventricle is moderately  dilated. Increased ventricular mass. Normal wall thickness. There is moderate eccentric hypertrophy. Severe global hypokinesis present. Septal motion is consistent with pacing. There is severely reduced systolic function with a visually estimated ejection fraction of 15 - 20%. Ejection fraction by visual approximation is 20%. Unable to assess diastolic function due to arrhythmia.    Left Atrium: Left atrium is severely dilated. Radha 85mL/m2.    Right Ventricle: Mild right ventricular enlargement. Wall thickness is normal. Right ventricle wall motion  is normal. Systolic function is severely reduced. Pacemaker lead present in the ventricle.    Right Atrium: Right atrium is severely dilated.    Aortic Valve: There is mild aortic valve sclerosis. There is normal leaflet mobility. There is no significant regurgitation.    Mitral Valve: There is bileaflet sclerosis. There is normal leaflet mobility. There is mild regurgitation.    Tricuspid Valve: The tricuspid valve is structurally normal. There is normal leaflet mobility. There is mild regurgitation.    Pulmonic Valve: The pulmonic valve is structurally normal. There is normal leaflet mobility. There is no significant regurgitation.    Aorta: Aortic root is normal in size measuring 3.13 cm. Ascending aorta is normal measuring 3.24 cm.    IVC/SVC: Normal venous pressure at 3 mmHg.    Pericardium: The pericardium is normal. There is no pericardial effusion.

## 2023-09-05 NOTE — NURSING
Cleaning patient and reapplying gown due to patient has removed gown, sheet and condom catheter and is moaning and fidgeting.  08:42 Neurologist at bedside noted patient's behavior and reports may get better after morning medications via PEG.

## 2023-09-05 NOTE — PT/OT/SLP PROGRESS
Speech Language Pathology Treatment    Patient Name:  Tera Griffiths   MRN:  [id number]  Admitting Diagnosis: Embolic stroke involving left middle cerebral artery    Recommendations:                 General Recommendations:  Dysphagia therapy, Speech/language therapy, and Cognitive-linguistic therapy  Diet recommendations:  NPO, Liquid Diet Level: NPO   Aspiration Precautions: Frequent oral care and Strict aspiration precautions   General Precautions: Standard, aphasia, aspiration, NPO  Communication strategies:  provide increased time to answer and go to room if call light pushed    Assessment:     Tera Griffiths is a 58 y.o. male with an SLP diagnosis of Aphasia and Dysphagia    Subjective     Pt alert and moaning    Pain/Comfort:  Pain Rating 1:  (pt moaning, unable to state)  Pain Rating Post-Intervention 1:  (no change)    Respiratory Status: Room air    Objective:     Has the patient been evaluated by SLP for swallowing?   Yes  Keep patient NPO? Yes     Pt seen bedside, alert and moaning with family members present when SLP entered room.  Pt localized to SLP on R, reaching to hold hand.  Pt did not follow simple commands or answer y/n questions.  No shaping of moaning into true words.  Po trials presented though pt refused by handing back to SLP and turning away.  EEG tech at bedside, session discontinued.  Education provided re: cont npo with strict aspiration precautions and SLP POC.  Son verbalized understanding.     Goals:   Multidisciplinary Problems       SLP Goals          Problem: SLP    Goal Priority Disciplines Outcome   SLP Goal     SLP Ongoing, Progressing   Description: Speech Language Pathology Goals  Goals expected to be met by 8/15  1. Pt will participate in ongoing assessment of swallow.   2. Pt will answer simple y/n questions with 60% accy given max cues.   3. Pt will follow simple commands with 50% accy given max cues.   4. Pt will vocalize in response to any stim x5/session given max  cues.   5. Pt will localize to stim on R x2/session given max cues.                              Plan:     Patient to be seen:  3 x/week   Plan of Care expires:  09/07/23  Plan of Care reviewed with:  patient   SLP Follow-Up:  Yes       Discharge recommendations:  nursing facility, skilled   Barriers to Discharge:  Level of Skilled Assistance Needed      Time Tracking:     SLP Treatment Date:   09/05/23  Speech Start Time:  1010  Speech Stop Time:  1017     Speech Total Time (min):  7 min    Billable Minutes: Speech Therapy Individual 7    09/05/2023

## 2023-09-05 NOTE — NURSING
Patient is on delirium precautions, midnight VS not obtained. Attempted to obtain VS when checking patient's BG for 0000 but patient was uncooperative and kept removing BP cuff and pulse ox. No acute signs of distress noted at this time.

## 2023-09-05 NOTE — PT/OT/SLP PROGRESS
Physical Therapy Treatment    Patient Name:  Tera Griffiths   MRN:  [id number]    Recommendations:     Discharge Recommendations: nursing facility, skilled  Discharge Equipment Recommendations: to be determined by next level of care  Barriers to discharge: Inaccessible home and Decreased caregiver support    Assessment:     Tera Griffiths is a 58 y.o. male admitted with a medical diagnosis of Embolic stroke involving left middle cerebral artery.  He presents with the following impairments/functional limitations: weakness, impaired endurance, impaired self care skills, impaired functional mobility, gait instability, impaired balance, impaired cognition, decreased coordination, visual deficits, decreased upper extremity function, decreased lower extremity function, decreased safety awareness. Pt stood w/ modA and physical initiation of transfer from PT but then immediately sat back down and returned himself to side lying, refusing to participate further so session terminated.     Rehab Prognosis: Fair; patient would benefit from acute skilled PT services to address these deficits and reach maximum level of function.    Recent Surgery: Procedure(s) (LRB):  EGD, WITH PEG TUBE INSERTION (N/A) 19 Days Post-Op    Plan:     During this hospitalization, patient to be seen 3 x/week to address the identified rehab impairments via gait training, therapeutic activities, therapeutic exercises, neuromuscular re-education and progress toward the following goals:    Plan of Care Expires:  09/08/23    Subjective     Chief Complaint: ASHLEY 2/2 nonverbal AMS  Patient/Family Comments/goals: pt's family wants to see him progress w/ therapy  Pain/Comfort:  Location 1:  (ASHLEY 2/2 nonverbal AMS)  Pain Addressed 1: Reposition, Distraction      Objective:     Communicated with RN prior to session.  Patient found HOB elevated with SCD, PEG Tube upon PT entry to room.     General Precautions: Standard, aspiration, aphasia, fall,  NPO  Orthopedic Precautions: N/A  Braces: N/A  Respiratory Status: Room air     Functional Mobility:  Bed Mobility:     Supine to Sit: minimum assistance  Sit to Supine: contact guard assistance  Transfers:     Sit to Stand:  moderate assistance with hand-held assist  Balance: EOB sitting SBA      AM-PAC 6 CLICK MOBILITY  Turning over in bed (including adjusting bedclothes, sheets and blankets)?: 3  Sitting down on and standing up from a chair with arms (e.g., wheelchair, bedside commode, etc.): 2  Moving from lying on back to sitting on the side of the bed?: 3  Moving to and from a bed to a chair (including a wheelchair)?: 1  Need to walk in hospital room?: 1  Climbing 3-5 steps with a railing?: 1  Basic Mobility Total Score: 11       Treatment & Education:  POC reviewed w/ pt's family, all questions addressed and family in agreement w/ POC  Bed mobility w/ physical initiation from therapist to get pt to sit up  Pt family educated to continue attempting to get pt to talk w/ them and move around as able    Patient left left sidelying with all lines intact, call button in reach, bed alarm on, RN notified, and family present..    GOALS:   Multidisciplinary Problems       Physical Therapy Goals          Problem: Physical Therapy    Goal Priority Disciplines Outcome Goal Variances Interventions   Physical Therapy Goal     PT, PT/OT Ongoing, Progressing     Description: Goals to be completed by: 9/8/23    Pt will perform sup<>sit transfers w/ minimum assistance  Pt will have sufficient dynamic balance to sit EOB while performing ADLs/therex w/ stand by assistance for 10 min  Pt will be able to stand up from EOB w/ moderate assistance using LRAD  Pt will ambulate 20 feet w/ maximal assistance using LRAD  Pt will be independent w/ HEP therex on BLE w/ good form and ROM   Pt will follow >50 % of single-step commands throughout treatment session                        Time Tracking:     PT Received On: 09/05/23  PT Start  Time: 1311     PT Stop Time: 1322  PT Total Time (min): 11 min     Billable Minutes: Therapeutic Activity 11    Treatment Type: Treatment  PT/PTA: PT     Number of PTA visits since last PT visit: 0     09/05/2023

## 2023-09-06 LAB
ALBUMIN SERPL BCP-MCNC: 2.5 G/DL (ref 3.5–5.2)
ALP SERPL-CCNC: 80 U/L (ref 55–135)
ALT SERPL W/O P-5'-P-CCNC: 12 U/L (ref 10–44)
ANION GAP SERPL CALC-SCNC: 11 MMOL/L (ref 8–16)
AST SERPL-CCNC: 23 U/L (ref 10–40)
BASOPHILS # BLD AUTO: 0.02 K/UL (ref 0–0.2)
BASOPHILS NFR BLD: 0.5 % (ref 0–1.9)
BILIRUB SERPL-MCNC: 0.4 MG/DL (ref 0.1–1)
BUN SERPL-MCNC: 25 MG/DL (ref 6–20)
CALCIUM SERPL-MCNC: 8.9 MG/DL (ref 8.7–10.5)
CHLORIDE SERPL-SCNC: 108 MMOL/L (ref 95–110)
CO2 SERPL-SCNC: 25 MMOL/L (ref 23–29)
CREAT SERPL-MCNC: 1.5 MG/DL (ref 0.5–1.4)
DIFFERENTIAL METHOD: ABNORMAL
EOSINOPHIL # BLD AUTO: 0.2 K/UL (ref 0–0.5)
EOSINOPHIL NFR BLD: 5.1 % (ref 0–8)
ERYTHROCYTE [DISTWIDTH] IN BLOOD BY AUTOMATED COUNT: 16 % (ref 11.5–14.5)
EST. GFR  (NO RACE VARIABLE): 54.3 ML/MIN/1.73 M^2
GLUCOSE SERPL-MCNC: 112 MG/DL (ref 70–110)
HCT VFR BLD AUTO: 33.6 % (ref 40–54)
HGB BLD-MCNC: 9.9 G/DL (ref 14–18)
IMM GRANULOCYTES # BLD AUTO: 0.01 K/UL (ref 0–0.04)
IMM GRANULOCYTES NFR BLD AUTO: 0.3 % (ref 0–0.5)
LYMPHOCYTES # BLD AUTO: 1.1 K/UL (ref 1–4.8)
LYMPHOCYTES NFR BLD: 28.8 % (ref 18–48)
MCH RBC QN AUTO: 26.5 PG (ref 27–31)
MCHC RBC AUTO-ENTMCNC: 29.5 G/DL (ref 32–36)
MCV RBC AUTO: 90 FL (ref 82–98)
MONOCYTES # BLD AUTO: 0.7 K/UL (ref 0.3–1)
MONOCYTES NFR BLD: 16.9 % (ref 4–15)
NEUTROPHILS # BLD AUTO: 1.9 K/UL (ref 1.8–7.7)
NEUTROPHILS NFR BLD: 48.4 % (ref 38–73)
NRBC BLD-RTO: 0 /100 WBC
PLATELET # BLD AUTO: 354 K/UL (ref 150–450)
PMV BLD AUTO: 11.7 FL (ref 9.2–12.9)
POCT GLUCOSE: 106 MG/DL (ref 70–110)
POCT GLUCOSE: 115 MG/DL (ref 70–110)
POCT GLUCOSE: 124 MG/DL (ref 70–110)
POTASSIUM SERPL-SCNC: 4.9 MMOL/L (ref 3.5–5.1)
PROT SERPL-MCNC: 6.7 G/DL (ref 6–8.4)
RBC # BLD AUTO: 3.74 M/UL (ref 4.6–6.2)
SODIUM SERPL-SCNC: 144 MMOL/L (ref 136–145)
WBC # BLD AUTO: 3.96 K/UL (ref 3.9–12.7)

## 2023-09-06 PROCEDURE — 36415 COLL VENOUS BLD VENIPUNCTURE: CPT

## 2023-09-06 PROCEDURE — 25000003 PHARM REV CODE 250: Performed by: NURSE PRACTITIONER

## 2023-09-06 PROCEDURE — 25000003 PHARM REV CODE 250

## 2023-09-06 PROCEDURE — 11000001 HC ACUTE MED/SURG PRIVATE ROOM

## 2023-09-06 PROCEDURE — 99232 PR SUBSEQUENT HOSPITAL CARE,LEVL II: ICD-10-PCS | Mod: ,,, | Performed by: NURSE PRACTITIONER

## 2023-09-06 PROCEDURE — 99233 SBSQ HOSP IP/OBS HIGH 50: CPT | Mod: ,,, | Performed by: PSYCHIATRY & NEUROLOGY

## 2023-09-06 PROCEDURE — 99232 SBSQ HOSP IP/OBS MODERATE 35: CPT | Mod: ,,, | Performed by: NURSE PRACTITIONER

## 2023-09-06 PROCEDURE — 80053 COMPREHEN METABOLIC PANEL: CPT

## 2023-09-06 PROCEDURE — S0166 INJ OLANZAPINE 2.5MG: HCPCS

## 2023-09-06 PROCEDURE — 99233 PR SUBSEQUENT HOSPITAL CARE,LEVL III: ICD-10-PCS | Mod: ,,, | Performed by: PSYCHIATRY & NEUROLOGY

## 2023-09-06 PROCEDURE — 85025 COMPLETE CBC W/AUTO DIFF WBC: CPT

## 2023-09-06 RX ORDER — VALPROIC ACID 250 MG/5ML
1000 SOLUTION ORAL NIGHTLY
Qty: 300 ML | Refills: 11 | Status: ON HOLD
Start: 2023-09-06 | End: 2023-10-10 | Stop reason: HOSPADM

## 2023-09-06 RX ADMIN — OLANZAPINE 5 MG: 10 INJECTION, POWDER, LYOPHILIZED, FOR SOLUTION INTRAMUSCULAR at 12:09

## 2023-09-06 RX ADMIN — APIXABAN 5 MG: 5 TABLET, FILM COATED ORAL at 10:09

## 2023-09-06 RX ADMIN — ACETAMINOPHEN 650 MG: 325 TABLET ORAL at 09:09

## 2023-09-06 RX ADMIN — ACETAMINOPHEN 650 MG: 325 TABLET ORAL at 11:09

## 2023-09-06 RX ADMIN — AMIODARONE HYDROCHLORIDE 200 MG: 200 TABLET ORAL at 10:09

## 2023-09-06 RX ADMIN — QUETIAPINE FUMARATE 25 MG: 25 TABLET ORAL at 09:09

## 2023-09-06 RX ADMIN — ASPIRIN 81 MG 81 MG: 81 TABLET ORAL at 10:09

## 2023-09-06 RX ADMIN — POLYETHYLENE GLYCOL 3350 17 G: 17 POWDER, FOR SOLUTION ORAL at 10:09

## 2023-09-06 RX ADMIN — VALPROIC ACID 1000 MG: 250 SOLUTION ORAL at 09:09

## 2023-09-06 RX ADMIN — APIXABAN 5 MG: 5 TABLET, FILM COATED ORAL at 09:09

## 2023-09-06 RX ADMIN — FERROUS SULFATE TAB 325 MG (65 MG ELEMENTAL FE) 1 EACH: 325 (65 FE) TAB at 10:09

## 2023-09-06 RX ADMIN — ACETAMINOPHEN 650 MG: 325 TABLET ORAL at 04:09

## 2023-09-06 RX ADMIN — SENNOSIDES AND DOCUSATE SODIUM 1 TABLET: 50; 8.6 TABLET ORAL at 09:09

## 2023-09-06 RX ADMIN — SENNOSIDES AND DOCUSATE SODIUM 1 TABLET: 50; 8.6 TABLET ORAL at 10:09

## 2023-09-06 RX ADMIN — VALPROIC ACID 500 MG: 250 SOLUTION ORAL at 10:09

## 2023-09-06 RX ADMIN — OLANZAPINE 5 MG: 10 INJECTION, POWDER, LYOPHILIZED, FOR SOLUTION INTRAMUSCULAR at 04:09

## 2023-09-06 RX ADMIN — EZETIMIBE 10 MG: 10 TABLET ORAL at 09:09

## 2023-09-06 RX ADMIN — ATORVASTATIN CALCIUM 80 MG: 40 TABLET, FILM COATED ORAL at 10:09

## 2023-09-06 RX ADMIN — METOPROLOL TARTRATE 25 MG: 25 TABLET, FILM COATED ORAL at 10:09

## 2023-09-06 RX ADMIN — VALPROIC ACID 500 MG: 250 SOLUTION ORAL at 11:09

## 2023-09-06 RX ADMIN — METOPROLOL TARTRATE 25 MG: 25 TABLET, FILM COATED ORAL at 09:09

## 2023-09-06 NOTE — ASSESSMENT & PLAN NOTE
Stroke risk factor  Cardiology was consulted by [facility]  CXR (8/19) with congestion of the pulmonary vasculature similar to previous CXR on 8/11, no increased fluid burden    No UOP documented  Sating well on RA   In no acute respiratory distress    -Strict I's/O's - Condom cath  -Currently on 1.5 L fluid restriction  -will re-initiate GDMT as tolerated

## 2023-09-06 NOTE — PLAN OF CARE
Problem: Adult Inpatient Plan of Care  Goal: Plan of Care Review  Outcome: Ongoing, Not Progressing  Goal: Patient-Specific Goal (Individualized)  Outcome: Ongoing, Not Progressing  Goal: Absence of Hospital-Acquired Illness or Injury  Outcome: Ongoing, Not Progressing  Goal: Optimal Comfort and Wellbeing  Outcome: Ongoing, Not Progressing  Goal: Readiness for Transition of Care  Outcome: Ongoing, Not Progressing     Problem: Diabetes Comorbidity  Goal: Blood Glucose Level Within Targeted Range  Outcome: Ongoing, Not Progressing     Problem: Infection  Goal: Absence of Infection Signs and Symptoms  Outcome: Ongoing, Not Progressing     Problem: Adjustment to Illness (Stroke, Ischemic/Transient Ischemic Attack)  Goal: Optimal Coping  Outcome: Ongoing, Not Progressing     Problem: Bowel Elimination Impaired (Stroke, Ischemic/Transient Ischemic Attack)  Goal: Effective Bowel Elimination  Outcome: Ongoing, Not Progressing     Problem: Cerebral Tissue Perfusion (Stroke, Ischemic/Transient Ischemic Attack)  Goal: Optimal Cerebral Tissue Perfusion  Outcome: Ongoing, Not Progressing     Problem: Cognitive Impairment (Stroke, Ischemic/Transient Ischemic Attack)  Goal: Optimal Cognitive Function  Outcome: Ongoing, Not Progressing     Problem: Communication Impairment (Stroke, Ischemic/Transient Ischemic Attack)  Goal: Improved Communication Skills  Outcome: Ongoing, Not Progressing     Problem: Functional Ability Impaired (Stroke, Ischemic/Transient Ischemic Attack)  Goal: Optimal Functional Ability  Outcome: Ongoing, Not Progressing     Problem: Respiratory Compromise (Stroke, Ischemic/Transient Ischemic Attack)  Goal: Effective Oxygenation and Ventilation  Outcome: Ongoing, Not Progressing     Problem: Sensorimotor Impairment (Stroke, Ischemic/Transient Ischemic Attack)  Goal: Improved Sensorimotor Function  Outcome: Ongoing, Not Progressing     Problem: Swallowing Impairment (Stroke, Ischemic/Transient Ischemic  Attack)  Goal: Optimal Eating and Swallowing without Aspiration  Outcome: Ongoing, Not Progressing     Problem: Urinary Elimination Impaired (Stroke, Ischemic/Transient Ischemic Attack)  Goal: Effective Urinary Elimination  Outcome: Ongoing, Not Progressing     Problem: Fall Injury Risk  Goal: Absence of Fall and Fall-Related Injury  Outcome: Ongoing, Not Progressing     Problem: Skin Injury Risk Increased  Goal: Skin Health and Integrity  Outcome: Ongoing, Not Progressing     Problem: Fluid and Electrolyte Imbalance (Acute Kidney Injury/Impairment)  Goal: Fluid and Electrolyte Balance  Outcome: Ongoing, Not Progressing     Problem: Oral Intake Inadequate (Acute Kidney Injury/Impairment)  Goal: Optimal Nutrition Intake  Outcome: Ongoing, Not Progressing     Problem: Renal Function Impairment (Acute Kidney Injury/Impairment)  Goal: Effective Renal Function  Outcome: Ongoing, Not Progressing     Problem: Adjustment to Illness (Sepsis/Septic Shock)  Goal: Optimal Coping  Outcome: Ongoing, Not Progressing     Problem: Bleeding (Sepsis/Septic Shock)  Goal: Absence of Bleeding  Outcome: Ongoing, Not Progressing     Problem: Glycemic Control Impaired (Sepsis/Septic Shock)  Goal: Blood Glucose Level Within Desired Range  Outcome: Ongoing, Not Progressing     Problem: Infection Progression (Sepsis/Septic Shock)  Goal: Absence of Infection Signs and Symptoms  Outcome: Ongoing, Not Progressing     Problem: Nutrition Impaired (Sepsis/Septic Shock)  Goal: Optimal Nutrition Intake  Outcome: Ongoing, Not Progressing   Review plan of care; patient pending placement Rehab or possibly NH and CM arranging.

## 2023-09-06 NOTE — PLAN OF CARE
Problem: Adult Inpatient Plan of Care  Goal: Plan of Care Review  Outcome: Ongoing, Progressing  Goal: Patient-Specific Goal (Individualized)  Outcome: Ongoing, Progressing  Goal: Absence of Hospital-Acquired Illness or Injury  Outcome: Ongoing, Progressing  Goal: Optimal Comfort and Wellbeing  Outcome: Ongoing, Progressing  Goal: Readiness for Transition of Care  Outcome: Ongoing, Progressing     Problem: Diabetes Comorbidity  Goal: Blood Glucose Level Within Targeted Range  Outcome: Ongoing, Progressing     Problem: Infection  Goal: Absence of Infection Signs and Symptoms  Outcome: Ongoing, Progressing     Problem: Bowel Elimination Impaired (Stroke, Ischemic/Transient Ischemic Attack)  Goal: Effective Bowel Elimination  Outcome: Ongoing, Progressing     Problem: Cerebral Tissue Perfusion (Stroke, Ischemic/Transient Ischemic Attack)  Goal: Optimal Cerebral Tissue Perfusion  Outcome: Ongoing, Progressing     Problem: Cognitive Impairment (Stroke, Ischemic/Transient Ischemic Attack)  Goal: Optimal Cognitive Function  Outcome: Ongoing, Progressing     Problem: Communication Impairment (Stroke, Ischemic/Transient Ischemic Attack)  Goal: Improved Communication Skills  Outcome: Ongoing, Progressing     Problem: Respiratory Compromise (Stroke, Ischemic/Transient Ischemic Attack)  Goal: Effective Oxygenation and Ventilation  Outcome: Ongoing, Progressing     Problem: Swallowing Impairment (Stroke, Ischemic/Transient Ischemic Attack)  Goal: Optimal Eating and Swallowing without Aspiration  Outcome: Ongoing, Progressing     Problem: Fall Injury Risk  Goal: Absence of Fall and Fall-Related Injury  Outcome: Ongoing, Progressing     Problem: Skin Injury Risk Increased  Goal: Skin Health and Integrity  Outcome: Ongoing, Progressing     Problem: Fluid and Electrolyte Imbalance (Acute Kidney Injury/Impairment)  Goal: Fluid and Electrolyte Balance  Outcome: Ongoing, Progressing     Problem: Oral Intake Inadequate (Acute Kidney  Injury/Impairment)  Goal: Optimal Nutrition Intake  Outcome: Ongoing, Progressing    POC reviewed with patient and family. Fall/Aspiration/Safety precautions remains in place. Tolerating tube feeds well, no residual noted. NAD. Sitter remains at bedside. Side rails up x3, bed in low position and locked, bed alarm on. Uneventful shift, care ongoing.

## 2023-09-06 NOTE — NURSING
Patient's Zahida SEVERINO at bedside. POA requesting water, juice, and applesauce for patient. POA educated that patient's diet is NPO and that pt cannot have anything by mouth due to risk of aspiration. POA  disagreed and went to vending machine and bought [employer] Ashley drink and water and gave it to patient. No s/s of respiratory distress  or aspiration noted at this time, will continue to monitor. MONICO Farias notified of occurrence.
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
## 2023-09-06 NOTE — ASSESSMENT & PLAN NOTE
Unclear etiology, likely some element of delerium  No history of alcohol abuse  Ammonia levels normal  BUN normal  Vit B 12 1300   TSH normal  Could not tolerate EEG  repeat CTH stable    - Thiamine pending  - Avoid sedating medications  - Monitor electrolytes, BGL

## 2023-09-06 NOTE — SUBJECTIVE & OBJECTIVE
Interval History 9/6/2023:  Patient is seen for follow-up PM&R evaluation and recommendations: Back with personal sitter this AM. Pulling at dressing.     HPI, Past Medical, Family, and Social History remains the same as documented in the initial encounter.    Scheduled Medications:    amiodarone  200 mg Per G Tube Daily    apixaban  5 mg Per G Tube BID    aspirin  81 mg Per G Tube Daily    atorvastatin  80 mg Per G Tube Daily    ezetimibe  10 mg Per G Tube QHS    ferrous sulfate  1 tablet Per G Tube Daily    metoprolol tartrate  25 mg Per G Tube BID    polyethylene glycol  17 g Per G Tube Daily    QUEtiapine  25 mg Per G Tube QHS    senna-docusate 8.6-50 mg  1 tablet Per G Tube BID    valproic acid (as sodium salt)  1,000 mg Per G Tube QHS    valproic acid (as sodium salt)  500 mg Per G Tube Daily    valproic acid (as sodium salt)  500 mg Per G Tube Q24H       Diagnostic Results: Labs: Reviewed  ECG: Reviewed  CT: Reviewed    PRN Medications: acetaminophen, albuterol-ipratropium, bisacodyL, chlorproMAZINE, dextrose 10%, dextrose 10%, diatrizoate meglumineand-diatrizoate sodium, glucagon (human recombinant), hydrALAZINE, labetalol, nitroGLYCERIN, OLANZapine, ondansetron, sodium chloride 0.9%, sodium chloride 0.9%, sodium chloride 0.9%    Review of Systems   Constitutional:  Positive for activity change.   HENT:  Positive for trouble swallowing.    Respiratory:  Negative for cough and shortness of breath.    Neurological:  Positive for weakness.   Psychiatric/Behavioral:  Positive for agitation, behavioral problems and confusion.      Objective:     Vital Signs (Most Recent):  Temp: 97.5 °F (36.4 °C) (09/06/23 0521)  Pulse: 75 (09/06/23 0809)  Resp: 16 (09/06/23 0809)  BP: 121/81 (09/06/23 0809)  SpO2: 96 % (09/06/23 0809)    Vital Signs (24h Range):  Temp:  [97 °F (36.1 °C)-97.8 °F (36.6 °C)] 97.5 °F (36.4 °C)  Pulse:  [62-93] 75  Resp:  [16-24] 16  SpO2:  [93 %-100 %] 96 %  BP: (121-176)/() 121/81          Physical Exam  Vitals and nursing note reviewed.   HENT:      Nose: Nose normal.      Mouth/Throat:      Mouth: Mucous membranes are moist.   Pulmonary:      Effort: Pulmonary effort is normal.   Musculoskeletal:      Comments: Spontaneously moves all 4 extremities   Skin:     General: Skin is warm.   Neurological:      Mental Status: He is alert.      Motor: Weakness present.      Gait: Gait abnormal.      Comments: Aphasia  Nonverbal  Not following commands   Psychiatric:      Comments: Restless   Personal sitter at bedside  No family at bedside

## 2023-09-06 NOTE — PROGRESS NOTES
[facility] - Neurosurgery ([facility])  Physical Medicine & Rehab  Progress Note    Patient Name: Tera Griffiths  MRN: [id number]  Admission Date: 8/5/2023  Length of Stay: 30 days  Attending Physician: Mansoor Velez MD    Subjective:     Principal Problem:Embolic stroke involving left middle cerebral artery    Hospital Course:   8/21/23: Participated w/ OT. Bed mob maxA- totalA. Grooming dependence.   8/30/23: Participated w/ OT. Bed mob CGA- modA. Toileting maxA.   9/4/23: Participated w/ OT. Bed mob Gopal. Sat EOB 12 mins SBA. AMPAC 6.     Interval History 9/6/2023:  Patient is seen for follow-up PM&R evaluation and recommendations: Back with personal sitter this AM. Pulling at dressing. Family not at bedside.     HPI, Past Medical, Family, and Social History remains the same as documented in the initial encounter.    Scheduled Medications:    amiodarone  200 mg Per G Tube Daily    apixaban  5 mg Per G Tube BID    aspirin  81 mg Per G Tube Daily    atorvastatin  80 mg Per G Tube Daily    ezetimibe  10 mg Per G Tube QHS    ferrous sulfate  1 tablet Per G Tube Daily    metoprolol tartrate  25 mg Per G Tube BID    polyethylene glycol  17 g Per G Tube Daily    QUEtiapine  25 mg Per G Tube QHS    senna-docusate 8.6-50 mg  1 tablet Per G Tube BID    valproic acid (as sodium salt)  1,000 mg Per G Tube QHS    valproic acid (as sodium salt)  500 mg Per G Tube Daily    valproic acid (as sodium salt)  500 mg Per G Tube Q24H     Diagnostic Results:   Labs: Reviewed  ECG: Reviewed  CT: Reviewed    PRN Medications: acetaminophen, albuterol-ipratropium, bisacodyL, chlorproMAZINE, dextrose 10%, dextrose 10%, diatrizoate meglumineand-diatrizoate sodium, glucagon (human recombinant), hydrALAZINE, labetalol, nitroGLYCERIN, OLANZapine, ondansetron, sodium chloride 0.9%, sodium chloride 0.9%, sodium chloride 0.9%    Review of Systems   Constitutional:  Positive for activity change.   HENT:  Positive for trouble  swallowing.    Respiratory:  Negative for cough and shortness of breath.    Neurological:  Positive for weakness.   Psychiatric/Behavioral:  Positive for agitation, behavioral problems and confusion.      Objective:     Vital Signs (Most Recent):  Temp: 97.5 °F (36.4 °C) (09/06/23 0521)  Pulse: 75 (09/06/23 0809)  Resp: 16 (09/06/23 0809)  BP: 121/81 (09/06/23 0809)  SpO2: 96 % (09/06/23 0809)    Vital Signs (24h Range):  Temp:  [97 °F (36.1 °C)-97.8 °F (36.6 °C)] 97.5 °F (36.4 °C)  Pulse:  [62-93] 75  Resp:  [16-24] 16  SpO2:  [93 %-100 %] 96 %  BP: (121-176)/() 121/81         Physical Exam  Vitals and nursing note reviewed.   HENT:      Nose: Nose normal.      Mouth/Throat:      Mouth: Mucous membranes are moist.   Pulmonary:      Effort: Pulmonary effort is normal.   Musculoskeletal:      Comments: Spontaneously moves all 4 extremities   Skin:     General: Skin is warm.   Neurological:      Mental Status: He is alert.      Motor: Weakness present.      Gait: Gait abnormal.      Comments: Aphasia  Nonverbal  Not following commands   Psychiatric:      Comments: Restless   Personal sitter at bedside  No family at bedside     Assessment/Plan:      * Embolic stroke involving left middle cerebral artery  - L MCA and L PCA infarct 8/7/23  - Etiology likely cardioembolic per VN    - Related to prolonged/acute hospital course.     Recommendations  -  Encourage mobility, OOB in chair at least 3 hours per day, and early ambulation as appropriate  -  PT/OT evaluate and treat  -  Pain management  -  Monitor for and prevent skin breakdown and pressure ulcers  · Early mobility, repositioning/weight shifting every 20-30 minutes when sitting, turn patient every 2 hours, proper mattress/overlay and chair cushioning, pressure relief/heel protector boots  -  DVT prophylaxis    -  Reviewed discharge options (IP rehab, SNF, HH therapy, and OP therapy)    Dysphagia  - SLP following, PEG in place    Multiple subsegmental pulmonary  emboli without acute cor pulmonale  - CTA done and incidently found PE, now on Eliquis    Paroxysmal A-fib  - Eliquis    PM&R Recommendation:     At this time, the PM&R team has reviewed this patient's ongoing medical case and rescinding inpatient rehab recommendation at this time. Family is not at bedside. Personal sitter back at bedside. Recommend longterm placement at this time. We will sign off.     Ele Madison NP  Department of Physical Medicine & Rehab   [facility] - Neurosurgery ([facility])

## 2023-09-06 NOTE — SUBJECTIVE & OBJECTIVE
Neurologic Chief Complaint: L MCA syndrome    Subjective:     Interval History: Patient is seen for follow-up neurological assessment and treatment recommendations: No new neurological developments, agitation stable. Pending Rehab    HPI, Past Medical, Family, and Social History remains the same as documented in the initial encounter.     Review of Systems   Unable to perform ROS: Other (clinical condition)     Scheduled Meds:   amiodarone  200 mg Per G Tube Daily    apixaban  5 mg Per G Tube BID    aspirin  81 mg Per G Tube Daily    atorvastatin  80 mg Per G Tube Daily    ezetimibe  10 mg Per G Tube QHS    ferrous sulfate  1 tablet Per G Tube Daily    metoprolol tartrate  25 mg Per G Tube BID    polyethylene glycol  17 g Per G Tube Daily    QUEtiapine  25 mg Per G Tube QHS    senna-docusate 8.6-50 mg  1 tablet Per G Tube BID    valproic acid (as sodium salt)  1,000 mg Per G Tube QHS    valproic acid (as sodium salt)  500 mg Per G Tube Daily    valproic acid (as sodium salt)  500 mg Per G Tube Q24H     Continuous Infusions:  PRN Meds:acetaminophen, albuterol-ipratropium, bisacodyL, chlorproMAZINE, dextrose 10%, dextrose 10%, diatrizoate meglumineand-diatrizoate sodium, glucagon (human recombinant), hydrALAZINE, labetalol, nitroGLYCERIN, OLANZapine, ondansetron, sodium chloride 0.9%, sodium chloride 0.9%, sodium chloride 0.9%    Objective:     Vital Signs (Most Recent):  Temp: 97.5 °F (36.4 °C) (09/06/23 0521)  Pulse: 75 (09/06/23 0809)  Resp: 16 (09/06/23 0809)  BP: 121/81 (09/06/23 0809)  SpO2: 96 % (09/06/23 0809)  BP Location: Left arm    Vital Signs Range (Last 24H):  Temp:  [97 °F (36.1 °C)-98.2 °F (36.8 °C)]   Pulse:  [62-93]   Resp:  [16-24]   BP: (121-176)/()   SpO2:  [93 %-100 %]   BP Location: Left arm       Physical Exam  Vitals and nursing note reviewed.   Constitutional:       General: He is not in acute distress.     Appearance: He is well-developed.   HENT:      Head: Normocephalic and  atraumatic.   Cardiovascular:      Rate and Rhythm: Normal rate.   Pulmonary:      Effort: Pulmonary effort is normal. No respiratory distress.   Abdominal:      General: There is no distension.   Skin:     General: Skin is warm and dry.   Neurological:      Comments: Aphasic, does not follow commands  L gaze              Neurological Exam:   LOC: drowsy  Attention Span: poor  Language: Global aphasia  Articulation: Untestable due to severe aphasia   Orientation: Untestable due to severe aphasia   EOM (CN III, IV, VI): Gaze preference  left  Facial Movement (CN VII): Lower facial weakness on the Right  Motor: Spontaneously moves all 4 extremities, will not follow commands    Laboratory:  CMP:   Recent Labs   Lab 09/06/23  0324   CALCIUM 8.9   ALBUMIN 2.5*   PROT 6.7      K 4.9   CO2 25      BUN 25*   CREATININE 1.5*   ALKPHOS 80   ALT 12   AST 23   BILITOT 0.4     CBC:   Recent Labs   Lab 09/06/23  0324   WBC 3.96   RBC 3.74*   HGB 9.9*   HCT 33.6*      MCV 90   MCH 26.5*   MCHC 29.5*       Diagnostic Results     Brain Imaging   CT Head 8/10/23  Impression:     No detrimental change compared to prior.     Evolving acute infarcts within the left MCA and PCA territories.  No hemorrhagic conversion.  No significant mass effect     Suspected small subacute infarction within the right caudate.     Multiple areas remote infarctions as detailed above.        CT Head 8/8/23 1704  Impression:     1. Redemonstration of acute infarctions in the left MCA and PCA territories.  No evidence to suggest hemorrhagic transformation.  2. Suspected small subacute infarction in the right caudate.  3. Numerous remote infarctions as detailed above.     CT Head 8/8/23 0824  Impression:     Moderate developing hypoattenuation left inferior frontal lobe and left occipital lobe concerning for developing recent infarcts post thrombectomy.     Allowing for scattered hyperdensity related to recently contrast administration for  thrombectomy there is no definite acute intracranial hemorrhage.     Previous hypodensity right caudate no longer seen which may be related to contrast administration for thrombectomy and possible subacute age infarction.     Superimposed remote infarcts with moderate to large encephalomalacia right MCA territory unchanged     Vessel Imaging   IR Angio 8/8/23  Preliminary Interpretation:   1.    No evidence of left ICA or MCA stenosis. No large or medium vessel occlusion.     CTA Multiphase 8/7/23  Impression:     CT head:     New right caudate nucleus hypodensity, likely representing age-indeterminate infarct.  Could be further evaluated with MRI.     Multifocal encephalomalacia involving the right frontal, temporoparietal, and left occipital lobes.     CTA head and neck:     Acute occlusion of the superior left M2 segment.     Left PCA occlusion of undetermined age.     Filling defect in the right main pulmonary artery, concerning for acute pulmonary thromboembolism.  Consider follow-up pulmonary CTA to more fully assess the extent of thromboemboli, the clot burden, and the presence or absence of right heart strain.     Not fully detailed above:     - Incomplete opacification of the left atrial appendage, suspicious for an intraluminal thrombus.  Follow-up echocardiogram, or cardiac MRI or CTA, recommended.     - The presence of acute thrombi or thromboemboli in both the systemic arterial and pulmonary arterial circulation raises concern for the possibility of underlying intracardiac or extracardiac right-to-left shunt, and/or a hypercoagulable state.  Correlate clinically.     - Incompletely visualized, but possibly large, pericardial effusion.  Artifacts compromise accurate assessment of its attenuation.     Cardiac Imaging   TTE with bubble 8/8/23    Left Ventricle: The left ventricle is moderately dilated. Increased ventricular mass. Normal wall thickness. There is moderate eccentric hypertrophy. Severe global  hypokinesis present. Septal motion is consistent with pacing. There is severely reduced systolic function with a visually estimated ejection fraction of 15 - 20%. Ejection fraction by visual approximation is 20%. Unable to assess diastolic function due to arrhythmia.    Left Atrium: Left atrium is severely dilated. Radha 85mL/m2.    Right Ventricle: Mild right ventricular enlargement. Wall thickness is normal. Right ventricle wall motion  is normal. Systolic function is severely reduced. Pacemaker lead present in the ventricle.    Right Atrium: Right atrium is severely dilated.    Aortic Valve: There is mild aortic valve sclerosis. There is normal leaflet mobility. There is no significant regurgitation.    Mitral Valve: There is bileaflet sclerosis. There is normal leaflet mobility. There is mild regurgitation.    Tricuspid Valve: The tricuspid valve is structurally normal. There is normal leaflet mobility. There is mild regurgitation.    Pulmonic Valve: The pulmonic valve is structurally normal. There is normal leaflet mobility. There is no significant regurgitation.    Aorta: Aortic root is normal in size measuring 3.13 cm. Ascending aorta is normal measuring 3.24 cm.    IVC/SVC: Normal venous pressure at 3 mmHg.    Pericardium: The pericardium is normal. There is no pericardial effusion.

## 2023-09-06 NOTE — PROGRESS NOTES
"CONSULTATION LIAISON PSYCHIATRY PROGRESS NOTE    Patient Name: Tera Griffiths  MRN: [id number]  Patient Class: IP- Inpatient  Admission Date: 8/5/2023  Attending Physician: Mansoor Velez MD      SUBJECTIVE:   Tera Griffiths is a 58 y.o. male with no known past psychiatric history of and pertinent past   medical history of CHF, CAD, AF, HTN, and CKD who was admitted 8/5 for CHF exacerbation. Hospital course c/b L MCA embolic stroke.      Psychiatry consulted for "currently in UE restraints due to pulling out lines/PEG. consult to psych to seek help for nonredirectable agitation. trying to avoid physical restraints"     Interval events: Patient received PRN Zyprexa 5 mg @ 0048 this AM.       Today, during interview patient appears restless and agitated. Per sitter in room patient has been agitated most of the day and had recently removed ankle cushions as well as tried to remove his gown.        OBJECTIVE:    Mental Status Exam:  General Appearance: appears stated age, dressed in hospital garb  Behavior:  agitated  Involuntary Movements and Motor Activity: no abnormal involuntary movements noted; no tics, no tremors, no akathisia, no dystonia, no evidence of tardive dyskinesia; no psychomotor agitation or retardation  Gait and Station: unable to assess - patient lying down or seated  Speech and Language:  unable to assess  Mood: unable to assess  Affect:  unable to assess  Thought Process and Associations: Unable to Assess  Thought Content and Perceptions:: Unable to Assess  Sensorium and Orientation: Unable to Formally Assess  Recent and Remote Memory: Unable to  Formally Assess  Attention and Concentration: Unable to Formally Assess  Fund of Knowledge: Unable to Formally Assess  Insight:  unable to assess  Judgment:  unable to assess     CAM ICU positive? Unable to assess      ASSESSMENT & RECOMMENDATIONS   Delirium 2/2 underlying medical etiologies     Continue depakote to 500 qam, 500 at noon, and 1000 " mg QHS   Last VPA level 50.9 on 9/03/23  Continue Seroquel 25 mg QHS per G tube  Continue Zyprexa 5 mg PRN for breakthrough, non-redirectable agitation  Most recent EKG from 9/2 with Qtc of 534. Should be noted that reliability can be affected by Afib. Continue to monitor Qtc with goal of <500.   Pt has scheduled zyprexa 5mg on his MAR since 8/27, per chart review this correlates with pt's increasing qtc      DELIRIUM  DELIRIUM BEHAVIOR PRECAUTIONS  As a reminder, changes in mentation & attention with either disorganized thinking, easy distractibility, and fluctuating level of consciousness are commonly observed with delirium.  Please utilize chemical restraints with PRN meds for agitation 1st. If needed for immediate safety reasons, can additionally utilize physical restraints. However, please avoid use of physical restraints, or have periods of patient being out of physical restraints if possible (e.g. while sleeping) as excessive use can promote rebound worsening of delirium/agitation.  Keep window shades open and room lit during day and room dim at night in order to promote normal sleep-wake cycles.  Encourage family at bedside. [place] patient often to situation, location, date.  Recommend to discourage Narcotics, Benzos and Anti-cholinergic medication use as they commonly promote and worsen delirium. If aforementioned medications need to be resumed for optimal patient outcomes after careful consideration, please exercise judicious use with clear documentation.  Please continue medical workup for causative etiology of delirium.      RISK ASSESSMENT  NO NEED FOR PEC, UNCONTESTED     FOLLOW UP  Will follow up while in house     DISPOSITION - once medically cleared:  Defer to medical team    Please contact ON CALL psychiatry service (24/7) for any acute issues that may arise.    Dr. Lj Wilkins  Psychiatry, PGY-II   Psychiatry  [facility]  9/6/2023 4:13  PM        --------------------------------------------------------------------------------------------------------------------------------------------------------------------------------------------------------------------------------------    CONTINUED OBJECTIVE clinical data & findings reviewed and noted for above decision making    Current Medications:   Scheduled Meds:    amiodarone  200 mg Per G Tube Daily    apixaban  5 mg Per G Tube BID    aspirin  81 mg Per G Tube Daily    atorvastatin  80 mg Per G Tube Daily    ezetimibe  10 mg Per G Tube QHS    ferrous sulfate  1 tablet Per G Tube Daily    metoprolol tartrate  25 mg Per G Tube BID    polyethylene glycol  17 g Per G Tube Daily    QUEtiapine  25 mg Per G Tube QHS    senna-docusate 8.6-50 mg  1 tablet Per G Tube BID    valproic acid (as sodium salt)  1,000 mg Per G Tube QHS    valproic acid (as sodium salt)  500 mg Per G Tube Daily    valproic acid (as sodium salt)  500 mg Per G Tube Q24H     PRN Meds: acetaminophen, albuterol-ipratropium, bisacodyL, chlorproMAZINE, dextrose 10%, dextrose 10%, diatrizoate meglumineand-diatrizoate sodium, glucagon (human recombinant), hydrALAZINE, labetalol, nitroGLYCERIN, OLANZapine, ondansetron, sodium chloride 0.9%, sodium chloride 0.9%, sodium chloride 0.9%    Allergies:   Review of patient's allergies indicates:  No Known Allergies    Vitals  Vitals:    09/06/23 1511   BP: (!) 145/96   Pulse: 73   Resp: 16   Temp: 98.7 °F (37.1 °C)       Labs/Imaging/Studies:  Recent Results (from the past 24 hour(s))   POCT glucose    Collection Time: 09/05/23  5:01 PM   Result Value Ref Range    POCT Glucose 81 70 - 110 mg/dL   POCT glucose    Collection Time: 09/06/23 12:55 AM   Result Value Ref Range    POCT Glucose 124 (H) 70 - 110 mg/dL   CBC auto differential    Collection Time: 09/06/23  3:24 AM   Result Value Ref Range    WBC 3.96 3.90 - 12.70 K/uL    RBC 3.74 (L) 4.60 - 6.20 M/uL    Hemoglobin 9.9 (L) 14.0 - 18.0 g/dL     Hematocrit 33.6 (L) 40.0 - 54.0 %    MCV 90 82 - 98 fL    MCH 26.5 (L) 27.0 - 31.0 pg    MCHC 29.5 (L) 32.0 - 36.0 g/dL    RDW 16.0 (H) 11.5 - 14.5 %    Platelets 354 150 - 450 K/uL    MPV 11.7 9.2 - 12.9 fL    Immature Granulocytes 0.3 0.0 - 0.5 %    Gran # (ANC) 1.9 1.8 - 7.7 K/uL    Immature Grans (Abs) 0.01 0.00 - 0.04 K/uL    Lymph # 1.1 1.0 - 4.8 K/uL    Mono # 0.7 0.3 - 1.0 K/uL    Eos # 0.2 0.0 - 0.5 K/uL    Baso # 0.02 0.00 - 0.20 K/uL    nRBC 0 0 /100 WBC    Gran % 48.4 38.0 - 73.0 %    Lymph % 28.8 18.0 - 48.0 %    Mono % 16.9 (H) 4.0 - 15.0 %    Eosinophil % 5.1 0.0 - 8.0 %    Basophil % 0.5 0.0 - 1.9 %    Differential Method Automated    Comprehensive metabolic panel    Collection Time: 09/06/23  3:24 AM   Result Value Ref Range    Sodium 144 136 - 145 mmol/L    Potassium 4.9 3.5 - 5.1 mmol/L    Chloride 108 95 - 110 mmol/L    CO2 25 23 - 29 mmol/L    Glucose 112 (H) 70 - 110 mg/dL    BUN 25 (H) 6 - 20 mg/dL    Creatinine 1.5 (H) 0.5 - 1.4 mg/dL    Calcium 8.9 8.7 - 10.5 mg/dL    Total Protein 6.7 6.0 - 8.4 g/dL    Albumin 2.5 (L) 3.5 - 5.2 g/dL    Total Bilirubin 0.4 0.1 - 1.0 mg/dL    Alkaline Phosphatase 80 55 - 135 U/L    AST 23 10 - 40 U/L    ALT 12 10 - 44 U/L    eGFR 54.3 (A) >60 mL/min/1.73 m^2    Anion Gap 11 8 - 16 mmol/L   POCT glucose    Collection Time: 09/06/23  5:59 AM   Result Value Ref Range    POCT Glucose 106 70 - 110 mg/dL   POCT glucose    Collection Time: 09/06/23  3:16 PM   Result Value Ref Range    POCT Glucose 115 (H) 70 - 110 mg/dL     Imaging Results              X-Ray Chest AP Portable (Final result)  Result time 08/05/23 14:46:03      Final result by Leandro Ruffin MD (08/05/23 14:46:03)                   Impression:      1. Central hilar findings suggest congestive change/edema.  No large focal consolidation.      Electronically signed by: Leandro Ruffin MD  Date:    08/05/2023  Time:    14:46               Narrative:    EXAMINATION:  XR CHEST AP PORTABLE    CLINICAL  HISTORY:  Chest Pain;    TECHNIQUE:  Single frontal view of the chest was performed.    COMPARISON:  07/13/2023    FINDINGS:  The cardiomediastinal silhouette is prominent, similar to the previous exam noting left chest wall pacer..  There is no pleural effusion.  The trachea is midline.  The lungs are symmetrically expanded bilaterally with coarse central hilar interstitial attenuation.  No large focal consolidation seen.  There is no pneumothorax.  The osseous structures are remarkable for degenerative change..

## 2023-09-06 NOTE — PROGRESS NOTES
Declan Salomon - Neurosurgery ([facility])  Adult Nutrition  Progress Note    SUMMARY       Recommendations    EN recs:                - Continuous: Isosource 1.5 @ goal rate of 55 mL/hr- provides 1980 kcals, 90 g pro, and 1008 mL fluid.              - Bolus: Isosource 1.5 (5 cartons/day)- provides 1875 kcals, 85 g pro, and 955 mL fluid.   RD following.    Goals: Will meet % EEN/EPN by next RD f/u.  Nutrition Goal Status: goal met  Communication of RD Recs:  (POC)    Assessment and Plan    Nutrition Problem  Inadequate oral intake     Related to (etiology):   Inability to consume sufficient needs     Signs and Symptoms (as evidenced by):   NPO status      Interventions/Recommendations (treatment strategy):  Collaboration of nutrition care with other providers   EN     Nutrition Diagnosis Status:   Continues    Reason for Assessment    Reason For Assessment: RD follow-up  Diagnosis: stroke/CVA  Relevant Medical History: CAD, PAF, CHF, CKD 3, [facility]  Interdisciplinary Rounds: did not attend  General Information Comments: RD f/u. Unable to speak with pt 2/2 being aphasic. Remains NPO- noted current continuous TF regimen running via PEG. Noted CBW of 182#. LBM 9/5.     (8/6): Unsure intake PTA. RD private messaged RN who states that pt consumed 90% of both breakfast and lunch. No issues with n/v/d/c/chewing/swallowing today. UBW fluctuates between 190-195# per chart review, #. No s/s of malnutrition, appears well-nourished. Educational handout left on bedside table for pt to review.   Nutrition Discharge Planning: Pending clinical course    Nutrition Risk Screen    Nutrition Risk Screen: tube feeding or parenteral nutrition, difficulty chewing/swallowing    Nutrition/Diet History    Spiritual, Cultural Beliefs, Mormonism Practices, Values that Affect Care: no  Food Allergies: NKFA  Factors Affecting Nutritional Intake: NPO, difficulty/impaired swallowing, impaired cognitive status/motor  "control    Anthropometrics    Temp: 97.5 °F (36.4 °C)  Height Method: Stated  Height: 6' 1" (185.4 cm)  Height (inches): 73 in  Weight Method: Bed Scale  Weight: 83 kg (182 lb 15.7 oz)  Weight (lb): 182.98 lb  Ideal Body Weight (IBW), Male: 184 lb  % Ideal Body Weight, Male (lb): 100.05 %  BMI (Calculated): 24.1  BMI Grade: 18.5-24.9 - normal    Lab/Procedures/Meds    Pertinent Labs Reviewed: reviewed  Pertinent Labs Comments: BUN 25, Creatinine 1.5, GFR 54.3, Glucose 112, Albumin 2.5  Pertinent Medications Reviewed: reviewed  Pertinent Medications Comments: aspirin, atorvastatin, metoprolol tartrate, senna-docusate    Estimated/Assessed Needs    Weight Used For Calorie Calculations: 83.5 kg (184 lb 1.4 oz)  Energy Calorie Requirements (kcal): 2088 kcals  Energy Need Method: Kcal/kg (25 kcal/kg)  Protein Requirements: 84 g (1.0 g/kg)  Weight Used For Protein Calculations: 83.5 kg (184 lb 1.4 oz)  Fluid Requirements (mL): 1 mL/kcal or fluid per MD  Estimated Fluid Requirement Method: RDA Method  RDA Method (mL): 2088    Nutrition Prescription Ordered    Current Diet Order: NPO  Current Nutrition Support Formula Ordered: Isosource 1.5  Current Nutrition Support Rate Ordered: 55 (ml)  Current Nutrition Support Frequency Ordered: mL/hr    Evaluation of Received Nutrient/Fluid Intake    Enteral Calories (kcal): 1980  Enteral Protein (gm): 90  Enteral (Free Water) Fluid (mL): 1008  % Kcal Needs: 95%  % Protein Needs: 107%  I/O: +21.5 L since 8/23  Energy Calories Required: meeting needs  Protein Required: meeting needs  Tolerance: tolerating  % Intake of Estimated Energy Needs: 95%  % Meal Intake: NPO    Nutrition Risk    Level of Risk/Frequency of Follow-up:  (1 time/week)     Monitor and Evaluation    Food and Nutrient Intake: enteral nutrition intake  Food and Nutrient Adminstration: enteral and parenteral nutrition administration  Knowledge/Beliefs/Attitudes: food and nutrition knowledge/skill, beliefs and " attitudes  Physical Activity and Function: nutrition-related ADLs and IADLs  Anthropometric Measurements: body mass index, weight change, weight, height/length  Biochemical Data, Medical Tests and Procedures: lipid profile, inflammatory profile, glucose/endocrine profile, gastrointestinal profile, electrolyte and renal panel  Nutrition-Focused Physical Findings: overall appearance     Nutrition Follow-Up    RD Follow-up?: Yes    Melodie Glez RDN,LDN

## 2023-09-06 NOTE — NURSING
Recently called to room by sitter; patient has removed hospital gown and ABD binder and continues to yell / moan and fidget in  the bed.  Sitter also reports that the patient was spitting over the left side rail of the bed.  Patient remains restless and moaning; will  clean perineum as patient with fecal incontinence at this time also.

## 2023-09-06 NOTE — PT/OT/SLP PROGRESS
Occupational Therapy      Patient Name:  Tera Griffiths   MRN:  [id number]    Patient not seen today secondary to Increased agitation. Will follow-up as appropriate.    9/6/2023

## 2023-09-06 NOTE — NURSING
Aggitated; restless; had another fecal incontinence; pulled binder off; attempting out of bed with legs thrown over side;  grabbing to  pull at PEG tube.  Incontinence care provided and patient continuing to  yell and moan.  Will administer prn Zyprexa.

## 2023-09-06 NOTE — PLAN OF CARE
ZENOBIA received notification from [facility] that patient cannot be admitted there due to no consistent family being able to stay with the patient at the facility.      SW sent additional rehab referrals.  [facility] is able to accept and will speak with family (ZENOBIA contacted them first to confirm and to let them know [facility] cannot accept).  Charity from [facility] will speak with brother and, if family agreeable, she will submit auth.      Becca Buchanan, ZENOBIA  [facility]  [phone]

## 2023-09-06 NOTE — ASSESSMENT & PLAN NOTE
Due to stroke  PT/OT-pending placement to [facility]  Family unwilling to provide 24/7 sitter, precluding him from rehab  [facility]

## 2023-09-07 VITALS
OXYGEN SATURATION: 95 % | SYSTOLIC BLOOD PRESSURE: 131 MMHG | BODY MASS INDEX: 23.66 KG/M2 | TEMPERATURE: 99 F | DIASTOLIC BLOOD PRESSURE: 91 MMHG | HEIGHT: 73 IN | RESPIRATION RATE: 18 BRPM | HEART RATE: 70 BPM | WEIGHT: 178.56 LBS

## 2023-09-07 LAB
POCT GLUCOSE: 101 MG/DL (ref 70–110)
POCT GLUCOSE: 114 MG/DL (ref 70–110)
POCT GLUCOSE: 118 MG/DL (ref 70–110)
POCT GLUCOSE: 94 MG/DL (ref 70–110)

## 2023-09-07 PROCEDURE — 93010 ELECTROCARDIOGRAM REPORT: CPT | Mod: ,,, | Performed by: INTERNAL MEDICINE

## 2023-09-07 PROCEDURE — 25000003 PHARM REV CODE 250: Performed by: NURSE PRACTITIONER

## 2023-09-07 PROCEDURE — 25000003 PHARM REV CODE 250

## 2023-09-07 PROCEDURE — S0166 INJ OLANZAPINE 2.5MG: HCPCS

## 2023-09-07 PROCEDURE — 92507 TX SP LANG VOICE COMM INDIV: CPT

## 2023-09-07 PROCEDURE — 93005 ELECTROCARDIOGRAM TRACING: CPT

## 2023-09-07 PROCEDURE — 97530 THERAPEUTIC ACTIVITIES: CPT

## 2023-09-07 PROCEDURE — 93010 PR ELECTROCARDIOGRAM REPORT: ICD-10-PCS | Mod: ,,, | Performed by: INTERNAL MEDICINE

## 2023-09-07 PROCEDURE — 99233 PR SUBSEQUENT HOSPITAL CARE,LEVL III: ICD-10-PCS | Mod: ,,, | Performed by: PSYCHIATRY & NEUROLOGY

## 2023-09-07 PROCEDURE — 99233 SBSQ HOSP IP/OBS HIGH 50: CPT | Mod: ,,, | Performed by: PSYCHIATRY & NEUROLOGY

## 2023-09-07 PROCEDURE — 97112 NEUROMUSCULAR REEDUCATION: CPT

## 2023-09-07 RX ADMIN — VALPROIC ACID 500 MG: 250 SOLUTION ORAL at 12:09

## 2023-09-07 RX ADMIN — OLANZAPINE 5 MG: 10 INJECTION, POWDER, LYOPHILIZED, FOR SOLUTION INTRAMUSCULAR at 02:09

## 2023-09-07 RX ADMIN — ASPIRIN 81 MG 81 MG: 81 TABLET ORAL at 08:09

## 2023-09-07 RX ADMIN — VALPROIC ACID 500 MG: 250 SOLUTION ORAL at 08:09

## 2023-09-07 RX ADMIN — ATORVASTATIN CALCIUM 80 MG: 40 TABLET, FILM COATED ORAL at 08:09

## 2023-09-07 RX ADMIN — POLYETHYLENE GLYCOL 3350 17 G: 17 POWDER, FOR SOLUTION ORAL at 08:09

## 2023-09-07 RX ADMIN — SENNOSIDES AND DOCUSATE SODIUM 1 TABLET: 50; 8.6 TABLET ORAL at 08:09

## 2023-09-07 RX ADMIN — APIXABAN 5 MG: 5 TABLET, FILM COATED ORAL at 08:09

## 2023-09-07 RX ADMIN — FERROUS SULFATE TAB 325 MG (65 MG ELEMENTAL FE) 1 EACH: 325 (65 FE) TAB at 08:09

## 2023-09-07 RX ADMIN — METOPROLOL TARTRATE 25 MG: 25 TABLET, FILM COATED ORAL at 08:09

## 2023-09-07 RX ADMIN — ACETAMINOPHEN 650 MG: 325 TABLET ORAL at 11:09

## 2023-09-07 RX ADMIN — AMIODARONE HYDROCHLORIDE 200 MG: 200 TABLET ORAL at 08:09

## 2023-09-07 NOTE — PT/OT/SLP PROGRESS
Speech Language Pathology Treatment    Patient Name:  Tera Griffiths   MRN:  [id number]  Admitting Diagnosis: Embolic stroke involving left middle cerebral artery    Recommendations:                 General Recommendations:  Dysphagia therapy, Speech/language therapy, and Cognitive-linguistic therapy  Diet recommendations:  NPO, Liquid Diet Level: NPO   Aspiration Precautions: Frequent oral care and Strict aspiration precautions   General Precautions: Standard, aphasia, aspiration, NPO  Communication strategies:  provide increased time to answer and go to room if call light pushed    Assessment:     Tera Griffiths is a 58 y.o. male with an SLP diagnosis of Aphasia and Dysphagia.     Subjective     Pt alert and moaning.     Pain/Comfort:  Pain Rating 1:  (NAD)  Pain Rating Post-Intervention 1:  (NAD)    Respiratory Status: Room air    Objective:     Has the patient been evaluated by SLP for swallowing?   Yes  Keep patient NPO? Yes     Pt seen bedside, alert and attending to SLP on L.  Moaning noted t/o session with no active shaping.  Pt shaking head no to prompts for auto speech task though did not respond to simple y/n questions.  No commands followed.  Pt demonstrated use of object x2.  Pt self presented carbonated beverage x2 with delayed swallow initiation with multiple swallows elicited.  Pt grimacing and pushing additional po trials away.  Education provided re: ongoing cont and SLP POC.  Education to be ongoing.        Goals:   Multidisciplinary Problems       SLP Goals          Problem: SLP    Goal Priority Disciplines Outcome   SLP Goal     SLP Ongoing, Progressing   Description: Speech Language Pathology Goals  Goals expected to be met by 8/15  1. Pt will participate in ongoing assessment of swallow.   2. Pt will answer simple y/n questions with 60% accy given max cues.   3. Pt will follow simple commands with 50% accy given max cues.   4. Pt will vocalize in response to any stim x5/session given max  cues.   5. Pt will localize to stim on R x2/session given max cues.                              Plan:     Patient to be seen:  3 x/week   Plan of Care expires:  09/07/23  Plan of Care reviewed with:  patient   SLP Follow-Up:  Yes       Discharge recommendations:  nursing facility, skilled   Barriers to Discharge:  Level of Skilled Assistance Needed      Time Tracking:     SLP Treatment Date:   09/07/23  Speech Start Time:  0941  Speech Stop Time:  0949     Speech Total Time (min):  8 min    Billable Minutes: Speech Therapy Individual 8    09/07/2023

## 2023-09-07 NOTE — PT/OT/SLP PROGRESS
Physical Therapy Co-Treatment    Patient Name: Tera Griffiths   MRN: [id number]    Co-treatment performed for this visit due to patient need for two skilled therapists to ensure patient and staff safety and to accommodate for patient activity tolerance/pain management   Recommendations:     Discharge Recommendations: nursing facility, skilled  Discharge Equipment Recommendations: to be determined by next level of care  Barriers to discharge: Increased level of assist and Decreased caregiver support    Assessment:     Tera Griffiths is a 58 y.o. male admitted with a medical diagnosis of Embolic stroke involving left middle cerebral artery. He presents with the following impairments/functional limitations: weakness, impaired endurance, impaired self care skills, impaired functional mobility, gait instability, impaired balance, impaired cognition, impaired coordination, decreased upper extremity function, decreased lower extremity function, decreased safety awareness, pain. Pt with fair to poor tolerance to therapy session on this day. Pt resistant to performing mobility at EOB, removing covers, or dressing in hospital gown. Pt moaning and groaning throughout session, but does not appear to be in pain. Pt continues to remain nonverbal and follow 0% of motor commands. Pt becoming agitated toward end of session while seated at EOB and grabbing/swatting at therapists. Pt would continue to benefit from skilled acute PT in order to address current deficits and progress functional mobility.     Rehab Prognosis: Fair; patient continues to benefit from acute skilled PT services to address these deficits and reach maximum level of function.  Recent Surgery: Procedure(s) (LRB):  EGD, WITH PEG TUBE INSERTION (N/A) 21 Days Post-Op    Plan:     During this hospitalization, patient to be seen 3 x/week to address the identified rehab impairments via gait training, therapeutic activities, therapeutic exercises, neuromuscular  re-education and progress toward the following goals:    Plan of Care Expires:  09/08/23    Subjective     Chief Complaint: Unable to assess, patient non-verbal  Patient/Family Comments/Goals: Unable to assess, patient non-verbal  Pain/Comfort:  Pain Rating 1:  (did not rate; pt moaning throughout)  Pain Addressed 1: Reposition, Distraction, Nurse notified  Pain Rating Post-Intervention 1:  (did not rate)    Objective:     Communicated with RN prior to session. Patient found right sidelying with blood pressure cuff, bed alarm, PEG Tube upon PT entry to room.     General Precautions: Standard, aphasia, aspiration, fall, NPO  Orthopedic Precautions: N/A  Braces: N/A    Functional Mobility:  Bed Mobility:    Supine to Sit: minimum assistance for LE management  Sit to Supine: minimum assistance for LE management  Transfers:  Sit to Stand: Deferred 2/2 pt not following commands and becoming agitated with therapists when tactically cued to perform STS   Balance:   Static Sitting: Good, able to maintain for 5 minute(s) with stand by assistance to contact guard assistance  Pt cued throughout to maintain upright posture, increase anterior trunk lean, maintain vertical positioning, and perform motor commands  Dynamic Sitting: Good: Patient accepts moderate challenge, stand by assistance to contact guard assistance  Pt demonstrating good voluntary UE movements while seated at EOB including throwing gown/towel, reaching for therapists/objects, and grasping therapists hands.      AM-PAC 6 CLICK MOBILITY  Turning over in bed (including adjusting bedclothes, sheets and blankets)?: 3  Sitting down on and standing up from a chair with arms (e.g., wheelchair, bedside commode, etc.): 2  Moving from lying on back to sitting on the side of the bed?: 3  Moving to and from a bed to a chair (including a wheelchair)?: 1  Need to walk in hospital room?: 1  Climbing 3-5 steps with a railing?: 1  Basic Mobility Total Score: 11     Therapeutic  Activities and Exercises:  Patient educated on role of acute care PT and PT POC, safety while in hospital including calling nurse for mobility, and call light usage.  Pt educated on the importance of maximum participation in therapy session  Pt encouraged throughout to attend to therapist, make eye contact, and perform UE/LE motor actions to command. Pt following 0% of commands at this time.      Patient left right sidelying with all lines intact, call button in reach, RN notified, and bed alarm on.    GOALS:   Multidisciplinary Problems       Physical Therapy Goals          Problem: Physical Therapy    Goal Priority Disciplines Outcome Goal Variances Interventions   Physical Therapy Goal     PT, PT/OT Ongoing, Progressing     Description: Goals to be completed by: 10/7/23    Pt will perform sup<>sit transfers w/ minimum assistance - met 9/7  Updated: SBA  Pt will have sufficient dynamic balance to sit EOB while performing ADLs/therex w/ stand by assistance for 10 min  Pt will be able to stand up from EOB w/ moderate assistance using LRAD  Pt will ambulate 20 feet w/ maximal assistance using LRAD  Pt will be independent w/ HEP therex on BLE w/ good form and ROM   Pt will follow >50 % of single-step commands throughout treatment session                        Time Tracking:     PT Received On: 09/07/23  PT Start Time: 1000     PT Stop Time: 1014  PT Total Time (min): 14 min     Billable Minutes: Neuromuscular Re-education 14      Treatment Type: Treatment  PT/PTA: PT     Number of PTA visits since last PT visit: 0     09/07/2023

## 2023-09-07 NOTE — ASSESSMENT & PLAN NOTE
Unclear etiology, likely some element of delerium  No history of alcohol abuse  Ammonia levels normal  BUN normal  Vit B 12 1300   TSH normal  Could not tolerate EEG  repeat CTH stable    -Thiamine pending  - Avoid sedating medications  - Monitor electrolytes, BGL

## 2023-09-07 NOTE — SUBJECTIVE & OBJECTIVE
Neurologic Chief Complaint: L MCA syndrome    Subjective:     Interval History: Patient is seen for follow-up neurological assessment and treatment recommendations: No new neurological changes, agitation stable, [facility] unable to accept patient with current level of family support. Pending [facility] accepted, pending auth/family approval  HPI, Past Medical, Family, and Social History remains the same as documented in the initial encounter.     Review of Systems   Unable to perform ROS: Other (clinical condition)     Scheduled Meds:   amiodarone  200 mg Per G Tube Daily    apixaban  5 mg Per G Tube BID    aspirin  81 mg Per G Tube Daily    atorvastatin  80 mg Per G Tube Daily    ezetimibe  10 mg Per G Tube QHS    ferrous sulfate  1 tablet Per G Tube Daily    metoprolol tartrate  25 mg Per G Tube BID    polyethylene glycol  17 g Per G Tube Daily    QUEtiapine  25 mg Per G Tube QHS    senna-docusate 8.6-50 mg  1 tablet Per G Tube BID    valproic acid (as sodium salt)  1,000 mg Per G Tube QHS    valproic acid (as sodium salt)  500 mg Per G Tube Daily    valproic acid (as sodium salt)  500 mg Per G Tube Q24H     Continuous Infusions:  PRN Meds:acetaminophen, albuterol-ipratropium, bisacodyL, chlorproMAZINE, dextrose 10%, dextrose 10%, diatrizoate meglumineand-diatrizoate sodium, glucagon (human recombinant), hydrALAZINE, labetalol, nitroGLYCERIN, OLANZapine, ondansetron, sodium chloride 0.9%, sodium chloride 0.9%, sodium chloride 0.9%    Objective:     Vital Signs (Most Recent):  Temp: 97 °F (36.1 °C) (09/07/23 0541)  Pulse: 79 (09/07/23 0838)  Resp: 20 (09/07/23 0541)  BP: (!) 120/90 (09/07/23 0838)  SpO2: 96 % (09/07/23 0541)  BP Location: Right arm    Vital Signs Range (Last 24H):  Temp:  [96.7 °F (35.9 °C)-98.7 °F (37.1 °C)]   Pulse:  [68-82]   Resp:  [16-20]   BP: (120-167)/()   SpO2:  [96 %-97 %]   BP Location: Right arm       Physical Exam  Vitals and nursing note reviewed.   Constitutional:       General: He is  not in acute distress.     Appearance: He is well-developed.   HENT:      Head: Normocephalic and atraumatic.   Cardiovascular:      Rate and Rhythm: Normal rate.   Pulmonary:      Effort: Pulmonary effort is normal. No respiratory distress.   Abdominal:      General: There is no distension.   Skin:     General: Skin is warm and dry.   Neurological:      Comments: Aphasic, does not follow commands  L gaze          Neurological Exam:   LOC: drowsy  Attention Span: poor  Language: Global aphasia  Articulation: Untestable due to severe aphasia   Orientation: Untestable due to severe aphasia   EOM (CN III, IV, VI): Gaze preference  left  Facial Movement (CN VII): Lower facial weakness on the Right  Motor: Spontaneously moves all 4 extremities, will not follow commands    Diagnostic Results     Brain Imaging   CT Head 8/10/23  Impression:     No detrimental change compared to prior.     Evolving acute infarcts within the left MCA and PCA territories.  No hemorrhagic conversion.  No significant mass effect     Suspected small subacute infarction within the right caudate.     Multiple areas remote infarctions as detailed above.        CT Head 8/8/23 1704  Impression:     1. Redemonstration of acute infarctions in the left MCA and PCA territories.  No evidence to suggest hemorrhagic transformation.  2. Suspected small subacute infarction in the right caudate.  3. Numerous remote infarctions as detailed above.     CT Head 8/8/23 0869  Impression:     Moderate developing hypoattenuation left inferior frontal lobe and left occipital lobe concerning for developing recent infarcts post thrombectomy.     Allowing for scattered hyperdensity related to recently contrast administration for thrombectomy there is no definite acute intracranial hemorrhage.     Previous hypodensity right caudate no longer seen which may be related to contrast administration for thrombectomy and possible subacute age infarction.     Superimposed remote  infarcts with moderate to large encephalomalacia right MCA territory unchanged     Vessel Imaging   IR Angio 8/8/23  Preliminary Interpretation:   1.    No evidence of left ICA or MCA stenosis. No large or medium vessel occlusion.     CTA Multiphase 8/7/23  Impression:     CT head:     New right caudate nucleus hypodensity, likely representing age-indeterminate infarct.  Could be further evaluated with MRI.     Multifocal encephalomalacia involving the right frontal, temporoparietal, and left occipital lobes.     CTA head and neck:     Acute occlusion of the superior left M2 segment.     Left PCA occlusion of undetermined age.     Filling defect in the right main pulmonary artery, concerning for acute pulmonary thromboembolism.  Consider follow-up pulmonary CTA to more fully assess the extent of thromboemboli, the clot burden, and the presence or absence of right heart strain.     Not fully detailed above:     - Incomplete opacification of the left atrial appendage, suspicious for an intraluminal thrombus.  Follow-up echocardiogram, or cardiac MRI or CTA, recommended.     - The presence of acute thrombi or thromboemboli in both the systemic arterial and pulmonary arterial circulation raises concern for the possibility of underlying intracardiac or extracardiac right-to-left shunt, and/or a hypercoagulable state.  Correlate clinically.     - Incompletely visualized, but possibly large, pericardial effusion.  Artifacts compromise accurate assessment of its attenuation.     Cardiac Imaging   TTE with bubble 8/8/23    Left Ventricle: The left ventricle is moderately dilated. Increased ventricular mass. Normal wall thickness. There is moderate eccentric hypertrophy. Severe global hypokinesis present. Septal motion is consistent with pacing. There is severely reduced systolic function with a visually estimated ejection fraction of 15 - 20%. Ejection fraction by visual approximation is 20%. Unable to assess diastolic  function due to arrhythmia.    Left Atrium: Left atrium is severely dilated. Radha 85mL/m2.    Right Ventricle: Mild right ventricular enlargement. Wall thickness is normal. Right ventricle wall motion  is normal. Systolic function is severely reduced. Pacemaker lead present in the ventricle.    Right Atrium: Right atrium is severely dilated.    Aortic Valve: There is mild aortic valve sclerosis. There is normal leaflet mobility. There is no significant regurgitation.    Mitral Valve: There is bileaflet sclerosis. There is normal leaflet mobility. There is mild regurgitation.    Tricuspid Valve: The tricuspid valve is structurally normal. There is normal leaflet mobility. There is mild regurgitation.    Pulmonic Valve: The pulmonic valve is structurally normal. There is normal leaflet mobility. There is no significant regurgitation.    Aorta: Aortic root is normal in size measuring 3.13 cm. Ascending aorta is normal measuring 3.24 cm.    IVC/SVC: Normal venous pressure at 3 mmHg.    Pericardium: The pericardium is normal. There is no pericardial effusion.

## 2023-09-07 NOTE — PT/OT/SLP PROGRESS
Occupational Therapy   Treatment    Name: Tera Griffiths  MRN: [id number]  Admitting Diagnosis:  Embolic stroke involving left middle cerebral artery  21 Days Post-Op    Recommendations:     Discharge Recommendations: nursing facility, skilled  Discharge Equipment Recommendations:  to be determined by next level of care  Barriers to discharge:  Other (Comment) (Increased A needed)    Assessment:     Tera Griffiths is a 58 y.o. male with a medical diagnosis of Embolic stroke involving left middle cerebral artery.  He presents with decreased functional status due to medical complexity. Performance deficits affecting function are weakness, impaired endurance, impaired self care skills, impaired functional mobility, gait instability, impaired balance, impaired cognition, decreased coordination, visual deficits, decreased lower extremity function, decreased safety awareness, impaired coordination, decreased ROM, impaired fine motor, abnormal tone, decreased upper extremity function.     Rehab Prognosis:  Fair; patient would benefit from acute skilled OT services to address these deficits and reach maximum level of function.       Plan:     Patient to be seen 3 x/week to address the above listed problems via self-care/home management, therapeutic activities, therapeutic exercises, neuromuscular re-education  Plan of Care Expires: 09/11/23  Plan of Care Reviewed with: patient    Subjective     Chief Complaint: Patient unable to verbalize at this time   Patient/Family Comments/goals: Gain functional status     Objective:     Communicated with: RN prior to session.  Patient found supine with SCD, PEG Tube upon OT entry to room.    General Precautions: Standard, aspiration, aphasia, fall, NPO    Orthopedic Precautions:N/A  Braces: N/A  Respiratory Status: Room air     Occupational Performance:     Bed Mobility:  Bed mobility dependent on patient volition  Patient completed Rolling/Turning to Left with  minimum  assistance  Patient completed Scooting/Bridging with minimum assistance  Patient completed Supine to Sit with minimum assistance  Patient completed Sit to Supine with minimum assistance     Functional Mobility/Transfers:  Patient unable to safely complete transfer at this time due to decreased command following and safety awareness    Activities of Daily Living:  Grooming: minimum assistance when patient expresses want/need to complete task. Patient threw wash cloth after completing ADL task.   UBD: Patient refusing to complete ADL at this time; patient nude during session       [facility] 6 Click ADL: 6    Treatment & Education:  Patient with fair movement when initiates for self but unable to follow any tactile/verbal/visual cues. Patient demonstrates movement in BUE in all planes when patient initiates movement. Co treatment completed due to patient need for increased A and safety precautions.    Patient left supine with all lines intact, call button in reach, and bed alarm on    GOALS:   Multidisciplinary Problems       Occupational Therapy Goals          Problem: Occupational Therapy    Goal Priority Disciplines Outcome Interventions   Occupational Therapy Goal     OT, PT/OT Ongoing, Progressing    Description: Goals to be met by: 9/8/23     Patient will increase functional independence with ADLs by performing:    UE Dressing with Moderate Assistance.  LE Dressing with Moderate Assistance.  Grooming while standing with Moderate Assistance.  Toileting from toilet with Moderate Assistance for hygiene and clothing management.                          Time Tracking:     OT Date of Treatment: 09/07/23  OT Start Time: 1000  OT Stop Time: 1014  OT Total Time (min): 14 min    Billable Minutes:Therapeutic Activity 14 minutes     OT/ANDREA: OT          9/7/2023

## 2023-09-07 NOTE — PLAN OF CARE
09/07/23 1520   Final Note   Assessment Type Final Discharge Note   Anticipated Discharge Disposition Rehab   Post-Acute Status   Post-Acute Authorization Placement   Post-Acute Placement Status Set-up Complete/Auth obtained     Patient accepted at [facility] and SW contacted brother Spencer, sister Timmy and SARAVANAN Teague to ensure they were in agreement with plan.  They are all agreeable.  Auth obtained and RN given number for report.  ZENOBIA set up ambo transport for 5pm.      Becca Buchanan, BRENT  [facility]  [phone]

## 2023-09-07 NOTE — PLAN OF CARE
Goals reviewed and remain appropriate. Pt progressing towards goals. Continue POC.    Problem: Physical Therapy  Goal: Physical Therapy Goal  Description: Goals to be completed by: 10/7/23    Pt will perform sup<>sit transfers w/ minimum assistance - met 9/7  Updated: SBA  Pt will have sufficient dynamic balance to sit EOB while performing ADLs/therex w/ stand by assistance for 10 min  Pt will be able to stand up from EOB w/ moderate assistance using LRAD  Pt will ambulate 20 feet w/ maximal assistance using LRAD  Pt will be independent w/ HEP therex on BLE w/ good form and ROM   Pt will follow >50 % of single-step commands throughout treatment session   Outcome: Ongoing, Progressing    9/7/2023

## 2023-09-07 NOTE — PLAN OF CARE
Problem: Adult Inpatient Plan of Care  Goal: Plan of Care Review  Outcome: Ongoing, Progressing  Goal: Patient-Specific Goal (Individualized)  Outcome: Ongoing, Progressing  Goal: Absence of Hospital-Acquired Illness or Injury  Outcome: Ongoing, Progressing  Intervention: Identify and Manage Fall Risk  Flowsheets (Taken 9/7/2023 1249)  Safety Promotion/Fall Prevention:   assistive device/personal item within reach   bed alarm set   Fall Risk reviewed with patient/family   Fall Risk signage in place   lighting adjusted   medications reviewed   instructed to call staff for mobility  Intervention: Prevent Skin Injury  Flowsheets (Taken 9/7/2023 1249)  Body Position:   position changed independently   30 degrees  Skin Protection:   adhesive use limited   incontinence pads utilized  Intervention: Prevent and Manage VTE (Venous Thromboembolism) Risk  Flowsheets (Taken 9/7/2023 1249)  Activity Management: Rolling - L1  VTE Prevention/Management: ROM (active) performed  Range of Motion: active ROM (range of motion) encouraged  Intervention: Prevent Infection  Flowsheets (Taken 9/7/2023 1249)  Infection Prevention:   hand hygiene promoted   single patient room provided  Goal: Optimal Comfort and Wellbeing  Outcome: Ongoing, Progressing  Intervention: Monitor Pain and Promote Comfort  Flowsheets (Taken 9/7/2023 1249)  Pain Management Interventions:   care clustered   pillow support provided   position adjusted  Intervention: Provide Person-Centered Care  Flowsheets (Taken 9/7/2023 1249)  Trust Relationship/Rapport: emotional support provided  Goal: Readiness for Transition of Care  Outcome: Ongoing, Progressing     Problem: Diabetes Comorbidity  Goal: Blood Glucose Level Within Targeted Range  Outcome: Ongoing, Progressing  Intervention: Monitor and Manage Glycemia  Flowsheets (Taken 9/7/2023 1249)  Glycemic Management: blood glucose monitored     Problem: Infection  Goal: Absence of Infection Signs and Symptoms  Outcome:  Ongoing, Progressing  Intervention: Prevent or Manage Infection  Flowsheets (Taken 9/7/2023 1249)  Fever Reduction/Comfort Measures:   lightweight bedding   lightweight clothing     Problem: Adjustment to Illness (Stroke, Ischemic/Transient Ischemic Attack)  Goal: Optimal Coping  Outcome: Ongoing, Progressing  Intervention: Support Psychosocial Response to Stroke  Flowsheets (Taken 9/7/2023 1249)  Supportive Measures:   problem-solving facilitated   positive reinforcement provided     Problem: Bowel Elimination Impaired (Stroke, Ischemic/Transient Ischemic Attack)  Goal: Effective Bowel Elimination  Outcome: Ongoing, Progressing  Intervention: Promote Effective Bowel Elimination  Flowsheets (Taken 9/7/2023 1249)  Bowel Program: maintenance program followed     Problem: Cerebral Tissue Perfusion (Stroke, Ischemic/Transient Ischemic Attack)  Goal: Optimal Cerebral Tissue Perfusion  Outcome: Ongoing, Progressing  Intervention: Protect and Optimize Cerebral Perfusion  Flowsheets (Taken 9/7/2023 1249)  Cerebral Perfusion Promotion: blood pressure monitored     Problem: Cognitive Impairment (Stroke, Ischemic/Transient Ischemic Attack)  Goal: Optimal Cognitive Function  Outcome: Ongoing, Progressing  Intervention: Optimize Cognitive Function  Flowsheets (Taken 9/7/2023 1249)  Environment Familiarity/Consistency: daily routine followed  Reorientation Measures: clock in view     Problem: Communication Impairment (Stroke, Ischemic/Transient Ischemic Attack)  Goal: Improved Communication Skills  Outcome: Ongoing, Progressing  Intervention: Optimize Communication Skills  Flowsheets (Taken 9/7/2023 1249)  Communication Enhancement Strategies: verbal communication attempts encouraged     Problem: Functional Ability Impaired (Stroke, Ischemic/Transient Ischemic Attack)  Goal: Optimal Functional Ability  Outcome: Ongoing, Progressing  Intervention: Optimize Functional Ability  Flowsheets (Taken 9/7/2023 1249)  Activity Management:  Rolling - L1     Problem: Urinary Elimination Impaired (Stroke, Ischemic/Transient Ischemic Attack)  Goal: Effective Urinary Elimination  Outcome: Ongoing, Progressing  Intervention: Promote Effective Bladder Elimination  Flowsheets (Taken 9/7/2023 1249)  Urinary Elimination Promotion: absorbent pad/diaper use encouraged     Problem: Fall Injury Risk  Goal: Absence of Fall and Fall-Related Injury  Outcome: Ongoing, Progressing  Intervention: Identify and Manage Contributors  Flowsheets (Taken 9/7/2023 1249)  Medication Review/Management: medications reviewed  Intervention: Promote Injury-Free Environment  Flowsheets (Taken 9/7/2023 1249)  Safety Promotion/Fall Prevention:   assistive device/personal item within reach   bed alarm set   Fall Risk reviewed with patient/family   Fall Risk signage in place   lighting adjusted   medications reviewed   instructed to call staff for mobility     Problem: Skin Injury Risk Increased  Goal: Skin Health and Integrity  Outcome: Ongoing, Progressing  Intervention: Optimize Skin Protection  Flowsheets (Taken 9/7/2023 1249)  Pressure Reduction Techniques: weight shift assistance provided  Pressure Reduction Devices:   positioning supports utilized   pressure-redistributing mattress utilized  Skin Protection:   adhesive use limited   incontinence pads utilized  Head of Bed (HOB) Positioning: HOB elevated

## 2023-09-07 NOTE — DISCHARGE SUMMARY
Declan Salomon - Neurosurgery ([facility])  Vascular Neurology  Comprehensive Stroke Center  Discharge Summary     Summary:     Admit Date: 8/5/2023  1:53 PM    Discharge Date and Time:  09/07/2023 2:39 PM    Attending Physician: Mansoor Velez MD     Discharge Provider: Clovis Rust MD    History of Present Illness: 57 y/o male in hospital since 8-5-23 with VHF exacerbation. Last known normal was 2200. Nurse noticed at 2300 that patient was not moving his right side,not talking or following commands.   In house stroke code called at 2304.   Upon examination patient with left gaze, right hemiplegia, no sensation on right, right hemianopsia, aphasia and not following commands.   Patient taken to CT scan and had CTA head and neck multiphase complete and it reveals L M1/M2 occlsuion. Decision made to go to IR at 0031.       Hospital Course (synopsis of major diagnoses, care, treatment, and services provided during the course of the hospital stay): Initially admitted 08/05 for ADHF and NSTEMI, course complicated by new L MCA stroke. PEG tube placed in Neuro ICU. WBC increasing to 21.65. KUB negative for any ileus or SBO. Ucx positive for largely pan-sensitive Klebsiella. CTAP wc with non-obstructing calculus in L kidney, perinephric stranding c/f pyelonephritis. Completed course of IV CTX. Hospital course continued to be complicated by non-redirectable agitation, briefly requiring restraints.  Pt pulled out PEG tube, replaced by monk catheter for tube feeds via General Surgery. Responded well to scheduled Depakote and Zyprexa. G-tube successfully replaced 8/28 without complication. Patient off of restraints. Depakote increased for persistent agitation. EEG ordered to evaluate for potential seizures in setting of unclear encephalopathy. Repeat CTH evolving left parieto-occipital, left frontal, and right caudate infarcts.  Petechial hemorrhage along the occipital cortical margin. Patient deemed to benefit from  rehabilitation.      Goals of Care Treatment Preferences:  Code Status: Full Code    Health care agent: Zahida Dave  Health care agent number:           What is most important right now is to focus on extending life as long as possible, even it it means sacrificing quality, curative/life-prolongation (regardless of treatment burdens).  Accordingly, we have decided that the best plan to meet the patient's goals includes continuing with treatment.      Stroke Etiology: Ischemic Cardioembolic Ischemic cardiomyopathy with EF < 28%    STROKE DOCUMENTATION   Acute Stroke Times   Last Known Normal Date: 08/07/23  Last Known Normal Time: 2200  Symptom Onset Date: 08/07/20  Symptom Onset Time: 2200  Stroke Team Called Date: 08/07/20  Stroke Team Called Time: 2304  Stroke Team Arrival Date: 08/07/20  Stroke Team Arrival Time: 2310  Thrombolytic Therapy Recommended: No  CTA Interpretation Time: 2329 (images viewed by Dr Jacome)  Thrombectomy Recommended: Yes  Decision to Treat Time for IR: 0031     NIH Scale:  1a. Level of Consciousness: 3-->Responds only with reflex motor or autonomic effects or totally unresponsive, flaccid, and areflexic  1b. LOC Questions: 2-->Answers neither question correctly  1c. LOC Commands: 2-->Performs neither task correctly  2. Best Gaze: 0-->Normal  3. Visual: 1-->Partial hemianopia  4. Facial Palsy: 1-->Minor paralysis (flattened nasolabial fold, asymmetry on smiling)  5a. Motor Arm, Left: 0-->No drift, limb holds 90 (or 45) degrees for full 10 secs  5b. Motor Arm, Right: 0-->No drift, limb holds 90 (or 45) degrees for full 10 secs  6a. Motor Leg, Left: 0-->No drift, leg holds 30 degree position for full 5 secs  6b. Motor Leg, Right: 0-->No drift, leg holds 30 degree position for full 5 secs  7. Limb Ataxia: 0-->Absent  8. Sensory: 0-->Normal, no sensory loss  9. Best Language: 3-->Mute, global aphasia, no usable speech or auditory comprehension  10. Dysarthria: 2-->Severe  dysarthria, patients speech is so slurred as to be unintelligible in the absence of or out of proportion to any dysphasia, or is mute/anarthric  11. Extinction and Inattention (formerly Neglect): 0-->No abnormality  Total (NIH Stroke Scale): 14      Modified [place] Score: 5      Assessment/Plan:     Diagnostic Results:    Brain Imaging   CT Head 8/10/23  Impression:     No detrimental change compared to prior.     Evolving acute infarcts within the left MCA and PCA territories.  No hemorrhagic conversion.  No significant mass effect     Suspected small subacute infarction within the right caudate.     Multiple areas remote infarctions as detailed above.        CT Head 8/8/23 1704  Impression:     1. Redemonstration of acute infarctions in the left MCA and PCA territories.  No evidence to suggest hemorrhagic transformation.  2. Suspected small subacute infarction in the right caudate.  3. Numerous remote infarctions as detailed above.     CT Head 8/8/23 2029  Impression:     Moderate developing hypoattenuation left inferior frontal lobe and left occipital lobe concerning for developing recent infarcts post thrombectomy.     Allowing for scattered hyperdensity related to recently contrast administration for thrombectomy there is no definite acute intracranial hemorrhage.     Previous hypodensity right caudate no longer seen which may be related to contrast administration for thrombectomy and possible subacute age infarction.     Superimposed remote infarcts with moderate to large encephalomalacia right MCA territory unchanged     Vessel Imaging   IR Angio 8/8/23  Preliminary Interpretation:   1.    No evidence of left ICA or MCA stenosis. No large or medium vessel occlusion.     CTA Multiphase 8/7/23  Impression:     CT head:     New right caudate nucleus hypodensity, likely representing age-indeterminate infarct.  Could be further evaluated with MRI.     Multifocal encephalomalacia involving the right frontal,  temporoparietal, and left occipital lobes.     CTA head and neck:     Acute occlusion of the superior left M2 segment.     Left PCA occlusion of undetermined age.     Filling defect in the right main pulmonary artery, concerning for acute pulmonary thromboembolism.  Consider follow-up pulmonary CTA to more fully assess the extent of thromboemboli, the clot burden, and the presence or absence of right heart strain.     Not fully detailed above:     - Incomplete opacification of the left atrial appendage, suspicious for an intraluminal thrombus.  Follow-up echocardiogram, or cardiac MRI or CTA, recommended.     - The presence of acute thrombi or thromboemboli in both the systemic arterial and pulmonary arterial circulation raises concern for the possibility of underlying intracardiac or extracardiac right-to-left shunt, and/or a hypercoagulable state.  Correlate clinically.     - Incompletely visualized, but possibly large, pericardial effusion.  Artifacts compromise accurate assessment of its attenuation.     Cardiac Imaging   TTE with bubble 8/8/23    Left Ventricle: The left ventricle is moderately dilated. Increased ventricular mass. Normal wall thickness. There is moderate eccentric hypertrophy. Severe global hypokinesis present. Septal motion is consistent with pacing. There is severely reduced systolic function with a visually estimated ejection fraction of 15 - 20%. Ejection fraction by visual approximation is 20%. Unable to assess diastolic function due to arrhythmia.    Left Atrium: Left atrium is severely dilated. Radha 85mL/m2.    Right Ventricle: Mild right ventricular enlargement. Wall thickness is normal. Right ventricle wall motion  is normal. Systolic function is severely reduced. Pacemaker lead present in the ventricle.    Right Atrium: Right atrium is severely dilated.    Aortic Valve: There is mild aortic valve sclerosis. There is normal leaflet mobility. There is no significant regurgitation.     Mitral Valve: There is bileaflet sclerosis. There is normal leaflet mobility. There is mild regurgitation.    Tricuspid Valve: The tricuspid valve is structurally normal. There is normal leaflet mobility. There is mild regurgitation.    Pulmonic Valve: The pulmonic valve is structurally normal. There is normal leaflet mobility. There is no significant regurgitation.    Aorta: Aortic root is normal in size measuring 3.13 cm. Ascending aorta is normal measuring 3.24 cm.    IVC/SVC: Normal venous pressure at 3 mmHg.    Pericardium: The pericardium is normal. There is no pericardial effusion.    Interventions: None    Complications: None    Disposition:     Final Active Diagnoses:    Diagnosis Date Noted POA    PRINCIPAL PROBLEM:  Embolic stroke involving left middle cerebral artery [I63.412] 08/08/2023 No    Encephalopathy [G93.40] 09/03/2023 Unknown    Agitation [R45.1] 08/22/2023 No    Malnutrition [E46] 08/22/2023 Yes    Fever [R50.9] 08/21/2023 Unknown    Complicated UTI (urinary tract infection) [N39.0] 08/21/2023 No    Constipation [K59.00] 08/20/2023 Yes    Sepsis [A41.9] 08/19/2023 No    Hypernatremia [E87.0] 08/19/2023 No    Acute renal failure superimposed on stage 3b chronic kidney disease [N17.9, N18.32] 08/10/2023 Yes    Dysphagia [R13.10] 08/10/2023 Yes    Acute pulmonary embolism without acute cor pulmonale [I26.99] 08/09/2023 Yes    Hemiparesis, right [G81.91] 08/08/2023 No    Global aphasia [R47.01] 08/08/2023 Yes    Ischemic cardiomyopathy [I25.5] 05/05/2023 Yes    Stage 3 chronic kidney disease [N18.30] 04/19/2023 Yes    NSTEMI (non-ST elevated myocardial infarction) [I21.4] 04/16/2023 Yes    Acute on chronic combined systolic and diastolic heart failure [I50.43] 09/23/2022 Yes    CAD (coronary artery disease) [I25.10] 09/23/2022 Yes    Dyslipidemia [E78.5] 09/23/2022 Yes    Paroxysmal A-fib [I48.0] 09/23/2022 Yes    ICD (implantable cardioverter-defibrillator) in place [Z95.810] 09/23/2022 Yes     Primary hypertension [I10] 09/23/2022 Yes    Multiple subsegmental pulmonary emboli without acute cor pulmonale [I26.94] 12/10/2021 Yes      Problems Resolved During this Admission:    Diagnosis Date Noted Date Resolved POA    Leukocytosis [D72.829] 08/19/2023 08/21/2023 Unknown    Type 2 diabetes mellitus with circulatory disorder, without long-term current use of insulin [E11.59] 09/23/2022 08/05/2023 Yes     No new Assessment & Plan notes have been filed under this hospital service since the last note was generated.  Service: Vascular Neurology      Recommendations:     Post-discharge complication risks: None    Stroke Education given to: family    Follow-up in Stroke Clinic in 4-6 weeks.     Discharge Plan:  Statin: Atorvastatin 80mg  Anticoagulant: Apixaban 5mg  Aggresive risk factor modification:  Hypertension  High Cholesterol  Atrial Fibrillation  diabetes    Follow Up: With Vascular Neurology and with Cardiologist      Patient Instructions:      Ambulatory referral/consult to [facility]   Standing Status: Future   Referral Priority: Routine Referral Type: Consultation   Referral Reason: Specialty Services Required   Number of Visits Requested: 1     Ambulatory referral/consult to [facility]   Standing Status: Future   Referral Priority: Routine Referral Type: Consultation   Referral Reason: Specialty Services Required   Number of Visits Requested: 1     Ambulatory referral/consult to Outpatient Case Management   Referral Priority: Routine Referral Type: Consultation   Referral Reason: Specialty Services Required   Number of Visits Requested: 1     Ambulatory referral/consult to Vascular Neurology   Standing Status: Future   Referral Priority: Routine Referral Type: Consultation   Referral Reason: Specialty Services Required   Requested Specialty: Vascular Neurology   Number of Visits Requested: 1     Ambulatory referral/consult to Cardiology   Standing Status: Future    Referral Priority: Routine Referral Type: Consultation   Referral Reason: Specialty Services Required   Requested Specialty: Cardiology   Number of Visits Requested: 1     Ambulatory Request for Ready Responder Services   Standing Status: Future Standing Exp. Date: 08/07/24     Order Specific Question Answer Comments   Program referred to: COVID-19 Vaccine        Medications:  Reconciled Home Medications:      Medication List        START taking these medications      aspirin 81 MG Chew  1 tablet (81 mg total) by Per G Tube route once daily.     metoprolol tartrate 25 MG tablet  Commonly known as: LOPRESSOR  1 tablet (25 mg total) by Per G Tube route 2 (two) times daily.     QUEtiapine 25 MG Tab  Commonly known as: SEROQUEL  1 tablet (25 mg total) by Per G Tube route every evening.     * valproic acid (as sodium salt) 250 mg/5 mL (5 mL) Soln  Commonly known as: DEPAKENE  10 mLs (500 mg total) by Per G Tube route once daily. To be administers at 1200 daily     * valproic acid (as sodium salt) 250 mg/5 mL (5 mL) Soln  Commonly known as: DEPAKENE  10 mLs (500 mg total) by Per G Tube route once daily. To be administered 0900 daily     * valproic acid (as sodium salt) 250 mg/5 mL (5 mL) Soln  Commonly known as: DEPAKENE  20 mLs (1,000 mg total) by Per G Tube route every evening.           * This list has 3 medication(s) that are the same as other medications prescribed for you. Read the directions carefully, and ask your doctor or other care provider to review them with you.                CHANGE how you take these medications      amiodarone 200 MG Tab  Commonly known as: PACERONE  1 tablet (200 mg total) by Per G Tube route once daily.  What changed:   how to take this  when to take this     apixaban 5 mg Tab  Commonly known as: ELIQUIS  1 tablet (5 mg total) by Per G Tube route 2 (two) times daily.  What changed: how to take this     atorvastatin 80 MG tablet  Commonly known as: LIPITOR  1 tablet (80 mg total) by  Per G Tube route once daily.  What changed: how to take this     empagliflozin 10 mg tablet  Commonly known as: JARDIANCE  1 tablet (10 mg total) by Per G Tube route once daily.  What changed: how to take this     ezetimibe 10 mg tablet  Commonly known as: ZETIA  1 tablet (10 mg total) by Per G Tube route every evening.  What changed:   how to take this  when to take this            STOP taking these medications      clopidogreL 75 mg tablet  Commonly known as: PLAVIX     ENTRESTO 24-26 mg per tablet  Generic drug: sacubitriL-valsartan     ferrous sulfate 325 mg (65 mg iron) Tab tablet  Commonly known as: FEOSOL     LIDOcaine 5 %  Commonly known as: LIDODERM     metoprolol succinate 25 MG 24 hr tablet  Commonly known as: TOPROL-XL     oxyCODONE-acetaminophen 5-325 mg per tablet  Commonly known as: PERCOCET     torsemide 20 MG Tab  Commonly known as: DEMADEX            ASK your doctor about these medications      REPATHA SURECLICK 140 mg/mL Pnij  Generic drug: evolocumab  Inject 1 mL (140 mg total) into the skin every 14 (fourteen) days.  Ask about: Should I take this medication?              Clovis Rust MD  Comprehensive Stroke Center  Department of Vascular Neurology   [facility] Neurosurgery [facility])
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
## 2023-09-07 NOTE — ASSESSMENT & PLAN NOTE
Stable  Creatinine baseline 1.3-1.5  Cr stable/at baseline    - caution with fluids if pre-renal. EF 15%  - CMP Q48h

## 2023-09-07 NOTE — PLAN OF CARE
Problem: Adult Inpatient Plan of Care  Goal: Plan of Care Review  Outcome: Ongoing, Progressing  Goal: Patient-Specific Goal (Individualized)  Outcome: Ongoing, Progressing  Goal: Absence of Hospital-Acquired Illness or Injury  Outcome: Ongoing, Progressing  Goal: Optimal Comfort and Wellbeing  Outcome: Ongoing, Progressing  Goal: Readiness for Transition of Care  Outcome: Ongoing, Progressing     Problem: Diabetes Comorbidity  Goal: Blood Glucose Level Within Targeted Range  Outcome: Ongoing, Progressing     Problem: Infection  Goal: Absence of Infection Signs and Symptoms  Outcome: Ongoing, Progressing     Problem: Adjustment to Illness (Stroke, Ischemic/Transient Ischemic Attack)  Goal: Optimal Coping  Outcome: Ongoing, Progressing     Problem: Cerebral Tissue Perfusion (Stroke, Ischemic/Transient Ischemic Attack)  Goal: Optimal Cerebral Tissue Perfusion  Outcome: Ongoing, Progressing     Problem: Cognitive Impairment (Stroke, Ischemic/Transient Ischemic Attack)  Goal: Optimal Cognitive Function  Outcome: Ongoing, Progressing     Problem: Communication Impairment (Stroke, Ischemic/Transient Ischemic Attack)  Goal: Improved Communication Skills  Outcome: Ongoing, Progressing     Problem: Functional Ability Impaired (Stroke, Ischemic/Transient Ischemic Attack)  Goal: Optimal Functional Ability  Outcome: Ongoing, Progressing     Problem: Respiratory Compromise (Stroke, Ischemic/Transient Ischemic Attack)  Goal: Effective Oxygenation and Ventilation  Outcome: Ongoing, Progressing     Problem: Swallowing Impairment (Stroke, Ischemic/Transient Ischemic Attack)  Goal: Optimal Eating and Swallowing without Aspiration  Outcome: Ongoing, Progressing     Problem: Fall Injury Risk  Goal: Absence of Fall and Fall-Related Injury  Outcome: Ongoing, Progressing     Problem: Skin Injury Risk Increased  Goal: Skin Health and Integrity  Outcome: Ongoing, Progressing     Problem: Fluid and Electrolyte Imbalance (Acute Kidney  Injury/Impairment)  Goal: Fluid and Electrolyte Balance  Outcome: Ongoing, Progressing     Problem: Oral Intake Inadequate (Acute Kidney Injury/Impairment)  Goal: Optimal Nutrition Intake  Outcome: Ongoing, Progressing     Problem: Renal Function Impairment (Acute Kidney Injury/Impairment)  Goal: Effective Renal Function  Outcome: Ongoing, Progressing

## 2023-09-07 NOTE — ASSESSMENT & PLAN NOTE
Stroke risk factor  Cardiology was consulted by [facility]  CXR (8/19) with congestion of the pulmonary vasculature similar to previous CXR on 8/11, no increased fluid burden    No UOP documented, unable to record, however nurse confirms patient is urinating daily  Sating well on RA   In no acute respiratory distress    -Strict I's/O's - Condom cath  -Currently on 1.5 L fluid restriction  -will re-initiate GDMT as tolerated

## 2023-09-07 NOTE — PROGRESS NOTES
"CONSULTATION LIAISON PSYCHIATRY PROGRESS NOTE    Patient Name: Tera Griffiths  MRN: [id number]  Patient Class: IP- Inpatient  Admission Date: 8/5/2023  Attending Physician: Mansoor Velez MD      SUBJECTIVE:   Tera Griffiths is a 58 y.o. male with no known past psychiatric history of and pertinent past   medical history of CHF, CAD, AF, HTN, and CKD who was admitted 8/5 for CHF exacerbation. Hospital course c/b L MCA embolic stroke.      Psychiatry consulted for "currently in UE restraints due to pulling out lines/PEG. consult to psych to seek help for nonredirectable agitation. trying to avoid physical restraints"     Interval events: Patient received PRN Zyprexa 5 mg @ 0048 and 1642 yesterday, and 0234 this AM.      Today, prior to interview patient engaging w/ physical therapy. Patient unable to respond to verbal commands or participate in meaningful conversation.         OBJECTIVE:     Mental Status Exam:  General Appearance: appears stated age, dressed in hospital garb  Behavior:  agitated, unable to respond to commands  Involuntary Movements and Motor Activity: no abnormal involuntary movements noted; no tics, no tremors, no akathisia, no dystonia, no evidence of tardive dyskinesia; no psychomotor agitation or retardation  Gait and Station: unable to assess - patient lying down or seated  Speech and Language:  unintelligible, communicating with moans and groans  Mood: unable to assess  Affect:  unable to assess  Thought Process and Associations: Unable to Assess  Thought Content and Perceptions:: Unable to Assess  Sensorium and Orientation: Unable to Formally Assess  Recent and Remote Memory: Unable to  Formally Assess  Attention and Concentration: Unable to Formally Assess  Fund of Knowledge: Unable to Formally Assess  Insight:  unable to assess  Judgment:  unable to assess     CAM ICU positive? Unable to assess        ASSESSMENT & RECOMMENDATIONS   Delirium 2/2 underlying medical etiologies   "   Continue depakote to 500 qam, 500 at noon, and 1000 mg QHS   Last VPA level 50.9 on 9/03/23  Continue Seroquel 25 mg QHS per G tube  Continue Zyprexa 5 mg PRN for breakthrough, non-redirectable agitation  Most recent EKG from 9/5 with Qtc of 530. Should be noted that reliability can be affected by Afib. Continue to monitor Qtc with goal of <500.        DELIRIUM  DELIRIUM BEHAVIOR PRECAUTIONS  As a reminder, changes in mentation & attention with either disorganized thinking, easy distractibility, and fluctuating level of consciousness are commonly observed with delirium.  Please utilize chemical restraints with PRN meds for agitation 1st. If needed for immediate safety reasons, can additionally utilize physical restraints. However, please avoid use of physical restraints, or have periods of patient being out of physical restraints if possible (e.g. while sleeping) as excessive use can promote rebound worsening of delirium/agitation.  Keep window shades open and room lit during day and room dim at night in order to promote normal sleep-wake cycles.  Encourage family at bedside. [place] patient often to situation, location, date.  Recommend to discourage Narcotics, Benzos and Anti-cholinergic medication use as they commonly promote and worsen delirium. If aforementioned medications need to be resumed for optimal patient outcomes after careful consideration, please exercise judicious use with clear documentation.  Please continue medical workup for causative etiology of delirium.      RISK ASSESSMENT  NO NEED FOR PEC, UNCONTESTED     FOLLOW UP  Will follow up while in house     DISPOSITION - once medically cleared:  Defer to medical team    Please contact ON CALL psychiatry service (24/7) for any acute issues that may arise.    Dr. Lj Wilkins  Psychiatry, PGY-II   Psychiatry  [facility]  9/7/2023 9:01  AM        --------------------------------------------------------------------------------------------------------------------------------------------------------------------------------------------------------------------------------------    CONTINUED OBJECTIVE clinical data & findings reviewed and noted for above decision making    Current Medications:   Scheduled Meds:    amiodarone  200 mg Per G Tube Daily    apixaban  5 mg Per G Tube BID    aspirin  81 mg Per G Tube Daily    atorvastatin  80 mg Per G Tube Daily    ezetimibe  10 mg Per G Tube QHS    ferrous sulfate  1 tablet Per G Tube Daily    metoprolol tartrate  25 mg Per G Tube BID    polyethylene glycol  17 g Per G Tube Daily    QUEtiapine  25 mg Per G Tube QHS    senna-docusate 8.6-50 mg  1 tablet Per G Tube BID    valproic acid (as sodium salt)  1,000 mg Per G Tube QHS    valproic acid (as sodium salt)  500 mg Per G Tube Daily    valproic acid (as sodium salt)  500 mg Per G Tube Q24H     PRN Meds: acetaminophen, albuterol-ipratropium, bisacodyL, chlorproMAZINE, dextrose 10%, dextrose 10%, diatrizoate meglumineand-diatrizoate sodium, glucagon (human recombinant), hydrALAZINE, labetalol, nitroGLYCERIN, OLANZapine, ondansetron, sodium chloride 0.9%, sodium chloride 0.9%, sodium chloride 0.9%    Allergies:   Review of patient's allergies indicates:  No Known Allergies    Vitals  Vitals:    09/07/23 0838   BP: (!) 120/90   Pulse: 79   Resp:    Temp:        Labs/Imaging/Studies:  Recent Results (from the past 24 hour(s))   POCT glucose    Collection Time: 09/06/23  3:16 PM   Result Value Ref Range    POCT Glucose 115 (H) 70 - 110 mg/dL   POCT glucose    Collection Time: 09/07/23 12:21 AM   Result Value Ref Range    POCT Glucose 114 (H) 70 - 110 mg/dL   POCT glucose    Collection Time: 09/07/23  5:34 AM   Result Value Ref Range    POCT Glucose 118 (H) 70 - 110 mg/dL     Imaging Results              X-Ray Chest AP Portable (Final result)  Result time 08/05/23  14:46:03      Final result by Leandro Ruffin MD (08/05/23 14:46:03)                   Impression:      1. Central hilar findings suggest congestive change/edema.  No large focal consolidation.      Electronically signed by: Leandro Ruffin MD  Date:    08/05/2023  Time:    14:46               Narrative:    EXAMINATION:  XR CHEST AP PORTABLE    CLINICAL HISTORY:  Chest Pain;    TECHNIQUE:  Single frontal view of the chest was performed.    COMPARISON:  07/13/2023    FINDINGS:  The cardiomediastinal silhouette is prominent, similar to the previous exam noting left chest wall pacer..  There is no pleural effusion.  The trachea is midline.  The lungs are symmetrically expanded bilaterally with coarse central hilar interstitial attenuation.  No large focal consolidation seen.  There is no pneumothorax.  The osseous structures are remarkable for degenerative change..

## 2023-09-07 NOTE — PLAN OF CARE
[facility]    FACILITY TRANSFER ORDERS      Patient Name: Tera Griffiths  YOB: 1965    PCP: Carleen Bueno MD   PCP Address: [address] [place] [address]  PCP Phone Number: [phone]  PCP Fax: [phone]    Encounter Date: 09/07/2023    Admit to: Rehab    Vital Signs:  Routine    Diagnoses:   Active Hospital Problems    Diagnosis  POA    *Embolic stroke involving left middle cerebral artery [I63.412]  No    Encephalopathy [G93.40]  Unknown    Agitation [R45.1]  No    Malnutrition [E46]  Yes    Fever [R50.9]  Unknown    Complicated UTI (urinary tract infection) [N39.0]  No    Constipation [K59.00]  Yes    Sepsis [A41.9]  No    Hypernatremia [E87.0]  No    Acute renal failure superimposed on stage 3b chronic kidney disease [N17.9, N18.32]  Yes    Dysphagia [R13.10]  Yes    Acute pulmonary embolism without acute cor pulmonale [I26.99]  Yes    Hemiparesis, right [G81.91]  No    Global aphasia [R47.01]  Yes    Ischemic cardiomyopathy [I25.5]  Yes    Stage 3 chronic kidney disease [N18.30]  Yes    NSTEMI (non-ST elevated myocardial infarction) [I21.4]  Yes    Acute on chronic combined systolic and diastolic heart failure [I50.43]  Yes    CAD (coronary artery disease) [I25.10]  Yes    Dyslipidemia [E78.5]  Yes    Paroxysmal A-fib [I48.0]  Yes    ICD (implantable cardioverter-defibrillator) in place [Z95.810]  Yes    Primary hypertension [I10]  Yes    Multiple subsegmental pulmonary emboli without acute cor pulmonale [I26.94]  Yes      Resolved Hospital Problems    Diagnosis Date Resolved POA    Leukocytosis [D72.829] 08/21/2023 Unknown    Type 2 diabetes mellitus with circulatory disorder, without long-term current use of insulin [E11.59] 08/05/2023 Yes       Allergies:Review of patient's allergies indicates:  No Known Allergies    Diet: Tube feed: EN recs:   If doing Continuous: Isosource 1.5 @ goal rate of 55 mL/hr- provides 1980 kcals, 90 g pro, and 1008 mL fluid.  IF doing  bolus: Isosource 1.5 (5 cartons/day)- provides 1875 kcals, 85 g pro, and 955 mL fluid.     Activities: Activity as tolerated    Goals of Care Treatment Preferences:  Code Status: Full Code    Health care agent: Zahida Dave  [facility] care agent number:           What is most important right now is to focus on extending life as long as possible, even it it means sacrificing quality, curative/life-prolongation (regardless of treatment burdens).  Accordingly, we have decided that the best plan to meet the patient's goals includes continuing with treatment.      Nursing: As determined by facility     Labs: As determined by facility  Patient has had persistently prolonged Qtc >500. Monitor with EKGs    CONSULTS:    Physical Therapy to evaluate and treat. , Occupational Therapy to evaluate and treat., and Speech Therapy to evaluate and treat for Language and Swallowing.    MISCELLANEOUS CARE:  PEG Care: Clean site every 24 hours.  and Diabetes Care:   Fingerstick blood sugar AC and HS and Report CBG < 60 or > 350 to physician.      Medications: Review discharge medications with patient and family and provide education.      Current Discharge Medication List        START taking these medications    Details   aspirin 81 MG Chew 1 tablet (81 mg total) by Per G Tube route once daily.  Refills: 0      metoprolol tartrate (LOPRESSOR) 25 MG tablet 1 tablet (25 mg total) by Per G Tube route 2 (two) times daily.  Qty: 60 tablet, Refills: 11    Comments: .      QUEtiapine (SEROQUEL) 25 MG Tab 1 tablet (25 mg total) by Per G Tube route every evening.  Qty: 30 tablet, Refills: 11      !! valproic acid, as sodium salt, (DEPAKENE) 250 mg/5 mL (5 mL) Soln 10 mLs (500 mg total) by Per G Tube route once daily. To be administered 0900 daily  Qty: 300 mL, Refills: 11      !! valproic acid, as sodium salt, (DEPAKENE) 250 mg/5 mL (5 mL) Soln 10 mLs (500 mg total) by Per G Tube route once daily. To be administers at 1200  daily  Qty: 300 mL, Refills: 11      !! valproic acid, as sodium salt, (DEPAKENE) 250 mg/5 mL (5 mL) Soln 20 mLs (1,000 mg total) by Per G Tube route every evening.  Qty: 300 mL, Refills: 11       !! - Potential duplicate medications found. Please discuss with provider.        CONTINUE these medications which have CHANGED    Details   amiodarone (PACERONE) 200 MG Tab 1 tablet (200 mg total) by Per G Tube route once daily.  Qty: 30 tablet, Refills: 11      apixaban (ELIQUIS) 5 mg Tab 1 tablet (5 mg total) by Per G Tube route 2 (two) times daily.  Qty: 60 tablet, Refills: 11      atorvastatin (LIPITOR) 80 MG tablet 1 tablet (80 mg total) by Per G Tube route once daily.  Qty: 90 tablet, Refills: 3      empagliflozin (JARDIANCE) 10 mg tablet 1 tablet (10 mg total) by Per G Tube route once daily.  Qty: 30 tablet, Refills: 1    Associated Diagnoses: Chronic combined systolic and diastolic heart failure; Ischemic cardiomyopathy      ezetimibe (ZETIA) 10 mg tablet 1 tablet (10 mg total) by Per G Tube route every evening.  Qty: 90 tablet, Refills: 3           STOP taking these medications       evolocumab (REPATHA SURECLICK) 140 mg/mL PnIj Comments:   Reason for Stopping:         clopidogreL (PLAVIX) 75 mg tablet Comments:   Reason for Stopping:         ferrous sulfate (FEOSOL) 325 mg (65 mg iron) Tab tablet Comments:   Reason for Stopping:         LIDOcaine (LIDODERM) 5 % Comments:   Reason for Stopping:         metoprolol succinate (TOPROL-XL) 25 MG 24 hr tablet Comments:   Reason for Stopping:         oxyCODONE-acetaminophen (PERCOCET) 5-325 mg per tablet Comments:   Reason for Stopping:         sacubitriL-valsartan (ENTRESTO) 24-26 mg per tablet Comments:   Reason for Stopping:         torsemide (DEMADEX) 20 MG Tab Comments:   Reason for Stopping:                  Immunizations Administered as of 9/7/2023       Name Date Dose VIS Date Route Exp Date    COVID-19, MRNA, LN-S, PF (Pfizer) (Purple Cap) 1/26/2022 -- -- -- --     COVID-19, MRNA, LN-S, PF (Pfizer) (Purple Cap) 1/6/2022 0.3 mL -- Intramuscular --    Site: Left arm     : [employer]     Lot: [id number]             Some patients may experience side effects after vaccination.  These may include fever, headache, muscle or joint aches.  Most symptoms resolve with 24-48 hours and do not require urgent medical evaluation unless they persist for more than 72 hours or symptoms are concerning for an unrelated medical condition.          _________________________________  Clovis Rust MD  09/07/2023

## 2023-09-07 NOTE — NURSING
Patient to be discharged to [facility] this evening. Rn attempted to call report but was unsuccessful, number was given for them to call back for report. Patient VSS today. He is still receiving meds and feeds through his PEG. Patient voiding and had several incontinent Bms today. RN will continue to monitor patient until transport arrives.

## 2023-09-08 ENCOUNTER — PATIENT OUTREACH (OUTPATIENT)
Dept: ADMINISTRATIVE | Facility: CLINIC | Age: 58
End: 2023-09-08
Payer: MEDICAID

## 2023-09-08 LAB — VIT B1 BLD-MCNC: 112 UG/L (ref 38–122)

## 2023-09-13 NOTE — PROGRESS NOTES
C3 nurse 2nd attempt to contact Tera Griffiths for a TCC post hospital discharge follow up call. No answer. The patient does not have a scheduled HOSFU appointment, and the pt does not have an [facility] PCP.

## 2023-09-24 ENCOUNTER — HOSPITAL ENCOUNTER (OUTPATIENT)
Facility: HOSPITAL | Age: 58
Discharge: HOME HEALTH | End: 2023-09-28
Attending: EMERGENCY MEDICINE | Admitting: EMERGENCY MEDICINE
Payer: MEDICAID

## 2023-09-24 DIAGNOSIS — Z76.89 ENCOUNTER FOR SOCIAL WORK INTERVENTION: ICD-10-CM

## 2023-09-24 DIAGNOSIS — I48.91 ATRIAL FIBRILLATION WITH RVR: ICD-10-CM

## 2023-09-24 DIAGNOSIS — Z91.89 AT RISK FOR LONG QT SYNDROME: ICD-10-CM

## 2023-09-24 DIAGNOSIS — R07.9 CHEST PAIN: ICD-10-CM

## 2023-09-24 DIAGNOSIS — Z51.5 PALLIATIVE CARE ENCOUNTER: ICD-10-CM

## 2023-09-24 DIAGNOSIS — N30.90 CYSTITIS: Primary | ICD-10-CM

## 2023-09-24 LAB
ALBUMIN SERPL BCP-MCNC: 3.6 G/DL (ref 3.5–5.2)
ALP SERPL-CCNC: 88 U/L (ref 55–135)
ALT SERPL W/O P-5'-P-CCNC: 32 U/L (ref 10–44)
ANION GAP SERPL CALC-SCNC: 11 MMOL/L (ref 8–16)
AST SERPL-CCNC: 35 U/L (ref 10–40)
BACTERIA #/AREA URNS HPF: ABNORMAL /HPF
BASOPHILS # BLD AUTO: 0.04 K/UL (ref 0–0.2)
BASOPHILS NFR BLD: 0.8 % (ref 0–1.9)
BILIRUB SERPL-MCNC: 0.6 MG/DL (ref 0.1–1)
BILIRUB UR QL STRIP: NEGATIVE
BUN SERPL-MCNC: 28 MG/DL (ref 6–20)
CALCIUM SERPL-MCNC: 9.5 MG/DL (ref 8.7–10.5)
CHLORIDE SERPL-SCNC: 107 MMOL/L (ref 95–110)
CLARITY UR: CLEAR
CO2 SERPL-SCNC: 24 MMOL/L (ref 23–29)
COLOR UR: YELLOW
CREAT SERPL-MCNC: 1.8 MG/DL (ref 0.5–1.4)
DIFFERENTIAL METHOD: ABNORMAL
EOSINOPHIL # BLD AUTO: 0.2 K/UL (ref 0–0.5)
EOSINOPHIL NFR BLD: 3.3 % (ref 0–8)
ERYTHROCYTE [DISTWIDTH] IN BLOOD BY AUTOMATED COUNT: 17.3 % (ref 11.5–14.5)
EST. GFR  (NO RACE VARIABLE): 44 ML/MIN/1.73 M^2
GLUCOSE SERPL-MCNC: 106 MG/DL (ref 70–110)
GLUCOSE UR QL STRIP: NEGATIVE
HCT VFR BLD AUTO: 34.5 % (ref 40–54)
HGB BLD-MCNC: 10.4 G/DL (ref 14–18)
HGB UR QL STRIP: NEGATIVE
IMM GRANULOCYTES # BLD AUTO: 0.01 K/UL (ref 0–0.04)
IMM GRANULOCYTES NFR BLD AUTO: 0.2 % (ref 0–0.5)
KETONES UR QL STRIP: NEGATIVE
LEUKOCYTE ESTERASE UR QL STRIP: ABNORMAL
LYMPHOCYTES # BLD AUTO: 1.6 K/UL (ref 1–4.8)
LYMPHOCYTES NFR BLD: 31.3 % (ref 18–48)
MCH RBC QN AUTO: 27.4 PG (ref 27–31)
MCHC RBC AUTO-ENTMCNC: 30.1 G/DL (ref 32–36)
MCV RBC AUTO: 91 FL (ref 82–98)
MICROSCOPIC COMMENT: ABNORMAL
MONOCYTES # BLD AUTO: 0.9 K/UL (ref 0.3–1)
MONOCYTES NFR BLD: 18.1 % (ref 4–15)
NEUTROPHILS # BLD AUTO: 2.4 K/UL (ref 1.8–7.7)
NEUTROPHILS NFR BLD: 46.3 % (ref 38–73)
NITRITE UR QL STRIP: NEGATIVE
NRBC BLD-RTO: 0 /100 WBC
PH UR STRIP: 7 [PH] (ref 5–8)
PLATELET # BLD AUTO: 294 K/UL (ref 150–450)
PMV BLD AUTO: 12 FL (ref 9.2–12.9)
POTASSIUM SERPL-SCNC: 4.7 MMOL/L (ref 3.5–5.1)
PROT SERPL-MCNC: 7.6 G/DL (ref 6–8.4)
PROT UR QL STRIP: ABNORMAL
RBC # BLD AUTO: 3.79 M/UL (ref 4.6–6.2)
RBC #/AREA URNS HPF: 11 /HPF (ref 0–4)
SODIUM SERPL-SCNC: 142 MMOL/L (ref 136–145)
SP GR UR STRIP: 1.02 (ref 1–1.03)
SQUAMOUS #/AREA URNS HPF: 1 /HPF
URN SPEC COLLECT METH UR: ABNORMAL
UROBILINOGEN UR STRIP-ACNC: ABNORMAL EU/DL
WBC # BLD AUTO: 5.15 K/UL (ref 3.9–12.7)
WBC #/AREA URNS HPF: 13 /HPF (ref 0–5)

## 2023-09-24 PROCEDURE — 87086 URINE CULTURE/COLONY COUNT: CPT | Performed by: EMERGENCY MEDICINE

## 2023-09-24 PROCEDURE — 25500020 PHARM REV CODE 255: Performed by: EMERGENCY MEDICINE

## 2023-09-24 PROCEDURE — 87186 SC STD MICRODIL/AGAR DIL: CPT | Mod: 59 | Performed by: EMERGENCY MEDICINE

## 2023-09-24 PROCEDURE — 81000 URINALYSIS NONAUTO W/SCOPE: CPT | Performed by: EMERGENCY MEDICINE

## 2023-09-24 PROCEDURE — 25000003 PHARM REV CODE 250: Performed by: EMERGENCY MEDICINE

## 2023-09-24 PROCEDURE — 87088 URINE BACTERIA CULTURE: CPT | Performed by: EMERGENCY MEDICINE

## 2023-09-24 PROCEDURE — 80053 COMPREHEN METABOLIC PANEL: CPT | Performed by: EMERGENCY MEDICINE

## 2023-09-24 PROCEDURE — 96361 HYDRATE IV INFUSION ADD-ON: CPT

## 2023-09-24 PROCEDURE — 87077 CULTURE AEROBIC IDENTIFY: CPT | Performed by: EMERGENCY MEDICINE

## 2023-09-24 PROCEDURE — 85025 COMPLETE CBC W/AUTO DIFF WBC: CPT | Performed by: EMERGENCY MEDICINE

## 2023-09-24 PROCEDURE — 82962 GLUCOSE BLOOD TEST: CPT

## 2023-09-24 PROCEDURE — 99285 EMERGENCY DEPT VISIT HI MDM: CPT | Mod: 25

## 2023-09-24 RX ORDER — VALPROIC ACID 250 MG/5ML
1000 SOLUTION ORAL NIGHTLY
Status: DISCONTINUED | OUTPATIENT
Start: 2023-09-24 | End: 2023-09-28 | Stop reason: HOSPADM

## 2023-09-24 RX ORDER — METOPROLOL TARTRATE 25 MG/1
25 TABLET, FILM COATED ORAL 2 TIMES DAILY
Status: DISCONTINUED | OUTPATIENT
Start: 2023-09-24 | End: 2023-09-27

## 2023-09-24 RX ORDER — GLUCAGON 1 MG
1 KIT INJECTION
Status: DISCONTINUED | OUTPATIENT
Start: 2023-09-24 | End: 2023-09-25

## 2023-09-24 RX ORDER — SODIUM CHLORIDE 9 MG/ML
1000 INJECTION, SOLUTION INTRAVENOUS
Status: COMPLETED | OUTPATIENT
Start: 2023-09-24 | End: 2023-09-25

## 2023-09-24 RX ORDER — QUETIAPINE FUMARATE 25 MG/1
25 TABLET, FILM COATED ORAL NIGHTLY
Status: DISCONTINUED | OUTPATIENT
Start: 2023-09-24 | End: 2023-09-28 | Stop reason: HOSPADM

## 2023-09-24 RX ORDER — ATORVASTATIN CALCIUM 40 MG/1
80 TABLET, FILM COATED ORAL DAILY
Status: DISCONTINUED | OUTPATIENT
Start: 2023-09-25 | End: 2023-09-28 | Stop reason: HOSPADM

## 2023-09-24 RX ORDER — INSULIN ASPART 100 [IU]/ML
0-5 INJECTION, SOLUTION INTRAVENOUS; SUBCUTANEOUS
Status: DISCONTINUED | OUTPATIENT
Start: 2023-09-24 | End: 2023-09-28 | Stop reason: HOSPADM

## 2023-09-24 RX ORDER — NAPROXEN SODIUM 220 MG/1
81 TABLET, FILM COATED ORAL DAILY
Status: DISCONTINUED | OUTPATIENT
Start: 2023-09-25 | End: 2023-09-28 | Stop reason: HOSPADM

## 2023-09-24 RX ORDER — VALPROIC ACID 250 MG/5ML
500 SOLUTION ORAL
Status: DISCONTINUED | OUTPATIENT
Start: 2023-09-25 | End: 2023-09-28 | Stop reason: HOSPADM

## 2023-09-24 RX ORDER — VALPROIC ACID 250 MG/5ML
500 SOLUTION ORAL DAILY
Status: DISCONTINUED | OUTPATIENT
Start: 2023-09-25 | End: 2023-09-28 | Stop reason: HOSPADM

## 2023-09-24 RX ORDER — AMIODARONE HYDROCHLORIDE 200 MG/1
200 TABLET ORAL DAILY
Status: DISCONTINUED | OUTPATIENT
Start: 2023-09-25 | End: 2023-09-28 | Stop reason: HOSPADM

## 2023-09-24 RX ORDER — EZETIMIBE 10 MG/1
10 TABLET ORAL NIGHTLY
Status: DISCONTINUED | OUTPATIENT
Start: 2023-09-24 | End: 2023-09-28 | Stop reason: HOSPADM

## 2023-09-24 RX ORDER — IBUPROFEN 200 MG
24 TABLET ORAL
Status: DISCONTINUED | OUTPATIENT
Start: 2023-09-24 | End: 2023-09-25

## 2023-09-24 RX ORDER — IBUPROFEN 200 MG
16 TABLET ORAL
Status: DISCONTINUED | OUTPATIENT
Start: 2023-09-24 | End: 2023-09-25

## 2023-09-24 RX ADMIN — EZETIMIBE 10 MG: 10 TABLET ORAL at 10:09

## 2023-09-24 RX ADMIN — QUETIAPINE 25 MG: 25 TABLET ORAL at 10:09

## 2023-09-24 RX ADMIN — SODIUM CHLORIDE 1000 ML: 9 INJECTION, SOLUTION INTRAVENOUS at 06:09

## 2023-09-24 RX ADMIN — APIXABAN 5 MG: 5 TABLET, FILM COATED ORAL at 10:09

## 2023-09-24 RX ADMIN — METOPROLOL TARTRATE 25 MG: 25 TABLET, FILM COATED ORAL at 10:09

## 2023-09-24 RX ADMIN — VALPROIC ACID 1000 MG: 250 SOLUTION ORAL at 10:09

## 2023-09-24 RX ADMIN — DIATRIZOATE MEGLUMINE AND DIATRIZOATE SODIUM 60 ML: 660; 100 LIQUID ORAL; RECTAL at 09:09

## 2023-09-24 NOTE — ED TRIAGE NOTES
"Peg Tube Problem (Pt BIB [facility] from home due to a "peg tube problem". Pr EMS, pt's caretaker called EMS reporting redness and swelling around peg tube site. No redness noted in triage. Per EMS, pt had a stroke in August and was left aphasic. Pt unable to respond to questions but is awake and alert.)  "

## 2023-09-24 NOTE — CONSULTS
Case Management Assessment     SW spoke with patient's niece, Beth, for assistance with evauation.  Per patient's niece, [facility].  Patient's ex-wife had been helping to care for him since discharge from rehab but decided that she could not continue to assist.  Patient's niece stated that patient's sister, Claribel, also tried to assist with his care after his discharge from rehab, but she has health problems and can no longer assist.    Patient's niece state that patient has been displaying aggressive behavior; however, she feels that patient's inability to verbalize is contributing the behavior because he is frustrated.  Patient's niece also reported that patient now has an unsteady gait.  He had been able to ambulate independently prior to the stroke.    Patient's niece provided with a list of nursing homes in the [facility] area.  SW explained the process for NH placement and encouraged patient's niece to start as soon as possible because it does take time.  SW advised niece that the family would need access to patient's banking information & social security award letter verifying income.  Patient's niece stated that the patient does not have any income [that she is aware of].  Patient is currently living rent free in an apartment that is being subsidized by [facility] because he was previously homeless.  SW encouraged niece to try to apply for Social Security Disablity on patient's behalf.      Patient has an apartment but it is on the 3rd floor level.  SW advised patient's niece to try to contact the landlord to request a 1st floor apartment.  SW also suggested that the family might also want to reach out to patient's ex-wife to ask her to reconsider helping patient until a NH can be located.

## 2023-09-24 NOTE — ED NOTES
Patient got out of the bed and is able to ambulate UNSAFELY approx 10 feet, reaching for walls for support and using RN to assist w balance. MD aware. Assisted patient back to bed and safety sitter now assigned.

## 2023-09-24 NOTE — ED PROVIDER NOTES
"Encounter Date: 9/24/2023    SCRIBE #1 NOTE: I, Hanane Alonso, am scribing for, and in the presence of,  Selena Monroy MD. I have scribed the following portions of the note - Other sections scribed: HPI, ROS, PE.       History     Chief Complaint   Patient presents with    Peg Tube Problem     [facility] to a "peg tube problem". Pr EMS, pt's caretaker called EMS reporting redness and swelling around peg tube site. No redness noted in triage. Per EMS, pt had a stroke in August and was left aphasic. Pt unable to respond to questions but is awake and alert.     This 58 y.o male, with a medical history of Acute on chronic combined systolic and diastolic heart failure, Atrial fibrillation, Coronary artery disease, Dyslipidemia, Embolic stroke involving left middle cerebral artery, Heart artery stent placement, Hypertension, Paroxysmal A-fib, and Stage 3 chronic kidney disease, presents to the ED via EMS transportation c/o a peg tube problem. Per EMS, pt's caretaker noticed redness and swelling around the peg tube site. They reported the caregiver had an appointment to go to. Of note, pt has a history of a Stroke in August 2023. His peg tube was replaced on 8/28/23. Additionally, pt has a hsitory of ischemic cardiomyopathy with an EF less than 28%. Further pt history is limited secondary as pt is aphasiac due to his previous stroke. Pt history is provided by an independent historian: EMS provider.    The patient's niece comes in after I assessed the patient.  I talked to her via telephone.  She reports the patient was discharged from an inpatient rehab on Wednesday.  He lives on the 3rd floor of an apartment building without an elevator.  The fire department had to come and bring patient up to his apartment.  She states he can walk but is very unsteady on his feet.  She states the patient's ex-wife, Zandra Caputo, was checking in on the patient.  However, he apparently was aggressive towards " "her and she no longer wants "anything to do with him".  The patient's knee says she is not aware of any other family or friends in the area that might be able to care for the patient or take him to their home.  She states the patient does have a son named Tera but she does not know his contact information.    The history is provided by the EMS personnel.     Review of patient's allergies indicates:  No Known Allergies  Past Medical History:   Diagnosis Date    Acute on chronic combined systolic and diastolic heart failure 09/23/2022    Atrial fibrillation     CAD (coronary artery disease) 09/23/2022    Dyslipidemia 09/23/2022    Embolic stroke involving left middle cerebral artery 08/08/2023    Encounter for blood transfusion     H/O heart artery stent     HTN (hypertension) 09/23/2022    ICD (implantable cardioverter-defibrillator) in place 09/23/2022    Paroxysmal A-fib 09/23/2022    Stage 3 chronic kidney disease 04/19/2023     Past Surgical History:   Procedure Laterality Date    CARDIAC DEFIBRILLATOR PLACEMENT Left     CEREBRAL ANGIOGRAM N/A 8/8/2023    Procedure: ANGIOGRAM-CEREBRAL;  Surgeon: Kenzie Rodriguez;  Location: [facility];  Service: Anesthesiology;  Laterality: N/A;  LVO (angiogram)    ESOPHAGOGASTRODUODENOSCOPY N/A 8/11/2023    Procedure: EGD (ESOPHAGOGASTRODUODENOSCOPY);  Surgeon: Colette Martinez MD;  Location: [address]);  Service: Gastroenterology;  Laterality: N/A;    ESOPHAGOGASTRODUODENOSCOPY W/ PEG N/A 8/17/2023    Procedure: EGD, WITH PEG TUBE INSERTION;  Surgeon: Yan Scott MD;  Location: [facility] OR [address] [place] [address];  Service: General;  Laterality: N/A;  PEG, possible lap G    HERNIA REPAIR      LEFT HEART CATHETERIZATION Left 4/18/2023    Procedure: Left heart cath;  Surgeon: Atilio Marks MD;  Location: [facility] CATH LAB;  Service: Cardiology;  Laterality: Left;    RIGHT HEART CATHETERIZATION Right 9/30/2022    Procedure: INSERTION, CATHETER, RIGHT HEART;  Surgeon: Caden Aden MD;  " Location: [facility] CATH LAB;  Service: Cardiology;  Laterality: Right;    TREATMENT OF CARDIAC ARRHYTHMIA N/A 11/22/2022    Procedure: CARDIOVERSION;  Surgeon: Marvin Sanon MD;  Location: [facility] EP LAB;  Service: Cardiology;  Laterality: N/A;  afib, KIA, DCCV, anes, MB, 3 Prep     No family history on file.  Social History     Tobacco Use    Smoking status: Never    Smokeless tobacco: Never   Substance Use Topics    Alcohol use: Never    Drug use: Never     Review of Systems   Unable to perform ROS: Other (Patient is aphasic)       Physical Exam     Initial Vitals [09/24/23 1152]   BP Pulse Resp Temp SpO2   121/80 67 18 98.5 °F (36.9 °C) 100 %      MAP       --         Physical Exam    Nursing note and vitals reviewed.  Constitutional: He appears well-developed and well-nourished. He is not diaphoretic. No distress.   HENT:   Head: Normocephalic and atraumatic.   Mouth/Throat: Oropharynx is clear and moist.   Eyes: Conjunctivae and EOM are normal. Pupils are equal, round, and reactive to light.   Neck: Neck supple.   Cardiovascular:  Normal rate and regular rhythm.           Pulmonary/Chest: Breath sounds normal. No respiratory distress.   Abdominal: Abdomen is soft. Bowel sounds are normal.   There is a peg tube in place. There is a small amount of discharge, but no erythema.    Musculoskeletal:         General: No edema.      Cervical back: Neck supple.     Neurological: He is alert.   Patient is aphasic due to previous stroke.  Patient follows commands.  The patient ambulates with a very unsteady gait.   Skin: Skin is warm and dry.   Psychiatric: He has a normal mood and affect.         ED Course   Procedures  Labs Reviewed   CULTURE, URINE - Abnormal; Notable for the following components:       Result Value    Urine Culture, Routine   (*)     Value: GRAM NEGATIVE WILFRIDO  >100,000 cfu/ml  Identification and susceptibility pending      All other components within normal limits    Narrative:     Specimen  Source->Urine   CBC W/ AUTO DIFFERENTIAL - Abnormal; Notable for the following components:    RBC 3.79 (*)     Hemoglobin 10.4 (*)     Hematocrit 34.5 (*)     MCHC 30.1 (*)     RDW 17.3 (*)     Mono % 18.1 (*)     All other components within normal limits   COMPREHENSIVE METABOLIC PANEL - Abnormal; Notable for the following components:    BUN 28 (*)     Creatinine 1.8 (*)     eGFR 44 (*)     All other components within normal limits   URINALYSIS, REFLEX TO URINE CULTURE - Abnormal; Notable for the following components:    Protein, UA Trace (*)     Urobilinogen, UA 2.0-3.0 (*)     Leukocytes, UA 1+ (*)     All other components within normal limits    Narrative:     Specimen Source->Urine   URINALYSIS MICROSCOPIC - Abnormal; Notable for the following components:    RBC, UA 11 (*)     WBC, UA 13 (*)     All other components within normal limits    Narrative:     Specimen Source->Urine   TROPONIN I - Abnormal; Notable for the following components:    Troponin I 0.046 (*)     All other components within normal limits   B-TYPE NATRIURETIC PEPTIDE - Abnormal; Notable for the following components:    BNP 3,173 (*)     All other components within normal limits   CBC W/ AUTO DIFFERENTIAL - Abnormal; Notable for the following components:    RBC 3.66 (*)     Hemoglobin 10.1 (*)     Hematocrit 33.7 (*)     MCHC 30.0 (*)     RDW 17.5 (*)     Mono % 16.5 (*)     All other components within normal limits   COMPREHENSIVE METABOLIC PANEL - Abnormal; Notable for the following components:    BUN 23 (*)     Creatinine 1.5 (*)     Albumin 3.2 (*)     eGFR 54 (*)     All other components within normal limits   POCT GLUCOSE - Abnormal; Notable for the following components:    POCT Glucose 113 (*)     All other components within normal limits   POCT GLUCOSE MONITORING CONTINUOUS        ECG Results              Repeat EKG 12-lead (Preliminary result)  Result time 09/25/23 06:47:28      Wet Read by Selena Monroy MD (09/25/23 06:47:28,  [facility], Emergency Medicine)    Atrial fibrillation with RVR, rate 104 beats per minute,  milliseconds.  No STEMI.                                  Imaging Results              X-Ray Chest AP Portable (Final result)  Result time 09/25/23 07:33:59      Final result by Atilio Leon MD (09/25/23 07:33:59)                   Impression:      No convincing evidence of acute detrimental change relative prior radiograph performed 08/19/2023, 09:09 hours.      Electronically signed by: Atilio Leon  Date:    09/25/2023  Time:    07:33               Narrative:    EXAMINATION:  XR CHEST AP PORTABLE    CLINICAL HISTORY:  tachycardia;    TECHNIQUE:  Single frontal view of the chest was performed.    COMPARISON:  Chest radiograph performed 08/19/2023, 09:09 hours.    FINDINGS:  Monitoring leads over the chest. Left subclavian approach AICD. Enlarged cardiac silhouette. Prominence of central pulmonary vasculature.  Question mild pulmonary vascular congestion.    No definite focal airspace consolidation is seen. A small left pleural effusion is not excluded.    No acute findings in the visualized abdomen. Osseous and soft tissue structures appear without definite acute change. Small hyperattenuating fragments again project over the left hemithorax.                                       XR Gastric tube check, non-radiologist performed (Final result)  Result time 09/24/23 21:50:37      Final result by Danny Burks MD (09/24/23 21:50:37)                   Impression:      Intraluminal position of gastrostomy tube.      Electronically signed by: Danny Burks MD  Date:    09/24/2023  Time:    21:50               Narrative:    EXAMINATION:  XR GASTRIC TUBE CHECK, NON-RADIOLOGIST PERFORMED    CLINICAL HISTORY:  confirm appropriate placement;    TECHNIQUE:  Two AP views of the abdomen were obtained before and after administration of 60 cc Gastroview  contrast.    COMPARISON:  08/26/2023.    FINDINGS:  Contrast was administered through patient's gastrostomy tube which confirms intraluminal position.  Contrast is visualized within the stomach and extends throughout the duodenum into the proximal jejunum.  Overall nonspecific bowel gas pattern with no evidence to suggest obstruction.  Scattered stool is seen in the colon.  Heart is enlarged.                                       Medications   amiodarone tablet 200 mg (has no administration in time range)   apixaban tablet 5 mg (5 mg Per G Tube Given 9/24/23 2218)   aspirin chewable tablet 81 mg (has no administration in time range)   atorvastatin tablet 80 mg (has no administration in time range)   ezetimibe tablet 10 mg (10 mg Per G Tube Given 9/24/23 2218)   metoprolol tartrate (LOPRESSOR) tablet 25 mg (25 mg Per G Tube Given 9/24/23 2219)   QUEtiapine tablet 25 mg (25 mg Per G Tube Given 9/24/23 2240)   valproic acid (as sodium salt) 250 mg/5 mL (5 mL) syrup Soln 1,000 mg (1,000 mg Per G Tube Given 9/24/23 2218)   valproic acid (as sodium salt) 250 mg/5 mL (5 mL) syrup Soln 500 mg (has no administration in time range)   valproic acid (as sodium salt) 250 mg/5 mL (5 mL) syrup Soln 500 mg (has no administration in time range)   glucose chewable tablet 16 g (has no administration in time range)   glucose chewable tablet 24 g (has no administration in time range)   glucagon (human recombinant) injection 1 mg (has no administration in time range)   insulin aspart U-100 pen 0-5 Units (has no administration in time range)   dextrose 10% bolus 125 mL 125 mL (has no administration in time range)   dextrose 10% bolus 250 mL 250 mL (has no administration in time range)   metoprolol injection 5 mg (0 mg Intravenous Hold 9/25/23 0700)   cefTRIAXone (ROCEPHIN) 2 g in dextrose 5 % in water (D5W) 100 mL IVPB (MB+) (has no administration in time range)   0.9%  NaCl infusion (0 mLs Intravenous Stopped 9/25/23 0842)   diatrizoate  meglumineand-diatrizoate sodium (GASTROVIEW) solution 60 mL (60 mLs Per G Tube Given 9/24/23 2148)   haloperidol lactate injection 5 mg (5 mg Intramuscular Given 9/25/23 0049)   droPERidol injection 2.5 mg (2.5 mg Intramuscular Given 9/25/23 0217)   LORazepam injection 2 mg (2 mg Intramuscular Given 9/25/23 0217)   LORazepam injection 2 mg (2 mg Intramuscular Given 9/25/23 0806)   diphenhydrAMINE injection 50 mg (50 mg Intramuscular Given 9/25/23 0806)     Medical Decision Making  58-year-old male with recent stroke, now aphasic, presents the ED to be placed in a nursing home.  Initial complaint was paid to problem, however, further discussion with the niece, she reports family unable to care for patient.  He lives on the 3rd floor apartment, there is no elevator.  He is unsteady on his feet.  The patient is aphasic on exam, he ambulates with an unsteady gait.  He has a PEG tube in place with small amount of discharge, there is no surrounding erythema.  I will obtain basic labs and a urinalysis.  I have consulted social work.    Amount and/or Complexity of Data Reviewed  Labs: ordered. Decision-making details documented in ED Course.     Details: Creatinine is elevated but appears to be at baseline.  Urinalysis negative for leukocytes or nitrites.  Radiology: ordered.    Risk  OTC drugs.  Prescription drug management.    I have consulted social work/case management.  The patient labs appear to be at his baseline.  Plan is to follow-up with [place] rehab to assess what their discharge plan was for this patient. I have ordered patient's home meds, PT/OT, and dietary assessment.  At this time, the patient will main in the ED until a safe disposition is determined.    Selena Monroy MD   4:30 PM    Update:   In the ED.  He was agitated overnight requiring multiple medications.  This morning, the patient remains agitated, he is tachycardic and irregularly irregular rhythm.  Will repeat ECG.    Selena Monroy MD   6:16  "AM    Patient's troponin slightly elevated, appears to be at baseline.  BNP is elevated, no acute change noted on chest x-ray.  Patient in and out of AFib with RVR, has not required IV rate control at this point.  Home meds have been ordered.  Urinalysis from yesterday shows a UTI, will treat with Rocephin.  Case reviewed with Dr. Heller for hospital observation.  Selena Monroy MD   9:11 AM          Scribe Attestation:   Scribe #1: I performed the above scribed service and the documentation accurately describes the services I performed. I attest to the accuracy of the note.        ED Course as of 09/25/23 0913   Sun Sep 24, 2023   1237 RN attempted to get a hold of family members, left voice mails. [LH]   1433 Called and spoke with Beth andrade niece. He was discharged on Wednesday from a rehab, he lives on the 3rd floor of the apartment ( had to come and get him upstairs). He is unsteady on his feet. She states his ex wife, Zahida Caputo, [phone], was taking care of him since Wednesday but no longer "wants anything to do with him." She state he has a son named Tera but she does not know his number.  [LH]   1529 Hemoglobin 10.4, hematocrit 34.5, appears consistent with previous baseline.  Urinalysis with 1+ leukocytes, negative for nitrites. [LH]   2206 XR Reviewed. PEG Tube ok to use [BS]   Mon Sep 25, 2023   0258 Patient altered, seemingly delirious, difficult to redirect, given home Seroquel 25 mg at 10:30 a.m. p.m..  Subsequently given Haldol, Ativan for sleep at 12:30 a.m. without improvement.  Subsequently attempted 2.5 mg droperidol at 2:15 am..  Will continue to monitor and order EKG to assess QT prolongation given multiple doses of different antipsychotics [BS]      ED Course User Index  [BS] Baltazar Lu MD  [LH] Selena Monroy MD                  I, Selena Monroy MD, personally performed the services described in this documentation. All medical record entries made " by the scribe were at my direction and in my presence. I have reviewed the chart and agree that the record reflects my personal performance and is accurate and complete.     This dictation has been generated using M-Modal Fluency Direct dictation; some phonetic errors may occur.     Clinical Impression:   Final diagnoses:  [Z76.89] Encounter for social work intervention  [Z91.89] At risk for long QT syndrome  [I48.91] Atrial fibrillation with RVR  [N30.90] Cystitis (Primary)        ED Disposition Condition    Observation                 Selena Monroy MD  09/27/23 5724

## 2023-09-25 PROBLEM — Z71.89 ADVANCED CARE PLANNING/COUNSELING DISCUSSION: Status: ACTIVE | Noted: 2023-09-25

## 2023-09-25 PROBLEM — I48.91 ATRIAL FIBRILLATION WITH RVR: Status: ACTIVE | Noted: 2023-09-25

## 2023-09-25 PROBLEM — N30.00 ACUTE CYSTITIS: Status: ACTIVE | Noted: 2023-09-25

## 2023-09-25 PROBLEM — R79.89 ELEVATED TROPONIN: Status: ACTIVE | Noted: 2023-09-25

## 2023-09-25 PROBLEM — N39.0 COMPLICATED UTI (URINARY TRACT INFECTION): Status: RESOLVED | Noted: 2023-08-21 | Resolved: 2023-09-25

## 2023-09-25 PROBLEM — R53.81 DEBILITY: Status: ACTIVE | Noted: 2023-09-25

## 2023-09-25 LAB
ALBUMIN SERPL BCP-MCNC: 3.2 G/DL (ref 3.5–5.2)
ALP SERPL-CCNC: 76 U/L (ref 55–135)
ALT SERPL W/O P-5'-P-CCNC: 28 U/L (ref 10–44)
ANION GAP SERPL CALC-SCNC: 9 MMOL/L (ref 8–16)
AST SERPL-CCNC: 31 U/L (ref 10–40)
BASOPHILS # BLD AUTO: 0.03 K/UL (ref 0–0.2)
BASOPHILS NFR BLD: 0.6 % (ref 0–1.9)
BILIRUB SERPL-MCNC: 0.5 MG/DL (ref 0.1–1)
BNP SERPL-MCNC: 3173 PG/ML (ref 0–99)
BUN SERPL-MCNC: 23 MG/DL (ref 6–20)
CALCIUM SERPL-MCNC: 9.1 MG/DL (ref 8.7–10.5)
CHLORIDE SERPL-SCNC: 110 MMOL/L (ref 95–110)
CO2 SERPL-SCNC: 23 MMOL/L (ref 23–29)
CREAT SERPL-MCNC: 1.5 MG/DL (ref 0.5–1.4)
DIFFERENTIAL METHOD: ABNORMAL
EOSINOPHIL # BLD AUTO: 0.1 K/UL (ref 0–0.5)
EOSINOPHIL NFR BLD: 2.6 % (ref 0–8)
ERYTHROCYTE [DISTWIDTH] IN BLOOD BY AUTOMATED COUNT: 17.5 % (ref 11.5–14.5)
EST. GFR  (NO RACE VARIABLE): 54 ML/MIN/1.73 M^2
GLUCOSE SERPL-MCNC: 72 MG/DL (ref 70–110)
HCT VFR BLD AUTO: 33.7 % (ref 40–54)
HGB BLD-MCNC: 10.1 G/DL (ref 14–18)
IMM GRANULOCYTES # BLD AUTO: 0.01 K/UL (ref 0–0.04)
IMM GRANULOCYTES NFR BLD AUTO: 0.2 % (ref 0–0.5)
LYMPHOCYTES # BLD AUTO: 1.4 K/UL (ref 1–4.8)
LYMPHOCYTES NFR BLD: 29.1 % (ref 18–48)
MCH RBC QN AUTO: 27.6 PG (ref 27–31)
MCHC RBC AUTO-ENTMCNC: 30 G/DL (ref 32–36)
MCV RBC AUTO: 92 FL (ref 82–98)
MONOCYTES # BLD AUTO: 0.8 K/UL (ref 0.3–1)
MONOCYTES NFR BLD: 16.5 % (ref 4–15)
NEUTROPHILS # BLD AUTO: 2.5 K/UL (ref 1.8–7.7)
NEUTROPHILS NFR BLD: 51 % (ref 38–73)
NRBC BLD-RTO: 0 /100 WBC
PLATELET # BLD AUTO: 299 K/UL (ref 150–450)
PMV BLD AUTO: 11.9 FL (ref 9.2–12.9)
POCT GLUCOSE: 113 MG/DL (ref 70–110)
POCT GLUCOSE: 83 MG/DL (ref 70–110)
POTASSIUM SERPL-SCNC: 4.4 MMOL/L (ref 3.5–5.1)
PROT SERPL-MCNC: 7 G/DL (ref 6–8.4)
RBC # BLD AUTO: 3.66 M/UL (ref 4.6–6.2)
SODIUM SERPL-SCNC: 142 MMOL/L (ref 136–145)
TROPONIN I SERPL DL<=0.01 NG/ML-MCNC: 0.04 NG/ML (ref 0–0.03)
TROPONIN I SERPL DL<=0.01 NG/ML-MCNC: 0.05 NG/ML (ref 0–0.03)
TROPONIN I SERPL DL<=0.01 NG/ML-MCNC: 0.05 NG/ML (ref 0–0.03)
WBC # BLD AUTO: 4.91 K/UL (ref 3.9–12.7)

## 2023-09-25 PROCEDURE — 96361 HYDRATE IV INFUSION ADD-ON: CPT

## 2023-09-25 PROCEDURE — G0378 HOSPITAL OBSERVATION PER HR: HCPCS

## 2023-09-25 PROCEDURE — 96372 THER/PROPH/DIAG INJ SC/IM: CPT | Mod: 59 | Performed by: EMERGENCY MEDICINE

## 2023-09-25 PROCEDURE — 84484 ASSAY OF TROPONIN QUANT: CPT | Mod: 91 | Performed by: STUDENT IN AN ORGANIZED HEALTH CARE EDUCATION/TRAINING PROGRAM

## 2023-09-25 PROCEDURE — 80053 COMPREHEN METABOLIC PANEL: CPT | Performed by: EMERGENCY MEDICINE

## 2023-09-25 PROCEDURE — 97535 SELF CARE MNGMENT TRAINING: CPT

## 2023-09-25 PROCEDURE — 92610 EVALUATE SWALLOWING FUNCTION: CPT

## 2023-09-25 PROCEDURE — 93010 ELECTROCARDIOGRAM REPORT: CPT | Mod: ,,, | Performed by: INTERNAL MEDICINE

## 2023-09-25 PROCEDURE — 96365 THER/PROPH/DIAG IV INF INIT: CPT

## 2023-09-25 PROCEDURE — 96372 THER/PROPH/DIAG INJ SC/IM: CPT | Mod: 59 | Performed by: STUDENT IN AN ORGANIZED HEALTH CARE EDUCATION/TRAINING PROGRAM

## 2023-09-25 PROCEDURE — 93005 ELECTROCARDIOGRAM TRACING: CPT

## 2023-09-25 PROCEDURE — 82962 GLUCOSE BLOOD TEST: CPT

## 2023-09-25 PROCEDURE — 93010 EKG 12-LEAD: ICD-10-PCS | Mod: 59,,, | Performed by: INTERNAL MEDICINE

## 2023-09-25 PROCEDURE — 25000003 PHARM REV CODE 250: Performed by: EMERGENCY MEDICINE

## 2023-09-25 PROCEDURE — 36415 COLL VENOUS BLD VENIPUNCTURE: CPT | Performed by: STUDENT IN AN ORGANIZED HEALTH CARE EDUCATION/TRAINING PROGRAM

## 2023-09-25 PROCEDURE — 99205 PR OFFICE/OUTPT VISIT, NEW, LEVL V, 60-74 MIN: ICD-10-PCS | Mod: ,,, | Performed by: NURSE PRACTITIONER

## 2023-09-25 PROCEDURE — 93010 ELECTROCARDIOGRAM REPORT: CPT | Mod: 59,,, | Performed by: INTERNAL MEDICINE

## 2023-09-25 PROCEDURE — 83880 ASSAY OF NATRIURETIC PEPTIDE: CPT | Performed by: EMERGENCY MEDICINE

## 2023-09-25 PROCEDURE — 84484 ASSAY OF TROPONIN QUANT: CPT | Performed by: EMERGENCY MEDICINE

## 2023-09-25 PROCEDURE — 63600175 PHARM REV CODE 636 W HCPCS: Performed by: STUDENT IN AN ORGANIZED HEALTH CARE EDUCATION/TRAINING PROGRAM

## 2023-09-25 PROCEDURE — 97166 OT EVAL MOD COMPLEX 45 MIN: CPT

## 2023-09-25 PROCEDURE — 85025 COMPLETE CBC W/AUTO DIFF WBC: CPT | Performed by: EMERGENCY MEDICINE

## 2023-09-25 PROCEDURE — 63600175 PHARM REV CODE 636 W HCPCS: Performed by: EMERGENCY MEDICINE

## 2023-09-25 PROCEDURE — 99205 OFFICE O/P NEW HI 60 MIN: CPT | Mod: ,,, | Performed by: NURSE PRACTITIONER

## 2023-09-25 PROCEDURE — 97161 PT EVAL LOW COMPLEX 20 MIN: CPT

## 2023-09-25 PROCEDURE — 97530 THERAPEUTIC ACTIVITIES: CPT

## 2023-09-25 RX ORDER — CHLORHEXIDINE GLUCONATE ORAL RINSE 1.2 MG/ML
5 SOLUTION DENTAL 3 TIMES DAILY
Status: ON HOLD | COMMUNITY
Start: 2023-09-22 | End: 2023-10-20 | Stop reason: HOSPADM

## 2023-09-25 RX ORDER — IBUPROFEN 200 MG
16 TABLET ORAL
Status: DISCONTINUED | OUTPATIENT
Start: 2023-09-25 | End: 2023-09-28 | Stop reason: HOSPADM

## 2023-09-25 RX ORDER — IBUPROFEN 200 MG
24 TABLET ORAL
Status: DISCONTINUED | OUTPATIENT
Start: 2023-09-25 | End: 2023-09-28 | Stop reason: HOSPADM

## 2023-09-25 RX ORDER — NALOXONE HCL 0.4 MG/ML
0.02 VIAL (ML) INJECTION
Status: DISCONTINUED | OUTPATIENT
Start: 2023-09-25 | End: 2023-09-28 | Stop reason: HOSPADM

## 2023-09-25 RX ORDER — DROPERIDOL 2.5 MG/ML
2.5 INJECTION, SOLUTION INTRAMUSCULAR; INTRAVENOUS ONCE
Status: COMPLETED | OUTPATIENT
Start: 2023-09-25 | End: 2023-09-25

## 2023-09-25 RX ORDER — SODIUM CHLORIDE 0.9 % (FLUSH) 0.9 %
10 SYRINGE (ML) INJECTION EVERY 12 HOURS PRN
Status: DISCONTINUED | OUTPATIENT
Start: 2023-09-25 | End: 2023-09-28 | Stop reason: HOSPADM

## 2023-09-25 RX ORDER — LORAZEPAM 2 MG/ML
2 INJECTION INTRAMUSCULAR
Status: COMPLETED | OUTPATIENT
Start: 2023-09-25 | End: 2023-09-25

## 2023-09-25 RX ORDER — METOPROLOL TARTRATE 1 MG/ML
5 INJECTION, SOLUTION INTRAVENOUS
Status: DISPENSED | OUTPATIENT
Start: 2023-09-25 | End: 2023-09-25

## 2023-09-25 RX ORDER — OLANZAPINE 10 MG/2ML
5 INJECTION, POWDER, FOR SOLUTION INTRAMUSCULAR ONCE AS NEEDED
Status: COMPLETED | OUTPATIENT
Start: 2023-09-25 | End: 2023-09-26

## 2023-09-25 RX ORDER — NITROGLYCERIN 0.4 MG/1
TABLET SUBLINGUAL
COMMUNITY
Start: 2023-07-21

## 2023-09-25 RX ORDER — HALOPERIDOL 5 MG/ML
5 INJECTION INTRAMUSCULAR
Status: COMPLETED | OUTPATIENT
Start: 2023-09-25 | End: 2023-09-25

## 2023-09-25 RX ORDER — GLUCAGON 1 MG
1 KIT INJECTION
Status: DISCONTINUED | OUTPATIENT
Start: 2023-09-25 | End: 2023-09-28 | Stop reason: HOSPADM

## 2023-09-25 RX ORDER — DIPHENHYDRAMINE HYDROCHLORIDE 50 MG/ML
50 INJECTION INTRAMUSCULAR; INTRAVENOUS
Status: COMPLETED | OUTPATIENT
Start: 2023-09-25 | End: 2023-09-25

## 2023-09-25 RX ORDER — HALOPERIDOL 5 MG/ML
5 INJECTION INTRAMUSCULAR
Status: DISCONTINUED | OUTPATIENT
Start: 2023-09-25 | End: 2023-09-25

## 2023-09-25 RX ADMIN — CEFTRIAXONE 2 G: 2 INJECTION, POWDER, FOR SOLUTION INTRAMUSCULAR; INTRAVENOUS at 10:09

## 2023-09-25 RX ADMIN — APIXABAN 5 MG: 5 TABLET, FILM COATED ORAL at 10:09

## 2023-09-25 RX ADMIN — LORAZEPAM 2 MG: 2 INJECTION INTRAMUSCULAR; INTRAVENOUS at 08:09

## 2023-09-25 RX ADMIN — HALOPERIDOL LACTATE 5 MG: 5 INJECTION, SOLUTION INTRAMUSCULAR at 12:09

## 2023-09-25 RX ADMIN — AMIODARONE HYDROCHLORIDE 200 MG: 200 TABLET ORAL at 10:09

## 2023-09-25 RX ADMIN — QUETIAPINE 25 MG: 25 TABLET ORAL at 08:09

## 2023-09-25 RX ADMIN — METOPROLOL TARTRATE 25 MG: 25 TABLET, FILM COATED ORAL at 08:09

## 2023-09-25 RX ADMIN — ATORVASTATIN CALCIUM 80 MG: 40 TABLET, FILM COATED ORAL at 10:09

## 2023-09-25 RX ADMIN — METOPROLOL TARTRATE 25 MG: 25 TABLET, FILM COATED ORAL at 10:09

## 2023-09-25 RX ADMIN — DIPHENHYDRAMINE HYDROCHLORIDE 50 MG: 50 INJECTION, SOLUTION INTRAMUSCULAR; INTRAVENOUS at 08:09

## 2023-09-25 RX ADMIN — ASPIRIN 81 MG CHEWABLE TABLET 81 MG: 81 TABLET CHEWABLE at 10:09

## 2023-09-25 RX ADMIN — VALPROIC ACID 500 MG: 250 SOLUTION ORAL at 10:09

## 2023-09-25 RX ADMIN — VALPROIC ACID 1000 MG: 250 SOLUTION ORAL at 08:09

## 2023-09-25 RX ADMIN — APIXABAN 5 MG: 5 TABLET, FILM COATED ORAL at 08:09

## 2023-09-25 RX ADMIN — EZETIMIBE 10 MG: 10 TABLET ORAL at 08:09

## 2023-09-25 RX ADMIN — LORAZEPAM 2 MG: 2 INJECTION INTRAMUSCULAR; INTRAVENOUS at 02:09

## 2023-09-25 RX ADMIN — DROPERIDOL 2.5 MG: 2.5 INJECTION, SOLUTION INTRAMUSCULAR; INTRAVENOUS at 02:09

## 2023-09-25 NOTE — ED NOTES
Pt continues to cry out, pull at the cords in the room and his external catheter. He pulled his adult brief off and threw it across the room. MD notified. New orders placed.

## 2023-09-25 NOTE — SUBJECTIVE & OBJECTIVE
Past Medical History:   Diagnosis Date    Acute on chronic combined systolic and diastolic heart failure 09/23/2022    Atrial fibrillation     CAD (coronary artery disease) 09/23/2022    Dyslipidemia 09/23/2022    Embolic stroke involving left middle cerebral artery 08/08/2023    Encounter for blood transfusion     H/O heart artery stent     HTN (hypertension) 09/23/2022    ICD (implantable cardioverter-defibrillator) in place 09/23/2022    Paroxysmal A-fib 09/23/2022    Stage 3 chronic kidney disease 04/19/2023       Past Surgical History:   Procedure Laterality Date    CARDIAC DEFIBRILLATOR PLACEMENT Left     CEREBRAL ANGIOGRAM N/A 8/8/2023    Procedure: ANGIOGRAM-CEREBRAL;  Surgeon: Kenzie Rodriguez;  Location: [facility];  Service: Anesthesiology;  Laterality: N/A;  LVO (angiogram)    ESOPHAGOGASTRODUODENOSCOPY N/A 8/11/2023    Procedure: EGD (ESOPHAGOGASTRODUODENOSCOPY);  Surgeon: Colette Martinez MD;  Location: [facility] ENDO (2ND FLR);  Service: Gastroenterology;  Laterality: N/A;    ESOPHAGOGASTRODUODENOSCOPY W/ PEG N/A 8/17/2023    Procedure: EGD, WITH PEG TUBE INSERTION;  Surgeon: Yan Scott MD;  Location: [facility] OR 2ND FLR;  Service: General;  Laterality: N/A;  PEG, possible lap G    HERNIA REPAIR      LEFT HEART CATHETERIZATION Left 4/18/2023    Procedure: Left heart cath;  Surgeon: Atilio Marks MD;  Location: [facility] CATH LAB;  Service: Cardiology;  Laterality: Left;    RIGHT HEART CATHETERIZATION Right 9/30/2022    Procedure: INSERTION, CATHETER, RIGHT HEART;  Surgeon: Caden Aden MD;  Location: [facility] CATH LAB;  Service: Cardiology;  Laterality: Right;    TREATMENT OF CARDIAC ARRHYTHMIA N/A 11/22/2022    Procedure: CARDIOVERSION;  Surgeon: Marvin Sanon MD;  Location: [facility] EP LAB;  Service: Cardiology;  Laterality: N/A;  afib, KIA, DCCV, anes, MB, 3 Prep       Review of patient's allergies indicates:  No Known Allergies    No current facility-administered medications on file prior to encounter.      Current Outpatient Medications on File Prior to Encounter   Medication Sig    amiodarone (PACERONE) 200 MG Tab 1 tablet (200 mg total) by Per G Tube route once daily.    apixaban (ELIQUIS) 5 mg Tab 1 tablet (5 mg total) by Per G Tube route 2 (two) times daily.    aspirin 81 MG Chew 1 tablet (81 mg total) by Per G Tube route once daily.    atorvastatin (LIPITOR) 80 MG tablet 1 tablet (80 mg total) by Per G Tube route once daily.    empagliflozin (JARDIANCE) 10 mg tablet 1 tablet (10 mg total) by Per G Tube route once daily.    ezetimibe (ZETIA) 10 mg tablet 1 tablet (10 mg total) by Per G Tube route every evening.    metoprolol tartrate (LOPRESSOR) 25 MG tablet 1 tablet (25 mg total) by Per G Tube route 2 (two) times daily.    QUEtiapine (SEROQUEL) 25 MG Tab 1 tablet (25 mg total) by Per G Tube route every evening.    valproic acid, as sodium salt, (DEPAKENE) 250 mg/5 mL (5 mL) Soln 10 mLs (500 mg total) by Per G Tube route once daily. To be administered 0900 daily    valproic acid, as sodium salt, (DEPAKENE) 250 mg/5 mL (5 mL) Soln 10 mLs (500 mg total) by Per G Tube route once daily. To be administers at 1200 daily    valproic acid, as sodium salt, (DEPAKENE) 250 mg/5 mL (5 mL) Soln 20 mLs (1,000 mg total) by Per G Tube route every evening.    chlorhexidine (PERIDEX) 0.12 % solution 5 mLs 3 (three) times daily.    nitroGLYCERIN (NITROSTAT) 0.4 MG SL tablet Place under the tongue.     Family History    None       Tobacco Use    Smoking status: Never    Smokeless tobacco: Never   Substance and Sexual Activity    Alcohol use: Never    Drug use: Never    Sexual activity: Not on file     Review of Systems   Unable to perform ROS: Patient nonverbal     Objective:     Vital Signs (Most Recent):  Temp: 98.3 °F (36.8 °C) (09/25/23 0053)  Pulse: 85 (09/25/23 0843)  Resp: 20 (09/25/23 0053)  BP: (!) 134/93 (09/25/23 0319)  SpO2: 99 % (09/25/23 0843) Vital Signs (24h Range):  Temp:  [98.3 °F (36.8 °C)-98.5 °F (36.9 °C)]  "98.3 °F (36.8 °C)  Pulse:  [] 85  Resp:  [18-20] 20  SpO2:  [96 %-100 %] 99 %  BP: (116-139)/(80-96) 134/93     Weight: 80.7 kg (178 lb)  Body mass index is 23.48 kg/m².     Physical Exam  Vitals and nursing note reviewed.   Constitutional:       General: He is not in acute distress.     Appearance: He is ill-appearing (chronically ill appearing).      Comments: Patient appears agitated. Attempting to climb out of the bed. Moaning and yelling but does not appear to be in pain.    HENT:      Mouth/Throat:      Mouth: Mucous membranes are dry.   Cardiovascular:      Rate and Rhythm: Tachycardia present. Rhythm irregular.      Heart sounds: Murmur heard.      No gallop.      Comments: +AICD  Pulmonary:      Effort: Pulmonary effort is normal. No respiratory distress.      Breath sounds: Wheezing (faint expiratory wheezes) present.   Abdominal:      General: There is no distension.      Palpations: Abdomen is soft.      Tenderness: There is no abdominal tenderness.      Comments: +PEG in place. Appears clean and intact, no overlying signs of infection   Musculoskeletal:         General: No swelling or tenderness.      Right lower leg: No edema.      Left lower leg: No edema.   Skin:     General: Skin is warm and dry.   Neurological:      Mental Status: He is alert. Mental status is at baseline.      Comments: Patient is aphasic. Per ex-wife, patient moans and groans at baseline. States when that happens, it means "he needs a bath or a diaper change".    Psychiatric:         Speech: He is noncommunicative.         Behavior: Behavior is agitated.                Significant Labs: All pertinent labs within the past 24 hours have been reviewed.  CBC:   Recent Labs   Lab 09/24/23  1457 09/25/23  0805   WBC 5.15 4.91   HGB 10.4* 10.1*   HCT 34.5* 33.7*    299     CMP:   Recent Labs   Lab 09/24/23  1457 09/25/23  0805    142   K 4.7 4.4    110   CO2 24 23    72   BUN 28* 23*   CREATININE 1.8* " 1.5*   CALCIUM 9.5 9.1   PROT 7.6 7.0   ALBUMIN 3.6 3.2*   BILITOT 0.6 0.5   ALKPHOS 88 76   AST 35 31   ALT 32 28   ANIONGAP 11 9     Cardiac Markers:   Recent Labs   Lab 09/25/23  0805   BNP 3,173*       Troponin:   Recent Labs   Lab 09/25/23  0805   TROPONINI 0.046*     TSH:   Recent Labs   Lab 09/03/23  1227   TSH 0.736     Urine Studies:   Recent Labs   Lab 09/24/23  1444   COLORU Yellow   APPEARANCEUA Clear   PHUR 7.0   SPECGRAV 1.020   PROTEINUA Trace*   GLUCUA Negative   KETONESU Negative   BILIRUBINUA Negative   OCCULTUA Negative   NITRITE Negative   UROBILINOGEN 2.0-3.0*   LEUKOCYTESUR 1+*   RBCUA 11*   WBCUA 13*   BACTERIA Occasional   SQUAMEPITHEL 1       Significant Imaging: I have reviewed all pertinent imaging results/findings within the past 24 hours.    Imaging Results              X-Ray Chest AP Portable (Final result)  Result time 09/25/23 07:33:59      Final result by Atilio Leon MD (09/25/23 07:33:59)                   Impression:      No convincing evidence of acute detrimental change relative prior radiograph performed 08/19/2023, 09:09 hours.      Electronically signed by: Atilio Leon  Date:    09/25/2023  Time:    07:33               Narrative:    EXAMINATION:  XR CHEST AP PORTABLE    CLINICAL HISTORY:  tachycardia;    TECHNIQUE:  Single frontal view of the chest was performed.    COMPARISON:  Chest radiograph performed 08/19/2023, 09:09 hours.    FINDINGS:  Monitoring leads over the chest. Left subclavian approach AICD. Enlarged cardiac silhouette. Prominence of central pulmonary vasculature.  Question mild pulmonary vascular congestion.    No definite focal airspace consolidation is seen. A small left pleural effusion is not excluded.    No acute findings in the visualized abdomen. Osseous and soft tissue structures appear without definite acute change. Small hyperattenuating fragments again project over the left hemithorax.                                       XR Gastric tube  check, non-radiologist performed (Final result)  Result time 09/24/23 21:50:37      Final result by Danny Burks MD (09/24/23 21:50:37)                   Impression:      Intraluminal position of gastrostomy tube.      Electronically signed by: Danny Burks MD  Date:    09/24/2023  Time:    21:50               Narrative:    EXAMINATION:  XR GASTRIC TUBE CHECK, NON-RADIOLOGIST PERFORMED    CLINICAL HISTORY:  confirm appropriate placement;    TECHNIQUE:  Two AP views of the abdomen were obtained before and after administration of 60 cc Gastroview contrast.    COMPARISON:  08/26/2023.    FINDINGS:  Contrast was administered through patient's gastrostomy tube which confirms intraluminal position.  Contrast is visualized within the stomach and extends throughout the duodenum into the proximal jejunum.  Overall nonspecific bowel gas pattern with no evidence to suggest obstruction.  Scattered stool is seen in the colon.  Heart is enlarged.

## 2023-09-25 NOTE — PT/OT/SLP EVAL
Physical Therapy Evaluation    Patient Name:  Tera Griffiths   MRN:  [id number]    Recommendations:     Discharge Recommendations:  (24hr care)   Discharge Equipment Recommendations:   ongoing  Barriers to discharge:  requires 24hr care    Assessment:     Tera Griffiths is a 58 y.o. male admitted with a medical diagnosis of Atrial fibrillation with RVR.  He presents with the following impairments/functional limitations: impaired cognition, impaired coordination, decreased safety awareness, gait instability, decreased coordination, impaired balance, impaired self care skills .    Rehab Prognosis: Fair; patient would benefit from acute skilled PT services to address these deficits and reach maximum level of function.    Recent Surgery: * No surgery found *      Plan:     During this hospitalization, patient to be seen  (2-3x/wk) to address the identified rehab impairments via   and progress toward the following goals:    Plan of Care Expires:  10/02/23    Subjective     Chief Complaint: Non-verbal, aphasic, groaning and slapping at therapist arms/hands  Patient/Family Comments/goals: Unable to state  Pain/Comfort:  Pain Rating 1:  (unknown, pt groaning and making noises, but unable to state if in pain)  Pain Addressed 1: Reposition, Distraction, Cessation of Activity    Patients cultural, spiritual, Orthodox conflicts given the current situation: no    Living Environment:  Prior to CVA, pt was living with cousin in an apartment with [address]  Prior to admission, patients level of function was Independent prior to CVA. Ha been to [facility] and SNF with little ambulation and requires assist with everything for safety Equipment used at home: wheelchair, bedside commode, hospital bed.  DME owned (not currently used): none.  Upon discharge, patient will have assistance from ?.    Objective:     Communicated with nsg prior to session.  Patient found HOB elevated with telemetry, PEG Tube (Pt kicked off Presure releif boots)   upon PT entry to room.    General Precautions: Standard, fall, aphasia  Orthopedic Precautions:N/A   Braces: N/A  Respiratory Status: Room air    Exams:  Cognitive Exam:  Patient is oriented to Unable to state name or respond to name  Gross Motor Coordination:  impaired 2/2 weakness  Postural Exam:  Patient presented with the following abnormalities:    -       Rounded shoulders  -       Forward head  Sensation:  uanble to test  Skin Integrity/Edema:      -       Skin integrity: Visible skin intact  -       Edema: None noted   RLE ROM: WFL  RLE Strength: WFL  LLE ROM: WFL  LLE Strength: WFL    Functional Mobility:  Bed Mobility:     Rolling Left:  minimum assistance  Rolling Right: minimum assistance  Scooting: stand by assistance  Supine to Sit: maximal assistance  Sit to Supine: minimum assistance  Transfers:     Sit to Stand:  moderate assistance and of 2 persons with no AD and hand-held assist  Gait: Min/Mod A with HHA approx 10' Total  Balance: FAir+ sit, Fair/FAir- stand      AM-PAC 6 CLICK MOBILITY  Total Score:13       Treatment & Education:  Pt is dependent for toileting and Upper/Lower body dressing.      Patient left HOB elevated with all lines intact, call button in reach, bed alarm on, and nsg notified.    GOALS:   Multidisciplinary Problems       Physical Therapy Goals          Problem: Physical Therapy    Goal Priority Disciplines Outcome Goal Variances Interventions   Physical Therapy Goal     PT, PT/OT Ongoing, Progressing     Description: Goals to be met by: 10/2/23     Patient will increase functional independence with mobility by performin. Supine to sit with Stand-by Assistance  2. Bed to chair transfer with Supervision    3. Gait  x 25-50 feet with Minimal Assistance using HHA.   4. Wheelchair propulsion x25-50 feet with Contact Guard Assistance                           History:     Past Medical History:   Diagnosis Date    Acute on chronic combined systolic and diastolic heart failure  09/23/2022    Atrial fibrillation     CAD (coronary artery disease) 09/23/2022    Dyslipidemia 09/23/2022    Embolic stroke involving left middle cerebral artery 08/08/2023    Encounter for blood transfusion     H/O heart artery stent     HTN (hypertension) 09/23/2022    ICD (implantable cardioverter-defibrillator) in place 09/23/2022    Paroxysmal A-fib 09/23/2022    Stage 3 chronic kidney disease 04/19/2023       Past Surgical History:   Procedure Laterality Date    CARDIAC DEFIBRILLATOR PLACEMENT Left     CEREBRAL ANGIOGRAM N/A 8/8/2023    Procedure: ANGIOGRAM-CEREBRAL;  Surgeon: Kenzie Rodriguez;  Location: [facility];  Service: Anesthesiology;  Laterality: N/A;  LVO (angiogram)    ESOPHAGOGASTRODUODENOSCOPY N/A 8/11/2023    Procedure: EGD (ESOPHAGOGASTRODUODENOSCOPY);  Surgeon: Colette Martinez MD;  [facility]  Service: Gastroenterology;  Laterality: N/A;    ESOPHAGOGASTRODUODENOSCOPY W/ PEG N/A 8/17/2023    Procedure: EGD, WITH PEG TUBE INSERTION;  Surgeon: Yan Scott MD;  Location: [facility] OR 2ND FLR;  Service: General;  Laterality: N/A;  PEG, possible lap G    HERNIA REPAIR      LEFT HEART CATHETERIZATION Left 4/18/2023    Procedure: Left heart cath;  Surgeon: Atilio Marks MD;  Location: [facility] CATH LAB;  Service: Cardiology;  Laterality: Left;    RIGHT HEART CATHETERIZATION Right 9/30/2022    Procedure: INSERTION, CATHETER, RIGHT HEART;  Surgeon: Caden Aden MD;  Location: [facility] CATH LAB;  Service: Cardiology;  Laterality: Right;    TREATMENT OF CARDIAC ARRHYTHMIA N/A 11/22/2022    Procedure: CARDIOVERSION;  Surgeon: Marvin Sanon MD;  Location: [facility] EP LAB;  Service: Cardiology;  Laterality: N/A;  afib, KIA, DCCV, anes, MB, 3 Prep       Time Tracking:     PT Received On: 09/25/23  PT Start Time: 1514     PT Stop Time: 1537  PT Total Time (min): 23 min     Billable Minutes: Evaluation 15 and Therapeutic Activity 8      09/25/2023

## 2023-09-25 NOTE — PHARMACY MED REC
"Admission Medication History     The home medication history was taken by Christina Vergara.    You may go to "Admission" then "Reconcile Home Medications" tabs to review and/or act upon these items.     The home medication list has been updated by the Pharmacy department.   Please read ALL comments highlighted in yellow.   Please address this information as you see fit.    Feel free to contact us if you have any questions or require assistance.      The medications listed below were removed from the home medication list. Please reorder if appropriate:  Patient reports no longer taking the following medication(s):          Medications listed below were obtained from: Patient/family and Analytic software- [employer]  (Not in a hospital admission)        Spoke with Zahida Dave to obtain patient last doses of medications, which she provided.  She was not able to verify Depakene nor if the patient is still taking Xarelto.  Contacted waiting for return call.                .        "

## 2023-09-25 NOTE — SUBJECTIVE & OBJECTIVE
Past Medical History:   Diagnosis Date    Acute on chronic combined systolic and diastolic heart failure 09/23/2022    Atrial fibrillation     CAD (coronary artery disease) 09/23/2022    Dyslipidemia 09/23/2022    Embolic stroke involving left middle cerebral artery 08/08/2023    Encounter for blood transfusion     H/O heart artery stent     HTN (hypertension) 09/23/2022    ICD (implantable cardioverter-defibrillator) in place 09/23/2022    Paroxysmal A-fib 09/23/2022    Stage 3 chronic kidney disease 04/19/2023       Past Surgical History:   Procedure Laterality Date    CARDIAC DEFIBRILLATOR PLACEMENT Left     CEREBRAL ANGIOGRAM N/A 8/8/2023    Procedure: ANGIOGRAM-CEREBRAL;  Surgeon: Kenzie Rodriguez;  Location: [facility];  Service: Anesthesiology;  Laterality: N/A;  LVO (angiogram)    ESOPHAGOGASTRODUODENOSCOPY N/A 8/11/2023    Procedure: EGD (ESOPHAGOGASTRODUODENOSCOPY);  Surgeon: Colette Martinez MD;  Location: [facility] ENDO (2ND FLR);  Service: Gastroenterology;  Laterality: N/A;    ESOPHAGOGASTRODUODENOSCOPY W/ PEG N/A 8/17/2023    Procedure: EGD, WITH PEG TUBE INSERTION;  Surgeon: Yan Scott MD;  Location: [facility] OR 2ND FLR;  Service: General;  Laterality: N/A;  PEG, possible lap G    HERNIA REPAIR      LEFT HEART CATHETERIZATION Left 4/18/2023    Procedure: Left heart cath;  Surgeon: Atilio Marks MD;  Location: [facility] CATH LAB;  Service: Cardiology;  Laterality: Left;    RIGHT HEART CATHETERIZATION Right 9/30/2022    Procedure: INSERTION, CATHETER, RIGHT HEART;  Surgeon: Caden Aden MD;  Location: [facility] CATH LAB;  Service: Cardiology;  Laterality: Right;    TREATMENT OF CARDIAC ARRHYTHMIA N/A 11/22/2022    Procedure: CARDIOVERSION;  Surgeon: Marvin Sanon MD;  Location: [facility] EP LAB;  Service: Cardiology;  Laterality: N/A;  afib, KIA, DCCV, anes, MB, 3 Prep       Review of patient's allergies indicates:  No Known Allergies    Medications:  Continuous Infusions:  Scheduled Meds:   amiodarone  200  mg Per G Tube Daily    apixaban  5 mg Per G Tube BID    aspirin  81 mg Per G Tube Daily    atorvastatin  80 mg Per G Tube Daily    [START ON 9/26/2023] cefTRIAXone (ROCEPHIN) IVPB  2 g Intravenous Q24H    ezetimibe  10 mg Per G Tube QHS    metoprolol  5 mg Intravenous ED 1 Time    metoprolol tartrate  25 mg Per G Tube BID    QUEtiapine  25 mg Per G Tube QHS    valproic acid (as sodium salt)  1,000 mg Per G Tube QHS    valproic acid (as sodium salt)  500 mg Per G Tube Daily    valproic acid (as sodium salt)  500 mg Per G Tube Daily with lunch     PRN Meds:dextrose 10%, dextrose 10%, glucagon (human recombinant), glucose, glucose, insulin aspart U-100, naloxone, OLANZapine, sodium chloride 0.9%    Family History    None       Tobacco Use    Smoking status: Never    Smokeless tobacco: Never   Substance and Sexual Activity    Alcohol use: Never    Drug use: Never    Sexual activity: Not on file       Review of Systems   Unable to perform ROS: Patient nonverbal (patient is aphasic)     Objective:     Vital Signs (Most Recent):  Temp: 97.3 °F (36.3 °C) (09/25/23 1155)  Pulse: 75 (09/25/23 1155)  Resp: 18 (09/25/23 1155)  BP: 109/67 (09/25/23 1155)  SpO2: 100 % (09/25/23 1018) Vital Signs (24h Range):  Temp:  [97.3 °F (36.3 °C)-98.3 °F (36.8 °C)] 97.3 °F (36.3 °C)  Pulse:  [] 75  Resp:  [18-20] 18  SpO2:  [96 %-100 %] 100 %  BP: (109-139)/(67-96) 109/67     Weight: 76.2 kg (168 lb)  Body mass index is 22.16 kg/m².       Physical Exam  Vitals and nursing note reviewed.   Constitutional:       General: He is not in acute distress.     Appearance: He is ill-appearing. He is not toxic-appearing or diaphoretic.      Comments: Agitated, flailing his extremities    HENT:      Mouth/Throat:      Mouth: Mucous membranes are dry.   Cardiovascular:      Rate and Rhythm: Tachycardia present.   Pulmonary:      Effort: Pulmonary effort is normal.   Abdominal:      General: There is no distension.      Comments: PEG    Musculoskeletal:      Right lower leg: No edema.      Left lower leg: No edema.   Skin:     General: Skin is warm and dry.   Neurological:      Mental Status: He is alert.      Comments: Patient is aphasic, unable to assess   Psychiatric:         Behavior: Behavior is agitated.            Review of Symptoms      Symptom Assessment (ESAS 0-10 Scale)  Unable to complete assessment due to Patient nonverbal         Pain Assessment in Advanced Demential Scale (PAINAD)   Breathing - Independent of vocalization:  1  Negative vocalization:  2  Facial expression:  2  Body language:  2  Consolability:  1  Total:  8    Psychosocial/Cultural:   See Palliative Psychosocial Note: No  **Primary  to Follow**  Palliative Care  Consult: No        Advance Care Planning   Advance Directives:   Medical Power of : Yes    Goals of Care: What is most important right now is to focus on extending life as long as possible, even it it means sacrificing quality, curative/life-prolongation (regardless of treatment burdens). Accordingly, we have decided that the best plan to meet the patient's goals includes continuing with treatment.         Significant Labs: All pertinent labs within the past 24 hours have been reviewed.  CBC:   Recent Labs   Lab 09/25/23  0805   WBC 4.91   HGB 10.1*   HCT 33.7*   MCV 92        BMP:  Recent Labs   Lab 09/25/23  0805   GLU 72      K 4.4      CO2 23   BUN 23*   CREATININE 1.5*   CALCIUM 9.1     LFT:  Lab Results   Component Value Date    AST 31 09/25/2023    ALKPHOS 76 09/25/2023    BILITOT 0.5 09/25/2023     Albumin:   Albumin   Date Value Ref Range Status   09/25/2023 3.2 (L) 3.5 - 5.2 g/dL Final     Protein:   Total Protein   Date Value Ref Range Status   09/25/2023 7.0 6.0 - 8.4 g/dL Final     Lactic acid:   Lab Results   Component Value Date    LACTATE 1.8 08/19/2023    LACTATE 1.0 07/13/2023       Significant Imaging: I have reviewed all pertinent  imaging results/findings within the past 24 hours. CXR

## 2023-09-25 NOTE — PT/OT/SLP EVAL
Occupational Therapy Evaluation       Name: Tera Griffiths  MRN: [id number]  Admitting Diagnosis: Atrial fibrillation with RVR  Recent Surgery: * No surgery found *      Recommendations:     Discharge Recommendations: TBD, 24 hr supervision NH placement recommended   Level of Assistance Recommended: 24 hours significant assistance  Discharge Equipment Recommendations: to be determined by next level of care  Barriers to discharge: Other (Comment) (patient unable to safely care for himself and is at high risk for readmission and falls due to global aphasia and severe cognitive deficits including poor safety awareness)    Assessment:     Tera Griffiths is a 58 y.o. male with a medical diagnosis of Atrial fibrillation with RVR. He presents with performance deficits affecting function including impaired self care skills, impaired functional mobility, gait instability, impaired balance, impaired cognition, decreased coordination, decreased safety awareness, impaired cardiopulmonary response to activity. Patient restless in stretcher and could not respond to written information. Patient has global aphasia and could follow instructions intermittently, but perseverated on taking off shirt and gown initially.     Rehab Prognosis: Fair; patient would benefit from acute OT services to address these deficits and reach maximum level of function.      Plan:     Patient to be seen 4 x/week (4-5x/week) to address the above listed problems via self-care/home management, neuromuscular re-education, therapeutic activities, therapeutic exercises  Plan of Care Expires: 10/16/23  Plan of Care Reviewed with: patient    Subjective     Chief Complaint: unable to verbalize, but perseverated on taking off shirt and gown and then placed shirt back on   Patient Comments/Goals: unable to communicate verbally or written   Pain/Comfort:  Pain Rating 1: other (see comments) (unable to verbalize due to global aphasia)    Patients cultural,  spiritual, Restorationist conflicts given the current situation: no    Social History:  Living Environment: Patient  lived   in a third floor apartment with number of outside stair(s): unknown ; his ex wife, sister, and niece have tried helping, but cannot help him any longer due to restlessness and agitation   Prior Level of Function: Prior to admission, patient required unknown; had recent IPR and SNF stays   Roles and Routines: Patient was not driving and disabled due to heart issues prior to admission.  Equipment Used at Home: none  DME owned (not currently used):  unknown   Assistance Upon Discharge:  unknown     Objective:     Communicated with CARL Almanza, prior to session. Patient found  lying with HOB/stretcher elevated  with blood pressure cuff, pulse ox (continuous), telemetry upon OT entry to room.    General Precautions: Standard, fall, aphasia   Orthopedic Precautions:     Braces: N/A    Respiratory Status: Room air    Cognitive and Psychosocial Function:  Oriented to: Not oriented to person, place, and time  Follows Commands/Attention: inattentive, easily distracted by all stimuli, and restless  Communication: expressive aphasia and receptive aphasia  Memory:  unknown/ASHLEY   Safety Awareness/Insight to Disability: impaired severely due to global aphasia   Mood/Affect/Coping Skills/Emotional Control: Anxious and restless     Hearing: Intact    Vision:  grossly, no deficits, but unable to do full neuro assessment due to inattention and restlessness        Physical Exam:  Postural examination/scapula alignment:    -       No postural abnormalities identified  Skin integrity: Visible skin intact  Hand dominance: Right         Left UE Right UE   UE Edema None noted None noted   UE ROM AROM WFL AROM WFL   UE Strength 5/5 5/5    Strength grasp WNL grasp WNL   Sensation LUE  INTACT:  WFL RUE  INTACT:  WFL   Fine Motor Coordination:  LUE  INTACT:  WFL RUE  INTACT:    WFL   Gross Motor Coordination:  LUE  INTACT:  WFL  Grossly intact; ASHLEY for dysmetria or asymmetry  RUE  INTACT:  WFL  Grossly intact; ASHLEY for dysmetria or asymmetry      Occupational Performance    Gait belt applied - Yes    Bed Mobility:   Rolling/Turning to Left with supervision; pt. Has physical ability to do tasks, but needs multiple verbal cues to follow directions overall   Rolling/Turning to Right with supervision  Scooting to HOB in supine: minimum assistance  Scooting anteriorly to EOB to have both feet planted on floor: maximal assistance  Supine to sit from left side of bed with minimum assistance  Sit to Supine with maximal assistance on left  side of bed    Functional Mobility/Transfers  Static Sitting EOB: supervision  Dynamic Sitting EOB: supervision  Static Standing Balance: contact guard assistance  Dynamic Standing Balance: contact guard assistance  Sit <> Stand Transfer with maximal assistance with rolling walker on 3rd trial; 2 trials without AD standing next to stretcher in ED.   Functionality Mobility: Patient did not take any steps because he repetitively sat down     Activities of Daily Living:  Grooming: total assistance  Upper Body Dressing: supervision  Lower Body Dressing: total assistance with donning socks    AMPA 6 Click ADL:  AMPAC Total Score: 9    Treatment & Education:  Patient has global aphasia and is restless, so has challenges following directions   Occupational therapy attempted to educate patient re: purpose of standing to complete evaluation, but he is unable to follow directions consistently.     Patient not clear to transfer with RN/PCT.    Patient left  HOB elevated on stretcher   with all lines intact, call button in reach, RN notified, and RN present.    GOALS:   Multidisciplinary Problems       Occupational Therapy Goals          Problem: Occupational Therapy    Goal Priority Disciplines Outcome Interventions   Occupational Therapy Goal     OT, PT/OT Ongoing, Progressing    Description: Goals to be  met by: 10/16/23     Patient will increase functional independence with ADLs by performing:    UE Dressing with [place].  LE Dressing with Modified [place].  Grooming while standing at sink with Supervision.  Toileting from toilet with Supervision for hygiene and clothing management.   Sitting in chair 30-60 minutes with Supervision.  Toilet transfer to toilet with Supervision.  Upper extremity exercise program x10  reps per handout, with assistance as needed.  Increase sustained attention to task x 5 min. With 1-2 verbal cues.                          History:     Past Medical History:   Diagnosis Date    Acute on chronic combined systolic and diastolic heart failure 09/23/2022    Atrial fibrillation     CAD (coronary artery disease) 09/23/2022    Dyslipidemia 09/23/2022    Embolic stroke involving left middle cerebral artery 08/08/2023    Encounter for blood transfusion     H/O heart artery stent     HTN (hypertension) 09/23/2022    ICD (implantable cardioverter-defibrillator) in place 09/23/2022    Paroxysmal A-fib 09/23/2022    Stage 3 chronic kidney disease 04/19/2023         Past Surgical History:   Procedure Laterality Date    CARDIAC DEFIBRILLATOR PLACEMENT Left     CEREBRAL ANGIOGRAM N/A 8/8/2023    Procedure: ANGIOGRAM-CEREBRAL;  Surgeon: Kenzie Rodriguez;  Location: [facility];  Service: Anesthesiology;  Laterality: N/A;  LVO (angiogram)    ESOPHAGOGASTRODUODENOSCOPY N/A 8/11/2023    Procedure: EGD (ESOPHAGOGASTRODUODENOSCOPY);  Surgeon: Colette Martinez MD;  Location: [address]);  Service: Gastroenterology;  Laterality: N/A;    ESOPHAGOGASTRODUODENOSCOPY W/ PEG N/A 8/17/2023    Procedure: EGD, WITH PEG TUBE INSERTION;  Surgeon: Yan Scott MD;  Location: [facility] OR [address] [place] [address];  Service: General;  Laterality: N/A;  PEG, possible lap G    HERNIA REPAIR      LEFT HEART CATHETERIZATION Left 4/18/2023    Procedure: Left heart cath;  Surgeon: Atilio Marks MD;  Location: [facility] CATH LAB;   Service: Cardiology;  Laterality: Left;    RIGHT HEART CATHETERIZATION Right 9/30/2022    Procedure: INSERTION, CATHETER, RIGHT HEART;  Surgeon: Caden Aden MD;  Location: [facility] CATH LAB;  Service: Cardiology;  Laterality: Right;    TREATMENT OF CARDIAC ARRHYTHMIA N/A 11/22/2022    Procedure: CARDIOVERSION;  Surgeon: Marvin Sanon MD;  Location: [facility] EP LAB;  Service: Cardiology;  Laterality: N/A;  afib, KIA, DCCV, anes, MB, 3 Prep       Time Tracking:     OT Date of Treatment: 09/25/23  OT Start Time: 1005  OT Stop Time: 1035  OT Total Time (min): 30 min    Billable Minutes: Evaluation 15  and Self Care/Home Management 15     9/25/2023

## 2023-09-25 NOTE — ASSESSMENT & PLAN NOTE
-patient was recently hospitliazed from 08/05 to 09/07/2023 for ADHF and NSTEMI, course complicated by L MCA stroke. Was discharge to [facility] inpatient rehab and recently discharge home from [facility]  -PT/OT consulted  - resume home medications:  Aspirin, Eliquis, metoprolol tartrate, statin

## 2023-09-25 NOTE — HPI
"HPI: 58 y.o male, with a medical history of combined systolic and diastolic heart failure, Atrial fibrillation (one eliquis), Coronary artery disease, Dyslipidemia, Embolic stroke involving left middle cerebral artery, Hypertension,  and Stage 3 chronic kidney disease was brought into the ED by ambulance initially for "peg problem".  However, ED provider discussed with family who ultimately stated that they can no longer care for the patient. Patient is aphasic.  Called and spoke with the patient's ex wife Ms. Teague.  She stated patient was recently discharged home from inpatient rehab and not understand why.  States that he is very weak and has an unsteady gait.  Easily gets agitated but improves with his a.m. medications.  Patient lives alone on the 3rd floor and is unable to care for himself.  Per ex-wife, family is requesting for nursing home placement as they are unable to care for the patient at this time.  Mental status wise, patient appears to be at baseline, which is aphasic and intermittently agitated. Of note, patient was recently hospitliazed from 08/05 to 09/07/2023 for ADHF and NSTEMI, course complicated by L MCA stroke. Patient was also treated for pyelonephritis with course of IV CTX. Of note, that hospital course was complicated by non-redirectable agitation, briefly requiring restraints. Patient was discharge to inpatient rehab.      In the ED, patient with atrial fibrillation with RVR.  IV Lopressor was ordered in the ED, but was not given.  Rhythm and rate improved with home medications.  Labs significant for elevated BNP and troponin.  EKG with no acute ischemic changes. CXR with No convincing evidence of acute detrimental change relative prior radiograph performed 08/19/2023.  Patient admitted to observation  for further evaluation  "

## 2023-09-25 NOTE — NURSING
[facility]  Nurses Note -- 4 Eyes      9/25/2023       Skin assessed on: Admit      [x] No Pressure Injuries Present    [x]Prevention Measures Documented    [] Yes LDA  for Pressure Injury Previously documented     [] Yes New Pressure Injury Discovered   [] LDA for New Pressure Injury Added      Attending RN:  Mis Stubbs, RN     Second RN:  Johanne Pace RN

## 2023-09-25 NOTE — PLAN OF CARE
Recommendations   1) Start TF as soon as medically able: Isosource 1.5 @ 20 ml/hr Advancing to goal of 50 ml/hr + 100 ml flush q 4 hr ( or changes per MD discretion) ( provides 1800 kcal ( 94% EEN), 81 g protein ( 100% EPN), and 912 ml free water)     2) weigh weekly   3) If bolus needed 5 cartons daily + 55 ml flush before and after each bolus  Goals: 1) TF started in < 4 days  Nutrition Goal Status: new  Communication of RD Recs: reviewed with physician (POC, sticky note)

## 2023-09-25 NOTE — ASSESSMENT & PLAN NOTE
-Of note, on last hospitalization,  The hospital course was complicated by non-redirectable agitation, briefly requiring restraints. Patient was discharge to inpatient rehab at that time  -Psych consulted for medication recommendations  -Sitter at bedside.  -Zyprexa once PRN  - resume home valproic acid

## 2023-09-25 NOTE — PT/OT/SLP EVAL
Speech Language Pathology Evaluation  Bedside Swallow    Patient Name:  Tera Griffiths   MRN:  [id number]  Admitting Diagnosis: Atrial fibrillation with RVR    Recommendations:                 General Recommendations:  Dysphagia therapy and Cognitive-linguistic evaluation  Diet recommendations:   ,   NPO excpet ice for pleasure if willing to accept  Aspiration Precautions: good oral care  General Precautions: Standard,    Communication strategies:   calm tone    Assessment:     Tera Griffiths is a 58 y.o. male with dx of a-fib he presents with profound cognitive linguistic deficits c/b confusion, agitation that also negatively impact his ability to attempt po. Unfortunately this clinically correlates with performance at [facility] from 8/8/23-9/7/23.     History:     Past Medical History:   Diagnosis Date    Acute on chronic combined systolic and diastolic heart failure 09/23/2022    Atrial fibrillation     CAD (coronary artery disease) 09/23/2022    Dyslipidemia 09/23/2022    Embolic stroke involving left middle cerebral artery 08/08/2023    Encounter for blood transfusion     H/O heart artery stent     HTN (hypertension) 09/23/2022    ICD (implantable cardioverter-defibrillator) in place 09/23/2022    Paroxysmal A-fib 09/23/2022    Stage 3 chronic kidney disease 04/19/2023       Past Surgical History:   Procedure Laterality Date    CARDIAC DEFIBRILLATOR PLACEMENT Left     CEREBRAL ANGIOGRAM N/A 8/8/2023    Procedure: ANGIOGRAM-CEREBRAL;  Surgeon: Kenzie Rodriguez;  Location: [facility];  Service: Anesthesiology;  Laterality: N/A;  LVO (angiogram)    ESOPHAGOGASTRODUODENOSCOPY N/A 8/11/2023    Procedure: EGD (ESOPHAGOGASTRODUODENOSCOPY);  Surgeon: Coltete Martinez MD;  Location: [address]);  Service: Gastroenterology;  Laterality: N/A;    ESOPHAGOGASTRODUODENOSCOPY W/ PEG N/A 8/17/2023    Procedure: EGD, WITH PEG TUBE INSERTION;  Surgeon: Yan Scott MD;  Location: [facility] OR [address] [place] [address];  Service: General;   Laterality: N/A;  PEG, possible lap G    HERNIA REPAIR      LEFT HEART CATHETERIZATION Left 4/18/2023    Procedure: Left heart cath;  Surgeon: Atilio Marks MD;  Location: [facility] CATH LAB;  Service: Cardiology;  Laterality: Left;    RIGHT HEART CATHETERIZATION Right 9/30/2022    Procedure: INSERTION, CATHETER, RIGHT HEART;  Surgeon: Caden Aden MD;  Location: [facility] CATH LAB;  Service: Cardiology;  Laterality: Right;    TREATMENT OF CARDIAC ARRHYTHMIA N/A 11/22/2022    Procedure: CARDIOVERSION;  Surgeon: Marvin Sanon MD;  Location: [facility] EP LAB;  Service: Cardiology;  Laterality: N/A;  afib, KIA, DCCV, anes, MB, 3 Prep       Social History: Patient recently discharged from SNF to 3rd story walk up apartment with caregivers    Chest X-Rays: 9/25 No convincing evidence of acute detrimental change relative prior radiograph performed 08/19/2023, 09:09 hours.    Prior diet: PEG (as a result of inability to attend to bolus presentation)     Subjective   Pt asleep upon ST entrance. Pt easily woke upon tactile and verbal cuing however agitated, confused, restless, attempting to exit bed. He remained non verbal throughout and was unable to follow commands.   Patient goals: unable to report    Pain/Comfort:   0    Respiratory Status: Room air    Objective:     Oral Musculature Evaluation  Oral Musculature: unable to assess due to poor participation/comprehension  Oral Musculature Comments: unable to asses patient with oral aversion and agitated    Bedside Swallow Eval:   Consistencies Assessed:  Ice attempted X2; oral aversion noted for first attempt, Pt hit spoon for second attempt.     Oral Phase:   Unable to assess    Pharyngeal Phase:   Unable to assess    Compensatory Strategies  None    Treatment: Nursing can continue to attempt ice if Pt's agitation is controlled.     Goals:   Multidisciplinary Problems       SLP Goals          Problem: SLP    Goal Priority Disciplines Outcome   SLP Goal    Low SLP Ongoing,  Not Progressing   Description: STG  Pt will participate in ongoing assessment of swallow and cognitive linguistic skills.                       Plan:     Patient to be seen:  2 x/week   Plan of Care expires:   9/30  Plan of Care reviewed with:  patient   SLP Follow-Up:  Yes       Discharge recommendations:  TBD  Barriers to Discharge:  Level of Skilled Assistance Needed .    Time Tracking:     SLP Treatment Date:   09/25/23  Speech Start Time:  1442  Speech Stop Time:  1451     Speech Total Time (min):  9 min    Billable Minutes: Eval Swallow and Oral Function 9    09/25/2023

## 2023-09-25 NOTE — HPI
"58 y.o male, with a medical history of combined systolic and diastolic heart failure, Atrial fibrillation (one eliquis), Coronary artery disease, Dyslipidemia, Embolic stroke involving left middle cerebral artery, Hypertension,  and Stage 3 chronic kidney disease was brought into the ED by ambulance initially for "peg problem".  However, ED provider discussed with family who ultimately stated that they can no longer care for the patient. Patient is aphasic.  Called and spoke with the patient's ex wife Ms. Teague.  She stated patient was recently discharged home from inpatient rehab and not understand why.  States that he is very weak and has an unsteady gait.  Easily gets agitated but improves with his a.m. medications.  Patient lives alone on the 3rd floor and is unable to care for himself.  Per ex-wife, family is requesting for nursing home placement as they are unable to care for the patient at this time.  Mental status wise, patient appears to be at baseline, which is aphasic and intermittently agitated. Of note, patient was recently hospitliazed from 08/05 to 09/07/2023 for ADHF and NSTEMI, course complicated by L MCA stroke. Patient was also treated for pyelonephritis with course of IV CTX. Of note, that hospital course was complicated by non-redirectable agitation, briefly requiring restraints. Patient was discharge to inpatient rehab.     In the ED, patient with atrial fibrillation with RVR.  IV Lopressor was ordered in the ED, but was not given.  Rhythm and rate improved with home medications.  Labs significant for elevated BNP and troponin.  EKG with no acute ischemic changes. CXR with No convincing evidence of acute detrimental change relative prior radiograph performed 08/19/2023.  Patient admitted to observation  for further evaluation    "

## 2023-09-25 NOTE — ASSESSMENT & PLAN NOTE
- urine analysis for occasional bacteria, +1 leukocytes.  Patient unable to communicate any UTI symptoms   -urine culture with Gram-negative rods, identification and sensitivities pending   - of note, was recently hospitalized in August 2023 and was treated with a course of Rocephin for UTI, previous urine culture with  Klebsiella pneumoniae, sensitive to ceftriaxone  - continue Rocephin

## 2023-09-25 NOTE — PLAN OF CARE
Patient family is not able to take care of patient.   SW sent patient NH referral to [facility] [address] [facility].       09/25/23 1677   Discharge Planning   Assessment Type Discharge Planning Assessment   Support Systems Spouse/significant other;Family members   Equipment Currently Used at Home wheelchair;hospital bed;bedside commode   Current Living Arrangements home   Patient/Family Anticipates Transition to inpatient rehabilitation facility  (Nursing Home)   Patient/Family Anticipated Services at Transition skilled nursing   DME Needed Upon Discharge  other (see comments)   Discharge Plan A New Nursing Home placement - correction care facility   Discharge Plan B New Nursing Home placement - correction care facility

## 2023-09-25 NOTE — PLAN OF CARE
Problem: Physical Therapy  Goal: Physical Therapy Goal  Description: Goals to be met by: 10/2/23     Patient will increase functional independence with mobility by performin. Supine to sit with Stand-by Assistance  2. Bed to chair transfer with Supervision    3. Gait  x 25-50 feet with Minimal Assistance using HHA.   4. Wheelchair propulsion x25-50 feet with Contact Guard Assistance      Outcome: Ongoing, Progressing   Initial PT evaluation performed today.  Pt may benefit from skilled PT services 2-3x/wk, but may be limited 2/2 global deficits.  24hr care recommended.

## 2023-09-25 NOTE — H&P
"[facility] Medicine  History & Physical    Patient Name: Tera Griffiths  MRN: [id number]  Patient Class: OP- Observation  Admission Date: 9/24/2023  Attending Physician: Ilia Heller DO   Primary Care Provider: Carleen Bueno MD         Patient information was obtained from spouse/SO, relative(s), EMS personnel and ER records.     Subjective:     Principal Problem:Atrial fibrillation with RVR    Chief Complaint:   Chief Complaint   Patient presents with    Peg Tube Problem     Pt BIB [facility] from home due to a "peg tube problem". Pr EMS, pt's caretaker called EMS reporting redness and swelling around peg tube site. No redness noted in triage. Per EMS, pt had a stroke in August and was left aphasic. Pt unable to respond to questions but is awake and alert.        HPI: 58 y.o male, with a medical history of combined systolic and diastolic heart failure, Atrial fibrillation (one eliquis), Coronary artery disease, Dyslipidemia, Embolic stroke involving left middle cerebral artery, Hypertension,  and Stage 3 chronic kidney disease was brought into the ED by ambulance initially for "peg problem".  However, ED provider discussed with family who ultimately stated that they can no longer care for the patient. Patient is aphasic.  Called and spoke with the patient's ex wife Ms. Teague.  She stated patient was recently discharged home from inpatient rehab and not understand why.  States that he is very weak and has an unsteady gait.  Easily gets agitated but improves with his a.m. medications.  Patient lives alone on the 3rd floor and is unable to care for himself.  Per ex-wife, family is requesting for nursing home placement as they are unable to care for the patient at this time.  Mental status wise, patient appears to be at baseline, which is aphasic and intermittently agitated. Of note, patient was recently hospitliazed from 08/05 to 09/07/2023 for ADHF and NSTEMI, course complicated " by L MCA stroke. Patient was also treated for pyelonephritis with course of IV CTX. Of note, that hospital course was complicated by non-redirectable agitation, briefly requiring restraints. Patient was discharge to inpatient rehab.     In the ED, patient with atrial fibrillation with RVR.  IV Lopressor was ordered in the ED, but was not given.  Rhythm and rate improved with home medications.  Labs significant for elevated BNP and troponin.  EKG with no acute ischemic changes. CXR with No convincing evidence of acute detrimental change relative prior radiograph performed 08/19/2023.  Patient admitted to observation  for further evaluation        Past Medical History:   Diagnosis Date    Acute on chronic combined systolic and diastolic heart failure 09/23/2022    Atrial fibrillation     CAD (coronary artery disease) 09/23/2022    Dyslipidemia 09/23/2022    Embolic stroke involving left middle cerebral artery 08/08/2023    Encounter for blood transfusion     H/O heart artery stent     HTN (hypertension) 09/23/2022    ICD (implantable cardioverter-defibrillator) in place 09/23/2022    Paroxysmal A-fib 09/23/2022    Stage 3 chronic kidney disease 04/19/2023       Past Surgical History:   Procedure Laterality Date    CARDIAC DEFIBRILLATOR PLACEMENT Left     CEREBRAL ANGIOGRAM N/A 8/8/2023    Procedure: ANGIOGRAM-CEREBRAL;  Surgeon: Kenzie Rodriguez;  Location: [facility];  Service: Anesthesiology;  Laterality: N/A;  LVO (angiogram)    ESOPHAGOGASTRODUODENOSCOPY N/A 8/11/2023    Procedure: EGD (ESOPHAGOGASTRODUODENOSCOPY);  Surgeon: Colette Martinez MD;  [facility]  Service: Gastroenterology;  Laterality: N/A;    ESOPHAGOGASTRODUODENOSCOPY W/ PEG N/A 8/17/2023    Procedure: EGD, WITH PEG TUBE INSERTION;  Surgeon: Yan Scott MD;  Location: [facility] OR 2ND FLR;  Service: General;  Laterality: N/A;  PEG, possible lap G    HERNIA REPAIR      LEFT HEART CATHETERIZATION Left 4/18/2023     Procedure: Left heart cath;  Surgeon: Atilio Marks MD;  Location: [facility] CATH LAB;  Service: Cardiology;  Laterality: Left;    RIGHT HEART CATHETERIZATION Right 9/30/2022    Procedure: INSERTION, CATHETER, RIGHT HEART;  Surgeon: Caden Aden MD;  Location: [facility] CATH LAB;  Service: Cardiology;  Laterality: Right;    TREATMENT OF CARDIAC ARRHYTHMIA N/A 11/22/2022    Procedure: CARDIOVERSION;  Surgeon: Marvin Sanon MD;  Location: [facility] EP LAB;  Service: Cardiology;  Laterality: N/A;  afib, IKA, DCCV, anes, MB, 3 Prep       Review of patient's allergies indicates:  No Known Allergies    No current facility-administered medications on file prior to encounter.     Current Outpatient Medications on File Prior to Encounter   Medication Sig    amiodarone (PACERONE) 200 MG Tab 1 tablet (200 mg total) by Per G Tube route once daily.    apixaban (ELIQUIS) 5 mg Tab 1 tablet (5 mg total) by Per G Tube route 2 (two) times daily.    aspirin 81 MG Chew 1 tablet (81 mg total) by Per G Tube route once daily.    atorvastatin (LIPITOR) 80 MG tablet 1 tablet (80 mg total) by Per G Tube route once daily.    empagliflozin (JARDIANCE) 10 mg tablet 1 tablet (10 mg total) by Per G Tube route once daily.    ezetimibe (ZETIA) 10 mg tablet 1 tablet (10 mg total) by Per G Tube route every evening.    metoprolol tartrate (LOPRESSOR) 25 MG tablet 1 tablet (25 mg total) by Per G Tube route 2 (two) times daily.    QUEtiapine (SEROQUEL) 25 MG Tab 1 tablet (25 mg total) by Per G Tube route every evening.    valproic acid, as sodium salt, (DEPAKENE) 250 mg/5 mL (5 mL) Soln 10 mLs (500 mg total) by Per G Tube route once daily. To be administered 0900 daily    valproic acid, as sodium salt, (DEPAKENE) 250 mg/5 mL (5 mL) Soln 10 mLs (500 mg total) by Per G Tube route once daily. To be administers at 1200 daily    valproic acid, as sodium salt, (DEPAKENE) 250 mg/5 mL (5 mL) Soln 20 mLs (1,000 mg total) by Per G Tube route every  evening.    chlorhexidine (PERIDEX) 0.12 % solution 5 mLs 3 (three) times daily.    nitroGLYCERIN (NITROSTAT) 0.4 MG SL tablet Place under the tongue.     Family History    None       Tobacco Use    Smoking status: Never    Smokeless tobacco: Never   Substance and Sexual Activity    Alcohol use: Never    Drug use: Never    Sexual activity: Not on file     Review of Systems   Unable to perform ROS: Patient nonverbal     Objective:     Vital Signs (Most Recent):  Temp: 98.3 °F (36.8 °C) (09/25/23 0053)  Pulse: 85 (09/25/23 0843)  Resp: 20 (09/25/23 0053)  BP: (!) 134/93 (09/25/23 0319)  SpO2: 99 % (09/25/23 0843) Vital Signs (24h Range):  Temp:  [98.3 °F (36.8 °C)-98.5 °F (36.9 °C)] 98.3 °F (36.8 °C)  Pulse:  [] 85  Resp:  [18-20] 20  SpO2:  [96 %-100 %] 99 %  BP: (116-139)/(80-96) 134/93     Weight: 80.7 kg (178 lb)  Body mass index is 23.48 kg/m².     Physical Exam  Vitals and nursing note reviewed.   Constitutional:       General: He is not in acute distress.     Appearance: He is ill-appearing (chronically ill appearing).      Comments: Patient appears agitated. Attempting to climb out of the bed. Moaning and yelling but does not appear to be in pain.    HENT:      Mouth/Throat:      Mouth: Mucous membranes are dry.   Cardiovascular:      Rate and Rhythm: Tachycardia present. Rhythm irregular.      Heart sounds: Murmur heard.      No gallop.      Comments: +AICD  Pulmonary:      Effort: Pulmonary effort is normal. No respiratory distress.      Breath sounds: Wheezing (faint expiratory wheezes) present.   Abdominal:      General: There is no distension.      Palpations: Abdomen is soft.      Tenderness: There is no abdominal tenderness.      Comments: +PEG in place. Appears clean and intact, no overlying signs of infection   Musculoskeletal:         General: No swelling or tenderness.      Right lower leg: No edema.      Left lower leg: No edema.   Skin:     General: Skin is warm and dry.  "  Neurological:      Mental Status: He is alert. Mental status is at baseline.      Comments: Patient is aphasic. Per ex-wife, patient moans and groans at baseline. States when that happens, it means "he needs a bath or a diaper change".    Psychiatric:         Speech: He is noncommunicative.         Behavior: Behavior is agitated.                Significant Labs: All pertinent labs within the past 24 hours have been reviewed.  CBC:   Recent Labs   Lab 09/24/23  1457 09/25/23  0805   WBC 5.15 4.91   HGB 10.4* 10.1*   HCT 34.5* 33.7*    299     CMP:   Recent Labs   Lab 09/24/23  1457 09/25/23  0805    142   K 4.7 4.4    110   CO2 24 23    72   BUN 28* 23*   CREATININE 1.8* 1.5*   CALCIUM 9.5 9.1   PROT 7.6 7.0   ALBUMIN 3.6 3.2*   BILITOT 0.6 0.5   ALKPHOS 88 76   AST 35 31   ALT 32 28   ANIONGAP 11 9     Cardiac Markers:   Recent Labs   Lab 09/25/23  0805   BNP 3,173*       Troponin:   Recent Labs   Lab 09/25/23  0805   TROPONINI 0.046*     TSH:   Recent Labs   Lab 09/03/23  1227   TSH 0.736     Urine Studies:   Recent Labs   Lab 09/24/23  1444   COLORU Yellow   APPEARANCEUA Clear   PHUR 7.0   SPECGRAV 1.020   PROTEINUA Trace*   GLUCUA Negative   KETONESU Negative   BILIRUBINUA Negative   OCCULTUA Negative   NITRITE Negative   UROBILINOGEN 2.0-3.0*   LEUKOCYTESUR 1+*   RBCUA 11*   WBCUA 13*   BACTERIA Occasional   SQUAMEPITHEL 1       Significant Imaging: I have reviewed all pertinent imaging results/findings within the past 24 hours.    Imaging Results              X-Ray Chest AP Portable (Final result)  Result time 09/25/23 07:33:59      Final result by Atilio Leon MD (09/25/23 07:33:59)                   Impression:      No convincing evidence of acute detrimental change relative prior radiograph performed 08/19/2023, 09:09 hours.      Electronically signed by: Atilio Leon  Date:    09/25/2023  Time:    07:33               Narrative:    EXAMINATION:  XR CHEST AP " PORTABLE    CLINICAL HISTORY:  tachycardia;    TECHNIQUE:  Single frontal view of the chest was performed.    COMPARISON:  Chest radiograph performed 08/19/2023, 09:09 hours.    FINDINGS:  Monitoring leads over the chest. Left subclavian approach AICD. Enlarged cardiac silhouette. Prominence of central pulmonary vasculature.  Question mild pulmonary vascular congestion.    No definite focal airspace consolidation is seen. A small left pleural effusion is not excluded.    No acute findings in the visualized abdomen. Osseous and soft tissue structures appear without definite acute change. Small hyperattenuating fragments again project over the left hemithorax.                                       XR Gastric tube check, non-radiologist performed (Final result)  Result time 09/24/23 21:50:37      Final result by Danny Burks MD (09/24/23 21:50:37)                   Impression:      Intraluminal position of gastrostomy tube.      Electronically signed by: Danny Burks MD  Date:    09/24/2023  Time:    21:50               Narrative:    EXAMINATION:  XR GASTRIC TUBE CHECK, NON-RADIOLOGIST PERFORMED    CLINICAL HISTORY:  confirm appropriate placement;    TECHNIQUE:  Two AP views of the abdomen were obtained before and after administration of 60 cc Gastroview contrast.    COMPARISON:  08/26/2023.    FINDINGS:  Contrast was administered through patient's gastrostomy tube which confirms intraluminal position.  Contrast is visualized within the stomach and extends throughout the duodenum into the proximal jejunum.  Overall nonspecific bowel gas pattern with no evidence to suggest obstruction.  Scattered stool is seen in the colon.  Heart is enlarged.                                        Assessment/Plan:     * Atrial fibrillation with RVR  Patient with Paroxysmal (<7 days) atrial fibrillation which is controlled currently with Beta Blocker and Amiodarone. Patient is currently in atrial fibrillation.[id number] Score:  2. Anticoagulation indicated. Anticoagulation done with eliquis.    -Presented with afib RVR, HR in the 130s  -EKG reviewed and showed afib rvr  -Resolved with home medications  -suspect possibly triggered by UTI and not receiving home meds  -Resume home meds: home metoprolol tartrate 25 mg b.i.d. and amiodarone 200 mg daily  - monitor on telemetry    Acute cystitis  - urine analysis for occasional bacteria, +1 leukocytes.  Patient unable to communicate any UTI symptoms   -urine culture with Gram-negative rods, identification and sensitivities pending   - of note, was recently hospitalized in August 2023 and was treated with a course of Rocephin for UTI, previous urine culture with  Klebsiella pneumoniae, sensitive to ceftriaxone  - continue Rocephin    Elevated troponin  - patient with history of chronically elevated troponin   - Initial troponin 0.046, lower than baseline.  Possibly elevated due to demand from atrial fibrillation with RVR  - patient without any chest pain.  EKG with atrial fibrillation with RVR   - trend troponin   - resume home Eliquis    CAD (coronary artery disease)  -Patient has nonobstructive coronary artery disease.  He has history of STEMI his RCA.   -Last cath in 04/2023: The LAD had luminal irregularity with no obstructive disease.The left circumflex had luminal irregularity with no obstructive disease.  There was 1 obtuse marginal branch that was completely occluded and appeared to be an acute occlusion.  It was very small.The RCA luminal irregularity without obstructive disease.  - resume home med:  Metoprolol tartrate 25 mg b.i.d., aspirin 81 mg daily, atorvastatin 80 mg daily      Ischemic cardiomyopathy  -see plan as above for CAD    Embolic stroke involving left middle cerebral artery  -patient was recently hospitliazed from 08/05 to 09/07/2023 for ADHF and NSTEMI, course complicated by L MCA stroke. Was discharge to [facility] inpatient rehab and recently discharge home from [facility]  -PT/OT  consulted  - resume home medications:  Aspirin, Eliquis, metoprolol tartrate, statin    Global aphasia  - due to recent left MCA stroke in August 2023   -SLP consulted  -patient with PEG tube    Hemiparesis, right  - due to recent left MCA stroke in August 2023   -PT/OT consulted      Dysphagia  - due to recent left MCA stroke in August 2023   -SLP consulted  -Pt with PEG (placed in 8/2023)      Primary hypertension  - resume home medication:  Metoprolol tartrate 25 mg b.i.d., amiodarone 200 mg daily      Dyslipidemia  - resume home medication:  Ezetimibe 10 mg nightly, atorvastatin 80 mg daily      Stage 3 chronic kidney disease  -Cr on admit 1.8, appears to be near baseline   -Avoid nephrotoxins, renal dose all meds.   -Monitor Is/Os  -Monitor       Multiple subsegmental pulmonary emboli without acute cor pulmonale  -seen on CTA chest in 08/08/2023  - patient without any hypoxia  - resume home Eliquis      Agitation  -Of note, on last hospitalization,  The hospital course was complicated by non-redirectable agitation, briefly requiring restraints. Patient was discharge to inpatient rehab at that time  -Psych consulted for medication recommendations  -Sitter at bedside.  -Zyprexa once PRN  - resume home valproic acid    Debility  - PT/OT consulted      ICD (implantable cardioverter-defibrillator) in place  -history noted  - [employer]  - PMHx cardiac arrest 2/2 V-Tach    Advanced care planning/counseling discussion  - palliative care team consulted        VTE Risk Mitigation (From admission, onward)         Ordered     apixaban tablet 5 mg  2 times daily         09/24/23 1623                   On 09/25/2023, patient should be placed in hospital observation services under my care.        Macey-Soraya Heller DO  Department of Hospital Medicine  [facility] - Emergency Dept

## 2023-09-25 NOTE — ASSESSMENT & PLAN NOTE
- due to recent left MCA stroke in August 2023   -SLP consulted  -patient with PEG tube   From: Carli Harvey  To: Mimi Lopez PA-C  Sent: 5/18/2023 8:44 PM CDT  Subject: Valsartan    Felipe Abernathy Cornea been taking the Valsartan for my Blood pressure. I feel like I have been having joint pain in my fingers since I have been taking it. I actually missed 4 days and notice the pain had stop. Once I started taking it again after a few days the pain is back. Any suggestions?

## 2023-09-25 NOTE — PLAN OF CARE
Problem: Occupational Therapy  Goal: Occupational Therapy Goal  Description: Goals to be met by: 10/16/23     Patient will increase functional independence with ADLs by performing:    UE Dressing with [place].  LE Dressing with Modified [place].  Grooming while standing at sink with Supervision.  Toileting from toilet with Supervision for hygiene and clothing management.   Sitting in chair 30-60 minutes with Supervision.  Toilet transfer to toilet with Supervision.  Upper extremity exercise program x10  reps per handout, with assistance as needed.  Increase sustained attention to task x 5 min. With 1-2 verbal cues.     Outcome: established    Patient is having difficulty following directions, including written, due to global aphasia. Ongoing assessment needed for d/c placement, but patient unable to care for himself and would need 24 hr. Supervision. DME needs TBD depending on discharge disposition. Occupational therapy to see 4-5x/week while in acute care therapy.

## 2023-09-25 NOTE — NURSING
Pt arrive to the unit by stretcher accompanied by Cami HENRY and ED tech  Assisted pt to bed. Tele monitoring initiated.  Admit assessment initiated.  Pt is AAOx0.  NO distress noted.

## 2023-09-25 NOTE — CONSULTS
[facility]  Adult Nutrition  Consult Note    SUMMARY     Recommendations   1) Start TF as soon as medically able: Isosource 1.5 @ 20 ml/hr Advancing to goal of 50 ml/hr + 100 ml flush q 4 hr ( or changes per MD discretion) ( provides 1800 kcal ( 94% EEN), 81 g protein ( 100% EPN), and 912 ml free water)     2) weigh weekly   3) If bolus needed 5 cartons daily + 55 ml flush before and after each bolus  Goals: 1) TF started in < 4 days  Nutrition Goal Status: new  Communication of RD Recs: reviewed with physician (POC, sticky note)    Assessment and Plan  Inadequate oral intake  R/t dysphagia, NPO  As evidenced by PO/TF intakes < 50% of needs x 1-2 days  Intervention: enteral nutrition therapy, collaboration with other providers  new       Malnutrition Assessment         Micronutrient Evaluation Summary: suspected deficiency  Micronutrient Evaluation Comments: check iron                             Reason for Assessment    Reason For Assessment: consult  Diagnosis:  (AFIB)  Relevant Medical History: stroke, NSTEMI, CHF, dysphagia, PEG 8/2023, AFIB, CAD, CKD 3  Interdisciplinary Rounds: did not attend (remote)    General Information Comments: 57 y/o male admitted with AFIB and ? PEG problem, no infection noted at this time- consulted for TF recs. Pt aphasic, weak, with intermittent confusion and dysphagia, family unable to care fo him at home. NFPE to be done by on-site RD at f/u. Insignificant wt loss per chart review.    Nutrition Discharge Planning: To be determined- TF above. Unable to eat PO needs TF for > 90 days. OK to substitute with equivalent product.    Nutrition Risk Screen    Nutrition Risk Screen: difficulty chewing/swallowing, tube feeding or parenteral nutrition    Nutrition/Diet History    Patient Reported Diet/Restrictions/Preferences: no oral intake  Spiritual, Cultural Beliefs, Yazdanism Practices, Values that Affect Care: no  Food Allergies: NKFA  Factors Affecting Nutritional Intake:  "difficulty/impaired swallowing, NPO    Anthropometrics    Temp: 97.3 °F (36.3 °C)  Height Method: Stated  Height: 6' 1" (185.4 cm)  Height (inches): 73 in  Weight Method: Bed Scale  Weight: 76.2 kg (167 lb 15.9 oz)  Weight (lb): 167.99 lb  Ideal Body Weight (IBW), Male: 184 lb  % Ideal Body Weight, Male (lb): 91.3 %  BMI (Calculated): 22.2  Weight Loss: unintentional  Usual Body Weight (UBW), k.8 kg (22)  % Usual Body Weight: 92.22  % Weight Change From Usual Weight: -7.97 %       Lab/Procedures/Meds    Pertinent Labs Reviewed: reviewed  BMP  Lab Results   Component Value Date     2023    K 4.4 2023     2023    CO2 23 2023    BUN 23 (H) 2023    CREATININE 1.5 (H) 2023    CALCIUM 9.1 2023    ANIONGAP 9 2023    EGFRNORACEVR 54 (A) 2023     Recent Labs   Lab 23  1100   POCTGLUCOSE 83     Lab Results   Component Value Date    HGBA1C 5.6 2023       Lab Results   Component Value Date    ALBUMIN 3.2 (L) 2023       Pertinent Medications Reviewed: reviewed  Scheduled Meds:   amiodarone  200 mg Per G Tube Daily    apixaban  5 mg Per G Tube BID    aspirin  81 mg Per G Tube Daily    atorvastatin  80 mg Per G Tube Daily    [START ON 2023] cefTRIAXone (ROCEPHIN) IVPB  2 g Intravenous Q24H    ezetimibe  10 mg Per G Tube QHS    metoprolol  5 mg Intravenous ED 1 Time    metoprolol tartrate  25 mg Per G Tube BID    QUEtiapine  25 mg Per G Tube QHS    valproic acid (as sodium salt)  1,000 mg Per G Tube QHS    valproic acid (as sodium salt)  500 mg Per G Tube Daily    valproic acid (as sodium salt)  500 mg Per G Tube Daily with lunch     Continuous Infusions:  PRN Meds:.dextrose 10%, dextrose 10%, glucagon (human recombinant), glucose, glucose, insulin aspart U-100, naloxone, OLANZapine, sodium chloride 0.9%    Estimated/Assessed Needs    Weight Used For Calorie Calculations: 76.2 kg (167 lb 15.9 oz)  Energy Calorie " Requirements (kcal): 25-30 kcal/kg ( CKD) = 7165-9596 kcal  Energy Need Method: Kcal/kg  Protein Requirements: 1-1.2 g protein/kg ( age vs. CKD) = 76-91 g  Weight Used For Protein Calculations: 76.2 kg (167 lb 15.9 oz)  Fluid Requirements (mL): 3455-7123 ml or per MD CHF  CHO Requirement: N/A      Nutrition Prescription Ordered    Current Diet Order: NPO x 1 day    Evaluation of Received Nutrient/Fluid Intake    Energy Calories Required: not meeting needs  Protein Required: not meeting needs  Fluid Required: not meeting needs  Tolerance: other (see comments) (NPO)     Intake/Output Summary (Last 24 hours) at 9/25/2023 1319  Last data filed at 9/25/2023 0842  Gross per 24 hour   Intake 1087.5 ml   Output --   Net 1087.5 ml       % Intake of Estimated Energy Needs: 0%  % Meal Intake: NPO    Nutrition Risk    Level of Risk/Frequency of Follow-up:  (2 x weekly)       Monitor and Evaluation    Food and Nutrient Intake: energy intake  Food and Nutrient Adminstration: diet order, enteral and parenteral nutrition administration  Anthropometric Measurements: weight  Biochemical Data, Medical Tests and Procedures: electrolyte and renal panel, gastrointestinal profile, glucose/endocrine profile  Nutrition-Focused Physical Findings: overall appearance, extremities, muscles and bones       Nutrition Follow-Up    RD Follow-up?: Yes

## 2023-09-25 NOTE — ASSESSMENT & PLAN NOTE
- patient with history of chronically elevated troponin   - Initial troponin 0.046, lower than baseline.  Possibly elevated due to demand from atrial fibrillation with RVR  - patient without any chest pain.  EKG with atrial fibrillation with RVR   - trend troponin   - resume home Eliquis

## 2023-09-25 NOTE — ASSESSMENT & PLAN NOTE
Patient with Paroxysmal (<7 days) atrial fibrillation which is controlled currently with Beta Blocker and Amiodarone. Patient is currently in atrial fibrillation.[id number] Score: 2. Anticoagulation indicated. Anticoagulation done with eliquis.    -Presented with afib RVR, HR in the 130s  -EKG reviewed and showed afib rvr  -Resolved with home medications  -suspect possibly triggered by UTI and not receiving home meds  -Resume home meds: home metoprolol tartrate 25 mg b.i.d. and amiodarone 200 mg daily  - monitor on telemetry

## 2023-09-25 NOTE — ASSESSMENT & PLAN NOTE
-Patient has nonobstructive coronary artery disease.  He has history of STEMI his RCA.   -Last cath in 04/2023: The LAD had luminal irregularity with no obstructive disease.The left circumflex had luminal irregularity with no obstructive disease.  There was 1 obtuse marginal branch that was completely occluded and appeared to be an acute occlusion.  It was very small.The RCA luminal irregularity without obstructive disease.  - resume home med:  Metoprolol tartrate 25 mg b.i.d., aspirin 81 mg daily, atorvastatin 80 mg daily

## 2023-09-25 NOTE — ASSESSMENT & PLAN NOTE
-Cr on admit 1.8, appears to be near baseline   -Avoid nephrotoxins, renal dose all meds.   -Monitor Is/Os  -Monitor

## 2023-09-25 NOTE — CONSULTS
[facility] - Telemetry  Palliative Medicine  Consult Note    Patient Name: Tera Griffiths  MRN: [id number]  Admission Date: 9/24/2023  Hospital Length of Stay: 0 days  Code Status: Full Code   Attending Provider: Ilia Heller DO  Consulting Provider: Helen Szymanski NP  Primary Care Physician: Carleen Bueno MD  Principal Problem:Atrial fibrillation with RVR    Patient information was obtained from past medical records, ER records and primary team.      Inpatient consult to Palliative Care  Consult performed by: Helen Szymanski NP  Consult ordered by: Ilia Heller DO  Reason for consult: [facility]        Assessment/Plan:   Palliative encounter:  Advance Care Planning   -palliative consult received; patient known to Palliative   And goal on each visit was life prolongation despite risks or burdens see ACP notes  -chart reviewed in detail  -patient has HPOA in [facility] (ex spouse who has been caring for him but unable to now and needs placment. See SW notes   -discussed with Dr Heller  -at time of consult patient is alert but nonverbal from previous CVA. He is agitated while in ED with all the stimuli. He can nod and noted per family he is able to communicate his needs  -emotional support provided    Embolic stroke involving left middle cerebral artery  -patient was recently hospitliazed from 08/05 to 09/07/2023 for ADHF and NSTEMI, course complicated by L MCA stroke. Was discharge to [facility] inpatient rehab and recently discharge home from [facility]   -he does meet critieria for hospice CVA, multiple hospitalizations this year and numerous co- morbids but this does not appear to be in line with his previous GOC noted  See ACP notes    Agitation  -Of note, on last hospitalization,  The hospital course was complicated by non-redirectable agitation, briefly requiring restraints. Patient was discharge to inpatient rehab at that time      -Psych consulted      -mgmt per primary    Debility  - PT/OT consulted      Global  "aphasia  - due to recent left MCA stroke in August 2023   -SLP consulted  -patient with PEG tube     Hemiparesis, right    - due to recent left MCA stroke in August 2023      Dysphagia  - due to recent left MCA stroke in August 2023   -SLP consulted  -Pt with PEG (placed in 8/2023)      Atrial fibrillation with RVR  Patient with Paroxysmal (<7 days) atrial fibrillation which is controlled currently with Beta Blocker and Amiodarone.   -Resolved   -mgmt per primary    Elevated troponin  - patient with history of chronically elevated troponin   - Initial troponin 0.046, lower than baseline.  Possibly elevated due to demand from atrial fibrillation with RVR  -mgmt per primary      CAD (coronary artery disease)  -Patient has nonobstructive coronary artery disease.  He has history of STEMI his RCA.   -Last cath in 04/2023:see results     Ischemic cardiomyopathy  -see plan as above for CAD                    Thank you for your consult.    Subjective:        HPI: 58 y.o male, with a medical history of combined systolic and diastolic heart failure, Atrial fibrillation (one eliquis), Coronary artery disease, Dyslipidemia, Embolic stroke involving left middle cerebral artery, Hypertension,  and Stage 3 chronic kidney disease was brought into the ED by ambulance initially for "peg problem".  However, ED provider discussed with family who ultimately stated that they can no longer care for the patient. Patient is aphasic.  Called and spoke with the patient's ex wife Ms. Teague.  She stated patient was recently discharged home from inpatient rehab and not understand why.  States that he is very weak and has an unsteady gait.  Easily gets agitated but improves with his a.m. medications.  Patient lives alone on the 3rd floor and is unable to care for himself.  Per ex-wife, family is requesting for nursing home placement as they are unable to care for the patient at this time.  Mental status wise, patient appears to be at baseline, " which is aphasic and intermittently agitated. Of note, patient was recently hospitliazed from 08/05 to 09/07/2023 for ADHF and NSTEMI, course complicated by L MCA stroke. Patient was also treated for pyelonephritis with course of IV CTX. Of note, that hospital course was complicated by non-redirectable agitation, briefly requiring restraints. Patient was discharge to inpatient rehab.      In the ED, patient with atrial fibrillation with RVR.  IV Lopressor was ordered in the ED, but was not given.  Rhythm and rate improved with home medications.  Labs significant for elevated BNP and troponin.  EKG with no acute ischemic changes. CXR with No convincing evidence of acute detrimental change relative prior radiograph performed 08/19/2023.  Patient admitted to observation  for further evaluation      Hospital Course:  No notes on file        Past Medical History:   Diagnosis Date    Acute on chronic combined systolic and diastolic heart failure 09/23/2022    Atrial fibrillation     CAD (coronary artery disease) 09/23/2022    Dyslipidemia 09/23/2022    Embolic stroke involving left middle cerebral artery 08/08/2023    Encounter for blood transfusion     H/O heart artery stent     HTN (hypertension) 09/23/2022    ICD (implantable cardioverter-defibrillator) in place 09/23/2022    Paroxysmal A-fib 09/23/2022    Stage 3 chronic kidney disease 04/19/2023       Past Surgical History:   Procedure Laterality Date    CARDIAC DEFIBRILLATOR PLACEMENT Left     CEREBRAL ANGIOGRAM N/A 8/8/2023    Procedure: ANGIOGRAM-CEREBRAL;  Surgeon: Kenzie Rodriguez;  Location: [facility];  Service: Anesthesiology;  Laterality: N/A;  LVO (angiogram)    ESOPHAGOGASTRODUODENOSCOPY N/A 8/11/2023    Procedure: EGD (ESOPHAGOGASTRODUODENOSCOPY);  Surgeon: Colette Martinez MD;  Location: [address]);  Service: Gastroenterology;  Laterality: N/A;    ESOPHAGOGASTRODUODENOSCOPY W/ PEG N/A 8/17/2023    Procedure: EGD, WITH PEG TUBE  INSERTION;  Surgeon: Yan Scott MD;  Location: [facility];  Service: General;  Laterality: N/A;  PEG, possible lap G    HERNIA REPAIR      LEFT HEART CATHETERIZATION Left 4/18/2023    Procedure: Left heart cath;  Surgeon: Atilio Marks MD;  Location: [facility] CATH LAB;  Service: Cardiology;  Laterality: Left;    RIGHT HEART CATHETERIZATION Right 9/30/2022    Procedure: INSERTION, CATHETER, RIGHT HEART;  Surgeon: Caden Aden MD;  Location: [facility] CATH LAB;  Service: Cardiology;  Laterality: Right;    TREATMENT OF CARDIAC ARRHYTHMIA N/A 11/22/2022    Procedure: CARDIOVERSION;  Surgeon: Marvin Sanon MD;  Location: [facility] EP LAB;  Service: Cardiology;  Laterality: N/A;  afib, KIA, DCCV, anes, MB, 3 Prep       Review of patient's allergies indicates:  No Known Allergies    Medications:  Continuous Infusions:  Scheduled Meds:   amiodarone  200 mg Per G Tube Daily    apixaban  5 mg Per G Tube BID    aspirin  81 mg Per G Tube Daily    atorvastatin  80 mg Per G Tube Daily    [START ON 9/26/2023] cefTRIAXone (ROCEPHIN) IVPB  2 g Intravenous Q24H    ezetimibe  10 mg Per G Tube QHS    metoprolol  5 mg Intravenous ED 1 Time    metoprolol tartrate  25 mg Per G Tube BID    QUEtiapine  25 mg Per G Tube QHS    valproic acid (as sodium salt)  1,000 mg Per G Tube QHS    valproic acid (as sodium salt)  500 mg Per G Tube Daily    valproic acid (as sodium salt)  500 mg Per G Tube Daily with lunch     PRN Meds:dextrose 10%, dextrose 10%, glucagon (human recombinant), glucose, glucose, insulin aspart U-100, naloxone, OLANZapine, sodium chloride 0.9%    Family History    None       Tobacco Use    Smoking status: Never    Smokeless tobacco: Never   Substance and Sexual Activity    Alcohol use: Never    Drug use: Never    Sexual activity: Not on file       Review of Systems   Unable to perform ROS: Patient nonverbal (patient is aphasic)     Objective:     Vital Signs (Most Recent):  Temp: 97.3 °F (36.3 °C)  (09/25/23 1155)  Pulse: 75 (09/25/23 1155)  Resp: 18 (09/25/23 1155)  BP: 109/67 (09/25/23 1155)  SpO2: 100 % (09/25/23 1018) Vital Signs (24h Range):  Temp:  [97.3 °F (36.3 °C)-98.3 °F (36.8 °C)] 97.3 °F (36.3 °C)  Pulse:  [] 75  Resp:  [18-20] 18  SpO2:  [96 %-100 %] 100 %  BP: (109-139)/(67-96) 109/67     Weight: 76.2 kg (168 lb)  Body mass index is 22.16 kg/m².       Physical Exam  Vitals and nursing note reviewed.   Constitutional:       General: He is not in acute distress.     Appearance: He is ill-appearing. He is not toxic-appearing or diaphoretic.      Comments: Agitated, flailing his extremities    HENT:      Mouth/Throat:      Mouth: Mucous membranes are dry.   Cardiovascular:      Rate and Rhythm: Tachycardia present.   Pulmonary:      Effort: Pulmonary effort is normal.   Abdominal:      General: There is no distension.      Comments: PEG   Musculoskeletal:      Right lower leg: No edema.      Left lower leg: No edema.   Skin:     General: Skin is warm and dry.   Neurological:      Mental Status: He is alert.      Comments: Patient is aphasic, unable to assess   Psychiatric:         Behavior: Behavior is agitated.            Review of Symptoms      Symptom Assessment (ESAS 0-10 Scale)  Unable to complete assessment due to Patient nonverbal         Pain Assessment in Advanced Demential Scale (PAINAD)   Breathing - Independent of vocalization:  1  Negative vocalization:  2  Facial expression:  2  Body language:  2  Consolability:  1  Total:  8    Psychosocial/Cultural:   See Palliative Psychosocial Note: No  **Primary  to Follow**  Palliative Care  Consult: No        Advance Care Planning  Advance Directives:   Medical Power of : Yes    Goals of Care: What is most important right now is to focus on extending life as long as possible, even it it means sacrificing quality, curative/life-prolongation (regardless of treatment burdens). Accordingly, we have decided that  the best plan to meet the patient's goals includes continuing with treatment.         Significant Labs: All pertinent labs within the past 24 hours have been reviewed.  CBC:   Recent Labs   Lab 09/25/23 0805   WBC 4.91   HGB 10.1*   HCT 33.7*   MCV 92        BMP:  Recent Labs   Lab 09/25/23 0805   GLU 72      K 4.4      CO2 23   BUN 23*   CREATININE 1.5*   CALCIUM 9.1     LFT:  Lab Results   Component Value Date    AST 31 09/25/2023    ALKPHOS 76 09/25/2023    BILITOT 0.5 09/25/2023     Albumin:   Albumin   Date Value Ref Range Status   09/25/2023 3.2 (L) 3.5 - 5.2 g/dL Final     Protein:   Total Protein   Date Value Ref Range Status   09/25/2023 7.0 6.0 - 8.4 g/dL Final     Lactic acid:   Lab Results   Component Value Date    LACTATE 1.8 08/19/2023    LACTATE 1.0 07/13/2023       Significant Imaging: I have reviewed all pertinent imaging results/findings within the past 24 hours. CXR        I spent a total of 70 minutes on the day of the visit. This includes face to face time in discussion of goals of care, symptom assessment, coordination of care and emotional support.  This also includes non-face to face time preparing to see the patient (eg, review of tests/imaging), obtaining and/or reviewing separately obtained history, documenting clinical information in the electronic or other health record, independently interpreting results and communicating results to the patient/family/caregiver, or care coordinator.    Helen Szymanski, NP  Palliative Medicine  [facility]

## 2023-09-25 NOTE — PLAN OF CARE
Problem: Adult Inpatient Plan of Care  Goal: Plan of Care Review  Outcome: Ongoing, Progressing  Goal: Patient-Specific Goal (Individualized)  Outcome: Ongoing, Progressing  Goal: Absence of Hospital-Acquired Illness or Injury  Outcome: Ongoing, Progressing  Goal: Optimal Comfort and Wellbeing  Outcome: Ongoing, Progressing  Goal: Readiness for Transition of Care  Outcome: Ongoing, Progressing     Problem: Coping Ineffective  Goal: Effective Coping  Outcome: Ongoing, Progressing     Problem: Fall Injury Risk  Goal: Absence of Fall and Fall-Related Injury  Outcome: Ongoing, Progressing     Problem: Adjustment to Illness (Sepsis/Septic Shock)  Goal: Optimal Coping  Outcome: Ongoing, Progressing     Problem: Bleeding (Sepsis/Septic Shock)  Goal: Absence of Bleeding  Outcome: Ongoing, Progressing     Problem: Glycemic Control Impaired (Sepsis/Septic Shock)  Goal: Blood Glucose Level Within Desired Range  Outcome: Ongoing, Progressing     Problem: Infection Progression (Sepsis/Septic Shock)  Goal: Absence of Infection Signs and Symptoms  Outcome: Ongoing, Progressing     Problem: Nutrition Impaired (Sepsis/Septic Shock)  Goal: Optimal Nutrition Intake  Outcome: Ongoing, Progressing     Problem: Fluid and Electrolyte Imbalance (Acute Kidney Injury/Impairment)  Goal: Fluid and Electrolyte Balance  Outcome: Ongoing, Progressing     Problem: Oral Intake Inadequate (Acute Kidney Injury/Impairment)  Goal: Optimal Nutrition Intake  Outcome: Ongoing, Progressing     Problem: Renal Function Impairment (Acute Kidney Injury/Impairment)  Goal: Effective Renal Function  Outcome: Ongoing, Progressing     Problem: Oral Intake Inadequate  Goal: Improved Oral Intake  Outcome: Ongoing, Progressing

## 2023-09-26 PROBLEM — Z51.5 PALLIATIVE CARE ENCOUNTER: Status: ACTIVE | Noted: 2023-09-25

## 2023-09-26 LAB
ANION GAP SERPL CALC-SCNC: 9 MMOL/L (ref 8–16)
BUN SERPL-MCNC: 22 MG/DL (ref 6–20)
CALCIUM SERPL-MCNC: 9 MG/DL (ref 8.7–10.5)
CHLORIDE SERPL-SCNC: 108 MMOL/L (ref 95–110)
CO2 SERPL-SCNC: 23 MMOL/L (ref 23–29)
CREAT SERPL-MCNC: 1.6 MG/DL (ref 0.5–1.4)
EST. GFR  (NO RACE VARIABLE): 50 ML/MIN/1.73 M^2
GLUCOSE SERPL-MCNC: 70 MG/DL (ref 70–110)
MAGNESIUM SERPL-MCNC: 2.1 MG/DL (ref 1.6–2.6)
PHOSPHATE SERPL-MCNC: 3.7 MG/DL (ref 2.7–4.5)
POCT GLUCOSE: 73 MG/DL (ref 70–110)
POCT GLUCOSE: 81 MG/DL (ref 70–110)
POCT GLUCOSE: 87 MG/DL (ref 70–110)
POCT GLUCOSE: 93 MG/DL (ref 70–110)
POTASSIUM SERPL-SCNC: 4.7 MMOL/L (ref 3.5–5.1)
SODIUM SERPL-SCNC: 140 MMOL/L (ref 136–145)

## 2023-09-26 PROCEDURE — 25000003 PHARM REV CODE 250: Performed by: PHYSICIAN ASSISTANT

## 2023-09-26 PROCEDURE — 96372 THER/PROPH/DIAG INJ SC/IM: CPT | Performed by: STUDENT IN AN ORGANIZED HEALTH CARE EDUCATION/TRAINING PROGRAM

## 2023-09-26 PROCEDURE — S0166 INJ OLANZAPINE 2.5MG: HCPCS | Performed by: NURSE PRACTITIONER

## 2023-09-26 PROCEDURE — 97530 THERAPEUTIC ACTIVITIES: CPT

## 2023-09-26 PROCEDURE — 25000003 PHARM REV CODE 250: Performed by: STUDENT IN AN ORGANIZED HEALTH CARE EDUCATION/TRAINING PROGRAM

## 2023-09-26 PROCEDURE — 83735 ASSAY OF MAGNESIUM: CPT | Performed by: STUDENT IN AN ORGANIZED HEALTH CARE EDUCATION/TRAINING PROGRAM

## 2023-09-26 PROCEDURE — G0378 HOSPITAL OBSERVATION PER HR: HCPCS

## 2023-09-26 PROCEDURE — 36415 COLL VENOUS BLD VENIPUNCTURE: CPT | Performed by: STUDENT IN AN ORGANIZED HEALTH CARE EDUCATION/TRAINING PROGRAM

## 2023-09-26 PROCEDURE — 99215 PR OFFICE/OUTPT VISIT, EST, LEVL V, 40-54 MIN: ICD-10-PCS | Mod: ,,, | Performed by: NURSE PRACTITIONER

## 2023-09-26 PROCEDURE — 97535 SELF CARE MNGMENT TRAINING: CPT

## 2023-09-26 PROCEDURE — 99499 NO LOS: ICD-10-PCS | Mod: ,,, | Performed by: NURSE PRACTITIONER

## 2023-09-26 PROCEDURE — S0166 INJ OLANZAPINE 2.5MG: HCPCS | Performed by: STUDENT IN AN ORGANIZED HEALTH CARE EDUCATION/TRAINING PROGRAM

## 2023-09-26 PROCEDURE — 96361 HYDRATE IV INFUSION ADD-ON: CPT

## 2023-09-26 PROCEDURE — 99499 UNLISTED E&M SERVICE: CPT | Mod: ,,, | Performed by: NURSE PRACTITIONER

## 2023-09-26 PROCEDURE — 99215 OFFICE O/P EST HI 40 MIN: CPT | Mod: ,,, | Performed by: NURSE PRACTITIONER

## 2023-09-26 PROCEDURE — 25000003 PHARM REV CODE 250: Performed by: NURSE PRACTITIONER

## 2023-09-26 PROCEDURE — 80048 BASIC METABOLIC PNL TOTAL CA: CPT | Performed by: STUDENT IN AN ORGANIZED HEALTH CARE EDUCATION/TRAINING PROGRAM

## 2023-09-26 PROCEDURE — 96366 THER/PROPH/DIAG IV INF ADDON: CPT

## 2023-09-26 PROCEDURE — 84100 ASSAY OF PHOSPHORUS: CPT | Performed by: STUDENT IN AN ORGANIZED HEALTH CARE EDUCATION/TRAINING PROGRAM

## 2023-09-26 PROCEDURE — 96372 THER/PROPH/DIAG INJ SC/IM: CPT | Performed by: NURSE PRACTITIONER

## 2023-09-26 PROCEDURE — 63600175 PHARM REV CODE 636 W HCPCS: Performed by: STUDENT IN AN ORGANIZED HEALTH CARE EDUCATION/TRAINING PROGRAM

## 2023-09-26 PROCEDURE — 63600175 PHARM REV CODE 636 W HCPCS: Performed by: HOSPITALIST

## 2023-09-26 PROCEDURE — 25000003 PHARM REV CODE 250: Performed by: EMERGENCY MEDICINE

## 2023-09-26 RX ORDER — TALC
6 POWDER (GRAM) TOPICAL NIGHTLY PRN
Status: DISCONTINUED | OUTPATIENT
Start: 2023-09-26 | End: 2023-09-28 | Stop reason: HOSPADM

## 2023-09-26 RX ORDER — OLANZAPINE 10 MG/2ML
5 INJECTION, POWDER, FOR SOLUTION INTRAMUSCULAR ONCE AS NEEDED
Status: COMPLETED | OUTPATIENT
Start: 2023-09-26 | End: 2023-09-26

## 2023-09-26 RX ORDER — DEXTROSE MONOHYDRATE AND SODIUM CHLORIDE 5; .9 G/100ML; G/100ML
INJECTION, SOLUTION INTRAVENOUS CONTINUOUS
Status: DISCONTINUED | OUTPATIENT
Start: 2023-09-26 | End: 2023-09-28 | Stop reason: HOSPADM

## 2023-09-26 RX ORDER — OLANZAPINE 10 MG/2ML
5 INJECTION, POWDER, FOR SOLUTION INTRAMUSCULAR ONCE AS NEEDED
Status: DISCONTINUED | OUTPATIENT
Start: 2023-09-26 | End: 2023-09-26

## 2023-09-26 RX ADMIN — OLANZAPINE 5 MG: 10 INJECTION, POWDER, FOR SOLUTION INTRAMUSCULAR at 05:09

## 2023-09-26 RX ADMIN — DEXTROSE AND SODIUM CHLORIDE: 5; 900 INJECTION, SOLUTION INTRAVENOUS at 09:09

## 2023-09-26 RX ADMIN — APIXABAN 5 MG: 5 TABLET, FILM COATED ORAL at 08:09

## 2023-09-26 RX ADMIN — CEFTRIAXONE 2 G: 2 INJECTION, POWDER, FOR SOLUTION INTRAMUSCULAR; INTRAVENOUS at 10:09

## 2023-09-26 RX ADMIN — APIXABAN 5 MG: 5 TABLET, FILM COATED ORAL at 10:09

## 2023-09-26 RX ADMIN — OLANZAPINE 5 MG: 10 INJECTION, POWDER, LYOPHILIZED, FOR SOLUTION INTRAMUSCULAR at 12:09

## 2023-09-26 RX ADMIN — METOPROLOL TARTRATE 25 MG: 25 TABLET, FILM COATED ORAL at 08:09

## 2023-09-26 RX ADMIN — QUETIAPINE 25 MG: 25 TABLET ORAL at 08:09

## 2023-09-26 RX ADMIN — VALPROIC ACID 500 MG: 250 SOLUTION ORAL at 12:09

## 2023-09-26 RX ADMIN — Medication 6 MG: at 10:09

## 2023-09-26 RX ADMIN — ATORVASTATIN CALCIUM 80 MG: 40 TABLET, FILM COATED ORAL at 09:09

## 2023-09-26 RX ADMIN — ASPIRIN 81 MG CHEWABLE TABLET 81 MG: 81 TABLET CHEWABLE at 10:09

## 2023-09-26 RX ADMIN — AMIODARONE HYDROCHLORIDE 200 MG: 200 TABLET ORAL at 10:09

## 2023-09-26 RX ADMIN — VALPROIC ACID 500 MG: 250 SOLUTION ORAL at 09:09

## 2023-09-26 RX ADMIN — DEXTROSE AND SODIUM CHLORIDE: 5; 900 INJECTION, SOLUTION INTRAVENOUS at 10:09

## 2023-09-26 RX ADMIN — EZETIMIBE 10 MG: 10 TABLET ORAL at 08:09

## 2023-09-26 RX ADMIN — VALPROIC ACID 1000 MG: 250 SOLUTION ORAL at 08:09

## 2023-09-26 NOTE — ASSESSMENT & PLAN NOTE
-patient was recently hospitliazed from 08/05 to 09/07/2023 for ADHF and NSTEMI, course complicated by L MCA stroke. Was discharge to [facility] inpatient rehab and recently discharge home from [facility]  -PT/OT consulted  - resume home medications:  Aspirin, Eliquis, metoprolol tartrate, statin  Patient has been started on tube feeding.  Patient was very combative over night,received Zyprexa,on restrain,seems family can not take of him,palliative is on case.

## 2023-09-26 NOTE — NURSING
[facility] [place]  Nurses Note -- 4 Eyes      9/25/2023       Skin assessed on: Admit      [x] No Pressure Injuries Present    [x]Prevention Measures Documented    [] Yes LDA  for Pressure Injury Previously documented     [] Yes New Pressure Injury Discovered   [] LDA for New Pressure Injury Added      Attending RN:  Madeline Schaefer LPN     Second RN:  CARL SPEAR

## 2023-09-26 NOTE — NURSING
Pt became increasingly agitated and IM prn med given at 0048. Pt now calm and resting. Peg tube residual checked and 0 ml residual noted.Tube feeding rate increased by 5ml/hr. Will continue to monitor

## 2023-09-26 NOTE — NURSING
Report received from night nurse Madeline LPN. Visualized patient and assessed patient's overall condition and appearance. No acute distress noted. Will continue to monitor

## 2023-09-26 NOTE — PT/OT/SLP PROGRESS
Occupational Therapy   Treatment    Name: Tera Griffiths  MRN: [id number]  Admitting Diagnosis:  Atrial fibrillation with RVR       Recommendations:     Discharge Recommendations: nursing facility, basic  Discharge Equipment Recommendations:  to be determined by next level of care  Barriers to discharge:   (patient unable to safely care for himself and is at high risk for readmission and falls due to global aphasia and severe cognitive deficits including poor safety awareness)    Assessment:     Tera Griffiths is a 58 y.o. male with a medical diagnosis of Atrial fibrillation with RVR.  He presents with HOB elevated and sitter present with some wheezing noted after sitting EOB. He initially did not want oxygen sats taken. Performance deficits affecting function are impaired endurance, weakness, impaired sensation, impaired self care skills, impaired functional mobility, gait instability, impaired balance, decreased safety awareness, pain, decreased ROM, impaired coordination, impaired fine motor, impaired cognition.     Rehab Prognosis:  Fair; patient would benefit from acute skilled OT services to address these deficits and reach maximum level of function.       Plan:     Patient to be seen 4 x/week (4-5x/week) to address the above listed problems via self-care/home management, neuromuscular re-education, therapeutic activities, therapeutic exercises  Plan of Care Expires: 10/16/23  Plan of Care Reviewed with: patient    Subjective     Chief Complaint: aphasic   Patient/Family Comments/goals: patient cannot verbalize   Pain/Comfort:       Objective:     Communicated with: RNAicha, prior to session.  Patient found HOB elevated with telemetry, PEG Tube, sitter, IV, upon OT entry to room.    General Precautions: Standard, fall, aphasia    Orthopedic Precautions:N/A  Braces: N/A  Respiratory Status: Room air     Occupational Performance:     Bed Mobility:    Patient completed Rolling/Turning to Left with   maximal assistance and 2  persons  Patient completed Rolling/Turning to Right with maximal assistance  Patient completed Scooting/Bridging with maximal assistance  Patient completed Supine to Sit with maximal assistance  Patient completed Sit to Supine with maximal assistance     Functional Mobility/Transfers:  Patient completed Sit <> Stand Transfer with maximal assistance and of 2 persons  with  rolling walker   Patient completed Bed <> Chair Transfer using Step Transfer technique with maximal assistance and of 2 persons with rolling walker  Functional Mobility: Patient walked ~40' with RW with occupational therapy and PTA with verbal cues needed to continue using RW and for safety with occupational therapy following with chair.     Activities of Daily Living:  Upper Body Dressing: minimum assistance to don and max assist to doff gown   Lower Body Dressing: total  assistance to don socks sitting EOB due to extensor tone and decreased ability to lean forward   Toileting: total assistance x 2 to remove brief and add new brief, but he used urinal with setup in long sitting in bed       [facility] 6 Click ADL:      Treatment & Education:  Occupational therapy educated him to place hands on chair when sitting to prevent falls.   Occupational therapy did AAROM with his right upper extremity x 5 reps before he became agitated and did not want to exercise with left arm.     Patient left HOB elevated with all lines intact, call button in reach, bed alarm on, RN  notified, and sitter  present    GOALS:   Multidisciplinary Problems       Occupational Therapy Goals          Problem: Occupational Therapy    Goal Priority Disciplines Outcome Interventions   Occupational Therapy Goal     OT, PT/OT Ongoing, Progressing    Description: Goals to be met by: 10/16/23     Patient will increase functional independence with ADLs by performing:    UE Dressing with [place].  LE Dressing with Modified [place].  Grooming while standing  at sink with Supervision.  Toileting from toilet with Supervision for hygiene and clothing management.   Sitting in chair 30-60 minutes with Supervision.  Toilet transfer to toilet with Supervision.  Upper extremity exercise program x10  reps per handout, with assistance as needed.  Increase sustained attention to task x 5 min. With 1-2 verbal cues.                          Time Tracking:     OT Date of Treatment: 09/26/23  OT Start Time: 1549  OT Stop Time: 1615  OT Total Time (min): 26 min    Billable Minutes:Self Care/Home Management 15       OT/ANDREA: OT        Co-treat with PTA   9/27/2023   Hemigard Postcare Instructions: The HEMIGARD strips are to remain completely dry for at least 5-7 days.

## 2023-09-26 NOTE — PLAN OF CARE
Problem: Adult Inpatient Plan of Care  Goal: Plan of Care Review  Outcome: Ongoing, Progressing  Goal: Optimal Comfort and Wellbeing  Outcome: Ongoing, Progressing  Intervention: Monitor Pain and Promote Comfort  Flowsheets (Taken 9/26/2023 1812)  Pain Management Interventions:   care clustered   pillow support provided   position adjusted

## 2023-09-26 NOTE — ASSESSMENT & PLAN NOTE
Patient with Paroxysmal (<7 days) atrial fibrillation which is controlled currently with Beta Blocker and Amiodarone. Patient is currently in atrial fibrillation.[id number] Score: 2. Anticoagulation indicated. Anticoagulation done with eliquis.    -Presented with afib RVR, HR in the 130s  -EKG reviewed and showed afib rvr  -Resolved with home medications  -suspect possibly triggered by UTI and not receiving home meds  -Resume home meds: home metoprolol tartrate 25 mg b.i.d. and amiodarone 200 mg daily  - monitor on telemetry,  Rate is controled.

## 2023-09-26 NOTE — PT/OT/SLP PROGRESS
Physical Therapy Treatment    Patient Name:  Tera Griffiths   MRN:  [id number]    Recommendations:     Discharge Recommendations:  (24hr care)  Discharge Equipment Recommendations:  ongoing   Barriers to discharge: None    Assessment:     Tera Griffiths is a 58 y.o. male admitted with a medical diagnosis of Atrial fibrillation with RVR.  He presents with the following impairments/functional limitations: weakness, impaired endurance, impaired self care skills, impaired functional mobility, impaired balance, gait instability, decreased safety awareness, decreased lower extremity function, impaired cognition, decreased ROM, decreased coordination, decreased upper extremity function, impaired coordination .    Rehab Prognosis: Fair-  patient would benefit from acute skilled PT services to address these deficits and reach maximum level of function.    Recent Surgery: * No surgery found *      Plan:     During this hospitalization, patient to be seen  (2-3x/wk) to address the identified rehab impairments via   and progress toward the following goals:    Plan of Care Expires:  10/02/23    Subjective     Chief Complaint: pt is nonverbal , moaning throughout treatment   Patient/Family Comments/goals:   Pain/Comfort:  Pain Rating 1: 0/10  Pain Rating Post-Intervention 1: 0/10      Objective:     Communicated with nurse Holcomb  prior to session.  Patient found HOB elevated  with telemetry, bed alarm, peripheral IV, PEG Tube upon PT entry to room.     General Precautions: Standard, fall, aphasia  Orthopedic Precautions: N/A  Braces: N/A  Respiratory Status: Room air     Functional Mobility:  Bed Mobility:     Scooting: contact guard assistance to scoot to HOB and MOD A to scoot anteriorly   Supine to Sit: mod assistance, HOB elevated   Sit to Supine: moderate assistance x 2   Transfers:     Sit to Stand: from bed and chair moderate assistance x 2 with rolling walker  Chair to bed :  moderate assistance and of 2 persons  with  hand-held assist  using  Step Transfer  Gait:  pt ambulated 50 ft with RW, CGA(chair followed) and 10 steps from chair to bed with HHA ,MOD A x 2. Noted with decreased melissa,decreased step length, pt unable to follow simple commands to participate/compete functional gait .  Balance:  fair - in standing       AM-PAC 6 CLICK MOBILITY  Turning over in bed (including adjusting bedclothes, sheets and blankets)?: 2  Sitting down on and standing up from a chair with arms (e.g., wheelchair, bedside commode, etc.): 2  Moving from lying on back to sitting on the side of the bed?: 3  Moving to and from a bed to a chair (including a wheelchair)?: 2  Need to walk in hospital room?: 3  Climbing 3-5 steps with a railing?: 2  Basic Mobility Total Score: 14       Treatment & Education:  Performed PROM BLE ex's : AP, LAQ, HSC x 10 reps each while sitting up in chair.     Patient left HOB elevated with all lines intact, call button in reach, bed alarm on, nurse notified, and safety sitter and OT  present..    GOALS:   Multidisciplinary Problems       Physical Therapy Goals          Problem: Physical Therapy    Goal Priority Disciplines Outcome Goal Variances Interventions   Physical Therapy Goal     PT, PT/OT Ongoing, Progressing     Description: Goals to be met by: 10/2/23     Patient will increase functional independence with mobility by performin. Supine to sit with Stand-by Assistance  2. Bed to chair transfer with Supervision    3. Gait  x 25-50 feet with Minimal Assistance using HHA.   4. Wheelchair propulsion x25-50 feet with Contact Guard Assistance                           Time Tracking:     PT Received On: 23  PT Start Time: 1549     PT Stop Time: 1615  PT Total Time (min): 26 min     Billable Minutes: Gait Training 13 and Total Time 26 min with OT     Treatment Type: Treatment  PT/PTA: PTA     Number of PTA visits since last PT visit: 2023

## 2023-09-26 NOTE — PLAN OF CARE
Problem: Adult Inpatient Plan of Care  Goal: Plan of Care Review  Outcome: Ongoing, Progressing  Goal: Patient-Specific Goal (Individualized)  Outcome: Ongoing, Progressing  Goal: Absence of Hospital-Acquired Illness or Injury  Outcome: Ongoing, Progressing  Goal: Optimal Comfort and Wellbeing  Outcome: Ongoing, Progressing  Goal: Readiness for Transition of Care  Outcome: Ongoing, Progressing     Problem: Coping Ineffective  Goal: Effective Coping  Outcome: Ongoing, Progressing     Problem: Fall Injury Risk  Goal: Absence of Fall and Fall-Related Injury  Outcome: Ongoing, Progressing

## 2023-09-26 NOTE — PROGRESS NOTES
[facility] - Telemetry  Palliative Medicine  Progress Note    Patient Name: Tera Griffiths  MRN: [id number]  Admission Date: 9/24/2023  Hospital Length of Stay: 0 days  Code Status: Full Code   Attending Provider: Gema Barrera, *  Consulting Provider: Helen Szymanski NP  Primary Care Physician: Carleen Bueno MD  Principal Problem:Atrial fibrillation with RVR        Assessment/Plan:   Palliative encounter:  -interval chart reviewed  -discussed with Dr Meredith   -discussed with nursing home liason from one of the [facility] referral sent to.They had come to evaluate the patient. She states HPOA adamant about SNF.  -discussed with SW/CM Randal and An  -discussed with therapy who report patient is at his baseline. Recently discharged from [facility]  -at time of consult patient in restraints but took them off as he seemed calm, easily consoled and need for 24 hours without restraints for NH placement policy. Sitter in room.   -patient appears to try to communicate via grunting and noises but he dos not follow or track me. I am wondering if his vision is affected.   Addendum  -spouse arrived later in day and was feeding patient and wanted to speak to physician. She reported patient eats and walks and had several demands of staff as to not put patient in restraints and or give antipsychotics. Patient has been out of restraints since I saw him this am and had so far been calm. However, without medication at HS he becomes agitated and per staff was kicking.     Dr Meredith came and spoke to her and updated.     Palliative encounter: 9/25/23  Advance Care Planning   -palliative consult received; patient known to Palliative   And goal on each visit was life prolongation despite risks or burdens see ACP notes  -chart reviewed in detail  -patient has HPOA in Epic (ex spouse who has been caring for him but unable to now and needs placment. See SW notes   -discussed with Dr Heller  -at time of consult patient is alert but nonverbal  from previous CVA. He is agitated while in ED with all the stimuli. He can nod and noted per family he is able to communicate his needs  -emotional support provided     Embolic stroke involving left middle cerebral artery  -patient was recently hospitliazed from 08/05 to 09/07/2023 for ADHF and NSTEMI, course complicated by L MCA stroke. Was discharge to [facility] inpatient rehab and recently discharge home from [facility]   -he does meet critieria for hospice CVA, multiple hospitalizations this year and numerous co- morbids but this does not appear to be in line with his previous GOC noted  See ACP notes   -ST evaluated and NPO, use PEG only      Agitation  -Of note, on last hospitalization,  The hospital course was complicated by non-redirectable agitation, briefly requiring restraints. Patient was discharge to inpatient rehab at that time      -Psych consulted      -mgmt per primary     Debility  - PT/OT consulted; rec. 24 hour care      Global aphasia  - due to recent left MCA stroke in August 2023   -SLP consulted and NPO  -patient with PEG tube     Hemiparesis, right    - due to recent left MCA stroke in August 2023      Dysphagia  - due to recent left MCA stroke in August 2023   -SLP consulted  -Pt with PEG (placed in 8/2023)      Atrial fibrillation with RVR  Patient with Paroxysmal (<7 days) atrial fibrillation which is controlled currently with Beta Blocker and Amiodarone.   -Resolved   -mgmt per primary     Elevated troponin  - patient with history of chronically elevated troponin   - Initial troponin 0.046, lower than baseline.  Possibly elevated due to demand from atrial fibrillation with RVR  -mgmt per primary        CAD (coronary artery disease)  -Patient has nonobstructive coronary artery disease.  He has history of STEMI his RCA.   -Last cath in 04/2023:see results     Ischemic cardiomyopathy  -see plan as above for CAD      -congestive heart failure   - last echo 7/2023  EF 10%            Subjective:     Chief  "Complaint:   Chief Complaint   Patient presents with    Peg Tube Problem     Pt BIB [facility] from home due to a "peg tube problem". Pr EMS, pt's caretaker called EMS reporting redness and swelling around peg tube site. No redness noted in triage. Per EMS, pt had a stroke in August and was left aphasic. Pt unable to respond to questions but is awake and alert.     HPI: 58 y.o male, with a medical history of combined systolic and diastolic heart failure, Atrial fibrillation (one eliquis), Coronary artery disease, Dyslipidemia, Embolic stroke involving left middle cerebral artery, Hypertension,  and Stage 3 chronic kidney disease was brought into the ED by ambulance initially for "peg problem".  However, ED provider discussed with family who ultimately stated that they can no longer care for the patient. Patient is aphasic.  Called and spoke with the patient's ex wife Ms. Teague.  She stated patient was recently discharged home from inpatient rehab and not understand why.  States that he is very weak and has an unsteady gait.  Easily gets agitated but improves with his a.m. medications.  Patient lives alone on the 3rd floor and is unable to care for himself.  Per ex-wife, family is requesting for nursing home placement as they are unable to care for the patient at this time.  Mental status wise, patient appears to be at baseline, which is aphasic and intermittently agitated. Of note, patient was recently hospitliazed from 08/05 to 09/07/2023 for ADHF and NSTEMI, course complicated by L MCA stroke. Patient was also treated for pyelonephritis with course of IV CTX. Of note, that hospital course was complicated by non-redirectable agitation, briefly requiring restraints. Patient was discharge to inpatient rehab.      In the ED, patient with atrial fibrillation with RVR.  IV Lopressor was ordered in the ED, but was not given.  Rhythm and rate improved with home medications.  Labs significant for elevated BNP and " troponin.  EKG with no acute ischemic changes. CXR with No convincing evidence of acute detrimental change relative prior radiograph performed 08/19/2023.  Patient admitted to observation  for further evaluation      Hospital Course:  No notes on file    Interval History: patient restrained. He is awake and calm at first but notices the restraints and became agitated and began to pull. He reached for my hand and held it and was easily consoled as I spoke to him. He did not track or follow me (poor eyesight??), when I spoke to him but continually grunted as if he were trying to communicate      Medications:  Continuous Infusions:   dextrose 5 % and 0.9 % NaCl 75 mL/hr at 09/26/23 0948     Scheduled Meds:   amiodarone  200 mg Per G Tube Daily    apixaban  5 mg Per G Tube BID    aspirin  81 mg Per G Tube Daily    atorvastatin  80 mg Per G Tube Daily    cefTRIAXone (ROCEPHIN) IVPB  2 g Intravenous Q24H    ezetimibe  10 mg Per G Tube QHS    metoprolol tartrate  25 mg Per G Tube BID    QUEtiapine  25 mg Per G Tube QHS    valproic acid (as sodium salt)  1,000 mg Per G Tube QHS    valproic acid (as sodium salt)  500 mg Per G Tube Daily    valproic acid (as sodium salt)  500 mg Per G Tube Daily with lunch     PRN Meds:dextrose 10%, dextrose 10%, glucagon (human recombinant), glucose, glucose, insulin aspart U-100, naloxone, sodium chloride 0.9%    Objective:     Vital Signs (Most Recent):  Temp: 98.2 °F (36.8 °C) (09/26/23 0734)  Pulse: (!) 57 (09/26/23 0734)  Resp: 18 (09/26/23 0734)  BP: (!) (P) 130/98 (09/26/23 0951)  SpO2: 98 % (09/25/23 2331) Vital Signs (24h Range):  Temp:  [97.2 °F (36.2 °C)-98.2 °F (36.8 °C)] 98.2 °F (36.8 °C)  Pulse:  [57-77] 57  Resp:  [18-20] 18  SpO2:  [95 %-98 %] 98 %  BP: (109-130)/(67-98) (P) 130/98     Weight: 75 kg (165 lb 5.5 oz)  Body mass index is 21.81 kg/m².       Physical Exam  Vitals and nursing note reviewed.   Constitutional:       General: He is not in acute distress.      Appearance: He is ill-appearing. He is not toxic-appearing or diaphoretic.   HENT:      Mouth/Throat:      Mouth: Mucous membranes are dry.   Cardiovascular:      Rate and Rhythm: Normal rate and regular rhythm.   Pulmonary:      Effort: Pulmonary effort is normal.   Abdominal:      Comments: PEG   Musculoskeletal:      Right lower leg: No edema.      Left lower leg: No edema.   Skin:     General: Skin is warm and dry.   Neurological:      Mental Status: He is alert.      Comments: Patient is aphasic, unable to assess, does not follow or track (poor eyesight ??)   Psychiatric:         Speech: He is communicative.         Behavior: Behavior is agitated.            Review of Symptoms      Symptom Assessment (ESAS 0-10 Scale)  Pain:  0  Dyspnea:  0  Anxiety:  0  Nausea:  0  Depression:  0  Anorexia:  0  Fatigue:  0  Insomnia:  0  Restlessness:  0  Agitation:  0  Unable to complete assessment due to Patient nonverbal         Pain Assessment in Advanced Demential Scale (PAINAD)   Breathing - Independent of vocalization:  0  Negative vocalization:  2  Facial expression:  1  Body language:  1  Consolability:  0  Total:  4    Psychosocial/Cultural:   See Palliative Psychosocial Note: No  **Primary  to Follow**  Palliative Care  Consult: No        Advance Care Planning  Advance Directives:   Medical Power of : Yes    Goals of Care: What is most important right now is to focus on extending life as long as possible, even it it means sacrificing quality, curative/life-prolongation (regardless of treatment burdens). Accordingly, we have decided that the best plan to meet the patient's goals includes continuing with treatment.         Significant Labs: All pertinent labs within the past 24 hours have been reviewed.  CBC:   Recent Labs   Lab 09/25/23  0805   WBC 4.91   HGB 10.1*   HCT 33.7*   MCV 92        BMP:  Recent Labs   Lab 09/26/23  0333   GLU 70      K 4.7      CO2 23    BUN 22*   CREATININE 1.6*   CALCIUM 9.0   MG 2.1     LFT:  Lab Results   Component Value Date    AST 31 09/25/2023    ALKPHOS 76 09/25/2023    BILITOT 0.5 09/25/2023     Albumin:   Albumin   Date Value Ref Range Status   09/25/2023 3.2 (L) 3.5 - 5.2 g/dL Final     Protein:   Total Protein   Date Value Ref Range Status   09/25/2023 7.0 6.0 - 8.4 g/dL Final     Lactic acid:   Lab Results   Component Value Date    LACTATE 1.8 08/19/2023    LACTATE 1.0 07/13/2023       Significant Imaging: None since last reviewed      Helen Szymanski NP  Palliative Medicine  [facility] - [place] - Telemetry      50% of 40 mins spent in chart review, face to face discussion, goals of care, emotional support, symptom assessment, coordination of care with other specialists and d/c planning.

## 2023-09-26 NOTE — SUBJECTIVE & OBJECTIVE
Past Medical History:   Diagnosis Date    Acute on chronic combined systolic and diastolic heart failure 09/23/2022    Atrial fibrillation     CAD (coronary artery disease) 09/23/2022    Dyslipidemia 09/23/2022    Embolic stroke involving left middle cerebral artery 08/08/2023    Encounter for blood transfusion     H/O heart artery stent     HTN (hypertension) 09/23/2022    ICD (implantable cardioverter-defibrillator) in place 09/23/2022    Paroxysmal A-fib 09/23/2022    Stage 3 chronic kidney disease 04/19/2023       Past Surgical History:   Procedure Laterality Date    CARDIAC DEFIBRILLATOR PLACEMENT Left     CEREBRAL ANGIOGRAM N/A 8/8/2023    Procedure: ANGIOGRAM-CEREBRAL;  Surgeon: Kenzie Rodriguez;  Location: [facility];  Service: Anesthesiology;  Laterality: N/A;  LVO (angiogram)    ESOPHAGOGASTRODUODENOSCOPY N/A 8/11/2023    Procedure: EGD (ESOPHAGOGASTRODUODENOSCOPY);  Surgeon: Colette Martinez MD;  Location: [facility] ENDO (2ND FLR);  Service: Gastroenterology;  Laterality: N/A;    ESOPHAGOGASTRODUODENOSCOPY W/ PEG N/A 8/17/2023    Procedure: EGD, WITH PEG TUBE INSERTION;  Surgeon: Yan Scott MD;  Location: [facility] OR 2ND FLR;  Service: General;  Laterality: N/A;  PEG, possible lap G    HERNIA REPAIR      LEFT HEART CATHETERIZATION Left 4/18/2023    Procedure: Left heart cath;  Surgeon: Atilio Marks MD;  Location: [facility] CATH LAB;  Service: Cardiology;  Laterality: Left;    RIGHT HEART CATHETERIZATION Right 9/30/2022    Procedure: INSERTION, CATHETER, RIGHT HEART;  Surgeon: Caden Aden MD;  Location: [facility] CATH LAB;  Service: Cardiology;  Laterality: Right;    TREATMENT OF CARDIAC ARRHYTHMIA N/A 11/22/2022    Procedure: CARDIOVERSION;  Surgeon: Marvin Sanon MD;  Location: [facility] EP LAB;  Service: Cardiology;  Laterality: N/A;  afib, KIA, DCCV, anes, MB, 3 Prep       Review of patient's allergies indicates:  No Known Allergies    No current facility-administered medications on file prior to encounter.      Current Outpatient Medications on File Prior to Encounter   Medication Sig    amiodarone (PACERONE) 200 MG Tab 1 tablet (200 mg total) by Per G Tube route once daily.    apixaban (ELIQUIS) 5 mg Tab 1 tablet (5 mg total) by Per G Tube route 2 (two) times daily.    aspirin 81 MG Chew 1 tablet (81 mg total) by Per G Tube route once daily.    atorvastatin (LIPITOR) 80 MG tablet 1 tablet (80 mg total) by Per G Tube route once daily.    empagliflozin (JARDIANCE) 10 mg tablet 1 tablet (10 mg total) by Per G Tube route once daily.    ezetimibe (ZETIA) 10 mg tablet 1 tablet (10 mg total) by Per G Tube route every evening.    metoprolol tartrate (LOPRESSOR) 25 MG tablet 1 tablet (25 mg total) by Per G Tube route 2 (two) times daily.    QUEtiapine (SEROQUEL) 25 MG Tab 1 tablet (25 mg total) by Per G Tube route every evening.    valproic acid, as sodium salt, (DEPAKENE) 250 mg/5 mL (5 mL) Soln 10 mLs (500 mg total) by Per G Tube route once daily. To be administered 0900 daily    valproic acid, as sodium salt, (DEPAKENE) 250 mg/5 mL (5 mL) Soln 10 mLs (500 mg total) by Per G Tube route once daily. To be administers at 1200 daily    valproic acid, as sodium salt, (DEPAKENE) 250 mg/5 mL (5 mL) Soln 20 mLs (1,000 mg total) by Per G Tube route every evening.    chlorhexidine (PERIDEX) 0.12 % solution 5 mLs 3 (three) times daily.    nitroGLYCERIN (NITROSTAT) 0.4 MG SL tablet Place under the tongue.     Family History    None       Tobacco Use    Smoking status: Never    Smokeless tobacco: Never   Substance and Sexual Activity    Alcohol use: Never    Drug use: Never    Sexual activity: Not on file     Review of Systems   Unable to perform ROS: Patient nonverbal     Objective:     Vital Signs (Most Recent):  Temp: 98.2 °F (36.8 °C) (09/26/23 0734)  Pulse: (!) 57 (09/26/23 0734)  Resp: 18 (09/26/23 0734)  BP: (!) 130/98 (09/26/23 0734)  SpO2: 98 % (09/25/23 7551) Vital Signs (24h Range):  Temp:  [97.2 °F (36.2 °C)-98.2 °F (36.8  "°C)] 98.2 °F (36.8 °C)  Pulse:  [] 57  Resp:  [18-20] 18  SpO2:  [95 %-100 %] 98 %  BP: (109-133)/(67-98) 130/98     Weight: 75 kg (165 lb 5.5 oz)  Body mass index is 21.81 kg/m².     Physical Exam  Vitals and nursing note reviewed.   Constitutional:       General: He is not in acute distress.     Appearance: He is ill-appearing (chronically ill appearing).      Comments: Patient appears agitated. Attempting to climb out of the bed. Moaning and yelling but does not appear to be in pain.    HENT:      Mouth/Throat:      Mouth: Mucous membranes are dry.   Cardiovascular:      Rate and Rhythm: Tachycardia present. Rhythm irregular.      Heart sounds: Murmur heard.      No gallop.      Comments: +AICD  Pulmonary:      Effort: Pulmonary effort is normal. No respiratory distress.      Breath sounds: Wheezing (faint expiratory wheezes) present.   Abdominal:      General: There is no distension.      Palpations: Abdomen is soft.      Tenderness: There is no abdominal tenderness.      Comments: +PEG in place. Appears clean and intact, no overlying signs of infection   Musculoskeletal:         General: No swelling or tenderness.      Right lower leg: No edema.      Left lower leg: No edema.   Skin:     General: Skin is warm and dry.   Neurological:      Mental Status: He is alert. Mental status is at baseline.      Comments: Patient is aphasic. Per ex-wife, patient moans and groans at baseline. States when that happens, it means "he needs a bath or a diaper change".    Psychiatric:         Speech: He is noncommunicative.         Behavior: Behavior is agitated.                Significant Labs: All pertinent labs within the past 24 hours have been reviewed.  CBC:   Recent Labs   Lab 09/24/23  1457 09/25/23  0805   WBC 5.15 4.91   HGB 10.4* 10.1*   HCT 34.5* 33.7*    299       CMP:   Recent Labs   Lab 09/24/23  1457 09/25/23  0805 09/26/23  0333    142 140   K 4.7 4.4 4.7    110 108   CO2 24 23 23   GLU " 106 72 70   BUN 28* 23* 22*   CREATININE 1.8* 1.5* 1.6*   CALCIUM 9.5 9.1 9.0   PROT 7.6 7.0  --    ALBUMIN 3.6 3.2*  --    BILITOT 0.6 0.5  --    ALKPHOS 88 76  --    AST 35 31  --    ALT 32 28  --    ANIONGAP 11 9 9       Cardiac Markers:   Recent Labs   Lab 09/25/23  0805   BNP 3,173*         Troponin:   Recent Labs   Lab 09/25/23  0805 09/25/23  1406 09/25/23 2011   TROPONINI 0.046* 0.050* 0.038*       TSH:   Recent Labs   Lab 09/03/23  1227   TSH 0.736       Urine Studies:   Recent Labs   Lab 09/24/23  1444   COLORU Yellow   APPEARANCEUA Clear   PHUR 7.0   SPECGRAV 1.020   PROTEINUA Trace*   GLUCUA Negative   KETONESU Negative   BILIRUBINUA Negative   OCCULTUA Negative   NITRITE Negative   UROBILINOGEN 2.0-3.0*   LEUKOCYTESUR 1+*   RBCUA 11*   WBCUA 13*   BACTERIA Occasional   SQUAMEPITHEL 1         Significant Imaging: I have reviewed all pertinent imaging results/findings within the past 24 hours.    Imaging Results              X-Ray Chest AP Portable (Final result)  Result time 09/25/23 07:33:59      Final result by Atilio Leon MD (09/25/23 07:33:59)                   Impression:      No convincing evidence of acute detrimental change relative prior radiograph performed 08/19/2023, 09:09 hours.      Electronically signed by: Atilio Leon  Date:    09/25/2023  Time:    07:33               Narrative:    EXAMINATION:  XR CHEST AP PORTABLE    CLINICAL HISTORY:  tachycardia;    TECHNIQUE:  Single frontal view of the chest was performed.    COMPARISON:  Chest radiograph performed 08/19/2023, 09:09 hours.    FINDINGS:  Monitoring leads over the chest. Left subclavian approach AICD. Enlarged cardiac silhouette. Prominence of central pulmonary vasculature.  Question mild pulmonary vascular congestion.    No definite focal airspace consolidation is seen. A small left pleural effusion is not excluded.    No acute findings in the visualized abdomen. Osseous and soft tissue structures appear without definite  acute change. Small hyperattenuating fragments again project over the left hemithorax.                                       XR Gastric tube check, non-radiologist performed (Final result)  Result time 09/24/23 21:50:37      Final result by Danny Burks MD (09/24/23 21:50:37)                   Impression:      Intraluminal position of gastrostomy tube.      Electronically signed by: Danny Burks MD  Date:    09/24/2023  Time:    21:50               Narrative:    EXAMINATION:  XR GASTRIC TUBE CHECK, NON-RADIOLOGIST PERFORMED    CLINICAL HISTORY:  confirm appropriate placement;    TECHNIQUE:  Two AP views of the abdomen were obtained before and after administration of 60 cc Gastroview contrast.    COMPARISON:  08/26/2023.    FINDINGS:  Contrast was administered through patient's gastrostomy tube which confirms intraluminal position.  Contrast is visualized within the stomach and extends throughout the duodenum into the proximal jejunum.  Overall nonspecific bowel gas pattern with no evidence to suggest obstruction.  Scattered stool is seen in the colon.  Heart is enlarged.

## 2023-09-26 NOTE — HOSPITAL COURSE
"58 y.o male, with a medical history of combined systolic and diastolic heart failure, Atrial fibrillation (one eliquis), Coronary artery disease, Dyslipidemia, Embolic stroke involving left middle cerebral artery, Hypertension,  and Stage 3 chronic kidney disease was brought into the ED by ambulance initially for "peg problem".  However, ED provider discussed with family who ultimately stated that they can no longer care for the patient. Patient is aphasic.  Called and spoke with the patient's ex wife Ms. Teague.  She stated patient was recently discharged home from inpatient rehab and not understand why.  States that he is very weak and has an unsteady gait.     In the ED, patient with atrial fibrillation with RVR.  IV Lopressor was ordered in the ED, but was not given.  Rhythm and rate improved with home medications.  Labs significant for elevated BNP and troponin.  EKG with no acute ischemic changes. CXR with No convincing evidence of acute detrimental change relative prior radiograph performed 08/19/2023.remains stable on Telemetry with no chest pain.or SOB.  Patient has been started on tube feeding.tolerated feeding,  Patient was very combative over night,received Zyprexa,on restrain,palliative was  on case.  Off restrain in last 48 hours.calmer.  Urine culture is grow Klebsiella\ and enterococcus,was traeted with IV rocephin.  Patients ex wife with [facility],want patient return home with HH,patient was returned home with HH and follow with PCP as out patient.  "

## 2023-09-26 NOTE — SUBJECTIVE & OBJECTIVE
Interval History: patient restrained. He is awake and calm at first but notices the restraints and became agitated and began to pull. He reached for my hand and held it and was easily consoled as I spoke to him. He did not track or follow me (poor eyesight??), when I spoke to him but continually grunted as if he were trying to communicate      Medications:  Continuous Infusions:   dextrose 5 % and 0.9 % NaCl 75 mL/hr at 09/26/23 0948     Scheduled Meds:   amiodarone  200 mg Per G Tube Daily    apixaban  5 mg Per G Tube BID    aspirin  81 mg Per G Tube Daily    atorvastatin  80 mg Per G Tube Daily    cefTRIAXone (ROCEPHIN) IVPB  2 g Intravenous Q24H    ezetimibe  10 mg Per G Tube QHS    metoprolol tartrate  25 mg Per G Tube BID    QUEtiapine  25 mg Per G Tube QHS    valproic acid (as sodium salt)  1,000 mg Per G Tube QHS    valproic acid (as sodium salt)  500 mg Per G Tube Daily    valproic acid (as sodium salt)  500 mg Per G Tube Daily with lunch     PRN Meds:dextrose 10%, dextrose 10%, glucagon (human recombinant), glucose, glucose, insulin aspart U-100, naloxone, sodium chloride 0.9%    Objective:     Vital Signs (Most Recent):  Temp: 98.2 °F (36.8 °C) (09/26/23 0734)  Pulse: (!) 57 (09/26/23 0734)  Resp: 18 (09/26/23 0734)  BP: (!) (P) 130/98 (09/26/23 0951)  SpO2: 98 % (09/25/23 2331) Vital Signs (24h Range):  Temp:  [97.2 °F (36.2 °C)-98.2 °F (36.8 °C)] 98.2 °F (36.8 °C)  Pulse:  [57-77] 57  Resp:  [18-20] 18  SpO2:  [95 %-98 %] 98 %  BP: (109-130)/(67-98) (P) 130/98     Weight: 75 kg (165 lb 5.5 oz)  Body mass index is 21.81 kg/m².       Physical Exam  Vitals and nursing note reviewed.   Constitutional:       General: He is not in acute distress.     Appearance: He is ill-appearing. He is not toxic-appearing or diaphoretic.   HENT:      Mouth/Throat:      Mouth: Mucous membranes are dry.   Cardiovascular:      Rate and Rhythm: Normal rate and regular rhythm.   Pulmonary:      Effort: Pulmonary effort is  normal.   Abdominal:      Comments: PEG   Musculoskeletal:      Right lower leg: No edema.      Left lower leg: No edema.   Skin:     General: Skin is warm and dry.   Neurological:      Mental Status: He is alert.      Comments: Patient is aphasic, unable to assess, does not follow or track (poor eyesight ??)   Psychiatric:         Speech: He is communicative.         Behavior: Behavior is agitated.            Review of Symptoms      Symptom Assessment (ESAS 0-10 Scale)  Pain:  0  Dyspnea:  0  Anxiety:  0  Nausea:  0  Depression:  0  Anorexia:  0  Fatigue:  0  Insomnia:  0  Restlessness:  0  Agitation:  0  Unable to complete assessment due to Patient nonverbal         Pain Assessment in Advanced Demential Scale (PAINAD)   Breathing - Independent of vocalization:  0  Negative vocalization:  2  Facial expression:  1  Body language:  1  Consolability:  0  Total:  4    Psychosocial/Cultural:   See Palliative Psychosocial Note: No  **Primary  to Follow**  Palliative Care  Consult: No        Advance Care Planning   Advance Directives:   Medical Power of : Yes    Goals of Care: What is most important right now is to focus on extending life as long as possible, even it it means sacrificing quality, curative/life-prolongation (regardless of treatment burdens). Accordingly, we have decided that the best plan to meet the patient's goals includes continuing with treatment.         Significant Labs: All pertinent labs within the past 24 hours have been reviewed.  CBC:   Recent Labs   Lab 09/25/23  0805   WBC 4.91   HGB 10.1*   HCT 33.7*   MCV 92        BMP:  Recent Labs   Lab 09/26/23  0333   GLU 70      K 4.7      CO2 23   BUN 22*   CREATININE 1.6*   CALCIUM 9.0   MG 2.1     LFT:  Lab Results   Component Value Date    AST 31 09/25/2023    ALKPHOS 76 09/25/2023    BILITOT 0.5 09/25/2023     Albumin:   Albumin   Date Value Ref Range Status   09/25/2023 3.2 (L) 3.5 - 5.2 g/dL  Final     Protein:   Total Protein   Date Value Ref Range Status   09/25/2023 7.0 6.0 - 8.4 g/dL Final     Lactic acid:   Lab Results   Component Value Date    LACTATE 1.8 08/19/2023    LACTATE 1.0 07/13/2023       Significant Imaging: None since last reviewed

## 2023-09-26 NOTE — PLAN OF CARE
Problem: Occupational Therapy  Goal: Occupational Therapy Goal  Description: Goals to be met by: 10/16/23     Patient will increase functional independence with ADLs by performing:    UE Dressing with [place].  LE Dressing with Modified [place].  Grooming while standing at sink with Supervision.  Toileting from toilet with Supervision for hygiene and clothing management.   Sitting in chair 30-60 minutes with Supervision.  Toilet transfer to toilet with Supervision.  Upper extremity exercise program x10  reps per handout, with assistance as needed.  Increase sustained attention to task x 5 min. With 1-2 verbal cues.     Outcome: Ongoing, Progressing   Patient walked from bed to chair x ~40' with CGA x 2 people with RW.

## 2023-09-26 NOTE — PROGRESS NOTES
"[facility] Medicine  Progress Note    Patient Name: Tera Griffiths  MRN: [id number]  Patient Class: OP- Observation   Admission Date: 9/24/2023  Length of Stay: 0 days  Attending Physician: Gema Barrera, *  Primary Care Provider: Carleen Bueno MD        Subjective:     Principal Problem:Atrial fibrillation with RVR        HPI:  58 y.o male, with a medical history of combined systolic and diastolic heart failure, Atrial fibrillation (one eliquis), Coronary artery disease, Dyslipidemia, Embolic stroke involving left middle cerebral artery, Hypertension,  and Stage 3 chronic kidney disease was brought into the ED by ambulance initially for "peg problem".  However, ED provider discussed with family who ultimately stated that they can no longer care for the patient. Patient is aphasic.  Called and spoke with the patient's ex wife Ms. Teague.  She stated patient was recently discharged home from inpatient rehab and not understand why.  States that he is very weak and has an unsteady gait.  Easily gets agitated but improves with his a.m. medications.  Patient lives alone on the 3rd floor and is unable to care for himself.  Per ex-wife, family is requesting for nursing home placement as they are unable to care for the patient at this time.  Mental status wise, patient appears to be at baseline, which is aphasic and intermittently agitated. Of note, patient was recently hospitliazed from 08/05 to 09/07/2023 for ADHF and NSTEMI, course complicated by L MCA stroke. Patient was also treated for pyelonephritis with course of IV CTX. Of note, that hospital course was complicated by non-redirectable agitation, briefly requiring restraints. Patient was discharge to inpatient rehab.     In the ED, patient with atrial fibrillation with RVR.  IV Lopressor was ordered in the ED, but was not given.  Rhythm and rate improved with home medications.  Labs significant for elevated BNP and troponin.  EKG " "with no acute ischemic changes. CXR with No convincing evidence of acute detrimental change relative prior radiograph performed 08/19/2023.  Patient admitted to observation  for further evaluation        Overview/Hospital Course:  58 y.o male, with a medical history of combined systolic and diastolic heart failure, Atrial fibrillation (one eliquis), Coronary artery disease, Dyslipidemia, Embolic stroke involving left middle cerebral artery, Hypertension,  and Stage 3 chronic kidney disease was brought into the ED by ambulance initially for "peg problem".  However, ED provider discussed with family who ultimately stated that they can no longer care for the patient. Patient is aphasic.  Called and spoke with the patient's ex wife Ms. Teague.  She stated patient was recently discharged home from inpatient rehab and not understand why.  States that he is very weak and has an unsteady gait.     In the ED, patient with atrial fibrillation with RVR.  IV Lopressor was ordered in the ED, but was not given.  Rhythm and rate improved with home medications.  Labs significant for elevated BNP and troponin.  EKG with no acute ischemic changes. CXR with No convincing evidence of acute detrimental change relative prior radiograph performed 08/19/2023.  Patient has been started on tube feeding.  Patient was very combative over night,received Zyprexa,on restrain,seems family can not take of him,palliative is on case.      Past Medical History:   Diagnosis Date    Acute on chronic combined systolic and diastolic heart failure 09/23/2022    Atrial fibrillation     CAD (coronary artery disease) 09/23/2022    Dyslipidemia 09/23/2022    Embolic stroke involving left middle cerebral artery 08/08/2023    Encounter for blood transfusion     H/O heart artery stent     HTN (hypertension) 09/23/2022    ICD (implantable cardioverter-defibrillator) in place 09/23/2022    Paroxysmal A-fib 09/23/2022    Stage 3 chronic kidney disease " 04/19/2023       Past Surgical History:   Procedure Laterality Date    CARDIAC DEFIBRILLATOR PLACEMENT Left     CEREBRAL ANGIOGRAM N/A 8/8/2023    Procedure: ANGIOGRAM-CEREBRAL;  Surgeon: Kenzie Rodriguez;  Location: [facility];  Service: Anesthesiology;  Laterality: N/A;  LVO (angiogram)    ESOPHAGOGASTRODUODENOSCOPY N/A 8/11/2023    Procedure: EGD (ESOPHAGOGASTRODUODENOSCOPY);  Surgeon: Colette Martinez MD;  Location: [facility] ENDO (2ND FLR);  Service: Gastroenterology;  Laterality: N/A;    ESOPHAGOGASTRODUODENOSCOPY W/ PEG N/A 8/17/2023    Procedure: EGD, WITH PEG TUBE INSERTION;  Surgeon: Yan Scott MD;  Location: [facility] OR 2ND FLR;  Service: General;  Laterality: N/A;  PEG, possible lap G    HERNIA REPAIR      LEFT HEART CATHETERIZATION Left 4/18/2023    Procedure: Left heart cath;  Surgeon: Atilio Marks MD;  Location: [facility] CATH LAB;  Service: Cardiology;  Laterality: Left;    RIGHT HEART CATHETERIZATION Right 9/30/2022    Procedure: INSERTION, CATHETER, RIGHT HEART;  Surgeon: Caden Aden MD;  Location: [facility] CATH LAB;  Service: Cardiology;  Laterality: Right;    TREATMENT OF CARDIAC ARRHYTHMIA N/A 11/22/2022    Procedure: CARDIOVERSION;  Surgeon: Marvin Sanon MD;  Location: [facility] EP LAB;  Service: Cardiology;  Laterality: N/A;  afib, KIA, DCCV, anes, MB, 3 Prep       Review of patient's allergies indicates:  No Known Allergies    No current facility-administered medications on file prior to encounter.     Current Outpatient Medications on File Prior to Encounter   Medication Sig    amiodarone (PACERONE) 200 MG Tab 1 tablet (200 mg total) by Per G Tube route once daily.    apixaban (ELIQUIS) 5 mg Tab 1 tablet (5 mg total) by Per G Tube route 2 (two) times daily.    aspirin 81 MG Chew 1 tablet (81 mg total) by Per G Tube route once daily.    atorvastatin (LIPITOR) 80 MG tablet 1 tablet (80 mg total) by Per G Tube route once daily.    empagliflozin (JARDIANCE) 10 mg tablet 1 tablet (10 mg  total) by Per G Tube route once daily.    ezetimibe (ZETIA) 10 mg tablet 1 tablet (10 mg total) by Per G Tube route every evening.    metoprolol tartrate (LOPRESSOR) 25 MG tablet 1 tablet (25 mg total) by Per G Tube route 2 (two) times daily.    QUEtiapine (SEROQUEL) 25 MG Tab 1 tablet (25 mg total) by Per G Tube route every evening.    valproic acid, as sodium salt, (DEPAKENE) 250 mg/5 mL (5 mL) Soln 10 mLs (500 mg total) by Per G Tube route once daily. To be administered 0900 daily    valproic acid, as sodium salt, (DEPAKENE) 250 mg/5 mL (5 mL) Soln 10 mLs (500 mg total) by Per G Tube route once daily. To be administers at 1200 daily    valproic acid, as sodium salt, (DEPAKENE) 250 mg/5 mL (5 mL) Soln 20 mLs (1,000 mg total) by Per G Tube route every evening.    chlorhexidine (PERIDEX) 0.12 % solution 5 mLs 3 (three) times daily.    nitroGLYCERIN (NITROSTAT) 0.4 MG SL tablet Place under the tongue.     Family History    None       Tobacco Use    Smoking status: Never    Smokeless tobacco: Never   Substance and Sexual Activity    Alcohol use: Never    Drug use: Never    Sexual activity: Not on file     Review of Systems   Unable to perform ROS: Patient nonverbal     Objective:     Vital Signs (Most Recent):  Temp: 98.2 °F (36.8 °C) (09/26/23 0734)  Pulse: (!) 57 (09/26/23 0734)  Resp: 18 (09/26/23 0734)  BP: (!) 130/98 (09/26/23 0734)  SpO2: 98 % (09/25/23 2331) Vital Signs (24h Range):  Temp:  [97.2 °F (36.2 °C)-98.2 °F (36.8 °C)] 98.2 °F (36.8 °C)  Pulse:  [] 57  Resp:  [18-20] 18  SpO2:  [95 %-100 %] 98 %  BP: (109-133)/(67-98) 130/98     Weight: 75 kg (165 lb 5.5 oz)  Body mass index is 21.81 kg/m².     Physical Exam  Vitals and nursing note reviewed.   Constitutional:       General: He is not in acute distress.     Appearance: He is ill-appearing (chronically ill appearing).      Comments: Patient appears agitated. Attempting to climb out of the bed. Moaning and yelling but does not appear  "to be in pain.    HENT:      Mouth/Throat:      Mouth: Mucous membranes are dry.   Cardiovascular:      Rate and Rhythm: Tachycardia present. Rhythm irregular.      Heart sounds: Murmur heard.      No gallop.      Comments: +AICD  Pulmonary:      Effort: Pulmonary effort is normal. No respiratory distress.      Breath sounds: Wheezing (faint expiratory wheezes) present.   Abdominal:      General: There is no distension.      Palpations: Abdomen is soft.      Tenderness: There is no abdominal tenderness.      Comments: +PEG in place. Appears clean and intact, no overlying signs of infection   Musculoskeletal:         General: No swelling or tenderness.      Right lower leg: No edema.      Left lower leg: No edema.   Skin:     General: Skin is warm and dry.   Neurological:      Mental Status: He is alert. Mental status is at baseline.      Comments: Patient is aphasic. Per ex-wife, patient moans and groans at baseline. States when that happens, it means "he needs a bath or a diaper change".    Psychiatric:         Speech: He is noncommunicative.         Behavior: Behavior is agitated.                Significant Labs: All pertinent labs within the past 24 hours have been reviewed.  CBC:   Recent Labs   Lab 09/24/23  1457 09/25/23  0805   WBC 5.15 4.91   HGB 10.4* 10.1*   HCT 34.5* 33.7*    299       CMP:   Recent Labs   Lab 09/24/23  1457 09/25/23  0805 09/26/23  0333    142 140   K 4.7 4.4 4.7    110 108   CO2 24 23 23    72 70   BUN 28* 23* 22*   CREATININE 1.8* 1.5* 1.6*   CALCIUM 9.5 9.1 9.0   PROT 7.6 7.0  --    ALBUMIN 3.6 3.2*  --    BILITOT 0.6 0.5  --    ALKPHOS 88 76  --    AST 35 31  --    ALT 32 28  --    ANIONGAP 11 9 9       Cardiac Markers:   Recent Labs   Lab 09/25/23  0805   BNP 3,173*         Troponin:   Recent Labs   Lab 09/25/23  0805 09/25/23  1406 09/25/23 2011   TROPONINI 0.046* 0.050* 0.038*       TSH:   Recent Labs   Lab 09/03/23  1227   TSH 0.736       Urine Studies: "   Recent Labs   Lab 09/24/23  1444   COLORU Yellow   APPEARANCEUA Clear   PHUR 7.0   SPECGRAV 1.020   PROTEINUA Trace*   GLUCUA Negative   KETONESU Negative   BILIRUBINUA Negative   OCCULTUA Negative   NITRITE Negative   UROBILINOGEN 2.0-3.0*   LEUKOCYTESUR 1+*   RBCUA 11*   WBCUA 13*   BACTERIA Occasional   SQUAMEPITHEL 1         Significant Imaging: I have reviewed all pertinent imaging results/findings within the past 24 hours.    Imaging Results              X-Ray Chest AP Portable (Final result)  Result time 09/25/23 07:33:59      Final result by Atilio Leon MD (09/25/23 07:33:59)                   Impression:      No convincing evidence of acute detrimental change relative prior radiograph performed 08/19/2023, 09:09 hours.      Electronically signed by: Atilio Leon  Date:    09/25/2023  Time:    07:33               Narrative:    EXAMINATION:  XR CHEST AP PORTABLE    CLINICAL HISTORY:  tachycardia;    TECHNIQUE:  Single frontal view of the chest was performed.    COMPARISON:  Chest radiograph performed 08/19/2023, 09:09 hours.    FINDINGS:  Monitoring leads over the chest. Left subclavian approach AICD. Enlarged cardiac silhouette. Prominence of central pulmonary vasculature.  Question mild pulmonary vascular congestion.    No definite focal airspace consolidation is seen. A small left pleural effusion is not excluded.    No acute findings in the visualized abdomen. Osseous and soft tissue structures appear without definite acute change. Small hyperattenuating fragments again project over the left hemithorax.                                       XR Gastric tube check, non-radiologist performed (Final result)  Result time 09/24/23 21:50:37      Final result by Danny Burks MD (09/24/23 21:50:37)                   Impression:      Intraluminal position of gastrostomy tube.      Electronically signed by: Danny Burks MD  Date:    09/24/2023  Time:    21:50               Narrative:     EXAMINATION:  XR GASTRIC TUBE CHECK, NON-RADIOLOGIST PERFORMED    CLINICAL HISTORY:  confirm appropriate placement;    TECHNIQUE:  Two AP views of the abdomen were obtained before and after administration of 60 cc Gastroview contrast.    COMPARISON:  08/26/2023.    FINDINGS:  Contrast was administered through patient's gastrostomy tube which confirms intraluminal position.  Contrast is visualized within the stomach and extends throughout the duodenum into the proximal jejunum.  Overall nonspecific bowel gas pattern with no evidence to suggest obstruction.  Scattered stool is seen in the colon.  Heart is enlarged.                                          Assessment/Plan:      * Atrial fibrillation with RVR  Patient with Paroxysmal (<7 days) atrial fibrillation which is controlled currently with Beta Blocker and Amiodarone. Patient is currently in atrial fibrillation.[id number] Score: 2. Anticoagulation indicated. Anticoagulation done with eliquis.    -Presented with afib RVR, HR in the 130s  -EKG reviewed and showed afib rvr  -Resolved with home medications  -suspect possibly triggered by UTI and not receiving home meds  -Resume home meds: home metoprolol tartrate 25 mg b.i.d. and amiodarone 200 mg daily  - monitor on telemetry,  Rate is controled.    Advanced care planning/counseling discussion  - palliative care team consulted      Debility  - PT/OT consulted      Elevated troponin  - patient with history of chronically elevated troponin   - Initial troponin 0.046, lower than baseline.  Possibly elevated due to demand from atrial fibrillation with RVR  - patient without any chest pain.  EKG with atrial fibrillation with RVR   - trend troponin   - resume home Eliquis    Acute cystitis  - urine analysis for occasional bacteria, +1 leukocytes.  Patient unable to communicate any UTI symptoms   -urine culture with Gram-negative rods, identification and sensitivities pending   - of note, was recently hospitalized in  August 2023 and was treated with a course of Rocephin for UTI, previous urine culture with  Klebsiella pneumoniae, sensitive to ceftriaxone  - continue Rocephin    Agitation  -Of note, on last hospitalization,  The hospital course was complicated by non-redirectable agitation, briefly requiring restraints. Patient was discharge to inpatient rehab at that time  -Psych consulted for medication recommendations  -Sitter at bedside.  -Zyprexa once PRN  - resume home valproic acid    Dysphagia  - due to recent left MCA stroke in August 2023   -SLP consulted  -Pt with PEG (placed in 8/2023)      Global aphasia  - due to recent left MCA stroke in August 2023   -SLP consulted  -patient with PEG tube    Hemiparesis, right  - due to recent left MCA stroke in August 2023   -PT/OT consulted      Multiple subsegmental pulmonary emboli without acute cor pulmonale  -seen on CTA chest in 08/08/2023  - patient without any hypoxia  - resume home Eliquis      Embolic stroke involving left middle cerebral artery  -patient was recently hospitliazed from 08/05 to 09/07/2023 for ADHF and NSTEMI, course complicated by L MCA stroke. Was discharge to [facility] inpatient rehab and recently discharge home from [facility]  -PT/OT consulted  - resume home medications:  Aspirin, Eliquis, metoprolol tartrate, statin  Patient has been started on tube feeding.  Patient was very combative over night,received Zyprexa,on restrain,seems family can not take of him,palliative is on case.    Ischemic cardiomyopathy  -see plan as above for CAD    Stage 3 chronic kidney disease  -Cr on admit 1.8, appears to be near baseline   -Avoid nephrotoxins, renal dose all meds.   -Monitor Is/Os  -Monitor       Primary hypertension  - resume home medication:  Metoprolol tartrate 25 mg b.i.d., amiodarone 200 mg daily      ICD (implantable cardioverter-defibrillator) in place  -history noted  - [employer]  - PMHx cardiac arrest 2/2 V-Tach    Dyslipidemia  - resume home medication:   Ezetimibe 10 mg nightly, atorvastatin 80 mg daily      CAD (coronary artery disease)  -Patient has nonobstructive coronary artery disease.  He has history of STEMI his RCA.   -Last cath in 04/2023: The LAD had luminal irregularity with no obstructive disease.The left circumflex had luminal irregularity with no obstructive disease.  There was 1 obtuse marginal branch that was completely occluded and appeared to be an acute occlusion.  It was very small.The RCA luminal irregularity without obstructive disease.  - resume home med:  Metoprolol tartrate 25 mg b.i.d., aspirin 81 mg daily, atorvastatin 80 mg daily        VTE Risk Mitigation (From admission, onward)         Ordered     apixaban tablet 5 mg  2 times daily         09/24/23 1623                Discharge Planning   YUSEF:      Code Status: Full Code   Is the patient medically ready for discharge?:     Reason for patient still in hospital (select all that apply): Patient trending condition  Discharge Plan A: New Nursing Home placement - care home care facility                  Gema Barrera MD  Department of Hospital Medicine   [facility] [place] - Telemetry

## 2023-09-26 NOTE — NURSING
[facility] [place]  Nurses Note -- 4 Eyes      9/26/2023       Skin assessed on: Q Shift      [x] No Pressure Injuries Present    []Prevention Measures Documented    [] Yes LDA  for Pressure Injury Previously documented     [] Yes New Pressure Injury Discovered   [] LDA for New Pressure Injury Added      Attending RN:  Aicha Genao LPN     Second RN:  JESUS Correa         Patient called asking for a refill on her methylphenidate (RITALIN) 10 MG tablet.    Thanks,   Olimpia

## 2023-09-26 NOTE — NURSING
Patient's wife at bedside at this time giving patient mashed potatoes and powerade. Patient's wife aware of NPO status and educated on risks. Patient's wife stated he can eat and drink and she does not want patient in restraints. MD notified and on his way to talk with patient's wife.

## 2023-09-26 NOTE — NURSING
Pt pulling at iv and peg tube with increased agitation. Trying to get out of bed and getting very combative kicking and hitting. NP notified and orders given for restraints and one time order for IM med. Will monitor and reassess  restraints per facility protocol

## 2023-09-26 NOTE — PLAN OF CARE
ZENOBIA received call from Mary ([facility]) who will have patient NH referral reviewed. Mary # [phone].    ZENOBIA received call from Ed who informed ZENOBIA to fax NH referral to [facility] at [phone].  ZENOBIA faxed referral to [facility].

## 2023-09-27 PROBLEM — E44.0 MODERATE PROTEIN-CALORIE MALNUTRITION: Status: ACTIVE | Noted: 2023-08-22

## 2023-09-27 PROBLEM — I50.40 COMBINED SYSTOLIC AND DIASTOLIC CONGESTIVE HEART FAILURE: Status: ACTIVE | Noted: 2023-09-27

## 2023-09-27 LAB
ANION GAP SERPL CALC-SCNC: 6 MMOL/L (ref 8–16)
BACTERIA UR CULT: ABNORMAL
BACTERIA UR CULT: ABNORMAL
BUN SERPL-MCNC: 18 MG/DL (ref 6–20)
CALCIUM SERPL-MCNC: 8.7 MG/DL (ref 8.7–10.5)
CHLORIDE SERPL-SCNC: 113 MMOL/L (ref 95–110)
CO2 SERPL-SCNC: 22 MMOL/L (ref 23–29)
CREAT SERPL-MCNC: 1.5 MG/DL (ref 0.5–1.4)
EST. GFR  (NO RACE VARIABLE): 54 ML/MIN/1.73 M^2
GLUCOSE SERPL-MCNC: 78 MG/DL (ref 70–110)
MAGNESIUM SERPL-MCNC: 2.1 MG/DL (ref 1.6–2.6)
PHOSPHATE SERPL-MCNC: 4.1 MG/DL (ref 2.7–4.5)
POCT GLUCOSE: 101 MG/DL (ref 70–110)
POCT GLUCOSE: 104 MG/DL (ref 70–110)
POCT GLUCOSE: 88 MG/DL (ref 70–110)
POTASSIUM SERPL-SCNC: 4.6 MMOL/L (ref 3.5–5.1)
SODIUM SERPL-SCNC: 141 MMOL/L (ref 136–145)

## 2023-09-27 PROCEDURE — 63600175 PHARM REV CODE 636 W HCPCS: Performed by: STUDENT IN AN ORGANIZED HEALTH CARE EDUCATION/TRAINING PROGRAM

## 2023-09-27 PROCEDURE — 92526 ORAL FUNCTION THERAPY: CPT

## 2023-09-27 PROCEDURE — 97116 GAIT TRAINING THERAPY: CPT | Mod: CQ

## 2023-09-27 PROCEDURE — 84100 ASSAY OF PHOSPHORUS: CPT | Performed by: STUDENT IN AN ORGANIZED HEALTH CARE EDUCATION/TRAINING PROGRAM

## 2023-09-27 PROCEDURE — 25000003 PHARM REV CODE 250: Performed by: PHYSICIAN ASSISTANT

## 2023-09-27 PROCEDURE — 97530 THERAPEUTIC ACTIVITIES: CPT

## 2023-09-27 PROCEDURE — 96361 HYDRATE IV INFUSION ADD-ON: CPT

## 2023-09-27 PROCEDURE — 96366 THER/PROPH/DIAG IV INF ADDON: CPT

## 2023-09-27 PROCEDURE — 99214 OFFICE O/P EST MOD 30 MIN: CPT | Mod: ,,, | Performed by: NURSE PRACTITIONER

## 2023-09-27 PROCEDURE — 99214 PR OFFICE/OUTPT VISIT, EST, LEVL IV, 30-39 MIN: ICD-10-PCS | Mod: ,,, | Performed by: NURSE PRACTITIONER

## 2023-09-27 PROCEDURE — 80048 BASIC METABOLIC PNL TOTAL CA: CPT | Performed by: STUDENT IN AN ORGANIZED HEALTH CARE EDUCATION/TRAINING PROGRAM

## 2023-09-27 PROCEDURE — G0378 HOSPITAL OBSERVATION PER HR: HCPCS

## 2023-09-27 PROCEDURE — 25000003 PHARM REV CODE 250: Performed by: STUDENT IN AN ORGANIZED HEALTH CARE EDUCATION/TRAINING PROGRAM

## 2023-09-27 PROCEDURE — 36415 COLL VENOUS BLD VENIPUNCTURE: CPT | Performed by: STUDENT IN AN ORGANIZED HEALTH CARE EDUCATION/TRAINING PROGRAM

## 2023-09-27 PROCEDURE — 63600175 PHARM REV CODE 636 W HCPCS: Performed by: HOSPITALIST

## 2023-09-27 PROCEDURE — 83735 ASSAY OF MAGNESIUM: CPT | Performed by: STUDENT IN AN ORGANIZED HEALTH CARE EDUCATION/TRAINING PROGRAM

## 2023-09-27 PROCEDURE — 25000003 PHARM REV CODE 250: Performed by: EMERGENCY MEDICINE

## 2023-09-27 RX ADMIN — DEXTROSE AND SODIUM CHLORIDE: 5; 900 INJECTION, SOLUTION INTRAVENOUS at 10:09

## 2023-09-27 RX ADMIN — EZETIMIBE 10 MG: 10 TABLET ORAL at 08:09

## 2023-09-27 RX ADMIN — VALPROIC ACID 1000 MG: 250 SOLUTION ORAL at 08:09

## 2023-09-27 RX ADMIN — VALPROIC ACID 500 MG: 250 SOLUTION ORAL at 12:09

## 2023-09-27 RX ADMIN — Medication 6 MG: at 08:09

## 2023-09-27 RX ADMIN — QUETIAPINE 25 MG: 25 TABLET ORAL at 08:09

## 2023-09-27 RX ADMIN — APIXABAN 5 MG: 5 TABLET, FILM COATED ORAL at 09:09

## 2023-09-27 RX ADMIN — AMIODARONE HYDROCHLORIDE 200 MG: 200 TABLET ORAL at 09:09

## 2023-09-27 RX ADMIN — ATORVASTATIN CALCIUM 80 MG: 40 TABLET, FILM COATED ORAL at 09:09

## 2023-09-27 RX ADMIN — CEFTRIAXONE 2 G: 2 INJECTION, POWDER, FOR SOLUTION INTRAMUSCULAR; INTRAVENOUS at 10:09

## 2023-09-27 RX ADMIN — METOPROLOL TARTRATE 25 MG: 25 TABLET, FILM COATED ORAL at 09:09

## 2023-09-27 RX ADMIN — APIXABAN 5 MG: 5 TABLET, FILM COATED ORAL at 08:09

## 2023-09-27 RX ADMIN — ASPIRIN 81 MG CHEWABLE TABLET 81 MG: 81 TABLET CHEWABLE at 09:09

## 2023-09-27 RX ADMIN — VALPROIC ACID 500 MG: 250 SOLUTION ORAL at 09:09

## 2023-09-27 NOTE — ASSESSMENT & PLAN NOTE
Nutrition consulted. Most recent weight and BMI monitored-     Measurements:  Wt Readings from Last 1 Encounters:   09/25/23 75 kg (165 lb 5.5 oz)   Body mass index is 21.81 kg/m².    Patient has been screened and assessed by RD.    Malnutrition Type:  Context:    Level:      Malnutrition Characteristic Summary:       Interventions/Recommendations (treatment strategy):  1) Start TF as soon as medically able: Isosource 1.5 @ 20 ml/hr Advancing to goal of 50 ml/hr + 100 ml flush q 4 hr ( or changes per MD discretion) ( provides 1800 kcal ( 94% EEN), 81 g protein ( 100% EPN), and 912 ml free water) 2) weigh weekly 3) If bolus needed 5 cartons daily + 55 ml flush before and after each bolus

## 2023-09-27 NOTE — PLAN OF CARE
Spoke to Dr Meredith regarding pt d/c plan.  TN advised that pt cannot go to a facility due to having no income or any financials.  Dr Meredith states that he will continue to treat the pt for today and d/c to home with [facility] as prior to coming to the hospital. TN to update Zahida on plan.

## 2023-09-27 NOTE — PROGRESS NOTES
[facility] - Telemetry  Palliative Medicine  Progress Note    Patient Name: Tera Griffiths  MRN: [id number]  Admission Date: 9/24/2023  Hospital Length of Stay: 0 days  Code Status: Full Code   Attending Provider: Gema Barrera, *  Consulting Provider: Helen Szymanski NP  Primary Care Physician: Carleen Bueno MD  Principal Problem:Atrial fibrillation with RVR        Assessment/Plan:   Palliative encounter:  -discussed patient with Dr Viri ZAMUDIO  -interval chart review; placement is pending access to financial information. However, in review of chart see that last hospitalization multiple SNF denials and ended up in IPR with the condition that he would have family with him at all times. He has a son and nephew who per SW note agreed to stay with patient 24/7 while in rehab. I have yet to see any family visit except his HPOA (ex spouse).   -Also noted per SW on a previous hospitalization that patient has no income and vouchers for electricity. The no income or financial info will likely be a problem with NH placement.    -updated CM on my findings  -at time of visit patient sitting up on side of bed. He is calm and no restraints. He does have a sitter.  -Support to patient with hand holding and calm communication as he was trying to communicate with grunting and grabbing my hand to hold.   -spoke to bedside nurse Wendy who states patient was able to stand as they assisted but nothing further.    Palliative encounter: 9/26/23  -interval chart reviewed  -discussed with Dr Viri ZAMUDIO  -discussed with nursing home liason from one of the N[facility] referral sent to.They had come to evaluate the patient. She states HPOA adamant about SNF.  -discussed with SW/LEDY Foster  -discussed with therapy who report patient is at his baseline. Recently discharged from I[facility]  -at time of consult patient in restraints but took them off as he seemed calm, easily consoled and need for 24 hours without restraints for NH placement  policy. Sitter in room.   -patient appears to try to communicate via grunting and noises but he dos not follow or track me. I am wondering if his vision is affected.   Addendum  -spouse arrived later in day and was feeding patient and wanted to speak to physician. She reported patient eats and walks and had several demands of staff as to not put patient in restraints and or give antipsychotics. Patient has been out of restraints since I saw him this am and had so far been calm. However, without medication at HS he becomes agitated and per staff was kicking.     Dr Meredith came and spoke to her and updated.      Palliative encounter: 9/25/23  Advance Care Planning   -palliative consult received; patient known to Palliative   And goal on each visit was life prolongation despite risks or burdens see ACP notes  -chart reviewed in detail  -patient has HPOA in [facility] (ex spouse who has been caring for him but unable to now and needs placment. See SW notes   -discussed with Dr Heller  -at time of consult patient is alert but nonverbal from previous CVA. He is agitated while in ED with all the stimuli. He can nod and noted per family he is able to communicate his needs  -emotional support provided     Embolic stroke involving left middle cerebral artery  -patient was recently hospitliazed from 08/05 to 09/07/2023 for ADHF and NSTEMI, course complicated by L MCA stroke. Was discharge to [facility] inpatient rehab and recently discharge home from [facility]   -he does meet critieria for hospice CVA, multiple hospitalizations this year and numerous co- morbids but this does not appear to be in line with his previous GOC noted  See ACP notes   -ST evaluated and NPO, use PEG only        -congestive heart failure   - last echo 7/2023  EF 10%  -multiple hospitalizations this year for CHF exacerbation  -not a candidate for transplant or LVAD noted and on maximum therapy  -does meet hospice criteria but not in line with patient/cg goals of  "care    Agitation  -Of note, on last hospitalization,  The hospital course was complicated by non-redirectable agitation, briefly requiring restraints. Patient was discharge to inpatient rehab at that time      -Psych consulted      -mgmt per primary      -calm, no restraints     Debility  - PT/OT consulted; rec. 24 hour care      Global aphasia  - due to recent left MCA stroke in August 2023   -SLP consulted and NPO  -patient with PEG tube     Hemiparesis, right    - due to recent left MCA stroke in August 2023      Dysphagia  - due to recent left MCA stroke in August 2023   -SLP consulted  -Pt with PEG (placed in 8/2023)      Atrial fibrillation with RVR  Patient with Paroxysmal (<7 days) atrial fibrillation which is controlled currently with Beta Blocker and Amiodarone.   -Resolved   -mgmt per primary     Elevated troponin  - patient with history of chronically elevated troponin   - Initial troponin 0.046, lower than baseline.  Possibly elevated due to demand from atrial fibrillation with RVR  -mgmt per primary        CAD (coronary artery disease)  -Patient has nonobstructive coronary artery disease.  He has history of STEMI his RCA.   -Last cath in 04/2023:see results      Ischemic cardiomyopathy  -see plan as above for CAD      -congestive heart failure   - last echo 7/2023  EF 10%  -multiple hospitalizations this year for CHF exacerbation  -not a candidate for transplant or LVAD noted and on maximum therapy  -does meet hospice criteria         Subjective:     Chief Complaint:   Chief Complaint   Patient presents with    Peg Tube Problem     Pt BIB [facility] from home due to a "peg tube problem". Pr EMS, pt's caretaker called EMS reporting redness and swelling around peg tube site. No redness noted in triage. Per EMS, pt had a stroke in August and was left aphasic. Pt unable to respond to questions but is awake and alert.       HPI: 58 y.o male, with a medical history of combined systolic and diastolic heart " "failure, Atrial fibrillation (one eliquis), Coronary artery disease, Dyslipidemia, Embolic stroke involving left middle cerebral artery, Hypertension,  and Stage 3 chronic kidney disease was brought into the ED by ambulance initially for "peg problem".  However, ED provider discussed with family who ultimately stated that they can no longer care for the patient. Patient is aphasic.  Called and spoke with the patient's ex wife Ms. Teague.  She stated patient was recently discharged home from inpatient rehab and not understand why.  States that he is very weak and has an unsteady gait.  Easily gets agitated but improves with his a.m. medications.  Patient lives alone on the 3rd floor and is unable to care for himself.  Per ex-wife, family is requesting for nursing home placement as they are unable to care for the patient at this time.  Mental status wise, patient appears to be at baseline, which is aphasic and intermittently agitated. Of note, patient was recently hospitliazed from 08/05 to 09/07/2023 for ADHF and NSTEMI, course complicated by L MCA stroke. Patient was also treated for pyelonephritis with course of IV CTX. Of note, that hospital course was complicated by non-redirectable agitation, briefly requiring restraints. Patient was discharge to inpatient rehab.      In the ED, patient with atrial fibrillation with RVR.  IV Lopressor was ordered in the ED, but was not given.  Rhythm and rate improved with home medications.  Labs significant for elevated BNP and troponin.  EKG with no acute ischemic changes. CXR with No convincing evidence of acute detrimental change relative prior radiograph performed 08/19/2023.  Patient admitted to observation  for further evaluation      Hospital Course:  No notes on file    Interval History: patient sitting up on side of bed. He is grunting as if to communicate. He does not follow or track me even when I ask him to look at me. He does not blink when I move my fingers " toward his eyes      Medications:  Continuous Infusions:   dextrose 5 % and 0.9 % NaCl 75 mL/hr at 09/27/23 1015     Scheduled Meds:   amiodarone  200 mg Per G Tube Daily    apixaban  5 mg Per G Tube BID    aspirin  81 mg Per G Tube Daily    atorvastatin  80 mg Per G Tube Daily    cefTRIAXone (ROCEPHIN) IVPB  2 g Intravenous Q24H    ezetimibe  10 mg Per G Tube QHS    QUEtiapine  25 mg Per G Tube QHS    valproic acid (as sodium salt)  1,000 mg Per G Tube QHS    valproic acid (as sodium salt)  500 mg Per G Tube Daily    valproic acid (as sodium salt)  500 mg Per G Tube Daily with lunch     PRN Meds:dextrose 10%, dextrose 10%, glucagon (human recombinant), glucose, glucose, insulin aspart U-100, melatonin, naloxone, sodium chloride 0.9%    Objective:     Vital Signs (Most Recent):  Temp: 97.6 °F (36.4 °C) (09/27/23 0726)  Pulse: 60 (09/27/23 0726)  Resp: 18 (09/27/23 0726)  BP: 100/75 (09/27/23 0726)  SpO2: 99 % (09/27/23 0726) Vital Signs (24h Range):  Temp:  [97.5 °F (36.4 °C)-98.3 °F (36.8 °C)] 97.6 °F (36.4 °C)  Pulse:  [] 60  Resp:  [18] 18  SpO2:  [95 %-99 %] 99 %  BP: (100-160)/() 100/75     Weight: 75 kg (165 lb 5.5 oz)  Body mass index is 21.81 kg/m².       Physical Exam  Vitals and nursing note reviewed.   Constitutional:       General: He is not in acute distress.     Appearance: He is not ill-appearing, toxic-appearing or diaphoretic.   HENT:      Mouth/Throat:      Mouth: Mucous membranes are dry.   Cardiovascular:      Rate and Rhythm: Normal rate and regular rhythm.   Pulmonary:      Effort: Pulmonary effort is normal.   Abdominal:      Comments: PEG   Musculoskeletal:      Right lower leg: No edema.      Left lower leg: No edema.   Skin:     General: Skin is warm and dry.   Neurological:      Mental Status: He is alert.      Comments: Patient is aphasic, unable to assess, does not follow or track (poor eyesight ??)   Psychiatric:         Speech: He is noncommunicative.         Behavior:  Behavior normal.                Advance Care Planning  Advance Directives:   Medical Power of : Yes    Goals of Care: What is most important right now is to focus on extending life as long as possible, even it it means sacrificing quality, curative/life-prolongation (regardless of treatment burdens). Accordingly, we have decided that the best plan to meet the patient's goals includes continuing with treatment.         Significant Labs: All pertinent labs within the past 24 hours have been reviewed.  CBC:   Recent Labs   Lab 09/25/23  0805   WBC 4.91   HGB 10.1*   HCT 33.7*   MCV 92        BMP:  Recent Labs   Lab 09/27/23  0443   GLU 78      K 4.6   *   CO2 22*   BUN 18   CREATININE 1.5*   CALCIUM 8.7   MG 2.1     LFT:  Lab Results   Component Value Date    AST 31 09/25/2023    ALKPHOS 76 09/25/2023    BILITOT 0.5 09/25/2023     Albumin:   Albumin   Date Value Ref Range Status   09/25/2023 3.2 (L) 3.5 - 5.2 g/dL Final     Protein:   Total Protein   Date Value Ref Range Status   09/25/2023 7.0 6.0 - 8.4 g/dL Final     Lactic acid:   Lab Results   Component Value Date    LACTATE 1.8 08/19/2023    LACTATE 1.0 07/13/2023       Significant Imaging: None since last review      Helen Szymanski NP  Palliative Medicine  [facility] - Telemetry      >50% of 35 mins spent in chart review, face to face discussion, goals of care, emotional support, symptom assessment, coordination of care with other specialists and d/c planning.

## 2023-09-27 NOTE — PT/OT/SLP PROGRESS
Physical Therapy Treatment    Patient Name:  Tera Griffiths   MRN:  [id number]    Recommendations:     Discharge Recommendations:  (24hr care)  Discharge Equipment Recommendations:  TBD     Assessment:     Tera Griffiths is a 58 y.o. male admitted with a medical diagnosis of Atrial fibrillation with RVR.  He presents with the following impairments/functional limitations: weakness, impaired endurance, impaired self care skills, gait instability, impaired functional mobility, impaired balance, decreased lower extremity function, decreased upper extremity function, decreased ROM, decreased coordination, decreased safety awareness, impaired cognition .    Rehab Prognosis: Poor; patient would benefit from acute skilled PT services to address these deficits and reach maximum level of function.    Recent Surgery: * No surgery found *      Plan:     During this hospitalization, patient to be seen  (2-3x/wk) to address the identified rehab impairments via   and progress toward the following goals:    Plan of Care Expires:  10/02/23    Subjective     Chief Complaint: pt is nonverbal , moaning throughout treatment   Patient/Family Comments/goals:   Pain/Comfort:  Pain Rating 1: 0/10  Pain Rating Post-Intervention 1: 0/10      Objective:     Communicated with nurse Nguyen prior to session.  Patient found HOB elevated with telemetry, peripheral IV, pressure relief boots, bed alarm, PEG Tube , safety sitter present upon PT entry to room.     General Precautions: Standard, fall, aphasia  Orthopedic Precautions: N/A  Braces: N/A  Respiratory Status: Room air     Functional Mobility:  Bed Mobility:     Scooting: contact guard assistance   Supine to Sit: min assistance, HOB elevated   Sit to Supine: min assistance   Transfers:     Sit to Stand: x 2 trials from bed and 1 trial from chair  moderate assistance x 2 with rolling walker and no AD   Chair to bed :  moderate assistance and of 2 persons with  hand-held assist  using   Step Transfer  Gait:  pt ambulated 40 ft with RW, CGA(chair followed) and 5-6 steps from chair to bed with HHA ,MOD A x 2. Noted with decreased melissa,decreased step length, pt with increased agitation while ambulating in the carmona way, started pushing therapist and push walker out the way  , pt  unable to follow simple commands to participate/compete functional gait safely .  Balance:  fair - in standing       AM-PAC 6 CLICK MOBILITY  Turning over in bed (including adjusting bedclothes, sheets and blankets)?: 4  Sitting down on and standing up from a chair with arms (e.g., wheelchair, bedside commode, etc.): 3  Moving from lying on back to sitting on the side of the bed?: 3  Moving to and from a bed to a chair (including a wheelchair)?: 3  Need to walk in hospital room?: 3  Climbing 3-5 steps with a railing?: 2  Basic Mobility Total Score: 18       Treatment & Education:  Performed bed mobility, transfer and gait training as above.     Patient left HOB elevated with all lines intact, call button in reach, bed alarm on, nurse notified, and safety sitter  present..    GOALS:   Multidisciplinary Problems       Physical Therapy Goals          Problem: Physical Therapy    Goal Priority Disciplines Outcome Goal Variances Interventions   Physical Therapy Goal     PT, PT/OT Ongoing, Progressing     Description: Goals to be met by: 10/2/23     Patient will increase functional independence with mobility by performin. Supine to sit with Stand-by Assistance  2. Bed to chair transfer with Supervision    3. Gait  x 25-50 feet with Minimal Assistance using HHA.   4. Wheelchair propulsion x25-50 feet with Contact Guard Assistance                           Time Tracking:     PT Received On: 23  PT Start Time: 1204     PT Stop Time: 1222  PT Total Time (min): 18 min     Billable Minutes: Gait Training 9  , OT presents     Treatment Type: Treatment  PT/PTA: PTA     Number of PTA visits since last PT visit: 2      09/27/2023

## 2023-09-27 NOTE — PLAN OF CARE
Zahida in agreement with plan for pt to d/c home with home health and understands reason pt cannot be placed.  States that she will be able to go to the home on tomorrow to let the pt into the home and get settled and will reach out to pts cousin Cristhian to see when he is available.

## 2023-09-27 NOTE — PLAN OF CARE
Pt grossly unable to attend to bolus presentation however with max cuing he eventually took a single sip of water without overt s/s of aspiration. ST recommends continue to offer water.

## 2023-09-27 NOTE — PLAN OF CARE
Problem: Occupational Therapy  Goal: Occupational Therapy Goal  Description: Goals to be met by: 10/16/23     Patient will increase functional independence with ADLs by performing:    UE Dressing with [place].  LE Dressing with Modified [place].  Grooming while standing at sink with Supervision.  Toileting from toilet with Supervision for hygiene and clothing management.   Sitting in chair 30-60 minutes with Supervision.  Toilet transfer to toilet with Supervision.  Upper extremity exercise program x10  reps per handout, with assistance as needed.  Increase sustained attention to task x 5 min. With 1-2 verbal cues.     Outcome:   Problem: Occupational Therapy  Goal: Occupational Therapy Goal  Description: Goals to be met by: 10/16/23     Patient will increase functional independence with ADLs by performing:    UE Dressing with [place].  LE Dressing with Modified [place].  Grooming while standing at sink with Supervision.  Toileting from toilet with Supervision for hygiene and clothing management.   Sitting in chair 30-60 minutes with Supervision.  Toilet transfer to toilet with Supervision.  Upper extremity exercise program x10  reps per handout, with assistance as needed.  Increase sustained attention to task x 5 min. With 1-2 verbal cues.     9/27/2023 1515 by Cassandra Purcell OT  Outcome: Unable to Meet, Plan Revised   Pt. To sit EOB or in chair x 15 min. With supervision.   Pt. To toilet with max assistance on toilet.   Dress LB with moderate assistance.

## 2023-09-27 NOTE — SUBJECTIVE & OBJECTIVE
Past Medical History:   Diagnosis Date    Acute on chronic combined systolic and diastolic heart failure 09/23/2022    Atrial fibrillation     CAD (coronary artery disease) 09/23/2022    Dyslipidemia 09/23/2022    Embolic stroke involving left middle cerebral artery 08/08/2023    Encounter for blood transfusion     H/O heart artery stent     HTN (hypertension) 09/23/2022    ICD (implantable cardioverter-defibrillator) in place 09/23/2022    Paroxysmal A-fib 09/23/2022    Stage 3 chronic kidney disease 04/19/2023       Past Surgical History:   Procedure Laterality Date    CARDIAC DEFIBRILLATOR PLACEMENT Left     CEREBRAL ANGIOGRAM N/A 8/8/2023    Procedure: ANGIOGRAM-CEREBRAL;  Surgeon: Kenzie Rodriguez;  Location: [facility];  Service: Anesthesiology;  Laterality: N/A;  LVO (angiogram)    ESOPHAGOGASTRODUODENOSCOPY N/A 8/11/2023    Procedure: EGD (ESOPHAGOGASTRODUODENOSCOPY);  Surgeon: Colette Martinez MD;  Location: [facility] ENDO (2ND FLR);  Service: Gastroenterology;  Laterality: N/A;    ESOPHAGOGASTRODUODENOSCOPY W/ PEG N/A 8/17/2023    Procedure: EGD, WITH PEG TUBE INSERTION;  Surgeon: Yan Scott MD;  Location: [facility] OR 2ND FLR;  Service: General;  Laterality: N/A;  PEG, possible lap G    HERNIA REPAIR      LEFT HEART CATHETERIZATION Left 4/18/2023    Procedure: Left heart cath;  Surgeon: Atilio Marks MD;  Location: [facility] CATH LAB;  Service: Cardiology;  Laterality: Left;    RIGHT HEART CATHETERIZATION Right 9/30/2022    Procedure: INSERTION, CATHETER, RIGHT HEART;  Surgeon: Caden Aden MD;  Location: [facility] CATH LAB;  Service: Cardiology;  Laterality: Right;    TREATMENT OF CARDIAC ARRHYTHMIA N/A 11/22/2022    Procedure: CARDIOVERSION;  Surgeon: Marvin Sanon MD;  Location: [facility] EP LAB;  Service: Cardiology;  Laterality: N/A;  afib, KIA, DCCV, anes, MB, 3 Prep       Review of patient's allergies indicates:  No Known Allergies    No current facility-administered medications on file prior to encounter.      Current Outpatient Medications on File Prior to Encounter   Medication Sig    amiodarone (PACERONE) 200 MG Tab 1 tablet (200 mg total) by Per G Tube route once daily.    apixaban (ELIQUIS) 5 mg Tab 1 tablet (5 mg total) by Per G Tube route 2 (two) times daily.    aspirin 81 MG Chew 1 tablet (81 mg total) by Per G Tube route once daily.    atorvastatin (LIPITOR) 80 MG tablet 1 tablet (80 mg total) by Per G Tube route once daily.    empagliflozin (JARDIANCE) 10 mg tablet 1 tablet (10 mg total) by Per G Tube route once daily.    ezetimibe (ZETIA) 10 mg tablet 1 tablet (10 mg total) by Per G Tube route every evening.    metoprolol tartrate (LOPRESSOR) 25 MG tablet 1 tablet (25 mg total) by Per G Tube route 2 (two) times daily.    QUEtiapine (SEROQUEL) 25 MG Tab 1 tablet (25 mg total) by Per G Tube route every evening.    valproic acid, as sodium salt, (DEPAKENE) 250 mg/5 mL (5 mL) Soln 10 mLs (500 mg total) by Per G Tube route once daily. To be administered 0900 daily    valproic acid, as sodium salt, (DEPAKENE) 250 mg/5 mL (5 mL) Soln 10 mLs (500 mg total) by Per G Tube route once daily. To be administers at 1200 daily    valproic acid, as sodium salt, (DEPAKENE) 250 mg/5 mL (5 mL) Soln 20 mLs (1,000 mg total) by Per G Tube route every evening.    chlorhexidine (PERIDEX) 0.12 % solution 5 mLs 3 (three) times daily.    nitroGLYCERIN (NITROSTAT) 0.4 MG SL tablet Place under the tongue.     Family History    None       Tobacco Use    Smoking status: Never    Smokeless tobacco: Never   Substance and Sexual Activity    Alcohol use: Never    Drug use: Never    Sexual activity: Not on file     Review of Systems   Unable to perform ROS: Patient nonverbal     Objective:     Vital Signs (Most Recent):  Temp: 97.6 °F (36.4 °C) (09/27/23 0726)  Pulse: 60 (09/27/23 0726)  Resp: 18 (09/27/23 0726)  BP: 100/75 (09/27/23 0726)  SpO2: 99 % (09/27/23 0726) Vital Signs (24h Range):  Temp:  [97.5 °F (36.4 °C)-98.3 °F (36.8 °C)] 97.6  "°F (36.4 °C)  Pulse:  [] 60  Resp:  [18] 18  SpO2:  [95 %-99 %] 99 %  BP: (100-160)/() 100/75     Weight: 75 kg (165 lb 5.5 oz)  Body mass index is 21.81 kg/m².     Physical Exam  Vitals and nursing note reviewed.   Constitutional:       General: He is not in acute distress.     Appearance: He is ill-appearing (chronically ill appearing).      Comments: Patient appears agitated. Attempting to climb out of the bed. Moaning and yelling but does not appear to be in pain.    HENT:      Mouth/Throat:      Mouth: Mucous membranes are dry.   Cardiovascular:      Rate and Rhythm: Tachycardia present. Rhythm irregular.      Heart sounds: Murmur heard.      No gallop.      Comments: +AICD  Pulmonary:      Effort: Pulmonary effort is normal. No respiratory distress.      Breath sounds: Wheezing (faint expiratory wheezes) present.   Abdominal:      General: There is no distension.      Palpations: Abdomen is soft.      Tenderness: There is no abdominal tenderness.      Comments: +PEG in place. Appears clean and intact, no overlying signs of infection   Musculoskeletal:         General: No swelling or tenderness.      Right lower leg: No edema.      Left lower leg: No edema.   Skin:     General: Skin is warm and dry.   Neurological:      Mental Status: He is alert. Mental status is at baseline.      Comments: Patient is aphasic. Per ex-wife, patient moans and groans at baseline. States when that happens, it means "he needs a bath or a diaper change".    Psychiatric:         Speech: He is noncommunicative.         Behavior: Behavior is agitated.                Significant Labs: All pertinent labs within the past 24 hours have been reviewed.  CBC:   No results for input(s): "WBC", "HGB", "HCT", "PLT" in the last 48 hours.    CMP:   Recent Labs   Lab 09/26/23  0333 09/27/23  0443    141   K 4.7 4.6    113*   CO2 23 22*   GLU 70 78   BUN 22* 18   CREATININE 1.6* 1.5*   CALCIUM 9.0 8.7   ANIONGAP 9 6* " "      Cardiac Markers:   No results for input(s): "CKMB", "MYOGLOBIN", "BNP", "TROPISTAT" in the last 48 hours.      Troponin:   Recent Labs   Lab 09/25/23  1406 09/25/23 2011   TROPONINI 0.050* 0.038*       TSH:   Recent Labs   Lab 09/03/23  1227   TSH 0.736       Urine Studies:   No results for input(s): "COLORU", "APPEARANCEUA", "PHUR", "SPECGRAV", "PROTEINUA", "GLUCUA", "KETONESU", "BILIRUBINUA", "OCCULTUA", "NITRITE", "UROBILINOGEN", "LEUKOCYTESUR", "RBCUA", "WBCUA", "BACTERIA", "SQUAMEPITHEL", "HYALINECASTS" in the last 48 hours.    Invalid input(s): "WRIGHTSUR"      Significant Imaging: I have reviewed all pertinent imaging results/findings within the past 24 hours.    Imaging Results              X-Ray Chest AP Portable (Final result)  Result time 09/25/23 07:33:59      Final result by Atilio Leon MD (09/25/23 07:33:59)                   Impression:      No convincing evidence of acute detrimental change relative prior radiograph performed 08/19/2023, 09:09 hours.      Electronically signed by: Atilio Leon  Date:    09/25/2023  Time:    07:33               Narrative:    EXAMINATION:  XR CHEST AP PORTABLE    CLINICAL HISTORY:  tachycardia;    TECHNIQUE:  Single frontal view of the chest was performed.    COMPARISON:  Chest radiograph performed 08/19/2023, 09:09 hours.    FINDINGS:  Monitoring leads over the chest. Left subclavian approach AICD. Enlarged cardiac silhouette. Prominence of central pulmonary vasculature.  Question mild pulmonary vascular congestion.    No definite focal airspace consolidation is seen. A small left pleural effusion is not excluded.    No acute findings in the visualized abdomen. Osseous and soft tissue structures appear without definite acute change. Small hyperattenuating fragments again project over the left hemithorax.                                       XR Gastric tube check, non-radiologist performed (Final result)  Result time 09/24/23 21:50:37      Final " result by Danny Burks MD (09/24/23 21:50:37)                   Impression:      Intraluminal position of gastrostomy tube.      Electronically signed by: Danny Burks MD  Date:    09/24/2023  Time:    21:50               Narrative:    EXAMINATION:  XR GASTRIC TUBE CHECK, NON-RADIOLOGIST PERFORMED    CLINICAL HISTORY:  confirm appropriate placement;    TECHNIQUE:  Two AP views of the abdomen were obtained before and after administration of 60 cc Gastroview contrast.    COMPARISON:  08/26/2023.    FINDINGS:  Contrast was administered through patient's gastrostomy tube which confirms intraluminal position.  Contrast is visualized within the stomach and extends throughout the duodenum into the proximal jejunum.  Overall nonspecific bowel gas pattern with no evidence to suggest obstruction.  Scattered stool is seen in the colon.  Heart is enlarged.

## 2023-09-27 NOTE — SUBJECTIVE & OBJECTIVE
Interval History: patient sitting up on side of bed. He is grunting as if to communicate. He does not follow or track me even when I ask him to look at me. He does not blink when I move my fingers toward his eyes      Medications:  Continuous Infusions:   dextrose 5 % and 0.9 % NaCl 75 mL/hr at 09/27/23 1015     Scheduled Meds:   amiodarone  200 mg Per G Tube Daily    apixaban  5 mg Per G Tube BID    aspirin  81 mg Per G Tube Daily    atorvastatin  80 mg Per G Tube Daily    cefTRIAXone (ROCEPHIN) IVPB  2 g Intravenous Q24H    ezetimibe  10 mg Per G Tube QHS    QUEtiapine  25 mg Per G Tube QHS    valproic acid (as sodium salt)  1,000 mg Per G Tube QHS    valproic acid (as sodium salt)  500 mg Per G Tube Daily    valproic acid (as sodium salt)  500 mg Per G Tube Daily with lunch     PRN Meds:dextrose 10%, dextrose 10%, glucagon (human recombinant), glucose, glucose, insulin aspart U-100, melatonin, naloxone, sodium chloride 0.9%    Objective:     Vital Signs (Most Recent):  Temp: 97.6 °F (36.4 °C) (09/27/23 0726)  Pulse: 60 (09/27/23 0726)  Resp: 18 (09/27/23 0726)  BP: 100/75 (09/27/23 0726)  SpO2: 99 % (09/27/23 0726) Vital Signs (24h Range):  Temp:  [97.5 °F (36.4 °C)-98.3 °F (36.8 °C)] 97.6 °F (36.4 °C)  Pulse:  [] 60  Resp:  [18] 18  SpO2:  [95 %-99 %] 99 %  BP: (100-160)/() 100/75     Weight: 75 kg (165 lb 5.5 oz)  Body mass index is 21.81 kg/m².       Physical Exam  Vitals and nursing note reviewed.   Constitutional:       General: He is not in acute distress.     Appearance: He is not ill-appearing, toxic-appearing or diaphoretic.   HENT:      Mouth/Throat:      Mouth: Mucous membranes are dry.   Cardiovascular:      Rate and Rhythm: Normal rate and regular rhythm.   Pulmonary:      Effort: Pulmonary effort is normal.   Abdominal:      Comments: PEG   Musculoskeletal:      Right lower leg: No edema.      Left lower leg: No edema.   Skin:     General: Skin is warm and dry.   Neurological:       Mental Status: He is alert.      Comments: Patient is aphasic, unable to assess, does not follow or track (poor eyesight ??)   Psychiatric:         Speech: He is noncommunicative.         Behavior: Behavior normal.                Advance Care Planning   Advance Directives:   Medical Power of : Yes    Goals of Care: What is most important right now is to focus on extending life as long as possible, even it it means sacrificing quality, curative/life-prolongation (regardless of treatment burdens). Accordingly, we have decided that the best plan to meet the patient's goals includes continuing with treatment.         Significant Labs: All pertinent labs within the past 24 hours have been reviewed.  CBC:   Recent Labs   Lab 09/25/23  0805   WBC 4.91   HGB 10.1*   HCT 33.7*   MCV 92        BMP:  Recent Labs   Lab 09/27/23  0443   GLU 78      K 4.6   *   CO2 22*   BUN 18   CREATININE 1.5*   CALCIUM 8.7   MG 2.1     LFT:  Lab Results   Component Value Date    AST 31 09/25/2023    ALKPHOS 76 09/25/2023    BILITOT 0.5 09/25/2023     Albumin:   Albumin   Date Value Ref Range Status   09/25/2023 3.2 (L) 3.5 - 5.2 g/dL Final     Protein:   Total Protein   Date Value Ref Range Status   09/25/2023 7.0 6.0 - 8.4 g/dL Final     Lactic acid:   Lab Results   Component Value Date    LACTATE 1.8 08/19/2023    LACTATE 1.0 07/13/2023       Significant Imaging: None since last review

## 2023-09-27 NOTE — ASSESSMENT & PLAN NOTE
- urine analysis for occasional bacteria, +1 leukocytes.  Patient unable to communicate any UTI symptoms   -urine culture with Gram-negative rods, identification and sensitivities pending   - of note, was recently hospitalized in August 2023 and was treated with a course of Rocephin for UTI, previous urine culture with  Klebsiella pneumoniae, sensitive to ceftriaxone  - continue Rocephin,    Urine culture is growing Klebsiella,

## 2023-09-27 NOTE — ASSESSMENT & PLAN NOTE
Patient is identified as having Combined Systolic and Diastolic heart failure that is Chronic. CHF is currently controlled. Latest ECHO performed and demonstrates- Results for orders placed during the hospital encounter of 08/05/23    Echo Saline Bubble? Yes    Interpretation Summary    Left Ventricle: The left ventricle is moderately dilated. Increased ventricular mass. Normal wall thickness. There is moderate eccentric hypertrophy. Severe global hypokinesis present. Septal motion is consistent with pacing. There is severely reduced systolic function with a visually estimated ejection fraction of 15 - 20%. Ejection fraction by visual approximation is 20%. Unable to assess diastolic function due to arrhythmia.    Left Atrium: Left atrium is severely dilated. Radha 85mL/m2.    Right Ventricle: Mild right ventricular enlargement. Wall thickness is normal. Right ventricle wall motion  is normal. Systolic function is severely reduced. Pacemaker lead present in the ventricle.    Right Atrium: Right atrium is severely dilated.    Aortic Valve: There is mild aortic valve sclerosis. There is normal leaflet mobility. There is no significant regurgitation.    Mitral Valve: There is bileaflet sclerosis. There is normal leaflet mobility. There is mild regurgitation.    Tricuspid Valve: The tricuspid valve is structurally normal. There is normal leaflet mobility. There is mild regurgitation.    Pulmonic Valve: The pulmonic valve is structurally normal. There is normal leaflet mobility. There is no significant regurgitation.    Aorta: Aortic root is normal in size measuring 3.13 cm. Ascending aorta is normal measuring 3.24 cm.    IVC/SVC: Normal venous pressure at 3 mmHg.    Pericardium: The pericardium is normal. There is no pericardial effusion.  . Continue Beta Blocker and Furosemide and monitor clinical status closely. Monitor on telemetry. Patient is off CHF pathway.  Monitor strict Is&Os and daily weights.  Place  on fluid restriction of 1.5 L. Cardiology has not been any consulted. Continue to stress to patient importance of self efficacy and  on diet for CHF. Last BNP reviewed- and noted below   Recent Labs   Lab 09/25/23  0805   BNP 3,173*   .

## 2023-09-27 NOTE — PT/OT/SLP PROGRESS
Speech Language Pathology Treatment    Patient Name:  Tera Griffiths   MRN:  [id number]  Admitting Diagnosis: Atrial fibrillation with RVR    Recommendations:                 General Recommendations:  Dysphagia therapy  Diet recommendations:   ,   NPO except water  Aspiration Precautions:  good oral care    General Precautions: Standard,    Communication strategies:   calm tone    Assessment:     Tera Griffiths is a 58 y.o. male with dx of afib he presents with profound cognitive linguistic deficits that negatively impact his ability to attend to bolus presentation.     Subjective   Pt grossly unable to attend to ST tasks severely distracted by internal and external stimuli. Non verbal throughout  Patient goals: Pt unable to report    Pain/Comfort:  Pain Rating 1: 0/10    Respiratory Status: Room air    Objective:     Has the patient been evaluated by SLP for swallowing?   Yes  Keep patient NPO? No     Pt grossly fidgeting throughout, pushing multiple offers of water away presented by ST. X1 feeding attempt Pt reached for water and then self presented one sip, no anterior loss or overt s/s of aspiration noted he was unable to participate in further ST or accept further po trials despite multiple attempts with max cuing.     Goals:   Multidisciplinary Problems       SLP Goals          Problem: SLP    Goal Priority Disciplines Outcome   SLP Goal    Low SLP Ongoing, Progressing   Description: STG  Pt will participate in ongoing assessment of swallow and cognitive linguistic skills.                       Plan:     Patient to be seen:  2 x/week   Plan of Care expires:  9 /30  Plan of Care reviewed with:  patient   SLP Follow-Up:  Yes       Discharge recommendations:  nursing facility, basic   Barriers to Discharge:  None    Time Tracking:     SLP Treatment Date:   09/27/23  Speech Start Time:  1351  Speech Stop Time:  1407     Speech Total Time (min):  16 min    Billable Minutes: Treatment Swallowing Dysfunction  16    09/27/2023

## 2023-09-27 NOTE — PT/OT/SLP PROGRESS
Occupational Therapy   Treatment    Name: Tera Griffiths  MRN: [id number]  Admitting Diagnosis:  Atrial fibrillation with RVR       Recommendations:     Discharge Recommendations: nursing facility, basic  Discharge Equipment Recommendations:  to be determined by next level of care  Barriers to discharge:  Other (Comment)    Assessment:     Tera Griffiths is a 58 y.o. male with a medical diagnosis of Atrial fibrillation with RVR.  He presents with HOB elevated with PCT and RN present taking blood pressure, but unable to obtain a number. Performance deficits affecting function are weakness, impaired endurance, impaired self care skills, impaired functional mobility, gait instability, impaired balance, impaired cognition, decreased lower extremity function, decreased safety awareness, pain, abnormal tone, decreased ROM, impaired coordination.     Rehab Prognosis:  Fair; patient would benefit from acute skilled OT services to address these deficits and reach maximum level of function.       Plan:     Patient to be seen 4 x/week to address the above listed problems via self-care/home management, therapeutic activities, therapeutic exercises  Plan of Care Expires: 10/16/23  Plan of Care Reviewed with: patient    Subjective     Chief Complaint: pt. Is aphasic   Patient/Family Comments/goals: family not present to ay   Pain/Comfort:  Pain Rating 1: other (see comments) (pt. unable to name verbally due to aphasia)    Objective:     Communicated with: CARL Nguyen,  prior to session.  Patient found HOB elevated with telemetry, peripheral IV, pressure relief boots, PEG Tube, bed alarm upon OT entry to room.    General Precautions: Standard, fall, aphasia    Orthopedic Precautions:N/A  Braces: N/A  Respiratory Status: Room air     Occupational Performance:     Bed Mobility:    Patient completed Rolling/Turning to Left with  CGA  Patient completed Scooting/Bridging with CGA  Patient completed Supine to Sit with minimal  assistance  Patient completed Sit to Supine with minimal assistance     Functional Mobility/Transfers:  Patient completed Sit <> Stand Transfer with moderate assistance and of 2  persons  with  rolling walker   Patient completed Bed <> Chair Transfer using Step Transfer technique with moderate assistance and of 2  persons with rolling walker  Functional Mobility: Patient walked ~40' with RW with mod assist x 2 people, PTA and occupational therapy, with chair following behind for safety. He became frustrated at end of walk and pushed RW away and sat down in chair. PTA rolled him back to room in chair.     Activities of Daily Living:  Upper Body Dressing: minimum assistance to don gown and then he removed gown himself from neck and draped it on his body   Lower Body Dressing: total assistance seated and standing x 2 to don brief       AMPAC 6 Click ADL:      Treatment & Education:  Occupational therapy provided hand holding assistance while RN provided PEG care so that she complete care  Occupational therapy provided multiple verbal cues for hand placement with all t/fs      Patient left HOB elevated with all lines intact, call button in reach, bed alarm on, RN notified, and sitter present    GOALS:   Multidisciplinary Problems       Occupational Therapy Goals          Problem: Occupational Therapy    Goal Priority Disciplines Outcome Interventions   Occupational Therapy Goal     OT, PT/OT Unable to Meet, Plan Revised    Description: Goals to be met by: 10/16/23     Patient will increase functional independence with ADLs by performing:    UE Dressing with Bond.  LE Dressing with Modified Bond.  Grooming while standing at sink with Supervision.  Toileting from toilet with Supervision for hygiene and clothing management.   Sitting in chair 30-60 minutes with Supervision.  Toilet transfer to toilet with Supervision.  Upper extremity exercise program x10  reps per handout, with assistance as  needed.  Increase sustained attention to task x 5 min. With 1-2 verbal cues.                          Time Tracking:     OT Date of Treatment: 12/04/23  OT Start Time: 1204  OT Stop Time: 1223  OT Total Time (min): 19 min    Billable Minutes:Self Care/Home Management 11    OT/ANDREA: OT          9/27/2023

## 2023-09-27 NOTE — PROGRESS NOTES
"[facility] Medicine  Progress Note    Patient Name: Tera Griffiths  MRN: [id number]  Patient Class: OP- Observation   Admission Date: 9/24/2023  Length of Stay: 0 days  Attending Physician: Gema Barrera, *  Primary Care Provider: Carleen Bueno MD        Subjective:     Principal Problem:Atrial fibrillation with RVR        HPI:  58 y.o male, with a medical history of combined systolic and diastolic heart failure, Atrial fibrillation (one eliquis), Coronary artery disease, Dyslipidemia, Embolic stroke involving left middle cerebral artery, Hypertension,  and Stage 3 chronic kidney disease was brought into the ED by ambulance initially for "peg problem".  However, ED provider discussed with family who ultimately stated that they can no longer care for the patient. Patient is aphasic.  Called and spoke with the patient's ex wife Ms. Teague.  She stated patient was recently discharged home from inpatient rehab and not understand why.  States that he is very weak and has an unsteady gait.  Easily gets agitated but improves with his a.m. medications.  Patient lives alone on the 3rd floor and is unable to care for himself.  Per ex-wife, family is requesting for nursing home placement as they are unable to care for the patient at this time.  Mental status wise, patient appears to be at baseline, which is aphasic and intermittently agitated. Of note, patient was recently hospitliazed from 08/05 to 09/07/2023 for ADHF and NSTEMI, course complicated by L MCA stroke. Patient was also treated for pyelonephritis with course of IV CTX. Of note, that hospital course was complicated by non-redirectable agitation, briefly requiring restraints. Patient was discharge to inpatient rehab.     In the ED, patient with atrial fibrillation with RVR.  IV Lopressor was ordered in the ED, but was not given.  Rhythm and rate improved with home medications.  Labs significant for elevated BNP and troponin.  EKG " "with no acute ischemic changes. CXR with No convincing evidence of acute detrimental change relative prior radiograph performed 08/19/2023.  Patient admitted to observation  for further evaluation        Overview/Hospital Course:  58 y.o male, with a medical history of combined systolic and diastolic heart failure, Atrial fibrillation (one eliquis), Coronary artery disease, Dyslipidemia, Embolic stroke involving left middle cerebral artery, Hypertension,  and Stage 3 chronic kidney disease was brought into the ED by ambulance initially for "peg problem".  However, ED provider discussed with family who ultimately stated that they can no longer care for the patient. Patient is aphasic.  Called and spoke with the patient's ex wife Ms. Teague.  She stated patient was recently discharged home from inpatient rehab and not understand why.  States that he is very weak and has an unsteady gait.     In the ED, patient with atrial fibrillation with RVR.  IV Lopressor was ordered in the ED, but was not given.  Rhythm and rate improved with home medications.  Labs significant for elevated BNP and troponin.  EKG with no acute ischemic changes. CXR with No convincing evidence of acute detrimental change relative prior radiograph performed 08/19/2023.  Patient has been started on tube feeding.  Patient was very combative over night,received Zyprexa,on restrain,seems family can not take of him,palliative is on case.  Off restrain in last 24 hours.calmer.  Urine culture is growing Klebsiella,      Past Medical History:   Diagnosis Date    Acute on chronic combined systolic and diastolic heart failure 09/23/2022    Atrial fibrillation     CAD (coronary artery disease) 09/23/2022    Dyslipidemia 09/23/2022    Embolic stroke involving left middle cerebral artery 08/08/2023    Encounter for blood transfusion     H/O heart artery stent     HTN (hypertension) 09/23/2022    ICD (implantable cardioverter-defibrillator) in place " 09/23/2022    Paroxysmal A-fib 09/23/2022    Stage 3 chronic kidney disease 04/19/2023       Past Surgical History:   Procedure Laterality Date    CARDIAC DEFIBRILLATOR PLACEMENT Left     CEREBRAL ANGIOGRAM N/A 8/8/2023    Procedure: ANGIOGRAM-CEREBRAL;  Surgeon: Kenzie Rodriguez;  Location: [facility];  Service: Anesthesiology;  Laterality: N/A;  LVO (angiogram)    ESOPHAGOGASTRODUODENOSCOPY N/A 8/11/2023    Procedure: EGD (ESOPHAGOGASTRODUODENOSCOPY);  Surgeon: Colette Martinez MD;  Location: [facility] ENDO (2ND FLR);  Service: Gastroenterology;  Laterality: N/A;    ESOPHAGOGASTRODUODENOSCOPY W/ PEG N/A 8/17/2023    Procedure: EGD, WITH PEG TUBE INSERTION;  Surgeon: Yan Scott MD;  Location: [facility] OR 2ND FLR;  Service: General;  Laterality: N/A;  PEG, possible lap G    HERNIA REPAIR      LEFT HEART CATHETERIZATION Left 4/18/2023    Procedure: Left heart cath;  Surgeon: Atilio Marks MD;  Location: [facility] CATH LAB;  Service: Cardiology;  Laterality: Left;    RIGHT HEART CATHETERIZATION Right 9/30/2022    Procedure: INSERTION, CATHETER, RIGHT HEART;  Surgeon: Caden Aden MD;  Location: [facility] CATH LAB;  Service: Cardiology;  Laterality: Right;    TREATMENT OF CARDIAC ARRHYTHMIA N/A 11/22/2022    Procedure: CARDIOVERSION;  Surgeon: Marvin Sanon MD;  Location: [facility] EP LAB;  Service: Cardiology;  Laterality: N/A;  afib, KIA, DCCV, anes, MB, 3 Prep       Review of patient's allergies indicates:  No Known Allergies    No current facility-administered medications on file prior to encounter.     Current Outpatient Medications on File Prior to Encounter   Medication Sig    amiodarone (PACERONE) 200 MG Tab 1 tablet (200 mg total) by Per G Tube route once daily.    apixaban (ELIQUIS) 5 mg Tab 1 tablet (5 mg total) by Per G Tube route 2 (two) times daily.    aspirin 81 MG Chew 1 tablet (81 mg total) by Per G Tube route once daily.    atorvastatin (LIPITOR) 80 MG tablet 1 tablet (80 mg total) by Per G Tube  route once daily.    empagliflozin (JARDIANCE) 10 mg tablet 1 tablet (10 mg total) by Per G Tube route once daily.    ezetimibe (ZETIA) 10 mg tablet 1 tablet (10 mg total) by Per G Tube route every evening.    metoprolol tartrate (LOPRESSOR) 25 MG tablet 1 tablet (25 mg total) by Per G Tube route 2 (two) times daily.    QUEtiapine (SEROQUEL) 25 MG Tab 1 tablet (25 mg total) by Per G Tube route every evening.    valproic acid, as sodium salt, (DEPAKENE) 250 mg/5 mL (5 mL) Soln 10 mLs (500 mg total) by Per G Tube route once daily. To be administered 0900 daily    valproic acid, as sodium salt, (DEPAKENE) 250 mg/5 mL (5 mL) Soln 10 mLs (500 mg total) by Per G Tube route once daily. To be administers at 1200 daily    valproic acid, as sodium salt, (DEPAKENE) 250 mg/5 mL (5 mL) Soln 20 mLs (1,000 mg total) by Per G Tube route every evening.    chlorhexidine (PERIDEX) 0.12 % solution 5 mLs 3 (three) times daily.    nitroGLYCERIN (NITROSTAT) 0.4 MG SL tablet Place under the tongue.     Family History    None       Tobacco Use    Smoking status: Never    Smokeless tobacco: Never   Substance and Sexual Activity    Alcohol use: Never    Drug use: Never    Sexual activity: Not on file     Review of Systems   Unable to perform ROS: Patient nonverbal     Objective:     Vital Signs (Most Recent):  Temp: 97.6 °F (36.4 °C) (09/27/23 0726)  Pulse: 60 (09/27/23 0726)  Resp: 18 (09/27/23 0726)  BP: 100/75 (09/27/23 0726)  SpO2: 99 % (09/27/23 0726) Vital Signs (24h Range):  Temp:  [97.5 °F (36.4 °C)-98.3 °F (36.8 °C)] 97.6 °F (36.4 °C)  Pulse:  [] 60  Resp:  [18] 18  SpO2:  [95 %-99 %] 99 %  BP: (100-160)/() 100/75     Weight: 75 kg (165 lb 5.5 oz)  Body mass index is 21.81 kg/m².     Physical Exam  Vitals and nursing note reviewed.   Constitutional:       General: He is not in acute distress.     Appearance: He is ill-appearing (chronically ill appearing).      Comments: Patient appears agitated. Attempting  "to climb out of the bed. Moaning and yelling but does not appear to be in pain.    HENT:      Mouth/Throat:      Mouth: Mucous membranes are dry.   Cardiovascular:      Rate and Rhythm: Tachycardia present. Rhythm irregular.      Heart sounds: Murmur heard.      No gallop.      Comments: +AICD  Pulmonary:      Effort: Pulmonary effort is normal. No respiratory distress.      Breath sounds: Wheezing (faint expiratory wheezes) present.   Abdominal:      General: There is no distension.      Palpations: Abdomen is soft.      Tenderness: There is no abdominal tenderness.      Comments: +PEG in place. Appears clean and intact, no overlying signs of infection   Musculoskeletal:         General: No swelling or tenderness.      Right lower leg: No edema.      Left lower leg: No edema.   Skin:     General: Skin is warm and dry.   Neurological:      Mental Status: He is alert. Mental status is at baseline.      Comments: Patient is aphasic. Per ex-wife, patient moans and groans at baseline. States when that happens, it means "he needs a bath or a diaper change".    Psychiatric:         Speech: He is noncommunicative.         Behavior: Behavior is agitated.                Significant Labs: All pertinent labs within the past 24 hours have been reviewed.  CBC:   No results for input(s): "WBC", "HGB", "HCT", "PLT" in the last 48 hours.    CMP:   Recent Labs   Lab 09/26/23  0333 09/27/23  0443    141   K 4.7 4.6    113*   CO2 23 22*   GLU 70 78   BUN 22* 18   CREATININE 1.6* 1.5*   CALCIUM 9.0 8.7   ANIONGAP 9 6*       Cardiac Markers:   No results for input(s): "CKMB", "MYOGLOBIN", "BNP", "TROPISTAT" in the last 48 hours.      Troponin:   Recent Labs   Lab 09/25/23  1406 09/25/23 2011   TROPONINI 0.050* 0.038*       TSH:   Recent Labs   Lab 09/03/23  1227   TSH 0.736       Urine Studies:   No results for input(s): "COLORU", "APPEARANCEUA", "PHUR", "SPECGRAV", "PROTEINUA", "GLUCUA", "KETONESU", "BILIRUBINUA", " ""OCCULTUA", "NITRITE", "UROBILINOGEN", "LEUKOCYTESUR", "RBCUA", "WBCUA", "BACTERIA", "SQUAMEPITHEL", "HYALINECASTS" in the last 48 hours.    Invalid input(s): "WRIGHTSUR"      Significant Imaging: I have reviewed all pertinent imaging results/findings within the past 24 hours.    Imaging Results              X-Ray Chest AP Portable (Final result)  Result time 09/25/23 07:33:59      Final result by Atilio Leon MD (09/25/23 07:33:59)                   Impression:      No convincing evidence of acute detrimental change relative prior radiograph performed 08/19/2023, 09:09 hours.      Electronically signed by: Atilio Leon  Date:    09/25/2023  Time:    07:33               Narrative:    EXAMINATION:  XR CHEST AP PORTABLE    CLINICAL HISTORY:  tachycardia;    TECHNIQUE:  Single frontal view of the chest was performed.    COMPARISON:  Chest radiograph performed 08/19/2023, 09:09 hours.    FINDINGS:  Monitoring leads over the chest. Left subclavian approach AICD. Enlarged cardiac silhouette. Prominence of central pulmonary vasculature.  Question mild pulmonary vascular congestion.    No definite focal airspace consolidation is seen. A small left pleural effusion is not excluded.    No acute findings in the visualized abdomen. Osseous and soft tissue structures appear without definite acute change. Small hyperattenuating fragments again project over the left hemithorax.                                       XR Gastric tube check, non-radiologist performed (Final result)  Result time 09/24/23 21:50:37      Final result by Danny Burks MD (09/24/23 21:50:37)                   Impression:      Intraluminal position of gastrostomy tube.      Electronically signed by: Danny Burks MD  Date:    09/24/2023  Time:    21:50               Narrative:    EXAMINATION:  XR GASTRIC TUBE CHECK, NON-RADIOLOGIST PERFORMED    CLINICAL HISTORY:  confirm appropriate placement;    TECHNIQUE:  Two AP views of the abdomen were " obtained before and after administration of 60 cc Gastroview contrast.    COMPARISON:  08/26/2023.    FINDINGS:  Contrast was administered through patient's gastrostomy tube which confirms intraluminal position.  Contrast is visualized within the stomach and extends throughout the duodenum into the proximal jejunum.  Overall nonspecific bowel gas pattern with no evidence to suggest obstruction.  Scattered stool is seen in the colon.  Heart is enlarged.                                          Assessment/Plan:      * Atrial fibrillation with RVR  Patient with Paroxysmal (<7 days) atrial fibrillation which is controlled currently with Beta Blocker and Amiodarone. Patient is currently in atrial fibrillation.[id number] Score: 2. Anticoagulation indicated. Anticoagulation done with eliquis.    -Presented with afib RVR, HR in the 130s  -EKG reviewed and showed afib rvr  -Resolved with home medications  -suspect possibly triggered by UTI and not receiving home meds  -Resume home meds: home metoprolol tartrate 25 mg b.i.d. and amiodarone 200 mg daily  - monitor on telemetry,  Rate is controled.    Combined systolic and diastolic congestive heart failure  Patient is identified as having Combined Systolic and Diastolic heart failure that is Chronic. CHF is currently controlled. Latest ECHO performed and demonstrates- Results for orders placed during the hospital encounter of 08/05/23    Echo Saline Bubble? Yes    Interpretation Summary    Left Ventricle: The left ventricle is moderately dilated. Increased ventricular mass. Normal wall thickness. There is moderate eccentric hypertrophy. Severe global hypokinesis present. Septal motion is consistent with pacing. There is severely reduced systolic function with a visually estimated ejection fraction of 15 - 20%. Ejection fraction by visual approximation is 20%. Unable to assess diastolic function due to arrhythmia.    Left Atrium: Left atrium is severely dilated. Radha  85mL/m2.    Right Ventricle: Mild right ventricular enlargement. Wall thickness is normal. Right ventricle wall motion  is normal. Systolic function is severely reduced. Pacemaker lead present in the ventricle.    Right Atrium: Right atrium is severely dilated.    Aortic Valve: There is mild aortic valve sclerosis. There is normal leaflet mobility. There is no significant regurgitation.    Mitral Valve: There is bileaflet sclerosis. There is normal leaflet mobility. There is mild regurgitation.    Tricuspid Valve: The tricuspid valve is structurally normal. There is normal leaflet mobility. There is mild regurgitation.    Pulmonic Valve: The pulmonic valve is structurally normal. There is normal leaflet mobility. There is no significant regurgitation.    Aorta: Aortic root is normal in size measuring 3.13 cm. Ascending aorta is normal measuring 3.24 cm.    IVC/SVC: Normal venous pressure at 3 mmHg.    Pericardium: The pericardium is normal. There is no pericardial effusion.  . Continue Beta Blocker and Furosemide and monitor clinical status closely. Monitor on telemetry. Patient is off CHF pathway.  Monitor strict Is&Os and daily weights.  Place on fluid restriction of 1.5 L. Cardiology has not been any consulted. Continue to stress to patient importance of self efficacy and  on diet for CHF. Last BNP reviewed- and noted below   Recent Labs   Lab 09/25/23  0805   BNP 3,173*   .    Palliative care encounter  - palliative care team consulted      Debility  - PT/OT consulted      Elevated troponin  - patient with history of chronically elevated troponin   - Initial troponin 0.046, lower than baseline.  Possibly elevated due to demand from atrial fibrillation with RVR  - patient without any chest pain.  EKG with atrial fibrillation with RVR   - trend troponin   - resume home Eliquis    Acute cystitis  - urine analysis for occasional bacteria, +1 leukocytes.  Patient unable to communicate any UTI symptoms    -urine culture with Gram-negative rods, identification and sensitivities pending   - of note, was recently hospitalized in August 2023 and was treated with a course of Rocephin for UTI, previous urine culture with  Klebsiella pneumoniae, sensitive to ceftriaxone  - continue Rocephin,    Urine culture is growing Klebsiella,    Moderate protein-calorie malnutrition  Nutrition consulted. Most recent weight and BMI monitored-     Measurements:  Wt Readings from Last 1 Encounters:   09/25/23 75 kg (165 lb 5.5 oz)   Body mass index is 21.81 kg/m².    Patient has been screened and assessed by RD.    Malnutrition Type:  Context:    Level:      Malnutrition Characteristic Summary:       Interventions/Recommendations (treatment strategy):  1) Start TF as soon as medically able: Isosource 1.5 @ 20 ml/hr Advancing to goal of 50 ml/hr + 100 ml flush q 4 hr ( or changes per MD discretion) ( provides 1800 kcal ( 94% EEN), 81 g protein ( 100% EPN), and 912 ml free water) 2) weigh weekly 3) If bolus needed 5 cartons daily + 55 ml flush before and after each bolus    Agitation  -Of note, on last hospitalization,  The hospital course was complicated by non-redirectable agitation, briefly requiring restraints. Patient was discharge to inpatient rehab at that time    -Sitter at bedside.  -Zyprexa once PRN  - resume home valproic acid,  Off restrain in last 24 hours.calmer.      Dysphagia  - due to recent left MCA stroke in August 2023   -SLP consulted  -Pt with PEG (placed in 8/2023)      Global aphasia  - due to recent left MCA stroke in August 2023   -SLP consulted  -patient with PEG tube    Hemiparesis, right  - due to recent left MCA stroke in August 2023   -PT/OT consulted      Multiple subsegmental pulmonary emboli without acute cor pulmonale  -seen on CTA chest in 08/08/2023  - patient without any hypoxia  - resume home Eliquis      Embolic stroke involving left middle cerebral artery  -patient was recently hospitliazed from  08/05 to 09/07/2023 for ADHF and NSTEMI, course complicated by L MCA stroke. Was discharge to [facility] inpatient rehab and recently discharge home from [facility]  -PT/OT consulted  - resume home medications:  Aspirin, Eliquis, metoprolol tartrate, statin  Patient has been started on tube feeding.  Patient was very combative over night,received Zyprexa,on restrain,seems family can not take of him,palliative is on case.    Ischemic cardiomyopathy  -see plan as above for CAD    Stage 3 chronic kidney disease  -Cr on admit 1.8, appears to be near baseline   -Avoid nephrotoxins, renal dose all meds.   -Monitor Is/Os  -Monitor       Primary hypertension  - resume home medication:  Metoprolol tartrate 25 mg b.i.d., amiodarone 200 mg daily      ICD (implantable cardioverter-defibrillator) in place  -history noted  - [employer]  - PMHx cardiac arrest 2/2 V-Tach    Dyslipidemia  - resume home medication:  Ezetimibe 10 mg nightly, atorvastatin 80 mg daily      CAD (coronary artery disease)  -Patient has nonobstructive coronary artery disease.  He has history of STEMI his RCA.   -Last cath in 04/2023: The LAD had luminal irregularity with no obstructive disease.The left circumflex had luminal irregularity with no obstructive disease.  There was 1 obtuse marginal branch that was completely occluded and appeared to be an acute occlusion.  It was very small.The RCA luminal irregularity without obstructive disease.  - resume home med:  Metoprolol tartrate 25 mg b.i.d., aspirin 81 mg daily, atorvastatin 80 mg daily        VTE Risk Mitigation (From admission, onward)         Ordered     apixaban tablet 5 mg  2 times daily         09/24/23 1623                Discharge Planning   YUSEF:      Code Status: Full Code   Is the patient medically ready for discharge?:     Reason for patient still in hospital (select all that apply): Patient trending condition  Discharge Plan A: New Nursing Home placement - snf care facility                   Gema Barrera MD  Department of Hospital Medicine   [facility] - Telemetry

## 2023-09-27 NOTE — PLAN OF CARE
Call received from Sania with [facility] in [place] to advise that she spoke with business/finance office and was notified that they cannot accept the pt without any income or banking information

## 2023-09-27 NOTE — PLAN OF CARE
AAKASH completed and faxed to [facility] to obtain 142.    0905- call placed to pts MAYCOL Teague to discuss d/c planning and advise of accepting facility for pt.  TN updated Zahida that pt has an accepting facility and is in [place].  Zahida states that would be fine for him to go there if they will take him.  TN inquired about financial information as this is the one thing that will be needed along with completion of admission paperwork.  At this time Zahida advised TN that the pt has no financial information and the only thing the pt owns is his name.  Zahida states she has been trying to tell the family that without income and financial information the pt cannot go to a NH but they insisted on bringing him to the hospital for placement.  Zahida states that the pt can ambulate and was doing so prior to coming to the hospital that the pt could dress himself and wore a diaper but still was the commode not wetting the diaper.  Pt has all equipment needed in the home already.  Zahida states that she goes to the pts home on her lunch break and after work to assist with ADL's. Zahida notified TN that the pts sister has the keys to the pts home and would have to let him in. TN advised Zahida that I would follow up with pts sister Claribel and Dr Meredith and get back with her.    0918- call placed to pts sister Claribel to update on current information regarding discharge plan.  Will call once definitive plan in place.    0930- call placed to [facility], spoke to Sania in admissions to follow up on acceptance of pt and advise of the information from POA that the pt has no financial information at all.  He has no income and no bank account to have statement from.  TN inquired if they could take the pt with no financial information.  Sania to follow up with business office and get back with TN but she does not believe that they will be able to. TN provided name and contact information for call back.

## 2023-09-27 NOTE — NURSING
Bedside report given to night nurse CARL Venegas. Walking rounds completed. Visualized and assessed patient. NAD noted. Safety precautions maintained and call light within reach.    Chart check completed.

## 2023-09-27 NOTE — PLAN OF CARE
Problem: Adult Inpatient Plan of Care  Goal: Plan of Care Review  Outcome: Ongoing, Progressing  Goal: Patient-Specific Goal (Individualized)  Outcome: Ongoing, Progressing  Goal: Absence of Hospital-Acquired Illness or Injury  Outcome: Ongoing, Progressing  Goal: Optimal Comfort and Wellbeing  Outcome: Ongoing, Progressing  Goal: Readiness for Transition of Care  Outcome: Ongoing, Progressing     Problem: Coping Ineffective  Goal: Effective Coping  Outcome: Ongoing, Progressing     Problem: Fall Injury Risk  Goal: Absence of Fall and Fall-Related Injury  Outcome: Ongoing, Progressing     Problem: Adjustment to Illness (Sepsis/Septic Shock)  Goal: Optimal Coping  Outcome: Ongoing, Progressing     Problem: Bleeding (Sepsis/Septic Shock)  Goal: Absence of Bleeding  Outcome: Ongoing, Progressing     Problem: Glycemic Control Impaired (Sepsis/Septic Shock)  Goal: Blood Glucose Level Within Desired Range  Outcome: Ongoing, Progressing     Problem: Infection Progression (Sepsis/Septic Shock)  Goal: Absence of Infection Signs and Symptoms  Outcome: Ongoing, Progressing     Problem: Nutrition Impaired (Sepsis/Septic Shock)  Goal: Optimal Nutrition Intake  Outcome: Ongoing, Progressing     Problem: Fluid and Electrolyte Imbalance (Acute Kidney Injury/Impairment)  Goal: Fluid and Electrolyte Balance  Outcome: Ongoing, Progressing     Problem: Oral Intake Inadequate (Acute Kidney Injury/Impairment)  Goal: Optimal Nutrition Intake  Outcome: Ongoing, Progressing     Problem: Renal Function Impairment (Acute Kidney Injury/Impairment)  Goal: Effective Renal Function  Outcome: Ongoing, Progressing     Problem: Oral Intake Inadequate  Goal: Improved Oral Intake  Outcome: Ongoing, Progressing     Problem: Skin Injury Risk Increased  Goal: Skin Health and Integrity  Outcome: Ongoing, Progressing     Problem: Restraint, Nonbehavioral (Nonviolent)  Goal: Absence of Harm or Injury  Outcome: Ongoing, Progressing

## 2023-09-28 ENCOUNTER — HOSPITAL ENCOUNTER (EMERGENCY)
Facility: HOSPITAL | Age: 58
Discharge: HOME | End: 2023-09-29
Attending: EMERGENCY MEDICINE
Payer: MEDICAID

## 2023-09-28 VITALS
HEIGHT: 73 IN | WEIGHT: 165.38 LBS | BODY MASS INDEX: 21.92 KG/M2 | SYSTOLIC BLOOD PRESSURE: 144 MMHG | TEMPERATURE: 98 F | HEART RATE: 69 BPM | DIASTOLIC BLOOD PRESSURE: 90 MMHG | OXYGEN SATURATION: 98 % | RESPIRATION RATE: 12 BRPM

## 2023-09-28 DIAGNOSIS — Z76.89 ENCOUNTER FOR SOCIAL WORK INTERVENTION: Primary | ICD-10-CM

## 2023-09-28 LAB
ANION GAP SERPL CALC-SCNC: 9 MMOL/L (ref 8–16)
BUN SERPL-MCNC: 14 MG/DL (ref 6–20)
CALCIUM SERPL-MCNC: 8.7 MG/DL (ref 8.7–10.5)
CHLORIDE SERPL-SCNC: 111 MMOL/L (ref 95–110)
CO2 SERPL-SCNC: 24 MMOL/L (ref 23–29)
CREAT SERPL-MCNC: 1.4 MG/DL (ref 0.5–1.4)
EST. GFR  (NO RACE VARIABLE): 59 ML/MIN/1.73 M^2
GLUCOSE SERPL-MCNC: 93 MG/DL (ref 70–110)
MAGNESIUM SERPL-MCNC: 2.1 MG/DL (ref 1.6–2.6)
PHOSPHATE SERPL-MCNC: 3.6 MG/DL (ref 2.7–4.5)
POCT GLUCOSE: 66 MG/DL (ref 70–110)
POCT GLUCOSE: 90 MG/DL (ref 70–110)
POTASSIUM SERPL-SCNC: 5 MMOL/L (ref 3.5–5.1)
SODIUM SERPL-SCNC: 144 MMOL/L (ref 136–145)

## 2023-09-28 PROCEDURE — 84100 ASSAY OF PHOSPHORUS: CPT | Performed by: STUDENT IN AN ORGANIZED HEALTH CARE EDUCATION/TRAINING PROGRAM

## 2023-09-28 PROCEDURE — 97530 THERAPEUTIC ACTIVITIES: CPT

## 2023-09-28 PROCEDURE — 97116 GAIT TRAINING THERAPY: CPT | Mod: CQ

## 2023-09-28 PROCEDURE — 83735 ASSAY OF MAGNESIUM: CPT | Performed by: STUDENT IN AN ORGANIZED HEALTH CARE EDUCATION/TRAINING PROGRAM

## 2023-09-28 PROCEDURE — 63600175 PHARM REV CODE 636 W HCPCS: Performed by: EMERGENCY MEDICINE

## 2023-09-28 PROCEDURE — 36415 COLL VENOUS BLD VENIPUNCTURE: CPT | Performed by: STUDENT IN AN ORGANIZED HEALTH CARE EDUCATION/TRAINING PROGRAM

## 2023-09-28 PROCEDURE — 25000003 PHARM REV CODE 250: Performed by: EMERGENCY MEDICINE

## 2023-09-28 PROCEDURE — G0378 HOSPITAL OBSERVATION PER HR: HCPCS

## 2023-09-28 PROCEDURE — 80048 BASIC METABOLIC PNL TOTAL CA: CPT | Performed by: STUDENT IN AN ORGANIZED HEALTH CARE EDUCATION/TRAINING PROGRAM

## 2023-09-28 PROCEDURE — S0166 INJ OLANZAPINE 2.5MG: HCPCS | Performed by: EMERGENCY MEDICINE

## 2023-09-28 PROCEDURE — 96372 THER/PROPH/DIAG INJ SC/IM: CPT | Performed by: EMERGENCY MEDICINE

## 2023-09-28 PROCEDURE — 99285 EMERGENCY DEPT VISIT HI MDM: CPT | Mod: 25

## 2023-09-28 RX ORDER — EZETIMIBE 10 MG/1
10 TABLET ORAL NIGHTLY
Status: DISCONTINUED | OUTPATIENT
Start: 2023-09-28 | End: 2023-09-29 | Stop reason: HOSPADM

## 2023-09-28 RX ORDER — AMIODARONE HYDROCHLORIDE 200 MG/1
200 TABLET ORAL DAILY
Status: DISCONTINUED | OUTPATIENT
Start: 2023-09-29 | End: 2023-09-29 | Stop reason: HOSPADM

## 2023-09-28 RX ORDER — ATORVASTATIN CALCIUM 40 MG/1
80 TABLET, FILM COATED ORAL DAILY
Status: DISCONTINUED | OUTPATIENT
Start: 2023-09-29 | End: 2023-09-29 | Stop reason: HOSPADM

## 2023-09-28 RX ORDER — VALPROIC ACID 250 MG/5ML
1000 SOLUTION ORAL NIGHTLY
Status: DISCONTINUED | OUTPATIENT
Start: 2023-09-28 | End: 2023-09-29 | Stop reason: HOSPADM

## 2023-09-28 RX ORDER — CHLORHEXIDINE GLUCONATE ORAL RINSE 1.2 MG/ML
5 SOLUTION DENTAL 3 TIMES DAILY
Status: DISCONTINUED | OUTPATIENT
Start: 2023-09-28 | End: 2023-09-29 | Stop reason: HOSPADM

## 2023-09-28 RX ORDER — LORAZEPAM 2 MG/ML
2 INJECTION INTRAMUSCULAR
Status: COMPLETED | OUTPATIENT
Start: 2023-09-28 | End: 2023-09-28

## 2023-09-28 RX ORDER — VALPROIC ACID 250 MG/5ML
500 SOLUTION ORAL DAILY
Status: DISCONTINUED | OUTPATIENT
Start: 2023-09-29 | End: 2023-09-29 | Stop reason: HOSPADM

## 2023-09-28 RX ORDER — METOPROLOL TARTRATE 25 MG/1
25 TABLET, FILM COATED ORAL 2 TIMES DAILY
Status: DISCONTINUED | OUTPATIENT
Start: 2023-09-28 | End: 2023-09-29 | Stop reason: HOSPADM

## 2023-09-28 RX ORDER — QUETIAPINE FUMARATE 25 MG/1
25 TABLET, FILM COATED ORAL NIGHTLY
Status: DISCONTINUED | OUTPATIENT
Start: 2023-09-28 | End: 2023-09-29 | Stop reason: HOSPADM

## 2023-09-28 RX ORDER — VALPROIC ACID 250 MG/5ML
500 SOLUTION ORAL DAILY
Status: DISCONTINUED | OUTPATIENT
Start: 2023-09-29 | End: 2023-09-29

## 2023-09-28 RX ORDER — NAPROXEN SODIUM 220 MG/1
81 TABLET, FILM COATED ORAL DAILY
Status: DISCONTINUED | OUTPATIENT
Start: 2023-09-29 | End: 2023-09-29 | Stop reason: HOSPADM

## 2023-09-28 RX ORDER — OLANZAPINE 10 MG/2ML
10 INJECTION, POWDER, FOR SOLUTION INTRAMUSCULAR ONCE
Status: COMPLETED | OUTPATIENT
Start: 2023-09-28 | End: 2023-09-28

## 2023-09-28 RX ORDER — DIPHENHYDRAMINE HYDROCHLORIDE 50 MG/ML
25 INJECTION INTRAMUSCULAR; INTRAVENOUS
Status: COMPLETED | OUTPATIENT
Start: 2023-09-28 | End: 2023-09-28

## 2023-09-28 RX ADMIN — LORAZEPAM 2 MG: 2 INJECTION INTRAMUSCULAR; INTRAVENOUS at 07:09

## 2023-09-28 RX ADMIN — ASPIRIN 81 MG CHEWABLE TABLET 81 MG: 81 TABLET CHEWABLE at 10:09

## 2023-09-28 RX ADMIN — AMIODARONE HYDROCHLORIDE 200 MG: 200 TABLET ORAL at 10:09

## 2023-09-28 RX ADMIN — EZETIMIBE 10 MG: 10 TABLET ORAL at 09:09

## 2023-09-28 RX ADMIN — ATORVASTATIN CALCIUM 80 MG: 40 TABLET, FILM COATED ORAL at 10:09

## 2023-09-28 RX ADMIN — VALPROIC ACID 1000 MG: 250 SOLUTION ORAL at 09:09

## 2023-09-28 RX ADMIN — VALPROIC ACID 500 MG: 250 SOLUTION ORAL at 10:09

## 2023-09-28 RX ADMIN — OLANZAPINE 10 MG: 10 INJECTION, POWDER, FOR SOLUTION INTRAMUSCULAR at 05:09

## 2023-09-28 RX ADMIN — APIXABAN 5 MG: 5 TABLET, FILM COATED ORAL at 09:09

## 2023-09-28 RX ADMIN — DIPHENHYDRAMINE HYDROCHLORIDE 25 MG: 50 INJECTION, SOLUTION INTRAMUSCULAR; INTRAVENOUS at 07:09

## 2023-09-28 RX ADMIN — LORAZEPAM 2 MG: 2 INJECTION INTRAMUSCULAR; INTRAVENOUS at 09:09

## 2023-09-28 RX ADMIN — APIXABAN 5 MG: 5 TABLET, FILM COATED ORAL at 10:09

## 2023-09-28 RX ADMIN — QUETIAPINE 25 MG: 25 TABLET ORAL at 09:09

## 2023-09-28 RX ADMIN — METOPROLOL TARTRATE 25 MG: 25 TABLET, FILM COATED ORAL at 09:09

## 2023-09-28 NOTE — PLAN OF CARE
Problem: Coping Ineffective  Goal: Effective Coping  9/27/2023 1931 by Wendy Coronel RN  Outcome: Ongoing, Progressing  9/27/2023 1910 by Wendy Coronel RN  Outcome: Ongoing, Progressing     Problem: Fall Injury Risk  Goal: Absence of Fall and Fall-Related Injury  9/27/2023 1931 by Wendy Coronel RN  Outcome: Ongoing, Progressing  9/27/2023 1910 by Wendy Coronel RN  Outcome: Ongoing, Progressing     Problem: Adjustment to Illness (Sepsis/Septic Shock)  Goal: Optimal Coping  9/27/2023 1931 by Wendy Coronel RN  Outcome: Ongoing, Progressing  9/27/2023 1910 by Wendy Coronel RN  Outcome: Ongoing, Progressing     Problem: Bleeding (Sepsis/Septic Shock)  Goal: Absence of Bleeding  9/27/2023 1931 by Wendy Coronel RN  Outcome: Ongoing, Progressing  9/27/2023 1910 by Wendy Coronel RN  Outcome: Ongoing, Progressing     Problem: Infection Progression (Sepsis/Septic Shock)  Goal: Absence of Infection Signs and Symptoms  9/27/2023 1931 by Wendy Coronel RN  Outcome: Ongoing, Progressing  9/27/2023 1910 by Wendy Coronel RN  Outcome: Ongoing, Progressing

## 2023-09-28 NOTE — ED NOTES
Patient was discharged from the 3rd floor earlier today and there was no one home to receive the patient at his house, so they brought him to the ER. Dr. Monroy ordering vital signs OLNY at this time. Has discussion w brother who is to call is when he arrives to confirm [facility] address and we will send the patient home again,

## 2023-09-28 NOTE — PT/OT/SLP PROGRESS
Physical Therapy Treatment    Patient Name:  Tera Griffiths   MRN:  [id number]    Recommendations:     Discharge Recommendations:  (24hr care)  Discharge Equipment Recommendations:    Barriers to discharge: None    Assessment:     Tera Grififths is a 58 y.o. male admitted with a medical diagnosis of Atrial fibrillation with RVR.  He presents with the following impairments/functional limitations: weakness, impaired endurance, impaired self care skills, impaired balance, gait instability, decreased lower extremity function, decreased ROM, edema, pain, impaired functional mobility, decreased safety awareness, decreased coordination, decreased upper extremity function, impaired cognition, impaired fine motor . Pt found sitting EOB with safety sitter present, pt has been trying to get OOB to walk . Pt ambulated ~ 22 ft with B HHA , CGA (chair followed) then able to re-direct and  assisted back in bed .      Rehab Prognosis: Poor; patient would benefit from acute skilled PT services to address these deficits and reach maximum level of function.    Recent Surgery: * No surgery found *      Plan:     During this hospitalization, patient to be seen  (2-3x/wk) to address the identified rehab impairments via   and progress toward the following goals:    Plan of Care Expires:  10/02/23    Subjective     Chief Complaint: pt in nonverbal   Patient/Family Comments/goals:    Pain/Comfort:  Pain Rating 1: 0/10  Pain Rating Post-Intervention 1: 0/10      Objective:     Communicated with safety sitter  prior to session.  Patient found sitting edge of bed with bed alarm upon PT entry to room.     General Precautions: Standard, fall, aphasia  Orthopedic Precautions: N/A  Braces: N/A  Respiratory Status: Room air     Functional Mobility:  Bed Mobility:     Scooting: SBA   Sit to Supine: min assistance   Transfers:     Sit to Stand:  from bed and 1 trial from chair  CGA with  no AD   Chair to bed :  min assistance  with  hand-held  assist  using  Step Transfer  Gait:  pt ambulated 22 ft with RW, CGA(chair followed) with B HHA , CGA . Noted with decreased melissa,decreased step length.    Balance:  fair  in standing       AM-PAC 6 CLICK MOBILITY  Turning over in bed (including adjusting bedclothes, sheets and blankets)?: 4  Sitting down on and standing up from a chair with arms (e.g., wheelchair, bedside commode, etc.): 4  Moving from lying on back to sitting on the side of the bed?: 4  Moving to and from a bed to a chair (including a wheelchair)?: 3  Need to walk in hospital room?: 3  Climbing 3-5 steps with a railing?: 2  Basic Mobility Total Score: 20       Treatment & Education:  Pt performed bed mobility , transfer and gait training as above.     Patient left HOB elevated with all lines intact, call button in reach, bed alarm on, nurse notified, and sitter  present..    GOALS:   Multidisciplinary Problems       Physical Therapy Goals       Not on file              Multidisciplinary Problems (Resolved)          Problem: Physical Therapy    Goal Priority Disciplines Outcome Goal Variances Interventions   Physical Therapy Goal   (Resolved)     PT, PT/OT Met     Description: Goals to be met by: 10/2/23     Patient will increase functional independence with mobility by performin. Supine to sit with Stand-by Assistance  2. Bed to chair transfer with Supervision    3. Gait  x 25-50 feet with Minimal Assistance using HHA.   4. Wheelchair propulsion x25-50 feet with Contact Guard Assistance                           Time Tracking:     PT Received On: 23  PT Start Time: 1230     PT Stop Time: 1243  PT Total Time (min): 13 min     Billable Minutes: Total Time 13 with OT     Treatment Type: Treatment  PT/PTA: PTA     Number of PTA visits since last PT visit: 3     2023

## 2023-09-28 NOTE — PLAN OF CARE
Call received from pts son Tera who was updated on the needs of the pt and inquired if he would be able to stay with the pt and assist in his care.  Tera states that he can be at the pts home within the hour for pt to be transported to home.  TN requested that the son call TN when he was at the pts home so that transportation can be scheduled for him to d/c to home      Updated pts nurse and MD of this information

## 2023-09-28 NOTE — PLAN OF CARE
Referral sent to [facility] via [facility] to advise of discharge and to resume services as prior to coming to the hospital    0920- call placed to pts nurse Wendy to inquire if transportation can be requested for pt.  Wendy reports that the pt is medically ready and transportation can be scheduled for  via ambulance.    0925-  call placed to POA Zahida to advise that the pt will be discharging to home on today and went over all services that will resume on the home as prior to coming to the hospital. Zahida advised that she will go to pts home when she gets off of work to assist with feeding, medications and bath    0928- call spoke to pts brother Spencer to advise of discharge.  At this time Spencer states that he was at the hospital visiting pt and just left to go to his own doctors appointment, but that Dr Meredith did advise that the pt was going to d/c to home on today.  TN advised that ambulance transportation would be arranged for pt to get home with a requested  time of noon and that someone needed to be there to accept the pt.  Spencer states that he will go to the pts home after his doctors appointment to accept the pt.  Also Spencer inquired about the pt living on the 3rd floor of his apartment complex and they feel he needs to be on the first floor for safety.  TN advised Spencer as per discussion with pervious SW that he and the pts family will need to go to the office to request this for the pt as this is free housing and TN can not assist in this process. Spencer verbalized understanding of all information provided.    0931- call placed to Claribel to update on d/c plan for pt and advise he will d/c on today and will need her assistance.    0935- Cristhian pts cousin contacted to advise of discharge.  No answer at this time VML including TN name and contact information.  Will follow for call back    0938- ADT 30 entered for transportation to home requested  for noon. Pts nurse Wendy  notified of transportation arrangement    0940-  call placed to Zahida (POA) to advise that transportation has been arranged for noon to bring pt home.  Zahida states that she has an appointment to begin disability process on next week to find out when and if pt will receive disability or not.  TN advised Zahida that once she gets the pt award letter then the facilities will likely accept the pt as he will then have income and a date when it will begin.

## 2023-09-28 NOTE — PLAN OF CARE
Problem: Adult Inpatient Plan of Care  Goal: Plan of Care Review  Outcome: Met  Goal: Patient-Specific Goal (Individualized)  Outcome: Met  Goal: Absence of Hospital-Acquired Illness or Injury  Outcome: Met  Goal: Optimal Comfort and Wellbeing  Outcome: Met  Goal: Readiness for Transition of Care  Outcome: Met     Problem: Occupational Therapy  Goal: Occupational Therapy Goal  Description: Goals to be met by: 10/16/23     Patient will increase functional independence with ADLs by performing:    UE Dressing with [place].  LE Dressing with Modified [place].  Grooming while standing at sink with Supervision.  Toileting from toilet with Supervision for hygiene and clothing management.   Sitting in chair 30-60 minutes with Supervision.  Toilet transfer to toilet with Supervision.  Upper extremity exercise program x10  reps per handout, with assistance as needed.  Increase sustained attention to task x 5 min. With 1-2 verbal cues.     Outcome: Met     Problem: Coping Ineffective  Goal: Effective Coping  Outcome: Met     Problem: Fall Injury Risk  Goal: Absence of Fall and Fall-Related Injury  Outcome: Met     Problem: Adjustment to Illness (Sepsis/Septic Shock)  Goal: Optimal Coping  Outcome: Met     Problem: Bleeding (Sepsis/Septic Shock)  Goal: Absence of Bleeding  Outcome: Met     Problem: Glycemic Control Impaired (Sepsis/Septic Shock)  Goal: Blood Glucose Level Within Desired Range  Outcome: Met     Problem: Infection Progression (Sepsis/Septic Shock)  Goal: Absence of Infection Signs and Symptoms  Outcome: Met     Problem: Nutrition Impaired (Sepsis/Septic Shock)  Goal: Optimal Nutrition Intake  Outcome: Met     Problem: Fluid and Electrolyte Imbalance (Acute Kidney Injury/Impairment)  Goal: Fluid and Electrolyte Balance  Outcome: Met     Problem: Oral Intake Inadequate (Acute Kidney Injury/Impairment)  Goal: Optimal Nutrition Intake  Outcome: Met     Problem: Renal Function Impairment (Acute Kidney  Injury/Impairment)  Goal: Effective Renal Function  Outcome: Met     Problem: Physical Therapy  Goal: Physical Therapy Goal  Description: Goals to be met by: 10/2/23     Patient will increase functional independence with mobility by performin. Supine to sit with Stand-by Assistance  2. Bed to chair transfer with Supervision    3. Gait  x 25-50 feet with Minimal Assistance using HHA.   4. Wheelchair propulsion x25-50 feet with Contact Guard Assistance      Outcome: Met     Problem: Skin Injury Risk Increased  Goal: Skin Health and Integrity  Outcome: Met

## 2023-09-28 NOTE — PLAN OF CARE
09/28/23 1110   Final Note   Assessment Type Final Discharge Note   Anticipated Discharge Disposition Home-Health   Hospital Resources/Appts/Education Provided Appointments scheduled and added to AVS;Post-Acute resouces added to AVS   Post-Acute Status   Post-Acute Authorization Home Health;HME;Other  (TF provider -- [facility])   HME Status Set-up Complete/Auth obtained   Home Health Status Set-up Complete/Auth obtained     Pts nurse Wendy notified that the pt is clear for d/c from CM standpoint      2867 spoke Cristhian to advise that someone needed to be at the pts home and he is in route to the apartment now

## 2023-09-28 NOTE — ED PROVIDER NOTES
Encounter Date: 9/28/2023    SCRIBE #1 NOTE: I, Scott Andino, am scribing for, and in the presence of,  Selena Monroy MD. I have scribed the following portions of the note - Other sections scribed: HPI,ROS, PE.       History     Chief Complaint   Patient presents with    Social Work Assessment - High Risk     Tera Griffiths is a 58 y.o. male, with a PMHx of CAD, HTN, Stroke, and A-Fib, who presents to the ED for Social Work Assessment. Patient was discharged and [place] via [facility] [place] where the patient's brother was supposed to let him inside. After an hour had past EMS personnel realized no one was at home and neither the patient's ex wife (power of ) or brother answered the multiple phone calls. Patient was then returned to the ED. HPI is limited due to patient's aphasia.     Upon chart review, Patient has a PMHx of Stroke (August 3rd, 2023), CHF, A-fib, and CAD. Stroke resulted in aphasia and right sided weakness. Patient was admitted to the Telemetry on 09/24/23 for a UTI that was treated with Rocephin via IV. Patient was noted to be combative, so Zyprexa and soft restraints were used. Patient was discharged this morning. Patient 's ex wife (power of ) requested patient to be returned with home health services.     The history is provided by the EMS personnel (nurse personnel). The history is limited by the condition of the patient. No  was used.     Review of patient's allergies indicates:  No Known Allergies  Past Medical History:   Diagnosis Date    Acute on chronic combined systolic and diastolic heart failure 09/23/2022    Atrial fibrillation     CAD (coronary artery disease) 09/23/2022    Dyslipidemia 09/23/2022    Embolic stroke involving left middle cerebral artery 08/08/2023    Encounter for blood transfusion     H/O heart artery stent     HTN (hypertension) 09/23/2022    ICD (implantable cardioverter-defibrillator) in place 09/23/2022     Paroxysmal A-fib 09/23/2022    Stage 3 chronic kidney disease 04/19/2023     Past Surgical History:   Procedure Laterality Date    CARDIAC DEFIBRILLATOR PLACEMENT Left     CEREBRAL ANGIOGRAM N/A 8/8/2023    Procedure: ANGIOGRAM-CEREBRAL;  Surgeon: Kenzie Rodriguez;  Location: [facility];  Service: Anesthesiology;  Laterality: N/A;  LVO (angiogram)    ESOPHAGOGASTRODUODENOSCOPY N/A 8/11/2023    Procedure: EGD (ESOPHAGOGASTRODUODENOSCOPY);  Surgeon: Colette Martinez MD;  Location: [facility] ENDO (2ND FLR);  Service: Gastroenterology;  Laterality: N/A;    ESOPHAGOGASTRODUODENOSCOPY W/ PEG N/A 8/17/2023    Procedure: EGD, WITH PEG TUBE INSERTION;  Surgeon: Yan Scott MD;  Location: [facility] OR 2ND FLR;  Service: General;  Laterality: N/A;  PEG, possible lap G    HERNIA REPAIR      LEFT HEART CATHETERIZATION Left 4/18/2023    Procedure: Left heart cath;  Surgeon: Atilio Marks MD;  Location: [facility] CATH LAB;  Service: Cardiology;  Laterality: Left;    RIGHT HEART CATHETERIZATION Right 9/30/2022    Procedure: INSERTION, CATHETER, RIGHT HEART;  Surgeon: Caden Aden MD;  Location: [facility] CATH LAB;  Service: Cardiology;  Laterality: Right;    TREATMENT OF CARDIAC ARRHYTHMIA N/A 11/22/2022    Procedure: CARDIOVERSION;  Surgeon: Marvin Sanon MD;  Location: [facility] EP LAB;  Service: Cardiology;  Laterality: N/A;  afib, KIA, DCCV, anes, MB, 3 Prep     No family history on file.  Social History     Tobacco Use    Smoking status: Never    Smokeless tobacco: Never   Substance Use Topics    Alcohol use: Never    Drug use: Never     Review of Systems   Unable to perform ROS: Other       Physical Exam     Initial Vitals   BP Pulse Resp Temp SpO2   09/28/23 1637 09/28/23 1637 -- 09/28/23 1639 --   (!) 143/103 97  98.4 °F (36.9 °C)       MAP       --                Physical Exam    Nursing note and vitals reviewed.  Constitutional: He appears well-developed and well-nourished. He is not diaphoretic.   HENT:   Head: Normocephalic and  atraumatic.   Mouth/Throat: Oropharynx is clear and moist.   Eyes: Conjunctivae are normal. Pupils are equal, round, and reactive to light.   Neck: Neck supple.   Cardiovascular:  Normal rate and regular rhythm.           Pulmonary/Chest: Breath sounds normal. No respiratory distress.   Abdominal: Abdomen is soft. Bowel sounds are normal.   Peg tube with no drainage or surrounding erythema    Musculoskeletal:         General: No edema.      Cervical back: Neck supple.     Neurological: He is alert.   Aphasic. Moaning, incomprehensible nonverbal sounds.   Skin: Skin is warm and dry.       Psychiatric: He is combative (Patient easily agitated, grabs at nurses).         ED Course   Procedures  Labs Reviewed - No data to display       Imaging Results    None          Medications   amiodarone tablet 200 mg (has no administration in time range)   apixaban tablet 5 mg (5 mg Per G Tube Given 9/28/23 2154)   aspirin chewable tablet 81 mg (has no administration in time range)   atorvastatin tablet 80 mg (has no administration in time range)   chlorhexidine 0.12 % solution 5 mL (0 mLs Mouth/Throat Hold 9/28/23 2100)   ezetimibe tablet 10 mg (10 mg Per G Tube Given 9/28/23 2154)   metoprolol tartrate (LOPRESSOR) tablet 25 mg (25 mg Per G Tube Given 9/28/23 2154)   QUEtiapine tablet 25 mg (25 mg Per G Tube Given 9/28/23 2154)   valproic acid (as sodium salt) 250 mg/5 mL (5 mL) syrup Soln 1,000 mg (1,000 mg Per G Tube Given 9/28/23 2155)   valproic acid (as sodium salt) 250 mg/5 mL (5 mL) syrup Soln 500 mg (has no administration in time range)   valproic acid (as sodium salt) 250 mg/5 mL (5 mL) syrup Soln 500 mg (has no administration in time range)   OLANZapine injection 10 mg (10 mg Intramuscular Given 9/28/23 1729)   LORazepam injection 2 mg (2 mg Intramuscular Given 9/28/23 1941)   diphenhydrAMINE injection 25 mg (25 mg Intramuscular Given 9/28/23 1941)   LORazepam injection 2 mg (2 mg Intramuscular Given 9/28/23 2152)      Medical Decision Making  This is a 58-year-old male with history of CVA resulting in aphasia and right-sided weakness who presents the ED with EMS.  The patient was discharged home this morning.  EMS took patient to residence where no one was there to receive the patient.  They waited approximately an hour and a half, they called multiple times the patient's power of , his ex-wife, as well as the patient's brother with no answer.  They returned him to the ER.  The patient is noted to be easily agitated, he climbs out of bed.  He requires some soft restraints and Zyprexa for his safety and the safety of his caregivers.  I will consult social work for help disposition in this patient.    Risk  OTC drugs.  Prescription drug management.    Care signed out to Dr. Lu, pending further assessment from Case Management for disposition.   Selena Monroy MD   10:47 PM          Scribe Attestation:   Scribe #1: I performed the above scribed service and the documentation accurately describes the services I performed. I attest to the accuracy of the note.        ED Course as of 09/28/23 2247   Thu Sep 28, 2023   1644 LVM for baldomero's son, Tera, he states he is on his way to the apartment, it is off [email or web address]. He will call me when he gets there to confirm the address.  [LH]   1730 Called patient's son, Tera, no answer, LVM. [LH]   2009 Patient's ex wife, Zahida Dave, called. She states son, Tera, may not be answering phone because he needs wifi for home to work. She gets off work at 11 pm, will drive to patient's home in [facility], and call us once she gets over there to confirm if his son is there.  [LH]   2121 Patient up again, trying to get out of bed.  Will give additional Ativan. [LH]      ED Course User Index  [LH] Selena Monroy MD          I, Selena Monroy MD, personally performed the services described in this documentation. All medical record entries made by the scribe were at  my direction and in my presence. I have reviewed the chart and agree that the record reflects my personal performance and is accurate and complete.     This dictation has been generated using M-Modal Fluency Direct dictation; some phonetic errors may occur.             Clinical Impression:   Final diagnoses:  [Z76.89] Encounter for social work intervention (Primary)               Selena Monroy MD  09/28/23 [id number]

## 2023-09-28 NOTE — NURSING
[facility]  Nurses Note -- 4 Eyes      9/27/2023       Skin assessed on: Q Shift      [x] No Pressure Injuries Present    [x]Prevention Measures Documented    [] Yes LDA  for Pressure Injury Previously documented     [] Yes New Pressure Injury Discovered   [] LDA for New Pressure Injury Added      Attending RN:  Wendy Coronel RN     Second RN:  Rubi MEDINA RN

## 2023-09-28 NOTE — PLAN OF CARE
Problem: Adult Inpatient Plan of Care  Goal: Plan of Care Review  Outcome: Ongoing, Progressing  Goal: Patient-Specific Goal (Individualized)  Outcome: Ongoing, Progressing  Goal: Absence of Hospital-Acquired Illness or Injury  Outcome: Ongoing, Progressing  Goal: Optimal Comfort and Wellbeing  Outcome: Ongoing, Progressing  Goal: Readiness for Transition of Care  Outcome: Ongoing, Progressing     Problem: Coping Ineffective  Goal: Effective Coping  Outcome: Ongoing, Progressing     Problem: Fall Injury Risk  Goal: Absence of Fall and Fall-Related Injury  Outcome: Ongoing, Progressing     Problem: Bleeding (Sepsis/Septic Shock)  Goal: Absence of Bleeding  Outcome: Ongoing, Progressing     Problem: Glycemic Control Impaired (Sepsis/Septic Shock)  Goal: Blood Glucose Level Within Desired Range  Outcome: Ongoing, Progressing     Problem: Infection Progression (Sepsis/Septic Shock)  Goal: Absence of Infection Signs and Symptoms  Outcome: Ongoing, Progressing   No restraints. Tube feeding and IVF continues. Sitter at bedside for safety, patient not pulling at lines or peg tube. Redirectable with max cues. VSS, afebrile.

## 2023-09-28 NOTE — NURSING
[facility] ambulance at bedside for transport to home. Patient awake, alert. No apparent distress noted, he is dressed in his clothes.   Spoke to Zahida notifying her that patient is leaving hospital to home address. Called Spencer Durantyler (brother) and left .

## 2023-09-28 NOTE — DISCHARGE SUMMARY
"[facility] Medicine  Discharge Summary      Patient Name: Tera Griffiths  MRN: [id number]  STEFFANIE: [id number]  Patient Class: OP- Observation  Admission Date: 9/24/2023  Hospital Length of Stay: 4 days   Discharge Date and Time:  09/28/2023 10:33 AM  Attending Physician: Gema Barrera, *   Discharging Provider: Gema Barrera MD  Primary Care Provider: Carleen Bueno MD    Primary Care Team: Networked reference to record PCT     HPI:   58 y.o male, with a medical history of combined systolic and diastolic heart failure, Atrial fibrillation (one eliquis), Coronary artery disease, Dyslipidemia, Embolic stroke involving left middle cerebral artery, Hypertension,  and Stage 3 chronic kidney disease was brought into the ED by ambulance initially for "peg problem".  However, ED provider discussed with family who ultimately stated that they can no longer care for the patient. Patient is aphasic.  Called and spoke with the patient's ex wife Ms. Teague.  She stated patient was recently discharged home from inpatient rehab and not understand why.  States that he is very weak and has an unsteady gait.  Easily gets agitated but improves with his a.m. medications.  Patient lives alone on the 3rd floor and is unable to care for himself.  Per ex-wife, family is requesting for nursing home placement as they are unable to care for the patient at this time.  Mental status wise, patient appears to be at baseline, which is aphasic and intermittently agitated. Of note, patient was recently hospitliazed from 08/05 to 09/07/2023 for ADHF and NSTEMI, course complicated by L MCA stroke. Patient was also treated for pyelonephritis with course of IV CTX. Of note, that hospital course was complicated by non-redirectable agitation, briefly requiring restraints. Patient was discharge to inpatient rehab.     In the ED, patient with atrial fibrillation with RVR.  IV Lopressor was ordered in the ED, but was " The ECG revealed a sinus rhythm. The ECG rate was 96 bpm. "not given.  Rhythm and rate improved with home medications.  Labs significant for elevated BNP and troponin.  EKG with no acute ischemic changes. CXR with No convincing evidence of acute detrimental change relative prior radiograph performed 08/19/2023.  Patient admitted to observation  for further evaluation        * No surgery found *      Hospital Course:   58 y.o male, with a medical history of combined systolic and diastolic heart failure, Atrial fibrillation (one eliquis), Coronary artery disease, Dyslipidemia, Embolic stroke involving left middle cerebral artery, Hypertension,  and Stage 3 chronic kidney disease was brought into the ED by ambulance initially for "peg problem".  However, ED provider discussed with family who ultimately stated that they can no longer care for the patient. Patient is aphasic.  Called and spoke with the patient's ex wife Ms. Teague.  She stated patient was recently discharged home from inpatient rehab and not understand why.  States that he is very weak and has an unsteady gait.     In the ED, patient with atrial fibrillation with RVR.  IV Lopressor was ordered in the ED, but was not given.  Rhythm and rate improved with home medications.  Labs significant for elevated BNP and troponin.  EKG with no acute ischemic changes. CXR with No convincing evidence of acute detrimental change relative prior radiograph performed 08/19/2023.remains stable on Telemetry with no chest pain.or SOB.  Patient has been started on tube feeding.tolerated feeding,  Patient was very combative over night,received Zyprexa,on restrain,palliative was  on case.  Off restrain in last 48 hours.calmer.  Urine culture is grow Klebsiella\ and enterococcus,was traeted with IV rocephin.  Patients ex wife with [employer],want patient return home with ,patient was returned home with  and follow with PCP as out patient.       Goals of Care Treatment Preferences:  Code Status: Full Code    Health care agent: Zahida " [facility] agent number:           What is most important right now is to focus on extending life as long as possible, even it it means sacrificing quality, curative/life-prolongation (regardless of treatment burdens).  Accordingly, we have decided that the best plan to meet the patient's goals includes continuing with treatment.      Consults:   Consults (From admission, onward)        Status Ordering Provider     Inpatient consult to Palliative Care  Once        Provider:  Helen Szymanski NP    Completed GAVIN MADRIGAL.     Inpatient consult to Registered Dietitian/Nutritionist  Once        Provider:  (Not yet assigned)    Completed HERBIE SAMPSON     Inpatient consult to Social Work  Once        Provider:  (Not yet assigned)    Completed HERBIE SAMPSON          No new Assessment & Plan notes have been filed under this hospital service since the last note was generated.  Service: Hospital Medicine    Final Active Diagnoses:    Diagnosis Date Noted POA    PRINCIPAL PROBLEM:  Atrial fibrillation with RVR [I48.91] 09/25/2023 Yes    Combined systolic and diastolic congestive heart failure [I50.40] 09/27/2023 Yes    Acute cystitis [N30.00] 09/25/2023 Yes    Elevated troponin [R77.8] 09/25/2023 Yes    Debility [R53.81] 09/25/2023 Yes    Palliative care encounter [Z51.5] 09/25/2023 Not Applicable    Agitation [R45.1] 08/22/2023 Yes    Moderate protein-calorie malnutrition [E44.0] 08/22/2023 Yes    Dysphagia [R13.10] 08/10/2023 Yes    Hemiparesis, right [G81.91] 08/08/2023 Yes    Global aphasia [R47.01] 08/08/2023 Yes    Embolic stroke involving left middle cerebral artery [I63.412] 08/08/2023 Yes    Ischemic cardiomyopathy [I25.5] 05/05/2023 Yes    Stage 3 chronic kidney disease [N18.30] 04/19/2023 Yes    CAD (coronary artery disease) [I25.10] 09/23/2022 Yes    Dyslipidemia [E78.5] 09/23/2022 Yes    Primary hypertension [I10] 09/23/2022 Yes    ICD (implantable  cardioverter-defibrillator) in place [Z95.810] 09/23/2022 Yes    History of ST elevation myocardial infarction (STEMI) [I25.2] 09/23/2022 Not Applicable    Multiple subsegmental pulmonary emboli without acute cor pulmonale [I26.94] 12/10/2021 Yes      Problems Resolved During this Admission:    Diagnosis Date Noted Date Resolved POA    Complicated UTI (urinary tract infection) [N39.0] 08/21/2023 09/25/2023 Yes       Discharged Condition: stable    Disposition: Home-Health Care [facility]    Follow Up:   Follow-up Information     [facility] Follow up.    Why: please call upon arrival to home to resume services as prior to coming to the hospital  Contact information:  [address] [place] [id number] [phone]           [facility] Follow up.    Why: please call for questions or concerns regarding tube feeding and equipment  Contact information:  [address] [facility] [address]  [phone]           [facility] Follow up.    Specialty: DME Provider  Why: please call for questions or concerns regarding equipment recieved post discharge from [facility]  Contact information:  [address]  [place] [address]  [phone]                       Patient Instructions:      ACCEPT - Ambulatory referral/consult to Interventional Cardiology   Standing Status: Future   Referral Priority: Routine Referral Type: Consultation   Referral Reason: Specialty Services Required   Requested Specialty: Interventional Cardiology   Number of Visits Requested: 1     Activity as tolerated       Significant Diagnostic Studies: Labs:   BMP:   Recent Labs   Lab 09/27/23  0443 09/28/23  0423   GLU 78 93    144   K 4.6 5.0   * 111*   CO2 22* 24   BUN 18 14   CREATININE 1.5* 1.4   CALCIUM 8.7 8.7   MG 2.1 2.1   , CMP   Recent Labs   Lab 09/27/23  0443 09/28/23  0423    144   K 4.6 5.0   * 111*   CO2 22* 24   GLU 78 93   BUN 18 14   CREATININE 1.5* 1.4   CALCIUM 8.7 8.7   ANIONGAP  "6* 9    and CBC No results for input(s): "WBC", "HGB", "HCT", "PLT" in the last 48 hours.  Microbiology:   Blood Culture   Lab Results   Component Value Date    LABBLOO No growth after 5 days. 08/19/2023    and Urine Culture    Lab Results   Component Value Date    LABURIN KLEBSIELLA PNEUMONIAE  >100,000 cfu/ml   (A) 09/24/2023    LABURIN ENTEROCOCCUS FAECALIS  10,000 - 49,999 cfu/ml   (A) 09/24/2023       Pending Diagnostic Studies:     None         Medications:  Reconciled Home Medications:      Medication List      CONTINUE taking these medications    amiodarone 200 MG Tab  Commonly known as: PACERONE  1 tablet (200 mg total) by Per G Tube route once daily.     apixaban 5 mg Tab  Commonly known as: ELIQUIS  1 tablet (5 mg total) by Per G Tube route 2 (two) times daily.     aspirin 81 MG Chew  1 tablet (81 mg total) by Per G Tube route once daily.     atorvastatin 80 MG tablet  Commonly known as: LIPITOR  1 tablet (80 mg total) by Per G Tube route once daily.     chlorhexidine 0.12 % solution  Commonly known as: PERIDEX  5 mLs 3 (three) times daily.     ezetimibe 10 mg tablet  Commonly known as: ZETIA  1 tablet (10 mg total) by Per G Tube route every evening.     metoprolol tartrate 25 MG tablet  Commonly known as: LOPRESSOR  1 tablet (25 mg total) by Per G Tube route 2 (two) times daily.     nitroGLYCERIN 0.4 MG SL tablet  Commonly known as: NITROSTAT  Place under the tongue.     QUEtiapine 25 MG Tab  Commonly known as: SEROQUEL  1 tablet (25 mg total) by Per G Tube route every evening.     * valproic acid (as sodium salt) 250 mg/5 mL (5 mL) Soln  Commonly known as: DEPAKENE  10 mLs (500 mg total) by Per G Tube route once daily. To be administers at 1200 daily     * valproic acid (as sodium salt) 250 mg/5 mL (5 mL) Soln  Commonly known as: DEPAKENE  10 mLs (500 mg total) by Per G Tube route once daily. To be administered 0900 daily     * valproic acid (as sodium salt) 250 mg/5 mL (5 mL) Soln  Commonly known as: " DEPAKENE  20 mLs (1,000 mg total) by Per G Tube route every evening.         * This list has 3 medication(s) that are the same as other medications prescribed for you. Read the directions carefully, and ask your doctor or other care provider to review them with you.            STOP taking these medications    empagliflozin 10 mg tablet  Commonly known as: JARDIANCE            Indwelling Lines/Drains at time of discharge:   Lines/Drains/Airways     Drain  Duration                Gastrostomy/Enterostomy 08/17/23 0800 Percutaneous endoscopic gastrostomy (PEG) midline 42 days                Time spent on the discharge of patient: less than 30  minutes         Gema Barrera MD  Department of Hospital Medicine  [facility]

## 2023-09-28 NOTE — PLAN OF CARE
[facility]      HOME HEALTH ORDERS  FACE TO FACE ENCOUNTER    Patient Name: Tera Griffiths  YOB: 1965    PCP: Carleen Bueno MD   PCP Address: [address] [place] [address]  PCP Phone Number: [phone]  PCP Fax: [phone]    Encounter Date: 9/24/23    Admit to Home Health    Diagnoses:  Active Hospital Problems    Diagnosis  POA    *Atrial fibrillation with RVR [I48.91]  Yes    Combined systolic and diastolic congestive heart failure [I50.40]  Yes    Acute cystitis [N30.00]  Yes    Elevated troponin [R77.8]  Yes    Debility [R53.81]  Yes    Palliative care encounter [Z51.5]  Not Applicable    Agitation [R45.1]  Yes    Moderate protein-calorie malnutrition [E44.0]  Yes    Dysphagia [R13.10]  Yes    Hemiparesis, right [G81.91]  Yes    Global aphasia [R47.01]  Yes    Embolic stroke involving left middle cerebral artery [I63.412]  Yes    Ischemic cardiomyopathy [I25.5]  Yes    Stage 3 chronic kidney disease [N18.30]  Yes    CAD (coronary artery disease) [I25.10]  Yes    Dyslipidemia [E78.5]  Yes    Primary hypertension [I10]  Yes    ICD (implantable cardioverter-defibrillator) in place [Z95.810]  Yes    History of ST elevation myocardial infarction (STEMI) [I25.2]  Not Applicable    Multiple subsegmental pulmonary emboli without acute cor pulmonale [I26.94]  Yes      Resolved Hospital Problems    Diagnosis Date Resolved POA    Complicated UTI (urinary tract infection) [N39.0] 09/25/2023 Yes       Follow Up Appointments:  No future appointments.    Allergies:Review of patient's allergies indicates:  No Known Allergies    Medications: Review discharge medications with patient and family and provide education.    Current Facility-Administered Medications   Medication Dose Route Frequency Provider Last Rate Last Admin    amiodarone tablet 200 mg  200 mg Per G Tube Daily Selena Monroy MD   200 mg at 09/27/23 0955    apixaban tablet 5 mg  5 mg Per G Tube BID Selena Monroy  MD ZAIN   5 mg at 09/27/23 2040    aspirin chewable tablet 81 mg  81 mg Per G Tube Daily Selena Monroy MD   81 mg at 09/27/23 0955    atorvastatin tablet 80 mg  80 mg Per G Tube Daily Selena Monroy MD   80 mg at 09/27/23 0953    cefTRIAXone (ROCEPHIN) 2 g in dextrose 5 % in water (D5W) 100 mL IVPB (MB+)  2 g Intravenous Q24H Ilia Heller., DO   Stopped at 09/27/23 1048    dextrose 10% bolus 125 mL 125 mL  12.5 g Intravenous PRN [facility]        dextrose 10% bolus 250 mL 250 mL  25 g Intravenous PRN [facility] T[facility] DO        dextrose 5 % and 0.9 % NaCl infusion   Intravenous Continuous Gema Barrera MD 50 mL/hr at 09/27/23 1017 Rate Change at 09/27/23 1017    ezetimibe tablet 10 mg  10 mg Per G Tube QHS Selena Monroy MD   10 mg at 09/27/23 2040    glucagon (human recombinant) injection 1 mg  1 mg Intramuscular PRN Pina Macey-My T., DO        glucose chewable tablet 16 g  16 g Oral PRN [facility] DO        glucose chewable tablet 24 g  24 g Oral PRN [facility] DO        insulin aspart U-100 pen 0-5 Units  0-5 Units Subcutaneous QID (AC + HS) PRN Selena Monroy MD        melatonin tablet 6 mg  6 mg Oral Nightly PRN Manuel Paulson PA-C   6 mg at 09/27/23 2040    naloxone 0.4 mg/mL injection 0.02 mg  0.02 mg Intravenous PRN [facility] T[facility] DO        QUEtiapine tablet 25 mg  25 mg Per G Tube QHS Selena Monroy MD   25 mg at 09/27/23 2040    sodium chloride 0.9% flush 10 mL  10 mL Intravenous Q12H PRN Pina MaceyGisella PELAEZ., DO        valproic acid (as sodium salt) 250 mg/5 mL (5 mL) syrup Soln 1,000 mg  1,000 mg Per G Tube QHS Selena Monroy MD   1,000 mg at 09/27/23 2039    valproic acid (as sodium salt) 250 mg/5 mL (5 mL) syrup Soln 500 mg  500 mg Per G Tube Daily Selena Monroy MD   500 mg at 09/27/23 0952    valproic acid (as sodium salt) 250 mg/5 mL (5 mL) syrup Soln 500 mg  500 mg Per G Tube Daily with lunch Selena Monroy MD   500 mg at  09/27/23 1257     Current Discharge Medication List        CONTINUE these medications which have NOT CHANGED    Details   amiodarone (PACERONE) 200 MG Tab 1 tablet (200 mg total) by Per G Tube route once daily.  Qty: 30 tablet, Refills: 11      apixaban (ELIQUIS) 5 mg Tab 1 tablet (5 mg total) by Per G Tube route 2 (two) times daily.  Qty: 60 tablet, Refills: 11      aspirin 81 MG Chew 1 tablet (81 mg total) by Per G Tube route once daily.  Refills: 0      atorvastatin (LIPITOR) 80 MG tablet 1 tablet (80 mg total) by Per G Tube route once daily.  Qty: 90 tablet, Refills: 3      ezetimibe (ZETIA) 10 mg tablet 1 tablet (10 mg total) by Per G Tube route every evening.  Qty: 90 tablet, Refills: 3      metoprolol tartrate (LOPRESSOR) 25 MG tablet 1 tablet (25 mg total) by Per G Tube route 2 (two) times daily.  Qty: 60 tablet, Refills: 11    Comments: .      QUEtiapine (SEROQUEL) 25 MG Tab 1 tablet (25 mg total) by Per G Tube route every evening.  Qty: 30 tablet, Refills: 11      !! valproic acid, as sodium salt, (DEPAKENE) 250 mg/5 mL (5 mL) Soln 10 mLs (500 mg total) by Per G Tube route once daily. To be administered 0900 daily  Qty: 300 mL, Refills: 11      !! valproic acid, as sodium salt, (DEPAKENE) 250 mg/5 mL (5 mL) Soln 10 mLs (500 mg total) by Per G Tube route once daily. To be administers at 1200 daily  Qty: 300 mL, Refills: 11      !! valproic acid, as sodium salt, (DEPAKENE) 250 mg/5 mL (5 mL) Soln 20 mLs (1,000 mg total) by Per G Tube route every evening.  Qty: 300 mL, Refills: 11      chlorhexidine (PERIDEX) 0.12 % solution 5 mLs 3 (three) times daily.      nitroGLYCERIN (NITROSTAT) 0.4 MG SL tablet Place under the tongue.       !! - Potential duplicate medications found. Please discuss with provider.        STOP taking these medications       empagliflozin (JARDIANCE) 10 mg tablet Comments:   Reason for Stopping:                 I have seen and examined this patient within the last 30 days. My clinical  findings that support the need for the home health skilled services and home bound status are the following:      Diet:    Per tube feeding as previous         Referrals/ Consults   to evaluate for community resources/long-range planning.  Aide to provide assistance with personal care, ADLs, and vital signs.    Activities:   Fall precaution     Nursing:   Agency to admit patient within 24 hours of hospital discharge unless specified on physician order or at patient request    SN to complete comprehensive assessment including routine vital signs. Instruct on disease process and s/s of complications to report to MD. Review/verify medication list sent home with the patient at time of discharge  and instruct patient/caregiver as needed. Frequency may be adjusted depending on start of care date.     Skilled nurse to perform up to 3 visits PRN for symptoms related to diagnosis    Notify MD if SBP > 160 or < 90; DBP > 90 or < 50; HR > 120 or < 50; Temp > 101; O2 < 88%; Other:       Ok to schedule additional visits based on staff availability and patient request on consecutive days within the home health episode.    When multiple disciplines ordered:    Start of Care occurs on Sunday - Wednesday schedule remaining discipline evaluations as ordered on separate consecutive days following the start of care.    Thursday SOC -schedule subsequent evaluations Friday and Monday the following week.     Friday - Saturday SOC - schedule subsequent discipline evaluations on consecutive days starting Monday of the following week.    For all post-discharge communication and subsequent orders please contact patient's primary care physician. If unable to reach primary care physician or do not receive response within 30 minutes, please contact  [facility]  for clinical staff order clarification    Miscellaneous   PEG Care:  Instruct patient/caregiver to clean site.  Monitor skin integrity.  Routine Skin for Bedridden Patients:  Instruct patient/caregiver to apply moisture barrier cream to all skin folds and wet areas in perineal area daily and after baths and all bowel movements.    Home Health Aide:  Nursing every 48 hours    Wound Care Orders  no    I certify that this patient is confined to his home and needs intermittent skilled nursing care.

## 2023-09-29 VITALS
DIASTOLIC BLOOD PRESSURE: 88 MMHG | OXYGEN SATURATION: 98 % | HEART RATE: 71 BPM | SYSTOLIC BLOOD PRESSURE: 122 MMHG | WEIGHT: 181.38 LBS | RESPIRATION RATE: 22 BRPM | TEMPERATURE: 98 F | BODY MASS INDEX: 23.93 KG/M2

## 2023-09-29 PROCEDURE — 25000242 PHARM REV CODE 250 ALT 637 W/ HCPCS: Performed by: EMERGENCY MEDICINE

## 2023-09-29 PROCEDURE — 63600175 PHARM REV CODE 636 W HCPCS: Performed by: STUDENT IN AN ORGANIZED HEALTH CARE EDUCATION/TRAINING PROGRAM

## 2023-09-29 PROCEDURE — 94760 N-INVAS EAR/PLS OXIMETRY 1: CPT

## 2023-09-29 PROCEDURE — 25000003 PHARM REV CODE 250: Performed by: EMERGENCY MEDICINE

## 2023-09-29 PROCEDURE — 96372 THER/PROPH/DIAG INJ SC/IM: CPT | Performed by: STUDENT IN AN ORGANIZED HEALTH CARE EDUCATION/TRAINING PROGRAM

## 2023-09-29 PROCEDURE — 94640 AIRWAY INHALATION TREATMENT: CPT

## 2023-09-29 RX ORDER — VALPROIC ACID 250 MG/5ML
500 SOLUTION ORAL DAILY
Status: DISCONTINUED | OUTPATIENT
Start: 2023-09-29 | End: 2023-09-29 | Stop reason: HOSPADM

## 2023-09-29 RX ORDER — DROPERIDOL 2.5 MG/ML
1.25 INJECTION, SOLUTION INTRAMUSCULAR; INTRAVENOUS ONCE
Status: COMPLETED | OUTPATIENT
Start: 2023-09-29 | End: 2023-09-29

## 2023-09-29 RX ORDER — IPRATROPIUM BROMIDE AND ALBUTEROL SULFATE 2.5; .5 MG/3ML; MG/3ML
3 SOLUTION RESPIRATORY (INHALATION)
Status: COMPLETED | OUTPATIENT
Start: 2023-09-29 | End: 2023-09-29

## 2023-09-29 RX ADMIN — CHLORHEXIDINE GLUCONATE 0.12% ORAL RINSE 5 ML: 1.2 LIQUID ORAL at 09:09

## 2023-09-29 RX ADMIN — ATORVASTATIN CALCIUM 80 MG: 40 TABLET, FILM COATED ORAL at 08:09

## 2023-09-29 RX ADMIN — ASPIRIN 81 MG CHEWABLE TABLET 81 MG: 81 TABLET CHEWABLE at 08:09

## 2023-09-29 RX ADMIN — AMIODARONE HYDROCHLORIDE 200 MG: 200 TABLET ORAL at 08:09

## 2023-09-29 RX ADMIN — DROPERIDOL 1.25 MG: 2.5 INJECTION, SOLUTION INTRAMUSCULAR; INTRAVENOUS at 04:09

## 2023-09-29 RX ADMIN — APIXABAN 5 MG: 5 TABLET, FILM COATED ORAL at 08:09

## 2023-09-29 RX ADMIN — IPRATROPIUM BROMIDE AND ALBUTEROL SULFATE 3 ML: 2.5; .5 SOLUTION RESPIRATORY (INHALATION) at 08:09

## 2023-09-29 RX ADMIN — VALPROIC ACID 500 MG: 250 SOLUTION ORAL at 08:09

## 2023-09-29 RX ADMIN — VALPROIC ACID 500 MG: 250 SOLUTION ORAL at 01:09

## 2023-09-29 RX ADMIN — METOPROLOL TARTRATE 25 MG: 25 TABLET, FILM COATED ORAL at 08:09

## 2023-09-29 NOTE — ED NOTES
Care assumed from CARL Delarosa. Patient resting in bed comfortably. Bilateral soft wrist restraints in place, no injuries noted. VSS. Will continue to monitor.

## 2023-09-29 NOTE — PLAN OF CARE
Consult addressed. ZENOBIA called patient's son and his brother multiple times no answer. ZENOBIA contacted patient's sister she stated that she cannot not help for SW to call patient's son. ZENOBIA informed her that she has been calling the son no answer she told SW she cannot do anything about that.    8:35pm Dr. Yañez informed SW that she received a call from patient's ex wife to let her know that she will be getting off work around 11 and that she will go to the house to wait for patient and when she gets there she will call to let us know so that we can schedule transportation. She also stated that patient's son phone only works with [employer] and he's not in a place with [employer] his phone is not working.    9:15 Dr. Yañez secure message ZENOBIA to inform her that the patient's ex-wife just called she said that she contacted the patient's sons girlfriend to speak with the patient's son.  She states he will be there early in the morning and will call us to confirm that he is at the patient's house to accept him.

## 2023-09-29 NOTE — PLAN OF CARE
ZENOBIA called patient's son to inquire about the time he will be arriving at patient's home. There was no answer. ZENOBIA left a message. ZENOBIA called Zahida to inquire if son gave her a time for arrival at patient's home. There was no answer. ZENOBIA left a message.     8:52 am     Tera notified SW that he was waiting on his girlfriend to transport him to patient's home.     9:22 am     Zahida notified ZENOBIA that she will pick patient's son up and bring him to patient's home. ZENOBIA informed Zahida that son stated that he was waiting on girlfriend. Zahida stated that girlfriend is unable to bring son, so she will. Zahida instructed ZENOBIA to have patient at the home at 11:30 am.     9:41 AM    ZENOBIA scheduled ambulance transportation for 11:00 am per Zahida's request.     10:22 am    Zahida notified ZENOBIA that patient's son called her and told her that he was called in to [facility] this morning and that is where he currently is. Zahida explained to ZENOBIA that patient's son has a heart problem and will require surgery in about three weeks. Zahida said that she is in route to [facility] to  patient's son and bring him to patient's home. Zahida stated that she will keep SW updated.     11:17 am    Zahida notified ZENOBIA that she was unable to locate patient at [facility]. Zahida stated that she found patient's son at his cousin's home. Zahida stated that son had no intention of going to patient's home. Zahida stated that she is sorry she can't assist any further because she has to go to work.       12:04 pm    Zahida is not healthcare power of . Zahida is listed on [employer] Directive as person to make decisions if patient is unable too. Zahida stated that she did not sign any paperwork to be a POA and does not want that responsibility. ZENOBIA informed Zahida that Cristhian is the next person listed. ZENOBIA called Cristhian and informed him that patient is discharging and needs someone at his home to accept him. Cristhian inquired if ZENOBIA has reached out to  his siblings. ZENOBIA stated that son, sister, and brother have been contacted and refuse to help. ZENOBIA explained to Cristhian that patient has listed him if Zahida declines. Cristhian inquired about a timeframe. ZENOBIA informed Cristhian that request will be submitted for noon  but it normally takes an hour for an ambulance to accept and get to hospital. Cristhian inquired if SW could have EMS call him when they are in route to patient's home.     12:24 pm     Called APS; left a message.     12:44 pm     SW called [facility] to inquire about transportation. Snehal informed ZENOBIA that she was not aware that it was will call and inquired if EMS had been there to  patient already. ZENOBIA explained that she called the [facility] and spoke to Evaristo who as supposed to update the order. Snehal stated that it wasn't and informed ZENOBIA that if EMS comes out again and can't transport patient, hospital will be billed. ZENOBIA explained that patient's cousin needs to be notified when patient is picked up and given an ETA. Snehal stated that she will give them the info.     12:57 pm     ZENOBIA notified patient's cousin, Cristhian that EMS ETA is 1:00 pm. Cristhian stated that patient should arrive at his apartment at 1:30/1:55. ZENOBIA informed Cristhian that EMS will call him as well.     1:37 pm     ZENOBIA completed [facility]PS report with Mrs. Romero.     1:50 pm     Received a call from Zahida informing ZENOBIA that Cristhian is minutes away from patient's home. ZENOBIA informed Zahida that patient left the hospital 23 minutes ago and should be arriving shortly. Zahida informed ZENOBIA that Cristhian said he took off of work to be with patient. Zahida stated that he is also going to leave work to check on patient.

## 2023-09-29 NOTE — ED NOTES
Pt sleeping , chest rise and fall noted with each respiration. Respiration rate is 16 , pt is A fib on monitor, BP and pulse WDL

## 2023-09-29 NOTE — ED NOTES
Spoke to sonal Morrow who states that he will meet Grace at [facility]. Made aware that Grace is here to  pt now.    No

## 2023-09-29 NOTE — PT/OT/SLP PROGRESS
Occupational Therapy   Treatment, Discharge Summary    Name: Tera Griffiths  MRN: [id number]  Admitting Diagnosis:  Atrial fibrillation with RVR     The primary encounter diagnosis was Cystitis. Diagnoses of Encounter for social work intervention, At risk for long QT syndrome, Atrial fibrillation with RVR, Chest pain, and Palliative care encounter [Z51.5] were also pertinent to this visit.    Recommendations:     Discharge Recommendations: nursing facility, basic (pt discharged home with family)  Discharge Equipment Recommendations:  to be determined by next level of care  Barriers to discharge:  None    Assessment:     Tera Griffiths is a 58 y.o. male with a medical diagnosis of Atrial fibrillation with RVR.  He presents with Performance deficits affecting function are weakness, impaired endurance, impaired self care skills, impaired functional mobility, gait instability, impaired balance, impaired cognition, decreased lower extremity function, decreased safety awareness, pain, abnormal tone, decreased ROM, impaired coordination.     Pt. found exiting his bed between bed rails, wanting to walk and sitter calling out for assistance to help redirect him to lie back down in bed. Pt. was getting more agitated and unable to be redirected. OT and PTA were outside by pt's room at the time. Pt Ambulated Min A with HAA ~22' and needed chair to sit down after. Pt rolled back to his room and assisted BTB with min A transfer and CGA sit>supine with max vc, tc, calming manual tech for agitation.     Pt did not meet OT goals by discharge due to aggressive behavior, poor attention and agitation. He needed max calming tech, redirection and 2 people to safely performed self care tasks and ambulate with HHA due to above,pt  poor safety awareness and insight. Pt was discharged home with family assistance and 24/7 care. Discharge acute OT services.     Rehab Prognosis:  Poor; patient would benefit from acute skilled OT services to  address these deficits and reach maximum level of function.       Plan:     Patient to be seen 4 x/week to address the above listed problems via self-care/home management, therapeutic activities, therapeutic exercises  Plan of Care Expires: 10/16/23  Plan of Care Reviewed with: patient    Subjective     Chief Complaint: mumbling words trying to communicate but speech unintelligible.  Patient/Family Comments/goals: none stated, pt agitated and sitter unable to redirect him as he was trying to exit the bed.    Objective:     Communicated with: nurse prior to session.  Patient found  exiting bed and risk sitter trying to redirect pt but having difficulty due to pt's aggression and resistance  with bed alarm (risk sitter) upon OT entry to room.    General Precautions: Standard, fall, aphasia    Orthopedic Precautions:N/A  Braces: N/A  Respiratory Status: Room air     Occupational Performance:     Bed Mobility:  max cues for safety as pt tried to exit bed between bed rails while they were up.  Patient completed Supine to Sit with contact guard assistance  Patient completed Sit to Supine with contact guard assistance     Functional Mobility/Transfers : pt needed max vc, tc, calming tech, used low tone voicing, firm hand held touch, validation of him wanting to walk and pt's need to get out of his room as he was motioning and moving his body in direction to the open door in his room.   Patient completed Sit <> Stand Transfer with minimum assistance  with  hand-held assist and 2 person assistance  Patient completed Bed <> Chair Transfer using Step Transfer technique with minimum assistance with hand-held assist  Functional Mobility:  Pt. pushed walker aside when placed in front of him. He ambulated ~22' with Min A x 2 with HHA, poor balance, moaning throughout, no change positive in behavior given verbal safety cues. Pt had to sit down moaning louder and slowing down and wheelchair brought for hm to sit down.       [facility] 6  Click ADL: 10    Treatment & Education:  Purpose of OT and POC, no evidence of learning noted  Calming tech and validation for agitation were effective.          Patient left HOB elevated with all lines intact, call button in reach, bed alarm on, and risk sitter present    GOALS:   Multidisciplinary Problems       Occupational Therapy Goals       Not on file              Multidisciplinary Problems (Resolved)          Problem: Occupational Therapy    Goal Priority Disciplines Outcome Interventions   Occupational Therapy Goal   (Resolved)     OT, PT/OT Met    Description: Goals to be met by: 10/16/23     Patient will increase functional independence with ADLs by performing:    UE Dressing with Towner.  LE Dressing with Modified Towner.  Grooming while standing at sink with Supervision.  Toileting from toilet with Supervision for hygiene and clothing management.   Sitting in chair 30-60 minutes with Supervision.  Toilet transfer to toilet with Supervision.  Upper extremity exercise program x10  reps per handout, with assistance as needed.  Increase sustained attention to task x 5 min. With 1-2 verbal cues.                          Time Tracking:     OT Date of Treatment: 09/28/23  OT Start Time: 1230  OT Stop Time: 1243  OT Total Time (min): 13 min cotx with PTA for complex nature of pt and for optimal rehab outcome    Billable Minutes:Therapeutic Activity 13  Total Time 13    OT/ANDREA: OT          9/28/2023

## 2023-09-29 NOTE — ED TRIAGE NOTES
Patient arrived to the ER via EMS after being discharged from the 3rd floor today and no one was home to meet the ambulance. Brought back here for troubleshooting of this discharge.

## 2023-09-30 ENCOUNTER — NURSE TRIAGE (OUTPATIENT)
Dept: ADMINISTRATIVE | Facility: CLINIC | Age: 58
End: 2023-09-30
Payer: MEDICAID

## 2023-09-30 NOTE — TELEPHONE ENCOUNTER
Patient's ex-wife states patient was discharged from the hospital on yesterday, 9/29/23, to home with Home Health Care and a feeding tube in place. Patient's ex-wife states patient onset of diarrhea this AM, 9/30/23 and states patient has had a total of 2-3 episodes of diarrhea since this morning.     Care Advice given to See PCP or visit an ED within 24 Hours due to weekend status and close of clinic. Patient's wife states understanding of care advice at this time.     Reason for Disposition   Tube feedings (e.g., nasogastric, g-tube, j-tube)    Additional Information   Negative: Shock suspected (e.g., cold/pale/clammy skin, too weak to stand, low BP, rapid pulse)   Negative: Difficult to awaken or acting confused (e.g., disoriented, slurred speech)   Negative: Sounds like a life-threatening emergency to the triager   Negative: Vomiting also present and worse than the diarrhea   Negative: [1] Blood in stool AND [2] without diarrhea   Negative: Diarrhea in a cancer patient who is currently (or recently) receiving chemotherapy or radiation therapy, or cancer patient who has metastatic or end-stage cancer and is receiving palliative care   Negative: Diarrhea begins while taking an antibiotic by mouth (oral antibiotic)   Negative: [1] SEVERE abdominal pain (e.g., excruciating) AND [2] present > 1 hour   Negative: [1] SEVERE abdominal pain AND [2] age > 60 years   Negative: [1] Blood in the stool AND [2] moderate or large amount of blood   Negative: Black or tarry bowel movements  (Exception: Chronic-unchanged black-grey BMs AND is taking iron pills or Pepto-Bismol.)   Negative: [1] Drinking very little AND [2] dehydration suspected (e.g., no urine > 12 hours, very dry mouth, very lightheaded)   Negative: Patient sounds very sick or weak to the triager   Negative: [1] SEVERE diarrhea (e.g., 7 or more times / day more than normal) AND [2] age > 60 years   Negative: [1] Constant abdominal pain AND [2] present > 2 hours    Negative: [1] Fever > 103 F (39.4 C) AND [2] not able to get the fever down using Fever Care Advice   Negative: [1] SEVERE diarrhea (e.g., 7 or more times / day more than normal) AND [2] present > 24 hours (1 day)   Negative: [1] MODERATE diarrhea (e.g., 4-6 times / day more than normal) AND [2] present > 48 hours (2 days)   Negative: [1] MODERATE diarrhea (e.g., 4-6 times / day more than normal) AND [2] age > 70 years   Negative: Fever > 101 F (38.3 C)   Negative: Fever present > 3 days (72 hours)   Negative: Abdominal pain  (Exception: Pain clears with each passage of diarrhea stool.)   Negative: [1] Blood in the stool AND [2] small amount of blood  (Exception: Only on toilet paper. Reason: Diarrhea can cause rectal irritation with blood on wiping.)   Negative: [1] Mucus or pus in stool AND [2] present > 2 days AND [3] diarrhea is more than mild   Negative: [1] Recent antibiotic therapy (i.e., within last 2 months) AND [2] diarrhea present > 3 days since antibiotic was stopped   Negative: [1] Recent hospitalization AND [2] diarrhea present > 3 days   Negative: Weak immune system (e.g., HIV positive, cancer chemo, splenectomy, organ transplant, chronic steroids)    Protocols used: Diarrhea-A-

## 2023-10-01 ENCOUNTER — NURSE TRIAGE (OUTPATIENT)
Dept: ADMINISTRATIVE | Facility: CLINIC | Age: 58
End: 2023-10-01
Payer: MEDICAID

## 2023-10-01 ENCOUNTER — HOSPITAL ENCOUNTER (OUTPATIENT)
Facility: HOSPITAL | Age: 58
Discharge: HOME | End: 2023-10-02
Attending: EMERGENCY MEDICINE | Admitting: EMERGENCY MEDICINE
Payer: MEDICAID

## 2023-10-01 DIAGNOSIS — Z91.89 AT RISK FOR LONG QT SYNDROME: ICD-10-CM

## 2023-10-01 DIAGNOSIS — I50.40 COMBINED SYSTOLIC AND DIASTOLIC CONGESTIVE HEART FAILURE, UNSPECIFIED HF CHRONICITY: ICD-10-CM

## 2023-10-01 DIAGNOSIS — I48.91 ATRIAL FIBRILLATION WITH RVR: ICD-10-CM

## 2023-10-01 DIAGNOSIS — K94.23 PEG TUBE MALFUNCTION: ICD-10-CM

## 2023-10-01 DIAGNOSIS — R10.9 ABDOMINAL PAIN, UNSPECIFIED ABDOMINAL LOCATION: Primary | ICD-10-CM

## 2023-10-01 DIAGNOSIS — R53.81 DEBILITY: ICD-10-CM

## 2023-10-01 DIAGNOSIS — R45.1 AGITATION: ICD-10-CM

## 2023-10-01 DIAGNOSIS — I25.2 HISTORY OF ST ELEVATION MYOCARDIAL INFARCTION (STEMI): ICD-10-CM

## 2023-10-01 DIAGNOSIS — R07.9 CHEST PAIN: ICD-10-CM

## 2023-10-01 PROBLEM — Z75.8 DISCHARGE PLANNING ISSUES: Status: ACTIVE | Noted: 2023-10-01

## 2023-10-01 PROBLEM — R19.7 DIARRHEA: Status: ACTIVE | Noted: 2023-10-01

## 2023-10-01 PROBLEM — Z86.73 HISTORY OF EMBOLIC STROKE: Status: ACTIVE | Noted: 2023-08-08

## 2023-10-01 LAB
ALBUMIN SERPL BCP-MCNC: 3.4 G/DL (ref 3.5–5.2)
ALP SERPL-CCNC: 92 U/L (ref 55–135)
ALT SERPL W/O P-5'-P-CCNC: 20 U/L (ref 10–44)
ANION GAP SERPL CALC-SCNC: 7 MMOL/L (ref 8–16)
AST SERPL-CCNC: 26 U/L (ref 10–40)
BACTERIA #/AREA URNS AUTO: ABNORMAL /HPF
BASOPHILS # BLD AUTO: 0.02 K/UL (ref 0–0.2)
BASOPHILS NFR BLD: 0.4 % (ref 0–1.9)
BILIRUB SERPL-MCNC: 0.5 MG/DL (ref 0.1–1)
BILIRUB UR QL STRIP: NEGATIVE
BUN SERPL-MCNC: 24 MG/DL (ref 6–20)
CALCIUM SERPL-MCNC: 9.2 MG/DL (ref 8.7–10.5)
CHLORIDE SERPL-SCNC: 110 MMOL/L (ref 95–110)
CLARITY UR REFRACT.AUTO: CLEAR
CO2 SERPL-SCNC: 24 MMOL/L (ref 23–29)
COLOR UR AUTO: YELLOW
CREAT SERPL-MCNC: 1.4 MG/DL (ref 0.5–1.4)
DIFFERENTIAL METHOD: ABNORMAL
EOSINOPHIL # BLD AUTO: 0.3 K/UL (ref 0–0.5)
EOSINOPHIL NFR BLD: 5 % (ref 0–8)
ERYTHROCYTE [DISTWIDTH] IN BLOOD BY AUTOMATED COUNT: 18 % (ref 11.5–14.5)
EST. GFR  (NO RACE VARIABLE): 59 ML/MIN/1.73 M^2
GLUCOSE SERPL-MCNC: 93 MG/DL (ref 70–110)
GLUCOSE UR QL STRIP: ABNORMAL
HCT VFR BLD AUTO: 33.9 % (ref 40–54)
HGB BLD-MCNC: 10.1 G/DL (ref 14–18)
HGB UR QL STRIP: NEGATIVE
IMM GRANULOCYTES # BLD AUTO: 0.01 K/UL (ref 0–0.04)
IMM GRANULOCYTES NFR BLD AUTO: 0.2 % (ref 0–0.5)
KETONES UR QL STRIP: NEGATIVE
LACTATE SERPL-SCNC: 0.6 MMOL/L (ref 0.5–2.2)
LEUKOCYTE ESTERASE UR QL STRIP: NEGATIVE
LIPASE SERPL-CCNC: 29 U/L (ref 4–60)
LYMPHOCYTES # BLD AUTO: 1.3 K/UL (ref 1–4.8)
LYMPHOCYTES NFR BLD: 25.9 % (ref 18–48)
MCH RBC QN AUTO: 28 PG (ref 27–31)
MCHC RBC AUTO-ENTMCNC: 29.8 G/DL (ref 32–36)
MCV RBC AUTO: 94 FL (ref 82–98)
MICROSCOPIC COMMENT: ABNORMAL
MONOCYTES # BLD AUTO: 0.8 K/UL (ref 0.3–1)
MONOCYTES NFR BLD: 15.3 % (ref 4–15)
NEUTROPHILS # BLD AUTO: 2.7 K/UL (ref 1.8–7.7)
NEUTROPHILS NFR BLD: 53.2 % (ref 38–73)
NITRITE UR QL STRIP: NEGATIVE
NRBC BLD-RTO: 0 /100 WBC
PH UR STRIP: 5 [PH] (ref 5–8)
PLATELET # BLD AUTO: 275 K/UL (ref 150–450)
PMV BLD AUTO: 12.2 FL (ref 9.2–12.9)
POTASSIUM SERPL-SCNC: 4.8 MMOL/L (ref 3.5–5.1)
PROT SERPL-MCNC: 7.2 G/DL (ref 6–8.4)
PROT UR QL STRIP: NEGATIVE
RBC # BLD AUTO: 3.61 M/UL (ref 4.6–6.2)
RBC #/AREA URNS AUTO: 2 /HPF (ref 0–4)
SODIUM SERPL-SCNC: 141 MMOL/L (ref 136–145)
SP GR UR STRIP: >=1.03 (ref 1–1.03)
SQUAMOUS #/AREA URNS AUTO: 0 /HPF
URN SPEC COLLECT METH UR: ABNORMAL
WBC # BLD AUTO: 5.02 K/UL (ref 3.9–12.7)
WBC #/AREA URNS AUTO: 8 /HPF (ref 0–5)
YEAST UR QL AUTO: ABNORMAL

## 2023-10-01 PROCEDURE — 96372 THER/PROPH/DIAG INJ SC/IM: CPT

## 2023-10-01 PROCEDURE — G0378 HOSPITAL OBSERVATION PER HR: HCPCS

## 2023-10-01 PROCEDURE — 83605 ASSAY OF LACTIC ACID: CPT | Performed by: EMERGENCY MEDICINE

## 2023-10-01 PROCEDURE — 25500020 PHARM REV CODE 255: Performed by: EMERGENCY MEDICINE

## 2023-10-01 PROCEDURE — 63600175 PHARM REV CODE 636 W HCPCS

## 2023-10-01 PROCEDURE — 25000003 PHARM REV CODE 250

## 2023-10-01 PROCEDURE — 96374 THER/PROPH/DIAG INJ IV PUSH: CPT

## 2023-10-01 PROCEDURE — 83880 ASSAY OF NATRIURETIC PEPTIDE: CPT

## 2023-10-01 PROCEDURE — 36415 COLL VENOUS BLD VENIPUNCTURE: CPT

## 2023-10-01 PROCEDURE — 93010 EKG 12-LEAD: ICD-10-PCS | Mod: ,,, | Performed by: INTERNAL MEDICINE

## 2023-10-01 PROCEDURE — 93005 ELECTROCARDIOGRAM TRACING: CPT

## 2023-10-01 PROCEDURE — 81001 URINALYSIS AUTO W/SCOPE: CPT | Performed by: EMERGENCY MEDICINE

## 2023-10-01 PROCEDURE — 80053 COMPREHEN METABOLIC PANEL: CPT | Performed by: EMERGENCY MEDICINE

## 2023-10-01 PROCEDURE — 85025 COMPLETE CBC W/AUTO DIFF WBC: CPT | Performed by: EMERGENCY MEDICINE

## 2023-10-01 PROCEDURE — 99285 EMERGENCY DEPT VISIT HI MDM: CPT | Mod: 25

## 2023-10-01 PROCEDURE — 63600175 PHARM REV CODE 636 W HCPCS: Performed by: EMERGENCY MEDICINE

## 2023-10-01 PROCEDURE — 93010 ELECTROCARDIOGRAM REPORT: CPT | Mod: ,,, | Performed by: INTERNAL MEDICINE

## 2023-10-01 PROCEDURE — 83690 ASSAY OF LIPASE: CPT | Performed by: EMERGENCY MEDICINE

## 2023-10-01 RX ORDER — HEPARIN SODIUM 5000 [USP'U]/ML
5000 INJECTION, SOLUTION INTRAVENOUS; SUBCUTANEOUS EVERY 8 HOURS
Status: DISCONTINUED | OUTPATIENT
Start: 2023-10-01 | End: 2023-10-02 | Stop reason: HOSPADM

## 2023-10-01 RX ORDER — NALOXONE HCL 0.4 MG/ML
0.02 VIAL (ML) INJECTION
Status: DISCONTINUED | OUTPATIENT
Start: 2023-10-01 | End: 2023-10-02 | Stop reason: HOSPADM

## 2023-10-01 RX ORDER — VALPROIC ACID 250 MG/5ML
500 SOLUTION ORAL DAILY
Status: DISCONTINUED | OUTPATIENT
Start: 2023-10-02 | End: 2023-10-02 | Stop reason: HOSPADM

## 2023-10-01 RX ORDER — HYDROMORPHONE HYDROCHLORIDE 1 MG/ML
0.5 INJECTION, SOLUTION INTRAMUSCULAR; INTRAVENOUS; SUBCUTANEOUS
Status: COMPLETED | OUTPATIENT
Start: 2023-10-01 | End: 2023-10-01

## 2023-10-01 RX ORDER — VALPROIC ACID 250 MG/5ML
1000 SOLUTION ORAL NIGHTLY
Status: DISCONTINUED | OUTPATIENT
Start: 2023-10-01 | End: 2023-10-02 | Stop reason: HOSPADM

## 2023-10-01 RX ORDER — QUETIAPINE FUMARATE 25 MG/1
25 TABLET, FILM COATED ORAL NIGHTLY
Status: DISCONTINUED | OUTPATIENT
Start: 2023-10-01 | End: 2023-10-02 | Stop reason: HOSPADM

## 2023-10-01 RX ORDER — GLUCAGON 1 MG
1 KIT INJECTION
Status: DISCONTINUED | OUTPATIENT
Start: 2023-10-01 | End: 2023-10-02 | Stop reason: HOSPADM

## 2023-10-01 RX ORDER — SODIUM CHLORIDE 0.9 % (FLUSH) 0.9 %
10 SYRINGE (ML) INJECTION
Status: CANCELLED | OUTPATIENT
Start: 2023-10-01

## 2023-10-01 RX ORDER — TALC
6 POWDER (GRAM) TOPICAL NIGHTLY PRN
Status: CANCELLED | OUTPATIENT
Start: 2023-10-01

## 2023-10-01 RX ORDER — METOPROLOL TARTRATE 25 MG/1
25 TABLET, FILM COATED ORAL 2 TIMES DAILY
Status: DISCONTINUED | OUTPATIENT
Start: 2023-10-01 | End: 2023-10-02 | Stop reason: HOSPADM

## 2023-10-01 RX ORDER — EZETIMIBE 10 MG/1
10 TABLET ORAL NIGHTLY
Status: DISCONTINUED | OUTPATIENT
Start: 2023-10-01 | End: 2023-10-02 | Stop reason: HOSPADM

## 2023-10-01 RX ORDER — ATORVASTATIN CALCIUM 40 MG/1
80 TABLET, FILM COATED ORAL DAILY
Status: DISCONTINUED | OUTPATIENT
Start: 2023-10-02 | End: 2023-10-02 | Stop reason: HOSPADM

## 2023-10-01 RX ORDER — IBUPROFEN 200 MG
16 TABLET ORAL
Status: DISCONTINUED | OUTPATIENT
Start: 2023-10-01 | End: 2023-10-02 | Stop reason: HOSPADM

## 2023-10-01 RX ORDER — AMIODARONE HYDROCHLORIDE 200 MG/1
200 TABLET ORAL DAILY
Status: DISCONTINUED | OUTPATIENT
Start: 2023-10-02 | End: 2023-10-02 | Stop reason: HOSPADM

## 2023-10-01 RX ORDER — NAPROXEN SODIUM 220 MG/1
81 TABLET, FILM COATED ORAL DAILY
Status: DISCONTINUED | OUTPATIENT
Start: 2023-10-02 | End: 2023-10-02 | Stop reason: HOSPADM

## 2023-10-01 RX ORDER — SODIUM CHLORIDE 0.9 % (FLUSH) 0.9 %
10 SYRINGE (ML) INJECTION EVERY 12 HOURS PRN
Status: DISCONTINUED | OUTPATIENT
Start: 2023-10-01 | End: 2023-10-02 | Stop reason: HOSPADM

## 2023-10-01 RX ORDER — IBUPROFEN 200 MG
24 TABLET ORAL
Status: DISCONTINUED | OUTPATIENT
Start: 2023-10-01 | End: 2023-10-02 | Stop reason: HOSPADM

## 2023-10-01 RX ADMIN — HEPARIN SODIUM 5000 UNITS: 5000 INJECTION, SOLUTION INTRAVENOUS; SUBCUTANEOUS at 11:10

## 2023-10-01 RX ADMIN — QUETIAPINE FUMARATE 25 MG: 25 TABLET ORAL at 11:10

## 2023-10-01 RX ADMIN — METOPROLOL TARTRATE 25 MG: 25 TABLET, FILM COATED ORAL at 11:10

## 2023-10-01 RX ADMIN — EZETIMIBE 10 MG: 10 TABLET ORAL at 11:10

## 2023-10-01 RX ADMIN — HYDROMORPHONE HYDROCHLORIDE 0.5 MG: 1 INJECTION, SOLUTION INTRAMUSCULAR; INTRAVENOUS; SUBCUTANEOUS at 05:10

## 2023-10-01 RX ADMIN — VALPROIC ACID 1000 MG: 250 SOLUTION ORAL at 11:10

## 2023-10-01 RX ADMIN — IOHEXOL 100 ML: 350 INJECTION, SOLUTION INTRAVENOUS at 05:10

## 2023-10-01 NOTE — ED PROVIDER NOTES
Encounter Date: 10/1/2023       History     Chief Complaint   Patient presents with    Abdominal Pain     Has Peg tube, previous CVA, family believes there to be a problem with Peg tube.     58-year-old male, history of systolic and diastolic heart failure, Atrial fibrillation (one eliquis), Coronary artery disease, Dyslipidemia, Embolic stroke involving left middle cerebral artery with residual, severe aphasia, Hypertension,  and Stage 3 chronic kidney disease, just discharged from [facility] 3 days ago, seems there was some debate at that point whether the patient's care needs were too overwhelming for family and there was discussion about nursing home placement but patient ultimately sent home, now brought in by EMS for concerns about possible abdominal pain.  Family apparently told EMS the patient has had worsening diarrhea over the last 2 days and then has been moaning more than usual and seems to be in pain.  They have also had difficulty utilizing the patient's PEG tube and the external tubing seems to have been broken.  No family accompanied the patient to the ED    The history is provided by the EMS personnel. The history is limited by the condition of the patient and the absence of a caregiver.     Review of patient's allergies indicates:  No Known Allergies  Past Medical History:   Diagnosis Date    Acute on chronic combined systolic and diastolic heart failure 09/23/2022    Atrial fibrillation     CAD (coronary artery disease) 09/23/2022    Dyslipidemia 09/23/2022    Embolic stroke involving left middle cerebral artery 08/08/2023    Encounter for blood transfusion     H/O heart artery stent     HTN (hypertension) 09/23/2022    ICD (implantable cardioverter-defibrillator) in place 09/23/2022    Paroxysmal A-fib 09/23/2022    Stage 3 chronic kidney disease 04/19/2023     Past Surgical History:   Procedure Laterality Date    CARDIAC DEFIBRILLATOR PLACEMENT Left     CEREBRAL ANGIOGRAM N/A 8/8/2023     Procedure: ANGIOGRAM-CEREBRAL;  Surgeon: Kenzie Rodriguez;  Location: [facility];  Service: Anesthesiology;  Laterality: N/A;  LVO (angiogram)    ESOPHAGOGASTRODUODENOSCOPY N/A 8/11/2023    Procedure: EGD (ESOPHAGOGASTRODUODENOSCOPY);  Surgeon: Colette Martinez MD;  Location: [facility] ENDO (2ND FLR);  Service: Gastroenterology;  Laterality: N/A;    ESOPHAGOGASTRODUODENOSCOPY W/ PEG N/A 8/17/2023    Procedure: EGD, WITH PEG TUBE INSERTION;  Surgeon: Yan Scott MD;  Location: [facility] OR 2ND FLR;  Service: General;  Laterality: N/A;  PEG, possible lap G    HERNIA REPAIR      LEFT HEART CATHETERIZATION Left 4/18/2023    Procedure: Left heart cath;  Surgeon: Atilio Marks MD;  Location: [facility] CATH LAB;  Service: Cardiology;  Laterality: Left;    RIGHT HEART CATHETERIZATION Right 9/30/2022    Procedure: INSERTION, CATHETER, RIGHT HEART;  Surgeon: Caden Aden MD;  Location: [facility] CATH LAB;  Service: Cardiology;  Laterality: Right;    TREATMENT OF CARDIAC ARRHYTHMIA N/A 11/22/2022    Procedure: CARDIOVERSION;  Surgeon: Marvin Sanon MD;  Location: [facility] EP LAB;  Service: Cardiology;  Laterality: N/A;  afib, KIA, DCCV, anes, MB, 3 Prep     History reviewed. No pertinent family history.  Social History     Tobacco Use    Smoking status: Never    Smokeless tobacco: Never   Substance Use Topics    Alcohol use: Never    Drug use: Never     Review of Systems    Physical Exam     Initial Vitals   BP Pulse Resp Temp SpO2   10/01/23 1610 10/01/23 1610 10/01/23 1610 10/01/23 1651 10/01/23 1610   112/75 83 18 97.8 °F (36.6 °C) 100 %      MAP       --                Physical Exam    Nursing note and vitals reviewed.  Constitutional: He is not diaphoretic. He appears distressed.   Patient is agitated, moaning repeatedly   HENT:   Mouth/Throat: Oropharynx is clear and moist.   Eyes: Conjunctivae are normal.   Neck: Neck supple.   Cardiovascular:  Normal rate and regular rhythm.           Pulmonary/Chest: Breath sounds normal. No  respiratory distress.   Abdominal: Abdomen is soft. He exhibits no distension. There is abdominal tenderness.   PEG tube There is no rebound and no guarding.   Musculoskeletal:         General: No edema.      Cervical back: Neck supple.     Neurological: He is alert.   Moaning repeatedly   Skin: Skin is warm. No rash noted.         ED Course   Procedures  Labs Reviewed   CBC W/ AUTO DIFFERENTIAL - Abnormal; Notable for the following components:       Result Value    RBC 3.61 (*)     Hemoglobin 10.1 (*)     Hematocrit 33.9 (*)     MCHC 29.8 (*)     RDW 18.0 (*)     Mono % 15.3 (*)     All other components within normal limits   COMPREHENSIVE METABOLIC PANEL - Abnormal; Notable for the following components:    BUN 24 (*)     Albumin 3.4 (*)     eGFR 59.0 (*)     Anion Gap 7 (*)     All other components within normal limits   URINALYSIS, REFLEX TO URINE CULTURE - Abnormal; Notable for the following components:    Specific Gravity, UA >=1.030 (*)     Glucose, UA 3+ (*)     All other components within normal limits    Narrative:     Specimen Source->Urine   URINALYSIS MICROSCOPIC - Abnormal; Notable for the following components:    WBC, UA 8 (*)     All other components within normal limits    Narrative:     Specimen Source->Urine   LACTIC ACID, PLASMA   LIPASE          Imaging Results               CT Abdomen Pelvis With Contrast (Final result)  Result time 10/01/23 18:19:57      Final result by Leandro Ruffin MD (10/01/23 18:19:57)                   Impression:      This report was flagged in Epic as abnormal.    1. There is inflammation about the subcutaneous course of the PEG tube, without findings to suggest abscess.  There is mild gastric wall thickening along the balloon portion of the PEG tube, correlation with any history of gastritis.  Please note, the PEG tube closely abuts the left hepatic lobe, possible left hepatic lobe tethering anteriorly not excluded.  Correlation with LFTs advised.  2. Patchy  hypoattenuation involving the interpolar region of the left kidney.  Findings may reflect sequela of prior injury or insult however correlation with urinalysis is recommended to exclude evolving change of pyelonephritis.  3. Left nonobstructive nephrolithiasis.  4. High attenuating lesion arises from the interpolar region/lower pole of the left kidney, higher in attenuation than would be expected for a simple cyst.  Follow-up ultrasound is recommended to confirm cystic nature.  5. Findings suggesting hepatic steatosis and hepatomegaly, correlation with LFTs recommended.  6. Prostatomegaly.  7. Mild perihepatic fluid and fluid in the pelvis, concerning for volume overload.  There are trace bilateral pleural effusions.  Correlation is advised.  8. Fat and fluid containing right inguinal hernia.  9. Please see above for several additional findings.      Electronically signed by: Leandro Ruffin MD  Date:    10/01/2023  Time:    18:19               Narrative:    EXAMINATION:  CT ABDOMEN PELVIS WITH CONTRAST    CLINICAL HISTORY:  Abdominal pain, acute, nonlocalized;    TECHNIQUE:  Low dose axial images, sagittal and coronal reformations were obtained from the lung bases to the pubic symphysis following the IV administration of 100 mL of Omnipaque 350 .  Oral contrast was not given.    COMPARISON:  CT chest abdomen and pelvis 08/22/2023    FINDINGS:  Images of the lower thorax are remarkable for bilateral dependent atelectasis and trace bilateral pleural effusions.  The heart is enlarged.  Partially visualized pacer wires noted.    Allowing for extensive motion artifact, the liver is hypoattenuating suggesting steatosis, correlation with LFTs recommended.  The liver is enlarged.  There is a PEG tube in place noting there is inflammation along the subcutaneous course of the tube, new since the previous examination.  The tube closely abuts the left hepatic lobe.  There is some degree of gastric wall thickening at the  balloon portion of the tube.  Correlation with any history of gastritis.  No free air in the abdomen or gastric wall pneumatosis at this time.  There is mild perinephric fluid.    The spleen, pancreas and adrenal glands are grossly unremarkable.  There may be mild gallbladder wall thickening, no secondary findings to suggest acute cholecystitis.  There are a few scattered abdominal lymph nodes.    There is no hydronephrosis.  There is questionable hypoattenuation involving the interpolar region of the left kidney versus artifact. Low attenuating lesions arise from the kidneys, too small for characterization.  There is a low attenuating lesion arising from the interpolar region of the left kidney measuring 1.8 cm, attenuation of which is higher than would be expected for a simple cyst.  There is left nonobstructive nephrolithiasis.  The bilateral ureters are unremarkable without calculi seen.  The prostate is prominent.    There is fluid in the pelvis.  There are a few scattered colonic diverticula without inflammation.  The terminal ileum is unremarkable.  The appendix is unremarkable.  The small bowel is grossly unremarkable.  There are a few scattered shotty periaortic, pericaval, and mesenteric lymph nodes.  There is atherosclerotic calcification of the aorta and its branches.  There is diastasis of the rectus abdominus musculature.  There is a left fat containing inguinal hernia.  There is a right fat and fluid containing inguinal hernia.    There are degenerative changes of the spine.  There is osteopenia.  No significant inguinal lymphadenopathy.                                       Medications   sodium chloride 0.9% flush 10 mL (has no administration in time range)   naloxone 0.4 mg/mL injection 0.02 mg (has no administration in time range)   glucose chewable tablet 16 g (has no administration in time range)   glucose chewable tablet 24 g (has no administration in time range)   glucagon (human recombinant)  injection 1 mg (has no administration in time range)   heparin (porcine) injection 5,000 Units (has no administration in time range)   dextrose 10% bolus 125 mL 125 mL (has no administration in time range)   dextrose 10% bolus 250 mL 250 mL (has no administration in time range)   amiodarone tablet 200 mg (has no administration in time range)   aspirin chewable tablet 81 mg (has no administration in time range)   atorvastatin tablet 80 mg (has no administration in time range)   ezetimibe tablet 10 mg (has no administration in time range)   metoprolol tartrate (LOPRESSOR) tablet 25 mg (has no administration in time range)   QUEtiapine tablet 25 mg (has no administration in time range)   valproic acid (as sodium salt) 250 mg/5 mL (5 mL) syrup Soln 1,000 mg (has no administration in time range)   valproic acid (as sodium salt) 250 mg/5 mL (5 mL) syrup Soln 500 mg (has no administration in time range)   valproic acid (as sodium salt) 250 mg/5 mL (5 mL) syrup Soln 500 mg (has no administration in time range)   HYDROmorphone injection 0.5 mg (0.5 mg Intravenous Given 10/1/23 1709)   iohexoL (OMNIPAQUE 350) injection 100 mL (100 mLs Intravenous Given 10/1/23 1758)     Medical Decision Making  Complex PMH as above, here with abd pain and tenderness on exam, concern for PEG malfunction    DDx for abdominal pain would include cholecystitis, appendicitis, diverticulitis, pancreatitis, cholangitis, hepatitis, bowel perforation or obstruction, volvulus, gastritis, renal colic, vascular catastrophe like AAA, aortic dissection, or mesenteric ischemia.  Other less dangerous problems such as gastroparesis, cyclic vomiting syndrome, and constipation are also possible.    VSS, abd tenderness as noted  Nurses attempted to flush PEG unsuccessfully    Plan  -labs  -CT abd/pelvis  -IV pain meds  -likely admit    Amount and/or Complexity of Data Reviewed  Independent Historian: EMS  Labs: ordered. Decision-making details documented in ED  Course.  Radiology: ordered and independent interpretation performed.    Risk  Prescription drug management.  Parenteral controlled substances.  Decision regarding hospitalization.               ED Course as of 10/01/23 2231   Sun Oct 01, 2023   1804 Lactate, Rogelio: 0.6 [AS]   1804 Lipase: 29 [AS]   1804 WBC: 5.02 [AS]   1804 Hemoglobin(!): 10.1 [AS]   1804 Hematocrit(!): 33.9 [AS]   1804 BUN(!): 24 [AS]   1804 Creatinine: 1.4 [AS]   1804 BILIRUBIN TOTAL: 0.5 [AS]   1852 CT findings noted.  There is inflammation about the subcutaneous course of the PEG tube, without findings to suggest abscess.  There is mild gastric wall thickening along the balloon portion of the PEG tube, correlation with any history of gastritis.  Please note, the PEG tube closely abuts the left hepatic lobe, possible left hepatic lobe tethering anteriorly not excluded.  [AS]   1852 Will admit for further workup of this PEG tube positioning.  Nurses attempted to flush the PEG tube and it did seem like a substantial portion of the injected material leaked around the insertion site.  Patient much more comfortable status post pain medication. [AS]      ED Course User Index  [AS] Katherine Chaudhari MD                    Clinical Impression:   Final diagnoses:  [R10.9] Abdominal pain, unspecified abdominal location (Primary)        ED Disposition Condition    Observation Stable                Katherine Chaudhari MD  10/01/23 2231

## 2023-10-01 NOTE — TELEPHONE ENCOUNTER
Reason for Disposition   SEVERE abdominal pain    Additional Information   Negative: Shock suspected (e.g., cold/pale/clammy skin, too weak to stand, low BP, rapid pulse)   Negative: [1] Shortness of breath AND [2] new-onset   Negative: Bluish (or gray) lips or face now   Negative: Sounds like a life-threatening emergency to the triager    Protocols used: Feeding Tube Symptoms and [id number]  Spoke with family member of pt who reports that feeding tube noted to be broken this morning. Also reports feeding tube is leaking. Reports pt complaining of abdomina pain as well. Advised to be seen in ED. Verbalized understanding

## 2023-10-01 NOTE — Clinical Note
Diagnosis: Abdominal pain, unspecified abdominal location [[id number]]   Future Attending Provider: MICHAEL VILLA [[id number]]   Admitting Provider:: LADI CASTILLO [[id number]]

## 2023-10-01 NOTE — ED TRIAGE NOTES
Pt arrives via EMS from home; per wife who called - pt is non verbal d/t CVA in September; pts peg tube is leaking from insertion point with clamp being broken; pt has had diarrhea since yesterday morning beginning as watery and now loose  Wife Zahida [phone]

## 2023-10-01 NOTE — ED NOTES
Patient identifiers verified and correct for Tera Valdhiraj  LOC: The patient is awake, alert but not aware of environment with an inappropriate affect, the patient is nonverbal and moaning  APPEARANCE: Patient appears uncomfortable but in no acute distress, patient is clean and well groomed.  SKIN: The skin is warm and dry, color consistent with ethnicity, patient has normal skin turgor and moist mucus membranes, skin intact, no breakdown or bruising noted.   MUSCULOSKELETAL: Patient moving all extremities spontaneously, no swelling noted.  RESPIRATORY: Airway is open and patent, respirations are spontaneous, patient has a normal effort and rate, no accessory muscle use noted, pt placed on continuous pulse ox with O2 sats noted at 100% on room air.  CARDIAC: Pt placed on cardiac monitor. Patient has a normal rate and regular rhythm, no edema noted, capillary refill < 3 seconds.   GASTRO: Soft and tender to palpation, distention noted, normoactive bowel sounds present in all four quadrants. Pt has peg tube in place and per wife peg tube is leaking and clamp broken  : Pt unable to deny or endorse any pain or frequency with urination.  NEURO: Pt opens eyes spontaneously, behavior not appropriate to situation, follows simple commands, facial expression symmetrical, pt is non verbal with deficits from previous CVA

## 2023-10-02 VITALS
SYSTOLIC BLOOD PRESSURE: 142 MMHG | DIASTOLIC BLOOD PRESSURE: 96 MMHG | HEART RATE: 85 BPM | WEIGHT: 185.19 LBS | TEMPERATURE: 98 F | HEIGHT: 73 IN | BODY MASS INDEX: 24.54 KG/M2 | OXYGEN SATURATION: 95 % | RESPIRATION RATE: 16 BRPM

## 2023-10-02 LAB
ALBUMIN SERPL BCP-MCNC: 3.5 G/DL (ref 3.5–5.2)
ALP SERPL-CCNC: 80 U/L (ref 55–135)
ALT SERPL W/O P-5'-P-CCNC: 19 U/L (ref 10–44)
ANION GAP SERPL CALC-SCNC: 12 MMOL/L (ref 8–16)
AST SERPL-CCNC: 22 U/L (ref 10–40)
BASOPHILS # BLD AUTO: 0.01 K/UL (ref 0–0.2)
BASOPHILS NFR BLD: 0.2 % (ref 0–1.9)
BILIRUB SERPL-MCNC: 0.4 MG/DL (ref 0.1–1)
BNP SERPL-MCNC: 3970 PG/ML (ref 0–99)
BUN SERPL-MCNC: 21 MG/DL (ref 6–20)
CALCIUM SERPL-MCNC: 9.2 MG/DL (ref 8.7–10.5)
CHLORIDE SERPL-SCNC: 109 MMOL/L (ref 95–110)
CO2 SERPL-SCNC: 20 MMOL/L (ref 23–29)
CREAT SERPL-MCNC: 1.4 MG/DL (ref 0.5–1.4)
DIFFERENTIAL METHOD: ABNORMAL
EOSINOPHIL # BLD AUTO: 0 K/UL (ref 0–0.5)
EOSINOPHIL NFR BLD: 0.5 % (ref 0–8)
ERYTHROCYTE [DISTWIDTH] IN BLOOD BY AUTOMATED COUNT: 18.2 % (ref 11.5–14.5)
EST. GFR  (NO RACE VARIABLE): 59 ML/MIN/1.73 M^2
GLUCOSE SERPL-MCNC: 87 MG/DL (ref 70–110)
HCT VFR BLD AUTO: 31.4 % (ref 40–54)
HGB BLD-MCNC: 9.8 G/DL (ref 14–18)
IMM GRANULOCYTES # BLD AUTO: 0.01 K/UL (ref 0–0.04)
IMM GRANULOCYTES NFR BLD AUTO: 0.2 % (ref 0–0.5)
LYMPHOCYTES # BLD AUTO: 1.2 K/UL (ref 1–4.8)
LYMPHOCYTES NFR BLD: 19 % (ref 18–48)
MAGNESIUM SERPL-MCNC: 2 MG/DL (ref 1.6–2.6)
MCH RBC QN AUTO: 27.8 PG (ref 27–31)
MCHC RBC AUTO-ENTMCNC: 31.2 G/DL (ref 32–36)
MCV RBC AUTO: 89 FL (ref 82–98)
MONOCYTES # BLD AUTO: 0.7 K/UL (ref 0.3–1)
MONOCYTES NFR BLD: 11.1 % (ref 4–15)
NEUTROPHILS # BLD AUTO: 4.2 K/UL (ref 1.8–7.7)
NEUTROPHILS NFR BLD: 69 % (ref 38–73)
NRBC BLD-RTO: 0 /100 WBC
PHOSPHATE SERPL-MCNC: 3.3 MG/DL (ref 2.7–4.5)
PLATELET # BLD AUTO: 269 K/UL (ref 150–450)
PMV BLD AUTO: 12.4 FL (ref 9.2–12.9)
POTASSIUM SERPL-SCNC: 5 MMOL/L (ref 3.5–5.1)
PROT SERPL-MCNC: 7.4 G/DL (ref 6–8.4)
RBC # BLD AUTO: 3.53 M/UL (ref 4.6–6.2)
SODIUM SERPL-SCNC: 141 MMOL/L (ref 136–145)
WBC # BLD AUTO: 6.04 K/UL (ref 3.9–12.7)

## 2023-10-02 PROCEDURE — 84100 ASSAY OF PHOSPHORUS: CPT

## 2023-10-02 PROCEDURE — 99223 PR INITIAL HOSPITAL CARE,LEVL III: ICD-10-PCS | Mod: ,,, | Performed by: HOSPITALIST

## 2023-10-02 PROCEDURE — 96372 THER/PROPH/DIAG INJ SC/IM: CPT

## 2023-10-02 PROCEDURE — 36415 COLL VENOUS BLD VENIPUNCTURE: CPT

## 2023-10-02 PROCEDURE — G0378 HOSPITAL OBSERVATION PER HR: HCPCS

## 2023-10-02 PROCEDURE — 80053 COMPREHEN METABOLIC PANEL: CPT

## 2023-10-02 PROCEDURE — 94640 AIRWAY INHALATION TREATMENT: CPT | Mod: XB

## 2023-10-02 PROCEDURE — 25000003 PHARM REV CODE 250

## 2023-10-02 PROCEDURE — 85025 COMPLETE CBC W/AUTO DIFF WBC: CPT

## 2023-10-02 PROCEDURE — 99223 1ST HOSP IP/OBS HIGH 75: CPT | Mod: ,,, | Performed by: HOSPITALIST

## 2023-10-02 PROCEDURE — 83735 ASSAY OF MAGNESIUM: CPT

## 2023-10-02 PROCEDURE — 94761 N-INVAS EAR/PLS OXIMETRY MLT: CPT

## 2023-10-02 PROCEDURE — 94640 AIRWAY INHALATION TREATMENT: CPT

## 2023-10-02 PROCEDURE — 63600175 PHARM REV CODE 636 W HCPCS

## 2023-10-02 PROCEDURE — 25000242 PHARM REV CODE 250 ALT 637 W/ HCPCS

## 2023-10-02 RX ORDER — OXYCODONE HYDROCHLORIDE 5 MG/1
5 TABLET ORAL EVERY 4 HOURS PRN
Status: DISCONTINUED | OUTPATIENT
Start: 2023-10-02 | End: 2023-10-02 | Stop reason: HOSPADM

## 2023-10-02 RX ORDER — OLANZAPINE 10 MG/2ML
5 INJECTION, POWDER, FOR SOLUTION INTRAMUSCULAR ONCE AS NEEDED
Status: COMPLETED | OUTPATIENT
Start: 2023-10-02 | End: 2023-10-02

## 2023-10-02 RX ORDER — IPRATROPIUM BROMIDE AND ALBUTEROL SULFATE 2.5; .5 MG/3ML; MG/3ML
3 SOLUTION RESPIRATORY (INHALATION)
Status: DISCONTINUED | OUTPATIENT
Start: 2023-10-02 | End: 2023-10-02 | Stop reason: HOSPADM

## 2023-10-02 RX ORDER — HYDROMORPHONE HYDROCHLORIDE 1 MG/ML
0.5 INJECTION, SOLUTION INTRAMUSCULAR; INTRAVENOUS; SUBCUTANEOUS EVERY 6 HOURS PRN
Status: DISCONTINUED | OUTPATIENT
Start: 2023-10-02 | End: 2023-10-02 | Stop reason: HOSPADM

## 2023-10-02 RX ADMIN — IPRATROPIUM BROMIDE AND ALBUTEROL SULFATE 3 ML: .5; 3 SOLUTION RESPIRATORY (INHALATION) at 07:10

## 2023-10-02 RX ADMIN — ASPIRIN 81 MG 81 MG: 81 TABLET ORAL at 10:10

## 2023-10-02 RX ADMIN — OXYCODONE HYDROCHLORIDE 5 MG: 5 TABLET ORAL at 11:10

## 2023-10-02 RX ADMIN — VALPROIC ACID 500 MG: 250 SOLUTION ORAL at 11:10

## 2023-10-02 RX ADMIN — AMIODARONE HYDROCHLORIDE 200 MG: 200 TABLET ORAL at 10:10

## 2023-10-02 RX ADMIN — HYDROMORPHONE HYDROCHLORIDE 0.5 MG: 1 INJECTION, SOLUTION INTRAMUSCULAR; INTRAVENOUS; SUBCUTANEOUS at 12:10

## 2023-10-02 RX ADMIN — ATORVASTATIN CALCIUM 80 MG: 40 TABLET, FILM COATED ORAL at 10:10

## 2023-10-02 RX ADMIN — OLANZAPINE 5 MG: 10 INJECTION, POWDER, LYOPHILIZED, FOR SOLUTION INTRAMUSCULAR at 10:10

## 2023-10-02 RX ADMIN — METOPROLOL TARTRATE 25 MG: 25 TABLET, FILM COATED ORAL at 10:10

## 2023-10-02 RX ADMIN — IPRATROPIUM BROMIDE AND ALBUTEROL SULFATE 3 ML: .5; 3 SOLUTION RESPIRATORY (INHALATION) at 03:10

## 2023-10-02 NOTE — PLAN OF CARE
[facility]      HOME HEALTH ORDERS  FACE TO FACE ENCOUNTER    Patient Name: Tera Griffiths  YOB: 1965    PCP: Carleen Bueno MD   PCP Address: [address] [place] [address]  PCP Phone Number: [phone]  PCP Fax: [phone]    Encounter Date: 10/1/23    Admit to Home Health    Diagnoses:  Active Hospital Problems    Diagnosis  POA    *PEG tube malfunction [K94.23]  Unknown    Discharge planning issues [Z02.9]  Not Applicable    Diarrhea [R19.7]  Unknown    Agitation [R45.1]  Yes    Dysphagia [R13.10]  Yes    Global aphasia [R47.01]  Yes    History of embolic stroke involving left middle cerebral artery [Z86.73]  Not Applicable    Ischemic cardiomyopathy [I25.5]  Yes    Stage 3 chronic kidney disease [N18.30]  Yes    Primary hypertension [I10]  Yes    Paroxysmal A-fib [I48.0]  Yes    ICD (implantable cardioverter-defibrillator) in place [Z95.810]  Yes    History of ST elevation myocardial infarction (STEMI) [I25.2]  Not Applicable    Multiple subsegmental pulmonary emboli without acute cor pulmonale [I26.94]  Yes      Resolved Hospital Problems   No resolved problems to display.       Follow Up Appointments:  No future appointments.    Allergies:Review of patient's allergies indicates:  No Known Allergies    Medications: Review discharge medications with patient and family and provide education.    Current Facility-Administered Medications   Medication Dose Route Frequency Provider Last Rate Last Admin    albuterol-ipratropium 2.5 mg-0.5 mg/3 mL nebulizer solution 3 mL  3 mL Nebulization Q6H [place] lCovis Rust MD   3 mL at 10/02/23 0755    amiodarone tablet 200 mg  200 mg Per G Tube Daily Clovis Rust MD   200 mg at 10/02/23 1029    aspirin chewable tablet 81 mg  81 mg Per G Tube Daily Clovis Rust MD   81 mg at 10/02/23 1030    atorvastatin tablet 80 mg  80 mg Per G Tube Daily Clovis Rust MD   80 mg at 10/02/23 1030    dextrose 10% bolus 125 mL 125 mL  12.5 g Intravenous  PRClovis Norman MD        dextrose 10% bolus 250 mL 250 mL  25 g Intravenous PRN Clovis Rust MD        ezetimibe tablet 10 mg  10 mg Per G Tube QHS Clovis Rust MD   10 mg at 10/01/23 2333    glucagon (human recombinant) injection 1 mg  1 mg Intramuscular PRN Clovis Rust MD        glucose chewable tablet 16 g  16 g Oral PRN Clovis Rust MD        glucose chewable tablet 24 g  24 g Oral PRN Clovis Rust MD        heparin (porcine) injection 5,000 Units  5,000 Units Subcutaneous Q8H Clovis Rust MD   5,000 Units at 10/01/23 2333    HYDROmorphone injection 0.5 mg  0.5 mg Intravenous Q6H PRN Clovis Rust MD   0.5 mg at 10/02/23 0019    metoprolol tartrate (LOPRESSOR) tablet 25 mg  25 mg Per G Tube BID Clovis Rust MD   25 mg at 10/02/23 1030    naloxone 0.4 mg/mL injection 0.02 mg  0.02 mg Intravenous PRN Clovis Rust MD        oxyCODONE immediate release tablet 5 mg  5 mg Oral Q4H PRN Marshall Sidhu MD   5 mg at 10/02/23 1131    QUEtiapine tablet 25 mg  25 mg Per G Tube QHS Clovis Rust MD   25 mg at 10/01/23 2333    sodium chloride 0.9% flush 10 mL  10 mL Intravenous Q12H PRN Clovis Rust MD        valproic acid (as sodium salt) 250 mg/5 mL (5 mL) syrup Soln 1,000 mg  1,000 mg Per G Tube QHS Clovis Rust MD   1,000 mg at 10/01/23 2334    valproic acid (as sodium salt) 250 mg/5 mL (5 mL) syrup Soln 500 mg  500 mg Per G Tube Daily Clovis Rust MD        valproic acid (as sodium salt) 250 mg/5 mL (5 mL) syrup Soln 500 mg  500 mg Per G Tube Daily Clovis Rust MD   500 mg at 10/02/23 1130     Current Discharge Medication List        CONTINUE these medications which have NOT CHANGED    Details   amiodarone (PACERONE) 200 MG Tab 1 tablet (200 mg total) by Per G Tube route once daily.  Qty: 30 tablet, Refills: 11      apixaban (ELIQUIS) 5 mg Tab 1 tablet (5 mg total) by Per G Tube route 2 (two) times daily.  Qty: 60 tablet, Refills: 11      aspirin 81 MG Chew 1 tablet (81 mg total) by Per G Tube route once  daily.  Refills: 0      atorvastatin (LIPITOR) 80 MG tablet 1 tablet (80 mg total) by Per G Tube route once daily.  Qty: 90 tablet, Refills: 3      chlorhexidine (PERIDEX) 0.12 % solution 5 mLs 3 (three) times daily.      ezetimibe (ZETIA) 10 mg tablet 1 tablet (10 mg total) by Per G Tube route every evening.  Qty: 90 tablet, Refills: 3      metoprolol tartrate (LOPRESSOR) 25 MG tablet 1 tablet (25 mg total) by Per G Tube route 2 (two) times daily.  Qty: 60 tablet, Refills: 11    Comments: .      nitroGLYCERIN (NITROSTAT) 0.4 MG SL tablet Place under the tongue.      QUEtiapine (SEROQUEL) 25 MG Tab 1 tablet (25 mg total) by Per G Tube route every evening.  Qty: 30 tablet, Refills: 11      !! valproic acid, as sodium salt, (DEPAKENE) 250 mg/5 mL (5 mL) Soln 10 mLs (500 mg total) by Per G Tube route once daily. To be administered 0900 daily  Qty: 300 mL, Refills: 11      !! valproic acid, as sodium salt, (DEPAKENE) 250 mg/5 mL (5 mL) Soln 10 mLs (500 mg total) by Per G Tube route once daily. To be administers at 1200 daily  Qty: 300 mL, Refills: 11      !! valproic acid, as sodium salt, (DEPAKENE) 250 mg/5 mL (5 mL) Soln 20 mLs (1,000 mg total) by Per G Tube route every evening.  Qty: 300 mL, Refills: 11       !! - Potential duplicate medications found. Please discuss with provider.            I have seen and examined this patient within the last 30 days. My clinical findings that support the need for the home health skilled services and home bound status are the following:no   Weakness/numbness causing balance and gait disturbance due to Stroke making it taxing to leave home.  Medical restrictions requiring assistance of another human to leave home due to  Unstable ambulation.     Diet:   cardiac diet    Labs:  Per Home health organization    Referrals/ Consults  Physical Therapy to evaluate and treat. Evaluate for home safety and equipment needs; Establish/upgrade home exercise program. Perform / instruct on  therapeutic exercises, gait training, transfer training, and Range of Motion.  Occupational Therapy to evaluate and treat. Evaluate home environment for safety and equipment needs. Perform/Instruct on transfers, ADL training, ROM, and therapeutic exercises.  Aide to provide assistance with personal care, ADLs, and vital signs.    Activities:   activity as tolerated    Nursing:   Agency to admit patient within 24 hours of hospital discharge unless specified on physician order or at patient request    SN to complete comprehensive assessment including routine vital signs. Instruct on disease process and s/s of complications to report to MD. Review/verify medication list sent home with the patient at time of discharge  and instruct patient/caregiver as needed. Frequency may be adjusted depending on start of care date.     Skilled nurse to perform up to 3 visits PRN for symptoms related to diagnosis    Notify MD if SBP > 160 or < 90; DBP > 90 or < 50; HR > 120 or < 50; Temp > 101; O2 < 88%    Ok to schedule additional visits based on staff availability and patient request on consecutive days within the home health episode.    When multiple disciplines ordered:    Start of Care occurs on Sunday - Wednesday schedule remaining discipline evaluations as ordered on separate consecutive days following the start of care.    Thursday SOC -schedule subsequent evaluations Friday and Monday the following week.     Friday - Saturday SOC - schedule subsequent discipline evaluations on consecutive days starting Monday of the following week.    For all post-discharge communication and subsequent orders please contact patient's primary care physician.    Miscellaneous   PEG Care:  Instruct patient/caregiver to clean site.  Monitor skin integrity.    Home Health Aide:  Physical Therapy Monday, Wednesday and Friday, Occupational Therapy Monday, Wednesday and Friday, and Home Health Aide Monday, Wednesday and Friday    Wound Care  Orders  no    I certify that this patient is confined to his home and needs intermittent skilled nursing care, physical therapy, and occupational therapy.

## 2023-10-02 NOTE — ASSESSMENT & PLAN NOTE
Last echo 8/2023 with severely reduced EF (20%) following NSTEMI   Satting well on RA on examination  Does not appear to be volume overloaded on exam, however CT scan does show trace bilateral pleural effusions and perihepatic fluid consistent with volume overload.    Last hospitalized for ADHF 2 months ago, in which course was complicated by NSTEMI and Stroke.    -Patient not on GDMT, not candidate for entresto or metoprolol succinate while NPO  -may benefit from addition of ACEi, and spironolactone  -f/u BNP and CXR

## 2023-10-02 NOTE — PLAN OF CARE
ZENOBIA notified patient's POA (Neris) pt will discharge home today.  POA on file under .  Pt's POA advised SW pt's cousin Cristhian will be at patient's home to receive patient once EMS arrives.      Transportation scheduled via [facility] for pt to transfer to home.  RN Brett was notified of the above.  ZENOBIA also notified pt's POA/Zahida of transfer to home and time.  Transportation scheduled for 4:30p.m.  Pt will discharge via wheelchair/room air.      Pt discharged home with [facility], Care at Home, and referral resources for PCA/Waiver Programs.  APS updated on discharge plan.       10/02/23 1613   Post-Acute Status   Post-Acute Authorization Home Health;Other   Home Health Status Set-up Complete/Auth obtained  ([facility])   Other Status See Comments  (Referral for Care at Home/  Resources for PCA/Sitter service)   Discharge Delays None known at this time   Discharge Plan   Discharge Plan A Home;Home Health;NP at home;Other  (Referral for Care at Home/ [facility]   Discharge Plan B Home;NP at home;Home Health     Georgia Castañeda LMSW  PRN-  [facility]  Ext. [phone]

## 2023-10-02 NOTE — ASSESSMENT & PLAN NOTE
Initially placed 8/17 by IR  PEG has been functional until day of admission.    CT abdomen pelvis with inflammation about the subQ portion on PEG tube, low concern for abscess. PEG tube abuts the left hepatic lobe, with possible tethering.    Unable to flush in ED    -consult IR to evaluate PEG  -nutrition consulted for tube feeds

## 2023-10-02 NOTE — NURSING
Pt received d/c instructions. No IV to discontinue. Estevan valve placed on peg tube. Pt to d/c with belongings.

## 2023-10-02 NOTE — SUBJECTIVE & OBJECTIVE
Past Medical History:   Diagnosis Date    Acute on chronic combined systolic and diastolic heart failure 09/23/2022    Atrial fibrillation     CAD (coronary artery disease) 09/23/2022    Dyslipidemia 09/23/2022    Embolic stroke involving left middle cerebral artery 08/08/2023    Encounter for blood transfusion     H/O heart artery stent     HTN (hypertension) 09/23/2022    ICD (implantable cardioverter-defibrillator) in place 09/23/2022    Paroxysmal A-fib 09/23/2022    Stage 3 chronic kidney disease 04/19/2023       Past Surgical History:   Procedure Laterality Date    CARDIAC DEFIBRILLATOR PLACEMENT Left     CEREBRAL ANGIOGRAM N/A 8/8/2023    Procedure: ANGIOGRAM-CEREBRAL;  Surgeon: Kenzie Rodriguez;  Location: [facility];  Service: Anesthesiology;  Laterality: N/A;  LVO (angiogram)    ESOPHAGOGASTRODUODENOSCOPY N/A 8/11/2023    Procedure: EGD (ESOPHAGOGASTRODUODENOSCOPY);  Surgeon: Colette Martinez MD;  Location: [facility] ENDO (2ND FLR);  Service: Gastroenterology;  Laterality: N/A;    ESOPHAGOGASTRODUODENOSCOPY W/ PEG N/A 8/17/2023    Procedure: EGD, WITH PEG TUBE INSERTION;  Surgeon: Yan Scott MD;  Location: [facility] OR 2ND FLR;  Service: General;  Laterality: N/A;  PEG, possible lap G    HERNIA REPAIR      LEFT HEART CATHETERIZATION Left 4/18/2023    Procedure: Left heart cath;  Surgeon: Atilio Marks MD;  Location: [facility] CATH LAB;  Service: Cardiology;  Laterality: Left;    RIGHT HEART CATHETERIZATION Right 9/30/2022    Procedure: INSERTION, CATHETER, RIGHT HEART;  Surgeon: Caden Aden MD;  Location: [facility] CATH LAB;  Service: Cardiology;  Laterality: Right;    TREATMENT OF CARDIAC ARRHYTHMIA N/A 11/22/2022    Procedure: CARDIOVERSION;  Surgeon: Marvin Sanon MD;  Location: [facility] EP LAB;  Service: Cardiology;  Laterality: N/A;  afib, KIA, DCCV, anes, MB, 3 Prep       Review of patient's allergies indicates:  No Known Allergies    No current facility-administered medications on file prior to encounter.      Current Outpatient Medications on File Prior to Encounter   Medication Sig    amiodarone (PACERONE) 200 MG Tab 1 tablet (200 mg total) by Per G Tube route once daily.    apixaban (ELIQUIS) 5 mg Tab 1 tablet (5 mg total) by Per G Tube route 2 (two) times daily.    aspirin 81 MG Chew 1 tablet (81 mg total) by Per G Tube route once daily.    atorvastatin (LIPITOR) 80 MG tablet 1 tablet (80 mg total) by Per G Tube route once daily.    chlorhexidine (PERIDEX) 0.12 % solution 5 mLs 3 (three) times daily.    ezetimibe (ZETIA) 10 mg tablet 1 tablet (10 mg total) by Per G Tube route every evening.    metoprolol tartrate (LOPRESSOR) 25 MG tablet 1 tablet (25 mg total) by Per G Tube route 2 (two) times daily.    nitroGLYCERIN (NITROSTAT) 0.4 MG SL tablet Place under the tongue.    QUEtiapine (SEROQUEL) 25 MG Tab 1 tablet (25 mg total) by Per G Tube route every evening.    valproic acid, as sodium salt, (DEPAKENE) 250 mg/5 mL (5 mL) Soln 10 mLs (500 mg total) by Per G Tube route once daily. To be administered 0900 daily    valproic acid, as sodium salt, (DEPAKENE) 250 mg/5 mL (5 mL) Soln 10 mLs (500 mg total) by Per G Tube route once daily. To be administers at 1200 daily    valproic acid, as sodium salt, (DEPAKENE) 250 mg/5 mL (5 mL) Soln 20 mLs (1,000 mg total) by Per G Tube route every evening.     Family History    None       Tobacco Use    Smoking status: Never    Smokeless tobacco: Never   Substance and Sexual Activity    Alcohol use: Never    Drug use: Never    Sexual activity: Not on file     Review of Systems   Reason unable to perform ROS: non-verbal.     Objective:     Vital Signs (Most Recent):  Temp: 97.6 °F (36.4 °C) (10/01/23 2200)  Pulse: 87 (10/01/23 2200)  Resp: 18 (10/01/23 2131)  BP: (!) 142/99 (10/01/23 2131)  SpO2: 100 % (10/01/23 2200) Vital Signs (24h Range):  Temp:  [97.6 °F (36.4 °C)-97.8 °F (36.6 °C)] 97.6 °F (36.4 °C)  Pulse:  [75-88] 87  Resp:  [18-20] 18  SpO2:  [96 %-100 %] 100 %  BP:  (112-142)/() 142/99        There is no height or weight on file to calculate BMI.     Physical Exam  Vitals and nursing note reviewed.   Constitutional:       General: He is not in acute distress.     Appearance: He is not ill-appearing.   HENT:      Head: Normocephalic and atraumatic.      Mouth/Throat:      Mouth: Mucous membranes are moist.   Eyes:      General:         Right eye: No discharge.         Left eye: No discharge.      Extraocular Movements: Extraocular movements intact.   Cardiovascular:      Rate and Rhythm: Normal rate. Rhythm irregular.   Pulmonary:      Effort: Pulmonary effort is normal. No respiratory distress.      Breath sounds: Normal breath sounds. No wheezing.   Abdominal:      General: There is no distension.      Tenderness: There is abdominal tenderness.      Comments: Not fully cooperative with palpation  PEG located epigastrically, discharge noted basia insertion site   Musculoskeletal:      Cervical back: No rigidity.      Right lower leg: No edema.      Left lower leg: No edema.   Neurological:      Mental Status: He is alert. Mental status is at baseline.      Comments: Not cooperative with neuro exam                Significant Labs: All pertinent labs within the past 24 hours have been reviewed.    Significant Imaging: I have reviewed all pertinent imaging results/findings within the past 24 hours.

## 2023-10-02 NOTE — DISCHARGE SUMMARY
"[facility] Medicine  Discharge Summary      Patient Name: Tera Griffiths  MRN: [id number]  STEFFANIE: [id number]  Patient Class: OP- Observation  Admission Date: 10/1/2023  Hospital Length of Stay: 0 days  Discharge Date and Time:  10/02/2023 4:39 PM  Attending Physician: Shima Beard*   Discharging Provider: Marshall Sidhu MD  Primary Care Provider: Carleen Bueno MD  [facility] Medicine Team: [facility] 2 Marshall Sidhu MD  Primary Care Team: [facility] 2    HPI:   Mr. 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 No family at bedside and patient is non verbal at baseline. Telephoned sister and ex-wife (HCPoA?) patient was "just released [facility] 3 days ago and was doing well until today. Patients sister was was bathing him today, noticed pink color expressed from PEG tube, was real nasty like they havent been cleaning it. After the bath he was moaning and groaning more than usual. Typically peaceful and quiet. Food flows out quickly when attempting to administer". Recently discharged 9/28 following 4 day hospitalization for afib rvr and UTI, but  presented for undisclosed "PEG problem", peg found to be working at that time.    Sister also states he had 2 episodes of watery diarrhea yesterday, but states the most recent stool was more formed. She thinks it may be because they recently changed tube feed. Unable to obtain review of symptoms 2/2 non-verbal state.    In ED, AF, /103, HR 82, no leukocytosis, hgb 10 (BL 13-15, has trended down over last month, but stable), plts 275, Na 141, K 4.8, BUN 24, Cr 1.4 (@ BL), UA unremarkable    CT abdomen pelvis with inflammation about the subQ portion on PEG tube, low " "concern for abscess. PEG tube abuts the left hepatic lobe, with possible tethering. Also non obstructive nephrolithiasis, high attenuation about the lower pole of L kidney, recommending ultrasound/  Trace bilateral pleural effusions and perihepatic fluid consistent with volume overload.    Activity: Dressing himself, walking around by himself. Goes to bathroom on his own. Does not communicate verbally.    Lives in his own home with family support, however, per chart family has been discussing nursing home, as care may be too overwhelming for them.    Tips from sister: Makes noises when he is soiled/wet/in pain, points to peg when hes hungry, family made it clear they did not want patient on more antipsychotics for agitiation.      * No surgery found *      Hospital Course:   Patient was admitted to the Hospital Medicine Service for medical management while undergoing work-up of malfunctioning PEG. General surgery was consulted for evaluation of PEG tube function and imaging. GS was able to flush PEG at bedside without complication, as were members of our nursing staff. Caretaker present at bedside for education. Gen Surg report that there was no operative indication concerning imaging findings. Patient remained afebrile and hemodynamically stable throughout hospitalization. Plan to discharge home with caretakers present to receive him. CT A/P also notes increased attenuation in the lower pole of kidney, likely representing cystic structure. Recommend outpatient retroperitoneal ultrasound to further characterize.     Objective:  BP (!) 142/96   Pulse 83   Temp 98 °F (36.7 °C)   Resp 20   Ht 6' 1" (1.854 m)   Wt 84 kg (185 lb 3 oz)   SpO2 (!) 94%   BMI 24.43 kg/m²     Physical Exam  Vitals and nursing note reviewed.   Constitutional:       General: He is not in acute distress.     Appearance: He is not ill-appearing.   HENT:      Head: Normocephalic and atraumatic.      Mouth/Throat:      Mouth: Mucous " membranes are moist.   Eyes:      General:         Right eye: No discharge.         Left eye: No discharge.      Extraocular Movements: Extraocular movements intact.   Cardiovascular:      Rate and Rhythm: Normal rate. Rhythm irregular.   Pulmonary:      Effort: Pulmonary effort is normal. No respiratory distress.      Breath sounds: Normal breath sounds. No wheezing.   Abdominal:      General: There is no distension.      Tenderness: There is abdominal tenderness.      Comments: Not fully cooperative with palpation  PEG located epigastrically, discharge noted basia insertion site   Musculoskeletal:      Cervical back: No rigidity.      Right lower leg: No edema.      Left lower leg: No edema.   Neurological:      Mental Status: He is alert. Mental status is at baseline.      Comments: Not cooperative with neuro exam     Goals of Care Treatment Preferences:  Code Status: Full Code    Health care agent[facility]:           What is most important right now is to focus on extending life as long as possible, even it it means sacrificing quality, curative/life-prolongation (regardless of treatment burdens).  Accordingly, we have decided that the best plan to meet the patient's goals includes continuing with treatment.      Consults:   Consults (From admission, onward)          Status Ordering Provider     Inpatient consult to General Surgery  Once        Provider:  (Not yet assigned)    Completed SHIVAM TRAMMELL     Inpatient consult to Social Work  Once        Provider:  (Not yet assigned)    Completed SOPHIE NUNEZ     Inpatient consult to Registered Dietitian/Nutritionist  Once        Provider:  (Not yet assigned)    Acknowledged SOPHIE NUNEZ            No new Assessment & Plan notes have been filed under this hospital service since the last note was generated.  Service: Hospital Medicine    Final Active Diagnoses:    Diagnosis Date Noted POA    PRINCIPAL PROBLEM:  PEG tube  malfunction [K94.23] 10/01/2023 Unknown    Discharge planning issues [Z02.9] 10/01/2023 Not Applicable    Diarrhea [R19.7] 10/01/2023 Unknown    Agitation [R45.1] 08/22/2023 Yes    Dysphagia [R13.10] 08/10/2023 Yes    Global aphasia [R47.01] 08/08/2023 Yes    History of embolic stroke involving left middle cerebral artery [Z86.73] 08/08/2023 Not Applicable    Ischemic cardiomyopathy [I25.5] 05/05/2023 Yes    Stage 3 chronic kidney disease [N18.30] 04/19/2023 Yes    Primary hypertension [I10] 09/23/2022 Yes    Paroxysmal A-fib [I48.0] 09/23/2022 Yes    ICD (implantable cardioverter-defibrillator) in place [Z95.810] 09/23/2022 Yes    History of ST elevation myocardial infarction (STEMI) [I25.2] 09/23/2022 Not Applicable    Multiple subsegmental pulmonary emboli without acute cor pulmonale [I26.94] 12/10/2021 Yes      Problems Resolved During this Admission:       Discharged Condition: stable    Disposition: Home or Self Care    Follow Up:   Follow-up Information       Carleen Bueno MD Follow up.    Specialty: Internal Medicine  Contact information:  [address] [place] [id number]  [phone]                           Patient Instructions:      Ambulatory referral/consult to [facility]   Standing Status: Future   Referral Priority: Routine Referral Type: Consultation   Referral Reason: Specialty Services Required   Number of Visits Requested: 1       Significant Diagnostic Studies: Labs:   CMP   Recent Labs   Lab 10/01/23  1702 10/02/23  0448    141   K 4.8 5.0    109   CO2 24 20*   GLU 93 87   BUN 24* 21*   CREATININE 1.4 1.4   CALCIUM 9.2 9.2   PROT 7.2 7.4   ALBUMIN 3.4* 3.5   BILITOT 0.5 0.4   ALKPHOS 92 80   AST 26 22   ALT 20 19   ANIONGAP 7* 12    and CBC   Recent Labs   Lab 10/01/23  1702 10/02/23  0448   WBC 5.02 6.04   HGB 10.1* 9.8*   HCT 33.9* 31.4*    269     Radiology:    CT A/P w/ Contrast 10/2/23:  Impression:    1. There is inflammation about  the subcutaneous course of the PEG tube, without findings to suggest abscess.  There is mild gastric wall thickening along the balloon portion of the PEG tube, correlation with any history of gastritis.  Please note, the PEG tube closely abuts the left hepatic lobe, possible left hepatic lobe tethering anteriorly not excluded.  Correlation with LFTs advised.   2. Patchy hypoattenuation involving the interpolar region of the left kidney.  Findings may reflect sequela of prior injury or insult however correlation with urinalysis is recommended to exclude evolving change of pyelonephritis.   3. Left nonobstructive nephrolithiasis.   4. High attenuating lesion arises from the interpolar region/lower pole of the left kidney, higher in attenuation than would be expected for a simple cyst.  Follow-up ultrasound is recommended to confirm cystic nature.   5. Findings suggesting hepatic steatosis and hepatomegaly, correlation with LFTs recommended.   6. Prostatomegaly.   7. Mild perihepatic fluid and fluid in the pelvis, concerning for volume overload.  There are trace bilateral pleural effusions.  Correlation is advised.   8. Fat and fluid containing right inguinal hernia.   9. Please see above for several additional findings.     Pending Diagnostic Studies:       Procedure Component Value Units Date/Time    US Retroperitoneal Complete [[id number]] Resulted: 10/01/23 6772    Order Status: Sent Lab Status: No result Updated: 10/01/23 2311           Medications:  Reconciled Home Medications:      Medication List        CONTINUE taking these medications      amiodarone 200 MG Tab  Commonly known as: PACERONE  1 tablet (200 mg total) by Per G Tube route once daily.     apixaban 5 mg Tab  Commonly known as: ELIQUIS  1 tablet (5 mg total) by Per G Tube route 2 (two) times daily.     aspirin 81 MG Chew  1 tablet (81 mg total) by Per G Tube route once daily.     atorvastatin 80 MG tablet  Commonly known as: LIPITOR  1 tablet (80 mg  total) by Per G Tube route once daily.     chlorhexidine 0.12 % solution  Commonly known as: PERIDEX  5 mLs 3 (three) times daily.     ezetimibe 10 mg tablet  Commonly known as: ZETIA  1 tablet (10 mg total) by Per G Tube route every evening.     metoprolol tartrate 25 MG tablet  Commonly known as: LOPRESSOR  1 tablet (25 mg total) by Per G Tube route 2 (two) times daily.     nitroGLYCERIN 0.4 MG SL tablet  Commonly known as: NITROSTAT  Place under the tongue.     QUEtiapine 25 MG Tab  Commonly known as: SEROQUEL  1 tablet (25 mg total) by Per G Tube route every evening.     * valproic acid (as sodium salt) 250 mg/5 mL (5 mL) Soln  Commonly known as: DEPAKENE  10 mLs (500 mg total) by Per G Tube route once daily. To be administers at 1200 daily     * valproic acid (as sodium salt) 250 mg/5 mL (5 mL) Soln  Commonly known as: DEPAKENE  10 mLs (500 mg total) by Per G Tube route once daily. To be administered 0900 daily     * valproic acid (as sodium salt) 250 mg/5 mL (5 mL) Soln  Commonly known as: DEPAKENE  20 mLs (1,000 mg total) by Per G Tube route every evening.           * This list has 3 medication(s) that are the same as other medications prescribed for you. Read the directions carefully, and ask your doctor or other care provider to review them with you.                  Indwelling Lines/Drains at time of discharge:   Lines/Drains/Airways       Drain  Duration                  Gastrostomy/Enterostomy 08/17/23 0800 Percutaneous endoscopic gastrostomy (PEG) midline 46 days                    Time spent on the discharge of patient: 30 minutes         Marshall Sidhu MD  Department of Hospital Medicine  [facility]

## 2023-10-02 NOTE — NURSING
Nurses Note -- 4 Eyes      10/2/2023   6:11 AM      Skin assessed during: Admit      [] No Altered Skin Integrity Present    []Prevention Measures Documented      [x] Yes- Altered Skin Integrity Present or Discovered   [x] LDA Added if Not in Epic (Describe Wound)   [x] New Altered Skin Integrity was Present on Admit and Documented in LDA   [] Wound Image Taken    Wound Care Consulted? Yes    Attending Nurse:  Kaley Crawford RN/Staff Member:   Emily

## 2023-10-02 NOTE — ASSESSMENT & PLAN NOTE
Has history of agitation  Patient will be without valproic acid and olanzapine while PEG malfunctioning  Not acutely agitated on examination, but not fully cooperative    -continue to re-direct as tolerated  -PRN zyprexa for non-redirectable agitation, please call resident first.

## 2023-10-02 NOTE — ASSESSMENT & PLAN NOTE
Noted on CT 8/8/23  Sating well on RA    -heparin ppx while PEG malfunctioning  -resume eliquis when able

## 2023-10-02 NOTE — ASSESSMENT & PLAN NOTE
Recent Embolic stroke 8/2023  Global aphasia, dysphagia  Right sided tyesha neglect  Progressively improving right sided hemiparesis    -continue asa/statin for secondary prevention

## 2023-10-02 NOTE — HOSPITAL COURSE
Patient was admitted to the Hospital Medicine Service for medical management while undergoing work-up of malfunctioning PEG. General surgery was consulted for evaluation of PEG tube function and imaging. GS was able to flush PEG at bedside without complication, as were members of our nursing staff. Caretaker present at bedside for education. Gen Surg report that there was no operative indication concerning imaging findings. Patient remained afebrile and hemodynamically stable throughout hospitalization. Plan to discharge home with caretakers present to receive him. CT A/P also notes increased attenuation in the lower pole of kidney, likely representing cystic structure. Recommend outpatient retroperitoneal ultrasound to further characterize.

## 2023-10-02 NOTE — H&P
"[facility] Medicine  History & Physical    Patient Name: Tera Griffiths  MRN: [id number]  Patient Class: OP- Observation  Admission Date: 10/1/2023  Attending Physician: Shima Beard*   Primary Care Provider: Carleen Bueno MD         Patient information was obtained from spouse/SO, relative(s) and ER records.     Subjective:     Principal Problem:PEG tube malfunction    Chief Complaint:   Chief Complaint   Patient presents with    Abdominal Pain     Has Peg tube, previous CVA, family believes there to be a problem with Peg tube.        HPI: Mr. 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 No family at bedside and patient is non verbal at baseline. Telephoned sister and ex-wife (HCPoA?) patient was "just released [facility] 3 days ago and was doing well until today. Patients sister was was bathing him today, noticed pink color expressed from PEG tube, was real nasty like they havent been cleaning it. After the bath he was moaning and groaning more than usual. Typically peaceful and quiet. Food flows out quickly when attempting to administer". Recently discharged 9/28 following 4 day hospitalization for afib rvr and UTI, but  presented for undisclosed "PEG problem", peg found to be working at that time.    Sister also states he had 2 episodes of watery diarrhea yesterday, but states the most recent stool was more formed. She thinks it may be because they recently changed tube feed. Unable to obtain review of symptoms 2/2 non-verbal state.    In ED, AF, /103, HR 82, no leukocytosis, hgb 10 (BL 13-15, has trended down over last month, but stable), plts 275, Na 141, K 4.8, BUN 24, Cr 1.4 (@ BL), UA " unremarkable    CT abdomen pelvis with inflammation about the subQ portion on PEG tube, low concern for abscess. PEG tube abuts the left hepatic lobe, with possible tethering. Also non obstructive nephrolithiasis, high attenuation about the lower pole of L kidney, recommending ultrasound/  Trace bilateral pleural effusions and perihepatic fluid consistent with volume overload.    Activity: Dressing himself, walking around by himself. Goes to bathroom on his own. Does not communicate verbally.    Lives in his own home with family support, however, per chart family has been discussing nursing home, as care may be too overwhelming for them.    Tips from sister: Makes noises when he is soiled/wet/in pain, points to peg when hes hungry, family made it clear they did not want patient on more antipsychotics for agitiation.      Past Medical History:   Diagnosis Date    Acute on chronic combined systolic and diastolic heart failure 09/23/2022    Atrial fibrillation     CAD (coronary artery disease) 09/23/2022    Dyslipidemia 09/23/2022    Embolic stroke involving left middle cerebral artery 08/08/2023    Encounter for blood transfusion     H/O heart artery stent     HTN (hypertension) 09/23/2022    ICD (implantable cardioverter-defibrillator) in place 09/23/2022    Paroxysmal A-fib 09/23/2022    Stage 3 chronic kidney disease 04/19/2023       Past Surgical History:   Procedure Laterality Date    CARDIAC DEFIBRILLATOR PLACEMENT Left     CEREBRAL ANGIOGRAM N/A 8/8/2023    Procedure: ANGIOGRAM-CEREBRAL;  Surgeon: Kenzie Rodriguez;  Location: [facility];  Service: Anesthesiology;  Laterality: N/A;  LVO (angiogram)    ESOPHAGOGASTRODUODENOSCOPY N/A 8/11/2023    Procedure: EGD (ESOPHAGOGASTRODUODENOSCOPY);  Surgeon: Colette Martinez MD;  Location: [address]);  Service: Gastroenterology;  Laterality: N/A;    ESOPHAGOGASTRODUODENOSCOPY W/ PEG N/A 8/17/2023    Procedure: EGD, WITH PEG TUBE INSERTION;  Surgeon:  Yan Scott MD;  Location: [facility] FLR;  Service: General;  Laterality: N/A;  PEG, possible lap G    HERNIA REPAIR      LEFT HEART CATHETERIZATION Left 4/18/2023    Procedure: Left heart cath;  Surgeon: Atilio Marks MD;  Location: [facility] CATH LAB;  Service: Cardiology;  Laterality: Left;    RIGHT HEART CATHETERIZATION Right 9/30/2022    Procedure: INSERTION, CATHETER, RIGHT HEART;  Surgeon: Caden Aden MD;  Location: [facility] CATH LAB;  Service: Cardiology;  Laterality: Right;    TREATMENT OF CARDIAC ARRHYTHMIA N/A 11/22/2022    Procedure: CARDIOVERSION;  Surgeon: Marvin Sanon MD;  Location: [facility] EP LAB;  Service: Cardiology;  Laterality: N/A;  afib, KIA, DCCV, anes, MB, 3 Prep       Review of patient's allergies indicates:  No Known Allergies    No current facility-administered medications on file prior to encounter.     Current Outpatient Medications on File Prior to Encounter   Medication Sig    amiodarone (PACERONE) 200 MG Tab 1 tablet (200 mg total) by Per G Tube route once daily.    apixaban (ELIQUIS) 5 mg Tab 1 tablet (5 mg total) by Per G Tube route 2 (two) times daily.    aspirin 81 MG Chew 1 tablet (81 mg total) by Per G Tube route once daily.    atorvastatin (LIPITOR) 80 MG tablet 1 tablet (80 mg total) by Per G Tube route once daily.    chlorhexidine (PERIDEX) 0.12 % solution 5 mLs 3 (three) times daily.    ezetimibe (ZETIA) 10 mg tablet 1 tablet (10 mg total) by Per G Tube route every evening.    metoprolol tartrate (LOPRESSOR) 25 MG tablet 1 tablet (25 mg total) by Per G Tube route 2 (two) times daily.    nitroGLYCERIN (NITROSTAT) 0.4 MG SL tablet Place under the tongue.    QUEtiapine (SEROQUEL) 25 MG Tab 1 tablet (25 mg total) by Per G Tube route every evening.    valproic acid, as sodium salt, (DEPAKENE) 250 mg/5 mL (5 mL) Soln 10 mLs (500 mg total) by Per G Tube route once daily. To be administered 0900 daily    valproic acid, as sodium salt, (DEPAKENE) 250 mg/5 mL (5  mL) Soln 10 mLs (500 mg total) by Per G Tube route once daily. To be administers at 1200 daily    valproic acid, as sodium salt, (DEPAKENE) 250 mg/5 mL (5 mL) Soln 20 mLs (1,000 mg total) by Per G Tube route every evening.     Family History    None       Tobacco Use    Smoking status: Never    Smokeless tobacco: Never   Substance and Sexual Activity    Alcohol use: Never    Drug use: Never    Sexual activity: Not on file     Review of Systems   Reason unable to perform ROS: non-verbal.     Objective:     Vital Signs (Most Recent):  Temp: 97.6 °F (36.4 °C) (10/01/23 2200)  Pulse: 87 (10/01/23 2200)  Resp: 18 (10/01/23 2131)  BP: (!) 142/99 (10/01/23 2131)  SpO2: 100 % (10/01/23 2200) Vital Signs (24h Range):  Temp:  [97.6 °F (36.4 °C)-97.8 °F (36.6 °C)] 97.6 °F (36.4 °C)  Pulse:  [75-88] 87  Resp:  [18-20] 18  SpO2:  [96 %-100 %] 100 %  BP: (112-142)/() 142/99        There is no height or weight on file to calculate BMI.     Physical Exam  Vitals and nursing note reviewed.   Constitutional:       General: He is not in acute distress.     Appearance: He is not ill-appearing.   HENT:      Head: Normocephalic and atraumatic.      Mouth/Throat:      Mouth: Mucous membranes are moist.   Eyes:      General:         Right eye: No discharge.         Left eye: No discharge.      Extraocular Movements: Extraocular movements intact.   Cardiovascular:      Rate and Rhythm: Normal rate. Rhythm irregular.   Pulmonary:      Effort: Pulmonary effort is normal. No respiratory distress.      Breath sounds: Normal breath sounds. No wheezing.   Abdominal:      General: There is no distension.      Tenderness: There is abdominal tenderness.      Comments: Not fully cooperative with palpation  PEG located epigastrically, discharge noted basia insertion site   Musculoskeletal:      Cervical back: No rigidity.      Right lower leg: No edema.      Left lower leg: No edema.   Neurological:      Mental Status: He is alert. Mental  status is at baseline.      Comments: Not cooperative with neuro exam                Significant Labs: All pertinent labs within the past 24 hours have been reviewed.    Significant Imaging: I have reviewed all pertinent imaging results/findings within the past 24 hours.    Assessment/Plan:     * PEG tube malfunction  Initially placed 8/17 by IR  PEG has been functional until day of admission.    CT abdomen pelvis with inflammation about the subQ portion on PEG tube, low concern for abscess. PEG tube abuts the left hepatic lobe, with possible tethering.    Unable to flush in ED    -consult IR to evaluate PEG  -nutrition consulted for tube feeds    History of embolic stroke involving left middle cerebral artery  Recent Embolic stroke 8/2023  Global aphasia, dysphagia  Right sided tyesha neglect  Progressively improving right sided hemiparesis    -continue asa/statin for secondary prevention    Ischemic cardiomyopathy  Last echo 8/2023 with severely reduced EF (20%) following NSTEMI   Satting well on RA on examination  Does not appear to be volume overloaded on exam, however CT scan does show trace bilateral pleural effusions and perihepatic fluid consistent with volume overload.    Last hospitalized for ADHF 2 months ago, in which course was complicated by NSTEMI and Stroke.    -Patient not on GDMT, not candidate for entresto or metoprolol succinate while NPO  -may benefit from addition of ACEi, and spironolactone  -f/u BNP and CXR    Paroxysmal A-fib  EKG pending  Irregular rhythm on examination  Rate controlled  History of V-tach/cardiac arrest with ICD    -continuous cardiac telemetry  -resume metoprolol/amiodarone once PEG functional    Stage 3 chronic kidney disease  Cr at baseline (1.4)    - daily CMP  - Avoid nephrotoxic agents  - Renally adjust medications    Agitation  Has history of agitation  Patient will be without valproic acid and olanzapine while PEG malfunctioning  Not acutely agitated on examination,  but not fully cooperative    -continue to re-direct as tolerated  -PRN zyprexa for non-redirectable agitation, please call resident first.    Diarrhea  Sister states he had 2 episodes of diarrhea yesterday, but states most recent stool was more solid.  recently changed the brand of tube feed.  Labs not concerning for acute infection  Does not appear to be dry on exam    -continue to monitor  -supportive care as needed  -no indication for further workup at this time    Discharge planning issues  Spoke with sister on phone, wants brother to come back home as soon as possible.  Per chart family has been discussing nursing home, as care may be too overwhelming for them.    -Social work consulted  -continue GOC discussion, clarify desires of family    Dysphagia  Sequale of stroke, 2/2 PEG 8/17 by IR  PEG has been functional until day of admission.    CT abdomen pelvis with inflammation about the subQ portion on PEG tube, low concern for abscess. PEG tube abuts the left hepatic lobe, with possible tethering.    Unable to flush in ED    -consult IR to evaluate PEG  -nutrition consulted for tube feeds  -resume meds and feeds when able    Global aphasia  Working with home health speech therapy    -resume speech therapy outpatient    Multiple subsegmental pulmonary emboli without acute cor pulmonale  Noted on CT 8/8/23  Sating well on RA    -heparin ppx while PEG malfunctioning  -resume eliquis when able    Primary hypertension  BP acceptable on admission  120-140/100    -resume Metoprolol once PEG functional    ICD (implantable cardioverter-defibrillator) in place  Noted      History of ST elevation myocardial infarction (STEMI)  noted    VTE Risk Mitigation (From admission, onward)         Ordered     heparin (porcine) injection 5,000 Units  Every 8 hours         10/01/23 2152     IP VTE HIGH RISK PATIENT  Once         10/01/23 2152     Place sequential compression device  Until discontinued         10/01/23 2152                    On 10/02/2023, patient should be placed in hospital observation services under my care in collaboration with Dr. Beard.    Clovis Rust MD  Department of Hospital Medicine  [facility]

## 2023-10-02 NOTE — ASSESSMENT & PLAN NOTE
EKG pending  Irregular rhythm on examination  Rate controlled  History of V-tach/cardiac arrest with ICD    -continuous cardiac telemetry  -resume metoprolol/amiodarone once PEG functional

## 2023-10-02 NOTE — PLAN OF CARE
[facility] - Med Surg  Initial Discharge Assessment       Primary Care Provider: Carleen Bueno MD    Admission Diagnosis: Abdominal pain, unspecified abdominal location [R10.9]    Admission Date: 10/1/2023  Expected Discharge Date: 10/3/2023    Transition of Care Barriers: None    Payor: MEDICAID / Plan: [facility] COMMUNITY PLAN [facility] ([place] MEDICAID) / Product Type: Managed Medicaid /     Extended Emergency Contact Information  Primary Emergency Contact: Zahida Dave  Mobile Phone: [phone]  Relation: Healthcare Power of   Preferred language: English  Secondary Emergency Contact: MADELYN CARTAGENA  Mobile Phone: [phone]  Relation: Sister  Preferred language: English   needed? No    Discharge Plan A: Home, Home with family, Home Health, Other, NP at home ([facility].)  Discharge Plan B: Home, Home with family, Home Health      [facility] [address] [place], [place] - [address]  [address]  [facility] [address]  Phone: [phone] Fax: [phone]    [facility] [address]  [address]   [facility] [address]  Phone: [phone] Fax: [phone]      Initial Assessment (most recent)       Adult Discharge Assessment - 10/02/23 1323          Discharge Assessment    Assessment Type Discharge Planning Assessment     Confirmed/corrected address, phone number and insurance Yes     Confirmed Demographics Correct on Facesheet     Source of Information family;other (see comments)   Ex-wife/POA    When was your last doctors appointment? --   Will need f/u with PCP.    Does patient/caregiver understand observation status Yes     Communicated YUSEF with patient/caregiver Yes     Reason For Admission PEG tube malfunction     People in Home alone     Do you expect to return to your current living situation? Yes     Do you have help at home or someone to help you manage your care at home? Yes     Who are your  caregiver(s) and their phone number(s)? Zahida Soliman/ex-[phone]     Current cognitive status: Alert/Oriented   Non-verbal    Equipment Currently Used at Home wheelchair;bedside commode;hospital bed;walker, rolling;shower chair     Readmission within 30 days? Yes     Patient currently being followed by outpatient case management? No     Do you take prescription medications? Yes     Do you have prescription coverage? Yes     Coverage [facility] [place]     Do you have any problems affording any of your prescribed medications? No     Is the patient taking medications as prescribed? yes     Who is going to help you get home at discharge? Pt will need ambulance transport to home.     How do you get to doctors appointments? family or friend will provide     Are you on dialysis? No     Do you take coumadin? No   Pt does not take coumadin but is on Eliquis    DME Needed Upon Discharge  none     Discharge Plan discussed with: POA     Name(s) and Number(s) Amisha [phone]     Transition of Care Barriers None     Discharge Plan A Home;Home with family;Home Health;Other;NP at home   [facility]    Discharge Plan B Home;Home with family;Home Health        Financial Resource Strain    How hard is it for you to pay for the very basics like food, housing, medical care, and heating? Not hard at all        Housing Stability    In the last 12 months, was there a time when you were not able to pay the mortgage or rent on time? No     In the last 12 months, was there a time when you did not have a steady place to sleep or slept in a shelter (including now)? No        Transportation Needs    In the past 12 months, has lack of transportation kept you from medical appointments or from getting medications? No     In the past 12 months, has lack of transportation kept you from meetings, work, or from getting things needed for daily living? No        Food Insecurity    Within the past 12  months, you worried that your food would run out before you got the money to buy more. Never true     Within the past 12 months, the food you bought just didn't last and you didn't have money to get more. Never true        Stress    Do you feel stress - tense, restless, nervous, or anxious, or unable to sleep at night because your mind is troubled all the time - these days? Not at all        Social Connections    In a typical week, how many times do you talk on the phone with family, friends, or neighbors? More than three times a week     How often do you get together with friends or relatives? More than three times a week     Do you belong to any clubs or organizations such as Buddhism groups, unions, fraternal or athletic groups, or school groups? No     How often do you attend meetings of the clubs or organizations you belong to? Never     Are you , , , , never , or living with a partner?         Alcohol Use    Q1: How often do you have a drink containing alcohol? Never     Q2: How many drinks containing alcohol do you have on a typical day when you are drinking? Patient does not drink     Q3: How often do you have six or more drinks on one occasion? Never                   SW completed initial discharge assessment.  Pt's ex-wife is MAYCOL (Neris [phone]).  Pt has [facility] medicaid.  Pt will need ambulance transport.      Pt lives alone in an apartment on the third floor.  Pt's wife reported patient is ambulatory and able to walk.  Pt's nurse reported patient was ambulatory in the halls earlier today.  Pt also able to dress and bath himself.  DME:  Hospital bed, toilet, wheelchair, walker, shower chair.  Pt has family support at home.  Pt's POA advised SW she has hired a sitter to sit with patient while she is at work.  Pt's family has also agreed to assist with taking care of patient.      Pt does not received coumadin but does take Eliquis.  Pt does not receive  dialysis.  Pt pending HH w/ [facility].  SW notified [facility] w/ [facility] of discharge today.      Disp:  Home w/ [facility]/  Family has paid sitter.  SW provided resources for PCA services and additional Sitter services.      Georgia Castañeda LMSW  PRN-  [facility]  Ext. [phone]

## 2023-10-02 NOTE — PLAN OF CARE
ZENOBIA spoke to patient's Ex-wife/POA (Neris [phone]) in reference to APS report that was filed on 9/29 by [facility] at [facility].  Pt's POA stated she was aware of the APS reported filed and the reason behind report.  Zahida stated the report was initiated due to patient's family failed to make sure someone was at home to receive patient.  ZENOBIA advised we will follow up on APS report prior to discharge home.      ZENOBIA notified patient's POA-Zahida, patient accepted by [facility].  ZENOBIA also confirmed with Zahida that she has sitters and family to sit with patient.  ZENOBIA completed referral for Care at Home.  ZENOBIA advised  Zahida sitter service and PCA resources was left at bedside.      ZENOBIA telephoned APS [phone] and spoke to Elsi.  She will have APS worker assigned to case contact SW back.      3:39 PM  ZENOBIA spoke to Ms. Lima with APS [phone] and provided an updated status on patient and discharge plan.  D/C plan:  Pt will discharge home via wheel chair van.  Pt has [facility].  Referral for Care at Home placed.  PCA and Waiver services resources left at bedside for family.  Pt's POA (Neris) has sitters in place.  In addtion, pt's family members will provide help and alternate sitting with patient.      Georgia Castañeda LMSW  PRN-  [facility]  Ext. [phone]

## 2023-10-02 NOTE — ASSESSMENT & PLAN NOTE
Sister states he had 2 episodes of diarrhea yesterday, but states most recent stool was more solid.  recently changed the brand of tube feed.  Labs not concerning for acute infection  Does not appear to be dry on exam    -continue to monitor  -supportive care as needed  -no indication for further workup at this time

## 2023-10-02 NOTE — HPI
"Mr. 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 No family at bedside and patient is non verbal at baseline. Telephoned sister and ex-wife (HCPoA?) patient was "just [facility] days ago and was doing well until today. Patients sister was was bathing him today, noticed pink color expressed from PEG tube, was real nasty like they havent been cleaning it. After the bath he was moaning and groaning more than usual. Typically peaceful and quiet. Food flows out quickly when attempting to administer". Recently discharged 9/28 following 4 day hospitalization for afib rvr and UTI, but  presented for undisclosed "PEG problem", peg found to be working at that time.    Sister also states he had 2 episodes of watery diarrhea yesterday, but states the most recent stool was more formed. She thinks it may be because they recently changed tube feed. Unable to obtain review of symptoms 2/2 non-verbal state.    In ED, AF, /103, HR 82, no leukocytosis, hgb 10 (BL 13-15, has trended down over last month, but stable), plts 275, Na 141, K 4.8, BUN 24, Cr 1.4 (@ BL), UA unremarkable    CT abdomen pelvis with inflammation about the subQ portion on PEG tube, low concern for abscess. PEG tube abuts the left hepatic lobe, with possible tethering. Also non obstructive nephrolithiasis, high attenuation about the lower pole of L kidney, recommending ultrasound/  Trace bilateral pleural effusions and perihepatic fluid consistent with volume overload.    Activity: Dressing himself, walking around by himself. Goes to bathroom on his own. Does not communicate verbally.    Lives in his own home with family support, however, per chart family has been discussing " nursing home, as care may be too overwhelming for them.    Tips from sister: Makes noises when he is soiled/wet/in pain, points to peg when hes hungry, family made it clear they did not want patient on more antipsychotics for agitiation.

## 2023-10-02 NOTE — ASSESSMENT & PLAN NOTE
Spoke with sister on phone, wants brother to come back home as soon as possible.  Per chart family has been discussing nursing home, as care may be too overwhelming for them.    -Social work consulted  -continue GOC discussion, clarify desires of family

## 2023-10-02 NOTE — ASSESSMENT & PLAN NOTE
Sequale of stroke, 2/2 PEG 8/17 by IR  PEG has been functional until day of admission.    CT abdomen pelvis with inflammation about the subQ portion on PEG tube, low concern for abscess. PEG tube abuts the left hepatic lobe, with possible tethering.    Unable to flush in ED    -consult IR to evaluate PEG  -nutrition consulted for tube feeds  -resume meds and feeds when able

## 2023-10-02 NOTE — CONSULTS
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, severe aphasia, and dysphagia s/p PEG placement (8/17) presenting from home with family concern that PEG is no longer working    - CT shows G tube in stomach, G tube flushes well, scant amount of fibrinous exudate around tube with granulation tissue on inferior aspect, no purulence  - will treat granulation tissue with silver nitrate sticks  - ok to use G tube  - recommend consult to wound care to assist with educating family on keeping G tube site clean and treating granulation tissue  - general surgery will sign off, please call with questions        Subjective:     Chief Complaint/Reason for Admission: PEG malfunction    History of Present Illness:   59 yo M with debility, combined systolic/diastolic HF, AF (on eliquis), hx of VT-cardiac arrest (ICD in place), CKD3, 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     Unfortunately, family was not at the bedside upon my examination and patient is non-verbal. G tube is in place in LUQ with scant amount of fibrinous exudate, no purulence, erythema or tenderness around tube. There is granulation tissue present. G tube is 4cm at the skin. Flushed tube with 50mL of water, flushed easily without resistance.    CT shows G tube in good position within the stomach.    No current facility-administered medications on file prior to encounter.     Current Outpatient Medications on File Prior to Encounter   Medication Sig    amiodarone " (PACERONE) 200 MG Tab 1 tablet (200 mg total) by Per G Tube route once daily.    apixaban (ELIQUIS) 5 mg Tab 1 tablet (5 mg total) by Per G Tube route 2 (two) times daily.    aspirin 81 MG Chew 1 tablet (81 mg total) by Per G Tube route once daily.    atorvastatin (LIPITOR) 80 MG tablet 1 tablet (80 mg total) by Per G Tube route once daily.    chlorhexidine (PERIDEX) 0.12 % solution 5 mLs 3 (three) times daily.    ezetimibe (ZETIA) 10 mg tablet 1 tablet (10 mg total) by Per G Tube route every evening.    metoprolol tartrate (LOPRESSOR) 25 MG tablet 1 tablet (25 mg total) by Per G Tube route 2 (two) times daily.    nitroGLYCERIN (NITROSTAT) 0.4 MG SL tablet Place under the tongue.    QUEtiapine (SEROQUEL) 25 MG Tab 1 tablet (25 mg total) by Per G Tube route every evening.    valproic acid, as sodium salt, (DEPAKENE) 250 mg/5 mL (5 mL) Soln 10 mLs (500 mg total) by Per G Tube route once daily. To be administered 0900 daily    valproic acid, as sodium salt, (DEPAKENE) 250 mg/5 mL (5 mL) Soln 10 mLs (500 mg total) by Per G Tube route once daily. To be administers at 1200 daily    valproic acid, as sodium salt, (DEPAKENE) 250 mg/5 mL (5 mL) Soln 20 mLs (1,000 mg total) by Per G Tube route every evening.       Review of patient's allergies indicates:  No Known Allergies    Past Medical History:   Diagnosis Date    Acute on chronic combined systolic and diastolic heart failure 09/23/2022    Atrial fibrillation     CAD (coronary artery disease) 09/23/2022    Dyslipidemia 09/23/2022    Embolic stroke involving left middle cerebral artery 08/08/2023    Encounter for blood transfusion     H/O heart artery stent     HTN (hypertension) 09/23/2022    ICD (implantable cardioverter-defibrillator) in place 09/23/2022    Paroxysmal A-fib 09/23/2022    Stage 3 chronic kidney disease 04/19/2023     Past Surgical History:   Procedure Laterality Date    CARDIAC DEFIBRILLATOR PLACEMENT Left     CEREBRAL ANGIOGRAM N/A 8/8/2023     Procedure: ANGIOGRAM-CEREBRAL;  Surgeon: Kenzie Rodriguez;  [facility]: [facility];  Service: Anesthesiology;  Laterality: N/A;  LVO (angiogram)    ESOPHAGOGASTRODUODENOSCOPY N/A 8/11/2023    Procedure: EGD (ESOPHAGOGASTRODUODENOSCOPY);  Surgeon: Colette Martinez MD;  Location: [facility] ENDO (2ND FLR);  Service: Gastroenterology;  Laterality: N/A;    ESOPHAGOGASTRODUODENOSCOPY W/ PEG N/A 8/17/2023    Procedure: EGD, WITH PEG TUBE INSERTION;  Surgeon: Yan Scott MD;  Location: [facility] OR 2ND FLR;  Service: General;  Laterality: N/A;  PEG, possible lap G    HERNIA REPAIR      LEFT HEART CATHETERIZATION Left 4/18/2023    Procedure: Left heart cath;  Surgeon: Atilio Marks MD;  Location: [facility] CATH LAB;  Service: Cardiology;  Laterality: Left;    RIGHT HEART CATHETERIZATION Right 9/30/2022    Procedure: INSERTION, CATHETER, RIGHT HEART;  Surgeon: Caden Aden MD;  Location: [facility] CATH LAB;  Service: Cardiology;  Laterality: Right;    TREATMENT OF CARDIAC ARRHYTHMIA N/A 11/22/2022    Procedure: CARDIOVERSION;  Surgeon: Marvin Sanon MD;  Location: [facility] EP LAB;  Service: Cardiology;  Laterality: N/A;  afib, KIA, DCCV, anes, MB, 3 Prep     Family History    None       Tobacco Use    Smoking status: Never    Smokeless tobacco: Never   Substance and Sexual Activity    Alcohol use: Never    Drug use: Never    Sexual activity: Not on file     Review of Systems   Unable to perform ROS: Patient nonverbal     Objective:     Vital Signs (Most Recent):  Temp: 97.5 °F (36.4 °C) (10/02/23 0446)  Pulse: 65 (10/02/23 0755)  Resp: 18 (10/02/23 0755)  BP: (!) 159/88 (10/02/23 0446)  SpO2: 95 % (10/02/23 0755) Vital Signs (24h Range):  Temp:  [97.5 °F (36.4 °C)-98.7 °F (37.1 °C)] 97.5 °F (36.4 °C)  Pulse:  [64-88] 65  Resp:  [18-20] 18  SpO2:  [95 %-100 %] 95 %  BP: (112-159)/() 159/88     Weight: 84 kg (185 lb 3 oz)  Body mass index is 24.43 kg/m².    No intake or output data in the 24 hours ending 10/02/23 1002    Physical  Exam  Cardiovascular:      Rate and Rhythm: Normal rate.   Pulmonary:      Effort: Pulmonary effort is normal. No respiratory distress.   Abdominal:      General: There is no distension.      Palpations: Abdomen is soft.      Tenderness: There is no abdominal tenderness.      Comments: G tube in place in LUQ with scan fibrinous exudate around tube, granulation tissue present at inferior edge. Tube capped.   Skin:     General: Skin is warm and dry.   Neurological:      Mental Status: He is alert.      Comments: Non-verbal, grunts and tracks with eyes, moves all four extremities but does not respond to questions.         Significant Labs:  BMP:   Recent Labs   Lab 10/02/23  0448   GLU 87      K 5.0      CO2 20*   BUN 21*   CREATININE 1.4   CALCIUM 9.2   MG 2.0     CBC:   Recent Labs   Lab 10/02/23  0448   WBC 6.04   RBC 3.53*   HGB 9.8*   HCT 31.4*      MCV 89   MCH 27.8   MCHC 31.2*       Significant Diagnostics:  CT: I have reviewed all pertinent results/findings within the past 24 hours. 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 I will sign off. Please contact us if you have any additional questions.    Barbara Morley MD  General  Surgery  [facility]

## 2023-10-03 ENCOUNTER — PES CALL (OUTPATIENT)
Dept: HOME HEALTH SERVICES | Facility: CLINIC | Age: 58
End: 2023-10-03
Payer: MEDICAID

## 2023-10-03 PROCEDURE — G0180 MD CERTIFICATION HHA PATIENT: HCPCS | Mod: ,,, | Performed by: HOSPITALIST

## 2023-10-03 PROCEDURE — G0180 PR HOME HEALTH MD CERTIFICATION: ICD-10-PCS | Mod: ,,, | Performed by: HOSPITALIST

## 2023-10-03 RX ORDER — AMIODARONE HYDROCHLORIDE 200 MG/1
200 TABLET ORAL DAILY
Qty: 30 TABLET | Refills: 0 | Status: ON HOLD | OUTPATIENT
Start: 2023-10-03 | End: 2023-10-10 | Stop reason: HOSPADM

## 2023-10-03 RX ORDER — ATORVASTATIN CALCIUM 80 MG/1
80 TABLET, FILM COATED ORAL DAILY
Qty: 30 TABLET | Refills: 0 | Status: ON HOLD | OUTPATIENT
Start: 2023-10-03 | End: 2023-10-10 | Stop reason: HOSPADM

## 2023-10-03 RX ORDER — EZETIMIBE 10 MG/1
10 TABLET ORAL NIGHTLY
Qty: 30 TABLET | Refills: 0 | Status: ON HOLD | OUTPATIENT
Start: 2023-10-03 | End: 2023-10-10 | Stop reason: HOSPADM

## 2023-10-03 RX ORDER — QUETIAPINE FUMARATE 25 MG/1
25 TABLET, FILM COATED ORAL NIGHTLY
Qty: 30 TABLET | Refills: 0 | Status: ON HOLD | OUTPATIENT
Start: 2023-10-03 | End: 2023-10-10 | Stop reason: HOSPADM

## 2023-10-03 RX ORDER — NAPROXEN SODIUM 220 MG/1
81 TABLET, FILM COATED ORAL DAILY
Qty: 30 TABLET | Refills: 0 | Status: ON HOLD | OUTPATIENT
Start: 2023-10-03 | End: 2023-10-10 | Stop reason: HOSPADM

## 2023-10-03 RX ORDER — METOPROLOL TARTRATE 25 MG/1
25 TABLET, FILM COATED ORAL 2 TIMES DAILY
Qty: 60 TABLET | Refills: 0 | Status: ON HOLD | OUTPATIENT
Start: 2023-10-03 | End: 2023-10-10 | Stop reason: HOSPADM

## 2023-10-03 NOTE — NURSING
Received patient on bed, awake and responsive, not in respiratory distress, breathing on room air, with PEG Tube in placed. Patient still waiting for the transport to pick him up and bring him home. Instructed to seek for assistance as needed, emphasized adequate rest, fall prevention observed and monitored continuously.     09:10 PM - [facility] transport came and pick the patient. Discharge instructions on his bag and all his personal belongings.

## 2023-10-04 ENCOUNTER — PES CALL (OUTPATIENT)
Dept: HOME HEALTH SERVICES | Facility: CLINIC | Age: 58
End: 2023-10-04
Payer: MEDICAID

## 2023-10-04 NOTE — PLAN OF CARE
[facility]  Discharge Final Note    Primary Care Provider: Carleen Bueno MD    Expected Discharge Date: 10/2/2023    Pt discharged home with [facility], [facility] at Home and referral resources for PCA/Waiver programs.  [facility] was notified of patient's discharge.    Final Discharge Note (most recent)       Final Note - 10/04/23 0758          Final Note    Assessment Type Final Discharge Note     Anticipated Discharge Disposition Home-Health Care Svc        Post-Acute Status    Post-Acute Authorization Home Health     Home Health Status Set-up Complete/Auth obtained     Discharge Delays None known at this time                     Important Message from Medicare             Contact Info       Carleen Bueno MD   Specialty: Internal Medicine   Relationship: PCP - General    [address] [address]   Phone: [phone]       Next Steps: Follow up          Georgia Castañeda LMSW  PRN-  [facility]  Ext. [phone]

## 2023-10-05 ENCOUNTER — PES CALL (OUTPATIENT)
Dept: HOME HEALTH SERVICES | Facility: CLINIC | Age: 58
End: 2023-10-05
Payer: MEDICAID

## 2023-10-06 ENCOUNTER — HOSPITAL ENCOUNTER (INPATIENT)
Facility: HOSPITAL | Age: 58
LOS: 20 days | Discharge: HOME | DRG: 291 | End: 2023-10-26
Attending: STUDENT IN AN ORGANIZED HEALTH CARE EDUCATION/TRAINING PROGRAM | Admitting: STUDENT IN AN ORGANIZED HEALTH CARE EDUCATION/TRAINING PROGRAM
Payer: MEDICAID

## 2023-10-06 DIAGNOSIS — I48.0 PAROXYSMAL A-FIB: ICD-10-CM

## 2023-10-06 DIAGNOSIS — R45.1 AGITATION: ICD-10-CM

## 2023-10-06 DIAGNOSIS — I25.2 HISTORY OF ST ELEVATION MYOCARDIAL INFARCTION (STEMI): Primary | ICD-10-CM

## 2023-10-06 DIAGNOSIS — E87.70 FLUID OVERLOAD: ICD-10-CM

## 2023-10-06 DIAGNOSIS — I50.9 ACUTE DECOMPENSATED HEART FAILURE: ICD-10-CM

## 2023-10-06 DIAGNOSIS — R07.9 CHEST PAIN: ICD-10-CM

## 2023-10-06 DIAGNOSIS — I50.40 COMBINED SYSTOLIC AND DIASTOLIC CONGESTIVE HEART FAILURE, UNSPECIFIED HF CHRONICITY: ICD-10-CM

## 2023-10-06 DIAGNOSIS — R94.31 QT PROLONGATION: ICD-10-CM

## 2023-10-06 DIAGNOSIS — R94.31 PROLONGED Q-T INTERVAL ON ECG: ICD-10-CM

## 2023-10-06 DIAGNOSIS — R06.02 SHORTNESS OF BREATH: ICD-10-CM

## 2023-10-06 DIAGNOSIS — R00.0 TACHYCARDIA: ICD-10-CM

## 2023-10-06 LAB
ALBUMIN SERPL BCP-MCNC: 3.5 G/DL (ref 3.5–5.2)
ALP SERPL-CCNC: 118 U/L (ref 55–135)
ALT SERPL W/O P-5'-P-CCNC: 18 U/L (ref 10–44)
ANION GAP SERPL CALC-SCNC: 11 MMOL/L (ref 8–16)
AST SERPL-CCNC: 23 U/L (ref 10–40)
BACTERIA #/AREA URNS AUTO: ABNORMAL /HPF
BASOPHILS # BLD AUTO: 0.03 K/UL (ref 0–0.2)
BASOPHILS NFR BLD: 0.6 % (ref 0–1.9)
BILIRUB SERPL-MCNC: 0.7 MG/DL (ref 0.1–1)
BILIRUB UR QL STRIP: NEGATIVE
BNP SERPL-MCNC: 4655 PG/ML (ref 0–99)
BUN SERPL-MCNC: 32 MG/DL (ref 6–20)
CALCIUM SERPL-MCNC: 8.9 MG/DL (ref 8.7–10.5)
CHLORIDE SERPL-SCNC: 108 MMOL/L (ref 95–110)
CLARITY UR REFRACT.AUTO: CLEAR
CO2 SERPL-SCNC: 21 MMOL/L (ref 23–29)
COLOR UR AUTO: YELLOW
CREAT SERPL-MCNC: 1.8 MG/DL (ref 0.5–1.4)
DIFFERENTIAL METHOD: ABNORMAL
EOSINOPHIL # BLD AUTO: 0.1 K/UL (ref 0–0.5)
EOSINOPHIL NFR BLD: 1 % (ref 0–8)
ERYTHROCYTE [DISTWIDTH] IN BLOOD BY AUTOMATED COUNT: 17.3 % (ref 11.5–14.5)
EST. GFR  (NO RACE VARIABLE): 43.6 ML/MIN/1.73 M^2
GLUCOSE SERPL-MCNC: 109 MG/DL (ref 70–110)
GLUCOSE UR QL STRIP: ABNORMAL
HCT VFR BLD AUTO: 32.8 % (ref 40–54)
HGB BLD-MCNC: 10.2 G/DL (ref 14–18)
HGB UR QL STRIP: NEGATIVE
HYALINE CASTS UR QL AUTO: 0 /LPF
IMM GRANULOCYTES # BLD AUTO: 0.01 K/UL (ref 0–0.04)
IMM GRANULOCYTES NFR BLD AUTO: 0.2 % (ref 0–0.5)
KETONES UR QL STRIP: NEGATIVE
LEUKOCYTE ESTERASE UR QL STRIP: NEGATIVE
LYMPHOCYTES # BLD AUTO: 0.9 K/UL (ref 1–4.8)
LYMPHOCYTES NFR BLD: 18.6 % (ref 18–48)
MAGNESIUM SERPL-MCNC: 2.1 MG/DL (ref 1.6–2.6)
MCH RBC QN AUTO: 28.1 PG (ref 27–31)
MCHC RBC AUTO-ENTMCNC: 31.1 G/DL (ref 32–36)
MCV RBC AUTO: 90 FL (ref 82–98)
MICROSCOPIC COMMENT: ABNORMAL
MONOCYTES # BLD AUTO: 0.8 K/UL (ref 0.3–1)
MONOCYTES NFR BLD: 15.7 % (ref 4–15)
NEUTROPHILS # BLD AUTO: 3.1 K/UL (ref 1.8–7.7)
NEUTROPHILS NFR BLD: 63.9 % (ref 38–73)
NITRITE UR QL STRIP: NEGATIVE
NRBC BLD-RTO: 0 /100 WBC
PH UR STRIP: 7 [PH] (ref 5–8)
PHOSPHATE SERPL-MCNC: 3.4 MG/DL (ref 2.7–4.5)
PLATELET # BLD AUTO: 248 K/UL (ref 150–450)
PMV BLD AUTO: 12.9 FL (ref 9.2–12.9)
POTASSIUM SERPL-SCNC: 4.8 MMOL/L (ref 3.5–5.1)
PROT SERPL-MCNC: 7.1 G/DL (ref 6–8.4)
PROT UR QL STRIP: ABNORMAL
RBC # BLD AUTO: 3.63 M/UL (ref 4.6–6.2)
RBC #/AREA URNS AUTO: 1 /HPF (ref 0–4)
SODIUM SERPL-SCNC: 140 MMOL/L (ref 136–145)
SP GR UR STRIP: 1.02 (ref 1–1.03)
SQUAMOUS #/AREA URNS AUTO: 0 /HPF
TROPONIN I SERPL DL<=0.01 NG/ML-MCNC: 0.03 NG/ML (ref 0–0.03)
TROPONIN I SERPL DL<=0.01 NG/ML-MCNC: 0.03 NG/ML (ref 0–0.03)
URN SPEC COLLECT METH UR: ABNORMAL
WBC # BLD AUTO: 4.84 K/UL (ref 3.9–12.7)
WBC #/AREA URNS AUTO: 2 /HPF (ref 0–5)
YEAST UR QL AUTO: ABNORMAL

## 2023-10-06 PROCEDURE — 99285 EMERGENCY DEPT VISIT HI MDM: CPT | Mod: 25

## 2023-10-06 PROCEDURE — 25000003 PHARM REV CODE 250

## 2023-10-06 PROCEDURE — 93010 EKG 12-LEAD: ICD-10-PCS | Mod: 59,,, | Performed by: INTERNAL MEDICINE

## 2023-10-06 PROCEDURE — 84484 ASSAY OF TROPONIN QUANT: CPT

## 2023-10-06 PROCEDURE — 96375 TX/PRO/DX INJ NEW DRUG ADDON: CPT

## 2023-10-06 PROCEDURE — 80053 COMPREHEN METABOLIC PANEL: CPT

## 2023-10-06 PROCEDURE — 11000001 HC ACUTE MED/SURG PRIVATE ROOM

## 2023-10-06 PROCEDURE — 84100 ASSAY OF PHOSPHORUS: CPT

## 2023-10-06 PROCEDURE — 96365 THER/PROPH/DIAG IV INF INIT: CPT

## 2023-10-06 PROCEDURE — 85025 COMPLETE CBC W/AUTO DIFF WBC: CPT

## 2023-10-06 PROCEDURE — 63600175 PHARM REV CODE 636 W HCPCS

## 2023-10-06 PROCEDURE — 94761 N-INVAS EAR/PLS OXIMETRY MLT: CPT

## 2023-10-06 PROCEDURE — 93005 ELECTROCARDIOGRAM TRACING: CPT

## 2023-10-06 PROCEDURE — 83880 ASSAY OF NATRIURETIC PEPTIDE: CPT

## 2023-10-06 PROCEDURE — 93010 ELECTROCARDIOGRAM REPORT: CPT | Mod: 59,,, | Performed by: INTERNAL MEDICINE

## 2023-10-06 PROCEDURE — 93010 ELECTROCARDIOGRAM REPORT: CPT | Mod: ,,, | Performed by: INTERNAL MEDICINE

## 2023-10-06 PROCEDURE — 81001 URINALYSIS AUTO W/SCOPE: CPT

## 2023-10-06 PROCEDURE — 83735 ASSAY OF MAGNESIUM: CPT

## 2023-10-06 RX ORDER — FUROSEMIDE 10 MG/ML
80 INJECTION INTRAMUSCULAR; INTRAVENOUS
Status: DISCONTINUED | OUTPATIENT
Start: 2023-10-06 | End: 2023-10-07

## 2023-10-06 RX ORDER — QUETIAPINE FUMARATE 25 MG/1
25 TABLET, FILM COATED ORAL ONCE
Status: COMPLETED | OUTPATIENT
Start: 2023-10-06 | End: 2023-10-06

## 2023-10-06 RX ORDER — VALPROIC ACID 250 MG/5ML
1000 SOLUTION ORAL NIGHTLY
Status: DISCONTINUED | OUTPATIENT
Start: 2023-10-06 | End: 2023-10-08

## 2023-10-06 RX ORDER — GLUCAGON 1 MG
1 KIT INJECTION
Status: DISCONTINUED | OUTPATIENT
Start: 2023-10-06 | End: 2023-10-26 | Stop reason: HOSPADM

## 2023-10-06 RX ORDER — AMIODARONE HYDROCHLORIDE 200 MG/1
200 TABLET ORAL DAILY
Status: DISCONTINUED | OUTPATIENT
Start: 2023-10-07 | End: 2023-10-06

## 2023-10-06 RX ORDER — IBUPROFEN 200 MG
16 TABLET ORAL
Status: DISCONTINUED | OUTPATIENT
Start: 2023-10-06 | End: 2023-10-26 | Stop reason: HOSPADM

## 2023-10-06 RX ORDER — ATORVASTATIN CALCIUM 40 MG/1
80 TABLET, FILM COATED ORAL DAILY
Status: DISCONTINUED | OUTPATIENT
Start: 2023-10-07 | End: 2023-10-19

## 2023-10-06 RX ORDER — SODIUM CHLORIDE 0.9 % (FLUSH) 0.9 %
10 SYRINGE (ML) INJECTION EVERY 12 HOURS PRN
Status: DISCONTINUED | OUTPATIENT
Start: 2023-10-06 | End: 2023-10-26 | Stop reason: HOSPADM

## 2023-10-06 RX ORDER — IBUPROFEN 200 MG
24 TABLET ORAL
Status: DISCONTINUED | OUTPATIENT
Start: 2023-10-06 | End: 2023-10-26 | Stop reason: HOSPADM

## 2023-10-06 RX ORDER — METOPROLOL TARTRATE 25 MG/1
25 TABLET, FILM COATED ORAL 2 TIMES DAILY
Status: DISCONTINUED | OUTPATIENT
Start: 2023-10-06 | End: 2023-10-19

## 2023-10-06 RX ORDER — VALPROIC ACID 250 MG/5ML
500 SOLUTION ORAL
Status: DISCONTINUED | OUTPATIENT
Start: 2023-10-07 | End: 2023-10-19

## 2023-10-06 RX ORDER — VALPROIC ACID 250 MG/5ML
500 SOLUTION ORAL DAILY
Status: DISCONTINUED | OUTPATIENT
Start: 2023-10-07 | End: 2023-10-19

## 2023-10-06 RX ORDER — MORPHINE SULFATE 4 MG/ML
4 INJECTION, SOLUTION INTRAMUSCULAR; INTRAVENOUS
Status: COMPLETED | OUTPATIENT
Start: 2023-10-06 | End: 2023-10-06

## 2023-10-06 RX ORDER — NAPROXEN SODIUM 220 MG/1
81 TABLET, FILM COATED ORAL DAILY
Status: DISCONTINUED | OUTPATIENT
Start: 2023-10-07 | End: 2023-10-19

## 2023-10-06 RX ORDER — QUETIAPINE FUMARATE 25 MG/1
25 TABLET, FILM COATED ORAL NIGHTLY
Status: DISCONTINUED | OUTPATIENT
Start: 2023-10-06 | End: 2023-10-06

## 2023-10-06 RX ORDER — NALOXONE HCL 0.4 MG/ML
0.02 VIAL (ML) INJECTION
Status: DISCONTINUED | OUTPATIENT
Start: 2023-10-06 | End: 2023-10-26 | Stop reason: HOSPADM

## 2023-10-06 RX ORDER — EZETIMIBE 10 MG/1
10 TABLET ORAL NIGHTLY
Status: DISCONTINUED | OUTPATIENT
Start: 2023-10-06 | End: 2023-10-19

## 2023-10-06 RX ORDER — FLUCONAZOLE 2 MG/ML
200 INJECTION, SOLUTION INTRAVENOUS
Status: COMPLETED | OUTPATIENT
Start: 2023-10-06 | End: 2023-10-06

## 2023-10-06 RX ORDER — FUROSEMIDE 10 MG/ML
80 INJECTION INTRAMUSCULAR; INTRAVENOUS
Status: COMPLETED | OUTPATIENT
Start: 2023-10-06 | End: 2023-10-06

## 2023-10-06 RX ORDER — NITROGLYCERIN 0.4 MG/1
0.4 TABLET SUBLINGUAL EVERY 5 MIN PRN
Status: DISCONTINUED | OUTPATIENT
Start: 2023-10-06 | End: 2023-10-26 | Stop reason: HOSPADM

## 2023-10-06 RX ADMIN — QUETIAPINE FUMARATE 25 MG: 25 TABLET ORAL at 07:10

## 2023-10-06 RX ADMIN — FUROSEMIDE 80 MG: 10 INJECTION, SOLUTION INTRAVENOUS at 06:10

## 2023-10-06 RX ADMIN — FLUCONAZOLE IN SODIUM CHLORIDE 200 MG: 2 INJECTION, SOLUTION INTRAVENOUS at 08:10

## 2023-10-06 RX ADMIN — FUROSEMIDE 80 MG: 10 INJECTION, SOLUTION INTRAVENOUS at 10:10

## 2023-10-06 RX ADMIN — MORPHINE SULFATE 4 MG: 4 INJECTION INTRAVENOUS at 04:10

## 2023-10-06 RX ADMIN — VALPROIC ACID 1000 MG: 250 SOLUTION ORAL at 10:10

## 2023-10-06 RX ADMIN — EZETIMIBE 10 MG: 10 TABLET ORAL at 10:10

## 2023-10-06 RX ADMIN — APIXABAN 5 MG: 5 TABLET, FILM COATED ORAL at 10:10

## 2023-10-06 RX ADMIN — METOPROLOL TARTRATE 25 MG: 25 TABLET, FILM COATED ORAL at 10:10
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
## 2023-10-06 NOTE — ED NOTES
Assumed care of pt at this time. VSS, RR even and unlabored. Resting in bed comfortably. No voiced compaints of pain or discomfort at this time. Safety protocols remain intact.      Patient identifiers for Tera Griffiths 58 y.o. male checked and correct.  Chief Complaint   Patient presents with    Leg Swelling     Called by family member, concerned for fluid overload d/t peripheral swelling. Pt found to be in afib rvr w paramedics. Pt tearful,yelling/groaning at triage. Pt is non verbal at baseline, unclear if he is at his cognitive baseline on arrival. Received 10 mg metoprolol IV w EMS. hx chf     Past Medical History:   Diagnosis Date    Acute on chronic combined systolic and diastolic heart failure 09/23/2022    Atrial fibrillation     CAD (coronary artery disease) 09/23/2022    Dyslipidemia 09/23/2022    Embolic stroke involving left middle cerebral artery 08/08/2023    Encounter for blood transfusion     H/O heart artery stent     HTN (hypertension) 09/23/2022    ICD (implantable cardioverter-defibrillator) in place 09/23/2022    Paroxysmal A-fib 09/23/2022    Stage 3 chronic kidney disease 04/19/2023     Allergies reported: Review of patient's allergies indicates:  No Known Allergies      LOC: Patient is awake & alert.  Patient unable to provide history and is nonverbal at baseline.  APPEARANCE: Patient appears restless.  Patient yelling out and moaning.  Patient is nonverbal at baseline. Patient is clean and well groomed, patient's clothing is properly fastened.  SKIN: The skin is warm and dry. Patient has normal skin turgor and moist mucus membranes.   MUSKULOSKELETAL: Patient is moving all extremities well, no obvious deformities noted. Pulses intact.   RESPIRATORY: Airway is open and patent. Respirations are spontaneous and non-labored with normal effort and rate.  CARDIAC: Patient has a normal rate and rhythm. placed on cardiac monitor. No peripheral edema noted. Patient was in Afib RVR, meds given  with EMS rate now controlled.  ABDOMEN: No distention noted. Soft and non-tender upon palpation.  NEUROLOGICAL:  PERRL. Facial expression is symmetrical. Hand grasps are equal bilaterally. Normal sensation in all extremities when touched with finger.
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
## 2023-10-07 LAB
ALBUMIN SERPL BCP-MCNC: 3.5 G/DL (ref 3.5–5.2)
ALP SERPL-CCNC: 113 U/L (ref 55–135)
ALT SERPL W/O P-5'-P-CCNC: 16 U/L (ref 10–44)
ANION GAP SERPL CALC-SCNC: 10 MMOL/L (ref 8–16)
ANION GAP SERPL CALC-SCNC: 10 MMOL/L (ref 8–16)
AST SERPL-CCNC: 18 U/L (ref 10–40)
BASOPHILS # BLD AUTO: 0.04 K/UL (ref 0–0.2)
BASOPHILS NFR BLD: 0.9 % (ref 0–1.9)
BILIRUB SERPL-MCNC: 0.8 MG/DL (ref 0.1–1)
BUN SERPL-MCNC: 28 MG/DL (ref 6–20)
BUN SERPL-MCNC: 29 MG/DL (ref 6–20)
CALCIUM SERPL-MCNC: 9.1 MG/DL (ref 8.7–10.5)
CALCIUM SERPL-MCNC: 9.2 MG/DL (ref 8.7–10.5)
CHLORIDE SERPL-SCNC: 102 MMOL/L (ref 95–110)
CHLORIDE SERPL-SCNC: 102 MMOL/L (ref 95–110)
CO2 SERPL-SCNC: 28 MMOL/L (ref 23–29)
CO2 SERPL-SCNC: 29 MMOL/L (ref 23–29)
CREAT SERPL-MCNC: 1.8 MG/DL (ref 0.5–1.4)
CREAT SERPL-MCNC: 1.8 MG/DL (ref 0.5–1.4)
CREAT UR-MCNC: 9 MG/DL (ref 23–375)
DIFFERENTIAL METHOD: ABNORMAL
EOSINOPHIL # BLD AUTO: 0.1 K/UL (ref 0–0.5)
EOSINOPHIL NFR BLD: 2.6 % (ref 0–8)
ERYTHROCYTE [DISTWIDTH] IN BLOOD BY AUTOMATED COUNT: 17.7 % (ref 11.5–14.5)
EST. GFR  (NO RACE VARIABLE): 43.6 ML/MIN/1.73 M^2
EST. GFR  (NO RACE VARIABLE): 43.6 ML/MIN/1.73 M^2
GLUCOSE SERPL-MCNC: 77 MG/DL (ref 70–110)
GLUCOSE SERPL-MCNC: 92 MG/DL (ref 70–110)
HCT VFR BLD AUTO: 34.4 % (ref 40–54)
HGB BLD-MCNC: 10.5 G/DL (ref 14–18)
IMM GRANULOCYTES # BLD AUTO: 0.01 K/UL (ref 0–0.04)
IMM GRANULOCYTES NFR BLD AUTO: 0.2 % (ref 0–0.5)
LYMPHOCYTES # BLD AUTO: 1.3 K/UL (ref 1–4.8)
LYMPHOCYTES NFR BLD: 30.8 % (ref 18–48)
MAGNESIUM SERPL-MCNC: 2.2 MG/DL (ref 1.6–2.6)
MCH RBC QN AUTO: 27.9 PG (ref 27–31)
MCHC RBC AUTO-ENTMCNC: 30.5 G/DL (ref 32–36)
MCV RBC AUTO: 91 FL (ref 82–98)
MONOCYTES # BLD AUTO: 0.7 K/UL (ref 0.3–1)
MONOCYTES NFR BLD: 15.3 % (ref 4–15)
NEUTROPHILS # BLD AUTO: 2.1 K/UL (ref 1.8–7.7)
NEUTROPHILS NFR BLD: 50.2 % (ref 38–73)
NRBC BLD-RTO: 0 /100 WBC
PHOSPHATE SERPL-MCNC: 3.8 MG/DL (ref 2.7–4.5)
PLATELET # BLD AUTO: 245 K/UL (ref 150–450)
PMV BLD AUTO: 13 FL (ref 9.2–12.9)
POTASSIUM SERPL-SCNC: 4 MMOL/L (ref 3.5–5.1)
POTASSIUM SERPL-SCNC: 4.2 MMOL/L (ref 3.5–5.1)
PROT SERPL-MCNC: 7.3 G/DL (ref 6–8.4)
RBC # BLD AUTO: 3.77 M/UL (ref 4.6–6.2)
SODIUM SERPL-SCNC: 140 MMOL/L (ref 136–145)
SODIUM SERPL-SCNC: 141 MMOL/L (ref 136–145)
UUN UR-MCNC: 74 MG/DL (ref 140–1050)
WBC # BLD AUTO: 4.26 K/UL (ref 3.9–12.7)

## 2023-10-07 PROCEDURE — 21400001 HC TELEMETRY ROOM

## 2023-10-07 PROCEDURE — 93005 ELECTROCARDIOGRAM TRACING: CPT

## 2023-10-07 PROCEDURE — 99223 PR INITIAL HOSPITAL CARE,LEVL III: ICD-10-PCS | Mod: ,,, | Performed by: HOSPITALIST

## 2023-10-07 PROCEDURE — 99222 PR INITIAL HOSPITAL CARE,LEVL II: ICD-10-PCS | Mod: AF,HB,, | Performed by: STUDENT IN AN ORGANIZED HEALTH CARE EDUCATION/TRAINING PROGRAM

## 2023-10-07 PROCEDURE — 84100 ASSAY OF PHOSPHORUS: CPT

## 2023-10-07 PROCEDURE — 93010 EKG 12-LEAD: ICD-10-PCS | Mod: ,,, | Performed by: INTERNAL MEDICINE

## 2023-10-07 PROCEDURE — 92610 EVALUATE SWALLOWING FUNCTION: CPT

## 2023-10-07 PROCEDURE — 83735 ASSAY OF MAGNESIUM: CPT

## 2023-10-07 PROCEDURE — 84540 ASSAY OF URINE/UREA-N: CPT

## 2023-10-07 PROCEDURE — 82570 ASSAY OF URINE CREATININE: CPT

## 2023-10-07 PROCEDURE — 63600175 PHARM REV CODE 636 W HCPCS

## 2023-10-07 PROCEDURE — 25000003 PHARM REV CODE 250

## 2023-10-07 PROCEDURE — 85025 COMPLETE CBC W/AUTO DIFF WBC: CPT

## 2023-10-07 PROCEDURE — 99222 1ST HOSP IP/OBS MODERATE 55: CPT | Mod: AF,HB,, | Performed by: STUDENT IN AN ORGANIZED HEALTH CARE EDUCATION/TRAINING PROGRAM

## 2023-10-07 PROCEDURE — 80048 BASIC METABOLIC PNL TOTAL CA: CPT | Mod: XB

## 2023-10-07 PROCEDURE — 80053 COMPREHEN METABOLIC PANEL: CPT

## 2023-10-07 PROCEDURE — 99223 1ST HOSP IP/OBS HIGH 75: CPT | Mod: ,,, | Performed by: HOSPITALIST

## 2023-10-07 PROCEDURE — 93010 ELECTROCARDIOGRAM REPORT: CPT | Mod: ,,, | Performed by: INTERNAL MEDICINE

## 2023-10-07 PROCEDURE — 36415 COLL VENOUS BLD VENIPUNCTURE: CPT

## 2023-10-07 PROCEDURE — 11000001 HC ACUTE MED/SURG PRIVATE ROOM

## 2023-10-07 PROCEDURE — 63600175 PHARM REV CODE 636 W HCPCS: Performed by: STUDENT IN AN ORGANIZED HEALTH CARE EDUCATION/TRAINING PROGRAM

## 2023-10-07 RX ORDER — LORAZEPAM 2 MG/ML
0.5 INJECTION INTRAMUSCULAR ONCE
Status: COMPLETED | OUTPATIENT
Start: 2023-10-07 | End: 2023-10-07

## 2023-10-07 RX ORDER — FUROSEMIDE 10 MG/ML
40 INJECTION INTRAMUSCULAR; INTRAVENOUS
Status: DISCONTINUED | OUTPATIENT
Start: 2023-10-07 | End: 2023-10-08

## 2023-10-07 RX ORDER — LORAZEPAM 2 MG/ML
1 INJECTION INTRAMUSCULAR ONCE
Status: COMPLETED | OUTPATIENT
Start: 2023-10-07 | End: 2023-10-07

## 2023-10-07 RX ORDER — MORPHINE SULFATE 4 MG/ML
4 INJECTION, SOLUTION INTRAMUSCULAR; INTRAVENOUS ONCE
Status: COMPLETED | OUTPATIENT
Start: 2023-10-07 | End: 2023-10-07

## 2023-10-07 RX ADMIN — ASPIRIN 81 MG CHEWABLE TABLET 81 MG: 81 TABLET CHEWABLE at 08:10

## 2023-10-07 RX ADMIN — VALPROIC ACID 500 MG: 250 SOLUTION ORAL at 08:10

## 2023-10-07 RX ADMIN — EZETIMIBE 10 MG: 10 TABLET ORAL at 08:10

## 2023-10-07 RX ADMIN — LORAZEPAM 1 MG: 2 INJECTION INTRAMUSCULAR; INTRAVENOUS at 08:10

## 2023-10-07 RX ADMIN — APIXABAN 5 MG: 5 TABLET, FILM COATED ORAL at 08:10

## 2023-10-07 RX ADMIN — MORPHINE SULFATE 4 MG: 4 INJECTION INTRAVENOUS at 01:10

## 2023-10-07 RX ADMIN — METOPROLOL TARTRATE 25 MG: 25 TABLET, FILM COATED ORAL at 08:10

## 2023-10-07 RX ADMIN — VALPROIC ACID 500 MG: 250 SOLUTION ORAL at 11:10

## 2023-10-07 RX ADMIN — LORAZEPAM 0.5 MG: 2 INJECTION INTRAMUSCULAR; INTRAVENOUS at 01:10

## 2023-10-07 RX ADMIN — FUROSEMIDE 40 MG: 10 INJECTION, SOLUTION INTRAVENOUS at 08:10

## 2023-10-07 RX ADMIN — VALPROIC ACID 1000 MG: 250 SOLUTION ORAL at 08:10

## 2023-10-07 RX ADMIN — ATORVASTATIN CALCIUM 80 MG: 40 TABLET, FILM COATED ORAL at 08:10
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
## 2023-10-07 NOTE — ED NOTES
Telemetry Verification   Patient placed on Telemetry Box  Verified with War Room  Box # [id number]   Monitor Tech War room   Rate 81   Rhythm Sinus with bigeminy

## 2023-10-07 NOTE — ASSESSMENT & PLAN NOTE
Patient with hx of CAD c/b RCA STEMI  - Chronic, stable  - Continue home ASA, BB, HI Statin  - PRN EKG, Troponin, SL NG for chest pain
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
## 2023-10-07 NOTE — ASSESSMENT & PLAN NOTE
Patient with previously known AFib  Current rhythm: Atrial Fibrillation  Home meds: Amiodarone + Lopressor  S/p PPM    - QTC prolonged on EKG, will hold home amiodarone  - Continue Lopressor 25mg BID, uptitrate as needed  - Continue anticoagulation with home Eliquis  - PRN IV Metoprolol for RVR  - Continuous Telemetry  - Maintain K>4, Mg >2, Ca/iCa WNL to decrease arrhythmogenic potential.

## 2023-10-07 NOTE — PLAN OF CARE
Problem: SLP  Goal: SLP Goal  Description: Speech Language Pathology Goals  Goals expected to be met by 10/21:     1. The pt will tolerate a least restrictive diet without overt signs of aspiration.    Outcome: Ongoing, Progressing       Bedside evaluation completed. ST recommends puree/thin diet with strict aspiration precautions. Continue alternative means as needed. SLP will continue to follow.
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
## 2023-10-07 NOTE — PT/OT/SLP EVAL
Speech Language Pathology Evaluation  Bedside Swallow    Patient Name:  Tera Griffiths   MRN:  [id number]  Admitting Diagnosis: Acute on chronic combined systolic and diastolic heart failure    Recommendations:                 General Recommendations:  Dysphagia therapy  Diet recommendations:  Puree Diet - IDDSI Level 4, Thin liquids - IDDSI Level 0  Aspiration Precautions: 1 bite/sip at a time, Alternating bites/sips, Assistance with meals, Check for pocketing/oral residue, Continue alternate means of nutrition as needed, Eliminate distractions, Feed only when awake/alert, HOB to 90 degrees, Remain upright 30 minutes post meal, and Small bites/sips   General Precautions: Standard, fall, aspiration  Communication strategies:  go to room if call light pushed    Assessment:     Tera Griffiths is a 58 y.o. male with with chronic cognitive-linguistic impairments placing him at a high risk of aspiration.     History:     Past Medical History:   Diagnosis Date    Acute on chronic combined systolic and diastolic heart failure 09/23/2022    Atrial fibrillation     CAD (coronary artery disease) 09/23/2022    Dyslipidemia 09/23/2022    Embolic stroke involving left middle cerebral artery 08/08/2023    Encounter for blood transfusion     H/O heart artery stent     HTN (hypertension) 09/23/2022    ICD (implantable cardioverter-defibrillator) in place 09/23/2022    Paroxysmal A-fib 09/23/2022    Stage 3 chronic kidney disease 04/19/2023       Past Surgical History:   Procedure Laterality Date    CARDIAC DEFIBRILLATOR PLACEMENT Left     CEREBRAL ANGIOGRAM N/A 8/8/2023    Procedure: ANGIOGRAM-CEREBRAL;  Surgeon: Kenzie Rodriguez;  [facility];  Service: Anesthesiology;  Laterality: N/A;  LVO (angiogram)    ESOPHAGOGASTRODUODENOSCOPY N/A 8/11/2023    Procedure: EGD (ESOPHAGOGASTRODUODENOSCOPY);  Surgeon: Colette Martinez MD;  Location: [address]);  Service: Gastroenterology;  Laterality: N/A;     "ESOPHAGOGASTRODUODENOSCOPY W/ PEG N/A 8/17/2023    Procedure: EGD, WITH PEG TUBE INSERTION;  Surgeon: Yan Scott MD;  Location: [facility] OR [address] [place] [address];  Service: General;  Laterality: N/A;  PEG, possible lap G    HERNIA REPAIR      LEFT HEART CATHETERIZATION Left 4/18/2023    Procedure: Left heart cath;  Surgeon: Atilio Marks MD;  Location: [facility] CATH LAB;  Service: Cardiology;  Laterality: Left;    RIGHT HEART CATHETERIZATION Right 9/30/2022    Procedure: INSERTION, CATHETER, RIGHT HEART;  Surgeon: Caden Aden MD;  Location: [facility] CATH LAB;  Service: Cardiology;  Laterality: Right;    TREATMENT OF CARDIAC ARRHYTHMIA N/A 11/22/2022    Procedure: CARDIOVERSION;  Surgeon: Marvin Sanon MD;  Location: [facility] EP LAB;  Service: Cardiology;  Laterality: N/A;  afib, KIA, DCCV, anes, MB, 3 Prep       Social History: Patient lives alone. Per chart review, pt eats without difficulty and occasionally uses his PEG tube. Prior to admission, pt's ex-wife reports a coughing spell and tube feeds coming out of the pt's mouth.     Prior Intubation HX:  None this admission     Modified Barium Swallow: None.    Chest X-Rays: 10/6: "The cardiomediastinal silhouette is enlarged, similar to the previous exam.  Left chest wall pacer noted..  There is no pleural effusion.  The trachea is midline.  The lungs are symmetrically expanded bilaterally with coarse interstitial attenuation primarily in a perihilar distribution..  No large focal consolidation seen.  There is no pneumothorax.  The osseous structures are remarkable for degenerative change."     Prior diet: NPO; PEG tube      Subjective     RN cleared pt for bedside evaluation.     Pt moaning/groaning t/o ST session.     Pain/Comfort:  Pain Rating 1:  (Moaning/groaning t/o ST session though unable to state pain rating.)  Pain Rating Post-Intervention 1:  (Moaning/groaning t/o ST session though unable to state pain rating.)    Respiratory Status: Room air    Objective:     Oral " Musculature Evaluation  Dentition:  (Unable to assess 2/2 pt not following commands)  Secretion Management:  (Unable to assess 2/2 pt not following commands)  Mucosal Quality:  (Unable to assess 2/2 pt not following commands)  Mandibular Strength and Mobility:  (Unable to assess 2/2 pt not following commands)  Oral Labial Strength and Mobility:  (Unable to assess 2/2 pt not following commands)  Lingual Strength and Mobility:  (Unable to assess 2/2 pt not following commands)  Volitional Cough: Unable to assess 2/2 pt not following commands  Volitional Swallow: Unable to assess 2/2 pt not following commands  Voice Prior to PO Intake: Clear with adequate intensity (vocalizations only)    Bedside Swallow Eval:   Consistencies Assessed:  Thin liquids via tsps x4 and cup sips x6  Puree: 1/2 tsps x2, tsps x5     Oral Phase:   Reduced oral acceptance (needed encouragement)  Slow oral transit time    Pharyngeal Phase:   no overt clinical signs/symptoms of aspiration    Compensatory Strategies  None    Treatment: Pt did not answer simple Y/N questions or follow simple commands. Initially, pt turned his head away from PO presentations. However, he began to willingly accept PO of water and apple sauce after 2nd PO trial of water was offered. He was observed to lean towards SLP and attempted to grab the cup following each PO trial. Education provided re: role of SLP, diet recs, swallow precs, s/s aspiration and POC.  Pt verbalized understanding and agreement. Pt would benefit from ongoing education.       Goals:   Multidisciplinary Problems       SLP Goals          Problem: SLP    Goal Priority Disciplines Outcome   SLP Goal     SLP Ongoing, Progressing   Description: Speech Language Pathology Goals  Goals expected to be met by 10/21:     1. The pt will tolerate a least restrictive diet without overt signs of aspiration.                               Plan:     Patient to be seen:  4 x/week   Plan of Care expires:  11/04/23  Plan  of Care reviewed with:  patient   SLP Follow-Up:  Yes       Discharge recommendations:   (tbd)     Time Tracking:     SLP Treatment Date:   10/07/23  Speech Start Time:  0903  Speech Stop Time:  0918     Speech Total Time (min):  15 min    Billable Minutes: Eval Swallow and Oral Function 15    10/07/2023

## 2023-10-07 NOTE — PLAN OF CARE
Patient is still pulling at Iv lines. Patient is still in soft restraints. Patient is on fedding tube and on puree thin diet and is tolerating food.  Problem: Adult Inpatient Plan of Care  Goal: Plan of Care Review  Outcome: Ongoing, Not Progressing  Goal: Patient-Specific Goal (Individualized)  Outcome: Ongoing, Not Progressing  Goal: Absence of Hospital-Acquired Illness or Injury  Outcome: Ongoing, Not Progressing  Goal: Optimal Comfort and Wellbeing  Outcome: Ongoing, Not Progressing  Goal: Readiness for Transition of Care  Outcome: Ongoing, Not Progressing     Problem: Fall Injury Risk  Goal: Absence of Fall and Fall-Related Injury  Outcome: Ongoing, Not Progressing     Problem: Restraint, Nonbehavioral (Nonviolent)  Goal: Absence of Harm or Injury  Outcome: Ongoing, Not Progressing     Problem: Adjustment to Illness (Sepsis/Septic Shock)  Goal: Optimal Coping  Outcome: Ongoing, Not Progressing     Problem: Bleeding (Sepsis/Septic Shock)  Goal: Absence of Bleeding  Outcome: Ongoing, Not Progressing     Problem: Glycemic Control Impaired (Sepsis/Septic Shock)  Goal: Blood Glucose Level Within Desired Range  Outcome: Ongoing, Not Progressing     Problem: Infection Progression (Sepsis/Septic Shock)  Goal: Absence of Infection Signs and Symptoms  Outcome: Ongoing, Not Progressing     Problem: Nutrition Impaired (Sepsis/Septic Shock)  Goal: Optimal Nutrition Intake  Outcome: Ongoing, Not Progressing     Problem: Fluid and Electrolyte Imbalance (Acute Kidney Injury/Impairment)  Goal: Fluid and Electrolyte Balance  Outcome: Ongoing, Not Progressing     Problem: Oral Intake Inadequate (Acute Kidney Injury/Impairment)  Goal: Optimal Nutrition Intake  Outcome: Ongoing, Not Progressing     Problem: Renal Function Impairment (Acute Kidney Injury/Impairment)  Goal: Effective Renal Function  Outcome: Ongoing, Not Progressing     Problem: Skin Injury Risk Increased  Goal: Skin Health and Integrity  Outcome: Ongoing, Not  Progressing

## 2023-10-07 NOTE — CONSULTS
CONSULTATION LIAISON PSYCHIATRY INITIAL EVALUATION    Patient Name: Tera Griffiths  MRN: [id number]  Patient Class: IP- Inpatient  Admission Date: 10/6/2023  Attending Physician: Karthik Logan MD      HPI:   Tera Griffiths is a 58 y.o. 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 Presents to the ED/admitted to the hospital for Acute on chronic combined systolic and diastolic heart failure    Psychiatry consulted for prolonged QTC but is non verbal and has non redirectable agitation; already on depakote and seroquel at home (seroquel held 2/2 QTC) appreciate med recs    Interval Events:   10/7/2023 5:38 PM: Per nursing, patient is still pulling at Iv lines. Patient is still in soft restraints.     Attempted to see patient tonight, patient was in the room sleeping with the sitter at the bedside. Per nursing, patient received PRN lorazepam 1 mg at 2031 for trying to bite his restraints and pulling out his PEG tube. Patient also pulled out his IV four times today.          Medical Review of Systems:  Pertinent items are noted in HPI.    Psychiatric Review of Systems (is patient experiencing or having changes in): Unable to assess secondary to patient sleeping and too drowsy to participate.    History obtained via chart review and updated as appropriate.      Past Psychiatric History:  Previous Medication Trials: ASHLEY  Previous Psychiatric Hospitalizations:[facility]   Previous Suicide Attempts: ASHLEY  History of Violence: [facility]  Outpatient Psychiatrist: [facility]  Family Psychiatric History: [facility]     Substance Use History (w/ emphasis on past 12 months):  Recreational Drugs:  ASHLEY  Use of Alcohol:  [facility]  Tobacco Use:ASHLEY  Rehab History:[facility]     Social History:  Marital Status:   Children: 1  Employment Status/Info:  ASHLEY  :[facility]  Education:  [facility]  Special Ed:  ASHLEY  Housing Status: with spouse  Access to gun: Unable to assess   Psychosocial Stressors: health  Functioning Relationships: good support system       Legal History:  Past Charges/Incarcerations: yes, 15 years for robbery  Pending charges:no    Mental Status Exam:  General Appearance: dressed in hospital garb, lying in bed  Behavior:  Patient sleeping  Involuntary Movements and Motor Activity:  unable to assess  Gait and Station: unable to assess - patient lying down or seated  Speech and Language:  unable to assess  Mood: unable to assess  Affect: unable to assess  Thought Process and Associations: unable to assess  Thought Content and Perceptions:: unable to assess  Sensorium and Orientation: unable to assess  Recent and Remote Memory: unable to assess  Attention and Concentration: unable to assess  Fund of Knowledge: unable to assess  Insight: unable to assess  Judgment: unable to assess    CAM ICU positive? unable to assess      ASSESSMENT & RECOMMENDATIONS   Agitation  R/O delirium    PSYCH MEDICATIONS  - Recommend to continue to hold all antipsychotics   Scheduled:  Increase nightly valproic acid to 1500 mg QHS  Continue valporic acid 500 mg in AM and valproic acid 500 mg with lunch   Start Buspar 10 mg TID    PRN  Buspar 10 mg q12h PRN anxiety/agitation     DELIRIUM BEHAVIOR MANAGEMENT  PLEASE utilize CHEMICAL restraints with PRN meds first for agitation. Minimize use of PHYSICAL restraints OR have periods of being out of physical restraints if possible.  Keep window shades open and room lit during day and room dim at night in order to promote normal sleep-wake cycles  Encourage family at bedside. [place] patient often to situation, location, date.  Continue to Limit or Discontinue use of Narcotics, Benzos and Anti-cholinergic medications as they may worsen delirium.  Continue medical workup for causative etiology of Delirium.       RISK ASSESSMENT  NEEDS PEC because patient is in imminent danger of hurting  self or others and is gravely disabled.   NO NEED FOR PEC, will continue to assess, continue 1:1 sitter    FOLLOW UP  Will follow up while in house    DISPOSITION - once medically cleared:   Defer to medical team    Please contact ON CALL psychiatry service (24/7) for any acute issues that may arise.    Laci Yao MD  hospitals Psychiatry PGY-II   Psychiatry  [facility]  10/7/2023 6:19 PM         --------------------------------------------------------------------------------------------------------------------------------------------------------------------------------------------------------------------------------------    CONTINUED HISTORY & OBJECTIVE clinical data & findings reviewed and noted for above decision making    Past Medical/Surgical History:   Past Medical History:   Diagnosis Date    Acute on chronic combined systolic and diastolic heart failure 09/23/2022    Atrial fibrillation     CAD (coronary artery disease) 09/23/2022    Dyslipidemia 09/23/2022    Embolic stroke involving left middle cerebral artery 08/08/2023    Encounter for blood transfusion     H/O heart artery stent     HTN (hypertension) 09/23/2022    ICD (implantable cardioverter-defibrillator) in place 09/23/2022    Paroxysmal A-fib 09/23/2022    Stage 3 chronic kidney disease 04/19/2023     Past Surgical History:   Procedure Laterality Date    CARDIAC DEFIBRILLATOR PLACEMENT Left     CEREBRAL ANGIOGRAM N/A 8/8/2023    Procedure: ANGIOGRAM-CEREBRAL;  Surgeon: Kenzie Rodriguez;  Location: [facility];  Service: Anesthesiology;  Laterality: N/A;  LVO (angiogram)    ESOPHAGOGASTRODUODENOSCOPY N/A 8/11/2023    Procedure: EGD (ESOPHAGOGASTRODUODENOSCOPY);  Surgeon: Colette Martinez MD;  Location: [facility] ([address] [place] [address]);  Service: Gastroenterology;  Laterality: N/A;    ESOPHAGOGASTRODUODENOSCOPY W/ PEG N/A 8/17/2023    Procedure: EGD, WITH PEG TUBE INSERTION;  Surgeon: Yan Scott MD;  Location: [facility] OR [address] [place] [address];  Service:  General;  Laterality: N/A;  PEG, possible lap G    HERNIA REPAIR      LEFT HEART CATHETERIZATION Left 4/18/2023    Procedure: Left heart cath;  Surgeon: Atilio Marks MD;  Location: [facility] CATH LAB;  Service: Cardiology;  Laterality: Left;    RIGHT HEART CATHETERIZATION Right 9/30/2022    Procedure: INSERTION, CATHETER, RIGHT HEART;  Surgeon: Caden Aden MD;  Location: [facility] CATH LAB;  Service: Cardiology;  Laterality: Right;    TREATMENT OF CARDIAC ARRHYTHMIA N/A 11/22/2022    Procedure: CARDIOVERSION;  Surgeon: Marvin Sanon MD;  Location: [facility] EP LAB;  Service: Cardiology;  Laterality: N/A;  afib, KIA, DCCV, anes, MB, 3 Prep       Current Medications:   Scheduled Meds:    apixaban  5 mg Per G Tube BID    aspirin  81 mg Per G Tube Daily    atorvastatin  80 mg Per G Tube Daily    ezetimibe  10 mg Per G Tube QHS    furosemide (LASIX) injection  40 mg Intravenous Q12H    metoprolol tartrate  25 mg Per G Tube BID    valproic acid (as sodium salt)  1,000 mg Per G Tube QHS    valproic acid (as sodium salt)  500 mg Per G Tube Daily    valproic acid (as sodium salt)  500 mg Per G Tube Daily with lunch     PRN Meds: dextrose 10%, dextrose 10%, glucagon (human recombinant), glucose, glucose, naloxone, nitroGLYCERIN, sodium chloride 0.9%    Allergies:   Review of patient's allergies indicates:  No Known Allergies    Vitals  Vitals:    10/07/23 1604   BP: 131/88   Pulse: 81   Resp: 16   Temp: 97.5 °F (36.4 °C)       Labs/Imaging/Studies:  Recent Results (from the past 24 hour(s))   Troponin I    Collection Time: 10/06/23  7:05 PM   Result Value Ref Range    Troponin I 0.034 (H) 0.000 - 0.026 ng/mL   Phosphorus    Collection Time: 10/06/23  7:05 PM   Result Value Ref Range    Phosphorus 3.4 2.7 - 4.5 mg/dL   Magnesium    Collection Time: 10/06/23  7:05 PM   Result Value Ref Range    Magnesium 2.1 1.6 - 2.6 mg/dL   Urea nitrogen, urine    Collection Time: 10/07/23 12:47 AM   Result Value Ref Range    Urine Urea  Nitrogen 74 (L) 140 - 1050 mg/dL   Creatinine, urine, random    Collection Time: 10/07/23 12:47 AM   Result Value Ref Range    Creatinine, Urine 9.0 (L) 23.0 - 375.0 mg/dL   Comprehensive Metabolic Panel (CMP)    Collection Time: 10/07/23  6:04 AM   Result Value Ref Range    Sodium 140 136 - 145 mmol/L    Potassium 4.2 3.5 - 5.1 mmol/L    Chloride 102 95 - 110 mmol/L    CO2 28 23 - 29 mmol/L    Glucose 92 70 - 110 mg/dL    BUN 29 (H) 6 - 20 mg/dL    Creatinine 1.8 (H) 0.5 - 1.4 mg/dL    Calcium 9.2 8.7 - 10.5 mg/dL    Total Protein 7.3 6.0 - 8.4 g/dL    Albumin 3.5 3.5 - 5.2 g/dL    Total Bilirubin 0.8 0.1 - 1.0 mg/dL    Alkaline Phosphatase 113 55 - 135 U/L    AST 18 10 - 40 U/L    ALT 16 10 - 44 U/L    eGFR 43.6 (A) >60 mL/min/1.73 m^2    Anion Gap 10 8 - 16 mmol/L   Magnesium    Collection Time: 10/07/23  6:04 AM   Result Value Ref Range    Magnesium 2.2 1.6 - 2.6 mg/dL   Phosphorus    Collection Time: 10/07/23  6:04 AM   Result Value Ref Range    Phosphorus 3.8 2.7 - 4.5 mg/dL   CBC with Automated Differential    Collection Time: 10/07/23  6:04 AM   Result Value Ref Range    WBC 4.26 3.90 - 12.70 K/uL    RBC 3.77 (L) 4.60 - 6.20 M/uL    Hemoglobin 10.5 (L) 14.0 - 18.0 g/dL    Hematocrit 34.4 (L) 40.0 - 54.0 %    MCV 91 82 - 98 fL    MCH 27.9 27.0 - 31.0 pg    MCHC 30.5 (L) 32.0 - 36.0 g/dL    RDW 17.7 (H) 11.5 - 14.5 %    Platelets 245 150 - 450 K/uL    MPV 13.0 (H) 9.2 - 12.9 fL    Immature Granulocytes 0.2 0.0 - 0.5 %    Gran # (ANC) 2.1 1.8 - 7.7 K/uL    Immature Grans (Abs) 0.01 0.00 - 0.04 K/uL    Lymph # 1.3 1.0 - 4.8 K/uL    Mono # 0.7 0.3 - 1.0 K/uL    Eos # 0.1 0.0 - 0.5 K/uL    Baso # 0.04 0.00 - 0.20 K/uL    nRBC 0 0 /100 WBC    Gran % 50.2 38.0 - 73.0 %    Lymph % 30.8 18.0 - 48.0 %    Mono % 15.3 (H) 4.0 - 15.0 %    Eosinophil % 2.6 0.0 - 8.0 %    Basophil % 0.9 0.0 - 1.9 %    Differential Method Automated    Basic metabolic panel    Collection Time: 10/07/23  3:11 PM   Result Value Ref Range     Sodium 141 136 - 145 mmol/L    Potassium 4.0 3.5 - 5.1 mmol/L    Chloride 102 95 - 110 mmol/L    CO2 29 23 - 29 mmol/L    Glucose 77 70 - 110 mg/dL    BUN 28 (H) 6 - 20 mg/dL    Creatinine 1.8 (H) 0.5 - 1.4 mg/dL    Calcium 9.1 8.7 - 10.5 mg/dL    Anion Gap 10 8 - 16 mmol/L    eGFR 43.6 (A) >60 mL/min/1.73 m^2     Imaging Results              X-Ray Chest AP Portable (Final result)  Result time 10/06/23 16:46:14      Final result by Leandro Ruffin MD (10/06/23 16:46:14)                   Impression:      1. Interstitial findings suggest congestive change/edema, correlation is advised.      Electronically signed by: Leandro Ruffin MD  Date:    10/06/2023  Time:    16:46               Narrative:    EXAMINATION:  XR CHEST AP PORTABLE    CLINICAL HISTORY:  CHF;    TECHNIQUE:  Single frontal view of the chest was performed.    COMPARISON:  10/01/2023    FINDINGS:  The cardiomediastinal silhouette is enlarged, similar to the previous exam.  Left chest wall pacer noted..  There is no pleural effusion.  The trachea is midline.  The lungs are symmetrically expanded bilaterally with coarse interstitial attenuation primarily in a perihilar distribution..  No large focal consolidation seen.  There is no pneumothorax.  The osseous structures are remarkable for degenerative change..

## 2023-10-07 NOTE — SUBJECTIVE & OBJECTIVE
Past Medical History:   Diagnosis Date    Acute on chronic combined systolic and diastolic heart failure 09/23/2022    Atrial fibrillation     CAD (coronary artery disease) 09/23/2022    Dyslipidemia 09/23/2022    Embolic stroke involving left middle cerebral artery 08/08/2023    Encounter for blood transfusion     H/O heart artery stent     HTN (hypertension) 09/23/2022    ICD (implantable cardioverter-defibrillator) in place 09/23/2022    Paroxysmal A-fib 09/23/2022    Stage 3 chronic kidney disease 04/19/2023       Past Surgical History:   Procedure Laterality Date    CARDIAC DEFIBRILLATOR PLACEMENT Left     CEREBRAL ANGIOGRAM N/A 8/8/2023    Procedure: ANGIOGRAM-CEREBRAL;  Surgeon: Kenzie Rodriguez;  Location: [facility];  Service: Anesthesiology;  Laterality: N/A;  LVO (angiogram)    ESOPHAGOGASTRODUODENOSCOPY N/A 8/11/2023    Procedure: EGD (ESOPHAGOGASTRODUODENOSCOPY);  Surgeon: Colette Martinez MD;  Location: [facility] ENDO (2ND FLR);  Service: Gastroenterology;  Laterality: N/A;    ESOPHAGOGASTRODUODENOSCOPY W/ PEG N/A 8/17/2023    Procedure: EGD, WITH PEG TUBE INSERTION;  Surgeon: Yan Scott MD;  Location: [facility] OR 2ND FLR;  Service: General;  Laterality: N/A;  PEG, possible lap G    HERNIA REPAIR      LEFT HEART CATHETERIZATION Left 4/18/2023    Procedure: Left heart cath;  Surgeon: Atilio Marks MD;  Location: [facility] CATH LAB;  Service: Cardiology;  Laterality: Left;    RIGHT HEART CATHETERIZATION Right 9/30/2022    Procedure: INSERTION, CATHETER, RIGHT HEART;  Surgeon: Caden Aden MD;  Location: [facility] CATH LAB;  Service: Cardiology;  Laterality: Right;    TREATMENT OF CARDIAC ARRHYTHMIA N/A 11/22/2022    Procedure: CARDIOVERSION;  Surgeon: Marvin Sanon MD;  Location: [facility] EP LAB;  Service: Cardiology;  Laterality: N/A;  afib, KIA, DCCV, anes, MB, 3 Prep       Review of patient's allergies indicates:  No Known Allergies    No current facility-administered medications on file prior to encounter.      Current Outpatient Medications on File Prior to Encounter   Medication Sig    amiodarone (PACERONE) 200 MG Tab 1 tablet (200 mg total) by Per G Tube route once daily.    amiodarone (PACERONE) 200 MG Tab 1 tablet (200 mg total) by Per G Tube route once daily. One time fill for lost medication at hospital    apixaban (ELIQUIS) 5 mg Tab 1 tablet (5 mg total) by Per G Tube route 2 (two) times daily.    apixaban (ELIQUIS) 5 mg Tab 1 tablet (5 mg total) by Per G Tube route 2 (two) times daily. One time fill for lost medication at hospital    aspirin 81 MG Chew 1 tablet (81 mg total) by Per G Tube route once daily.    aspirin 81 MG Chew 1 tablet (81 mg total) by Per G Tube route once daily. One time fill for lost medication at hospital    atorvastatin (LIPITOR) 80 MG tablet 1 tablet (80 mg total) by Per G Tube route once daily.    atorvastatin (LIPITOR) 80 MG tablet 1 tablet (80 mg total) by Per G Tube route once daily. One time fill for lost medication at hospital    chlorhexidine (PERIDEX) 0.12 % solution 5 mLs 3 (three) times daily.    empagliflozin (JARDIANCE) 10 mg tablet 1 tablet (10 mg total) by Per G Tube route once daily. One time fill for lost medication at hospital    EMPAGLIFLOZIN ORAL 10 mg by Per G Tube route once daily.    ezetimibe (ZETIA) 10 mg tablet 1 tablet (10 mg total) by Per G Tube route every evening.    ezetimibe (ZETIA) 10 mg tablet 1 tablet (10 mg total) by Per G Tube route every evening. One time fill for lost medication at hospital    metoprolol tartrate (LOPRESSOR) 25 MG tablet 1 tablet (25 mg total) by Per G Tube route 2 (two) times daily.    metoprolol tartrate (LOPRESSOR) 25 MG tablet 1 tablet (25 mg total) by Per G Tube route 2 (two) times daily. One time fill for lost medication at hospital    nitroGLYCERIN (NITROSTAT) 0.4 MG SL tablet Place under the tongue.    QUEtiapine (SEROQUEL) 25 MG Tab 1 tablet (25 mg total) by Per G Tube route every evening.    QUEtiapine (SEROQUEL) 25 MG Tab  1 tablet (25 mg total) by Per G Tube route every evening. One time fill for lost medication at hospital    valproic acid, as sodium salt, (DEPAKENE) 250 mg/5 mL (5 mL) Soln 10 mLs (500 mg total) by Per G Tube route once daily. To be administered 0900 daily    valproic acid, as sodium salt, (DEPAKENE) 250 mg/5 mL (5 mL) Soln 10 mLs (500 mg total) by Per G Tube route once daily. To be administers at 1200 daily    valproic acid, as sodium salt, (DEPAKENE) 250 mg/5 mL (5 mL) Soln 20 mLs (1,000 mg total) by Per G Tube route every evening.     Family History    None       Tobacco Use    Smoking status: Never    Smokeless tobacco: Never   Substance and Sexual Activity    Alcohol use: Never    Drug use: Never    Sexual activity: Not on file     Review of Systems   Unable to perform ROS: Patient nonverbal     Objective:     Vital Signs (Most Recent):  Temp: 96.7 °F (35.9 °C) (10/07/23 0728)  Pulse: 70 (10/07/23 0728)  Resp: 19 (10/07/23 0728)  BP: 118/76 (10/07/23 0728)  SpO2: 97 % (10/06/23 2048) Vital Signs (24h Range):  Temp:  [96.7 °F (35.9 °C)-97.6 °F (36.4 °C)] 96.7 °F (35.9 °C)  Pulse:  [] 70  Resp:  [18-22] 19  SpO2:  [97 %-100 %] 97 %  BP: (118-131)/() 118/76     Weight: 83.9 kg (185 lb)  Body mass index is 24.41 kg/m².     Physical Exam  Constitutional:       Appearance: Normal appearance.   HENT:      Mouth/Throat:      Mouth: Mucous membranes are moist.      Pharynx: Oropharynx is clear.   Eyes:      Conjunctiva/sclera: Conjunctivae normal.   Cardiovascular:      Rate and Rhythm: Normal rate. Rhythm irregular.      Pulses: Normal pulses.   Pulmonary:      Effort: Pulmonary effort is normal.   Abdominal:      General: Abdomen is flat.      Palpations: Abdomen is soft.      Comments: +PEG   Musculoskeletal:      Comments: Trace pitting edema in LLE   Skin:     Capillary Refill: Capillary refill takes less than 2 seconds.   Neurological:      Mental Status: He is alert. Mental status is at baseline.           Significant Labs: All pertinent labs within the past 24 hours have been reviewed.  CBC:   Recent Labs   Lab 10/06/23  1604 10/07/23  0604   WBC 4.84 4.26   HGB 10.2* 10.5*   HCT 32.8* 34.4*    245     CMP:   Recent Labs   Lab 10/06/23  1604 10/07/23  0604    140   K 4.8 4.2    102   CO2 21* 28    92   BUN 32* 29*   CREATININE 1.8* 1.8*   CALCIUM 8.9 9.2   PROT 7.1 7.3   ALBUMIN 3.5 3.5   BILITOT 0.7 0.8   ALKPHOS 118 113   AST 23 18   ALT 18 16   ANIONGAP 11 10       Significant Imaging: I have reviewed all pertinent imaging results/findings within the past 24 hours.

## 2023-10-07 NOTE — ASSESSMENT & PLAN NOTE
Mr. Domingo Griffiths is a 58 y.o M with CAD c/b STEMI, AFib on Eliquis, combined CHF (EF 15-20%), HTN, HLD, CKD3b, L MCA CVA with residual aphasia and R sided deficits s/p PEG placement who presents for evaluation of LE swelling.     Patient's volume status difficult to assess as he is unable to follow directions, however he has a significant increase in his BNP and vascular congestion on his CXR consistent with decompensated heart failure. Unclear if 2/2 poor salt/fluid restriction vs tachycardia induced myopathy as he was found in AFwRVR by EMS vs other precipitant.    - Lasix 40mg BID IVP  - F/u Formal TTE for CVP  - Patient not on GDMT. Consider transitioning Lopressor to Toprol on discharge  - 1500cc fluid restriction. RD consulted for cardiac healthy TF recs  - Daily standing weights, strict Is/Os

## 2023-10-08 LAB
ALBUMIN SERPL BCP-MCNC: 3.1 G/DL (ref 3.5–5.2)
ALP SERPL-CCNC: 99 U/L (ref 55–135)
ALT SERPL W/O P-5'-P-CCNC: 16 U/L (ref 10–44)
ANION GAP SERPL CALC-SCNC: 8 MMOL/L (ref 8–16)
ANION GAP SERPL CALC-SCNC: 8 MMOL/L (ref 8–16)
AST SERPL-CCNC: 20 U/L (ref 10–40)
BASOPHILS # BLD AUTO: 0.04 K/UL (ref 0–0.2)
BASOPHILS NFR BLD: 0.9 % (ref 0–1.9)
BILIRUB SERPL-MCNC: 0.6 MG/DL (ref 0.1–1)
BUN SERPL-MCNC: 27 MG/DL (ref 6–20)
BUN SERPL-MCNC: 30 MG/DL (ref 6–20)
CALCIUM SERPL-MCNC: 8.8 MG/DL (ref 8.7–10.5)
CALCIUM SERPL-MCNC: 8.9 MG/DL (ref 8.7–10.5)
CHLORIDE SERPL-SCNC: 102 MMOL/L (ref 95–110)
CHLORIDE SERPL-SCNC: 103 MMOL/L (ref 95–110)
CO2 SERPL-SCNC: 29 MMOL/L (ref 23–29)
CO2 SERPL-SCNC: 30 MMOL/L (ref 23–29)
CREAT SERPL-MCNC: 1.8 MG/DL (ref 0.5–1.4)
CREAT SERPL-MCNC: 1.9 MG/DL (ref 0.5–1.4)
DIFFERENTIAL METHOD: ABNORMAL
EOSINOPHIL # BLD AUTO: 0.4 K/UL (ref 0–0.5)
EOSINOPHIL NFR BLD: 8.4 % (ref 0–8)
ERYTHROCYTE [DISTWIDTH] IN BLOOD BY AUTOMATED COUNT: 17.2 % (ref 11.5–14.5)
EST. GFR  (NO RACE VARIABLE): 40.9 ML/MIN/1.73 M^2
EST. GFR  (NO RACE VARIABLE): 43.6 ML/MIN/1.73 M^2
GLUCOSE SERPL-MCNC: 80 MG/DL (ref 70–110)
GLUCOSE SERPL-MCNC: 97 MG/DL (ref 70–110)
HCT VFR BLD AUTO: 34.4 % (ref 40–54)
HGB BLD-MCNC: 10.5 G/DL (ref 14–18)
IMM GRANULOCYTES # BLD AUTO: 0 K/UL (ref 0–0.04)
IMM GRANULOCYTES NFR BLD AUTO: 0 % (ref 0–0.5)
LACTATE SERPL-SCNC: 1.2 MMOL/L (ref 0.5–2.2)
LYMPHOCYTES # BLD AUTO: 1.4 K/UL (ref 1–4.8)
LYMPHOCYTES NFR BLD: 30 % (ref 18–48)
MAGNESIUM SERPL-MCNC: 2.2 MG/DL (ref 1.6–2.6)
MCH RBC QN AUTO: 27.7 PG (ref 27–31)
MCHC RBC AUTO-ENTMCNC: 30.5 G/DL (ref 32–36)
MCV RBC AUTO: 91 FL (ref 82–98)
MONOCYTES # BLD AUTO: 0.8 K/UL (ref 0.3–1)
MONOCYTES NFR BLD: 17.1 % (ref 4–15)
NEUTROPHILS # BLD AUTO: 2 K/UL (ref 1.8–7.7)
NEUTROPHILS NFR BLD: 43.6 % (ref 38–73)
NRBC BLD-RTO: 0 /100 WBC
PHOSPHATE SERPL-MCNC: 4.3 MG/DL (ref 2.7–4.5)
PLATELET # BLD AUTO: 245 K/UL (ref 150–450)
PMV BLD AUTO: 12.7 FL (ref 9.2–12.9)
POTASSIUM SERPL-SCNC: 4 MMOL/L (ref 3.5–5.1)
POTASSIUM SERPL-SCNC: 4.2 MMOL/L (ref 3.5–5.1)
PROT SERPL-MCNC: 6.5 G/DL (ref 6–8.4)
RBC # BLD AUTO: 3.79 M/UL (ref 4.6–6.2)
SODIUM SERPL-SCNC: 140 MMOL/L (ref 136–145)
SODIUM SERPL-SCNC: 140 MMOL/L (ref 136–145)
WBC # BLD AUTO: 4.67 K/UL (ref 3.9–12.7)

## 2023-10-08 PROCEDURE — 80048 BASIC METABOLIC PNL TOTAL CA: CPT | Mod: XB

## 2023-10-08 PROCEDURE — 83605 ASSAY OF LACTIC ACID: CPT

## 2023-10-08 PROCEDURE — 36415 COLL VENOUS BLD VENIPUNCTURE: CPT

## 2023-10-08 PROCEDURE — 25000003 PHARM REV CODE 250: Performed by: STUDENT IN AN ORGANIZED HEALTH CARE EDUCATION/TRAINING PROGRAM

## 2023-10-08 PROCEDURE — 85025 COMPLETE CBC W/AUTO DIFF WBC: CPT

## 2023-10-08 PROCEDURE — 93005 ELECTROCARDIOGRAM TRACING: CPT

## 2023-10-08 PROCEDURE — 99233 SBSQ HOSP IP/OBS HIGH 50: CPT | Mod: ,,, | Performed by: HOSPITALIST

## 2023-10-08 PROCEDURE — 84100 ASSAY OF PHOSPHORUS: CPT

## 2023-10-08 PROCEDURE — 63600175 PHARM REV CODE 636 W HCPCS

## 2023-10-08 PROCEDURE — 25000003 PHARM REV CODE 250

## 2023-10-08 PROCEDURE — 11000001 HC ACUTE MED/SURG PRIVATE ROOM

## 2023-10-08 PROCEDURE — 99233 PR SUBSEQUENT HOSPITAL CARE,LEVL III: ICD-10-PCS | Mod: ,,, | Performed by: HOSPITALIST

## 2023-10-08 PROCEDURE — 83735 ASSAY OF MAGNESIUM: CPT

## 2023-10-08 PROCEDURE — 93010 ELECTROCARDIOGRAM REPORT: CPT | Mod: ,,, | Performed by: INTERNAL MEDICINE

## 2023-10-08 PROCEDURE — 21400001 HC TELEMETRY ROOM

## 2023-10-08 PROCEDURE — 80053 COMPREHEN METABOLIC PANEL: CPT

## 2023-10-08 PROCEDURE — 93010 EKG 12-LEAD: ICD-10-PCS | Mod: ,,, | Performed by: INTERNAL MEDICINE

## 2023-10-08 RX ORDER — AMIODARONE HYDROCHLORIDE 200 MG/1
200 TABLET ORAL DAILY
Status: DISCONTINUED | OUTPATIENT
Start: 2023-10-08 | End: 2023-10-08

## 2023-10-08 RX ORDER — VALPROIC ACID 250 MG/5ML
1500 SOLUTION ORAL NIGHTLY
Status: DISCONTINUED | OUTPATIENT
Start: 2023-10-08 | End: 2023-10-19

## 2023-10-08 RX ORDER — AMIODARONE HYDROCHLORIDE 200 MG/1
200 TABLET ORAL DAILY
Status: DISCONTINUED | OUTPATIENT
Start: 2023-10-08 | End: 2023-10-18

## 2023-10-08 RX ORDER — BUSPIRONE HYDROCHLORIDE 10 MG/1
10 TABLET ORAL 3 TIMES DAILY
Status: DISCONTINUED | OUTPATIENT
Start: 2023-10-08 | End: 2023-10-26 | Stop reason: HOSPADM

## 2023-10-08 RX ORDER — BUSPIRONE HYDROCHLORIDE 10 MG/1
10 TABLET ORAL EVERY 12 HOURS PRN
Status: DISCONTINUED | OUTPATIENT
Start: 2023-10-08 | End: 2023-10-19

## 2023-10-08 RX ORDER — FUROSEMIDE 40 MG/1
40 TABLET ORAL DAILY
Status: DISCONTINUED | OUTPATIENT
Start: 2023-10-09 | End: 2023-10-11

## 2023-10-08 RX ADMIN — AMIODARONE HYDROCHLORIDE 200 MG: 200 TABLET ORAL at 11:10

## 2023-10-08 RX ADMIN — EZETIMIBE 10 MG: 10 TABLET ORAL at 08:10

## 2023-10-08 RX ADMIN — BUSPIRONE HYDROCHLORIDE 10 MG: 10 TABLET ORAL at 09:10

## 2023-10-08 RX ADMIN — VALPROIC ACID 500 MG: 250 SOLUTION ORAL at 09:10

## 2023-10-08 RX ADMIN — FUROSEMIDE 40 MG: 10 INJECTION, SOLUTION INTRAVENOUS at 09:10

## 2023-10-08 RX ADMIN — VALPROIC ACID 500 MG: 250 SOLUTION ORAL at 11:10

## 2023-10-08 RX ADMIN — APIXABAN 5 MG: 5 TABLET, FILM COATED ORAL at 09:10

## 2023-10-08 RX ADMIN — BUSPIRONE HYDROCHLORIDE 10 MG: 10 TABLET ORAL at 03:10

## 2023-10-08 RX ADMIN — BUSPIRONE HYDROCHLORIDE 10 MG: 10 TABLET ORAL at 08:10

## 2023-10-08 RX ADMIN — METOPROLOL TARTRATE 25 MG: 25 TABLET, FILM COATED ORAL at 08:10

## 2023-10-08 RX ADMIN — METOPROLOL TARTRATE 25 MG: 25 TABLET, FILM COATED ORAL at 09:10

## 2023-10-08 RX ADMIN — ASPIRIN 81 MG CHEWABLE TABLET 81 MG: 81 TABLET CHEWABLE at 09:10

## 2023-10-08 RX ADMIN — BUSPIRONE HYDROCHLORIDE 10 MG: 10 TABLET ORAL at 04:10

## 2023-10-08 RX ADMIN — ATORVASTATIN CALCIUM 80 MG: 40 TABLET, FILM COATED ORAL at 09:10

## 2023-10-08 RX ADMIN — SODIUM CHLORIDE, POTASSIUM CHLORIDE, SODIUM LACTATE AND CALCIUM CHLORIDE 250 ML: 600; 310; 30; 20 INJECTION, SOLUTION INTRAVENOUS at 11:10

## 2023-10-08 RX ADMIN — VALPROIC ACID 1500 MG: 250 SOLUTION ORAL at 08:10

## 2023-10-08 RX ADMIN — APIXABAN 5 MG: 5 TABLET, FILM COATED ORAL at 08:10

## 2023-10-08 NOTE — ASSESSMENT & PLAN NOTE
Mr. Domingo Griffiths is a 58 y.o M with CAD c/b STEMI, AFib on Eliquis, combined CHF (EF 15-20%), HTN, HLD, CKD3b, L MCA CVA with residual aphasia and R sided deficits s/p PEG placement who presents for evaluation of LE swelling.     Patient's volume status difficult to assess as he is unable to follow directions, however he has a significant increase in his BNP and vascular congestion on his CXR consistent with decompensated heart failure. Unclear if 2/2 poor salt/fluid restriction vs tachycardia induced myopathy as he was found in AFwRVR by EMS vs other precipitant.  Patient appears euvolemic on exam and has remained hemodynamically stable.    - Lasix 40mg QD Po  - F/u Formal TTE for CVP  - Patient not on GDMT. Consider transitioning Lopressor to Toprol on discharge  - 1500cc fluid restriction. RD consulted for cardiac healthy TF recs  - Daily standing weights, strict Is/Os

## 2023-10-08 NOTE — PLAN OF CARE
Recommendations  1)  Isosource 1.5 to goal of 50 ml/hr provides 1800 kcal,  81 g protein  and 912 ml free water     2) Continue texture modified diet per SLP recs  3) If bolus needed 5 cartons daily  4 ) RD following     Goals: Meet % of EEN/EPN by RD f/u date  Nutrition Goal Status: goal not met  Communication of RD Recs: POC

## 2023-10-08 NOTE — PT/OT/SLP PROGRESS
Physical Therapy      Patient Name:  Tera Griffiths   MRN:  [id number]    PT orders acknowledged and accepted, pt screen and determined inappropriate for physical therapy services at this time. Pt non-verbal at baseline and unable to follow commands when prompted at this time. Please re-consult if status changes. Pt to be discharged from acute PT services. Will follow-up when appropriate.    10/8/2023

## 2023-10-08 NOTE — PLAN OF CARE
Problem: Adult Inpatient Plan of Care  Goal: Plan of Care Review  Outcome: Ongoing, Not Progressing  Goal: Patient-Specific Goal (Individualized)  Outcome: Ongoing, Not Progressing  Goal: Absence of Hospital-Acquired Illness or Injury  Outcome: Ongoing, Not Progressing  Goal: Optimal Comfort and Wellbeing  Outcome: Ongoing, Not Progressing  Goal: Readiness for Transition of Care  Outcome: Ongoing, Not Progressing     Problem: Fall Injury Risk  Goal: Absence of Fall and Fall-Related Injury  Outcome: Ongoing, Not Progressing     Problem: Restraint, Nonbehavioral (Nonviolent)  Goal: Absence of Harm or Injury  Outcome: Ongoing, Not Progressing     Problem: Adjustment to Illness (Sepsis/Septic Shock)  Goal: Optimal Coping  Outcome: Ongoing, Not Progressing     Problem: Bleeding (Sepsis/Septic Shock)  Goal: Absence of Bleeding  Outcome: Ongoing, Not Progressing     Problem: Glycemic Control Impaired (Sepsis/Septic Shock)  Goal: Blood Glucose Level Within Desired Range  Outcome: Ongoing, Not Progressing     Problem: Infection Progression (Sepsis/Septic Shock)  Goal: Absence of Infection Signs and Symptoms  Outcome: Ongoing, Not Progressing     Problem: Nutrition Impaired (Sepsis/Septic Shock)  Goal: Optimal Nutrition Intake  Outcome: Ongoing, Not Progressing     Problem: Fluid and Electrolyte Imbalance (Acute Kidney Injury/Impairment)  Goal: Fluid and Electrolyte Balance  Outcome: Ongoing, Not Progressing     Problem: Oral Intake Inadequate (Acute Kidney Injury/Impairment)  Goal: Optimal Nutrition Intake  Outcome: Ongoing, Not Progressing     Problem: Renal Function Impairment (Acute Kidney Injury/Impairment)  Goal: Effective Renal Function  Outcome: Ongoing, Not Progressing     Problem: Skin Injury Risk Increased  Goal: Skin Health and Integrity  Outcome: Ongoing, Not Progressing

## 2023-10-08 NOTE — HOSPITAL COURSE
Patient was admitted to Hospital Medicine service for medical management and evaluation of Acute on Chronic Heart Failure exacerbation and increased agitation from baseline. Psychiatry was consulted for recommendations on agitation control in the setting of prolonged QTc. Psychiatry recommended scheduled buspar with as needed additional dosing and nightly seroquel as needed. Patient was placed in soft restraints after removing multiple lines of PIV access. Diuresis was continued with IV lasix and patient appeared to be euvolemic without ongoing evidence of HF exacerbation. Throughout stay, patient was sequentially restarted on GDMT that was held on previous admission. Would recommend outpatient follow up with PCP/Cardiology to discuss addition of spironolactone. Patient continued to have significant agitation and removed multiple lines of PIV access, requiring soft restraints and a sitter. Discussions with CM ongoing for placement in light of concerning history and presentation. Platelets uptrending after discontinuing valproic acid at recommendation of psychiatry. Significant difficulty coordinating placement in conjunction with patient's family/HPOA. Patient discharged home in hemodynamically stable condition on 10/26 with close PCP follow up with strict return precautions as discussed at bedside.

## 2023-10-08 NOTE — PT/OT/SLP PROGRESS
Occupational Therapy      Patient Name:  Tera Griffiths   MRN:  [id number]    Patient to be discharged from OT services at this time. Pt not responsive to commands, unable to answer questions when prompted. Please re-consult when appropriate.  10/8/2023

## 2023-10-08 NOTE — CONSULTS
"  [facility] - Med Surg  Adult Nutrition  Consult Note    SUMMARY     Recommendations  1)  Isosource 1.5 to goal of 50 ml/hr provides 1800 kcal,  81 g protein  and 912 ml free water     2) Continue texture modified diet per SLP recs  3) If bolus needed 5 cartons daily  4 ) RD following    Goals: Meet % of EEN/EPN by RD f/u date  Nutrition Goal Status: goal not met  Communication of RD Recs: POC    Assessment and Plan    Nutrition Problem  Inadequate energy intake     Related to (etiology):   Physiological demands     Signs and Symptoms (as evidenced by):   HF    Interventions(treatment strategy):  Collaboration of nutrition care w/ other providers     Nutrition Diagnosis Status:   New       Reason for Assessment    Reason For Assessment: consult  Diagnosis: other (see comments)  Relevant Medical History: -  Interdisciplinary Rounds: did not attend  General Information Comments: RD consulted for tube feeding. Pt is non verbal and has non redirectable agitation- not appropriate for visit. Per chart review, noted some weight fluctuations may be d/t fluid shifts. Pt is currently on a pureed, thin liquid diet.  PEG tube in place, unable to make an accurate diagnosis of malnutrition at this time. LBM noted-10/1/23  Nutrition Discharge Planning: pending clinical course    Nutrition Risk Screen    Nutrition Risk Screen: tube feeding or parenteral nutrition    Nutrition/Diet History    Spiritual, Cultural Beliefs, Congregational Practices, Values that Affect Care: no  Food Allergies: NKFA    Anthropometrics    Temp: 97.9 °F (36.6 °C)  Height Method: Estimated  Height: 5' 8" (172.7 cm)  Height (inches): 68 in  Weight Method: Bed Scale  Weight: 76.1 kg (167 lb 12.3 oz)  Weight (lb): 167.77 lb  Ideal Body Weight (IBW), Male: 154 lb  % Ideal Body Weight, Male (lb): 108.94 %  BMI (Calculated): 25.5  BMI Grade: 25 - 29.9 - overweight       Lab/Procedures/Meds    Pertinent Labs Reviewed: reviewed  Pertinent Labs Comments: " -  Pertinent Medications Reviewed: reviewed  Pertinent Medications Comments: -    Estimated/Assessed Needs    Weight Used For Calorie Calculations: 76.1 kg (167 lb 12.3 oz)  Energy Calorie Requirements (kcal): 1902  Energy Need Method: Kcal/kg (30-35 kcal/kg)  Protein Requirements: 91 g (1.2 g/kg)  Weight Used For Protein Calculations: 76.1 kg (167 lb 12.3 oz)        RDA Method (mL): 1902         Nutrition Prescription Ordered    Current Diet Order: Pureed diet    Evaluation of Received Nutrient/Fluid Intake    I/O: -  Energy Calories Required: not meeting needs  Protein Required: not meeting needs  Fluid Required: not meeting needs  Total Fluid Intake (mL/kg): 1 ml or fluid per MD  Tolerance: tolerating  % Intake of Estimated Energy Needs: 0 - 25 %  % Meal Intake: 0 - 25 %    Nutrition Risk    Level of Risk/Frequency of Follow-up: low ((1x/week))       Monitor and Evaluation    Food and Nutrient Intake: energy intake, food and beverage intake  Food and Nutrient Adminstration: diet order  Knowledge/Beliefs/Attitudes: food and nutrition knowledge/skill, beliefs and attitudes  Physical Activity and Function: nutrition-related ADLs and IADLs, factors affecting access to physical activity  Anthropometric Measurements: weight, height/length, weight change, body mass index, growth pattern indices/percentile ranks  Biochemical Data, Medical Tests and Procedures: electrolyte and renal panel, gastrointestinal profile, glucose/endocrine profile, inflammatory profile, lipid profile  Nutrition-Focused Physical Findings: overall appearance, extremities, muscles and bones, head and eyes, skin       Nutrition Follow-Up    RD Follow-up?: Yes

## 2023-10-08 NOTE — SUBJECTIVE & OBJECTIVE
Interval History: Patient was agitated throughout the night and removed several lines of IV access. PRN 1 mg of Ativan given. Patient agitation now to be managed with buspar, per psychiatry. Otherwise, patient is hemodynamically stable.    Review of Systems   Unable to perform ROS: Patient nonverbal     Objective:     Vital Signs (Most Recent):  Temp: 97.4 °F (36.3 °C) (10/08/23 0417)  Pulse: 72 (10/08/23 1103)  Resp: 16 (10/08/23 1103)  BP: (S) (!) 83/53 (10/08/23 1103)  SpO2: 96 % (10/08/23 1103) Vital Signs (24h Range):  Temp:  [97.4 °F (36.3 °C)-97.9 °F (36.6 °C)] 97.4 °F (36.3 °C)  Pulse:  [55-81] 72  Resp:  [16-18] 16  SpO2:  [92 %-98 %] 96 %  BP: ()/(53-88) 83/53     Weight: 76.1 kg (167 lb 12.3 oz)  Body mass index is 25.51 kg/m².    Intake/Output Summary (Last 24 hours) at 10/8/2023 1317  Last data filed at 10/8/2023 1116  Gross per 24 hour   Intake --   Output 1200 ml   Net -1200 ml         Physical Exam  Constitutional:       Appearance: Normal appearance.   HENT:      Mouth/Throat:      Mouth: Mucous membranes are moist.      Pharynx: Oropharynx is clear.   Eyes:      Conjunctiva/sclera: Conjunctivae normal.   Cardiovascular:      Rate and Rhythm: Normal rate. Rhythm irregular.      Pulses: Normal pulses.   Pulmonary:      Effort: Pulmonary effort is normal.      Breath sounds: No rales.   Abdominal:      General: Abdomen is flat.      Palpations: Abdomen is soft.      Comments: +PEG   Musculoskeletal:      Right lower leg: No edema.      Left lower leg: No edema.   Skin:     Capillary Refill: Capillary refill takes less than 2 seconds.   Neurological:      Mental Status: He is alert. Mental status is at baseline.             Significant Labs: All pertinent labs within the past 24 hours have been reviewed.  CBC:   Recent Labs   Lab 10/06/23  1604 10/07/23  0604 10/08/23  0220   WBC 4.84 4.26 4.67   HGB 10.2* 10.5* 10.5*   HCT 32.8* 34.4* 34.4*    245 245     CMP:   Recent Labs   Lab  10/06/23  1604 10/07/23  0604 10/07/23  1511 10/08/23  0220 10/08/23  1207    140 141 140 140   K 4.8 4.2 4.0 4.2 4.0    102 102 103 102   CO2 21* 28 29 29 30*    92 77 80 97   BUN 32* 29* 28* 30* 27*   CREATININE 1.8* 1.8* 1.8* 1.9* 1.8*   CALCIUM 8.9 9.2 9.1 8.9 8.8   PROT 7.1 7.3  --  6.5  --    ALBUMIN 3.5 3.5  --  3.1*  --    BILITOT 0.7 0.8  --  0.6  --    ALKPHOS 118 113  --  99  --    AST 23 18  --  20  --    ALT 18 16  --  16  --    ANIONGAP 11 10 10 8 8       Significant Imaging: I have reviewed all pertinent imaging results/findings within the past 24 hours.
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
## 2023-10-09 LAB
ALBUMIN SERPL BCP-MCNC: 3.3 G/DL (ref 3.5–5.2)
ALP SERPL-CCNC: 98 U/L (ref 55–135)
ALT SERPL W/O P-5'-P-CCNC: 13 U/L (ref 10–44)
ANION GAP SERPL CALC-SCNC: 10 MMOL/L (ref 8–16)
ANION GAP SERPL CALC-SCNC: 9 MMOL/L (ref 8–16)
ASCENDING AORTA: 3.33 CM
AST SERPL-CCNC: 15 U/L (ref 10–40)
AV INDEX (PROSTH): 0.64
AV MEAN GRADIENT: 3 MMHG
AV PEAK GRADIENT: 6 MMHG
AV VALVE AREA BY VELOCITY RATIO: 2.85 CM²
AV VALVE AREA: 2.73 CM²
AV VELOCITY RATIO: 0.67
BASOPHILS # BLD AUTO: 0.03 K/UL (ref 0–0.2)
BASOPHILS NFR BLD: 0.7 % (ref 0–1.9)
BILIRUB SERPL-MCNC: 0.6 MG/DL (ref 0.1–1)
BSA FOR ECHO PROCEDURE: 1.91 M2
BUN SERPL-MCNC: 21 MG/DL (ref 6–20)
BUN SERPL-MCNC: 24 MG/DL (ref 6–20)
CALCIUM SERPL-MCNC: 8.5 MG/DL (ref 8.7–10.5)
CALCIUM SERPL-MCNC: 9.2 MG/DL (ref 8.7–10.5)
CHLORIDE SERPL-SCNC: 101 MMOL/L (ref 95–110)
CHLORIDE SERPL-SCNC: 99 MMOL/L (ref 95–110)
CO2 SERPL-SCNC: 30 MMOL/L (ref 23–29)
CO2 SERPL-SCNC: 31 MMOL/L (ref 23–29)
CREAT SERPL-MCNC: 1.6 MG/DL (ref 0.5–1.4)
CREAT SERPL-MCNC: 1.8 MG/DL (ref 0.5–1.4)
CV ECHO LV RWT: 0.32 CM
DIFFERENTIAL METHOD: ABNORMAL
DOP CALC AO PEAK VEL: 1.23 M/S
DOP CALC AO VTI: 20.14 CM
DOP CALC LVOT AREA: 4.2 CM2
DOP CALC LVOT DIAMETER: 2.32 CM
DOP CALC LVOT PEAK VEL: 0.83 M/S
DOP CALC LVOT STROKE VOLUME: 54.89 CM3
DOP CALCLVOT PEAK VEL VTI: 12.99 CM
ECHO LV POSTERIOR WALL: 1.14 CM (ref 0.6–1.1)
EOSINOPHIL # BLD AUTO: 0.3 K/UL (ref 0–0.5)
EOSINOPHIL NFR BLD: 7.1 % (ref 0–8)
ERYTHROCYTE [DISTWIDTH] IN BLOOD BY AUTOMATED COUNT: 17.6 % (ref 11.5–14.5)
EST. GFR  (NO RACE VARIABLE): 43.6 ML/MIN/1.73 M^2
EST. GFR  (NO RACE VARIABLE): 50.3 ML/MIN/1.73 M^2
FRACTIONAL SHORTENING: 13 % (ref 28–44)
GLUCOSE SERPL-MCNC: 85 MG/DL (ref 70–110)
GLUCOSE SERPL-MCNC: 85 MG/DL (ref 70–110)
HCT VFR BLD AUTO: 39.3 % (ref 40–54)
HGB BLD-MCNC: 11.9 G/DL (ref 14–18)
IMM GRANULOCYTES # BLD AUTO: 0.01 K/UL (ref 0–0.04)
IMM GRANULOCYTES NFR BLD AUTO: 0.2 % (ref 0–0.5)
INTERVENTRICULAR SEPTUM: 0.94 CM (ref 0.6–1.1)
LA MAJOR: 7.73 CM
LA MINOR: 7.91 CM
LA WIDTH: 5.66 CM
LEFT ATRIUM SIZE: 5.39 CM
LEFT ATRIUM VOLUME INDEX MOD: 104.4 ML/M2
LEFT ATRIUM VOLUME INDEX: 107.3 ML/M2
LEFT ATRIUM VOLUME MOD: 197.24 CM3
LEFT ATRIUM VOLUME: 202.76 CM3
LEFT INTERNAL DIMENSION IN SYSTOLE: 6.12 CM (ref 2.1–4)
LEFT VENTRICLE DIASTOLIC VOLUME INDEX: 136.47 ML/M2
LEFT VENTRICLE DIASTOLIC VOLUME: 257.92 ML
LEFT VENTRICLE MASS INDEX: 180 G/M2
LEFT VENTRICLE SYSTOLIC VOLUME INDEX: 99.7 ML/M2
LEFT VENTRICLE SYSTOLIC VOLUME: 188.42 ML
LEFT VENTRICULAR INTERNAL DIMENSION IN DIASTOLE: 7.03 CM (ref 3.5–6)
LEFT VENTRICULAR MASS: 340.58 G
LYMPHOCYTES # BLD AUTO: 1.7 K/UL (ref 1–4.8)
LYMPHOCYTES NFR BLD: 40.8 % (ref 18–48)
MAGNESIUM SERPL-MCNC: 2.2 MG/DL (ref 1.6–2.6)
MCH RBC QN AUTO: 27.7 PG (ref 27–31)
MCHC RBC AUTO-ENTMCNC: 30.3 G/DL (ref 32–36)
MCV RBC AUTO: 91 FL (ref 82–98)
MONOCYTES # BLD AUTO: 0.7 K/UL (ref 0.3–1)
MONOCYTES NFR BLD: 15.8 % (ref 4–15)
MV PEAK E VEL: 0.67 M/S
NEUTROPHILS # BLD AUTO: 1.5 K/UL (ref 1.8–7.7)
NEUTROPHILS NFR BLD: 35.4 % (ref 38–73)
NRBC BLD-RTO: 0 /100 WBC
PHOSPHATE SERPL-MCNC: 4.4 MG/DL (ref 2.7–4.5)
PISA TR MAX VEL: 2.63 M/S
PLATELET # BLD AUTO: 299 K/UL (ref 150–450)
PMV BLD AUTO: 12.4 FL (ref 9.2–12.9)
POTASSIUM SERPL-SCNC: 4.2 MMOL/L (ref 3.5–5.1)
POTASSIUM SERPL-SCNC: 4.6 MMOL/L (ref 3.5–5.1)
PROT SERPL-MCNC: 7 G/DL (ref 6–8.4)
RA MAJOR: 6.97 CM
RA WIDTH: 5.65 CM
RBC # BLD AUTO: 4.3 M/UL (ref 4.6–6.2)
RIGHT VENTRICULAR END-DIASTOLIC DIMENSION: 4.92 CM
SINUS: 3.4 CM
SODIUM SERPL-SCNC: 139 MMOL/L (ref 136–145)
SODIUM SERPL-SCNC: 141 MMOL/L (ref 136–145)
STJ: 3.02 CM
TR MAX PG: 28 MMHG
TRICUSPID ANNULAR PLANE SYSTOLIC EXCURSION: 1.3 CM
WBC # BLD AUTO: 4.24 K/UL (ref 3.9–12.7)
Z-SCORE OF LEFT VENTRICULAR DIMENSION IN END DIASTOLE: 2.84
Z-SCORE OF LEFT VENTRICULAR DIMENSION IN END SYSTOLE: 4.98

## 2023-10-09 PROCEDURE — 63600175 PHARM REV CODE 636 W HCPCS: Performed by: STUDENT IN AN ORGANIZED HEALTH CARE EDUCATION/TRAINING PROGRAM

## 2023-10-09 PROCEDURE — 93010 ELECTROCARDIOGRAM REPORT: CPT | Mod: ,,, | Performed by: INTERNAL MEDICINE

## 2023-10-09 PROCEDURE — 21400001 HC TELEMETRY ROOM

## 2023-10-09 PROCEDURE — 25000003 PHARM REV CODE 250

## 2023-10-09 PROCEDURE — 93005 ELECTROCARDIOGRAM TRACING: CPT

## 2023-10-09 PROCEDURE — 25000003 PHARM REV CODE 250: Performed by: STUDENT IN AN ORGANIZED HEALTH CARE EDUCATION/TRAINING PROGRAM

## 2023-10-09 PROCEDURE — 85025 COMPLETE CBC W/AUTO DIFF WBC: CPT

## 2023-10-09 PROCEDURE — 93010 EKG 12-LEAD: ICD-10-PCS | Mod: ,,, | Performed by: INTERNAL MEDICINE

## 2023-10-09 PROCEDURE — 36415 COLL VENOUS BLD VENIPUNCTURE: CPT

## 2023-10-09 PROCEDURE — 84100 ASSAY OF PHOSPHORUS: CPT

## 2023-10-09 PROCEDURE — 99233 SBSQ HOSP IP/OBS HIGH 50: CPT | Mod: GC,,, | Performed by: HOSPITALIST

## 2023-10-09 PROCEDURE — 94761 N-INVAS EAR/PLS OXIMETRY MLT: CPT

## 2023-10-09 PROCEDURE — 25500020 PHARM REV CODE 255

## 2023-10-09 PROCEDURE — 80053 COMPREHEN METABOLIC PANEL: CPT

## 2023-10-09 PROCEDURE — 11000001 HC ACUTE MED/SURG PRIVATE ROOM

## 2023-10-09 PROCEDURE — 92526 ORAL FUNCTION THERAPY: CPT

## 2023-10-09 PROCEDURE — 80048 BASIC METABOLIC PNL TOTAL CA: CPT | Mod: XB

## 2023-10-09 PROCEDURE — 99233 PR SUBSEQUENT HOSPITAL CARE,LEVL III: ICD-10-PCS | Mod: GC,,, | Performed by: HOSPITALIST

## 2023-10-09 PROCEDURE — 83735 ASSAY OF MAGNESIUM: CPT

## 2023-10-09 RX ORDER — LORAZEPAM 2 MG/ML
0.5 INJECTION INTRAMUSCULAR ONCE
Status: COMPLETED | OUTPATIENT
Start: 2023-10-09 | End: 2023-10-09

## 2023-10-09 RX ORDER — QUETIAPINE FUMARATE 25 MG/1
25 TABLET, FILM COATED ORAL NIGHTLY PRN
Status: DISCONTINUED | OUTPATIENT
Start: 2023-10-09 | End: 2023-10-19

## 2023-10-09 RX ADMIN — EZETIMIBE 10 MG: 10 TABLET ORAL at 09:10

## 2023-10-09 RX ADMIN — HUMAN ALBUMIN MICROSPHERES AND PERFLUTREN 0.11 MG: 10; .22 INJECTION, SOLUTION INTRAVENOUS at 04:10

## 2023-10-09 RX ADMIN — VALPROIC ACID 500 MG: 250 SOLUTION ORAL at 11:10

## 2023-10-09 RX ADMIN — BUSPIRONE HYDROCHLORIDE 10 MG: 10 TABLET ORAL at 11:10

## 2023-10-09 RX ADMIN — LORAZEPAM 0.5 MG: 2 INJECTION INTRAMUSCULAR; INTRAVENOUS at 01:10

## 2023-10-09 RX ADMIN — ASPIRIN 81 MG CHEWABLE TABLET 81 MG: 81 TABLET CHEWABLE at 11:10

## 2023-10-09 RX ADMIN — APIXABAN 5 MG: 5 TABLET, FILM COATED ORAL at 11:10

## 2023-10-09 RX ADMIN — VALPROIC ACID 1500 MG: 250 SOLUTION ORAL at 09:10

## 2023-10-09 RX ADMIN — AMIODARONE HYDROCHLORIDE 200 MG: 200 TABLET ORAL at 11:10

## 2023-10-09 RX ADMIN — FUROSEMIDE 40 MG: 40 TABLET ORAL at 11:10

## 2023-10-09 RX ADMIN — METOPROLOL TARTRATE 25 MG: 25 TABLET, FILM COATED ORAL at 11:10

## 2023-10-09 RX ADMIN — BUSPIRONE HYDROCHLORIDE 10 MG: 10 TABLET ORAL at 09:10

## 2023-10-09 RX ADMIN — APIXABAN 5 MG: 5 TABLET, FILM COATED ORAL at 09:10

## 2023-10-09 RX ADMIN — METOPROLOL TARTRATE 25 MG: 25 TABLET, FILM COATED ORAL at 09:10

## 2023-10-09 RX ADMIN — ATORVASTATIN CALCIUM 80 MG: 40 TABLET, FILM COATED ORAL at 11:10

## 2023-10-09 RX ADMIN — BUSPIRONE HYDROCHLORIDE 10 MG: 10 TABLET ORAL at 04:10

## 2023-10-09 RX ADMIN — QUETIAPINE FUMARATE 25 MG: 25 TABLET ORAL at 09:10

## 2023-10-09 NOTE — PLAN OF CARE
Problem: Adult Inpatient Plan of Care  Goal: Plan of Care Review  Outcome: Ongoing, Progressing  Goal: Patient-Specific Goal (Individualized)  Outcome: Ongoing, Progressing  Goal: Absence of Hospital-Acquired Illness or Injury  Outcome: Ongoing, Progressing  Goal: Optimal Comfort and Wellbeing  Outcome: Ongoing, Progressing  Goal: Readiness for Transition of Care  Outcome: Ongoing, Progressing     Problem: Fall Injury Risk  Goal: Absence of Fall and Fall-Related Injury  Outcome: Ongoing, Progressing     Problem: Restraint, Nonbehavioral (Nonviolent)  Goal: Absence of Harm or Injury  Outcome: Ongoing, Progressing     Problem: Adjustment to Illness (Sepsis/Septic Shock)  Goal: Optimal Coping  Outcome: Ongoing, Progressing     Problem: Bleeding (Sepsis/Septic Shock)  Goal: Absence of Bleeding  Outcome: Ongoing, Progressing     Problem: Glycemic Control Impaired (Sepsis/Septic Shock)  Goal: Blood Glucose Level Within Desired Range  Outcome: Ongoing, Progressing     Problem: Infection Progression (Sepsis/Septic Shock)  Goal: Absence of Infection Signs and Symptoms  Outcome: Ongoing, Progressing     Problem: Nutrition Impaired (Sepsis/Septic Shock)  Goal: Optimal Nutrition Intake  Outcome: Ongoing, Progressing     Problem: Fluid and Electrolyte Imbalance (Acute Kidney Injury/Impairment)  Goal: Fluid and Electrolyte Balance  Outcome: Ongoing, Progressing     Problem: Oral Intake Inadequate (Acute Kidney Injury/Impairment)  Goal: Optimal Nutrition Intake  Outcome: Ongoing, Progressing     Problem: Renal Function Impairment (Acute Kidney Injury/Impairment)  Goal: Effective Renal Function  Outcome: Ongoing, Progressing     Problem: Skin Injury Risk Increased  Goal: Skin Health and Integrity  Outcome: Ongoing, Progressing

## 2023-10-09 NOTE — PT/OT/SLP PROGRESS
Speech Language Pathology Treatment    Patient Name:  Tera Griffiths   MRN:  [id number]  Admitting Diagnosis: Acute on chronic combined systolic and diastolic heart failure    Recommendations:                 General Recommendations:  Dysphagia therapy  Diet recommendations:  Pleasure Feeding, Soft & Bite Sized Diet - IDDSI Level 6, Liquid Diet Level: Thin liquids - IDDSI Level 0   Aspiration Precautions: 1 bite/sip at a time, Assistance with meals, Continue alternate means of nutrition, Feed only when awake/alert, Frequent oral care, HOB to 90 degrees, Ice chips sparingly, Puree for pleasure, Small bites/sips, and Standard aspiration precautions   General Precautions: Standard, fall, aspiration  Communication strategies:  none    Assessment:     Tera Griffiths is a 58 y.o. male with with chronic cognitive-linguistic impairments placing him at a high risk of aspiration.     Subjective     Spoke with RN prior to session. Pt moaning/groaning with left leg hanging out of bed upon entry to room. Restraints in place. No family present.     Pain/Comfort:  Pain Rating 1:  (moaning/groaning t/o session, unable to indicate or rate pain)    Respiratory Status: Room air    Objective:     Has the patient been evaluated by SLP for swallowing?   Yes  Keep patient NPO? No        Pt seen bedside for ongoing swallow assessment. Helped repositioned pt to be seated upright, midline in bed with HOB raised. Pt did not answer simple Y/N questions or follow simple commands, though demonstrated adequate oral acceptance upon presentation. Worked on intake of ice chip x2, thin liquids via straw sips x4 oz, puree x2 oz and cracker x1. He demonstrated adequate oral acceptance and extraction. Noted prolonged mastication, though adequate oral clearance. No overt s/sx of airway compromise appreciated. Recommend he continue PEG tube feeds given increased risk for aspiration 2/2 cognitive-linguistic deficits and allow pleasure feeds of soft  solids and thin liquids when awake/alert and upright. SLP will continue to follow.     Goals:   Multidisciplinary Problems       SLP Goals          Problem: SLP    Goal Priority Disciplines Outcome   SLP Goal     SLP Ongoing, Progressing   Description: Speech Language Pathology Goals  Goals expected to be met by 10/21:     1. The pt will tolerate a least restrictive diet without overt signs of aspiration.                             Plan:     Patient to be seen:  4 x/week   Plan of Care expires:  11/04/23  Plan of Care reviewed with:  patient (RN)   SLP Follow-Up:  Yes       Discharge recommendations:   (tbd)   Barriers to Discharge:  Level of Skilled Assistance Needed      Time Tracking:     SLP Treatment Date:   10/09/23  Speech Start Time:  0951  Speech Stop Time:  1000     Speech Total Time (min):  9 min    Billable Minutes: Treatment Swallowing Dysfunction 9    10/09/2023
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
## 2023-10-09 NOTE — ASSESSMENT & PLAN NOTE
Patient agitated on admission and is baseline non-verbal post CVA. QTc prolonged on admission and home dose seroquel was held.     -Continue home depakote 500/500/1500mg dosing  -Continue home seroquel 25 as nightly PRN  -Continue buspar 10mg TID + 10mg PRN for agitation  -Psychiatry consulted, appreciate recs

## 2023-10-09 NOTE — SUBJECTIVE & OBJECTIVE
Interval History: Patient remained afebrile and hemodynamically stable overnight. PRN 0.5 mg of Ativan given. Patient agitation now to be managed with buspar, per psychiatry. Otherwise, patient is hemodynamically stable.    Review of Systems   Unable to perform ROS: Patient nonverbal     Objective:     Vital Signs (Most Recent):  Temp: 97.8 °F (36.6 °C) (10/09/23 0833)  Pulse: 88 (10/09/23 0833)  Resp: 18 (10/09/23 0833)  BP: (!) 133/95 (10/09/23 0833)  SpO2: 100 % (10/09/23 0833) Vital Signs (24h Range):  Temp:  [97.5 °F (36.4 °C)-98.1 °F (36.7 °C)] 97.8 °F (36.6 °C)  Pulse:  [63-88] 88  Resp:  [16-18] 18  SpO2:  [95 %-100 %] 100 %  BP: ()/(53-95) 133/95     Weight: 76.1 kg (167 lb 12.3 oz)  Body mass index is 25.51 kg/m².    Intake/Output Summary (Last 24 hours) at 10/9/2023 0937  Last data filed at 10/9/2023 0629  Gross per 24 hour   Intake 420 ml   Output 700 ml   Net -280 ml           Physical Exam  Constitutional:       Appearance: Normal appearance.   HENT:      Mouth/Throat:      Mouth: Mucous membranes are moist.      Pharynx: Oropharynx is clear.   Eyes:      Conjunctiva/sclera: Conjunctivae normal.   Cardiovascular:      Rate and Rhythm: Normal rate. Rhythm irregular.      Pulses: Normal pulses.   Pulmonary:      Effort: Pulmonary effort is normal.      Breath sounds: No rales.   Abdominal:      General: Abdomen is flat.      Palpations: Abdomen is soft.      Comments: +PEG   Musculoskeletal:      Right lower leg: No edema.      Left lower leg: No edema.   Skin:     Capillary Refill: Capillary refill takes less than 2 seconds.   Neurological:      Mental Status: He is alert. Mental status is at baseline.           Significant Labs: All pertinent labs within the past 24 hours have been reviewed.  CBC:   Recent Labs   Lab 10/08/23  0220 10/09/23  0434   WBC 4.67 4.24   HGB 10.5* 11.9*   HCT 34.4* 39.3*    299       CMP:   Recent Labs   Lab 10/08/23  0220 10/08/23  1207 10/09/23  0434     140 141   K 4.2 4.0 4.6    102 101   CO2 29 30* 30*   GLU 80 97 85   BUN 30* 27* 24*   CREATININE 1.9* 1.8* 1.8*   CALCIUM 8.9 8.8 9.2   PROT 6.5  --  7.0   ALBUMIN 3.1*  --  3.3*   BILITOT 0.6  --  0.6   ALKPHOS 99  --  98   AST 20  --  15   ALT 16  --  13   ANIONGAP 8 8 10         Significant Imaging: I have reviewed all pertinent imaging results/findings within the past 24 hours.

## 2023-10-10 LAB
ANION GAP SERPL CALC-SCNC: 12 MMOL/L (ref 8–16)
BUN SERPL-MCNC: 19 MG/DL (ref 6–20)
CALCIUM SERPL-MCNC: 9.1 MG/DL (ref 8.7–10.5)
CHLORIDE SERPL-SCNC: 100 MMOL/L (ref 95–110)
CO2 SERPL-SCNC: 26 MMOL/L (ref 23–29)
CREAT SERPL-MCNC: 2 MG/DL (ref 0.5–1.4)
EST. GFR  (NO RACE VARIABLE): 38.4 ML/MIN/1.73 M^2
GLUCOSE SERPL-MCNC: 69 MG/DL (ref 70–110)
POTASSIUM SERPL-SCNC: 4.4 MMOL/L (ref 3.5–5.1)
SODIUM SERPL-SCNC: 138 MMOL/L (ref 136–145)

## 2023-10-10 PROCEDURE — 21400001 HC TELEMETRY ROOM

## 2023-10-10 PROCEDURE — 25000003 PHARM REV CODE 250

## 2023-10-10 PROCEDURE — 80048 BASIC METABOLIC PNL TOTAL CA: CPT

## 2023-10-10 PROCEDURE — 99232 PR SUBSEQUENT HOSPITAL CARE,LEVL II: ICD-10-PCS | Mod: GC,,, | Performed by: HOSPITALIST

## 2023-10-10 PROCEDURE — 36415 COLL VENOUS BLD VENIPUNCTURE: CPT

## 2023-10-10 PROCEDURE — 11000001 HC ACUTE MED/SURG PRIVATE ROOM

## 2023-10-10 PROCEDURE — 99232 SBSQ HOSP IP/OBS MODERATE 35: CPT | Mod: GC,,, | Performed by: HOSPITALIST

## 2023-10-10 PROCEDURE — 25000003 PHARM REV CODE 250: Performed by: STUDENT IN AN ORGANIZED HEALTH CARE EDUCATION/TRAINING PROGRAM

## 2023-10-10 RX ORDER — LORAZEPAM 0.5 MG/1
0.5 TABLET ORAL ONCE
Status: COMPLETED | OUTPATIENT
Start: 2023-10-10 | End: 2023-10-10

## 2023-10-10 RX ORDER — BUSPIRONE HYDROCHLORIDE 10 MG/1
10 TABLET ORAL 3 TIMES DAILY
Qty: 90 TABLET | Refills: 3 | Status: SHIPPED | OUTPATIENT
Start: 2023-10-10 | End: 2023-10-19 | Stop reason: SDUPTHER

## 2023-10-10 RX ORDER — VALPROIC ACID 250 MG/5ML
1500 SOLUTION ORAL NIGHTLY
Qty: 900 ML | Refills: 3 | Status: SHIPPED | OUTPATIENT
Start: 2023-10-10 | End: 2023-10-19 | Stop reason: HOSPADM

## 2023-10-10 RX ORDER — FUROSEMIDE 40 MG/1
40 TABLET ORAL DAILY
Qty: 30 TABLET | Refills: 3 | Status: SHIPPED | OUTPATIENT
Start: 2023-10-10 | End: 2023-10-19 | Stop reason: SDUPTHER

## 2023-10-10 RX ORDER — BUSPIRONE HYDROCHLORIDE 10 MG/1
10 TABLET ORAL 3 TIMES DAILY
Qty: 90 TABLET | Refills: 3 | Status: SHIPPED | OUTPATIENT
Start: 2023-10-10 | End: 2023-10-10 | Stop reason: SDUPTHER

## 2023-10-10 RX ORDER — BUSPIRONE HYDROCHLORIDE 10 MG/1
10 TABLET ORAL EVERY 12 HOURS PRN
Qty: 30 TABLET | Refills: 0 | Status: SHIPPED | OUTPATIENT
Start: 2023-10-10 | End: 2023-10-10 | Stop reason: HOSPADM

## 2023-10-10 RX ADMIN — AMIODARONE HYDROCHLORIDE 200 MG: 200 TABLET ORAL at 09:10

## 2023-10-10 RX ADMIN — ASPIRIN 81 MG CHEWABLE TABLET 81 MG: 81 TABLET CHEWABLE at 09:10

## 2023-10-10 RX ADMIN — APIXABAN 5 MG: 5 TABLET, FILM COATED ORAL at 09:10

## 2023-10-10 RX ADMIN — METOPROLOL TARTRATE 25 MG: 25 TABLET, FILM COATED ORAL at 07:10

## 2023-10-10 RX ADMIN — LORAZEPAM 0.5 MG: 0.5 TABLET ORAL at 08:10

## 2023-10-10 RX ADMIN — ATORVASTATIN CALCIUM 80 MG: 40 TABLET, FILM COATED ORAL at 09:10

## 2023-10-10 RX ADMIN — VALPROIC ACID 500 MG: 250 SOLUTION ORAL at 09:10

## 2023-10-10 RX ADMIN — QUETIAPINE FUMARATE 25 MG: 25 TABLET ORAL at 07:10

## 2023-10-10 RX ADMIN — FUROSEMIDE 40 MG: 40 TABLET ORAL at 09:10

## 2023-10-10 RX ADMIN — BUSPIRONE HYDROCHLORIDE 10 MG: 10 TABLET ORAL at 07:10

## 2023-10-10 RX ADMIN — VALPROIC ACID 1500 MG: 250 SOLUTION ORAL at 07:10

## 2023-10-10 RX ADMIN — EZETIMIBE 10 MG: 10 TABLET ORAL at 07:10

## 2023-10-10 RX ADMIN — BUSPIRONE HYDROCHLORIDE 10 MG: 10 TABLET ORAL at 05:10

## 2023-10-10 RX ADMIN — VALPROIC ACID 500 MG: 250 SOLUTION ORAL at 12:10

## 2023-10-10 RX ADMIN — BUSPIRONE HYDROCHLORIDE 10 MG: 10 TABLET ORAL at 09:10

## 2023-10-10 RX ADMIN — APIXABAN 5 MG: 5 TABLET, FILM COATED ORAL at 07:10

## 2023-10-10 RX ADMIN — METOPROLOL TARTRATE 25 MG: 25 TABLET, FILM COATED ORAL at 09:10

## 2023-10-10 NOTE — ASSESSMENT & PLAN NOTE
Mr. Domingo Griffiths is a 58 y.o M with CAD c/b STEMI, AFib on Eliquis, combined CHF (EF 15-20%), HTN, HLD, CKD3b, L MCA CVA with residual aphasia and R sided deficits s/p PEG placement who presents for evaluation of LE swelling.     Patient's volume status difficult to assess as he is unable to follow directions, however he has a significant increase in his BNP and vascular congestion on his CXR consistent with decompensated heart failure. Unclear if 2/2 poor salt/fluid restriction vs tachycardia induced myopathy as he was found in AFwRVR by EMS vs other precipitant.  Patient appears euvolemic on exam and has remained hemodynamically stable.    - Lasix 40mg QD Po - home dose  - Patient now euvolemic  - Patient not on GDMT. Consider transitioning Lopressor to Toprol on discharge  - 1500cc fluid restriction. RD consulted for cardiac healthy TF recs  - Daily standing weights, strict Is/Os
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
## 2023-10-10 NOTE — PLAN OF CARE
ZENOBIA contacting pt's ex-wife & Healthcare POAZahida on her cell phone to complete Initial Discharge Planning Assessment. Zahida express frustration with the lack of care pt's family, Sister and Brother are not providing to patient. Zahida expressing concern for the patient, but frustration over pt's care. [facility] pt. Lives alone with help from herself and pt's cousin, Tien Griffiths. [facility] pt has no form of income to enter into a nursing facility. [facility] ambulance transportation is needed because pt lives in a 3rd floor apartment building with no elevator. Zahida and Cristhian rotate pt's care at home. [facility] pt is independent with most ADL's. [facility] she contacted [facility] for care attendants, but does not want to wait for the forms to arrive in the mail to complete. [facility] she needs help immediately. Pt. Has [facility] Medicaid insurance coverage. [facility] pt's family has applied for SSI benefits in the past  instead of SSDI benefits, as pt is now disabled. [facility] she has reached out to pt's PCP, Dr. Carleen Bueno several times with no success. Zahida would like all paperwork completed for resources while pt is admitted into the hospital where MD's can sign the paperwork. Zahida is experiencing caregiver burnout while continuing employment for [employer]. [facility] pt will continually come back to the hospital for care until he receives the proper resources for his care at home.     French Carrasquillo LMSW

## 2023-10-10 NOTE — PLAN OF CARE
Declan tyler - Med Surg  Initial Discharge Assessment       Primary Care Provider: Carleen Bueno MD    Admission Diagnosis: Shortness of breath [R06.02]  Tachycardia [R00.0]  Fluid overload [E87.70]  Chest pain [R07.9]    Admission Date: 10/6/2023  Expected Discharge Date: 10/9/2023    Transition of Care Barriers: (P) Social, Transportation, Mobility, Previous protective services    Payor: MEDICAID / Plan: [facility] COMMUNITY PLAN [facility] ([place] MEDICAID) / Product Type: Managed Medicaid /     Extended Emergency Contact Information  Primary Emergency Contact: Zahida Dave  Mobile Phone: [phone]  Relation: Healthcare Power of   Preferred language: English  Secondary Emergency Contact: MADELYN CARTAGENA  Mobile Phone: [phone]  Relation: Sister  Preferred language: English   needed? No    Discharge Plan A: (P) Home Health  Discharge Plan B: (P) Home with family      [facility] [address] [facility], [place] - [address]  [address]  [address]  [facility] [address]  Phone: [phone] Fax: [phone]    [facility] [address]  [address]   [facility] [address]  Phone: [phone] Fax: [phone]    [id number] [address] AT [facility] [address]  [address]  [facility] [address]  Phone: [phone] Fax: [phone]      Initial Assessment (most recent)       Adult Discharge Assessment - 10/09/23 1800          Discharge Assessment    Assessment Type Discharge Planning Assessment     Confirmed/corrected address, phone number and insurance Yes     Confirmed Demographics Correct on Facesheet     Source of Information family;health care advocate     If unable to respond/provide information was family/caregiver contacted? Yes     Contact Name/Number Ex-Wife; Healthcare POZahida MOSES [phone]     Communicated YUSEF with patient/caregiver Date not available/Unable to determine     Reason  For Admission Heart Failure     People in Home alone     Do you expect to return to your current living situation? Yes     Do you have help at home or someone to help you manage your care at home? Yes     Who are your caregiver(s) and their phone number(s)? Healthcare Zahida SEVERINO     Prior to hospitilization cognitive status: Unable to Assess     Current cognitive status: Unable to Assess     Dressing/Bathing bathing difficulty, assistance 1 person     Home Accessibility stairs to enter home     Number of Stairs, Main Entrance other (see comments)   30 stairs. 3rd floor apartment.    Stair Railings, Main Entrance railings safe and in good condition     Landing, Stairs, Main Entrance adequate turning radius     Readmission within 30 days? Yes     Patient currently being followed by outpatient case management? No (P)      Do you currently have service(s) that help you manage your care at home? No (P)      Do you take prescription medications? Yes (P)      Do you have prescription coverage? Yes (P)      Coverage [facility] Medicaid (P)      Do you have any problems affording any of your prescribed medications? No (P)      Is the patient taking medications as prescribed? yes (P)      Who is going to help you get home at discharge? Healthcare POA, Zahida & Cristhian Dunn (P)      How do you get to doctors appointments? other (see comments) (P)    Ambulance Transportation.    Are you on dialysis? No (P)      Do you take coumadin? No (P)      DME Needed Upon Discharge  none (P)      Discharge Plan discussed with: POA (P)      Name(s) and Number(s) Healthcare Zahida SEVERINO Jolie (ex-wife) (P)      Transition of Care Barriers Social;Transportation;Mobility;Previous protective services (P)      SDOH Housing/economic concerns (P)      Housing/Economic Concerns Other (P)    No income.    Discharge Plan A Home Health (P)      Discharge Plan B Home with family (P)         Physical Activity    On average, how many days per week  do you engage in moderate to strenuous exercise (like a brisk walk)? 0 days (P)      On average, how many minutes do you engage in exercise at this level? 0 min (P)         Financial Resource Strain    How hard is it for you to pay for the very basics like food, housing, medical care, and heating? Very hard (P)         Housing Stability    In the last 12 months, was there a time when you were not able to pay the mortgage or rent on time? No (P)      In the last 12 months, how many places have you lived? 1 (P)      In the last 12 months, was there a time when you did not have a steady place to sleep or slept in a shelter (including now)? No (P)         Transportation Needs    In the past 12 months, has lack of transportation kept you from medical appointments or from getting medications? No (P)      In the past 12 months, has lack of transportation kept you from meetings, work, or from getting things needed for daily living? No (P)         Food Insecurity    Within the past 12 months, you worried that your food would run out before you got the money to buy more. Never true (P)      Within the past 12 months, the food you bought just didn't last and you didn't have money to get more. Never true (P)         Stress    Do you feel stress - tense, restless, nervous, or anxious, or unable to sleep at night because your mind is troubled all the time - these days? Only a little (P)         Social Connections    In a typical week, how many times do you talk on the phone with family, friends, or neighbors? More than three times a week (P)      How often do you get together with friends or relatives? More than three times a week (P)      How often do you attend Uatsdin or Mandaen services? Never (P)      Do you belong to any clubs or organizations such as Uatsdin groups, unions, fraternal or athletic groups, or school groups? No (P)      How often do you attend meetings of the clubs or organizations you belong to? Never (P)       Are you , , , , never , or living with a partner?  (P)         Alcohol Use    Q1: How often do you have a drink containing alcohol? Never (P)      Q2: How many drinks containing alcohol do you have on a typical day when you are drinking? Patient does not drink (P)      Q3: How often do you have six or more drinks on one occasion? Never (P)                    ZENOBIA obtaining Initial Discharge Planning Assessment information over the phone with pt's ex-wife & Healthcare POA, Zahida Dave [phone]. Pt is  and lives alone with rotating care from Zahida & pt's cousin Cristhian Griffiths. Zahida states she and Cristhian assist with meals, bathing, medication and sitter services voluntarily throughout the day. ZENOBIA was unable to obtain information about pt's HME at home as Zahida vented her frustration about the lack of family support with pt's care. (See ZENOBIA Progress Note-10/09/23). Discharge Plan is for pt. Transportation home via Ambulance with support from Zahida & Cristhian. Pt. could benefit from [facility] referral. Lack of income is a barrier to long-term placement.     French Carrasquillo LMSW

## 2023-10-10 NOTE — PT/OT/SLP PROGRESS
Speech Language Pathology      Tera Griffiths  MRN: [id number]    Patient not seen today secondary to pt agitated and pulling lines (nursing hold), upon second attempt pt being changed by nursing staff and seen with emotional lability and increased agitation . Spoke with brother at the bedside regarding SLP POC, brother verbalizing agreement.Will follow-up to meet SLP POC at next service date.

## 2023-10-10 NOTE — SUBJECTIVE & OBJECTIVE
Interval History: Patient remained afebrile and hemodynamically stable overnight. PRN seroquel given. Patient agitated this morning and removed pIV. Sitter present in room. Ongoing discussion with CM concerning disposition.    Review of Systems   Unable to perform ROS: Patient nonverbal     Objective:     Vital Signs (Most Recent):  Temp: 97.7 °F (36.5 °C) (10/10/23 0657)  Pulse: (!) 57 (10/10/23 0657)  Resp: 17 (10/10/23 0657)  BP: 121/69 (10/10/23 0657)  SpO2: 95 % (10/10/23 0657) Vital Signs (24h Range):  Temp:  [97.7 °F (36.5 °C)-98.2 °F (36.8 °C)] 97.7 °F (36.5 °C)  Pulse:  [57-82] 57  Resp:  [17-18] 17  SpO2:  [95 %-96 %] 95 %  BP: (113-121)/(52-70) 121/69     Weight: 75.8 kg (167 lb)  Body mass index is 25.39 kg/m².    Intake/Output Summary (Last 24 hours) at 10/10/2023 0934  Last data filed at 10/9/2023 1656  Gross per 24 hour   Intake 790 ml   Output --   Net 790 ml           Physical Exam  Constitutional:       Appearance: Normal appearance.   HENT:      Mouth/Throat:      Mouth: Mucous membranes are moist.      Pharynx: Oropharynx is clear.   Eyes:      Conjunctiva/sclera: Conjunctivae normal.   Cardiovascular:      Rate and Rhythm: Normal rate. Rhythm irregular.      Pulses: Normal pulses.   Pulmonary:      Effort: Pulmonary effort is normal.      Breath sounds: No rales.   Abdominal:      General: Abdomen is flat.      Palpations: Abdomen is soft.      Comments: +PEG   Musculoskeletal:      Right lower leg: No edema.      Left lower leg: No edema.   Skin:     Capillary Refill: Capillary refill takes less than 2 seconds.   Neurological:      Mental Status: He is alert. Mental status is at baseline.             Significant Labs: All pertinent labs within the past 24 hours have been reviewed.  CBC:   Recent Labs   Lab 10/09/23  0434   WBC 4.24   HGB 11.9*   HCT 39.3*          CMP:   Recent Labs   Lab 10/08/23  1207 10/09/23  0434 10/09/23  1447    141 139   K 4.0 4.6 4.2    101 99   CO2  30* 30* 31*   GLU 97 85 85   BUN 27* 24* 21*   CREATININE 1.8* 1.8* 1.6*   CALCIUM 8.8 9.2 8.5*   PROT  --  7.0  --    ALBUMIN  --  3.3*  --    BILITOT  --  0.6  --    ALKPHOS  --  98  --    AST  --  15  --    ALT  --  13  --    ANIONGAP 8 10 9         Significant Imaging: I have reviewed all pertinent imaging results/findings within the past 24 hours.
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
## 2023-10-10 NOTE — PROGRESS NOTES
CONSULTATION LIAISON PSYCHIATRY PROGRESS NOTE    Patient Name: Tera Griffiths  MRN: [id number]  Patient Class: IP- Inpatient  Admission Date: 10/6/2023  Attending Physician: Karthik Logan MD      SUBJECTIVE:   Tera Griffiths is a 58 y.o. 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 Presents to the ED/admitted to the hospital for Acute on chronic combined systolic and diastolic heart failure     Psychiatry consulted for prolonged QTC but is non verbal and has non redirectable agitation; already on depakote and seroquel at home (seroquel held 2/2 QTC) appreciate med recs     Interval Events:   10/7/2023 5:38 PM: Per nursing, patient is still pulling at Iv lines. Patient is still in soft restraints.      Attempted to see patient tonight, patient was in the room sleeping with the sitter at the bedside. Per nursing, patient received PRN lorazepam 1 mg at 2031 for trying to bite his restraints and pulling out his PEG tube. Patient also pulled out his IV four times today.       10/09/2023   Today, patient is resting in bed. He is reaching out to grab my hand and gets calm. Nurse at bedside about to give medications and says that he gets agitated when he is by himself but redirectable and has been calm during the day. Overnight he required IV lorazepam for agitation as well as buspar 10mg once (minimal improvement).  Currently on [id number] and buspar 10mg TID.          10/10/2023  Today, patient sleeping in the morning and did pull his IV but did not require PRN. He received seroquel 25mg  HS. Per team, patient is doing better and they are working with possible correction placement for him given poor social support at home.       OBJECTIVE:    Mental Status Exam:  General Appearance: appears stated age, well developed and nourished, adequately  "groomed and appropriately dressed, in no acute distress  Behavior: pleasant, redirectable  Involuntary Movements and Motor Activity: no abnormal involuntary movements noted  Gait and Station: unable to assess - patient lying down or seated  Speech and Language: naming impaired, repetition impaired, aphasia  Mood: "ASHLEY"  Affect: reactive, anxious  Thought Process and Associations: Unable to Assess  Thought Content and Perceptions:: Unable to Assess  Sensorium and Orientation: Unable to Formally Assess  Recent and Remote Memory: Unable to  Formally Assess  Attention and Concentration: Unable to Formally Assess  Fund of Knowledge: Unable to Formally Assess  Insight:  ASHLEY  Judgment:  ASHLEY     CAM ICU positive? Unable to assess         ASSESSMENT & RECOMMENDATIONS   Agitation   Delirium   Aphasia      PSYCH MEDICATIONS  Scheduled  Continue VPA [id number]; get VPA level (10/11/23)   Continue buspirone 10mg TID      PRN  Continue 10mg Q12 PRN    Okay for seroquel 25mg PRN   Please repeat EKG if able          RISK ASSESSMENT  NO NEED FOR PEC     FOLLOW UP  Will sign off      DISPOSITION - once medically cleared:  Defer to medical team    Please contact ON CALL psychiatry service (24/7) for any acute issues that may arise.    Dr. Arlene Aleman   Psychiatry  [facility]  10/10/2023 9:18 AM        --------------------------------------------------------------------------------------------------------------------------------------------------------------------------------------------------------------------------------------    CONTINUED OBJECTIVE clinical data & findings reviewed and noted for above decision making    Current Medications:   Scheduled Meds:    amiodarone  200 mg Per G Tube Daily    apixaban  5 mg Per G Tube BID    aspirin  81 mg Per G Tube Daily    atorvastatin  80 mg Per G Tube Daily    busPIRone  10 mg Oral TID    ezetimibe  10 mg Per G Tube QHS    furosemide  40 mg Oral " Daily    metoprolol tartrate  25 mg Per G Tube BID    valproic acid (as sodium salt)  1,500 mg Per G Tube QHS    valproic acid (as sodium salt)  500 mg Per G Tube Daily    valproic acid (as sodium salt)  500 mg Per G Tube Daily with lunch     PRN Meds: busPIRone, dextrose 10%, dextrose 10%, glucagon (human recombinant), glucose, glucose, naloxone, nitroGLYCERIN, QUEtiapine, sodium chloride 0.9%    Allergies:   Review of patient's allergies indicates:  No Known Allergies    Vitals  Vitals:    10/10/23 0657   BP: 121/69   Pulse: (!) 57   Resp: 17   Temp: 97.7 °F (36.5 °C)       Labs/Imaging/Studies:  Recent Results (from the past 24 hour(s))   Basic metabolic panel    Collection Time: 10/09/23  2:47 PM   Result Value Ref Range    Sodium 139 136 - 145 mmol/L    Potassium 4.2 3.5 - 5.1 mmol/L    Chloride 99 95 - 110 mmol/L    CO2 31 (H) 23 - 29 mmol/L    Glucose 85 70 - 110 mg/dL    BUN 21 (H) 6 - 20 mg/dL    Creatinine 1.6 (H) 0.5 - 1.4 mg/dL    Calcium 8.5 (L) 8.7 - 10.5 mg/dL    Anion Gap 9 8 - 16 mmol/L    eGFR 50.3 (A) >60 mL/min/1.73 m^2   Echo    Collection Time: 10/09/23  3:55 PM   Result Value Ref Range    Ascending aorta 3.33 cm    STJ 3.02 cm    AV mean gradient 3 mmHg    Ao peak rome 1.23 m/s    Ao VTI 20.14 cm    IVS 0.94 0.6 - 1.1 cm    LA size 5.39 cm    Left Atrium Major Axis 7.73 cm    Left Atrium Minor Axis 7.91 cm    LVIDd 7.03 (A) 3.5 - 6.0 cm    LVIDs 6.12 (A) 2.1 - 4.0 cm    LVOT diameter 2.32 cm    LVOT peak VTI 12.99 cm    Posterior Wall 1.14 (A) 0.6 - 1.1 cm    MV Peak E Rome 0.67 m/s    RA Major Axis 6.97 cm    RA Width 5.65 cm    RVDD 4.92 cm    Sinus 3.40 cm    TAPSE 1.30 cm    TR Max Rome 2.63 m/s    LA WIDTH 5.66 cm    LV Diastolic Volume 257.92 mL    LV Systolic Volume 188.42 mL    LVOT peak rome 0.83 m/s    LA volume (mod) 197.24 cm3    FS 13 %    LA volume 202.76 cm3    LV mass 340.58 g    ZLVIDD 2.84     ZLVIDS 4.98     Left Ventricle Relative Wall Thickness 0.32 cm    AV valve area 2.73  cm²    AV Velocity Ratio 0.67     AV index (prosthetic) 0.64     LVOT area 4.2 cm2    LVOT stroke volume 54.89 cm3    AV peak gradient 6 mmHg    LV Systolic Volume Index 99.7 mL/m2    LV Diastolic Volume Index 136.47 mL/m2    LA Volume Index 107.3 mL/m2    LV Mass Index 180 g/m2    Triscuspid Valve Regurgitation Peak Gradient 28 mmHg    LA Volume Index (Mod) 104.4 mL/m2    KARIN by Velocity Ratio 2.85 cm²    BSA 1.91 m2     Imaging Results              X-Ray Chest AP Portable (Final result)  Result time 10/06/23 16:46:14      Final result by Leandro Ruffin MD (10/06/23 16:46:14)                   Impression:      1. Interstitial findings suggest congestive change/edema, correlation is advised.      Electronically signed by: Leandro Ruffin MD  Date:    10/06/2023  Time:    16:46               Narrative:    EXAMINATION:  XR CHEST AP PORTABLE    CLINICAL HISTORY:  CHF;    TECHNIQUE:  Single frontal view of the chest was performed.    COMPARISON:  10/01/2023    FINDINGS:  The cardiomediastinal silhouette is enlarged, similar to the previous exam.  Left chest wall pacer noted..  There is no pleural effusion.  The trachea is midline.  The lungs are symmetrically expanded bilaterally with coarse interstitial attenuation primarily in a perihilar distribution..  No large focal consolidation seen.  There is no pneumothorax.  The osseous structures are remarkable for degenerative change..

## 2023-10-10 NOTE — PLAN OF CARE
Problem: Adult Inpatient Plan of Care  Goal: Plan of Care Review  Outcome: Ongoing, Progressing  Goal: Patient-Specific Goal (Individualized)  Outcome: Ongoing, Progressing  Goal: Absence of Hospital-Acquired Illness or Injury  Outcome: Ongoing, Progressing  Goal: Optimal Comfort and Wellbeing  Outcome: Ongoing, Progressing  Goal: Readiness for Transition of Care  Outcome: Ongoing, Progressing       VS remain stable. Pt remains free from falls and injuries. Questions and concerns addressed and answered. Medication compliance. Bed low, wheels locked, side rails up x 2, call light within reach.

## 2023-10-11 PROCEDURE — 25000003 PHARM REV CODE 250

## 2023-10-11 PROCEDURE — 80053 COMPREHEN METABOLIC PANEL: CPT

## 2023-10-11 PROCEDURE — 36415 COLL VENOUS BLD VENIPUNCTURE: CPT

## 2023-10-11 PROCEDURE — 93010 ELECTROCARDIOGRAM REPORT: CPT | Mod: ,,, | Performed by: INTERNAL MEDICINE

## 2023-10-11 PROCEDURE — 99233 SBSQ HOSP IP/OBS HIGH 50: CPT | Mod: ,,, | Performed by: HOSPITALIST

## 2023-10-11 PROCEDURE — 11000001 HC ACUTE MED/SURG PRIVATE ROOM

## 2023-10-11 PROCEDURE — 93010 EKG 12-LEAD: ICD-10-PCS | Mod: ,,, | Performed by: INTERNAL MEDICINE

## 2023-10-11 PROCEDURE — 93005 ELECTROCARDIOGRAM TRACING: CPT

## 2023-10-11 PROCEDURE — 85025 COMPLETE CBC W/AUTO DIFF WBC: CPT

## 2023-10-11 PROCEDURE — 25000003 PHARM REV CODE 250: Performed by: STUDENT IN AN ORGANIZED HEALTH CARE EDUCATION/TRAINING PROGRAM

## 2023-10-11 PROCEDURE — 92526 ORAL FUNCTION THERAPY: CPT

## 2023-10-11 PROCEDURE — 21400001 HC TELEMETRY ROOM

## 2023-10-11 PROCEDURE — 99233 PR SUBSEQUENT HOSPITAL CARE,LEVL III: ICD-10-PCS | Mod: ,,, | Performed by: HOSPITALIST

## 2023-10-11 RX ADMIN — QUETIAPINE FUMARATE 25 MG: 25 TABLET ORAL at 08:10

## 2023-10-11 RX ADMIN — VALPROIC ACID 500 MG: 250 SOLUTION ORAL at 12:10

## 2023-10-11 RX ADMIN — FUROSEMIDE 40 MG: 40 TABLET ORAL at 08:10

## 2023-10-11 RX ADMIN — ATORVASTATIN CALCIUM 80 MG: 40 TABLET, FILM COATED ORAL at 08:10

## 2023-10-11 RX ADMIN — BUSPIRONE HYDROCHLORIDE 10 MG: 10 TABLET ORAL at 08:10

## 2023-10-11 RX ADMIN — VALPROIC ACID 1500 MG: 250 SOLUTION ORAL at 08:10

## 2023-10-11 RX ADMIN — METOPROLOL TARTRATE 25 MG: 25 TABLET, FILM COATED ORAL at 08:10

## 2023-10-11 RX ADMIN — AMIODARONE HYDROCHLORIDE 200 MG: 200 TABLET ORAL at 08:10

## 2023-10-11 RX ADMIN — APIXABAN 5 MG: 5 TABLET, FILM COATED ORAL at 08:10

## 2023-10-11 RX ADMIN — ASPIRIN 81 MG CHEWABLE TABLET 81 MG: 81 TABLET CHEWABLE at 08:10

## 2023-10-11 RX ADMIN — EZETIMIBE 10 MG: 10 TABLET ORAL at 08:10

## 2023-10-11 RX ADMIN — BUSPIRONE HYDROCHLORIDE 10 MG: 10 TABLET ORAL at 02:10
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
## 2023-10-11 NOTE — PLAN OF CARE
Patient continues to pull IV repeatedly despite redirection and careful attention to dressing witnessed by this documenting provider. Patient would benefit from having an IV for emergency purposes, but at this point given the patient is not requiring any IV treatments we will leave without IV for now in hopes to titrate chemical restraints. Family does not want physical restraints and I think to restrain him for the purpose of an intervention that is not imminently needed carries more risk than benefit.  Further GOC discussion warranted with family.

## 2023-10-11 NOTE — ASSESSMENT & PLAN NOTE
Patient agitated on admission and is baseline non-verbal post CVA. QTc prolonged on admission and home dose seroquel was held. Patient has history of hospital delirium/agitation, which does not represent baseline behavior at home or among family. Patient is requiring a sitter to control agitation, as patient is able to remove mittens and soft restraints. Has removed multiple pIVs.     -Continue home depakote 500/500/1500mg dosing  -Continue home seroquel 25 as nightly PRN  -Continue buspar 10mg TID + 10mg PRN for agitation  -Psychiatry consulted, appreciate recs

## 2023-10-11 NOTE — SUBJECTIVE & OBJECTIVE
"Interval History: Patient remained afebrile and hemodynamically stable overnight. PRN seroquel given. Patient agitated once again this morning and removed pIV. Sitter was unavailable overnight. Ongoing discussion with CM concerning disposition.    Review of Systems   Unable to perform ROS: Patient nonverbal     Objective:     Vital Signs (Most Recent):  Temp: 97.5 °F (36.4 °C) (10/11/23 1129)  Pulse: (!) 58 (10/11/23 1129)  Resp: 18 (10/11/23 1129)  BP: 107/72 (10/11/23 1129)  SpO2: 97 % (10/11/23 1129) Vital Signs (24h Range):  Temp:  [97.3 °F (36.3 °C)-97.5 °F (36.4 °C)] 97.5 °F (36.4 °C)  Pulse:  [58-71] 58  Resp:  [18-23] 18  SpO2:  [96 %-99 %] 97 %  BP: (107-128)/(72-84) 107/72     Weight: 75.8 kg (167 lb)  Body mass index is 25.39 kg/m².  No intake or output data in the 24 hours ending 10/11/23 1254        Physical Exam  Constitutional:       Appearance: Normal appearance.   HENT:      Mouth/Throat:      Mouth: Mucous membranes are moist.      Pharynx: Oropharynx is clear.   Eyes:      Conjunctiva/sclera: Conjunctivae normal.   Cardiovascular:      Rate and Rhythm: Normal rate. Rhythm irregular.      Pulses: Normal pulses.   Pulmonary:      Effort: Pulmonary effort is normal.      Breath sounds: No rales.   Abdominal:      General: Abdomen is flat.      Palpations: Abdomen is soft.      Comments: +PEG   Musculoskeletal:      Right lower leg: No edema.      Left lower leg: No edema.   Skin:     Capillary Refill: Capillary refill takes less than 2 seconds.   Neurological:      Mental Status: He is alert. Mental status is at baseline.             Significant Labs: All pertinent labs within the past 24 hours have been reviewed.  CBC:   No results for input(s): "WBC", "HGB", "HCT", "PLT" in the last 48 hours.    CMP:   Recent Labs   Lab 10/09/23  1447 10/10/23  1534    138   K 4.2 4.4   CL 99 100   CO2 31* 26   GLU 85 69*   BUN 21* 19   CREATININE 1.6* 2.0*   CALCIUM 8.5* 9.1   ANIONGAP 9 12 "         Significant Imaging: I have reviewed all pertinent imaging results/findings within the past 24 hours.

## 2023-10-11 NOTE — PT/OT/SLP PROGRESS
Speech Language Pathology Treatment    Patient Name:  Tera Griffiths   MRN:  [id number]  Admitting Diagnosis: Acute on chronic combined systolic and diastolic heart failure    Recommendations:                 General Recommendations:   Monitor diet tolerance   Diet recommendations:  Regular Diet - IDDSI Level 7, Liquid Diet Level: Thin liquids - IDDSI Level 0   Aspiration Precautions: 1 bite/sip at a time, Assistance with meals, Eliminate distractions, Feed only when awake/alert, HOB to 90 degrees, Small bites/sips, and Standard aspiration precautions   General Precautions: Standard, fall  Communication strategies:   n/a    Assessment:     Tera Griffiths is a 58 y.o. male with with chronic cognitive-linguistic impairments placing him at increased risk of aspiration. Pt able to tolerate regular solids and thin liquids without difficulty and has been demonstrating adequate PO intake     Subjective     Spoke with RN prior to session who reports opt tolerated breakfast without difficulty. Pt awake and restless upon entry to room. Pt with constant moaning and attempts at pulling out IV. No restraints in place upon entry to room, sitter at bedside.     Pain/Comfort:  Pain Rating 1:  (none observed or reported)    Respiratory Status: Room air    Objective:     Has the patient been evaluated by SLP for swallowing?   Yes  Keep patient NPO? No     Pt seen for ongoing dysphagia therapy. HOB raised prior to PO trials. Sitter at bedside preventing pt from pulling at IV. Pt restless, though able to self present thin liquids from open cup and cracker adequately when provided. He demonstrated adequate extraction, mastication and oral clearance. No overt s/sx of airway compromise appreciated. Pt reportedly demonstrated adequate PO intake with breakfast tray this morning. Recommend he be advanced to a regular diet at this time. Given restlessness and attempts to pull at IV, bilaterally mitts placed. Recommend feed assist if in  restraints or mitts, otherwise 1:1 supervision and assistance with tray set up/ cutting up foods. SLP will follow up to check tolerance with diet advancement. RN updated post session.     Goals:   Multidisciplinary Problems       SLP Goals          Problem: SLP    Goal Priority Disciplines Outcome   SLP Goal     SLP Ongoing, Progressing   Description: Speech Language Pathology Goals  Goals expected to be met by 10/21:     1. The pt will tolerate a least restrictive diet without overt signs of aspiration.                             Plan:     Patient to be seen:  4 x/week   Plan of Care expires:  11/04/23  Plan of Care reviewed with:  patient (yosef & RN)   SLP Follow-Up:  Yes       Discharge recommendations:   (tbd)   Barriers to Discharge:  None    Time Tracking:     SLP Treatment Date:   10/11/23  Speech Start Time:  1031  Speech Stop Time:  1040     Speech Total Time (min):  9 min    Billable Minutes: Treatment Swallowing Dysfunction 9    10/11/2023
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
## 2023-10-11 NOTE — ASSESSMENT & PLAN NOTE
Mr. Domingo Griffiths is a 58 y.o M with CAD c/b STEMI, AFib on Eliquis, combined CHF (EF 15-20%), HTN, HLD, CKD3b, L MCA CVA with residual aphasia and R sided deficits s/p PEG placement who presents for evaluation of LE swelling.     Patient's volume status difficult to assess as he is unable to follow directions, however he has a significant increase in his BNP and vascular congestion on his CXR consistent with decompensated heart failure. Unclear if 2/2 poor salt/fluid restriction vs tachycardia induced myopathy as he was found in AFwRVR by EMS vs other precipitant.  Patient appears euvolemic on exam and has remained hemodynamically stable.    - Lasix 40mg QD Po - home dose. Holding in the setting of rising sCr  - Patient now euvolemic  - Patient not on GDMT. Consider transitioning Lopressor to Toprol on discharge  - 1500cc fluid restriction. RD consulted for cardiac healthy TF recs  - Daily standing weights, strict Is/Os
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
## 2023-10-12 LAB
ALBUMIN SERPL BCP-MCNC: 3.4 G/DL (ref 3.5–5.2)
ALP SERPL-CCNC: 87 U/L (ref 55–135)
ALT SERPL W/O P-5'-P-CCNC: 12 U/L (ref 10–44)
ANION GAP SERPL CALC-SCNC: 8 MMOL/L (ref 8–16)
AST SERPL-CCNC: 16 U/L (ref 10–40)
BASOPHILS # BLD AUTO: 0.02 K/UL (ref 0–0.2)
BASOPHILS NFR BLD: 0.6 % (ref 0–1.9)
BILIRUB SERPL-MCNC: 0.5 MG/DL (ref 0.1–1)
BUN SERPL-MCNC: 24 MG/DL (ref 6–20)
CALCIUM SERPL-MCNC: 8.7 MG/DL (ref 8.7–10.5)
CHLORIDE SERPL-SCNC: 99 MMOL/L (ref 95–110)
CO2 SERPL-SCNC: 33 MMOL/L (ref 23–29)
CREAT SERPL-MCNC: 1.9 MG/DL (ref 0.5–1.4)
DIFFERENTIAL METHOD: ABNORMAL
EOSINOPHIL # BLD AUTO: 0.2 K/UL (ref 0–0.5)
EOSINOPHIL NFR BLD: 6.1 % (ref 0–8)
ERYTHROCYTE [DISTWIDTH] IN BLOOD BY AUTOMATED COUNT: 17.2 % (ref 11.5–14.5)
EST. GFR  (NO RACE VARIABLE): 40.9 ML/MIN/1.73 M^2
GLUCOSE SERPL-MCNC: 108 MG/DL (ref 70–110)
HCT VFR BLD AUTO: 39.2 % (ref 40–54)
HGB BLD-MCNC: 11.8 G/DL (ref 14–18)
IMM GRANULOCYTES # BLD AUTO: 0 K/UL (ref 0–0.04)
IMM GRANULOCYTES NFR BLD AUTO: 0 % (ref 0–0.5)
LYMPHOCYTES # BLD AUTO: 1.6 K/UL (ref 1–4.8)
LYMPHOCYTES NFR BLD: 45 % (ref 18–48)
MCH RBC QN AUTO: 27.6 PG (ref 27–31)
MCHC RBC AUTO-ENTMCNC: 30.1 G/DL (ref 32–36)
MCV RBC AUTO: 92 FL (ref 82–98)
MONOCYTES # BLD AUTO: 0.5 K/UL (ref 0.3–1)
MONOCYTES NFR BLD: 12.6 % (ref 4–15)
NEUTROPHILS # BLD AUTO: 1.3 K/UL (ref 1.8–7.7)
NEUTROPHILS NFR BLD: 35.7 % (ref 38–73)
NRBC BLD-RTO: 0 /100 WBC
PLATELET # BLD AUTO: 289 K/UL (ref 150–450)
PMV BLD AUTO: 11.8 FL (ref 9.2–12.9)
POTASSIUM SERPL-SCNC: 3.9 MMOL/L (ref 3.5–5.1)
PROT SERPL-MCNC: 7.3 G/DL (ref 6–8.4)
RBC # BLD AUTO: 4.27 M/UL (ref 4.6–6.2)
SODIUM SERPL-SCNC: 140 MMOL/L (ref 136–145)
WBC # BLD AUTO: 3.58 K/UL (ref 3.9–12.7)

## 2023-10-12 PROCEDURE — 63600175 PHARM REV CODE 636 W HCPCS

## 2023-10-12 PROCEDURE — 21400001 HC TELEMETRY ROOM

## 2023-10-12 PROCEDURE — 25000003 PHARM REV CODE 250: Performed by: STUDENT IN AN ORGANIZED HEALTH CARE EDUCATION/TRAINING PROGRAM

## 2023-10-12 PROCEDURE — 25000003 PHARM REV CODE 250

## 2023-10-12 PROCEDURE — 99231 SBSQ HOSP IP/OBS SF/LOW 25: CPT | Mod: ,,, | Performed by: HOSPITALIST

## 2023-10-12 PROCEDURE — 11000001 HC ACUTE MED/SURG PRIVATE ROOM

## 2023-10-12 PROCEDURE — 99231 PR SUBSEQUENT HOSPITAL CARE,LEVL I: ICD-10-PCS | Mod: ,,, | Performed by: HOSPITALIST

## 2023-10-12 PROCEDURE — 92526 ORAL FUNCTION THERAPY: CPT

## 2023-10-12 RX ORDER — LORAZEPAM 2 MG/ML
1 INJECTION INTRAMUSCULAR ONCE
Status: COMPLETED | OUTPATIENT
Start: 2023-10-12 | End: 2023-10-12

## 2023-10-12 RX ADMIN — BUSPIRONE HYDROCHLORIDE 10 MG: 10 TABLET ORAL at 07:10

## 2023-10-12 RX ADMIN — LORAZEPAM 1 MG: 2 INJECTION INTRAMUSCULAR; INTRAVENOUS at 03:10

## 2023-10-12 RX ADMIN — ASPIRIN 81 MG CHEWABLE TABLET 81 MG: 81 TABLET CHEWABLE at 08:10

## 2023-10-12 RX ADMIN — EZETIMIBE 10 MG: 10 TABLET ORAL at 07:10

## 2023-10-12 RX ADMIN — METOPROLOL TARTRATE 25 MG: 25 TABLET, FILM COATED ORAL at 08:10

## 2023-10-12 RX ADMIN — METOPROLOL TARTRATE 25 MG: 25 TABLET, FILM COATED ORAL at 07:10

## 2023-10-12 RX ADMIN — APIXABAN 5 MG: 5 TABLET, FILM COATED ORAL at 08:10

## 2023-10-12 RX ADMIN — QUETIAPINE FUMARATE 25 MG: 25 TABLET ORAL at 07:10

## 2023-10-12 RX ADMIN — VALPROIC ACID 500 MG: 250 SOLUTION ORAL at 12:10

## 2023-10-12 RX ADMIN — VALPROIC ACID 500 MG: 250 SOLUTION ORAL at 08:10

## 2023-10-12 RX ADMIN — BUSPIRONE HYDROCHLORIDE 10 MG: 10 TABLET ORAL at 08:10

## 2023-10-12 RX ADMIN — VALPROIC ACID 1500 MG: 250 SOLUTION ORAL at 07:10

## 2023-10-12 RX ADMIN — APIXABAN 5 MG: 5 TABLET, FILM COATED ORAL at 07:10

## 2023-10-12 RX ADMIN — AMIODARONE HYDROCHLORIDE 200 MG: 200 TABLET ORAL at 08:10

## 2023-10-12 RX ADMIN — ATORVASTATIN CALCIUM 80 MG: 40 TABLET, FILM COATED ORAL at 08:10

## 2023-10-12 NOTE — PT/OT/SLP PROGRESS
Speech Language Pathology Treatment/  Discharge Summary     Patient Name:  Tera Griffiths   MRN:  [id number]  Admitting Diagnosis: Acute on chronic combined systolic and diastolic heart failure    Recommendations:                 General Recommendations:  Follow-up not indicated  Diet recommendations:  Regular Diet - IDDSI Level 7, Liquid Diet Level: Thin liquids - IDDSI Level 0   Aspiration Precautions: Standard aspiration precautions   General Precautions: Standard, fall  Communication strategies:  go to room if call light pushed    Assessment:     Tera Griffiths is a 58 y.o. male with an SLP diagnosis of  functional swallow .  No further ST needs.     Subjective     Spoke with RN prior to session. Pt awake sitting upright on EOB with sitter at bedside.     Pain/Comfort:  Pain Rating 1:  (none indicated or observed)    Respiratory Status: Room air    Objective:     Has the patient been evaluated by SLP for swallowing?   Yes  Keep patient NPO? No     Pt seen for diet check to ensure tolerance of regular solids and thin liquids. Sitter reports he fed himself breakfast without any difficulty and ate majority of tray. Provided cracker x2 and 8 oz ice water. Pt able to self present and tolerate without any overt s/sx of airway compromise this session. Recommend he continue with a regular solid and thin liquids diet at this time. N further acute ST needs.     Goals:   Multidisciplinary Problems       SLP Goals          Problem: SLP    Goal Priority Disciplines Outcome   SLP Goal     SLP Ongoing, Progressing   Description: Speech Language Pathology Goals  Goals expected to be met by 10/21:     1. The pt will tolerate a least restrictive diet without overt signs of aspiration.                             Plan:     Patient to be seen:  4 x/week   Plan of Care expires:  11/04/23  Plan of Care reviewed with:  patient   SLP Follow-Up:  No       Discharge recommendations:   (tbd)   Barriers to Discharge:  None    Time  Tracking:     SLP Treatment Date:   10/12/23  Speech Start Time:  1008  Speech Stop Time:  1014     Speech Total Time (min):  6 min    Billable Minutes: Treatment Swallowing Dysfunction 6    10/12/2023
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
## 2023-10-12 NOTE — SUBJECTIVE & OBJECTIVE
Interval History: No issues overnight. Out of restraints, calm this morning. HDS. Pending placement     Review of Systems   Unable to perform ROS: Patient nonverbal     Objective:     Vital Signs (Most Recent):  Temp: 97.6 °F (36.4 °C) (10/12/23 1119)  Pulse: 62 (10/12/23 1119)  Resp: 18 (10/12/23 1119)  BP: 135/88 (10/12/23 1119)  SpO2: 98 % (10/12/23 0729) Vital Signs (24h Range):  Temp:  [97 °F (36.1 °C)-97.6 °F (36.4 °C)] 97.6 °F (36.4 °C)  Pulse:  [60-76] 62  Resp:  [17-18] 18  SpO2:  [95 %-99 %] 98 %  BP: ()/(51-90) 135/88     Weight: 75.8 kg (167 lb)  Body mass index is 25.39 kg/m².    Intake/Output Summary (Last 24 hours) at 10/12/2023 1305  Last data filed at 10/12/2023 0406  Gross per 24 hour   Intake 240 ml   Output --   Net 240 ml           Physical Exam  Vitals and nursing note reviewed.   Constitutional:       General: He is not in acute distress.     Appearance: Normal appearance. He is not ill-appearing.   HENT:      Head: Normocephalic and atraumatic.      Mouth/Throat:      Mouth: Mucous membranes are moist.      Pharynx: Oropharynx is clear.   Eyes:      Conjunctiva/sclera: Conjunctivae normal.   Cardiovascular:      Rate and Rhythm: Normal rate. Rhythm irregular.      Pulses: Normal pulses.      Heart sounds: No murmur heard.  Pulmonary:      Effort: Pulmonary effort is normal. No respiratory distress.      Breath sounds: No wheezing, rhonchi or rales.   Abdominal:      General: Abdomen is flat. There is no distension.      Palpations: Abdomen is soft.      Tenderness: There is no abdominal tenderness.      Comments: +PEG   Musculoskeletal:      Right lower leg: No edema.      Left lower leg: No edema.   Skin:     General: Skin is warm.      Capillary Refill: Capillary refill takes less than 2 seconds.      Findings: No erythema or rash.   Neurological:      Mental Status: He is alert. Mental status is at baseline.             Significant Labs: All pertinent labs within the past 24 hours  have been reviewed.  CBC:   Recent Labs   Lab 10/11/23  2339   WBC 3.58*   HGB 11.8*   HCT 39.2*          CMP:   Recent Labs   Lab 10/10/23  1534 10/11/23  2339    140   K 4.4 3.9    99   CO2 26 33*   GLU 69* 108   BUN 19 24*   CREATININE 2.0* 1.9*   CALCIUM 9.1 8.7   PROT  --  7.3   ALBUMIN  --  3.4*   BILITOT  --  0.5   ALKPHOS  --  87   AST  --  16   ALT  --  12   ANIONGAP 12 8         Significant Imaging: I have reviewed all pertinent imaging results/findings within the past 24 hours.

## 2023-10-12 NOTE — PLAN OF CARE
Problem: Adult Inpatient Plan of Care  Goal: Absence of Hospital-Acquired Illness or Injury  Outcome: Ongoing, Progressing  Goal: Optimal Comfort and Wellbeing  Outcome: Ongoing, Progressing     Problem: Fall Injury Risk  Goal: Absence of Fall and Fall-Related Injury  Outcome: Ongoing, Progressing     Problem: Adjustment to Illness (Sepsis/Septic Shock)  Goal: Optimal Coping  Outcome: Ongoing, Progressing     Problem: Infection Progression (Sepsis/Septic Shock)  Goal: Absence of Infection Signs and Symptoms  Outcome: Ongoing, Progressing     Problem: Fluid and Electrolyte Imbalance (Acute Kidney Injury/Impairment)  Goal: Fluid and Electrolyte Balance  Outcome: Ongoing, Progressing

## 2023-10-13 PROCEDURE — 11000001 HC ACUTE MED/SURG PRIVATE ROOM

## 2023-10-13 PROCEDURE — 25000003 PHARM REV CODE 250

## 2023-10-13 PROCEDURE — 99231 SBSQ HOSP IP/OBS SF/LOW 25: CPT | Mod: ,,, | Performed by: HOSPITALIST

## 2023-10-13 PROCEDURE — 99231 PR SUBSEQUENT HOSPITAL CARE,LEVL I: ICD-10-PCS | Mod: ,,, | Performed by: HOSPITALIST

## 2023-10-13 PROCEDURE — 25000003 PHARM REV CODE 250: Performed by: STUDENT IN AN ORGANIZED HEALTH CARE EDUCATION/TRAINING PROGRAM

## 2023-10-13 RX ADMIN — VALPROIC ACID 500 MG: 250 SOLUTION ORAL at 12:10

## 2023-10-13 RX ADMIN — METOPROLOL TARTRATE 25 MG: 25 TABLET, FILM COATED ORAL at 08:10

## 2023-10-13 RX ADMIN — VALPROIC ACID 1500 MG: 250 SOLUTION ORAL at 09:10

## 2023-10-13 RX ADMIN — BUSPIRONE HYDROCHLORIDE 10 MG: 10 TABLET ORAL at 08:10

## 2023-10-13 RX ADMIN — QUETIAPINE FUMARATE 25 MG: 25 TABLET ORAL at 09:10

## 2023-10-13 RX ADMIN — APIXABAN 5 MG: 5 TABLET, FILM COATED ORAL at 09:10

## 2023-10-13 RX ADMIN — APIXABAN 5 MG: 5 TABLET, FILM COATED ORAL at 08:10

## 2023-10-13 RX ADMIN — BUSPIRONE HYDROCHLORIDE 10 MG: 10 TABLET ORAL at 04:10

## 2023-10-13 RX ADMIN — ASPIRIN 81 MG CHEWABLE TABLET 81 MG: 81 TABLET CHEWABLE at 08:10

## 2023-10-13 RX ADMIN — METOPROLOL TARTRATE 25 MG: 25 TABLET, FILM COATED ORAL at 09:10

## 2023-10-13 RX ADMIN — BUSPIRONE HYDROCHLORIDE 10 MG: 10 TABLET ORAL at 09:10

## 2023-10-13 RX ADMIN — AMIODARONE HYDROCHLORIDE 200 MG: 200 TABLET ORAL at 08:10

## 2023-10-13 RX ADMIN — VALPROIC ACID 500 MG: 250 SOLUTION ORAL at 08:10

## 2023-10-13 RX ADMIN — ATORVASTATIN CALCIUM 80 MG: 40 TABLET, FILM COATED ORAL at 08:10

## 2023-10-13 RX ADMIN — EZETIMIBE 10 MG: 10 TABLET ORAL at 09:10
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
## 2023-10-13 NOTE — PLAN OF CARE
Recommendations    1. Continue Regular Diet as tolerated. Texture per SLP.   2. IF EN warranted recommend Isosource @ 50 mL/hr x 24 hours to provide  1800 kcal ( 94% EEN), 81 g protein ( 100% EPN), and 912 ml free water)   100 mL FWF Q4H   3. Weigh weekly.   4. RD to monitor and follow-up.    Goals: Meet % of EEN/EPN by RD f/u date  Nutrition Goal Status: progressing towards goal  Communication of RD Recs: other (comment)

## 2023-10-13 NOTE — PLAN OF CARE
10/13/23 1412   Post-Acute Status   Post-Acute Authorization Placement   Discharge Plan   Discharge Plan A New Nursing Home placement - nursing home care facility     [facility] has requested updated MD notes and PT notes, forwarded updated notes via Juju Sharp, RN    [facility]  [phone]

## 2023-10-13 NOTE — SUBJECTIVE & OBJECTIVE
Interval History: No issues overnight and patient remains afebrile, hemodynamically stable. Out of restraints, remains calm this morning. Pending placement.    Review of Systems   Unable to perform ROS: Patient nonverbal     Objective:     Vital Signs (Most Recent):  Temp: 97.3 °F (36.3 °C) (10/13/23 0700)  Pulse: 65 (10/13/23 0700)  Resp: 18 (10/13/23 0700)  BP: 125/77 (10/13/23 0700)  SpO2: 95 % (10/13/23 0700) Vital Signs (24h Range):  Temp:  [97.3 °F (36.3 °C)] 97.3 °F (36.3 °C)  Pulse:  [65] 65  Resp:  [18] 18  SpO2:  [95 %] 95 %  BP: (125-131)/(77-86) 125/77     Weight: 75.8 kg (167 lb)  Body mass index is 25.39 kg/m².  No intake or output data in the 24 hours ending 10/13/23 1515        Physical Exam  Vitals and nursing note reviewed.   Constitutional:       General: He is not in acute distress.     Appearance: Normal appearance. He is not ill-appearing.   HENT:      Head: Normocephalic and atraumatic.      Mouth/Throat:      Mouth: Mucous membranes are moist.      Pharynx: Oropharynx is clear.   Eyes:      Conjunctiva/sclera: Conjunctivae normal.   Cardiovascular:      Rate and Rhythm: Normal rate. Rhythm irregular.      Pulses: Normal pulses.      Heart sounds: No murmur heard.  Pulmonary:      Effort: Pulmonary effort is normal. No respiratory distress.      Breath sounds: No wheezing, rhonchi or rales.   Abdominal:      General: Abdomen is flat. There is no distension.      Palpations: Abdomen is soft.      Tenderness: There is no abdominal tenderness.      Comments: +PEG   Musculoskeletal:      Right lower leg: No edema.      Left lower leg: No edema.   Skin:     General: Skin is warm.      Capillary Refill: Capillary refill takes less than 2 seconds.      Findings: No erythema or rash.   Neurological:      Mental Status: He is alert. Mental status is at baseline.             Significant Labs: All pertinent labs within the past 24 hours have been reviewed.  CBC:   Recent Labs   Lab 10/11/23  2339   WBC  3.58*   HGB 11.8*   HCT 39.2*          CMP:   Recent Labs   Lab 10/11/23  2339      K 3.9   CL 99   CO2 33*      BUN 24*   CREATININE 1.9*   CALCIUM 8.7   PROT 7.3   ALBUMIN 3.4*   BILITOT 0.5   ALKPHOS 87   AST 16   ALT 12   ANIONGAP 8         Significant Imaging: I have reviewed all pertinent imaging results/findings within the past 24 hours.

## 2023-10-13 NOTE — PROGRESS NOTES
"[facility] - Med Surg  Adult Nutrition  Progress Note    SUMMARY       Recommendations    1. Continue Regular Diet as tolerated. Texture per SLP.   2. IF EN warranted recommend Isosource @ 50 mL/hr x 24 hours to provide  1800 kcal ( 94% EEN), 81 g protein ( 100% EPN), and 912 ml free water)   100 mL FWF Q4H   3. Weigh weekly.   4. RD to monitor and follow-up.    Goals: Meet % of EEN/EPN by RD f/u date  Nutrition Goal Status: progressing towards goal  Communication of RD Recs: other (comment)    Assessment and Plan    Nutrition Problem  Inadequate energy intake     Related to (etiology):   Physiological demands     Signs and Symptoms (as evidenced by):   HF    Interventions(treatment strategy):  Collaboration of nutrition care w/ other providers     Nutrition Diagnosis Status:   Improving       Reason for Assessment    Reason For Assessment: RD follow-up  Diagnosis: other (see comments)  Relevant Medical History: -  Interdisciplinary Rounds: did not attend  General Information Comments: RD follow-up. Patient resting at RD visit. Spoke with sitter at bedside, TF not running, patietnt eating well and tolerating oral diet PEG likely to be d/c'd. Upgraded to Regular with thin liquids. Patient is non-verbal.  Nutrition Discharge Planning: pending clinical course    Nutrition Risk Screen    Nutrition Risk Screen: no indicators present    Nutrition/Diet History    Patient Reported Diet/Restrictions/Preferences: general  Spiritual, Cultural Beliefs, Jain Practices, Values that Affect Care: no  Food Allergies: NKFA  Factors Affecting Nutritional Intake: None identified at this time    Anthropometrics    Temp: 97.3 °F (36.3 °C)  Height Method: Estimated  Height: 5' 8" (172.7 cm)  Height (inches): 68 in  Weight Method: Bed Scale  Weight: 75.8 kg (167 lb)  Weight (lb): 167 lb  Ideal Body Weight (IBW), Male: 154 lb  % Ideal Body Weight, Male (lb): 108.44 %  BMI (Calculated): 25.4  BMI Grade: 25 - 29.9 - overweight   "     Lab/Procedures/Meds    Pertinent Labs Reviewed: reviewed  Pertinent Labs Comments: CO2 33, BUN 24, Creat 1.9, GFR 40.9  Pertinent Medications Reviewed: reviewed  Pertinent Medications Comments: -      Estimated/Assessed Needs    Weight Used For Calorie Calculations: 76.1 kg (167 lb 12.3 oz)  Energy Calorie Requirements (kcal): 1902  Energy Need Method: Kcal/kg (30-35 kcal/kg)  Protein Requirements: 91 g (1.2 g/kg)  Weight Used For Protein Calculations: 76.1 kg (167 lb 12.3 oz)        RDA Method (mL): 1902         Nutrition Prescription Ordered    Current Diet Order: Regular  Nutrition Order Comments: Thin Liquids    Evaluation of Received Nutrient/Fluid Intake    I/O: 0  Energy Calories Required: meeting needs  Protein Required: meeting needs  Fluid Required: not meeting needs  Total Fluid Intake (mL/kg): 1 ml or fluid per MD  Comments: LBM: 10/12  Tolerance: tolerating  % Intake of Estimated Energy Needs: 75 - 100 %  % Meal Intake: 75 - 100 %    Nutrition Risk    Level of Risk/Frequency of Follow-up: low ((1x/week))     Monitor and Evaluation    Food and Nutrient Intake: energy intake, food and beverage intake  Food and Nutrient Adminstration: diet order  Knowledge/Beliefs/Attitudes: food and nutrition knowledge/skill, beliefs and attitudes  Physical Activity and Function: nutrition-related ADLs and IADLs, factors affecting access to physical activity  Anthropometric Measurements: weight, height/length, weight change, body mass index, growth pattern indices/percentile ranks  Biochemical Data, Medical Tests and Procedures: electrolyte and renal panel, gastrointestinal profile, glucose/endocrine profile, inflammatory profile, lipid profile  Nutrition-Focused Physical Findings: overall appearance, extremities, muscles and bones, head and eyes, skin     Nutrition Follow-Up    RD Follow-up?: Yes    [facility] Registration Eligible, Provisional LDN

## 2023-10-14 PROCEDURE — 25000003 PHARM REV CODE 250

## 2023-10-14 PROCEDURE — 11000001 HC ACUTE MED/SURG PRIVATE ROOM

## 2023-10-14 PROCEDURE — 99231 SBSQ HOSP IP/OBS SF/LOW 25: CPT | Mod: ,,, | Performed by: HOSPITALIST

## 2023-10-14 PROCEDURE — 99231 PR SUBSEQUENT HOSPITAL CARE,LEVL I: ICD-10-PCS | Mod: ,,, | Performed by: HOSPITALIST

## 2023-10-14 PROCEDURE — 25000003 PHARM REV CODE 250: Performed by: STUDENT IN AN ORGANIZED HEALTH CARE EDUCATION/TRAINING PROGRAM

## 2023-10-14 RX ORDER — TALC
6 POWDER (GRAM) TOPICAL NIGHTLY PRN
Status: DISCONTINUED | OUTPATIENT
Start: 2023-10-14 | End: 2023-10-26 | Stop reason: HOSPADM

## 2023-10-14 RX ADMIN — METOPROLOL TARTRATE 25 MG: 25 TABLET, FILM COATED ORAL at 08:10

## 2023-10-14 RX ADMIN — VALPROIC ACID 500 MG: 250 SOLUTION ORAL at 08:10

## 2023-10-14 RX ADMIN — BUSPIRONE HYDROCHLORIDE 10 MG: 10 TABLET ORAL at 02:10

## 2023-10-14 RX ADMIN — AMIODARONE HYDROCHLORIDE 200 MG: 200 TABLET ORAL at 08:10

## 2023-10-14 RX ADMIN — ASPIRIN 81 MG CHEWABLE TABLET 81 MG: 81 TABLET CHEWABLE at 08:10

## 2023-10-14 RX ADMIN — BUSPIRONE HYDROCHLORIDE 10 MG: 10 TABLET ORAL at 08:10

## 2023-10-14 RX ADMIN — VALPROIC ACID 1500 MG: 250 SOLUTION ORAL at 08:10

## 2023-10-14 RX ADMIN — EZETIMIBE 10 MG: 10 TABLET ORAL at 08:10

## 2023-10-14 RX ADMIN — QUETIAPINE FUMARATE 25 MG: 25 TABLET ORAL at 08:10

## 2023-10-14 RX ADMIN — Medication 6 MG: at 08:10

## 2023-10-14 RX ADMIN — APIXABAN 5 MG: 5 TABLET, FILM COATED ORAL at 08:10

## 2023-10-14 RX ADMIN — ATORVASTATIN CALCIUM 80 MG: 40 TABLET, FILM COATED ORAL at 08:10

## 2023-10-14 RX ADMIN — VALPROIC ACID 500 MG: 250 SOLUTION ORAL at 02:10
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
## 2023-10-14 NOTE — PLAN OF CARE
No significant changes this shift. Continue with POC and monitor. Pt left lying semi fowlers bed in lowest position, with call light in reach, and all wheels locked X3 rails up.
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
## 2023-10-14 NOTE — SUBJECTIVE & OBJECTIVE
Interval History: No issues overnight and patient remains afebrile, hemodynamically stable. Out of restraints, remains calm this morning with sitter in room. Pending placement.    Review of Systems   Unable to perform ROS: Patient nonverbal     Objective:     Vital Signs (Most Recent):  Temp: 97.3 °F (36.3 °C) (10/14/23 0903)  Pulse: 68 (10/14/23 1115)  Resp: 18 (10/14/23 1115)  BP: (!) 153/95 (10/14/23 0903)  SpO2: 100 % (10/14/23 1115) Vital Signs (24h Range):  Temp:  [97.3 °F (36.3 °C)-98.7 °F (37.1 °C)] 97.3 °F (36.3 °C)  Pulse:  [60-74] 68  Resp:  [18] 18  SpO2:  [93 %-100 %] 100 %  BP: (110-153)/(74-95) 153/95     Weight: 75.8 kg (167 lb)  Body mass index is 25.39 kg/m².  No intake or output data in the 24 hours ending 10/14/23 1356        Physical Exam  Vitals and nursing note reviewed.   Constitutional:       General: He is not in acute distress.     Appearance: Normal appearance. He is not ill-appearing.   HENT:      Head: Normocephalic and atraumatic.      Mouth/Throat:      Mouth: Mucous membranes are moist.      Pharynx: Oropharynx is clear.   Eyes:      Conjunctiva/sclera: Conjunctivae normal.   Cardiovascular:      Rate and Rhythm: Normal rate. Rhythm irregular.      Pulses: Normal pulses.      Heart sounds: No murmur heard.  Pulmonary:      Effort: Pulmonary effort is normal. No respiratory distress.      Breath sounds: No wheezing, rhonchi or rales.   Abdominal:      General: Abdomen is flat. There is no distension.      Palpations: Abdomen is soft.      Tenderness: There is no abdominal tenderness.      Comments: +PEG   Musculoskeletal:      Right lower leg: No edema.      Left lower leg: No edema.   Skin:     General: Skin is warm.      Capillary Refill: Capillary refill takes less than 2 seconds.      Findings: No erythema or rash.   Neurological:      Mental Status: He is alert. Mental status is at baseline.             Significant Labs: All pertinent labs within the past 24 hours have been  "reviewed.  CBC:   No results for input(s): "WBC", "HGB", "HCT", "PLT" in the last 48 hours.    CMP:   No results for input(s): "NA", "K", "CL", "CO2", "GLU", "BUN", "CREATININE", "CALCIUM", "PROT", "ALBUMIN", "BILITOT", "ALKPHOS", "AST", "ALT", "ANIONGAP", "EGFRNONAA" in the last 48 hours.    Invalid input(s): "ESTGFAFR[place]"      Significant Imaging: I have reviewed all pertinent imaging results/findings within the past 24 hours.  "

## 2023-10-15 LAB
ALBUMIN SERPL BCP-MCNC: 3.3 G/DL (ref 3.5–5.2)
ALP SERPL-CCNC: 80 U/L (ref 55–135)
ALT SERPL W/O P-5'-P-CCNC: 10 U/L (ref 10–44)
ANION GAP SERPL CALC-SCNC: 12 MMOL/L (ref 8–16)
AST SERPL-CCNC: 15 U/L (ref 10–40)
BASOPHILS # BLD AUTO: 0.02 K/UL (ref 0–0.2)
BASOPHILS NFR BLD: 0.5 % (ref 0–1.9)
BILIRUB SERPL-MCNC: 0.4 MG/DL (ref 0.1–1)
BUN SERPL-MCNC: 20 MG/DL (ref 6–20)
CALCIUM SERPL-MCNC: 9 MG/DL (ref 8.7–10.5)
CHLORIDE SERPL-SCNC: 105 MMOL/L (ref 95–110)
CO2 SERPL-SCNC: 22 MMOL/L (ref 23–29)
CREAT SERPL-MCNC: 1.6 MG/DL (ref 0.5–1.4)
DIFFERENTIAL METHOD: ABNORMAL
EOSINOPHIL # BLD AUTO: 0.2 K/UL (ref 0–0.5)
EOSINOPHIL NFR BLD: 5.6 % (ref 0–8)
ERYTHROCYTE [DISTWIDTH] IN BLOOD BY AUTOMATED COUNT: 17.1 % (ref 11.5–14.5)
EST. GFR  (NO RACE VARIABLE): 50.3 ML/MIN/1.73 M^2
GLUCOSE SERPL-MCNC: 140 MG/DL (ref 70–110)
HCT VFR BLD AUTO: 37.9 % (ref 40–54)
HGB BLD-MCNC: 11.4 G/DL (ref 14–18)
IMM GRANULOCYTES # BLD AUTO: 0 K/UL (ref 0–0.04)
IMM GRANULOCYTES NFR BLD AUTO: 0 % (ref 0–0.5)
LYMPHOCYTES # BLD AUTO: 1.6 K/UL (ref 1–4.8)
LYMPHOCYTES NFR BLD: 40.7 % (ref 18–48)
MCH RBC QN AUTO: 27.3 PG (ref 27–31)
MCHC RBC AUTO-ENTMCNC: 30.1 G/DL (ref 32–36)
MCV RBC AUTO: 91 FL (ref 82–98)
MONOCYTES # BLD AUTO: 0.2 K/UL (ref 0.3–1)
MONOCYTES NFR BLD: 6.1 % (ref 4–15)
NEUTROPHILS # BLD AUTO: 1.8 K/UL (ref 1.8–7.7)
NEUTROPHILS NFR BLD: 47.1 % (ref 38–73)
NRBC BLD-RTO: 0 /100 WBC
PLATELET # BLD AUTO: 248 K/UL (ref 150–450)
PMV BLD AUTO: 11.4 FL (ref 9.2–12.9)
POTASSIUM SERPL-SCNC: 4 MMOL/L (ref 3.5–5.1)
PROT SERPL-MCNC: 7.1 G/DL (ref 6–8.4)
RBC # BLD AUTO: 4.17 M/UL (ref 4.6–6.2)
SODIUM SERPL-SCNC: 139 MMOL/L (ref 136–145)
WBC # BLD AUTO: 3.91 K/UL (ref 3.9–12.7)

## 2023-10-15 PROCEDURE — 85025 COMPLETE CBC W/AUTO DIFF WBC: CPT

## 2023-10-15 PROCEDURE — 25000003 PHARM REV CODE 250

## 2023-10-15 PROCEDURE — 36415 COLL VENOUS BLD VENIPUNCTURE: CPT

## 2023-10-15 PROCEDURE — 11000001 HC ACUTE MED/SURG PRIVATE ROOM

## 2023-10-15 PROCEDURE — 25000003 PHARM REV CODE 250: Performed by: STUDENT IN AN ORGANIZED HEALTH CARE EDUCATION/TRAINING PROGRAM

## 2023-10-15 PROCEDURE — 92523 SPEECH SOUND LANG COMPREHEN: CPT

## 2023-10-15 PROCEDURE — 99231 SBSQ HOSP IP/OBS SF/LOW 25: CPT | Mod: ,,, | Performed by: HOSPITALIST

## 2023-10-15 PROCEDURE — 80053 COMPREHEN METABOLIC PANEL: CPT

## 2023-10-15 PROCEDURE — 99231 PR SUBSEQUENT HOSPITAL CARE,LEVL I: ICD-10-PCS | Mod: ,,, | Performed by: HOSPITALIST

## 2023-10-15 RX ADMIN — VALPROIC ACID 1500 MG: 250 SOLUTION ORAL at 08:10

## 2023-10-15 RX ADMIN — VALPROIC ACID 500 MG: 250 SOLUTION ORAL at 09:10

## 2023-10-15 RX ADMIN — APIXABAN 5 MG: 5 TABLET, FILM COATED ORAL at 08:10

## 2023-10-15 RX ADMIN — ATORVASTATIN CALCIUM 80 MG: 40 TABLET, FILM COATED ORAL at 09:10

## 2023-10-15 RX ADMIN — APIXABAN 5 MG: 5 TABLET, FILM COATED ORAL at 09:10

## 2023-10-15 RX ADMIN — BUSPIRONE HYDROCHLORIDE 10 MG: 10 TABLET ORAL at 09:10

## 2023-10-15 RX ADMIN — BUSPIRONE HYDROCHLORIDE 10 MG: 10 TABLET ORAL at 03:10

## 2023-10-15 RX ADMIN — QUETIAPINE FUMARATE 25 MG: 25 TABLET ORAL at 08:10

## 2023-10-15 RX ADMIN — ASPIRIN 81 MG CHEWABLE TABLET 81 MG: 81 TABLET CHEWABLE at 09:10

## 2023-10-15 RX ADMIN — BUSPIRONE HYDROCHLORIDE 10 MG: 10 TABLET ORAL at 08:10

## 2023-10-15 RX ADMIN — AMIODARONE HYDROCHLORIDE 200 MG: 200 TABLET ORAL at 09:10

## 2023-10-15 RX ADMIN — Medication 6 MG: at 08:10

## 2023-10-15 RX ADMIN — EZETIMIBE 10 MG: 10 TABLET ORAL at 08:10

## 2023-10-15 RX ADMIN — METOPROLOL TARTRATE 25 MG: 25 TABLET, FILM COATED ORAL at 08:10

## 2023-10-15 RX ADMIN — VALPROIC ACID 500 MG: 250 SOLUTION ORAL at 12:10

## 2023-10-15 RX ADMIN — METOPROLOL TARTRATE 25 MG: 25 TABLET, FILM COATED ORAL at 09:10
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
## 2023-10-15 NOTE — SUBJECTIVE & OBJECTIVE
Interval History: No issues overnight and patient remains afebrile, hemodynamically stable. Out of restraints, remains calm this morning. Pending placement.    Review of Systems   Unable to perform ROS: Patient nonverbal     Objective:     Vital Signs (Most Recent):  Temp: 98.9 °F (37.2 °C) (10/15/23 1453)  Pulse: (!) 59 (10/15/23 1453)  Resp: 18 (10/15/23 1453)  BP: 116/84 (10/15/23 1453)  SpO2: 95 % (10/15/23 1200) Vital Signs (24h Range):  Temp:  [97.5 °F (36.4 °C)-98.9 °F (37.2 °C)] 98.9 °F (37.2 °C)  Pulse:  [] 59  Resp:  [16-18] 18  SpO2:  [95 %] 95 %  BP: (116-146)/(84-93) 116/84     Weight: 75.8 kg (167 lb)  Body mass index is 25.39 kg/m².  No intake or output data in the 24 hours ending 10/15/23 1634        Physical Exam  Vitals and nursing note reviewed.   Constitutional:       General: He is not in acute distress.     Appearance: Normal appearance. He is not ill-appearing.   HENT:      Head: Normocephalic and atraumatic.      Mouth/Throat:      Mouth: Mucous membranes are moist.      Pharynx: Oropharynx is clear.   Eyes:      Conjunctiva/sclera: Conjunctivae normal.   Cardiovascular:      Rate and Rhythm: Normal rate. Rhythm irregular.      Pulses: Normal pulses.      Heart sounds: No murmur heard.  Pulmonary:      Effort: Pulmonary effort is normal. No respiratory distress.      Breath sounds: No wheezing, rhonchi or rales.   Abdominal:      General: Abdomen is flat. There is no distension.      Palpations: Abdomen is soft.      Tenderness: There is no abdominal tenderness.      Comments: +PEG   Musculoskeletal:      Right lower leg: No edema.      Left lower leg: No edema.   Skin:     General: Skin is warm.      Capillary Refill: Capillary refill takes less than 2 seconds.      Findings: No erythema or rash.   Neurological:      Mental Status: He is alert. Mental status is at baseline.             Significant Labs: All pertinent labs within the past 24 hours have been reviewed.  CBC:   Recent Labs    Lab 10/15/23  0014   WBC 3.91   HGB 11.4*   HCT 37.9*          CMP:   Recent Labs   Lab 10/15/23  0014      K 4.0      CO2 22*   *   BUN 20   CREATININE 1.6*   CALCIUM 9.0   PROT 7.1   ALBUMIN 3.3*   BILITOT 0.4   ALKPHOS 80   AST 15   ALT 10   ANIONGAP 12         Significant Imaging: I have reviewed all pertinent imaging results/findings within the past 24 hours.

## 2023-10-15 NOTE — PLAN OF CARE
Problem: SLP  Goal: SLP Goal  Description: Speech Language Pathology Goals  Goals expected to be met by 10/29    1. Pt will answer simple y/n questions with 60% accy given max cues.   2. Pt will follow simple commands with 50% accy given max cues.   3. Pt will vocalize in response to any stim x5/session given max cues.   4. Pt will ID objects from FCx2 with 50 % acc following max cues.        Outcome: Ongoing, Progressing     Bedside cognitive-communication evaluation completed. SLP will continue to follow.
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
## 2023-10-16 PROCEDURE — 25000003 PHARM REV CODE 250

## 2023-10-16 PROCEDURE — 11000001 HC ACUTE MED/SURG PRIVATE ROOM

## 2023-10-16 PROCEDURE — 93010 ELECTROCARDIOGRAM REPORT: CPT | Mod: ,,, | Performed by: INTERNAL MEDICINE

## 2023-10-16 PROCEDURE — 93005 ELECTROCARDIOGRAM TRACING: CPT

## 2023-10-16 PROCEDURE — 25000003 PHARM REV CODE 250: Performed by: STUDENT IN AN ORGANIZED HEALTH CARE EDUCATION/TRAINING PROGRAM

## 2023-10-16 PROCEDURE — 92507 TX SP LANG VOICE COMM INDIV: CPT

## 2023-10-16 PROCEDURE — 99232 PR SUBSEQUENT HOSPITAL CARE,LEVL II: ICD-10-PCS | Mod: ,,, | Performed by: HOSPITALIST

## 2023-10-16 PROCEDURE — 63600175 PHARM REV CODE 636 W HCPCS

## 2023-10-16 PROCEDURE — 93010 EKG 12-LEAD: ICD-10-PCS | Mod: ,,, | Performed by: INTERNAL MEDICINE

## 2023-10-16 PROCEDURE — 99232 SBSQ HOSP IP/OBS MODERATE 35: CPT | Mod: ,,, | Performed by: HOSPITALIST

## 2023-10-16 RX ORDER — LORAZEPAM 2 MG/ML
0.5 INJECTION INTRAMUSCULAR EVERY 6 HOURS PRN
Status: DISCONTINUED | OUTPATIENT
Start: 2023-10-16 | End: 2023-10-16

## 2023-10-16 RX ORDER — LORAZEPAM 0.5 MG/1
0.5 TABLET ORAL EVERY 6 HOURS PRN
Status: DISCONTINUED | OUTPATIENT
Start: 2023-10-16 | End: 2023-10-16

## 2023-10-16 RX ADMIN — EZETIMIBE 10 MG: 10 TABLET ORAL at 09:10

## 2023-10-16 RX ADMIN — VALPROIC ACID 1500 MG: 250 SOLUTION ORAL at 09:10

## 2023-10-16 RX ADMIN — BUSPIRONE HYDROCHLORIDE 10 MG: 10 TABLET ORAL at 01:10

## 2023-10-16 RX ADMIN — METOPROLOL TARTRATE 25 MG: 25 TABLET, FILM COATED ORAL at 01:10

## 2023-10-16 RX ADMIN — METOPROLOL TARTRATE 25 MG: 25 TABLET, FILM COATED ORAL at 09:10

## 2023-10-16 RX ADMIN — LORAZEPAM 0.5 MG: 2 INJECTION INTRAMUSCULAR; INTRAVENOUS at 07:10

## 2023-10-16 RX ADMIN — APIXABAN 5 MG: 5 TABLET, FILM COATED ORAL at 09:10

## 2023-10-16 RX ADMIN — ASPIRIN 81 MG CHEWABLE TABLET 81 MG: 81 TABLET CHEWABLE at 01:10

## 2023-10-16 RX ADMIN — APIXABAN 5 MG: 5 TABLET, FILM COATED ORAL at 01:10

## 2023-10-16 RX ADMIN — ATORVASTATIN CALCIUM 80 MG: 40 TABLET, FILM COATED ORAL at 01:10

## 2023-10-16 RX ADMIN — VALPROIC ACID 500 MG: 250 SOLUTION ORAL at 01:10

## 2023-10-16 RX ADMIN — AMIODARONE HYDROCHLORIDE 200 MG: 200 TABLET ORAL at 01:10

## 2023-10-16 RX ADMIN — BUSPIRONE HYDROCHLORIDE 10 MG: 10 TABLET ORAL at 09:10

## 2023-10-16 NOTE — PLAN OF CARE
Dianna did send updates to Catrina with [facility] clinical updates, nursing notes and therapy updates. Will follow up.

## 2023-10-16 NOTE — PLAN OF CARE
Declan Nelson with [facility] [employer] reviewing case and will get an update from [facility] from Catrina with admissions.
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
## 2023-10-16 NOTE — ASSESSMENT & PLAN NOTE
Patient agitated on admission and is baseline non-verbal post CVA. QTc prolonged on admission and home dose seroquel was held. Patient has history of hospital delirium/agitation, which does not represent baseline behavior at home or among family. Patient is requiring a sitter to control agitation, as patient is able to remove mittens and soft restraints. Has removed multiple pIVs.     -Continue home depakote 500/500/1500mg dosing  -Continue home seroquel 25 as nightly PRN  -Continue buspar 10mg TID + 10mg PRN for agitation  -Ativan 0.5mg IM q6h PRN for non-redirectable agitation  -Psychiatry signed off, appreciate recs

## 2023-10-16 NOTE — PLAN OF CARE
Problem: Adult Inpatient Plan of Care  Goal: Plan of Care Review  Outcome: Ongoing, Progressing  Goal: Patient-Specific Goal (Individualized)  Outcome: Ongoing, Progressing  Goal: Absence of Hospital-Acquired Illness or Injury  Outcome: Ongoing, Progressing  Goal: Optimal Comfort and Wellbeing  Outcome: Ongoing, Progressing  Goal: Readiness for Transition of Care  Outcome: Ongoing, Progressing     Problem: Fall Injury Risk  Goal: Absence of Fall and Fall-Related Injury  Outcome: Ongoing, Progressing     Problem: Restraint, Nonbehavioral (Nonviolent)  Goal: Absence of Harm or Injury  Outcome: Ongoing, Progressing     Problem: Oral Intake Inadequate (Acute Kidney Injury/Impairment)  Goal: Optimal Nutrition Intake  Outcome: Ongoing, Progressing

## 2023-10-16 NOTE — SUBJECTIVE & OBJECTIVE
Interval History: No issues overnight and patient remains afebrile, hemodynamically stable. Refusing meds and IV placement this morning. Biting and scratching at nursing staff. In bed and calm on evaluation this morning. Will initiate additional PRNs for agitation.    Review of Systems   Unable to perform ROS: Patient nonverbal     Objective:     Vital Signs (Most Recent):  Temp: 97.4 °F (36.3 °C) (10/16/23 1041)  Pulse: (!) 54 (10/16/23 1041)  Resp: 18 (10/16/23 1041)  BP: (!) 150/104 (10/16/23 1041)  SpO2: 96 % (10/16/23 1041) Vital Signs (24h Range):  Temp:  [97 °F (36.1 °C)-97.4 °F (36.3 °C)] 97.4 °F (36.3 °C)  Pulse:  [54-76] 54  Resp:  [17-18] 18  SpO2:  [94 %-96 %] 96 %  BP: (109-150)/() 150/104     Weight: 75.8 kg (167 lb)  Body mass index is 25.39 kg/m².  No intake or output data in the 24 hours ending 10/16/23 1459        Physical Exam  Vitals and nursing note reviewed.   Constitutional:       General: He is not in acute distress.     Appearance: Normal appearance. He is not ill-appearing.   HENT:      Head: Normocephalic and atraumatic.      Mouth/Throat:      Mouth: Mucous membranes are moist.      Pharynx: Oropharynx is clear.   Eyes:      Conjunctiva/sclera: Conjunctivae normal.   Cardiovascular:      Rate and Rhythm: Normal rate. Rhythm irregular.      Pulses: Normal pulses.      Heart sounds: No murmur heard.  Pulmonary:      Effort: Pulmonary effort is normal. No respiratory distress.      Breath sounds: No wheezing, rhonchi or rales.   Abdominal:      General: Abdomen is flat. There is no distension.      Palpations: Abdomen is soft.      Tenderness: There is no abdominal tenderness.      Comments: +PEG   Musculoskeletal:      Right lower leg: No edema.      Left lower leg: No edema.   Skin:     General: Skin is warm.      Capillary Refill: Capillary refill takes less than 2 seconds.      Findings: No erythema or rash.   Neurological:      Mental Status: He is alert. Mental status is at  baseline.             Significant Labs: All pertinent labs within the past 24 hours have been reviewed.  CBC:   Recent Labs   Lab 10/15/23  0014   WBC 3.91   HGB 11.4*   HCT 37.9*          CMP:   Recent Labs   Lab 10/15/23  0014      K 4.0      CO2 22*   *   BUN 20   CREATININE 1.6*   CALCIUM 9.0   PROT 7.1   ALBUMIN 3.3*   BILITOT 0.4   ALKPHOS 80   AST 15   ALT 10   ANIONGAP 12         Significant Imaging: I have reviewed all pertinent imaging results/findings within the past 24 hours.

## 2023-10-16 NOTE — PROGRESS NOTES
Pt continues with tele sitter in the room, he has attempted to get up unassisted with notification and assistance given. His appetite was good, feeds self after meal set up. He ambulated to  with asst for BM. Easily redirected, PEG tube is in place and verified medications administered through tube. Pt showed no evidence of distress.

## 2023-10-16 NOTE — PT/OT/SLP PROGRESS
Speech Language Pathology Treatment    Patient Name:  Tera Griffiths   MRN:  [id number]  Admitting Diagnosis: Acute on chronic combined systolic and diastolic heart failure    Recommendations:                 General Recommendations:  Speech/language therapy  Diet recommendations:  Regular Diet - IDDSI Level 7, Liquid Diet Level: Thin liquids - IDDSI Level 0   Aspiration Precautions: Standard aspiration precautions   General Precautions: Standard, aphasia, fall  Communication strategies:  none    Assessment:     Tera Griffiths is a 58 y.o. male with an SLP diagnosis of  severe global aphasia .  SLP will continue to follow to optimize communication.     Subjective     Spoke with RN prior to session who reports pt with increased agitation this date. Pt awake and calm this session.     Pain/Comfort:  Pain Rating 1:  (none indicated or observed)    Respiratory Status: Room air    Objective:     Has the patient been evaluated by SLP for swallowing?   Yes  Keep patient NPO? No     Pt seen for ongoing speech therapy in attempt to optimize pt's ability to communicate basic wants/needs. Pt awake, though did not attend to clinician for longer than a few seconds despite max multimodal cuing. Despite max, multimodal cuing pt did not follow simple a step commands, answer simple Y/N questions with head nod/shake, say his name, point to word/object from FC x2, imitate sounds or gestures or engage in joint attention. No elicited or spontaneous verbalization appreciated. SLP will continue to follow to optimize communication. MD updated on impressions and recommendations post session.     Goals:   Multidisciplinary Problems       SLP Goals          Problem: SLP    Goal Priority Disciplines Outcome   SLP Goal     SLP Ongoing, Progressing   Description: Speech Language Pathology Goals  Goals expected to be met by 10/29    1. Pt will answer simple y/n questions with 60% accy given max cues.   2. Pt will follow simple commands with  50% accy given max cues.   3. Pt will vocalize in response to any stim x5/session given max cues.   4. Pt will ID objects from FCx2 with 50 % acc following max cues.                                         Plan:     Patient to be seen:  3 x/week   Plan of Care expires:  11/14/23  Plan of Care reviewed with:  patient (RN)   SLP Follow-Up:  Yes       Discharge recommendations:  Low Intensity Therapy   Barriers to Discharge:  None    Time Tracking:     SLP Treatment Date:   10/16/23  Speech Start Time:  1450  Speech Stop Time:  1457     Speech Total Time (min):  7 min    Billable Minutes: Speech Therapy Individual 7    10/16/2023

## 2023-10-17 LAB — SARS-COV-2 RNA RESP QL NAA+PROBE: NOT DETECTED

## 2023-10-17 PROCEDURE — 87635 SARS-COV-2 COVID-19 AMP PRB: CPT

## 2023-10-17 PROCEDURE — 30200315 PPD INTRADERMAL TEST REV CODE 302

## 2023-10-17 PROCEDURE — 80053 COMPREHEN METABOLIC PANEL: CPT

## 2023-10-17 PROCEDURE — 86580 TB INTRADERMAL TEST: CPT

## 2023-10-17 PROCEDURE — 11000001 HC ACUTE MED/SURG PRIVATE ROOM

## 2023-10-17 PROCEDURE — 25000003 PHARM REV CODE 250: Performed by: STUDENT IN AN ORGANIZED HEALTH CARE EDUCATION/TRAINING PROGRAM

## 2023-10-17 PROCEDURE — 25000003 PHARM REV CODE 250

## 2023-10-17 PROCEDURE — 85025 COMPLETE CBC W/AUTO DIFF WBC: CPT

## 2023-10-17 PROCEDURE — 99233 PR SUBSEQUENT HOSPITAL CARE,LEVL III: ICD-10-PCS | Mod: ,,, | Performed by: HOSPITALIST

## 2023-10-17 PROCEDURE — 36415 COLL VENOUS BLD VENIPUNCTURE: CPT

## 2023-10-17 PROCEDURE — 99233 SBSQ HOSP IP/OBS HIGH 50: CPT | Mod: ,,, | Performed by: HOSPITALIST

## 2023-10-17 RX ADMIN — BUSPIRONE HYDROCHLORIDE 10 MG: 10 TABLET ORAL at 09:10

## 2023-10-17 RX ADMIN — BUSPIRONE HYDROCHLORIDE 10 MG: 10 TABLET ORAL at 08:10

## 2023-10-17 RX ADMIN — METOPROLOL TARTRATE 25 MG: 25 TABLET, FILM COATED ORAL at 09:10

## 2023-10-17 RX ADMIN — EZETIMIBE 10 MG: 10 TABLET ORAL at 09:10

## 2023-10-17 RX ADMIN — BUSPIRONE HYDROCHLORIDE 10 MG: 10 TABLET ORAL at 03:10

## 2023-10-17 RX ADMIN — TUBERCULIN PURIFIED PROTEIN DERIVATIVE 5 UNITS: 5 INJECTION, SOLUTION INTRADERMAL at 03:10

## 2023-10-17 RX ADMIN — ATORVASTATIN CALCIUM 80 MG: 40 TABLET, FILM COATED ORAL at 08:10

## 2023-10-17 RX ADMIN — APIXABAN 5 MG: 5 TABLET, FILM COATED ORAL at 09:10

## 2023-10-17 RX ADMIN — VALPROIC ACID 500 MG: 250 SOLUTION ORAL at 01:10

## 2023-10-17 RX ADMIN — VALPROIC ACID 500 MG: 250 SOLUTION ORAL at 08:10

## 2023-10-17 RX ADMIN — AMIODARONE HYDROCHLORIDE 200 MG: 200 TABLET ORAL at 08:10

## 2023-10-17 RX ADMIN — METOPROLOL TARTRATE 25 MG: 25 TABLET, FILM COATED ORAL at 08:10

## 2023-10-17 RX ADMIN — VALPROIC ACID 1500 MG: 250 SOLUTION ORAL at 09:10

## 2023-10-17 RX ADMIN — APIXABAN 5 MG: 5 TABLET, FILM COATED ORAL at 08:10

## 2023-10-17 RX ADMIN — ASPIRIN 81 MG CHEWABLE TABLET 81 MG: 81 TABLET CHEWABLE at 08:10
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
## 2023-10-17 NOTE — SUBJECTIVE & OBJECTIVE
Interval History: No issues overnight. Remains HDS and afebrile. Awaiting placement    Review of Systems   Unable to perform ROS: Patient nonverbal     Objective:     Vital Signs (Most Recent):  Temp: 97.6 °F (36.4 °C) (10/16/23 1540)  Pulse: 73 (10/17/23 1200)  Resp: 17 (10/17/23 0700)  BP: (!) 134/112 (10/17/23 1200)  SpO2: (!) 94 % (10/17/23 0000) Vital Signs (24h Range):  Temp:  [97.6 °F (36.4 °C)] 97.6 °F (36.4 °C)  Pulse:  [55-78] 73  Resp:  [17-19] 17  SpO2:  [94 %-96 %] 94 %  BP: ()/() 134/112     Weight: 75.8 kg (167 lb)  Body mass index is 25.39 kg/m².    Intake/Output Summary (Last 24 hours) at 10/17/2023 1436  Last data filed at 10/17/2023 0055  Gross per 24 hour   Intake 275 ml   Output 200 ml   Net 75 ml           Physical Exam  Vitals and nursing note reviewed.   Constitutional:       General: He is not in acute distress.     Appearance: Normal appearance. He is not ill-appearing.   HENT:      Head: Normocephalic and atraumatic.      Mouth/Throat:      Mouth: Mucous membranes are moist.      Pharynx: Oropharynx is clear.   Eyes:      Conjunctiva/sclera: Conjunctivae normal.   Cardiovascular:      Rate and Rhythm: Normal rate. Rhythm irregular.      Pulses: Normal pulses.      Heart sounds: No murmur heard.  Pulmonary:      Effort: Pulmonary effort is normal. No respiratory distress.      Breath sounds: No wheezing, rhonchi or rales.   Abdominal:      General: Abdomen is flat. There is no distension.      Palpations: Abdomen is soft.      Tenderness: There is no abdominal tenderness.      Comments: +PEG   Musculoskeletal:      Right lower leg: No edema.      Left lower leg: No edema.   Skin:     General: Skin is warm.      Capillary Refill: Capillary refill takes less than 2 seconds.      Findings: No erythema or rash.   Neurological:      Mental Status: He is alert. Mental status is at baseline.             Significant Labs: All pertinent labs within the past 24 hours have been  "reviewed.  CBC:   No results for input(s): "WBC", "HGB", "HCT", "PLT" in the last 48 hours.    CMP:   No results for input(s): "NA", "K", "CL", "CO2", "GLU", "BUN", "CREATININE", "CALCIUM", "PROT", "ALBUMIN", "BILITOT", "ALKPHOS", "AST", "ALT", "ANIONGAP", "EGFRNONAA" in the last 48 hours.    Invalid input(s): "[place]"      Significant Imaging: I have reviewed all pertinent imaging results/findings within the past 24 hours.  "

## 2023-10-17 NOTE — PLAN OF CARE
Problem: Adult Inpatient Plan of Care  Goal: Plan of Care Review  Outcome: Ongoing, Progressing  Goal: Patient-Specific Goal (Individualized)  Outcome: Ongoing, Progressing  Goal: Absence of Hospital-Acquired Illness or Injury  Outcome: Ongoing, Progressing  Goal: Optimal Comfort and Wellbeing  Outcome: Ongoing, Progressing     Problem: Fall Injury Risk  Goal: Absence of Fall and Fall-Related Injury  Outcome: Ongoing, Progressing     Problem: Nutrition Impaired (Sepsis/Septic Shock)  Goal: Optimal Nutrition Intake  Outcome: Ongoing, Progressing     Problem: Oral Intake Inadequate (Acute Kidney Injury/Impairment)  Goal: Optimal Nutrition Intake  Outcome: Ongoing, Progressing      Pt had on and off agitation all throughout the shift tried to push staff at times, tried to get up from the bed. Did not require any form of restraints, pt assisted ambulation as he is high risk for fall. VS check refused this morning.  Pt remains free from injury, fall precaution maintained. [employer] remained at the bedside.

## 2023-10-17 NOTE — PLAN OF CARE
10/17/23 1430   Post-Acute Status   Post-Acute Authorization Placement   Post-Acute Placement Status Referrals Sent   Hospital Resources/Appts/Education Provided Appointments scheduled and added to AVS   Discharge Delays (!) Patient and Family Barriers   Discharge Plan   Discharge Plan A New Nursing Home placement - long-term care facility   Discharge Plan B Home with family
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
## 2023-10-17 NOTE — PLAN OF CARE
[facility] S[facility]  Discharge Reassessment    Primary Care Provider: Carleen Bueno MD    Expected Discharge Date: 10/18/2023    Reassessment (most recent)       Discharge Reassessment - 10/17/23 1431          Discharge Reassessment    Assessment Type Discharge Planning Reassessment     Did the patient's condition or plan change since previous assessment? No     Discharge Plan discussed with: Spouse/sig other;Patient     Communicated YUSEF with patient/caregiver Yes     Discharge Plan A New Nursing Home placement - [facility] care facility     Discharge Plan B Home with family     DME Needed Upon Discharge  none     Transition of Care Barriers Mental illness;No family/friends to help;Transportation;Underinsured;Other (see comments);Social     Why the patient remains in the hospital Social issues;Placement issues        Post-Acute Status    Post-Acute Authorization Placement     Post-Acute Placement Status Referrals Sent     Hospital Resources/Appts/Education Provided Appointments scheduled and added to AVS;Post-Acute resouces added to AVS     Discharge Delays Patient and Family Barriers                   Discharge Plan A and Plan B have been determined by review of patient's clinical status, future medical and therapeutic needs, and coverage/benefits for post-acute care in coordination with multidisciplinary team members.

## 2023-10-18 LAB
ALBUMIN SERPL BCP-MCNC: 3 G/DL (ref 3.5–5.2)
ALP SERPL-CCNC: 60 U/L (ref 55–135)
ALT SERPL W/O P-5'-P-CCNC: 7 U/L (ref 10–44)
ANION GAP SERPL CALC-SCNC: 8 MMOL/L (ref 8–16)
AST SERPL-CCNC: 16 U/L (ref 10–40)
BASOPHILS # BLD AUTO: 0.01 K/UL (ref 0–0.2)
BASOPHILS NFR BLD: 0.3 % (ref 0–1.9)
BILIRUB SERPL-MCNC: 0.3 MG/DL (ref 0.1–1)
BUN SERPL-MCNC: 23 MG/DL (ref 6–20)
CALCIUM SERPL-MCNC: 8.6 MG/DL (ref 8.7–10.5)
CHLORIDE SERPL-SCNC: 105 MMOL/L (ref 95–110)
CO2 SERPL-SCNC: 26 MMOL/L (ref 23–29)
CREAT SERPL-MCNC: 1.7 MG/DL (ref 0.5–1.4)
DIFFERENTIAL METHOD: ABNORMAL
EOSINOPHIL # BLD AUTO: 0.1 K/UL (ref 0–0.5)
EOSINOPHIL NFR BLD: 3.1 % (ref 0–8)
ERYTHROCYTE [DISTWIDTH] IN BLOOD BY AUTOMATED COUNT: 17.2 % (ref 11.5–14.5)
EST. GFR  (NO RACE VARIABLE): 46.7 ML/MIN/1.73 M^2
GLUCOSE SERPL-MCNC: 132 MG/DL (ref 70–110)
HCT VFR BLD AUTO: 32.3 % (ref 40–54)
HGB BLD-MCNC: 10 G/DL (ref 14–18)
IMM GRANULOCYTES # BLD AUTO: 0.01 K/UL (ref 0–0.04)
IMM GRANULOCYTES NFR BLD AUTO: 0.3 % (ref 0–0.5)
LYMPHOCYTES # BLD AUTO: 1.4 K/UL (ref 1–4.8)
LYMPHOCYTES NFR BLD: 43.8 % (ref 18–48)
MCH RBC QN AUTO: 28.2 PG (ref 27–31)
MCHC RBC AUTO-ENTMCNC: 31 G/DL (ref 32–36)
MCV RBC AUTO: 91 FL (ref 82–98)
MONOCYTES # BLD AUTO: 0.3 K/UL (ref 0.3–1)
MONOCYTES NFR BLD: 8.6 % (ref 4–15)
NEUTROPHILS # BLD AUTO: 1.4 K/UL (ref 1.8–7.7)
NEUTROPHILS NFR BLD: 43.9 % (ref 38–73)
NRBC BLD-RTO: 0 /100 WBC
PLATELET # BLD AUTO: 165 K/UL (ref 150–450)
PMV BLD AUTO: 12.3 FL (ref 9.2–12.9)
POTASSIUM SERPL-SCNC: 4.3 MMOL/L (ref 3.5–5.1)
PROT SERPL-MCNC: 6 G/DL (ref 6–8.4)
RBC # BLD AUTO: 3.55 M/UL (ref 4.6–6.2)
SODIUM SERPL-SCNC: 139 MMOL/L (ref 136–145)
WBC # BLD AUTO: 3.24 K/UL (ref 3.9–12.7)

## 2023-10-18 PROCEDURE — 99232 SBSQ HOSP IP/OBS MODERATE 35: CPT | Mod: ,,, | Performed by: HOSPITALIST

## 2023-10-18 PROCEDURE — 25000003 PHARM REV CODE 250

## 2023-10-18 PROCEDURE — 93010 ELECTROCARDIOGRAM REPORT: CPT | Mod: ,,, | Performed by: INTERNAL MEDICINE

## 2023-10-18 PROCEDURE — 93005 ELECTROCARDIOGRAM TRACING: CPT

## 2023-10-18 PROCEDURE — 63600175 PHARM REV CODE 636 W HCPCS

## 2023-10-18 PROCEDURE — 11000001 HC ACUTE MED/SURG PRIVATE ROOM

## 2023-10-18 PROCEDURE — 93010 EKG 12-LEAD: ICD-10-PCS | Mod: ,,, | Performed by: INTERNAL MEDICINE

## 2023-10-18 PROCEDURE — 25000003 PHARM REV CODE 250: Performed by: STUDENT IN AN ORGANIZED HEALTH CARE EDUCATION/TRAINING PROGRAM

## 2023-10-18 PROCEDURE — 99232 PR SUBSEQUENT HOSPITAL CARE,LEVL II: ICD-10-PCS | Mod: ,,, | Performed by: HOSPITALIST

## 2023-10-18 RX ORDER — POLYETHYLENE GLYCOL 3350 17 G/17G
17 POWDER, FOR SOLUTION ORAL DAILY
Status: DISCONTINUED | OUTPATIENT
Start: 2023-10-18 | End: 2023-10-26 | Stop reason: HOSPADM

## 2023-10-18 RX ORDER — AMOXICILLIN 250 MG
1 CAPSULE ORAL DAILY
Status: DISCONTINUED | OUTPATIENT
Start: 2023-10-18 | End: 2023-10-26 | Stop reason: HOSPADM

## 2023-10-18 RX ORDER — LORAZEPAM 2 MG/ML
0.5 INJECTION INTRAMUSCULAR ONCE
Status: COMPLETED | OUTPATIENT
Start: 2023-10-18 | End: 2023-10-18

## 2023-10-18 RX ADMIN — SENNOSIDES AND DOCUSATE SODIUM 1 TABLET: 50; 8.6 TABLET ORAL at 04:10

## 2023-10-18 RX ADMIN — ASPIRIN 81 MG CHEWABLE TABLET 81 MG: 81 TABLET CHEWABLE at 08:10

## 2023-10-18 RX ADMIN — METOPROLOL TARTRATE 25 MG: 25 TABLET, FILM COATED ORAL at 08:10

## 2023-10-18 RX ADMIN — ATORVASTATIN CALCIUM 80 MG: 40 TABLET, FILM COATED ORAL at 08:10

## 2023-10-18 RX ADMIN — LORAZEPAM 0.5 MG: 2 INJECTION INTRAMUSCULAR; INTRAVENOUS at 12:10

## 2023-10-18 RX ADMIN — VALPROIC ACID 500 MG: 250 SOLUTION ORAL at 12:10

## 2023-10-18 RX ADMIN — APIXABAN 5 MG: 5 TABLET, FILM COATED ORAL at 08:10

## 2023-10-18 RX ADMIN — BUSPIRONE HYDROCHLORIDE 10 MG: 10 TABLET ORAL at 08:10

## 2023-10-18 RX ADMIN — AMIODARONE HYDROCHLORIDE 200 MG: 200 TABLET ORAL at 08:10

## 2023-10-18 RX ADMIN — BUSPIRONE HYDROCHLORIDE 10 MG: 10 TABLET ORAL at 04:10

## 2023-10-18 RX ADMIN — VALPROIC ACID 500 MG: 250 SOLUTION ORAL at 08:10

## 2023-10-18 RX ADMIN — POLYETHYLENE GLYCOL 3350 17 G: 17 POWDER, FOR SOLUTION ORAL at 04:10

## 2023-10-18 NOTE — ASSESSMENT & PLAN NOTE
Patient agitated on admission and is baseline non-verbal post CVA. QTc prolonged on admission and home dose seroquel was held. Patient has history of hospital delirium/agitation, which does not represent baseline behavior at home or among family. Patient is requiring a sitter to control agitation, as patient is able to remove mittens and soft restraints. Has removed multiple pIVs.     -Continue home depakote 500/500/1500mg dosing  -Continue home seroquel 25 as nightly PRN  -Continue buspar 10mg TID + 10mg PRN for agitation  -Will continue to consider further chemical restraint in all efforts to avoid physical restraints, as requested by patient's hPOA.  -Psychiatry signed off, appreciate recs
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
## 2023-10-18 NOTE — ASSESSMENT & PLAN NOTE
Mr. Domingo Griffiths is a 58 y.o M with CAD c/b STEMI, AFib on Eliquis, combined CHF (EF 15-20%), HTN, HLD, CKD3b, L MCA CVA with residual aphasia and R sided deficits s/p PEG placement who presents for evaluation of LE swelling.     Patient's volume status difficult to assess as he is unable to follow directions, however he has a significant increase in his BNP and vascular congestion on his CXR consistent with decompensated heart failure. Unclear if 2/2 poor salt/fluid restriction vs tachycardia induced myopathy as he was found in AFwRVR by EMS vs other precipitant.  Patient appears euvolemic on exam and has remained hemodynamically stable.    - Lasix 40mg QD Po - home dose  - Patient now euvolemic  - Patient not on GDMT at admission. Entresto, Jardiance, and Aldactone previously held in the setting of post-CVA dysphagia and EMERSON. Restarting GDMT as tolerated.  - Entresto 24-26mg BID po  - Lopressor 25mg BID per G tube transitioned to Toprol 50mg QD po (10/19/23)  - Jardiance 10mg QD po  - 1500cc fluid restriction. RD consulted for cardiac healthy TF recs  - Daily standing weights, strict Is/Os

## 2023-10-18 NOTE — SUBJECTIVE & OBJECTIVE
Interval History: No issues overnight. Remains HDS and afebrile. Potential accepting facility. Awaiting placement. Restarting GDMT as tolerated.    Review of Systems   Unable to perform ROS: Patient nonverbal     Objective:     Vital Signs (Most Recent):  Temp: 97.5 °F (36.4 °C) (10/18/23 1042)  Pulse: 62 (10/19/23 0725)  Resp: 16 (10/19/23 0725)  BP: 122/88 (10/19/23 0725)  SpO2: (!) 92 % (10/18/23 1042) Vital Signs (24h Range):  Pulse:  [62] 62  Resp:  [16] 16  BP: (122)/(88) 122/88     Weight: 75.8 kg (167 lb)  Body mass index is 25.39 kg/m².  No intake or output data in the 24 hours ending 10/19/23 1452        Physical Exam  Vitals and nursing note reviewed.   Constitutional:       General: He is not in acute distress.     Appearance: Normal appearance. He is not ill-appearing.   HENT:      Head: Normocephalic and atraumatic.      Mouth/Throat:      Mouth: Mucous membranes are moist.      Pharynx: Oropharynx is clear.   Eyes:      Conjunctiva/sclera: Conjunctivae normal.   Cardiovascular:      Rate and Rhythm: Normal rate. Rhythm irregular.      Pulses: Normal pulses.      Heart sounds: No murmur heard.  Pulmonary:      Effort: Pulmonary effort is normal. No respiratory distress.      Breath sounds: No wheezing, rhonchi or rales.   Abdominal:      General: Abdomen is flat. There is no distension.      Palpations: Abdomen is soft.      Tenderness: There is no abdominal tenderness.      Comments: +PEG   Musculoskeletal:      Right lower leg: No edema.      Left lower leg: No edema.   Skin:     General: Skin is warm.      Capillary Refill: Capillary refill takes less than 2 seconds.      Findings: No erythema or rash.   Neurological:      Mental Status: He is alert. Mental status is at baseline.             Significant Labs: All pertinent labs within the past 24 hours have been reviewed.  CBC:   Recent Labs   Lab 10/17/23  2338   WBC 3.24*   HGB 10.0*   HCT 32.3*          CMP:   Recent Labs   Lab  10/17/23  2338      K 4.3      CO2 26   *   BUN 23*   CREATININE 1.7*   CALCIUM 8.6*   PROT 6.0   ALBUMIN 3.0*   BILITOT 0.3   ALKPHOS 60   AST 16   ALT 7*   ANIONGAP 8         Significant Imaging: I have reviewed all pertinent imaging results/findings within the past 24 hours.
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
## 2023-10-18 NOTE — SUBJECTIVE & OBJECTIVE
Interval History: No issues overnight. Remains HDS and afebrile. Awaiting placement    Review of Systems   Unable to perform ROS: Patient nonverbal     Objective:     Vital Signs (Most Recent):  Temp: 97.5 °F (36.4 °C) (10/18/23 1042)  Pulse: (!) 55 (10/18/23 1042)  Resp: 18 (10/18/23 1042)  BP: (!) 144/102 (10/18/23 1042)  SpO2: (!) 92 % (10/18/23 1042) Vital Signs (24h Range):  Temp:  [97.5 °F (36.4 °C)-98.5 °F (36.9 °C)] 97.5 °F (36.4 °C)  Pulse:  [55-62] 55  Resp:  [18] 18  SpO2:  [92 %-97 %] 92 %  BP: (123-145)/() 144/102     Weight: 75.8 kg (167 lb)  Body mass index is 25.39 kg/m².  No intake or output data in the 24 hours ending 10/18/23 1325        Physical Exam  Vitals and nursing note reviewed.   Constitutional:       General: He is not in acute distress.     Appearance: Normal appearance. He is not ill-appearing.   HENT:      Head: Normocephalic and atraumatic.      Mouth/Throat:      Mouth: Mucous membranes are moist.      Pharynx: Oropharynx is clear.   Eyes:      Conjunctiva/sclera: Conjunctivae normal.   Cardiovascular:      Rate and Rhythm: Normal rate. Rhythm irregular.      Pulses: Normal pulses.      Heart sounds: No murmur heard.  Pulmonary:      Effort: Pulmonary effort is normal. No respiratory distress.      Breath sounds: No wheezing, rhonchi or rales.   Abdominal:      General: Abdomen is flat. There is no distension.      Palpations: Abdomen is soft.      Tenderness: There is no abdominal tenderness.      Comments: +PEG   Musculoskeletal:      Right lower leg: No edema.      Left lower leg: No edema.   Skin:     General: Skin is warm.      Capillary Refill: Capillary refill takes less than 2 seconds.      Findings: No erythema or rash.   Neurological:      Mental Status: He is alert. Mental status is at baseline.             Significant Labs: All pertinent labs within the past 24 hours have been reviewed.  CBC:   Recent Labs   Lab 10/17/23  2338   WBC 3.24*   HGB 10.0*   HCT 32.3*           CMP:   Recent Labs   Lab 10/17/23  2338      K 4.3      CO2 26   *   BUN 23*   CREATININE 1.7*   CALCIUM 8.6*   PROT 6.0   ALBUMIN 3.0*   BILITOT 0.3   ALKPHOS 60   AST 16   ALT 7*   ANIONGAP 8         Significant Imaging: I have reviewed all pertinent imaging results/findings within the past 24 hours.

## 2023-10-19 LAB
SARS-COV-2 RNA RESP QL NAA+PROBE: NOT DETECTED
TB INDURATION 48 - 72 HR READ: 0 MM

## 2023-10-19 PROCEDURE — 93010 ELECTROCARDIOGRAM REPORT: CPT | Mod: ,,, | Performed by: INTERNAL MEDICINE

## 2023-10-19 PROCEDURE — 25000003 PHARM REV CODE 250: Performed by: STUDENT IN AN ORGANIZED HEALTH CARE EDUCATION/TRAINING PROGRAM

## 2023-10-19 PROCEDURE — 21400001 HC TELEMETRY ROOM

## 2023-10-19 PROCEDURE — 93010 EKG 12-LEAD: ICD-10-PCS | Mod: ,,, | Performed by: INTERNAL MEDICINE

## 2023-10-19 PROCEDURE — 93005 ELECTROCARDIOGRAM TRACING: CPT

## 2023-10-19 PROCEDURE — 25000003 PHARM REV CODE 250

## 2023-10-19 PROCEDURE — 87635 SARS-COV-2 COVID-19 AMP PRB: CPT

## 2023-10-19 PROCEDURE — 25000242 PHARM REV CODE 250 ALT 637 W/ HCPCS

## 2023-10-19 PROCEDURE — 25000003 PHARM REV CODE 250: Performed by: INTERNAL MEDICINE

## 2023-10-19 RX ORDER — ATORVASTATIN CALCIUM 80 MG/1
80 TABLET, FILM COATED ORAL DAILY
Qty: 90 TABLET | Refills: 3
Start: 2023-10-19 | End: 2024-10-18

## 2023-10-19 RX ORDER — QUETIAPINE FUMARATE 25 MG/1
25 TABLET, FILM COATED ORAL NIGHTLY
Status: DISCONTINUED | OUTPATIENT
Start: 2023-10-19 | End: 2023-10-26 | Stop reason: HOSPADM

## 2023-10-19 RX ORDER — BUSPIRONE HYDROCHLORIDE 10 MG/1
10 TABLET ORAL 3 TIMES DAILY
Qty: 90 TABLET | Refills: 3 | Status: ON HOLD | OUTPATIENT
Start: 2023-10-19 | End: 2023-12-22

## 2023-10-19 RX ORDER — NAPROXEN SODIUM 220 MG/1
81 TABLET, FILM COATED ORAL DAILY
Status: DISCONTINUED | OUTPATIENT
Start: 2023-10-20 | End: 2023-10-26 | Stop reason: HOSPADM

## 2023-10-19 RX ORDER — VALPROIC ACID 250 MG/1
500 CAPSULE, LIQUID FILLED ORAL
Status: CANCELLED | OUTPATIENT
Start: 2023-10-19

## 2023-10-19 RX ORDER — EZETIMIBE 10 MG/1
10 TABLET ORAL NIGHTLY
Qty: 90 TABLET | Refills: 3 | Status: SHIPPED | OUTPATIENT
Start: 2023-10-19 | End: 2024-10-18

## 2023-10-19 RX ORDER — FUROSEMIDE 40 MG/1
40 TABLET ORAL DAILY
Qty: 30 TABLET | Refills: 3 | Status: ON HOLD | OUTPATIENT
Start: 2023-10-19 | End: 2023-12-22 | Stop reason: HOSPADM

## 2023-10-19 RX ORDER — VALPROIC ACID 250 MG/1
1500 CAPSULE, LIQUID FILLED ORAL NIGHTLY
Status: CANCELLED | OUTPATIENT
Start: 2023-10-19

## 2023-10-19 RX ORDER — METOPROLOL SUCCINATE 50 MG/1
50 TABLET, EXTENDED RELEASE ORAL DAILY
Qty: 30 TABLET | Refills: 11 | Status: SHIPPED | OUTPATIENT
Start: 2023-10-20 | End: 2024-10-19

## 2023-10-19 RX ORDER — VALPROIC ACID 250 MG/1
500 CAPSULE, LIQUID FILLED ORAL DAILY
Status: CANCELLED | OUTPATIENT
Start: 2023-10-20

## 2023-10-19 RX ORDER — VALPROIC ACID 250 MG/5ML
1500 SOLUTION ORAL NIGHTLY
Status: DISCONTINUED | OUTPATIENT
Start: 2023-10-19 | End: 2023-10-24

## 2023-10-19 RX ORDER — NAPROXEN SODIUM 220 MG/1
81 TABLET, FILM COATED ORAL DAILY
Qty: 30 TABLET | Refills: 11 | Status: SHIPPED | OUTPATIENT
Start: 2023-10-19

## 2023-10-19 RX ORDER — ATORVASTATIN CALCIUM 40 MG/1
80 TABLET, FILM COATED ORAL DAILY
Status: DISCONTINUED | OUTPATIENT
Start: 2023-10-20 | End: 2023-10-26 | Stop reason: HOSPADM

## 2023-10-19 RX ORDER — VALPROIC ACID 250 MG/5ML
500 SOLUTION ORAL
Status: DISCONTINUED | OUTPATIENT
Start: 2023-10-19 | End: 2023-10-24

## 2023-10-19 RX ORDER — QUETIAPINE FUMARATE 25 MG/1
25 TABLET, FILM COATED ORAL NIGHTLY
Qty: 30 TABLET | Refills: 11 | Status: ON HOLD
Start: 2023-10-19 | End: 2023-12-22

## 2023-10-19 RX ORDER — VALPROIC ACID 250 MG/5ML
1500 SOLUTION ORAL NIGHTLY
Qty: 900 ML | Refills: 11 | Status: ON HOLD
Start: 2023-10-19 | End: 2023-12-22 | Stop reason: HOSPADM

## 2023-10-19 RX ORDER — EZETIMIBE 10 MG/1
10 TABLET ORAL NIGHTLY
Status: DISCONTINUED | OUTPATIENT
Start: 2023-10-19 | End: 2023-10-26 | Stop reason: HOSPADM

## 2023-10-19 RX ORDER — VALPROIC ACID 250 MG/5ML
500 SOLUTION ORAL DAILY
Status: DISCONTINUED | OUTPATIENT
Start: 2023-10-20 | End: 2023-10-24

## 2023-10-19 RX ORDER — METOPROLOL SUCCINATE 50 MG/1
50 TABLET, EXTENDED RELEASE ORAL DAILY
Status: DISCONTINUED | OUTPATIENT
Start: 2023-10-20 | End: 2023-10-26 | Stop reason: HOSPADM

## 2023-10-19 RX ADMIN — APIXABAN 5 MG: 5 TABLET, FILM COATED ORAL at 12:10

## 2023-10-19 RX ADMIN — QUETIAPINE FUMARATE 25 MG: 25 TABLET ORAL at 12:10

## 2023-10-19 RX ADMIN — SACUBITRIL AND VALSARTAN 1 TABLET: 24; 26 TABLET, FILM COATED ORAL at 08:10

## 2023-10-19 RX ADMIN — METOPROLOL TARTRATE 25 MG: 25 TABLET, FILM COATED ORAL at 08:10

## 2023-10-19 RX ADMIN — EZETIMIBE 10 MG: 10 TABLET ORAL at 09:10

## 2023-10-19 RX ADMIN — BUSPIRONE HYDROCHLORIDE 10 MG: 10 TABLET ORAL at 12:10

## 2023-10-19 RX ADMIN — Medication 6 MG: at 09:10

## 2023-10-19 RX ADMIN — APIXABAN 5 MG: 5 TABLET, FILM COATED ORAL at 09:10

## 2023-10-19 RX ADMIN — VALPROIC ACID 1500 MG: 250 SOLUTION ORAL at 09:10

## 2023-10-19 RX ADMIN — APIXABAN 5 MG: 5 TABLET, FILM COATED ORAL at 08:10

## 2023-10-19 RX ADMIN — BUSPIRONE HYDROCHLORIDE 10 MG: 10 TABLET ORAL at 09:10

## 2023-10-19 RX ADMIN — VALPROIC ACID 500 MG: 250 SOLUTION ORAL at 08:10

## 2023-10-19 RX ADMIN — BUSPIRONE HYDROCHLORIDE 10 MG: 10 TABLET ORAL at 08:10

## 2023-10-19 RX ADMIN — ASPIRIN 81 MG CHEWABLE TABLET 81 MG: 81 TABLET CHEWABLE at 08:10

## 2023-10-19 RX ADMIN — POLYETHYLENE GLYCOL 3350 17 G: 17 POWDER, FOR SOLUTION ORAL at 08:10

## 2023-10-19 RX ADMIN — QUETIAPINE FUMARATE 25 MG: 25 TABLET ORAL at 09:10

## 2023-10-19 RX ADMIN — Medication 6 MG: at 12:10

## 2023-10-19 RX ADMIN — ATORVASTATIN CALCIUM 80 MG: 40 TABLET, FILM COATED ORAL at 08:10

## 2023-10-19 RX ADMIN — VALPROIC ACID 1500 MG: 250 SOLUTION ORAL at 12:10

## 2023-10-19 RX ADMIN — SACUBITRIL AND VALSARTAN 1 TABLET: 24; 26 TABLET, FILM COATED ORAL at 12:10

## 2023-10-19 RX ADMIN — SACUBITRIL AND VALSARTAN 1 TABLET: 24; 26 TABLET, FILM COATED ORAL at 09:10

## 2023-10-19 RX ADMIN — EZETIMIBE 10 MG: 10 TABLET ORAL at 12:10

## 2023-10-19 RX ADMIN — VALPROIC ACID 500 MG: 250 SOLUTION ORAL at 03:10

## 2023-10-19 RX ADMIN — BUSPIRONE HYDROCHLORIDE 10 MG: 10 TABLET ORAL at 03:10

## 2023-10-19 RX ADMIN — SENNOSIDES AND DOCUSATE SODIUM 1 TABLET: 50; 8.6 TABLET ORAL at 08:10

## 2023-10-19 RX ADMIN — EMPAGLIFLOZIN 10 MG: 10 TABLET, FILM COATED ORAL at 03:10

## 2023-10-19 NOTE — PLAN OF CARE
Pt accepted by 3 nursing facilities run by [facility]. Pt stable to dc, left voicemail message with HPTUCKER Camargojacinta Dave at  informing Pt is medically ready to dc and she needs to complete admit paperwork with nursing home rep. Sw also provide [facility] contact number, CM leadership and medicine team updated.

## 2023-10-19 NOTE — ASSESSMENT & PLAN NOTE
Patient with previously known AFib  Current rhythm: Atrial Fibrillation  Home meds: Amiodarone + Lopressor  S/p PPM    - QTC prolonged on EKG, will hold home amiodarone  - Transitioned from Lopressor to Toprol 50 QD in line with GDMT  - Continue anticoagulation with home Eliquis  - PRN IV Metoprolol for RVR  - Continuous Telemetry  - Maintain K>4, Mg >2, Ca/iCa WNL to decrease arrhythmogenic potential.

## 2023-10-19 NOTE — ASSESSMENT & PLAN NOTE
Patient agitated on admission and is baseline non-verbal post CVA. QTc prolonged on admission and home dose seroquel was held. Patient has history of hospital delirium/agitation, which does not represent baseline behavior at home or among family. Patient is requiring a sitter to control agitation, as patient is able to remove mittens and soft restraints. Has removed multiple pIVs.     -Continue home depakote 500/500/1500mg dosing  -Continue home seroquel 25mg nightly  -Continue buspar 10mg TID for agitation  -Will continue to consider further chemical restraint in all efforts to avoid physical restraints, as requested by patient's hPOA.  -Psychiatry signed off, appreciate recs
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
## 2023-10-19 NOTE — PLAN OF CARE
Recommendations    1. Continue Regular Diet as tolerated. Texture per SLP.   2. Recommend ONS Boost Plus with all meals to optimize meals.   3. Weigh weekly.   4. RD to monitor and follow-up.    Goals: Meet % of EEN/EPN by RD f/u date  Nutrition Goal Status: progressing towards goal  Communication of RD Recs: other (comment)

## 2023-10-19 NOTE — PROGRESS NOTES
"[facility] - Med Surg  Adult Nutrition  Progress Note    SUMMARY       Recommendations    1. Continue Regular Diet as tolerated. Texture per SLP.   2. Recommend ONS Boost Plus with all meals to optimize meals.   3. Weigh weekly.   4. RD to monitor and follow-up.    Goals: Meet % of EEN/EPN by RD f/u date  Nutrition Goal Status: progressing towards goal  Communication of RD Recs: other (comment)    Assessment and Plan    Nutrition Problem  Inadequate energy intake     Related to (etiology):   Physiological demands     Signs and Symptoms (as evidenced by):   HF    Interventions(treatment strategy):  Collaboration of nutrition care w/ other providers     Nutrition Diagnosis Status:   Improving       Reason for Assessment    Reason For Assessment: RD follow-up  Diagnosis: other (see comments)  Relevant Medical History: -  Interdisciplinary Rounds: did not attend  General Information Comments: RD follow-up. Tolerating diet. PO intake 50% at meals. Patient was not open to speaking with RD pretending to be in/out of sleep. Per chart patient is medically stable for DC.  Nutrition Discharge Planning: pending clinical course    Nutrition Risk Screen    Nutrition Risk Screen: no indicators present    Nutrition/Diet History    Patient Reported Diet/Restrictions/Preferences: general  Spiritual, Cultural Beliefs, Jainism Practices, Values that Affect Care: no  Food Allergies: NKFA  Factors Affecting Nutritional Intake: None identified at this time    Anthropometrics    Temp: 97.5 °F (36.4 °C)  Height Method: Estimated  Height: 5' 8" (172.7 cm)  Height (inches): 68 in  Weight Method: Bed Scale  Weight: 75.8 kg (167 lb)  Weight (lb): 167 lb  Ideal Body Weight (IBW), Male: 154 lb  % Ideal Body Weight, Male (lb): 108.44 %  BMI (Calculated): 25.4  BMI Grade: 25 - 29.9 - overweight       Lab/Procedures/Meds    Pertinent Labs Reviewed: reviewed  Pertinent Labs Comments: BUN 23, Creat 1.7, GFR 46.7, Glu 132, Ca 8.6, ALT " 7  Pertinent Medications Reviewed: reviewed  Pertinent Medications Comments: -    Estimated/Assessed Needs    Weight Used For Calorie Calculations: 76.1 kg (167 lb 12.3 oz)  Energy Calorie Requirements (kcal): 1902  Energy Need Method: Kcal/kg (30-35 kcal/kg)  Protein Requirements: 91 g (1.2 g/kg)  Weight Used For Protein Calculations: 76.1 kg (167 lb 12.3 oz)        RDA Method (mL): 1902         Nutrition Prescription Ordered    Current Diet Order: Regular  Nutrition Order Comments: Thin Liquids    Evaluation of Received Nutrient/Fluid Intake    I/O: 0  Energy Calories Required: meeting needs  Protein Required: meeting needs  Fluid Required: not meeting needs  Total Fluid Intake (mL/kg): 1 ml or fluid per MD  Comments: LBM: 10/15  Tolerance: tolerating  % Intake of Estimated Energy Needs: 75 - 100 %  % Meal Intake: 50 - 75 %    Nutrition Risk    Level of Risk/Frequency of Follow-up: low ((1x/week))     Monitor and Evaluation    Food and Nutrient Intake: energy intake, food and beverage intake  Food and Nutrient Adminstration: diet order  Knowledge/Beliefs/Attitudes: food and nutrition knowledge/skill, beliefs and attitudes  Physical Activity and Function: nutrition-related ADLs and IADLs, factors affecting access to physical activity  Anthropometric Measurements: weight, height/length, weight change, body mass index, growth pattern indices/percentile ranks  Biochemical Data, Medical Tests and Procedures: electrolyte and renal panel, gastrointestinal profile, glucose/endocrine profile, inflammatory profile, lipid profile  Nutrition-Focused Physical Findings: overall appearance, extremities, muscles and bones, head and eyes, skin     Nutrition Follow-Up    RD Follow-up?: Yes    [facility] Registration Eligible, Provisional LDN

## 2023-10-20 PROCEDURE — 93010 ELECTROCARDIOGRAM REPORT: CPT | Mod: ,,, | Performed by: INTERNAL MEDICINE

## 2023-10-20 PROCEDURE — 25000003 PHARM REV CODE 250

## 2023-10-20 PROCEDURE — 11000001 HC ACUTE MED/SURG PRIVATE ROOM

## 2023-10-20 PROCEDURE — 93010 EKG 12-LEAD: ICD-10-PCS | Mod: ,,, | Performed by: INTERNAL MEDICINE

## 2023-10-20 PROCEDURE — 25000242 PHARM REV CODE 250 ALT 637 W/ HCPCS

## 2023-10-20 PROCEDURE — 25000003 PHARM REV CODE 250: Performed by: STUDENT IN AN ORGANIZED HEALTH CARE EDUCATION/TRAINING PROGRAM

## 2023-10-20 PROCEDURE — 25000003 PHARM REV CODE 250: Performed by: INTERNAL MEDICINE

## 2023-10-20 PROCEDURE — 93005 ELECTROCARDIOGRAM TRACING: CPT

## 2023-10-20 PROCEDURE — 92507 TX SP LANG VOICE COMM INDIV: CPT

## 2023-10-20 RX ORDER — LORAZEPAM 0.5 MG/1
0.5 TABLET ORAL ONCE
Status: COMPLETED | OUTPATIENT
Start: 2023-10-20 | End: 2023-10-20

## 2023-10-20 RX ORDER — AMOXICILLIN 250 MG
1 CAPSULE ORAL DAILY
Start: 2023-10-21

## 2023-10-20 RX ORDER — TALC
6 POWDER (GRAM) TOPICAL NIGHTLY PRN
Refills: 0
Start: 2023-10-20

## 2023-10-20 RX ORDER — POLYETHYLENE GLYCOL 3350 17 G/17G
17 POWDER, FOR SOLUTION ORAL DAILY
Refills: 0
Start: 2023-10-21

## 2023-10-20 RX ORDER — SPIRONOLACTONE 25 MG/1
25 TABLET ORAL DAILY
Status: CANCELLED | OUTPATIENT
Start: 2023-10-20

## 2023-10-20 RX ADMIN — QUETIAPINE FUMARATE 25 MG: 25 TABLET ORAL at 09:10

## 2023-10-20 RX ADMIN — BUSPIRONE HYDROCHLORIDE 10 MG: 10 TABLET ORAL at 09:10

## 2023-10-20 RX ADMIN — POLYETHYLENE GLYCOL 3350 17 G: 17 POWDER, FOR SOLUTION ORAL at 09:10

## 2023-10-20 RX ADMIN — EMPAGLIFLOZIN 10 MG: 10 TABLET, FILM COATED ORAL at 09:10

## 2023-10-20 RX ADMIN — Medication 6 MG: at 11:10

## 2023-10-20 RX ADMIN — SACUBITRIL AND VALSARTAN 1 TABLET: 24; 26 TABLET, FILM COATED ORAL at 09:10

## 2023-10-20 RX ADMIN — VALPROIC ACID 1500 MG: 250 SOLUTION ORAL at 09:10

## 2023-10-20 RX ADMIN — APIXABAN 5 MG: 5 TABLET, FILM COATED ORAL at 09:10

## 2023-10-20 RX ADMIN — ATORVASTATIN CALCIUM 80 MG: 40 TABLET, FILM COATED ORAL at 09:10

## 2023-10-20 RX ADMIN — LORAZEPAM 0.5 MG: 0.5 TABLET ORAL at 11:10

## 2023-10-20 RX ADMIN — SENNOSIDES AND DOCUSATE SODIUM 1 TABLET: 50; 8.6 TABLET ORAL at 09:10

## 2023-10-20 RX ADMIN — EZETIMIBE 10 MG: 10 TABLET ORAL at 09:10

## 2023-10-20 RX ADMIN — ASPIRIN 81 MG CHEWABLE TABLET 81 MG: 81 TABLET CHEWABLE at 09:10

## 2023-10-20 RX ADMIN — BUSPIRONE HYDROCHLORIDE 10 MG: 10 TABLET ORAL at 02:10

## 2023-10-20 RX ADMIN — VALPROIC ACID 500 MG: 250 SOLUTION ORAL at 12:10

## 2023-10-20 RX ADMIN — VALPROIC ACID 500 MG: 250 SOLUTION ORAL at 09:10

## 2023-10-20 RX ADMIN — METOPROLOL SUCCINATE 50 MG: 50 TABLET, EXTENDED RELEASE ORAL at 09:10

## 2023-10-20 NOTE — PT/OT/SLP PROGRESS
Speech Language Pathology Treatment    Patient Name:  Tera Griffiths   MRN:  [id number]  Admitting Diagnosis: Acute on chronic combined systolic and diastolic heart failure    Recommendations:                 General Recommendations:  Speech/language therapy  Diet recommendations:  Regular Diet - IDDSI Level 7, Liquid Diet Level: Thin liquids - IDDSI Level 0   Aspiration Precautions: Standard aspiration precautions   General Precautions: Standard, fall, aphasia  Communication strategies:  go to room if call light pushed    Assessment:     Tera Griffiths is a 58 y.o. male with an SLP diagnosis of Aphasia.    Subjective     Pt awake/alert    Pain/Comfort:  Pain Rating 1: 0/10  Pain Rating Post-Intervention 1: 0/10    Respiratory Status: Room air    Objective:     Has the patient been evaluated by SLP for swallowing?   Yes  Keep patient NPO? No     Pt seen for ongoing speech/language therapy. Pt with poor participation throughout session despite max encouragement. He was unable to follow simple commands despite max cues. He only answered to 2/5 simple y/n questions with head shake/nod. Pt unable to differentiate y/n and only answered yes with head nod. No elicited or spontaneous verbalization was observed. He continues to moan/groan throughout the session. All goals remain appropriate at this time and pt to continue regular diet/thins. Will continue to follow up.     Goals:   Multidisciplinary Problems       SLP Goals          Problem: SLP    Goal Priority Disciplines Outcome   SLP Goal     SLP Ongoing, Progressing   Description: Speech Language Pathology Goals  Goals expected to be met by 10/29    1. Pt will answer simple y/n questions with 60% accy given max cues.   2. Pt will follow simple commands with 50% accy given max cues.   3. Pt will vocalize in response to any stim x5/session given max cues.   4. Pt will ID objects from FCx2 with 50 % acc following max cues.                                            Plan:     Patient to be seen:  3 x/week   Plan of Care expires:  11/14/23  Plan of Care reviewed with:  patient   SLP Follow-Up:  Yes       Discharge recommendations:  Low Intensity Therapy       Time Tracking:     SLP Treatment Date:   10/20/23  Speech Start Time:  0941  Speech Stop Time:  0953     Speech Total Time (min):  12 min    Billable Minutes: Speech Therapy Individual 12    10/20/2023

## 2023-10-20 NOTE — PLAN OF CARE
Placed call to brother Spencer at , had to leave another VM asking brother to return call to process paperwork with NH that has accepted Pt, will follow. Sw placed call to ENRIKE Cordon at , left VM asking her to return call and updated that Sw has left several message for Pt's brother. Dianna will contact sister or son next, Dianna will update  leadership and medicine team.

## 2023-10-20 NOTE — SUBJECTIVE & OBJECTIVE
Interval History: No issues overnight. Remains HDS and afebrile. Potential accepting facility. Awaiting placement. Tolerating po GDMT well.    Review of Systems   Unable to perform ROS: Patient nonverbal     Objective:     Vital Signs (Most Recent):  Temp: 98 °F (36.7 °C) (10/20/23 1122)  Pulse: 69 (10/20/23 1122)  Resp: 19 (10/20/23 1122)  BP: 137/85 (10/20/23 1448)  SpO2: 98 % (10/20/23 1122) Vital Signs (24h Range):  Temp:  [97.5 °F (36.4 °C)-98.5 °F (36.9 °C)] 98 °F (36.7 °C)  Pulse:  [62-69] 69  Resp:  [16-19] 19  SpO2:  [92 %-98 %] 98 %  BP: (118-173)/() 137/85     Weight: 75.8 kg (167 lb)  Body mass index is 25.39 kg/m².    Intake/Output Summary (Last 24 hours) at 10/20/2023 1843  Last data filed at 10/20/2023 0546  Gross per 24 hour   Intake 480 ml   Output 400 ml   Net 80 ml           Physical Exam  Vitals and nursing note reviewed.   Constitutional:       General: He is not in acute distress.     Appearance: Normal appearance. He is not ill-appearing.   HENT:      Head: Normocephalic and atraumatic.      Mouth/Throat:      Mouth: Mucous membranes are moist.      Pharynx: Oropharynx is clear.   Eyes:      Conjunctiva/sclera: Conjunctivae normal.   Cardiovascular:      Rate and Rhythm: Normal rate. Rhythm irregular.      Pulses: Normal pulses.      Heart sounds: No murmur heard.  Pulmonary:      Effort: Pulmonary effort is normal. No respiratory distress.      Breath sounds: No wheezing, rhonchi or rales.   Abdominal:      General: Abdomen is flat. There is no distension.      Palpations: Abdomen is soft.      Tenderness: There is no abdominal tenderness.      Comments: +PEG   Musculoskeletal:      Right lower leg: No edema.      Left lower leg: No edema.   Skin:     General: Skin is warm.      Capillary Refill: Capillary refill takes less than 2 seconds.      Findings: No erythema or rash.   Neurological:      Mental Status: He is alert. Mental status is at baseline.             Significant Labs: All  "pertinent labs within the past 24 hours have been reviewed.  CBC:   No results for input(s): "WBC", "HGB", "HCT", "PLT" in the last 48 hours.    CMP:   No results for input(s): "NA", "K", "CL", "CO2", "GLU", "BUN", "CREATININE", "CALCIUM", "PROT", "ALBUMIN", "BILITOT", "ALKPHOS", "AST", "ALT", "ANIONGAP", "EGFRNONAA" in the last 48 hours.    Invalid input(s): "ESTGFAFR[place]"      Significant Imaging: I have reviewed all pertinent imaging results/findings within the past 24 hours.  "
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
## 2023-10-20 NOTE — PLAN OF CARE
Sw left another message with brother Spencer, left another message informing of denial of service and that Sw had left info with ex wife with [facility] letter and rate of bed cost a day until NH paperwork is filled out. Dianna will contact sister and son for options.

## 2023-10-20 NOTE — PLAN OF CARE
NURSING HOME ORDERS    10/20/2023  [facility]  [address]  [place] [address]  Dept: [phone]  Loc: [phone]     Admit to Nursing Home:      Diagnoses:  Active Hospital Problems    Diagnosis  POA    *Acute on chronic combined systolic and diastolic heart failure [I50.43]  Yes    Discharge planning issues [Z02.9]  Not Applicable    Agitation [R45.1]  Yes    History of embolic stroke involving left middle cerebral artery [Z86.73]  Not Applicable    CAD (coronary artery disease) [I25.10]  Yes    Paroxysmal A-fib [I48.0]  Yes    Dyslipidemia [E78.5]  Yes      Resolved Hospital Problems   No resolved problems to display.       Patient is homebound due to:  Acute on chronic combined systolic and diastolic heart failure    Allergies:Review of patient's allergies indicates:  No Known Allergies    Vitals:  Routine    Diet: cardiac diet    Continue Regular Diet as tolerated.   2. IF EN warranted recommend Isosource @ 50 mL/hr x 24 hours to provide  1800 kcal ( 94% EEN), 81 g protein ( 100% EPN), and 912 ml free water)   100 mL FWF Q4H   3. Weigh weekly.   4. RD to monitor and follow-up.     Goals: Meet % of EEN/EPN by RD f/u date  Nutrition Goal Status: progressing towards goal  Communication of RD Recs: other (comment)    Activities:   Activity as tolerated    Goals of Care Treatment Preferences:  Code Status: Full Code    Health care agent: [facility] agent number:           What is most important right now is to focus on extending life as long as possible, even it it means sacrificing quality, curative/life-prolongation (regardless of treatment burdens).  Accordingly, we have decided that the best plan to meet the patient's goals includes continuing with treatment.      Labs:  Routine, per facility + BMP 10/27/23 to follow sCr    Nursing Precautions:  Aspiration  and Fall    Consults:   PT to evaluate and treat- 2 times a  week, OT to evaluate and treat- 2 times a week, ST to evaluate and treat- 3 times a week, and Nutrition to evaluate and recommend diet     Miscellaneous Care: PEG Care:  Clean site every 24 hours  CHF Care: Daily Weight with notification of MD/NP of 2lb or > increase in 24 hours    v/s and O2 sat every shift    Oxygen as needed for sats <90%    Report abnormal breath sounds to MD/NP    Edema checks q shift- notify MD/NP of increased edema    Task segmentation by nursing for daily care to decrease exertion    Schedule appointment with cardiologist, Yunior Mullerh THALIA in 2 Weeks    CHF education to include diet ,medication, and CHF flags for MD notification      Medications: Discontinue all previous medication orders, if any. See new list below.     Medication List        START taking these medications      busPIRone 10 MG tablet  Commonly known as: BUSPAR  Take 1 tablet (10 mg total) by mouth 3 (three) times daily. Can also take two additional doses as needed for agitation     furosemide 40 MG tablet  Commonly known as: LASIX  Take 1 tablet (40 mg total) by mouth once daily.     melatonin 3 mg tablet  Commonly known as: MELATIN  Take 2 tablets (6 mg total) by mouth nightly as needed for Insomnia.     metoprolol succinate 50 MG 24 hr tablet  Commonly known as: TOPROL-XL  Take 1 tablet (50 mg total) by mouth once daily.     polyethylene glycol 17 gram Pwpk  Commonly known as: GLYCOLAX  Take 17 g by mouth once daily.  Start taking on: October 21, 2023     sacubitriL-valsartan 24-26 mg per tablet  Commonly known as: ENTRESTO  Take 1 tablet by mouth 2 (two) times daily.     senna-docusate 8.6-50 mg 8.6-50 mg per tablet  Commonly known as: PERICOLACE  Take 1 tablet by mouth once daily.  Start taking on: October 21, 2023            CHANGE how you take these medications      apixaban 5 mg Tab  Commonly known as: ELIQUIS  Take 1 tablet (5 mg total) by mouth 2 (two) times daily.  What changed:   how to take this  Another  medication with the same name was removed. Continue taking this medication, and follow the directions you see here.     aspirin 81 MG Chew  Chew and  swallow 1 tablet (81 mg total) by mouth once daily.  What changed:   how to take this  Another medication with the same name was removed. Continue taking this medication, and follow the directions you see here.     atorvastatin 80 MG tablet  Commonly known as: LIPITOR  Take 1 tablet (80 mg total) by mouth once daily.  What changed:   how to take this  Another medication with the same name was removed. Continue taking this medication, and follow the directions you see here.     empagliflozin 10 mg tablet  Commonly known as: JARDIANCE  Take 1 tablet (10 mg total) by mouth once daily.  What changed:   how to take this  additional instructions  Another medication with the same name was removed. Continue taking this medication, and follow the directions you see here.     ezetimibe 10 mg tablet  Commonly known as: ZETIA  Take 1 tablet (10 mg total) by mouth every evening.  What changed:   how to take this  Another medication with the same name was removed. Continue taking this medication, and follow the directions you see here.     QUEtiapine 25 MG Tab  Commonly known as: SEROQUEL  Take 1 tablet (25 mg total) by mouth every evening.  What changed:   how to take this  Another medication with the same name was removed. Continue taking this medication, and follow the directions you see here.     * valproic acid (as sodium salt) 250 mg/5 mL (5 mL) Soln  Commonly known as: DEPAKENE  10 mLs (500 mg total) by Per G Tube route once daily. To be administers at 1200 daily  What changed: Another medication with the same name was changed. Make sure you understand how and when to take each.     * valproic acid (as sodium salt) 250 mg/5 mL (5 mL) Soln  Commonly known as: DEPAKENE  10 mLs (500 mg total) by Per G Tube route once daily. To be administered 0900 daily  What changed: Another  medication with the same name was changed. Make sure you understand how and when to take each.     * valproic acid (as sodium salt) 250 mg/5 mL (5 mL) Soln  Commonly known as: DEPAKENE  Take 30 mLs (1,500 mg total) by mouth every evening.  What changed:   how much to take  how to take this           * This list has 3 medication(s) that are the same as other medications prescribed for you. Read the directions carefully, and ask your doctor or other care provider to review them with you.                CONTINUE taking these medications      nitroGLYCERIN 0.4 MG SL tablet  Commonly known as: NITROSTAT  Place under the tongue.            STOP taking these medications      amiodarone 200 MG Tab  Commonly known as: PACERONE     chlorhexidine 0.12 % solution  Commonly known as: PERIDEX     metoprolol tartrate 25 MG tablet  Commonly known as: LOPRESSOR                Immunizations Administered as of 10/20/2023       Name Date Dose VIS Date Route Exp Date    COVID-19, MRNA, LN-S, PF (Pfizer) (Purple Cap) 1/26/2022 -- -- -- --    COVID-19, MRNA, LN-S, PF (Pfizer) (Purple Cap) 1/6/2022 0.3 mL -- Intramuscular --    Site: Left arm     : [employer]     Lot: [id number]     Comment: Adminis             This patient has had both covid vaccinations    Some patients may experience side effects after vaccination.  These may include fever, headache, muscle or joint aches.  Most symptoms resolve with 24-48 hours and do not require urgent medical evaluation unless they persist for more than 72 hours or symptoms are concerning for an unrelated medical condition.          _________________________________  Marshall Sidhu MD  10/20/2023

## 2023-10-21 LAB
ALBUMIN SERPL BCP-MCNC: 3.3 G/DL (ref 3.5–5.2)
ALP SERPL-CCNC: 66 U/L (ref 55–135)
ALT SERPL W/O P-5'-P-CCNC: 7 U/L (ref 10–44)
ANION GAP SERPL CALC-SCNC: 10 MMOL/L (ref 8–16)
AST SERPL-CCNC: 15 U/L (ref 10–40)
BASOPHILS # BLD AUTO: 0 K/UL (ref 0–0.2)
BASOPHILS NFR BLD: 0 % (ref 0–1.9)
BILIRUB SERPL-MCNC: 0.3 MG/DL (ref 0.1–1)
BUN SERPL-MCNC: 19 MG/DL (ref 6–20)
CALCIUM SERPL-MCNC: 9 MG/DL (ref 8.7–10.5)
CHLORIDE SERPL-SCNC: 108 MMOL/L (ref 95–110)
CO2 SERPL-SCNC: 26 MMOL/L (ref 23–29)
CREAT SERPL-MCNC: 1.8 MG/DL (ref 0.5–1.4)
DIFFERENTIAL METHOD: ABNORMAL
EOSINOPHIL # BLD AUTO: 0.1 K/UL (ref 0–0.5)
EOSINOPHIL NFR BLD: 2.4 % (ref 0–8)
ERYTHROCYTE [DISTWIDTH] IN BLOOD BY AUTOMATED COUNT: 17.7 % (ref 11.5–14.5)
EST. GFR  (NO RACE VARIABLE): 43.6 ML/MIN/1.73 M^2
GLUCOSE SERPL-MCNC: 94 MG/DL (ref 70–110)
HCT VFR BLD AUTO: 34.7 % (ref 40–54)
HGB BLD-MCNC: 10.8 G/DL (ref 14–18)
IMM GRANULOCYTES # BLD AUTO: 0.01 K/UL (ref 0–0.04)
IMM GRANULOCYTES NFR BLD AUTO: 0.3 % (ref 0–0.5)
LYMPHOCYTES # BLD AUTO: 1.2 K/UL (ref 1–4.8)
LYMPHOCYTES NFR BLD: 35.4 % (ref 18–48)
MCH RBC QN AUTO: 28.2 PG (ref 27–31)
MCHC RBC AUTO-ENTMCNC: 31.1 G/DL (ref 32–36)
MCV RBC AUTO: 91 FL (ref 82–98)
MONOCYTES # BLD AUTO: 0.3 K/UL (ref 0.3–1)
MONOCYTES NFR BLD: 9.5 % (ref 4–15)
NEUTROPHILS # BLD AUTO: 1.7 K/UL (ref 1.8–7.7)
NEUTROPHILS NFR BLD: 52.4 % (ref 38–73)
NRBC BLD-RTO: 0 /100 WBC
PLATELET # BLD AUTO: 127 K/UL (ref 150–450)
PMV BLD AUTO: 12.9 FL (ref 9.2–12.9)
POTASSIUM SERPL-SCNC: 4.6 MMOL/L (ref 3.5–5.1)
PROT SERPL-MCNC: 6.6 G/DL (ref 6–8.4)
RBC # BLD AUTO: 3.83 M/UL (ref 4.6–6.2)
SODIUM SERPL-SCNC: 144 MMOL/L (ref 136–145)
WBC # BLD AUTO: 3.28 K/UL (ref 3.9–12.7)

## 2023-10-21 PROCEDURE — 25000242 PHARM REV CODE 250 ALT 637 W/ HCPCS

## 2023-10-21 PROCEDURE — 25000003 PHARM REV CODE 250

## 2023-10-21 PROCEDURE — 25000003 PHARM REV CODE 250: Performed by: STUDENT IN AN ORGANIZED HEALTH CARE EDUCATION/TRAINING PROGRAM

## 2023-10-21 PROCEDURE — 11000001 HC ACUTE MED/SURG PRIVATE ROOM

## 2023-10-21 PROCEDURE — 80053 COMPREHEN METABOLIC PANEL: CPT

## 2023-10-21 PROCEDURE — 25000003 PHARM REV CODE 250: Performed by: INTERNAL MEDICINE

## 2023-10-21 PROCEDURE — 36415 COLL VENOUS BLD VENIPUNCTURE: CPT

## 2023-10-21 PROCEDURE — 85025 COMPLETE CBC W/AUTO DIFF WBC: CPT

## 2023-10-21 RX ORDER — LORAZEPAM 2 MG/ML
0.5 INJECTION INTRAMUSCULAR ONCE
Status: DISCONTINUED | OUTPATIENT
Start: 2023-10-21 | End: 2023-10-21

## 2023-10-21 RX ORDER — LORAZEPAM 0.5 MG/1
0.5 TABLET ORAL ONCE
Status: COMPLETED | OUTPATIENT
Start: 2023-10-21 | End: 2023-10-21

## 2023-10-21 RX ADMIN — VALPROIC ACID 500 MG: 250 SOLUTION ORAL at 12:10

## 2023-10-21 RX ADMIN — VALPROIC ACID 1500 MG: 250 SOLUTION ORAL at 08:10

## 2023-10-21 RX ADMIN — ATORVASTATIN CALCIUM 80 MG: 40 TABLET, FILM COATED ORAL at 10:10

## 2023-10-21 RX ADMIN — ASPIRIN 81 MG CHEWABLE TABLET 81 MG: 81 TABLET CHEWABLE at 10:10

## 2023-10-21 RX ADMIN — APIXABAN 5 MG: 5 TABLET, FILM COATED ORAL at 10:10

## 2023-10-21 RX ADMIN — SENNOSIDES AND DOCUSATE SODIUM 1 TABLET: 50; 8.6 TABLET ORAL at 10:10

## 2023-10-21 RX ADMIN — SACUBITRIL AND VALSARTAN 1 TABLET: 24; 26 TABLET, FILM COATED ORAL at 08:10

## 2023-10-21 RX ADMIN — Medication 6 MG: at 07:10

## 2023-10-21 RX ADMIN — BUSPIRONE HYDROCHLORIDE 10 MG: 10 TABLET ORAL at 08:10

## 2023-10-21 RX ADMIN — LORAZEPAM 0.5 MG: 0.5 TABLET ORAL at 11:10

## 2023-10-21 RX ADMIN — EMPAGLIFLOZIN 10 MG: 10 TABLET, FILM COATED ORAL at 10:10

## 2023-10-21 RX ADMIN — VALPROIC ACID 500 MG: 250 SOLUTION ORAL at 10:10

## 2023-10-21 RX ADMIN — POLYETHYLENE GLYCOL 3350 17 G: 17 POWDER, FOR SOLUTION ORAL at 10:10

## 2023-10-21 RX ADMIN — QUETIAPINE FUMARATE 25 MG: 25 TABLET ORAL at 08:10

## 2023-10-21 RX ADMIN — BUSPIRONE HYDROCHLORIDE 10 MG: 10 TABLET ORAL at 10:10

## 2023-10-21 RX ADMIN — EZETIMIBE 10 MG: 10 TABLET ORAL at 08:10

## 2023-10-21 RX ADMIN — METOPROLOL SUCCINATE 50 MG: 50 TABLET, EXTENDED RELEASE ORAL at 10:10

## 2023-10-21 RX ADMIN — BUSPIRONE HYDROCHLORIDE 10 MG: 10 TABLET ORAL at 03:10

## 2023-10-21 RX ADMIN — SACUBITRIL AND VALSARTAN 1 TABLET: 24; 26 TABLET, FILM COATED ORAL at 10:10

## 2023-10-21 RX ADMIN — APIXABAN 5 MG: 5 TABLET, FILM COATED ORAL at 08:10

## 2023-10-21 NOTE — PLAN OF CARE
Problem: Adult Inpatient Plan of Care  Goal: Plan of Care Review  Outcome: Adequate for Care Transition  Goal: Patient-Specific Goal (Individualized)  Outcome: Adequate for Care Transition  Goal: Absence of Hospital-Acquired Illness or Injury  Outcome: Adequate for Care Transition  Goal: Optimal Comfort and Wellbeing  Outcome: Adequate for Care Transition  Goal: Readiness for Transition of Care  Outcome: Adequate for Care Transition     Problem: Fall Injury Risk  Goal: Absence of Fall and Fall-Related Injury  Outcome: Adequate for Care Transition     Problem: Restraint, Nonbehavioral (Nonviolent)  Goal: Absence of Harm or Injury  Outcome: Adequate for Care Transition     Problem: Adjustment to Illness (Sepsis/Septic Shock)  Goal: Optimal Coping  Outcome: Adequate for Care Transition     Problem: Bleeding (Sepsis/Septic Shock)  Goal: Absence of Bleeding  Outcome: Adequate for Care Transition     Problem: Glycemic Control Impaired (Sepsis/Septic Shock)  Goal: Blood Glucose Level Within Desired Range  Outcome: Adequate for Care Transition     Problem: Infection Progression (Sepsis/Septic Shock)  Goal: Absence of Infection Signs and Symptoms  Outcome: Adequate for Care Transition     Problem: Nutrition Impaired (Sepsis/Septic Shock)  Goal: Optimal Nutrition Intake  Outcome: Adequate for Care Transition     Problem: Fluid and Electrolyte Imbalance (Acute Kidney Injury/Impairment)  Goal: Fluid and Electrolyte Balance  Outcome: Adequate for Care Transition     Problem: Oral Intake Inadequate (Acute Kidney Injury/Impairment)  Goal: Optimal Nutrition Intake  Outcome: Adequate for Care Transition     Problem: Renal Function Impairment (Acute Kidney Injury/Impairment)  Goal: Effective Renal Function  Outcome: Adequate for Care Transition     Problem: Skin Injury Risk Increased  Goal: Skin Health and Integrity  Outcome: Adequate for Care Transition

## 2023-10-21 NOTE — ASSESSMENT & PLAN NOTE
Mr. Domingo Griffiths is a 58 y.o M with CAD c/b STEMI, AFib on Eliquis, combined CHF (EF 15-20%), HTN, HLD, CKD3b, L MCA CVA with residual aphasia and R sided deficits s/p PEG placement who presents for evaluation of LE swelling.     Patient's volume status difficult to assess as he is unable to follow directions, however he has a significant increase in his BNP and vascular congestion on his CXR consistent with decompensated heart failure. Unclear if 2/2 poor salt/fluid restriction vs tachycardia induced myopathy as he was found in AFwRVR by EMS vs other precipitant.  Patient appears euvolemic on exam and has remained hemodynamically stable.    - Lasix 40mg QD Po - home dose  - Patient now euvolemic  - Patient not on GDMT at admission. Entresto, Jardiance, and Aldactone previously held in the setting of post-CVA dysphagia and EMERSON. Restarting GDMT as tolerated.  - Entresto 24-26mg BID po  - Lopressor 25mg BID per G tube transitioned to Toprol 50mg QD po (10/19/23)  - Jardiance 10mg QD po  - Recommending outpatient initiation of spironolactone 25mg po QD  - 1500cc fluid restriction. RD consulted for cardiac healthy TF recs  - Daily standing weights, strict Is/Os
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
## 2023-10-22 PROCEDURE — 25000003 PHARM REV CODE 250

## 2023-10-22 PROCEDURE — 25000003 PHARM REV CODE 250: Performed by: INTERNAL MEDICINE

## 2023-10-22 PROCEDURE — 63600175 PHARM REV CODE 636 W HCPCS

## 2023-10-22 PROCEDURE — 25000242 PHARM REV CODE 250 ALT 637 W/ HCPCS

## 2023-10-22 PROCEDURE — 25000003 PHARM REV CODE 250: Performed by: STUDENT IN AN ORGANIZED HEALTH CARE EDUCATION/TRAINING PROGRAM

## 2023-10-22 PROCEDURE — 11000001 HC ACUTE MED/SURG PRIVATE ROOM

## 2023-10-22 PROCEDURE — 93005 ELECTROCARDIOGRAM TRACING: CPT

## 2023-10-22 PROCEDURE — 93010 ELECTROCARDIOGRAM REPORT: CPT | Mod: ,,, | Performed by: INTERNAL MEDICINE

## 2023-10-22 PROCEDURE — 93010 EKG 12-LEAD: ICD-10-PCS | Mod: ,,, | Performed by: INTERNAL MEDICINE

## 2023-10-22 RX ORDER — LORAZEPAM 2 MG/ML
0.5 INJECTION INTRAMUSCULAR ONCE
Status: COMPLETED | OUTPATIENT
Start: 2023-10-22 | End: 2023-10-22

## 2023-10-22 RX ADMIN — ATORVASTATIN CALCIUM 80 MG: 40 TABLET, FILM COATED ORAL at 08:10

## 2023-10-22 RX ADMIN — EMPAGLIFLOZIN 10 MG: 10 TABLET, FILM COATED ORAL at 08:10

## 2023-10-22 RX ADMIN — POLYETHYLENE GLYCOL 3350 17 G: 17 POWDER, FOR SOLUTION ORAL at 08:10

## 2023-10-22 RX ADMIN — ASPIRIN 81 MG CHEWABLE TABLET 81 MG: 81 TABLET CHEWABLE at 08:10

## 2023-10-22 RX ADMIN — APIXABAN 5 MG: 5 TABLET, FILM COATED ORAL at 08:10

## 2023-10-22 RX ADMIN — SACUBITRIL AND VALSARTAN 1 TABLET: 24; 26 TABLET, FILM COATED ORAL at 08:10

## 2023-10-22 RX ADMIN — VALPROIC ACID 500 MG: 250 SOLUTION ORAL at 12:10

## 2023-10-22 RX ADMIN — Medication 6 MG: at 08:10

## 2023-10-22 RX ADMIN — SENNOSIDES AND DOCUSATE SODIUM 1 TABLET: 50; 8.6 TABLET ORAL at 08:10

## 2023-10-22 RX ADMIN — BUSPIRONE HYDROCHLORIDE 10 MG: 10 TABLET ORAL at 04:10

## 2023-10-22 RX ADMIN — BUSPIRONE HYDROCHLORIDE 10 MG: 10 TABLET ORAL at 08:10

## 2023-10-22 RX ADMIN — LORAZEPAM 0.5 MG: 2 INJECTION INTRAMUSCULAR; INTRAVENOUS at 09:10

## 2023-10-22 RX ADMIN — METOPROLOL SUCCINATE 50 MG: 50 TABLET, EXTENDED RELEASE ORAL at 08:10

## 2023-10-22 RX ADMIN — QUETIAPINE FUMARATE 25 MG: 25 TABLET ORAL at 08:10

## 2023-10-22 RX ADMIN — EZETIMIBE 10 MG: 10 TABLET ORAL at 08:10

## 2023-10-22 RX ADMIN — VALPROIC ACID 1500 MG: 250 SOLUTION ORAL at 08:10

## 2023-10-22 RX ADMIN — VALPROIC ACID 500 MG: 250 SOLUTION ORAL at 08:10
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
## 2023-10-22 NOTE — SUBJECTIVE & OBJECTIVE
Interval History: No issues overnight. Remains HDS and afebrile. Potential accepting facility. Awaiting placement.    Review of Systems   Unable to perform ROS: Patient nonverbal     Objective:     Vital Signs (Most Recent):  Temp: 97.6 °F (36.4 °C) (10/22/23 0823)  Pulse: 72 (10/22/23 0827)  Resp: 20 (10/22/23 0823)  BP: (!) 137/92 (10/22/23 0827)  SpO2: 97 % (10/22/23 0823) Vital Signs (24h Range):  Temp:  [97.6 °F (36.4 °C)] 97.6 °F (36.4 °C)  Pulse:  [60-72] 72  Resp:  [18-20] 20  SpO2:  [97 %] 97 %  BP: (109-137)/(56-92) 137/92     Weight: 75.8 kg (167 lb)  Body mass index is 25.39 kg/m².  No intake or output data in the 24 hours ending 10/22/23 1217        Physical Exam  Vitals and nursing note reviewed.   Constitutional:       General: He is not in acute distress.     Appearance: Normal appearance. He is not ill-appearing.   HENT:      Head: Normocephalic and atraumatic.      Mouth/Throat:      Mouth: Mucous membranes are moist.      Pharynx: Oropharynx is clear.   Eyes:      Conjunctiva/sclera: Conjunctivae normal.   Cardiovascular:      Rate and Rhythm: Normal rate. Rhythm irregular.      Pulses: Normal pulses.      Heart sounds: No murmur heard.  Pulmonary:      Effort: Pulmonary effort is normal. No respiratory distress.      Breath sounds: No wheezing, rhonchi or rales.   Abdominal:      General: Abdomen is flat. There is no distension.      Palpations: Abdomen is soft.      Tenderness: There is no abdominal tenderness.      Comments: +PEG   Musculoskeletal:      Right lower leg: No edema.      Left lower leg: No edema.   Skin:     General: Skin is warm.      Capillary Refill: Capillary refill takes less than 2 seconds.      Findings: No erythema or rash.   Neurological:      Mental Status: He is alert. Mental status is at baseline.             Significant Labs: All pertinent labs within the past 24 hours have been reviewed.  CBC:   Recent Labs   Lab 10/21/23  0019   WBC 3.28*   HGB 10.8*   HCT 34.7*    *       CMP:   Recent Labs   Lab 10/21/23  0019      K 4.6      CO2 26   GLU 94   BUN 19   CREATININE 1.8*   CALCIUM 9.0   PROT 6.6   ALBUMIN 3.3*   BILITOT 0.3   ALKPHOS 66   AST 15   ALT 7*   ANIONGAP 10         Significant Imaging: I have reviewed all pertinent imaging results/findings within the past 24 hours.

## 2023-10-22 NOTE — PLAN OF CARE
Problem: Adult Inpatient Plan of Care  Goal: Plan of Care Review  10/22/2023 1226 by Renetta Escobar RN  Outcome: Ongoing, Progressing  10/22/2023 1003 by Renetta Escobar RN  Outcome: Ongoing, Progressing  Goal: Patient-Specific Goal (Individualized)  10/22/2023 1226 by Renetta Escobar RN  Outcome: Ongoing, Progressing  10/22/2023 1003 by Renetta Escobar RN  Outcome: Ongoing, Progressing  Goal: Absence of Hospital-Acquired Illness or Injury  10/22/2023 1226 by Renetta Escobar RN  Outcome: Ongoing, Progressing  10/22/2023 1003 by Renetta Escobar RN  Outcome: Ongoing, Progressing  Goal: Optimal Comfort and Wellbeing  10/22/2023 1226 by Renetta Escobar RN  Outcome: Ongoing, Progressing  10/22/2023 1003 by Renetta Escobar RN  Outcome: Ongoing, Progressing  Goal: Readiness for Transition of Care  10/22/2023 1226 by Renetta Escobar RN  Outcome: Ongoing, Progressing  10/22/2023 1003 by Renetta Escobar RN  Outcome: Ongoing, Progressing
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
## 2023-10-23 PROCEDURE — 11000001 HC ACUTE MED/SURG PRIVATE ROOM

## 2023-10-23 PROCEDURE — 25000003 PHARM REV CODE 250: Performed by: STUDENT IN AN ORGANIZED HEALTH CARE EDUCATION/TRAINING PROGRAM

## 2023-10-23 PROCEDURE — 25000003 PHARM REV CODE 250

## 2023-10-23 PROCEDURE — 25000242 PHARM REV CODE 250 ALT 637 W/ HCPCS

## 2023-10-23 PROCEDURE — 25000003 PHARM REV CODE 250: Performed by: INTERNAL MEDICINE

## 2023-10-23 RX ORDER — LORAZEPAM 0.5 MG/1
0.5 TABLET ORAL DAILY PRN
Status: DISCONTINUED | OUTPATIENT
Start: 2023-10-23 | End: 2023-10-24

## 2023-10-23 RX ADMIN — POLYETHYLENE GLYCOL 3350 17 G: 17 POWDER, FOR SOLUTION ORAL at 07:10

## 2023-10-23 RX ADMIN — BUSPIRONE HYDROCHLORIDE 10 MG: 10 TABLET ORAL at 07:10

## 2023-10-23 RX ADMIN — EZETIMIBE 10 MG: 10 TABLET ORAL at 08:10

## 2023-10-23 RX ADMIN — BUSPIRONE HYDROCHLORIDE 10 MG: 10 TABLET ORAL at 02:10

## 2023-10-23 RX ADMIN — ASPIRIN 81 MG CHEWABLE TABLET 81 MG: 81 TABLET CHEWABLE at 07:10

## 2023-10-23 RX ADMIN — APIXABAN 5 MG: 5 TABLET, FILM COATED ORAL at 08:10

## 2023-10-23 RX ADMIN — QUETIAPINE FUMARATE 25 MG: 25 TABLET ORAL at 08:10

## 2023-10-23 RX ADMIN — VALPROIC ACID 500 MG: 250 SOLUTION ORAL at 11:10

## 2023-10-23 RX ADMIN — APIXABAN 5 MG: 5 TABLET, FILM COATED ORAL at 07:10

## 2023-10-23 RX ADMIN — METOPROLOL SUCCINATE 50 MG: 50 TABLET, EXTENDED RELEASE ORAL at 07:10

## 2023-10-23 RX ADMIN — ATORVASTATIN CALCIUM 80 MG: 40 TABLET, FILM COATED ORAL at 07:10

## 2023-10-23 RX ADMIN — SENNOSIDES AND DOCUSATE SODIUM 1 TABLET: 50; 8.6 TABLET ORAL at 07:10

## 2023-10-23 RX ADMIN — VALPROIC ACID 500 MG: 250 SOLUTION ORAL at 07:10

## 2023-10-23 RX ADMIN — SACUBITRIL AND VALSARTAN 1 TABLET: 24; 26 TABLET, FILM COATED ORAL at 07:10

## 2023-10-23 RX ADMIN — EMPAGLIFLOZIN 10 MG: 10 TABLET, FILM COATED ORAL at 07:10

## 2023-10-23 RX ADMIN — SACUBITRIL AND VALSARTAN 1 TABLET: 24; 26 TABLET, FILM COATED ORAL at 08:10

## 2023-10-23 NOTE — SUBJECTIVE & OBJECTIVE
"Interval History: NAEO, remains HDS and afebrile. Awaiting placement. Patient medically stable for discharge.      Review of Systems   Unable to perform ROS: Patient nonverbal     Objective:     Vital Signs (Most Recent):  Temp: 97 °F (36.1 °C) (10/23/23 1054)  Pulse: 72 (10/23/23 0719)  Resp: 16 (10/22/23 2023)  BP: 123/84 (10/23/23 0719)  SpO2: 95 % (10/23/23 1054) Vital Signs (24h Range):  Temp:  [97 °F (36.1 °C)-97.3 °F (36.3 °C)] 97 °F (36.1 °C)  Pulse:  [72] 72  Resp:  [16] 16  SpO2:  [95 %-98 %] 95 %  BP: (123-129)/(84-93) 123/84     Weight: 75.8 kg (167 lb)  Body mass index is 25.39 kg/m².  No intake or output data in the 24 hours ending 10/23/23 1328        Physical Exam  Vitals and nursing note reviewed.   Constitutional:       General: He is not in acute distress.     Appearance: Normal appearance. He is not ill-appearing.   HENT:      Head: Normocephalic and atraumatic.      Mouth/Throat:      Mouth: Mucous membranes are moist.      Pharynx: Oropharynx is clear.   Eyes:      Conjunctiva/sclera: Conjunctivae normal.   Cardiovascular:      Rate and Rhythm: Normal rate. Rhythm irregular.      Pulses: Normal pulses.      Heart sounds: No murmur heard.  Pulmonary:      Effort: Pulmonary effort is normal. No respiratory distress.      Breath sounds: No wheezing, rhonchi or rales.   Abdominal:      General: Abdomen is flat. There is no distension.      Palpations: Abdomen is soft.      Tenderness: There is no abdominal tenderness.      Comments: +PEG   Musculoskeletal:      Right lower leg: No edema.      Left lower leg: No edema.   Skin:     General: Skin is warm.      Capillary Refill: Capillary refill takes less than 2 seconds.      Findings: No erythema or rash.   Neurological:      Mental Status: He is alert. Mental status is at baseline.             Significant Labs: All pertinent labs within the past 24 hours have been reviewed.  CBC:   No results for input(s): "WBC", "HGB", "HCT", "PLT" in the last 48 " "hours.    CMP:   No results for input(s): "NA", "K", "CL", "CO2", "GLU", "BUN", "CREATININE", "CALCIUM", "PROT", "ALBUMIN", "BILITOT", "ALKPHOS", "AST", "ALT", "ANIONGAP", "EGFRNONAA" in the last 48 hours.    Invalid input(s): "ESTGFAFRICA"      Significant Imaging: I have reviewed all pertinent imaging results/findings within the past 24 hours.  "

## 2023-10-23 NOTE — ASSESSMENT & PLAN NOTE
Mr. Domingo Griffiths is a 58 y.o M with CAD c/b STEMI, AFib on Eliquis, combined CHF (EF 15-20%), HTN, HLD, CKD3b, L MCA CVA with residual aphasia and R sided deficits s/p PEG placement who presents for evaluation of LE swelling.     Patient's volume status difficult to assess as he is unable to follow directions, however he has a significant increase in his BNP and vascular congestion on his CXR consistent with decompensated heart failure. Unclear if 2/2 poor salt/fluid restriction vs tachycardia induced myopathy as he was found in AFwRVR by EMS vs other precipitant.  Patient appears euvolemic on exam and has remained hemodynamically stable.    - Lasix 40mg QD Po - home dose  - Patient appears euvolemic  - Patient not on GDMT at admission. Entresto, Jardiance, and Aldactone previously held in the setting of post-CVA dysphagia and EMERSON. Restarting GDMT as tolerated.  - Entresto 24-26mg BID po  - Lopressor 25mg BID per G tube transitioned to Toprol 50mg QD po (10/19/23)  - Jardiance 10mg QD po  - Recommending outpatient initiation of spironolactone 25mg po QD  - 1500cc fluid restriction. RD consulted for cardiac healthy TF recs  - Daily standing weights, strict Is/Os
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
## 2023-10-24 ENCOUNTER — EXTERNAL HOME HEALTH (OUTPATIENT)
Dept: HOME HEALTH SERVICES | Facility: HOSPITAL | Age: 58
End: 2023-10-24
Payer: MEDICAID

## 2023-10-24 LAB
ANION GAP SERPL CALC-SCNC: 8 MMOL/L (ref 8–16)
BASOPHILS # BLD AUTO: 0.02 K/UL (ref 0–0.2)
BASOPHILS NFR BLD: 0.5 % (ref 0–1.9)
BUN SERPL-MCNC: 20 MG/DL (ref 6–20)
CALCIUM SERPL-MCNC: 8.9 MG/DL (ref 8.7–10.5)
CHLORIDE SERPL-SCNC: 109 MMOL/L (ref 95–110)
CO2 SERPL-SCNC: 24 MMOL/L (ref 23–29)
CREAT SERPL-MCNC: 1.6 MG/DL (ref 0.5–1.4)
DIFFERENTIAL METHOD: ABNORMAL
EOSINOPHIL # BLD AUTO: 0.1 K/UL (ref 0–0.5)
EOSINOPHIL NFR BLD: 2.1 % (ref 0–8)
ERYTHROCYTE [DISTWIDTH] IN BLOOD BY AUTOMATED COUNT: 17.8 % (ref 11.5–14.5)
EST. GFR  (NO RACE VARIABLE): 50.3 ML/MIN/1.73 M^2
GLUCOSE SERPL-MCNC: 77 MG/DL (ref 70–110)
HCT VFR BLD AUTO: 32.3 % (ref 40–54)
HGB BLD-MCNC: 10.6 G/DL (ref 14–18)
IMM GRANULOCYTES # BLD AUTO: 0.02 K/UL (ref 0–0.04)
IMM GRANULOCYTES NFR BLD AUTO: 0.5 % (ref 0–0.5)
LYMPHOCYTES # BLD AUTO: 1.4 K/UL (ref 1–4.8)
LYMPHOCYTES NFR BLD: 37.5 % (ref 18–48)
MCH RBC QN AUTO: 29.7 PG (ref 27–31)
MCHC RBC AUTO-ENTMCNC: 32.8 G/DL (ref 32–36)
MCV RBC AUTO: 91 FL (ref 82–98)
MONOCYTES # BLD AUTO: 0.5 K/UL (ref 0.3–1)
MONOCYTES NFR BLD: 13.7 % (ref 4–15)
NEUTROPHILS # BLD AUTO: 1.7 K/UL (ref 1.8–7.7)
NEUTROPHILS NFR BLD: 45.7 % (ref 38–73)
NRBC BLD-RTO: 0 /100 WBC
PLATELET # BLD AUTO: 81 K/UL (ref 150–450)
PMV BLD AUTO: ABNORMAL FL (ref 9.2–12.9)
POTASSIUM SERPL-SCNC: 4.1 MMOL/L (ref 3.5–5.1)
RBC # BLD AUTO: 3.57 M/UL (ref 4.6–6.2)
SODIUM SERPL-SCNC: 141 MMOL/L (ref 136–145)
VALPROATE SERPL-MCNC: 83.8 UG/ML (ref 50–100)
WBC # BLD AUTO: 3.79 K/UL (ref 3.9–12.7)

## 2023-10-24 PROCEDURE — 25000003 PHARM REV CODE 250

## 2023-10-24 PROCEDURE — 80164 ASSAY DIPROPYLACETIC ACD TOT: CPT

## 2023-10-24 PROCEDURE — 36415 COLL VENOUS BLD VENIPUNCTURE: CPT

## 2023-10-24 PROCEDURE — 25000003 PHARM REV CODE 250: Performed by: INTERNAL MEDICINE

## 2023-10-24 PROCEDURE — 25000242 PHARM REV CODE 250 ALT 637 W/ HCPCS

## 2023-10-24 PROCEDURE — 11000001 HC ACUTE MED/SURG PRIVATE ROOM

## 2023-10-24 PROCEDURE — 93005 ELECTROCARDIOGRAM TRACING: CPT

## 2023-10-24 PROCEDURE — 93010 EKG 12-LEAD: ICD-10-PCS | Mod: ,,, | Performed by: INTERNAL MEDICINE

## 2023-10-24 PROCEDURE — 80048 BASIC METABOLIC PNL TOTAL CA: CPT

## 2023-10-24 PROCEDURE — 93010 ELECTROCARDIOGRAM REPORT: CPT | Mod: ,,, | Performed by: INTERNAL MEDICINE

## 2023-10-24 PROCEDURE — 85025 COMPLETE CBC W/AUTO DIFF WBC: CPT

## 2023-10-24 PROCEDURE — 63600175 PHARM REV CODE 636 W HCPCS

## 2023-10-24 PROCEDURE — 97535 SELF CARE MNGMENT TRAINING: CPT

## 2023-10-24 PROCEDURE — 25000003 PHARM REV CODE 250: Performed by: STUDENT IN AN ORGANIZED HEALTH CARE EDUCATION/TRAINING PROGRAM

## 2023-10-24 RX ORDER — LORAZEPAM 2 MG/ML
1 INJECTION INTRAMUSCULAR ONCE
Status: COMPLETED | OUTPATIENT
Start: 2023-10-24 | End: 2023-10-24

## 2023-10-24 RX ADMIN — SACUBITRIL AND VALSARTAN 1 TABLET: 24; 26 TABLET, FILM COATED ORAL at 08:10

## 2023-10-24 RX ADMIN — SENNOSIDES AND DOCUSATE SODIUM 1 TABLET: 50; 8.6 TABLET ORAL at 09:10

## 2023-10-24 RX ADMIN — BUSPIRONE HYDROCHLORIDE 10 MG: 10 TABLET ORAL at 09:10

## 2023-10-24 RX ADMIN — ASPIRIN 81 MG CHEWABLE TABLET 81 MG: 81 TABLET CHEWABLE at 09:10

## 2023-10-24 RX ADMIN — EZETIMIBE 10 MG: 10 TABLET ORAL at 08:10

## 2023-10-24 RX ADMIN — Medication 6 MG: at 08:10

## 2023-10-24 RX ADMIN — ATORVASTATIN CALCIUM 80 MG: 40 TABLET, FILM COATED ORAL at 09:10

## 2023-10-24 RX ADMIN — SACUBITRIL AND VALSARTAN 1 TABLET: 24; 26 TABLET, FILM COATED ORAL at 09:10

## 2023-10-24 RX ADMIN — APIXABAN 5 MG: 5 TABLET, FILM COATED ORAL at 08:10

## 2023-10-24 RX ADMIN — APIXABAN 5 MG: 5 TABLET, FILM COATED ORAL at 09:10

## 2023-10-24 RX ADMIN — LORAZEPAM 0.5 MG: 0.5 TABLET ORAL at 12:10

## 2023-10-24 RX ADMIN — EMPAGLIFLOZIN 10 MG: 10 TABLET, FILM COATED ORAL at 09:10

## 2023-10-24 RX ADMIN — LORAZEPAM 1 MG: 2 INJECTION INTRAMUSCULAR; INTRAVENOUS at 12:10

## 2023-10-24 RX ADMIN — BUSPIRONE HYDROCHLORIDE 10 MG: 10 TABLET ORAL at 04:10

## 2023-10-24 RX ADMIN — METOPROLOL SUCCINATE 50 MG: 50 TABLET, EXTENDED RELEASE ORAL at 09:10

## 2023-10-24 RX ADMIN — POLYETHYLENE GLYCOL 3350 17 G: 17 POWDER, FOR SOLUTION ORAL at 09:10

## 2023-10-24 RX ADMIN — VALPROIC ACID 500 MG: 250 SOLUTION ORAL at 09:10

## 2023-10-24 NOTE — SUBJECTIVE & OBJECTIVE
Interval History: NAEO, VSS, AF, HDS. Obtaining routine labs.    Review of Systems   Unable to perform ROS: Patient nonverbal     Objective:     Vital Signs (Most Recent):  Temp: 98.3 °F (36.8 °C) (10/24/23 1124)  Pulse: 71 (10/24/23 1124)  Resp: 18 (10/24/23 1124)  BP: (!) 124/59 (10/24/23 1124)  SpO2: 97 % (10/24/23 1124) Vital Signs (24h Range):  Temp:  [98 °F (36.7 °C)-98.3 °F (36.8 °C)] 98.3 °F (36.8 °C)  Pulse:  [] 71  Resp:  [16-18] 18  SpO2:  [94 %-97 %] 97 %  BP: (111-175)/() 124/59     Weight: 75.8 kg (167 lb)  Body mass index is 25.39 kg/m².  No intake or output data in the 24 hours ending 10/24/23 1409        Physical Exam  Vitals and nursing note reviewed.   Constitutional:       General: He is not in acute distress.     Appearance: Normal appearance. He is not ill-appearing.   HENT:      Head: Normocephalic and atraumatic.      Mouth/Throat:      Mouth: Mucous membranes are moist.      Pharynx: Oropharynx is clear.   Eyes:      Conjunctiva/sclera: Conjunctivae normal.   Cardiovascular:      Rate and Rhythm: Normal rate and regular rhythm.      Pulses: Normal pulses.      Heart sounds: No murmur heard.  Pulmonary:      Effort: Pulmonary effort is normal. No respiratory distress.      Breath sounds: No wheezing, rhonchi or rales.   Abdominal:      General: Abdomen is flat. There is no distension.      Palpations: Abdomen is soft.      Tenderness: There is no abdominal tenderness.      Comments: +PEG   Musculoskeletal:      Right lower leg: No edema.      Left lower leg: No edema.   Skin:     General: Skin is warm.      Capillary Refill: Capillary refill takes less than 2 seconds.      Findings: No erythema or rash.   Neurological:      Mental Status: He is alert. Mental status is at baseline.             Significant Labs: All pertinent labs within the past 24 hours have been reviewed.  CBC:   Recent Labs   Lab 10/24/23  1012   WBC 3.79*   HGB 10.6*   HCT 32.3*   PLT 81*       CMP:   Recent  Labs   Lab 10/24/23  1012      K 4.1      CO2 24   GLU 77   BUN 20   CREATININE 1.6*   CALCIUM 8.9   ANIONGAP 8         Significant Imaging: I have reviewed all pertinent imaging results/findings within the past 24 hours.
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
## 2023-10-24 NOTE — PLAN OF CARE
[facility]  Discharge Reassessment    Primary Care Provider: Carleen Bueno MD    Expected Discharge Date: 10/25/2023    Reassessment (most recent)       Discharge Reassessment - 10/24/23 1305          Discharge Reassessment    Assessment Type Discharge Planning Reassessment     Did the patient's condition or plan change since previous assessment? No     Discharge Plan discussed with: Sibling     Name(s) and Number(s) Spencer brother and sister Claribel     Communicated YUSEF with patient/caregiver Yes     Discharge Plan A Home with family     Discharge Plan B New Nursing Home placement - correction care facility     DME Needed Upon Discharge  none     Transition of Care Barriers Mental illness;No family/friends to help;Social;Previous protective services;Underinsured     Why the patient remains in the hospital Placement issues;Social issues        Post-Acute Status    Post-Acute Authorization Placement     Post-Acute Placement Status Referrals Sent     Hospital Resources/Appts/Education Provided Appointments scheduled and added to AVS;Community resources provided     Discharge Delays Patient and Family Barriers                   Discharge Plan A and Plan B have been determined by review of patient's clinical status, future medical and therapeutic needs, and coverage/benefits for post-acute care in coordination with multidisciplinary team members.
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
## 2023-10-24 NOTE — PLAN OF CARE
10/24/23 1304   Post-Acute Status   Post-Acute Authorization Placement   Post-Acute Placement Status Referrals Sent   Hospital Resources/Appts/Education Provided Appointments scheduled and added to AVS;Community resources provided   Discharge Delays (!) Patient and Family Barriers   Discharge Plan   Discharge Plan A Home with family   Discharge Plan B New Nursing Home placement - [facility] facility

## 2023-10-24 NOTE — PLAN OF CARE
Problem: Adult Inpatient Plan of Care  Goal: Plan of Care Review  Outcome: Ongoing, Progressing  Goal: Patient-Specific Goal (Individualized)  Outcome: Ongoing, Progressing  Goal: Absence of Hospital-Acquired Illness or Injury  Outcome: Ongoing, Progressing  Goal: Optimal Comfort and Wellbeing  Outcome: Ongoing, Progressing  Goal: Readiness for Transition of Care  Outcome: Ongoing, Progressing     Problem: Fall Injury Risk  Goal: Absence of Fall and Fall-Related Injury  Outcome: Ongoing, Progressing    Pt has been agitated, at times will show aggressive behavior.When assisted to bathroom, pt would rather urinate on the floor, urinal was offered but that upsets him even more. He spits on the floor, he follows commands very poorly.Pt pushes staff when trying to get him back to bed, will not let staff take his vitals. 2 staff members needed during med pass, because he swings his arm when trying to give meds via PEG tube.    1x Ativan IM ordered for agitation.

## 2023-10-24 NOTE — PT/OT/SLP PROGRESS
Speech Language Pathology Treatment    Patient Name:  Tera Griffiths   MRN:  [id number]  Admitting Diagnosis: Acute on chronic combined systolic and diastolic heart failure    Recommendations:                 General Recommendations:  Speech/language therapy  Diet recommendations:  Regular Diet - IDDSI Level 7, Liquid Diet Level: Thin liquids - IDDSI Level 0   Aspiration Precautions: Standard aspiration precautions   General Precautions: Standard, aphasia, fall  Communication strategies:  go to room if call light pushed    Assessment:     Tera Griffiths is a 58 y.o. male with an SLP diagnosis of Aphasia.  SLP will continue to follow to optimize communication of basic wants/needs.     Subjective     Pt asleep upon entry to room. Family members at bedside.     Pain/Comfort:  Pain Rating 1: 0/10    Respiratory Status: Room air    Objective:     Has the patient been evaluated by SLP for swallowing?   Yes  Keep patient NPO? No     Provided education to family members this date re: role of SLP in his care, impressions, recommendation and ongoing ST POC. Discussed objective for optimizing functional communication and goals for therapy targeting simple expressive and receptive language tasks. Family verbalized understanding and were in agreement. They repots he has been pointing more, though not functional and no verbalization appreciated in response to them. At this time, all goals remain appropriate at this time and pt to continue regular diet/thins. SLP will continue to follow.      Goals:   Multidisciplinary Problems       SLP Goals          Problem: SLP    Goal Priority Disciplines Outcome   SLP Goal     SLP Ongoing, Progressing   Description: Speech Language Pathology Goals  Goals expected to be met by 10/29    1. Pt will answer simple y/n questions with 60% accy given max cues.   2. Pt will follow simple commands with 50% accy given max cues.   3. Pt will vocalize in response to any stim x5/session given max  cues.   4. Pt will ID objects from FCx2 with 50 % acc following max cues.                                           Plan:     Patient to be seen:  3 x/week   Plan of Care expires:  11/14/23  Plan of Care reviewed with:  family   SLP Follow-Up:  Yes       Discharge recommendations:  Low Intensity Therapy   Barriers to Discharge:  None    Time Tracking:     SLP Treatment Date:   10/24/23  Speech Start Time:  1308  Speech Stop Time:  1316     Speech Total Time (min):  8 min    Billable Minutes: Self Care/Home Management Training 8    10/24/2023

## 2023-10-25 LAB
ANION GAP SERPL CALC-SCNC: 12 MMOL/L (ref 8–16)
BASOPHILS # BLD AUTO: 0.01 K/UL (ref 0–0.2)
BASOPHILS NFR BLD: 0.3 % (ref 0–1.9)
BUN SERPL-MCNC: 19 MG/DL (ref 6–20)
CALCIUM SERPL-MCNC: 9.4 MG/DL (ref 8.7–10.5)
CHLORIDE SERPL-SCNC: 107 MMOL/L (ref 95–110)
CO2 SERPL-SCNC: 21 MMOL/L (ref 23–29)
CREAT SERPL-MCNC: 1.7 MG/DL (ref 0.5–1.4)
DIFFERENTIAL METHOD: ABNORMAL
EOSINOPHIL # BLD AUTO: 0.1 K/UL (ref 0–0.5)
EOSINOPHIL NFR BLD: 1.8 % (ref 0–8)
ERYTHROCYTE [DISTWIDTH] IN BLOOD BY AUTOMATED COUNT: 18.3 % (ref 11.5–14.5)
EST. GFR  (NO RACE VARIABLE): 46.7 ML/MIN/1.73 M^2
GLUCOSE SERPL-MCNC: 75 MG/DL (ref 70–110)
HCT VFR BLD AUTO: 38.7 % (ref 40–54)
HGB BLD-MCNC: 11.8 G/DL (ref 14–18)
IMM GRANULOCYTES # BLD AUTO: 0 K/UL (ref 0–0.04)
IMM GRANULOCYTES NFR BLD AUTO: 0 % (ref 0–0.5)
LYMPHOCYTES # BLD AUTO: 1.9 K/UL (ref 1–4.8)
LYMPHOCYTES NFR BLD: 47.8 % (ref 18–48)
MCH RBC QN AUTO: 28.5 PG (ref 27–31)
MCHC RBC AUTO-ENTMCNC: 30.5 G/DL (ref 32–36)
MCV RBC AUTO: 94 FL (ref 82–98)
MONOCYTES # BLD AUTO: 0.4 K/UL (ref 0.3–1)
MONOCYTES NFR BLD: 9.2 % (ref 4–15)
NEUTROPHILS # BLD AUTO: 1.6 K/UL (ref 1.8–7.7)
NEUTROPHILS NFR BLD: 40.9 % (ref 38–73)
NRBC BLD-RTO: 0 /100 WBC
PLATELET # BLD AUTO: 92 K/UL (ref 150–450)
PMV BLD AUTO: 13.8 FL (ref 9.2–12.9)
POTASSIUM SERPL-SCNC: 4.6 MMOL/L (ref 3.5–5.1)
RBC # BLD AUTO: 4.14 M/UL (ref 4.6–6.2)
SODIUM SERPL-SCNC: 140 MMOL/L (ref 136–145)
WBC # BLD AUTO: 3.93 K/UL (ref 3.9–12.7)

## 2023-10-25 PROCEDURE — 25000242 PHARM REV CODE 250 ALT 637 W/ HCPCS

## 2023-10-25 PROCEDURE — 92507 TX SP LANG VOICE COMM INDIV: CPT

## 2023-10-25 PROCEDURE — 36415 COLL VENOUS BLD VENIPUNCTURE: CPT

## 2023-10-25 PROCEDURE — 80048 BASIC METABOLIC PNL TOTAL CA: CPT

## 2023-10-25 PROCEDURE — 11000001 HC ACUTE MED/SURG PRIVATE ROOM

## 2023-10-25 PROCEDURE — 93005 ELECTROCARDIOGRAM TRACING: CPT

## 2023-10-25 PROCEDURE — 85025 COMPLETE CBC W/AUTO DIFF WBC: CPT

## 2023-10-25 PROCEDURE — 93010 ELECTROCARDIOGRAM REPORT: CPT | Mod: ,,, | Performed by: INTERNAL MEDICINE

## 2023-10-25 PROCEDURE — 25000003 PHARM REV CODE 250

## 2023-10-25 PROCEDURE — 25000003 PHARM REV CODE 250: Performed by: STUDENT IN AN ORGANIZED HEALTH CARE EDUCATION/TRAINING PROGRAM

## 2023-10-25 PROCEDURE — 25000003 PHARM REV CODE 250: Performed by: INTERNAL MEDICINE

## 2023-10-25 PROCEDURE — 93010 EKG 12-LEAD: ICD-10-PCS | Mod: ,,, | Performed by: INTERNAL MEDICINE

## 2023-10-25 RX ADMIN — SACUBITRIL AND VALSARTAN 1 TABLET: 24; 26 TABLET, FILM COATED ORAL at 09:10

## 2023-10-25 RX ADMIN — EZETIMIBE 10 MG: 10 TABLET ORAL at 08:10

## 2023-10-25 RX ADMIN — SACUBITRIL AND VALSARTAN 1 TABLET: 24; 26 TABLET, FILM COATED ORAL at 08:10

## 2023-10-25 RX ADMIN — ASPIRIN 81 MG CHEWABLE TABLET 81 MG: 81 TABLET CHEWABLE at 09:10

## 2023-10-25 RX ADMIN — APIXABAN 5 MG: 5 TABLET, FILM COATED ORAL at 08:10

## 2023-10-25 RX ADMIN — ATORVASTATIN CALCIUM 80 MG: 40 TABLET, FILM COATED ORAL at 09:10

## 2023-10-25 RX ADMIN — EMPAGLIFLOZIN 10 MG: 10 TABLET, FILM COATED ORAL at 09:10

## 2023-10-25 RX ADMIN — SENNOSIDES AND DOCUSATE SODIUM 1 TABLET: 50; 8.6 TABLET ORAL at 09:10

## 2023-10-25 RX ADMIN — METOPROLOL SUCCINATE 50 MG: 50 TABLET, EXTENDED RELEASE ORAL at 09:10

## 2023-10-25 RX ADMIN — BUSPIRONE HYDROCHLORIDE 10 MG: 10 TABLET ORAL at 09:10

## 2023-10-25 RX ADMIN — APIXABAN 5 MG: 5 TABLET, FILM COATED ORAL at 09:10

## 2023-10-25 RX ADMIN — POLYETHYLENE GLYCOL 3350 17 G: 17 POWDER, FOR SOLUTION ORAL at 09:10

## 2023-10-25 RX ADMIN — BUSPIRONE HYDROCHLORIDE 10 MG: 10 TABLET ORAL at 03:10

## 2023-10-25 RX ADMIN — QUETIAPINE FUMARATE 25 MG: 25 TABLET ORAL at 08:10
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
## 2023-10-25 NOTE — PLAN OF CARE
[facility] Med Surg  Discharge Final Note    Primary Care Provider: Carleen Bueno MD    Expected Discharge Date: 10/25/2023    Final Discharge Note (most recent)       Final Note - 10/25/23 1051          Final Note    Assessment Type Final Discharge Note     Anticipated Discharge Disposition Home or Self Care     Hospital Resources/Appts/Education Provided Appointments scheduled and added to AVS        Post-Acute Status    Post-Acute Authorization Other     Other Status No Post-Acute Service Needs     Discharge Delays Patient and Family Barriers                     Important Message from Medicare             Contact Info       Carleen Bueno MD   Specialty: Internal Medicine   Relationship: PCP - General    [address] [facility] [address]   Phone: [phone]       Next Steps: Follow up
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
## 2023-10-25 NOTE — PLAN OF CARE
Problem: Adult Inpatient Plan of Care  Goal: Plan of Care Review  Outcome: Ongoing, Progressing  Goal: Patient-Specific Goal (Individualized)  Outcome: Ongoing, Progressing  Goal: Readiness for Transition of Care  Outcome: Ongoing, Progressing     Problem: Restraint, Nonbehavioral (Nonviolent)  Goal: Absence of Harm or Injury  Outcome: Ongoing, Progressing     Problem: Adjustment to Illness (Sepsis/Septic Shock)  Goal: Optimal Coping  Outcome: Ongoing, Progressing     Problem: Oral Intake Inadequate (Acute Kidney Injury/Impairment)  Goal: Optimal Nutrition Intake  Outcome: Ongoing, Progressing     Problem: Renal Function Impairment (Acute Kidney Injury/Impairment)  Goal: Effective Renal Function  Outcome: Ongoing, Progressing

## 2023-10-25 NOTE — PT/OT/SLP PROGRESS
Speech Language Pathology Treatment    Patient Name:  Tera Griffiths   MRN:  [id number]  Admitting Diagnosis: Acute on chronic combined systolic and diastolic heart failure    Recommendations:               General Recommendations:  Speech/language therapy  Diet recommendations:  Regular Diet - IDDSI Level 7, Liquid Diet Level: Thin liquids - IDDSI Level 0   Aspiration Precautions: Standard aspiration precautions   General Precautions: Standard, aphasia, fall  Communication strategies:  go to room if call light pushed    Assessment:     Tera Griffiths is a 58 y.o. male with an SLP diagnosis of Aphasia.  SLP will continue to follow to optimize communication of basic wants/needs.     Subjective     Pt asleep upon entry to room,. Roused with verbal and tactile cues. No family present.     Pain/Comfort:  Pain Rating 1:  (none observed or indicated)    Respiratory Status: Room air    Objective:     Has the patient been evaluated by SLP for swallowing?   Yes  Keep patient NPO? No     Pt seen for ongoing speech therapy in attempt to optimize pt's ability to communicate basic wants/needs. Pt awake, though did not attend to clinician for longer than a few seconds despite max multimodal cuing. Despite max, multimodal cuing pt did not follow simple a step commands or answer simple Y/N questions with head nod/shake. Pt did not say his name, point to word/object from FC x2, imitate sounds or gestures or engage in joint attention despite MAX cuing. No elicited or spontaneous verbalization appreciated, though pt able to point to housekeeping when they entered the room. SLP will continue to follow to optimize communication.     Goals:   Multidisciplinary Problems       SLP Goals          Problem: SLP    Goal Priority Disciplines Outcome   SLP Goal     SLP Ongoing, Progressing   Description: Speech Language Pathology Goals  Goals expected to be met by 10/29    1. Pt will answer simple y/n questions with 60% accy given max cues.    2. Pt will follow simple commands with 50% accy given max cues.   3. Pt will vocalize in response to any stim x5/session given max cues.   4. Pt will ID objects from FCx2 with 50 % acc following max cues.                                           Plan:     Patient to be seen:  2 x/week   Plan of Care expires:  11/14/23  Plan of Care reviewed with:  patient   SLP Follow-Up:  Yes       Discharge recommendations:  Low Intensity Therapy   Barriers to Discharge:  None    Time Tracking:     SLP Treatment Date:   10/25/23  Speech Start Time:  1019  Speech Stop Time:  1027     Speech Total Time (min):  8 min    Billable Minutes: Speech Therapy Individual 8    10/25/2023

## 2023-10-25 NOTE — PROGRESS NOTES
"[facility]  Adult Nutrition  Progress Note    SUMMARY       Recommendations    1. Continue Regular Diet as tolerated. Texture per SLP.   2. Recommend ONS Boost Plus with all meals to optimize meals.   3. Weigh weekly.   4. RD to monitor and follow-up.    Goals: Meet % of EEN/EPN by RD f/u date  Nutrition Goal Status: progressing towards goal  Communication of RD Recs: other (comment)    Assessment and Plan    Nutrition Problem  Inadequate energy intake     Related to (etiology):   Physiological demands     Signs and Symptoms (as evidenced by):   HF    Interventions(treatment strategy):  Collaboration of nutrition care w/ other providers     Nutrition Diagnosis Status:   Improving         Reason for Assessment    Reason For Assessment: RD follow-up  Diagnosis: other (see comments)  Relevant Medical History: -  Interdisciplinary Rounds: did not attend  General Information Comments: RD follow-up. Patient continues to tolerate diet consuming 50-75% at meals. Regular Diet. No c/o N/V/C/D.  Nutrition Discharge Planning: pending clinical course    Nutrition Risk Screen    Nutrition Risk Screen: no indicators present    Nutrition/Diet History    Patient Reported Diet/Restrictions/Preferences: general  Spiritual, Cultural Beliefs, Jew Practices, Values that Affect Care: no  Food Allergies: NKFA  Factors Affecting Nutritional Intake: None identified at this time    Anthropometrics    Temp: 97.6 °F (36.4 °C)  Height Method: Estimated  Height: 5' 8" (172.7 cm)  Height (inches): 68 in  Weight Method: Bed Scale  Weight: 74.3 kg (163 lb 12.8 oz)  Weight (lb): 163.8 lb  Ideal Body Weight (IBW), Male: 154 lb  % Ideal Body Weight, Male (lb): 108.44 %  BMI (Calculated): 24.9  BMI Grade: 25 - 29.9 - overweight       Lab/Procedures/Meds    Pertinent Labs Reviewed: reviewed  Pertinent Labs Comments: CO2 21, Creat 1.7, GFR 46.7  Pertinent Medications Reviewed: reviewed  Pertinent Medications Comments: " -      Estimated/Assessed Needs    Weight Used For Calorie Calculations: 76.1 kg (167 lb 12.3 oz)  Energy Calorie Requirements (kcal): 1902  Energy Need Method: Kcal/kg (30-35 kcal/kg)  Protein Requirements: 91 g (1.2 g/kg)  Weight Used For Protein Calculations: 76.1 kg (167 lb 12.3 oz)        RDA Method (mL): 1902         Nutrition Prescription Ordered    Current Diet Order: Regular  Nutrition Order Comments: Thin Liquids    Evaluation of Received Nutrient/Fluid Intake    I/O: +245 mL  Energy Calories Required: meeting needs  Protein Required: meeting needs  Fluid Required: not meeting needs  Total Fluid Intake (mL/kg): 1 ml or fluid per MD  Comments: LBM: 10/24  Tolerance: tolerating  % Intake of Estimated Energy Needs: 75 - 100 %  % Meal Intake: 50 - 75 %    Nutrition Risk    Level of Risk/Frequency of Follow-up: low ((1x/week))     Monitor and Evaluation    Food and Nutrient Intake: energy intake, food and beverage intake  Food and Nutrient Adminstration: diet order  Knowledge/Beliefs/Attitudes: food and nutrition knowledge/skill, beliefs and attitudes  Physical Activity and Function: nutrition-related ADLs and IADLs, factors affecting access to physical activity  Anthropometric Measurements: weight, height/length, weight change, body mass index, growth pattern indices/percentile ranks  Biochemical Data, Medical Tests and Procedures: electrolyte and renal panel, gastrointestinal profile, glucose/endocrine profile, inflammatory profile, lipid profile  Nutrition-Focused Physical Findings: overall appearance, extremities, muscles and bones, head and eyes, skin     Nutrition Follow-Up    RD Follow-up?: Yes    [facility] Registration Eligible, Provisional LDN

## 2023-10-25 NOTE — SUBJECTIVE & OBJECTIVE
Interval History: NAEO, VSS, AF, HDS. Obtaining routine labs. Platelets uptrending s/p d/c'ing valproic acid. Patient is medically ready for discharge.    Review of Systems   Unable to perform ROS: Patient nonverbal     Objective:     Vital Signs (Most Recent):  Temp: 97.6 °F (36.4 °C) (10/25/23 0450)  Pulse: 80 (10/25/23 0728)  Resp: 18 (10/25/23 0728)  BP: (!) 157/106 (10/25/23 0728)  SpO2: (!) 92 % (10/24/23 2340) Vital Signs (24h Range):  Temp:  [97.4 °F (36.3 °C)-98.1 °F (36.7 °C)] 97.6 °F (36.4 °C)  Pulse:  [65-80] 80  Resp:  [18] 18  SpO2:  [92 %-93 %] 92 %  BP: (108-157)/() 157/106     Weight: 74.3 kg (163 lb 12.8 oz)  Body mass index is 24.91 kg/m².    Intake/Output Summary (Last 24 hours) at 10/25/2023 1154  Last data filed at 10/24/2023 2038  Gross per 24 hour   Intake 245 ml   Output --   Net 245 ml           Physical Exam  Vitals and nursing note reviewed.   Constitutional:       General: He is not in acute distress.     Appearance: Normal appearance. He is not ill-appearing.   HENT:      Head: Normocephalic and atraumatic.      Mouth/Throat:      Mouth: Mucous membranes are moist.      Pharynx: Oropharynx is clear.   Eyes:      Conjunctiva/sclera: Conjunctivae normal.   Cardiovascular:      Rate and Rhythm: Normal rate and regular rhythm.      Pulses: Normal pulses.      Heart sounds: No murmur heard.  Pulmonary:      Effort: Pulmonary effort is normal. No respiratory distress.      Breath sounds: No wheezing, rhonchi or rales.   Abdominal:      General: Abdomen is flat. There is no distension.      Palpations: Abdomen is soft.      Tenderness: There is no abdominal tenderness.      Comments: +PEG   Musculoskeletal:      Right lower leg: No edema.      Left lower leg: No edema.   Skin:     General: Skin is warm.      Capillary Refill: Capillary refill takes less than 2 seconds.      Findings: No erythema or rash.   Neurological:      Mental Status: He is alert. Mental status is at baseline.              Significant Labs: All pertinent labs within the past 24 hours have been reviewed.  CBC:   Recent Labs   Lab 10/24/23  1012 10/25/23  0701   WBC 3.79* 3.93   HGB 10.6* 11.8*   HCT 32.3* 38.7*   PLT 81* 92*       CMP:   Recent Labs   Lab 10/24/23  1012 10/25/23  0701    140   K 4.1 4.6    107   CO2 24 21*   GLU 77 75   BUN 20 19   CREATININE 1.6* 1.7*   CALCIUM 8.9 9.4   ANIONGAP 8 12         Significant Imaging: I have reviewed all pertinent imaging results/findings within the past 24 hours.

## 2023-10-25 NOTE — PLAN OF CARE
Sw did leave VM with ENRIKE Teague Jolie, ex spouse informing her Pt was stable to dc and Sw will setup transport and follow with EPS at the home, CM leadership updated.

## 2023-10-25 NOTE — PLAN OF CARE
"Confirmed with [facility] rep Aicha that "Pt will not be admitted back on HH services," Sw to update HPOA that Pt is medically stable to dc back home with EPS and family follow up. Pt's family has waiver information and sitter resources for Pt to be safe at the home. Sw will update  leadership.   "

## 2023-10-26 VITALS
BODY MASS INDEX: 24.83 KG/M2 | HEART RATE: 91 BPM | OXYGEN SATURATION: 99 % | SYSTOLIC BLOOD PRESSURE: 178 MMHG | TEMPERATURE: 98 F | RESPIRATION RATE: 18 BRPM | WEIGHT: 163.81 LBS | DIASTOLIC BLOOD PRESSURE: 111 MMHG | HEIGHT: 68 IN

## 2023-10-26 LAB
ANION GAP SERPL CALC-SCNC: 10 MMOL/L (ref 8–16)
BASOPHILS # BLD AUTO: 0.01 K/UL (ref 0–0.2)
BASOPHILS NFR BLD: 0.3 % (ref 0–1.9)
BUN SERPL-MCNC: 23 MG/DL (ref 6–20)
CALCIUM SERPL-MCNC: 9.3 MG/DL (ref 8.7–10.5)
CHLORIDE SERPL-SCNC: 107 MMOL/L (ref 95–110)
CO2 SERPL-SCNC: 23 MMOL/L (ref 23–29)
CREAT SERPL-MCNC: 1.9 MG/DL (ref 0.5–1.4)
DIFFERENTIAL METHOD: ABNORMAL
EOSINOPHIL # BLD AUTO: 0 K/UL (ref 0–0.5)
EOSINOPHIL NFR BLD: 0.8 % (ref 0–8)
ERYTHROCYTE [DISTWIDTH] IN BLOOD BY AUTOMATED COUNT: 18.1 % (ref 11.5–14.5)
EST. GFR  (NO RACE VARIABLE): 40.9 ML/MIN/1.73 M^2
GLUCOSE SERPL-MCNC: 92 MG/DL (ref 70–110)
HCT VFR BLD AUTO: 33.7 % (ref 40–54)
HGB BLD-MCNC: 10.2 G/DL (ref 14–18)
IMM GRANULOCYTES # BLD AUTO: 0.01 K/UL (ref 0–0.04)
IMM GRANULOCYTES NFR BLD AUTO: 0.3 % (ref 0–0.5)
LYMPHOCYTES # BLD AUTO: 1.3 K/UL (ref 1–4.8)
LYMPHOCYTES NFR BLD: 33.2 % (ref 18–48)
MAGNESIUM SERPL-MCNC: 2.1 MG/DL (ref 1.6–2.6)
MCH RBC QN AUTO: 27.9 PG (ref 27–31)
MCHC RBC AUTO-ENTMCNC: 30.3 G/DL (ref 32–36)
MCV RBC AUTO: 92 FL (ref 82–98)
MONOCYTES # BLD AUTO: 0.8 K/UL (ref 0.3–1)
MONOCYTES NFR BLD: 20.7 % (ref 4–15)
NEUTROPHILS # BLD AUTO: 1.8 K/UL (ref 1.8–7.7)
NEUTROPHILS NFR BLD: 44.7 % (ref 38–73)
NRBC BLD-RTO: 0 /100 WBC
PHOSPHATE SERPL-MCNC: 3.4 MG/DL (ref 2.7–4.5)
PLATELET # BLD AUTO: 91 K/UL (ref 150–450)
PMV BLD AUTO: 14.1 FL (ref 9.2–12.9)
POTASSIUM SERPL-SCNC: 4.6 MMOL/L (ref 3.5–5.1)
RBC # BLD AUTO: 3.65 M/UL (ref 4.6–6.2)
SODIUM SERPL-SCNC: 140 MMOL/L (ref 136–145)
WBC # BLD AUTO: 3.97 K/UL (ref 3.9–12.7)

## 2023-10-26 PROCEDURE — 80048 BASIC METABOLIC PNL TOTAL CA: CPT

## 2023-10-26 PROCEDURE — 84100 ASSAY OF PHOSPHORUS: CPT

## 2023-10-26 PROCEDURE — 25000003 PHARM REV CODE 250: Performed by: STUDENT IN AN ORGANIZED HEALTH CARE EDUCATION/TRAINING PROGRAM

## 2023-10-26 PROCEDURE — 25000003 PHARM REV CODE 250

## 2023-10-26 PROCEDURE — 83735 ASSAY OF MAGNESIUM: CPT

## 2023-10-26 PROCEDURE — 85025 COMPLETE CBC W/AUTO DIFF WBC: CPT

## 2023-10-26 PROCEDURE — 25000003 PHARM REV CODE 250: Performed by: INTERNAL MEDICINE

## 2023-10-26 PROCEDURE — 25000242 PHARM REV CODE 250 ALT 637 W/ HCPCS

## 2023-10-26 PROCEDURE — 36415 COLL VENOUS BLD VENIPUNCTURE: CPT

## 2023-10-26 PROCEDURE — 99231 SBSQ HOSP IP/OBS SF/LOW 25: CPT | Mod: ,,, | Performed by: HOSPITALIST

## 2023-10-26 PROCEDURE — 99231 PR SUBSEQUENT HOSPITAL CARE,LEVL I: ICD-10-PCS | Mod: ,,, | Performed by: HOSPITALIST

## 2023-10-26 RX ADMIN — ASPIRIN 81 MG CHEWABLE TABLET 81 MG: 81 TABLET CHEWABLE at 08:10

## 2023-10-26 RX ADMIN — BUSPIRONE HYDROCHLORIDE 10 MG: 10 TABLET ORAL at 08:10

## 2023-10-26 RX ADMIN — SENNOSIDES AND DOCUSATE SODIUM 1 TABLET: 50; 8.6 TABLET ORAL at 08:10

## 2023-10-26 RX ADMIN — SACUBITRIL AND VALSARTAN 1 TABLET: 24; 26 TABLET, FILM COATED ORAL at 08:10

## 2023-10-26 RX ADMIN — POLYETHYLENE GLYCOL 3350 17 G: 17 POWDER, FOR SOLUTION ORAL at 08:10

## 2023-10-26 RX ADMIN — APIXABAN 5 MG: 5 TABLET, FILM COATED ORAL at 08:10

## 2023-10-26 RX ADMIN — METOPROLOL SUCCINATE 50 MG: 50 TABLET, EXTENDED RELEASE ORAL at 08:10

## 2023-10-26 RX ADMIN — EMPAGLIFLOZIN 10 MG: 10 TABLET, FILM COATED ORAL at 08:10

## 2023-10-26 RX ADMIN — ATORVASTATIN CALCIUM 80 MG: 40 TABLET, FILM COATED ORAL at 08:10

## 2023-10-26 RX ADMIN — BUSPIRONE HYDROCHLORIDE 10 MG: 10 TABLET ORAL at 04:10

## 2023-10-26 NOTE — PLAN OF CARE
"Dianna did receive call from ENRIKE Dave, asking to contact cousin Cristhian. Dianna called Cristhian at , asked if he is meeting Pt at the home, Cristhian states "I am," Dianna asked if 630pm was a good time for transport, Cristhian asked "what time is it now," Dianna informed a little past 5pm. Cristhian ok with 630pm, Dianna will have Cristhian's contact numbers provided to the ambulance crew and expect Pt home at 7pm. Dianna will provide SS resources for disability and also informed cousin and HPOA of group home reviewing the info, nurse updated as well as OCC.   "
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
## 2023-10-26 NOTE — PLAN OF CARE
[facility] - Med Surg  Discharge Final Note    Primary Care Provider: Carleen Bueno MD    Expected Discharge Date: 10/26/2023    Final Discharge Note (most recent)       Final Note - 10/26/23 1323          Final Note    Assessment Type Final Discharge Note     Hospital Resources/Appts/Education Provided Appointments scheduled and added to AVS;Community resources provided        Post-Acute Status    Post-Acute Authorization Placement     Post-Acute Placement Status Referrals Sent     Other Status Community Services     Discharge Delays Patient and Family Barriers                     Important Message from Medicare             Contact Info       Carleen Bueno MD   Specialty: Internal Medicine   Relationship: PCP - General    [address] [facility] [address]   Phone: [phone]       Next Steps: Follow up

## 2023-10-26 NOTE — PLAN OF CARE
Spoke with patient's HCPOA, Elaine, and informed her of his discharge. She stated she would call his cousin as she was on her way to work. I informed her that cousin would need to be present at apartment as patient needs someone with him. She confirmed and stated she would call assigned SW to let him know what time cousin will be present at apartment to receive patient so transport can be arranged.    UPDATE 4:07 PM    Spoke with HCPOA regarding inability to connect with family to meet patient at home. She stated she was at work and could not meet him. Offered to coordinate with her for when she gets off to which she stated he has other family to be called and hung up.     Attempted to call back and call was ignored.     Voicemail left providing information regarding outstanding balance and current nightly rate that are due immediately if patient does not discharge this date with family. Also, requested his financial information be dropped of to him at hospital to enable placement if they are not supporting him in returning home.     Assigned SW to update EPS.     Cheryl Rubio, MSW, LCSW  Manager - Case Management

## 2023-10-26 NOTE — PLAN OF CARE
"Dianna spoke with Shaun at , reviewing his information for group home placement, but asked what kind of money does he have?" Dianna informed he has family and debit card to pay if accepted, Dianna will contact [facility] to ask for Pt's payment options and follow with acceptance. If group home denies, Dianna will follow with dc to the home and ask what time [facility] can meet Pt at home.   "
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
## 2023-10-26 NOTE — SUBJECTIVE & OBJECTIVE
Interval History: Patient medically stable for discharge.    Review of Systems   Unable to perform ROS: Patient nonverbal     Objective:     Vital Signs (Most Recent):  Temp: 98.4 °F (36.9 °C) (10/26/23 0526)  Pulse: 91 (10/26/23 0822)  Resp: 18 (10/26/23 0822)  BP: (!) 178/111 (10/26/23 0822)  SpO2: 99 % (10/26/23 0526) Vital Signs (24h Range):  Temp:  [96.4 °F (35.8 °C)-98.4 °F (36.9 °C)] 98.4 °F (36.9 °C)  Pulse:  [76-99] 91  Resp:  [18-21] 18  SpO2:  [98 %-99 %] 99 %  BP: (139-178)/() 178/111     Weight: 74.3 kg (163 lb 12.8 oz)  Body mass index is 24.91 kg/m².    Intake/Output Summary (Last 24 hours) at 10/26/2023 1359  Last data filed at 10/25/2023 2000  Gross per 24 hour   Intake 50 ml   Output --   Net 50 ml         Physical Exam  Vitals and nursing note reviewed.   Constitutional:       General: He is not in acute distress.     Appearance: Normal appearance. He is not ill-appearing.   HENT:      Head: Normocephalic and atraumatic.      Mouth/Throat:      Mouth: Mucous membranes are moist.      Pharynx: Oropharynx is clear.   Eyes:      Conjunctiva/sclera: Conjunctivae normal.   Cardiovascular:      Rate and Rhythm: Normal rate and regular rhythm.      Pulses: Normal pulses.      Heart sounds: No murmur heard.  Pulmonary:      Effort: Pulmonary effort is normal. No respiratory distress.      Breath sounds: No wheezing, rhonchi or rales.   Abdominal:      General: Abdomen is flat. There is no distension.      Palpations: Abdomen is soft.      Tenderness: There is no abdominal tenderness.      Comments: +PEG   Musculoskeletal:      Right lower leg: No edema.      Left lower leg: No edema.   Skin:     General: Skin is warm.      Capillary Refill: Capillary refill takes less than 2 seconds.      Findings: No erythema or rash.   Neurological:      Mental Status: He is alert. Mental status is at baseline.             Significant Labs: All pertinent labs within the past 24 hours have been reviewed.  Recent  Lab Results         10/26/23  0414   10/26/23  0413        Anion Gap   10       Baso # 0.01         Basophil % 0.3         BUN   23       Calcium   9.3       Chloride   107       CO2   23       Creatinine   1.9       Differential Method Automated         eGFR   40.9       Eos # 0.0         Eosinophil % 0.8         Glucose   92       Gran # (ANC) 1.8         Gran % 44.7         Hematocrit 33.7         Hemoglobin 10.2         Immature Grans (Abs) 0.01  Comment: Mild elevation in immature granulocytes is non specific and   can be seen in a variety of conditions including stress response,   acute inflammation, trauma and pregnancy. Correlation with other   laboratory and clinical findings is essential.           Immature Granulocytes 0.3         Lymph # 1.3         Lymph % 33.2         Magnesium    2.1       MCH 27.9         MCHC 30.3         MCV 92         Mono # 0.8         Mono % 20.7         MPV 14.1         nRBC 0         Phosphorus Level   3.4       Platelet Count 91         Potassium   4.6       RBC 3.65         RDW 18.1         Sodium   140       WBC 3.97                 Significant Imaging:  No new imaging.

## 2023-10-26 NOTE — PLAN OF CARE
"Dianna called [facility] at , asked if her cousin has informed her of a time he will be ready for Pt's family to be home. [facility] informed Dianna "that is not my cousin, it is his cousin," HPOA is at work and can not contact him continuously." Dianna placed call to Cristhian at  and left VM asking him what time he will be home to meet Pt once Sw's arranges transport, will continue to contact cousin.  leadership updated.   "

## 2023-10-26 NOTE — PLAN OF CARE
DIANNA spoke to  with [employer] [phone], informed of the lack of family involvement and that HPOA wont have a family member answer Dianna's call's to get a time family will be home to receive Pt nor will the HPOA provide any finances to group home or any facility if Pt accepted. Ms Lima will attempt to contact  or his bank to see if she can obtain financial info. Dianna updated CM leadership, Dianna provided contact number to Dianna and will continue to contact family for dc.

## 2023-10-26 NOTE — PLAN OF CARE
APPOINTMENT:    Patient Appointment(s) scheduled with Tiffani Pope MD, Tuesday Nov 7, 2023 4:00 PM

## 2023-10-28 NOTE — DISCHARGE SUMMARY
[facility]  Discharge Summary      Patient Name: Tera Griffiths  MRN: [id number]  [facility]: [id number]  Patient Class: IP- Inpatient  Admission Date: 10/6/2023  Hospital Length of Stay: 20 days  Discharge Date and Time:  10/28/2023 6:31 AM  Attending Physician: No att. providers found   Discharging Provider: Vishnu Membreno MD  Primary Care Provider: Carleen Bueno MD  [facility] Medicine Team: [facility] 2 Vishnu Membreno MD  Primary Care Team: [facility] 2    HPI:   Mr. Domingo Griffiths is a 58 y.o M with CAD c/b STEMI, AFib on Eliquis, combined CHF (EF 15-20%), HTN, HLD, CKD3b, L MCA CVA with residual aphasia and R sided deficits s/p PEG placement who presents for evaluation of LE swelling.     Patient is non-verbal as such history obtained from chart review and his ex-wife/POA. Per ED notes, patient's HH RN arrived at his abode earlier in the day and found his apt door wide open, burning plastic in the oven, and the patient on the floor in distress (grunting/yelling/tearful). On EMS arrival he was found to be in AFib w RVR and 10mg IVP Metoprolol was given with subsequent improvement in rate. He was thought to be fluid overloaded and was brought to the ED for evaluation. Patient's ex-wife/MPOA Zahida reports that the patient lives by himself but she checks on him in the morning before work (she drives [employer]) and at night, and his cousin is supposed to watch him midday. She says he can ambulate without assistance and can eat with no issues (although he does have a PEG that she will sometimes use). Of note, she did mention that he had a coughing spell earlier that morning and was found to have tube feeds coming out of his mouth when she went to go evaluate him.     In the ED, patient was afebrile, HR initially tachycardic but later improved, BP WNL, saturating well on RA. Labs notable for no leukocytosis, Cr 1.8, BNP 4655, UA noninfectious. CXR consistent with vascular congestion.        * No surgery found *      Hospital Course:   Patient was admitted to Hospital Medicine service for medical management and evaluation of Acute on Chronic Heart Failure exacerbation and increased agitation from baseline. Psychiatry was consulted for recommendations on agitation control in the setting of prolonged QTc. Psychiatry recommended scheduled buspar with as needed additional dosing and nightly seroquel as needed. Patient was placed in soft restraints after removing multiple lines of PIV access. Diuresis was continued with IV lasix and patient appeared to be euvolemic without ongoing evidence of HF exacerbation. Throughout stay, patient was sequentially restarted on GDMT that was held on previous admission. Would recommend outpatient follow up with PCP/Cardiology to discuss addition of spironolactone. Patient continued to have significant agitation and removed multiple lines of PIV access, requiring soft restraints and a sitter. Discussions with CM ongoing for placement in light of concerning history and presentation. Platelets uptrending after discontinuing valproic acid at recommendation of psychiatry. Significant difficulty coordinating placement in conjunction with patient's family/HPOA. Patient discharged home in hemodynamically stable condition on 10/26 with close PCP follow up with strict return precautions as discussed at bedside.         Physical Exam  Vitals and nursing note reviewed.   Constitutional:       General: He is not in acute distress.     Appearance: Normal appearance. He is not ill-appearing.   HENT:      Head: Normocephalic and atraumatic.      Mouth/Throat:      Mouth: Mucous membranes are moist.      Pharynx: Oropharynx is clear.   Eyes:      Conjunctiva/sclera: Conjunctivae normal.   Cardiovascular:      Rate and Rhythm: Normal rate and regular rhythm.      Pulses: Normal pulses.      Heart sounds: No murmur heard.  Pulmonary:      Effort: Pulmonary effort is normal. No  respiratory distress.      Breath sounds: No wheezing, rhonchi or rales.   Abdominal:      General: Abdomen is flat. There is no distension.      Palpations: Abdomen is soft.      Tenderness: There is no abdominal tenderness.      Comments: +PEG   Musculoskeletal:      Right lower leg: No edema.      Left lower leg: No edema.   Skin:     General: Skin is warm.      Capillary Refill: Capillary refill takes less than 2 seconds.      Findings: No erythema or rash.   Neurological:      Mental Status: He is alert. Mental status is at baseline.     Vitals:    10/25/23 0728 10/25/23 2120 10/26/23 0526 10/26/23 0822   BP: (!) 157/106 139/77  (!) 178/111   BP Location:       Patient Position:    Lying   Pulse: 80 99 76 91   Resp: 18 18 (!) 21 18   Temp:  96.4 °F (35.8 °C) 98.4 °F (36.9 °C)    TempSrc:  Axillary  Oral   SpO2:  98% 99%    Weight:       Height:           Goals of Care Treatment Preferences:  Code Status: Full Code    Health care agent: Zahida [facility] care agent number:           What is most important right now is to focus on extending life as long as possible, even it it means sacrificing quality, curative/life-prolongation (regardless of treatment burdens).  Accordingly, we have decided that the best plan to meet the patient's goals includes continuing with treatment.      Consults:   Consults (From admission, onward)        Status Ordering Provider     Inpatient consult to Psychiatry  Once        Provider:  (Not yet assigned)    Completed NANDO LUKE     Inpatient consult to Registered Dietitian/Nutritionist  Once        Provider:  (Not yet assigned)    Completed NANDO LUKE     [facility]  Once        Provider:  (Not yet assigned)    Completed ZOILA GRESHAM     Inpatient consult to Registered Dietitian/Nutritionist  Once        Provider:  (Not yet assigned)    Completed ZOILA GRESHAM          No new Assessment & Plan notes have been filed  "under this hospital service since the last note was generated.  Service: Hospital Medicine    Final Active Diagnoses:    Diagnosis Date Noted POA    PRINCIPAL PROBLEM:  Acute on chronic combined systolic and diastolic heart failure [I50.43] 09/23/2022 Yes    Discharge planning issues [Z02.9] 10/01/2023 Not Applicable    Agitation [R45.1] 08/22/2023 Yes    History of embolic stroke involving left middle cerebral artery [Z86.73] 08/08/2023 Not Applicable    CAD (coronary artery disease) [I25.10] 09/23/2022 Yes    Paroxysmal A-fib [I48.0] 09/23/2022 Yes    Dyslipidemia [E78.5] 09/23/2022 Yes      Problems Resolved During this Admission:       Discharged Condition: stable    Disposition: Home or Self Care    Follow Up:   Follow-up Information     Carleen Bueno MD Follow up.    Specialty: Internal Medicine  Contact information:  [address] [facility] [address]  [phone]                       Patient Instructions:   No discharge procedures on file.    Significant Diagnostic Studies: Labs: CMP No results for input(s): "NA", "K", "CL", "CO2", "GLU", "BUN", "CREATININE", "CALCIUM", "PROT", "ALBUMIN", "BILITOT", "ALKPHOS", "AST", "ALT", "ANIONGAP", "ESTGFRAFRICA", "EGFRNONAA" in the last 48 hours. and CBC No results for input(s): "WBC", "HGB", "HCT", "PLT" in the last 48 hours.    Pending Diagnostic Studies:     Procedure Component Value Units Date/Time    EKG 12-lead [[id number]]     Order Status: Sent Lab Status: No result     EKG 12-lead [[id number]]     Order Status: Sent Lab Status: No result     EKG 12-lead [[id number]]     Order Status: Sent Lab Status: No result     EKG 12-lead [[id number]]     Order Status: Sent Lab Status: No result     EKG 12-lead [[id number]]     Order Status: Sent Lab Status: No result     EKG 12-lead [[id number]]     Order Status: Sent Lab Status: No result     EKG 12-lead [[id number]]     Order Status: Sent Lab Status: No result     EKG 12-lead [[id number]]     Order Status: " Sent Lab Status: No result     EKG 12-lead [[id number]]     Order Status: Sent Lab Status: No result          Medications:  Reconciled Home Medications:      Medication List      START taking these medications    busPIRone 10 MG tablet  Commonly known as: BUSPAR  Take 1 tablet (10 mg total) by mouth 3 (three) times daily. Can also take two additional doses as needed for agitation     furosemide 40 MG tablet  Commonly known as: LASIX  Take 1 tablet (40 mg total) by mouth once daily.     melatonin 3 mg tablet  Commonly known as: MELATIN  Take 2 tablets (6 mg total) by mouth nightly as needed for Insomnia.     metoprolol succinate 50 MG 24 hr tablet  Commonly known as: TOPROL-XL  Take 1 tablet (50 mg total) by mouth once daily.     polyethylene glycol 17 gram Pwpk  Commonly known as: GLYCOLAX  Take 17 g by mouth once daily.     sacubitriL-valsartan 24-26 mg per tablet  Commonly known as: ENTRESTO  Take 1 tablet by mouth 2 (two) times daily.     senna-docusate 8.6-50 mg 8.6-50 mg per tablet  Commonly known as: PERICOLACE  Take 1 tablet by mouth once daily.        CHANGE how you take these medications    apixaban 5 mg Tab  Commonly known as: ELIQUIS  Take 1 tablet (5 mg total) by mouth 2 (two) times daily.  What changed:   · how to take this  · Another medication with the same name was removed. Continue taking this medication, and follow the directions you see here.     aspirin 81 MG Chew  Chew and  swallow 1 tablet (81 mg total) by mouth once daily.  What changed:   · how to take this  · Another medication with the same name was removed. Continue taking this medication, and follow the directions you see here.     atorvastatin 80 MG tablet  Commonly known as: LIPITOR  Take 1 tablet (80 mg total) by mouth once daily.  What changed:   · how to take this  · Another medication with the same name was removed. Continue taking this medication, and follow the directions you see here.     empagliflozin 10 mg tablet  Commonly  known as: JARDIANCE  Take 1 tablet (10 mg total) by mouth once daily.  What changed:   · how to take this  · additional instructions  · Another medication with the same name was removed. Continue taking this medication, and follow the directions you see here.     ezetimibe 10 mg tablet  Commonly known as: ZETIA  Take 1 tablet (10 mg total) by mouth every evening.  What changed:   · how to take this  · Another medication with the same name was removed. Continue taking this medication, and follow the directions you see here.     QUEtiapine 25 MG Tab  Commonly known as: SEROQUEL  Take 1 tablet (25 mg total) by mouth every evening.  What changed:   · how to take this  · Another medication with the same name was removed. Continue taking this medication, and follow the directions you see here.     * valproic acid (as sodium salt) 250 mg/5 mL (5 mL) Soln  Commonly known as: DEPAKENE  10 mLs (500 mg total) by Per G Tube route once daily. To be administers at 1200 daily  What changed: Another medication with the same name was changed. Make sure you understand how and when to take each.     * valproic acid (as sodium salt) 250 mg/5 mL (5 mL) Soln  Commonly known as: DEPAKENE  10 mLs (500 mg total) by Per G Tube route once daily. To be administered 0900 daily  What changed: Another medication with the same name was changed. Make sure you understand how and when to take each.     * valproic acid (as sodium salt) 250 mg/5 mL (5 mL) Soln  Commonly known as: DEPAKENE  Take 30 mLs (1,500 mg total) by mouth every evening.  What changed:   · how much to take  · how to take this         * This list has 3 medication(s) that are the same as other medications prescribed for you. Read the directions carefully, and ask your doctor or other care provider to review them with you.            CONTINUE taking these medications    nitroGLYCERIN 0.4 MG SL tablet  Commonly known as: NITROSTAT  Place under the tongue.        STOP taking these  medications    amiodarone 200 MG Tab  Commonly known as: PACERONE     chlorhexidine 0.12 % solution  Commonly known as: PERIDEX     metoprolol tartrate 25 MG tablet  Commonly known as: LOPRESSOR            Indwelling Lines/Drains at time of discharge:   Lines/Drains/Airways     Drain  Duration                Gastrostomy/Enterostomy 08/17/23 0800 Percutaneous endoscopic gastrostomy (PEG) midline 71 days                Time spent on the discharge of patient: 45 minutes         Vishnu Membreno MD  Department of Hospital Medicine  [facility]

## 2023-10-30 NOTE — PLAN OF CARE
"Spoke with Mr. Griffiths's Healthcare Power of , Zahida, by phone this morning to clarify his discharge medications. Clarified that we recommend holding his valproic acid until follow up with PCP and the prescriber. She did not state whether or not Mr. Griffiths has been receiving the valproic acid after discharge. During this conversation, she strongly voiced her frustration with how he was continued on his home psychiatric medications during his hospitalization. At this time, she was raising her voice over the phone and stated that she would "file a complaint with the board" and demanded that her complaints were rectified. She stated that if an attending does not call her back before 1400 that she will be forced to name this MD as the sole person on the complaint.    Vishnu Membreno MD    "

## 2023-11-06 PROBLEM — I21.4 NSTEMI (NON-ST ELEVATED MYOCARDIAL INFARCTION): Status: RESOLVED | Noted: 2023-04-16 | Resolved: 2023-11-06

## 2023-11-20 PROBLEM — N17.9 ACUTE RENAL FAILURE SUPERIMPOSED ON STAGE 3B CHRONIC KIDNEY DISEASE: Status: RESOLVED | Noted: 2023-08-10 | Resolved: 2023-11-20

## 2023-11-20 PROBLEM — N18.32 ACUTE RENAL FAILURE SUPERIMPOSED ON STAGE 3B CHRONIC KIDNEY DISEASE: Status: RESOLVED | Noted: 2023-08-10 | Resolved: 2023-11-20

## 2023-11-27 PROBLEM — A41.9 SEPSIS: Status: RESOLVED | Noted: 2023-08-19 | Resolved: 2023-11-27

## 2023-12-19 ENCOUNTER — HOSPITAL ENCOUNTER (INPATIENT)
Facility: HOSPITAL | Age: 58
LOS: 3 days | Discharge: HOME | DRG: 394 | End: 2023-12-22
Attending: EMERGENCY MEDICINE | Admitting: HOSPITALIST
Payer: MEDICAID

## 2023-12-19 DIAGNOSIS — Z93.1 S/P PERCUTANEOUS ENDOSCOPIC GASTROSTOMY (PEG) TUBE PLACEMENT: Primary | ICD-10-CM

## 2023-12-19 DIAGNOSIS — R07.9 CHEST PAIN: ICD-10-CM

## 2023-12-19 DIAGNOSIS — L03.818 CELLULITIS OF OTHER SPECIFIED SITE: ICD-10-CM

## 2023-12-19 DIAGNOSIS — I50.42 CHRONIC COMBINED SYSTOLIC AND DIASTOLIC HEART FAILURE: ICD-10-CM

## 2023-12-19 DIAGNOSIS — I50.40 COMBINED SYSTOLIC AND DIASTOLIC CONGESTIVE HEART FAILURE, UNSPECIFIED HF CHRONICITY: ICD-10-CM

## 2023-12-19 DIAGNOSIS — R94.31 PROLONGED Q-T INTERVAL ON ECG: ICD-10-CM

## 2023-12-19 DIAGNOSIS — R45.1 AGITATION: ICD-10-CM

## 2023-12-19 DIAGNOSIS — R79.89 ELEVATED TROPONIN: ICD-10-CM

## 2023-12-19 DIAGNOSIS — I25.2 HISTORY OF ST ELEVATION MYOCARDIAL INFARCTION (STEMI): ICD-10-CM

## 2023-12-19 DIAGNOSIS — I48.0 PAROXYSMAL A-FIB: ICD-10-CM

## 2023-12-19 LAB
ALBUMIN SERPL BCP-MCNC: 4.1 G/DL (ref 3.5–5.2)
ALP SERPL-CCNC: 72 U/L (ref 55–135)
ALT SERPL W/O P-5'-P-CCNC: 17 U/L (ref 10–44)
ANION GAP SERPL CALC-SCNC: 10 MMOL/L (ref 8–16)
ASCENDING AORTA: 2.94 CM
AST SERPL-CCNC: 27 U/L (ref 10–40)
AV INDEX (PROSTH): 0.66
AV MEAN GRADIENT: 4 MMHG
AV PEAK GRADIENT: 6 MMHG
AV VALVE AREA BY VELOCITY RATIO: 1.96 CM²
AV VALVE AREA: 2.03 CM²
AV VELOCITY RATIO: 0.64
BASOPHILS # BLD AUTO: 0.02 K/UL (ref 0–0.2)
BASOPHILS NFR BLD: 0.5 % (ref 0–1.9)
BILIRUB SERPL-MCNC: 0.5 MG/DL (ref 0.1–1)
BNP SERPL-MCNC: 1151 PG/ML (ref 0–99)
BSA FOR ECHO PROCEDURE: 1.88 M2
BUN SERPL-MCNC: 17 MG/DL (ref 6–20)
BUN SERPL-MCNC: 20 MG/DL (ref 6–30)
CALCIUM SERPL-MCNC: 9.7 MG/DL (ref 8.7–10.5)
CHLORIDE SERPL-SCNC: 105 MMOL/L (ref 95–110)
CHLORIDE SERPL-SCNC: 105 MMOL/L (ref 95–110)
CO2 SERPL-SCNC: 23 MMOL/L (ref 23–29)
CREAT SERPL-MCNC: 2.1 MG/DL (ref 0.5–1.4)
CREAT SERPL-MCNC: 2.2 MG/DL (ref 0.5–1.4)
CV ECHO LV RWT: 0.4 CM
DIFFERENTIAL METHOD: ABNORMAL
DOP CALC AO PEAK VEL: 1.21 M/S
DOP CALC AO VTI: 16.98 CM
DOP CALC LVOT AREA: 3.1 CM2
DOP CALC LVOT DIAMETER: 1.98 CM
DOP CALC LVOT PEAK VEL: 0.77 M/S
DOP CALC LVOT STROKE VOLUME: 34.47 CM3
DOP CALCLVOT PEAK VEL VTI: 11.2 CM
E WAVE DECELERATION TIME: 173.29 MSEC
E/A RATIO: 1.56
E/E' RATIO: 8.71 M/S
ECHO LV POSTERIOR WALL: 1.37 CM (ref 0.6–1.1)
EOSINOPHIL # BLD AUTO: 0.1 K/UL (ref 0–0.5)
EOSINOPHIL NFR BLD: 3.1 % (ref 0–8)
ERYTHROCYTE [DISTWIDTH] IN BLOOD BY AUTOMATED COUNT: 15.5 % (ref 11.5–14.5)
EST. GFR  (NO RACE VARIABLE): 36.3 ML/MIN/1.73 M^2
FRACTIONAL SHORTENING: 9 % (ref 28–44)
GLUCOSE SERPL-MCNC: 102 MG/DL (ref 70–110)
GLUCOSE SERPL-MCNC: 105 MG/DL (ref 70–110)
GRAM STN SPEC: NORMAL
HCT VFR BLD AUTO: 46.1 % (ref 40–54)
HCT VFR BLD CALC: 45 %PCV (ref 36–54)
HGB BLD-MCNC: 14.4 G/DL (ref 14–18)
IMM GRANULOCYTES # BLD AUTO: 0 K/UL (ref 0–0.04)
IMM GRANULOCYTES NFR BLD AUTO: 0 % (ref 0–0.5)
INTERVENTRICULAR SEPTUM: 0.91 CM (ref 0.6–1.1)
LA MAJOR: 7.97 CM
LA MINOR: 8.49 CM
LA WIDTH: 5.35 CM
LACTATE SERPL-SCNC: 1.7 MMOL/L (ref 0.5–2.2)
LEFT ATRIUM SIZE: 5.72 CM
LEFT ATRIUM VOLUME INDEX: 114.4 ML/M2
LEFT ATRIUM VOLUME: 213.86 CM3
LEFT INTERNAL DIMENSION IN SYSTOLE: 6.22 CM (ref 2.1–4)
LEFT VENTRICLE DIASTOLIC VOLUME INDEX: 130.51 ML/M2
LEFT VENTRICLE DIASTOLIC VOLUME: 244.06 ML
LEFT VENTRICLE MASS INDEX: 196 G/M2
LEFT VENTRICLE SYSTOLIC VOLUME INDEX: 104.4 ML/M2
LEFT VENTRICLE SYSTOLIC VOLUME: 195.17 ML
LEFT VENTRICULAR INTERNAL DIMENSION IN DIASTOLE: 6.86 CM (ref 3.5–6)
LEFT VENTRICULAR MASS: 367.28 G
LIPASE SERPL-CCNC: 23 U/L (ref 4–60)
LV LATERAL E/E' RATIO: 8.71 M/S
LV SEPTAL E/E' RATIO: 8.71 M/S
LYMPHOCYTES # BLD AUTO: 1.2 K/UL (ref 1–4.8)
LYMPHOCYTES NFR BLD: 30.9 % (ref 18–48)
MCH RBC QN AUTO: 27.3 PG (ref 27–31)
MCHC RBC AUTO-ENTMCNC: 31.2 G/DL (ref 32–36)
MCV RBC AUTO: 88 FL (ref 82–98)
MONOCYTES # BLD AUTO: 0.5 K/UL (ref 0.3–1)
MONOCYTES NFR BLD: 11.9 % (ref 4–15)
MV PEAK A VEL: 0.39 M/S
MV PEAK E VEL: 0.61 M/S
MV STENOSIS PRESSURE HALF TIME: 50.25 MS
MV VALVE AREA P 1/2 METHOD: 4.38 CM2
NEUTROPHILS # BLD AUTO: 2.1 K/UL (ref 1.8–7.7)
NEUTROPHILS NFR BLD: 53.6 % (ref 38–73)
NRBC BLD-RTO: 0 /100 WBC
PLATELET # BLD AUTO: 284 K/UL (ref 150–450)
PMV BLD AUTO: 11.2 FL (ref 9.2–12.9)
POC IONIZED CALCIUM: 0.95 MMOL/L (ref 1.06–1.42)
POC TCO2 (MEASURED): 24 MMOL/L (ref 23–29)
POTASSIUM BLD-SCNC: 5.3 MMOL/L (ref 3.5–5.1)
POTASSIUM SERPL-SCNC: 5.2 MMOL/L (ref 3.5–5.1)
PROT SERPL-MCNC: 8.2 G/DL (ref 6–8.4)
RA MAJOR: 7.42 CM
RA PRESSURE ESTIMATED: 3 MMHG
RA WIDTH: 5 CM
RBC # BLD AUTO: 5.27 M/UL (ref 4.6–6.2)
RIGHT VENTRICULAR END-DIASTOLIC DIMENSION: 4.43 CM
SAMPLE: ABNORMAL
SINUS: 2.88 CM
SODIUM BLD-SCNC: 136 MMOL/L (ref 136–145)
SODIUM SERPL-SCNC: 138 MMOL/L (ref 136–145)
STJ: 2.82 CM
TDI LATERAL: 0.07 M/S
TDI SEPTAL: 0.07 M/S
TDI: 0.07 M/S
TRICUSPID ANNULAR PLANE SYSTOLIC EXCURSION: 1.7 CM
TROPONIN I SERPL DL<=0.01 NG/ML-MCNC: 0.05 NG/ML (ref 0–0.03)
TROPONIN I SERPL DL<=0.01 NG/ML-MCNC: 0.06 NG/ML (ref 0–0.03)
TROPONIN I SERPL DL<=0.01 NG/ML-MCNC: 0.06 NG/ML (ref 0–0.03)
TSH SERPL DL<=0.005 MIU/L-ACNC: 1.74 UIU/ML (ref 0.4–4)
WBC # BLD AUTO: 3.85 K/UL (ref 3.9–12.7)
Z-SCORE OF LEFT VENTRICULAR DIMENSION IN END DIASTOLE: 2.75
Z-SCORE OF LEFT VENTRICULAR DIMENSION IN END SYSTOLE: 5.22

## 2023-12-19 PROCEDURE — 25500020 PHARM REV CODE 255: Performed by: HOSPITALIST

## 2023-12-19 PROCEDURE — 63600175 PHARM REV CODE 636 W HCPCS: Performed by: EMERGENCY MEDICINE

## 2023-12-19 PROCEDURE — 87070 CULTURE OTHR SPECIMN AEROBIC: CPT

## 2023-12-19 PROCEDURE — G0378 HOSPITAL OBSERVATION PER HR: HCPCS

## 2023-12-19 PROCEDURE — 63600175 PHARM REV CODE 636 W HCPCS

## 2023-12-19 PROCEDURE — 93010 ELECTROCARDIOGRAM REPORT: CPT | Mod: ,,, | Performed by: INTERNAL MEDICINE

## 2023-12-19 PROCEDURE — 93005 ELECTROCARDIOGRAM TRACING: CPT

## 2023-12-19 PROCEDURE — 25000003 PHARM REV CODE 250

## 2023-12-19 PROCEDURE — 93010 ELECTROCARDIOGRAM REPORT: CPT | Mod: 59,,, | Performed by: INTERNAL MEDICINE

## 2023-12-19 PROCEDURE — 80053 COMPREHEN METABOLIC PANEL: CPT

## 2023-12-19 PROCEDURE — 87075 CULTR BACTERIA EXCEPT BLOOD: CPT

## 2023-12-19 PROCEDURE — 99285 EMERGENCY DEPT VISIT HI MDM: CPT | Mod: 25

## 2023-12-19 PROCEDURE — 25500020 PHARM REV CODE 255: Performed by: EMERGENCY MEDICINE

## 2023-12-19 PROCEDURE — 21400001 HC TELEMETRY ROOM

## 2023-12-19 PROCEDURE — 87076 CULTURE ANAEROBE IDENT EACH: CPT | Mod: 59

## 2023-12-19 PROCEDURE — 83880 ASSAY OF NATRIURETIC PEPTIDE: CPT

## 2023-12-19 PROCEDURE — 84443 ASSAY THYROID STIM HORMONE: CPT

## 2023-12-19 PROCEDURE — 84484 ASSAY OF TROPONIN QUANT: CPT

## 2023-12-19 PROCEDURE — 84484 ASSAY OF TROPONIN QUANT: CPT | Mod: 91

## 2023-12-19 PROCEDURE — 83605 ASSAY OF LACTIC ACID: CPT

## 2023-12-19 PROCEDURE — 83690 ASSAY OF LIPASE: CPT

## 2023-12-19 PROCEDURE — 87205 SMEAR GRAM STAIN: CPT

## 2023-12-19 PROCEDURE — 85025 COMPLETE CBC W/AUTO DIFF WBC: CPT

## 2023-12-19 PROCEDURE — 96374 THER/PROPH/DIAG INJ IV PUSH: CPT

## 2023-12-19 PROCEDURE — 93010 EKG 12-LEAD: ICD-10-PCS | Mod: 59,,, | Performed by: INTERNAL MEDICINE

## 2023-12-19 PROCEDURE — 96375 TX/PRO/DX INJ NEW DRUG ADDON: CPT

## 2023-12-19 RX ORDER — VALPROIC ACID 250 MG/5ML
500 SOLUTION ORAL DAILY
Status: DISCONTINUED | OUTPATIENT
Start: 2023-12-20 | End: 2023-12-19

## 2023-12-19 RX ORDER — ONDANSETRON 8 MG/1
8 TABLET, ORALLY DISINTEGRATING ORAL EVERY 8 HOURS PRN
Status: DISCONTINUED | OUTPATIENT
Start: 2023-12-19 | End: 2023-12-22

## 2023-12-19 RX ORDER — EZETIMIBE 10 MG/1
10 TABLET ORAL NIGHTLY
Status: DISCONTINUED | OUTPATIENT
Start: 2023-12-19 | End: 2023-12-19

## 2023-12-19 RX ORDER — ASPIRIN 325 MG
325 TABLET ORAL
Status: COMPLETED | OUTPATIENT
Start: 2023-12-19 | End: 2023-12-19

## 2023-12-19 RX ORDER — IBUPROFEN 200 MG
16 TABLET ORAL
Status: DISCONTINUED | OUTPATIENT
Start: 2023-12-19 | End: 2023-12-22 | Stop reason: HOSPADM

## 2023-12-19 RX ORDER — BISACODYL 10 MG
10 SUPPOSITORY, RECTAL RECTAL DAILY PRN
Status: DISCONTINUED | OUTPATIENT
Start: 2023-12-19 | End: 2023-12-22 | Stop reason: HOSPADM

## 2023-12-19 RX ORDER — NAPROXEN SODIUM 220 MG/1
81 TABLET, FILM COATED ORAL DAILY
Status: DISCONTINUED | OUTPATIENT
Start: 2023-12-19 | End: 2023-12-19

## 2023-12-19 RX ORDER — FAMOTIDINE 20 MG/1
1 TABLET, FILM COATED ORAL 2 TIMES DAILY
COMMUNITY
Start: 2023-12-08 | End: 2024-01-21

## 2023-12-19 RX ORDER — PANTOPRAZOLE SODIUM 40 MG/1
1 TABLET, DELAYED RELEASE ORAL DAILY
COMMUNITY
Start: 2023-11-02

## 2023-12-19 RX ORDER — FENTANYL CITRATE 50 UG/ML
50 INJECTION, SOLUTION INTRAMUSCULAR; INTRAVENOUS
Status: COMPLETED | OUTPATIENT
Start: 2023-12-19 | End: 2023-12-19

## 2023-12-19 RX ORDER — SPIRONOLACTONE 25 MG/1
1 TABLET ORAL DAILY
COMMUNITY
Start: 2023-12-05

## 2023-12-19 RX ORDER — FAMOTIDINE 20 MG/1
20 TABLET, FILM COATED ORAL 2 TIMES DAILY
Status: DISCONTINUED | OUTPATIENT
Start: 2023-12-19 | End: 2023-12-20

## 2023-12-19 RX ORDER — IPRATROPIUM BROMIDE AND ALBUTEROL SULFATE 2.5; .5 MG/3ML; MG/3ML
3 SOLUTION RESPIRATORY (INHALATION) EVERY 4 HOURS PRN
Status: DISCONTINUED | OUTPATIENT
Start: 2023-12-19 | End: 2023-12-22 | Stop reason: HOSPADM

## 2023-12-19 RX ORDER — TORSEMIDE 20 MG/1
20 TABLET ORAL 2 TIMES DAILY
COMMUNITY

## 2023-12-19 RX ORDER — EZETIMIBE 10 MG/1
10 TABLET ORAL NIGHTLY
Status: DISCONTINUED | OUTPATIENT
Start: 2023-12-19 | End: 2023-12-20

## 2023-12-19 RX ORDER — POLYETHYLENE GLYCOL 3350 17 G/17G
17 POWDER, FOR SOLUTION ORAL DAILY PRN
Status: DISCONTINUED | OUTPATIENT
Start: 2023-12-19 | End: 2023-12-22 | Stop reason: HOSPADM

## 2023-12-19 RX ORDER — TORSEMIDE 20 MG/1
20 TABLET ORAL 2 TIMES DAILY
Status: DISCONTINUED | OUTPATIENT
Start: 2023-12-20 | End: 2023-12-20

## 2023-12-19 RX ORDER — SODIUM CHLORIDE 0.9 % (FLUSH) 0.9 %
10 SYRINGE (ML) INJECTION
Status: DISCONTINUED | OUTPATIENT
Start: 2023-12-19 | End: 2023-12-22 | Stop reason: HOSPADM

## 2023-12-19 RX ORDER — PROMETHAZINE HYDROCHLORIDE 25 MG/1
25 TABLET ORAL EVERY 6 HOURS PRN
Status: DISCONTINUED | OUTPATIENT
Start: 2023-12-19 | End: 2023-12-22 | Stop reason: HOSPADM

## 2023-12-19 RX ORDER — IBUPROFEN 200 MG
24 TABLET ORAL
Status: DISCONTINUED | OUTPATIENT
Start: 2023-12-19 | End: 2023-12-22 | Stop reason: HOSPADM

## 2023-12-19 RX ORDER — LORAZEPAM 2 MG/ML
1 INJECTION INTRAMUSCULAR EVERY 6 HOURS PRN
Status: DISCONTINUED | OUTPATIENT
Start: 2023-12-19 | End: 2023-12-21

## 2023-12-19 RX ORDER — ACETAMINOPHEN 325 MG/1
650 TABLET ORAL EVERY 4 HOURS PRN
Status: DISCONTINUED | OUTPATIENT
Start: 2023-12-19 | End: 2023-12-22 | Stop reason: HOSPADM

## 2023-12-19 RX ORDER — NAPROXEN SODIUM 220 MG/1
81 TABLET, FILM COATED ORAL DAILY
Status: DISCONTINUED | OUTPATIENT
Start: 2023-12-20 | End: 2023-12-20

## 2023-12-19 RX ORDER — GLUCAGON 1 MG
1 KIT INJECTION
Status: DISCONTINUED | OUTPATIENT
Start: 2023-12-19 | End: 2023-12-22 | Stop reason: HOSPADM

## 2023-12-19 RX ORDER — QUETIAPINE FUMARATE 25 MG/1
25 TABLET, FILM COATED ORAL NIGHTLY
Status: DISCONTINUED | OUTPATIENT
Start: 2023-12-19 | End: 2023-12-19

## 2023-12-19 RX ORDER — ATORVASTATIN CALCIUM 40 MG/1
80 TABLET, FILM COATED ORAL DAILY
Status: DISCONTINUED | OUTPATIENT
Start: 2023-12-20 | End: 2023-12-20

## 2023-12-19 RX ORDER — VALPROIC ACID 250 MG/5ML
1500 SOLUTION ORAL NIGHTLY
Status: DISCONTINUED | OUTPATIENT
Start: 2023-12-19 | End: 2023-12-19

## 2023-12-19 RX ORDER — QUETIAPINE FUMARATE 25 MG/1
25 TABLET, FILM COATED ORAL NIGHTLY
Status: DISCONTINUED | OUTPATIENT
Start: 2023-12-19 | End: 2023-12-20

## 2023-12-19 RX ORDER — SPIRONOLACTONE 25 MG/1
25 TABLET ORAL DAILY
Status: DISCONTINUED | OUTPATIENT
Start: 2023-12-20 | End: 2023-12-20

## 2023-12-19 RX ORDER — TALC
6 POWDER (GRAM) TOPICAL NIGHTLY PRN
Status: DISCONTINUED | OUTPATIENT
Start: 2023-12-19 | End: 2023-12-22 | Stop reason: HOSPADM

## 2023-12-19 RX ORDER — ATORVASTATIN CALCIUM 40 MG/1
80 TABLET, FILM COATED ORAL DAILY
Status: DISCONTINUED | OUTPATIENT
Start: 2023-12-19 | End: 2023-12-19

## 2023-12-19 RX ADMIN — LORAZEPAM 1 MG: 2 INJECTION INTRAMUSCULAR; INTRAVENOUS at 05:12

## 2023-12-19 RX ADMIN — EZETIMIBE 10 MG: 10 TABLET ORAL at 09:12

## 2023-12-19 RX ADMIN — QUETIAPINE FUMARATE 25 MG: 25 TABLET ORAL at 09:12

## 2023-12-19 RX ADMIN — SACUBITRIL AND VALSARTAN 1 TABLET: 24; 26 TABLET, FILM COATED ORAL at 09:12

## 2023-12-19 RX ADMIN — SODIUM ZIRCONIUM CYCLOSILICATE 5 G: 5 POWDER, FOR SUSPENSION ORAL at 01:12

## 2023-12-19 RX ADMIN — ATORVASTATIN CALCIUM 80 MG: 40 TABLET, FILM COATED ORAL at 05:12

## 2023-12-19 RX ADMIN — IOHEXOL 100 ML: 350 INJECTION, SOLUTION INTRAVENOUS at 11:12

## 2023-12-19 RX ADMIN — ASPIRIN 325 MG ORAL TABLET 325 MG: 325 PILL ORAL at 05:12

## 2023-12-19 RX ADMIN — Medication 6 MG: at 09:12

## 2023-12-19 RX ADMIN — HUMAN ALBUMIN MICROSPHERES AND PERFLUTREN 0.66 MG: 10; .22 INJECTION, SOLUTION INTRAVENOUS at 03:12

## 2023-12-19 RX ADMIN — APIXABAN 5 MG: 5 TABLET, FILM COATED ORAL at 09:12

## 2023-12-19 RX ADMIN — FAMOTIDINE 20 MG: 20 TABLET ORAL at 09:12

## 2023-12-19 RX ADMIN — FENTANYL CITRATE 50 MCG: 50 INJECTION INTRAMUSCULAR; INTRAVENOUS at 02:12

## 2023-12-19 NOTE — ED TRIAGE NOTES
Patient identifiers verified verbally with patient's sister and correct for Tera Griffiths.    Tera Griffiths, a 58 y.o. male presents to the ED via personal transportation with caregiver with CC abdominal pain presumed by caregiver. Pt moaning and crying, caregiver states acts like this when he is in pain.

## 2023-12-19 NOTE — ASSESSMENT & PLAN NOTE
Patient with Paroxysmal (<7 days) atrial fibrillation which is controlled currently with Beta Blocker. Patient is currently in sinus rhythm.[id number] Score: 2. Anticoagulation indicated. Anticoagulation done with eliquis .  - per sister she crushes his ER toprol and gives it via PEG tube; will hold as medication is not able to be crushed due to formulation

## 2023-12-19 NOTE — ASSESSMENT & PLAN NOTE
History of STEMI  Elevate troponin  Patient with known CAD s/p stent placement, which is controlled. Will continue ASA and Statin and monitor for S/Sx of angina/ACS. Continue to monitor on telemetry.

## 2023-12-19 NOTE — HPI
Tera Griffiths is a 59 yo male with PMHx of STEMI, AFib on Eliquis, combined CHF (EF 15-20%), HTN, HLD, CKD3b, L MCA CVA with residual aphasia and R sided deficits s/p PEG placement presenting today with his sister for evaluation of suspected chest pain. The patient is nonverbal at baseline and is unable to contribute to history. She reports that patient has been holding his chest over the site of his defibrillator, as well as his abdomen and PEG. Patient continuously points to his G tube and the purulent drainage coming from the site and seems concerned. She is not sure of the last time he had a BM as he goes to the bathroom on his own. Patient is nonverbal at baseline but sister says that he is able to swallow medications, use the bathroom, and brush his teeth. He lives at home with his sister, who is his primary caregiver.     Per chart review:     In the ED: Difficult to take vitals due to patient behavior but stable. CBC and CMP largely unremarkable. Potassium increased to 5.2. BNP 1,161 which is baseline. Troponin increased to 0.06 (baseline is 0.03). CXR unchanged from October. EKG paced and unchanged from October. CTA chest, abdomen, pelvis negative. Given Fentanyl and sodium zirconium cyclosilicate. Admitting for ACS rule out.

## 2023-12-19 NOTE — ASSESSMENT & PLAN NOTE
Patient with Chronic debility due to hemiplegia/hemiparesis. Latest AMPAC and GEMS scores have been reviewed. Evaluation for etiology is underway. Plan includes progressive mobility protocol initated and fall precautions in place.

## 2023-12-19 NOTE — ASSESSMENT & PLAN NOTE
Right hemiparesis  Global aphasia  - Chronic issue without new neurologic defects  - Continue ASA, statin, risk factor reduction   - fall, delirium and aspiration precautions

## 2023-12-19 NOTE — ASSESSMENT & PLAN NOTE
- Acute on chronic issue  - Per chart review, multiple prior admissions complicated by agitation/aggressive behaviors requiring multiple PRNs  - Psychiatry consulted

## 2023-12-19 NOTE — ASSESSMENT & PLAN NOTE
- 58 y.o. M with CAD and history of STEMI s/p PCI who is nonverbal at baseline presenting with questionable chest pain according to his caregiver. Patient is non-verbal and non-communicative, cannot follow demands or point to areas of pain when asked. Unclear if actually experiencing chest pain  - VSSAF   - Troponin 0.051 >>0.060, chronically elevated between 0.03-0.1  - BNP 1,151, chronically elevated between 1,500-4,000  - EKG with ventricular paced rhythm   - CXR without acute process   - Continue ASA, statin, BB  - CBC, CMP (goal Mag>2, K>4) daily; lipid panel reviewed   - Monitor telemetry  - Echo pending   - Ddx: anxiety, costochondritis, esophageal reflux, pericarditis, PEG tube dysfunction, biliary disease   - Risk factors: history of CAD/MI, HTN, HLD  - HEART score 3  - If troponin rises, start ACS protocol w/ heparin gtt and consult interventional cardiology

## 2023-12-19 NOTE — PLAN OF CARE
Pt is non-verbal at baseline and unable to complete assessment with pt.  SW unable to contact Power of  at this time      Aleja Godoy CD, MSW, LMSW, RSW   Case Management  [facility]  Email: [email or web address]     Weight bearing as tolerated to left leg.   Change dressing to left thigh with gauze and tegaderm daily and as needed.   Change old sore to buttock as needed.   Oxygen 2L/min continuous.  Turn every 2 hours.

## 2023-12-19 NOTE — ASSESSMENT & PLAN NOTE
- Controlled on admission   - Latest BP and vitals reviewed  - Continue home meds for HTN:   Hypertension Medications               furosemide (LASIX) 40 MG tablet Take 1 tablet (40 mg total) by mouth once daily.    metoprolol succinate (TOPROL-XL) 50 MG 24 hr tablet Take 1 tablet (50 mg total) by mouth once daily.    nitroGLYCERIN (NITROSTAT) 0.4 MG SL tablet Place under the tongue.    sacubitriL-valsartan (ENTRESTO) 24-26 mg per tablet Take 1 tablet by mouth 2 (two) times daily.   - Goal SBP 120s-140s. Utilize p.r.n. antihypertensives only if patient's BP >180/110 & develop symptoms of worsening CP or SOB.  - holding MTP 2/2 non-compatible with PEG tube

## 2023-12-19 NOTE — ASSESSMENT & PLAN NOTE
- Chronic issue  - Per family at bedside takes medications via PEG tube but eats PO meals   - SLP consulted

## 2023-12-19 NOTE — ASSESSMENT & PLAN NOTE
- Creatinine today Estimated Creatinine Clearance: 38 mL/min (A) (based on SCr of 2.1 mg/dL (H)). (Baseline 1.7-1.9)  Recent Labs   Lab 12/19/23  1107   CO2 23   BUN 17   CREATININE 2.1*   - Monitor BMP daily, replete electrolytes if necessary  - Renally adjust drugs to CrCl; avoid nephrotoxic drugs if possible  - Monitor I/Os
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
## 2023-12-19 NOTE — ASSESSMENT & PLAN NOTE
Ischemic cardiomyopathy  Patient is identified as having Combined Systolic and Diastolic heart failure that is Chronic. CHF is currently controlled.   Latest ECHO performed and demonstrates- Results for orders placed during the hospital encounter of 10/06/23  Echo  Interpretation Summary    Left Ventricle: The left ventricle is severely dilated. Severely increased ventricular mass. Normal wall thickness. There is eccentric hypertrophy. Global hypokinesis present. There is severely reduced systolic function with a visually estimated ejection fraction of 10 -15%.    Left Atrium: Left atrium is severely dilated.    Right Ventricle: Moderate right ventricular enlargement. Wall thickness is normal. Right ventricle wall motion has global hypokinesis. Systolic function is moderately reduced.    Right Atrium: Right atrium is severely dilated.    Aortic Valve: There is mild aortic valve sclerosis.    Mitral Valve: There is mild regurgitation.    Tricuspid Valve: There is mild regurgitation.    Pulmonic Valve: There is moderate regurgitation.    Pulmonary Artery: The estimated pulmonary artery systolic pressure is at least 31 mmHg.    Pericardium: There is a trivial circumferential effusion.  - Continue Beta Blocker Furosemide ARNI and monitor clinical status closely.   - Monitor on telemetry.  - Patient is off CHF pathway.   - Monitor strict Is&Os and daily weights. Place on fluid restriction of 1.5 L.   - Continue to stress to patient importance of self efficacy and  on diet for CHF.  - Last BNP reviewed- and noted below   Recent Labs   Lab 12/19/23  1107   BNP 1,151*   - Euvolemic on exam**

## 2023-12-20 ENCOUNTER — DOCUMENTATION ONLY (OUTPATIENT)
Dept: ELECTROPHYSIOLOGY | Facility: CLINIC | Age: 58
End: 2023-12-20
Payer: MEDICAID

## 2023-12-20 LAB
ALBUMIN SERPL BCP-MCNC: 3.7 G/DL (ref 3.5–5.2)
ALP SERPL-CCNC: 69 U/L (ref 55–135)
ALT SERPL W/O P-5'-P-CCNC: 12 U/L (ref 10–44)
ANION GAP SERPL CALC-SCNC: 11 MMOL/L (ref 8–16)
AST SERPL-CCNC: 19 U/L (ref 10–40)
BASOPHILS # BLD AUTO: 0.02 K/UL (ref 0–0.2)
BASOPHILS NFR BLD: 0.7 % (ref 0–1.9)
BILIRUB SERPL-MCNC: 0.6 MG/DL (ref 0.1–1)
BUN SERPL-MCNC: 14 MG/DL (ref 6–20)
CALCIUM SERPL-MCNC: 9.8 MG/DL (ref 8.7–10.5)
CHLORIDE SERPL-SCNC: 107 MMOL/L (ref 95–110)
CO2 SERPL-SCNC: 21 MMOL/L (ref 23–29)
CREAT SERPL-MCNC: 1.8 MG/DL (ref 0.5–1.4)
DIFFERENTIAL METHOD: ABNORMAL
EOSINOPHIL # BLD AUTO: 0.1 K/UL (ref 0–0.5)
EOSINOPHIL NFR BLD: 2.8 % (ref 0–8)
ERYTHROCYTE [DISTWIDTH] IN BLOOD BY AUTOMATED COUNT: 15.9 % (ref 11.5–14.5)
EST. GFR  (NO RACE VARIABLE): 43.6 ML/MIN/1.73 M^2
GLUCOSE SERPL-MCNC: 80 MG/DL (ref 70–110)
HCT VFR BLD AUTO: 42.2 % (ref 40–54)
HGB BLD-MCNC: 13.5 G/DL (ref 14–18)
IMM GRANULOCYTES # BLD AUTO: 0 K/UL (ref 0–0.04)
IMM GRANULOCYTES NFR BLD AUTO: 0 % (ref 0–0.5)
LYMPHOCYTES # BLD AUTO: 1.2 K/UL (ref 1–4.8)
LYMPHOCYTES NFR BLD: 42.7 % (ref 18–48)
MAGNESIUM SERPL-MCNC: 2.1 MG/DL (ref 1.6–2.6)
MCH RBC QN AUTO: 27.8 PG (ref 27–31)
MCHC RBC AUTO-ENTMCNC: 32 G/DL (ref 32–36)
MCV RBC AUTO: 87 FL (ref 82–98)
MONOCYTES # BLD AUTO: 0.4 K/UL (ref 0.3–1)
MONOCYTES NFR BLD: 14.2 % (ref 4–15)
NEUTROPHILS # BLD AUTO: 1.1 K/UL (ref 1.8–7.7)
NEUTROPHILS NFR BLD: 39.6 % (ref 38–73)
NRBC BLD-RTO: 0 /100 WBC
PHOSPHATE SERPL-MCNC: 3.3 MG/DL (ref 2.7–4.5)
PLATELET # BLD AUTO: 283 K/UL (ref 150–450)
PMV BLD AUTO: 12 FL (ref 9.2–12.9)
POCT GLUCOSE: 100 MG/DL (ref 70–110)
POTASSIUM SERPL-SCNC: 4.3 MMOL/L (ref 3.5–5.1)
PROT SERPL-MCNC: 7.2 G/DL (ref 6–8.4)
RBC # BLD AUTO: 4.86 M/UL (ref 4.6–6.2)
SODIUM SERPL-SCNC: 139 MMOL/L (ref 136–145)
WBC # BLD AUTO: 2.81 K/UL (ref 3.9–12.7)

## 2023-12-20 PROCEDURE — 83735 ASSAY OF MAGNESIUM: CPT

## 2023-12-20 PROCEDURE — 84100 ASSAY OF PHOSPHORUS: CPT

## 2023-12-20 PROCEDURE — 21400001 HC TELEMETRY ROOM

## 2023-12-20 PROCEDURE — 92610 EVALUATE SWALLOWING FUNCTION: CPT

## 2023-12-20 PROCEDURE — 25000003 PHARM REV CODE 250

## 2023-12-20 PROCEDURE — G0378 HOSPITAL OBSERVATION PER HR: HCPCS

## 2023-12-20 PROCEDURE — 96372 THER/PROPH/DIAG INJ SC/IM: CPT

## 2023-12-20 PROCEDURE — 63600175 PHARM REV CODE 636 W HCPCS

## 2023-12-20 PROCEDURE — 85025 COMPLETE CBC W/AUTO DIFF WBC: CPT

## 2023-12-20 PROCEDURE — 87040 BLOOD CULTURE FOR BACTERIA: CPT | Mod: 59

## 2023-12-20 PROCEDURE — 25000003 PHARM REV CODE 250: Performed by: HOSPITALIST

## 2023-12-20 PROCEDURE — 36415 COLL VENOUS BLD VENIPUNCTURE: CPT

## 2023-12-20 PROCEDURE — 80053 COMPREHEN METABOLIC PANEL: CPT

## 2023-12-20 RX ORDER — FAMOTIDINE 20 MG/1
20 TABLET, FILM COATED ORAL DAILY
Status: DISCONTINUED | OUTPATIENT
Start: 2023-12-20 | End: 2023-12-20

## 2023-12-20 RX ORDER — LACTULOSE 10 G/15ML
10 SOLUTION ORAL 3 TIMES DAILY
Status: DISCONTINUED | OUTPATIENT
Start: 2023-12-20 | End: 2023-12-20

## 2023-12-20 RX ORDER — LACTULOSE 10 G/15ML
10 SOLUTION ORAL 3 TIMES DAILY
Status: DISCONTINUED | OUTPATIENT
Start: 2023-12-20 | End: 2023-12-22 | Stop reason: HOSPADM

## 2023-12-20 RX ORDER — EZETIMIBE 10 MG/1
10 TABLET ORAL NIGHTLY
Status: DISCONTINUED | OUTPATIENT
Start: 2023-12-20 | End: 2023-12-22 | Stop reason: HOSPADM

## 2023-12-20 RX ORDER — MORPHINE SULFATE 4 MG/ML
4 INJECTION, SOLUTION INTRAMUSCULAR; INTRAVENOUS EVERY 4 HOURS PRN
Status: DISCONTINUED | OUTPATIENT
Start: 2023-12-20 | End: 2023-12-22 | Stop reason: HOSPADM

## 2023-12-20 RX ORDER — FAMOTIDINE 20 MG/1
20 TABLET, FILM COATED ORAL DAILY
Status: DISCONTINUED | OUTPATIENT
Start: 2023-12-21 | End: 2023-12-20

## 2023-12-20 RX ORDER — SPIRONOLACTONE 25 MG/1
25 TABLET ORAL DAILY
Status: DISCONTINUED | OUTPATIENT
Start: 2023-12-21 | End: 2023-12-22 | Stop reason: HOSPADM

## 2023-12-20 RX ORDER — QUETIAPINE FUMARATE 25 MG/1
25 TABLET, FILM COATED ORAL NIGHTLY
Status: DISCONTINUED | OUTPATIENT
Start: 2023-12-20 | End: 2023-12-22 | Stop reason: HOSPADM

## 2023-12-20 RX ORDER — TORSEMIDE 20 MG/1
20 TABLET ORAL 2 TIMES DAILY
Status: DISCONTINUED | OUTPATIENT
Start: 2023-12-20 | End: 2023-12-22 | Stop reason: HOSPADM

## 2023-12-20 RX ORDER — METOPROLOL SUCCINATE 50 MG/1
50 TABLET, EXTENDED RELEASE ORAL DAILY
Status: DISCONTINUED | OUTPATIENT
Start: 2023-12-20 | End: 2023-12-22 | Stop reason: HOSPADM

## 2023-12-20 RX ORDER — MORPHINE SULFATE 4 MG/ML
4 INJECTION, SOLUTION INTRAMUSCULAR; INTRAVENOUS ONCE
Status: COMPLETED | OUTPATIENT
Start: 2023-12-20 | End: 2023-12-20

## 2023-12-20 RX ORDER — NAPROXEN SODIUM 220 MG/1
81 TABLET, FILM COATED ORAL DAILY
Status: DISCONTINUED | OUTPATIENT
Start: 2023-12-21 | End: 2023-12-22 | Stop reason: HOSPADM

## 2023-12-20 RX ORDER — PANTOPRAZOLE SODIUM 40 MG/1
40 TABLET, DELAYED RELEASE ORAL DAILY
Status: DISCONTINUED | OUTPATIENT
Start: 2023-12-21 | End: 2023-12-22 | Stop reason: HOSPADM

## 2023-12-20 RX ORDER — OLANZAPINE 10 MG/2ML
5 INJECTION, POWDER, FOR SOLUTION INTRAMUSCULAR ONCE AS NEEDED
Status: COMPLETED | OUTPATIENT
Start: 2023-12-20 | End: 2023-12-20

## 2023-12-20 RX ORDER — ATORVASTATIN CALCIUM 40 MG/1
80 TABLET, FILM COATED ORAL DAILY
Status: DISCONTINUED | OUTPATIENT
Start: 2023-12-21 | End: 2023-12-22 | Stop reason: HOSPADM

## 2023-12-20 RX ADMIN — ASPIRIN 81 MG CHEWABLE TABLET 81 MG: 81 TABLET CHEWABLE at 09:12

## 2023-12-20 RX ADMIN — APIXABAN 5 MG: 5 TABLET, FILM COATED ORAL at 09:12

## 2023-12-20 RX ADMIN — ACETAMINOPHEN 650 MG: 325 TABLET ORAL at 10:12

## 2023-12-20 RX ADMIN — LORAZEPAM 1 MG: 2 INJECTION INTRAMUSCULAR; INTRAVENOUS at 10:12

## 2023-12-20 RX ADMIN — EZETIMIBE 10 MG: 10 TABLET ORAL at 09:12

## 2023-12-20 RX ADMIN — OLANZAPINE 5 MG: 10 INJECTION, POWDER, FOR SOLUTION INTRAMUSCULAR at 06:12

## 2023-12-20 RX ADMIN — QUETIAPINE FUMARATE 25 MG: 25 TABLET ORAL at 09:12

## 2023-12-20 RX ADMIN — TORSEMIDE 20 MG: 20 TABLET ORAL at 05:12

## 2023-12-20 RX ADMIN — SACUBITRIL AND VALSARTAN 1 TABLET: 24; 26 TABLET, FILM COATED ORAL at 09:12

## 2023-12-20 RX ADMIN — FAMOTIDINE 20 MG: 20 TABLET ORAL at 10:12

## 2023-12-20 RX ADMIN — TORSEMIDE 20 MG: 20 TABLET ORAL at 09:12

## 2023-12-20 RX ADMIN — ATORVASTATIN CALCIUM 80 MG: 40 TABLET, FILM COATED ORAL at 09:12

## 2023-12-20 RX ADMIN — MORPHINE SULFATE 4 MG: 4 INJECTION INTRAVENOUS at 02:12

## 2023-12-20 RX ADMIN — LACTULOSE 10 G: 20 SOLUTION ORAL at 09:12

## 2023-12-20 RX ADMIN — METOPROLOL SUCCINATE 50 MG: 50 TABLET, EXTENDED RELEASE ORAL at 01:12

## 2023-12-20 RX ADMIN — SPIRONOLACTONE 25 MG: 25 TABLET ORAL at 09:12

## 2023-12-20 NOTE — PT/OT/SLP EVAL
Speech Language Pathology Evaluation  Bedside Swallow    Patient Name:  Tera Griffiths   MRN:  [id number]  Admitting Diagnosis: Chest pain    Recommendations:                 General Recommendations:  Follow-up not indicated  Diet recommendations:  Regular Diet - IDDSI Level 7, Thin liquids - IDDSI Level 0   Aspiration Precautions: Standard aspiration precautions   General Precautions: Standard,    Communication strategies:  none    Assessment:     Tera Griffiths is a 58 y.o. male with swallow function WFL for an unmodified diet. No further skilled ST services needed in the acute setting. Re-consult as needed.     History:     Past Medical History:   Diagnosis Date    Acute on chronic combined systolic and diastolic heart failure 09/23/2022    Atrial fibrillation     CAD (coronary artery disease) 09/23/2022    Dyslipidemia 09/23/2022    Embolic stroke involving left middle cerebral artery 08/08/2023    Encounter for blood transfusion     H/O heart artery stent     HTN (hypertension) 09/23/2022    ICD (implantable cardioverter-defibrillator) in place 09/23/2022    Paroxysmal A-fib 09/23/2022    Stage 3 chronic kidney disease 04/19/2023       Past Surgical History:   Procedure Laterality Date    CARDIAC DEFIBRILLATOR PLACEMENT Left     CEREBRAL ANGIOGRAM N/A 8/8/2023    Procedure: ANGIOGRAM-CEREBRAL;  Surgeon: Kenzie Rodriguez;  Location: [facility];  Service: Anesthesiology;  Laterality: N/A;  LVO (angiogram)    ESOPHAGOGASTRODUODENOSCOPY N/A 8/11/2023    Procedure: EGD (ESOPHAGOGASTRODUODENOSCOPY);  Surgeon: Colette Martinez MD;  Location: [address]);  Service: Gastroenterology;  Laterality: N/A;    ESOPHAGOGASTRODUODENOSCOPY W/ PEG N/A 8/17/2023    Procedure: EGD, WITH PEG TUBE INSERTION;  Surgeon: Yan Scott MD;  Location: [facility] OR [address] [place] [address];  Service: General;  Laterality: N/A;  PEG, possible lap G    HERNIA REPAIR      LEFT HEART CATHETERIZATION Left 4/18/2023    Procedure: Left heart cath;  Surgeon:  Atilio Marks MD;  Location: [facility] CATH LAB;  Service: Cardiology;  Laterality: Left;    RIGHT HEART CATHETERIZATION Right 9/30/2022    Procedure: INSERTION, CATHETER, RIGHT HEART;  Surgeon: Caden Aden MD;  Location: [facility] CATH LAB;  Service: Cardiology;  Laterality: Right;    TREATMENT OF CARDIAC ARRHYTHMIA N/A 11/22/2022    Procedure: CARDIOVERSION;  Surgeon: Marvin Sanon MD;  Location: [facility] EP LAB;  Service: Cardiology;  Laterality: N/A;  afib, KIA, DCCV, anes, MB, 3 Prep       Social History: Patient lives with sister per chart review.    Prior Intubation HX:  none on file     Modified Barium Swallow: none on file     Chest X-Rays: FINDINGS: 12/19  Cardioverter/defibrillator again noted.  Cardiomediastinal silhouette is again seen to be markedly prominent, representing cardiomegaly and/or pericardial fluid, but the cardiomediastinal silhouette demonstrates no detrimental change since the prior exam referenced above.  Pulmonary vascularity is normal, without interval engorgement.  Lung zones are stable and free of significant airspace consolidation or volume loss.  No pleural fluid of any substantial volume is seen on either side.  No pneumothorax.  A cluster of tiny metallic opacities is again seen superimposed over the mid hemithorax on the left side.    Prior diet: regular thins per last SLP note dated 10/25 .        Subjective     No family present at bedside.   Patient goals: none stated     Pain/Comfort:  Pain Rating 1: 0/10    Respiratory Status: Room air    Objective:     Oral Musculature Evaluation  Oral Musculature: WFL  Dentition: present and adequate  Secretion Management: adequate  Mucosal Quality: good  Mandibular Strength and Mobility: WFL  Oral Labial Strength and Mobility: WFL    Bedside Swallow Eval:   Consistencies Assessed:  Thin liquids approximately 4 oz via straw sip  Solids 2 crackers      Oral Phase:   WFL    Pharyngeal Phase:   No overt signs of aspiration  noted    Compensatory Strategies  None    Treatment: Pt known to SLP services from prior hospitalization. Pt tearful and with moan/groaning throughout session. Pt hesitant to accept PO trials, however accepting package of kemal crackers and self feeding. No signs of aspiration were noted, appearing to be at baseline for swallow function. SLP will sign off, re-consult as needed.     Goals:   Multidisciplinary Problems       SLP Goals       Not on file                    Plan:     Plan of Care reviewed with:  patient   SLP Follow-Up:  No       Discharge recommendations:  No Therapy Indicated   Barriers to Discharge:  None    Time Tracking:     SLP Treatment Date:   12/20/23  Speech Start Time:  0905  Speech Stop Time:  0917     Speech Total Time (min):  12 min    Billable Minutes: Eval Swallow and Oral Function 12 12/20/2023

## 2023-12-20 NOTE — PROGRESS NOTES
[facility] [address] [place] [address] Medicine  Progress Note    Patient Name: Tera Griffiths  MRN: [id number]  Patient Class: OP- Observation   Admission Date: 12/19/2023  Length of Stay: 0 days  Attending Physician: Barbara Wilder MD  Primary Care Provider: Carleen Bueno MD        Subjective:     Principal Problem:Chest pain        HPI:  Tera Griffiths is a 59 yo male with PMHx of STEMI, AFib on Eliquis, combined CHF (EF 15-20%), HTN, HLD, CKD3b, L MCA CVA with residual aphasia and R sided deficits s/p PEG placement presenting today with his sister for evaluation of suspected chest pain. The patient is nonverbal at baseline and is unable to contribute to history. She reports that patient has been holding his chest over the site of his defibrillator, as well as his abdomen and PEG. Patient continuously points to his G tube and the purulent drainage coming from the site and seems concerned. She is not sure of the last time he had a BM as he goes to the bathroom on his own. Patient is nonverbal at baseline but sister says that he is able to swallow medications, use the bathroom, and brush his teeth. He lives at home with his sister, who is his primary caregiver.     Per chart review:     In the ED: Difficult to take vitals due to patient behavior but stable. CBC and CMP largely unremarkable. Potassium increased to 5.2. BNP 1,161 which is baseline. Troponin increased to 0.06 (baseline is 0.03). CXR unchanged from October. EKG paced and unchanged from October. CTA chest, abdomen, pelvis negative. Given Fentanyl and sodium zirconium cyclosilicate. Admitting for ACS rule out.     Overview/Hospital Course:  Tera Griffiths is admitted to  Observation for ACS rule out. Trops trended and negative. No evidence of fluid overload. Echo showing improved EF from prior. Appears constipated on CT so on bowel regimen.     Interval History:  NAEON. AFVSS.  Patient evaluated at bedside, sister Claribel present. Patient tearful  and uncomfortable appearing on exam. Sister reports patient eats a regular diet and takes medications by mouth occasionally at home - changed diet and PO med orders.  KUB showing mild constipation - started on aggressive bowel regimen. Wound care consulted for irritation around PEG site - wound cultures pending. UA and Bcx pending.     Review of Systems   Unable to perform ROS: Patient nonverbal     Objective:     Vital Signs (Most Recent):  Temp: 97.9 °F (36.6 °C) (12/20/23 1115)  Pulse: 67 (12/20/23 1132)  Resp: 17 (12/20/23 1115)  BP: 108/68 (12/20/23 1115)  SpO2: 97 % (12/20/23 1115) Vital Signs (24h Range):  Temp:  [97.1 °F (36.2 °C)-98.1 °F (36.7 °C)] 97.9 °F (36.6 °C)  Pulse:  [61-77] 67  Resp:  [17-20] 17  SpO2:  [94 %-100 %] 97 %  BP: (108-155)/(56-97) 108/68     Weight: 73.9 kg (163 lb)  Body mass index is 24.78 kg/m².    Intake/Output Summary (Last 24 hours) at 12/20/2023 1134  Last data filed at 12/20/2023 0554  Gross per 24 hour   Intake 120 ml   Output --   Net 120 ml         Physical Exam  Vitals and nursing note reviewed.   Constitutional:       General: He is not in acute distress.     Appearance: He is well-developed.      Comments: Tearful and moaning on exam   HENT:      Head: Normocephalic and atraumatic.   Cardiovascular:      Rate and Rhythm: Normal rate and regular rhythm.      Heart sounds: Normal heart sounds.      Comments: Difficult to auscultate heart and lung sounds due to patient constant moaning; non-redirectable  Pulmonary:      Effort: Pulmonary effort is normal. No respiratory distress.   Abdominal:      General: Bowel sounds are normal. There is no distension.      Comments: PEG tube in place, purulent drainage from stoma    Musculoskeletal:      Right lower leg: No edema.      Left lower leg: No edema.   Skin:     General: Skin is warm and dry.      Findings: No rash.   Neurological:      Mental Status: He is alert. Mental status is at baseline.      Comments: Non-verbal and  non-communicative, does not follow commands      Significant Labs: All pertinent labs within the past 24 hours have been reviewed.    Significant Imaging: I have reviewed all pertinent imaging results/findings within the past 24 hours.    Assessment/Plan:      * Chest pain  - 58 y.o. M with CAD and history of STEMI s/p PCI who is nonverbal at baseline presenting with questionable chest pain according to his caregiver. Patient is non-verbal and non-communicative, cannot follow demands or point to areas of pain when asked. Unclear if actually experiencing chest pain  - VSSAF   - Troponin 0.051 >>0.060, chronically elevated between 0.03-0.1  - BNP 1,151, chronically elevated between 1,500-4,000  - EKG with ventricular paced rhythm   - CXR without acute process   - Continue ASA, statin, BB  - CBC, CMP (goal Mag>2, K>4) daily; lipid panel reviewed   - Monitor telemetry  Results for orders placed during the hospital encounter of 12/19/23    Echo    Interpretation Summary    Left Ventricle: The left ventricle is severely dilated. Severely increased ventricular mass. Normal wall thickness. There is eccentric hypertrophy. Global hypokinesis present. There is severely reduced systolic function with a visually estimated ejection fraction of 15 - 20%. There is diastolic dysfunction but grade cannot be determined.    Right Ventricle: Mild right ventricular enlargement. Wall thickness is normal. Right ventricle wall motion has global hypokinesis. Systolic function is mildly reduced. Pacemaker lead present in the ventricle.    Left Atrium: Left atrium is severely dilated.    Right Atrium: Right atrium is severely dilated.    Aortic Valve: There is mild aortic valve sclerosis.    Pulmonic Valve: There is mild regurgitation.    IVC/SVC: Normal venous pressure at 3 mmHg.    Pericardium: There is a trivial circumferential effusion.  - Ddx: anxiety, costochondritis, esophageal reflux, pericarditis, PEG tube dysfunction, biliary disease    - Risk factors: history of CAD/MI, HTN, HLD  - HEART score 3  - If troponin rises, start ACS protocol w/ heparin gtt and consult interventional cardiology    Debility  Patient with Chronic debility due to hemiplegia/hemiparesis. Latest AMPAC and GEMS scores have been reviewed. Evaluation for etiology is underway. Plan includes progressive mobility protocol initated and fall precautions in place.    Agitation  - Acute on chronic issue  - Per chart review, multiple prior admissions complicated by agitation/aggressive behaviors requiring multiple PRNs    Constipation  May be contributing to discomfort  - aggressive bowel regimen  - avoid constipating medications as possible     Dysphagia  - Chronic issue  - Per family at bedside takes medications via PEG tube and PO but eats PO meals   - SLP consulted     BPH (benign prostatic hyperplasia)  - Chronic issu  - Bladder scan q6h PRN, straight cath if >300 ml     History of embolic stroke involving left middle cerebral artery  Right hemiparesis  Global aphasia  - Chronic issue without new neurologic defects  - Continue ASA, statin, risk factor reduction   - fall, delirium and aspiration precautions     Stage 3 chronic kidney disease  - Creatinine today Estimated Creatinine Clearance: 44.3 mL/min (A) (based on SCr of 1.8 mg/dL (H)). (Baseline 1.7-1.9)  Recent Labs   Lab 12/19/23  1107 12/20/23  0506   CO2 23 21*   BUN 17 14   CREATININE 2.1* 1.8*   MG  --  2.1   PHOS  --  3.3     - Monitor BMP daily, replete electrolytes if necessary  - Renally adjust drugs to CrCl; avoid nephrotoxic drugs if possible  - Monitor I/Os     Primary hypertension  - Controlled on admission   - Latest BP and vitals reviewed  - Continue home meds for HTN:   Hypertension Medications               furosemide (LASIX) 40 MG tablet Take 1 tablet (40 mg total) by mouth once daily.    metoprolol succinate (TOPROL-XL) 50 MG 24 hr tablet Take 1 tablet (50 mg total) by mouth once daily.    nitroGLYCERIN  (NITROSTAT) 0.4 MG SL tablet Place under the tongue.    sacubitriL-valsartan (ENTRESTO) 24-26 mg per tablet Take 1 tablet by mouth 2 (two) times daily.   - Goal SBP 120s-140s. Utilize p.r.n. antihypertensives only if patient's BP >180/110 & develop symptoms of worsening CP or SOB.  - holding MTP 2/2 non-compatible with PEG tube     ICD (implantable cardioverter-defibrillator) in place  - Noted  - Monitor telemetry  - device check ordered     Dyslipidemia  - Chronic  - Continue statin    Chronic combined systolic and diastolic heart failure  Ischemic cardiomyopathy  Patient is identified as having Combined Systolic and Diastolic heart failure that is Chronic. CHF is currently controlled.   Latest ECHO performed and demonstrates- Results for orders placed during the hospital encounter of 10/06/23  Echo  Interpretation Summary    Left Ventricle: The left ventricle is severely dilated. Severely increased ventricular mass. Normal wall thickness. There is eccentric hypertrophy. Global hypokinesis present. There is severely reduced systolic function with a visually estimated ejection fraction of 10 -15%.    Left Atrium: Left atrium is severely dilated.    Right Ventricle: Moderate right ventricular enlargement. Wall thickness is normal. Right ventricle wall motion has global hypokinesis. Systolic function is moderately reduced.    Right Atrium: Right atrium is severely dilated.    Aortic Valve: There is mild aortic valve sclerosis.    Mitral Valve: There is mild regurgitation.    Tricuspid Valve: There is mild regurgitation.    Pulmonic Valve: There is moderate regurgitation.    Pulmonary Artery: The estimated pulmonary artery systolic pressure is at least 31 mmHg.    Pericardium: There is a trivial circumferential effusion.  - Continue Beta Blocker Furosemide ARNI and monitor clinical status closely.   - Monitor on telemetry.  - Patient is off CHF pathway.   - Monitor strict Is&Os and daily weights. Place on fluid  restriction of 1.5 L.   - Continue to stress to patient importance of self efficacy and  on diet for CHF.  - Last BNP reviewed- and noted below   Recent Labs   Lab 12/19/23  1107   BNP 1,151*     - Euvolemic on exam    Paroxysmal A-fib  Patient with Paroxysmal (<7 days) atrial fibrillation which is controlled currently with Beta Blocker. Patient is currently in sinus rhythm.[id number] Score: 2. Anticoagulation indicated. Anticoagulation done with eliquis .    CAD (coronary artery disease)  History of STEMI  Elevate troponin  Patient with known CAD s/p stent placement, which is controlled. Will continue ASA and Statin and monitor for S/Sx of angina/ACS. Continue to monitor on telemetry.       VTE Risk Mitigation (From admission, onward)           Ordered     apixaban tablet 5 mg  2 times daily         12/20/23 1055     Reason for No Pharmacological VTE Prophylaxis  Once        Question:  Reasons:  Answer:  Already adequately anticoagulated on oral Anticoagulants    12/19/23 1330     IP VTE HIGH RISK PATIENT  Once         12/19/23 1330                    Discharge Planning   YUSEF: 12/21/2023     Code Status: Full Code   Is the patient medically ready for discharge?: No    Reason for patient still in hospital (select all that apply): Patient trending condition, Laboratory test, and Treatment  Discharge Plan A: Home with family          Aicha uBtt PA-C  Department of Hospital Medicine   [facility] - [facility]ation 11H

## 2023-12-20 NOTE — ASSESSMENT & PLAN NOTE
- Acute on chronic issue  - Per chart review, multiple prior admissions complicated by agitation/aggressive behaviors requiring multiple PRNs
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
## 2023-12-20 NOTE — PROGRESS NOTES
Pharmacist Renal Dose Adjustment Note    Tera Griffiths is a 58 y.o. male being treated with the medication Famotidine    Patient Data:    Vital Signs (Most Recent):  Temp: 97.5 °F (36.4 °C) (12/20/23 0430)  Pulse: 66 (12/20/23 0430)  Resp: 18 (12/20/23 0430)  BP: (Abnormal) 108/57 (12/20/23 0430)  SpO2: 100 % (12/20/23 0430) Vital Signs (72h Range):  Temp:  [97.1 °F (36.2 °C)-98.1 °F (36.7 °C)]   Pulse:  [61-72]   Resp:  [17-26]   BP: (108-155)/()   SpO2:  [94 %-100 %]      Recent Labs   Lab 12/19/23  1107 12/20/23  0506   CREATININE 2.1* 1.8*     Serum creatinine: 1.8 mg/dL (H) 12/20/23 0506  Estimated creatinine clearance: 44.3 mL/min (A)    Famotidine 20 mg twice daily will be changed to Famotidine 20 mg daily  Pharmacist's Name: Alejo James  Pharmacist's Extension: [phone]

## 2023-12-20 NOTE — NURSING
Nurses Note -- 4 Eyes      12/19/2023   8:26 PM      Skin assessed during: Admit      [x] No Altered Skin Integrity Present    [x]Prevention Measures Documented      [] Yes- Altered Skin Integrity Present or Discovered   [] LDA Added if Not in Epic (Describe Wound)   [] New Altered Skin Integrity was Present on Admit and Documented in LDA   [] Wound Image Taken    Wound Care Consulted? No    Attending Nurse:  Sara Crawford RN/Staff Member:  PINKY Blackburn RN

## 2023-12-20 NOTE — NURSING
Patient pulled out IV , pulled off condom catheter climbed out of bed and sittting in chair in room several attempts have been made to assist patient to the bathroom patient refused , MD called for additional meds since patient has lost IV access .

## 2023-12-20 NOTE — ASSESSMENT & PLAN NOTE
- Creatinine today Estimated Creatinine Clearance: 44.3 mL/min (A) (based on SCr of 1.8 mg/dL (H)). (Baseline 1.7-1.9)  Recent Labs   Lab 12/19/23  1107 12/20/23  0506   CO2 23 21*   BUN 17 14   CREATININE 2.1* 1.8*   MG  --  2.1   PHOS  --  3.3     - Monitor BMP daily, replete electrolytes if necessary  - Renally adjust drugs to CrCl; avoid nephrotoxic drugs if possible  - Monitor I/Os

## 2023-12-20 NOTE — PLAN OF CARE
[facility] - Observation 11H  Discharge Assessment    Primary Care Provider: Carleen Bueno MD     Discharge Assessment (most recent)       BRIEF DISCHARGE ASSESSMENT - 12/20/23 0901          Discharge Planning    Assessment Type Discharge Planning Brief Assessment     Resource/Environmental Concerns none     Support Systems Family members     Equipment Currently Used at Home wheelchair;bedside commode;hospital bed;shower chair     Current Living Arrangements home;apartment     Patient/Family Anticipates Transition to home;home with family     Patient/Family Anticipated Services at Transition none     DME Needed Upon Discharge  none     Discharge Plan A Home with family     Discharge Plan B Home with family                     Pt is a 58 y.o. male admitted with chest pain and has a PMH of CVA. He lives with his sister and POA in a ground floor apartment. He requires assistance with bating, he can mostly dress himself and feed himself. Information on mediciad services given to pt's sister. He will have transportation home. [facility] to patient and/or family with understanding verbalized.   name and number and estimated discharge date written on white board in patient's room with request to call for any questions or concerns.  Will continue to follow for needs.  Johan Martinez RN,BSN

## 2023-12-20 NOTE — PROGRESS NOTES
Cardiac device interrogation and/or reprogramming completed by industry representative, Char LONG with [employer]; please refer to report located in the Media tab.

## 2023-12-20 NOTE — ASSESSMENT & PLAN NOTE
May be contributing to discomfort  - aggressive bowel regimen  - avoid constipating medications as possible

## 2023-12-20 NOTE — ASSESSMENT & PLAN NOTE
- Chronic issue  - Per family at bedside takes medications via PEG tube and PO but eats PO meals   - SLP consulted

## 2023-12-20 NOTE — NURSING
Attempt to give lactulose patient refused meds again. Patient pulled out IV , patient agitated . Assisted patient back in bed SRx3 bed alarm in progress
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
## 2023-12-20 NOTE — PLAN OF CARE
Problem: Adult Inpatient Plan of Care  Goal: Plan of Care Review  Outcome: Ongoing, Progressing  Goal: Patient-Specific Goal (Individualized)  Outcome: Ongoing, Progressing  Goal: Absence of Hospital-Acquired Illness or Injury  Outcome: Ongoing, Progressing  Goal: Optimal Comfort and Wellbeing  Outcome: Ongoing, Progressing  Goal: Readiness for Transition of Care  Outcome: Ongoing, Progressing     Problem: Infection  Goal: Absence of Infection Signs and Symptoms  Outcome: Ongoing, Progressing     Problem: Skin Injury Risk Increased  Goal: Skin Health and Integrity  Outcome: Ongoing, Progressing     Problem: Fall Injury Risk  Goal: Absence of Fall and Fall-Related Injury  Outcome: Ongoing, Progressing     Problem: Hypertension Acute  Goal: Blood Pressure Within Desired Range  Outcome: Ongoing, Progressing

## 2023-12-20 NOTE — NURSING
Telesitter activated upon pts arrival to unit.pt had a fall risk sitter in the er.bed in low position.rails up x3.call bell in reach.bed alarm activated also for pt safety.will monitor.

## 2023-12-20 NOTE — ASSESSMENT & PLAN NOTE
Patient with Paroxysmal (<7 days) atrial fibrillation which is controlled currently with Beta Blocker. Patient is currently in sinus rhythm.[id number] Score: 2. Anticoagulation indicated. Anticoagulation done with eliquis .

## 2023-12-20 NOTE — NURSING
Patients sister called to speak to nurse , entered patients room patient crying , patients sister asked if patient can have a pain pill explained patient can have tylenol , sister stated patient needs more than tylenol , will page MD for additional orders . Dr. Bueno making rounds at this time, examined patient , awsered sisters questions explained additional meds will be ordered and diet adjustments will be made .

## 2023-12-20 NOTE — ASSESSMENT & PLAN NOTE
- 58 y.o. M with CAD and history of STEMI s/p PCI who is nonverbal at baseline presenting with questionable chest pain according to his caregiver. Patient is non-verbal and non-communicative, cannot follow demands or point to areas of pain when asked. Unclear if actually experiencing chest pain  - VSSAF   - Troponin 0.051 >>0.060, chronically elevated between 0.03-0.1  - BNP 1,151, chronically elevated between 1,500-4,000  - EKG with ventricular paced rhythm   - CXR without acute process   - Continue ASA, statin, BB  - CBC, CMP (goal Mag>2, K>4) daily; lipid panel reviewed   - Monitor telemetry  Results for orders placed during the hospital encounter of 12/19/23    Echo    Interpretation Summary    Left Ventricle: The left ventricle is severely dilated. Severely increased ventricular mass. Normal wall thickness. There is eccentric hypertrophy. Global hypokinesis present. There is severely reduced systolic function with a visually estimated ejection fraction of 15 - 20%. There is diastolic dysfunction but grade cannot be determined.    Right Ventricle: Mild right ventricular enlargement. Wall thickness is normal. Right ventricle wall motion has global hypokinesis. Systolic function is mildly reduced. Pacemaker lead present in the ventricle.    Left Atrium: Left atrium is severely dilated.    Right Atrium: Right atrium is severely dilated.    Aortic Valve: There is mild aortic valve sclerosis.    Pulmonic Valve: There is mild regurgitation.    IVC/SVC: Normal venous pressure at 3 mmHg.    Pericardium: There is a trivial circumferential effusion.  - Ddx: anxiety, costochondritis, esophageal reflux, pericarditis, PEG tube dysfunction, biliary disease   - Risk factors: history of CAD/MI, HTN, HLD  - HEART score 3  - If troponin rises, start ACS protocol w/ heparin gtt and consult interventional cardiology

## 2023-12-20 NOTE — ED NOTES
Attempt x2 to call report unsuccessful  
Attempted to call report at this time. No response  
Care assumed from Elsi HENRY. Hourly rounding complete. Patient resting in stretcher and is in NAD at this time. Pt is awake alert VSS. Pt denies pain at this time. Pt updated on POC. Bed low and locked, SR up x2, call bell in patient reach. Pt remains on continuous cardiac monitor, continuous pulse ox, and auto BP cuff. Pt voices no needs at this time.    
Hospital Medicine at the bedside.   
I-STAT Chem-8+ Results:   Value Reference Range   Sodium 136 136-145 mmol/L   Potassium  5.3 3.5-5.1 mmol/L   Chloride 105  mmol/L   Ionized Calcium 0.95 1.06-1.42 mmol/L   CO2 (measured) 24 23-29 mmol/L   Glucose 102  mg/dL   BUN 20 6-30 mg/dL   Creatinine 2.2 0.5-1.4 mg/dL   Hematocrit 45 36-54%      
Pt awake he is restless and agitated. Pt attempting to pull his SL out.   
Pt changed to aman huang MD at bedside.  
Pt is restless and agitated, he is attempting to pull his IV out and refusing to wear the pulse and cardiac monitor. MD notified.   
Pt redirected to quit pulling at this IV. He rolled over an appears to be sleeping.   
Telemetry Verification   Patient placed on Telemetry Box  Verified with War Room  Box # [id number]   Monitor Tech War room   Rate 62   Rhythm NSR      
Unable to get full set of VS after multiple attempts- pt moaning, hollering, and not sitting still. Pulse ox not picking up (fingers cold), attempted probe on ear and patient refused and started hollering. Charge nurse made aware. Pt is Awake and alert.   
Reduced Fetal Movement

## 2023-12-20 NOTE — HOSPITAL COURSE
Tera Griffiths is admitted to  Observation for ACS rule out. Trops trend flat: 0.060 -> 0.050 -> 0.063. No evidence of fluid overload. Echo stable compared to prior, (EF 15-20%), normal venous pressure. Appears constipated on CT and with concern for abdominal pain - started on bowel regimen. Purulence v granulation tissue around PEG site - gram stain and cultures collected, wound care consulted. Started on vanc for now. Gram stain with many WBC, gram positive cocci and rods. Anaerobic culture grew moderate prevotella B melaninogenica. Add flagyl. Discharge home with treatment for PEG site cellulitis - flagyl and doxycycline x 10 days total abx course. Prior to discharge patient's affect improved, engaging and pleasant, taking oral medications, does not appear to be in pain. Sister whom pt lives with endorses he is improved since admission and believes he is at his baseline. Interest in PEG tube removal since patient has been able to maintain nutrition orally for the past 5 months. Referral for follow up with general surgery clinic. PCP follow up. Discharge plan discussed and patient expressed understanding. All questions answered.

## 2023-12-20 NOTE — SUBJECTIVE & OBJECTIVE
Interval History:  NAEON. AFVSS.  Patient evaluated at bedside, sister Claribel present. Patient tearful and uncomfortable appearing on exam. Sister reports patient eats a regular diet and takes medications by mouth occasionally at home - changed diet and PO med orders.  KUB showing mild constipation - started on aggressive bowel regimen. Wound care consulted for irritation around PEG site - wound cultures pending. UA and Bcx pending.     Review of Systems   Unable to perform ROS: Patient nonverbal     Objective:     Vital Signs (Most Recent):  Temp: 97.9 °F (36.6 °C) (12/20/23 1115)  Pulse: 67 (12/20/23 1132)  Resp: 17 (12/20/23 1115)  BP: 108/68 (12/20/23 1115)  SpO2: 97 % (12/20/23 1115) Vital Signs (24h Range):  Temp:  [97.1 °F (36.2 °C)-98.1 °F (36.7 °C)] 97.9 °F (36.6 °C)  Pulse:  [61-77] 67  Resp:  [17-20] 17  SpO2:  [94 %-100 %] 97 %  BP: (108-155)/(56-97) 108/68     Weight: 73.9 kg (163 lb)  Body mass index is 24.78 kg/m².    Intake/Output Summary (Last 24 hours) at 12/20/2023 1134  Last data filed at 12/20/2023 0554  Gross per 24 hour   Intake 120 ml   Output --   Net 120 ml         Physical Exam  Vitals and nursing note reviewed.   Constitutional:       General: He is not in acute distress.     Appearance: He is well-developed.      Comments: Tearful and moaning on exam   HENT:      Head: Normocephalic and atraumatic.   Cardiovascular:      Rate and Rhythm: Normal rate and regular rhythm.      Heart sounds: Normal heart sounds.      Comments: Difficult to auscultate heart and lung sounds due to patient constant moaning; non-redirectable  Pulmonary:      Effort: Pulmonary effort is normal. No respiratory distress.   Abdominal:      General: Bowel sounds are normal. There is no distension.      Comments: PEG tube in place, purulent drainage from stoma    Musculoskeletal:      Right lower leg: No edema.      Left lower leg: No edema.   Skin:     General: Skin is warm and dry.      Findings: No rash.    Neurological:      Mental Status: He is alert. Mental status is at baseline.      Comments: Non-verbal and non-communicative, does not follow commands      Significant Labs: All pertinent labs within the past 24 hours have been reviewed.    Significant Imaging: I have reviewed all pertinent imaging results/findings within the past 24 hours.

## 2023-12-20 NOTE — ASSESSMENT & PLAN NOTE
Ischemic cardiomyopathy  Patient is identified as having Combined Systolic and Diastolic heart failure that is Chronic. CHF is currently controlled.   Latest ECHO performed and demonstrates- Results for orders placed during the hospital encounter of 10/06/23  Echo  Interpretation Summary    Left Ventricle: The left ventricle is severely dilated. Severely increased ventricular mass. Normal wall thickness. There is eccentric hypertrophy. Global hypokinesis present. There is severely reduced systolic function with a visually estimated ejection fraction of 10 -15%.    Left Atrium: Left atrium is severely dilated.    Right Ventricle: Moderate right ventricular enlargement. Wall thickness is normal. Right ventricle wall motion has global hypokinesis. Systolic function is moderately reduced.    Right Atrium: Right atrium is severely dilated.    Aortic Valve: There is mild aortic valve sclerosis.    Mitral Valve: There is mild regurgitation.    Tricuspid Valve: There is mild regurgitation.    Pulmonic Valve: There is moderate regurgitation.    Pulmonary Artery: The estimated pulmonary artery systolic pressure is at least 31 mmHg.    Pericardium: There is a trivial circumferential effusion.  - Continue Beta Blocker Furosemide ARNI and monitor clinical status closely.   - Monitor on telemetry.  - Patient is off CHF pathway.   - Monitor strict Is&Os and daily weights. Place on fluid restriction of 1.5 L.   - Continue to stress to patient importance of self efficacy and  on diet for CHF.  - Last BNP reviewed- and noted below   Recent Labs   Lab 12/19/23  1107   BNP 1,151*     - Euvolemic on exam

## 2023-12-20 NOTE — PLAN OF CARE
Problem: Adult Inpatient Plan of Care  Goal: Plan of Care Review  Outcome: Ongoing, Progressing  Goal: Patient-Specific Goal (Individualized)  Outcome: Ongoing, Progressing  Goal: Absence of Hospital-Acquired Illness or Injury  Outcome: Ongoing, Progressing  Goal: Optimal Comfort and Wellbeing  Outcome: Ongoing, Progressing  Goal: Readiness for Transition of Care  Outcome: Ongoing, Progressing     Problem: Skin Injury Risk Increased  Goal: Skin Health and Integrity  Outcome: Ongoing, Progressing     Problem: Fall Injury Risk  Goal: Absence of Fall and Fall-Related Injury  Outcome: Ongoing, Progressing     Problem: Hypertension Acute  Goal: Blood Pressure Within Desired Range  Outcome: Ongoing, Progressing

## 2023-12-20 NOTE — NURSING
Asleep opens eyes when name called and touched ,  patient is non verbal SR X3 in place for safety , call bell in reach bed in low position tele sitter in progress , pt safety .

## 2023-12-21 PROBLEM — Z93.1 S/P PERCUTANEOUS ENDOSCOPIC GASTROSTOMY (PEG) TUBE PLACEMENT: Status: ACTIVE | Noted: 2023-12-21

## 2023-12-21 LAB
BACTERIA SPEC AEROBE CULT: NORMAL
BILIRUB UR QL STRIP: NEGATIVE
CLARITY UR REFRACT.AUTO: CLEAR
COLOR UR AUTO: YELLOW
GLUCOSE UR QL STRIP: ABNORMAL
HGB UR QL STRIP: NEGATIVE
KETONES UR QL STRIP: NEGATIVE
LEUKOCYTE ESTERASE UR QL STRIP: NEGATIVE
NITRITE UR QL STRIP: NEGATIVE
PH UR STRIP: 5 [PH] (ref 5–8)
PROT UR QL STRIP: NEGATIVE
SP GR UR STRIP: 1.01 (ref 1–1.03)
URN SPEC COLLECT METH UR: ABNORMAL

## 2023-12-21 PROCEDURE — 11000001 HC ACUTE MED/SURG PRIVATE ROOM

## 2023-12-21 PROCEDURE — 21400001 HC TELEMETRY ROOM

## 2023-12-21 PROCEDURE — 63600175 PHARM REV CODE 636 W HCPCS

## 2023-12-21 PROCEDURE — 96372 THER/PROPH/DIAG INJ SC/IM: CPT

## 2023-12-21 PROCEDURE — 25000003 PHARM REV CODE 250

## 2023-12-21 PROCEDURE — 81003 URINALYSIS AUTO W/O SCOPE: CPT

## 2023-12-21 PROCEDURE — 63600175 PHARM REV CODE 636 W HCPCS: Performed by: HOSPITALIST

## 2023-12-21 PROCEDURE — 25000003 PHARM REV CODE 250: Performed by: HOSPITALIST

## 2023-12-21 RX ORDER — METRONIDAZOLE 500 MG/100ML
500 INJECTION, SOLUTION INTRAVENOUS
Status: DISCONTINUED | OUTPATIENT
Start: 2023-12-21 | End: 2023-12-22 | Stop reason: HOSPADM

## 2023-12-21 RX ORDER — OLANZAPINE 10 MG/2ML
5 INJECTION, POWDER, FOR SOLUTION INTRAMUSCULAR ONCE AS NEEDED
Status: COMPLETED | OUTPATIENT
Start: 2023-12-21 | End: 2023-12-21

## 2023-12-21 RX ORDER — CEPHALEXIN 500 MG/1
500 CAPSULE ORAL EVERY 12 HOURS
Status: DISCONTINUED | OUTPATIENT
Start: 2023-12-21 | End: 2023-12-21

## 2023-12-21 RX ORDER — BUSPIRONE HYDROCHLORIDE 10 MG/1
10 TABLET ORAL
Status: DISCONTINUED | OUTPATIENT
Start: 2023-12-21 | End: 2023-12-22 | Stop reason: HOSPADM

## 2023-12-21 RX ORDER — LORAZEPAM 2 MG/ML
1 INJECTION INTRAMUSCULAR EVERY 6 HOURS PRN
Status: DISCONTINUED | OUTPATIENT
Start: 2023-12-21 | End: 2023-12-21

## 2023-12-21 RX ORDER — BUSPIRONE HYDROCHLORIDE 10 MG/1
10 TABLET ORAL 2 TIMES DAILY PRN
Status: DISCONTINUED | OUTPATIENT
Start: 2023-12-21 | End: 2023-12-22 | Stop reason: HOSPADM

## 2023-12-21 RX ORDER — OLANZAPINE 10 MG/2ML
5 INJECTION, POWDER, FOR SOLUTION INTRAMUSCULAR ONCE
Status: COMPLETED | OUTPATIENT
Start: 2023-12-21 | End: 2023-12-21

## 2023-12-21 RX ADMIN — CEPHALEXIN 500 MG: 500 CAPSULE ORAL at 10:12

## 2023-12-21 RX ADMIN — METRONIDAZOLE 500 MG: 500 INJECTION, SOLUTION INTRAVENOUS at 02:12

## 2023-12-21 RX ADMIN — MORPHINE SULFATE 4 MG: 4 INJECTION INTRAVENOUS at 04:12

## 2023-12-21 RX ADMIN — METRONIDAZOLE 500 MG: 500 INJECTION, SOLUTION INTRAVENOUS at 10:12

## 2023-12-21 RX ADMIN — MORPHINE SULFATE 4 MG: 4 INJECTION INTRAVENOUS at 08:12

## 2023-12-21 RX ADMIN — OLANZAPINE 5 MG: 10 INJECTION, POWDER, LYOPHILIZED, FOR SOLUTION INTRAMUSCULAR at 10:12

## 2023-12-21 RX ADMIN — OLANZAPINE 5 MG: 10 INJECTION, POWDER, LYOPHILIZED, FOR SOLUTION INTRAMUSCULAR at 02:12

## 2023-12-21 RX ADMIN — METOPROLOL SUCCINATE 50 MG: 50 TABLET, EXTENDED RELEASE ORAL at 11:12

## 2023-12-21 RX ADMIN — OLANZAPINE 5 MG: 10 INJECTION, POWDER, FOR SOLUTION INTRAMUSCULAR at 05:12

## 2023-12-21 RX ADMIN — TORSEMIDE 20 MG: 20 TABLET ORAL at 11:12

## 2023-12-21 RX ADMIN — VANCOMYCIN HYDROCHLORIDE 1500 MG: 1.5 INJECTION, POWDER, LYOPHILIZED, FOR SOLUTION INTRAVENOUS at 12:12

## 2023-12-21 RX ADMIN — LORAZEPAM 1 MG: 2 INJECTION INTRAMUSCULAR; INTRAVENOUS at 08:12

## 2023-12-21 NOTE — PLAN OF CARE
Problem: Adult Inpatient Plan of Care  Goal: Plan of Care Review  Outcome: Ongoing, Progressing  Goal: Patient-Specific Goal (Individualized)  Outcome: Ongoing, Progressing  Goal: Absence of Hospital-Acquired Illness or Injury  Outcome: Ongoing, Progressing  Goal: Optimal Comfort and Wellbeing  Outcome: Ongoing, Progressing  Goal: Readiness for Transition of Care  Outcome: Ongoing, Progressing     Problem: Infection  Goal: Absence of Infection Signs and Symptoms  Outcome: Ongoing, Progressing     Problem: Skin Injury Risk Increased  Goal: Skin Health and Integrity  Outcome: Ongoing, Progressing     Problem: Fall Injury Risk  Goal: Absence of Fall and Fall-Related Injury  Outcome: Ongoing, Progressing     Problem: Hypertension Acute  Goal: Blood Pressure Within Desired Range  Outcome: Ongoing, Progressing     Problem: Constipation  Goal: Effective Bowel Elimination  Outcome: Ongoing, Progressing     Problem: Adjustment to Illness (Heart Failure)  Goal: Optimal Coping  Outcome: Ongoing, Progressing     Problem: Cardiac Output Decreased (Heart Failure)  Goal: Optimal Cardiac Output  Outcome: Ongoing, Progressing     Problem: Dysrhythmia (Heart Failure)  Goal: Stable Heart Rate and Rhythm  Outcome: Ongoing, Progressing     Problem: Fluid Imbalance (Heart Failure)  Goal: Fluid Balance  Outcome: Ongoing, Progressing     Problem: Functional Ability Impaired (Heart Failure)  Goal: Optimal Functional Ability  Outcome: Ongoing, Progressing     Problem: Oral Intake Inadequate (Heart Failure)  Goal: Optimal Nutrition Intake  Outcome: Ongoing, Progressing     Problem: Respiratory Compromise (Heart Failure)  Goal: Effective Oxygenation and Ventilation  Outcome: Ongoing, Progressing     Problem: Sleep Disordered Breathing (Heart Failure)  Goal: Effective Breathing Pattern During Sleep  Outcome: Ongoing, Progressing     Problem: Dysrhythmia  Goal: Normalized Cardiac Rhythm  Outcome: Ongoing, Progressing     Problem: Chest  Pain  Goal: Resolution of Chest Pain Symptoms  Outcome: Ongoing, Progressing

## 2023-12-21 NOTE — SUBJECTIVE & OBJECTIVE
Interval History: NAEON. AFVSS. Patient agitated this morning and refusing medications. PEG site marques stain with many WBC - started on empiric vanc. Wound care has low suspicion for infection, will await culture results. Switch lorazepam to buspar prn for agitation, trial maintenance dose this afternoon. Family at bedside believes pt is improving and seems more like himself, but this is after dose of ativan this am. Will keep tonight, watch cultures and UA, and dc phyllis if stable.     Review of Systems   Unable to perform ROS: Patient nonverbal     Objective:     Vital Signs (Most Recent):  Temp: 97.6 °F (36.4 °C) (12/20/23 2348)  Pulse: (!) 58 (12/21/23 1144)  Resp: 16 (12/20/23 2348)  BP: (!) 174/95 (12/21/23 0829)  SpO2: 99 % (12/20/23 2348) Vital Signs (24h Range):  Temp:  [97.4 °F (36.3 °C)-97.8 °F (36.6 °C)] 97.6 °F (36.4 °C)  Pulse:  [49-70] 58  Resp:  [16-20] 16  SpO2:  [95 %-99 %] 99 %  BP: (108-174)/(83-95) 174/95     Weight: 73.9 kg (163 lb)  Body mass index is 24.78 kg/m².    Intake/Output Summary (Last 24 hours) at 12/21/2023 1331  Last data filed at 12/21/2023 0539  Gross per 24 hour   Intake 220 ml   Output 400 ml   Net -180 ml         Physical Exam  Vitals and nursing note reviewed.   Constitutional:       General: He is not in acute distress.     Appearance: He is well-developed.      Comments: Intermittently agitated and moaning on exam, then smiling. No acute distress.   HENT:      Head: Normocephalic and atraumatic.   Cardiovascular:      Rate and Rhythm: Normal rate and regular rhythm.      Heart sounds: Normal heart sounds.   Pulmonary:      Effort: Pulmonary effort is normal. No respiratory distress.   Abdominal:      General: Bowel sounds are normal. There is no distension.      Comments: PEG tube in place, no visible drainage from stoma    Musculoskeletal:      Right lower leg: No edema.      Left lower leg: No edema.   Skin:     General: Skin is warm and dry.      Findings: No rash.    Neurological:      Mental Status: He is alert. Mental status is at baseline.      Comments: Non-verbal and non-communicative, does not follow commands    Psychiatric:      Comments: Labile mood   Overall is cooperative with exam this morning              Significant Labs: All pertinent labs within the past 24 hours have been reviewed.    Significant Imaging: I have reviewed all pertinent imaging results/findings within the past 24 hours.

## 2023-12-21 NOTE — PROGRESS NOTES
[facility] [address] [place] [address] Medicine  Progress Note    Patient Name: Tera Griffiths  MRN: [id number]  Patient Class: OP- Observation   Admission Date: 12/19/2023  Length of Stay: 0 days  Attending Physician: Barbara Stubbs MD  Primary Care Provider: Carleen Bueno MD        Subjective:     Principal Problem:Chest pain        HPI:  Tera Griffiths is a 59 yo male with PMHx of STEMI, AFib on Eliquis, combined CHF (EF 15-20%), HTN, HLD, CKD3b, L MCA CVA with residual aphasia and R sided deficits s/p PEG placement presenting today with his sister for evaluation of suspected chest pain. The patient is nonverbal at baseline and is unable to contribute to history. She reports that patient has been holding his chest over the site of his defibrillator, as well as his abdomen and PEG. Patient continuously points to his G tube and the purulent drainage coming from the site and seems concerned. She is not sure of the last time he had a BM as he goes to the bathroom on his own. Patient is nonverbal at baseline but sister says that he is able to swallow medications, use the bathroom, and brush his teeth. He lives at home with his sister, who is his primary caregiver.     Per chart review:     In the ED: Difficult to take vitals due to patient behavior but stable. CBC and CMP largely unremarkable. Potassium increased to 5.2. BNP 1,161 which is baseline. Troponin increased to 0.06 (baseline is 0.03). CXR unchanged from October. EKG paced and unchanged from October. CTA chest, abdomen, pelvis negative. Given Fentanyl and sodium zirconium cyclosilicate. Admitting for ACS rule out.     Overview/Hospital Course:  Tera Griffiths is admitted to  Observation for ACS rule out. Trops trend flat: 0.060 -> 0.050 -> 0.063. No evidence of fluid overload. Echo relatively stable compared to prior, (EF 15-20%), normal venous pressure. Appears constipated on CT and with concern for abdominal pain - started on bowel regimen. Purulence  v granulation tissue around PEG site - gram stain and cultures collected, wound care consulted. Started on vanc for now. Gram stain with many WBC, wound care no concerned for infection, believes appearance with c/w granulation tissue. Follow cultures.     Interval History: NAEON. AFVSS. Patient agitated this morning and refusing medications. PEG site marques stain with many WBC - started on empiric vanc. Wound care has low suspicion for infection, will await culture results. Switch lorazepam to buspar prn for agitation, trial maintenance dose this afternoon. Family at bedside believes pt is improving and seems more like himself, but this is after dose of ativan this am. Will keep tonight, watch cultures and UA, and dc phyllis if stable.     Review of Systems   Unable to perform ROS: Patient nonverbal     Objective:     Vital Signs (Most Recent):  Temp: 97.6 °F (36.4 °C) (12/20/23 2348)  Pulse: (!) 58 (12/21/23 1144)  Resp: 16 (12/20/23 2348)  BP: (!) 174/95 (12/21/23 0829)  SpO2: 99 % (12/20/23 2348) Vital Signs (24h Range):  Temp:  [97.4 °F (36.3 °C)-97.8 °F (36.6 °C)] 97.6 °F (36.4 °C)  Pulse:  [49-70] 58  Resp:  [16-20] 16  SpO2:  [95 %-99 %] 99 %  BP: (108-174)/(83-95) 174/95     Weight: 73.9 kg (163 lb)  Body mass index is 24.78 kg/m².    Intake/Output Summary (Last 24 hours) at 12/21/2023 1331  Last data filed at 12/21/2023 0539  Gross per 24 hour   Intake 220 ml   Output 400 ml   Net -180 ml         Physical Exam  Vitals and nursing note reviewed.   Constitutional:       General: He is not in acute distress.     Appearance: He is well-developed.      Comments: Intermittently agitated and moaning on exam, then smiling. No acute distress.   HENT:      Head: Normocephalic and atraumatic.   Cardiovascular:      Rate and Rhythm: Normal rate and regular rhythm.      Heart sounds: Normal heart sounds.   Pulmonary:      Effort: Pulmonary effort is normal. No respiratory distress.   Abdominal:      General: Bowel sounds are  normal. There is no distension.      Comments: PEG tube in place, no visible drainage from stoma    Musculoskeletal:      Right lower leg: No edema.      Left lower leg: No edema.   Skin:     General: Skin is warm and dry.      Findings: No rash.   Neurological:      Mental Status: He is alert. Mental status is at baseline.      Comments: Non-verbal and non-communicative, does not follow commands    Psychiatric:      Comments: Labile mood   Overall is cooperative with exam this morning              Significant Labs: All pertinent labs within the past 24 hours have been reviewed.    Significant Imaging: I have reviewed all pertinent imaging results/findings within the past 24 hours.    Assessment/Plan:      * Chest pain  - 58 y.o. M with CAD and history of STEMI s/p PCI who is nonverbal at baseline presenting with questionable chest pain according to his caregiver. Patient is non-verbal and non-communicative, cannot follow demands or point to areas of pain when asked. Unclear if actually experiencing chest pain  - VSSAF   - Troponin 0.06 -> 0.051 -> 0.063, chronically elevated between 0.03-0.1  - BNP 1,151, chronically elevated between 1,500-4,000  - EKG with ventricular paced rhythm   - CXR without acute process   - Continue ASA, statin, BB  - CBC, CMP (goal Mag>2, K>4) daily; lipid panel reviewed   - Monitor telemetry  Results for orders placed during the hospital encounter of 12/19/23    Echo    Interpretation Summary    Left Ventricle: The left ventricle is severely dilated. Severely increased ventricular mass. Normal wall thickness. There is eccentric hypertrophy. Global hypokinesis present. There is severely reduced systolic function with a visually estimated ejection fraction of 15 - 20%. There is diastolic dysfunction but grade cannot be determined.    Right Ventricle: Mild right ventricular enlargement. Wall thickness is normal. Right ventricle wall motion has global hypokinesis. Systolic function is mildly  reduced. Pacemaker lead present in the ventricle.    Left Atrium: Left atrium is severely dilated.    Right Atrium: Right atrium is severely dilated.    Aortic Valve: There is mild aortic valve sclerosis.    Pulmonic Valve: There is mild regurgitation.    IVC/SVC: Normal venous pressure at 3 mmHg.    Pericardium: There is a trivial circumferential effusion.  - Ddx: anxiety, costochondritis, esophageal reflux, pericarditis, PEG tube dysfunction, biliary disease   - Risk factors: history of CAD/MI, HTN, HLD  - HEART score 3  - If troponin rises, start ACS protocol w/ heparin gtt and consult interventional cardiology    S/P percutaneous endoscopic gastrostomy (PEG) tube placement  Patient noted to have a percutaneous endoscopic gastrostomy tube in place. I have personally inspected the tube.Tube was placed prior to this admission There are signs of drainage or infection around the site. The tube is patent. Medications have not converted to liquid form if available.  Routine care to be done by wound care and nursing staff.   - pt does not use peg tube (oral feeding and meds for the past 5 months, per family)   - peg placed originally by gen surg in 8/2023 - family interested in removal since it is not being used. Refer to [facility] clinic.   - concern for purulence surrounding PEG site. Wound care suspects this is healthy granulation tissue. Gram stain with many WBC, anaerobic cx with moderate PREVOTELLA (B.) MELANINOGENICA    -> continue vanc for now, add flagyl     Debility  Patient with Chronic debility due to hemiplegia/hemiparesis. Latest AMPAC and GEMS scores have been reviewed. Evaluation for etiology is underway. Plan includes progressive mobility protocol initated and fall precautions in place.    Elevated troponin        Agitation  - Acute on chronic issue  - Per chart review, multiple prior admissions complicated by agitation/aggressive behaviors requiring multiple PRNs  - started on prn ativan, but discontinued as  may worsen delirium   - depakote trial previously, discontinued d/t concern for thrombocytopenia.   - at home on seroquel nightly only - continue   - prior script for buspar but family reports pt is not taking. Trial maintenance dose at 10 mg in evening, plus twice prn for agitation.   - 2nd line with IM prn zyprexa (if refusing oral)    Constipation  May be contributing to discomfort  - aggressive bowel regimen  - avoid constipating medications as possible     Dysphagia  - Chronic issue  - Per family at bedside takes medications via PEG tube and PO but eats PO meals   - SLP consulted     Global aphasia        Hemiparesis, right        BPH (benign prostatic hyperplasia)  - Chronic issu  - Bladder scan q6h PRN, straight cath if >300 ml     History of embolic stroke involving left middle cerebral artery  Right hemiparesis  Global aphasia  - Chronic issue without new neurologic defects  - Continue ASA, statin, risk factor reduction   - fall, delirium and aspiration precautions     Ischemic cardiomyopathy        Stage 3 chronic kidney disease  - Creatinine today Estimated Creatinine Clearance: 44.3 mL/min (A) (based on SCr of 1.8 mg/dL (H)). (Baseline 1.7-1.9)  Recent Labs   Lab 12/19/23  1107 12/20/23  0506   CO2 23 21*   BUN 17 14   CREATININE 2.1* 1.8*   MG  --  2.1   PHOS  --  3.3     - Monitor BMP daily, replete electrolytes if necessary  - Renally adjust drugs to CrCl; avoid nephrotoxic drugs if possible  - Monitor I/Os     Primary hypertension  - Controlled on admission   - Latest BP and vitals reviewed  - Continue home meds for HTN:   Hypertension Medications               furosemide (LASIX) 40 MG tablet Take 1 tablet (40 mg total) by mouth once daily.    metoprolol succinate (TOPROL-XL) 50 MG 24 hr tablet Take 1 tablet (50 mg total) by mouth once daily.    nitroGLYCERIN (NITROSTAT) 0.4 MG SL tablet Place under the tongue.    sacubitriL-valsartan (ENTRESTO) 24-26 mg per tablet Take 1 tablet by mouth 2 (two)  times daily.   - Goal SBP 120s-140s. Utilize p.r.n. antihypertensives only if patient's BP >180/110 & develop symptoms of worsening CP or SOB.      ICD (implantable cardioverter-defibrillator) in place  - Noted  - Monitor telemetry  - device check ordered     Dyslipidemia  - Chronic  - Continue statin    Chronic combined systolic and diastolic heart failure  Ischemic cardiomyopathy  Patient is identified as having Combined Systolic and Diastolic heart failure that is Chronic. CHF is currently controlled.   Latest ECHO performed and demonstrates- Results for orders placed during the hospital encounter of 10/06/23  Echo  Interpretation Summary    Left Ventricle: The left ventricle is severely dilated. Severely increased ventricular mass. Normal wall thickness. There is eccentric hypertrophy. Global hypokinesis present. There is severely reduced systolic function with a visually estimated ejection fraction of 10 -15%.    Left Atrium: Left atrium is severely dilated.    Right Ventricle: Moderate right ventricular enlargement. Wall thickness is normal. Right ventricle wall motion has global hypokinesis. Systolic function is moderately reduced.    Right Atrium: Right atrium is severely dilated.    Aortic Valve: There is mild aortic valve sclerosis.    Mitral Valve: There is mild regurgitation.    Tricuspid Valve: There is mild regurgitation.    Pulmonic Valve: There is moderate regurgitation.    Pulmonary Artery: The estimated pulmonary artery systolic pressure is at least 31 mmHg.    Pericardium: There is a trivial circumferential effusion.  - Continue Beta Blocker Furosemide ARNI and monitor clinical status closely.   - Monitor on telemetry.  - Patient is off CHF pathway.   - Monitor strict Is&Os and daily weights. Place on fluid restriction of 1.5 L.   - Continue to stress to patient importance of self efficacy and  on diet for CHF.  - Last BNP reviewed- and noted below   Recent Labs   Lab 12/19/23  1107   BNP  1,151*     - Euvolemic on exam    Paroxysmal A-fib  Patient with Paroxysmal (<7 days) atrial fibrillation which is controlled currently with Beta Blocker. Patient is currently in sinus rhythm.[id number] Score: 2. Anticoagulation indicated. Anticoagulation done with eliquis .    History of ST elevation myocardial infarction (STEMI)        CAD (coronary artery disease)  History of STEMI  Elevate troponin  Patient with known CAD s/p stent placement, which is controlled. Will continue ASA and Statin and monitor for S/Sx of angina/ACS. Continue to monitor on telemetry.       VTE Risk Mitigation (From admission, onward)           Ordered     apixaban tablet 5 mg  2 times daily         12/20/23 1055     Reason for No Pharmacological VTE Prophylaxis  Once        Question:  Reasons:  Answer:  Already adequately anticoagulated on oral Anticoagulants    12/19/23 1330     IP VTE HIGH RISK PATIENT  Once         12/19/23 1330                    Discharge Planning   YUSEF: 12/22/2023     Code Status: Full Code   Is the patient medically ready for discharge?: No    Reason for patient still in hospital (select all that apply): Patient new problem, Patient trending condition, Laboratory test, Treatment, and Consult recommendations  Discharge Plan A: Home with family                  Cynthia Lazcano PA-C  Department of Hospital Medicine   [facility]

## 2023-12-21 NOTE — ASSESSMENT & PLAN NOTE
Patient with Paroxysmal (<7 days) atrial fibrillation which is controlled currently with Beta Blocker. Patient is currently in sinus rhythm.[id number] Score: 2. Anticoagulation indicated. Anticoagulation done with eliquis .

## 2023-12-21 NOTE — PROGRESS NOTES
"Pharmacokinetic Initial Assessment: IV Vancomycin    Assessment/Plan:    Initiate intravenous vancomycin with loading dose of 1500 mg once with subsequent doses when random concentrations are less than 20 mcg/mL  Desired empiric serum trough concentration is 10 to 15 mcg/mL  Draw vancomycin random level on 12/22 at 1100.  Pharmacy will continue to follow and monitor vancomycin.      Please contact pharmacy at extension [phone] with any questions regarding this assessment.     Thank you for the consult,   Alejo James       Patient brief summary:  Tera Griffiths is a 58 y.o. male initiated on antimicrobial therapy with IV Vancomycin for treatment of suspected skin & soft tissue infection    Drug Allergies:   Review of patient's allergies indicates:  No Known Allergies    Actual Body Weight:   73.9 kg    Renal Function:   Estimated Creatinine Clearance: 44.3 mL/min (A) (based on SCr of 1.8 mg/dL (H)).,     Dialysis Method (if applicable):  N/A    CBC (last 72 hours):  Recent Labs   Lab Result Units 12/19/23  1107 12/20/23  0506   WBC K/uL 3.85* 2.81*   Hemoglobin g/dL 14.4 13.5*   Hematocrit % 46.1 42.2   Platelets K/uL 284 283   Gran % % 53.6 39.6   Lymph % % 30.9 42.7   Mono % % 11.9 14.2   Eosinophil % % 3.1 2.8   Basophil % % 0.5 0.7   Differential Method  Automated Automated       Metabolic Panel (last 72 hours):  Recent Labs   Lab Result Units 12/19/23  1107 12/20/23  0506 12/21/23  0900   Sodium mmol/L 138 139  --    Potassium mmol/L 5.2* 4.3  --    Chloride mmol/L 105 107  --    CO2 mmol/L 23 21*  --    Glucose mg/dL 105 80  --    Glucose, UA   --   --  1+*   BUN mg/dL 17 14  --    Creatinine mg/dL 2.1* 1.8*  --    Albumin g/dL 4.1 3.7  --    Total Bilirubin mg/dL 0.5 0.6  --    Alkaline Phosphatase U/L 72 69  --    AST U/L 27 19  --    ALT U/L 17 12  --    Magnesium mg/dL  --  2.1  --    Phosphorus mg/dL  --  3.3  --        Drug levels (last 3 results):  No results for input(s): "VANCOMYCINRA", "VANCORANDOM", " ""VANCOMYCINPE", "VANCOPEAK", "VANCOMYCINTR", "VANCOTROUGH" in the last 72 hours.    Microbiologic Results:  Microbiology Results (last 7 days)       Procedure Component Value Units Date/Time    Culture, Anaerobe [[id number]] Collected: 12/19/23 1741    Order Status: Completed Specimen: Skin from Abdomen Updated: 12/21/23 1031     Anaerobic Culture Culture in progress    Narrative:      PEG tube    Aerobic culture [[id number]] Collected: 12/19/23 1741    Order Status: Completed Specimen: Skin from Abdomen Updated: 12/21/23 0916     Aerobic Bacterial Culture Skin suraj,  no predominant organism    Narrative:      PEG tube    Blood culture [[id number]] Collected: 12/20/23 1119    Order Status: Completed Specimen: Blood Updated: 12/20/23 1915     Blood Culture, Routine No Growth to date    Blood culture [[id number]] Collected: 12/20/23 1121    Order Status: Completed Specimen: Blood Updated: 12/20/23 1915     Blood Culture, Routine No Growth to date    Gram stain [[id number]] Collected: 12/19/23 1741    Order Status: Completed Specimen: Skin from Abdomen Updated: 12/19/23 2219     Gram Stain Result Many WBC's      Moderate Gram positive cocci      Few Gram positive rods    Narrative:      PEG tube            "

## 2023-12-21 NOTE — ASSESSMENT & PLAN NOTE
- Acute on chronic issue  - Per chart review, multiple prior admissions complicated by agitation/aggressive behaviors requiring multiple PRNs  - started on prn ativan, but discontinued as may worsen delirium   - depakote trial previously, discontinued d/t concern for thrombocytopenia.   - at home on seroquel nightly only - continue   - prior script for buspar but family reports pt is not taking. Trial maintenance dose at 10 mg in evening, plus twice prn for agitation.   - 2nd line with IM prn zyprexa (if refusing oral)

## 2023-12-21 NOTE — ASSESSMENT & PLAN NOTE
- Controlled on admission   - Latest BP and vitals reviewed  - Continue home meds for HTN:   Hypertension Medications               furosemide (LASIX) 40 MG tablet Take 1 tablet (40 mg total) by mouth once daily.    metoprolol succinate (TOPROL-XL) 50 MG 24 hr tablet Take 1 tablet (50 mg total) by mouth once daily.    nitroGLYCERIN (NITROSTAT) 0.4 MG SL tablet Place under the tongue.    sacubitriL-valsartan (ENTRESTO) 24-26 mg per tablet Take 1 tablet by mouth 2 (two) times daily.   - Goal SBP 120s-140s. Utilize p.r.n. antihypertensives only if patient's BP >180/110 & develop symptoms of worsening CP or SOB.

## 2023-12-21 NOTE — NURSING
Pt refusing medication and vitals. No distress noted. Cynthia notified. Awaiting reply/new orders.

## 2023-12-21 NOTE — PLAN OF CARE
Problem: Adult Inpatient Plan of Care  Goal: Plan of Care Review  12/21/2023 1731 by Fatimah Garibay RN  Outcome: Ongoing, Progressing  12/21/2023 1730 by Fatimah Garibay RN  Outcome: Ongoing, Progressing  Goal: Patient-Specific Goal (Individualized)  12/21/2023 1731 by Fatimah Garibay RN  Outcome: Ongoing, Progressing  12/21/2023 1730 by Fatimah Garibay RN  Outcome: Ongoing, Progressing  Goal: Absence of Hospital-Acquired Illness or Injury  12/21/2023 1731 by Fatimah Garibay RN  Outcome: Ongoing, Progressing  12/21/2023 1730 by Fatimah Garibay RN  Outcome: Ongoing, Progressing  Goal: Optimal Comfort and Wellbeing  12/21/2023 1731 by Fatimah Garibay RN  Outcome: Ongoing, Progressing  12/21/2023 1730 by Fatimah Garibay RN  Outcome: Ongoing, Progressing  Goal: Readiness for Transition of Care  12/21/2023 1731 by Fatimah Garibay RN  Outcome: Ongoing, Progressing  12/21/2023 1730 by Fatimah Garibay RN  Outcome: Ongoing, Progressing     Problem: Infection  Goal: Absence of Infection Signs and Symptoms  12/21/2023 1731 by Fatimah Garibay RN  Outcome: Ongoing, Progressing  12/21/2023 1730 by Fatimah Garibay RN  Outcome: Ongoing, Progressing     Problem: Skin Injury Risk Increased  Goal: Skin Health and Integrity  12/21/2023 1731 by Fatimah Garibay RN  Outcome: Ongoing, Progressing  12/21/2023 1730 by Fatimah Garibay RN  Outcome: Ongoing, Progressing     Problem: Fall Injury Risk  Goal: Absence of Fall and Fall-Related Injury  12/21/2023 1731 by Fatimah Garibay RN  Outcome: Ongoing, Progressing  12/21/2023 1730 by Fatimah Garibay RN  Outcome: Ongoing, Progressing     Problem: Constipation  Goal: Effective Bowel Elimination  12/21/2023 1731 by Fatimah Garibay RN  Outcome: Ongoing, Progressing  12/21/2023 1730 by Fatimah Garibay RN  Outcome: Ongoing, Progressing     Problem: Adjustment to Illness (Heart Failure)  Goal: Optimal Coping  12/21/2023 1731 by Fatimah Garibay RN  Outcome: Ongoing, Progressing  12/21/2023 1730 by Fatimah Garibay RN  Outcome: Ongoing, Progressing      Problem: Cardiac Output Decreased (Heart Failure)  Goal: Optimal Cardiac Output  12/21/2023 1731 by Fatimah Garibay RN  Outcome: Ongoing, Progressing  12/21/2023 1730 by Fatimah Garibay RN  Outcome: Ongoing, Progressing     Problem: Dysrhythmia (Heart Failure)  Goal: Stable Heart Rate and Rhythm  12/21/2023 1731 by Fatimah Garibay RN  Outcome: Ongoing, Progressing  12/21/2023 1730 by Fatimah Garibay RN  Outcome: Ongoing, Progressing     Problem: Fluid Imbalance (Heart Failure)  Goal: Fluid Balance  12/21/2023 1731 by Fatimah Garibay RN  Outcome: Ongoing, Progressing  12/21/2023 1730 by Fatimah Garibay RN  Outcome: Ongoing, Progressing     Problem: Functional Ability Impaired (Heart Failure)  Goal: Optimal Functional Ability  12/21/2023 1731 by Fatimah Garibay RN  Outcome: Ongoing, Progressing  12/21/2023 1730 by Fatimah Garibya RN  Outcome: Ongoing, Progressing     Problem: Oral Intake Inadequate (Heart Failure)  Goal: Optimal Nutrition Intake  12/21/2023 1731 by Fatimah Garibay RN  Outcome: Ongoing, Progressing  12/21/2023 1730 by Fatimah Garibay RN  Outcome: Ongoing, Progressing     Problem: Respiratory Compromise (Heart Failure)  Goal: Effective Oxygenation and Ventilation  12/21/2023 1731 by Fatimah Garibay RN  Outcome: Ongoing, Progressing  12/21/2023 1730 by Fatimah Garibay RN  Outcome: Ongoing, Progressing     Problem: Sleep Disordered Breathing (Heart Failure)  Goal: Effective Breathing Pattern During Sleep  12/21/2023 1731 by Fatimah Garibay RN  Outcome: Ongoing, Progressing  12/21/2023 1730 by Fatimah Garibay RN  Outcome: Ongoing, Progressing     Problem: Dysrhythmia  Goal: Normalized Cardiac Rhythm  12/21/2023 1731 by Fatimah Garibay RN  Outcome: Ongoing, Progressing  12/21/2023 1730 by Fatimah Garibay RN  Outcome: Ongoing, Progressing     Problem: Chest Pain  Goal: Resolution of Chest Pain Symptoms  12/21/2023 1731 by Fatimah Garibay RN  Outcome: Ongoing, Progressing  12/21/2023 1730 by Fatimah Garibay RN  Outcome: Ongoing, Progressing     Problem:  Restraint, Nonbehavioral (Nonviolent)  Goal: Absence of Harm or Injury  12/21/2023 1731 by Fatimah Garibay RN  Outcome: Ongoing, Progressing  12/21/2023 1730 by Fatimah Garibay RN  Outcome: Ongoing, Progressing     Problem: Adjustment to Illness (Chronic Kidney Disease)  Goal: Optimal Coping with Chronic Illness  12/21/2023 1731 by Fatimah Garibay RN  Outcome: Ongoing, Progressing  12/21/2023 1730 by Fatimah Garibay RN  Outcome: Ongoing, Progressing     Problem: Electrolyte Imbalance (Chronic Kidney Disease)  Goal: Electrolyte Balance  12/21/2023 1731 by Fatimah Garibay RN  Outcome: Ongoing, Progressing  12/21/2023 1730 by Fatimah Garibay RN  Outcome: Ongoing, Progressing     Problem: Fluid Volume Excess (Chronic Kidney Disease)  Goal: Fluid Balance  12/21/2023 1731 by Fatimah Garibay RN  Outcome: Ongoing, Progressing  12/21/2023 1730 by Fatimah Garibay RN  Outcome: Ongoing, Progressing     Problem: Functional Decline (Chronic Kidney Disease)  Goal: Optimal Functional Ability  12/21/2023 1731 by Fatimah Garibay RN  Outcome: Ongoing, Progressing  12/21/2023 1730 by Fatimah Garibay RN  Outcome: Ongoing, Progressing     Problem: Hematologic Alteration (Chronic Kidney Disease)  Goal: Absence of Anemia Signs and Symptoms  12/21/2023 1731 by Fatimah Garibay RN  Outcome: Ongoing, Progressing  12/21/2023 1730 by Fatimah Garibay RN  Outcome: Ongoing, Progressing     Problem: Oral Intake Inadequate (Chronic Kidney Disease)  Goal: Optimal Oral Intake  12/21/2023 1731 by Fatimah Garibay RN  Outcome: Ongoing, Progressing  12/21/2023 1730 by Fatimah Garibay RN  Outcome: Ongoing, Progressing     Problem: Pain (Chronic Kidney Disease)  Goal: Acceptable Pain Control  12/21/2023 1731 by Fatimah Garibay RN  Outcome: Ongoing, Progressing  12/21/2023 1730 by Fatimah Garibay RN  Outcome: Ongoing, Progressing     Problem: Renal Function Impairment (Chronic Kidney Disease)  Goal: Minimize Renal Failure Effects  12/21/2023 1731 by Fatimah Garibay RN  Outcome: Ongoing,  Progressing  12/21/2023 1730 by Fatimah Garibay RN  Outcome: Ongoing, Progressing     Problem: Swallowing Impairment  Goal: Optimal Eating and Swallowing Without Aspiration  12/21/2023 1731 by Fatimah Garibay RN  Outcome: Ongoing, Progressing  12/21/2023 1730 by Fatimah Garibay RN  Outcome: Ongoing, Progressing     Problem: Aspiration (Enteral Nutrition)  Goal: Absence of Aspiration Signs and Symptoms  12/21/2023 1731 by Fatimah Garibay RN  Outcome: Ongoing, Progressing  12/21/2023 1730 by Fatimah Garibay RN  Outcome: Ongoing, Progressing     Problem: Device-Related Complication Risk (Enteral Nutrition)  Goal: Safe, Effective Therapy Delivery  12/21/2023 1731 by Fatimah Garibay RN  Outcome: Ongoing, Progressing  12/21/2023 1730 by Fatimah Garibay RN  Outcome: Ongoing, Progressing     Problem: Feeding Intolerance (Enteral Nutrition)  Goal: Feeding Tolerance  12/21/2023 1731 by Fatimah Garibay RN  Outcome: Ongoing, Progressing  12/21/2023 1730 by Fatimah Garibay RN  Outcome: Ongoing, Progressing     Problem: Hypertension Comorbidity  Goal: Blood Pressure in Desired Range  Outcome: Ongoing, Progressing

## 2023-12-21 NOTE — ASSESSMENT & PLAN NOTE
Patient noted to have a percutaneous endoscopic gastrostomy tube in place. I have personally inspected the tube.Tube was placed prior to this admission There are signs of drainage or infection around the site. The tube is patent. Medications have not converted to liquid form if available.  Routine care to be done by wound care and nursing staff.   - pt does not use peg tube (oral feeding and meds for the past 5 months, per family)   - peg placed originally by gen surg in 8/2023 - family interested in removal since it is not being used. Refer to  clinic.   - concern for purulence surrounding PEG site. Wound care suspects this is healthy granulation tissue. Gram stain with many WBC, anaerobic cx with moderate PREVOTELLA (B.) MELANINOGENICA    -> continue vanc for now, add flagyl

## 2023-12-21 NOTE — PLAN OF CARE
Problem: Adult Inpatient Plan of Care  Goal: Plan of Care Review  12/20/2023 1900 by Gregory Crawford RN  Outcome: Ongoing, Progressing  12/20/2023 1733 by Gregory Crawford RN  Outcome: Ongoing, Progressing  Goal: Patient-Specific Goal (Individualized)  12/20/2023 1900 by Gregory Crawford RN  Outcome: Ongoing, Progressing  12/20/2023 1733 by Gregory Crawford RN  Outcome: Ongoing, Progressing  Goal: Absence of Hospital-Acquired Illness or Injury  12/20/2023 1900 by Gregory Crawford RN  Outcome: Ongoing, Progressing  12/20/2023 1733 by Gregory Crawford RN  Outcome: Ongoing, Progressing  Goal: Optimal Comfort and Wellbeing  12/20/2023 1900 by Gregory Crawford RN  Outcome: Ongoing, Progressing  12/20/2023 1733 by Gregory Crawford RN  Outcome: Ongoing, Progressing  Goal: Readiness for Transition of Care  12/20/2023 1900 by Gregory Crawford RN  Outcome: Ongoing, Progressing  12/20/2023 1733 by Gregory Crawford RN  Outcome: Ongoing, Progressing     Problem: Infection  Goal: Absence of Infection Signs and Symptoms  12/20/2023 1900 by Gregory Crawford RN  Outcome: Ongoing, Progressing  12/20/2023 1733 by Gregory Crawford RN  Outcome: Ongoing, Progressing     Problem: Skin Injury Risk Increased  Goal: Skin Health and Integrity  12/20/2023 1900 by Gregory Crawford RN  Outcome: Ongoing, Progressing  12/20/2023 1733 by Gregory Crawford RN  Outcome: Ongoing, Progressing     Problem: Fall Injury Risk  Goal: Absence of Fall and Fall-Related Injury  12/20/2023 1900 by Gregory Crawford RN  Outcome: Ongoing, Progressing  12/20/2023 1733 by Gregory Crawford RN  Outcome: Ongoing, Progressing     Problem: Hypertension Acute  Goal: Blood Pressure Within Desired Range  12/20/2023 1900 by Gregory Crawford, RN  Outcome: Ongoing, Progressing  12/20/2023 1733 by Gregory Crawford, RN  Outcome: Ongoing, Progressing

## 2023-12-21 NOTE — CONSULTS
[facility]  Wound Care    Patient Name:  Tera Griffiths   MRN:  [id number]  Date: 12/21/2023  Diagnosis: Chest pain    History:     Past Medical History:   Diagnosis Date    Acute on chronic combined systolic and diastolic heart failure 09/23/2022    Atrial fibrillation     CAD (coronary artery disease) 09/23/2022    Dyslipidemia 09/23/2022    Embolic stroke involving left middle cerebral artery 08/08/2023    Encounter for blood transfusion     H/O heart artery stent     HTN (hypertension) 09/23/2022    ICD (implantable cardioverter-defibrillator) in place 09/23/2022    Paroxysmal A-fib 09/23/2022    Stage 3 chronic kidney disease 04/19/2023       Social History     Socioeconomic History    Marital status: Single   Tobacco Use    Smoking status: Never    Smokeless tobacco: Never   Substance and Sexual Activity    Alcohol use: Never    Drug use: Never     Social Determinants of Health     Financial Resource Strain: High Risk (10/9/2023)    Overall Financial Resource Strain (CARDIA)     Difficulty of Paying Living Expenses: Very hard   Food Insecurity: No Food Insecurity (10/9/2023)    Hunger Vital Sign     Worried About Running Out of Food in the Last Year: Never true     Ran Out of Food in the Last Year: Never true   Transportation Needs: No Transportation Needs (10/9/2023)    PRAPARE - Transportation     Lack of Transportation (Medical): No     Lack of Transportation (Non-Medical): No   Physical Activity: Inactive (10/9/2023)    Exercise Vital Sign     Days of Exercise per Week: 0 days     Minutes of Exercise per Session: 0 min   Stress: No Stress Concern Present (10/9/2023)    [employer] [employer] [employer] - Occupational Stress Questionnaire     Feeling of Stress : Only a little   Recent Concern: Stress - Stress Concern Present (7/14/2023)    [place] [employer] - Occupational Stress Questionnaire     Feeling of Stress : To some extent   Social Connections: Socially  Isolated (10/9/2023)    Social Connection and Isolation Panel [NHANES]     Frequency of Communication with Friends and Family: More than three times a week     Frequency of Social Gatherings with Friends and Family: More than three times a week     Attends [employer] Services: Never     Active Member of Clubs or Organizations: No     Attends Club or Organization Meetings: Never     Marital Status:    Housing Stability: Low Risk  (10/9/2023)    Housing Stability Vital Sign     Unable to Pay for Housing in the Last Year: No     Number of Places Lived in the Last Year: 1     Unstable Housing in the Last Year: No       Precautions:     Allergies as of 12/19/2023    (No Known Allergies)       WO Assessment Details/Treatment   Patient seen for wound care consultation.   Reviewed chart for this encounter.   See Flow Sheet for findings.    Pt sitting up in bed with PCT and sister at bedside. Pt in restraints. Sister agreed to assessment.   Removed tape from pt peg tube area, cleansed with Vashe, no malodorous drainage noted, tube is easy to manipulate and does not seem to cause discomfort to pt. Red hypergranulation tissue on wound bed that can be visualized.   Cleansed with Vashe, cut a slit in hydrofera blue ring, placed to periwound area, secure with plain Mepilex foam, secure with Medipore tape.     RECOMMENDATIONS:  Continue to monitor for s/s of infection.  Change dressing q5d.    Discussed POC with patient and primary nurse.   See EMR for orders & patient education.    Discussed nutrition and the role of protein in wound healing with the patient. Instructed patient to optimize protein for wound healing.    Bedside nursing to continue care & monitoring.  Bedside nursing to maintain pressure injury prevention interventions.    WC sign off -- please re-consult if needed.           12/21/23 1130   WOCN Assessment   WOCN Total Time (mins) 30   Visit Date 12/21/23   Visit Time 1130   Consult Type New   WOCN  Speciality Wound   Wound other   Intervention assessed;changed;applied;chart review;orders   Teaching on-going

## 2023-12-21 NOTE — PLAN OF CARE
Problem: Adult Inpatient Plan of Care  Goal: Plan of Care Review  12/20/2023 1906 by Gregory Crawford, RN  Outcome: Ongoing, Progressing  12/20/2023 1900 by Gregory Crawford RN  Outcome: Ongoing, Progressing  12/20/2023 1733 by Gregory Crawford RN  Outcome: Ongoing, Progressing  Goal: Patient-Specific Goal (Individualized)  12/20/2023 1906 by Gregory Crawford, RN  Outcome: Ongoing, Progressing  12/20/2023 1900 by Gregory Crawford, RN  Outcome: Ongoing, Progressing  12/20/2023 1733 by Gregory Crawford, RN  Outcome: Ongoing, Progressing  Goal: Absence of Hospital-Acquired Illness or Injury  12/20/2023 1906 by Gregory Crawford, RN  Outcome: Ongoing, Progressing  12/20/2023 1900 by Gregory Crawford, RN  Outcome: Ongoing, Progressing  12/20/2023 1733 by Gregory Crawford RN  Outcome: Ongoing, Progressing  Goal: Optimal Comfort and Wellbeing  12/20/2023 1906 by Gregory Crawford RN  Outcome: Ongoing, Progressing  12/20/2023 1900 by Gregory Crawford, RN  Outcome: Ongoing, Progressing  12/20/2023 1733 by Gregory Crawford RN  Outcome: Ongoing, Progressing  Goal: Readiness for Transition of Care  12/20/2023 1906 by Gregory Crawford RN  Outcome: Ongoing, Progressing  12/20/2023 1900 by Gregory Crawford, RN  Outcome: Ongoing, Progressing  12/20/2023 1733 by Gregory Crawford, RN  Outcome: Ongoing, Progressing

## 2023-12-21 NOTE — ASSESSMENT & PLAN NOTE
Ischemic cardiomyopathy  Patient is identified as having Combined Systolic and Diastolic heart failure that is Chronic. CHF is currently controlled.   Latest ECHO performed and demonstrates- Results for orders placed during the hospital encounter of 10/06/23  Echo  Interpretation Summary    Left Ventricle: The left ventricle is severely dilated. Severely increased ventricular mass. Normal wall thickness. There is eccentric hypertrophy. Global hypokinesis present. There is severely reduced systolic function with a visually estimated ejection fraction of 10 -15%.    Left Atrium: Left atrium is severely dilated.    Right Ventricle: Moderate right ventricular enlargement. Wall thickness is normal. Right ventricle wall motion has global hypokinesis. Systolic function is moderately reduced.    Right Atrium: Right atrium is severely dilated.    Aortic Valve: There is mild aortic valve sclerosis.    Mitral Valve: There is mild regurgitation.    Tricuspid Valve: There is mild regurgitation.    Pulmonic Valve: There is moderate regurgitation.    Pulmonary Artery: The estimated pulmonary artery systolic pressure is at least 31 mmHg.    Pericardium: There is a trivial circumferential effusion.  - Continue Beta Blocker Furosemide ARNI and monitor clinical status closely.   - Monitor on telemetry.  - Patient is off CHF pathway.   - Monitor strict Is&Os and daily weights. Place on fluid restriction of 1.5 L.   - Continue to stress to patient importance of self efficacy and  on diet for CHF.  - Last BNP reviewed- and noted below   Recent Labs   Lab 12/19/23  1107   BNP 1,151*     - Euvolemic on exam

## 2023-12-21 NOTE — ASSESSMENT & PLAN NOTE
- 58 y.o. M with CAD and history of STEMI s/p PCI who is nonverbal at baseline presenting with questionable chest pain according to his caregiver. Patient is non-verbal and non-communicative, cannot follow demands or point to areas of pain when asked. Unclear if actually experiencing chest pain  - VSSAF   - Troponin 0.06 -> 0.051 -> 0.063, chronically elevated between 0.03-0.1  - BNP 1,151, chronically elevated between 1,500-4,000  - EKG with ventricular paced rhythm   - CXR without acute process   - Continue ASA, statin, BB  - CBC, CMP (goal Mag>2, K>4) daily; lipid panel reviewed   - Monitor telemetry  Results for orders placed during the hospital encounter of 12/19/23    Echo    Interpretation Summary    Left Ventricle: The left ventricle is severely dilated. Severely increased ventricular mass. Normal wall thickness. There is eccentric hypertrophy. Global hypokinesis present. There is severely reduced systolic function with a visually estimated ejection fraction of 15 - 20%. There is diastolic dysfunction but grade cannot be determined.    Right Ventricle: Mild right ventricular enlargement. Wall thickness is normal. Right ventricle wall motion has global hypokinesis. Systolic function is mildly reduced. Pacemaker lead present in the ventricle.    Left Atrium: Left atrium is severely dilated.    Right Atrium: Right atrium is severely dilated.    Aortic Valve: There is mild aortic valve sclerosis.    Pulmonic Valve: There is mild regurgitation.    IVC/SVC: Normal venous pressure at 3 mmHg.    Pericardium: There is a trivial circumferential effusion.  - Ddx: anxiety, costochondritis, esophageal reflux, pericarditis, PEG tube dysfunction, biliary disease   - Risk factors: history of CAD/MI, HTN, HLD  - HEART score 3  - If troponin rises, start ACS protocol w/ heparin gtt and consult interventional cardiology

## 2023-12-21 NOTE — NURSING
Pt eyes are close laying supine. Pt easy to arouse. No distress noted. Bed in lowest position.Telemetry maintained. Side rails up x3. Bed alarm maintained. Telesitter maintained. Call bell and personal belongs within reach.  Safety precautions maintained.No further issues or concerns at this time. Plan of care continues.

## 2023-12-22 VITALS
OXYGEN SATURATION: 96 % | HEART RATE: 65 BPM | RESPIRATION RATE: 18 BRPM | WEIGHT: 163 LBS | BODY MASS INDEX: 24.71 KG/M2 | HEIGHT: 68 IN | TEMPERATURE: 98 F | DIASTOLIC BLOOD PRESSURE: 99 MMHG | SYSTOLIC BLOOD PRESSURE: 136 MMHG

## 2023-12-22 PROCEDURE — 63600175 PHARM REV CODE 636 W HCPCS

## 2023-12-22 PROCEDURE — 93005 ELECTROCARDIOGRAM TRACING: CPT

## 2023-12-22 PROCEDURE — 63600175 PHARM REV CODE 636 W HCPCS: Performed by: NURSE PRACTITIONER

## 2023-12-22 PROCEDURE — 93010 EKG 12-LEAD: ICD-10-PCS | Mod: ,,, | Performed by: INTERNAL MEDICINE

## 2023-12-22 PROCEDURE — 25000003 PHARM REV CODE 250

## 2023-12-22 PROCEDURE — 93010 ELECTROCARDIOGRAM REPORT: CPT | Mod: ,,, | Performed by: INTERNAL MEDICINE

## 2023-12-22 PROCEDURE — 25000003 PHARM REV CODE 250: Performed by: NURSE PRACTITIONER

## 2023-12-22 RX ORDER — BISACODYL 10 MG
10 SUPPOSITORY, RECTAL RECTAL DAILY PRN
Qty: 30 SUPPOSITORY | Refills: 0 | Status: SHIPPED | OUTPATIENT
Start: 2023-12-22 | End: 2024-01-21

## 2023-12-22 RX ORDER — HALOPERIDOL 5 MG/ML
2 INJECTION INTRAMUSCULAR EVERY 6 HOURS PRN
Status: DISCONTINUED | OUTPATIENT
Start: 2023-12-22 | End: 2023-12-22

## 2023-12-22 RX ORDER — METRONIDAZOLE 500 MG/1
500 TABLET ORAL EVERY 8 HOURS
Qty: 21 TABLET | Refills: 0 | Status: SHIPPED | OUTPATIENT
Start: 2023-12-22 | End: 2023-12-29

## 2023-12-22 RX ORDER — HALOPERIDOL 5 MG/ML
2 INJECTION INTRAMUSCULAR ONCE
Status: COMPLETED | OUTPATIENT
Start: 2023-12-22 | End: 2023-12-22

## 2023-12-22 RX ORDER — BUSPIRONE HYDROCHLORIDE 10 MG/1
10 TABLET ORAL 3 TIMES DAILY
Qty: 90 TABLET | Refills: 3 | Status: SHIPPED | OUTPATIENT
Start: 2023-12-22 | End: 2024-12-21

## 2023-12-22 RX ORDER — DOXYCYCLINE 100 MG/1
100 CAPSULE ORAL 2 TIMES DAILY
Qty: 14 CAPSULE | Refills: 0 | Status: SHIPPED | OUTPATIENT
Start: 2023-12-22 | End: 2023-12-29

## 2023-12-22 RX ORDER — QUETIAPINE FUMARATE 25 MG/1
25 TABLET, FILM COATED ORAL NIGHTLY
Qty: 30 TABLET | Refills: 11
Start: 2023-12-22 | End: 2024-12-21

## 2023-12-22 RX ADMIN — APIXABAN 5 MG: 5 TABLET, FILM COATED ORAL at 09:12

## 2023-12-22 RX ADMIN — METRONIDAZOLE 500 MG: 500 INJECTION, SOLUTION INTRAVENOUS at 05:12

## 2023-12-22 RX ADMIN — ASPIRIN 81 MG CHEWABLE TABLET 81 MG: 81 TABLET CHEWABLE at 09:12

## 2023-12-22 RX ADMIN — SACUBITRIL AND VALSARTAN 1 TABLET: 24; 26 TABLET, FILM COATED ORAL at 09:12

## 2023-12-22 RX ADMIN — DEXTROSE MONOHYDRATE 250 MG: 50 INJECTION, SOLUTION INTRAVENOUS at 12:12

## 2023-12-22 RX ADMIN — ATORVASTATIN CALCIUM 80 MG: 40 TABLET, FILM COATED ORAL at 09:12

## 2023-12-22 RX ADMIN — SPIRONOLACTONE 25 MG: 25 TABLET ORAL at 09:12

## 2023-12-22 RX ADMIN — PANTOPRAZOLE SODIUM 40 MG: 40 TABLET, DELAYED RELEASE ORAL at 09:12

## 2023-12-22 RX ADMIN — TORSEMIDE 20 MG: 20 TABLET ORAL at 09:12

## 2023-12-22 RX ADMIN — HALOPERIDOL LACTATE 2 MG: 5 INJECTION, SOLUTION INTRAMUSCULAR at 02:12

## 2023-12-22 RX ADMIN — METOPROLOL SUCCINATE 50 MG: 50 TABLET, EXTENDED RELEASE ORAL at 09:12

## 2023-12-22 NOTE — PLAN OF CARE
[facility] Observation 11H  Discharge Final Note    Primary Care Provider: Carleen Bueno MD    Expected Discharge Date: 12/22/2023    No future appointments.  Pt discharged home with no services. CM attempted to schedule  Surgery Clinic referral. No appointments available in the requested time,   will send message to clinic nurse to contact pt/family with appointmtent. Note put in AVS.    Final Discharge Note (most recent)       Final Note - 12/22/23 1502          Final Note    Assessment Type Final Discharge Note     Anticipated Discharge Disposition Home or Self Care     Hospital Resources/Appts/Education Provided Provided patient/caregiver with written discharge plan information;Appointments scheduled and added to AVS;Post-Acute resouces added to AVS        Post-Acute Status    Discharge Delays None known at this time                     Important Message from Medicare             Contact Info [place] Medicaid    [phone]  option 1       Next Steps: Follow up    Instructions: Call to apply for sitter services    Surgery Clinic        Next Steps: Follow up    Instructions: The Clinic Nurse will contact you with an appointment. If you do not hear from them in 48 hrs, please call [phone].    Carleen Bueno MD   Specialty: Internal Medicine   Relationship: PCP - General    [address] [facility] [address]   Phone: [phone]       Next Steps: Go on 1/3/2024    Instructions: 10:00 AM

## 2023-12-22 NOTE — PLAN OF CARE
Problem: Adult Inpatient Plan of Care  Goal: Plan of Care Review  Outcome: Ongoing, Progressing  Goal: Patient-Specific Goal (Individualized)  Outcome: Ongoing, Progressing  Goal: Absence of Hospital-Acquired Illness or Injury  Outcome: Ongoing, Progressing  Goal: Optimal Comfort and Wellbeing  Outcome: Ongoing, Progressing  Goal: Readiness for Transition of Care  Outcome: Ongoing, Progressing     Problem: Infection  Goal: Absence of Infection Signs and Symptoms  Outcome: Ongoing, Progressing     Problem: Skin Injury Risk Increased  Goal: Skin Health and Integrity  Outcome: Ongoing, Progressing     Problem: Fall Injury Risk  Goal: Absence of Fall and Fall-Related Injury  Outcome: Ongoing, Progressing     Problem: Constipation  Goal: Effective Bowel Elimination  Outcome: Ongoing, Progressing     Problem: Adjustment to Illness (Heart Failure)  Goal: Optimal Coping  Outcome: Ongoing, Progressing     Problem: Cardiac Output Decreased (Heart Failure)  Goal: Optimal Cardiac Output  Outcome: Ongoing, Progressing     Problem: Dysrhythmia (Heart Failure)  Goal: Stable Heart Rate and Rhythm  Outcome: Ongoing, Progressing     Problem: Fluid Imbalance (Heart Failure)  Goal: Fluid Balance  Outcome: Ongoing, Progressing     Problem: Functional Ability Impaired (Heart Failure)  Goal: Optimal Functional Ability  Outcome: Ongoing, Progressing     Problem: Oral Intake Inadequate (Heart Failure)  Goal: Optimal Nutrition Intake  Outcome: Ongoing, Progressing     Problem: Respiratory Compromise (Heart Failure)  Goal: Effective Oxygenation and Ventilation  Outcome: Ongoing, Progressing     Problem: Sleep Disordered Breathing (Heart Failure)  Goal: Effective Breathing Pattern During Sleep  Outcome: Ongoing, Progressing     Problem: Dysrhythmia  Goal: Normalized Cardiac Rhythm  Outcome: Ongoing, Progressing     Problem: Chest Pain  Goal: Resolution of Chest Pain Symptoms  Outcome: Ongoing, Progressing     Problem: Restraint,  Nonbehavioral (Nonviolent)  Goal: Absence of Harm or Injury  Outcome: Ongoing, Progressing     Problem: Adjustment to Illness (Chronic Kidney Disease)  Goal: Optimal Coping with Chronic Illness  Outcome: Ongoing, Progressing     Problem: Electrolyte Imbalance (Chronic Kidney Disease)  Goal: Electrolyte Balance  Outcome: Ongoing, Progressing     Problem: Fluid Volume Excess (Chronic Kidney Disease)  Goal: Fluid Balance  Outcome: Ongoing, Progressing     Problem: Functional Decline (Chronic Kidney Disease)  Goal: Optimal Functional Ability  Outcome: Ongoing, Progressing     Problem: Hematologic Alteration (Chronic Kidney Disease)  Goal: Absence of Anemia Signs and Symptoms  Outcome: Ongoing, Progressing     Problem: Oral Intake Inadequate (Chronic Kidney Disease)  Goal: Optimal Oral Intake  Outcome: Ongoing, Progressing     Problem: Pain (Chronic Kidney Disease)  Goal: Acceptable Pain Control  Outcome: Ongoing, Progressing     Problem: Renal Function Impairment (Chronic Kidney Disease)  Goal: Minimize Renal Failure Effects  Outcome: Ongoing, Progressing     Problem: Swallowing Impairment  Goal: Optimal Eating and Swallowing Without Aspiration  Outcome: Ongoing, Progressing     Problem: Aspiration (Enteral Nutrition)  Goal: Absence of Aspiration Signs and Symptoms  Outcome: Ongoing, Progressing     Problem: Device-Related Complication Risk (Enteral Nutrition)  Goal: Safe, Effective Therapy Delivery  Outcome: Ongoing, Progressing     Problem: Feeding Intolerance (Enteral Nutrition)  Goal: Feeding Tolerance  Outcome: Ongoing, Progressing     Problem: Hypertension Comorbidity  Goal: Blood Pressure in Desired Range  Outcome: Ongoing, Progressing

## 2023-12-22 NOTE — PROGRESS NOTES
VANCOMYCIN DOSING BY PHARMACY DISCONTINUATION NOTE    Tera Griffiths is a 58 y.o. male who had been consulted for vancomycin dosing.    The pharmacy consult for vancomycin dosing has been discontinued.     Vancomycin Dosing by Pharmacy Consult will sign-off. Please reconsult if necessary. Thank you for allowing us to participate in this patient's care.     Thank you for the consult,   Chava Webb, Pharm.D.  Inpatient Pharmacist  EXT [phone]

## 2023-12-22 NOTE — PLAN OF CARE
Problem: Adult Inpatient Plan of Care  Goal: Plan of Care Review  Outcome: Met  Goal: Patient-Specific Goal (Individualized)  Outcome: Met  Goal: Absence of Hospital-Acquired Illness or Injury  Outcome: Met  Goal: Optimal Comfort and Wellbeing  Outcome: Met  Goal: Readiness for Transition of Care  Outcome: Met     Problem: Infection  Goal: Absence of Infection Signs and Symptoms  Outcome: Met     Problem: Skin Injury Risk Increased  Goal: Skin Health and Integrity  Outcome: Met

## 2023-12-23 NOTE — DISCHARGE SUMMARY
[facility] Observation [address] [place] [address] Medicine  Discharge Summary      Patient Name: Tera Griffiths  MRN: [id number]  STEFFANIE: [id number]  Patient Class: IP- Inpatient  Admission Date: 12/19/2023  Hospital Length of Stay: 1 days  Discharge Date and Time:  12/23/2023 2:18 PM  Attending Physician: No att. 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 The patient is nonverbal at baseline and is unable to contribute to history. She reports that patient has been holding his chest over the site of his defibrillator, as well as his abdomen and PEG. Patient continuously points to his G tube and the purulent drainage coming from the site and seems concerned. She is not sure of the last time he had a BM as he goes to the bathroom on his own. Patient is nonverbal at baseline but sister says that he is able to swallow medications, use the bathroom, and brush his teeth. He lives at home with his sister, who is his primary caregiver.     Per chart review:     In the ED: Difficult to take vitals due to patient behavior but stable. CBC and CMP largely unremarkable. Potassium increased to 5.2. BNP 1,161 which is baseline. Troponin increased to 0.06 (baseline is 0.03). CXR unchanged from October. EKG paced and unchanged from October. CTA chest, abdomen, pelvis negative. Given Fentanyl and sodium zirconium cyclosilicate. Admitting for ACS rule out.     * No surgery found *      Hospital Course:   Tera Griffiths is admitted to  Observation for ACS rule out. Trops trend flat: 0.060 -> 0.050 -> 0.063. No evidence of  fluid overload. Echo stable compared to prior, (EF 15-20%), normal venous pressure. Appears constipated on CT and with concern for abdominal pain - started on bowel regimen. Purulence v granulation tissue around PEG site - gram stain and cultures collected, wound care consulted. Started on vanc for now. Gram stain with many WBC, gram positive cocci and rods. Anaerobic culture grew moderate prevotella B melaninogenica. Add flagyl. Discharge home with treatment for PEG site cellulitis - flagyl and doxycycline x 10 days total abx course. Prior to discharge patient's affect improved, engaging and pleasant, taking oral medications, does not appear to be in pain. Sister whom pt lives with endorses he is improved since admission and believes he is at his baseline. Interest in PEG tube removal since patient has been able to maintain nutrition orally for the past 5 months. Referral for follow up with general surgery clinic. PCP follow up. Discharge plan discussed and patient expressed understanding. All questions answered.        Goals of Care Treatment Preferences:  Code Status: Full Code    [facility]           What is most important right now is to focus on extending life as long as possible, even it it means sacrificing quality, curative/life-prolongation (regardless of treatment burdens).  Accordingly, we have decided that the best plan to meet the patient's goals includes continuing with treatment.      Consults:     No new Assessment & Plan notes have been filed under this hospital service since the last note was generated.  Service: Hospital Medicine    Final Active Diagnoses:    Diagnosis Date Noted POA    PRINCIPAL PROBLEM:  Chest pain [R07.9] 12/19/2023 Yes    S/P percutaneous endoscopic gastrostomy (PEG) tube placement [Z93.1] 12/21/2023 Not Applicable    Elevated troponin [R79.89] 09/25/2023 Yes    Debility [R53.81] 09/25/2023 Yes    Agitation [R45.1]  08/22/2023 Yes    Constipation [K59.00] 08/20/2023 Yes    Dysphagia [R13.10] 08/10/2023 Yes    History of embolic stroke involving left middle cerebral artery [Z86.73] 08/08/2023 Not Applicable    Hemiparesis, right [G81.91] 08/08/2023 Yes    Global aphasia [R47.01] 08/08/2023 Yes    Ischemic cardiomyopathy [I25.5] 05/05/2023 Yes    Stage 3 chronic kidney disease [N18.30] 04/19/2023 Yes    CAD (coronary artery disease) [I25.10] 09/23/2022 Yes    History of ST elevation myocardial infarction (STEMI) [I25.2] 09/23/2022 Not Applicable    Paroxysmal A-fib [I48.0] 09/23/2022 Yes    Chronic combined systolic and diastolic heart failure [I50.42] 09/23/2022 Yes    Dyslipidemia [E78.5] 09/23/2022 Yes    ICD (implantable cardioverter-defibrillator) in place [Z95.810] 09/23/2022 Yes    Primary hypertension [I10] 09/23/2022 Yes    BPH (benign prostatic hyperplasia) [N40.0] 01/05/2015 Yes      Problems Resolved During this Admission:       Discharged Condition: good    Disposition: Home or Self Care    Follow Up:   Follow-up Information       [place] Medicaid Follow up.    Why: Call to apply for sitter services  Contact information:  [phone]  option 1             Surgery Clinic Follow up.    Why: The Clinic Nurse will contact you with an appointment. If you do not hear from them in 48 hrs, please call [phone].             Carleen Bueno MD. Go on 1/3/2024.    Specialty: Internal Medicine  Why: 10:00 AM  Contact information:  [address] [facility] [address]  [phone]                           Patient Instructions:      Ambulatory referral/consult to General Surgery   Standing Status: Future   Referral Priority: Urgent Referral Type: Consultation   Referral Reason: Specialty Services Required   Requested Specialty: General Surgery   Number of Visits Requested: 1     Ambulatory referral/consult to Internal Medicine   Standing Status: Future   Referral Priority: Urgent Referral Type: Consultation   Referral  Reason: Specialty Services Required   Requested Specialty: Internal Medicine   Number of Visits Requested: 1     Diet diabetic     Notify your health care provider if you experience any of the following:  temperature >100.4     Notify your health care provider if you experience any of the following:  persistent nausea and vomiting or diarrhea     Notify your health care provider if you experience any of the following:  severe uncontrolled pain     Notify your health care provider if you experience any of the following:  redness, tenderness, or signs of infection (pain, swelling, redness, odor or green/yellow discharge around incision site)     Notify your health care provider if you experience any of the following:  difficulty breathing or increased cough     Notify your health care provider if you experience any of the following:  worsening rash     Notify your health care provider if you experience any of the following:  persistent dizziness, light-headedness, or visual disturbances     Notify your health care provider if you experience any of the following:  increased confusion or weakness     Activity as tolerated       Significant Diagnostic Studies: N/A    Pending Diagnostic Studies:       None           Medications:  Reconciled Home Medications:      Medication List        START taking these medications      bisacodyL 10 mg Supp  Commonly known as: DULCOLAX  Place 1 suppository (10 mg total) rectally daily as needed (Until bowel movement if patient has no bowel movement for 2 days).     doxycycline 100 MG Cap  Commonly known as: VIBRAMYCIN  Take 1 capsule (100 mg total) by mouth 2 (two) times daily. for 7 days     metroNIDAZOLE 500 MG tablet  Commonly known as: FLAGYL  Take 1 tablet (500 mg total) by mouth every 8 (eight) hours. for 7 days            CONTINUE taking these medications      apixaban 5 mg Tab  Commonly known as: ELIQUIS  Take 1 tablet (5 mg total) by mouth 2 (two) times daily.     aspirin 81 MG  Chew  Chew and  swallow 1 tablet (81 mg total) by mouth once daily.     atorvastatin 80 MG tablet  Commonly known as: LIPITOR  Take 1 tablet (80 mg total) by mouth once daily.     busPIRone 10 MG tablet  Commonly known as: BUSPAR  Take 1 tablet (10 mg total) by mouth 3 (three) times daily. Can also take two additional doses as needed for agitation     empagliflozin 10 mg tablet  Commonly known as: JARDIANCE  Take 1 tablet (10 mg total) by mouth once daily.     ezetimibe 10 mg tablet  Commonly known as: ZETIA  Take 1 tablet (10 mg total) by mouth every evening.     famotidine 20 MG tablet  Commonly known as: PEPCID  Take 1 tablet by mouth 2 (two) times daily.     melatonin 3 mg tablet  Commonly known as: MELATIN  Take 2 tablets (6 mg total) by mouth nightly as needed for Insomnia.     metoprolol succinate 50 MG 24 hr tablet  Commonly known as: TOPROL-XL  Take 1 tablet (50 mg total) by mouth once daily.     nitroGLYCERIN 0.4 MG SL tablet  Commonly known as: NITROSTAT  Place under the tongue.     pantoprazole 40 MG tablet  Commonly known as: PROTONIX  Take 1 tablet by mouth once daily.     polyethylene glycol 17 gram Pwpk  Commonly known as: GLYCOLAX  Take 17 g by mouth once daily.     QUEtiapine 25 MG Tab  Commonly known as: SEROQUEL  Take 1 tablet (25 mg total) by mouth every evening.     sacubitriL-valsartan 24-26 mg per tablet  Commonly known as: ENTRESTO  Take 1 tablet by mouth 2 (two) times daily.     senna-docusate 8.6-50 mg 8.6-50 mg per tablet  Commonly known as: PERICOLACE  Take 1 tablet by mouth once daily.     spironolactone 25 MG tablet  Commonly known as: ALDACTONE  Take 1 tablet by mouth once daily.     torsemide 20 MG Tab  Commonly known as: DEMADEX  Take 20 mg by mouth 2 (two) times daily.            STOP taking these medications      furosemide 40 MG tablet  Commonly known as: LASIX     valproic acid (as sodium salt) 250 mg/5 mL (5 mL) Soln  Commonly known as: DEPAKENE              Indwelling  Lines/Drains at time of discharge:   Lines/Drains/Airways       Drain  Duration                  Gastrostomy/Enterostomy 08/17/23 0800 Percutaneous endoscopic gastrostomy (PEG) midline 128 days                    Time spent on the discharge of patient: 36 minutes         Cynthia Lazcano PA-C  Department of Hospital Medicine  [facility] - Observation 11H

## 2023-12-25 LAB
BACTERIA BLD CULT: NORMAL
BACTERIA BLD CULT: NORMAL

## 2023-12-26 LAB
BACTERIA SPEC ANAEROBE CULT: ABNORMAL
BACTERIA SPEC ANAEROBE CULT: ABNORMAL

## 2024-01-01 PROBLEM — I26.94 MULTIPLE SUBSEGMENTAL PULMONARY EMBOLI WITHOUT ACUTE COR PULMONALE: Status: RESOLVED | Noted: 2021-12-10 | Resolved: 2024-01-01

## 2024-01-08 PROBLEM — I26.99 ACUTE PULMONARY EMBOLISM WITHOUT ACUTE COR PULMONALE: Status: RESOLVED | Noted: 2023-08-09 | Resolved: 2024-01-08

## 2024-01-17 ENCOUNTER — HOSPITAL ENCOUNTER (EMERGENCY)
Facility: HOSPITAL | Age: 59
Discharge: HOME | End: 2024-01-18
Attending: EMERGENCY MEDICINE
Payer: MEDICAID

## 2024-01-17 VITALS
RESPIRATION RATE: 20 BRPM | DIASTOLIC BLOOD PRESSURE: 75 MMHG | BODY MASS INDEX: 24.25 KG/M2 | HEIGHT: 68 IN | TEMPERATURE: 99 F | WEIGHT: 160 LBS | HEART RATE: 69 BPM | OXYGEN SATURATION: 98 % | SYSTOLIC BLOOD PRESSURE: 122 MMHG

## 2024-01-17 DIAGNOSIS — R10.9 ABDOMINAL PAIN: ICD-10-CM

## 2024-01-17 DIAGNOSIS — N30.01 ACUTE CYSTITIS WITH HEMATURIA: Primary | ICD-10-CM

## 2024-01-17 LAB
ALBUMIN SERPL BCP-MCNC: 3.9 G/DL (ref 3.5–5.2)
ALP SERPL-CCNC: 76 U/L (ref 55–135)
ALT SERPL W/O P-5'-P-CCNC: 19 U/L (ref 10–44)
ANION GAP SERPL CALC-SCNC: 8 MMOL/L (ref 8–16)
AST SERPL-CCNC: 16 U/L (ref 10–40)
BACTERIA #/AREA URNS AUTO: ABNORMAL /HPF
BASOPHILS # BLD AUTO: 0.01 K/UL (ref 0–0.2)
BASOPHILS NFR BLD: 0.1 % (ref 0–1.9)
BILIRUB SERPL-MCNC: 0.5 MG/DL (ref 0.1–1)
BILIRUB UR QL STRIP: NEGATIVE
BUN SERPL-MCNC: 19 MG/DL (ref 6–20)
BUN SERPL-MCNC: 22 MG/DL (ref 6–30)
CALCIUM SERPL-MCNC: 9.5 MG/DL (ref 8.7–10.5)
CHLORIDE SERPL-SCNC: 103 MMOL/L (ref 95–110)
CHLORIDE SERPL-SCNC: 99 MMOL/L (ref 95–110)
CLARITY UR REFRACT.AUTO: ABNORMAL
CO2 SERPL-SCNC: 27 MMOL/L (ref 23–29)
COLOR UR AUTO: YELLOW
CREAT SERPL-MCNC: 2.1 MG/DL (ref 0.5–1.4)
CREAT SERPL-MCNC: 2.2 MG/DL (ref 0.5–1.4)
DIFFERENTIAL METHOD BLD: ABNORMAL
EOSINOPHIL # BLD AUTO: 0.1 K/UL (ref 0–0.5)
EOSINOPHIL NFR BLD: 0.6 % (ref 0–8)
ERYTHROCYTE [DISTWIDTH] IN BLOOD BY AUTOMATED COUNT: 15.8 % (ref 11.5–14.5)
EST. GFR  (NO RACE VARIABLE): 34.3 ML/MIN/1.73 M^2
GLUCOSE SERPL-MCNC: 98 MG/DL (ref 70–110)
GLUCOSE SERPL-MCNC: 98 MG/DL (ref 70–110)
GLUCOSE UR QL STRIP: ABNORMAL
HCT VFR BLD AUTO: 42.6 % (ref 40–54)
HCT VFR BLD CALC: 54 %PCV (ref 36–54)
HCV AB SERPL QL IA: NORMAL
HGB BLD-MCNC: 13.6 G/DL (ref 14–18)
HGB UR QL STRIP: ABNORMAL
HIV 1+2 AB+HIV1 P24 AG SERPL QL IA: NORMAL
IMM GRANULOCYTES # BLD AUTO: 0.03 K/UL (ref 0–0.04)
IMM GRANULOCYTES NFR BLD AUTO: 0.3 % (ref 0–0.5)
KETONES UR QL STRIP: NEGATIVE
LEUKOCYTE ESTERASE UR QL STRIP: ABNORMAL
LIPASE SERPL-CCNC: 29 U/L (ref 4–60)
LYMPHOCYTES # BLD AUTO: 1.3 K/UL (ref 1–4.8)
LYMPHOCYTES NFR BLD: 12.4 % (ref 18–48)
MCH RBC QN AUTO: 27.7 PG (ref 27–31)
MCHC RBC AUTO-ENTMCNC: 31.9 G/DL (ref 32–36)
MCV RBC AUTO: 87 FL (ref 82–98)
MICROSCOPIC COMMENT: ABNORMAL
MONOCYTES # BLD AUTO: 1 K/UL (ref 0.3–1)
MONOCYTES NFR BLD: 9.8 % (ref 4–15)
NEUTROPHILS # BLD AUTO: 7.7 K/UL (ref 1.8–7.7)
NEUTROPHILS NFR BLD: 76.8 % (ref 38–73)
NITRITE UR QL STRIP: NEGATIVE
NRBC BLD-RTO: 0 /100 WBC
PH UR STRIP: 8 [PH] (ref 5–8)
PLATELET # BLD AUTO: 250 K/UL (ref 150–450)
PMV BLD AUTO: 11 FL (ref 9.2–12.9)
POC IONIZED CALCIUM: 1.18 MMOL/L (ref 1.06–1.42)
POC TCO2 (MEASURED): 29 MMOL/L (ref 23–29)
POTASSIUM BLD-SCNC: 4.1 MMOL/L (ref 3.5–5.1)
POTASSIUM SERPL-SCNC: 4.3 MMOL/L (ref 3.5–5.1)
PROT SERPL-MCNC: 7.9 G/DL (ref 6–8.4)
PROT UR QL STRIP: ABNORMAL
RBC # BLD AUTO: 4.91 M/UL (ref 4.6–6.2)
RBC #/AREA URNS AUTO: >100 /HPF (ref 0–4)
SAMPLE: ABNORMAL
SODIUM BLD-SCNC: 138 MMOL/L (ref 136–145)
SODIUM SERPL-SCNC: 138 MMOL/L (ref 136–145)
SP GR UR STRIP: 1.01 (ref 1–1.03)
URN SPEC COLLECT METH UR: ABNORMAL
WBC # BLD AUTO: 10.06 K/UL (ref 3.9–12.7)
WBC #/AREA URNS AUTO: >100 /HPF (ref 0–5)
WBC CLUMPS UR QL AUTO: ABNORMAL
YEAST UR QL AUTO: ABNORMAL

## 2024-01-17 PROCEDURE — 87389 HIV-1 AG W/HIV-1&-2 AB AG IA: CPT | Performed by: PHYSICIAN ASSISTANT

## 2024-01-17 PROCEDURE — 87086 URINE CULTURE/COLONY COUNT: CPT | Performed by: NURSE PRACTITIONER

## 2024-01-17 PROCEDURE — 63600175 PHARM REV CODE 636 W HCPCS: Performed by: EMERGENCY MEDICINE

## 2024-01-17 PROCEDURE — 93010 ELECTROCARDIOGRAM REPORT: CPT | Mod: ,,, | Performed by: INTERNAL MEDICINE

## 2024-01-17 PROCEDURE — 80047 BASIC METABLC PNL IONIZED CA: CPT

## 2024-01-17 PROCEDURE — 93005 ELECTROCARDIOGRAM TRACING: CPT

## 2024-01-17 PROCEDURE — 96361 HYDRATE IV INFUSION ADD-ON: CPT

## 2024-01-17 PROCEDURE — 83690 ASSAY OF LIPASE: CPT | Performed by: NURSE PRACTITIONER

## 2024-01-17 PROCEDURE — 99285 EMERGENCY DEPT VISIT HI MDM: CPT | Mod: 25

## 2024-01-17 PROCEDURE — 25000003 PHARM REV CODE 250: Performed by: EMERGENCY MEDICINE

## 2024-01-17 PROCEDURE — 96375 TX/PRO/DX INJ NEW DRUG ADDON: CPT

## 2024-01-17 PROCEDURE — 87186 SC STD MICRODIL/AGAR DIL: CPT | Performed by: NURSE PRACTITIONER

## 2024-01-17 PROCEDURE — 87077 CULTURE AEROBIC IDENTIFY: CPT | Performed by: NURSE PRACTITIONER

## 2024-01-17 PROCEDURE — 87088 URINE BACTERIA CULTURE: CPT | Performed by: NURSE PRACTITIONER

## 2024-01-17 PROCEDURE — 82330 ASSAY OF CALCIUM: CPT

## 2024-01-17 PROCEDURE — 96365 THER/PROPH/DIAG IV INF INIT: CPT

## 2024-01-17 PROCEDURE — 81001 URINALYSIS AUTO W/SCOPE: CPT | Performed by: NURSE PRACTITIONER

## 2024-01-17 PROCEDURE — 80053 COMPREHEN METABOLIC PANEL: CPT | Performed by: NURSE PRACTITIONER

## 2024-01-17 PROCEDURE — 86803 HEPATITIS C AB TEST: CPT | Performed by: PHYSICIAN ASSISTANT

## 2024-01-17 PROCEDURE — 85025 COMPLETE CBC W/AUTO DIFF WBC: CPT | Performed by: NURSE PRACTITIONER

## 2024-01-17 RX ORDER — CEPHALEXIN 500 MG/1
500 CAPSULE ORAL 4 TIMES DAILY
Qty: 40 CAPSULE | Refills: 0 | Status: SHIPPED | OUTPATIENT
Start: 2024-01-17 | End: 2024-01-17

## 2024-01-17 RX ORDER — CEPHALEXIN 500 MG/1
500 CAPSULE ORAL EVERY 12 HOURS
Qty: 20 CAPSULE | Refills: 0 | Status: ON HOLD | OUTPATIENT
Start: 2024-01-17 | End: 2024-01-22

## 2024-01-17 RX ORDER — MORPHINE SULFATE 2 MG/ML
2 INJECTION, SOLUTION INTRAMUSCULAR; INTRAVENOUS
Status: COMPLETED | OUTPATIENT
Start: 2024-01-17 | End: 2024-01-17

## 2024-01-17 RX ADMIN — SODIUM CHLORIDE, POTASSIUM CHLORIDE, SODIUM LACTATE AND CALCIUM CHLORIDE 1000 ML: 600; 310; 30; 20 INJECTION, SOLUTION INTRAVENOUS at 09:01

## 2024-01-17 RX ADMIN — MORPHINE SULFATE 2 MG: 2 INJECTION, SOLUTION INTRAMUSCULAR; INTRAVENOUS at 08:01

## 2024-01-17 RX ADMIN — CEFTRIAXONE 1 G: 1 INJECTION, POWDER, FOR SOLUTION INTRAMUSCULAR; INTRAVENOUS at 10:01

## 2024-01-18 NOTE — ED NOTES
I-STAT Chem-8+ Results:   Value Reference Range   Sodium 138 136-145 mmol/L   Potassium  4.1 3.5-5.1 mmol/L   Chloride 99  mmol/L   Ionized Calcium 1.18 1.06-1.42 mmol/L   CO2 (measured) 23 23-29 mmol/L   Glucose 98  mg/dL   BUN 22 6-30 mg/dL   Creatinine 2.1 0.5-1.4 mg/dL   Hematocrit 54 36-54%

## 2024-01-18 NOTE — DISCHARGE INSTRUCTIONS
Please complete the entire course of antibiotics as prescribed.     Return to the ED immediately for any new chest pain, shortness of breath, severe abdominal pain, abdominal pain that is constant, nausea/and vomiting that does not stop on its own, severe headache, new numbness, tingling, or weakness anywhere, fever greater than 101F or any additional concerns.

## 2024-01-18 NOTE — ED PROVIDER NOTES
"Encounter Date: 1/17/2024       History     Chief Complaint   Patient presents with    Multiple Complaints     Having trouble walking and standing. Pt is nonverbal. Arrived with is sister from home. Patient c/o abd pain. Hernia. "Bleeding around feeding tube"     Tera Miles is a 58-year-old male past medical history of AFib, CAD, hypertension, CKD, nonverbal brought in by sister for weakness and abdominal pain.  She states they went to [place] today and she noted he was having difficulty getting out of the car.  They got back to the house, was able to get out of the car and walk in but was appearing weak.  He then started pointing to his abdomen in the right lower quadrant.  She was concerned that he could have another hernia.  Then urinated on the floor which is unlike him.  She denies recent fevers or chills.  He overall is eating very well.        Review of patient's allergies indicates:  No Known Allergies  Past Medical History:   Diagnosis Date    Acute on chronic combined systolic and diastolic heart failure 09/23/2022    Atrial fibrillation     CAD (coronary artery disease) 09/23/2022    Dyslipidemia 09/23/2022    Embolic stroke involving left middle cerebral artery 08/08/2023    Encounter for blood transfusion     H/O heart artery stent     HTN (hypertension) 09/23/2022    ICD (implantable cardioverter-defibrillator) in place 09/23/2022    Paroxysmal A-fib 09/23/2022    Stage 3 chronic kidney disease 04/19/2023     Past Surgical History:   Procedure Laterality Date    CARDIAC DEFIBRILLATOR PLACEMENT Left     CEREBRAL ANGIOGRAM N/A 8/8/2023    Procedure: ANGIOGRAM-CEREBRAL;  Surgeon: Kenzie Rodriguez;  Location: [facility];  Service: Anesthesiology;  Laterality: N/A;  LVO (angiogram)    ESOPHAGOGASTRODUODENOSCOPY N/A 8/11/2023    Procedure: EGD (ESOPHAGOGASTRODUODENOSCOPY);  Surgeon: Colette Martinez MD;  Location: [facility] ([address] [place] [address]);  Service: Gastroenterology;  Laterality: N/A;    " ESOPHAGOGASTRODUODENOSCOPY W/ PEG N/A 8/17/2023    Procedure: EGD, WITH PEG TUBE INSERTION;  Surgeon: aYn Scott MD;  Location: [facility] OR [address] [place] [address];  Service: General;  Laterality: N/A;  PEG, possible lap G    HERNIA REPAIR      LEFT HEART CATHETERIZATION Left 4/18/2023    Procedure: Left heart cath;  Surgeon: Atilio Marks MD;  Location: [facility] CATH LAB;  Service: Cardiology;  Laterality: Left;    RIGHT HEART CATHETERIZATION Right 9/30/2022    Procedure: INSERTION, CATHETER, RIGHT HEART;  Surgeon: Caden Aden MD;  Location: [facility] CATH LAB;  Service: Cardiology;  Laterality: Right;    TREATMENT OF CARDIAC ARRHYTHMIA N/A 11/22/2022    Procedure: CARDIOVERSION;  Surgeon: Marvin Sanon MD;  Location: [facility] EP LAB;  Service: Cardiology;  Laterality: N/A;  afib, KIA, DCCV, anes, MB, 3 Prep     No family history on file.  Social History     Tobacco Use    Smoking status: Never    Smokeless tobacco: Never   Substance Use Topics    Alcohol use: Never    Drug use: Never     Review of Systems    Physical Exam     Initial Vitals [01/17/24 1828]   BP Pulse Resp Temp SpO2   135/78 68 20 98.7 °F (37.1 °C) 97 %      MAP       --         Physical Exam    Nursing note and vitals reviewed.  Constitutional: He appears well-developed and well-nourished. No distress.   HENT:   Mouth/Throat: Oropharynx is clear and moist.   Neck: Neck supple.   Cardiovascular:  Normal rate, regular rhythm and intact distal pulses.           Pulmonary/Chest: Breath sounds normal. He has no wheezes. He has no rales.   Abdominal: Abdomen is soft. Bowel sounds are normal. There is abdominal tenderness (Right lower quadrant, suprapubic pain).   + small amount of granulation tissue around the PEG site, no acute bleeding noted   Genitourinary:    Genitourinary Comments: No groin swelling or tenderness.  Penis is normal, no scrotal swelling or tenderness     Musculoskeletal:         General: No edema.      Cervical back: Neck supple.      Lymphadenopathy:     He has no cervical adenopathy.   Neurological: He is alert.   + grunting, moans     Skin: No rash noted.   Psychiatric: He has a normal mood and affect.         ED Course   Procedures  Labs Reviewed   CBC W/ AUTO DIFFERENTIAL - Abnormal; Notable for the following components:       Result Value    Hemoglobin 13.6 (*)     MCHC 31.9 (*)     RDW 15.8 (*)     Gran % 76.8 (*)     Lymph % 12.4 (*)     All other components within normal limits   COMPREHENSIVE METABOLIC PANEL - Abnormal; Notable for the following components:    Creatinine 2.2 (*)     eGFR 34.3 (*)     All other components within normal limits   HIV 1 / 2 ANTIBODY    Narrative:     Release to patient->Immediate   HEPATITIS C ANTIBODY    Narrative:     Release to patient->Immediate   LIPASE   URINALYSIS, REFLEX TO URINE CULTURE   ISTAT CHEM8     EKG Readings: (Independently Interpreted)   EKG:  AFib at 77, PVCs no acute ischemic changes       Imaging Results    None          Medications   lactated ringers bolus 1,000 mL (has no administration in time range)   morphine injection 2 mg (2 mg Intravenous Given 1/17/24 2036)     Medical Decision Making  Emergent evaluation a 58-year-old male presenting today for suspected weakness, abdominal pain.    Vital signs stable.  Well-appearing, no acute distress.    Differential includes but not limited to obstruction, hernia, appendicitis, colitis, nephrolithiasis, UTI, pyelonephritis    Because he is having difficulty ambulating, CT head ordered as he has not able to verbalize complaints.     Plan for , morphine IV, CT abdomen pelvis, CT head    Amount and/or Complexity of Data Reviewed  Labs:  Decision-making details documented in ED Course.  Radiology: ordered.    Risk  Prescription drug management.               ED Course as of 01/17/24 2058 Wed Jan 17, 2024 2024 WBC: 10.06 [GM]   2024 Hemoglobin(!): 13.6 [GM]   2058 Patient turned over to Dr. Cee in stable condition pending CT abdomen  pelvis, CT head, read evaluation and final disposition [GM]      ED Course User Index  [GM] Allie Tejeda MD                           Clinical Impression:  Final diagnoses:  [R10.9] Abdominal pain                 Allie Tejeda MD  01/17/24 2058

## 2024-01-18 NOTE — ED TRIAGE NOTES
"Tera Griffiths, a 58 y.o. male presents to the ED w/ complaint of trouble walking this afternoon and abdominal pain      Triage note:  Chief Complaint   Patient presents with    Multiple Complaints     Having trouble walking and standing. Pt is nonverbal. Arrived with is sister from home. Patient c/o abd pain. Hernia. "Bleeding around feeding tube"     Review of patient's allergies indicates:  No Known Allergies  Past Medical History:   Diagnosis Date    Acute on chronic combined systolic and diastolic heart failure 09/23/2022    Atrial fibrillation     CAD (coronary artery disease) 09/23/2022    Dyslipidemia 09/23/2022    Embolic stroke involving left middle cerebral artery 08/08/2023    Encounter for blood transfusion     H/O heart artery stent     HTN (hypertension) 09/23/2022    ICD (implantable cardioverter-defibrillator) in place 09/23/2022    Paroxysmal A-fib 09/23/2022    Stage 3 chronic kidney disease 04/19/2023       "

## 2024-01-19 ENCOUNTER — HOSPITAL ENCOUNTER (OUTPATIENT)
Facility: HOSPITAL | Age: 59
Discharge: PSYCHIATRIC OR SUBSTANCE USE FACILITY | End: 2024-01-22
Attending: EMERGENCY MEDICINE | Admitting: EMERGENCY MEDICINE
Payer: MEDICAID

## 2024-01-19 DIAGNOSIS — Z00.8 MEDICAL CLEARANCE FOR PSYCHIATRIC ADMISSION: ICD-10-CM

## 2024-01-19 DIAGNOSIS — R47.01 GLOBAL APHASIA: ICD-10-CM

## 2024-01-19 DIAGNOSIS — R07.9 CHEST PAIN: ICD-10-CM

## 2024-01-19 DIAGNOSIS — F03.90 MAJOR NEUROCOGNITIVE DISORDER: ICD-10-CM

## 2024-01-19 DIAGNOSIS — R46.89 AGGRESSIVE BEHAVIOR: Primary | ICD-10-CM

## 2024-01-19 LAB
ALBUMIN SERPL BCP-MCNC: 3.6 G/DL (ref 3.5–5.2)
ALP SERPL-CCNC: 65 U/L (ref 55–135)
ALT SERPL W/O P-5'-P-CCNC: 14 U/L (ref 10–44)
AMPHET+METHAMPHET UR QL: NEGATIVE
ANION GAP SERPL CALC-SCNC: 5 MMOL/L (ref 8–16)
APAP SERPL-MCNC: <3 UG/ML (ref 10–20)
AST SERPL-CCNC: 12 U/L (ref 10–40)
BACTERIA #/AREA URNS AUTO: ABNORMAL /HPF
BARBITURATES UR QL SCN>200 NG/ML: NEGATIVE
BASOPHILS # BLD AUTO: 0.01 K/UL (ref 0–0.2)
BASOPHILS NFR BLD: 0.2 % (ref 0–1.9)
BENZODIAZ UR QL SCN>200 NG/ML: NEGATIVE
BILIRUB SERPL-MCNC: 0.4 MG/DL (ref 0.1–1)
BILIRUB UR QL STRIP: NEGATIVE
BUN SERPL-MCNC: 16 MG/DL (ref 6–20)
BZE UR QL SCN: NEGATIVE
CALCIUM SERPL-MCNC: 10 MG/DL (ref 8.7–10.5)
CANNABINOIDS UR QL SCN: NEGATIVE
CHLORIDE SERPL-SCNC: 108 MMOL/L (ref 95–110)
CLARITY UR REFRACT.AUTO: CLEAR
CO2 SERPL-SCNC: 25 MMOL/L (ref 23–29)
COLOR UR AUTO: YELLOW
CREAT SERPL-MCNC: 1.5 MG/DL (ref 0.5–1.4)
CREAT UR-MCNC: 108 MG/DL (ref 23–375)
DIFFERENTIAL METHOD BLD: ABNORMAL
EOSINOPHIL # BLD AUTO: 0.1 K/UL (ref 0–0.5)
EOSINOPHIL NFR BLD: 2.7 % (ref 0–8)
ERYTHROCYTE [DISTWIDTH] IN BLOOD BY AUTOMATED COUNT: 15.9 % (ref 11.5–14.5)
EST. GFR  (NO RACE VARIABLE): 54.3 ML/MIN/1.73 M^2
ETHANOL SERPL-MCNC: <10 MG/DL
GLUCOSE SERPL-MCNC: 85 MG/DL (ref 70–110)
GLUCOSE UR QL STRIP: ABNORMAL
HCT VFR BLD AUTO: 40.4 % (ref 40–54)
HGB BLD-MCNC: 12.5 G/DL (ref 14–18)
HGB UR QL STRIP: NEGATIVE
IMM GRANULOCYTES # BLD AUTO: 0.01 K/UL (ref 0–0.04)
IMM GRANULOCYTES NFR BLD AUTO: 0.2 % (ref 0–0.5)
KETONES UR QL STRIP: NEGATIVE
LEUKOCYTE ESTERASE UR QL STRIP: ABNORMAL
LYMPHOCYTES # BLD AUTO: 1.1 K/UL (ref 1–4.8)
LYMPHOCYTES NFR BLD: 21.3 % (ref 18–48)
MCH RBC QN AUTO: 27.1 PG (ref 27–31)
MCHC RBC AUTO-ENTMCNC: 30.9 G/DL (ref 32–36)
MCV RBC AUTO: 88 FL (ref 82–98)
METHADONE UR QL SCN>300 NG/ML: NEGATIVE
MICROSCOPIC COMMENT: ABNORMAL
MONOCYTES # BLD AUTO: 0.8 K/UL (ref 0.3–1)
MONOCYTES NFR BLD: 14.6 % (ref 4–15)
NEUTROPHILS # BLD AUTO: 3.2 K/UL (ref 1.8–7.7)
NEUTROPHILS NFR BLD: 61 % (ref 38–73)
NITRITE UR QL STRIP: NEGATIVE
NRBC BLD-RTO: 0 /100 WBC
OPIATES UR QL SCN: ABNORMAL
PCP UR QL SCN>25 NG/ML: NEGATIVE
PH UR STRIP: 7 [PH] (ref 5–8)
PLATELET # BLD AUTO: 241 K/UL (ref 150–450)
PMV BLD AUTO: 10.8 FL (ref 9.2–12.9)
POTASSIUM SERPL-SCNC: 4.7 MMOL/L (ref 3.5–5.1)
PROT SERPL-MCNC: 7.5 G/DL (ref 6–8.4)
PROT UR QL STRIP: ABNORMAL
RBC # BLD AUTO: 4.61 M/UL (ref 4.6–6.2)
RBC #/AREA URNS AUTO: 2 /HPF (ref 0–4)
SODIUM SERPL-SCNC: 138 MMOL/L (ref 136–145)
SP GR UR STRIP: 1.02 (ref 1–1.03)
SQUAMOUS #/AREA URNS AUTO: 0 /HPF
TOXICOLOGY INFORMATION: ABNORMAL
TSH SERPL DL<=0.005 MIU/L-ACNC: 1.39 UIU/ML (ref 0.4–4)
UNSPECIFIED CRY UR QL COMP ASSIST: <1
URN SPEC COLLECT METH UR: ABNORMAL
WBC # BLD AUTO: 5.27 K/UL (ref 3.9–12.7)
WBC #/AREA URNS AUTO: 9 /HPF (ref 0–5)
YEAST UR QL AUTO: ABNORMAL

## 2024-01-19 PROCEDURE — 99285 EMERGENCY DEPT VISIT HI MDM: CPT | Mod: 25

## 2024-01-19 PROCEDURE — 80053 COMPREHEN METABOLIC PANEL: CPT | Performed by: EMERGENCY MEDICINE

## 2024-01-19 PROCEDURE — 93005 ELECTROCARDIOGRAM TRACING: CPT

## 2024-01-19 PROCEDURE — 82077 ASSAY SPEC XCP UR&BREATH IA: CPT | Performed by: EMERGENCY MEDICINE

## 2024-01-19 PROCEDURE — 80307 DRUG TEST PRSMV CHEM ANLYZR: CPT | Performed by: EMERGENCY MEDICINE

## 2024-01-19 PROCEDURE — 96372 THER/PROPH/DIAG INJ SC/IM: CPT | Performed by: EMERGENCY MEDICINE

## 2024-01-19 PROCEDURE — 80143 DRUG ASSAY ACETAMINOPHEN: CPT | Performed by: EMERGENCY MEDICINE

## 2024-01-19 PROCEDURE — 63600175 PHARM REV CODE 636 W HCPCS: Performed by: EMERGENCY MEDICINE

## 2024-01-19 PROCEDURE — 84443 ASSAY THYROID STIM HORMONE: CPT | Performed by: EMERGENCY MEDICINE

## 2024-01-19 PROCEDURE — 85025 COMPLETE CBC W/AUTO DIFF WBC: CPT | Performed by: EMERGENCY MEDICINE

## 2024-01-19 PROCEDURE — 81001 URINALYSIS AUTO W/SCOPE: CPT | Mod: XB | Performed by: EMERGENCY MEDICINE

## 2024-01-19 PROCEDURE — 93010 ELECTROCARDIOGRAM REPORT: CPT | Mod: ,,, | Performed by: INTERNAL MEDICINE

## 2024-01-19 RX ORDER — LORAZEPAM 2 MG/ML
2 INJECTION INTRAMUSCULAR
Status: COMPLETED | OUTPATIENT
Start: 2024-01-19 | End: 2024-01-19

## 2024-01-19 RX ORDER — DIPHENHYDRAMINE HYDROCHLORIDE 50 MG/ML
50 INJECTION INTRAMUSCULAR; INTRAVENOUS
Status: COMPLETED | OUTPATIENT
Start: 2024-01-19 | End: 2024-01-19

## 2024-01-19 RX ORDER — HALOPERIDOL 5 MG/ML
5 INJECTION INTRAMUSCULAR
Status: COMPLETED | OUTPATIENT
Start: 2024-01-19 | End: 2024-01-19

## 2024-01-19 RX ADMIN — DIPHENHYDRAMINE HYDROCHLORIDE 50 MG: 50 INJECTION, SOLUTION INTRAMUSCULAR; INTRAVENOUS at 11:01

## 2024-01-19 RX ADMIN — LORAZEPAM 2 MG: 2 INJECTION INTRAMUSCULAR; INTRAVENOUS at 11:01

## 2024-01-19 RX ADMIN — HALOPERIDOL LACTATE 5 MG: 5 INJECTION, SOLUTION INTRAMUSCULAR at 11:01

## 2024-01-19 NOTE — ED NOTES
Pt brought to the ED by his sister. She states he has been acting aggressive at home, Pt is anxious but cooperative with changing of clothes and lab collections. Pt is unable to communicate verbally r/t CVA. Pt does follow simple directions. Pt has a PEG tube but according to a speech pathology report dated 12/20/2023 he can swallow whole meds, eat a regular diet, and tolerate thin liquids. Pt given water he consumed this without any issues.

## 2024-01-19 NOTE — ED TRIAGE NOTES
Pt. Is a 58 yr old -American male presenting to the ED after running in the street, and pushing care takers out of the way.  Hx of stroke in past that left Pt. Non-verbal.
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
## 2024-01-19 NOTE — ED NOTES
Pt belongings: jacket, shirt, pants, shoes, socks. All secured outside of the room. Pt has NO items in the OC safe.

## 2024-01-20 PROBLEM — R46.89 AGGRESSIVE BEHAVIOR: Status: ACTIVE | Noted: 2024-01-20

## 2024-01-20 LAB — BACTERIA UR CULT: ABNORMAL

## 2024-01-20 PROCEDURE — 25000003 PHARM REV CODE 250: Performed by: EMERGENCY MEDICINE

## 2024-01-20 PROCEDURE — 96372 THER/PROPH/DIAG INJ SC/IM: CPT | Performed by: EMERGENCY MEDICINE

## 2024-01-20 PROCEDURE — 96375 TX/PRO/DX INJ NEW DRUG ADDON: CPT

## 2024-01-20 PROCEDURE — 63600175 PHARM REV CODE 636 W HCPCS: Mod: JZ,JG | Performed by: EMERGENCY MEDICINE

## 2024-01-20 PROCEDURE — G0378 HOSPITAL OBSERVATION PER HR: HCPCS

## 2024-01-20 PROCEDURE — 25000003 PHARM REV CODE 250: Performed by: NURSE PRACTITIONER

## 2024-01-20 PROCEDURE — 63600175 PHARM REV CODE 636 W HCPCS: Performed by: NURSE PRACTITIONER

## 2024-01-20 PROCEDURE — 25000242 PHARM REV CODE 250 ALT 637 W/ HCPCS: Performed by: EMERGENCY MEDICINE

## 2024-01-20 PROCEDURE — 90792 PSYCH DIAG EVAL W/MED SRVCS: CPT | Mod: AF,HB,, | Performed by: PSYCHIATRY & NEUROLOGY

## 2024-01-20 PROCEDURE — 96365 THER/PROPH/DIAG IV INF INIT: CPT

## 2024-01-20 RX ORDER — ATORVASTATIN CALCIUM 40 MG/1
80 TABLET, FILM COATED ORAL DAILY
Status: DISCONTINUED | OUTPATIENT
Start: 2024-01-20 | End: 2024-01-22 | Stop reason: HOSPADM

## 2024-01-20 RX ORDER — DIVALPROEX SODIUM 250 MG/1
250 TABLET, DELAYED RELEASE ORAL 3 TIMES DAILY
Status: DISCONTINUED | OUTPATIENT
Start: 2024-01-20 | End: 2024-01-22

## 2024-01-20 RX ORDER — LORAZEPAM 2 MG/ML
1 INJECTION INTRAMUSCULAR
Status: DISCONTINUED | OUTPATIENT
Start: 2024-01-20 | End: 2024-01-20

## 2024-01-20 RX ORDER — METOPROLOL SUCCINATE 50 MG/1
50 TABLET, EXTENDED RELEASE ORAL DAILY
Status: DISCONTINUED | OUTPATIENT
Start: 2024-01-20 | End: 2024-01-22 | Stop reason: HOSPADM

## 2024-01-20 RX ORDER — ACETAMINOPHEN 325 MG/1
650 TABLET ORAL EVERY 4 HOURS PRN
Status: DISCONTINUED | OUTPATIENT
Start: 2024-01-20 | End: 2024-01-22 | Stop reason: HOSPADM

## 2024-01-20 RX ORDER — OLANZAPINE 10 MG/2ML
10 INJECTION, POWDER, FOR SOLUTION INTRAMUSCULAR
Status: COMPLETED | OUTPATIENT
Start: 2024-01-20 | End: 2024-01-20

## 2024-01-20 RX ORDER — LORAZEPAM 1 MG/1
2 TABLET ORAL
Status: DISCONTINUED | OUTPATIENT
Start: 2024-01-20 | End: 2024-01-20

## 2024-01-20 RX ORDER — IBUPROFEN 200 MG
16 TABLET ORAL
Status: DISCONTINUED | OUTPATIENT
Start: 2024-01-20 | End: 2024-01-22 | Stop reason: HOSPADM

## 2024-01-20 RX ORDER — DIVALPROEX SODIUM 250 MG/1
250 TABLET, DELAYED RELEASE ORAL 3 TIMES DAILY PRN
Status: DISCONTINUED | OUTPATIENT
Start: 2024-01-20 | End: 2024-01-22 | Stop reason: HOSPADM

## 2024-01-20 RX ORDER — TALC
6 POWDER (GRAM) TOPICAL NIGHTLY PRN
Status: DISCONTINUED | OUTPATIENT
Start: 2024-01-20 | End: 2024-01-22 | Stop reason: HOSPADM

## 2024-01-20 RX ORDER — NAPROXEN SODIUM 220 MG/1
81 TABLET, FILM COATED ORAL DAILY
Status: DISCONTINUED | OUTPATIENT
Start: 2024-01-20 | End: 2024-01-22 | Stop reason: HOSPADM

## 2024-01-20 RX ORDER — PANTOPRAZOLE SODIUM 40 MG/1
40 TABLET, DELAYED RELEASE ORAL DAILY
Status: DISCONTINUED | OUTPATIENT
Start: 2024-01-21 | End: 2024-01-22 | Stop reason: HOSPADM

## 2024-01-20 RX ORDER — SPIRONOLACTONE 25 MG/1
25 TABLET ORAL DAILY
Status: DISCONTINUED | OUTPATIENT
Start: 2024-01-20 | End: 2024-01-22 | Stop reason: HOSPADM

## 2024-01-20 RX ORDER — NALOXONE HCL 0.4 MG/ML
0.02 VIAL (ML) INJECTION
Status: DISCONTINUED | OUTPATIENT
Start: 2024-01-20 | End: 2024-01-22 | Stop reason: HOSPADM

## 2024-01-20 RX ORDER — SODIUM CHLORIDE 0.9 % (FLUSH) 0.9 %
10 SYRINGE (ML) INJECTION EVERY 12 HOURS PRN
Status: DISCONTINUED | OUTPATIENT
Start: 2024-01-20 | End: 2024-01-22 | Stop reason: HOSPADM

## 2024-01-20 RX ORDER — EZETIMIBE 10 MG/1
10 TABLET ORAL NIGHTLY
Status: DISCONTINUED | OUTPATIENT
Start: 2024-01-20 | End: 2024-01-22 | Stop reason: HOSPADM

## 2024-01-20 RX ORDER — GLUCAGON 1 MG
1 KIT INJECTION
Status: DISCONTINUED | OUTPATIENT
Start: 2024-01-20 | End: 2024-01-22 | Stop reason: HOSPADM

## 2024-01-20 RX ORDER — QUETIAPINE FUMARATE 50 MG/1
50 TABLET, EXTENDED RELEASE ORAL DAILY
Status: DISCONTINUED | OUTPATIENT
Start: 2024-01-20 | End: 2024-01-20

## 2024-01-20 RX ORDER — LORAZEPAM 1 MG/1
1 TABLET ORAL
Status: COMPLETED | OUTPATIENT
Start: 2024-01-20 | End: 2024-01-20

## 2024-01-20 RX ORDER — IBUPROFEN 200 MG
24 TABLET ORAL
Status: DISCONTINUED | OUTPATIENT
Start: 2024-01-20 | End: 2024-01-22 | Stop reason: HOSPADM

## 2024-01-20 RX ORDER — ACETAMINOPHEN 500 MG
1000 TABLET ORAL EVERY 8 HOURS PRN
Status: DISCONTINUED | OUTPATIENT
Start: 2024-01-20 | End: 2024-01-22 | Stop reason: HOSPADM

## 2024-01-20 RX ADMIN — EZETIMIBE 10 MG: 10 TABLET ORAL at 08:01

## 2024-01-20 RX ADMIN — ATORVASTATIN CALCIUM 80 MG: 40 TABLET, FILM COATED ORAL at 06:01

## 2024-01-20 RX ADMIN — ASPIRIN 81 MG CHEWABLE TABLET 81 MG: 81 TABLET CHEWABLE at 09:01

## 2024-01-20 RX ADMIN — SPIRONOLACTONE 25 MG: 25 TABLET ORAL at 09:01

## 2024-01-20 RX ADMIN — SACUBITRIL AND VALSARTAN 1 TABLET: 24; 26 TABLET, FILM COATED ORAL at 09:01

## 2024-01-20 RX ADMIN — LORAZEPAM 1 MG: 2 INJECTION INTRAMUSCULAR at 09:01

## 2024-01-20 RX ADMIN — OLANZAPINE 10 MG: 10 INJECTION, POWDER, FOR SOLUTION INTRAMUSCULAR at 08:01

## 2024-01-20 RX ADMIN — LORAZEPAM 1 MG: 1 TABLET ORAL at 04:01

## 2024-01-20 RX ADMIN — APIXABAN 5 MG: 5 TABLET, FILM COATED ORAL at 08:01

## 2024-01-20 RX ADMIN — QUETIAPINE FUMARATE 50 MG: 50 TABLET, EXTENDED RELEASE ORAL at 09:01

## 2024-01-20 RX ADMIN — APIXABAN 5 MG: 5 TABLET, FILM COATED ORAL at 09:01

## 2024-01-20 RX ADMIN — EMPAGLIFLOZIN 10 MG: 10 TABLET, FILM COATED ORAL at 09:01

## 2024-01-20 RX ADMIN — DIVALPROEX SODIUM 250 MG: 250 TABLET, DELAYED RELEASE ORAL at 02:01

## 2024-01-20 RX ADMIN — METOPROLOL SUCCINATE 50 MG: 50 TABLET, EXTENDED RELEASE ORAL at 09:01

## 2024-01-20 RX ADMIN — CEFTRIAXONE 1 G: 1 INJECTION, POWDER, FOR SOLUTION INTRAMUSCULAR; INTRAVENOUS at 08:01

## 2024-01-20 RX ADMIN — DIVALPROEX SODIUM 250 MG: 250 TABLET, DELAYED RELEASE ORAL at 08:01

## 2024-01-20 RX ADMIN — SACUBITRIL AND VALSARTAN 1 TABLET: 24; 26 TABLET, FILM COATED ORAL at 08:01

## 2024-01-20 NOTE — ED NOTES
Pt remains in paper scrubs, resting comfortably in stretcher.  Pt aware of plan of care. PEC complete and in patient's chart. Pt belongings are removed from room, labeled, and locked away. No unnecessary equipment, cords, or wires left in room. Sitter at bedside recording Q 15 minute checks. Sitter belongings are out of room.

## 2024-01-20 NOTE — ED NOTES
Patient calm at this time. Given chips and chocolate chip cookie to eat per patient request. 1:1 sitter at bedside at this time.

## 2024-01-20 NOTE — ED NOTES
Pt remains in paper scrubs, resting comfortably in stretcher. No acute distress observed. Pt aware of plan of care. PEC complete and in patient's chart. Pt belongings are removed from room, labeled, and locked away. No unnecessary equipment, cords, or wires left in room. Sitter at bedside recording Q 15 minute checks. Sitter belongings are out of room.

## 2024-01-20 NOTE — PROVIDER PROGRESS NOTES - EMERGENCY DEPT.
I have assumed care for this patient from Dr. Joseph          7:50 AM . I have reviewed case, and have seen patient. I have read the chart and discussed care with the previous attending. I agree with the plan as previously determined. Since assuming care, the patient's course includes:      58-year-old male PEC'd for grave disability, aggressive behavior. pending transfer    Patient eating in the room, aphasic  G-tube in place    Home medications ordered, also Ativan p.r.n. for agitation  Will consult Psychiatry as the patient has been in the emergency department for 19 hours    10:50 AM  I discussed with Psychiatry, recommendations to stop any antipsychotics and benzodiazepines.  Depakote 250 t.i.d. scheduled and 250 t.i.d. p.r.n. for agitation    Patient was signed-out to Dr. Abreu at the change of shift with plan for: pending psychiatric placement, consider admission if unsuccessful within the next few hours

## 2024-01-20 NOTE — ED NOTES
Patient moaning and unknown if due to pain due to being non-verbal. Bladder scanned patient and 126 mL present in bladder. Patient able to urinate in urinal after. Changed into light blue scrub bottoms. 1:1 sitter at bedside.

## 2024-01-20 NOTE — CONSULTS
Emergency Psychiatry Consult Note    1/20/2024 10:59 AM  Tera Griffiths  MRN: [id number]    Chief Complaint / Reason for Consult: physical aggression and grave disability     SUBJECTIVE     History of Present Illness:   Tera Griffiths is a 58 y.o. male with no known past psychiatric history and PMH of L MCA stroke w/ residual aphasia and R-sided deficits, s/p PEG, cognitive impairment, CHF, HTN, HLD, and CKD who currently presents w/ aggression at home. Psychiatry consulted to address the pt's aggression. He has been awaiting placement in the ED for the past day.    Per ED RN(s):  Pt out of bed. Sitting on floor after urinating on floor w/ no pants.     Pt climbing in and out of bed. Urinating on floor x2. Difficult to redirect     Pt climbing in and out of bed. Appears anxious. Difficult to redirect. MD made aware.     Per ED MD:  This history was obtained from the patient's sister, Claribel Griffiths, with whom he lives and who is his healthcare power of  by phone.  He has aphasia which limited history acquisition.     He is a 58-year-old with the below past medical history who presents by personal transportation.  His sister reports behavior change over the past few days.  He has been aggressive and difficult to redirect.  He has been running into the street, posing a safety risk for himself and others.  She reports a prior episode of similar symptoms.  He was started on psychiatric medications that were discontinued by his primary care provider.    Patient became slightly anxious overnight.  Given PO ativan. Will continue to await psychiatric placement.     Per Psychiatry:  On psych eval, pt is laying in bed, drinking water. Interview limited d/t pt's aphasia/nonverbal status. He moans and points to things he wants such as juice. Able to squeeze hand when asked but does not follow other commands. Unable to participate in [facility]-ICU.     Per chart review, pt was seen by CL service in hospital in Aug-Sep and  Oct of last year. He was delirious w/ agitation. QTc was prolonged. Valproic acid was utilized at multiple points, eventually at a dose of 500 qAM / 500 w/ lunch / 1500 mg qhs.    Collateral:  Name: Claribel Griffiths  Number used: [phone]  Relationship to pt: sister  Pt's recent behavior / account of events: Pt has become increasingly agitated and aggressive at home. He has been hitting and pushing.   Pt's baseline behavior: Nonverbal, lives at home w/ sister, sleeps well  Medications: Buspar 10 mg tid, Seroquel 25 mg qhs  Comfortable w/ pt being discharged?: Sister feels that, if pt were to return home, she would need extra help to supervise pt, at least for a few days.       Psychiatric Review of Systems: Unable to assess    Medical Review Of Systems:  Unable to assess    Psychiatric History:  Diagnose(s): No, per chart review  Previous Medication Trials: Yes - Seroquel, Depakote, Buspar, Zyprexa  Previous Psychiatric Hospitalizations: [facility]  Family Psychiatric History: [facility]  Outpatient Psychiatrist: [facility]  Outpatient Therapist: [facility]    Suicide/Violence Risk Assessment:  Current/active suicidal ideation/plan/intent: [facility]  Previous suicide attempts: [facility]  Current/active homicidal ideation/plan/intent: [facility]  History of threats/arrests associated with violent conduct: [facility]  Access to firearms/lethal weapons: [facility]    Social History:  Marital Status:   Children: 1   Employment Status:  [facility]  Education:  [facility]  Special Ed:  [facility]  Housing Status: Yes - w/ sister  Developmental History: [facility]  History of Abuse: [facility]    Substance Use History:   Unable to assess    Legal History:  Unable to assess    Psychosocial Factors:  Stressors: ASHLEY   Functioning Relationships: good relationship w/ sister      Scheduled Meds:   apixaban  5 mg Oral BID    aspirin  81 mg Oral Daily    atorvastatin  80 mg Oral Daily    divalproex  250 mg Oral TID    empagliflozin  10 mg Oral Daily    ezetimibe  10 mg Oral QHS    metoprolol succinate  50 mg Oral  Daily    sacubitriL-valsartan  1 tablet Oral BID    spironolactone  25 mg Oral Daily     Psychotherapeutics (From admission, onward)      None          PRN Meds:  divalproex  Home Meds:  Prior to Admission medications    Medication Sig Start Date End Date Taking? Authorizing Provider   apixaban (ELIQUIS) 5 mg Tab Take 1 tablet (5 mg total) by mouth 2 (two) times daily. 10/19/23   Coby Fuentes MD   aspirin 81 MG Chew Chew and  swallow 1 tablet (81 mg total) by mouth once daily. 10/19/23   Coby Fuentes MD   atorvastatin (LIPITOR) 80 MG tablet Take 1 tablet (80 mg total) by mouth once daily. 10/19/23 10/18/24  Coby Fuentes MD   bisacodyL (DULCOLAX) 10 mg Supp Place 1 suppository (10 mg total) rectally daily as needed (Until bowel movement if patient has no bowel movement for 2 days). 12/22/23 1/21/24  Cynthia Lazcano PA-C   busPIRone (BUSPAR) 10 MG tablet Take 1 tablet (10 mg total) by mouth 3 (three) times daily. Can also take two additional doses as needed for agitation 12/22/23 12/21/24  Cynthia Lazcano PA-C   cephALEXin (KEFLEX) 500 MG capsule Take 1 capsule (500 mg total) by mouth every 12 (twelve) hours. for 10 days 1/17/24 1/27/24  Galilea Cee MD   empagliflozin (JARDIANCE) 10 mg tablet Take 1 tablet (10 mg total) by mouth once daily. 10/19/23   Coby Fuentes MD   ezetimibe (ZETIA) 10 mg tablet Take 1 tablet (10 mg total) by mouth every evening. 10/19/23 10/18/24  Coby Fuentes MD   famotidine (PEPCID) 20 MG tablet Take 1 tablet by mouth 2 (two) times daily. 12/8/23 1/7/24  Corona Desouza   melatonin (MELATIN) 3 mg tablet Take 2 tablets (6 mg total) by mouth nightly as needed for Insomnia. 10/20/23   Rosi Leonardo MD   metoprolol succinate (TOPROL-XL) 50 MG 24 hr tablet Take 1 tablet (50 mg total) by mouth once daily. 10/20/23 10/19/24  Coby Fuentes MD   nitroGLYCERIN (NITROSTAT) 0.4 MG SL tablet Place under the tongue. 7/21/23   Provider, Historical    pantoprazole (PROTONIX) 40 MG tablet Take 1 tablet by mouth once daily. 11/2/23   Provider, Historical   polyethylene glycol (GLYCOLAX) 17 gram PwPk Take 17 g by mouth once daily. 10/21/23   Rosi Leonardo MD   QUEtiapine (SEROQUEL) 25 MG Tab Take 1 tablet (25 mg total) by mouth every evening. 12/22/23 12/21/24  Cynthia Lazcano PA-C   sacubitriL-valsartan (ENTRESTO) 24-26 mg per tablet Take 1 tablet by mouth 2 (two) times daily. 10/19/23   Coby Fuentes MD   senna-docusate 8.6-50 mg (PERICOLACE) 8.6-50 mg per tablet Take 1 tablet by mouth once daily. 10/21/23   Rosi Leonardo MD   spironolactone (ALDACTONE) 25 MG tablet Take 1 tablet by mouth once daily. 12/5/23   Provider, Historical   torsemide (DEMADEX) 20 MG Tab Take 20 mg by mouth 2 (two) times daily.    Provider, Historical     Psychotherapeutics (From admission, onward)      None          Allergies:  Patient has no known allergies.  Past Medical/Surgical History:  Past Medical History:   Diagnosis Date    Acute on chronic combined systolic and diastolic heart failure 09/23/2022    Atrial fibrillation     CAD (coronary artery disease) 09/23/2022    Dyslipidemia 09/23/2022    Embolic stroke involving left middle cerebral artery 08/08/2023    Encounter for blood transfusion     H/O heart artery stent     HTN (hypertension) 09/23/2022    ICD (implantable cardioverter-defibrillator) in place 09/23/2022    Paroxysmal A-fib 09/23/2022    Stage 3 chronic kidney disease 04/19/2023     Past Surgical History:   Procedure Laterality Date    CARDIAC DEFIBRILLATOR PLACEMENT Left     CEREBRAL ANGIOGRAM N/A 8/8/2023    Procedure: ANGIOGRAM-CEREBRAL;  Surgeon: Kenzie Rodriguez;  Location: [facility];  Service: Anesthesiology;  Laterality: N/A;  LVO (angiogram)    ESOPHAGOGASTRODUODENOSCOPY N/A 8/11/2023    Procedure: EGD (ESOPHAGOGASTRODUODENOSCOPY);  Surgeon: Colette Martinez MD;  Location: [facility] ([address] [place] [address]);  Service: Gastroenterology;  Laterality: N/A;     ESOPHAGOGASTRODUODENOSCOPY W/ PEG N/A 8/17/2023    Procedure: EGD, WITH PEG TUBE INSERTION;  Surgeon: Yan Scott MD;  Location: [facility] OR [address] [place] [address];  Service: General;  Laterality: N/A;  PEG, possible lap G    HERNIA REPAIR      LEFT HEART CATHETERIZATION Left 4/18/2023    Procedure: Left heart cath;  Surgeon: Atilio Marks MD;  Location: [facility] CATH LAB;  Service: Cardiology;  Laterality: Left;    RIGHT HEART CATHETERIZATION Right 9/30/2022    Procedure: INSERTION, CATHETER, RIGHT HEART;  Surgeon: Caden Aden MD;  Location: [facility] CATH LAB;  Service: Cardiology;  Laterality: Right;    TREATMENT OF CARDIAC ARRHYTHMIA N/A 11/22/2022    Procedure: CARDIOVERSION;  Surgeon: Marvin Sanon MD;  Location: [facility] EP LAB;  Service: Cardiology;  Laterality: N/A;  afib, KIA, DCCV, anes, MB, 3 Prep     OBJECTIVE     Vital Signs:  Temp:  [95 °F (35 °C)-98.3 °F (36.8 °C)]   Pulse:  [71-81]   Resp:  [16-18]   BP: (112-138)/(83-93)   SpO2:  [95 %-99 %]     Mental Status Exam:  Appearance: age appropriate, in hospital garb, laying in bed  Level of Consciousness/Orientation: alert, unable to assess orientation  Behavior/Cooperation:  unable to participate  Speech: aphasic, moans  Mood: ASHLEY  Affect: frustrated  Thought Process: ASHLEY  Thought Content: ASHLEY  Perceptions: ASHLEY  Fund of Knowledge: unable to assess  Attention Span/Concentration: impaired  Memory: Unable to assess  Insight: poor  Judgment:  impaired    Laboratory Data:  Recent Results (from the past 48 hour(s))   CBC auto differential    Collection Time: 01/19/24 12:04 PM   Result Value Ref Range    WBC 5.27 3.90 - 12.70 K/uL    RBC 4.61 4.60 - 6.20 M/uL    Hemoglobin 12.5 (L) 14.0 - 18.0 g/dL    Hematocrit 40.4 40.0 - 54.0 %    MCV 88 82 - 98 fL    MCH 27.1 27.0 - 31.0 pg    MCHC 30.9 (L) 32.0 - 36.0 g/dL    RDW 15.9 (H) 11.5 - 14.5 %    Platelets 241 150 - 450 K/uL    MPV 10.8 9.2 - 12.9 fL    Immature Granulocytes 0.2 0.0 - 0.5 %    Gran # (ANC) 3.2 1.8 - 7.7 K/uL     Immature Grans (Abs) 0.01 0.00 - 0.04 K/uL    Lymph # 1.1 1.0 - 4.8 K/uL    Mono # 0.8 0.3 - 1.0 K/uL    Eos # 0.1 0.0 - 0.5 K/uL    Baso # 0.01 0.00 - 0.20 K/uL    nRBC 0 0 /100 WBC    Gran % 61.0 38.0 - 73.0 %    Lymph % 21.3 18.0 - 48.0 %    Mono % 14.6 4.0 - 15.0 %    Eosinophil % 2.7 0.0 - 8.0 %    Basophil % 0.2 0.0 - 1.9 %    Differential Method Automated    Comprehensive metabolic panel    Collection Time: 01/19/24 12:04 PM   Result Value Ref Range    Sodium 138 136 - 145 mmol/L    Potassium 4.7 3.5 - 5.1 mmol/L    Chloride 108 95 - 110 mmol/L    CO2 25 23 - 29 mmol/L    Glucose 85 70 - 110 mg/dL    BUN 16 6 - 20 mg/dL    Creatinine 1.5 (H) 0.5 - 1.4 mg/dL    Calcium 10.0 8.7 - 10.5 mg/dL    Total Protein 7.5 6.0 - 8.4 g/dL    Albumin 3.6 3.5 - 5.2 g/dL    Total Bilirubin 0.4 0.1 - 1.0 mg/dL    Alkaline Phosphatase 65 55 - 135 U/L    AST 12 10 - 40 U/L    ALT 14 10 - 44 U/L    eGFR 54.3 (A) >60 mL/min/1.73 m^2    Anion Gap 5 (L) 8 - 16 mmol/L   TSH    Collection Time: 01/19/24 12:04 PM   Result Value Ref Range    TSH 1.394 0.400 - 4.000 uIU/mL   Ethanol    Collection Time: 01/19/24 12:04 PM   Result Value Ref Range    Alcohol, Serum <10 <10 mg/dL   Acetaminophen level    Collection Time: 01/19/24 12:04 PM   Result Value Ref Range    Acetaminophen (Tylenol), Serum <3.0 (L) 10.0 - 20.0 ug/mL   Urinalysis, Reflex to Urine Culture Urine, Clean Catch    Collection Time: 01/19/24 12:11 PM    Specimen: Urine   Result Value Ref Range    Specimen UA Urine, Clean Catch     Color, UA Yellow Yellow, Straw, Sandra    Appearance, UA Clear Clear    pH, UA 7.0 5.0 - 8.0    Specific Gravity, UA 1.020 1.005 - 1.030    Protein, UA Trace (A) Negative    Glucose, UA 3+ (A) Negative    Ketones, UA Negative Negative    Bilirubin (UA) Negative Negative    Occult Blood UA Negative Negative    Nitrite, UA Negative Negative    Leukocytes, UA Trace (A) Negative   Urinalysis Microscopic    Collection Time: 01/19/24 12:11 PM   Result  Value Ref Range    RBC, UA 2 0 - 4 /hpf    WBC, UA 9 (H) 0 - 5 /hpf    Bacteria Occasional None-Occ /hpf    Yeast, UA None None    Squam Epithel, UA 0 /hpf    Unclass Alena UA <1 None-Moderate    Microscopic Comment SEE COMMENT    Drug screen panel, emergency    Collection Time: 01/19/24 12:34 PM   Result Value Ref Range    Benzodiazepines Negative Negative    Methadone metabolites Negative Negative    Cocaine (Metab.) Negative Negative    Opiate Scrn, Ur Presumptive Positive (A) Negative    Barbiturate Screen, Ur Negative Negative    Amphetamine Screen, Ur Negative Negative    THC Negative Negative    Phencyclidine Negative Negative    Creatinine, Urine 108.0 23.0 - 375.0 mg/dL    Toxicology Information SEE COMMENT       Lab Results   Component Value Date    VALPROATE 83.8 10/24/2023     Imaging:  Imaging Results              CT Head Without Contrast (Final result)  Result time 01/19/24 12:36:46      Final result by Daquan Carty MD (01/19/24 12:36:46)                   Impression:      Multiple zones of infarction are seen throughout both hemispheres, as above.  No significant change compared to the prior exam.      Electronically signed by: Daquan Carty MD  Date:    01/19/2024  Time:    12:36               Narrative:    EXAMINATION:  CT HEAD WITHOUT CONTRAST    CLINICAL HISTORY:  Mental status change, unknown cause;    TECHNIQUE:  Low dose axial images were obtained through the head.  Coronal and sagittal reformations were also performed. Contrast was not administered.    COMPARISON:  01/17/2024    FINDINGS:  Examination demonstrates generalize cerebral volume loss with enlargement of sulci and ventricles.  There are multiple remote infarcts again identified in both frontal lobes, parietal lobes and occipital lobes, largest area involving the left parieto-occipital area.  Remote infarcts also in the right anterior basal ganglia including caudate head and right cerebellum.    No acute infarct or significant  change compared to the prior study.  No intracranial hemorrhage is seen.  No extra-axial collections, mass, edema or midline shift.                                       ASSESSMENT     Tera Griffiths is a 58 y.o. male with no known past psychiatric history and PMH of L MCA stroke w/ residual aphasia and R-sided deficits, s/p PEG, cognitive impairment, CHF, HTN, HLD, and CKD who currently presents w/ aggression at home. Psychiatry consulted to address the pt's aggression. He has been awaiting placement in the ED for the past day.    IMPRESSION  Agitation  Unspecified major neurocognitive disorder  R/o delirium, possibly 2/2 UTI or multifactorial     RECOMMENDATION(S)      1. Scheduled Medication(s):  Discontinue Seroquel given prolonged QTc (581 -> 508 ms)  Start valproic acid 250 mg tid    2. PRN Medication(s):  Valproic acid 250 mg tid prn non-redirectable agitation  Avoid antipsychotics (QTc prolonged) as well as deliriogenic meds including benzodiazepines, antihistamines, anticholinergics (see delirium precautions below)    3. Legal Status/Precaution(s):  Continue PEC at this time as the patient is currently gravely disabled and a danger to others. Seek inpatient bed for patient safety and stabilization when/if medically cleared by the ER MD. Continue to observe patient's behavior while in the ER and reassess the patient daily until placement is found.    Delirium precautions:  Continue to manage medical conditions and assess for and treat pain.  Please try to minimize the use of physical restraints, as they can worsen agitation; utilize PRN medications first.  Please avoid/minimize use of benzodiazepines (understand that may need to use BZD taper for EtOH withdrawal), anticholinergics, antihistamines (H1- and H2-blockers), and opioids when possible, as they may exacerbate delirium.  Please keep shades open and lights on during day and shades closed and lights off at night to encourage normal sleep/wake  cycle.  Reduce unnecessary stimulation/noise. TV screen and sound should be off unless patient is actively engaged w/ the program.  Encourage staff to reorient pt as needed throughout the day and to optimize pt's surroundings w/ an accurately updated calendar, and functioning clock.      In cases of emergency, daily coverage provided by Acute/ED Psych MD, NP, or SW, with associated contact numbers listed in the [facility] On Call Schedule.    Case discussed w/ psychiatry staff: Dr. Justino Silverio MD  [facility], PGY-II

## 2024-01-20 NOTE — PROVIDER PROGRESS NOTES - EMERGENCY DEPT.
Patient became slightly anxious overnight.  Given PO ativan. Will continue to await psychiatric placement.

## 2024-01-20 NOTE — ED NOTES
Pt remains in paper scrubs, resting comfortably in stretcher. Report received from CARL Butt. No acute distress observed. Pt aware of plan of care. PEC complete and in patient's chart. Pt belongings are removed from room, labeled, and locked away. No unnecessary equipment, cords, or wires left in room. Sitter at bedside recording Q 15 minute checks. Sitter belongings are out of room.

## 2024-01-21 PROBLEM — F03.90 MAJOR NEUROCOGNITIVE DISORDER: Status: ACTIVE | Noted: 2024-01-21

## 2024-01-21 LAB
ALBUMIN SERPL BCP-MCNC: 3.4 G/DL (ref 3.5–5.2)
ALP SERPL-CCNC: 64 U/L (ref 55–135)
ALT SERPL W/O P-5'-P-CCNC: 13 U/L (ref 10–44)
ANION GAP SERPL CALC-SCNC: 8 MMOL/L (ref 8–16)
AST SERPL-CCNC: 13 U/L (ref 10–40)
BASOPHILS # BLD AUTO: 0.02 K/UL (ref 0–0.2)
BASOPHILS NFR BLD: 0.5 % (ref 0–1.9)
BILIRUB SERPL-MCNC: 0.4 MG/DL (ref 0.1–1)
BUN SERPL-MCNC: 16 MG/DL (ref 6–20)
CALCIUM SERPL-MCNC: 9.5 MG/DL (ref 8.7–10.5)
CHLORIDE SERPL-SCNC: 106 MMOL/L (ref 95–110)
CO2 SERPL-SCNC: 24 MMOL/L (ref 23–29)
CREAT SERPL-MCNC: 1.5 MG/DL (ref 0.5–1.4)
DIFFERENTIAL METHOD BLD: ABNORMAL
EOSINOPHIL # BLD AUTO: 0.2 K/UL (ref 0–0.5)
EOSINOPHIL NFR BLD: 4.5 % (ref 0–8)
ERYTHROCYTE [DISTWIDTH] IN BLOOD BY AUTOMATED COUNT: 15.7 % (ref 11.5–14.5)
EST. GFR  (NO RACE VARIABLE): 54.3 ML/MIN/1.73 M^2
GLUCOSE SERPL-MCNC: 74 MG/DL (ref 70–110)
HCT VFR BLD AUTO: 40.2 % (ref 40–54)
HGB BLD-MCNC: 12.7 G/DL (ref 14–18)
IMM GRANULOCYTES # BLD AUTO: 0.01 K/UL (ref 0–0.04)
IMM GRANULOCYTES NFR BLD AUTO: 0.2 % (ref 0–0.5)
LYMPHOCYTES # BLD AUTO: 1.3 K/UL (ref 1–4.8)
LYMPHOCYTES NFR BLD: 30.5 % (ref 18–48)
MAGNESIUM SERPL-MCNC: 2.3 MG/DL (ref 1.6–2.6)
MCH RBC QN AUTO: 27.4 PG (ref 27–31)
MCHC RBC AUTO-ENTMCNC: 31.6 G/DL (ref 32–36)
MCV RBC AUTO: 87 FL (ref 82–98)
MONOCYTES # BLD AUTO: 0.7 K/UL (ref 0.3–1)
MONOCYTES NFR BLD: 16.7 % (ref 4–15)
NEUTROPHILS # BLD AUTO: 2 K/UL (ref 1.8–7.7)
NEUTROPHILS NFR BLD: 47.6 % (ref 38–73)
NRBC BLD-RTO: 0 /100 WBC
PLATELET # BLD AUTO: 267 K/UL (ref 150–450)
PMV BLD AUTO: 10.7 FL (ref 9.2–12.9)
POTASSIUM SERPL-SCNC: 4.6 MMOL/L (ref 3.5–5.1)
PROT SERPL-MCNC: 7.2 G/DL (ref 6–8.4)
RBC # BLD AUTO: 4.64 M/UL (ref 4.6–6.2)
SODIUM SERPL-SCNC: 138 MMOL/L (ref 136–145)
WBC # BLD AUTO: 4.19 K/UL (ref 3.9–12.7)

## 2024-01-21 PROCEDURE — 25000003 PHARM REV CODE 250: Performed by: NURSE PRACTITIONER

## 2024-01-21 PROCEDURE — 85025 COMPLETE CBC W/AUTO DIFF WBC: CPT | Performed by: NURSE PRACTITIONER

## 2024-01-21 PROCEDURE — 80053 COMPREHEN METABOLIC PANEL: CPT | Performed by: NURSE PRACTITIONER

## 2024-01-21 PROCEDURE — G0378 HOSPITAL OBSERVATION PER HR: HCPCS

## 2024-01-21 PROCEDURE — 99215 OFFICE O/P EST HI 40 MIN: CPT | Mod: AF,HB,, | Performed by: PSYCHIATRY & NEUROLOGY

## 2024-01-21 PROCEDURE — 83735 ASSAY OF MAGNESIUM: CPT | Performed by: NURSE PRACTITIONER

## 2024-01-21 PROCEDURE — 96365 THER/PROPH/DIAG IV INF INIT: CPT | Mod: 59

## 2024-01-21 PROCEDURE — 63600175 PHARM REV CODE 636 W HCPCS: Performed by: NURSE PRACTITIONER

## 2024-01-21 RX ORDER — DIVALPROEX SODIUM 250 MG/1
250 TABLET, DELAYED RELEASE ORAL 3 TIMES DAILY
Qty: 90 TABLET | Refills: 11
Start: 2024-01-21 | End: 2024-01-22 | Stop reason: HOSPADM

## 2024-01-21 RX ORDER — DIVALPROEX SODIUM 250 MG/1
250 TABLET, DELAYED RELEASE ORAL 3 TIMES DAILY PRN
Start: 2024-01-21 | End: 2025-01-20

## 2024-01-21 RX ORDER — TRAMADOL HYDROCHLORIDE 50 MG/1
50 TABLET ORAL EVERY 6 HOURS PRN
COMMUNITY

## 2024-01-21 RX ORDER — TORSEMIDE 20 MG/1
20 TABLET ORAL 2 TIMES DAILY
Status: DISCONTINUED | OUTPATIENT
Start: 2024-01-21 | End: 2024-01-22 | Stop reason: HOSPADM

## 2024-01-21 RX ADMIN — DIVALPROEX SODIUM 250 MG: 250 TABLET, DELAYED RELEASE ORAL at 09:01

## 2024-01-21 RX ADMIN — APIXABAN 5 MG: 5 TABLET, FILM COATED ORAL at 10:01

## 2024-01-21 RX ADMIN — EZETIMIBE 10 MG: 10 TABLET ORAL at 08:01

## 2024-01-21 RX ADMIN — DIVALPROEX SODIUM 250 MG: 250 TABLET, DELAYED RELEASE ORAL at 04:01

## 2024-01-21 RX ADMIN — SACUBITRIL AND VALSARTAN 1 TABLET: 24; 26 TABLET, FILM COATED ORAL at 08:01

## 2024-01-21 RX ADMIN — SACUBITRIL AND VALSARTAN 1 TABLET: 24; 26 TABLET, FILM COATED ORAL at 04:01

## 2024-01-21 RX ADMIN — ATORVASTATIN CALCIUM 80 MG: 40 TABLET, FILM COATED ORAL at 10:01

## 2024-01-21 RX ADMIN — DIVALPROEX SODIUM 250 MG: 250 TABLET, DELAYED RELEASE ORAL at 10:01

## 2024-01-21 RX ADMIN — METOPROLOL SUCCINATE 50 MG: 50 TABLET, EXTENDED RELEASE ORAL at 10:01

## 2024-01-21 RX ADMIN — ASPIRIN 81 MG CHEWABLE TABLET 81 MG: 81 TABLET CHEWABLE at 10:01

## 2024-01-21 RX ADMIN — PANTOPRAZOLE SODIUM 40 MG: 40 TABLET, DELAYED RELEASE ORAL at 10:01

## 2024-01-21 RX ADMIN — DIVALPROEX SODIUM 250 MG: 250 TABLET, DELAYED RELEASE ORAL at 08:01

## 2024-01-21 RX ADMIN — TORSEMIDE 20 MG: 20 TABLET ORAL at 09:01

## 2024-01-21 RX ADMIN — APIXABAN 5 MG: 5 TABLET, FILM COATED ORAL at 08:01

## 2024-01-21 RX ADMIN — Medication 6 MG: at 08:01

## 2024-01-21 RX ADMIN — SPIRONOLACTONE 25 MG: 25 TABLET ORAL at 10:01

## 2024-01-21 RX ADMIN — TORSEMIDE 20 MG: 20 TABLET ORAL at 10:01

## 2024-01-21 RX ADMIN — CEFTRIAXONE 1 G: 1 INJECTION, POWDER, FOR SOLUTION INTRAMUSCULAR; INTRAVENOUS at 09:01

## 2024-01-21 NOTE — ASSESSMENT & PLAN NOTE
Patient with Paroxysmal (<7 days) atrial fibrillation which is controlled currently with Beta Blocker. Patient is currently in sinus rhythm.[id number] Score: 2. Anticoagulation indicated. Anticoagulation done with eliquis .

## 2024-01-21 NOTE — ASSESSMENT & PLAN NOTE
Patient is identified as having Combined Systolic and Diastolic heart failure that is Chronic. CHF is currently controlled. Latest ECHO performed and demonstrates- Results for orders placed during the hospital encounter of 12/19/23    Echo    Interpretation Summary    Left Ventricle: The left ventricle is severely dilated. Severely increased ventricular mass. Normal wall thickness. There is eccentric hypertrophy. Global hypokinesis present. There is severely reduced systolic function with a visually estimated ejection fraction of 15 - 20%. There is diastolic dysfunction but grade cannot be determined.    Right Ventricle: Mild right ventricular enlargement. Wall thickness is normal. Right ventricle wall motion has global hypokinesis. Systolic function is mildly reduced. Pacemaker lead present in the ventricle.    Left Atrium: Left atrium is severely dilated.    Right Atrium: Right atrium is severely dilated.    Aortic Valve: There is mild aortic valve sclerosis.    Pulmonic Valve: There is mild regurgitation.    IVC/SVC: Normal venous pressure at 3 mmHg.    Pericardium: There is a trivial circumferential effusion.  . Continue Beta Blocker, Aldactone, and ARNI and monitor clinical status closely. Monitor on telemetry. Patient is off CHF pathway.  Monitor strict Is&Os and daily weights.  Place on fluid restriction of 1.5 L. Cardiology has not been consulted. Continue to stress to patient importance of self efficacy and  on diet for CHF.

## 2024-01-21 NOTE — ASSESSMENT & PLAN NOTE
Chronic, controlled. Latest blood pressure and vitals reviewed-     Temp:  [95 °F (35 °C)-98.3 °F (36.8 °C)]   Pulse:  [77-94]   Resp:  [16-18]   BP: (112-149)/(87-96)   SpO2:  [96 %-99 %] .   Home meds for hypertension were reviewed and noted below.   Hypertension Medications               metoprolol succinate (TOPROL-XL) 50 MG 24 hr tablet Take 1 tablet (50 mg total) by mouth once daily.    nitroGLYCERIN (NITROSTAT) 0.4 MG SL tablet Place under the tongue.    sacubitriL-valsartan (ENTRESTO) 24-26 mg per tablet Take 1 tablet by mouth 2 (two) times daily.    spironolactone (ALDACTONE) 25 MG tablet Take 1 tablet by mouth once daily.    torsemide (DEMADEX) 20 MG Tab Take 20 mg by mouth 2 (two) times daily.            While in the hospital, will manage blood pressure as follows; Continue home antihypertensive regimen    Will utilize p.r.n. blood pressure medication only if patient's blood pressure greater than 180/110 and he develops symptoms such as worsening chest pain or shortness of breath.

## 2024-01-21 NOTE — HPI
"Tera Griffiths is a 58 y.o. male with a PMHx of HTN, HLD, CAD, CKD3, L MCA stroke w/ residual aphasia and R-sided deficits, s/p PEG, cognitive impairment, and CHF (EF 15-20%) who presents to the ED for aggressive behavior. Patient is non verbal at baseline and is unable to provide any history. Per ED notes "His sister reports behavior change over the past few days.  He has been aggressive and difficult to redirect.  He has been running into the street, posing a safety risk for himself and others.  She reports a prior episode of similar symptoms.  He was started on psychiatric medications that were discontinued by his primary care provider."     In the ED, VSS, afebrile. CBC unremarkable. ETOH <10. Acetaminophen level <3.0. TSH WNL. Cr 1.5 (at baseline). UA with trace leukocytes and 9 WBCs, much improved from previous on antibiotics currently. UDS +opiates. CTH with multiple zones of infarction are seen throughout both hemispheres, as above. No significant change compared to the prior exam. The patient required multiple doses of ativan as well as haldol, benadryl, and zyprexa. Started on depakote scheduled and PRN per psych recs. The patient was PEC'd and psychiatry consulted to address the pt's aggression. He has been awaiting placement in the ED for over 24 hrs so admitted to hospital medicine pending placement.   "

## 2024-01-21 NOTE — PROVIDER PROGRESS NOTES - EMERGENCY DEPT.
Encounter Date: 1/19/2024    ED Physician Progress Notes        Physician Note:   We had difficulty placing the patient.  He has a PEG tube and finding a psychiatric facility has proved to be challenging.  [facility] is at capacity.  [facility] has had all deny miles.  I reached out to case management and social work.  Reached out to Hospital Medicine.  They will observe while psychiatry follows the patient.

## 2024-01-21 NOTE — ED NOTES
Talked to Chantel at [facility], they are no longer accepting this patient at the facility.

## 2024-01-21 NOTE — ED NOTES
Pt remains in paper scrubs, resting comfortably in stretcher. NAD. Pt aware of plan of care. PEC complete and in patient's chart. Pt belongings are removed from room, labeled, and locked away. No unnecessary equipment, cords, or wires left in room. Sitter at bedside recording Q 15 minute checks. Sitter belongings are out of room.

## 2024-01-21 NOTE — ED NOTES
Pt remains in paper scrubs, resting in stretcher comfortably - with side rails up, locked, and in lowest position. Chest rise and fall noted; breathing equal, even, and unlabored. Sitter remains at bedside in direct visual contact, charting per protocol every 15 minutes. No equipment or belongings are in the patients room to prevent self harm or injury. Pt aware of plan of care. No acute distress noted and no needs expressed at this time. Will continue to assess periodically.

## 2024-01-21 NOTE — ASSESSMENT & PLAN NOTE
Patient is chronically on statin.will continue for now. Monitor clinically. Last LDL was   Lab Results   Component Value Date    LDLCALC 101.4 08/08/2023

## 2024-01-21 NOTE — ED NOTES
Pt remains in paper scrubs, resting in wheelchair comfortably. No signs of distress noted. Sitter remains at bedside in direct visual contact, charting per protocol every 15 minutes. No equipment or belongings are in the patients room. Pt aware of plan of care. Will continue to monitor.

## 2024-01-21 NOTE — ASSESSMENT & PLAN NOTE
Diagnosed during recent ED visit on 1/17 at which time he received rocephin and was discharged on keflex.  -Afebrile, no leukocytosis.  -Repeat UA non infectious.  -Complete course with rocephin 1g.

## 2024-01-21 NOTE — SUBJECTIVE & OBJECTIVE
Interval History: Patient is non- verbal. Appears calm. Sitter present at bedside reports that patient was more agitated yesterday and kicking around, however since morning he has been more calm and not trying to climb out of bed. Remains PEC. Psych following. PT/OT and SW consulted for discharge planning.       Objective:     Vital Signs (Most Recent):  Temp: 98.6 °F (37 °C) (01/21/24 0754)  Pulse: 75 (01/21/24 0754)  Resp: 16 (01/21/24 0754)  BP: 125/79 (01/21/24 0754)  SpO2: 96 % (01/21/24 0754) Vital Signs (24h Range):  Temp:  [97 °F (36.1 °C)-98.6 °F (37 °C)] 98.6 °F (37 °C)  Pulse:  [66-94] 75  Resp:  [16-18] 16  SpO2:  [96 %-98 %] 96 %  BP: (125-149)/(79-96) 125/79     Weight: 72.6 kg (160 lb)  Body mass index is 24.33 kg/m².    Intake/Output Summary (Last 24 hours) at 1/21/2024 1209  Last data filed at 1/21/2024 1022  Gross per 24 hour   Intake --   Output 325 ml   Net -325 ml         Physical Exam  Vitals and nursing note reviewed.   Constitutional:       General: He is not in acute distress.     Appearance: He is not toxic-appearing or diaphoretic.   HENT:      Head: Normocephalic and atraumatic.      Nose: Nose normal.      Mouth/Throat:      Mouth: Mucous membranes are moist.   Eyes:      Pupils: Pupils are equal, round, and reactive to light.   Cardiovascular:      Rate and Rhythm: Normal rate and regular rhythm.      Pulses: Normal pulses.   Pulmonary:      Effort: Pulmonary effort is normal. No respiratory distress.      Breath sounds: No wheezing, rhonchi or rales.   Abdominal:      General: Bowel sounds are normal. There is no distension.      Palpations: Abdomen is soft.      Tenderness: There is no abdominal tenderness. There is no guarding.      Comments: PEG tube in place   Musculoskeletal:      Cervical back: Normal range of motion.   Skin:     General: Skin is warm and dry.      Capillary Refill: Capillary refill takes less than 2 seconds.   Neurological:      Mental Status: He is alert.       Comments: Global aphasia. Patient moving all extremities but not following commands.   Psychiatric:         Speech: He is noncommunicative.

## 2024-01-21 NOTE — H&P
"  [facility] - Emergency Dept  [facility] Medicine  History & Physical    Patient Name: Tera Griffiths  MRN: [id number]  Patient Class: OP- Observation  Admission Date: 1/19/2024  Attending Physician: Stephen Cabezas MD   Primary Care Provider: Carleen Bueno MD         Patient information was obtained from past medical records and ER records.     Subjective:     Principal Problem:Aggressive behavior    Chief Complaint:   Chief Complaint   Patient presents with    Aggressive Behavior     Aggressive towards family at home        HPI: Tera Griffiths is a 58 y.o. male with a PMHx of HTN, HLD, CAD, CKD3, L MCA stroke w/ residual aphasia and R-sided deficits, s/p PEG, cognitive impairment, and CHF (EF 15-20%) who presents to the ED for aggressive behavior. Patient is non verbal at baseline and is unable to provide any history. Per ED notes "His sister reports behavior change over the past few days.  He has been aggressive and difficult to redirect.  He has been running into the street, posing a safety risk for himself and others.  She reports a prior episode of similar symptoms.  He was started on psychiatric medications that were discontinued by his primary care provider."     In the ED, VSS, afebrile. CBC unremarkable. ETOH <10. Acetaminophen level <3.0. TSH WNL. Cr 1.5 (at baseline). UA with trace leukocytes and 9 WBCs, much improved from previous on antibiotics currently. UDS +opiates. CTH with multiple zones of infarction are seen throughout both hemispheres, as above. No significant change compared to the prior exam. The patient required multiple doses of ativan as well as haldol, benadryl, and zyprexa. Started on depakote scheduled and PRN per psych recs. The patient was PEC'd and psychiatry consulted to address the pt's aggression. He has been awaiting placement in the ED for over 24 hrs so admitted to hospital medicine pending placement.     Past Medical History:   Diagnosis Date    Acute on chronic combined " systolic and diastolic heart failure 09/23/2022    Atrial fibrillation     CAD (coronary artery disease) 09/23/2022    Dyslipidemia 09/23/2022    Embolic stroke involving left middle cerebral artery 08/08/2023    Encounter for blood transfusion     H/O heart artery stent     HTN (hypertension) 09/23/2022    ICD (implantable cardioverter-defibrillator) in place 09/23/2022    Paroxysmal A-fib 09/23/2022    Stage 3 chronic kidney disease 04/19/2023       Past Surgical History:   Procedure Laterality Date    CARDIAC DEFIBRILLATOR PLACEMENT Left     CEREBRAL ANGIOGRAM N/A 8/8/2023    Procedure: ANGIOGRAM-CEREBRAL;  Surgeon: Kenzie Rodriguez;  Location: [facility];  Service: Anesthesiology;  Laterality: N/A;  LVO (angiogram)    ESOPHAGOGASTRODUODENOSCOPY N/A 8/11/2023    Procedure: EGD (ESOPHAGOGASTRODUODENOSCOPY);  Surgeon: Colette Martinez MD;  [facility]  Service: Gastroenterology;  Laterality: N/A;    ESOPHAGOGASTRODUODENOSCOPY W/ PEG N/A 8/17/2023    Procedure: EGD, WITH PEG TUBE INSERTION;  Surgeon: Yan Scott MD;  Location: [facility] OR 2ND FLR;  Service: General;  Laterality: N/A;  PEG, possible lap G    HERNIA REPAIR      LEFT HEART CATHETERIZATION Left 4/18/2023    Procedure: Left heart cath;  Surgeon: Atilio Marks MD;  Location: [facility] CATH LAB;  Service: Cardiology;  Laterality: Left;    RIGHT HEART CATHETERIZATION Right 9/30/2022    Procedure: INSERTION, CATHETER, RIGHT HEART;  Surgeon: Caden Aden MD;  Location: [facility] CATH LAB;  Service: Cardiology;  Laterality: Right;    TREATMENT OF CARDIAC ARRHYTHMIA N/A 11/22/2022    Procedure: CARDIOVERSION;  Surgeon: Marvin Sanon MD;  Location: [facility] EP LAB;  Service: Cardiology;  Laterality: N/A;  afib, KIA, DCCV, anes, MB, 3 Prep       Review of patient's allergies indicates:  No Known Allergies    No current facility-administered medications on file prior to encounter.     Current Outpatient Medications on File Prior to Encounter    Medication Sig    apixaban (ELIQUIS) 5 mg Tab Take 1 tablet (5 mg total) by mouth 2 (two) times daily.    aspirin 81 MG Chew Chew and  swallow 1 tablet (81 mg total) by mouth once daily.    atorvastatin (LIPITOR) 80 MG tablet Take 1 tablet (80 mg total) by mouth once daily.    bisacodyL (DULCOLAX) 10 mg Supp Place 1 suppository (10 mg total) rectally daily as needed (Until bowel movement if patient has no bowel movement for 2 days).    busPIRone (BUSPAR) 10 MG tablet Take 1 tablet (10 mg total) by mouth 3 (three) times daily. Can also take two additional doses as needed for agitation    cephALEXin (KEFLEX) 500 MG capsule Take 1 capsule (500 mg total) by mouth every 12 (twelve) hours. for 10 days    empagliflozin (JARDIANCE) 10 mg tablet Take 1 tablet (10 mg total) by mouth once daily.    ezetimibe (ZETIA) 10 mg tablet Take 1 tablet (10 mg total) by mouth every evening.    famotidine (PEPCID) 20 MG tablet Take 1 tablet by mouth 2 (two) times daily.    melatonin (MELATIN) 3 mg tablet Take 2 tablets (6 mg total) by mouth nightly as needed for Insomnia.    metoprolol succinate (TOPROL-XL) 50 MG 24 hr tablet Take 1 tablet (50 mg total) by mouth once daily.    nitroGLYCERIN (NITROSTAT) 0.4 MG SL tablet Place under the tongue.    pantoprazole (PROTONIX) 40 MG tablet Take 1 tablet by mouth once daily.    polyethylene glycol (GLYCOLAX) 17 gram PwPk Take 17 g by mouth once daily.    QUEtiapine (SEROQUEL) 25 MG Tab Take 1 tablet (25 mg total) by mouth every evening.    sacubitriL-valsartan (ENTRESTO) 24-26 mg per tablet Take 1 tablet by mouth 2 (two) times daily.    senna-docusate 8.6-50 mg (PERICOLACE) 8.6-50 mg per tablet Take 1 tablet by mouth once daily.    spironolactone (ALDACTONE) 25 MG tablet Take 1 tablet by mouth once daily.    torsemide (DEMADEX) 20 MG Tab Take 20 mg by mouth 2 (two) times daily.     Family History    None       Tobacco Use    Smoking status: Never    Smokeless tobacco: Never   Substance and  Sexual Activity    Alcohol use: Never    Drug use: Never    Sexual activity: Not on file     Review of Systems   Unable to perform ROS: Patient nonverbal     Objective:     Vital Signs (Most Recent):  Temp: 97.3 °F (36.3 °C) (01/20/24 1716)  Pulse: 92 (01/20/24 1716)  Resp: 18 (01/20/24 1716)  BP: (!) 149/94 (01/20/24 1716)  SpO2: 97 % (01/20/24 1716) Vital Signs (24h Range):  Temp:  [95 °F (35 °C)-98.3 °F (36.8 °C)] 97.3 °F (36.3 °C)  Pulse:  [77-92] 92  Resp:  [16-18] 18  SpO2:  [97 %-99 %] 97 %  BP: (112-149)/(87-94) 149/94     Weight: 72.6 kg (160 lb)  Body mass index is 24.33 kg/m².     Physical Exam  Vitals and nursing note reviewed.   Constitutional:       General: He is not in acute distress.     Appearance: He is not toxic-appearing or diaphoretic.   HENT:      Head: Normocephalic and atraumatic.      Nose: Nose normal.      Mouth/Throat:      Mouth: Mucous membranes are moist.   Eyes:      Pupils: Pupils are equal, round, and reactive to light.   Cardiovascular:      Rate and Rhythm: Normal rate and regular rhythm.      Pulses: Normal pulses.   Pulmonary:      Effort: Pulmonary effort is normal. No respiratory distress.      Breath sounds: No wheezing, rhonchi or rales.   Abdominal:      General: Bowel sounds are normal. There is no distension.      Palpations: Abdomen is soft.      Tenderness: There is no abdominal tenderness. There is no guarding.      Comments: PEG tube in place   Musculoskeletal:      Cervical back: Normal range of motion.   Skin:     General: Skin is warm and dry.      Capillary Refill: Capillary refill takes less than 2 seconds.   Neurological:      Mental Status: He is alert.      Comments: Global aphasia. Patient moving all extremities but not following commands.   Psychiatric:         Speech: He is noncommunicative.              CRANIAL NERVES     CN III, IV, VI   Pupils are equal, round, and reactive to light.       Significant Labs: All pertinent labs within the past 24 hours  have been reviewed.  CBC:   Recent Labs   Lab 01/19/24  1204   WBC 5.27   HGB 12.5*   HCT 40.4        CMP:   Recent Labs   Lab 01/19/24  1204      K 4.7      CO2 25   GLU 85   BUN 16   CREATININE 1.5*   CALCIUM 10.0   PROT 7.5   ALBUMIN 3.6   BILITOT 0.4   ALKPHOS 65   AST 12   ALT 14   ANIONGAP 5*     Urine Studies:   Recent Labs   Lab 01/19/24  1211   COLORU Yellow   APPEARANCEUA Clear   PHUR 7.0   SPECGRAV 1.020   PROTEINUA Trace*   GLUCUA 3+*   KETONESU Negative   BILIRUBINUA Negative   OCCULTUA Negative   NITRITE Negative   LEUKOCYTESUR Trace*   RBCUA 2   WBCUA 9*   BACTERIA Occasional   SQUAMEPITHEL 0       Significant Imaging: I have reviewed all pertinent imaging results/findings within the past 24 hours.  Imaging Results              CT Head Without Contrast (Final result)  Result time 01/19/24 12:36:46      Final result by Daquan Carty MD (01/19/24 12:36:46)                   Impression:      Multiple zones of infarction are seen throughout both hemispheres, as above.  No significant change compared to the prior exam.      Electronically signed by: Daquan Carty MD  Date:    01/19/2024  Time:    12:36               Narrative:    EXAMINATION:  CT HEAD WITHOUT CONTRAST    CLINICAL HISTORY:  Mental status change, unknown cause;    TECHNIQUE:  Low dose axial images were obtained through the head.  Coronal and sagittal reformations were also performed. Contrast was not administered.    COMPARISON:  01/17/2024    FINDINGS:  Examination demonstrates generalize cerebral volume loss with enlargement of sulci and ventricles.  There are multiple remote infarcts again identified in both frontal lobes, parietal lobes and occipital lobes, largest area involving the left parieto-occipital area.  Remote infarcts also in the right anterior basal ganglia including caudate head and right cerebellum.    No acute infarct or significant change compared to the prior study.  No intracranial hemorrhage is  seen.  No extra-axial collections, mass, edema or midline shift.                                      Assessment/Plan:     * Aggressive behavior  Patient presented with aggressive behavior at home and was unable to be redirected by his sister who cares for him. He was also noted to run into the street as well. In the ED he had several episodes of agitations requiring multiple medications. Calm and resting in bed at the time of my exam.  -Pt PEC'd in the ED, continue.  -Continue 1:1 psych observation.  -Psychiatry following patient, appreciate assistance.  -Hold seroquel. Continue depakote TID and PRN for agitation.  -Pending inpatient placement.    Acute cystitis  Diagnosed during recent ED visit on 1/17 at which time he received rocephin and was discharged on keflex.  -Afebrile, no leukocytosis.  -Repeat UA non infectious.  -Complete course with rocephin 1g.    S/P percutaneous endoscopic gastrostomy (PEG) tube placement  - pt does not use peg tube (oral feeding and meds for the past 6 months, per family)   - peg placed originally by gen surg in 8/2023    Combined systolic and diastolic congestive heart failure  Patient is identified as having Combined Systolic and Diastolic heart failure that is Chronic. CHF is currently controlled. Latest ECHO performed and demonstrates- Results for orders placed during the hospital encounter of 12/19/23    Echo    Interpretation Summary    Left Ventricle: The left ventricle is severely dilated. Severely increased ventricular mass. Normal wall thickness. There is eccentric hypertrophy. Global hypokinesis present. There is severely reduced systolic function with a visually estimated ejection fraction of 15 - 20%. There is diastolic dysfunction but grade cannot be determined.    Right Ventricle: Mild right ventricular enlargement. Wall thickness is normal. Right ventricle wall motion has global hypokinesis. Systolic function is mildly reduced. Pacemaker lead present in the  ventricle.    Left Atrium: Left atrium is severely dilated.    Right Atrium: Right atrium is severely dilated.    Aortic Valve: There is mild aortic valve sclerosis.    Pulmonic Valve: There is mild regurgitation.    IVC/SVC: Normal venous pressure at 3 mmHg.    Pericardium: There is a trivial circumferential effusion.  . Continue Beta Blocker, Aldactone, and ARNI and monitor clinical status closely. Monitor on telemetry. Patient is off CHF pathway.  Monitor strict Is&Os and daily weights.  Place on fluid restriction of 1.5 L. Cardiology has not been consulted. Continue to stress to patient importance of self efficacy and  on diet for CHF.     History of embolic stroke involving left middle cerebral artery  Hemiparesis, right   Global aphasia   -Chronic, stable.  -Continue ASA, statin, and eliquis.   -Fall precautions.    Stage 3 chronic kidney disease  Creatine stable for now. BMP reviewed- noted Estimated Creatinine Clearance: 53.2 mL/min (A) (based on SCr of 1.5 mg/dL (H)). according to latest data. Based on current GFR, CKD stage is stage 3 - GFR 30-59.  Monitor UOP and serial BMP and adjust therapy as needed. Renally dose meds. Avoid nephrotoxic medications and procedures.    Primary hypertension  Chronic, controlled. Latest blood pressure and vitals reviewed-     Temp:  [95 °F (35 °C)-98.3 °F (36.8 °C)]   Pulse:  [77-94]   Resp:  [16-18]   BP: (112-149)/(87-96)   SpO2:  [96 %-99 %] .   Home meds for hypertension were reviewed and noted below.   Hypertension Medications               metoprolol succinate (TOPROL-XL) 50 MG 24 hr tablet Take 1 tablet (50 mg total) by mouth once daily.    nitroGLYCERIN (NITROSTAT) 0.4 MG SL tablet Place under the tongue.    sacubitriL-valsartan (ENTRESTO) 24-26 mg per tablet Take 1 tablet by mouth 2 (two) times daily.    spironolactone (ALDACTONE) 25 MG tablet Take 1 tablet by mouth once daily.    torsemide (DEMADEX) 20 MG Tab Take 20 mg by mouth 2 (two) times daily.             While in the hospital, will manage blood pressure as follows; Continue home antihypertensive regimen    Will utilize p.r.n. blood pressure medication only if patient's blood pressure greater than 180/110 and he develops symptoms such as worsening chest pain or shortness of breath.    ICD (implantable cardioverter-defibrillator) in place  Noted.  -No acute issues.    Dyslipidemia   Patient is chronically on statin.will continue for now. Monitor clinically. Last LDL was   Lab Results   Component Value Date    LDLCALC 101.4 08/08/2023       Paroxysmal A-fib  Patient with Paroxysmal (<7 days) atrial fibrillation which is controlled currently with Beta Blocker. Patient is currently in sinus rhythm.[id number] Score: 2. Anticoagulation indicated. Anticoagulation done with eliquis .    CAD (coronary artery disease)  Patient with known CAD s/p stent placement and CABG, which is controlled Will continue ASA and Statin and monitor for S/Sx of angina/ACS.       VTE Risk Mitigation (From admission, onward)           Ordered     apixaban tablet 5 mg  2 times daily         01/20/24 0611                         On 01/20/2024, patient should be placed in hospital observation services under my care in collaboration with Stephen Cabezas MD.           Nancie Aldridge NP  Department of Hospital Medicine  [facility] - Emergency Dept

## 2024-01-21 NOTE — ED NOTES
Assumed care and received report from CARL Beyer.    Pt remains in paper scrubs, resting comfortably in stretcher. NAD. Pt aware of plan of care. PEC complete and in patient's chart. Pt belongings are removed from room, labeled, and locked away. No unnecessary equipment, cords, or wires left in room. Sitter at bedside recording Q 15 minute checks. Sitter belongings are out of room.

## 2024-01-21 NOTE — ASSESSMENT & PLAN NOTE
Patient with known CAD s/p stent placement and CABG, which is controlled Will continue ASA and Statin and monitor for S/Sx of angina/ACS.

## 2024-01-21 NOTE — ASSESSMENT & PLAN NOTE
Patient presented with aggressive behavior at home and was unable to be redirected by his sister who cares for him. He was also noted to run into the street as well. In the ED he had several episodes of agitations requiring multiple medications. Calm and resting in bed at the time of my exam.  -Pt PEC'd in the ED, continue.  -Continue 1:1 psych observation.  -Psychiatry following patient  1. Scheduled Medication(s):  Discontinue Seroquel given prolonged QTc (581 -> 508 ms)  Start valproic acid 250 mg tid     2. PRN Medication(s):  Valproic acid 250 mg tid prn non-redirectable agitation  Avoid antipsychotics (QTc prolonged) as well as deliriogenic meds including benzodiazepines, antihistamines, anticholinergics (see delirium precautions below)     -Pending inpatient placement. PT/OT and SW consulted for discharge planning

## 2024-01-21 NOTE — PLAN OF CARE
Home Health replies:   [facility] - denied at this time; not taking payor  [facility] - all medicaid referrals on hold  [facility] - unable to staff - not cost effective  [facility] - not taking payor at this time.     01/21/24 1027   Post-Acute Status   Post-Acute Authorization Home Health   Home Health Status Referrals Sent   Discharge Delays None known at this time   Discharge Plan   Discharge Plan A Home Health     Met with sister Claribel to review discharge recommendation of home health and is agreeable to plan    Patient/family provided list of facilities in-network with patient's payor plan. Providers that are owned, operated, or affiliated with [facility] are included on the list.     Notified that referral sent to below listed facilities from in-network list based on proximity to home/family support:   52 referrals sent via [employer]  2.  3.  4.  5. (can send more than 5)    Patient/family instructed to identify preference.    Preferred Facility: (if more than 1, listed in order of descending preference)  No preference    If an additional preferred facility not listed above is identified, additional referral to be sent. If above facilities unable to accept, will send additional referrals to in-network providers.     Discharge Plan A and Plan B have been determined by review of patient's clinical status, future medical and therapeutic needs, and coverage/benefits for post-acute care in coordination with multidisciplinary team members.    Aleja Godoy, RAS, MSW, LMSW, RSW   Case Management  [facility]  Email: [email or web address]

## 2024-01-21 NOTE — ASSESSMENT & PLAN NOTE
- pt does not use peg tube (oral feeding and meds for the past 6 months, per family)   - peg placed originally by gen surg in 8/2023

## 2024-01-21 NOTE — ASSESSMENT & PLAN NOTE
Patient presented with aggressive behavior at home and was unable to be redirected by his sister who cares for him. He was also noted to run into the street as well. In the ED he had several episodes of agitations requiring multiple medications. Calm and resting in bed at the time of my exam.  -Pt PEC'd in the ED, continue.  -Continue 1:1 psych observation.  -Psychiatry following patient, appreciate assistance.  -Hold seroquel. Continue depakote TID and PRN for agitation.  -Pending inpatient placement.

## 2024-01-21 NOTE — ASSESSMENT & PLAN NOTE
Creatine stable for now. BMP reviewed- noted Estimated Creatinine Clearance: 53.2 mL/min (A) (based on SCr of 1.5 mg/dL (H)). according to latest data. Based on current GFR, CKD stage is stage 3 - GFR 30-59.  Monitor UOP and serial BMP and adjust therapy as needed. Renally dose meds. Avoid nephrotoxic medications and procedures.

## 2024-01-21 NOTE — SUBJECTIVE & OBJECTIVE
Past Medical History:   Diagnosis Date    Acute on chronic combined systolic and diastolic heart failure 09/23/2022    Atrial fibrillation     CAD (coronary artery disease) 09/23/2022    Dyslipidemia 09/23/2022    Embolic stroke involving left middle cerebral artery 08/08/2023    Encounter for blood transfusion     H/O heart artery stent     HTN (hypertension) 09/23/2022    ICD (implantable cardioverter-defibrillator) in place 09/23/2022    Paroxysmal A-fib 09/23/2022    Stage 3 chronic kidney disease 04/19/2023       Past Surgical History:   Procedure Laterality Date    CARDIAC DEFIBRILLATOR PLACEMENT Left     CEREBRAL ANGIOGRAM N/A 8/8/2023    Procedure: ANGIOGRAM-CEREBRAL;  Surgeon: Kenzie Rodriguez;  Location: [facility];  Service: Anesthesiology;  Laterality: N/A;  LVO (angiogram)    ESOPHAGOGASTRODUODENOSCOPY N/A 8/11/2023    Procedure: EGD (ESOPHAGOGASTRODUODENOSCOPY);  Surgeon: Colette Martinez MD;  Location: [facility] (2ND FLR);  Service: Gastroenterology;  Laterality: N/A;    ESOPHAGOGASTRODUODENOSCOPY W/ PEG N/A 8/17/2023    Procedure: EGD, WITH PEG TUBE INSERTION;  Surgeon: Yan Scott MD;  Location: [facility] OR 2ND FLR;  Service: General;  Laterality: N/A;  PEG, possible lap G    HERNIA REPAIR      LEFT HEART CATHETERIZATION Left 4/18/2023    Procedure: Left heart cath;  Surgeon: Atilio Marks MD;  Location: [facility]ATH [facility];  Service: Cardiology;  Laterality: Left;    RIGHT HEART CATHETERIZATION Right 9/30/2022    Procedure: INSERTION, CATHETER, RIGHT HEART;  Surgeon: Caden Aden MD;  Location: [facility];  Service: Cardiology;  Laterality: Right;    TREATMENT OF CARDIAC ARRHYTHMIA N/A 11/22/2022    Procedure: CARDIOVERSION;  Surgeon: Marvin Sanon MD;  Location: [facility] EP [facility];  Service: Cardiology;  Laterality: N/A;  afib, KIA, DCCV, anes, MB, 3 Prep       Review of patient's allergies indicates:  No Known Allergies    No current facility-administered medications on file prior to encounter.      Current Outpatient Medications on File Prior to Encounter   Medication Sig    apixaban (ELIQUIS) 5 mg Tab Take 1 tablet (5 mg total) by mouth 2 (two) times daily.    aspirin 81 MG Chew Chew and  swallow 1 tablet (81 mg total) by mouth once daily.    atorvastatin (LIPITOR) 80 MG tablet Take 1 tablet (80 mg total) by mouth once daily.    bisacodyL (DULCOLAX) 10 mg Supp Place 1 suppository (10 mg total) rectally daily as needed (Until bowel movement if patient has no bowel movement for 2 days).    busPIRone (BUSPAR) 10 MG tablet Take 1 tablet (10 mg total) by mouth 3 (three) times daily. Can also take two additional doses as needed for agitation    cephALEXin (KEFLEX) 500 MG capsule Take 1 capsule (500 mg total) by mouth every 12 (twelve) hours. for 10 days    empagliflozin (JARDIANCE) 10 mg tablet Take 1 tablet (10 mg total) by mouth once daily.    ezetimibe (ZETIA) 10 mg tablet Take 1 tablet (10 mg total) by mouth every evening.    famotidine (PEPCID) 20 MG tablet Take 1 tablet by mouth 2 (two) times daily.    melatonin (MELATIN) 3 mg tablet Take 2 tablets (6 mg total) by mouth nightly as needed for Insomnia.    metoprolol succinate (TOPROL-XL) 50 MG 24 hr tablet Take 1 tablet (50 mg total) by mouth once daily.    nitroGLYCERIN (NITROSTAT) 0.4 MG SL tablet Place under the tongue.    pantoprazole (PROTONIX) 40 MG tablet Take 1 tablet by mouth once daily.    polyethylene glycol (GLYCOLAX) 17 gram PwPk Take 17 g by mouth once daily.    QUEtiapine (SEROQUEL) 25 MG Tab Take 1 tablet (25 mg total) by mouth every evening.    sacubitriL-valsartan (ENTRESTO) 24-26 mg per tablet Take 1 tablet by mouth 2 (two) times daily.    senna-docusate 8.6-50 mg (PERICOLACE) 8.6-50 mg per tablet Take 1 tablet by mouth once daily.    spironolactone (ALDACTONE) 25 MG tablet Take 1 tablet by mouth once daily.    torsemide (DEMADEX) 20 MG Tab Take 20 mg by mouth 2 (two) times daily.     Family History    None       Tobacco Use    Smoking  status: Never    Smokeless tobacco: Never   Substance and Sexual Activity    Alcohol use: Never    Drug use: Never    Sexual activity: Not on file     Review of Systems   Unable to perform ROS: Patient nonverbal     Objective:     Vital Signs (Most Recent):  Temp: 97.3 °F (36.3 °C) (01/20/24 1716)  Pulse: 92 (01/20/24 1716)  Resp: 18 (01/20/24 1716)  BP: (!) 149/94 (01/20/24 1716)  SpO2: 97 % (01/20/24 1716) Vital Signs (24h Range):  Temp:  [95 °F (35 °C)-98.3 °F (36.8 °C)] 97.3 °F (36.3 °C)  Pulse:  [77-92] 92  Resp:  [16-18] 18  SpO2:  [97 %-99 %] 97 %  BP: (112-149)/(87-94) 149/94     Weight: 72.6 kg (160 lb)  Body mass index is 24.33 kg/m².     Physical Exam  Vitals and nursing note reviewed.   Constitutional:       General: He is not in acute distress.     Appearance: He is not toxic-appearing or diaphoretic.   HENT:      Head: Normocephalic and atraumatic.      Nose: Nose normal.      Mouth/Throat:      Mouth: Mucous membranes are moist.   Eyes:      Pupils: Pupils are equal, round, and reactive to light.   Cardiovascular:      Rate and Rhythm: Normal rate and regular rhythm.      Pulses: Normal pulses.   Pulmonary:      Effort: Pulmonary effort is normal. No respiratory distress.      Breath sounds: No wheezing, rhonchi or rales.   Abdominal:      General: Bowel sounds are normal. There is no distension.      Palpations: Abdomen is soft.      Tenderness: There is no abdominal tenderness. There is no guarding.      Comments: PEG tube in place   Musculoskeletal:      Cervical back: Normal range of motion.   Skin:     General: Skin is warm and dry.      Capillary Refill: Capillary refill takes less than 2 seconds.   Neurological:      Mental Status: He is alert.      Comments: Global aphasia. Patient moving all extremities but not following commands.   Psychiatric:         Speech: He is noncommunicative.              CRANIAL NERVES     CN III, IV, VI   Pupils are equal, round, and reactive to light.        Significant Labs: All pertinent labs within the past 24 hours have been reviewed.  CBC:   Recent Labs   Lab 01/19/24  1204   WBC 5.27   HGB 12.5*   HCT 40.4        CMP:   Recent Labs   Lab 01/19/24  1204      K 4.7      CO2 25   GLU 85   BUN 16   CREATININE 1.5*   CALCIUM 10.0   PROT 7.5   ALBUMIN 3.6   BILITOT 0.4   ALKPHOS 65   AST 12   ALT 14   ANIONGAP 5*     Urine Studies:   Recent Labs   Lab 01/19/24  1211   COLORU Yellow   APPEARANCEUA Clear   PHUR 7.0   SPECGRAV 1.020   PROTEINUA Trace*   GLUCUA 3+*   KETONESU Negative   BILIRUBINUA Negative   OCCULTUA Negative   NITRITE Negative   LEUKOCYTESUR Trace*   RBCUA 2   WBCUA 9*   BACTERIA Occasional   SQUAMEPITHEL 0       Significant Imaging: I have reviewed all pertinent imaging results/findings within the past 24 hours.  Imaging Results              CT Head Without Contrast (Final result)  Result time 01/19/24 12:36:46      Final result by Daquan Carty MD (01/19/24 12:36:46)                   Impression:      Multiple zones of infarction are seen throughout both hemispheres, as above.  No significant change compared to the prior exam.      Electronically signed by: Daquan Carty MD  Date:    01/19/2024  Time:    12:36               Narrative:    EXAMINATION:  CT HEAD WITHOUT CONTRAST    CLINICAL HISTORY:  Mental status change, unknown cause;    TECHNIQUE:  Low dose axial images were obtained through the head.  Coronal and sagittal reformations were also performed. Contrast was not administered.    COMPARISON:  01/17/2024    FINDINGS:  Examination demonstrates generalize cerebral volume loss with enlargement of sulci and ventricles.  There are multiple remote infarcts again identified in both frontal lobes, parietal lobes and occipital lobes, largest area involving the left parieto-occipital area.  Remote infarcts also in the right anterior basal ganglia including caudate head and right cerebellum.    No acute infarct or significant  change compared to the prior study.  No intracranial hemorrhage is seen.  No extra-axial collections, mass, edema or midline shift.

## 2024-01-21 NOTE — PROGRESS NOTES
"CONSULTATION LIAISON PSYCHIATRY PROGRESS NOTE    Patient Name: Tera Griffiths  MRN: [id number]  Patient Class: OP- Observation  Admission Date: 1/19/2024  Attending Physician: Norm Hair MD      SUBJECTIVE:   Tera Griffiths is a 58 y.o. male with no known past psychiatric history and PMH of L MCA stroke w/ residual aphasia and R-sided deficits, s/p PEG, cognitive impairment, CHF, HTN, HLD, and CKD who currently presents w/ aggression at home. Psychiatry consulted to address the pt's aggression. He has been awaiting placement in the ED for the past day.     Psychiatry consulted for "great disability, aggressive, pending transfer for 19 hours the ED "    Interval events: seroquel discontinued. Pt started on depakote 250mg TID. Urine culture resulted E coli >100,000 CFU, pt on rocephin now.     Today, pt eating lunch in his room in the ER. Per sitter, pt has been calm and without behavioral issues. Pt did not respond to his name or attempts to engage made by this resident. Interview limited due to pt's aphasia/nonverbal status.        OBJECTIVE:    Mental Status Exam:  General Appearance: appears stated age, well developed and nourished, adequately groomed and appropriately dressed, in no acute distress  Behavior: minimal responses, calm, sitting on bed eating lunch, minimally interactive   Involuntary Movements and Motor Activity: no abnormal involuntary movements noted  Gait and Station: unable to assess - patient lying down or seated  Speech and Language:  unable to assess  Mood: unable to assess  Affect: calm  Thought Process and Associations:  unable to assess  Thought Content and Perceptions:: Unable to Assess  Sensorium and Orientation: Unable to Formally Assess  Recent and Remote Memory: Unable to  Formally Assess  Attention and Concentration: Unable to Formally Assess  Fund of Knowledge: Unable to Formally Assess  Insight: poor awareness of illness  Judgment: impaired    CAM ICU positive? Unable to " assess      ASSESSMENT & RECOMMENDATIONS   Agitation, resolving  Unspecified major neurocognitive disorder   R/o delirium, possibly 2/2 UTI vs multifactorial   Prolonged Qtc      PSYCH MEDICATIONS  Scheduled: continue valproic acid 250mg TID   PRN: continue valproic acid 250mg TID PRN for non-redirectable agitation  Avoid antipsychotics (qtc prolonged) as well as deliriogenic meds including benzos, antihistamines, anticholinergics. See below for further delirium precautions.     Delirium precautions:  Continue to manage medical conditions and assess for and treat pain.  Please try to minimize the use of physical restraints, as they can worsen agitation; utilize PRN medications first.  Please avoid/minimize use of benzodiazepines (understand that may need to use BZD taper for EtOH withdrawal), anticholinergics, antihistamines (H1- and H2-blockers), and opioids when possible, as they may exacerbate delirium.  Please keep shades open and lights on during day and shades closed and lights off at night to encourage normal sleep/wake cycle.  Reduce unnecessary stimulation/noise. TV screen and sound should be off unless patient is actively engaged w/ the program.  Encourage staff to reorient pt as needed throughout the day and to optimize pt's surroundings w/ an accurately updated calendar, and functioning clock.    RISK ASSESSMENT  NEEDS PEC for SI/HI or grave disability & NEEDS 1:1 sitter, pt is gravely disabled and a danger to others at this time.     FOLLOW UP  Will follow up while in house    DISPOSITION - once medically cleared:  Defer to medical team    Please contact ON CALL psychiatry service (24/7) for any acute issues that may arise.    Dr. Dinah Cifuentes MD  [facility] PGY-II    Psychiatry  [facility]  1/21/2024 2:50  PM        --------------------------------------------------------------------------------------------------------------------------------------------------------------------------------------------------------------------------------------    CONTINUED OBJECTIVE clinical data & findings reviewed and noted for above decision making    Current Medications:   Scheduled Meds:    apixaban  5 mg Oral BID    aspirin  81 mg Oral Daily    atorvastatin  80 mg Oral Daily    cefTRIAXone (Rocephin) IV (PEDS and ADULTS)  1 g Intravenous Q24H    divalproex  250 mg Oral TID    ezetimibe  10 mg Oral QHS    metoprolol succinate  50 mg Oral Daily    pantoprazole  40 mg Oral Daily    sacubitriL-valsartan  1 tablet Oral BID    spironolactone  25 mg Oral Daily    torsemide  20 mg Oral BID     PRN Meds: acetaminophen, acetaminophen, dextrose 10%, dextrose 10%, divalproex, glucagon (human recombinant), glucose, glucose, melatonin, naloxone, sodium chloride 0.9%    Allergies:   Review of patient's allergies indicates:  No Known Allergies    Vitals  Vitals:    01/21/24 1343   BP: (!) 140/89   Pulse: 79   Resp:    Temp: 96.2 °F (35.7 °C)       Labs/Imaging/Studies:  Recent Results (from the past 24 hour(s))   Comprehensive Metabolic Panel (CMP)    Collection Time: 01/21/24  4:23 AM   Result Value Ref Range    Sodium 138 136 - 145 mmol/L    Potassium 4.6 3.5 - 5.1 mmol/L    Chloride 106 95 - 110 mmol/L    CO2 24 23 - 29 mmol/L    Glucose 74 70 - 110 mg/dL    BUN 16 6 - 20 mg/dL    Creatinine 1.5 (H) 0.5 - 1.4 mg/dL    Calcium 9.5 8.7 - 10.5 mg/dL    Total Protein 7.2 6.0 - 8.4 g/dL    Albumin 3.4 (L) 3.5 - 5.2 g/dL    Total Bilirubin 0.4 0.1 - 1.0 mg/dL    Alkaline Phosphatase 64 55 - 135 U/L    AST 13 10 - 40 U/L    ALT 13 10 - 44 U/L    eGFR 54.3 (A) >60 mL/min/1.73 m^2    Anion Gap 8 8 - 16 mmol/L   Magnesium    Collection Time: 01/21/24  4:23 AM   Result Value Ref Range    Magnesium 2.3 1.6 - 2.6 mg/dL   CBC with Automated Differential     Collection Time: 01/21/24  4:23 AM   Result Value Ref Range    WBC 4.19 3.90 - 12.70 K/uL    RBC 4.64 4.60 - 6.20 M/uL    Hemoglobin 12.7 (L) 14.0 - 18.0 g/dL    Hematocrit 40.2 40.0 - 54.0 %    MCV 87 82 - 98 fL    MCH 27.4 27.0 - 31.0 pg    MCHC 31.6 (L) 32.0 - 36.0 g/dL    RDW 15.7 (H) 11.5 - 14.5 %    Platelets 267 150 - 450 K/uL    MPV 10.7 9.2 - 12.9 fL    Immature Granulocytes 0.2 0.0 - 0.5 %    Gran # (ANC) 2.0 1.8 - 7.7 K/uL    Immature Grans (Abs) 0.01 0.00 - 0.04 K/uL    Lymph # 1.3 1.0 - 4.8 K/uL    Mono # 0.7 0.3 - 1.0 K/uL    Eos # 0.2 0.0 - 0.5 K/uL    Baso # 0.02 0.00 - 0.20 K/uL    nRBC 0 0 /100 WBC    Gran % 47.6 38.0 - 73.0 %    Lymph % 30.5 18.0 - 48.0 %    Mono % 16.7 (H) 4.0 - 15.0 %    Eosinophil % 4.5 0.0 - 8.0 %    Basophil % 0.5 0.0 - 1.9 %    Differential Method Automated      Imaging Results              CT Head Without Contrast (Final result)  Result time 01/19/24 12:36:46      Final result by Daquan Carty MD (01/19/24 12:36:46)                   Impression:      Multiple zones of infarction are seen throughout both hemispheres, as above.  No significant change compared to the prior exam.      Electronically signed by: Daquan Carty MD  Date:    01/19/2024  Time:    12:36               Narrative:    EXAMINATION:  CT HEAD WITHOUT CONTRAST    CLINICAL HISTORY:  Mental status change, unknown cause;    TECHNIQUE:  Low dose axial images were obtained through the head.  Coronal and sagittal reformations were also performed. Contrast was not administered.    COMPARISON:  01/17/2024    FINDINGS:  Examination demonstrates generalize cerebral volume loss with enlargement of sulci and ventricles.  There are multiple remote infarcts again identified in both frontal lobes, parietal lobes and occipital lobes, largest area involving the left parieto-occipital area.  Remote infarcts also in the right anterior basal ganglia including caudate head and right cerebellum.    No acute infarct or  significant change compared to the prior study.  No intracranial hemorrhage is seen.  No extra-axial collections, mass, edema or midline shift.

## 2024-01-21 NOTE — ASSESSMENT & PLAN NOTE
Hemiparesis, right   Global aphasia   -Chronic, stable.  -Continue ASA, statin, and eliquis.   -Fall precautions.

## 2024-01-21 NOTE — PROGRESS NOTES
"[facility] - Emergency Dept  [facility] Medicine  Progress Note    Patient Name: Tera Griffiths  MRN: [id number]  Patient Class: OP- Observation   Admission Date: 1/19/2024  Length of Stay: 0 days  Attending Physician: Norm Hair MD  Primary Care Provider: Carleen Bueno MD        Subjective:     Principal Problem:Aggressive behavior        HPI:  Tera Griffiths is a 58 y.o. male with a PMHx of HTN, HLD, CAD, CKD3, L MCA stroke w/ residual aphasia and R-sided deficits, s/p PEG, cognitive impairment, and CHF (EF 15-20%) who presents to the ED for aggressive behavior. Patient is non verbal at baseline and is unable to provide any history. Per ED notes "His sister reports behavior change over the past few days.  He has been aggressive and difficult to redirect.  He has been running into the street, posing a safety risk for himself and others.  She reports a prior episode of similar symptoms.  He was started on psychiatric medications that were discontinued by his primary care provider."     In the ED, VSS, afebrile. CBC unremarkable. ETOH <10. Acetaminophen level <3.0. TSH WNL. Cr 1.5 (at baseline). UA with trace leukocytes and 9 WBCs, much improved from previous on antibiotics currently. UDS +opiates. CTH with multiple zones of infarction are seen throughout both hemispheres, as above. No significant change compared to the prior exam. The patient required multiple doses of ativan as well as haldol, benadryl, and zyprexa. Started on depakote scheduled and PRN per psych recs. The patient was PEC'd and psychiatry consulted to address the pt's aggression. He has been awaiting placement in the ED for over 24 hrs so admitted to hospital medicine pending placement.     Overview/Hospital Course:  No notes on file    Interval History: Patient is non- verbal. Appears calm. Sitter present at bedside reports that patient was more agitated yesterday and kicking around, however since morning he has been more calm and not " trying to climb out of bed. Remains PEC. Psych following. Discussed with psych, patient is stable from psych standpoint for IP psych placement. PT/OT and SW consulted for discharge planning. Patient is medically clear for IP psych placement       Objective:     Vital Signs (Most Recent):  Temp: 98.6 °F (37 °C) (01/21/24 0754)  Pulse: 75 (01/21/24 0754)  Resp: 16 (01/21/24 0754)  BP: 125/79 (01/21/24 0754)  SpO2: 96 % (01/21/24 0754) Vital Signs (24h Range):  Temp:  [97 °F (36.1 °C)-98.6 °F (37 °C)] 98.6 °F (37 °C)  Pulse:  [66-94] 75  Resp:  [16-18] 16  SpO2:  [96 %-98 %] 96 %  BP: (125-149)/(79-96) 125/79     Weight: 72.6 kg (160 lb)  Body mass index is 24.33 kg/m².    Intake/Output Summary (Last 24 hours) at 1/21/2024 1209  Last data filed at 1/21/2024 1022  Gross per 24 hour   Intake --   Output 325 ml   Net -325 ml         Physical Exam  Vitals and nursing note reviewed.   Constitutional:       General: He is not in acute distress.     Appearance: He is not toxic-appearing or diaphoretic.   HENT:      Head: Normocephalic and atraumatic.      Nose: Nose normal.      Mouth/Throat:      Mouth: Mucous membranes are moist.   Eyes:      Pupils: Pupils are equal, round, and reactive to light.   Cardiovascular:      Rate and Rhythm: Normal rate and regular rhythm.      Pulses: Normal pulses.   Pulmonary:      Effort: Pulmonary effort is normal. No respiratory distress.      Breath sounds: No wheezing, rhonchi or rales.   Abdominal:      General: Bowel sounds are normal. There is no distension.      Palpations: Abdomen is soft.      Tenderness: There is no abdominal tenderness. There is no guarding.      Comments: PEG tube in place   Musculoskeletal:      Cervical back: Normal range of motion.   Skin:     General: Skin is warm and dry.      Capillary Refill: Capillary refill takes less than 2 seconds.   Neurological:      Mental Status: He is alert.      Comments: Global aphasia. Patient moving all extremities but not  following commands.   Psychiatric:         Speech: He is noncommunicative.               Assessment/Plan:      * Aggressive behavior  Patient presented with aggressive behavior at home and was unable to be redirected by his sister who cares for him. He was also noted to run into the street as well. In the ED he had several episodes of agitations requiring multiple medications. Calm and resting in bed at the time of my exam.  -Pt PEC'd in the ED, continue.  -Continue 1:1 psych observation.  -Psychiatry following patient  1. Scheduled Medication(s):  Discontinue Seroquel given prolonged QTc (581 -> 508 ms)  Start valproic acid 250 mg tid     2. PRN Medication(s):  Valproic acid 250 mg tid prn non-redirectable agitation  Avoid antipsychotics (QTc prolonged) as well as deliriogenic meds including benzodiazepines, antihistamines, anticholinergics (see delirium precautions below)     -Pending inpatient placement. PT/OT and SW consulted for discharge planning      S/P percutaneous endoscopic gastrostomy (PEG) tube placement  - pt does not use peg tube (oral feeding and meds for the past 6 months, per family)   - peg placed originally by gen surg in 8/2023    Combined systolic and diastolic congestive heart failure  Patient is identified as having Combined Systolic and Diastolic heart failure that is Chronic. CHF is currently controlled. Latest ECHO performed and demonstrates- Results for orders placed during the hospital encounter of 12/19/23    Echo    Interpretation Summary    Left Ventricle: The left ventricle is severely dilated. Severely increased ventricular mass. Normal wall thickness. There is eccentric hypertrophy. Global hypokinesis present. There is severely reduced systolic function with a visually estimated ejection fraction of 15 - 20%. There is diastolic dysfunction but grade cannot be determined.    Right Ventricle: Mild right ventricular enlargement. Wall thickness is normal. Right ventricle wall motion  has global hypokinesis. Systolic function is mildly reduced. Pacemaker lead present in the ventricle.    Left Atrium: Left atrium is severely dilated.    Right Atrium: Right atrium is severely dilated.    Aortic Valve: There is mild aortic valve sclerosis.    Pulmonic Valve: There is mild regurgitation.    IVC/SVC: Normal venous pressure at 3 mmHg.    Pericardium: There is a trivial circumferential effusion.  . Continue Beta Blocker, Aldactone, and ARNI and monitor clinical status closely. Monitor on telemetry. Patient is off CHF pathway.  Monitor strict Is&Os and daily weights.  Place on fluid restriction of 1.5 L. Cardiology has not been consulted. Continue to stress to patient importance of self efficacy and  on diet for CHF.     Acute cystitis  Diagnosed during recent ED visit on 1/17 at which time he received rocephin and was discharged on keflex.  -Afebrile, no leukocytosis.  -Repeat UA non infectious.  -Complete course with rocephin 1g.    History of embolic stroke involving left middle cerebral artery  Hemiparesis, right   Global aphasia   -Chronic, stable.  -Continue ASA, statin, and eliquis.   -Fall precautions.    Stage 3 chronic kidney disease  Creatine stable for now. BMP reviewed- noted Estimated Creatinine Clearance: 53.2 mL/min (A) (based on SCr of 1.5 mg/dL (H)). according to latest data. Based on current GFR, CKD stage is stage 3 - GFR 30-59.  Monitor UOP and serial BMP and adjust therapy as needed. Renally dose meds. Avoid nephrotoxic medications and procedures.    Primary hypertension  Chronic, controlled. Latest blood pressure and vitals reviewed-     Temp:  [95 °F (35 °C)-98.3 °F (36.8 °C)]   Pulse:  [77-94]   Resp:  [16-18]   BP: (112-149)/(87-96)   SpO2:  [96 %-99 %] .   Home meds for hypertension were reviewed and noted below.   Hypertension Medications               metoprolol succinate (TOPROL-XL) 50 MG 24 hr tablet Take 1 tablet (50 mg total) by mouth once daily.    nitroGLYCERIN  (NITROSTAT) 0.4 MG SL tablet Place under the tongue.    sacubitriL-valsartan (ENTRESTO) 24-26 mg per tablet Take 1 tablet by mouth 2 (two) times daily.    spironolactone (ALDACTONE) 25 MG tablet Take 1 tablet by mouth once daily.    torsemide (DEMADEX) 20 MG Tab Take 20 mg by mouth 2 (two) times daily.            While in the hospital, will manage blood pressure as follows; Continue home antihypertensive regimen    Will utilize p.r.n. blood pressure medication only if patient's blood pressure greater than 180/110 and he develops symptoms such as worsening chest pain or shortness of breath.    ICD (implantable cardioverter-defibrillator) in place  Noted.  -No acute issues.    Dyslipidemia   Patient is chronically on statin.will continue for now. Monitor clinically. Last LDL was   Lab Results   Component Value Date    LDLCALC 101.4 08/08/2023       Paroxysmal A-fib  Patient with Paroxysmal (<7 days) atrial fibrillation which is controlled currently with Beta Blocker. Patient is currently in sinus rhythm.[id number] Score: 2. Anticoagulation indicated. Anticoagulation done with eliquis .    CAD (coronary artery disease)  Patient with known CAD s/p stent placement and CABG, which is controlled Will continue ASA and Statin and monitor for S/Sx of angina/ACS.       VTE Risk Mitigation (From admission, onward)           Ordered     apixaban tablet 5 mg  2 times daily         01/20/24 0611                    Discharge Planning   YUSEF:      Code Status: Full Code   Is the patient medically ready for discharge?:     Reason for patient still in hospital (select all that apply): Patient trending condition  Discharge Plan A: Home Health   Discharge Delays: None known at this time              Norm Hair MD  Department of Hospital Medicine   [facility] - Emergency Dept

## 2024-01-21 NOTE — PLAN OF CARE
[facility] - Emergency Dept  Initial Discharge Assessment       Primary Care Provider: Carleen Bueno MD    Admission Diagnosis: Aggressive behavior [R46.89]    Admission Date: 1/19/2024  Expected Discharge Date:     Pt is non-verbal at baseline.  SW contacted sister Madelyn to complete assessment as pt is currently in observation status with hospital medicine.      As per sister pt is dependent on a wheelchair for ambulation.  During stay at [facility] SW noticed pt is able to ambulate without equipment with 1 person assist for balance.  Sister stated that pt needs help with ADL's and sister assists.      As per sister any help in the home would be appreciated.  SW discussed post acute services and home health with PT/OT and social work for advanced care planning and community services.  Sister stated that home health would be helpful in the home.      Pt to d/c with home health when ready    Transition of Care Barriers: (P) None    Payor: MEDICAID / Plan: [facility] COMMUNITY PLAN [employer] ([place] MEDICAID) / Product Type: Managed Medicaid /     Extended Emergency Contact Information  Primary Emergency Contact: MADELYN CARTAGENA  Mobile Phone: [phone]  Relation: Healthcare Power of   Preferred language: English   needed? No  Secondary Emergency Contact: ChristineemaniSpencer scott  Mobile Phone: [phone]  Relation: Brother    Discharge Plan A: (P) Home Health, Home  Discharge Plan B: (P) Home Health      [id number] - [place], [place] - [address] [facility]  [address] [place] [address]  Phone: [phone] Fax: [phone]      Initial Assessment (most recent)       Adult Discharge Assessment - 01/21/24 1014          Discharge Assessment    Assessment Type Discharge Planning Assessment (P)      Confirmed/corrected address, phone number and insurance Yes (P)      Confirmed Demographics Correct on Facesheet (P)      Source of Information health  care advocate;family (P)      Reason For Admission Aggressive behavior (P)      People in Home sibling(s) (P)      Facility Arrived From: home (P)      Do you expect to return to your current living situation? Yes (P)      Do you have help at home or someone to help you manage your care at home? Yes (P)      Who are your caregiver(s) and their phone number(s)? sister Claribel is primary caregiver (P)      Prior to hospitilization cognitive status: Unable to Assess (P)      Current cognitive status: Unable to Assess (P)      Walking or Climbing Stairs Difficulty yes (P)      Walking or Climbing Stairs ambulation difficulty, requires equipment (P)      Mobility Management wheelchair (P)      Dressing/Bathing Difficulty yes (P)      Dressing/Bathing dressing difficulty, assistance 1 person;bathing difficulty, assistance 1 person (P)      Dressing/Bathing Management sister does help with ADL's when required (P)      Home Accessibility wheelchair accessible (P)      Home Layout Able to live on 1st floor (P)      Equipment Currently Used at Home wheelchair (P)      Patient currently being followed by outpatient case management? No (P)      Do you currently have service(s) that help you manage your care at home? No (P)      Do you have any problems affording any of your prescribed medications? No (P)      Is the patient taking medications as prescribed? yes (P)      Who is going to help you get home at discharge? family/friends (P)      How do you get to doctors appointments? family or friend will provide (P)      Are you on dialysis? No (P)      Do you take coumadin? No (P)      Discharge Plan A Home Health;Home (P)      Discharge Plan B Home Health (P)      DME Needed Upon Discharge  none (P)      Discharge Plan discussed with: POA (P)      Name(s) and Number(s) sister Claribel [phone] (P)      Transition of Care Barriers None (P)         Physical Activity    On average, how many days per week do you engage in moderate  to strenuous exercise (like a brisk walk)? 0 days (P)      On average, how many minutes do you engage in exercise at this level? 0 min (P)         Financial Resource Strain    How hard is it for you to pay for the very basics like food, housing, medical care, and heating? Not very hard (P)         Housing Stability    In the last 12 months, was there a time when you were not able to pay the mortgage or rent on time? No (P)      In the last 12 months, was there a time when you did not have a steady place to sleep or slept in a shelter (including now)? No (P)         Transportation Needs    In the past 12 months, has lack of transportation kept you from medical appointments or from getting medications? No (P)      In the past 12 months, has lack of transportation kept you from meetings, work, or from getting things needed for daily living? No (P)         Food Insecurity    Within the past 12 months, you worried that your food would run out before you got the money to buy more. Never true (P)      Within the past 12 months, the food you bought just didn't last and you didn't have money to get more. Never true (P)         Stress    Do you feel stress - tense, restless, nervous, or anxious, or unable to sleep at night because your mind is troubled all the time - these days? Only a little (P)         Social Connections    In a typical week, how many times do you talk on the phone with family, friends, or neighbors? More than three times a week (P)      How often do you get together with friends or relatives? More than three times a week (P)      How often do you attend Jain or Mandaen services? More than 4 times per year (P)    they come to the apartment for service for pt    Do you belong to any clubs or organizations such as Jain groups, unions, fraternal or athletic groups, or school groups? No (P)      How often do you attend meetings of the clubs or organizations you belong to? Never (P)      Are you ,  , , , never , or living with a partner?  (P)         Alcohol Use    Q1: How often do you have a drink containing alcohol? Never (P)      Q2: How many drinks containing alcohol do you have on a typical day when you are drinking? Patient does not drink (P)      Q3: How often do you have six or more drinks on one occasion? Never (P)         OTHER    Name(s) of People in Home sister Claribel (P)                    Discharge Plan A and Plan B have been determined by review of patient's clinical status, future medical and therapeutic needs, and coverage/benefits for post-acute care in coordination with multidisciplinary team members.    Aleja Godoy, RAS, MSW, LMSW, RSW   Case Management  [facility]  Email: [email or web address]

## 2024-01-22 VITALS
DIASTOLIC BLOOD PRESSURE: 63 MMHG | HEART RATE: 62 BPM | SYSTOLIC BLOOD PRESSURE: 99 MMHG | WEIGHT: 160.06 LBS | OXYGEN SATURATION: 100 % | HEIGHT: 68 IN | BODY MASS INDEX: 24.26 KG/M2 | TEMPERATURE: 98 F | RESPIRATION RATE: 16 BRPM

## 2024-01-22 LAB
ALBUMIN SERPL BCP-MCNC: 3.7 G/DL (ref 3.5–5.2)
ALP SERPL-CCNC: 76 U/L (ref 55–135)
ALT SERPL W/O P-5'-P-CCNC: 14 U/L (ref 10–44)
ANION GAP SERPL CALC-SCNC: 16 MMOL/L (ref 8–16)
AST SERPL-CCNC: 15 U/L (ref 10–40)
BASOPHILS # BLD AUTO: 0.02 K/UL (ref 0–0.2)
BASOPHILS NFR BLD: 0.4 % (ref 0–1.9)
BILIRUB SERPL-MCNC: 0.4 MG/DL (ref 0.1–1)
BUN SERPL-MCNC: 34 MG/DL (ref 6–20)
CALCIUM SERPL-MCNC: 9.8 MG/DL (ref 8.7–10.5)
CHLORIDE SERPL-SCNC: 106 MMOL/L (ref 95–110)
CO2 SERPL-SCNC: 17 MMOL/L (ref 23–29)
CREAT SERPL-MCNC: 2.3 MG/DL (ref 0.5–1.4)
DIFFERENTIAL METHOD BLD: ABNORMAL
EOSINOPHIL # BLD AUTO: 0.2 K/UL (ref 0–0.5)
EOSINOPHIL NFR BLD: 4.4 % (ref 0–8)
ERYTHROCYTE [DISTWIDTH] IN BLOOD BY AUTOMATED COUNT: 15.8 % (ref 11.5–14.5)
EST. GFR  (NO RACE VARIABLE): 32.5 ML/MIN/1.73 M^2
GLUCOSE SERPL-MCNC: 93 MG/DL (ref 70–110)
HCT VFR BLD AUTO: 42.7 % (ref 40–54)
HGB BLD-MCNC: 13.5 G/DL (ref 14–18)
IMM GRANULOCYTES # BLD AUTO: 0.01 K/UL (ref 0–0.04)
IMM GRANULOCYTES NFR BLD AUTO: 0.2 % (ref 0–0.5)
LYMPHOCYTES # BLD AUTO: 1.5 K/UL (ref 1–4.8)
LYMPHOCYTES NFR BLD: 28.9 % (ref 18–48)
MAGNESIUM SERPL-MCNC: 2.3 MG/DL (ref 1.6–2.6)
MCH RBC QN AUTO: 27.1 PG (ref 27–31)
MCHC RBC AUTO-ENTMCNC: 31.6 G/DL (ref 32–36)
MCV RBC AUTO: 86 FL (ref 82–98)
MONOCYTES # BLD AUTO: 0.6 K/UL (ref 0.3–1)
MONOCYTES NFR BLD: 10.8 % (ref 4–15)
NEUTROPHILS # BLD AUTO: 2.9 K/UL (ref 1.8–7.7)
NEUTROPHILS NFR BLD: 55.3 % (ref 38–73)
NRBC BLD-RTO: 0 /100 WBC
PLATELET # BLD AUTO: 298 K/UL (ref 150–450)
PMV BLD AUTO: 11.5 FL (ref 9.2–12.9)
POTASSIUM SERPL-SCNC: 4.3 MMOL/L (ref 3.5–5.1)
PROT SERPL-MCNC: 8.1 G/DL (ref 6–8.4)
RBC # BLD AUTO: 4.99 M/UL (ref 4.6–6.2)
SODIUM SERPL-SCNC: 139 MMOL/L (ref 136–145)
WBC # BLD AUTO: 5.26 K/UL (ref 3.9–12.7)

## 2024-01-22 PROCEDURE — 85025 COMPLETE CBC W/AUTO DIFF WBC: CPT | Performed by: NURSE PRACTITIONER

## 2024-01-22 PROCEDURE — 25000003 PHARM REV CODE 250: Performed by: STUDENT IN AN ORGANIZED HEALTH CARE EDUCATION/TRAINING PROGRAM

## 2024-01-22 PROCEDURE — 36415 COLL VENOUS BLD VENIPUNCTURE: CPT | Performed by: NURSE PRACTITIONER

## 2024-01-22 PROCEDURE — G0378 HOSPITAL OBSERVATION PER HR: HCPCS

## 2024-01-22 PROCEDURE — 80053 COMPREHEN METABOLIC PANEL: CPT | Performed by: NURSE PRACTITIONER

## 2024-01-22 PROCEDURE — 99215 OFFICE O/P EST HI 40 MIN: CPT | Mod: AF,HB,, | Performed by: PSYCHIATRY & NEUROLOGY

## 2024-01-22 PROCEDURE — 25000003 PHARM REV CODE 250: Performed by: NURSE PRACTITIONER

## 2024-01-22 PROCEDURE — 83735 ASSAY OF MAGNESIUM: CPT | Performed by: NURSE PRACTITIONER

## 2024-01-22 RX ORDER — DIVALPROEX SODIUM 250 MG/1
500 TABLET, DELAYED RELEASE ORAL 3 TIMES DAILY
Status: DISCONTINUED | OUTPATIENT
Start: 2024-01-22 | End: 2024-01-22 | Stop reason: HOSPADM

## 2024-01-22 RX ORDER — DIVALPROEX SODIUM 500 MG/1
500 TABLET, DELAYED RELEASE ORAL 3 TIMES DAILY
Qty: 90 TABLET | Refills: 11
Start: 2024-01-22 | End: 2025-01-21

## 2024-01-22 RX ORDER — CEPHALEXIN 500 MG/1
500 CAPSULE ORAL EVERY 6 HOURS
Status: DISCONTINUED | OUTPATIENT
Start: 2024-01-22 | End: 2024-01-22 | Stop reason: HOSPADM

## 2024-01-22 RX ORDER — CEPHALEXIN 500 MG/1
500 CAPSULE ORAL EVERY 12 HOURS
Qty: 8 CAPSULE | Refills: 0
Start: 2024-01-22 | End: 2024-01-26

## 2024-01-22 RX ADMIN — DIVALPROEX SODIUM 250 MG: 250 TABLET, DELAYED RELEASE ORAL at 02:01

## 2024-01-22 RX ADMIN — DIVALPROEX SODIUM 250 MG: 250 TABLET, DELAYED RELEASE ORAL at 09:01

## 2024-01-22 RX ADMIN — TORSEMIDE 20 MG: 20 TABLET ORAL at 09:01

## 2024-01-22 RX ADMIN — SACUBITRIL AND VALSARTAN 1 TABLET: 24; 26 TABLET, FILM COATED ORAL at 09:01

## 2024-01-22 RX ADMIN — ASPIRIN 81 MG CHEWABLE TABLET 81 MG: 81 TABLET CHEWABLE at 09:01

## 2024-01-22 RX ADMIN — DIVALPROEX SODIUM 250 MG: 250 TABLET, DELAYED RELEASE ORAL at 07:01

## 2024-01-22 RX ADMIN — ATORVASTATIN CALCIUM 80 MG: 40 TABLET, FILM COATED ORAL at 09:01

## 2024-01-22 RX ADMIN — APIXABAN 5 MG: 5 TABLET, FILM COATED ORAL at 09:01

## 2024-01-22 RX ADMIN — METOPROLOL SUCCINATE 50 MG: 50 TABLET, EXTENDED RELEASE ORAL at 09:01

## 2024-01-22 RX ADMIN — PANTOPRAZOLE SODIUM 40 MG: 40 TABLET, DELAYED RELEASE ORAL at 09:01

## 2024-01-22 RX ADMIN — CEPHALEXIN 500 MG: 500 CAPSULE ORAL at 09:01

## 2024-01-22 RX ADMIN — SPIRONOLACTONE 25 MG: 25 TABLET ORAL at 09:01

## 2024-01-22 NOTE — PLAN OF CARE
Problem: Occupational Therapy  Goal: Occupational Therapy Goal  Outcome: Met       OT screening complete. Pt appears to be functioning at baseline with ADLs & functional mobility. Pt demonstrated independence with bed mobility & self-feeding. In addition, according to the PCT, pt has been walking to the bathroom and able to complete complete toileting tasks. However, pt is unable to follow verbal commands & is non-verbal, a formal OT evaluation was not conducted.

## 2024-01-22 NOTE — PROGRESS NOTES
"[facility] Medicine  Progress Note    Patient Name: Tera Griffiths  MRN: [id number]  Patient Class: OP- Observation   Admission Date: 1/19/2024  Length of Stay: 0 days  Attending Physician: Norm Hair MD  Primary Care Provider: Carleen Bueno MD        Subjective:     Principal Problem:Aggressive behavior        HPI:  Tera Griffiths is a 58 y.o. male with a PMHx of HTN, HLD, CAD, CKD3, L MCA stroke w/ residual aphasia and R-sided deficits, s/p PEG, cognitive impairment, and CHF (EF 15-20%) who presents to the ED for aggressive behavior. Patient is non verbal at baseline and is unable to provide any history. Per ED notes "His sister reports behavior change over the past few days.  He has been aggressive and difficult to redirect.  He has been running into the street, posing a safety risk for himself and others.  She reports a prior episode of similar symptoms.  He was started on psychiatric medications that were discontinued by his primary care provider."     In the ED, VSS, afebrile. CBC unremarkable. ETOH <10. Acetaminophen level <3.0. TSH WNL. Cr 1.5 (at baseline). UA with trace leukocytes and 9 WBCs, much improved from previous on antibiotics currently. UDS +opiates. CTH with multiple zones of infarction are seen throughout both hemispheres, as above. No significant change compared to the prior exam. The patient required multiple doses of ativan as well as haldol, benadryl, and zyprexa. Started on depakote scheduled and PRN per psych recs. The patient was PEC'd and psychiatry consulted to address the pt's aggression. He has been awaiting placement in the ED for over 24 hrs so admitted to hospital medicine pending placement.     Overview/Hospital Course:  No notes on file    Interval History: Patient is non- verbal. Appears calm. Sister present at bedside, discussed POC. Agreeable with IP psych placement. Psych following. Remains PEC. PT/OT and SW consulted for discharge planning. " Patient is medically ready for IP psych placement.     Objective:     Vital Signs (Most Recent):  Temp: 97.8 °F (36.6 °C) (01/22/24 1047)  Pulse: 62 (01/22/24 1047)  Resp: 16 (01/22/24 1047)  BP: 99/63 (01/22/24 1047)  SpO2: 100 % (01/22/24 1047) Vital Signs (24h Range):  Temp:  [96.2 °F (35.7 °C)-98.2 °F (36.8 °C)] 97.8 °F (36.6 °C)  Pulse:  [57-82] 62  Resp:  [16-18] 16  SpO2:  [91 %-100 %] 100 %  BP: ()/(63-91) 99/63     Weight: 72.6 kg (160 lb 0.9 oz)  Body mass index is 24.34 kg/m².    Intake/Output Summary (Last 24 hours) at 1/22/2024 1156  Last data filed at 1/22/2024 0333  Gross per 24 hour   Intake 120 ml   Output 1375 ml   Net -1255 ml           Physical Exam  Vitals and nursing note reviewed.   Constitutional:       General: He is not in acute distress.     Appearance: He is not toxic-appearing or diaphoretic.   HENT:      Head: Normocephalic and atraumatic.      Nose: Nose normal.      Mouth/Throat:      Mouth: Mucous membranes are moist.   Eyes:      Pupils: Pupils are equal, round, and reactive to light.   Cardiovascular:      Rate and Rhythm: Normal rate and regular rhythm.      Pulses: Normal pulses.   Pulmonary:      Effort: Pulmonary effort is normal. No respiratory distress.      Breath sounds: No wheezing, rhonchi or rales.   Abdominal:      General: Bowel sounds are normal. There is no distension.      Palpations: Abdomen is soft.      Tenderness: There is no abdominal tenderness. There is no guarding.      Comments: PEG tube in place   Musculoskeletal:      Cervical back: Normal range of motion.   Skin:     General: Skin is warm and dry.      Capillary Refill: Capillary refill takes less than 2 seconds.   Neurological:      Mental Status: He is alert.      Comments: Global aphasia. Patient moving all extremities but not following commands.   Psychiatric:         Speech: He is noncommunicative.                 Assessment/Plan:      * Aggressive behavior  Patient presented with aggressive  behavior at home and was unable to be redirected by his sister who cares for him. He was also noted to run into the street as well. In the ED he had several episodes of agitations requiring multiple medications. Calm and resting in bed at the time of my exam.  -Pt PEC'd in the ED, continue.  -Continue 1:1 psych observation.  -Psychiatry following patient  1. Scheduled Medication(s):  Discontinued Seroquel given prolonged QTc (581 -> 508 ms)  Started valproic acid 250 mg tid     2. PRN Medication(s):  Valproic acid 250 mg tid prn non-redirectable agitation  Avoid antipsychotics (QTc prolonged) as well as deliriogenic meds including benzodiazepines, antihistamines, anticholinergics (see delirium precautions below)     -Pending inpatient placement. PT/OT and SW consulted for discharge planning      S/P percutaneous endoscopic gastrostomy (PEG) tube placement  - pt does not use peg tube (oral feeding and meds for the past 6 months, per family)   - peg placed originally by gen surg in 8/2023    Combined systolic and diastolic congestive heart failure  Patient is identified as having Combined Systolic and Diastolic heart failure that is Chronic. CHF is currently controlled. Latest ECHO performed and demonstrates- Results for orders placed during the hospital encounter of 12/19/23    Echo    Interpretation Summary    Left Ventricle: The left ventricle is severely dilated. Severely increased ventricular mass. Normal wall thickness. There is eccentric hypertrophy. Global hypokinesis present. There is severely reduced systolic function with a visually estimated ejection fraction of 15 - 20%. There is diastolic dysfunction but grade cannot be determined.    Right Ventricle: Mild right ventricular enlargement. Wall thickness is normal. Right ventricle wall motion has global hypokinesis. Systolic function is mildly reduced. Pacemaker lead present in the ventricle.    Left Atrium: Left atrium is severely dilated.    Right Atrium:  Right atrium is severely dilated.    Aortic Valve: There is mild aortic valve sclerosis.    Pulmonic Valve: There is mild regurgitation.    IVC/SVC: Normal venous pressure at 3 mmHg.    Pericardium: There is a trivial circumferential effusion.  . Continue Beta Blocker, Aldactone, and ARNI and monitor clinical status closely. Monitor on telemetry. Patient is off CHF pathway.  Monitor strict Is&Os and daily weights.  Place on fluid restriction of 1.5 L. Cardiology has not been consulted. Continue to stress to patient importance of self efficacy and  on diet for CHF.     Acute cystitis  Diagnosed during recent ED visit on 1/17 at which time he received rocephin and was discharged on keflex.  -Afebrile, no leukocytosis.  -Repeat UA non infectious.  -Complete course with rocephin 1g.    History of embolic stroke involving left middle cerebral artery  Hemiparesis, right   Global aphasia   -Chronic, stable.  -Continue ASA, statin, and eliquis.   -Fall precautions.    Stage 3 chronic kidney disease  Creatine stable for now. BMP reviewed- noted Estimated Creatinine Clearance: 53.2 mL/min (A) (based on SCr of 1.5 mg/dL (H)). according to latest data. Based on current GFR, CKD stage is stage 3 - GFR 30-59.  Monitor UOP and serial BMP and adjust therapy as needed. Renally dose meds. Avoid nephrotoxic medications and procedures.    Primary hypertension  Chronic, controlled. Latest blood pressure and vitals reviewed-     Temp:  [95 °F (35 °C)-98.3 °F (36.8 °C)]   Pulse:  [77-94]   Resp:  [16-18]   BP: (112-149)/(87-96)   SpO2:  [96 %-99 %] .   Home meds for hypertension were reviewed and noted below.   Hypertension Medications               metoprolol succinate (TOPROL-XL) 50 MG 24 hr tablet Take 1 tablet (50 mg total) by mouth once daily.    nitroGLYCERIN (NITROSTAT) 0.4 MG SL tablet Place under the tongue.    sacubitriL-valsartan (ENTRESTO) 24-26 mg per tablet Take 1 tablet by mouth 2 (two) times daily.    spironolactone  (ALDACTONE) 25 MG tablet Take 1 tablet by mouth once daily.    torsemide (DEMADEX) 20 MG Tab Take 20 mg by mouth 2 (two) times daily.            While in the hospital, will manage blood pressure as follows; Continue home antihypertensive regimen    Will utilize p.r.n. blood pressure medication only if patient's blood pressure greater than 180/110 and he develops symptoms such as worsening chest pain or shortness of breath.    ICD (implantable cardioverter-defibrillator) in place  Noted.  -No acute issues.    Dyslipidemia   Patient is chronically on statin.will continue for now. Monitor clinically. Last LDL was   Lab Results   Component Value Date    LDLCALC 101.4 08/08/2023       Paroxysmal A-fib  Patient with Paroxysmal (<7 days) atrial fibrillation which is controlled currently with Beta Blocker. Patient is currently in sinus rhythm.[id number] Score: 2. Anticoagulation indicated. Anticoagulation done with eliquis .    CAD (coronary artery disease)  Patient with known CAD s/p stent placement and CABG, which is controlled Will continue ASA and Statin and monitor for S/Sx of angina/ACS.       VTE Risk Mitigation (From admission, onward)           Ordered     apixaban tablet 5 mg  2 times daily         01/20/24 0611                    Discharge Planning   YUSEF: 1/22/2024     Code Status: Full Code   Is the patient medically ready for discharge?: Yes    Reason for patient still in hospital (select all that apply): Patient trending condition  Discharge Plan A: Home Health   Discharge Delays: None known at this time              Norm Hair MD  Department of Hospital Medicine   [facility] Surg

## 2024-01-22 NOTE — PROGRESS NOTES
"CONSULTATION LIAISON PSYCHIATRY PROGRESS NOTE    Patient Name: Tera Griffiths  MRN: [id number]  Patient Class: OP- Observation  Admission Date: 1/19/2024  Attending Physician: Norm Hair MD      SUBJECTIVE:   Tera Griffiths is a 58 y.o. male with no known past psychiatric history and PMH of L MCA stroke w/ residual aphasia and R-sided deficits, s/p PEG, cognitive impairment, CHF, HTN, HLD, and CKD who currently presents w/ aggression at home. Psychiatry consulted to address the pt's aggression. He has been awaiting placement in the ED for the past day.      Psychiatry consulted for "great disability, aggressive, pending transfer for 19 hours the ED "    Overnight patient noted to have been agitated and trying to leave room.  Grabbed staff member.  Required PRN Depakote at 2058 and 0702.  At time of interview, patient sitting calmly in bed.  Tracks appropriately with eyes.  Does not reply to attempts to engage in interview or follow commands when prompted.  Interview limited due to patient's nonverbal status and global aphasia.       OBJECTIVE:    Mental Status Exam:  General Appearance: unremarkable, dressed in hospital garb, in no acute distress  Behavior:  calm, minimal engagement with interview  Involuntary Movements and Motor Activity: no abnormal involuntary movements noted  Gait and Station: unable to assess - patient lying down or seated  Speech and Language: mute  Mood: neutral  Affect: reactive  Thought Process and Associations: difficult to assess due to poverty of thought  Thought Content and Perceptions:: Unable to Assess  Sensorium and Orientation: alert, Unable to Formally Assess  Recent and Remote Memory: Unable to  Formally Assess  Attention and Concentration: Unable to Formally Assess  Fund of Knowledge: Unable to Formally Assess  Insight: poor awareness of illness  Judgment: poor, behavior is inappropriate to the circumstances    CAM ICU positive? Unable to assess      ASSESSMENT & " RECOMMENDATIONS   Unspecified major neurocognitive disorder   R/o delirium, possibly 2/2 UTI vs multifactorial   Prolonged Qtc      PSYCH MEDICATIONS  Scheduled: Increase Valproic Acid to 500mg PO or IV TID  PRN: Valproic acid 250mg PO or IV TID PRN non-redirectable agitation  Recommend checking EKG to monitor Qtc  QTc 508 on 1/19  Avoid antipsychotics (qtc prolonged) as well as deliriogenic meds including benzos, antihistamines, anticholinergics. See below for further delirium precautions     RISK ASSESSMENT  NEEDS PEC/CEC for grave disability & NEEDS 1:1 sitter  CEC exp 2/3/24 @ 1643    Delirium precautions:  Continue to manage medical conditions and assess for and treat pain.  Please try to minimize the use of physical restraints, as they can worsen agitation; utilize PRN medications first.  Please avoid/minimize use of benzodiazepines (understand that may need to use BZD taper for EtOH withdrawal), anticholinergics, antihistamines (H1- and H2-blockers), and opioids when possible, as they may exacerbate delirium.  Please keep shades open and lights on during day and shades closed and lights off at night to encourage normal sleep/wake cycle.  Reduce unnecessary stimulation/noise. TV screen and sound should be off unless patient is actively engaged w/ the program.  Encourage staff to reorient pt as needed throughout the day and to optimize pt's surroundings w/ an accurately updated calendar, and functioning clock    FOLLOW UP  Will follow up while in house    DISPOSITION - once medically cleared:  Seek involuntary inpatient psychiatric admission for stabilization of acute psychiatric symptoms and a safe disposition plan is enacted. The patient &/or their family was informed that the patient will be transferred to an inpatient unit per ED placement team.     Please contact ON CALL psychiatry service (24/7) for any acute issues that may arise.    Dr. Maxx Barragan   Psychiatry  [facility]  [place]  1/22/2024 11:00 AM        --------------------------------------------------------------------------------------------------------------------------------------------------------------------------------------------------------------------------------------    CONTINUED OBJECTIVE clinical data & findings reviewed and noted for above decision making    Current Medications:   Scheduled Meds:    apixaban  5 mg Oral BID    aspirin  81 mg Oral Daily    atorvastatin  80 mg Oral Daily    cephALEXin  500 mg Oral Q6H    divalproex  250 mg Oral TID    ezetimibe  10 mg Oral QHS    metoprolol succinate  50 mg Oral Daily    pantoprazole  40 mg Oral Daily    sacubitriL-valsartan  1 tablet Oral BID    spironolactone  25 mg Oral Daily    torsemide  20 mg Oral BID     PRN Meds: acetaminophen, acetaminophen, dextrose 10%, dextrose 10%, divalproex, glucagon (human recombinant), glucose, glucose, melatonin, naloxone, sodium chloride 0.9%    Allergies:   Review of patient's allergies indicates:  No Known Allergies    Vitals  Vitals:    01/22/24 1047   BP: 99/63   Pulse: 62   Resp: 16   Temp: 97.8 °F (36.6 °C)       Labs/Imaging/Studies:  Recent Results (from the past 24 hour(s))   Comprehensive Metabolic Panel (CMP)    Collection Time: 01/22/24  7:50 AM   Result Value Ref Range    Sodium 139 136 - 145 mmol/L    Potassium 4.3 3.5 - 5.1 mmol/L    Chloride 106 95 - 110 mmol/L    CO2 17 (L) 23 - 29 mmol/L    Glucose 93 70 - 110 mg/dL    BUN 34 (H) 6 - 20 mg/dL    Creatinine 2.3 (H) 0.5 - 1.4 mg/dL    Calcium 9.8 8.7 - 10.5 mg/dL    Total Protein 8.1 6.0 - 8.4 g/dL    Albumin 3.7 3.5 - 5.2 g/dL    Total Bilirubin 0.4 0.1 - 1.0 mg/dL    Alkaline Phosphatase 76 55 - 135 U/L    AST 15 10 - 40 U/L    ALT 14 10 - 44 U/L    eGFR 32.5 (A) >60 mL/min/1.73 m^2    Anion Gap 16 8 - 16 mmol/L   Magnesium    Collection Time: 01/22/24  7:50 AM   Result Value Ref Range    Magnesium 2.3 1.6 - 2.6 mg/dL   CBC with Automated Differential     Collection Time: 01/22/24  7:50 AM   Result Value Ref Range    WBC 5.26 3.90 - 12.70 K/uL    RBC 4.99 4.60 - 6.20 M/uL    Hemoglobin 13.5 (L) 14.0 - 18.0 g/dL    Hematocrit 42.7 40.0 - 54.0 %    MCV 86 82 - 98 fL    MCH 27.1 27.0 - 31.0 pg    MCHC 31.6 (L) 32.0 - 36.0 g/dL    RDW 15.8 (H) 11.5 - 14.5 %    Platelets 298 150 - 450 K/uL    MPV 11.5 9.2 - 12.9 fL    Immature Granulocytes 0.2 0.0 - 0.5 %    Gran # (ANC) 2.9 1.8 - 7.7 K/uL    Immature Grans (Abs) 0.01 0.00 - 0.04 K/uL    Lymph # 1.5 1.0 - 4.8 K/uL    Mono # 0.6 0.3 - 1.0 K/uL    Eos # 0.2 0.0 - 0.5 K/uL    Baso # 0.02 0.00 - 0.20 K/uL    nRBC 0 0 /100 WBC    Gran % 55.3 38.0 - 73.0 %    Lymph % 28.9 18.0 - 48.0 %    Mono % 10.8 4.0 - 15.0 %    Eosinophil % 4.4 0.0 - 8.0 %    Basophil % 0.4 0.0 - 1.9 %    Differential Method Automated      Imaging Results              CT Head Without Contrast (Final result)  Result time 01/19/24 12:36:46      Final result by Daquan Carty MD (01/19/24 12:36:46)                   Impression:      Multiple zones of infarction are seen throughout both hemispheres, as above.  No significant change compared to the prior exam.      Electronically signed by: Daquan Carty MD  Date:    01/19/2024  Time:    12:36               Narrative:    EXAMINATION:  CT HEAD WITHOUT CONTRAST    CLINICAL HISTORY:  Mental status change, unknown cause;    TECHNIQUE:  Low dose axial images were obtained through the head.  Coronal and sagittal reformations were also performed. Contrast was not administered.    COMPARISON:  01/17/2024    FINDINGS:  Examination demonstrates generalize cerebral volume loss with enlargement of sulci and ventricles.  There are multiple remote infarcts again identified in both frontal lobes, parietal lobes and occipital lobes, largest area involving the left parieto-occipital area.  Remote infarcts also in the right anterior basal ganglia including caudate head and right cerebellum.    No acute infarct or significant  change compared to the prior study.  No intracranial hemorrhage is seen.  No extra-axial collections, mass, edema or midline shift.

## 2024-01-22 NOTE — PT/OT/SLP PROGRESS
Occupational Therapy   Screening/Discharge    Name: Tera Griffiths  MRN: [id number]  Admitting Diagnosis:  Aggressive behavior         Tera Griffiths is a 58 y.o. male with a medical diagnosis of Aggressive behavior.      OT screening complete. Pt appears to be functioning at baseline with ADLs & functional mobility. Pt demonstrated independence with bed mobility & self-feeding. In addition, according to the PCT that has been monitoring the pt reports that the pt has been walking to the bathroom and able to complete toileting tasks. However, pt is unable to follow verbal commands & is non-verbal, and therefore a formal OT evaluation could not be conducted. Pt is not appropriate for occupational therapy services at this time and will be discharged from OT services.     Please re-consult OT services if pt has a change in functional status.

## 2024-01-22 NOTE — PLAN OF CARE
[facility] Med S[facility]  Discharge Final Note    Primary Care Provider: Carleen Bueno MD    Expected Discharge Date: 1/22/2024    Final Discharge Note (most recent)       Final Note - 01/22/24 1501          Final Note    Assessment Type Final Discharge Note     Anticip[facility] Discharge [facility]position [facility]        Post-Acute Status    Post-Acute Authorization Placement     Post-Acute Placement Status Set-up Complete/Auth obtained     Discharge Delays None known at this time                     Important Message from Medicare             Contact Info       Carleen Bueno MD   Specialty: Internal Medicine   Relationship: PCP - General    [address] [facility] [address]   Phone: [phone]       Next Steps: Follow up in 1 week(s)

## 2024-01-22 NOTE — PLAN OF CARE
Problem: Occupational Therapy  Goal: Occupational Therapy Goal  Outcome: Met       OT eval complete. Pt is functioning at baseline with ADLs & functional mobility and will not require further acute OT services at this time.

## 2024-01-22 NOTE — NURSING
MD paged after patient attempted to leave room. He was non-redirectable and agitated. Patient grabbed staff breast and refused to go back to room. Security was called to assist, md aware.

## 2024-01-22 NOTE — PT/OT/SLP PROGRESS
Physical Therapy Screen and Discharge      Patient Name:  Tera Griffiths   MRN:  [id number]    PT orders received and acknowledged and screen performed. Pt demonstrated independence with bed mobility and self feeding during screen, however pt is non-verbal and demonstrated poor command following with prompted mobility. Pt appears to be functioning at baseline PLOF at this time. RN and PCT report pt has been able to amb to bathroom with supervision. Acute physical therapy orders will be discharged at this time, please re-consult if status changes.     1/22/2024

## 2024-01-22 NOTE — SUBJECTIVE & OBJECTIVE
Interval History: Patient is non- verbal. Appears calm. Sister present at bedside, discussed POC. Agreeable with IP psych placement. Psych following. Remains PEC. PT/OT and SW consulted for discharge planning. Patient is medically ready for IP psych placement.     Objective:     Vital Signs (Most Recent):  Temp: 97.8 °F (36.6 °C) (01/22/24 1047)  Pulse: 62 (01/22/24 1047)  Resp: 16 (01/22/24 1047)  BP: 99/63 (01/22/24 1047)  SpO2: 100 % (01/22/24 1047) Vital Signs (24h Range):  Temp:  [96.2 °F (35.7 °C)-98.2 °F (36.8 °C)] 97.8 °F (36.6 °C)  Pulse:  [57-82] 62  Resp:  [16-18] 16  SpO2:  [91 %-100 %] 100 %  BP: ()/(63-91) 99/63     Weight: 72.6 kg (160 lb 0.9 oz)  Body mass index is 24.34 kg/m².    Intake/Output Summary (Last 24 hours) at 1/22/2024 1156  Last data filed at 1/22/2024 0333  Gross per 24 hour   Intake 120 ml   Output 1375 ml   Net -1255 ml           Physical Exam  Vitals and nursing note reviewed.   Constitutional:       General: He is not in acute distress.     Appearance: He is not toxic-appearing or diaphoretic.   HENT:      Head: Normocephalic and atraumatic.      Nose: Nose normal.      Mouth/Throat:      Mouth: Mucous membranes are moist.   Eyes:      Pupils: Pupils are equal, round, and reactive to light.   Cardiovascular:      Rate and Rhythm: Normal rate and regular rhythm.      Pulses: Normal pulses.   Pulmonary:      Effort: Pulmonary effort is normal. No respiratory distress.      Breath sounds: No wheezing, rhonchi or rales.   Abdominal:      General: Bowel sounds are normal. There is no distension.      Palpations: Abdomen is soft.      Tenderness: There is no abdominal tenderness. There is no guarding.      Comments: PEG tube in place   Musculoskeletal:      Cervical back: Normal range of motion.   Skin:     General: Skin is warm and dry.      Capillary Refill: Capillary refill takes less than 2 seconds.   Neurological:      Mental Status: He is alert.      Comments: Global aphasia.  Patient moving all extremities but not following commands.   Psychiatric:         Speech: He is noncommunicative.

## 2024-01-22 NOTE — NURSING
Nurses Note -- 4 Eyes      1/22/2024   3:21 AM      Skin assessed during: Admit      [x] No Altered Skin Integrity Present    [x]Prevention Measures Documented      [] Yes- Altered Skin Integrity Present or Discovered   [] LDA Added if Not in Epic (Describe Wound)   [] New Altered Skin Integrity was Present on Admit and Documented in LDA   [] Wound Image Taken    Wound Care Consulted? No    Attending Nurse:  Samantha Crawford RN/Staff Member:   Aaliyah

## 2024-01-22 NOTE — PHARMACY MED REC
"Admission Medication History     The home medication history was taken by Pawel Garibay.    You may go to "Admission" then "Reconcile Home Medications" tabs to review and/or act upon these items.     The home medication list has been updated by the Pharmacy department.   Please read ALL comments highlighted in yellow.   Please address this information as you see fit.    Feel free to contact us if you have any questions or require assistance.      UNABLE TO ASSESS PATIENT. LAST FILL DATES ENTERED.    Medications listed below were obtained from: [employer] software- [employer]  Current Outpatient Medications on File Prior to Encounter   Medication Sig    apixaban (ELIQUIS) 5 mg Tab     Take 1 tablet (5 mg total) by mouth 2 (two) times   daily.  Last fill date on 1/7/24 for 60/30       aspirin 81 MG Chew     Chew and  swallow 1 tablet (81 mg total) by mouth once daily.  Last fill date on 11/2/23 for 90/90      atorvastatin (LIPITOR) 80 MG tablet   Take 1 tablet (80 mg total) by mouth once daily.  Last fill date on 12/31 for 30/30    bisacodyL (DULCOLAX) 10 mg Supp     Place 1 suppository (10 mg total) rectally daily as needed (Until bowel movement if patient has no bowel movement for 2 days).  Last fill date on 12/22/23 for 30/30      busPIRone (BUSPAR) 10 MG tablet     Take 1 tablet (10 mg total) by mouth 3 (three) times daily. Can also take two additional doses as needed for agitation  Last fill date on 12/22/23 for 90/30      cephALEXin (KEFLEX) 500 MG capsule   Take 1 capsule (500 mg total) by mouth every 12 (twelve) hours for 10 days  Last fill date on 1/18/24 for 20/10      empagliflozin (JARDIANCE) 10 mg tablet   Take 1 tablet (10 mg total) by mouth once daily.  Last fill date on 11/2/23 for 90/90    ezetimibe (ZETIA) 10 mg tablet   Take 1 tablet (10 mg total) by mouth every evening.  Last fill date on 11/2/23 for 90/90      melatonin (MELATIN) 3 mg tablet   Take 2 tablets (6 mg total) by mouth nightly as needed for " insomnia.    metoprolol succinate (TOPROL-XL) 50 MG 24 hr tablet   Take 1 tablet (50 mg total) by mouth once daily.  Last fill date on 11/2/23 for 90/90    nitroGLYCERIN (NITROSTAT) 0.4 MG SL tablet   Place under the tongue.    pantoprazole (PROTONIX) 40 MG tablet   Take 1 tablet by mouth once daily.  Last fill date on 12/31/23 for 30/30    polyethylene glycol (GLYCOLAX) 17 gram PwPk   Take 17 g by mouth once daily.    QUEtiapine (SEROQUEL) 25 MG Tab   Take 1 tablet (25 mg total) by mouth every evening.  Last fill date on 9/22/23 for 30/30    sacubitriL-valsartan (ENTRESTO) 24-26 mg per tablet   Take 1 tablet by mouth 2 (two) times daily.  Last fill date on 1/6/24 for 60/30    senna-docusate 8.6-50 mg (PERICOLACE) 8.6-50 mg per tablet   Take 1 tablet by mouth once daily.    spironolactone (ALDACTONE) 25 MG tablet   Take 1 tablet by mouth once daily.  Last fill date on 1/4/24 for 30/30    torsemide (DEMADEX) 20 MG Tab   Take 20 mg by mouth 2 (two) times daily.  Last fill date on 12/6/23 for 120/60    traMADoL (ULTRAM) 50 mg tablet   Take 50 mg by mouth every 6 (six) hours as needed for pain.  Last fill date on 12/7/23 for 21/7             Potential issues to be addressed PRIOR TO DISCHARGE  Please discuss with the patient barriers to adherence with medication treatment plans  Patient requires education regarding drug therapies     Pawel Garibay  EXT [phone]                  .

## 2024-01-23 NOTE — DISCHARGE SUMMARY
"[facility] Medicine  Discharge Summary      Patient Name: Tera Griffiths  MRN: [id number]  STEFFANIE: [id number]  Patient Class: OP- Observation  Admission Date: 1/19/2024  Hospital Length of Stay: 0 days  Discharge Date and Time:  01/23/2024 2:18 PM  Attending Physician: Robyn att. providers found   Discharging Provider: Norm Hair MD  Primary Care Provider: Carleen Bueno MD  [facility] Medicine Team: [facility] Norm Hair MD  Primary Care Team: [facility]    HPI:   Tera Griffiths is a 58 y.o. male with a PMHx of HTN, HLD, CAD, CKD3, L MCA stroke w/ residual aphasia and R-sided deficits, s/p PEG, cognitive impairment, and CHF (EF 15-20%) who presents to the ED for aggressive behavior. Patient is non verbal at baseline and is unable to provide any history. Per ED notes "His sister reports behavior change over the past few days.  He has been aggressive and difficult to redirect.  He has been running into the street, posing a safety risk for himself and others.  She reports a prior episode of similar symptoms.  He was started on psychiatric medications that were discontinued by his primary care provider."     In the ED, VSS, afebrile. CBC unremarkable. ETOH <10. Acetaminophen level <3.0. TSH WNL. Cr 1.5 (at baseline). UA with trace leukocytes and 9 WBCs, much improved from previous on antibiotics currently. UDS +opiates. CTH with multiple zones of infarction are seen throughout both hemispheres, as above. No significant change compared to the prior exam. The patient required multiple doses of ativan as well as haldol, benadryl, and zyprexa. Started on depakote scheduled and PRN per psych recs. The patient was PEC'd and psychiatry consulted to address the pt's aggression. He has been awaiting placement in the ED for over 24 hrs so admitted to hospital medicine pending placement.     * No surgery found *      Hospital Course:   Patient presented with aggressive behavior at home and was " unable to be redirected by his sister who cares for him. He was also noted to run into the street as well. In the ED he had several episodes of agitations requiring multiple medications. Pt PEC'd in the ED. Psych consulted. Discontinued Seroquel given prolonged QTc (581 -> 508 ms). Started valproic acid 500 mg tid. Rec using Valproic acid 250 mg tid prn non-redirectable agitation. Avoid antipsychotics (QTc prolonged) as well as deliriogenic meds including benzodiazepines, antihistamines, anticholinergics (see delirium precautions below). Of note, patient noted to have Uti, diagnosed during recent ED visit on 1/17 at which time he received rocephin and was discharged on keflex.Patient received 2 doses of CTX as IP , switched back to Keflex to complete total course fo 5 days. PT/OT and SW consulted for discharge planning. Patient cleared by psych for IP psych placement. On 1/23 Patient d/c to IP psych facility. Plan of care discussed with sister, verbalized understanding. All questions were answered.        Goals of Care Treatment Preferences:  Code Status: Full Code    Health care agent: [facility] agent number:           What is most important right now is to focus on extending life as long as possible, even it it means sacrificing quality, curative/life-prolongation (regardless of treatment burdens).  Accordingly, we have decided that the best plan to meet the patient's goals includes continuing with treatment.      Consults:   Consults (From admission, onward)          Status Ordering Provider     Inpatient consult to Psychiatry  Once        Provider:  (Not yet assigned)    CARI Hall            No new Assessment & Plan notes have been filed under this hospital service since the last note was generated.  Service: Hospital Medicine    Final Active Diagnoses:    Diagnosis Date Noted POA    PRINCIPAL PROBLEM:  Aggressive behavior [R46.89] 01/20/2024 Yes    Major  neurocognitive disorder [F03.90] 01/21/2024 Unknown    S/P percutaneous endoscopic gastrostomy (PEG) tube placement [Z93.1] 12/21/2023 Not Applicable    Combined systolic and diastolic congestive heart failure [I50.40] 09/27/2023 Yes    Acute cystitis [N30.00] 09/25/2023 Yes    Global aphasia [R47.01] 08/08/2023 Yes    Hemiparesis, right [G81.91] 08/08/2023 Yes    History of embolic stroke involving left middle cerebral artery [Z86.73] 08/08/2023 Not Applicable    Stage 3 chronic kidney disease [N18.30] 04/19/2023 Yes    Paroxysmal A-fib [I48.0] 09/23/2022 Yes    Primary hypertension [I10] 09/23/2022 Yes    CAD (coronary artery disease) [I25.10] 09/23/2022 Yes    Dyslipidemia [E78.5] 09/23/2022 Yes    ICD (implantable cardioverter-defibrillator) in place [Z95.810] 09/23/2022 Yes      Problems Resolved During this Admission:       Discharged Condition: stable    Disposition: Psychiatric Hospital    Follow Up:   Follow-up Information       Carleen Bueno MD Follow up in 1 week(s).    Specialty: Internal Medicine  Contact information:  [address] [place] [address]  [phone]                           Patient Instructions:      Ambulatory referral/consult to Psychiatry   Standing Status: Future   Referral Priority: Routine Referral Type: Psychiatric   Referral Reason: Specialty Services Required   Requested Specialty: Psychiatry   Number of Visits Requested: 1       Significant Diagnostic Studies: N/A    Pending Diagnostic Studies:       None           Medications:  Reconciled Home Medications:      Medication List        START taking these medications      * divalproex 250 MG EC tablet  Commonly known as: DEPAKOTE  Take 1 tablet (250 mg total) by mouth 3 (three) times daily as needed (agitation).     * divalproex 500 MG Tbec  Commonly known as: DEPAKOTE  Take 1 tablet (500 mg total) by mouth 3 (three) times daily.           * This list has 2 medication(s) that are the same as other medications prescribed for  you. Read the directions carefully, and ask your doctor or other care provider to review them with you.                CONTINUE taking these medications      apixaban 5 mg Tab  Commonly known as: ELIQUIS  Take 1 tablet (5 mg total) by mouth 2 (two) times daily.     aspirin 81 MG Chew  Chew and  swallow 1 tablet (81 mg total) by mouth once daily.     atorvastatin 80 MG tablet  Commonly known as: LIPITOR  Take 1 tablet (80 mg total) by mouth once daily.     busPIRone 10 MG tablet  Commonly known as: BUSPAR  Take 1 tablet (10 mg total) by mouth 3 (three) times daily. Can also take two additional doses as needed for agitation     cephALEXin 500 MG capsule  Commonly known as: KEFLEX  Take 1 capsule (500 mg total) by mouth every 12 (twelve) hours. for 4 days     empagliflozin 10 mg tablet  Commonly known as: JARDIANCE  Take 1 tablet (10 mg total) by mouth once daily.     ezetimibe 10 mg tablet  Commonly known as: ZETIA  Take 1 tablet (10 mg total) by mouth every evening.     melatonin 3 mg tablet  Commonly known as: MELATIN  Take 2 tablets (6 mg total) by mouth nightly as needed for Insomnia.     metoprolol succinate 50 MG 24 hr tablet  Commonly known as: TOPROL-XL  Take 1 tablet (50 mg total) by mouth once daily.     nitroGLYCERIN 0.4 MG SL tablet  Commonly known as: NITROSTAT  Place under the tongue.     pantoprazole 40 MG tablet  Commonly known as: PROTONIX  Take 1 tablet by mouth once daily.     polyethylene glycol 17 gram Pwpk  Commonly known as: GLYCOLAX  Take 17 g by mouth once daily.     QUEtiapine 25 MG Tab  Commonly known as: SEROQUEL  Take 1 tablet (25 mg total) by mouth every evening.     sacubitriL-valsartan 24-26 mg per tablet  Commonly known as: ENTRESTO  Take 1 tablet by mouth 2 (two) times daily.     senna-docusate 8.6-50 mg 8.6-50 mg per tablet  Commonly known as: PERICOLACE  Take 1 tablet by mouth once daily.     spironolactone 25 MG tablet  Commonly known as: ALDACTONE  Take 1 tablet by mouth once  daily.     torsemide 20 MG Tab  Commonly known as: DEMADEX  Take 20 mg by mouth 2 (two) times daily.     traMADoL 50 mg tablet  Commonly known as: ULTRAM  Take 50 mg by mouth every 6 (six) hours as needed for Pain.            ASK your doctor about these medications      bisacodyL 10 mg Supp  Commonly known as: DULCOLAX  Place 1 suppository (10 mg total) rectally daily as needed (Until bowel movement if patient has no bowel movement for 2 days).  Ask about: Should I take this medication?              Indwelling Lines/Drains at time of discharge:   Lines/Drains/Airways       Drain  Duration                  Gastrostomy/Enterostomy 08/17/23 0800 Percutaneous endoscopic gastrostomy (PEG) midline 159 days                    Time spent on the discharge of patient: 35 minutes         Norm Hair MD  Department of Hospital Medicine  [facility]

## 2024-01-23 NOTE — HOSPITAL COURSE
Patient presented with aggressive behavior at home and was unable to be redirected by his sister who cares for him. He was also noted to run into the street as well. In the ED he had several episodes of agitations requiring multiple medications. Pt PEC'd in the ED. Psych consulted. Discontinued Seroquel given prolonged QTc (581 -> 508 ms). Started valproic acid 500 mg tid. Rec using Valproic acid 250 mg tid prn non-redirectable agitation. Avoid antipsychotics (QTc prolonged) as well as deliriogenic meds including benzodiazepines, antihistamines, anticholinergics (see delirium precautions below). Of note, patient noted to have Uti, diagnosed during recent ED visit on 1/17 at which time he received rocephin and was discharged on keflex.Patient received 2 doses of CTX as IP , switched back to Keflex to complete total course fo 5 days. PT/OT and SW consulted for discharge planning. Patient cleared by psych for IP psych placement. On 1/23 Patient d/c to IP psych facility. Plan of care discussed with sister, verbalized understanding. All questions were answered.

## 2024-02-05 NOTE — ASSESSMENT & PLAN NOTE
- Was on DAPT: ASA, Plavix  - On hold- defer to VN about when to restart  - EKG with AF, negative for STEMI  - Troponin elevated at baseline   show

## 2024-02-15 ENCOUNTER — OFFICE VISIT (OUTPATIENT)
Dept: SURGERY | Facility: CLINIC | Age: 59
End: 2024-02-15
Payer: MEDICAID

## 2024-02-15 VITALS
DIASTOLIC BLOOD PRESSURE: 77 MMHG | BODY MASS INDEX: 26.3 KG/M2 | HEART RATE: 80 BPM | SYSTOLIC BLOOD PRESSURE: 128 MMHG | HEIGHT: 68 IN | WEIGHT: 173.5 LBS | OXYGEN SATURATION: 98 %

## 2024-02-15 DIAGNOSIS — Z43.1 PEG (PERCUTANEOUS ENDOSCOPIC GASTROSTOMY) ADJUSTMENT/REPLACEMENT/REMOVAL: Primary | ICD-10-CM

## 2024-02-15 PROCEDURE — 99214 OFFICE O/P EST MOD 30 MIN: CPT | Mod: PBBFAC | Performed by: STUDENT IN AN ORGANIZED HEALTH CARE EDUCATION/TRAINING PROGRAM

## 2024-02-15 PROCEDURE — 4010F ACE/ARB THERAPY RXD/TAKEN: CPT | Mod: CPTII,,, | Performed by: STUDENT IN AN ORGANIZED HEALTH CARE EDUCATION/TRAINING PROGRAM

## 2024-02-15 PROCEDURE — 3078F DIAST BP <80 MM HG: CPT | Mod: CPTII,,, | Performed by: STUDENT IN AN ORGANIZED HEALTH CARE EDUCATION/TRAINING PROGRAM

## 2024-02-15 PROCEDURE — 3074F SYST BP LT 130 MM HG: CPT | Mod: CPTII,,, | Performed by: STUDENT IN AN ORGANIZED HEALTH CARE EDUCATION/TRAINING PROGRAM

## 2024-02-15 PROCEDURE — 99213 OFFICE O/P EST LOW 20 MIN: CPT | Mod: S$PBB,,, | Performed by: STUDENT IN AN ORGANIZED HEALTH CARE EDUCATION/TRAINING PROGRAM

## 2024-02-15 PROCEDURE — 99999 PR PBB SHADOW E&M-EST. PATIENT-LVL IV: CPT | Mod: PBBFAC,,, | Performed by: STUDENT IN AN ORGANIZED HEALTH CARE EDUCATION/TRAINING PROGRAM

## 2024-02-15 PROCEDURE — 1159F MED LIST DOCD IN RCRD: CPT | Mod: CPTII,,, | Performed by: STUDENT IN AN ORGANIZED HEALTH CARE EDUCATION/TRAINING PROGRAM

## 2024-02-15 PROCEDURE — 3008F BODY MASS INDEX DOCD: CPT | Mod: CPTII,,, | Performed by: STUDENT IN AN ORGANIZED HEALTH CARE EDUCATION/TRAINING PROGRAM

## 2024-02-15 NOTE — PROGRESS NOTES
"[facility]  Post Op Visit    SUBJECTIVE:  Tera Griffiths is a 58 y.o. male who presents to clinic today for post op follow up after Egd, With Peg Tube Insertion on 8/17/2023 . He  denies pain, fevers, chills, nausea, vomiting, diarrhea, and constipation and is returning to their usual activity level. He is tolerating diet with normal appetite and bowel function and denies redness around or drainage from incisions. Patients ex-wife is present and has been taking care of him for the past couple of months. She states he has been eating full meals. He has been ambulating well. He is still nonverbal but responds well to commands. She states patient and herself are ready for this tube to come out, he has not used in months. Denies any drainage, redness, or signs of infection.     OBJECTIVE:  Vitals:    02/15/24 0951   BP: 128/77   Pulse: 80     Body mass index is 26.38 kg/m².    General: male in NAD. Not toxic appearing. Nonverbal at baseline.   HENT: Normocephalic and atraumatic. EOM intact.   Pulmonary: No respiratory distress. Effort normal.  Abdomen: soft, non-tender, non-distended  Skin: Incisions are healing well without signs of infection. No erythema, drainage, or increased warmth noted. Slight hypergranulation tissue noted basia-tube insertion site.   MSK: normal range of motion  Neurological: Nonverbal, bale to respond to commands.   Psychiatric: normal mood and behavior      Path:   No results found for: "FPATHDX"      ASSESSMENT/PLAN:    Tera Griffiths is a 58 y.o. y/o male who presents to clinic today for f/u s/p Egd, With Peg Tube Insertion on 8/17/2023. Clinically improving and meeting all post op milestones     - Removed PEG tube without incident, patient tolerated procedure well. No bleeding after, dressed with gauze and Tegaderm and instructed patient on proper wound care.   - Follow-up PRN. Call clinic with any questions or concerns  - All questions answered; patient is comfortable with " the above follow-up plan and verbalized understanding.    Chaz Cai DPM   PGY-1  2/15/2024

## 2024-07-18 NOTE — PROGRESS NOTES
[facility] Step[facility]  Cardiology  Progress Note    Patient Name: Tera Griffiths  MRN: [id number]  Admission Date: 6/8/2023  Hospital Length of Stay: 2 days  Code Status: Full Code   Attending Physician: Joel Kebede MD   Primary Care Physician: Carleen Bueno MD  Expected Discharge Date: 6/13/2023  Principal Problem:Chronic combined systolic and diastolic heart failure    Subjective:     Hospital Course:   Admitted for HF exacerbation with initial elevated BP on nitro gtt.  Weaned off gtt and reintroduced home medications as tolerated while diuresing with IV medications.      Interval History: Reports feeling well.  Denies chest pain, dyspnea, cough.  Diuresing well.  Restarting home Torsemide and titrating BP medications as tolerated.    Review of Systems   Constitutional: Negative.   Eyes: Negative.    Cardiovascular: Negative.  Negative for chest pain and leg swelling.   Respiratory:  Negative for cough and shortness of breath.    Endocrine: Negative.    Hematologic/Lymphatic: Negative.    Skin: Negative.    Musculoskeletal: Negative.    Neurological:  Negative for dizziness.   Objective:     Vital Signs (Most Recent):  Temp: 97.3 °F (36.3 °C) (06/10/23 1126)  Pulse: 62 (06/10/23 1126)  Resp: 18 (06/10/23 1126)  BP: 120/74 (06/10/23 1126)  SpO2: (!) 94 % (06/10/23 1126) Vital Signs (24h Range):  Temp:  [96.5 °F (35.8 °C)-98.7 °F (37.1 °C)] 97.3 °F (36.3 °C)  Pulse:  [62-78] 62  Resp:  [16-62] 18  SpO2:  [91 %-100 %] 94 %  BP: ()/(44-85) 120/74     Weight: 86.2 kg (190 lb)  Body mass index is 25.77 kg/m².     SpO2: (!) 94 %         Intake/Output Summary (Last 24 hours) at 6/10/2023 1208  Last data filed at 6/10/2023 1002  Gross per 24 hour   Intake 120 ml   Output 1150 ml   Net -1030 ml       Lines/Drains/Airways       Peripheral Intravenous Line  Duration                  Peripheral IV - Single Lumen 06/08/23 1157 18 G Left Antecubital 2 days         Peripheral IV - Single Lumen 06/08/23 5085  20 G Posterior;Right Forearm 1 day                       Physical Exam  Constitutional:       Appearance: Normal appearance.   HENT:      Head: Normocephalic and atraumatic.   Eyes:      Extraocular Movements: Extraocular movements intact.      Pupils: Pupils are equal, round, and reactive to light.   Cardiovascular:      Rate and Rhythm: Normal rate and regular rhythm.      Pulses: Normal pulses.      Heart sounds: Normal heart sounds.   Pulmonary:      Effort: Pulmonary effort is normal.      Breath sounds: Normal breath sounds.   Abdominal:      General: Abdomen is flat. Bowel sounds are normal.      Palpations: Abdomen is soft.   Musculoskeletal:         General: No tenderness. Normal range of motion.      Cervical back: Normal range of motion.      Right lower leg: No edema.      Left lower leg: No edema.   Skin:     General: Skin is warm.      Capillary Refill: Capillary refill takes less than 2 seconds.   Neurological:      General: No focal deficit present.      Mental Status: He is alert and oriented to person, place, and time.          Significant Labs: All pertinent lab results from the last 24 hours have been reviewed.    Significant Imaging:  All relevant imaging reviewed    Assessment and Plan:       * Chronic combined systolic and diastolic heart failure  Acute on chronic systolic/diastolic heart failure  Presented with elevated blood pressures.  Started on Tridil drip received IV Lasix in the emergency room.  Restart GDMT.   S/p IV diuresis.  Transition to home Torsemide 20mg qd  Strict I/Os  Weaned off nitro gtt while restarting home medications  Replete K>4 and MG>2     Stage 3 chronic kidney disease  Creatinine at presentation is 1.4.  Improved from prior admission.  Continue to monitor with diuresis.    HTN (hypertension)  Restart home blood pressure medications. Weaned off Tridil drip while introducing home medications.  Denies missing doses of medications at home.  BP drop significantly when  restarted home isosorbide.  Unclear about home medication compliance.      Entresto 49-51 BID  Titrate and addition of antihypertensives as tolerated    Dyslipidemia  On Statins     Type 2 diabetes mellitus with circulatory disorder, without long-term current use of insulin  Sliding scale and a.c. HS.    Atrial fibrillation  History of paroxysmal atrial fibrillation.  In normal sinus rhythm.  On anticoagulation with Eliquis    CAD (coronary artery disease)  Patient has nonobstructive coronary artery disease from angiogram 1 month back.  He has history of STEMI his RCA.  Continue Plavix, Toprol-XL, statins.        VTE Risk Mitigation (From admission, onward)         Ordered     apixaban tablet 5 mg  2 times daily         06/08/23 7101                Alexis Martinez MD  Cardiology  [facility] - Cardiology Stepdown   Other

## 2024-07-27 ENCOUNTER — HOSPITAL ENCOUNTER (EMERGENCY)
Facility: HOSPITAL | Age: 59
Discharge: HOME | End: 2024-07-28
Attending: EMERGENCY MEDICINE
Payer: MEDICAID

## 2024-07-27 DIAGNOSIS — R10.9 ABDOMINAL PAIN, UNSPECIFIED ABDOMINAL LOCATION: ICD-10-CM

## 2024-07-27 DIAGNOSIS — N20.0 KIDNEY STONE: Primary | ICD-10-CM

## 2024-07-27 DIAGNOSIS — N20.0 NEPHROLITHIASIS: ICD-10-CM

## 2024-07-27 DIAGNOSIS — R07.9 CHEST PAIN: ICD-10-CM

## 2024-07-27 LAB
ALBUMIN SERPL BCP-MCNC: 3.7 G/DL (ref 3.5–5.2)
ALP SERPL-CCNC: 57 U/L (ref 55–135)
ALT SERPL W/O P-5'-P-CCNC: 7 U/L (ref 10–44)
ANION GAP SERPL CALC-SCNC: 11 MMOL/L (ref 8–16)
AST SERPL-CCNC: 11 U/L (ref 10–40)
BASOPHILS # BLD AUTO: 0.02 K/UL (ref 0–0.2)
BASOPHILS NFR BLD: 0.4 % (ref 0–1.9)
BILIRUB SERPL-MCNC: 0.3 MG/DL (ref 0.1–1)
BUN SERPL-MCNC: 26 MG/DL (ref 6–20)
BUN SERPL-MCNC: 30 MG/DL (ref 6–30)
CALCIUM SERPL-MCNC: 9.1 MG/DL (ref 8.7–10.5)
CHLORIDE SERPL-SCNC: 103 MMOL/L (ref 95–110)
CHLORIDE SERPL-SCNC: 105 MMOL/L (ref 95–110)
CO2 SERPL-SCNC: 22 MMOL/L (ref 23–29)
CREAT SERPL-MCNC: 1.9 MG/DL (ref 0.5–1.4)
CREAT SERPL-MCNC: 2 MG/DL (ref 0.5–1.4)
DIFFERENTIAL METHOD BLD: ABNORMAL
EOSINOPHIL # BLD AUTO: 0.1 K/UL (ref 0–0.5)
EOSINOPHIL NFR BLD: 1.2 % (ref 0–8)
ERYTHROCYTE [DISTWIDTH] IN BLOOD BY AUTOMATED COUNT: 14.2 % (ref 11.5–14.5)
EST. GFR  (NO RACE VARIABLE): 40.6 ML/MIN/1.73 M^2
GLUCOSE SERPL-MCNC: 83 MG/DL (ref 70–110)
GLUCOSE SERPL-MCNC: 86 MG/DL (ref 70–110)
HCT VFR BLD AUTO: 45.8 % (ref 40–54)
HCT VFR BLD CALC: 50 %PCV (ref 36–54)
HGB BLD-MCNC: 14.3 G/DL (ref 14–18)
IMM GRANULOCYTES # BLD AUTO: 0.02 K/UL (ref 0–0.04)
IMM GRANULOCYTES NFR BLD AUTO: 0.4 % (ref 0–0.5)
LIPASE SERPL-CCNC: 50 U/L (ref 4–60)
LYMPHOCYTES # BLD AUTO: 1.6 K/UL (ref 1–4.8)
LYMPHOCYTES NFR BLD: 31.8 % (ref 18–48)
MCH RBC QN AUTO: 27 PG (ref 27–31)
MCHC RBC AUTO-ENTMCNC: 31.2 G/DL (ref 32–36)
MCV RBC AUTO: 87 FL (ref 82–98)
MONOCYTES # BLD AUTO: 0.4 K/UL (ref 0.3–1)
MONOCYTES NFR BLD: 8.6 % (ref 4–15)
NEUTROPHILS # BLD AUTO: 3 K/UL (ref 1.8–7.7)
NEUTROPHILS NFR BLD: 57.6 % (ref 38–73)
NRBC BLD-RTO: 0 /100 WBC
PLATELET # BLD AUTO: 361 K/UL (ref 150–450)
PMV BLD AUTO: 11.4 FL (ref 9.2–12.9)
POC IONIZED CALCIUM: 1.14 MMOL/L (ref 1.06–1.42)
POC TCO2 (MEASURED): 25 MMOL/L (ref 23–29)
POTASSIUM BLD-SCNC: 4 MMOL/L (ref 3.5–5.1)
POTASSIUM SERPL-SCNC: 4 MMOL/L (ref 3.5–5.1)
PROT SERPL-MCNC: 8 G/DL (ref 6–8.4)
RBC # BLD AUTO: 5.29 M/UL (ref 4.6–6.2)
SAMPLE: ABNORMAL
SODIUM BLD-SCNC: 139 MMOL/L (ref 136–145)
SODIUM SERPL-SCNC: 138 MMOL/L (ref 136–145)
TROPONIN I SERPL DL<=0.01 NG/ML-MCNC: 0.03 NG/ML (ref 0–0.03)
WBC # BLD AUTO: 5.12 K/UL (ref 3.9–12.7)

## 2024-07-27 PROCEDURE — 93010 ELECTROCARDIOGRAM REPORT: CPT | Mod: ,,, | Performed by: INTERNAL MEDICINE

## 2024-07-27 PROCEDURE — 93005 ELECTROCARDIOGRAM TRACING: CPT

## 2024-07-27 PROCEDURE — 81001 URINALYSIS AUTO W/SCOPE: CPT | Performed by: STUDENT IN AN ORGANIZED HEALTH CARE EDUCATION/TRAINING PROGRAM

## 2024-07-27 PROCEDURE — 99285 EMERGENCY DEPT VISIT HI MDM: CPT | Mod: 25

## 2024-07-27 PROCEDURE — 83690 ASSAY OF LIPASE: CPT | Performed by: STUDENT IN AN ORGANIZED HEALTH CARE EDUCATION/TRAINING PROGRAM

## 2024-07-27 PROCEDURE — 80047 BASIC METABLC PNL IONIZED CA: CPT

## 2024-07-27 PROCEDURE — 85025 COMPLETE CBC W/AUTO DIFF WBC: CPT | Performed by: STUDENT IN AN ORGANIZED HEALTH CARE EDUCATION/TRAINING PROGRAM

## 2024-07-27 PROCEDURE — 80053 COMPREHEN METABOLIC PANEL: CPT | Performed by: STUDENT IN AN ORGANIZED HEALTH CARE EDUCATION/TRAINING PROGRAM

## 2024-07-27 PROCEDURE — 84484 ASSAY OF TROPONIN QUANT: CPT | Performed by: STUDENT IN AN ORGANIZED HEALTH CARE EDUCATION/TRAINING PROGRAM

## 2024-07-28 VITALS
SYSTOLIC BLOOD PRESSURE: 102 MMHG | HEART RATE: 56 BPM | TEMPERATURE: 98 F | OXYGEN SATURATION: 96 % | DIASTOLIC BLOOD PRESSURE: 77 MMHG | RESPIRATION RATE: 18 BRPM

## 2024-07-28 LAB
BACTERIA #/AREA URNS AUTO: ABNORMAL /HPF
BILIRUB UR QL STRIP: NEGATIVE
CLARITY UR REFRACT.AUTO: CLEAR
COLOR UR AUTO: COLORLESS
GLUCOSE UR QL STRIP: ABNORMAL
HGB UR QL STRIP: ABNORMAL
HYALINE CASTS UR QL AUTO: 5 /LPF
KETONES UR QL STRIP: NEGATIVE
LEUKOCYTE ESTERASE UR QL STRIP: NEGATIVE
MICROSCOPIC COMMENT: ABNORMAL
NITRITE UR QL STRIP: NEGATIVE
OHS QRS DURATION: 136 MS
OHS QTC CALCULATION: 475 MS
PH UR STRIP: 5 [PH] (ref 5–8)
PROT UR QL STRIP: NEGATIVE
RBC #/AREA URNS AUTO: 88 /HPF (ref 0–4)
SP GR UR STRIP: 1.01 (ref 1–1.03)
URN SPEC COLLECT METH UR: ABNORMAL
WBC #/AREA URNS AUTO: 1 /HPF (ref 0–5)
YEAST UR QL AUTO: ABNORMAL

## 2024-07-28 PROCEDURE — 96375 TX/PRO/DX INJ NEW DRUG ADDON: CPT

## 2024-07-28 PROCEDURE — 25000003 PHARM REV CODE 250: Performed by: STUDENT IN AN ORGANIZED HEALTH CARE EDUCATION/TRAINING PROGRAM

## 2024-07-28 PROCEDURE — 63600175 PHARM REV CODE 636 W HCPCS: Performed by: STUDENT IN AN ORGANIZED HEALTH CARE EDUCATION/TRAINING PROGRAM

## 2024-07-28 PROCEDURE — 96374 THER/PROPH/DIAG INJ IV PUSH: CPT

## 2024-07-28 RX ORDER — FAMOTIDINE 10 MG/ML
20 INJECTION INTRAVENOUS
Status: COMPLETED | OUTPATIENT
Start: 2024-07-28 | End: 2024-07-28

## 2024-07-28 RX ORDER — MORPHINE SULFATE 4 MG/ML
4 INJECTION, SOLUTION INTRAMUSCULAR; INTRAVENOUS
Status: COMPLETED | OUTPATIENT
Start: 2024-07-28 | End: 2024-07-28

## 2024-07-28 RX ORDER — ACETAMINOPHEN 500 MG
1000 TABLET ORAL
Status: COMPLETED | OUTPATIENT
Start: 2024-07-28 | End: 2024-07-28

## 2024-07-28 RX ORDER — OXYCODONE HYDROCHLORIDE 5 MG/1
5 TABLET ORAL EVERY 4 HOURS PRN
Qty: 8 TABLET | Refills: 0 | Status: SHIPPED | OUTPATIENT
Start: 2024-07-28

## 2024-07-28 RX ADMIN — FAMOTIDINE 20 MG: 10 INJECTION, SOLUTION INTRAVENOUS at 02:07

## 2024-07-28 RX ADMIN — ACETAMINOPHEN 1000 MG: 500 TABLET ORAL at 12:07

## 2024-07-28 RX ADMIN — MORPHINE SULFATE 4 MG: 4 INJECTION INTRAVENOUS at 02:07

## 2024-07-28 NOTE — ED NOTES
I-STAT Chem-8+ Results:   Value Reference Range   Sodium 139 136-145 mmol/L   Potassium  4.0 3.5-5.1 mmol/L   Chloride 103  mmol/L   Ionized Calcium 1.14 1.06-1.42 mmol/L   CO2 (measured) 25 23-29 mmol/L   Glucose 86  mg/dL   BUN 30 6-30 mg/dL   Creatinine 2.0 0.5-1.4 mg/dL   Hematocrit 50 36-54%

## 2024-07-28 NOTE — DISCHARGE INSTRUCTIONS
You can give him Tylenol as needed every 8 hours.  For severe pain you can give him the oxycodone.  Follow up with the urologist for kidney stones.

## 2024-07-28 NOTE — PROVIDER PROGRESS NOTES - EMERGENCY DEPT.
Encounter Date: 7/27/2024    ED Physician Progress Notes            ED Resident HAND-OFF NOTE:  11:22 PM 7/28/2024  Tera Griffiths is a 59 y.o. male who presented to the ED on 7/27/2024 and has been managed by .       59 year old male with PMHx of heart failure and nonverbal.  He endorses R abdominal pain and had episode of reaching for his chest.  Labs so far unremarkable.  No need for delta trop.   [x ] CT scan   CT scan completed with nonobstructing 3 mm left nephrolithiasis at [facility].  On my re-evaluation, patient appears to be acutely uncomfortable, he was given 1 g of oral Tylenol without significant improvement on re-evaluation following the Tylenol.  He was then given 4 mg IV morphine with significant improvement of his symptoms.    Discussed nephrolithiasis management as outpatient with his sister at bedside.  She was given a strainer and outpatient referral to Urology for further nephrolithiasis management and short course of oral oxycodone for use as needed for continued pain.    On my evaluation, Tera Griffiths appears well, hemodynamically stable and in NAD. Thus far, Tera Griffiths has received:  Medications   acetaminophen tablet 1,000 mg (1,000 mg Oral Given 7/28/24 0028)   morphine injection 4 mg (4 mg Intravenous Given 7/28/24 0228)   famotidine (PF) injection 20 mg (20 mg Intravenous Given 7/28/24 0228)       On my exam, I appreciate:  /77   Pulse (!) 56   Temp 97.9 °F (36.6 °C) (Oral)   Resp 18   SpO2 96%   ED Course as of 07/28/24 0942   Sat Jul 27, 2024 2227 CBC W/ AUTO DIFFERENTIAL(!)  unremarkable [AC]   2241 Creatinine(!): 1.9  At baseline [AC]   2241 Troponin I(!): 0.030  Lower than previous 7 months ago [AC]   2316 [phone] patient sister calling for update. Updated on lab work. Discharge is pending CT scan, though overall reassured by bloodwork and exam. [AC]   Sun Jul 28, 2024   0032 CT Abdomen Pelvis  Without Contrast  3 mm left ureteral stone at the  UVJ, new since 01/17/2024, with proximal ureteral dilatation.  No evidence of hydronephrosis.     Left atrial chamber enlargement, and left ventricular chamber enlargement and/or myocardial hypertrophy; both present previously.     Partially visualized transvenous pacemaker/ICD leads.     Exophytic left renal lesion, previously characterized as a hyperdense cyst, stable from 2014.   [KB]   6955 X-Ray Chest 1 View  Stable cardiomegaly with mild pulmonary vascular congestion.      [KB]      ED Course User Index  [AC] Mariella Montero MD  [KB] Esha Kelly MD       Disposition:  Discharged home.  I have discussed and counseled Tera Griffiths regarding exam, results, diagnosis, treatment, and plan.  ______________________  Esha Kelly DO  Emergency Medicine Resident  11:22 PM 7/27/2024

## 2024-07-28 NOTE — ED PROVIDER NOTES
"Encounter Date: 7/27/2024       History     Chief Complaint   Patient presents with    Abdominal Pain     Pt nonverbal, sister at bedside providing hx. States pt c/o lower abd pain and back pain. States pt's urine has been brown and coming out "like syrup."     Chest Pain     L sided CP that began last night     Tera Griffiths is a 59-year-old male w/ a PMHx significant for atrial fibrillation, CAD, stroke, HTN, ICD in setting of CHF, stage III CKD is presenting w/ abdominal pain.    Patient is nonverbal and history is obtained from sister.  Sister reports that patient has had multiple recurrent episodes of abdominal pain and was previously being worked up at [facility].  Previous encounters were attributed to bowel spasm and patient is usually discharged home.  Of note sister reports that patient has a left-sided hernia that is reducible.  Surgeons have deferred surgery in setting of CHF w/ reported ejection fraction 15%.  Today sister reports that patient began screaming while on the commode after attempting to urinate.  Patient was clutching his right side and unable to stand.  Sister does confirm bowel that he is able to urinate today.  Prior to today the patient had been staying w/ another caregiver who also reports a recent history of right-sided abdominal pain indicated by clutching his right side and crying.  She also noted that yesterday he seemed to endorse left-sided CP/left shoulder pain and was screaming and placing the caregivers hand on his chest        Review of patient's allergies indicates:  No Known Allergies  Past Medical History:   Diagnosis Date    Acute on chronic combined systolic and diastolic heart failure 09/23/2022    Atrial fibrillation     CAD (coronary artery disease) 09/23/2022    Dyslipidemia 09/23/2022    Embolic stroke involving left middle cerebral artery 08/08/2023    Encounter for blood transfusion     H/O heart artery stent     HTN (hypertension) 09/23/2022    ICD (implantable " cardioverter-defibrillator) in place 09/23/2022    Paroxysmal A-fib 09/23/2022    Stage 3 chronic kidney disease 04/19/2023     Past Surgical History:   Procedure Laterality Date    CARDIAC DEFIBRILLATOR PLACEMENT Left     CEREBRAL ANGIOGRAM N/A 8/8/2023    Procedure: ANGIOGRAM-CEREBRAL;  Surgeon: Kenzie Rodriguez;  Location: [facility];  Service: Anesthesiology;  Laterality: N/A;  LVO (angiogram)    ESOPHAGOGASTRODUODENOSCOPY N/A 8/11/2023    Procedure: EGD (ESOPHAGOGASTRODUODENOSCOPY);  Surgeon: Colette Martinez MD;  Location: [facility] ENDO (2ND FLR);  Service: Gastroenterology;  Laterality: N/A;    ESOPHAGOGASTRODUODENOSCOPY W/ PEG N/A 8/17/2023    Procedure: EGD, WITH PEG TUBE INSERTION;  Surgeon: Yan Scott MD;  Location: [facility] OR 2ND FLR;  Service: General;  Laterality: N/A;  PEG, possible lap G    HERNIA REPAIR      LEFT HEART CATHETERIZATION Left 4/18/2023    Procedure: Left heart cath;  Surgeon: Atilio Marks MD;  Location: [facility] CATH LAB;  Service: Cardiology;  Laterality: Left;    RIGHT HEART CATHETERIZATION Right 9/30/2022    Procedure: INSERTION, CATHETER, RIGHT HEART;  Surgeon: Caden Aden MD;  Location: [facility] CATH LAB;  Service: Cardiology;  Laterality: Right;    TREATMENT OF CARDIAC ARRHYTHMIA N/A 11/22/2022    Procedure: CARDIOVERSION;  Surgeon: Marvin Sanon MD;  Location: [facility] EP LAB;  Service: Cardiology;  Laterality: N/A;  afib, KIA, DCCV, anes, MB, 3 Prep     No family history on file.  Social History     Tobacco Use    Smoking status: Never    Smokeless tobacco: Never   Substance Use Topics    Alcohol use: Never    Drug use: Never     Review of Systems    Physical Exam     Initial Vitals [07/27/24 1941]   BP Pulse Resp Temp SpO2   101/81 71 18 97.6 °F (36.4 °C) 98 %      MAP       --         Physical Exam    Constitutional: He appears well-developed and well-nourished.   HENT:   Head: Normocephalic and atraumatic.   Eyes: Conjunctivae and EOM are normal. Pupils are equal, round, and  reactive to light.   Cardiovascular:  Normal rate.           Pulmonary/Chest: Breath sounds normal. No respiratory distress. He has no wheezes. He has no rales.   Abdominal: Abdomen is soft. Bowel sounds are normal. He exhibits no distension. There is no abdominal tenderness.   Patient seen guarding on his right side when transplant from wheelchair to the bed.  However on physical exam he is nontender w/ palpation to the right side and right flank. There is no rebound and no guarding.     Neurological: He is alert.         ED Course   Procedures  Labs Reviewed   CBC W/ AUTO DIFFERENTIAL - Abnormal       Result Value    WBC 5.12      RBC 5.29      Hemoglobin 14.3      Hematocrit 45.8      MCV 87      MCH 27.0      MCHC 31.2 (*)     RDW 14.2      Platelets 361      MPV 11.4      Immature Granulocytes 0.4      Gran # (ANC) 3.0      Immature Grans (Abs) 0.02      Lymph # 1.6      Mono # 0.4      Eos # 0.1      Baso # 0.02      nRBC 0      Gran % 57.6      Lymph % 31.8      Mono % 8.6      Eosinophil % 1.2      Basophil % 0.4      Differential Method Automated     COMPREHENSIVE METABOLIC PANEL - Abnormal    Sodium 138      Potassium 4.0      Chloride 105      CO2 22 (*)     Glucose 83      BUN 26 (*)     Creatinine 1.9 (*)     Calcium 9.1      Total Protein 8.0      Albumin 3.7      Total Bilirubin 0.3      Alkaline Phosphatase 57      AST 11      ALT 7 (*)     eGFR 40.6 (*)     Anion Gap 11     TROPONIN I - Abnormal    Troponin I 0.030 (*)    ISTAT PROCEDURE - Abnormal    POC Glucose 86      POC BUN 30      POC Creatinine 2.0 (*)     POC Sodium 139      POC Potassium 4.0      POC Chloride 103      POC TCO2 (MEASURED) 25      POC Ionized Calcium 1.14      POC Hematocrit 50      Sample HUI     LIPASE    Lipase 50     URINALYSIS, REFLEX TO URINE CULTURE   ISTAT CHEM8          Imaging Results              CT Abdomen Pelvis  Without Contrast (In process)  Result time 07/27/24 22:56:31                     Medications - No  data to display  Medical Decision Making  This is a 59-year-old nonverbal male w/ a PMHx of heart failure and states she CKD is presenting to the ED w/ right-sided abdominal pain and 1 day history of now resolved CP.  History is obtained from sister.  Patient has a history of recurrent episodes of abdominal pain w/ the most recent CT scan earlier this month as previously been attributed to spasms.  This presentation is unique as he is complaining about his right side rather in his last.  Overall exam is reassuring, patient does not have any abdominal distention or tenderness.  No right flank tenderness.  Patient is hemodynamically stable.  However given communication barriers plan to do full abdominal workup and cardiac workup including EKG, troponin, CBC, CMP, lipase, UA, and CT abdomen/pelvis..  Given patient's history of CKD stage 3 we will perform CT scan w/o contrast.    Labs reviewed and overall unremarkable.  Creatinine is at his baseline.  Troponin is elevated but lower than previous.  Elevation is likely related to CKD.  Given reported CP was over 24 hours ago would have anticipated elevated troponin if true ischemic event.  Will defer repeat troponin at this time.  EKG also unremarkable.  UA remains pending.  CT abdomen pending read at time of sign-out. Patient signed out at change of shift to oncoming resident.                   ED Course as of 07/27/24 2334   Sat Jul 27, 2024 2227 CBC W/ AUTO DIFFERENTIAL(!)  unremarkable [AC]   2241 Creatinine(!): 1.9  At baseline [AC]   2241 Troponin I(!): 0.030  Lower than previous 7 months ago [AC]   2316 [phone] patient sister calling for update. Updated on lab work. Discharge is pending CT scan, though overall reassured by bloodwork and exam. [AC]      ED Course User Index  [AC] Mariella Montero MD                             Clinical Impression:  Final diagnoses:  [R07.9] Chest pain  [R10.9] Abdominal pain, unspecified abdominal location (Primary)        Plan discussed with Dr. Chaudhari.    Mariella Montero MD  Emergency Medicine, PGY1            Mariella Montero MD  Resident  07/27/24 9890

## 2024-07-28 NOTE — ED TRIAGE NOTES
"Tera Griffiths, a 59 y.o. male presents to the ED w/ complaint of patient non verbal. Per sister patient has been complaining about lower abdominal pain and back pain.     Triage note:  Chief Complaint   Patient presents with    Abdominal Pain     Pt nonverbal, sister at bedside providing hx. States pt c/o lower abd pain and back pain. States pt's urine has been brown and coming out "like syrup."     Chest Pain     L sided CP that began last night     Review of patient's allergies indicates:  No Known Allergies  Past Medical History:   Diagnosis Date    Acute on chronic combined systolic and diastolic heart failure 09/23/2022    Atrial fibrillation     CAD (coronary artery disease) 09/23/2022    Dyslipidemia 09/23/2022    Embolic stroke involving left middle cerebral artery 08/08/2023    Encounter for blood transfusion     H/O heart artery stent     HTN (hypertension) 09/23/2022    ICD (implantable cardioverter-defibrillator) in place 09/23/2022    Paroxysmal A-fib 09/23/2022    Stage 3 chronic kidney disease 04/19/2023       "

## 2024-08-16 NOTE — PROGRESS NOTES
PATIENT REPORTS HE HAS BEEN READMITTED TO OUTSIDE FACILITY-[facility].     no pressure injuries documented

## 2024-12-02 NOTE — ASSESSMENT & PLAN NOTE
Patient agitated on admission and is baseline non-verbal post CVA. QTc prolonged on admission and home dose seroquel was held. Patient has history of hospital delirium/agitation, which does not represent baseline behavior at home or among family. Patient is requiring a sitter to control agitation, as patient is able to remove mittens and soft restraints. Has removed multiple pIVs.     -Continue home depakote 500/500/1500mg dosing  -Continue home seroquel 25 as nightly PRN  -Continue buspar 10mg TID + 10mg PRN for agitation  -Ativan 0.5mg IM q6h PRN for non-redirectable agitation  -Psychiatry signed off, appreciate recs   Pt chart reviewed. Per LOV note on 10/16/24 \"DULoxetine (CYMBALTA) 30 MG capsule; Take 1 capsule by mouth daily.\"    Next scheduled visit on 2/20/25. Rx sent to preferred pharmacy.

## 2025-04-01 NOTE — PROGRESS NOTES
09/23/22 1248   Vital Signs   Temp 97.7 °F (36.5 °C)   Temp src Oral   Pulse 80   Heart Rate Source SpO2   Resp 20   SpO2 96 %   O2 Device (Oxygen Therapy) room air   BP (!) 149/99   MAP (mmHg) 118   BP Location Right arm   BP Method Automatic   Patient Position Lying   Level care handed off by CARL Otero Charge RN to CARL Del Real   "  If patient has telephone visit, have they been educated on video visit as preferred visit method and offered to change to video visit? N/A        Instructions Relayed to Patient by Virtual Roomer:     Patient is active on [employer]:   Relayed following to patient: \"It looks like you are active on [employer], are you able to join the visit this way? If not, do you need us to send you a link now or would you like your provider to send a link via text or email when they are ready to initiate the visit?\"      Patient Confirmed they will join visit via: [employer]  Reminded patient to ensure they were logged on to virtual visit by arrival time listed.   Asked if patient has flexibility to initiate visit sooner than arrival time: patient is unable to initiate visit earlier than arrival time     If pediatric virtual visit, ensured pediatric patient along with parent/guardian will be present for video visit.     Patient offered the website [email or web address] and/or phone number to [employer] Help line: [phone]    Helnea is a 53 year old who is being evaluated via a billable video visit.    How would you like to obtain your AVS? PBC [email or web address]  If the video visit is dropped, the invitation should be resent by: Send to e-mail at: [email or web address]  Will anyone else be joining your video visit? No      Assessment & Plan     Migraine with aura and without status migrainosus, not intractable  History of migraine with aura but he really has not had one in probably 10 years.  But now he has had 2 in a couple of weeks.  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  Reviewed urgent care visit and subsequent lab work.  Started taking a magnesium and potassium supplement.  So we talked about strategies for this.  I think his history tells us that chronic migraine is not necessarily high on her worry list, but certainly if that does start to happen we " "can talk about strategies.  Moreover we want to have something on hand in case of migraine does start.  He does get the aura warning so we can hopefully truncate this with Imitrex.  Discussed mechanism of action of the proposed medication, as well as potential effects, both good and bad.  Patient expressed understanding and agreed with treatment.   - SUMAtriptan (IMITREX) 50 MG tablet; Take 1 tablet (50 mg) by mouth at onset of headache for migraine. May repeat in 2 hours. Max 4 tablets/24 hours.    Type 2 diabetes mellitus without complication, without long-term current use of insulin (H)  Very well-controlled.  Plan follow-up in 6 months  - metFORMIN (GLUCOPHAGE) 500 MG tablet; Take 1 tablet (500 mg) by mouth daily (with dinner).  - lisinopril (ZESTRIL) 5 MG tablet; Take 1 tablet (5 mg) by mouth daily.    Hypertension goal BP (blood pressure) < 140/90  Stable on current regimen.  Continue same plan and routine follow-up.   - amLODIPine (NORVASC) 5 MG tablet; Take 1 tablet (5 mg) by mouth daily.  - lisinopril (ZESTRIL) 5 MG tablet; Take 1 tablet (5 mg) by mouth daily.  - triamterene-HCTZ (DYAZIDE) 37.5-25 MG capsule; Take 1 capsule by mouth daily.    Hyperlipidemia LDL goal <70  Stable on current regimen.  Continue same plan and routine follow-up.     Neck pain  Still little bit stiff and we will try another quick course of steroids  - methylPREDNISolone (MEDROL DOSEPAK) 4 MG tablet therapy pack; Follow Package Directions          BMI  Estimated body mass index is 28.46 kg/m  as calculated from the following:    Height as of 3/20/25: 1.626 m (5' 4\").    Weight as of 3/20/25: 75.2 kg (165 lb 12.8 oz).         Regular exercise  See Patient Instructions    Subjective   Pat is a 53 year old, presenting for the following health issues:  No chief complaint on file.        4/1/2025     9:07 AM   Additional Questions   Roomed by Annamarie PIZARRO   Accompanied by Self         4/1/2025     9:07 AM   Patient Reported Additional " Medications   Patient reports taking the following new medications None     History of Present Illness       Diabetes:   He presents for follow up of diabetes.    He is not checking blood glucose.         He has no concerns regarding his diabetes at this time.   He is not experiencing numbness or burning in feet, excessive thirst, blurry vision, weight changes or redness, sores or blisters on feet.           He eats 2-3 servings of fruits and vegetables daily.He consumes 0 sweetened beverage(s) daily.He exercises with enough effort to increase his heart rate 9 or less minutes per day.  He exercises with enough effort to increase his heart rate 3 or less days per week.   He is taking medications regularly.          Video visit with patient today in follow-up of of chronic conditions but wanted to talk about migraines as above      Review of Systems  Constitutional, HEENT, cardiovascular, pulmonary, gi and gu systems are negative, except as otherwise noted.      Objective           Vitals:  No vitals were obtained today due to virtual visit.    Physical Exam   GENERAL: alert and no distress  EYES: Eyes grossly normal to inspection.  No discharge or erythema, or obvious scleral/conjunctival abnormalities.  RESP: No audible wheeze, cough, or visible cyanosis.    SKIN: Visible skin clear. No significant rash, abnormal pigmentation or lesions.  NEURO: Cranial nerves grossly intact.  Mentation and speech appropriate for age.  PSYCH: Appropriate affect, tone, and pace of words    Past labs reviewed with the patient.     The longitudinal plan of care for the diagnosis(es)/condition(s) as documented were addressed during this visit. Due to the added complexity in care, I will continue to support Pat in the subsequent management and with ongoing continuity of care.      Video-Visit Details    Type of service:  Video Visit   Originating Location (pt. Location): Home    Distant Location (provider location):  On-site  Platform  used for Video Visit: Ankur  Signed Electronically by: Dolores Baldwin MD
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
## (undated) DEVICE — LUBRICANT SURGILUBE 2 OZ

## (undated) DEVICE — KIT MICROINTRO 4F .018X40X7CM

## (undated) DEVICE — CATH ARI 4FR

## (undated) DEVICE — PAD DEFIB CADENCE ADULT R2

## (undated) DEVICE — TRAY CATH LAB OMC

## (undated) DEVICE — TUBING SUC UNIV W/CONN 12FT

## (undated) DEVICE — CATH INFINITI JUDKINS JR4

## (undated) DEVICE — KIT PEG PULL STANDARD 20F

## (undated) DEVICE — KIT CUSTOM MANIFOLD

## (undated) DEVICE — BOWL STERILE LARGE 32OZ

## (undated) DEVICE — SHEATH INTRODUCER 7FR 11CM

## (undated) DEVICE — SHEATH INTRODUCER 6FR 11CM

## (undated) DEVICE — SPIKE CONTRAST CONTROLLER

## (undated) DEVICE — GUIDEWIRE SUPRA CORE 035 190CM

## (undated) DEVICE — LINE 60IN PRESSURE MON.

## (undated) DEVICE — OMNIPAQUE 350 200ML

## (undated) DEVICE — KIT PROBE COVER WITH GEL

## (undated) DEVICE — SET MICROPUNCTURE 5FR 501NT

## (undated) DEVICE — SEE MEDLINE ITEM 156894

## (undated) DEVICE — CATH SWAN GANZ STND 7FR

## (undated) DEVICE — GUIDEWIRE .021X260CM J-3MM TIP

## (undated) DEVICE — DEVICE PERCLOSE SUT CLSR 6FR

## (undated) DEVICE — TRANSDUCER ADULT DISP

## (undated) DEVICE — URINARY DRAINAGE BAG

## (undated) DEVICE — CATH JL5 4FR INFINITY

## (undated) DEVICE — STOPCOCK 3-WAY